# Patient Record
Sex: MALE | Race: WHITE | NOT HISPANIC OR LATINO | Employment: OTHER | URBAN - METROPOLITAN AREA
[De-identification: names, ages, dates, MRNs, and addresses within clinical notes are randomized per-mention and may not be internally consistent; named-entity substitution may affect disease eponyms.]

---

## 2017-01-06 RX ORDER — PEN NEEDLE, DIABETIC 29 G X1/2"
NEEDLE, DISPOSABLE MISCELLANEOUS
Qty: 100 EACH | Refills: 1 | Status: SHIPPED | OUTPATIENT
Start: 2017-01-06 | End: 2017-05-04 | Stop reason: SDUPTHER

## 2017-05-04 RX ORDER — FLURBIPROFEN SODIUM 0.3 MG/ML
SOLUTION/ DROPS OPHTHALMIC
Qty: 100 EACH | Refills: 5 | Status: SHIPPED | OUTPATIENT
Start: 2017-05-04 | End: 2020-06-11

## 2018-12-03 ENCOUNTER — TELEPHONE (OUTPATIENT)
Dept: INTERNAL MEDICINE | Facility: CLINIC | Age: 63
End: 2018-12-03

## 2018-12-03 NOTE — TELEPHONE ENCOUNTER
PT LEFT PRACTICE DUE TO INS REASONS. LAST SEEN DEC, 2015.  PT NOW HAS INS WE ACCEPT, PT WOULD LIKE TO TO RETURN.   PT IS CONSIDERED NEW PT, WOULD YOU LIKE TO TO TAKE ON AS NEW PT?    PT CALL BACK [phone]

## 2018-12-20 ENCOUNTER — OFFICE VISIT (OUTPATIENT)
Dept: INTERNAL MEDICINE | Facility: CLINIC | Age: 63
End: 2018-12-20

## 2018-12-20 ENCOUNTER — TELEPHONE (OUTPATIENT)
Dept: INTERNAL MEDICINE | Facility: CLINIC | Age: 63
End: 2018-12-20

## 2018-12-20 VITALS
HEIGHT: 74 IN | HEART RATE: 64 BPM | SYSTOLIC BLOOD PRESSURE: 142 MMHG | TEMPERATURE: 98.6 F | DIASTOLIC BLOOD PRESSURE: 76 MMHG | WEIGHT: 262 LBS | RESPIRATION RATE: 18 BRPM | BODY MASS INDEX: 33.62 KG/M2

## 2018-12-20 DIAGNOSIS — Z79.4 TYPE 2 DIABETES MELLITUS WITH DIABETIC PERIPHERAL ANGIOPATHY WITHOUT GANGRENE, WITH LONG-TERM CURRENT USE OF INSULIN (HCC): Primary | ICD-10-CM

## 2018-12-20 DIAGNOSIS — I10 ESSENTIAL HYPERTENSION: ICD-10-CM

## 2018-12-20 DIAGNOSIS — I73.9 PERIPHERAL ARTERIAL DISEASE (HCC): ICD-10-CM

## 2018-12-20 DIAGNOSIS — E78.5 HYPERLIPIDEMIA, UNSPECIFIED HYPERLIPIDEMIA TYPE: ICD-10-CM

## 2018-12-20 DIAGNOSIS — I25.10 CORONARY ARTERY DISEASE INVOLVING NATIVE HEART WITHOUT ANGINA PECTORIS, UNSPECIFIED VESSEL OR LESION TYPE: ICD-10-CM

## 2018-12-20 DIAGNOSIS — E11.51 TYPE 2 DIABETES MELLITUS WITH DIABETIC PERIPHERAL ANGIOPATHY WITHOUT GANGRENE, WITH LONG-TERM CURRENT USE OF INSULIN (HCC): Primary | ICD-10-CM

## 2018-12-20 LAB
A/C: NORMAL
ALBUMIN SERPL-MCNC: 4.58 G/DL (ref 3.2–4.8)
ALBUMIN/GLOB SERPL: 2 G/DL (ref 1.5–2.5)
ALP SERPL-CCNC: 70 U/L (ref 25–100)
ALT SERPL W P-5'-P-CCNC: 24 U/L (ref 7–40)
ANION GAP SERPL CALCULATED.3IONS-SCNC: 7 MMOL/L (ref 3–11)
ARTICHOKE IGE QN: 75 MG/DL (ref 0–130)
AST SERPL-CCNC: 21 U/L (ref 0–33)
BILIRUB BLD-MCNC: NEGATIVE MG/DL
BILIRUB SERPL-MCNC: 0.6 MG/DL (ref 0.3–1.2)
BUN BLD-MCNC: 21 MG/DL (ref 9–23)
BUN/CREAT SERPL: 21.4 (ref 7–25)
CALCIUM SPEC-SCNC: 9.2 MG/DL (ref 8.7–10.4)
CHLORIDE SERPL-SCNC: 105 MMOL/L (ref 99–109)
CHOLEST SERPL-MCNC: 124 MG/DL (ref 0–200)
CLARITY, POC: CLEAR
CO2 SERPL-SCNC: 30 MMOL/L (ref 20–31)
COLOR UR: YELLOW
CREAT BLD-MCNC: 0.98 MG/DL (ref 0.6–1.3)
EXPIRATION DATE: ABNORMAL
EXPIRATION DATE: NORMAL
GFR SERPL CREATININE-BSD FRML MDRD: 78 ML/MIN/1.73
GLOBULIN UR ELPH-MCNC: 2.3 GM/DL
GLUCOSE BLD-MCNC: 74 MG/DL (ref 70–100)
GLUCOSE UR STRIP-MCNC: ABNORMAL MG/DL
HBA1C MFR BLD: 8.4 % (ref 4.8–5.6)
HDLC SERPL-MCNC: 45 MG/DL (ref 40–60)
KETONES UR QL: NEGATIVE
LEUKOCYTE EST, POC: NEGATIVE
Lab: ABNORMAL
Lab: NORMAL
NITRITE UR-MCNC: NEGATIVE MG/ML
PH UR: 5 [PH] (ref 5–8)
POC CREATININE URINE: 200
POC MICROALBUMIN URINE: 80
POTASSIUM BLD-SCNC: 3.8 MMOL/L (ref 3.5–5.5)
PROT SERPL-MCNC: 6.9 G/DL (ref 5.7–8.2)
PROT UR STRIP-MCNC: NEGATIVE MG/DL
RBC # UR STRIP: NEGATIVE /UL
SODIUM BLD-SCNC: 142 MMOL/L (ref 132–146)
SP GR UR: 1.02 (ref 1–1.03)
TRIGL SERPL-MCNC: 47 MG/DL (ref 0–150)
UROBILINOGEN UR QL: ABNORMAL

## 2018-12-20 PROCEDURE — 82044 UR ALBUMIN SEMIQUANTITATIVE: CPT | Performed by: INTERNAL MEDICINE

## 2018-12-20 PROCEDURE — 99204 OFFICE O/P NEW MOD 45 MIN: CPT | Performed by: INTERNAL MEDICINE

## 2018-12-20 PROCEDURE — 80061 LIPID PANEL: CPT | Performed by: INTERNAL MEDICINE

## 2018-12-20 PROCEDURE — 80053 COMPREHEN METABOLIC PANEL: CPT | Performed by: INTERNAL MEDICINE

## 2018-12-20 PROCEDURE — 83036 HEMOGLOBIN GLYCOSYLATED A1C: CPT | Performed by: INTERNAL MEDICINE

## 2018-12-20 PROCEDURE — 81003 URINALYSIS AUTO W/O SCOPE: CPT | Performed by: INTERNAL MEDICINE

## 2018-12-20 PROCEDURE — 36415 COLL VENOUS BLD VENIPUNCTURE: CPT | Performed by: INTERNAL MEDICINE

## 2018-12-20 RX ORDER — ATORVASTATIN CALCIUM 40 MG/1
40 TABLET, FILM COATED ORAL DAILY
COMMUNITY
Start: 2014-09-30 | End: 2019-12-12 | Stop reason: SDUPTHER

## 2018-12-20 RX ORDER — CILOSTAZOL 50 MG/1
50 TABLET ORAL 2 TIMES DAILY
COMMUNITY
Start: 2014-09-25 | End: 2019-01-21 | Stop reason: SDUPTHER

## 2018-12-20 RX ORDER — INSULIN ASPART 100 [IU]/ML
50 INJECTION, SUSPENSION SUBCUTANEOUS 2 TIMES DAILY
Refills: 1 | COMMUNITY
Start: 2018-11-23 | End: 2018-12-20 | Stop reason: SDUPTHER

## 2018-12-20 RX ORDER — PIOGLITAZONEHYDROCHLORIDE 30 MG/1
1 TABLET ORAL DAILY
Refills: 1 | COMMUNITY
Start: 2018-12-03 | End: 2019-01-07

## 2018-12-20 RX ORDER — LISINOPRIL 2.5 MG/1
1 TABLET ORAL DAILY
Refills: 0 | COMMUNITY
Start: 2018-11-26 | End: 2020-10-21

## 2018-12-20 RX ORDER — ASPIRIN 81 MG/1
1 TABLET ORAL DAILY
COMMUNITY
Start: 2013-04-22

## 2018-12-20 RX ORDER — GABAPENTIN 400 MG/1
400 CAPSULE ORAL 2 TIMES DAILY
Refills: 0 | COMMUNITY
Start: 2018-12-17 | End: 2019-02-01 | Stop reason: SDUPTHER

## 2018-12-20 RX ORDER — APIXABAN 5 MG/1
1 TABLET, FILM COATED ORAL 2 TIMES DAILY
Refills: 1 | COMMUNITY
Start: 2018-11-27 | End: 2019-01-30 | Stop reason: SDUPTHER

## 2018-12-20 RX ORDER — METOPROLOL SUCCINATE 25 MG/1
1 TABLET, EXTENDED RELEASE ORAL DAILY
Refills: 0 | COMMUNITY
Start: 2018-11-19 | End: 2019-09-08 | Stop reason: SDUPTHER

## 2018-12-20 RX ORDER — INSULIN ASPART 100 [IU]/ML
50 INJECTION, SUSPENSION SUBCUTANEOUS 2 TIMES DAILY
Qty: 10 PEN | Refills: 5 | Status: SHIPPED | OUTPATIENT
Start: 2018-12-20 | End: 2019-06-17 | Stop reason: SDUPTHER

## 2018-12-20 NOTE — TELEPHONE ENCOUNTER
----- Message from Ebonie Vallejo sent at 12/20/2018  1:43 PM EST -----  [facility] CALLED AND NEED TO VERIFY THE DOSING FOR NOVOLOG MIX.  [phone]

## 2018-12-20 NOTE — TELEPHONE ENCOUNTER
S/W RICARDO LUCIO.  STATES JUST NEEDED TO VERIFY NOVALOG DOSE.  STATES RX IS 50 UNITS TWICE DAILY AND THAT IS WHAT PT REPORTED TODAY THAT HE WAS TAKING.  VERB UNDERSTANDING AND APPREC.

## 2018-12-20 NOTE — PROGRESS NOTES
Chief Complaint   Patient presents with   • RE-ESTABLISH CARE       History of Present Illness    The patient presents for a follow-up related to Type 2 Diabetes Mellitus and reports that he checks his blood sugars at home. His sugars are checked four times daily. The average sugars are in the 150-200 range. He denies hypoglycemic symptoms. The patient denies polyuria, polydypsia or polyphagia. He reports no symptoms of neuropathy. The patient takes his medication as prescribed. He is taking insulin. His injection sites are rotated appropriately. The patient does check his feet daily at home. He denies chest pain, shortness of breath, orthopnea, paroxysmal nocturnal dyspnea, dyspnea on exertion, edema, palpitations or syncope.    The patient presents for a follow-up related to hypertension. The patient reports that he has had no headaches or blurred vision. He states that he is taking his medication as prescribed. He is not having medication side effects.    The patient presents for a follow-up related to hyperlipidemia. He is following a low fat diet. He reports that he is exercising. He is taking atorvastatin. The patient is taking his medication as prescribed. He reports no medication side effects, including muscle cramps, abdominal pain, headaches or weakness.    The patient presents for a follow-up related to coronary artery disease. He denies diaphoresis. The patient reports that he is diabetic, hypertensive and hyperlipidemic but he is not a smoker. He takes an aspirin daily.    The patient presents for follow-up of peripheral arterial disease. He states that he has claudication, hair loss and leg pain. He denies back pain. He states that he is taking no medications. The claudication begins at rest and is relieved with rest. He denies ulcerations.    Review of Systems    CONSTITUTIONAL- Denies Unexplained Weight Loss, Fever, Chills, Fatigue, Weakness or Malaise.    HENT- Denies Nasal Discharge, Sore Throat,  Ear Pain, Ear Drainage, Sinus Pain, Nasal Congestion, Decreased Hearing or Tinnitus.    GASTROINTESTINAL- Denies Abdominal Pain, Nausea, Vomiting, Diarrhea, Blood per Rectum, Constipation or Heartburn.    PULMONARY- Denies Wheezing, Sputum Production, Cough, Hemoptysis or Pleuritic Chest Pain.    ENDOCRINE- Denies Cold Intolerance, Depression, Heat Intolerance, Memory Loss, Sleep Disturbance or Weight Gain.    Medications      Current Outpatient Medications:   •  aspirin 81 MG EC tablet, Take 1 tablet by mouth Daily., Disp: , Rfl:   •  atorvastatin (LIPITOR) 40 MG tablet, Take 40 mg by mouth Daily., Disp: , Rfl:   •  B-D UF III MINI PEN NEEDLES 31G X 5 MM misc, use as directed, Disp: 100 each, Rfl: 5  •  cilostazol (PLETAL) 50 MG tablet, Take 50 mg by mouth 2 (Two) Times a Day., Disp: , Rfl:   •  ELIQUIS 5 MG tablet tablet, Take 1 tablet by mouth 2 (Two) Times a Day., Disp: , Rfl: 1  •  gabapentin (NEURONTIN) 400 MG capsule, Take 400 mg by mouth 2 (Two) Times a Day., Disp: , Rfl: 0  •  lisinopril (PRINIVIL,ZESTRIL) 2.5 MG tablet, Take 1 tablet by mouth Daily., Disp: , Rfl: 0  •  metFORMIN (GLUCOPHAGE) 1000 MG tablet, Take 1 tablet by mouth 2 (Two) Times a Day., Disp: , Rfl: 0  •  metoprolol succinate XL (TOPROL-XL) 25 MG 24 hr tablet, Take 1 tablet by mouth Daily., Disp: , Rfl: 0  •  NOVOLOG MIX 70/30 FLEXPEN (70-30) 100 UNIT/ML suspension pen-injector injection, Inject 50 Units under the skin into the appropriate area as directed 2 (Two) Times a Day., Disp: , Rfl: 1  •  pioglitazone (ACTOS) 30 MG tablet, Take 1 tablet by mouth Daily., Disp: , Rfl: 1     Allergies    Allergies   Allergen Reactions   • Neosporin [Neomycin-Bacitracin Zn-Polymyx] Rash       Problem List    There is no problem list on file for this patient.    Past Medical History:   Diagnosis Date   • Coronary artery disease    • Diabetes mellitus (CMS/HCC)    • Hyperlipidemia    • Peripheral vascular disease (CMS/HCC)      Past Surgical History:  "  Procedure Laterality Date   • CORONARY ARTERY BYPASS GRAFT     • FEMORAL ARTERY - POPLITEAL ARTERY BYPASS GRAFT       Social History     Socioeconomic History   • Marital status:      Spouse name: Not on file   • Number of children: Not on file   • Years of education: Not on file   • Highest education level: Not on file   Tobacco Use   • Smoking status: Former Smoker     Last attempt to quit:      Years since quittin.9   • Smokeless tobacco: Never Used   Substance and Sexual Activity   • Alcohol use: No     Frequency: Never       Medications, Allergies, Problems List and Past History were reviewed and updated.    Physical Examination    /76 (BP Location: Left arm, Patient Position: Sitting, Cuff Size: Adult)   Pulse 64   Temp 98.6 °F (37 °C) (Temporal)   Resp 18   Ht 187.3 cm (73.75\")   Wt 119 kg (262 lb)   BMI 33.87 kg/m²       HEENT: Head- Normocephalic Atraumatic. Facies- Within normal limits. Pinnas- Normal texture and shape bilaterally. Canals- Normal bilaterally. TMs- Normal bilaterally. Nares- Patent bilaterally. Nasal Septum- is normal. There is no tenderness to palpation over the frontal or maxillary sinuses. Lids- Normal bilaterally. Conjunctiva- Clear bilaterally. Sclera- Anicteric bilaterally. Oropharynx- Moist with no lesions. Tonsils- No enlargement, erythema or exudate.    Neck: Thyroid- non enlarged, symmetric and has no nodules. No bruits are detected. ROM- Normal Range of Motion with no rigidity.    Lungs: Auscultation- Clear to auscultation bilaterally. There are no retractions, clubbing or cyanosis. The Expiratory to Inspiratory ratio is equal.    Cardiovascular: There are no carotid bruits. Heart- Normal Rate with Regular rhythm and no murmurs. There are no gallops. There are no rubs. In the lower extremities there is no edema. The upper extremities do not have edema.    Abdomen: Soft, benign, non-tender with no masses, hernias, organomegaly or scars.    Feet: The " feet are symmetric with normal hay landmarks. There is no tenderness to palpation bilaterally. The feet have normal posterior tibial and dorsalis pedis pulses and normal capillary refill bilaterally. The monofilament examination is normal bilaterally.       The arches are normal bilaterally. There are no skin or nail lesions present. There are no ingrown nails. There are no bunions noted.    Impression and Assessment    Type 2 Diabetes Mellitus with Other Circulatory Complications.    Type 2 Diabetes Mellitus.    Essential Hypertension.    Hyperlipidemia.    Coronary Artery Disease.    Peripheral Arterial Disease.    Plan    Essential Hypertension Plan: The current plan was continued.    Hyperlipidemia Plan: The patient was instructed to exercise daily, eat a low fat diet and continue his medications.    Coronary Artery Disease Plan: The current plan was continued.    Peripheral Arterial Disease Plan: The current plan was continued.    Type 2 Diabetes Mellitus with Other Circulatory Complications Plan: The current plan was continued.    Type 2 Diabetes Mellitus Plan: The current plan was continued.    Too was seen today for re-establish care.    Diagnoses and all orders for this visit:    Type 2 diabetes mellitus with diabetic peripheral angiopathy without gangrene, with long-term current use of insulin (CMS/[facility])  -     Comprehensive Metabolic Panel  -     Hemoglobin A1c  -     POC Microalbumin  -     SITagliptin (JANUVIA) 100 MG tablet; Take 1 tablet by mouth Daily.  -     NOVOLOG MIX 70/30 FLEXPEN (70-30) 100 UNIT/ML suspension pen-injector injection; Inject 0.5 mL under the skin into the appropriate area as directed 2 (Two) Times a Day.    Essential hypertension  -     Comprehensive Metabolic Panel  -     POC Urinalysis Dipstick, Automated    Hyperlipidemia, unspecified hyperlipidemia type  -     Comprehensive Metabolic Panel  -     Lipid Panel    Coronary artery disease involving native heart without  angina pectoris, unspecified vessel or lesion type    Peripheral arterial disease (CMS/HCC)        Return to Office    The patient was instructed to return for follow-up in 3 weeks.    The patient was instructed to return sooner if the condition changes, worsens, or does not resolve.

## 2019-01-07 ENCOUNTER — OFFICE VISIT (OUTPATIENT)
Dept: INTERNAL MEDICINE | Facility: CLINIC | Age: 64
End: 2019-01-07

## 2019-01-07 VITALS
DIASTOLIC BLOOD PRESSURE: 80 MMHG | RESPIRATION RATE: 18 BRPM | HEART RATE: 78 BPM | HEIGHT: 74 IN | SYSTOLIC BLOOD PRESSURE: 150 MMHG | WEIGHT: 266.2 LBS | BODY MASS INDEX: 34.16 KG/M2 | TEMPERATURE: 98.2 F

## 2019-01-07 DIAGNOSIS — R05.9 COUGH: Primary | ICD-10-CM

## 2019-01-07 LAB
EXPIRATION DATE: NORMAL
FLUAV AG NPH QL: NEGATIVE
FLUBV AG NPH QL: NEGATIVE
INTERNAL CONTROL: NORMAL
Lab: NORMAL

## 2019-01-07 PROCEDURE — 87804 INFLUENZA ASSAY W/OPTIC: CPT | Performed by: INTERNAL MEDICINE

## 2019-01-07 PROCEDURE — 99213 OFFICE O/P EST LOW 20 MIN: CPT | Performed by: INTERNAL MEDICINE

## 2019-01-07 RX ORDER — BENZONATATE 200 MG/1
200 CAPSULE ORAL 3 TIMES DAILY PRN
Qty: 20 CAPSULE | Refills: 0 | Status: SHIPPED | OUTPATIENT
Start: 2019-01-07 | End: 2019-04-15

## 2019-01-07 RX ORDER — AZITHROMYCIN 250 MG/1
TABLET, FILM COATED ORAL
Qty: 6 TABLET | Refills: 0 | Status: SHIPPED | OUTPATIENT
Start: 2019-01-07 | End: 2019-01-14

## 2019-01-07 RX ORDER — FLUTICASONE PROPIONATE 50 MCG
2 SPRAY, SUSPENSION (ML) NASAL DAILY
Qty: 16 G | Refills: 3 | Status: SHIPPED | OUTPATIENT
Start: 2019-01-07 | End: 2019-12-16 | Stop reason: SDUPTHER

## 2019-01-07 NOTE — PROGRESS NOTES
"OFFICE PROGRESS NOTE    Chief Complaint   Patient presents with   • Cough     x2 days    • Nasal Congestion     x2days    • Vomiting     x2 days        HPI: 63 y.o. male pt of Dr. Geller's with hx DM2, HTN, HLD, CAD, PAD here for:    Has had 2d ST, cough (yellow-green phlegm), nasal congestion (yellow), +frontal HA and maxillary sinus pressure. Denies F/C, SOB, abd pain. Some N/V (post tussive). Taking Mucinex, tylenol with no relief. Reports this is his \"4th round of sinus issues this fall.\" Prior PCP Rx'd Azithro, Amox (didn't work) and a few other \"stronger abx\" but can't recall names.     Review of Systems   Constitutional: Negative for activity change, appetite change and fever.   HENT: Positive for congestion, postnasal drip, rhinorrhea, sinus pressure and sore throat. Negative for sneezing.    Eyes: Negative for discharge and visual disturbance.   Respiratory: Positive for cough. Negative for shortness of breath.    Cardiovascular: Negative for chest pain and palpitations.   Gastrointestinal: Negative for abdominal distention, abdominal pain, blood in stool, constipation, nausea and vomiting.   Endocrine: Negative for cold intolerance and heat intolerance.   Genitourinary: Negative for dysuria.   Musculoskeletal: Negative for neck stiffness.   Skin: Negative for rash.   Allergic/Immunologic: Negative for environmental allergies and food allergies.   Neurological: Negative for headache.   Hematological: Negative for adenopathy.   Psychiatric/Behavioral: Negative for depressed mood.       The following portions of the patient's history were reviewed and updated as appropriate: allergies, current medications, past family history, past medical history, past social history, past surgical history and problem list.      Physical Exam:  Vitals:    01/07/19 1002   BP: 150/80   BP Location: Right arm   Patient Position: Sitting   Cuff Size: Adult   Pulse: 78   Resp: 18   Temp: 98.2 °F (36.8 °C)   Weight: 121 kg (266 " "lb 3.2 oz)   Height: 187.3 cm (73.75\")       Physical Exam   Constitutional: He is oriented to person, place, and time. He appears well-developed and well-nourished. No distress.   Speaks in full sentences, no active coughing during exam   HENT:   Head: Normocephalic and atraumatic.   Right Ear: A middle ear effusion (serous) is present.   Left Ear: A middle ear effusion (serous) is present.   Nose: Mucosal edema and rhinorrhea present. Right sinus exhibits maxillary sinus tenderness. Left sinus exhibits maxillary sinus tenderness.   +cobblestoning   Eyes: Right eye exhibits no discharge. Left eye exhibits no discharge.   Neck: Normal range of motion. Neck supple.   Cardiovascular: Normal rate, regular rhythm and normal heart sounds. Exam reveals no gallop and no friction rub.   No murmur heard.  Pulmonary/Chest: Effort normal and breath sounds normal. No stridor. No respiratory distress. He has no wheezes. He has no rales.   Musculoskeletal: Normal range of motion. He exhibits no edema.   Lymphadenopathy:     He has no cervical adenopathy.   Neurological: He is alert and oriented to person, place, and time. He exhibits normal muscle tone.   Skin: Skin is warm and dry. Capillary refill takes less than 2 seconds. He is not diaphoretic.   Psychiatric: He has a normal mood and affect.   Vitals reviewed.    Assesment and Plan: 63 y.o. male here for:  Cough  Likely acute bronchitis given absence of focal lung sounds, fevers. Rx Tessalon Perles PRN, Azithromycin x 5d and Flonase. Ok to c/w Mucinex PRN. Pt requesting \"stronger abx\" and I relayed that given current exam without tachypnea, focal lung sounds etc, no role for broader spectrum abx (I.e. Levaquin) bradley given risk for GI upset, Cdiff, resistance etc. Pt agreeable to this explanation. Has prescheduled f/u with PCP next week but can call sooner with new questions/concerns (I.e. F/C, blood in sputum, SOB etc).      Return for Next scheduled follow up, As needed if no " improvement or new symptoms.    Trinidad Aquino MD  1/7/2019

## 2019-01-07 NOTE — ASSESSMENT & PLAN NOTE
"Likely acute bronchitis given absence of focal lung sounds, fevers. Rx Tessalon Perles PRN, Azithromycin x 5d and Flonase. Ok to c/w Mucinex PRN. Pt requesting \"stronger abx\" and I relayed that given current exam without tachypnea, focal lung sounds etc, no role for broader spectrum abx (I.e. Levaquin) bradley given risk for GI upset, Cdiff, resistance etc. Pt agreeable to this explanation. Has prescheduled f/u with PCP next week but can call sooner with new questions/concerns (I.e. F/C, blood in sputum, SOB etc).  "

## 2019-01-14 ENCOUNTER — TELEPHONE (OUTPATIENT)
Dept: INTERNAL MEDICINE | Facility: CLINIC | Age: 64
End: 2019-01-14

## 2019-01-14 ENCOUNTER — OFFICE VISIT (OUTPATIENT)
Dept: INTERNAL MEDICINE | Facility: CLINIC | Age: 64
End: 2019-01-14

## 2019-01-14 VITALS
TEMPERATURE: 98.5 F | BODY MASS INDEX: 34 KG/M2 | SYSTOLIC BLOOD PRESSURE: 128 MMHG | DIASTOLIC BLOOD PRESSURE: 78 MMHG | RESPIRATION RATE: 18 BRPM | HEART RATE: 68 BPM | WEIGHT: 263 LBS

## 2019-01-14 DIAGNOSIS — Z79.4 TYPE 2 DIABETES MELLITUS WITH DIABETIC PERIPHERAL ANGIOPATHY WITHOUT GANGRENE, WITH LONG-TERM CURRENT USE OF INSULIN (HCC): Primary | ICD-10-CM

## 2019-01-14 DIAGNOSIS — J01.90 ACUTE SINUSITIS, RECURRENCE NOT SPECIFIED, UNSPECIFIED LOCATION: ICD-10-CM

## 2019-01-14 DIAGNOSIS — E11.51 TYPE 2 DIABETES MELLITUS WITH DIABETIC PERIPHERAL ANGIOPATHY WITHOUT GANGRENE, WITH LONG-TERM CURRENT USE OF INSULIN (HCC): Primary | ICD-10-CM

## 2019-01-14 LAB — HBA1C MFR BLD: 8.4 % (ref 4.8–5.6)

## 2019-01-14 PROCEDURE — 83036 HEMOGLOBIN GLYCOSYLATED A1C: CPT | Performed by: INTERNAL MEDICINE

## 2019-01-14 PROCEDURE — 99214 OFFICE O/P EST MOD 30 MIN: CPT | Performed by: INTERNAL MEDICINE

## 2019-01-14 PROCEDURE — 36415 COLL VENOUS BLD VENIPUNCTURE: CPT | Performed by: INTERNAL MEDICINE

## 2019-01-14 RX ORDER — CEFDINIR 300 MG/1
300 CAPSULE ORAL 2 TIMES DAILY
Qty: 28 CAPSULE | Refills: 0 | Status: SHIPPED | OUTPATIENT
Start: 2019-01-14 | End: 2019-02-25

## 2019-01-14 NOTE — TELEPHONE ENCOUNTER
----- Message from Tess Aviles sent at 1/14/2019  2:32 PM EST -----  Contact: WIFE  WILLIE RICHTER CALLING FOR HER  ALYSA RICHTER WHO HAD HIS A1C CHECKED TODAY, SHE WANTS TO KNOW IF THE RESULTS ARE IN. SHE CAN BE REACHED [phone]

## 2019-01-14 NOTE — PROGRESS NOTES
Chief Complaint   Patient presents with   • Follow-up     3 WEEK FOLLOW UP       History of Present Illness      The patient presents for a follow-up related to Type 2 Diabetes Mellitus and reports that he checks his blood sugars at home. His sugars are checked every other day. The average sugars are in the 100-150 range. He denies hypoglycemic symptoms. The patient denies polyuria, polydypsia or polyphagia. He reports no symptoms of neuropathy. The patient takes his medication as prescribed. He is taking insulin. His injection sites are rotated appropriately. The patient does check his feet daily at home. He denies chest pain, shortness of breath, orthopnea, paroxysmal nocturnal dyspnea, dyspnea on exertion, edema, palpitations or syncope.    The patient complains of sinus pain, nasal congestion, nasal discharge and facial pain.    The patient reports a 3 week history of upper respiratory symptoms. The patient has symptoms of a wet cough and a headache but the patient does not have a dry cough, wheezing, fever, eye drainage, ear pain, ear drainage, nausea, vomiting, diarrhea, rash, decreased appetite, abdominal pain or sore throat. The cough is non productive. The nasal discharge is yellow and thick. The patient has used OTC oral decongestants, antibiotics, cool mist humidifiers and mucolytics for treatment of this illness. The antibiotic used augmentin and z-izzy x 2   The patient completed the antibiotic. The symptoms have not improved with the antibiotics. The mucolytic agent did not give symptom relief. The oral decongestants did not give relief of the congestion.    Review of Systems    HENT- Reports: Decreased Hearing. Denies: Tinnitus.    GASTROINTESTINAL- Denies Blood per Rectum, Constipation or Heartburn.    PULMONARY- Reports: Cough. Denies: Sputum Production or Hemoptysis.    CARDIOVASCULAR- Denies Claudication or Irregular Heart Beat.    ENDOCRINE- Denies Cold Intolerance, Depression, Fatigue, Hair Loss,  Heat Intolerance, Memory Loss, Sleep Disturbance, Weight Gain or Weight Loss.    Medications      Current Outpatient Medications:   •  aspirin 81 MG EC tablet, Take 1 tablet by mouth Daily., Disp: , Rfl:   •  atorvastatin (LIPITOR) 40 MG tablet, Take 40 mg by mouth Daily., Disp: , Rfl:   •  B-D UF III MINI PEN NEEDLES 31G X 5 MM misc, use as directed, Disp: 100 each, Rfl: 5  •  benzonatate (TESSALON) 200 MG capsule, Take 1 capsule by mouth 3 (Three) Times a Day As Needed for Cough., Disp: 20 capsule, Rfl: 0  •  cilostazol (PLETAL) 50 MG tablet, Take 50 mg by mouth 2 (Two) Times a Day., Disp: , Rfl:   •  ELIQUIS 5 MG tablet tablet, Take 1 tablet by mouth 2 (Two) Times a Day., Disp: , Rfl: 1  •  fluticasone (FLONASE) 50 MCG/ACT nasal spray, 2 sprays into the nostril(s) as directed by provider Daily., Disp: 16 g, Rfl: 3  •  gabapentin (NEURONTIN) 400 MG capsule, Take 400 mg by mouth 2 (Two) Times a Day., Disp: , Rfl: 0  •  lisinopril (PRINIVIL,ZESTRIL) 2.5 MG tablet, Take 1 tablet by mouth Daily., Disp: , Rfl: 0  •  metFORMIN (GLUCOPHAGE) 1000 MG tablet, Take 1 tablet by mouth 2 (Two) Times a Day., Disp: , Rfl: 0  •  metoprolol succinate XL (TOPROL-XL) 25 MG 24 hr tablet, Take 1 tablet by mouth Daily., Disp: , Rfl: 0  •  NOVOLOG MIX 70/30 FLEXPEN (70-30) 100 UNIT/ML suspension pen-injector injection, Inject 0.5 mL under the skin into the appropriate area as directed 2 (Two) Times a Day. (Patient taking differently: Inject 48 Units under the skin into the appropriate area as directed 2 (Two) Times a Day.), Disp: 10 pen, Rfl: 5  •  SITagliptin (JANUVIA) 100 MG tablet, Take 1 tablet by mouth Daily., Disp: 30 tablet, Rfl: 5  •  cefdinir (OMNICEF) 300 MG capsule, Take 1 capsule by mouth 2 (Two) Times a Day., Disp: 28 capsule, Rfl: 0     Allergies    Allergies   Allergen Reactions   • Neosporin [Neomycin-Bacitracin Zn-Polymyx] Rash       Problem List    Patient Active Problem List   Diagnosis   • Cough       Medications,  Allergies, Problems List and Past History were reviewed and updated.    Physical Examination    /78 (BP Location: Left arm, Patient Position: Sitting, Cuff Size: Adult)   Pulse 68   Temp 98.5 °F (36.9 °C) (Temporal)   Resp 18   Wt 119 kg (263 lb)   BMI 34.00 kg/m²     HEENT: Head- Normocephalic Atraumatic. Facies- Within normal limits. Pinnas- Normal texture and shape bilaterally. Canals- Normal bilaterally. TMs- Normal bilaterally. Nares- Patent bilaterally. Nasal Septum- is normal. There is no tenderness to palpation over the frontal or maxillary sinuses. Lids- Normal bilaterally. Conjunctiva- Clear bilaterally. Sclera- Anicteric bilaterally. Oropharynx- Moist with no lesions. Tonsils- No enlargement, erythema or exudate.    Neck: Thyroid- non enlarged, symmetric and has no nodules. No bruits are detected. ROM- Normal Range of Motion with no rigidity.    Lungs: Auscultation- Clear to auscultation bilaterally. There are no retractions, clubbing or cyanosis. The Expiratory to Inspiratory ratio is equal.    Lymph Nodes: Cervical- no enlarged lymph nodes noted. Clavicular- Deferred. Axillary- Deferred. Inguinal- Deferred.    Cardiovascular: There are no carotid bruits. Heart- Normal Rate with Regular rhythm and no murmurs. There are no gallops. There are no rubs. In the lower extremities there is no edema. The upper extremities do not have edema.    Abdomen: Soft, benign, non-tender with no masses, hernias, organomegaly or scars.    Impression and Assessment    Type 2 Diabetes Mellitus.    Acute Sinusitis.    Plan    Type 2 Diabetes Mellitus Plan: Medication was added as noted below.    Acute Sinusitis Plan: A cool mist humidifier was encouraged. Over the counter mucolytic agents were encouraged. He was encouraged to drink plenty of fluids. Medication will be added as noted below.    Too was seen today for follow-up.    Diagnoses and all orders for this visit:    Type 2 diabetes mellitus with diabetic  peripheral angiopathy without gangrene, with long-term current use of insulin (CMS/[facility])  -     Hemoglobin A1c    Acute sinusitis, recurrence not specified, unspecified location  -     cefdinir (OMNICEF) 300 MG capsule; Take 1 capsule by mouth 2 (Two) Times a Day.        Return to Office    The patient was instructed to return for follow-up in 3 months.    The patient was instructed to return sooner if the condition changes, worsens, or does not resolve.

## 2019-01-21 RX ORDER — CILOSTAZOL 50 MG/1
TABLET ORAL
Qty: 60 TABLET | Refills: 3 | Status: SHIPPED | OUTPATIENT
Start: 2019-01-21 | End: 2019-05-11 | Stop reason: SDUPTHER

## 2019-01-31 RX ORDER — APIXABAN 5 MG/1
TABLET, FILM COATED ORAL
Qty: 60 TABLET | Refills: 5 | Status: SHIPPED | OUTPATIENT
Start: 2019-01-31 | End: 2019-08-19 | Stop reason: SDUPTHER

## 2019-02-04 RX ORDER — GABAPENTIN 400 MG/1
CAPSULE ORAL
Qty: 60 CAPSULE | Refills: 0 | Status: SHIPPED | OUTPATIENT
Start: 2019-02-04 | End: 2019-03-11 | Stop reason: SDUPTHER

## 2019-02-25 ENCOUNTER — TELEPHONE (OUTPATIENT)
Dept: INTERNAL MEDICINE | Facility: CLINIC | Age: 64
End: 2019-02-25

## 2019-02-25 RX ORDER — AMOXICILLIN AND CLAVULANATE POTASSIUM 875; 125 MG/1; MG/1
1 TABLET, FILM COATED ORAL 2 TIMES DAILY
Qty: 20 TABLET | Refills: 0 | Status: SHIPPED | OUTPATIENT
Start: 2019-02-25 | End: 2019-03-07

## 2019-02-25 NOTE — TELEPHONE ENCOUNTER
Please call in augmentin 875 mg po BID x 10 days.   If symptoms worsen, don't improve, develops a fever needs to be seen.  Des Geller MD  1:47 PM  02/25/19

## 2019-02-25 NOTE — TELEPHONE ENCOUNTER
----- Message from Destiny Murphy sent at 2/25/2019 10:19 AM EST -----  Wife, Caroline Tai, called and states patient still has sinus infection symptoms of watery eyes, nose running, phlegm. Wants to know what Dr. Geller wants to do for him? Uses [facility]. Wife can be reached at [phone].

## 2019-02-25 NOTE — TELEPHONE ENCOUNTER
RX SENT VIA [facility].     S/W PT WIFE, INFORMED OF RX WITH DIRECTIONS GIVEN.  VERB GOOD UNDERSTANDING AND APPREC.  EXPL IF WORSENS OR DOESN'T IMPROVE THEN WILL NEED TO CALL TO BE SEEN.  AGREED.

## 2019-03-12 RX ORDER — GABAPENTIN 400 MG/1
CAPSULE ORAL
Qty: 60 CAPSULE | Refills: 0 | Status: SHIPPED | OUTPATIENT
Start: 2019-03-12 | End: 2019-04-21 | Stop reason: SDUPTHER

## 2019-04-15 ENCOUNTER — OFFICE VISIT (OUTPATIENT)
Dept: INTERNAL MEDICINE | Facility: CLINIC | Age: 64
End: 2019-04-15

## 2019-04-15 VITALS
DIASTOLIC BLOOD PRESSURE: 62 MMHG | BODY MASS INDEX: 33.87 KG/M2 | SYSTOLIC BLOOD PRESSURE: 132 MMHG | TEMPERATURE: 97.8 F | RESPIRATION RATE: 18 BRPM | WEIGHT: 262 LBS | HEART RATE: 68 BPM

## 2019-04-15 DIAGNOSIS — L57.0 ACTINIC KERATOSIS: ICD-10-CM

## 2019-04-15 DIAGNOSIS — E11.51 TYPE 2 DIABETES MELLITUS WITH DIABETIC PERIPHERAL ANGIOPATHY WITHOUT GANGRENE, WITH LONG-TERM CURRENT USE OF INSULIN (HCC): Primary | ICD-10-CM

## 2019-04-15 DIAGNOSIS — Z79.4 TYPE 2 DIABETES MELLITUS WITH DIABETIC PERIPHERAL ANGIOPATHY WITHOUT GANGRENE, WITH LONG-TERM CURRENT USE OF INSULIN (HCC): Primary | ICD-10-CM

## 2019-04-15 DIAGNOSIS — E78.5 HYPERLIPIDEMIA, UNSPECIFIED HYPERLIPIDEMIA TYPE: ICD-10-CM

## 2019-04-15 DIAGNOSIS — I10 ESSENTIAL HYPERTENSION: ICD-10-CM

## 2019-04-15 LAB
A/C: NORMAL
ALBUMIN SERPL-MCNC: 4.4 G/DL (ref 3.5–5.2)
ALBUMIN/GLOB SERPL: 1.3 G/DL
ALP SERPL-CCNC: 83 U/L (ref 39–117)
ALT SERPL W P-5'-P-CCNC: 18 U/L (ref 1–41)
ANION GAP SERPL CALCULATED.3IONS-SCNC: 18 MMOL/L
AST SERPL-CCNC: 19 U/L (ref 1–40)
BILIRUB BLD-MCNC: NEGATIVE MG/DL
BILIRUB SERPL-MCNC: 0.7 MG/DL (ref 0.2–1.2)
BUN BLD-MCNC: 12 MG/DL (ref 8–23)
BUN/CREAT SERPL: 11.9 (ref 7–25)
CALCIUM SPEC-SCNC: 9.5 MG/DL (ref 8.6–10.5)
CHLORIDE SERPL-SCNC: 102 MMOL/L (ref 98–107)
CHOLEST SERPL-MCNC: 117 MG/DL (ref 0–200)
CLARITY, POC: CLEAR
CO2 SERPL-SCNC: 24 MMOL/L (ref 22–29)
COLOR UR: YELLOW
CREAT BLD-MCNC: 1.01 MG/DL (ref 0.76–1.27)
EXPIRATION DATE: NORMAL
EXPIRATION DATE: NORMAL
GFR SERPL CREATININE-BSD FRML MDRD: 75 ML/MIN/1.73
GLOBULIN UR ELPH-MCNC: 3.3 GM/DL
GLUCOSE BLD-MCNC: 134 MG/DL (ref 65–99)
GLUCOSE UR STRIP-MCNC: NEGATIVE MG/DL
HBA1C MFR BLD: 7.6 % (ref 4.8–5.6)
HDLC SERPL-MCNC: 35 MG/DL (ref 40–60)
KETONES UR QL: NEGATIVE
LDLC SERPL CALC-MCNC: 57 MG/DL (ref 0–100)
LDLC/HDLC SERPL: 1.63 {RATIO}
LEUKOCYTE EST, POC: NEGATIVE
Lab: NORMAL
Lab: NORMAL
NITRITE UR-MCNC: NEGATIVE MG/ML
PH UR: 5 [PH] (ref 5–8)
POC CREATININE URINE: 100
POC MICROALBUMIN URINE: 30
POTASSIUM BLD-SCNC: 4.5 MMOL/L (ref 3.5–5.2)
PROT SERPL-MCNC: 7.7 G/DL (ref 6–8.5)
PROT UR STRIP-MCNC: NEGATIVE MG/DL
RBC # UR STRIP: NEGATIVE /UL
SODIUM BLD-SCNC: 144 MMOL/L (ref 136–145)
SP GR UR: 1.01 (ref 1–1.03)
TRIGL SERPL-MCNC: 124 MG/DL (ref 0–150)
UROBILINOGEN UR QL: NORMAL
VLDLC SERPL-MCNC: 24.8 MG/DL (ref 5–40)

## 2019-04-15 PROCEDURE — 83036 HEMOGLOBIN GLYCOSYLATED A1C: CPT | Performed by: INTERNAL MEDICINE

## 2019-04-15 PROCEDURE — 81003 URINALYSIS AUTO W/O SCOPE: CPT | Performed by: INTERNAL MEDICINE

## 2019-04-15 PROCEDURE — 36415 COLL VENOUS BLD VENIPUNCTURE: CPT | Performed by: INTERNAL MEDICINE

## 2019-04-15 PROCEDURE — 80061 LIPID PANEL: CPT | Performed by: INTERNAL MEDICINE

## 2019-04-15 PROCEDURE — 80053 COMPREHEN METABOLIC PANEL: CPT | Performed by: INTERNAL MEDICINE

## 2019-04-15 PROCEDURE — 82044 UR ALBUMIN SEMIQUANTITATIVE: CPT | Performed by: INTERNAL MEDICINE

## 2019-04-15 PROCEDURE — 99214 OFFICE O/P EST MOD 30 MIN: CPT | Performed by: INTERNAL MEDICINE

## 2019-04-15 NOTE — PROGRESS NOTES
Chief Complaint   Patient presents with   • Follow-up     3 month follow up chronic medical problems       History of Present Illness    He presents for an initial evaluation with a scaly area on his face. This has been present for longer than one year. The condition is painful, enlarging, scaling and itchy. There are no exposures to people with similar lesions. There is no history of new cosmetic or detergent usage on the affected area. No prior treatment has been administered.    The patient presents for a follow-up related to hyperlipidemia. He is following a low fat diet. He reports that he is not exercising. He is taking atorvastatin. The patient is taking his medication as prescribed. He reports no medication side effects, including muscle cramps, abdominal pain, headaches or weakness. He denies chest pain, shortness of breath, orthopnea, paroxysmal nocturnal dyspnea, dyspnea on exertion, edema, palpitations or syncope.    The patient presents for a follow-up related to Type 2 Diabetes Mellitus and reports that he checks his blood sugars at home. His sugars are checked daily. The average sugars are in the 100-150 range. He denies hypoglycemic symptoms. The patient denies polyuria, polydypsia or polyphagia. The patient takes his medication as prescribed. He is taking insulin. His injection sites are rotated appropriately. The patient does check his feet daily at home.    The patient presents for a follow-up related to hypertension. The patient reports that he has had no headaches or blurred vision. He states that he is taking his medication as prescribed. He is not having medication side effects.    Review of Systems    CONSTITUTIONAL- Denies Unexplained Weight Loss, Fever, Chills, Sweats, Fatigue, Weakness or Malaise.    PULMONARY- Denies Wheezing, Sputum Production, Cough, Hemoptysis or Pleuritic Chest Pain.    CARDIOVASCULAR- Denies Claudication or Irregular Heart Beat.    Medications      Current Outpatient  Medications:   •  aspirin 81 MG EC tablet, Take 1 tablet by mouth Daily., Disp: , Rfl:   •  atorvastatin (LIPITOR) 40 MG tablet, Take 40 mg by mouth Daily., Disp: , Rfl:   •  B-D UF III MINI PEN NEEDLES 31G X 5 MM misc, use as directed, Disp: 100 each, Rfl: 5  •  cilostazol (PLETAL) 50 MG tablet, take 1 tablet twice a day (Patient taking differently: take 1 tablet daily), Disp: 60 tablet, Rfl: 3  •  ELIQUIS 5 MG tablet tablet, take 1 tablet by mouth twice a day, Disp: 60 tablet, Rfl: 5  •  fluticasone (FLONASE) 50 MCG/ACT nasal spray, 2 sprays into the nostril(s) as directed by provider Daily., Disp: 16 g, Rfl: 3  •  gabapentin (NEURONTIN) 400 MG capsule, take 1 capsule by mouth twice a day, Disp: 60 capsule, Rfl: 0  •  lisinopril (PRINIVIL,ZESTRIL) 2.5 MG tablet, Take 1 tablet by mouth Daily., Disp: , Rfl: 0  •  metFORMIN (GLUCOPHAGE) 1000 MG tablet, Take 1 tablet by mouth 2 (Two) Times a Day., Disp: , Rfl: 0  •  metoprolol succinate XL (TOPROL-XL) 25 MG 24 hr tablet, Take 1 tablet by mouth Daily., Disp: , Rfl: 0  •  NOVOLOG MIX 70/30 FLEXPEN (70-30) 100 UNIT/ML suspension pen-injector injection, Inject 0.5 mL under the skin into the appropriate area as directed 2 (Two) Times a Day., Disp: 10 pen, Rfl: 5  •  SITagliptin (JANUVIA) 100 MG tablet, Take 1 tablet by mouth Daily., Disp: 30 tablet, Rfl: 5     Allergies    Allergies   Allergen Reactions   • Adhesive Tape Rash   • Neosporin [Neomycin-Bacitracin Zn-Polymyx] Rash       Problem List    Patient Active Problem List   Diagnosis   • Cough       Medications, Allergies, Problems List and Past History were reviewed and updated.    Physical Examination    /62 (BP Location: Left arm, Patient Position: Sitting, Cuff Size: Adult)   Pulse 68   Temp 97.8 °F (36.6 °C) (Temporal)   Resp 18   Wt 119 kg (262 lb)   BMI 33.87 kg/m²     HEENT: Facies- Within normal limits. Lids- Normal bilaterally. Conjunctiva- Clear bilaterally. Sclera- Anicteric bilaterally.    Neck:  Thyroid- non enlarged, symmetric and has no nodules. No bruits are detected.    Lungs: Auscultation- Clear to auscultation bilaterally. There are no retractions, clubbing or cyanosis. The Expiratory to Inspiratory ratio is equal.    Cardiovascular: There are no carotid bruits. Heart- Normal Rate with Regular rhythm and no murmurs. There are no gallops. There are no rubs. In the lower extremities there is no edema. The upper extremities do not have edema.    Abdomen: Soft, benign, non-tender with no masses, hernias, organomegaly or scars.    Dermatologic: The patient has a scaly area on his face. The scaly area is pearly and pink. The affected skin is crusted, dry, excoriated and maculopapular and the condition is noted to be well demarcated.    Impression and Assessment    Actinic keratosis.    Hyperlipidemia.    Type 2 Diabetes Mellitus.    Essential Hypertension.    Plan    Hyperlipidemia Plan: The patient was instructed to exercise daily, eat a low fat diet and continue his medications.    Essential Hypertension Plan: The current plan was continued.    Type 2 Diabetes Mellitus Plan: The current plan was continued.    Actinic keratosis Plan: A dermatology referral was arranged.    Too was seen today for follow-up.    Diagnoses and all orders for this visit:    Type 2 diabetes mellitus with diabetic peripheral angiopathy without gangrene, with long-term current use of insulin (CMS/[facility])  -     Comprehensive Metabolic Panel  -     Hemoglobin A1c  -     POC Urinalysis Dipstick, Automated  -     POC Microalbumin    Essential hypertension  -     Comprehensive Metabolic Panel  -     POC Urinalysis Dipstick, Automated    Hyperlipidemia, unspecified hyperlipidemia type  -     Comprehensive Metabolic Panel  -     Lipid Panel    Actinic keratosis  -     Ambulatory Referral to Dermatology          Return to Office    The patient was instructed to return for follow-up in 4 months.    The patient was instructed to return  sooner if the condition changes, worsens, or does not resolve.

## 2019-04-22 RX ORDER — GABAPENTIN 400 MG/1
CAPSULE ORAL
Qty: 60 CAPSULE | Refills: 0 | Status: SHIPPED | OUTPATIENT
Start: 2019-04-22 | End: 2019-05-23 | Stop reason: SDUPTHER

## 2019-05-13 RX ORDER — CILOSTAZOL 50 MG/1
TABLET ORAL
Qty: 60 TABLET | Refills: 3 | Status: SHIPPED | OUTPATIENT
Start: 2019-05-13 | End: 2019-09-05 | Stop reason: SDUPTHER

## 2019-05-23 RX ORDER — GABAPENTIN 400 MG/1
CAPSULE ORAL
Qty: 60 CAPSULE | Refills: 0 | Status: SHIPPED | OUTPATIENT
Start: 2019-05-23 | End: 2019-07-10 | Stop reason: SDUPTHER

## 2019-06-03 ENCOUNTER — OFFICE VISIT (OUTPATIENT)
Dept: INTERNAL MEDICINE | Facility: CLINIC | Age: 64
End: 2019-06-03

## 2019-06-03 VITALS
SYSTOLIC BLOOD PRESSURE: 140 MMHG | HEART RATE: 70 BPM | HEIGHT: 74 IN | TEMPERATURE: 98.5 F | RESPIRATION RATE: 18 BRPM | WEIGHT: 265 LBS | BODY MASS INDEX: 34.01 KG/M2 | DIASTOLIC BLOOD PRESSURE: 70 MMHG

## 2019-06-03 DIAGNOSIS — R23.9 SKIN CHANGE: Primary | ICD-10-CM

## 2019-06-03 PROCEDURE — 99213 OFFICE O/P EST LOW 20 MIN: CPT | Performed by: NURSE PRACTITIONER

## 2019-06-03 RX ORDER — SULFAMETHOXAZOLE AND TRIMETHOPRIM 800; 160 MG/1; MG/1
1 TABLET ORAL 2 TIMES DAILY
Qty: 20 TABLET | Refills: 0 | Status: SHIPPED | OUTPATIENT
Start: 2019-06-03 | End: 2019-08-01

## 2019-06-03 NOTE — PROGRESS NOTES
Subjective:    Too Tai is a 63 y.o. male.     Chief Complaint   Patient presents with   • Skin Problem     left side of the face x4 weeks (has seen Dermatology)       History of Present Illness   Patient present with spouse.    Patient complains of skin changes on left temporal area x 4 weeks. Patient states areas begin as raised red bumps and he scratches areas open and cannot stop picking at them. Patient states multiple areas have developed and are developing among hair above left ear. He denies itching or tenderness. He states he had an appointment at [facility] for actinic keratoses and had cryotherapy on several areas. Patient and wife state they were told by dermatologist that he had no idea what other areas were. Patient expresses concern about skin cancer. Patient has T2DM.    Current Outpatient Medications:   •  aspirin 81 MG EC tablet, Take 1 tablet by mouth Daily., Disp: , Rfl:   •  atorvastatin (LIPITOR) 40 MG tablet, Take 40 mg by mouth Daily., Disp: , Rfl:   •  B-D UF III MINI PEN NEEDLES 31G X 5 MM misc, use as directed, Disp: 100 each, Rfl: 5  •  cilostazol (PLETAL) 50 MG tablet, take 1 tablet by mouth twice a day, Disp: 60 tablet, Rfl: 3  •  Crisaborole (EUCRISA) 2 % ointment, Apply 1 application topically Daily As Needed (rash)., Disp: 60 g, Rfl: 0  •  ELIQUIS 5 MG tablet tablet, take 1 tablet by mouth twice a day, Disp: 60 tablet, Rfl: 5  •  fluticasone (FLONASE) 50 MCG/ACT nasal spray, 2 sprays into the nostril(s) as directed by provider Daily., Disp: 16 g, Rfl: 3  •  gabapentin (NEURONTIN) 400 MG capsule, take 1 capsule by mouth twice a day, Disp: 60 capsule, Rfl: 0  •  lisinopril (PRINIVIL,ZESTRIL) 2.5 MG tablet, Take 1 tablet by mouth Daily., Disp: , Rfl: 0  •  metFORMIN (GLUCOPHAGE) 1000 MG tablet, Take 1 tablet by mouth 2 (Two) Times a Day., Disp: , Rfl: 0  •  metoprolol succinate XL (TOPROL-XL) 25 MG 24 hr tablet, Take 1 tablet by mouth Daily., Disp: , Rfl: 0  •  NOVOLOG MIX  "70/30 FLEXPEN (70-30) 100 UNIT/ML suspension pen-injector injection, Inject 0.5 mL under the skin into the appropriate area as directed 2 (Two) Times a Day., Disp: 10 pen, Rfl: 5  •  SITagliptin (JANUVIA) 100 MG tablet, Take 1 tablet by mouth Daily., Disp: 30 tablet, Rfl: 5  •  sulfamethoxazole-trimethoprim (BACTRIM DS) 800-160 MG per tablet, Take 1 tablet by mouth 2 (Two) Times a Day., Disp: 20 tablet, Rfl: 0     The following portions of the patient's history were reviewed and updated as appropriate: allergies, current medications, past family history, past medical history, past social history, past surgical history and problem list.    Review of Systems   Constitutional: Negative for chills, fatigue and fever.   HENT: Negative for congestion, ear pain, postnasal drip, rhinorrhea, sinus pressure, sneezing and sore throat.    Eyes: Negative for pain, discharge, redness and itching.   Respiratory: Negative for cough, chest tightness, shortness of breath and wheezing.    Cardiovascular: Negative for chest pain.   Gastrointestinal: Negative for abdominal pain, diarrhea, nausea and vomiting.   Musculoskeletal: Negative for arthralgias and myalgias.   Skin: Positive for rash.   Neurological: Negative for headaches.   Hematological: Negative for adenopathy.       Objective:    /70   Pulse 70   Temp 98.5 °F (36.9 °C)   Resp 18   Ht 187.3 cm (73.75\")   Wt 120 kg (265 lb)   BMI 34.26 kg/m²     Physical Exam   Constitutional: He is oriented to person, place, and time. He appears well-developed and well-nourished. He is active and cooperative.  Non-toxic appearance. He does not have a sickly appearance. He does not appear ill. No distress.   HENT:   Head: Normocephalic and atraumatic.   Right Ear: Tympanic membrane, external ear and ear canal normal.   Left Ear: Tympanic membrane, external ear and ear canal normal.   Nose: Nose normal.   Mouth/Throat: Uvula is midline, oropharynx is clear and moist and mucous " membranes are normal. No oral lesions.   Eyes: Conjunctivae and lids are normal.   Neck: Normal range of motion. Neck supple.   Cardiovascular: Normal rate and regular rhythm.   No murmur heard.  Pulmonary/Chest: Effort normal and breath sounds normal.   Lymphadenopathy:     He has no cervical adenopathy.   Neurological: He is alert and oriented to person, place, and time.   Skin: Skin is warm and dry. Rash noted.   Actinic keratoses scattered on scalp. Multiple pustules and scabbed areas on left and right temporal area.   Psychiatric: He has a normal mood and affect.   Nursing note and vitals reviewed.      Assessment/Plan:    Too was seen today for skin problem.    Diagnoses and all orders for this visit:    Skin change  -     Ambulatory Referral to Dermatology  -     sulfamethoxazole-trimethoprim (BACTRIM DS) 800-160 MG per tablet; Take 1 tablet by mouth 2 (Two) Times a Day.  -     Crisaborole (EUCRISA) 2 % ointment; Apply 1 application topically Daily As Needed (rash).    Discussed to stop scratching open skin.     Return if symptoms worsen or fail to improve.

## 2019-06-08 DIAGNOSIS — E11.51 TYPE 2 DIABETES MELLITUS WITH DIABETIC PERIPHERAL ANGIOPATHY WITHOUT GANGRENE, WITH LONG-TERM CURRENT USE OF INSULIN (HCC): ICD-10-CM

## 2019-06-08 DIAGNOSIS — Z79.4 TYPE 2 DIABETES MELLITUS WITH DIABETIC PERIPHERAL ANGIOPATHY WITHOUT GANGRENE, WITH LONG-TERM CURRENT USE OF INSULIN (HCC): ICD-10-CM

## 2019-06-10 RX ORDER — SITAGLIPTIN 100 MG/1
TABLET, FILM COATED ORAL
Qty: 30 TABLET | Refills: 5 | Status: SHIPPED | OUTPATIENT
Start: 2019-06-10 | End: 2020-09-05 | Stop reason: SDUPTHER

## 2019-06-17 DIAGNOSIS — E11.51 TYPE 2 DIABETES MELLITUS WITH DIABETIC PERIPHERAL ANGIOPATHY WITHOUT GANGRENE, WITH LONG-TERM CURRENT USE OF INSULIN (HCC): ICD-10-CM

## 2019-06-17 DIAGNOSIS — Z79.4 TYPE 2 DIABETES MELLITUS WITH DIABETIC PERIPHERAL ANGIOPATHY WITHOUT GANGRENE, WITH LONG-TERM CURRENT USE OF INSULIN (HCC): ICD-10-CM

## 2019-06-17 RX ORDER — INSULIN ASPART 100 [IU]/ML
INJECTION, SUSPENSION SUBCUTANEOUS
Qty: 30 ML | Refills: 5 | Status: SHIPPED | OUTPATIENT
Start: 2019-06-17 | End: 2019-10-12 | Stop reason: SDUPTHER

## 2019-07-10 RX ORDER — GABAPENTIN 400 MG/1
CAPSULE ORAL
Qty: 60 CAPSULE | Refills: 0 | Status: SHIPPED | OUTPATIENT
Start: 2019-07-10 | End: 2019-08-23 | Stop reason: SDUPTHER

## 2019-07-24 ENCOUNTER — TELEPHONE (OUTPATIENT)
Dept: INTERNAL MEDICINE | Facility: CLINIC | Age: 64
End: 2019-07-24

## 2019-07-24 NOTE — TELEPHONE ENCOUNTER
S/W pt wife, [place], expl we do not have a coupon but that Dr Garner recommended checking with pharmacy and if they don't trying [email or web address].  States she has already checked with pharmacy and they didn't. States she will figure something out to get thru til 8/1/19 when he has insurance again.  Verb apprec.

## 2019-07-24 NOTE — TELEPHONE ENCOUNTER
----- Message from Meredith Edwards sent at 7/24/2019  9:11 AM EDT -----  Pts wife, [place], called and said there was a mix-up with insurance and are not covered until August. They were hoping we could find a coupon to fill Sean:     NOVOLOG MIX 70/30 FLEXPEN (70-30) 100 UNIT/ML suspension pen-injector injection    [place] can be reached at [phone].    Thank you.

## 2019-07-24 NOTE — TELEPHONE ENCOUNTER
I don't have any coupons.  Maybe the pharmacy does.  He can also try [email or web address]  Des Geller MD  1:47 PM  07/24/19

## 2019-08-01 ENCOUNTER — OFFICE VISIT (OUTPATIENT)
Dept: INTERNAL MEDICINE | Facility: CLINIC | Age: 64
End: 2019-08-01

## 2019-08-01 VITALS
SYSTOLIC BLOOD PRESSURE: 128 MMHG | BODY MASS INDEX: 33.48 KG/M2 | RESPIRATION RATE: 18 BRPM | HEART RATE: 72 BPM | TEMPERATURE: 97.8 F | WEIGHT: 259 LBS | DIASTOLIC BLOOD PRESSURE: 74 MMHG

## 2019-08-01 DIAGNOSIS — I10 ESSENTIAL HYPERTENSION: Primary | ICD-10-CM

## 2019-08-01 DIAGNOSIS — R41.3 MEMORY LOSS: ICD-10-CM

## 2019-08-01 DIAGNOSIS — E78.5 HYPERLIPIDEMIA, UNSPECIFIED HYPERLIPIDEMIA TYPE: ICD-10-CM

## 2019-08-01 DIAGNOSIS — E11.9 TYPE 2 DIABETES MELLITUS WITHOUT COMPLICATION, WITHOUT LONG-TERM CURRENT USE OF INSULIN (HCC): ICD-10-CM

## 2019-08-01 LAB
ALBUMIN SERPL-MCNC: 4.1 G/DL (ref 3.5–5.2)
ALBUMIN/GLOB SERPL: 1.2 G/DL
ALP SERPL-CCNC: 83 U/L (ref 39–117)
ALT SERPL W P-5'-P-CCNC: 23 U/L (ref 1–41)
ANION GAP SERPL CALCULATED.3IONS-SCNC: 13.3 MMOL/L (ref 5–15)
AST SERPL-CCNC: 22 U/L (ref 1–40)
BASOPHILS # BLD AUTO: 0.06 10*3/MM3 (ref 0–0.2)
BASOPHILS NFR BLD AUTO: 0.8 % (ref 0–1.5)
BILIRUB SERPL-MCNC: 0.6 MG/DL (ref 0.2–1.2)
BUN BLD-MCNC: 11 MG/DL (ref 8–23)
BUN/CREAT SERPL: 12 (ref 7–25)
CALCIUM SPEC-SCNC: 9.8 MG/DL (ref 8.6–10.5)
CHLORIDE SERPL-SCNC: 99 MMOL/L (ref 98–107)
CHOLEST SERPL-MCNC: 120 MG/DL (ref 0–200)
CO2 SERPL-SCNC: 27.7 MMOL/L (ref 22–29)
CREAT BLD-MCNC: 0.92 MG/DL (ref 0.76–1.27)
DEPRECATED RDW RBC AUTO: 43.8 FL (ref 37–54)
EOSINOPHIL # BLD AUTO: 0.45 10*3/MM3 (ref 0–0.4)
EOSINOPHIL NFR BLD AUTO: 6.1 % (ref 0.3–6.2)
ERYTHROCYTE [DISTWIDTH] IN BLOOD BY AUTOMATED COUNT: 12.7 % (ref 12.3–15.4)
FOLATE SERPL-MCNC: 17.9 NG/ML (ref 4.78–24.2)
GFR SERPL CREATININE-BSD FRML MDRD: 83 ML/MIN/1.73
GLOBULIN UR ELPH-MCNC: 3.5 GM/DL
GLUCOSE BLD-MCNC: 167 MG/DL (ref 65–99)
HBA1C MFR BLD: 7.7 % (ref 4.8–5.6)
HCT VFR BLD AUTO: 47.6 % (ref 37.5–51)
HDLC SERPL-MCNC: 35 MG/DL (ref 40–60)
HGB BLD-MCNC: 15.3 G/DL (ref 13–17.7)
IMM GRANULOCYTES # BLD AUTO: 0.03 10*3/MM3 (ref 0–0.05)
IMM GRANULOCYTES NFR BLD AUTO: 0.4 % (ref 0–0.5)
LDLC SERPL CALC-MCNC: 66 MG/DL (ref 0–100)
LDLC/HDLC SERPL: 1.88 {RATIO}
LYMPHOCYTES # BLD AUTO: 2.06 10*3/MM3 (ref 0.7–3.1)
LYMPHOCYTES NFR BLD AUTO: 28.1 % (ref 19.6–45.3)
MCH RBC QN AUTO: 30.4 PG (ref 26.6–33)
MCHC RBC AUTO-ENTMCNC: 32.1 G/DL (ref 31.5–35.7)
MCV RBC AUTO: 94.6 FL (ref 79–97)
MONOCYTES # BLD AUTO: 0.73 10*3/MM3 (ref 0.1–0.9)
MONOCYTES NFR BLD AUTO: 9.9 % (ref 5–12)
NEUTROPHILS # BLD AUTO: 4.01 10*3/MM3 (ref 1.7–7)
NEUTROPHILS NFR BLD AUTO: 54.7 % (ref 42.7–76)
NRBC BLD AUTO-RTO: 0 /100 WBC (ref 0–0.2)
PLATELET # BLD AUTO: 294 10*3/MM3 (ref 140–450)
PMV BLD AUTO: 10.3 FL (ref 6–12)
POTASSIUM BLD-SCNC: 4.8 MMOL/L (ref 3.5–5.2)
PROT SERPL-MCNC: 7.6 G/DL (ref 6–8.5)
RBC # BLD AUTO: 5.03 10*6/MM3 (ref 4.14–5.8)
SODIUM BLD-SCNC: 140 MMOL/L (ref 136–145)
T4 FREE SERPL-MCNC: 1.21 NG/DL (ref 0.93–1.7)
TRIGL SERPL-MCNC: 96 MG/DL (ref 0–150)
TSH SERPL DL<=0.05 MIU/L-ACNC: 1.93 MIU/ML (ref 0.27–4.2)
VIT B12 BLD-MCNC: 252 PG/ML (ref 211–946)
VLDLC SERPL-MCNC: 19.2 MG/DL (ref 5–40)
WBC NRBC COR # BLD: 7.34 10*3/MM3 (ref 3.4–10.8)

## 2019-08-01 PROCEDURE — 80061 LIPID PANEL: CPT | Performed by: INTERNAL MEDICINE

## 2019-08-01 PROCEDURE — 36415 COLL VENOUS BLD VENIPUNCTURE: CPT | Performed by: INTERNAL MEDICINE

## 2019-08-01 PROCEDURE — 84443 ASSAY THYROID STIM HORMONE: CPT | Performed by: INTERNAL MEDICINE

## 2019-08-01 PROCEDURE — 82746 ASSAY OF FOLIC ACID SERUM: CPT | Performed by: INTERNAL MEDICINE

## 2019-08-01 PROCEDURE — 99214 OFFICE O/P EST MOD 30 MIN: CPT | Performed by: INTERNAL MEDICINE

## 2019-08-01 PROCEDURE — 85025 COMPLETE CBC W/AUTO DIFF WBC: CPT | Performed by: INTERNAL MEDICINE

## 2019-08-01 PROCEDURE — 83036 HEMOGLOBIN GLYCOSYLATED A1C: CPT | Performed by: INTERNAL MEDICINE

## 2019-08-01 PROCEDURE — 82607 VITAMIN B-12: CPT | Performed by: INTERNAL MEDICINE

## 2019-08-01 PROCEDURE — 80053 COMPREHEN METABOLIC PANEL: CPT | Performed by: INTERNAL MEDICINE

## 2019-08-01 PROCEDURE — 84439 ASSAY OF FREE THYROXINE: CPT | Performed by: INTERNAL MEDICINE

## 2019-08-01 NOTE — PATIENT INSTRUCTIONS
Heart-Healthy Eating Plan  Heart-healthy meal planning includes:  · Eating less unhealthy fats.  · Eating more healthy fats.  · Making other changes in your diet.  Talk with your doctor or a diet specialist (dietitian) to create an eating plan that is right for you.  What is my plan?  Your doctor may recommend an eating plan that includes:  · Total fat: ______% or less of total calories a day.  · Saturated fat: ______% or less of total calories a day.  · Cholesterol: less than _________mg a day.  What are tips for following this plan?  Cooking  Avoid frying your food. Try to bake, boil, grill, or broil it instead. You can also reduce fat by:  · Removing the skin from poultry.  · Removing all visible fats from meats.  · Steaming vegetables in water or broth.  Meal planning    · At meals, divide your plate into four equal parts:  ? Fill one-half of your plate with vegetables and green salads.  ? Fill one-fourth of your plate with whole grains.  ? Fill one-fourth of your plate with lean protein foods.  · Eat 4-5 servings of vegetables per day. A serving of vegetables is:  ? 1 cup of raw or cooked vegetables.  ? 2 cups of raw leafy greens.  · Eat 4-5 servings of fruit per day. A serving of fruit is:  ? 1 medium whole fruit.  ? ¼ cup of dried fruit.  ? ½ cup of fresh, frozen, or canned fruit.  ? ½ cup of 100% fruit juice.  · Eat more foods that have soluble fiber. These are apples, broccoli, carrots, beans, peas, and barley. Try to get 20-30 g of fiber per day.  · Eat 4-5 servings of nuts, legumes, and seeds per week:  ? 1 serving of dried beans or legumes equals ½ cup after being cooked.  ? 1 serving of nuts is ¼ cup.  ? 1 serving of seeds equals 1 tablespoon.  General information  · Eat more home-cooked food. Eat less restaurant, buffet, and fast food.  · Limit or avoid alcohol.  · Limit foods that are high in starch and sugar.  · Avoid fried foods.  · Lose weight if you are overweight.  · Keep track of how much salt  (sodium) you eat. This is important if you have high blood pressure. Ask your doctor to tell you more about this.  · Try to add vegetarian meals each week.  Fats  · Choose healthy fats. These include olive oil and canola oil, flaxseeds, walnuts, almonds, and seeds.  · Eat more omega-3 fats. These include salmon, mackerel, sardines, tuna, flaxseed oil, and ground flaxseeds. Try to eat fish at least 2 times each week.  · Check food labels. Avoid foods with trans fats or high amounts of saturated fat.  · Limit saturated fats.  ? These are often found in animal products, such as meats, butter, and cream.  ? These are also found in plant foods, such as palm oil, palm kernel oil, and coconut oil.  · Avoid foods with partially hydrogenated oils in them. These have trans fats. Examples are stick margarine, some tub margarines, cookies, crackers, and other baked goods.  What foods can I eat?  Fruits  All fresh, canned (in natural juice), or frozen fruits.  Vegetables  Fresh or frozen vegetables (raw, steamed, roasted, or grilled). Green salads.  Grains  Most grains. Choose whole wheat and whole grains most of the time. Rice and pasta, including brown rice and pastas made with whole wheat.  Meats and other proteins  Lean, well-trimmed beef, veal, pork, and lamb. Chicken and turkey without skin. All fish and shellfish. Wild duck, rabbit, pheasant, and venison. Egg whites or low-cholesterol egg substitutes. Dried beans, peas, lentils, and tofu. Seeds and most nuts.  Dairy  Low-fat or nonfat cheeses, including ricotta and mozzarella. Skim or 1% milk that is liquid, powdered, or evaporated. Buttermilk that is made with low-fat milk. Nonfat or low-fat yogurt.  Fats and oils  Non-hydrogenated (trans-free) margarines. Vegetable oils, including soybean, sesame, sunflower, olive, peanut, safflower, corn, canola, and cottonseed. Salad dressings or mayonnaise made with a vegetable oil.  Beverages  Mineral water. Coffee and tea. Diet  carbonated beverages.  Sweets and desserts  Sherbet, gelatin, and fruit ice. Small amounts of dark chocolate.  Limit all sweets and desserts.  Seasonings and condiments  All seasonings and condiments.  The items listed above may not be a complete list of foods and drinks you can eat. Contact a dietitian for more options.  What foods should I avoid?  Fruits  Canned fruit in heavy syrup. Fruit in cream or butter sauce. Fried fruit. Limit coconut.  Vegetables  Vegetables cooked in cheese, cream, or butter sauce. Fried vegetables.  Grains  Breads that are made with saturated or trans fats, oils, or whole milk. Croissants. Sweet rolls. Donuts. High-fat crackers, such as cheese crackers.  Meats and other proteins  Fatty meats, such as hot dogs, ribs, sausage, feliz, rib-eye roast or steak. High-fat deli meats, such as salami and bologna. Caviar. Domestic duck and goose. Organ meats, such as liver.  Dairy  Cream, sour cream, cream cheese, and creamed cottage cheese. Whole-milk cheeses. Whole or 2% milk that is liquid, evaporated, or condensed. Whole buttermilk. Cream sauce or high-fat cheese sauce. Yogurt that is made from whole milk.  Fats and oils  Meat fat, or shortening. Cocoa butter, hydrogenated oils, palm oil, coconut oil, palm kernel oil. Solid fats and shortenings, including feliz fat, salt pork, lard, and butter. Nondairy cream substitutes. Salad dressings with cheese or sour cream.  Beverages  Regular sodas and juice drinks with added sugar.  Sweets and desserts  Frosting. Pudding. Cookies. Cakes. Pies. Milk chocolate or white chocolate. Buttered syrups. Full-fat ice cream or ice cream drinks.  The items listed above may not be a complete list of foods and drinks to avoid. Contact a dietitian for more information.  Summary  · Heart-healthy meal planning includes eating less unhealthy fats, eating more healthy fats, and making other changes in your diet.  · Eat a balanced diet. This includes fruits and  vegetables, low-fat or nonfat dairy, lean protein, nuts and legumes, whole grains, and heart-healthy oils and fats.  This information is not intended to replace advice given to you by your health care provider. Make sure you discuss any questions you have with your health care provider.  Document Released: 06/18/2013 Document Revised: 01/25/2019 Document Reviewed: 01/25/2019  [employer] Interactive Patient Education © 2019 [employer]

## 2019-08-01 NOTE — PROGRESS NOTES
Chief Complaint   Patient presents with   • Follow-up     4 month follow up chronic medical problems       History of Present Illness    The patient presents for a follow-up related to Type 2 Diabetes Mellitus and reports that he checks his blood sugars at home. His sugars are checked daily. The average sugars are in the 150-200 range. He denies hypoglycemic symptoms. The patient denies polyuria, polydypsia or polyphagia. He reports no symptoms of neuropathy. The patient takes his medication as prescribed. He is not taking insulin. The patient does check his feet daily at home. He denies chest pain, shortness of breath, orthopnea, paroxysmal nocturnal dyspnea, dyspnea on exertion, edema, palpitations or syncope.    The patient presents for a follow-up related to hyperlipidemia. He is following a low fat diet. He reports that he is exercising. He is taking atorvastatin. The patient is taking his medication as prescribed. He reports no medication side effects, including muscle cramps, abdominal pain, headaches or weakness.    The patient presents for a follow-up related to hypertension. The patient reports that he has had no headaches or blurred vision. He states that he is taking his medication as prescribed. He is not having medication side effects.    The patient complains of memory loss over 6 months. The primary type of memory affected is learning and retention, complex tasks and name recall. The patient still drives. He states that he does get lost. He lives with his spouse. The patient is able to stay alone. There are no symptoms of depression.    Review of Systems    CONSTITUTIONAL- Denies Unexplained Weight Loss, Fever, Chills, Sweats, Fatigue, Weakness or Malaise.    HENT- Denies Nasal Discharge, Sore Throat, Ear Pain, Ear Drainage, Sinus Pain, Nasal Congestion, Decreased Hearing or Tinnitus.    GASTROINTESTINAL- Denies Abdominal Pain, Nausea, Vomiting, Diarrhea, Blood per Rectum, Constipation or  Heartburn.    PULMONARY- Denies Wheezing, Sputum Production, Cough, Hemoptysis or Pleuritic Chest Pain.    CARDIOVASCULAR- Denies Claudication or Irregular Heart Beat.    NEUROLOGIC- Reports: Memory Loss. Denies: Seizures, Confusion or Depression.    Medications      Current Outpatient Medications:   •  aspirin 81 MG EC tablet, Take 1 tablet by mouth Daily., Disp: , Rfl:   •  atorvastatin (LIPITOR) 40 MG tablet, Take 40 mg by mouth Daily., Disp: , Rfl:   •  B-D UF III MINI PEN NEEDLES 31G X 5 MM misc, use as directed, Disp: 100 each, Rfl: 5  •  cilostazol (PLETAL) 50 MG tablet, take 1 tablet by mouth twice a day, Disp: 60 tablet, Rfl: 3  •  Crisaborole (EUCRISA) 2 % ointment, Apply 1 application topically Daily As Needed (rash)., Disp: 60 g, Rfl: 0  •  ELIQUIS 5 MG tablet tablet, take 1 tablet by mouth twice a day, Disp: 60 tablet, Rfl: 5  •  fluticasone (FLONASE) 50 MCG/ACT nasal spray, 2 sprays into the nostril(s) as directed by provider Daily., Disp: 16 g, Rfl: 3  •  gabapentin (NEURONTIN) 400 MG capsule, take 1 capsule by mouth twice a day, Disp: 60 capsule, Rfl: 0  •  JANUVIA 100 MG tablet, take 1 tablet by mouth once daily, Disp: 30 tablet, Rfl: 5  •  lisinopril (PRINIVIL,ZESTRIL) 2.5 MG tablet, Take 1 tablet by mouth Daily., Disp: , Rfl: 0  •  metFORMIN (GLUCOPHAGE) 1000 MG tablet, Take 1 tablet by mouth 2 (Two) Times a Day., Disp: , Rfl: 0  •  metoprolol succinate XL (TOPROL-XL) 25 MG 24 hr tablet, Take 1 tablet by mouth Daily., Disp: , Rfl: 0  •  NOVOLOG MIX 70/30 FLEXPEN (70-30) 100 UNIT/ML suspension pen-injector injection, inject 50 units subcutaneously twice a day, Disp: 30 mL, Rfl: 5     Allergies    Allergies   Allergen Reactions   • Adhesive Tape Rash   • Neosporin [Neomycin-Bacitracin Zn-Polymyx] Rash       Problem List    Patient Active Problem List   Diagnosis   • Cough       Medications, Allergies, Problems List and Past History were reviewed and updated.    Physical Examination    /74 (BP  Location: Left arm, Patient Position: Sitting, Cuff Size: Adult)   Pulse 72   Temp 97.8 °F (36.6 °C) (Temporal)   Resp 18   Wt 117 kg (259 lb)   BMI 33.48 kg/m²     HEENT: Facies- Within normal limits. Lids- Normal bilaterally. Conjunctiva- Clear bilaterally. Sclera- Anicteric bilaterally.    Neck: Thyroid- non enlarged, symmetric and has no nodules. No bruits are detected.    Lungs: Auscultation- Clear to auscultation bilaterally. There are no retractions, clubbing or cyanosis. The Expiratory to Inspiratory ratio is equal.    Lymph Nodes: Cervical- no enlarged lymph nodes noted. Clavicular- Deferred. Axillary- Deferred. Inguinal- Deferred.    Cardiovascular: There are no carotid bruits. Heart- Normal Rate with Regular rhythm and no murmurs. There are no gallops. There are no rubs. In the lower extremities there is no edema. The upper extremities do not have edema.    Abdomen: Soft, benign, non-tender with no masses, hernias, organomegaly or scars.    Neurologic Exam: MMSE: 22/30.    Cranial Nerves II-XII: Intact    Upper Extremity Neuro Exam: Muscle Strength is 5/5 in all major upper extremity muscle groups. Deep Tendon Reflexes are 2+ and equal in the biceps, triceps and brachioradialis distributions bilaterally. Sensation is normal in all distributions.    Lower Extremity Neuro Exam: Muscle Strength is 5/5 in all major lower extremity muscle groups. Deep Tendon Reflexes are 2+ and equal in the patellar and Achilles distributions bilaterally. Sensation is normal in all distributions.    Corticospinal Tract Exam: No Ulnar or Pronator Drift.    Gait: Normal.    Impression and Assessment    Type 2 Diabetes Mellitus without complication without insulin usage.    Hyperlipidemia.    Essential Hypertension.    Memory Loss.    Plan    Hyperlipidemia Plan: The patient was instructed to exercise daily, eat a low fat diet and continue his medications.    Essential Hypertension Plan: The current plan was  continued.    Type 2 Diabetes Mellitus without complication without insulin usage Plan: The current plan was continued.    Memory Loss Plan: Further plans will be made after test results are received and reviewed.    Too was seen today for follow-up.    Diagnoses and all orders for this visit:    Essential hypertension  -     CBC & Differential  -     Comprehensive Metabolic Panel  -     CBC Auto Differential    Hyperlipidemia, unspecified hyperlipidemia type  -     CBC & Differential  -     Comprehensive Metabolic Panel  -     Lipid Panel  -     CBC Auto Differential    Type 2 diabetes mellitus without complication, without long-term current use of insulin (CMS/[facility])  -     CBC & Differential  -     Comprehensive Metabolic Panel  -     Hemoglobin A1c  -     CBC Auto Differential    Memory loss  -     CBC & Differential  -     Comprehensive Metabolic Panel  -     TSH  -     T4, Free  -     Folate  -     Vitamin B12  -     MRI Brain With & Without Contrast; Future  -     CBC Auto Differential        Return to Office    The patient was instructed to return for follow-up in 4 months.    The patient was instructed to return sooner if the condition changes, worsens, or does not resolve.

## 2019-08-04 DIAGNOSIS — E53.8 VITAMIN B12 DEFICIENCY: Primary | ICD-10-CM

## 2019-08-04 RX ORDER — CYANOCOBALAMIN 1000 UG/ML
1000 INJECTION, SOLUTION INTRAMUSCULAR; SUBCUTANEOUS
Status: COMPLETED | OUTPATIENT
Start: 2019-08-05 | End: 2019-08-29

## 2019-08-04 RX ORDER — CYANOCOBALAMIN 1000 UG/ML
1000 INJECTION, SOLUTION INTRAMUSCULAR; SUBCUTANEOUS
Status: SHIPPED | OUTPATIENT
Start: 2019-09-02

## 2019-08-08 ENCOUNTER — CLINICAL SUPPORT (OUTPATIENT)
Dept: INTERNAL MEDICINE | Facility: CLINIC | Age: 64
End: 2019-08-08

## 2019-08-08 PROCEDURE — 96372 THER/PROPH/DIAG INJ SC/IM: CPT | Performed by: INTERNAL MEDICINE

## 2019-08-08 RX ADMIN — CYANOCOBALAMIN 1000 MCG: 1000 INJECTION, SOLUTION INTRAMUSCULAR; SUBCUTANEOUS at 16:08

## 2019-08-14 ENCOUNTER — HOSPITAL ENCOUNTER (OUTPATIENT)
Dept: MRI IMAGING | Facility: HOSPITAL | Age: 64
Discharge: HOME | End: 2019-08-14
Admitting: INTERNAL MEDICINE

## 2019-08-14 ENCOUNTER — CLINICAL SUPPORT (OUTPATIENT)
Dept: INTERNAL MEDICINE | Facility: CLINIC | Age: 64
End: 2019-08-14

## 2019-08-14 DIAGNOSIS — E53.8 B12 DEFICIENCY: ICD-10-CM

## 2019-08-14 DIAGNOSIS — R41.3 MEMORY LOSS: ICD-10-CM

## 2019-08-14 PROCEDURE — 70553 MRI BRAIN STEM W/O & W/DYE: CPT

## 2019-08-14 PROCEDURE — 0 GADOBENATE DIMEGLUMINE 529 MG/ML SOLUTION: Performed by: INTERNAL MEDICINE

## 2019-08-14 PROCEDURE — 96372 THER/PROPH/DIAG INJ SC/IM: CPT | Performed by: INTERNAL MEDICINE

## 2019-08-14 PROCEDURE — A9577 INJ MULTIHANCE: HCPCS | Performed by: INTERNAL MEDICINE

## 2019-08-14 RX ADMIN — GADOBENATE DIMEGLUMINE 20 ML: 529 INJECTION, SOLUTION INTRAVENOUS at 14:11

## 2019-08-14 RX ADMIN — CYANOCOBALAMIN 1000 MCG: 1000 INJECTION, SOLUTION INTRAMUSCULAR; SUBCUTANEOUS at 14:30

## 2019-08-19 RX ORDER — APIXABAN 5 MG/1
TABLET, FILM COATED ORAL
Qty: 60 TABLET | Refills: 5 | Status: SHIPPED | OUTPATIENT
Start: 2019-08-19 | End: 2020-06-11

## 2019-08-21 ENCOUNTER — CLINICAL SUPPORT (OUTPATIENT)
Dept: INTERNAL MEDICINE | Facility: CLINIC | Age: 64
End: 2019-08-21

## 2019-08-21 DIAGNOSIS — E53.8 VITAMIN B 12 DEFICIENCY: ICD-10-CM

## 2019-08-21 PROCEDURE — 96372 THER/PROPH/DIAG INJ SC/IM: CPT | Performed by: INTERNAL MEDICINE

## 2019-08-21 RX ADMIN — CYANOCOBALAMIN 1000 MCG: 1000 INJECTION, SOLUTION INTRAMUSCULAR; SUBCUTANEOUS at 13:14

## 2019-08-26 RX ORDER — GABAPENTIN 400 MG/1
CAPSULE ORAL
Qty: 60 CAPSULE | Refills: 0 | Status: SHIPPED | OUTPATIENT
Start: 2019-08-26 | End: 2019-10-01 | Stop reason: SDUPTHER

## 2019-08-29 ENCOUNTER — OFFICE VISIT (OUTPATIENT)
Dept: INTERNAL MEDICINE | Facility: CLINIC | Age: 64
End: 2019-08-29

## 2019-08-29 VITALS
DIASTOLIC BLOOD PRESSURE: 64 MMHG | TEMPERATURE: 98.3 F | BODY MASS INDEX: 33.48 KG/M2 | RESPIRATION RATE: 16 BRPM | SYSTOLIC BLOOD PRESSURE: 134 MMHG | HEART RATE: 92 BPM | WEIGHT: 259 LBS

## 2019-08-29 DIAGNOSIS — E53.8 VITAMIN B12 DEFICIENCY: ICD-10-CM

## 2019-08-29 DIAGNOSIS — R41.3 MEMORY LOSS: Primary | ICD-10-CM

## 2019-08-29 PROCEDURE — 96372 THER/PROPH/DIAG INJ SC/IM: CPT | Performed by: INTERNAL MEDICINE

## 2019-08-29 PROCEDURE — 99213 OFFICE O/P EST LOW 20 MIN: CPT | Performed by: INTERNAL MEDICINE

## 2019-08-29 RX ADMIN — CYANOCOBALAMIN 1000 MCG: 1000 INJECTION, SOLUTION INTRAMUSCULAR; SUBCUTANEOUS at 12:10

## 2019-08-29 NOTE — PROGRESS NOTES
Chief Complaint   Patient presents with   • Follow-up     Follow up chronic medical problems       History of Present Illness    The patient presents for follow-up of Vitamin B12 deficiency. There are no reports of blood loss. The patient has memory loss but does not have a dry cough, a wet cough, wheezing, fever, a headache, nausea, vomiting, diarrhea, myalgias, abdominal pain, sweats, weight loss, decreased appetite, chills, paresthesias or numbness. The patient's energy level is normal. There are no reports of chest pain, shortness of breath, palpitations or syncope. The patient is taking a vitamin B12 supplement in the form of IM injections.    He presents for follow-up regarding his memory loss. He presents with his spouse. His symptoms are reported to be improved. He is taking a medication. The medication is taken as prescribed. There are no medication side effects.    Review of Systems    CONSTITUTIONAL- Denies Weakness or Malaise.    PULMONARY- Denies Sputum Production, Cough, Hemoptysis or Pleuritic Chest Pain.    CARDIOVASCULAR- Denies Claudication, Edema or Irregular Heart Beat.    NEUROLOGIC- Denies Seizures, Visual Changes, Confusion or Depression.    Medications      Current Outpatient Medications:   •  aspirin 81 MG EC tablet, Take 1 tablet by mouth Daily., Disp: , Rfl:   •  atorvastatin (LIPITOR) 40 MG tablet, Take 40 mg by mouth Daily., Disp: , Rfl:   •  B-D UF III MINI PEN NEEDLES 31G X 5 MM misc, use as directed, Disp: 100 each, Rfl: 5  •  cilostazol (PLETAL) 50 MG tablet, take 1 tablet by mouth twice a day, Disp: 60 tablet, Rfl: 3  •  Crisaborole (EUCRISA) 2 % ointment, Apply 1 application topically Daily As Needed (rash)., Disp: 60 g, Rfl: 0  •  ELIQUIS 5 MG tablet tablet, take 1 tablet by mouth twice a day, Disp: 60 tablet, Rfl: 5  •  fluticasone (FLONASE) 50 MCG/ACT nasal spray, 2 sprays into the nostril(s) as directed by provider Daily., Disp: 16 g, Rfl: 3  •  gabapentin (NEURONTIN) 400 MG  capsule, take 1 capsule by mouth twice a day, Disp: 60 capsule, Rfl: 0  •  JANUVIA 100 MG tablet, take 1 tablet by mouth once daily, Disp: 30 tablet, Rfl: 5  •  lisinopril (PRINIVIL,ZESTRIL) 2.5 MG tablet, Take 1 tablet by mouth Daily., Disp: , Rfl: 0  •  metFORMIN (GLUCOPHAGE) 1000 MG tablet, Take 1 tablet by mouth 2 (Two) Times a Day., Disp: , Rfl: 0  •  metoprolol succinate XL (TOPROL-XL) 25 MG 24 hr tablet, Take 1 tablet by mouth Daily., Disp: , Rfl: 0  •  NOVOLOG MIX 70/30 FLEXPEN (70-30) 100 UNIT/ML suspension pen-injector injection, inject 50 units subcutaneously twice a day, Disp: 30 mL, Rfl: 5    Current Facility-Administered Medications:   •  [START ON 9/2/2019] cyanocobalamin injection 1,000 mcg, 1,000 mcg, Intramuscular, Q28 Days, Des Geller MD     Allergies    Allergies   Allergen Reactions   • Adhesive Tape Rash   • Neosporin [Neomycin-Bacitracin Zn-Polymyx] Rash       Problem List    Patient Active Problem List   Diagnosis   • Cough       Medications, Allergies, Problems List and Past History were reviewed and updated.    Physical Examination    /64 (BP Location: Left arm, Patient Position: Sitting, Cuff Size: Adult)   Pulse 92   Temp 98.3 °F (36.8 °C) (Temporal)   Resp 16   Wt 117 kg (259 lb)   BMI 33.48 kg/m²     HEENT: Facies- Within normal limits. Lids- Normal bilaterally. Conjunctiva- Clear bilaterally. Sclera- Anicteric bilaterally.    Neck: Thyroid- non enlarged, symmetric and has no nodules. No bruits are detected. ROM- Normal Range of Motion with no rigidity.    Lungs: Auscultation- Clear to auscultation bilaterally. There are no retractions, clubbing or cyanosis. The Expiratory to Inspiratory ratio is equal.    Lymph Nodes: Cervical- no enlarged lymph nodes noted.    Cardiovascular: There are no carotid bruits. Heart- Normal Rate with Regular rhythm and no murmurs. There are no gallops. There are no rubs. In the lower extremities there is no edema. The upper extremities  do not have edema.    Abdomen: Soft, benign, non-tender with no masses, hernias, organomegaly or scars.    Impression and Assessment    Vitamin B12 Deficiency.    Memory Loss.    Plan    Memory Loss Plan: The current plan was continued.    Vitamin B12 Deficiency Plan: The current plan was continued.    Too was seen today for follow-up.    Diagnoses and all orders for this visit:    Memory loss    Vitamin B12 deficiency        Return to Office    The patient was instructed to return for follow-up in 2 months.    The patient was instructed to return sooner if the condition changes, worsens, or does not resolve.

## 2019-09-03 ENCOUNTER — OFFICE VISIT (OUTPATIENT)
Dept: INTERNAL MEDICINE | Facility: CLINIC | Age: 64
End: 2019-09-03

## 2019-09-03 VITALS
RESPIRATION RATE: 18 BRPM | HEART RATE: 84 BPM | BODY MASS INDEX: 33.09 KG/M2 | WEIGHT: 256 LBS | DIASTOLIC BLOOD PRESSURE: 74 MMHG | SYSTOLIC BLOOD PRESSURE: 128 MMHG | TEMPERATURE: 97.4 F

## 2019-09-03 DIAGNOSIS — J01.00 ACUTE NON-RECURRENT MAXILLARY SINUSITIS: Primary | ICD-10-CM

## 2019-09-03 PROCEDURE — 99214 OFFICE O/P EST MOD 30 MIN: CPT | Performed by: INTERNAL MEDICINE

## 2019-09-03 RX ORDER — AMOXICILLIN AND CLAVULANATE POTASSIUM 875; 125 MG/1; MG/1
1 TABLET, FILM COATED ORAL 2 TIMES DAILY
Qty: 20 TABLET | Refills: 0 | Status: SHIPPED | OUTPATIENT
Start: 2019-09-03 | End: 2019-10-15

## 2019-09-03 NOTE — PROGRESS NOTES
Chief Complaint   Patient presents with   • Sinusitis     Face and head pressure       History of Present Illness    The patient complains of sinus pain, nasal congestion, nasal discharge and facial pain.    The patient reports a 2 week history of upper respiratory symptoms. The patient has symptoms of a headache but the patient does not have a dry cough, a wet cough, wheezing, fever, eye drainage, ear pain, ear drainage, nausea, vomiting, diarrhea, rash, decreased appetite, abdominal pain or sore throat. The nasal discharge is yellow, green, thick and purulent. The patient has not tried anything for treatment of this illness.     Review of Systems    CONSTITUTIONAL- Denies Unexplained Weight Loss, Chills, Sweats, Fatigue, Weakness or Malaise.    HENT- Denies Decreased Hearing.    GASTROINTESTINAL- Denies Blood per Rectum, Constipation or Heartburn.    Medications      Current Outpatient Medications:   •  aspirin 81 MG EC tablet, Take 1 tablet by mouth Daily., Disp: , Rfl:   •  atorvastatin (LIPITOR) 40 MG tablet, Take 40 mg by mouth Daily., Disp: , Rfl:   •  B-D UF III MINI PEN NEEDLES 31G X 5 MM misc, use as directed, Disp: 100 each, Rfl: 5  •  cilostazol (PLETAL) 50 MG tablet, take 1 tablet by mouth twice a day, Disp: 60 tablet, Rfl: 3  •  Crisaborole (EUCRISA) 2 % ointment, Apply 1 application topically Daily As Needed (rash)., Disp: 60 g, Rfl: 0  •  ELIQUIS 5 MG tablet tablet, take 1 tablet by mouth twice a day, Disp: 60 tablet, Rfl: 5  •  fluticasone (FLONASE) 50 MCG/ACT nasal spray, 2 sprays into the nostril(s) as directed by provider Daily., Disp: 16 g, Rfl: 3  •  gabapentin (NEURONTIN) 400 MG capsule, take 1 capsule by mouth twice a day, Disp: 60 capsule, Rfl: 0  •  JANUVIA 100 MG tablet, take 1 tablet by mouth once daily, Disp: 30 tablet, Rfl: 5  •  lisinopril (PRINIVIL,ZESTRIL) 2.5 MG tablet, Take 1 tablet by mouth Daily., Disp: , Rfl: 0  •  metFORMIN (GLUCOPHAGE) 1000 MG tablet, Take 1 tablet by mouth 2  (Two) Times a Day., Disp: , Rfl: 0  •  metoprolol succinate XL (TOPROL-XL) 25 MG 24 hr tablet, Take 1 tablet by mouth Daily., Disp: , Rfl: 0  •  NOVOLOG MIX 70/30 FLEXPEN (70-30) 100 UNIT/ML suspension pen-injector injection, inject 50 units subcutaneously twice a day, Disp: 30 mL, Rfl: 5  •  amoxicillin-clavulanate (AUGMENTIN) 875-125 MG per tablet, Take 1 tablet by mouth 2 (Two) Times a Day., Disp: 20 tablet, Rfl: 0    Current Facility-Administered Medications:   •  cyanocobalamin injection 1,000 mcg, 1,000 mcg, Intramuscular, Q28 Days, Des Geller MD     Allergies    Allergies   Allergen Reactions   • Adhesive Tape Rash   • Neosporin [Neomycin-Bacitracin Zn-Polymyx] Rash       Problem List    Patient Active Problem List   Diagnosis   • Cough       Medications, Allergies, Problems List and Past History were reviewed and updated.    Physical Examination    /74 (BP Location: Right arm, Patient Position: Sitting, Cuff Size: Adult)   Pulse 84   Temp 97.4 °F (36.3 °C) (Temporal)   Resp 18   Wt 116 kg (256 lb)   BMI 33.09 kg/m²     HEENT: Head- Normocephalic Atraumatic. Facies- Within normal limits. Pinnas- Normal texture and shape bilaterally. Canals- Normal bilaterally. TMs- Normal bilaterally. Nares- Patent bilaterally. Nasal Septum- is normal. There is pain to palpation over the right maxillary sinus and left maxillary sinus but not over the right frontal sinus or left frontal sinus. Lids- Normal bilaterally. Conjunctiva- Clear bilaterally. Sclera- Anicteric bilaterally. Oropharynx- Moist with no lesions. Tonsils- No enlargement, erythema or exudate.    Neck: Thyroid- non enlarged, symmetric and has no nodules. No bruits are detected.    Lungs: Auscultation- Clear to auscultation bilaterally. There are no retractions, clubbing or cyanosis. The Expiratory to Inspiratory ratio is equal.    Lymph Nodes: Cervical- no enlarged lymph nodes noted.    Cardiovascular: There are no carotid bruits. Heart-  Normal Rate with Regular rhythm and no murmurs. There are no gallops. There are no rubs. In the lower extremities there is no edema. The upper extremities do not have edema.    Impression and Assessment    Acute Sinusitis.    Plan    Acute Sinusitis Plan: Use over the counter acetaminophen for fevers or comfort. The patient was encouraged to use over the counter antihistamines. A cool mist humidifier was encouraged. He was encouraged to drink plenty of fluids. Medication will be added as noted below.    Too was seen today for sinusitis.    Diagnoses and all orders for this visit:    Acute non-recurrent maxillary sinusitis  -     amoxicillin-clavulanate (AUGMENTIN) 875-125 MG per tablet; Take 1 tablet by mouth 2 (Two) Times a Day.        Return to Office    The patient was instructed to return for follow-up as needed.    The patient was instructed to return sooner if the condition changes, worsens, or does not resolve.

## 2019-09-05 RX ORDER — CILOSTAZOL 50 MG/1
TABLET ORAL
Qty: 180 TABLET | Refills: 1 | Status: SHIPPED | OUTPATIENT
Start: 2019-09-05 | End: 2020-06-11

## 2019-09-09 RX ORDER — METOPROLOL SUCCINATE 25 MG/1
25 TABLET, EXTENDED RELEASE ORAL DAILY
Qty: 30 TABLET | Refills: 5 | Status: SHIPPED | OUTPATIENT
Start: 2019-09-09 | End: 2020-06-11

## 2019-10-02 RX ORDER — GABAPENTIN 400 MG/1
CAPSULE ORAL
Qty: 60 CAPSULE | Refills: 0 | Status: SHIPPED | OUTPATIENT
Start: 2019-10-02 | End: 2019-11-05 | Stop reason: SDUPTHER

## 2019-10-07 ENCOUNTER — CLINICAL SUPPORT (OUTPATIENT)
Dept: INTERNAL MEDICINE | Facility: CLINIC | Age: 64
End: 2019-10-07

## 2019-10-07 DIAGNOSIS — E53.8 VITAMIN B12 DEFICIENCY: ICD-10-CM

## 2019-10-07 PROCEDURE — 96372 THER/PROPH/DIAG INJ SC/IM: CPT | Performed by: INTERNAL MEDICINE

## 2019-10-07 RX ADMIN — CYANOCOBALAMIN 1000 MCG: 1000 INJECTION, SOLUTION INTRAMUSCULAR; SUBCUTANEOUS at 15:25

## 2019-10-12 ENCOUNTER — DOCUMENTATION (OUTPATIENT)
Dept: INTERNAL MEDICINE | Facility: CLINIC | Age: 64
End: 2019-10-12

## 2019-10-12 DIAGNOSIS — E11.51 TYPE 2 DIABETES MELLITUS WITH DIABETIC PERIPHERAL ANGIOPATHY WITHOUT GANGRENE, WITH LONG-TERM CURRENT USE OF INSULIN (HCC): ICD-10-CM

## 2019-10-12 DIAGNOSIS — Z79.4 TYPE 2 DIABETES MELLITUS WITH DIABETIC PERIPHERAL ANGIOPATHY WITHOUT GANGRENE, WITH LONG-TERM CURRENT USE OF INSULIN (HCC): ICD-10-CM

## 2019-10-12 RX ORDER — INSULIN ASPART 100 [IU]/ML
50 INJECTION, SUSPENSION SUBCUTANEOUS 2 TIMES DAILY
Qty: 10 PEN | Refills: 5 | Status: SHIPPED | OUTPATIENT
Start: 2019-10-12 | End: 2020-03-16

## 2019-10-15 ENCOUNTER — OFFICE VISIT (OUTPATIENT)
Dept: INTERNAL MEDICINE | Facility: CLINIC | Age: 64
End: 2019-10-15

## 2019-10-15 VITALS
HEIGHT: 74 IN | BODY MASS INDEX: 32.44 KG/M2 | HEART RATE: 92 BPM | TEMPERATURE: 98.3 F | WEIGHT: 252.8 LBS | OXYGEN SATURATION: 95 % | DIASTOLIC BLOOD PRESSURE: 68 MMHG | SYSTOLIC BLOOD PRESSURE: 132 MMHG | RESPIRATION RATE: 20 BRPM

## 2019-10-15 DIAGNOSIS — J01.00 ACUTE NON-RECURRENT MAXILLARY SINUSITIS: Primary | ICD-10-CM

## 2019-10-15 PROCEDURE — 99214 OFFICE O/P EST MOD 30 MIN: CPT | Performed by: INTERNAL MEDICINE

## 2019-10-15 RX ORDER — BENZONATATE 200 MG/1
200 CAPSULE ORAL 3 TIMES DAILY PRN
Qty: 30 CAPSULE | Refills: 0 | Status: SHIPPED | OUTPATIENT
Start: 2019-10-15 | End: 2019-12-16

## 2019-10-15 RX ORDER — AMOXICILLIN AND CLAVULANATE POTASSIUM 875; 125 MG/1; MG/1
1 TABLET, FILM COATED ORAL 2 TIMES DAILY
Qty: 20 TABLET | Refills: 0 | Status: SHIPPED | OUTPATIENT
Start: 2019-10-15 | End: 2019-11-06

## 2019-10-15 RX ORDER — INFLUENZA VIRUS VACCINE 15; 15; 15; 15 UG/.5ML; UG/.5ML; UG/.5ML; UG/.5ML
SUSPENSION INTRAMUSCULAR
Refills: 0 | COMMUNITY
Start: 2019-09-06 | End: 2019-10-15

## 2019-10-15 NOTE — PROGRESS NOTES
Chief Complaint   Patient presents with   • Nasal Congestion     symptoms started 10/13/19   • Cough     productive        History of Present Illness      The patient complains of sinus pain, nasal congestion, nasal discharge, tooth pain and facial pain.    The patient reports a 1 week history of upper respiratory symptoms. The patient does not have a dry cough, a wet cough, wheezing, fever, a headache, eye drainage, ear pain, ear drainage, nausea, vomiting, diarrhea, rash, decreased appetite or abdominal pain. The nasal discharge is yellow, green, thick, purulent and cloudy. The patient has not tried anything for treatment of this illness.    Review of Systems    CONSTITUTIONAL- Denies Unexplained Weight Loss, Chills, Sweats, Fatigue, Weakness or Malaise.    GASTROINTESTINAL- Denies Constipation or Heartburn.    PULMONARY- Denies Dyspnea, Sputum Production, Cough, Hemoptysis or Pleuritic Chest Pain.    Medications      Current Outpatient Medications:   •  aspirin 81 MG EC tablet, Take 1 tablet by mouth Daily., Disp: , Rfl:   •  atorvastatin (LIPITOR) 40 MG tablet, Take 40 mg by mouth Daily., Disp: , Rfl:   •  B-D UF III MINI PEN NEEDLES 31G X 5 MM misc, use as directed, Disp: 100 each, Rfl: 5  •  Crisaborole (EUCRISA) 2 % ointment, Apply 1 application topically Daily As Needed (rash)., Disp: 60 g, Rfl: 0  •  ELIQUIS 5 MG tablet tablet, take 1 tablet by mouth twice a day, Disp: 60 tablet, Rfl: 5  •  fluticasone (FLONASE) 50 MCG/ACT nasal spray, 2 sprays into the nostril(s) as directed by provider Daily., Disp: 16 g, Rfl: 3  •  gabapentin (NEURONTIN) 400 MG capsule, take 1 capsule by mouth twice a day, Disp: 60 capsule, Rfl: 0  •  JANUVIA 100 MG tablet, take 1 tablet by mouth once daily, Disp: 30 tablet, Rfl: 5  •  lisinopril (PRINIVIL,ZESTRIL) 2.5 MG tablet, Take 1 tablet by mouth Daily., Disp: , Rfl: 0  •  metFORMIN (GLUCOPHAGE) 1000 MG tablet, Take 1 tablet by mouth 2 (Two) Times a Day., Disp: , Rfl: 0  •   "metoprolol succinate XL (TOPROL-XL) 25 MG 24 hr tablet, Take 1 tablet by mouth Daily., Disp: 30 tablet, Rfl: 5  •  NOVOLOG MIX 70/30 FLEXPEN (70-30) 100 UNIT/ML suspension pen-injector injection, Inject 0.5 mL under the skin into the appropriate area as directed 2 (Two) Times a Day., Disp: 10 pen, Rfl: 5  •  amoxicillin-clavulanate (AUGMENTIN) 875-125 MG per tablet, Take 1 tablet by mouth 2 (Two) Times a Day., Disp: 20 tablet, Rfl: 0  •  benzonatate (TESSALON) 200 MG capsule, Take 1 capsule by mouth 3 (Three) Times a Day As Needed for Cough., Disp: 30 capsule, Rfl: 0  •  cilostazol (PLETAL) 50 MG tablet, take 1 tablet by mouth twice a day, Disp: 180 tablet, Rfl: 1    Current Facility-Administered Medications:   •  cyanocobalamin injection 1,000 mcg, 1,000 mcg, Intramuscular, Q28 Days, Des Geller MD, 1,000 mcg at 10/07/19 1525     Allergies    Allergies   Allergen Reactions   • Adhesive Tape Rash   • Neosporin [Neomycin-Bacitracin Zn-Polymyx] Rash       Problem List    Patient Active Problem List   Diagnosis   • Cough       Medications, Allergies, Problems List and Past History were reviewed and updated.    Physical Examination    /68   Pulse 92   Temp 98.3 °F (36.8 °C) (Temporal)   Resp 20   Ht 187.3 cm (73.75\")   Wt 115 kg (252 lb 12.8 oz)   SpO2 95%   BMI 32.68 kg/m²     HEENT: Head- Normocephalic Atraumatic. Facies- Within normal limits. Pinnas- Normal texture and shape bilaterally. Canals- Normal bilaterally. TMs- Normal bilaterally. Nares- Patent bilaterally. Nasal Septum- is normal. There is pain to palpation over the right maxillary sinus and left maxillary sinus but not over the right frontal sinus or left frontal sinus. Lids- Normal bilaterally. Conjunctiva- Clear bilaterally. Sclera- Anicteric bilaterally. Oropharynx- Moist with no lesions. Tonsils- No enlargement, erythema or exudate.    Neck: Thyroid- non enlarged, symmetric and has no nodules. No bruits are detected. ROM- Normal " Range of Motion with no rigidity.    Lungs: Auscultation- Clear to auscultation bilaterally. There are no retractions, clubbing or cyanosis. The Expiratory to Inspiratory ratio is equal.    Lymph Nodes: Cervical- no enlarged lymph nodes noted.    Cardiovascular: Heart- Normal Rate with Regular rhythm and no murmurs.    Impression and Assessment    Acute Sinusitis.    Plan    Acute Sinusitis Plan: Use over the counter acetaminophen for fevers or comfort. A cool mist humidifier was encouraged. He was encouraged to drink plenty of fluids. Adequate rest was advised. Medication will be added as noted below.    Too was seen today for nasal congestion and cough.    Diagnoses and all orders for this visit:    Acute non-recurrent maxillary sinusitis  -     amoxicillin-clavulanate (AUGMENTIN) 875-125 MG per tablet; Take 1 tablet by mouth 2 (Two) Times a Day.  -     benzonatate (TESSALON) 200 MG capsule; Take 1 capsule by mouth 3 (Three) Times a Day As Needed for Cough.        Return to Office    The patient was instructed to return for follow-up at the next scheduled visit.    The patient was instructed to return sooner if the condition changes, worsens, or does not resolve.

## 2019-11-05 RX ORDER — GABAPENTIN 400 MG/1
CAPSULE ORAL
Qty: 60 CAPSULE | Refills: 0 | Status: SHIPPED | OUTPATIENT
Start: 2019-11-05 | End: 2019-11-22 | Stop reason: SDUPTHER

## 2019-11-06 ENCOUNTER — OFFICE VISIT (OUTPATIENT)
Dept: INTERNAL MEDICINE | Facility: CLINIC | Age: 64
End: 2019-11-06

## 2019-11-06 VITALS
RESPIRATION RATE: 18 BRPM | TEMPERATURE: 98.3 F | WEIGHT: 247 LBS | HEART RATE: 76 BPM | SYSTOLIC BLOOD PRESSURE: 132 MMHG | DIASTOLIC BLOOD PRESSURE: 74 MMHG | BODY MASS INDEX: 31.93 KG/M2

## 2019-11-06 DIAGNOSIS — E53.8 VITAMIN B 12 DEFICIENCY: Primary | ICD-10-CM

## 2019-11-06 LAB — VIT B12 BLD-MCNC: >2000 PG/ML (ref 211–946)

## 2019-11-06 PROCEDURE — 36415 COLL VENOUS BLD VENIPUNCTURE: CPT | Performed by: INTERNAL MEDICINE

## 2019-11-06 PROCEDURE — 96372 THER/PROPH/DIAG INJ SC/IM: CPT | Performed by: INTERNAL MEDICINE

## 2019-11-06 PROCEDURE — 99212 OFFICE O/P EST SF 10 MIN: CPT | Performed by: INTERNAL MEDICINE

## 2019-11-06 PROCEDURE — 82607 VITAMIN B-12: CPT | Performed by: INTERNAL MEDICINE

## 2019-11-06 RX ADMIN — CYANOCOBALAMIN 1000 MCG: 1000 INJECTION, SOLUTION INTRAMUSCULAR; SUBCUTANEOUS at 12:00

## 2019-11-06 NOTE — PROGRESS NOTES
Chief Complaint   Patient presents with   • Follow-up     2 month follow up chronic medical problems       History of Present Illness      The patient presents for follow-up of Vitamin B12 deficiency. There are no reports of blood loss. The patient has no symptoms of a dry cough, a wet cough, wheezing, fever, a headache, nausea, vomiting, diarrhea, myalgias, abdominal pain, sweats, weight loss, decreased appetite, chills, paresthesias, numbness or memory loss. The patient's energy level is normal. There are no reports of chest pain, shortness of breath, palpitations or syncope. The patient is taking a vitamin B12 supplement in the form of IM injections.    Review of Systems    NEUROLOGIC- Denies Seizures, Visual Changes, Confusion, Depression or Excessive Daytime Sleepiness.    Medications      Current Outpatient Medications:   •  aspirin 81 MG EC tablet, Take 1 tablet by mouth Daily., Disp: , Rfl:   •  atorvastatin (LIPITOR) 40 MG tablet, Take 40 mg by mouth Daily., Disp: , Rfl:   •  B-D UF III MINI PEN NEEDLES 31G X 5 MM misc, use as directed, Disp: 100 each, Rfl: 5  •  benzonatate (TESSALON) 200 MG capsule, Take 1 capsule by mouth 3 (Three) Times a Day As Needed for Cough., Disp: 30 capsule, Rfl: 0  •  cilostazol (PLETAL) 50 MG tablet, take 1 tablet by mouth twice a day, Disp: 180 tablet, Rfl: 1  •  Crisaborole (EUCRISA) 2 % ointment, Apply 1 application topically Daily As Needed (rash)., Disp: 60 g, Rfl: 0  •  ELIQUIS 5 MG tablet tablet, take 1 tablet by mouth twice a day, Disp: 60 tablet, Rfl: 5  •  fluticasone (FLONASE) 50 MCG/ACT nasal spray, 2 sprays into the nostril(s) as directed by provider Daily., Disp: 16 g, Rfl: 3  •  gabapentin (NEURONTIN) 400 MG capsule, take 1 capsule by mouth twice a day, Disp: 60 capsule, Rfl: 0  •  JANUVIA 100 MG tablet, take 1 tablet by mouth once daily, Disp: 30 tablet, Rfl: 5  •  lisinopril (PRINIVIL,ZESTRIL) 2.5 MG tablet, Take 1 tablet by mouth Daily., Disp: , Rfl: 0  •   metFORMIN (GLUCOPHAGE) 1000 MG tablet, Take 1 tablet by mouth 2 (Two) Times a Day., Disp: , Rfl: 0  •  metoprolol succinate XL (TOPROL-XL) 25 MG 24 hr tablet, Take 1 tablet by mouth Daily., Disp: 30 tablet, Rfl: 5  •  NOVOLOG MIX 70/30 FLEXPEN (70-30) 100 UNIT/ML suspension pen-injector injection, Inject 0.5 mL under the skin into the appropriate area as directed 2 (Two) Times a Day., Disp: 10 pen, Rfl: 5    Current Facility-Administered Medications:   •  cyanocobalamin injection 1,000 mcg, 1,000 mcg, Intramuscular, Q28 Days, Des Geller MD, 1,000 mcg at 10/07/19 1525     Allergies    Allergies   Allergen Reactions   • Adhesive Tape Rash   • Neosporin [Neomycin-Bacitracin Zn-Polymyx] Rash       Problem List    Patient Active Problem List   Diagnosis   • Cough       Medications, Allergies, Problems List and Past History were reviewed and updated.    Physical Examination    /74 (BP Location: Left arm, Patient Position: Sitting, Cuff Size: Adult)   Pulse 76   Temp 98.3 °F (36.8 °C) (Temporal)   Resp 18   Wt 112 kg (247 lb)   BMI 31.93 kg/m²     HEENT: Facies- Within normal limits. Lids- Normal bilaterally. Conjunctiva- Clear bilaterally. Sclera- Anicteric bilaterally.    Neck: Thyroid- non enlarged, symmetric and has no nodules. No bruits are detected.    Lungs: Auscultation- Clear to auscultation bilaterally. There are no retractions, clubbing or cyanosis. The Expiratory to Inspiratory ratio is equal.    Lymph Nodes: Cervical- no enlarged lymph nodes noted.    Cardiovascular: Heart- Normal Rate with Regular rhythm and no murmurs.    Impression and Assessment    Vitamin B12 Deficiency.    Plan    Vitamin B12 Deficiency Plan: The current plan was continued.    Too was seen today for follow-up.    Diagnoses and all orders for this visit:    Vitamin B 12 deficiency  -     Vitamin B12        Return to Office    The patient was instructed to return for follow-up in 2 months.    The patient was  instructed to return sooner if the condition changes, worsens, or does not resolve.

## 2019-11-21 ENCOUNTER — TELEPHONE (OUTPATIENT)
Dept: INTERNAL MEDICINE | Facility: CLINIC | Age: 64
End: 2019-11-21

## 2019-11-21 RX ORDER — SULFAMETHOXAZOLE AND TRIMETHOPRIM 800; 160 MG/1; MG/1
1 TABLET ORAL 2 TIMES DAILY
Qty: 20 TABLET | Refills: 0 | Status: SHIPPED | OUTPATIENT
Start: 2019-11-21 | End: 2019-12-16

## 2019-11-21 NOTE — TELEPHONE ENCOUNTER
Patients wife called in regards to the patient still having an issues with an sinus infection and would like to know if Dr Hernandez would be able to call him something to his pharmacy other than the medication that was prescribed to the patient on 11/06/2019? Please give Mrs. Berumen a call back at [phone] in regards to this matter.

## 2019-11-21 NOTE — TELEPHONE ENCOUNTER
Sent in Bactrim  If symptoms worsen, don't improve, develops a fever needs to be seen.   Des Geller MD  5:24 PM  11/21/19

## 2019-11-23 RX ORDER — GABAPENTIN 400 MG/1
CAPSULE ORAL
Qty: 60 CAPSULE | Refills: 0 | Status: SHIPPED | OUTPATIENT
Start: 2019-11-23 | End: 2019-12-12 | Stop reason: SDUPTHER

## 2019-12-12 RX ORDER — GABAPENTIN 400 MG/1
CAPSULE ORAL
Qty: 60 CAPSULE | Refills: 0 | Status: SHIPPED | OUTPATIENT
Start: 2019-12-12 | End: 2020-01-16

## 2019-12-12 RX ORDER — ATORVASTATIN CALCIUM 40 MG/1
40 TABLET, FILM COATED ORAL NIGHTLY
Qty: 90 TABLET | Refills: 1 | Status: SHIPPED | OUTPATIENT
Start: 2019-12-12 | End: 2020-06-11

## 2019-12-16 ENCOUNTER — OFFICE VISIT (OUTPATIENT)
Dept: INTERNAL MEDICINE | Facility: CLINIC | Age: 64
End: 2019-12-16

## 2019-12-16 VITALS
WEIGHT: 249.13 LBS | HEART RATE: 67 BPM | BODY MASS INDEX: 32.2 KG/M2 | DIASTOLIC BLOOD PRESSURE: 80 MMHG | OXYGEN SATURATION: 96 % | TEMPERATURE: 98.8 F | SYSTOLIC BLOOD PRESSURE: 120 MMHG

## 2019-12-16 DIAGNOSIS — R50.9 FEVER AND CHILLS: ICD-10-CM

## 2019-12-16 DIAGNOSIS — J10.1 INFLUENZA A: Primary | ICD-10-CM

## 2019-12-16 PROBLEM — M10.9 GOUT: Status: ACTIVE | Noted: 2019-12-16

## 2019-12-16 PROBLEM — E11.9 DIABETES MELLITUS (HCC): Status: ACTIVE | Noted: 2019-12-16

## 2019-12-16 PROBLEM — I25.10 CORONARY ARTERY DISEASE INVOLVING NATIVE CORONARY ARTERY OF NATIVE HEART: Status: ACTIVE | Noted: 2017-01-27

## 2019-12-16 PROBLEM — I73.9 PERIPHERAL VASCULAR DISEASE (HCC): Status: ACTIVE | Noted: 2018-09-10

## 2019-12-16 PROBLEM — I25.2 HISTORY OF HEART ATTACK: Status: ACTIVE | Noted: 2019-12-16

## 2019-12-16 PROBLEM — N40.0 BENIGN PROSTATIC HYPERPLASIA: Status: ACTIVE | Noted: 2019-12-16

## 2019-12-16 PROBLEM — E78.5 HYPERLIPIDEMIA: Status: ACTIVE | Noted: 2019-12-16

## 2019-12-16 PROBLEM — G62.9 PERIPHERAL NEUROPATHY: Status: ACTIVE | Noted: 2019-12-16

## 2019-12-16 PROBLEM — I25.10 CHRONIC CORONARY ARTERY DISEASE: Status: ACTIVE | Noted: 2019-12-16

## 2019-12-16 PROBLEM — I10 BENIGN ESSENTIAL HTN: Status: ACTIVE | Noted: 2019-12-16

## 2019-12-16 LAB
EXPIRATION DATE: ABNORMAL
FLUAV AG NPH QL: POSITIVE
FLUBV AG NPH QL: NEGATIVE
INTERNAL CONTROL: ABNORMAL
Lab: ABNORMAL

## 2019-12-16 PROCEDURE — 87804 INFLUENZA ASSAY W/OPTIC: CPT | Performed by: PHYSICIAN ASSISTANT

## 2019-12-16 PROCEDURE — 99213 OFFICE O/P EST LOW 20 MIN: CPT | Performed by: PHYSICIAN ASSISTANT

## 2019-12-16 RX ORDER — OSELTAMIVIR PHOSPHATE 75 MG/1
75 CAPSULE ORAL 2 TIMES DAILY
Qty: 10 CAPSULE | Refills: 0 | Status: SHIPPED | OUTPATIENT
Start: 2019-12-16 | End: 2019-12-21

## 2019-12-16 RX ORDER — FLUTICASONE PROPIONATE 50 MCG
2 SPRAY, SUSPENSION (ML) NASAL DAILY
Qty: 16 G | Refills: 3 | Status: SHIPPED | OUTPATIENT
Start: 2019-12-16 | End: 2022-10-26

## 2019-12-16 NOTE — PROGRESS NOTES
Chief Complaint   Patient presents with   • URI     2xdays left ear hurting, congestion, cough, chills   • Fever     2xdays       Subjective       History of Present Illness     Too Tai is a 64 y.o. male. He presents with 2 day history of flu-like symptoms. Pt reports fever, fatigue, chills, cough and congestion x2 days. Fever Tmax on Saturday of 102.3F. He also reports a productive cough and L ear pain. He denies sore throat, abdominal pain, N/V/D. He has been taking tylenol rotating with advil PRN, with no relief of symptoms. He did receive a flu vaccine this season.       The following portions of the patient's history were reviewed and updated as appropriate: allergies, current medications and problem list.    Allergies   Allergen Reactions   • Adhesive Tape Rash   • Neosporin [Neomycin-Bacitracin Zn-Polymyx] Rash     Social History     Tobacco Use   • Smoking status: Former Smoker     Last attempt to quit:      Years since quittin.9   • Smokeless tobacco: Never Used   Substance Use Topics   • Alcohol use: No     Frequency: Never         Current Outpatient Medications:   •  aspirin 81 MG EC tablet, Take 1 tablet by mouth Daily., Disp: , Rfl:   •  atorvastatin (LIPITOR) 40 MG tablet, Take 1 tablet by mouth Every Night., Disp: 90 tablet, Rfl: 1  •  B-D UF III MINI PEN NEEDLES 31G X 5 MM misc, use as directed, Disp: 100 each, Rfl: 5  •  cilostazol (PLETAL) 50 MG tablet, take 1 tablet by mouth twice a day, Disp: 180 tablet, Rfl: 1  •  ELIQUIS 5 MG tablet tablet, take 1 tablet by mouth twice a day, Disp: 60 tablet, Rfl: 5  •  fluticasone (FLONASE) 50 MCG/ACT nasal spray, 2 sprays into the nostril(s) as directed by provider Daily., Disp: 16 g, Rfl: 3  •  gabapentin (NEURONTIN) 400 MG capsule, TAKE 1 CAPSULE BY MOUTH TWICE A DAY, Disp: 60 capsule, Rfl: 0  •  JANUVIA 100 MG tablet, take 1 tablet by mouth once daily, Disp: 30 tablet, Rfl: 5  •  lisinopril (PRINIVIL,ZESTRIL) 2.5 MG tablet, Take 1 tablet by  mouth Daily., Disp: , Rfl: 0  •  metFORMIN (GLUCOPHAGE) 1000 MG tablet, Take 1 tablet by mouth 2 (Two) Times a Day., Disp: , Rfl: 0  •  metoprolol succinate XL (TOPROL-XL) 25 MG 24 hr tablet, Take 1 tablet by mouth Daily., Disp: 30 tablet, Rfl: 5  •  NOVOLOG MIX 70/30 FLEXPEN (70-30) 100 UNIT/ML suspension pen-injector injection, Inject 0.5 mL under the skin into the appropriate area as directed 2 (Two) Times a Day., Disp: 10 pen, Rfl: 5  •  benzonatate (TESSALON) 200 MG capsule, Take 1 capsule by mouth 3 (Three) Times a Day As Needed for Cough., Disp: 30 capsule, Rfl: 0  •  Crisaborole (EUCRISA) 2 % ointment, Apply 1 application topically Daily As Needed (rash)., Disp: 60 g, Rfl: 0  •  oseltamivir (TAMIFLU) 75 MG capsule, Take 1 capsule by mouth 2 (Two) Times a Day for 5 days., Disp: 10 capsule, Rfl: 0  •  sulfamethoxazole-trimethoprim (BACTRIM DS) 800-160 MG per tablet, Take 1 tablet by mouth 2 (Two) Times a Day., Disp: 20 tablet, Rfl: 0    Current Facility-Administered Medications:   •  cyanocobalamin injection 1,000 mcg, 1,000 mcg, Intramuscular, Q28 Days, Des Geller MD, 1,000 mcg at 11/06/19 1200    Review of Systems   Constitutional: Positive for chills, fatigue and fever.   HENT: Positive for congestion and ear pain. Negative for sore throat and trouble swallowing.    Eyes: Negative for pain.   Respiratory: Positive for cough. Negative for shortness of breath and wheezing.    Cardiovascular: Negative for chest pain.   Gastrointestinal: Negative for abdominal pain, diarrhea, nausea and vomiting.   Genitourinary: Negative for dysuria.   Neurological: Positive for headache. Negative for dizziness.       Objective   Vitals:    12/16/19 1158   BP: 120/80   Pulse: 67   Temp: 98.8 °F (37.1 °C)   SpO2: 96%     Physical Exam   Constitutional: He appears well-developed and well-nourished.   HENT:   Head: Normocephalic and atraumatic.   Right Ear: Tympanic membrane, external ear and ear canal normal.   Left  Ear: Tympanic membrane, external ear and ear canal normal.   Mouth/Throat: Oropharynx is clear and moist and mucous membranes are normal.   Eyes: Conjunctivae are normal.   Neck: Neck supple.   Cardiovascular: Normal rate and regular rhythm.   No murmur heard.  Pulmonary/Chest: Effort normal and breath sounds normal. He has no wheezes. He has no rales.   Lymphadenopathy:     He has no cervical adenopathy.   Psychiatric: He has a normal mood and affect. His behavior is normal.         Assessment/Plan   Too was seen today for uri and fever.    Diagnoses and all orders for this visit:    Influenza A  -     oseltamivir (TAMIFLU) 75 MG capsule; Take 1 capsule by mouth 2 (Two) Times a Day for 5 days.    Fever and chills  -     POC Influenza A / B    Other orders  -     fluticasone (FLONASE) 50 MCG/ACT nasal spray; 2 sprays into the nostril(s) as directed by provider Daily.        POSITIVE flu A.   Finish all Tamiflu as directed.  Tylenol or Ibuprofen rotating PRN.   Plenty of fluids and rest.              Return for Next scheduled follow up.

## 2019-12-26 ENCOUNTER — TELEPHONE (OUTPATIENT)
Dept: INTERNAL MEDICINE | Facility: CLINIC | Age: 64
End: 2019-12-26

## 2019-12-26 NOTE — TELEPHONE ENCOUNTER
Kamla cueva pharmacist from [facility] called to get last two office notes faxed to [phone] they are verifying most recent med list they believe the one they have is outdated and want to compare what they have to what out office has dates of service 12-16-19 and 11-6-19 please call with any questions or concerns [phone]

## 2020-01-16 RX ORDER — GABAPENTIN 400 MG/1
CAPSULE ORAL
Qty: 60 CAPSULE | Refills: 2 | Status: SHIPPED | OUTPATIENT
Start: 2020-01-16 | End: 2020-01-21

## 2020-01-21 ENCOUNTER — OFFICE VISIT (OUTPATIENT)
Dept: INTERNAL MEDICINE | Facility: CLINIC | Age: 65
End: 2020-01-21

## 2020-01-21 VITALS
DIASTOLIC BLOOD PRESSURE: 70 MMHG | HEART RATE: 64 BPM | WEIGHT: 242 LBS | SYSTOLIC BLOOD PRESSURE: 142 MMHG | RESPIRATION RATE: 18 BRPM | BODY MASS INDEX: 31.28 KG/M2 | TEMPERATURE: 97.9 F

## 2020-01-21 DIAGNOSIS — E11.51 TYPE 2 DIABETES MELLITUS WITH DIABETIC PERIPHERAL ANGIOPATHY WITHOUT GANGRENE, WITH LONG-TERM CURRENT USE OF INSULIN (HCC): Primary | ICD-10-CM

## 2020-01-21 DIAGNOSIS — I10 ESSENTIAL HYPERTENSION: ICD-10-CM

## 2020-01-21 DIAGNOSIS — E78.5 HYPERLIPIDEMIA, UNSPECIFIED HYPERLIPIDEMIA TYPE: ICD-10-CM

## 2020-01-21 DIAGNOSIS — Z79.4 TYPE 2 DIABETES MELLITUS WITH DIABETIC PERIPHERAL ANGIOPATHY WITHOUT GANGRENE, WITH LONG-TERM CURRENT USE OF INSULIN (HCC): Primary | ICD-10-CM

## 2020-01-21 PROCEDURE — 99214 OFFICE O/P EST MOD 30 MIN: CPT | Performed by: INTERNAL MEDICINE

## 2020-01-21 PROCEDURE — 36415 COLL VENOUS BLD VENIPUNCTURE: CPT | Performed by: INTERNAL MEDICINE

## 2020-01-21 RX ORDER — GABAPENTIN 400 MG/1
400 CAPSULE ORAL 3 TIMES DAILY
Qty: 90 CAPSULE | Refills: 2 | Status: SHIPPED | OUTPATIENT
Start: 2020-01-21 | End: 2020-06-11

## 2020-01-21 NOTE — PROGRESS NOTES
Chief Complaint   Patient presents with   • Follow-up     2 month follow up chronic medical problems        History of Present Illness    The patient presents for a follow-up related to Type 2 Diabetes Mellitus and reports that he doesn't check his blood sugars at home. He denies hypoglycemic symptoms. The patient denies polyuria, polydypsia or polyphagia. He reports numbness, tingling and pain in his feet. The neuropathy is to the mid-calfs. The patient takes his medication as prescribed. He is taking insulin. His injection sites are rotated appropriately. The patient does check his feet daily at home. He denies chest pain, shortness of breath, orthopnea, paroxysmal nocturnal dyspnea, dyspnea on exertion, edema, palpitations or syncope.    The patient presents for a follow-up related to hyperlipidemia. He is following a low fat diet. He reports that he is exercising. He is taking atorvastatin. The patient is taking his medication as prescribed. He reports no medication side effects, including muscle cramps, abdominal pain, headaches or weakness.    The patient presents for a follow-up related to hypertension. The patient reports that he has had no headaches or blurred vision. He states that he is taking his medication as prescribed. He is not having medication side effects.    Review of Systems    CONSTITUTIONAL- Denies Unexplained Weight Loss, Fever, Chills, Sweats, Fatigue, Weakness or Malaise.    PULMONARY- Denies Wheezing, Sputum Production, Cough, Hemoptysis or Pleuritic Chest Pain.    CARDIOVASCULAR- Denies Claudication or Irregular Heart Beat.    Medications      Current Outpatient Medications:   •  aspirin 81 MG EC tablet, Take 1 tablet by mouth Daily., Disp: , Rfl:   •  atorvastatin (LIPITOR) 40 MG tablet, Take 1 tablet by mouth Every Night., Disp: 90 tablet, Rfl: 1  •  B-D UF III MINI PEN NEEDLES 31G X 5 MM misc, use as directed, Disp: 100 each, Rfl: 5  •  cilostazol (PLETAL) 50 MG tablet, take 1 tablet  by mouth twice a day, Disp: 180 tablet, Rfl: 1  •  Crisaborole (EUCRISA) 2 % ointment, Apply 1 application topically Daily As Needed (rash)., Disp: 60 g, Rfl: 0  •  ELIQUIS 5 MG tablet tablet, take 1 tablet by mouth twice a day, Disp: 60 tablet, Rfl: 5  •  fluticasone (FLONASE) 50 MCG/ACT nasal spray, 2 sprays into the nostril(s) as directed by provider Daily., Disp: 16 g, Rfl: 3  •  gabapentin (NEURONTIN) 400 MG capsule, TAKE 1 CAPSULE BY MOUTH TWICE DAILY, Disp: 60 capsule, Rfl: 2  •  JANUVIA 100 MG tablet, take 1 tablet by mouth once daily, Disp: 30 tablet, Rfl: 5  •  lisinopril (PRINIVIL,ZESTRIL) 2.5 MG tablet, Take 1 tablet by mouth Daily., Disp: , Rfl: 0  •  metFORMIN (GLUCOPHAGE) 1000 MG tablet, Take 1 tablet by mouth 2 (Two) Times a Day., Disp: , Rfl: 0  •  metoprolol succinate XL (TOPROL-XL) 25 MG 24 hr tablet, Take 1 tablet by mouth Daily., Disp: 30 tablet, Rfl: 5  •  NOVOLOG MIX 70/30 FLEXPEN (70-30) 100 UNIT/ML suspension pen-injector injection, Inject 0.5 mL under the skin into the appropriate area as directed 2 (Two) Times a Day., Disp: 10 pen, Rfl: 5    Current Facility-Administered Medications:   •  cyanocobalamin injection 1,000 mcg, 1,000 mcg, Intramuscular, Q28 Days, Des Geller MD, 1,000 mcg at 11/06/19 1200     Allergies    Allergies   Allergen Reactions   • Adhesive Tape Rash   • Neosporin [Neomycin-Bacitracin Zn-Polymyx] Rash       Problem List    Patient Active Problem List   Diagnosis   • Cough   • Benign essential HTN   • Benign prostatic hyperplasia   • Coronary artery disease involving native coronary artery of native heart   • Chronic coronary artery disease   • Diabetes mellitus (CMS/HCC)   • Gout   • Hyperlipidemia   • History of heart attack   • Peripheral neuropathy   • Peripheral vascular disease (CMS/HCC)       Medications, Allergies, Problems List and Past History were reviewed and updated.    Physical Examination    /70 (BP Location: Left arm, Patient Position:  Sitting, Cuff Size: Adult)   Pulse 64   Temp 97.9 °F (36.6 °C) (Temporal)   Resp 18   Wt 110 kg (242 lb)   BMI 31.28 kg/m²     HEENT: Facies- Within normal limits. Lids- Normal bilaterally. Conjunctiva- Clear bilaterally. Sclera- Anicteric bilaterally.    Neck: Thyroid- non enlarged, symmetric and has no nodules. No bruits are detected.    Lungs: Auscultation- Clear to auscultation bilaterally. There are no retractions, clubbing or cyanosis. The Expiratory to Inspiratory ratio is equal.    Cardiovascular: There are no carotid bruits. Heart- Normal Rate with Regular rhythm and no murmurs. There are no gallops. There are no rubs. In the lower extremities there is no edema. The upper extremities do not have edema.    Abdomen: Soft, benign, non-tender with no masses, hernias, organomegaly or scars.    Impression and Assessment    Type 2 Diabetes Mellitus without complication with insulin usage.    Hyperlipidemia.    Essential Hypertension.    Diabetic Neuropathy.    Plan    Hyperlipidemia Plan: The patient was instructed to exercise daily, eat a low fat diet and continue his medications.    Essential Hypertension Plan: The current plan was continued.    Type 2 Diabetes Mellitus without complication with insulin usage Plan: The current plan was continued.    Diabetic Neuropathy Plan: The medications were changed as noted.    Too was seen today for follow-up.    Diagnoses and all orders for this visit:    Type 2 diabetes mellitus with diabetic peripheral angiopathy without gangrene, with long-term current use of insulin (CMS/[facility])  -     Comprehensive Metabolic Panel  -     Hemoglobin A1c    Essential hypertension  -     CBC & Differential  -     Comprehensive Metabolic Panel    Hyperlipidemia, unspecified hyperlipidemia type  -     Comprehensive Metabolic Panel  -     Lipid Panel    Other orders  -     gabapentin (NEURONTIN) 400 MG capsule; Take 1 capsule by mouth 3 (Three) Times a Day.        Return to  Office    The patient was instructed to return for follow-up in 4 months. Next Visit: Follow-up.    The patient was instructed to return sooner if the condition changes, worsens, or does not resolve.

## 2020-01-22 LAB
ALBUMIN SERPL-MCNC: 4.1 G/DL (ref 3.5–5.2)
ALBUMIN/GLOB SERPL: 1.5 G/DL
ALP SERPL-CCNC: 73 U/L (ref 39–117)
ALT SERPL-CCNC: 16 U/L (ref 1–41)
AST SERPL-CCNC: 17 U/L (ref 1–40)
BASOPHILS # BLD AUTO: 0.06 10*3/MM3 (ref 0–0.2)
BASOPHILS NFR BLD AUTO: 0.9 % (ref 0–1.5)
BILIRUB SERPL-MCNC: 0.6 MG/DL (ref 0.2–1.2)
BUN SERPL-MCNC: 10 MG/DL (ref 8–23)
BUN/CREAT SERPL: 10.1 (ref 7–25)
CALCIUM SERPL-MCNC: 9.1 MG/DL (ref 8.6–10.5)
CHLORIDE SERPL-SCNC: 101 MMOL/L (ref 98–107)
CHOLEST SERPL-MCNC: 140 MG/DL (ref 0–200)
CO2 SERPL-SCNC: 26.7 MMOL/L (ref 22–29)
CREAT SERPL-MCNC: 0.99 MG/DL (ref 0.76–1.27)
EOSINOPHIL # BLD AUTO: 0.61 10*3/MM3 (ref 0–0.4)
EOSINOPHIL NFR BLD AUTO: 9.1 % (ref 0.3–6.2)
ERYTHROCYTE [DISTWIDTH] IN BLOOD BY AUTOMATED COUNT: 13.1 % (ref 12.3–15.4)
GLOBULIN SER CALC-MCNC: 2.7 GM/DL
GLUCOSE SERPL-MCNC: 96 MG/DL (ref 65–99)
HBA1C MFR BLD: 7.5 % (ref 4.8–5.6)
HCT VFR BLD AUTO: 43.5 % (ref 37.5–51)
HDLC SERPL-MCNC: 38 MG/DL (ref 40–60)
HGB BLD-MCNC: 14.6 G/DL (ref 13–17.7)
IMM GRANULOCYTES # BLD AUTO: 0.02 10*3/MM3 (ref 0–0.05)
IMM GRANULOCYTES NFR BLD AUTO: 0.3 % (ref 0–0.5)
LDLC SERPL CALC-MCNC: 81 MG/DL (ref 0–100)
LYMPHOCYTES # BLD AUTO: 2.23 10*3/MM3 (ref 0.7–3.1)
LYMPHOCYTES NFR BLD AUTO: 33.4 % (ref 19.6–45.3)
MCH RBC QN AUTO: 29.8 PG (ref 26.6–33)
MCHC RBC AUTO-ENTMCNC: 33.6 G/DL (ref 31.5–35.7)
MCV RBC AUTO: 88.8 FL (ref 79–97)
MONOCYTES # BLD AUTO: 0.86 10*3/MM3 (ref 0.1–0.9)
MONOCYTES NFR BLD AUTO: 12.9 % (ref 5–12)
NEUTROPHILS # BLD AUTO: 2.9 10*3/MM3 (ref 1.7–7)
NEUTROPHILS NFR BLD AUTO: 43.4 % (ref 42.7–76)
NRBC BLD AUTO-RTO: 0 /100 WBC (ref 0–0.2)
PLATELET # BLD AUTO: 345 10*3/MM3 (ref 140–450)
POTASSIUM SERPL-SCNC: 4.6 MMOL/L (ref 3.5–5.2)
PROT SERPL-MCNC: 6.8 G/DL (ref 6–8.5)
RBC # BLD AUTO: 4.9 10*6/MM3 (ref 4.14–5.8)
SODIUM SERPL-SCNC: 141 MMOL/L (ref 136–145)
TRIGL SERPL-MCNC: 105 MG/DL (ref 0–150)
VLDLC SERPL CALC-MCNC: 21 MG/DL (ref 5–40)
WBC # BLD AUTO: 6.68 10*3/MM3 (ref 3.4–10.8)

## 2020-03-04 ENCOUNTER — OFFICE VISIT (OUTPATIENT)
Dept: INTERNAL MEDICINE | Facility: CLINIC | Age: 65
End: 2020-03-04

## 2020-03-04 VITALS
HEART RATE: 84 BPM | SYSTOLIC BLOOD PRESSURE: 122 MMHG | WEIGHT: 243 LBS | TEMPERATURE: 98.1 F | DIASTOLIC BLOOD PRESSURE: 76 MMHG | RESPIRATION RATE: 18 BRPM | BODY MASS INDEX: 31.41 KG/M2

## 2020-03-04 DIAGNOSIS — J01.00 ACUTE NON-RECURRENT MAXILLARY SINUSITIS: Primary | ICD-10-CM

## 2020-03-04 PROCEDURE — 99214 OFFICE O/P EST MOD 30 MIN: CPT | Performed by: INTERNAL MEDICINE

## 2020-03-04 RX ORDER — AMOXICILLIN AND CLAVULANATE POTASSIUM 875; 125 MG/1; MG/1
1 TABLET, FILM COATED ORAL 2 TIMES DAILY
Qty: 20 TABLET | Refills: 0 | Status: SHIPPED | OUTPATIENT
Start: 2020-03-04 | End: 2020-08-31

## 2020-03-04 NOTE — PROGRESS NOTES
Chief Complaint   Patient presents with   • Sinusitis   • Cough       History of Present Illness      The patient reports a 1 week history of upper respiratory symptoms. The patient has symptoms of a wet cough, facial pain, tooth pain, a headache, nasal congestion and rhinorrhea but the patient does not have a dry cough, wheezing, fever, eye drainage, ear pain, ear drainage, nausea, vomiting, diarrhea, rash, decreased appetite, abdominal pain or sore throat. The cough is productive of thick and yellow sputum. The nasal discharge is yellow and thick. The patient has not tried anything for treatment of this illness.    Review of Systems    CONSTITUTIONAL- Denies Unexplained Weight Loss, Chills, Sweats, Fatigue, Weakness or Malaise.    HENT- Denies Sinus Pain or Decreased Hearing.    PULMONARY- Reports: Cough. Denies: Dyspnea, Sputum Production or Hemoptysis.    CARDIOVASCULAR- Denies Chest Pain, Claudication, Edema, Syncope, Palpitations or Irregular Heart Beat.    Medications      Current Outpatient Medications:   •  aspirin 81 MG EC tablet, Take 1 tablet by mouth Daily., Disp: , Rfl:   •  atorvastatin (LIPITOR) 40 MG tablet, Take 1 tablet by mouth Every Night., Disp: 90 tablet, Rfl: 1  •  B-D UF III MINI PEN NEEDLES 31G X 5 MM misc, use as directed, Disp: 100 each, Rfl: 5  •  cilostazol (PLETAL) 50 MG tablet, take 1 tablet by mouth twice a day, Disp: 180 tablet, Rfl: 1  •  Crisaborole (EUCRISA) 2 % ointment, Apply 1 application topically Daily As Needed (rash)., Disp: 60 g, Rfl: 0  •  ELIQUIS 5 MG tablet tablet, take 1 tablet by mouth twice a day, Disp: 60 tablet, Rfl: 5  •  fluticasone (FLONASE) 50 MCG/ACT nasal spray, 2 sprays into the nostril(s) as directed by provider Daily., Disp: 16 g, Rfl: 3  •  gabapentin (NEURONTIN) 400 MG capsule, Take 1 capsule by mouth 3 (Three) Times a Day., Disp: 90 capsule, Rfl: 2  •  JANUVIA 100 MG tablet, take 1 tablet by mouth once daily, Disp: 30 tablet, Rfl: 5  •  lisinopril  (PRINIVIL,ZESTRIL) 2.5 MG tablet, Take 1 tablet by mouth Daily., Disp: , Rfl: 0  •  metFORMIN (GLUCOPHAGE) 1000 MG tablet, Take 1 tablet by mouth 2 (Two) Times a Day., Disp: , Rfl: 0  •  metoprolol succinate XL (TOPROL-XL) 25 MG 24 hr tablet, Take 1 tablet by mouth Daily., Disp: 30 tablet, Rfl: 5  •  NOVOLOG MIX 70/30 FLEXPEN (70-30) 100 UNIT/ML suspension pen-injector injection, Inject 0.5 mL under the skin into the appropriate area as directed 2 (Two) Times a Day., Disp: 10 pen, Rfl: 5    Current Facility-Administered Medications:   •  cyanocobalamin injection 1,000 mcg, 1,000 mcg, Intramuscular, Q28 Days, Des Geller MD, 1,000 mcg at 11/06/19 1200     Allergies    Allergies   Allergen Reactions   • Adhesive Tape Rash   • Neosporin [Neomycin-Bacitracin Zn-Polymyx] Rash       Problem List    Patient Active Problem List   Diagnosis   • Cough   • Benign essential HTN   • Benign prostatic hyperplasia   • Coronary artery disease involving native coronary artery of native heart   • Chronic coronary artery disease   • Diabetes mellitus (CMS/HCC)   • Gout   • Hyperlipidemia   • History of heart attack   • Peripheral neuropathy   • Peripheral vascular disease (CMS/HCC)       Medications, Allergies, Problems List and Past History were reviewed and updated.    Physical Examination    /76 (BP Location: Right arm, Patient Position: Sitting, Cuff Size: Adult)   Pulse 84   Temp 98.1 °F (36.7 °C) (Temporal)   Resp 18   Wt 110 kg (243 lb)   BMI 31.41 kg/m²     HEENT: Head- Normocephalic Atraumatic. Facies- Within normal limits. Pinnas- Normal texture and shape bilaterally. Canals- Normal bilaterally. TMs- Normal bilaterally. Nares- Patent bilaterally. Nasal Septum- is normal. There is pain to palpation over the right maxillary sinus and left maxillary sinus but not over the right frontal sinus or left frontal sinus. Lids- Normal bilaterally. Conjunctiva- Clear bilaterally. Sclera- Anicteric bilaterally.  Oropharynx- Moist with no lesions. Tonsils- No enlargement, erythema or exudate.    Neck: Thyroid- non enlarged, symmetric and has no nodules. No bruits are detected. ROM- Normal Range of Motion with no rigidity.    Lungs: Auscultation- Clear to auscultation bilaterally. There are no retractions, clubbing or cyanosis. The Expiratory to Inspiratory ratio is equal.    Lymph Nodes: Cervical- no enlarged lymph nodes noted.    Cardiovascular: There are no carotid bruits. Heart- Normal Rate with Regular rhythm and no murmurs. There are no gallops. There are no rubs. In the lower extremities there is no edema. The upper extremities do not have edema.    Impression and Assessment    Acute Sinusitis.    Plan    Acute Sinusitis Plan: Use over the counter acetaminophen for fevers or comfort. A cool mist humidifier was encouraged. Over the counter mucolytic agents were encouraged. Medication will be added as noted below.    Too was seen today for sinusitis and cough.    Diagnoses and all orders for this visit:    Acute non-recurrent maxillary sinusitis  -     amoxicillin-clavulanate (AUGMENTIN) 875-125 MG per tablet; Take 1 tablet by mouth 2 (Two) Times a Day.        Return to Office    The patient was instructed to return for follow-up at the next scheduled visit.    The patient was instructed to return sooner if the condition changes, worsens, or does not resolve.

## 2020-03-14 DIAGNOSIS — Z79.4 TYPE 2 DIABETES MELLITUS WITH DIABETIC PERIPHERAL ANGIOPATHY WITHOUT GANGRENE, WITH LONG-TERM CURRENT USE OF INSULIN (HCC): ICD-10-CM

## 2020-03-14 DIAGNOSIS — E11.51 TYPE 2 DIABETES MELLITUS WITH DIABETIC PERIPHERAL ANGIOPATHY WITHOUT GANGRENE, WITH LONG-TERM CURRENT USE OF INSULIN (HCC): ICD-10-CM

## 2020-03-16 RX ORDER — INSULIN ASPART 100 [IU]/ML
INJECTION, SUSPENSION SUBCUTANEOUS
Qty: 15 ML | Refills: 2 | Status: SHIPPED | OUTPATIENT
Start: 2020-03-16 | End: 2020-05-15

## 2020-05-15 DIAGNOSIS — Z79.4 TYPE 2 DIABETES MELLITUS WITH DIABETIC PERIPHERAL ANGIOPATHY WITHOUT GANGRENE, WITH LONG-TERM CURRENT USE OF INSULIN (HCC): ICD-10-CM

## 2020-05-15 DIAGNOSIS — E11.51 TYPE 2 DIABETES MELLITUS WITH DIABETIC PERIPHERAL ANGIOPATHY WITHOUT GANGRENE, WITH LONG-TERM CURRENT USE OF INSULIN (HCC): ICD-10-CM

## 2020-05-15 RX ORDER — INSULIN ASPART 100 [IU]/ML
INJECTION, SUSPENSION SUBCUTANEOUS
Qty: 15 ML | Refills: 2 | Status: SHIPPED | OUTPATIENT
Start: 2020-05-15 | End: 2020-05-18 | Stop reason: SDUPTHER

## 2020-05-18 DIAGNOSIS — Z79.4 TYPE 2 DIABETES MELLITUS WITH DIABETIC PERIPHERAL ANGIOPATHY WITHOUT GANGRENE, WITH LONG-TERM CURRENT USE OF INSULIN (HCC): ICD-10-CM

## 2020-05-18 DIAGNOSIS — E11.51 TYPE 2 DIABETES MELLITUS WITH DIABETIC PERIPHERAL ANGIOPATHY WITHOUT GANGRENE, WITH LONG-TERM CURRENT USE OF INSULIN (HCC): ICD-10-CM

## 2020-05-18 RX ORDER — INSULIN ASPART 100 [IU]/ML
50 INJECTION, SUSPENSION SUBCUTANEOUS
Qty: 30 PEN | Refills: 2 | Status: SHIPPED | OUTPATIENT
Start: 2020-05-18 | End: 2020-06-05

## 2020-05-21 DIAGNOSIS — E11.51 TYPE 2 DIABETES MELLITUS WITH DIABETIC PERIPHERAL ANGIOPATHY WITHOUT GANGRENE, WITH LONG-TERM CURRENT USE OF INSULIN (HCC): Primary | ICD-10-CM

## 2020-05-21 DIAGNOSIS — Z79.4 TYPE 2 DIABETES MELLITUS WITH DIABETIC PERIPHERAL ANGIOPATHY WITHOUT GANGRENE, WITH LONG-TERM CURRENT USE OF INSULIN (HCC): Primary | ICD-10-CM

## 2020-05-21 RX ORDER — BLOOD GLUCOSE CONTROL HIGH,LOW
1 EACH MISCELLANEOUS AS NEEDED
Qty: 1 EACH | Refills: 0 | Status: SHIPPED | OUTPATIENT
Start: 2020-05-21

## 2020-05-21 RX ORDER — BLOOD-GLUCOSE METER
1 EACH MISCELLANEOUS 3 TIMES DAILY
Qty: 1 KIT | Refills: 0 | Status: SHIPPED | OUTPATIENT
Start: 2020-05-21

## 2020-05-21 RX ORDER — PEN NEEDLE, DIABETIC 31 GX5/16"
1 NEEDLE, DISPOSABLE MISCELLANEOUS 3 TIMES DAILY
Qty: 200 EACH | Refills: 0 | Status: SHIPPED | OUTPATIENT
Start: 2020-05-21

## 2020-05-26 ENCOUNTER — OFFICE VISIT (OUTPATIENT)
Dept: INTERNAL MEDICINE | Facility: CLINIC | Age: 65
End: 2020-05-26

## 2020-05-26 VITALS
TEMPERATURE: 98 F | WEIGHT: 248.6 LBS | BODY MASS INDEX: 32.14 KG/M2 | RESPIRATION RATE: 18 BRPM | OXYGEN SATURATION: 95 % | HEART RATE: 69 BPM | SYSTOLIC BLOOD PRESSURE: 138 MMHG | DIASTOLIC BLOOD PRESSURE: 78 MMHG

## 2020-05-26 DIAGNOSIS — Z79.4 TYPE 2 DIABETES MELLITUS WITH DIABETIC PERIPHERAL ANGIOPATHY WITHOUT GANGRENE, WITH LONG-TERM CURRENT USE OF INSULIN (HCC): Primary | ICD-10-CM

## 2020-05-26 DIAGNOSIS — E78.5 HYPERLIPIDEMIA, UNSPECIFIED HYPERLIPIDEMIA TYPE: ICD-10-CM

## 2020-05-26 DIAGNOSIS — Z12.11 SCREENING FOR COLON CANCER: ICD-10-CM

## 2020-05-26 DIAGNOSIS — E11.51 TYPE 2 DIABETES MELLITUS WITH DIABETIC PERIPHERAL ANGIOPATHY WITHOUT GANGRENE, WITH LONG-TERM CURRENT USE OF INSULIN (HCC): Primary | ICD-10-CM

## 2020-05-26 DIAGNOSIS — I10 ESSENTIAL HYPERTENSION: ICD-10-CM

## 2020-05-26 DIAGNOSIS — R10.31 RIGHT LOWER QUADRANT ABDOMINAL PAIN: ICD-10-CM

## 2020-05-26 DIAGNOSIS — Z23 IMMUNIZATION DUE: ICD-10-CM

## 2020-05-26 LAB
ALBUMIN/CREATININE RATIO, URINE: NORMAL
BILIRUB BLD-MCNC: ABNORMAL MG/DL
CLARITY, POC: CLEAR
COLOR UR: YELLOW
EXPIRATION DATE: ABNORMAL
EXPIRATION DATE: NORMAL
GLUCOSE UR STRIP-MCNC: ABNORMAL MG/DL
KETONES UR QL: ABNORMAL
LEUKOCYTE EST, POC: NEGATIVE
Lab: ABNORMAL
Lab: NORMAL
NITRITE UR-MCNC: NEGATIVE MG/ML
PH UR: 5 [PH] (ref 5–8)
POC CREATININE URINE: 200
POC MICROALBUMIN URINE: 80
PROT UR STRIP-MCNC: NEGATIVE MG/DL
RBC # UR STRIP: NEGATIVE /UL
SP GR UR: 1.02 (ref 1–1.03)
UROBILINOGEN UR QL: ABNORMAL

## 2020-05-26 PROCEDURE — 90732 PPSV23 VACC 2 YRS+ SUBQ/IM: CPT | Performed by: INTERNAL MEDICINE

## 2020-05-26 PROCEDURE — 82044 UR ALBUMIN SEMIQUANTITATIVE: CPT | Performed by: INTERNAL MEDICINE

## 2020-05-26 PROCEDURE — G0009 ADMIN PNEUMOCOCCAL VACCINE: HCPCS | Performed by: INTERNAL MEDICINE

## 2020-05-26 PROCEDURE — 99214 OFFICE O/P EST MOD 30 MIN: CPT | Performed by: INTERNAL MEDICINE

## 2020-05-26 PROCEDURE — 36415 COLL VENOUS BLD VENIPUNCTURE: CPT | Performed by: INTERNAL MEDICINE

## 2020-05-26 PROCEDURE — 81003 URINALYSIS AUTO W/O SCOPE: CPT | Performed by: INTERNAL MEDICINE

## 2020-05-26 NOTE — PROGRESS NOTES
Chief Complaint   Patient presents with   • Follow-up       History of Present Illness      The patient presents for a follow-up related to hypertension. The patient reports that he has had no headaches, chest pain, dyspnea, edema, syncope, blurred vision or palpitations. He states that he is taking his medication as prescribed. He is not having medication side effects.    The patient presents for a follow-up related to hyperlipidemia. He is following a low fat diet. He reports that he is not exercising. He is taking atorvastatin. The patient is taking his medication as prescribed. He reports no medication side effects, including muscle cramps, headaches or weakness. He denies orthopnea, paroxysmal nocturnal dyspnea or dyspnea on exertion.    The patient presents for a follow-up related to Type 2 Diabetes Mellitus and reports that he checks his blood sugars at home. His sugars are checked every three days. The average sugars are in the 150-200 range. He denies hypoglycemic symptoms. The patient denies polyuria, polydypsia or polyphagia. He reports numbness, tingling and pain in his feet. The neuropathy is to the mid-foot. The neuropathy and leg pain to mid-thigh on the right leg. Patient has a history of bypass surgery on right leg. The patient takes his medication as prescribed. He is taking insulin. His injection sites are rotated appropriately. The patient does check his feet daily at home.    The patient presents for an initial visit for right lower quadrant abdominal pain. There is 2 month history of abdominal pain. The pain is in the RLQ. The pain does not radiate. The pain is characterized as aching and dull. The pain is intermittent. The patient has not had a fever. The patient reports that the pain is not aggravated by motion, food, medication or hunger.  The patient has noted no alleviating factors. The patient also denies hematuria, dysuria, a rash, urinary hesitancy or urinary frequency. The patient has  not had a past history of abdominal surgery. Patient's last colonoscopy was ten years ago.    Review of Systems    CONSTITUTIONAL- Denies Unexplained Weight Loss, Fever, Chills, Sweats, Fatigue, Weakness or Malaise.    GASTROINTESTINAL- Reports: Abdominal Pain. Denies: Nausea, Vomiting, Diarrhea, Blood per Rectum or Constipation.    PULMONARY- Denies Wheezing, Sputum Production, Cough, Hemoptysis or Pleuritic Chest Pain.    CARDIOVASCULAR- Denies Irregular Heart Beat.    ENDOCRINE- Denies Cold Intolerance, Depression, Hair Loss, Heat Intolerance, Memory Loss, Sleep Disturbance or Weight Gain.    Medications      Current Outpatient Medications:   •  Alcohol Swabs (ALCOHOL PREP) pads, 1 swab 3 (Three) Times a Day., Disp: 200 each, Rfl: 0  •  amoxicillin-clavulanate (AUGMENTIN) 875-125 MG per tablet, Take 1 tablet by mouth 2 (Two) Times a Day., Disp: 20 tablet, Rfl: 0  •  aspirin 81 MG EC tablet, Take 1 tablet by mouth Daily., Disp: , Rfl:   •  atorvastatin (LIPITOR) 40 MG tablet, Take 1 tablet by mouth Every Night., Disp: 90 tablet, Rfl: 1  •  B-D UF III MINI PEN NEEDLES 31G X 5 MM misc, use as directed, Disp: 100 each, Rfl: 5  •  Blood Glucose Calibration (ACCU-CHEK LOR) solution, 1 bottle by In Vitro route As Needed (Use as directed)., Disp: 1 each, Rfl: 0  •  Blood Glucose Monitoring Suppl (ACCU-CHEK LOR PLUS) w/Device kit, 1 kit 3 (Three) Times a Day., Disp: 1 kit, Rfl: 0  •  cilostazol (PLETAL) 50 MG tablet, take 1 tablet by mouth twice a day, Disp: 180 tablet, Rfl: 1  •  Crisaborole (EUCRISA) 2 % ointment, Apply 1 application topically Daily As Needed (rash)., Disp: 60 g, Rfl: 0  •  ELIQUIS 5 MG tablet tablet, take 1 tablet by mouth twice a day, Disp: 60 tablet, Rfl: 5  •  fluticasone (FLONASE) 50 MCG/ACT nasal spray, 2 sprays into the nostril(s) as directed by provider Daily., Disp: 16 g, Rfl: 3  •  gabapentin (NEURONTIN) 400 MG capsule, Take 1 capsule by mouth 3 (Three) Times a Day., Disp: 90 capsule, Rfl:  2  •  glucose blood (Accu-Chek Geri Plus) test strip, Use as instructed, Disp: 100 each, Rfl: 12  •  JANUVIA 100 MG tablet, take 1 tablet by mouth once daily, Disp: 30 tablet, Rfl: 5  •  Lancets (ACCU-CHEK SOFT TOUCH) lancets, Check sugars 3 times daily, Disp: 1 each, Rfl: 12  •  lisinopril (PRINIVIL,ZESTRIL) 2.5 MG tablet, Take 1 tablet by mouth Daily., Disp: , Rfl: 0  •  metFORMIN (GLUCOPHAGE) 1000 MG tablet, Take 1 tablet by mouth 2 (Two) Times a Day., Disp: , Rfl: 0  •  metoprolol succinate XL (TOPROL-XL) 25 MG 24 hr tablet, Take 1 tablet by mouth Daily., Disp: 30 tablet, Rfl: 5  •  NOVOLOG MIX 70/30 FLEXPEN (70-30) 100 UNIT/ML suspension pen-injector injection, Inject 0.5 mL under the skin into the appropriate area as directed 2 (Two) Times a Day Before Meals., Disp: 30 pen, Rfl: 2    Current Facility-Administered Medications:   •  cyanocobalamin injection 1,000 mcg, 1,000 mcg, Intramuscular, Q28 Days, Des Geller MD, 1,000 mcg at 11/06/19 1200     Allergies    Allergies   Allergen Reactions   • Adhesive Tape Rash   • Neosporin [Neomycin-Bacitracin Zn-Polymyx] Rash       Problem List    Patient Active Problem List   Diagnosis   • Cough   • Benign essential HTN   • Benign prostatic hyperplasia   • Coronary artery disease involving native coronary artery of native heart   • Chronic coronary artery disease   • Diabetes mellitus (CMS/HCC)   • Gout   • Hyperlipidemia   • History of heart attack   • Peripheral neuropathy   • Peripheral vascular disease (CMS/HCC)       Medications, Allergies, Problems List and Past History were reviewed and updated.    Physical Examination    /78 (BP Location: Right arm, Patient Position: Sitting, Cuff Size: Adult)   Pulse 69   Temp 98 °F (36.7 °C) (Temporal)   Resp 18   Wt 113 kg (248 lb 9.6 oz)   SpO2 95%   BMI 32.14 kg/m²     HEENT: Head- Normocephalic Atraumatic. Facies- Within normal limits. Pinnas- Normal texture and shape bilaterally. Canals- Normal  bilaterally. TMs- Normal bilaterally. Nares- Patent bilaterally. Nasal Septum- is normal. Lids- Normal bilaterally. Conjunctiva- Clear bilaterally. Sclera- Anicteric bilaterally. Oropharynx- Moist with no lesions. Tonsils- No enlargement, erythema or exudate.    Neck: Thyroid- non enlarged, symmetric and has no nodules. No bruits are detected.    Lungs: Auscultation- Clear to auscultation bilaterally. There are no retractions, clubbing or cyanosis. The Expiratory to Inspiratory ratio is equal.    Cardiovascular: There are no carotid bruits. Heart- Normal Rate with Regular rhythm and no murmurs. There are no gallops. There are no rubs. In the lower extremities there is no edema. The upper extremities do not have edema.    Abdomen: Soft, benign, non-tender with no masses, hernias, organomegaly or scars. There is no CVA tenderness noted.    Feet: The feet are symmetric with normal hay landmarks. There is no tenderness to palpation bilaterally. The feet have normal posterior tibial and dorsalis pedis pulses and normal capillary refill bilaterally. The arches are normal bilaterally. There are no skin or nail lesions present. There are no ingrown nails. There are no bunions noted.    Impression and Assessment    Essential Hypertension.    Hyperlipidemia.    Type 2 Diabetes Mellitus.    Right Lower Quadrant Abdominal Pain    Diabetic Neuropathy.    Plan    Right Lower Quadrant Abdominal Pain Plan: The patient is due for a colonoscopy. The following tests were ordered: Colonoscopy.    Essential Hypertension Plan: The patient was instructed to exercise daily, eat a low fat diet and continue his medications.    Hyperlipidemia Plan: The patient was instructed to exercise daily, eat a low fat diet and continue his medications.    Type 2 Diabetes Mellitus Plan: The current plan was continued.    Diabetic Neuropathy Plan: The current plan was continued.    Too was seen today for follow-up.    Diagnoses and all orders for  this visit:    Type 2 diabetes mellitus with diabetic peripheral angiopathy without gangrene, with long-term current use of insulin (CMS/[facility])  -     Comprehensive Metabolic Panel  -     Hemoglobin A1c  -     POC Microalbumin    Essential hypertension  -     Comprehensive Metabolic Panel  -     CBC & Differential  -     POC Urinalysis Dipstick, Automated    Hyperlipidemia, unspecified hyperlipidemia type  -     Comprehensive Metabolic Panel  -     Lipid Panel    Right lower quadrant abdominal pain  -     CT Abdomen Pelvis Without Contrast; Future    Immunization due  -     Pneumococcal Polysaccharide Vaccine 23-Valent Greater Than or Equal To 3yo Subcutaneous / IM    Screening for colon cancer  -     Ambulatory Referral For Screening Colonoscopy        Return to Office    The patient was instructed to return for follow-up in 4 months.    The patient was instructed to return sooner if the condition changes, worsens, or does not resolve.

## 2020-05-27 LAB
ALBUMIN SERPL-MCNC: 4.3 G/DL (ref 3.8–4.8)
ALBUMIN/GLOB SERPL: 1.7 {RATIO} (ref 1.2–2.2)
ALP SERPL-CCNC: 70 IU/L (ref 39–117)
ALT SERPL-CCNC: 19 IU/L (ref 0–44)
AST SERPL-CCNC: 18 IU/L (ref 0–40)
BASOPHILS # BLD AUTO: 0.1 X10E3/UL (ref 0–0.2)
BASOPHILS NFR BLD AUTO: 1 %
BILIRUB SERPL-MCNC: 0.5 MG/DL (ref 0–1.2)
BUN SERPL-MCNC: 18 MG/DL (ref 8–27)
BUN/CREAT SERPL: 15 (ref 10–24)
CALCIUM SERPL-MCNC: 9.6 MG/DL (ref 8.6–10.2)
CHLORIDE SERPL-SCNC: 108 MMOL/L (ref 96–106)
CHOLEST SERPL-MCNC: 128 MG/DL (ref 100–199)
CO2 SERPL-SCNC: 23 MMOL/L (ref 20–29)
CREAT SERPL-MCNC: 1.18 MG/DL (ref 0.76–1.27)
EOSINOPHIL # BLD AUTO: 0.6 X10E3/UL (ref 0–0.4)
EOSINOPHIL NFR BLD AUTO: 8 %
ERYTHROCYTE [DISTWIDTH] IN BLOOD BY AUTOMATED COUNT: 12.8 % (ref 11.6–15.4)
GLOBULIN SER CALC-MCNC: 2.6 G/DL (ref 1.5–4.5)
GLUCOSE SERPL-MCNC: 154 MG/DL (ref 65–99)
HBA1C MFR BLD: 7.6 % (ref 4.8–5.6)
HCT VFR BLD AUTO: 44.9 % (ref 37.5–51)
HDLC SERPL-MCNC: 39 MG/DL
HGB BLD-MCNC: 15 G/DL (ref 13–17.7)
IMM GRANULOCYTES # BLD AUTO: 0 X10E3/UL (ref 0–0.1)
IMM GRANULOCYTES NFR BLD AUTO: 1 %
LDLC SERPL CALC-MCNC: 76 MG/DL (ref 0–99)
LYMPHOCYTES # BLD AUTO: 2 X10E3/UL (ref 0.7–3.1)
LYMPHOCYTES NFR BLD AUTO: 26 %
MCH RBC QN AUTO: 30.2 PG (ref 26.6–33)
MCHC RBC AUTO-ENTMCNC: 33.4 G/DL (ref 31.5–35.7)
MCV RBC AUTO: 91 FL (ref 79–97)
MONOCYTES # BLD AUTO: 0.9 X10E3/UL (ref 0.1–0.9)
MONOCYTES NFR BLD AUTO: 12 %
NEUTROPHILS # BLD AUTO: 4.1 X10E3/UL (ref 1.4–7)
NEUTROPHILS NFR BLD AUTO: 52 %
PLATELET # BLD AUTO: 284 X10E3/UL (ref 150–450)
POTASSIUM SERPL-SCNC: 4.9 MMOL/L (ref 3.5–5.2)
PROT SERPL-MCNC: 6.9 G/DL (ref 6–8.5)
RBC # BLD AUTO: 4.96 X10E6/UL (ref 4.14–5.8)
SODIUM SERPL-SCNC: 143 MMOL/L (ref 134–144)
TRIGL SERPL-MCNC: 66 MG/DL (ref 0–149)
VLDLC SERPL CALC-MCNC: 13 MG/DL (ref 5–40)
WBC # BLD AUTO: 7.8 X10E3/UL (ref 3.4–10.8)

## 2020-06-05 DIAGNOSIS — E11.51 TYPE 2 DIABETES MELLITUS WITH DIABETIC PERIPHERAL ANGIOPATHY WITHOUT GANGRENE, WITH LONG-TERM CURRENT USE OF INSULIN (HCC): ICD-10-CM

## 2020-06-05 DIAGNOSIS — Z79.4 TYPE 2 DIABETES MELLITUS WITH DIABETIC PERIPHERAL ANGIOPATHY WITHOUT GANGRENE, WITH LONG-TERM CURRENT USE OF INSULIN (HCC): ICD-10-CM

## 2020-06-05 RX ORDER — INSULIN ASPART 100 [IU]/ML
INJECTION, SUSPENSION SUBCUTANEOUS
Qty: 15 ML | Refills: 0 | Status: SHIPPED | OUTPATIENT
Start: 2020-06-05 | End: 2020-06-22

## 2020-06-11 RX ORDER — METOPROLOL SUCCINATE 25 MG/1
TABLET, EXTENDED RELEASE ORAL
Qty: 90 TABLET | Refills: 1 | Status: SHIPPED | OUTPATIENT
Start: 2020-06-11 | End: 2020-10-19

## 2020-06-11 RX ORDER — APIXABAN 5 MG/1
TABLET, FILM COATED ORAL
Qty: 180 TABLET | Refills: 1 | Status: SHIPPED | OUTPATIENT
Start: 2020-06-11 | End: 2020-12-14

## 2020-06-11 RX ORDER — CILOSTAZOL 50 MG/1
TABLET ORAL
Qty: 180 TABLET | Refills: 1 | Status: SHIPPED | OUTPATIENT
Start: 2020-06-11 | End: 2020-10-27

## 2020-06-11 RX ORDER — GABAPENTIN 400 MG/1
CAPSULE ORAL
Qty: 90 CAPSULE | Refills: 2 | Status: SHIPPED | OUTPATIENT
Start: 2020-06-11 | End: 2020-11-30

## 2020-06-11 RX ORDER — FLURBIPROFEN SODIUM 0.3 MG/ML
SOLUTION/ DROPS OPHTHALMIC
Qty: 200 EACH | Refills: 1 | Status: SHIPPED | OUTPATIENT
Start: 2020-06-11 | End: 2021-06-16

## 2020-06-11 RX ORDER — ATORVASTATIN CALCIUM 40 MG/1
40 TABLET, FILM COATED ORAL NIGHTLY
Qty: 90 TABLET | Refills: 1 | Status: SHIPPED | OUTPATIENT
Start: 2020-06-11 | End: 2021-04-30

## 2020-06-19 ENCOUNTER — TELEPHONE (OUTPATIENT)
Dept: INTERNAL MEDICINE | Facility: CLINIC | Age: 65
End: 2020-06-19

## 2020-06-19 DIAGNOSIS — E11.51 TYPE 2 DIABETES MELLITUS WITH DIABETIC PERIPHERAL ANGIOPATHY WITHOUT GANGRENE, WITH LONG-TERM CURRENT USE OF INSULIN (HCC): ICD-10-CM

## 2020-06-19 DIAGNOSIS — Z79.4 TYPE 2 DIABETES MELLITUS WITH DIABETIC PERIPHERAL ANGIOPATHY WITHOUT GANGRENE, WITH LONG-TERM CURRENT USE OF INSULIN (HCC): ICD-10-CM

## 2020-06-19 NOTE — TELEPHONE ENCOUNTER
[facility] IS REQUESTING DOSING CLARIFICATION FOR PATIENTS     NOVOLOG MIX 70/30 FLEXPEN (70-30) 100 UNIT/ML suspension pen-injector injection    PLEASE CALL AND CONFIRM [phone]

## 2020-06-19 NOTE — TELEPHONE ENCOUNTER
Pharmacy states on his Novlog, the directions say 0.5 units. With the pen, you can't measure out 0.5ml.  Pharmacy needs clarification. Ok to wait until Monday.

## 2020-06-22 RX ORDER — INSULIN ASPART 100 [IU]/ML
INJECTION, SUSPENSION SUBCUTANEOUS
Qty: 30 ML | Refills: 5 | Status: SHIPPED | OUTPATIENT
Start: 2020-06-22 | End: 2021-02-15 | Stop reason: SDUPTHER

## 2020-06-22 NOTE — TELEPHONE ENCOUNTER
S/W pt, states he takes Novalog Mix 70/30 and that he takes 50 units twice daily.  Expl will get rx corrected and resent to pharmacy.  Verb great apprec.

## 2020-07-21 RX ORDER — SODIUM, POTASSIUM,MAG SULFATES 17.5-3.13G
SOLUTION, RECONSTITUTED, ORAL ORAL
Qty: 2 BOTTLE | Refills: 0 | Status: SHIPPED | OUTPATIENT
Start: 2020-07-21 | End: 2020-12-31

## 2020-07-24 ENCOUNTER — APPOINTMENT (OUTPATIENT)
Dept: PREADMISSION TESTING | Facility: HOSPITAL | Age: 65
End: 2020-07-24

## 2020-08-18 ENCOUNTER — OFFICE VISIT (OUTPATIENT)
Dept: INTERNAL MEDICINE | Facility: CLINIC | Age: 65
End: 2020-08-18

## 2020-08-18 VITALS
SYSTOLIC BLOOD PRESSURE: 132 MMHG | TEMPERATURE: 98.2 F | BODY MASS INDEX: 31.8 KG/M2 | HEART RATE: 64 BPM | RESPIRATION RATE: 16 BRPM | WEIGHT: 246 LBS | OXYGEN SATURATION: 97 % | DIASTOLIC BLOOD PRESSURE: 72 MMHG

## 2020-08-18 DIAGNOSIS — M25.472 PAIN AND SWELLING OF LEFT ANKLE: ICD-10-CM

## 2020-08-18 DIAGNOSIS — M25.572 PAIN AND SWELLING OF LEFT ANKLE: ICD-10-CM

## 2020-08-18 DIAGNOSIS — I73.9 PERIPHERAL VASCULAR DISEASE (HCC): Primary | ICD-10-CM

## 2020-08-18 DIAGNOSIS — Z95.828 S/P INSERTION OF ILIAC ARTERY STENT: ICD-10-CM

## 2020-08-18 LAB — D DIMER PPP FEU-MCNC: 0.29 MCGFEU/ML (ref 0–0.56)

## 2020-08-18 PROCEDURE — 99213 OFFICE O/P EST LOW 20 MIN: CPT | Performed by: PHYSICIAN ASSISTANT

## 2020-08-18 PROCEDURE — 36415 COLL VENOUS BLD VENIPUNCTURE: CPT | Performed by: PHYSICIAN ASSISTANT

## 2020-08-18 NOTE — PROGRESS NOTES
Chief Complaint   Patient presents with   • Foot Pain     spots around ankle       Subjective       History of Present Illness     Too Tai is a 65 y.o. male. He presents with 2 day history of swelling and tenderness to L ankle, with a white spot at his ankle. Pt and his wife provide the history. Pt is concerned as he has severe PVD and hx of peripheral arterial stent and femoral artery stent. Pt denies any recent falls, trauma or injury to lower extremities. His wife states he does have new shoes he has been wearing and wonders if this is affecting his ankle. He does have mild swelling at lower extremities at baseline, somewhat increased at lateral L ankle today. He denies any increased numbness or tingling of lower extremities from baseline. He denies chest pain, SOA, abdominal pain or any additional concerns today. Pt follows with Dr. Light at [facility] in [place] for his PVD.       The following portions of the patient's history were reviewed and updated as appropriate: allergies, current medications, past medical history, past surgical history and problem list.    Allergies   Allergen Reactions   • Adhesive Tape Rash   • Neosporin [Neomycin-Bacitracin Zn-Polymyx] Rash     Social History     Tobacco Use   • Smoking status: Former Smoker     Last attempt to quit:      Years since quittin.6   • Smokeless tobacco: Never Used   Substance Use Topics   • Alcohol use: No     Frequency: Never         Current Outpatient Medications:   •  Alcohol Swabs (ALCOHOL PREP) pads, 1 swab 3 (Three) Times a Day., Disp: 200 each, Rfl: 0  •  amoxicillin-clavulanate (AUGMENTIN) 875-125 MG per tablet, Take 1 tablet by mouth 2 (Two) Times a Day., Disp: 20 tablet, Rfl: 0  •  aspirin 81 MG EC tablet, Take 1 tablet by mouth Daily., Disp: , Rfl:   •  atorvastatin (LIPITOR) 40 MG tablet, TAKE 1 TABLET BY MOUTH EVERY NIGHT, Disp: 90 tablet, Rfl: 1  •  B-D UF III MINI PEN NEEDLES 31G X 5 MM misc, USE AS DIRECTED, Disp: 200  each, Rfl: 1  •  Blood Glucose Calibration (ACCU-CHEK LOR) solution, 1 bottle by In Vitro route As Needed (Use as directed)., Disp: 1 each, Rfl: 0  •  Blood Glucose Monitoring Suppl (ACCU-CHEK LOR PLUS) w/Device kit, 1 kit 3 (Three) Times a Day., Disp: 1 kit, Rfl: 0  •  cilostazol (PLETAL) 50 MG tablet, TAKE 1 TABLET BY MOUTH TWICE A DAY, Disp: 180 tablet, Rfl: 1  •  Crisaborole (EUCRISA) 2 % ointment, Apply 1 application topically Daily As Needed (rash)., Disp: 60 g, Rfl: 0  •  ELIQUIS 5 MG tablet tablet, TAKE 1 TABLET BY MOUTH TWICE A DAY, Disp: 180 tablet, Rfl: 1  •  fluticasone (FLONASE) 50 MCG/ACT nasal spray, 2 sprays into the nostril(s) as directed by provider Daily., Disp: 16 g, Rfl: 3  •  gabapentin (NEURONTIN) 400 MG capsule, TAKE 1 CAPSULE BY MOUTH THREE TIMES DAILY, Disp: 90 capsule, Rfl: 2  •  glucose blood (Accu-Chek Lor Plus) test strip, Use as instructed, Disp: 100 each, Rfl: 12  •  JANUVIA 100 MG tablet, take 1 tablet by mouth once daily, Disp: 30 tablet, Rfl: 5  •  Lancets (ACCU-CHEK SOFT TOUCH) lancets, Check sugars 3 times daily, Disp: 1 each, Rfl: 12  •  lisinopril (PRINIVIL,ZESTRIL) 2.5 MG tablet, Take 1 tablet by mouth Daily., Disp: , Rfl: 0  •  metFORMIN (GLUCOPHAGE) 1000 MG tablet, Take 1 tablet by mouth 2 (Two) Times a Day., Disp: 180 tablet, Rfl: 1  •  metoprolol succinate XL (TOPROL-XL) 25 MG 24 hr tablet, TAKE 1 TABLET BY MOUTH ONCE DAILY, Disp: 90 tablet, Rfl: 1  •  NOVOLOG MIX 70/30 FLEXPEN (70-30) 100 UNIT/ML suspension pen-injector injection, Take 50 units SQ twice daily with meals, Disp: 30 mL, Rfl: 5  •  sodium-potassium-magnesium sulfates (Suprep Bowel Prep Kit) 17.5-3.13-1.6 GM/177ML solution oral solution, Please follow the directions mailed to you by our office. If you have any questions call our office at [phone], Disp: 2 bottle, Rfl: 0    Current Facility-Administered Medications:   •  cyanocobalamin injection 1,000 mcg, 1,000 mcg, Intramuscular, Q28 Days,  Des Geller MD, 1,000 mcg at 11/06/19 1200    Review of Systems   Constitutional: Negative for chills, fatigue and fever.   HENT: Negative for sore throat.    Respiratory: Negative for cough, shortness of breath and wheezing.    Cardiovascular: Positive for leg swelling. Negative for chest pain and palpitations.   Gastrointestinal: Negative for abdominal pain, nausea and vomiting.   Musculoskeletal: Positive for arthralgias and myalgias.   Skin: Positive for color change. Negative for rash.   Neurological: Negative for dizziness and headache.       Objective   Vitals:    08/18/20 0851   BP: 132/72   Pulse: 64   Resp: 16   Temp: 98.2 °F (36.8 °C)   SpO2: 97%     Physical Exam   Constitutional: He appears well-developed and well-nourished.   HENT:   Mouth/Throat: Oropharynx is clear and moist.   Eyes: Conjunctivae are normal.   Cardiovascular: Normal rate, regular rhythm and normal heart sounds.   Pulses:       Posterior tibial pulses are 0 on the right side, and 1+ on the left side.   Decreased peripheral pulses secondary to PVD.    Pulmonary/Chest: Effort normal and breath sounds normal. He has no wheezes. He has no rales.   Musculoskeletal:        Left ankle: He exhibits swelling. He exhibits normal range of motion and no ecchymosis. Tenderness. Lateral malleolus tenderness found.   L ankle: +minimal swelling localized to lateral L ankle just posterior to malleolus. +prominent vessel at L ankle at area of suspicion. +TTP again just posterior to L lateral malleolus. Normal active ROM, although +pt reports discomfort with dorsiflexion and inversion.              Assessment/Plan   Too was seen today for foot pain.    Diagnoses and all orders for this visit:    Peripheral vascular disease (CMS/[facility])  -     D-dimer, Quantitative    S/P insertion of iliac artery stent  -     D-dimer, Quantitative    Pain and swelling of left ankle  -     D-dimer, Quantitative      Suspect muscle vs ligament strain. Advised  GLORIA at this time. We will obtain D-dimer to r/o further concerns for DVT as this is very worrisome for pt and his wife today.   Continue follow up with Dr. Light for PVD as scheduled next month.            Return for Next scheduled follow up.

## 2020-08-31 ENCOUNTER — PRIOR AUTHORIZATION (OUTPATIENT)
Dept: INTERNAL MEDICINE | Facility: CLINIC | Age: 65
End: 2020-08-31

## 2020-08-31 ENCOUNTER — OFFICE VISIT (OUTPATIENT)
Dept: INTERNAL MEDICINE | Facility: CLINIC | Age: 65
End: 2020-08-31

## 2020-08-31 VITALS
BODY MASS INDEX: 30.59 KG/M2 | DIASTOLIC BLOOD PRESSURE: 62 MMHG | RESPIRATION RATE: 16 BRPM | SYSTOLIC BLOOD PRESSURE: 110 MMHG | HEIGHT: 75 IN | OXYGEN SATURATION: 96 % | TEMPERATURE: 98.2 F | WEIGHT: 246 LBS | HEART RATE: 70 BPM

## 2020-08-31 DIAGNOSIS — S10.91XA ABRASION OF NECK, INITIAL ENCOUNTER: Primary | ICD-10-CM

## 2020-08-31 DIAGNOSIS — Z79.4 TYPE 2 DIABETES MELLITUS WITH DIABETIC PERIPHERAL ANGIOPATHY WITHOUT GANGRENE, WITH LONG-TERM CURRENT USE OF INSULIN (HCC): ICD-10-CM

## 2020-08-31 DIAGNOSIS — E11.51 TYPE 2 DIABETES MELLITUS WITH DIABETIC PERIPHERAL ANGIOPATHY WITHOUT GANGRENE, WITH LONG-TERM CURRENT USE OF INSULIN (HCC): ICD-10-CM

## 2020-08-31 PROCEDURE — 99213 OFFICE O/P EST LOW 20 MIN: CPT | Performed by: PHYSICIAN ASSISTANT

## 2020-08-31 NOTE — TELEPHONE ENCOUNTER
Too Tai (Becker: [id number])   Available without authorization.        Tier Exception has been submitted:     Too Tai (Becker: [id number])   Drug: Januvia 100MG tablets  Form: [employer] Tier Exceptions Form    Next Steps:  The plan will fax you a determination, typically within 1 to 5 business days

## 2020-09-28 ENCOUNTER — OFFICE VISIT (OUTPATIENT)
Dept: INTERNAL MEDICINE | Facility: CLINIC | Age: 65
End: 2020-09-28

## 2020-09-28 VITALS
RESPIRATION RATE: 18 BRPM | WEIGHT: 246 LBS | BODY MASS INDEX: 30.75 KG/M2 | DIASTOLIC BLOOD PRESSURE: 68 MMHG | TEMPERATURE: 98 F | SYSTOLIC BLOOD PRESSURE: 142 MMHG | HEART RATE: 72 BPM

## 2020-09-28 DIAGNOSIS — Z12.5 PROSTATE CANCER SCREENING: ICD-10-CM

## 2020-09-28 DIAGNOSIS — Z79.4 TYPE 2 DIABETES MELLITUS WITH DIABETIC PERIPHERAL ANGIOPATHY WITHOUT GANGRENE, WITH LONG-TERM CURRENT USE OF INSULIN (HCC): ICD-10-CM

## 2020-09-28 DIAGNOSIS — E53.8 VITAMIN B 12 DEFICIENCY: ICD-10-CM

## 2020-09-28 DIAGNOSIS — Z00.00 PHYSICAL EXAM: ICD-10-CM

## 2020-09-28 DIAGNOSIS — Z12.11 SCREENING FOR COLON CANCER: ICD-10-CM

## 2020-09-28 DIAGNOSIS — Z00.00 MEDICARE ANNUAL WELLNESS VISIT, SUBSEQUENT: Primary | ICD-10-CM

## 2020-09-28 DIAGNOSIS — E11.51 TYPE 2 DIABETES MELLITUS WITH DIABETIC PERIPHERAL ANGIOPATHY WITHOUT GANGRENE, WITH LONG-TERM CURRENT USE OF INSULIN (HCC): ICD-10-CM

## 2020-09-28 DIAGNOSIS — I10 ESSENTIAL HYPERTENSION: ICD-10-CM

## 2020-09-28 DIAGNOSIS — E78.5 HYPERLIPIDEMIA, UNSPECIFIED HYPERLIPIDEMIA TYPE: ICD-10-CM

## 2020-09-28 DIAGNOSIS — I25.10 CORONARY ARTERY DISEASE INVOLVING NATIVE HEART WITHOUT ANGINA PECTORIS, UNSPECIFIED VESSEL OR LESION TYPE: ICD-10-CM

## 2020-09-28 DIAGNOSIS — L01.00 IMPETIGO: ICD-10-CM

## 2020-09-28 LAB
A/C: NORMAL
BILIRUB BLD-MCNC: NEGATIVE MG/DL
CLARITY, POC: CLEAR
COLOR UR: YELLOW
EXPIRATION DATE: ABNORMAL
EXPIRATION DATE: NORMAL
GLUCOSE UR STRIP-MCNC: ABNORMAL MG/DL
KETONES UR QL: NEGATIVE
LEUKOCYTE EST, POC: NEGATIVE
Lab: 5066
Lab: ABNORMAL
NITRITE UR-MCNC: NEGATIVE MG/ML
PH UR: 6 [PH] (ref 5–8)
POC CREATININE URINE: 50
POC MICROALBUMIN URINE: 10
PROT UR STRIP-MCNC: NEGATIVE MG/DL
RBC # UR STRIP: NEGATIVE /UL
SP GR UR: 1.01 (ref 1–1.03)
UROBILINOGEN UR QL: NORMAL

## 2020-09-28 PROCEDURE — 36415 COLL VENOUS BLD VENIPUNCTURE: CPT | Performed by: INTERNAL MEDICINE

## 2020-09-28 PROCEDURE — G0439 PPPS, SUBSEQ VISIT: HCPCS | Performed by: INTERNAL MEDICINE

## 2020-09-28 PROCEDURE — 81003 URINALYSIS AUTO W/O SCOPE: CPT | Performed by: INTERNAL MEDICINE

## 2020-09-28 PROCEDURE — 99396 PREV VISIT EST AGE 40-64: CPT | Performed by: INTERNAL MEDICINE

## 2020-09-28 PROCEDURE — 96160 PT-FOCUSED HLTH RISK ASSMT: CPT | Performed by: INTERNAL MEDICINE

## 2020-09-28 PROCEDURE — 82044 UR ALBUMIN SEMIQUANTITATIVE: CPT | Performed by: INTERNAL MEDICINE

## 2020-09-28 RX ORDER — CEPHALEXIN 500 MG/1
500 CAPSULE ORAL 4 TIMES DAILY
Qty: 40 CAPSULE | Refills: 0 | Status: SHIPPED | OUTPATIENT
Start: 2020-09-28 | End: 2020-10-20

## 2020-09-28 NOTE — PROGRESS NOTES
The ABCs of the Annual Wellness Visit  Subsequent Medicare Wellness Visit    Chief Complaint   Patient presents with   • Medicare Wellness-subsequent       Subjective   History of Present Illness:  Too Tai is a 65 y.o. male who presents for a Subsequent Medicare Wellness Visit.    HEALTH RISK ASSESSMENT    Recent Hospitalizations:  Recently treated at the following:  Other: [facility]    Current Medical Providers:  Patient Care Team:  Des Geller MD as PCP - General (Internal Medicine)    Smoking Status:  Social History     Tobacco Use   Smoking Status Former Smoker   • Quit date:    • Years since quittin.7   Smokeless Tobacco Never Used       Alcohol Consumption:  Social History     Substance and Sexual Activity   Alcohol Use No   • Frequency: Never       Depression Screen:   PHQ-2/PHQ-9 Depression Screening 2020   Little interest or pleasure in doing things 0   Feeling down, depressed, or hopeless 0   Total Score 0       Fall Risk Screen:  AUGUSTINE Fall Risk Assessment has not been completed.    Health Habits and Functional and Cognitive Screening:  Functional & Cognitive Status 2020   Do you have difficulty preparing food and eating? No   Do you have difficulty bathing yourself, getting dressed or grooming yourself? No   Do you have difficulty using the toilet? No   Do you have difficulty moving around from place to place? No   Do you have trouble with steps or getting out of a bed or a chair? No   Current Diet Well Balanced Diet   Dental Exam Up to date   Eye Exam Up to date   Exercise (times per week) 0 times per week   Current Exercise Activities Include None   Do you need help using the phone?  No   Are you deaf or do you have serious difficulty hearing?  Yes   Do you need help with transportation? No   Do you need help shopping? No   Do you need help preparing meals?  No   Do you need help with housework?  No   Do you need help with laundry? No   Do you need help taking  your medications? Yes   Do you need help managing money? No   Do you ever drive or ride in a car without wearing a seat belt? No   Have you felt unusual stress, anger or loneliness in the last month? No   Who do you live with? Spouse   If you need help, do you have trouble finding someone available to you? No   Have you been bothered in the last four weeks by sexual problems? No   Do you have difficulty concentrating, remembering or making decisions? No         Does the patient have evidence of cognitive impairment? No    Asprin use counseling:Taking ASA appropriately as indicated    Age-appropriate Screening Schedule:  Refer to the list below for future screening recommendations based on patient's age, sex and/or medical conditions. Orders for these recommended tests are listed in the plan section. The patient has been provided with a written plan.    Health Maintenance   Topic Date Due   • COLONOSCOPY  1955   • TDAP/TD VACCINES (1 - Tdap) 07/13/1975   • ZOSTER VACCINE (2 of 3) 09/14/2017   • DIABETIC FOOT EXAM  12/03/2018   • DIABETIC EYE EXAM  12/03/2018   • INFLUENZA VACCINE  08/01/2020   • HEMOGLOBIN A1C  11/26/2020   • LIPID PANEL  05/26/2021   • URINE MICROALBUMIN  05/26/2021          The following portions of the patient's history were reviewed and updated as appropriate: allergies, current medications, past family history, past medical history, past social history, past surgical history and problem list.    Outpatient Medications Prior to Visit   Medication Sig Dispense Refill   • Alcohol Swabs (ALCOHOL PREP) pads 1 swab 3 (Three) Times a Day. 200 each 0   • aspirin 81 MG EC tablet Take 1 tablet by mouth Daily.     • atorvastatin (LIPITOR) 40 MG tablet TAKE 1 TABLET BY MOUTH EVERY NIGHT 90 tablet 1   • B-D UF III MINI PEN NEEDLES 31G X 5 MM misc USE AS DIRECTED 200 each 1   • Blood Glucose Calibration (ACCU-CHEK LOR) solution 1 bottle by In Vitro route As Needed (Use as directed). 1 each 0   • Blood  Glucose Monitoring Suppl (ACCU-CHEK LOR PLUS) w/Device kit 1 kit 3 (Three) Times a Day. 1 kit 0   • cilostazol (PLETAL) 50 MG tablet TAKE 1 TABLET BY MOUTH TWICE A  tablet 1   • Crisaborole (EUCRISA) 2 % ointment Apply 1 application topically Daily As Needed (rash). 60 g 0   • ELIQUIS 5 MG tablet tablet TAKE 1 TABLET BY MOUTH TWICE A  tablet 1   • fluticasone (FLONASE) 50 MCG/ACT nasal spray 2 sprays into the nostril(s) as directed by provider Daily. (Patient taking differently: 2 sprays into the nostril(s) as directed by provider Daily As Needed.) 16 g 3   • gabapentin (NEURONTIN) 400 MG capsule TAKE 1 CAPSULE BY MOUTH THREE TIMES DAILY 90 capsule 2   • glucose blood (Accu-Chek Lor Plus) test strip Use as instructed 100 each 12   • Lancets (ACCU-CHEK SOFT TOUCH) lancets Check sugars 3 times daily 1 each 12   • lisinopril (PRINIVIL,ZESTRIL) 2.5 MG tablet Take 1 tablet by mouth Daily.  0   • metFORMIN (GLUCOPHAGE) 1000 MG tablet Take 1 tablet by mouth 2 (Two) Times a Day. 180 tablet 1   • metoprolol succinate XL (TOPROL-XL) 25 MG 24 hr tablet TAKE 1 TABLET BY MOUTH ONCE DAILY 90 tablet 1   • mupirocin (Bactroban) 2 % ointment Apply  topically to the appropriate area as directed 3 (Three) Times a Day. 60 g 0   • NOVOLOG MIX 70/30 FLEXPEN (70-30) 100 UNIT/ML suspension pen-injector injection Take 50 units SQ twice daily with meals 30 mL 5   • SITagliptin (Januvia) 100 MG tablet Take 1 tablet by mouth Daily. 90 tablet 1   • sodium-potassium-magnesium sulfates (Suprep Bowel Prep Kit) 17.5-3.13-1.6 GM/177ML solution oral solution Please follow the directions mailed to you by our office. If you have any questions call our office at [phone] 2 bottle 0     Facility-Administered Medications Prior to Visit   Medication Dose Route Frequency Provider Last Rate Last Dose   • cyanocobalamin injection 1,000 mcg  1,000 mcg Intramuscular Q28 Days Des Geller MD   1,000 mcg at 11/06/19 1200        Patient Active Problem List   Diagnosis   • Cough   • Benign essential HTN   • Benign prostatic hyperplasia   • Coronary artery disease involving native coronary artery of native heart   • Chronic coronary artery disease   • Diabetes mellitus (CMS/[facility])   • Gout   • Hyperlipidemia   • History of heart attack   • Peripheral neuropathy   • Peripheral vascular disease (CMS/[facility])       Advanced Care Planning:  ACP discussion was held with the patient during this visit. Patient does not have an advance directive, information provided.    Review of Systems    Compared to one year ago, the patient feels his physical health is worse.  Compared to one year ago, the patient feels his mental health is the same.    Reviewed chart for potential of high risk medication in the elderly: yes  Reviewed chart for potential of harmful drug interactions in the elderly:yes    Objective         Vitals:    09/28/20 1353   BP: 142/68   BP Location: Right arm   Patient Position: Sitting   Cuff Size: Adult   Pulse: 72   Resp: 18   Temp: 98 °F (36.7 °C)   TempSrc: Infrared   Weight: 112 kg (246 lb)   Height: Comment: Unable to remove shoes from height   PainSc: 0-No pain       Body mass index is 30.75 kg/m².  Discussed the patient's BMI with him. The BMI is above average; BMI management plan is completed.    Physical Exam Finger Rub Hearing{Test (right ear):passed  Finger Rub Hearing{Test (left ear):passed              Assessment/Plan   Medicare Risks and Personalized Health Plan  CMS Preventative Services Quick Reference  Advance Directive Discussion  Fall Risk  Immunizations Discussed/Encouraged (specific immunizations; adacel Tdap and Shingrix )  Obesity/Overweight     The above risks/problems have been discussed with the patient.  Pertinent information has been shared with the patient in the After Visit Summary.  Follow up plans and orders are seen below in the Assessment/Plan Section.    Too was seen today for medicare  wellness-subsequent.    Diagnoses and all orders for this visit:    Medicare annual wellness visit, subsequent    Physical exam    Type 2 diabetes mellitus with diabetic peripheral angiopathy without gangrene, with long-term current use of insulin (CMS/[facility])    Essential hypertension    Hyperlipidemia, unspecified hyperlipidemia type    Vitamin B 12 deficiency    Coronary artery disease involving native heart without angina pectoris, unspecified vessel or lesion type    Impetigo        An After Visit Summary and PPPS were given to the patient.

## 2020-09-29 LAB
ALBUMIN SERPL-MCNC: 4.5 G/DL (ref 3.5–5.2)
ALBUMIN/GLOB SERPL: 1.9 G/DL
ALP SERPL-CCNC: 88 U/L (ref 39–117)
ALT SERPL-CCNC: 15 U/L (ref 1–41)
AST SERPL-CCNC: 13 U/L (ref 1–40)
BASOPHILS # BLD AUTO: 0.07 10*3/MM3 (ref 0–0.2)
BASOPHILS NFR BLD AUTO: 1 % (ref 0–1.5)
BILIRUB SERPL-MCNC: 0.7 MG/DL (ref 0–1.2)
BUN SERPL-MCNC: 16 MG/DL (ref 8–23)
BUN/CREAT SERPL: 15.8 (ref 7–25)
CALCIUM SERPL-MCNC: 9.2 MG/DL (ref 8.6–10.5)
CHLORIDE SERPL-SCNC: 100 MMOL/L (ref 98–107)
CHOLEST SERPL-MCNC: 132 MG/DL (ref 0–200)
CO2 SERPL-SCNC: 28.4 MMOL/L (ref 22–29)
CREAT SERPL-MCNC: 1.01 MG/DL (ref 0.76–1.27)
EOSINOPHIL # BLD AUTO: 0.49 10*3/MM3 (ref 0–0.4)
EOSINOPHIL NFR BLD AUTO: 7.3 % (ref 0.3–6.2)
ERYTHROCYTE [DISTWIDTH] IN BLOOD BY AUTOMATED COUNT: 11.8 % (ref 12.3–15.4)
GLOBULIN SER CALC-MCNC: 2.4 GM/DL
GLUCOSE SERPL-MCNC: 257 MG/DL (ref 65–99)
HBA1C MFR BLD: 7.5 % (ref 4.8–5.6)
HCT VFR BLD AUTO: 45 % (ref 37.5–51)
HCV AB S/CO SERPL IA: 0.2 S/CO RATIO (ref 0–0.9)
HDLC SERPL-MCNC: 37 MG/DL (ref 40–60)
HGB BLD-MCNC: 15.1 G/DL (ref 13–17.7)
IMM GRANULOCYTES # BLD AUTO: 0.02 10*3/MM3 (ref 0–0.05)
IMM GRANULOCYTES NFR BLD AUTO: 0.3 % (ref 0–0.5)
LDLC SERPL CALC-MCNC: 77 MG/DL (ref 0–100)
LYMPHOCYTES # BLD AUTO: 2.08 10*3/MM3 (ref 0.7–3.1)
LYMPHOCYTES NFR BLD AUTO: 31 % (ref 19.6–45.3)
MCH RBC QN AUTO: 30.4 PG (ref 26.6–33)
MCHC RBC AUTO-ENTMCNC: 33.6 G/DL (ref 31.5–35.7)
MCV RBC AUTO: 90.5 FL (ref 79–97)
MONOCYTES # BLD AUTO: 0.88 10*3/MM3 (ref 0.1–0.9)
MONOCYTES NFR BLD AUTO: 13.1 % (ref 5–12)
NEUTROPHILS # BLD AUTO: 3.16 10*3/MM3 (ref 1.7–7)
NEUTROPHILS NFR BLD AUTO: 47.3 % (ref 42.7–76)
NRBC BLD AUTO-RTO: 0 /100 WBC (ref 0–0.2)
PLATELET # BLD AUTO: 271 10*3/MM3 (ref 140–450)
POTASSIUM SERPL-SCNC: 3.9 MMOL/L (ref 3.5–5.2)
PROT SERPL-MCNC: 6.9 G/DL (ref 6–8.5)
PSA SERPL-MCNC: 0.55 NG/ML (ref 0–4)
RBC # BLD AUTO: 4.97 10*6/MM3 (ref 4.14–5.8)
SODIUM SERPL-SCNC: 138 MMOL/L (ref 136–145)
TRIGL SERPL-MCNC: 88 MG/DL (ref 0–150)
TSH SERPL DL<=0.005 MIU/L-ACNC: 2.48 UIU/ML (ref 0.27–4.2)
VIT B12 SERPL-MCNC: 242 PG/ML (ref 211–946)
VLDLC SERPL CALC-MCNC: 17.6 MG/DL
WBC # BLD AUTO: 6.7 10*3/MM3 (ref 3.4–10.8)

## 2020-09-29 NOTE — PROGRESS NOTES
Chief Complaint   Patient presents with   • Medicare Wellness-subsequent       History of Present Illness    The patient presents for an established patient physical examination and health maintenance visit.    The patient presents for a follow-up related to Vitamin B12 deficiency. There are no reports of blood loss. The patient has no symptoms of fever, nausea, vomiting, diarrhea, myalgias, abdominal pain, sweats, weight loss, decreased appetite, chills, paresthesias, numbness or memory loss. His energy level is normal. The patient is taking a vitamin B12 supplement in the form of IM injections.    He presents for an initial evaluation with a rash on his neck. This has been present for two weeks. The condition is painful, worsening, oozing and irritated. There are no exposures to people with similar lesions. There is no history of new cosmetic or detergent usage on the affected area. Treatment has been administered at home. The prior treatment consisted of topical steroid cream. The patient denies a dry cough, a wet cough, wheezing, facial pain, a headache, eye drainage, ear pain, ear drainage or nasal discharge.    The patient presents for a follow-up related to Type 2 Diabetes Mellitus. He checks his blood sugars at home. His sugars are checked twice daily. The average sugars are in the 100-150 range. He denies hypoglycemic symptoms. The patient denies polyuria, polydypsia or polyphagia. He reports no symptoms of neuropathy. The patient takes his medication as prescribed. He is not taking insulin. The patient does check his feet daily at home. He denies chest pain, shortness of breath, orthopnea, paroxysmal nocturnal dyspnea, dyspnea on exertion, edema, palpitations or syncope.    The patient presents for a follow-up related to hyperlipidemia. He is following a low fat diet. He reports that he is exercising. He is taking atorvastatin. The patient is taking his medication as prescribed. He reports no medication  side effects, including muscle cramps, abdominal pain, headaches or weakness.    The patient presents for a follow-up related to hypertension. The patient reports that he has had no blurred vision. He states that he is taking his medication as prescribed. He is not having medication side effects.    The patient presents for a follow-up related to coronary artery disease. He takes an aspirin daily.    Review of Systems    CONSTITUTIONAL- Denies Weakness or Malaise.    NECK- Denies Decreased Range of Motion, Stiffness, Thyroid Nodules, Enlarged Lymph Nodes or Goiter.    EYES- Denies Eye Pain, Eye Redness, Eye Discharge, Decreased Vision, Visual Disturbance, Diplopia, Visual Loss or Swollen Eyelid.    HENT- Denies Sore Throat, Sinus Pain, Nasal Congestion, Decreased Hearing or Tinnitus.    PULMONARY- Denies Sputum Production, Cough, Hemoptysis or Pleuritic Chest Pain.    GASTROINTESTINAL- Denies Blood per Rectum, Constipation or Heartburn.    GENITOURINARY- Denies Penile Discharge, Erectile Dysfunction, Testicular Mass, Testicular Pain, Hernia, Nocturia, Decreased Urine Stream, Incomplete Voiding, Urinary Frequency, Urinary Urgency, Dysuria or Hematuria.    CARDIOVASCULAR- Denies Claudication or Irregular Heart Beat.    ENDOCRINE- Denies Cold Intolerance, Depression, Hair Loss, Heat Intolerance, Sleep Disturbance or Weight Gain.    NEUROLOGIC- Denies Seizures, Confusion or Excessive Daytime Sleepiness.    MUSCULOSKELETAL- Denies Joint Pain, Joint Stiffness, Decreased Range of Motion, Joint Swelling or Erythema of Joints.    INTEGUMENTARY- Reports: Rash. Denies: Lumps, Sores, Itching, Dryness, Color Change, Changes in Hair, Brittle Nails, Discolored Nails, Thickened Nails, Growths or Alopecia.    Medications      Current Outpatient Medications:   •  Alcohol Swabs (ALCOHOL PREP) pads, 1 swab 3 (Three) Times a Day., Disp: 200 each, Rfl: 0  •  aspirin 81 MG EC tablet, Take 1 tablet by mouth Daily., Disp: , Rfl:   •   atorvastatin (LIPITOR) 40 MG tablet, TAKE 1 TABLET BY MOUTH EVERY NIGHT, Disp: 90 tablet, Rfl: 1  •  B-D UF III MINI PEN NEEDLES 31G X 5 MM misc, USE AS DIRECTED, Disp: 200 each, Rfl: 1  •  Blood Glucose Calibration (ACCU-CHEK LOR) solution, 1 bottle by In Vitro route As Needed (Use as directed)., Disp: 1 each, Rfl: 0  •  Blood Glucose Monitoring Suppl (ACCU-CHEK LOR PLUS) w/Device kit, 1 kit 3 (Three) Times a Day., Disp: 1 kit, Rfl: 0  •  cilostazol (PLETAL) 50 MG tablet, TAKE 1 TABLET BY MOUTH TWICE A DAY, Disp: 180 tablet, Rfl: 1  •  Crisaborole (EUCRISA) 2 % ointment, Apply 1 application topically Daily As Needed (rash)., Disp: 60 g, Rfl: 0  •  ELIQUIS 5 MG tablet tablet, TAKE 1 TABLET BY MOUTH TWICE A DAY, Disp: 180 tablet, Rfl: 1  •  fluticasone (FLONASE) 50 MCG/ACT nasal spray, 2 sprays into the nostril(s) as directed by provider Daily. (Patient taking differently: 2 sprays into the nostril(s) as directed by provider Daily As Needed.), Disp: 16 g, Rfl: 3  •  gabapentin (NEURONTIN) 400 MG capsule, TAKE 1 CAPSULE BY MOUTH THREE TIMES DAILY, Disp: 90 capsule, Rfl: 2  •  glucose blood (Accu-Chek Lor Plus) test strip, Use as instructed, Disp: 100 each, Rfl: 12  •  Lancets (ACCU-CHEK SOFT TOUCH) lancets, Check sugars 3 times daily, Disp: 1 each, Rfl: 12  •  lisinopril (PRINIVIL,ZESTRIL) 2.5 MG tablet, Take 1 tablet by mouth Daily., Disp: , Rfl: 0  •  metFORMIN (GLUCOPHAGE) 1000 MG tablet, Take 1 tablet by mouth 2 (Two) Times a Day., Disp: 180 tablet, Rfl: 1  •  metoprolol succinate XL (TOPROL-XL) 25 MG 24 hr tablet, TAKE 1 TABLET BY MOUTH ONCE DAILY, Disp: 90 tablet, Rfl: 1  •  mupirocin (Bactroban) 2 % ointment, Apply  topically to the appropriate area as directed 3 (Three) Times a Day., Disp: 60 g, Rfl: 0  •  NOVOLOG MIX 70/30 FLEXPEN (70-30) 100 UNIT/ML suspension pen-injector injection, Take 50 units SQ twice daily with meals, Disp: 30 mL, Rfl: 5  •  SITagliptin (Januvia) 100 MG tablet, Take 1 tablet by  mouth Daily., Disp: 90 tablet, Rfl: 1  •  cephalexin (Keflex) 500 MG capsule, Take 1 capsule by mouth 4 (Four) Times a Day., Disp: 40 capsule, Rfl: 0  •  sodium-potassium-magnesium sulfates (Suprep Bowel Prep Kit) 17.5-3.13-1.6 GM/177ML solution oral solution, Please follow the directions mailed to you by our office. If you have any questions call our office at [phone], Disp: 2 bottle, Rfl: 0    Current Facility-Administered Medications:   •  cyanocobalamin injection 1,000 mcg, 1,000 mcg, Intramuscular, Q28 Days, Des Geller MD, 1,000 mcg at 11/06/19 1200     Allergies    Allergies   Allergen Reactions   • Adhesive Tape Rash   • Neosporin [Neomycin-Bacitracin Zn-Polymyx] Rash       Problem List    Patient Active Problem List   Diagnosis   • Cough   • Benign essential HTN   • Benign prostatic hyperplasia   • Coronary artery disease involving native coronary artery of native heart   • Chronic coronary artery disease   • Diabetes mellitus (CMS/HCC)   • Gout   • Hyperlipidemia   • History of heart attack   • Peripheral neuropathy   • Peripheral vascular disease (CMS/HCC)       Medications, Allergies, Problems List and Past History were reviewed and updated.    Physical Examination    /68 (BP Location: Right arm, Patient Position: Sitting, Cuff Size: Adult)   Pulse 72   Temp 98 °F (36.7 °C) (Infrared)   Resp 18   Wt 112 kg (246 lb)   BMI 30.75 kg/m²     HEENT: Head- Normocephalic Atraumatic. Facies- Within normal limits. Pinnas- Normal texture and shape bilaterally. Canals- Normal bilaterally. TMs- Normal bilaterally. Nares- Patent bilaterally. Nasal Septum- is normal. There is no tenderness to palpation over the frontal or maxillary sinuses. Lids- Normal bilaterally. Conjunctiva- Clear bilaterally. Sclera- Anicteric bilaterally. Oropharynx- Moist with no lesions. Tonsils- No enlargement, erythema or exudate.    Neck: Thyroid- non enlarged, symmetric and has no nodules. No bruits are detected.  ROM- Normal Range of Motion with no rigidity.    Lungs: Auscultation- Clear to auscultation bilaterally. There are no retractions, clubbing or cyanosis. The Expiratory to Inspiratory ratio is equal.    Lymph Nodes: Cervical- no enlarged lymph nodes noted. Clavicular- no enlarged supraclavicular lymph nodes noted. Axillary- no enlarged axillary lymph nodes noted. Inguinal- no enlarged inguinal lymph nodes noted.    Cardiovascular: There are no carotid bruits. Heart- Normal Rate with Regular rhythm and no murmurs. There are no gallops. There are no rubs. In the lower extremities there is no edema. The upper extremities do not have edema.    Abdomen: Soft, benign, non-tender with no masses, hernias, organomegaly or scars.    Male Genitourinary: The penis is uncircumcised with testicles found in the scrotum bilaterally. Testicular Size is normal bilaterally. The penis has no anatomic abnormalities.    Rectal: reveals normal external sphincter tone with no external lesions.    Prostate: The prostate gland is symmetric and smooth with no nodularity.    Feet: The feet are symmetric with normal hay landmarks. There is no tenderness to palpation bilaterally. The feet have normal posterior tibial and dorsalis pedis pulses and normal capillary refill bilaterally. The monofilament examination is normal bilaterally.       The arches are normal bilaterally. There are no skin or nail lesions present. There are no ingrown nails. There are no bunions noted.    Dermatologic: The patient has no worrisome or suspicious skin lesions noted.    Dermatologic: The patient has a rash on his neck. The rash is brown and bright red. The affected skin is crusted and the condition is noted to be spread over a wide area.    Impression and Assessment    Normal Physical Examination.    Impetigo.    Vitamin B12 Deficiency.    Encounter for Screening for Malignant Neoplasm of the Colon.    Encounter for Screening for Malignant Neoplasm of the  Prostate.    Type 2 Diabetes Mellitus.    Hyperlipidemia.    Essential Hypertension.    Coronary Artery Disease.    Plan    Hyperlipidemia Plan: The patient was instructed to exercise daily, eat a low fat diet and continue his medications.    Essential Hypertension Plan: The current plan was continued.    Coronary Artery Disease Plan: The current plan was continued.    Type 2 Diabetes Mellitus Plan: The current plan was continued.    Impetigo Plan: Medication was added as noted below.    Vitamin B12 Deficiency Plan: The current plan was continued.    Counseling was provided regarding: Adequate Aerobic Exercise, Dental Visits, Flossing Teeth and Heart Healthy Diet.    The following was ordered for screening and health maintenance: CBC w Automated Diff, CMP, Colonoscopy, Hepatitis C Antibody, PSA, TSH and U/A.       Immunizations Ordered and Administered: None.    Too was seen today for medicare wellness-subsequent.    Diagnoses and all orders for this visit:    Medicare annual wellness visit, subsequent    Physical exam  -     CBC & Differential  -     Hepatitis C Antibody  -     TSH    Type 2 diabetes mellitus with diabetic peripheral angiopathy without gangrene, with long-term current use of insulin (CMS/[facility])  -     Comprehensive Metabolic Panel  -     Hemoglobin A1c  -     POC Microalbumin    Essential hypertension  -     CBC & Differential  -     Comprehensive Metabolic Panel  -     POC Urinalysis Dipstick, Automated    Hyperlipidemia, unspecified hyperlipidemia type  -     Comprehensive Metabolic Panel  -     Lipid Panel    Vitamin B 12 deficiency  -     CBC & Differential  -     Vitamin B12    Coronary artery disease involving native heart without angina pectoris, unspecified vessel or lesion type    Impetigo  -     cephalexin (Keflex) 500 MG capsule; Take 1 capsule by mouth 4 (Four) Times a Day.    Screening for colon cancer  -     Ambulatory Referral For Screening Colonoscopy    Prostate cancer  screening  -     PSA Screen        Return to Office    The patient was instructed to return for follow-up in 4 months.    The patient was instructed to return sooner if the condition changes, worsens, or does not resolve.

## 2020-10-05 ENCOUNTER — TELEPHONE (OUTPATIENT)
Dept: INTERNAL MEDICINE | Facility: CLINIC | Age: 65
End: 2020-10-05

## 2020-10-05 PROCEDURE — U0003 INFECTIOUS AGENT DETECTION BY NUCLEIC ACID (DNA OR RNA); SEVERE ACUTE RESPIRATORY SYNDROME CORONAVIRUS 2 (SARS-COV-2) (CORONAVIRUS DISEASE [COVID-19]), AMPLIFIED PROBE TECHNIQUE, MAKING USE OF HIGH THROUGHPUT TECHNOLOGIES AS DESCRIBED BY CMS-2020-01-R: HCPCS | Performed by: NURSE PRACTITIONER

## 2020-10-05 NOTE — TELEPHONE ENCOUNTER
We are not ordering Covid tests unless pt's are seen via [facility] per guidelines, and for asymptomatic patients with possible exposure we advise they be tested at [facility].    See message on other family member.

## 2020-10-05 NOTE — TELEPHONE ENCOUNTER
Patients wife called and stated that a child in her household tested positive for covid and she would like an order that way the patient can get tested as well     Please call and advise at [phone]

## 2020-10-05 NOTE — TELEPHONE ENCOUNTER
Summary: Transferred  to be scheduled.       Spoke to pt's , advised of clinical message.

## 2020-10-08 ENCOUNTER — TELEPHONE (OUTPATIENT)
Dept: URGENT CARE | Facility: CLINIC | Age: 65
End: 2020-10-08

## 2020-10-19 RX ORDER — METOPROLOL SUCCINATE 25 MG/1
TABLET, EXTENDED RELEASE ORAL
Qty: 90 TABLET | Refills: 1 | Status: SHIPPED | OUTPATIENT
Start: 2020-10-19 | End: 2021-04-15

## 2020-10-20 ENCOUNTER — OFFICE VISIT (OUTPATIENT)
Dept: INTERNAL MEDICINE | Facility: CLINIC | Age: 65
End: 2020-10-20

## 2020-10-20 VITALS
SYSTOLIC BLOOD PRESSURE: 132 MMHG | DIASTOLIC BLOOD PRESSURE: 72 MMHG | HEART RATE: 76 BPM | OXYGEN SATURATION: 98 % | BODY MASS INDEX: 30.75 KG/M2 | WEIGHT: 246 LBS | TEMPERATURE: 98.2 F

## 2020-10-20 DIAGNOSIS — I10 ESSENTIAL HYPERTENSION: ICD-10-CM

## 2020-10-20 DIAGNOSIS — E11.51 TYPE 2 DIABETES MELLITUS WITH DIABETIC PERIPHERAL ANGIOPATHY WITHOUT GANGRENE, WITH LONG-TERM CURRENT USE OF INSULIN (HCC): Primary | ICD-10-CM

## 2020-10-20 DIAGNOSIS — E53.8 VITAMIN B 12 DEFICIENCY: ICD-10-CM

## 2020-10-20 DIAGNOSIS — E78.5 HYPERLIPIDEMIA, UNSPECIFIED HYPERLIPIDEMIA TYPE: ICD-10-CM

## 2020-10-20 DIAGNOSIS — Z79.4 TYPE 2 DIABETES MELLITUS WITH DIABETIC PERIPHERAL ANGIOPATHY WITHOUT GANGRENE, WITH LONG-TERM CURRENT USE OF INSULIN (HCC): Primary | ICD-10-CM

## 2020-10-20 DIAGNOSIS — R21 RASH: ICD-10-CM

## 2020-10-20 DIAGNOSIS — Z23 IMMUNIZATION DUE: ICD-10-CM

## 2020-10-20 PROCEDURE — 99214 OFFICE O/P EST MOD 30 MIN: CPT | Performed by: INTERNAL MEDICINE

## 2020-10-20 PROCEDURE — G0008 ADMIN INFLUENZA VIRUS VAC: HCPCS | Performed by: INTERNAL MEDICINE

## 2020-10-20 PROCEDURE — 90686 IIV4 VACC NO PRSV 0.5 ML IM: CPT | Performed by: INTERNAL MEDICINE

## 2020-10-20 PROCEDURE — 96372 THER/PROPH/DIAG INJ SC/IM: CPT | Performed by: INTERNAL MEDICINE

## 2020-10-20 RX ADMIN — CYANOCOBALAMIN 1000 MCG: 1000 INJECTION, SOLUTION INTRAMUSCULAR; SUBCUTANEOUS at 16:03

## 2020-10-20 NOTE — PROGRESS NOTES
Chief Complaint   Patient presents with   • Rash   • Med Refill     lipitor 40 mg       History of Present Illness      The patient presents for a follow-up related to Vitamin B12 deficiency. There are no reports of blood loss. The patient has no symptoms of fever, nausea, vomiting, diarrhea, myalgias, abdominal pain, sweats, weight loss, decreased appetite, chills, paresthesias, numbness or memory loss. His energy level is normal. The patient is taking a vitamin B12 supplement in the form of IM injections.    He presents for an initial evaluation with a rash on his neck and the arms. This has been present for one month. The condition is painful, scaling and itchy. There are no exposures to people with similar lesions. There is no history of new cosmetic or detergent usage on the affected area. No prior treatment has been administered. The patient denies a dry cough, a wet cough, wheezing, facial pain, a headache, eye drainage, ear pain, ear drainage or nasal discharge.    The patient presents for a follow-up related to Type 2 Diabetes Mellitus. He checks his blood sugars at home. His sugars are checked daily. The average sugars are in the 100-150 range. He denies hypoglycemic symptoms. The patient denies polyuria, polydypsia or polyphagia. He reports no symptoms of neuropathy. The patient takes his medication as prescribed. He is not taking insulin. The patient does check his feet daily at home. He denies chest pain, shortness of breath, orthopnea, paroxysmal nocturnal dyspnea, dyspnea on exertion, edema, palpitations or syncope.    The patient presents for a follow-up related to hyperlipidemia. He is following a low fat diet. He reports that he is exercising. He is taking atorvastatin. The patient is taking his medication as prescribed. He reports no medication side effects, including muscle cramps, abdominal pain, headaches or weakness.    The patient presents for a follow-up related to hypertension. The patient  reports that he has had no blurred vision. He states that he is taking his medication as prescribed. He is not having medication side effects.    Review of Systems    CONSTITUTIONAL- Denies Weakness or Malaise.    HENT- Denies Sore Throat, Sinus Pain, Nasal Congestion, Decreased Hearing or Tinnitus.    GASTROINTESTINAL- Denies Blood per Rectum, Constipation or Heartburn.    PULMONARY- Denies Sputum Production, Cough, Hemoptysis or Pleuritic Chest Pain.    CARDIOVASCULAR- Denies Claudication or Irregular Heart Beat.    Medications      Current Outpatient Medications:   •  Alcohol Swabs (ALCOHOL PREP) pads, 1 swab 3 (Three) Times a Day., Disp: 200 each, Rfl: 0  •  aspirin 81 MG EC tablet, Take 1 tablet by mouth Daily., Disp: , Rfl:   •  atorvastatin (LIPITOR) 40 MG tablet, TAKE 1 TABLET BY MOUTH EVERY NIGHT, Disp: 90 tablet, Rfl: 1  •  B-D UF III MINI PEN NEEDLES 31G X 5 MM misc, USE AS DIRECTED, Disp: 200 each, Rfl: 1  •  Blood Glucose Calibration (ACCU-CHEK LOR) solution, 1 bottle by In Vitro route As Needed (Use as directed)., Disp: 1 each, Rfl: 0  •  Blood Glucose Monitoring Suppl (ACCU-CHEK LOR PLUS) w/Device kit, 1 kit 3 (Three) Times a Day., Disp: 1 kit, Rfl: 0  •  cilostazol (PLETAL) 50 MG tablet, TAKE 1 TABLET BY MOUTH TWICE A DAY, Disp: 180 tablet, Rfl: 1  •  Crisaborole (EUCRISA) 2 % ointment, Apply 1 application topically Daily As Needed (rash)., Disp: 60 g, Rfl: 0  •  ELIQUIS 5 MG tablet tablet, TAKE 1 TABLET BY MOUTH TWICE A DAY, Disp: 180 tablet, Rfl: 1  •  fluticasone (FLONASE) 50 MCG/ACT nasal spray, 2 sprays into the nostril(s) as directed by provider Daily. (Patient taking differently: 2 sprays into the nostril(s) as directed by provider Daily As Needed.), Disp: 16 g, Rfl: 3  •  gabapentin (NEURONTIN) 400 MG capsule, TAKE 1 CAPSULE BY MOUTH THREE TIMES DAILY, Disp: 90 capsule, Rfl: 2  •  glucose blood (Accu-Chek Lor Plus) test strip, Use as instructed, Disp: 100 each, Rfl: 12  •  Lancets  (ACCU-CHEK SOFT TOUCH) lancets, Check sugars 3 times daily, Disp: 1 each, Rfl: 12  •  lisinopril (PRINIVIL,ZESTRIL) 2.5 MG tablet, Take 1 tablet by mouth Daily., Disp: , Rfl: 0  •  metFORMIN (GLUCOPHAGE) 1000 MG tablet, Take 1 tablet by mouth 2 (Two) Times a Day., Disp: 180 tablet, Rfl: 1  •  metoprolol succinate XL (TOPROL-XL) 25 MG 24 hr tablet, TAKE 1 TABLET BY MOUTH EVERY DAY, Disp: 90 tablet, Rfl: 1  •  mupirocin (Bactroban) 2 % ointment, Apply  topically to the appropriate area as directed 3 (Three) Times a Day., Disp: 60 g, Rfl: 0  •  NOVOLOG MIX 70/30 FLEXPEN (70-30) 100 UNIT/ML suspension pen-injector injection, Take 50 units SQ twice daily with meals, Disp: 30 mL, Rfl: 5  •  SITagliptin (Januvia) 100 MG tablet, Take 1 tablet by mouth Daily., Disp: 90 tablet, Rfl: 1  •  sodium-potassium-magnesium sulfates (Suprep Bowel Prep Kit) 17.5-3.13-1.6 GM/177ML solution oral solution, Please follow the directions mailed to you by our office. If you have any questions call our office at [phone], Disp: 2 bottle, Rfl: 0    Current Facility-Administered Medications:   •  cyanocobalamin injection 1,000 mcg, 1,000 mcg, Intramuscular, Q28 Days, Des Geller MD, 1,000 mcg at 11/06/19 1200     Allergies    Allergies   Allergen Reactions   • Adhesive Tape Rash   • Neosporin [Neomycin-Bacitracin Zn-Polymyx] Rash       Problem List    Patient Active Problem List   Diagnosis   • Cough   • Benign essential HTN   • Benign prostatic hyperplasia   • Coronary artery disease involving native coronary artery of native heart   • Chronic coronary artery disease   • Diabetes mellitus (CMS/HCC)   • Gout   • Hyperlipidemia   • History of heart attack   • Peripheral neuropathy   • Peripheral vascular disease (CMS/HCC)       Medications, Allergies, Problems List and Past History were reviewed and updated.    Physical Examination    /72 (BP Location: Left arm, Patient Position: Sitting, Cuff Size: Adult)   Pulse 76   Temp  98.2 °F (36.8 °C) (Temporal)   Wt 112 kg (246 lb)   SpO2 98%   BMI 30.75 kg/m²     HEENT: Head- Normocephalic Atraumatic. Facies- Within normal limits. Pinnas- Normal texture and shape bilaterally. Canals- Normal bilaterally. TMs- Normal bilaterally. Nares- Patent bilaterally. Nasal Septum- is normal. There is no tenderness to palpation over the frontal or maxillary sinuses. Lids- Normal bilaterally. Conjunctiva- Clear bilaterally. Sclera- Anicteric bilaterally. Oropharynx- Moist with no lesions. Tonsils- No enlargement, erythema or exudate.    Neck: Thyroid- non enlarged, symmetric and has no nodules. No bruits are detected. ROM- Normal Range of Motion with no rigidity.    Lungs: Auscultation- Clear to auscultation bilaterally. There are no retractions, clubbing or cyanosis. The Expiratory to Inspiratory ratio is equal.    Lymph Nodes: Cervical- no enlarged lymph nodes noted. Clavicular- Deferred. Axillary- Deferred. Inguinal- Deferred.    Cardiovascular: There are no carotid bruits. Heart- Normal Rate with Regular rhythm and no murmurs. There are no gallops. There are no rubs. In the lower extremities there is no edema. The upper extremities do not have edema.    Abdomen: Soft, benign, non-tender with no masses, hernias, organomegaly or scars.    Dermatologic: The patient has a rash on his neck and the arms. The rash is deep red. The affected skin is excoriated and macular and the condition is noted to be well demarcated.    Impression and Assessment    Rash.    Vitamin B12 Deficiency.    Type 2 Diabetes Mellitus.    Hyperlipidemia.    Essential Hypertension.    Plan    Hyperlipidemia Plan: The patient was instructed to exercise daily, eat a low fat diet and continue his medications.    Essential Hypertension Plan: The current plan was continued.    Type 2 Diabetes Mellitus Plan: The current plan was continued.    Rash Plan: A dermatology referral was arranged.    Vitamin B12 Deficiency Plan: The current plan  was continued.    Diagnoses and all orders for this visit:    1. Type 2 diabetes mellitus with diabetic peripheral angiopathy without gangrene, with long-term current use of insulin (CMS/[facility]) (Primary)  -     Comprehensive Metabolic Panel    2. Essential hypertension  -     CBC & Differential  -     Comprehensive Metabolic Panel    3. Hyperlipidemia, unspecified hyperlipidemia type  -     Comprehensive Metabolic Panel  -     Lipid Panel    4. Vitamin B 12 deficiency  -     CBC & Differential  -     Vitamin B12    5. Rash  -     Ambulatory Referral to Dermatology    6. Immunization due  -     Fluarix/Fluzone/Afluria Quad>6 Months        Return to Office    The patient was instructed to return for follow-up in 4 months.    The patient was instructed to return sooner if the condition changes, worsens, or does not resolve.

## 2020-10-21 RX ORDER — LISINOPRIL 2.5 MG/1
TABLET ORAL
Qty: 30 TABLET | Refills: 5 | Status: SHIPPED | OUTPATIENT
Start: 2020-10-21 | End: 2021-04-15

## 2020-10-26 ENCOUNTER — TELEPHONE (OUTPATIENT)
Dept: INTERNAL MEDICINE | Facility: CLINIC | Age: 65
End: 2020-10-26

## 2020-10-26 NOTE — TELEPHONE ENCOUNTER
Spoke with Caroline   She states Deysi gave her dermatology office # and he has an appt for 11/2/2020   Verb understanding given

## 2020-10-26 NOTE — TELEPHONE ENCOUNTER
Patient's wife Caroline states patient has sores on his neck again that has opened up and bleeding. She states he finished an antibiotic 1 week ago for the problem. She states Dr. Geller placed a referral for a Dermatologist. She can be reached at [phone].

## 2020-10-27 RX ORDER — CILOSTAZOL 50 MG/1
TABLET ORAL
Qty: 180 TABLET | Refills: 1 | Status: SHIPPED | OUTPATIENT
Start: 2020-10-27 | End: 2023-04-05 | Stop reason: SDUPTHER

## 2020-10-27 NOTE — TELEPHONE ENCOUNTER
Last OV 9-28-20  Next OV due for follow up in January but isn't scheduled yet    S/w patient and scheduled him for a 4 month fup on 1-27-21.    .

## 2020-11-16 ENCOUNTER — TELEPHONE (OUTPATIENT)
Dept: INTERNAL MEDICINE | Facility: CLINIC | Age: 65
End: 2020-11-16

## 2020-11-16 RX ORDER — AMOXICILLIN AND CLAVULANATE POTASSIUM 875; 125 MG/1; MG/1
1 TABLET, FILM COATED ORAL 2 TIMES DAILY
Qty: 20 TABLET | Refills: 0 | Status: SHIPPED | OUTPATIENT
Start: 2020-11-16 | End: 2021-01-27

## 2020-11-16 NOTE — TELEPHONE ENCOUNTER
Augmentin sent in  Please notify patient  If symptoms worsen, don't improve, develops a fever needs to be seen.   Des Geller MD  17:29 EST  11/16/20

## 2020-11-16 NOTE — TELEPHONE ENCOUNTER
Pt wife called requesting an antibiotic be sent in for pt due to his sinus infection. Please advise

## 2020-11-16 NOTE — TELEPHONE ENCOUNTER
S/W pt wife, informed that Dr Garner has sent rx to pharmacy.  Verb great apprec. Inst to call if worsens or doesn't improve.  Agreed.

## 2020-11-30 RX ORDER — GABAPENTIN 400 MG/1
CAPSULE ORAL
Qty: 90 CAPSULE | Refills: 2 | Status: SHIPPED | OUTPATIENT
Start: 2020-11-30 | End: 2021-07-14 | Stop reason: SDUPTHER

## 2020-12-14 RX ORDER — APIXABAN 5 MG/1
TABLET, FILM COATED ORAL
Qty: 180 TABLET | Refills: 1 | Status: SHIPPED | OUTPATIENT
Start: 2020-12-14 | End: 2021-03-15

## 2020-12-31 RX ORDER — SODIUM, POTASSIUM,MAG SULFATES 17.5-3.13G
2 SOLUTION, RECONSTITUTED, ORAL ORAL TAKE AS DIRECTED
Qty: 354 ML | Refills: 0 | Status: SHIPPED | OUTPATIENT
Start: 2020-12-31 | End: 2021-01-27

## 2021-01-10 ENCOUNTER — APPOINTMENT (OUTPATIENT)
Dept: PREADMISSION TESTING | Facility: HOSPITAL | Age: 66
End: 2021-01-10

## 2021-01-10 DIAGNOSIS — Z79.4 TYPE 2 DIABETES MELLITUS WITH DIABETIC PERIPHERAL ANGIOPATHY WITHOUT GANGRENE, WITH LONG-TERM CURRENT USE OF INSULIN (HCC): ICD-10-CM

## 2021-01-10 DIAGNOSIS — E11.51 TYPE 2 DIABETES MELLITUS WITH DIABETIC PERIPHERAL ANGIOPATHY WITHOUT GANGRENE, WITH LONG-TERM CURRENT USE OF INSULIN (HCC): ICD-10-CM

## 2021-01-11 ENCOUNTER — PRIOR AUTHORIZATION (OUTPATIENT)
Dept: GASTROENTEROLOGY | Facility: CLINIC | Age: 66
End: 2021-01-11

## 2021-01-11 RX ORDER — SITAGLIPTIN 100 MG/1
TABLET, FILM COATED ORAL
Qty: 90 TABLET | Refills: 1 | Status: SHIPPED | OUTPATIENT
Start: 2021-01-11 | End: 2021-10-05

## 2021-01-12 ENCOUNTER — OUTSIDE FACILITY SERVICE (OUTPATIENT)
Dept: GASTROENTEROLOGY | Facility: CLINIC | Age: 66
End: 2021-01-12

## 2021-01-12 PROCEDURE — OUTSIDEPOS PR OUTSIDE POS PLACEHOLDER: Performed by: INTERNAL MEDICINE

## 2021-01-27 ENCOUNTER — HOSPITAL ENCOUNTER (OUTPATIENT)
Dept: GENERAL RADIOLOGY | Facility: HOSPITAL | Age: 66
Discharge: HOME | End: 2021-01-27
Admitting: INTERNAL MEDICINE

## 2021-01-27 ENCOUNTER — OFFICE VISIT (OUTPATIENT)
Dept: INTERNAL MEDICINE | Facility: CLINIC | Age: 66
End: 2021-01-27

## 2021-01-27 VITALS
TEMPERATURE: 97.3 F | SYSTOLIC BLOOD PRESSURE: 148 MMHG | RESPIRATION RATE: 18 BRPM | HEART RATE: 64 BPM | WEIGHT: 241 LBS | BODY MASS INDEX: 30.12 KG/M2 | DIASTOLIC BLOOD PRESSURE: 64 MMHG

## 2021-01-27 DIAGNOSIS — M79.642 LEFT HAND PAIN: ICD-10-CM

## 2021-01-27 DIAGNOSIS — M79.642 LEFT HAND PAIN: Primary | ICD-10-CM

## 2021-01-27 DIAGNOSIS — E53.8 VITAMIN B12 DEFICIENCY: ICD-10-CM

## 2021-01-27 DIAGNOSIS — L28.1 PRURIGO NODULARIS: ICD-10-CM

## 2021-01-27 PROCEDURE — 36415 COLL VENOUS BLD VENIPUNCTURE: CPT | Performed by: INTERNAL MEDICINE

## 2021-01-27 PROCEDURE — 73130 X-RAY EXAM OF HAND: CPT

## 2021-01-27 PROCEDURE — 99214 OFFICE O/P EST MOD 30 MIN: CPT | Performed by: INTERNAL MEDICINE

## 2021-01-27 PROCEDURE — 96372 THER/PROPH/DIAG INJ SC/IM: CPT | Performed by: INTERNAL MEDICINE

## 2021-01-27 RX ORDER — TRIAMCINOLONE ACETONIDE 5 MG/G
OINTMENT TOPICAL 2 TIMES DAILY
Qty: 60 G | Refills: 0 | Status: SHIPPED | OUTPATIENT
Start: 2021-01-27 | End: 2021-03-11

## 2021-01-27 RX ORDER — SULFAMETHOXAZOLE AND TRIMETHOPRIM 800; 160 MG/1; MG/1
1 TABLET ORAL 2 TIMES DAILY
Qty: 20 TABLET | Refills: 0 | Status: SHIPPED | OUTPATIENT
Start: 2021-01-27 | End: 2021-01-28

## 2021-01-27 RX ADMIN — CYANOCOBALAMIN 1000 MCG: 1000 INJECTION, SOLUTION INTRAMUSCULAR; SUBCUTANEOUS at 16:10

## 2021-01-28 ENCOUNTER — TELEPHONE (OUTPATIENT)
Dept: INTERNAL MEDICINE | Facility: CLINIC | Age: 66
End: 2021-01-28

## 2021-01-28 RX ORDER — AMOXICILLIN AND CLAVULANATE POTASSIUM 875; 125 MG/1; MG/1
1 TABLET, FILM COATED ORAL 2 TIMES DAILY
Qty: 20 TABLET | Refills: 0 | Status: SHIPPED | OUTPATIENT
Start: 2021-01-28 | End: 2021-03-11

## 2021-01-28 NOTE — TELEPHONE ENCOUNTER
PATIENT SPOUSE CALLED IN STATED PATIENT WAS PLACED ON SULFAMETH-TRIMETHOPRIM  FOR AN INJURY TO HIS HAND AND THE MEDICATION IS CAUSING THE PATIENT TO ITCH BADLY AND IT'S ALSO CAUSED PATIENT TO BREAK OUT ON HIS CHEST WITH A RED AREA PATIENT IS REQUESTING A DIFFERENT ANTIBIOTIC FOR TREATMENT OF HIS INJURY PLEASE ADVISE      CALL BACK NUMBER: [phone]  [facility] [address] [facility]

## 2021-02-15 RX ORDER — INSULIN ASPART 100 [IU]/ML
INJECTION, SUSPENSION SUBCUTANEOUS
Qty: 30 ML | Refills: 5 | Status: SHIPPED | OUTPATIENT
Start: 2021-02-15 | End: 2021-03-02 | Stop reason: SDUPTHER

## 2021-02-22 ENCOUNTER — OFFICE VISIT (OUTPATIENT)
Dept: INTERNAL MEDICINE | Facility: CLINIC | Age: 66
End: 2021-02-22

## 2021-02-22 ENCOUNTER — HOSPITAL ENCOUNTER (OUTPATIENT)
Dept: CT IMAGING | Facility: HOSPITAL | Age: 66
Discharge: HOME | End: 2021-02-22
Admitting: NURSE PRACTITIONER

## 2021-02-22 VITALS
DIASTOLIC BLOOD PRESSURE: 70 MMHG | SYSTOLIC BLOOD PRESSURE: 122 MMHG | OXYGEN SATURATION: 96 % | HEART RATE: 82 BPM | TEMPERATURE: 98.2 F | BODY MASS INDEX: 29.5 KG/M2 | RESPIRATION RATE: 16 BRPM | WEIGHT: 236 LBS

## 2021-02-22 DIAGNOSIS — R29.810 FACIAL DROOP: Primary | ICD-10-CM

## 2021-02-22 DIAGNOSIS — R29.810 FACIAL DROOP: ICD-10-CM

## 2021-02-22 PROCEDURE — 99214 OFFICE O/P EST MOD 30 MIN: CPT | Performed by: NURSE PRACTITIONER

## 2021-02-22 PROCEDURE — 70450 CT HEAD/BRAIN W/O DYE: CPT

## 2021-03-02 ENCOUNTER — TELEPHONE (OUTPATIENT)
Dept: INTERNAL MEDICINE | Facility: CLINIC | Age: 66
End: 2021-03-02

## 2021-03-02 RX ORDER — INSULIN ASPART 100 [IU]/ML
INJECTION, SUSPENSION SUBCUTANEOUS
Qty: 30 ML | Refills: 5 | Status: SHIPPED | OUTPATIENT
Start: 2021-03-02 | End: 2021-11-02 | Stop reason: SDUPTHER

## 2021-03-02 NOTE — TELEPHONE ENCOUNTER
Reduce dose patient does not need new prescription pen.  He had a recent reduced appetite I asked his wife to reduce the insulin dosage temporarily.  She is to call me is doing this week.

## 2021-03-03 ENCOUNTER — PATIENT OUTREACH (OUTPATIENT)
Dept: PHARMACY | Facility: HOSPITAL | Age: 66
End: 2021-03-03

## 2021-03-03 NOTE — OUTREACH NOTE
Medication Adherence Call    Patient was called today to discuss medication adherence with lisinopril, as the patient was identified as having care opportunities.     The patient's wife denies issues with adherence and denies any barriers to receiving medications. She believes some of his medcations are a 90 day supply. She is not sure at the time if this one is, but will ask his pharmacy for it to be filled that way.    Carmelita Bee, PharmD  03/03/21

## 2021-03-11 ENCOUNTER — OFFICE VISIT (OUTPATIENT)
Dept: INTERNAL MEDICINE | Facility: CLINIC | Age: 66
End: 2021-03-11

## 2021-03-11 VITALS
BODY MASS INDEX: 29.5 KG/M2 | HEART RATE: 68 BPM | TEMPERATURE: 97.3 F | DIASTOLIC BLOOD PRESSURE: 60 MMHG | RESPIRATION RATE: 20 BRPM | SYSTOLIC BLOOD PRESSURE: 126 MMHG | WEIGHT: 236 LBS

## 2021-03-11 DIAGNOSIS — E11.621 DIABETIC ULCER OF RIGHT HEEL ASSOCIATED WITH TYPE 2 DIABETES MELLITUS, UNSPECIFIED ULCER STAGE (HCC): ICD-10-CM

## 2021-03-11 DIAGNOSIS — R41.3 MEMORY LOSS: ICD-10-CM

## 2021-03-11 DIAGNOSIS — I73.9 PERIPHERAL VASCULAR DISEASE (HCC): ICD-10-CM

## 2021-03-11 DIAGNOSIS — Z79.4 TYPE 2 DIABETES MELLITUS WITH DIABETIC PERIPHERAL ANGIOPATHY WITHOUT GANGRENE, WITH LONG-TERM CURRENT USE OF INSULIN (HCC): Primary | ICD-10-CM

## 2021-03-11 DIAGNOSIS — L97.419 DIABETIC ULCER OF RIGHT HEEL ASSOCIATED WITH TYPE 2 DIABETES MELLITUS, UNSPECIFIED ULCER STAGE (HCC): ICD-10-CM

## 2021-03-11 DIAGNOSIS — I10 ESSENTIAL HYPERTENSION: ICD-10-CM

## 2021-03-11 DIAGNOSIS — E11.51 TYPE 2 DIABETES MELLITUS WITH DIABETIC PERIPHERAL ANGIOPATHY WITHOUT GANGRENE, WITH LONG-TERM CURRENT USE OF INSULIN (HCC): Primary | ICD-10-CM

## 2021-03-11 DIAGNOSIS — I25.10 CORONARY ARTERY DISEASE INVOLVING NATIVE HEART WITHOUT ANGINA PECTORIS, UNSPECIFIED VESSEL OR LESION TYPE: ICD-10-CM

## 2021-03-11 DIAGNOSIS — E78.5 HYPERLIPIDEMIA, UNSPECIFIED HYPERLIPIDEMIA TYPE: ICD-10-CM

## 2021-03-11 DIAGNOSIS — E53.8 VITAMIN B 12 DEFICIENCY: ICD-10-CM

## 2021-03-11 LAB
A/C: NORMAL
BILIRUB BLD-MCNC: NEGATIVE MG/DL
CLARITY, POC: CLEAR
COLOR UR: YELLOW
EXPIRATION DATE: ABNORMAL
EXPIRATION DATE: NORMAL
GLUCOSE UR STRIP-MCNC: ABNORMAL MG/DL
KETONES UR QL: NEGATIVE
LEUKOCYTE EST, POC: NEGATIVE
Lab: ABNORMAL
Lab: NORMAL
NITRITE UR-MCNC: NEGATIVE MG/ML
PH UR: 5 [PH] (ref 5–8)
POC CREATININE URINE: 100
POC MICROALBUMIN URINE: 10
PROT UR STRIP-MCNC: NEGATIVE MG/DL
RBC # UR STRIP: NEGATIVE /UL
SP GR UR: 1.02 (ref 1–1.03)
UROBILINOGEN UR QL: NORMAL

## 2021-03-11 PROCEDURE — 36415 COLL VENOUS BLD VENIPUNCTURE: CPT | Performed by: INTERNAL MEDICINE

## 2021-03-11 PROCEDURE — 81003 URINALYSIS AUTO W/O SCOPE: CPT | Performed by: INTERNAL MEDICINE

## 2021-03-11 PROCEDURE — 99214 OFFICE O/P EST MOD 30 MIN: CPT | Performed by: INTERNAL MEDICINE

## 2021-03-11 PROCEDURE — 82044 UR ALBUMIN SEMIQUANTITATIVE: CPT | Performed by: INTERNAL MEDICINE

## 2021-03-12 LAB
ALBUMIN SERPL-MCNC: 4.1 G/DL (ref 3.5–5.2)
ALBUMIN/GLOB SERPL: 1.6 G/DL
ALP SERPL-CCNC: 78 U/L (ref 39–117)
ALT SERPL-CCNC: 18 U/L (ref 1–41)
AST SERPL-CCNC: 21 U/L (ref 1–40)
BASOPHILS # BLD AUTO: 0.08 10*3/MM3 (ref 0–0.2)
BASOPHILS NFR BLD AUTO: 1.2 % (ref 0–1.5)
BILIRUB SERPL-MCNC: 0.4 MG/DL (ref 0–1.2)
BUN SERPL-MCNC: 16 MG/DL (ref 8–23)
BUN/CREAT SERPL: 18.4 (ref 7–25)
CALCIUM SERPL-MCNC: 9.2 MG/DL (ref 8.6–10.5)
CHLORIDE SERPL-SCNC: 104 MMOL/L (ref 98–107)
CHOLEST SERPL-MCNC: 127 MG/DL (ref 0–200)
CO2 SERPL-SCNC: 25.9 MMOL/L (ref 22–29)
CREAT SERPL-MCNC: 0.87 MG/DL (ref 0.76–1.27)
EOSINOPHIL # BLD AUTO: 0.66 10*3/MM3 (ref 0–0.4)
EOSINOPHIL NFR BLD AUTO: 9.6 % (ref 0.3–6.2)
ERYTHROCYTE [DISTWIDTH] IN BLOOD BY AUTOMATED COUNT: 12.6 % (ref 12.3–15.4)
GLOBULIN SER CALC-MCNC: 2.5 GM/DL
GLUCOSE SERPL-MCNC: 179 MG/DL (ref 65–99)
HBA1C MFR BLD: 7.9 % (ref 4.8–5.6)
HCT VFR BLD AUTO: 45.8 % (ref 37.5–51)
HDLC SERPL-MCNC: 38 MG/DL (ref 40–60)
HGB BLD-MCNC: 15.1 G/DL (ref 13–17.7)
IMM GRANULOCYTES # BLD AUTO: 0.03 10*3/MM3 (ref 0–0.05)
IMM GRANULOCYTES NFR BLD AUTO: 0.4 % (ref 0–0.5)
LDLC SERPL CALC-MCNC: 73 MG/DL (ref 0–100)
LYMPHOCYTES # BLD AUTO: 2.03 10*3/MM3 (ref 0.7–3.1)
LYMPHOCYTES NFR BLD AUTO: 29.6 % (ref 19.6–45.3)
MCH RBC QN AUTO: 30.7 PG (ref 26.6–33)
MCHC RBC AUTO-ENTMCNC: 33 G/DL (ref 31.5–35.7)
MCV RBC AUTO: 93.1 FL (ref 79–97)
MONOCYTES # BLD AUTO: 0.79 10*3/MM3 (ref 0.1–0.9)
MONOCYTES NFR BLD AUTO: 11.5 % (ref 5–12)
NEUTROPHILS # BLD AUTO: 3.26 10*3/MM3 (ref 1.7–7)
NEUTROPHILS NFR BLD AUTO: 47.7 % (ref 42.7–76)
NRBC BLD AUTO-RTO: 0 /100 WBC (ref 0–0.2)
PLATELET # BLD AUTO: 269 10*3/MM3 (ref 140–450)
POTASSIUM SERPL-SCNC: 4.6 MMOL/L (ref 3.5–5.2)
PROT SERPL-MCNC: 6.6 G/DL (ref 6–8.5)
RBC # BLD AUTO: 4.92 10*6/MM3 (ref 4.14–5.8)
SODIUM SERPL-SCNC: 139 MMOL/L (ref 136–145)
T4 FREE SERPL-MCNC: 1.23 NG/DL (ref 0.93–1.7)
TRIGL SERPL-MCNC: 83 MG/DL (ref 0–150)
TSH SERPL DL<=0.005 MIU/L-ACNC: 1.71 UIU/ML (ref 0.27–4.2)
VIT B12 SERPL-MCNC: 297 PG/ML (ref 211–946)
VLDLC SERPL CALC-MCNC: 16 MG/DL (ref 5–40)
WBC # BLD AUTO: 6.85 10*3/MM3 (ref 3.4–10.8)

## 2021-03-12 NOTE — PROGRESS NOTES
Chief Complaint   Patient presents with   • Follow-up     2 WEEK FOLLOW UP       History of Present Illness    The patient presents for a follow-up related to Vitamin B12 deficiency. There are no reports of blood loss. The patient has memory loss but does not have a dry cough, a wet cough, wheezing, fever, nausea, vomiting, diarrhea, myalgias, abdominal pain, sweats, weight loss, decreased appetite, chills, paresthesias or numbness. His energy level is normal. The patient is taking a vitamin B12 supplement in the form of IM injections.    The patient presents for a follow-up related to Type 2 Diabetes Mellitus. He checks his blood sugars at home. His sugars are checked daily. The average sugars are in the 100-150 range. He denies hypoglycemic symptoms. The patient denies polyuria, polydypsia or polyphagia. He reports no symptoms of neuropathy. The patient takes his medication as prescribed. He is taking insulin. His injection sites are rotated appropriately. The patient does check his feet daily at home. He denies chest pain, shortness of breath, orthopnea, paroxysmal nocturnal dyspnea, dyspnea on exertion, edema, palpitations or syncope.    The patient presents for a follow-up related to hypertension. The patient reports that he has had no headaches or blurred vision. He states that he is taking his medication as prescribed. He is not having medication side effects.    The patient presents for a follow-up related to hyperlipidemia. He is following a low fat diet. He reports that he is exercising. He is taking atorvastatin. The patient is taking his medication as prescribed. He reports no medication side effects, including muscle cramps, abdominal pain, headaches or weakness.    The patient presents for a follow-up related to coronary artery disease. He takes an aspirin daily.    The patient presents for follow-up of peripheral arterial disease. He states that he has claudication, hair loss and leg pain. He denies  back pain. He states that he is taking Eliquis. The claudication begins after he walks less than ten yards and is relieved with rest. He reports ulcerations.    The patient complains of memory loss over 6 months. The primary type of memory affected is complex tasks, reasoning, speech and language, behavioral difficulties and name recall. He states that he does not get lost. He lives with his spouse. The patient is able to stay alone. There are no symptoms of depression.    Review of Systems    CONSTITUTIONAL- Denies Weakness or Malaise.    HENT- Denies Nasal Discharge, Sore Throat, Ear Pain, Ear Drainage, Sinus Pain, Nasal Congestion, Decreased Hearing or Tinnitus.    GASTROINTESTINAL- Denies Blood per Rectum, Constipation or Heartburn.    PULMONARY- Denies Sputum Production, Cough, Hemoptysis or Pleuritic Chest Pain.    NEUROLOGIC- Denies Seizures, Confusion or Depression.    Medications      Current Outpatient Medications:   •  Alcohol Swabs (ALCOHOL PREP) pads, 1 swab 3 (Three) Times a Day., Disp: 200 each, Rfl: 0  •  aspirin 81 MG EC tablet, Take 1 tablet by mouth Daily., Disp: , Rfl:   •  atorvastatin (LIPITOR) 40 MG tablet, TAKE 1 TABLET BY MOUTH EVERY NIGHT, Disp: 90 tablet, Rfl: 1  •  B-D UF III MINI PEN NEEDLES 31G X 5 MM misc, USE AS DIRECTED, Disp: 200 each, Rfl: 1  •  Blood Glucose Calibration (ACCU-CHEK LOR) solution, 1 bottle by In Vitro route As Needed (Use as directed)., Disp: 1 each, Rfl: 0  •  Blood Glucose Monitoring Suppl (ACCU-CHEK LOR PLUS) w/Device kit, 1 kit 3 (Three) Times a Day., Disp: 1 kit, Rfl: 0  •  cilostazol (PLETAL) 50 MG tablet, TAKE 1 TABLET BY MOUTH TWICE DAILY, Disp: 180 tablet, Rfl: 1  •  Eliquis 5 MG tablet tablet, TAKE 1 TABLET BY MOUTH TWICE A DAY, Disp: 180 tablet, Rfl: 1  •  gabapentin (NEURONTIN) 400 MG capsule, TAKE 1 CAPSULE BY MOUTH THREE TIMES DAILY, Disp: 90 capsule, Rfl: 2  •  glucose blood (Accu-Chek Lor Plus) test strip, Use as instructed, Disp: 100 each,  Rfl: 12  •  Januvia 100 MG tablet, TAKE 1 TABLET BY MOUTH DAILY, Disp: 90 tablet, Rfl: 1  •  Lancets (ACCU-CHEK SOFT TOUCH) lancets, Check sugars 3 times daily, Disp: 1 each, Rfl: 12  •  lisinopril (PRINIVIL,ZESTRIL) 2.5 MG tablet, TAKE 1 TABLET BY MOUTH EVERY DAY, Disp: 30 tablet, Rfl: 5  •  metFORMIN (GLUCOPHAGE) 1000 MG tablet, TAKE 1 TABLET BY MOUTH TWICE DAILY, Disp: 180 tablet, Rfl: 1  •  metoprolol succinate XL (TOPROL-XL) 25 MG 24 hr tablet, TAKE 1 TABLET BY MOUTH EVERY DAY, Disp: 90 tablet, Rfl: 1  •  mupirocin (Bactroban) 2 % ointment, Apply  topically to the appropriate area as directed 3 (Three) Times a Day. (Patient taking differently: Apply  topically to the appropriate area as directed 3 (Three) Times a Day As Needed.), Disp: 60 g, Rfl: 0  •  NovoLOG Mix 70/30 FlexPen (70-30) 100 UNIT/ML suspension pen-injector injection, Take 30 units SQ twice daily with meals (Patient taking differently: Take 25-40  units SQ twice daily with meals), Disp: 30 mL, Rfl: 5  •  fluticasone (FLONASE) 50 MCG/ACT nasal spray, 2 sprays into the nostril(s) as directed by provider Daily. (Patient taking differently: 2 sprays into the nostril(s) as directed by provider Daily As Needed.), Disp: 16 g, Rfl: 3    Current Facility-Administered Medications:   •  cyanocobalamin injection 1,000 mcg, 1,000 mcg, Intramuscular, Q28 Days, Des Geller MD, 1,000 mcg at 01/27/21 1610     Allergies    Allergies   Allergen Reactions   • Bactrim [Sulfamethoxazole-Trimethoprim] Rash   • Adhesive Tape Rash   • Neosporin [Neomycin-Bacitracin Zn-Polymyx] Rash       Problem List    Patient Active Problem List   Diagnosis   • Cough   • Benign essential HTN   • Benign prostatic hyperplasia   • Coronary artery disease involving native coronary artery of native heart   • Chronic coronary artery disease   • Diabetes mellitus (CMS/HCC)   • Gout   • Hyperlipidemia   • History of heart attack   • Peripheral neuropathy   • Peripheral vascular disease  (CMS/[facility])       Medications, Allergies, Problems List and Past History were reviewed and updated.    Physical Examination    /60 (BP Location: Right arm, Patient Position: Sitting, Cuff Size: Adult)   Pulse 68   Temp 97.3 °F (36.3 °C) (Infrared)   Resp 20   Wt 107 kg (236 lb)   BMI 29.50 kg/m²     HEENT: Head- Normocephalic Atraumatic. Facies- Within normal limits. Pinnas- Normal texture and shape bilaterally. Canals- Normal bilaterally. TMs- Normal bilaterally. Nares- Patent bilaterally. Nasal Septum- is normal. There is no tenderness to palpation over the frontal or maxillary sinuses. Lids- Normal bilaterally. Conjunctiva- Clear bilaterally. Sclera- Anicteric bilaterally. Oropharynx- Moist with no lesions. Tonsils- No enlargement, erythema or exudate.    Neck: Thyroid- non enlarged, symmetric and has no nodules. No bruits are detected. ROM- Normal Range of Motion with no rigidity.    Lungs: Auscultation- Clear to auscultation bilaterally. There are no retractions, clubbing or cyanosis. The Expiratory to Inspiratory ratio is equal.    Lymph Nodes: Cervical- no enlarged lymph nodes noted.    Cardiovascular: There are no carotid bruits. Heart- Normal Rate with Regular rhythm and no murmurs. There are no gallops. There are no rubs. In the lower extremities there is no edema. The upper extremities do not have edema.    Abdomen: Soft, benign, non-tender with no masses, hernias, organomegaly or scars.    Neurologic Exam:    Cranial Nerves II-XII: Intact    Upper Extremity Neuro Exam: Muscle Strength is 5/5 in all major upper extremity muscle groups. Deep Tendon Reflexes are 2+ and equal in the biceps, triceps and brachioradialis distributions bilaterally. Sensation is normal in all distributions.    Lower Extremity Neuro Exam: Muscle Strength is 5/5 in all major lower extremity muscle groups. Deep Tendon Reflexes are 2+ and equal in the patellar and Achilles distributions bilaterally. Sensation is normal in  all distributions.    Corticospinal Tract Exam: No Ulnar or Pronator Drift.    Gait: Normal.    Cerebellar Function: No abnormalities noted with rapid alternating movements on the right, rapid alternating movements on the left, finger to nose on the right, finger to nose on the left, heel to shin on the right, heel to shin on the left or Romberg test.    Feet: The feet are symmetric with normal hay landmarks. There is no tenderness to palpation bilaterally. The feet have a decreased right posteior tibial pulse and a normal left posterior tibial pulse, a decreased right dorsalis pedis pulse and a normal left dorsalis pedis pulse and delayed right sided capillary refill. The arches are normal bilaterally. There are skin or nail lesions present. There are no ingrown nails. There are no bunions noted.    Dermatologic: The patient has an ulcerated area on his feet. The ulceration is pale. The affected skin is dry, eroded, fissured and ulcerated and the condition is noted to be well demarcated.    Impression and Assessment    Ulceration.    Vitamin B12 Deficiency.    Type 2 Diabetes Mellitus.    Essential Hypertension.    Hyperlipidemia.    Coronary Artery Disease.    Peripheral Arterial Disease.    Memory Loss.    Plan    Essential Hypertension Plan: The patient was instructed to continue the current medications.    Hyperlipidemia Plan: The patient was instructed to exercise daily, eat a low fat diet and continue his medications.    Coronary Artery Disease Plan: The patient will follow-up with his cardiologist. The patient was instructed to continue the current medications.    Peripheral Arterial Disease Plan: He was referred to vascular surgery.    Type 2 Diabetes Mellitus Plan: The current plan was continued.    Ulceration Plan: He was referred to vascular surgery. He will continue his follow-up with podiatry.    Memory Loss Plan: Further plans will be made after test results are received and reviewed.    Vitamin B12  Deficiency Plan: The patient was instructed to continue the current medications.    Diagnoses and all orders for this visit:    1. Type 2 diabetes mellitus with diabetic peripheral angiopathy without gangrene, with long-term current use of insulin (CMS/[facility]) (Primary)  -     Comprehensive Metabolic Panel  -     Hemoglobin A1c  -     POC Microalbumin    2. Essential hypertension  -     CBC & Differential  -     Comprehensive Metabolic Panel  -     POC Urinalysis Dipstick, Automated    3. Hyperlipidemia, unspecified hyperlipidemia type  -     Comprehensive Metabolic Panel  -     Lipid Panel    4. Vitamin B 12 deficiency  -     CBC & Differential  -     Vitamin B12    5. Diabetic ulcer of right heel associated with type 2 diabetes mellitus, unspecified ulcer stage (CMS/[facility])  -     Ambulatory Referral to Vascular Surgery    6. Coronary artery disease involving native heart without angina pectoris, unspecified vessel or lesion type    7. Peripheral vascular disease (CMS/[facility])    8. Memory loss  -     TSH  -     T4, Free  -     MRI Brain With & Without Contrast; Future           Return to Office    The patient was instructed to return for follow-up in 4 months. The patient was instructed to return sooner if the condition changes, worsens, or does not resolve.

## 2021-03-15 RX ORDER — APIXABAN 5 MG/1
TABLET, FILM COATED ORAL
Qty: 180 TABLET | Refills: 1 | Status: SHIPPED | OUTPATIENT
Start: 2021-03-15 | End: 2021-10-11

## 2021-03-31 ENCOUNTER — APPOINTMENT (OUTPATIENT)
Dept: MRI IMAGING | Facility: HOSPITAL | Age: 66
End: 2021-03-31

## 2021-04-15 RX ORDER — LISINOPRIL 2.5 MG/1
TABLET ORAL
Qty: 30 TABLET | Refills: 5 | Status: SHIPPED | OUTPATIENT
Start: 2021-04-15 | End: 2021-11-17 | Stop reason: SDUPTHER

## 2021-04-15 RX ORDER — METOPROLOL SUCCINATE 25 MG/1
TABLET, EXTENDED RELEASE ORAL
Qty: 90 TABLET | Refills: 1 | Status: SHIPPED | OUTPATIENT
Start: 2021-04-15 | End: 2021-11-15

## 2021-04-27 ENCOUNTER — OFFICE VISIT (OUTPATIENT)
Dept: INTERNAL MEDICINE | Facility: CLINIC | Age: 66
End: 2021-04-27

## 2021-04-27 VITALS
WEIGHT: 231 LBS | DIASTOLIC BLOOD PRESSURE: 80 MMHG | TEMPERATURE: 98.9 F | RESPIRATION RATE: 20 BRPM | HEART RATE: 80 BPM | SYSTOLIC BLOOD PRESSURE: 158 MMHG | BODY MASS INDEX: 28.87 KG/M2

## 2021-04-27 DIAGNOSIS — S10.91XA ABRASION OF NECK, INITIAL ENCOUNTER: ICD-10-CM

## 2021-04-27 DIAGNOSIS — J30.2 SEASONAL ALLERGIC RHINITIS, UNSPECIFIED TRIGGER: Primary | ICD-10-CM

## 2021-04-27 PROCEDURE — 99214 OFFICE O/P EST MOD 30 MIN: CPT | Performed by: INTERNAL MEDICINE

## 2021-04-27 RX ORDER — AZITHROMYCIN 250 MG/1
TABLET, FILM COATED ORAL
Qty: 6 TABLET | Refills: 0 | Status: SHIPPED | OUTPATIENT
Start: 2021-04-27 | End: 2021-07-14

## 2021-04-27 RX ORDER — METHYLPREDNISOLONE 4 MG/1
TABLET ORAL
Qty: 21 TABLET | Refills: 0 | Status: SHIPPED | OUTPATIENT
Start: 2021-04-27 | End: 2021-07-14

## 2021-04-30 RX ORDER — ATORVASTATIN CALCIUM 40 MG/1
40 TABLET, FILM COATED ORAL NIGHTLY
Qty: 90 TABLET | Refills: 1 | Status: SHIPPED | OUTPATIENT
Start: 2021-04-30 | End: 2021-11-01

## 2021-05-03 NOTE — PROGRESS NOTES
Chief Complaint   Patient presents with   • Sinus issues       History of Present Illness    He presents for an initial evaluation with an abrasion on his neck. This has been present for several days. The condition is painful. The patient has had a tetanus shot in the past five years. No prior treatment has been administered. There is no evidence of infection.    The patient presents for an acute visit for a ten day history of allergy symptoms. The symptoms are seasonal. His allergies are worse in the spring. The patient reports a dry cough, nasal congestion, a runny nose, itchy watery eyes and sneezing but the patient has not noted a wet cough, wheezing, fever, facial pain, a headache, eye drainage, ear pain, ear drainage, a rash, nausea, vomiting, diarrhea, chills, decreased appetite or eczema. He has not tried anything to relieve the symptoms.    Medications      Current Outpatient Medications:   •  Alcohol Swabs (ALCOHOL PREP) pads, 1 swab 3 (Three) Times a Day., Disp: 200 each, Rfl: 0  •  aspirin 81 MG EC tablet, Take 1 tablet by mouth Daily., Disp: , Rfl:   •  B-D UF III MINI PEN NEEDLES 31G X 5 MM misc, USE AS DIRECTED, Disp: 200 each, Rfl: 1  •  Blood Glucose Calibration (ACCU-CHEK LOR) solution, 1 bottle by In Vitro route As Needed (Use as directed)., Disp: 1 each, Rfl: 0  •  Blood Glucose Monitoring Suppl (ACCU-CHEK LOR PLUS) w/Device kit, 1 kit 3 (Three) Times a Day., Disp: 1 kit, Rfl: 0  •  cilostazol (PLETAL) 50 MG tablet, TAKE 1 TABLET BY MOUTH TWICE DAILY, Disp: 180 tablet, Rfl: 1  •  Eliquis 5 MG tablet tablet, TAKE 1 TABLET BY MOUTH TWICE DAILY, Disp: 180 tablet, Rfl: 1  •  fluticasone (FLONASE) 50 MCG/ACT nasal spray, 2 sprays into the nostril(s) as directed by provider Daily. (Patient taking differently: 2 sprays into the nostril(s) as directed by provider Daily As Needed.), Disp: 16 g, Rfl: 3  •  gabapentin (NEURONTIN) 400 MG capsule, TAKE 1 CAPSULE BY MOUTH THREE TIMES DAILY, Disp: 90  capsule, Rfl: 2  •  glucose blood (Accu-Chek Geri Plus) test strip, Use as instructed, Disp: 100 each, Rfl: 12  •  Januvia 100 MG tablet, TAKE 1 TABLET BY MOUTH DAILY, Disp: 90 tablet, Rfl: 1  •  Lancets (ACCU-CHEK SOFT TOUCH) lancets, Check sugars 3 times daily, Disp: 1 each, Rfl: 12  •  lisinopril (PRINIVIL,ZESTRIL) 2.5 MG tablet, TAKE 1 TABLET BY MOUTH EVERY DAY, Disp: 30 tablet, Rfl: 5  •  metFORMIN (GLUCOPHAGE) 1000 MG tablet, TAKE 1 TABLET BY MOUTH TWICE DAILY, Disp: 180 tablet, Rfl: 1  •  metoprolol succinate XL (TOPROL-XL) 25 MG 24 hr tablet, TAKE 1 TABLET BY MOUTH EVERY DAY, Disp: 90 tablet, Rfl: 1  •  mupirocin (Bactroban) 2 % ointment, Apply  topically to the appropriate area as directed 3 (Three) Times a Day., Disp: 60 g, Rfl: 0  •  NovoLOG Mix 70/30 FlexPen (70-30) 100 UNIT/ML suspension pen-injector injection, Take 30 units SQ twice daily with meals (Patient taking differently: Take 25-40  units SQ twice daily with meals), Disp: 30 mL, Rfl: 5  •  atorvastatin (LIPITOR) 40 MG tablet, TAKE 1 TABLET BY MOUTH EVERY NIGHT, Disp: 90 tablet, Rfl: 1  •  azithromycin (Zithromax Z-Jesse) 250 MG tablet, Take 2 tablets the first day, then 1 tablet daily for 4 days., Disp: 6 tablet, Rfl: 0  •  methylPREDNISolone (MEDROL) 4 MG dose pack, Take as directed on package instructions., Disp: 21 tablet, Rfl: 0    Current Facility-Administered Medications:   •  cyanocobalamin injection 1,000 mcg, 1,000 mcg, Intramuscular, Q28 Days, Des Geller MD, 1,000 mcg at 01/27/21 1610     Allergies    Allergies   Allergen Reactions   • Bactrim [Sulfamethoxazole-Trimethoprim] Rash   • Adhesive Tape Rash   • Neosporin [Neomycin-Bacitracin Zn-Polymyx] Rash       Problem List    Patient Active Problem List   Diagnosis   • Cough   • Benign essential HTN   • Benign prostatic hyperplasia   • Coronary artery disease involving native coronary artery of native heart   • Chronic coronary artery disease   • Diabetes mellitus (CMS/HCC)   •  Gout   • Hyperlipidemia   • History of heart attack   • Peripheral neuropathy   • Peripheral vascular disease (CMS/HCC)       Medications, Allergies, Problems List and Past History were reviewed and updated.    Physical Examination    /80 (BP Location: Left arm, Patient Position: Sitting, Cuff Size: Adult)   Pulse 80   Temp 98.9 °F (37.2 °C) (Infrared)   Resp 20   Wt 105 kg (231 lb)   BMI 28.87 kg/m²       HEENT: Head- Normocephalic Atraumatic. Facies- Within normal limits. Pinnas- Normal texture and shape bilaterally. Canals- Normal bilaterally. TMs- Normal bilaterally. Nares- Patent bilaterally. Nasal Septum- is normal. There is no tenderness to palpation over the frontal or maxillary sinuses. Lids- Normal bilaterally. Conjunctiva- Clear bilaterally. Sclera- Anicteric bilaterally. Oropharynx- Moist with no lesions. Tonsils- No enlargement, erythema or exudate.    Neck: Thyroid- non enlarged, symmetric and has no nodules. No bruits are detected. ROM- Normal Range of Motion with no rigidity.    Lungs: Auscultation- Clear to auscultation bilaterally. There are no retractions, clubbing or cyanosis. The Expiratory to Inspiratory ratio is equal.    Cardiovascular: There are no carotid bruits. Heart- Normal Rate with Regular rhythm and no murmurs. There are no gallops. There are no rubs. In the lower extremities there is no edema. The upper extremities do not have edema.    Dermatologic: The patient has an abrasion on his neck. The abrasion is bright red. The affected skin is abraded and the condition is noted to be well demarcated.    Impression and Assessment    Abrasion.    Allergic Rhinitis.    Plan    Allergic Rhinitis Plan: Medication was added as noted below.    Abrasion Plan: Skin care was discussed with the patient.    Diagnoses and all orders for this visit:    1. Seasonal allergic rhinitis, unspecified trigger (Primary)  -     azithromycin (Zithromax Z-Jesse) 250 MG tablet; Take 2 tablets the first  day, then 1 tablet daily for 4 days.  Dispense: 6 tablet; Refill: 0  -     methylPREDNISolone (MEDROL) 4 MG dose pack; Take as directed on package instructions.  Dispense: 21 tablet; Refill: 0    2. Abrasion of neck, initial encounter  -     mupirocin (Bactroban) 2 % ointment; Apply  topically to the appropriate area as directed 3 (Three) Times a Day.  Dispense: 60 g; Refill: 0        Return to Office    The patient was instructed to return for follow-up as needed. The patient was instructed to return sooner if the condition changes, worsens, or does not resolve.

## 2021-06-16 RX ORDER — FLURBIPROFEN SODIUM 0.3 MG/ML
SOLUTION/ DROPS OPHTHALMIC
Qty: 200 EACH | Refills: 1 | Status: SHIPPED | OUTPATIENT
Start: 2021-06-16

## 2021-07-14 ENCOUNTER — OFFICE VISIT (OUTPATIENT)
Dept: INTERNAL MEDICINE | Facility: CLINIC | Age: 66
End: 2021-07-14

## 2021-07-14 VITALS
DIASTOLIC BLOOD PRESSURE: 80 MMHG | RESPIRATION RATE: 18 BRPM | BODY MASS INDEX: 28.37 KG/M2 | SYSTOLIC BLOOD PRESSURE: 122 MMHG | TEMPERATURE: 98 F | WEIGHT: 227 LBS | HEART RATE: 72 BPM

## 2021-07-14 DIAGNOSIS — L97.509 TYPE 2 DIABETES MELLITUS WITH FOOT ULCER, WITH LONG-TERM CURRENT USE OF INSULIN (HCC): ICD-10-CM

## 2021-07-14 DIAGNOSIS — E78.5 HYPERLIPIDEMIA, UNSPECIFIED HYPERLIPIDEMIA TYPE: ICD-10-CM

## 2021-07-14 DIAGNOSIS — E53.8 VITAMIN B 12 DEFICIENCY: Primary | ICD-10-CM

## 2021-07-14 DIAGNOSIS — I10 ESSENTIAL HYPERTENSION: ICD-10-CM

## 2021-07-14 DIAGNOSIS — Z79.4 TYPE 2 DIABETES MELLITUS WITH FOOT ULCER, WITH LONG-TERM CURRENT USE OF INSULIN (HCC): ICD-10-CM

## 2021-07-14 DIAGNOSIS — E11.621 TYPE 2 DIABETES MELLITUS WITH FOOT ULCER, WITH LONG-TERM CURRENT USE OF INSULIN (HCC): ICD-10-CM

## 2021-07-14 PROCEDURE — 36415 COLL VENOUS BLD VENIPUNCTURE: CPT | Performed by: INTERNAL MEDICINE

## 2021-07-14 PROCEDURE — 99214 OFFICE O/P EST MOD 30 MIN: CPT | Performed by: INTERNAL MEDICINE

## 2021-07-14 PROCEDURE — 96372 THER/PROPH/DIAG INJ SC/IM: CPT | Performed by: INTERNAL MEDICINE

## 2021-07-14 RX ORDER — GABAPENTIN 400 MG/1
400 CAPSULE ORAL 3 TIMES DAILY
Qty: 90 CAPSULE | Refills: 1 | Status: SHIPPED | OUTPATIENT
Start: 2021-07-14 | End: 2022-02-22

## 2021-07-14 RX ORDER — CIPROFLOXACIN 500 MG/1
500 TABLET, FILM COATED ORAL DAILY
COMMUNITY
Start: 2021-06-25 | End: 2022-02-21

## 2021-07-14 RX ADMIN — CYANOCOBALAMIN 1000 MCG: 1000 INJECTION, SOLUTION INTRAMUSCULAR; SUBCUTANEOUS at 15:05

## 2021-07-15 LAB
CHOLEST SERPL-MCNC: 115 MG/DL (ref 0–200)
HBA1C MFR BLD: 7.9 % (ref 4.8–5.6)
HDLC SERPL-MCNC: 31 MG/DL (ref 40–60)
LDLC SERPL CALC-MCNC: 67 MG/DL (ref 0–100)
TRIGL SERPL-MCNC: 88 MG/DL (ref 0–150)
VIT B12 SERPL-MCNC: 233 PG/ML (ref 211–946)
VLDLC SERPL CALC-MCNC: 17 MG/DL (ref 5–40)

## 2021-08-08 NOTE — PROGRESS NOTES
Chief Complaint   Patient presents with   • Follow-up     4 MONTH FOLLOW UP CHRONIC MEDICAL PROBLEMS       History of Present Illness      The patient presents for a follow-up related to Vitamin B12 deficiency. There are no reports of blood loss. The patient has no symptoms of a dry cough, a wet cough, wheezing, fever, nausea, vomiting, diarrhea, myalgias, abdominal pain, sweats, weight loss, decreased appetite, chills, paresthesias, numbness or memory loss. His energy level is normal. The patient is taking a vitamin B12 supplement in the form of IM injections.    The patient presents for a follow-up related to Type 2 Diabetes Mellitus. He checks his blood sugars at home. His sugars are checked daily. The average sugars are in the 100-150 range. He denies hypoglycemic symptoms. The patient denies polyuria, polydypsia or polyphagia. He reports no symptoms of neuropathy. The patient takes his medication as prescribed. He is taking insulin. His injection sites are rotated appropriately. The patient does check his feet daily at home. He denies chest pain, shortness of breath, orthopnea, paroxysmal nocturnal dyspnea, dyspnea on exertion, edema, palpitations or syncope.    The patient presents for a follow-up related to hyperlipidemia. He is following a low fat diet. He reports that he is not exercising. He is taking atorvastatin. The patient is taking his medication as prescribed. He reports no medication side effects, including muscle cramps, abdominal pain, headaches or weakness.    The patient presents for a follow-up related to hypertension. The patient reports that he has had no headaches or blurred vision. He states that he is taking his medication as prescribed. He is not having medication side effects.    Review of Systems    CONSTITUTIONAL- Denies Weakness or Malaise.    HENT- Denies Nasal Discharge, Sore Throat, Ear Pain, Ear Drainage, Sinus Pain, Nasal Congestion, Decreased Hearing or  Tinnitus.    GASTROINTESTINAL- Denies Blood per Rectum, Constipation or Heartburn.    PULMONARY- Denies Sputum Production, Cough, Hemoptysis or Pleuritic Chest Pain.    CARDIOVASCULAR- Denies Claudication or Irregular Heart Beat.    Medications      Current Outpatient Medications:   •  Alcohol Swabs (ALCOHOL PREP) pads, 1 swab 3 (Three) Times a Day., Disp: 200 each, Rfl: 0  •  aspirin 81 MG EC tablet, Take 1 tablet by mouth Daily., Disp: , Rfl:   •  atorvastatin (LIPITOR) 40 MG tablet, TAKE 1 TABLET BY MOUTH EVERY NIGHT, Disp: 90 tablet, Rfl: 1  •  B-D UF III MINI PEN NEEDLES 31G X 5 MM misc, USE AS DIRECTED, Disp: 200 each, Rfl: 1  •  Blood Glucose Calibration (ACCU-CHEK LOR) solution, 1 bottle by In Vitro route As Needed (Use as directed)., Disp: 1 each, Rfl: 0  •  Blood Glucose Monitoring Suppl (ACCU-CHEK LOR PLUS) w/Device kit, 1 kit 3 (Three) Times a Day., Disp: 1 kit, Rfl: 0  •  cilostazol (PLETAL) 50 MG tablet, TAKE 1 TABLET BY MOUTH TWICE DAILY, Disp: 180 tablet, Rfl: 1  •  ciprofloxacin (CIPRO) 500 MG tablet, Take 500 mg by mouth Daily., Disp: , Rfl:   •  Eliquis 5 MG tablet tablet, TAKE 1 TABLET BY MOUTH TWICE DAILY, Disp: 180 tablet, Rfl: 1  •  glucose blood (Accu-Chek Lor Plus) test strip, Use as instructed, Disp: 100 each, Rfl: 12  •  Januvia 100 MG tablet, TAKE 1 TABLET BY MOUTH DAILY, Disp: 90 tablet, Rfl: 1  •  lisinopril (PRINIVIL,ZESTRIL) 2.5 MG tablet, TAKE 1 TABLET BY MOUTH EVERY DAY, Disp: 30 tablet, Rfl: 5  •  metoprolol succinate XL (TOPROL-XL) 25 MG 24 hr tablet, TAKE 1 TABLET BY MOUTH EVERY DAY, Disp: 90 tablet, Rfl: 1  •  mupirocin (Bactroban) 2 % ointment, Apply  topically to the appropriate area as directed 3 (Three) Times a Day., Disp: 60 g, Rfl: 0  •  NovoLOG Mix 70/30 FlexPen (70-30) 100 UNIT/ML suspension pen-injector injection, Take 30 units SQ twice daily with meals (Patient taking differently: Take 25-40  units SQ twice daily with meals), Disp: 30 mL, Rfl: 5  •  fluticasone  (FLONASE) 50 MCG/ACT nasal spray, 2 sprays into the nostril(s) as directed by provider Daily. (Patient taking differently: 2 sprays into the nostril(s) as directed by provider Daily As Needed.), Disp: 16 g, Rfl: 3  •  gabapentin (NEURONTIN) 400 MG capsule, Take 1 capsule by mouth 3 (Three) Times a Day., Disp: 90 capsule, Rfl: 1  •  Lancets (ACCU-CHEK SOFT TOUCH) lancets, Check sugars 3 times daily, Disp: 1 each, Rfl: 12  •  metFORMIN (GLUCOPHAGE) 1000 MG tablet, TAKE 1 TABLET BY MOUTH TWICE DAILY, Disp: 180 tablet, Rfl: 1    Current Facility-Administered Medications:   •  cyanocobalamin injection 1,000 mcg, 1,000 mcg, Intramuscular, Q28 Days, Des Geller MD, 1,000 mcg at 07/14/21 1505     Allergies    Allergies   Allergen Reactions   • Bactrim [Sulfamethoxazole-Trimethoprim] Rash   • Adhesive Tape Rash   • Neosporin [Neomycin-Bacitracin Zn-Polymyx] Rash       Problem List    Patient Active Problem List   Diagnosis   • Cough   • Benign essential HTN   • Benign prostatic hyperplasia   • Coronary artery disease involving native coronary artery of native heart   • Chronic coronary artery disease   • Diabetes mellitus (CMS/HCC)   • Gout   • Hyperlipidemia   • History of heart attack   • Peripheral neuropathy   • Peripheral vascular disease (CMS/HCC)       Medications, Allergies, Problems List and Past History were reviewed and updated.    Physical Examination    /80 (BP Location: Right arm, Patient Position: Sitting, Cuff Size: Adult)   Pulse 72   Temp 98 °F (36.7 °C) (Infrared)   Resp 18   Wt 103 kg (227 lb)   BMI 28.37 kg/m²     HEENT: Facies- Within normal limits. Lids- Normal bilaterally. Conjunctiva- Clear bilaterally. Sclera- Anicteric bilaterally.    Neck: Thyroid- non enlarged, symmetric and has no nodules. No bruits are detected.    Lungs: Auscultation- Clear to auscultation bilaterally. There are no retractions, clubbing or cyanosis. The Expiratory to Inspiratory ratio is equal.    Lymph  Nodes: Cervical- no enlarged lymph nodes noted.    Cardiovascular: There are no carotid bruits. Heart- Normal Rate with Regular rhythm and no murmurs. There are no gallops. There are no rubs. In the lower extremities there is no edema. The upper extremities do not have edema.    Abdomen: Soft, benign, non-tender with no masses, hernias, organomegaly or scars.    Impression and Assessment    Vitamin B12 Deficiency.    Type 2 Diabetes Mellitus.    Hyperlipidemia.    Essential Hypertension.    Plan    Hyperlipidemia Plan: The patient was instructed to exercise daily, eat a low fat diet and continue his medications.    Essential Hypertension Plan: The patient was instructed to continue the current medications.    Type 2 Diabetes Mellitus Plan: The patient was instructed to continue the current medications.    Vitamin B12 Deficiency Plan: The patient was instructed to continue the current medications.    Diagnoses and all orders for this visit:    1. Vitamin B 12 deficiency (Primary)  -     Vitamin B12    2. Type 2 diabetes mellitus with foot ulcer, with long-term current use of insulin (CMS/[facility])  -     gabapentin (NEURONTIN) 400 MG capsule; Take 1 capsule by mouth 3 (Three) Times a Day.  Dispense: 90 capsule; Refill: 1  -     Hemoglobin A1c    3. Essential hypertension    4. Hyperlipidemia, unspecified hyperlipidemia type  -     Lipid Panel          Return to Office    The patient was instructed to return for follow-up in 4 months. The patient was instructed to return sooner if the condition changes, worsens, or does not resolve.

## 2021-10-05 DIAGNOSIS — Z79.4 TYPE 2 DIABETES MELLITUS WITH DIABETIC PERIPHERAL ANGIOPATHY WITHOUT GANGRENE, WITH LONG-TERM CURRENT USE OF INSULIN (HCC): ICD-10-CM

## 2021-10-05 DIAGNOSIS — E11.51 TYPE 2 DIABETES MELLITUS WITH DIABETIC PERIPHERAL ANGIOPATHY WITHOUT GANGRENE, WITH LONG-TERM CURRENT USE OF INSULIN (HCC): ICD-10-CM

## 2021-10-05 RX ORDER — SITAGLIPTIN 100 MG/1
TABLET, FILM COATED ORAL
Qty: 90 TABLET | Refills: 1 | Status: SHIPPED | OUTPATIENT
Start: 2021-10-05 | End: 2022-01-25

## 2021-10-07 ENCOUNTER — TELEPHONE (OUTPATIENT)
Dept: INTERNAL MEDICINE | Facility: CLINIC | Age: 66
End: 2021-10-07

## 2021-10-07 DIAGNOSIS — R41.3 MEMORY LOSS: Primary | ICD-10-CM

## 2021-10-07 NOTE — TELEPHONE ENCOUNTER
Pt wife, [place], called and requesting referral to Dr Lenin Bradley for pt's dementia. States Dr Garner did a memory test on pt couple visits ago but that pt has gotten worse and like would like to see a specialist.  States he also gets easily aggitated and mean at times which is not like his normal.  States they are trying to keep him from driving but that it is hard.  States he has gotten lost and not known where he was when trying to go to places he has been numerous times.  States she is just very concerned.     Pt wife # [phone]

## 2021-10-11 RX ORDER — APIXABAN 5 MG/1
TABLET, FILM COATED ORAL
Qty: 180 TABLET | Refills: 1 | Status: SHIPPED | OUTPATIENT
Start: 2021-10-11 | End: 2022-04-11

## 2021-10-11 NOTE — TELEPHONE ENCOUNTER
[phone] is the wrong number for wife    Appt has been made and wife is aware at correct phone number-see referral for any further communications

## 2021-10-18 ENCOUNTER — LAB (OUTPATIENT)
Dept: LAB | Facility: HOSPITAL | Age: 66
End: 2021-10-18

## 2021-10-18 ENCOUNTER — OFFICE VISIT (OUTPATIENT)
Dept: INTERNAL MEDICINE | Facility: CLINIC | Age: 66
End: 2021-10-18

## 2021-10-18 VITALS
HEIGHT: 75 IN | SYSTOLIC BLOOD PRESSURE: 122 MMHG | RESPIRATION RATE: 12 BRPM | BODY MASS INDEX: 28.23 KG/M2 | OXYGEN SATURATION: 97 % | TEMPERATURE: 97.7 F | DIASTOLIC BLOOD PRESSURE: 58 MMHG | HEART RATE: 65 BPM | WEIGHT: 227 LBS

## 2021-10-18 DIAGNOSIS — I10 ESSENTIAL HYPERTENSION: Primary | ICD-10-CM

## 2021-10-18 DIAGNOSIS — Z79.4 TYPE 2 DIABETES MELLITUS WITHOUT COMPLICATION, WITH LONG-TERM CURRENT USE OF INSULIN (HCC): ICD-10-CM

## 2021-10-18 DIAGNOSIS — E78.5 HYPERLIPIDEMIA, UNSPECIFIED HYPERLIPIDEMIA TYPE: ICD-10-CM

## 2021-10-18 DIAGNOSIS — F03.90 DEMENTIA WITHOUT BEHAVIORAL DISTURBANCE, UNSPECIFIED DEMENTIA TYPE: ICD-10-CM

## 2021-10-18 DIAGNOSIS — E11.9 TYPE 2 DIABETES MELLITUS WITHOUT COMPLICATION, WITH LONG-TERM CURRENT USE OF INSULIN (HCC): ICD-10-CM

## 2021-10-18 PROCEDURE — 96372 THER/PROPH/DIAG INJ SC/IM: CPT | Performed by: INTERNAL MEDICINE

## 2021-10-18 PROCEDURE — 99214 OFFICE O/P EST MOD 30 MIN: CPT | Performed by: INTERNAL MEDICINE

## 2021-10-18 RX ORDER — DONEPEZIL HYDROCHLORIDE 10 MG/1
10 TABLET, FILM COATED ORAL NIGHTLY
Qty: 30 TABLET | Refills: 5 | Status: SHIPPED | OUTPATIENT
Start: 2021-10-18 | End: 2022-01-25

## 2021-10-18 RX ADMIN — CYANOCOBALAMIN 1000 MCG: 1000 INJECTION, SOLUTION INTRAMUSCULAR; SUBCUTANEOUS at 12:26

## 2021-10-18 NOTE — PROGRESS NOTES
Chief Complaint   Patient presents with   • Follow-up       History of Present Illness    The patient presents for a follow-up related to Type 2 Diabetes Mellitus. He checks his blood sugars at home. His sugars are checked infrequently. The average sugars are in the 150-200 range. He denies hypoglycemic symptoms. The patient denies polyuria, polydypsia or polyphagia. He reports no symptoms of neuropathy. The patient takes his medication as prescribed. He is not taking insulin. The patient does check his feet daily at home. He denies chest pain, shortness of breath, orthopnea, paroxysmal nocturnal dyspnea, dyspnea on exertion, edema, palpitations or syncope.    The patient presents for a follow-up related to hyperlipidemia. He is following a low fat diet. He reports that he is not exercising. He is taking atorvastatin. The patient is taking his medication as prescribed. He reports no medication side effects, including muscle cramps, abdominal pain, headaches or weakness.    The patient presents for a follow-up related to hypertension. The patient reports that he has had no headaches or blurred vision. He states that he is taking his medication as prescribed. He is not having medication side effects.    The patient presents for a follow-up related to dementia. His symptoms are reported to be stable. He is not taking a medication. He lives with his spouse. The patient is able to stay alone.    Review of Systems    CONSTITUTIONAL- Denies Unexplained Weight Loss, Fever, Chills, Sweats, Fatigue, Weakness or Malaise.    PULMONARY- Denies Wheezing, Sputum Production, Cough, Hemoptysis or Pleuritic Chest Pain.    GASTROINTESTINAL- Denies Abdominal Pain, Nausea, Vomiting, Diarrhea, Blood per Rectum, Constipation or Heartburn.    CARDIOVASCULAR- Denies Claudication or Irregular Heart Beat.    NEUROLOGIC- Denies Seizures, Confusion, Memory Loss, Depression or Excessive Daytime Sleepiness.    Medications      Current Outpatient  Medications:   •  Alcohol Swabs (ALCOHOL PREP) pads, 1 swab 3 (Three) Times a Day., Disp: 200 each, Rfl: 0  •  aspirin 81 MG EC tablet, Take 1 tablet by mouth Daily., Disp: , Rfl:   •  atorvastatin (LIPITOR) 40 MG tablet, TAKE 1 TABLET BY MOUTH EVERY NIGHT, Disp: 90 tablet, Rfl: 1  •  B-D UF III MINI PEN NEEDLES 31G X 5 MM misc, USE AS DIRECTED, Disp: 200 each, Rfl: 1  •  Blood Glucose Calibration (ACCU-CHEK LOR) solution, 1 bottle by In Vitro route As Needed (Use as directed)., Disp: 1 each, Rfl: 0  •  Blood Glucose Monitoring Suppl (ACCU-CHEK LOR PLUS) w/Device kit, 1 kit 3 (Three) Times a Day., Disp: 1 kit, Rfl: 0  •  cilostazol (PLETAL) 50 MG tablet, TAKE 1 TABLET BY MOUTH TWICE DAILY, Disp: 180 tablet, Rfl: 1  •  ciprofloxacin (CIPRO) 500 MG tablet, Take 500 mg by mouth Daily., Disp: , Rfl:   •  Eliquis 5 MG tablet tablet, TAKE 1 TABLET BY MOUTH TWICE DAILY, Disp: 180 tablet, Rfl: 1  •  fluticasone (FLONASE) 50 MCG/ACT nasal spray, 2 sprays into the nostril(s) as directed by provider Daily. (Patient taking differently: 2 sprays into the nostril(s) as directed by provider Daily As Needed.), Disp: 16 g, Rfl: 3  •  gabapentin (NEURONTIN) 400 MG capsule, Take 1 capsule by mouth 3 (Three) Times a Day., Disp: 90 capsule, Rfl: 1  •  glucose blood (Accu-Chek Lor Plus) test strip, Use as instructed, Disp: 100 each, Rfl: 12  •  Januvia 100 MG tablet, TAKE 1 TABLET BY MOUTH DAILY, Disp: 90 tablet, Rfl: 1  •  Lancets (ACCU-CHEK SOFT TOUCH) lancets, Check sugars 3 times daily, Disp: 1 each, Rfl: 12  •  lisinopril (PRINIVIL,ZESTRIL) 2.5 MG tablet, TAKE 1 TABLET BY MOUTH EVERY DAY, Disp: 30 tablet, Rfl: 5  •  metFORMIN (GLUCOPHAGE) 1000 MG tablet, TAKE 1 TABLET BY MOUTH TWICE DAILY, Disp: 180 tablet, Rfl: 1  •  metoprolol succinate XL (TOPROL-XL) 25 MG 24 hr tablet, TAKE 1 TABLET BY MOUTH EVERY DAY, Disp: 90 tablet, Rfl: 1  •  mupirocin (Bactroban) 2 % ointment, Apply  topically to the appropriate area as directed 3  "(Three) Times a Day., Disp: 60 g, Rfl: 0  •  NovoLOG Mix 70/30 FlexPen (70-30) 100 UNIT/ML suspension pen-injector injection, Take 30 units SQ twice daily with meals (Patient taking differently: Take 25-40  units SQ twice daily with meals), Disp: 30 mL, Rfl: 5  •  donepezil (Aricept) 10 MG tablet, Take 1 tablet by mouth Every Night., Disp: 30 tablet, Rfl: 5    Current Facility-Administered Medications:   •  cyanocobalamin injection 1,000 mcg, 1,000 mcg, Intramuscular, Q28 Days, Des Geller MD, 1,000 mcg at 10/18/21 1226     Allergies    Allergies   Allergen Reactions   • Bactrim [Sulfamethoxazole-Trimethoprim] Rash   • Adhesive Tape Rash   • Neosporin [Neomycin-Bacitracin Zn-Polymyx] Rash       Problem List    Patient Active Problem List   Diagnosis   • Cough   • Benign essential HTN   • Benign prostatic hyperplasia   • Coronary artery disease involving native coronary artery of native heart   • Chronic coronary artery disease   • Diabetes mellitus (HCC)   • Gout   • Hyperlipidemia   • History of heart attack   • Peripheral neuropathy   • Peripheral vascular disease (HCC)       Medications, Allergies, Problems List and Past History were reviewed and updated.    Physical Examination    /58   Pulse 65   Temp 97.7 °F (36.5 °C)   Resp 12   Ht 190.5 cm (75\")   Wt 103 kg (227 lb)   SpO2 97%   BMI 28.37 kg/m²     HEENT: Facies- Within normal limits. Lids- Normal bilaterally. Conjunctiva- Clear bilaterally. Sclera- Anicteric bilaterally.    Neck: Thyroid- non enlarged, symmetric and has no nodules. No bruits are detected. ROM- Normal Range of Motion with no rigidity.    Lungs: Auscultation- Clear to auscultation bilaterally. There are no retractions, clubbing or cyanosis. The Expiratory to Inspiratory ratio is equal.    Lymph Nodes: Cervical- no enlarged lymph nodes noted.    Cardiovascular: There are no carotid bruits. Heart- Normal Rate with Regular rhythm and no murmurs. There are no gallops. There " are no rubs. In the lower extremities there is no edema. The upper extremities do not have edema.    Abdomen: Soft, benign, non-tender with no masses, hernias, organomegaly or scars.    Impression and Assessment    Type 2 Diabetes Mellitus without complication without insulin usage.    Hyperlipidemia.    Essential Hypertension.    Dementia.    Plan    Hyperlipidemia Plan: The patient was instructed to exercise daily, eat a low fat diet and continue his medications.    Essential Hypertension Plan: The patient was instructed to continue the current medications.    Type 2 Diabetes Mellitus without complication without insulin usage Plan: The patient was instructed to continue the current medications.    Dementia Plan: Medication will be added as noted.    Diagnoses and all orders for this visit:    1. Essential hypertension (Primary)  -     Comprehensive Metabolic Panel; Future  -     Comprehensive Metabolic Panel    2. Hyperlipidemia, unspecified hyperlipidemia type  -     Comprehensive Metabolic Panel; Future  -     Lipid Panel; Future  -     Lipid Panel  -     Comprehensive Metabolic Panel    3. Type 2 diabetes mellitus without complication, with long-term current use of insulin (HCC)  -     Comprehensive Metabolic Panel; Future  -     Hemoglobin A1c; Future  -     Hemoglobin A1c  -     Comprehensive Metabolic Panel    4. Dementia without behavioral disturbance, unspecified dementia type (HCC)  -     donepezil (Aricept) 10 MG tablet; Take 1 tablet by mouth Every Night.  Dispense: 30 tablet; Refill: 5        Return to Office    The patient was instructed to return for follow-up in 6 weeks. The patient was instructed to return sooner if the condition changes, worsens, or does not resolve.

## 2021-10-19 LAB
ALBUMIN SERPL-MCNC: 4.2 G/DL (ref 3.5–5.2)
ALBUMIN/GLOB SERPL: 1.9 G/DL
ALP SERPL-CCNC: 82 U/L (ref 39–117)
ALT SERPL-CCNC: 19 U/L (ref 1–41)
AST SERPL-CCNC: 19 U/L (ref 1–40)
BILIRUB SERPL-MCNC: 0.9 MG/DL (ref 0–1.2)
BUN SERPL-MCNC: 11 MG/DL (ref 8–23)
BUN/CREAT SERPL: 13.1 (ref 7–25)
CALCIUM SERPL-MCNC: 9 MG/DL (ref 8.6–10.5)
CHLORIDE SERPL-SCNC: 106 MMOL/L (ref 98–107)
CHOLEST SERPL-MCNC: 122 MG/DL (ref 0–200)
CO2 SERPL-SCNC: 27.1 MMOL/L (ref 22–29)
CREAT SERPL-MCNC: 0.84 MG/DL (ref 0.76–1.27)
GLOBULIN SER CALC-MCNC: 2.2 GM/DL
GLUCOSE SERPL-MCNC: 163 MG/DL (ref 65–99)
HBA1C MFR BLD: 8.6 % (ref 4.8–5.6)
HDLC SERPL-MCNC: 36 MG/DL (ref 40–60)
LDLC SERPL CALC-MCNC: 69 MG/DL (ref 0–100)
POTASSIUM SERPL-SCNC: 4.4 MMOL/L (ref 3.5–5.2)
PROT SERPL-MCNC: 6.4 G/DL (ref 6–8.5)
SODIUM SERPL-SCNC: 141 MMOL/L (ref 136–145)
TRIGL SERPL-MCNC: 86 MG/DL (ref 0–150)
VLDLC SERPL CALC-MCNC: 17 MG/DL (ref 5–40)

## 2021-11-01 RX ORDER — ATORVASTATIN CALCIUM 40 MG/1
40 TABLET, FILM COATED ORAL NIGHTLY
Qty: 90 TABLET | Refills: 1 | Status: SHIPPED | OUTPATIENT
Start: 2021-11-01 | End: 2022-07-18

## 2021-11-02 RX ORDER — INSULIN ASPART 100 [IU]/ML
INJECTION, SUSPENSION SUBCUTANEOUS
Qty: 30 ML | Refills: 5 | Status: CANCELLED | OUTPATIENT
Start: 2021-11-02

## 2021-11-02 RX ORDER — INSULIN ASPART 100 [IU]/ML
INJECTION, SUSPENSION SUBCUTANEOUS
Qty: 30 ML | Refills: 5
Start: 2021-11-02 | End: 2021-11-22

## 2021-11-15 RX ORDER — METOPROLOL SUCCINATE 25 MG/1
TABLET, EXTENDED RELEASE ORAL
Qty: 90 TABLET | Refills: 1 | Status: SHIPPED | OUTPATIENT
Start: 2021-11-15 | End: 2022-05-19

## 2021-11-17 RX ORDER — LISINOPRIL 2.5 MG/1
2.5 TABLET ORAL DAILY
Qty: 90 TABLET | Refills: 1 | Status: SHIPPED | OUTPATIENT
Start: 2021-11-17 | End: 2022-05-16

## 2021-11-22 RX ORDER — INSULIN ASPART 100 [IU]/ML
INJECTION, SUSPENSION SUBCUTANEOUS
Qty: 30 ML | Refills: 5 | OUTPATIENT
Start: 2021-11-22

## 2021-11-22 RX ORDER — INSULIN ASPART 100 [IU]/ML
INJECTION, SUSPENSION SUBCUTANEOUS
Qty: 30 ML | Refills: 5 | Status: SHIPPED | OUTPATIENT
Start: 2021-11-22 | End: 2022-07-11

## 2022-01-14 ENCOUNTER — DOCUMENTATION (OUTPATIENT)
Dept: INTERNAL MEDICINE | Facility: CLINIC | Age: 67
End: 2022-01-14

## 2022-01-14 RX ORDER — AMOXICILLIN AND CLAVULANATE POTASSIUM 875; 125 MG/1; MG/1
1 TABLET, FILM COATED ORAL 2 TIMES DAILY
Qty: 20 TABLET | Refills: 0 | Status: SHIPPED | OUTPATIENT
Start: 2022-01-14 | End: 2022-02-21

## 2022-01-25 DIAGNOSIS — E11.51 TYPE 2 DIABETES MELLITUS WITH DIABETIC PERIPHERAL ANGIOPATHY WITHOUT GANGRENE, WITH LONG-TERM CURRENT USE OF INSULIN: ICD-10-CM

## 2022-01-25 DIAGNOSIS — F03.90 DEMENTIA WITHOUT BEHAVIORAL DISTURBANCE, UNSPECIFIED DEMENTIA TYPE: ICD-10-CM

## 2022-01-25 DIAGNOSIS — Z79.4 TYPE 2 DIABETES MELLITUS WITH DIABETIC PERIPHERAL ANGIOPATHY WITHOUT GANGRENE, WITH LONG-TERM CURRENT USE OF INSULIN: ICD-10-CM

## 2022-01-25 RX ORDER — SITAGLIPTIN 100 MG/1
TABLET, FILM COATED ORAL
Qty: 90 TABLET | Refills: 1 | Status: SHIPPED | OUTPATIENT
Start: 2022-01-25 | End: 2022-10-21

## 2022-01-25 RX ORDER — DONEPEZIL HYDROCHLORIDE 10 MG/1
10 TABLET, FILM COATED ORAL NIGHTLY
Qty: 90 TABLET | Refills: 1 | Status: SHIPPED | OUTPATIENT
Start: 2022-01-25 | End: 2022-09-19

## 2022-02-21 ENCOUNTER — HOSPITAL ENCOUNTER (OUTPATIENT)
Dept: GENERAL RADIOLOGY | Facility: HOSPITAL | Age: 67
Discharge: HOME | End: 2022-02-21
Admitting: INTERNAL MEDICINE

## 2022-02-21 ENCOUNTER — LAB (OUTPATIENT)
Dept: LAB | Facility: HOSPITAL | Age: 67
End: 2022-02-21

## 2022-02-21 ENCOUNTER — OFFICE VISIT (OUTPATIENT)
Dept: INTERNAL MEDICINE | Facility: CLINIC | Age: 67
End: 2022-02-21

## 2022-02-21 ENCOUNTER — HOSPITAL ENCOUNTER (OUTPATIENT)
Dept: GENERAL RADIOLOGY | Facility: HOSPITAL | Age: 67
Discharge: HOME | End: 2022-02-21

## 2022-02-21 VITALS
DIASTOLIC BLOOD PRESSURE: 64 MMHG | TEMPERATURE: 98.3 F | RESPIRATION RATE: 22 BRPM | SYSTOLIC BLOOD PRESSURE: 118 MMHG | WEIGHT: 228 LBS | BODY MASS INDEX: 28.5 KG/M2 | HEART RATE: 74 BPM

## 2022-02-21 DIAGNOSIS — I25.10 CORONARY ARTERY DISEASE INVOLVING NATIVE HEART WITHOUT ANGINA PECTORIS, UNSPECIFIED VESSEL OR LESION TYPE: ICD-10-CM

## 2022-02-21 DIAGNOSIS — E78.5 HYPERLIPIDEMIA, UNSPECIFIED HYPERLIPIDEMIA TYPE: ICD-10-CM

## 2022-02-21 DIAGNOSIS — F03.90 DEMENTIA WITHOUT BEHAVIORAL DISTURBANCE, UNSPECIFIED DEMENTIA TYPE: ICD-10-CM

## 2022-02-21 DIAGNOSIS — I10 ESSENTIAL HYPERTENSION: ICD-10-CM

## 2022-02-21 DIAGNOSIS — E11.9 TYPE 2 DIABETES MELLITUS WITHOUT COMPLICATION, WITH LONG-TERM CURRENT USE OF INSULIN: ICD-10-CM

## 2022-02-21 DIAGNOSIS — R07.9 CHEST PAIN, UNSPECIFIED TYPE: ICD-10-CM

## 2022-02-21 DIAGNOSIS — E53.8 VITAMIN B 12 DEFICIENCY: ICD-10-CM

## 2022-02-21 DIAGNOSIS — L97.509 TYPE 2 DIABETES MELLITUS WITH FOOT ULCER, WITH LONG-TERM CURRENT USE OF INSULIN: ICD-10-CM

## 2022-02-21 DIAGNOSIS — Z00.00 MEDICARE ANNUAL WELLNESS VISIT, SUBSEQUENT: Primary | ICD-10-CM

## 2022-02-21 DIAGNOSIS — Z79.4 TYPE 2 DIABETES MELLITUS WITH FOOT ULCER, WITH LONG-TERM CURRENT USE OF INSULIN: ICD-10-CM

## 2022-02-21 DIAGNOSIS — E11.621 TYPE 2 DIABETES MELLITUS WITH FOOT ULCER, WITH LONG-TERM CURRENT USE OF INSULIN: ICD-10-CM

## 2022-02-21 DIAGNOSIS — Z79.4 TYPE 2 DIABETES MELLITUS WITHOUT COMPLICATION, WITH LONG-TERM CURRENT USE OF INSULIN: ICD-10-CM

## 2022-02-21 LAB
A/C: ABNORMAL
BILIRUB BLD-MCNC: NEGATIVE MG/DL
CLARITY, POC: CLEAR
COLOR UR: YELLOW
EXPIRATION DATE: ABNORMAL
EXPIRATION DATE: ABNORMAL
GLUCOSE UR STRIP-MCNC: ABNORMAL MG/DL
KETONES UR QL: NEGATIVE
LEUKOCYTE EST, POC: NEGATIVE
Lab: ABNORMAL
Lab: ABNORMAL
NITRITE UR-MCNC: NEGATIVE MG/ML
PH UR: 6 [PH] (ref 5–8)
POC CREATININE URINE: ABNORMAL
POC MICROALBUMIN URINE: ABNORMAL
PROT UR STRIP-MCNC: NEGATIVE MG/DL
RBC # UR STRIP: NEGATIVE /UL
SP GR UR: 1.02 (ref 1–1.03)
UROBILINOGEN UR QL: NORMAL

## 2022-02-21 PROCEDURE — 1170F FXNL STATUS ASSESSED: CPT | Performed by: INTERNAL MEDICINE

## 2022-02-21 PROCEDURE — 96372 THER/PROPH/DIAG INJ SC/IM: CPT | Performed by: INTERNAL MEDICINE

## 2022-02-21 PROCEDURE — 82044 UR ALBUMIN SEMIQUANTITATIVE: CPT | Performed by: INTERNAL MEDICINE

## 2022-02-21 PROCEDURE — 81003 URINALYSIS AUTO W/O SCOPE: CPT | Performed by: INTERNAL MEDICINE

## 2022-02-21 PROCEDURE — 36415 COLL VENOUS BLD VENIPUNCTURE: CPT | Performed by: INTERNAL MEDICINE

## 2022-02-21 PROCEDURE — 1126F AMNT PAIN NOTED NONE PRSNT: CPT | Performed by: INTERNAL MEDICINE

## 2022-02-21 PROCEDURE — G0439 PPPS, SUBSEQ VISIT: HCPCS | Performed by: INTERNAL MEDICINE

## 2022-02-21 PROCEDURE — 71046 X-RAY EXAM CHEST 2 VIEWS: CPT

## 2022-02-21 PROCEDURE — 96160 PT-FOCUSED HLTH RISK ASSMT: CPT | Performed by: INTERNAL MEDICINE

## 2022-02-21 PROCEDURE — 1160F RVW MEDS BY RX/DR IN RCRD: CPT | Performed by: INTERNAL MEDICINE

## 2022-02-21 RX ADMIN — CYANOCOBALAMIN 1000 MCG: 1000 INJECTION, SOLUTION INTRAMUSCULAR; SUBCUTANEOUS at 15:07

## 2022-02-21 NOTE — PROGRESS NOTES
The ABCs of the Annual Wellness Visit  Subsequent Medicare Wellness Visit    Chief Complaint   Patient presents with   • Annual Exam      Subjective    History of Present Illness:  Too Tai is a 66 y.o. male who presents for a Subsequent Medicare Wellness Visit.    The following portions of the patient's history were reviewed and   updated as appropriate: allergies, current medications, past family history, past medical history, past social history, past surgical history and problem list.    Compared to one year ago, the patient feels his physical   health is the same.    Compared to one year ago, the patient feels his mental   health is the same.    Recent Hospitalizations:  He was not admitted to the hospital during the last year.       Current Medical Providers:  Patient Care Team:  Des Geller MD as PCP - General (Internal Medicine)  Sara Bee MD as Consulting Physician (Dermatopathology)    Outpatient Medications Prior to Visit   Medication Sig Dispense Refill   • Alcohol Swabs (ALCOHOL PREP) pads 1 swab 3 (Three) Times a Day. 200 each 0   • aspirin 81 MG EC tablet Take 1 tablet by mouth Daily.     • atorvastatin (LIPITOR) 40 MG tablet TAKE 1 TABLET BY MOUTH EVERY NIGHT 90 tablet 1   • B-D UF III MINI PEN NEEDLES 31G X 5 MM misc USE AS DIRECTED 200 each 1   • Blood Glucose Calibration (ACCU-CHEK LOR) solution 1 bottle by In Vitro route As Needed (Use as directed). 1 each 0   • Blood Glucose Monitoring Suppl (ACCU-CHEK LOR PLUS) w/Device kit 1 kit 3 (Three) Times a Day. 1 kit 0   • cilostazol (PLETAL) 50 MG tablet TAKE 1 TABLET BY MOUTH TWICE DAILY 180 tablet 1   • donepezil (ARICEPT) 10 MG tablet TAKE 1 TABLET BY MOUTH EVERY NIGHT 90 tablet 1   • Eliquis 5 MG tablet tablet TAKE 1 TABLET BY MOUTH TWICE DAILY 180 tablet 1   • fluticasone (FLONASE) 50 MCG/ACT nasal spray 2 sprays into the nostril(s) as directed by provider Daily. (Patient taking differently: 2 sprays into the nostril(s)  as directed by provider Daily As Needed.) 16 g 3   • gabapentin (NEURONTIN) 400 MG capsule Take 1 capsule by mouth 3 (Three) Times a Day. 90 capsule 1   • glucose blood (Accu-Chek Geri Plus) test strip Use as instructed 100 each 12   • Januvia 100 MG tablet TAKE 1 TABLET BY MOUTH DAILY 90 tablet 1   • Lancets (ACCU-CHEK SOFT TOUCH) lancets Check sugars 3 times daily 1 each 12   • lisinopril (PRINIVIL,ZESTRIL) 2.5 MG tablet Take 1 tablet by mouth Daily. 90 tablet 1   • metFORMIN (GLUCOPHAGE) 1000 MG tablet TAKE 1 TABLET BY MOUTH TWICE DAILY 180 tablet 1   • metoprolol succinate XL (TOPROL-XL) 25 MG 24 hr tablet TAKE 1 TABLET BY MOUTH EVERY DAY 90 tablet 1   • mupirocin (Bactroban) 2 % ointment Apply  topically to the appropriate area as directed 3 (Three) Times a Day. 60 g 0   • NovoLOG Mix 70/30 FlexPen (70-30) 100 UNIT/ML suspension pen-injector injection ADMINISTER 50 UNITS UNDER THE SKIN TWICE DAILY WITH MEALS 30 mL 5   • amoxicillin-clavulanate (Augmentin) 875-125 MG per tablet Take 1 tablet by mouth 2 (Two) Times a Day. 20 tablet 0   • ciprofloxacin (CIPRO) 500 MG tablet Take 500 mg by mouth Daily.       Facility-Administered Medications Prior to Visit   Medication Dose Route Frequency Provider Last Rate Last Admin   • cyanocobalamin injection 1,000 mcg  1,000 mcg Intramuscular Q28 Days Des Geller MD   1,000 mcg at 10/18/21 1226       No opioid medication identified on active medication list. I have reviewed chart for other potential  high risk medication/s and harmful drug interactions in the elderly.          Aspirin is on active medication list. Aspirin use is indicated based on review of current medical condition/s. Pros and cons of this therapy have been discussed today. Benefits of this medication outweigh potential harm.  Patient has been encouraged to continue taking this medication.  .      Patient Active Problem List   Diagnosis   • Cough   • Benign essential HTN   • Benign prostatic  hyperplasia   • Coronary artery disease involving native coronary artery of native heart   • Chronic coronary artery disease   • Diabetes mellitus (HCC)   • Gout   • Hyperlipidemia   • History of heart attack   • Peripheral neuropathy   • Peripheral vascular disease (HCC)     Advance Care Planning  Advance Directive is not on file.  ACP discussion was held with the patient during this visit. Patient does not have an advance directive, information provided.          Objective    Vitals:    22 1109   BP: 118/64   Pulse: 74   Resp: 22   Temp: 98.3 °F (36.8 °C)   Weight: 103 kg (228 lb)   PainSc: 0-No pain     BMI Readings from Last 1 Encounters:   22 28.50 kg/m²   BMI is above normal parameters. Recommendations include: exercise counseling and nutrition counseling    Does the patient have evidence of cognitive impairment? Yes    Physical Exam        Finger Rub Hearing{Test (right ear):passed  Finger Rub Hearing{Test (left ear):passed        HEALTH RISK ASSESSMENT    Smoking Status:  Social History     Tobacco Use   Smoking Status Former Smoker   • Quit date:    • Years since quittin.1   Smokeless Tobacco Never Used     Alcohol Consumption:  Social History     Substance and Sexual Activity   Alcohol Use No     Fall Risk Screen:    STEADI Fall Risk Assessment was completed, and patient is at HIGH risk for falls. Assessment completed on:2022    Depression Screening:  PHQ-2/PHQ-9 Depression Screening 2022   Little interest or pleasure in doing things 0   Feeling down, depressed, or hopeless 0   Total Score 0       Health Habits and Functional and Cognitive Screening:  Functional & Cognitive Status 2022   Do you have difficulty preparing food and eating? No   Do you have difficulty bathing yourself, getting dressed or grooming yourself? Yes   Do you have difficulty using the toilet? No   Do you have difficulty moving around from place to place? No   Do you have trouble with steps or  getting out of a bed or a chair? No   Current Diet Unhealthy Diet   Dental Exam Not up to date   Eye Exam Not up to date   Exercise (times per week) 0 times per week   Current Exercises Include No Regular Exercise   Current Exercise Activities Include -   Do you need help using the phone?  Yes   Are you deaf or do you have serious difficulty hearing?  Yes   Do you need help with transportation? No   Do you need help shopping? Yes   Do you need help preparing meals?  Yes   Do you need help with housework?  Yes   Do you need help with laundry? No   Do you need help taking your medications? Yes   Do you need help managing money? No   Do you ever drive or ride in a car without wearing a seat belt? No   Have you felt unusual stress, anger or loneliness in the last month? Yes   Who do you live with? Sibling   If you need help, do you have trouble finding someone available to you? No   Have you been bothered in the last four weeks by sexual problems? No   Do you have difficulty concentrating, remembering or making decisions? Yes       Age-appropriate Screening Schedule:  Refer to the list below for future screening recommendations based on patient's age, sex and/or medical conditions. Orders for these recommended tests are listed in the plan section. The patient has been provided with a written plan.    Health Maintenance   Topic Date Due   • TDAP/TD VACCINES (1 - Tdap) Never done   • ZOSTER VACCINE (2 of 3) 09/14/2017   • DIABETIC FOOT EXAM  Never done   • DIABETIC EYE EXAM  Never done   • INFLUENZA VACCINE  08/01/2021   • URINE MICROALBUMIN  03/11/2022   • HEMOGLOBIN A1C  04/18/2022   • LIPID PANEL  10/18/2022              Assessment/Plan   CMS Preventative Services Quick Reference  Risk Factors Identified During Encounter  Dementia/Memory   Immunizations Discussed/Encouraged (specific Immunizations; Tdap and Shingrix  Obesity/Overweight   The above risks/problems have been discussed with the patient.  Follow up  actions/plans if indicated are seen below in the Assessment/Plan Section.  Pertinent information has been shared with the patient in the After Visit Summary.    Diagnoses and all orders for this visit:    1. Medicare annual wellness visit, subsequent (Primary)    2. Essential hypertension  -     CBC & Differential; Future  -     Comprehensive Metabolic Panel; Future  -     TSH; Future  -     POC Urinalysis Dipstick, Automated; Future  -     POC Urinalysis Dipstick, Automated  -     TSH  -     Comprehensive Metabolic Panel  -     CBC & Differential    3. Hyperlipidemia, unspecified hyperlipidemia type  -     Comprehensive Metabolic Panel; Future  -     Lipid Panel; Future  -     Lipid Panel  -     Comprehensive Metabolic Panel    4. Type 2 diabetes mellitus without complication, with long-term current use of insulin (HCC)  -     Comprehensive Metabolic Panel; Future  -     POC Microalbumin; Future  -     POC Microalbumin  -     Comprehensive Metabolic Panel    5. Dementia without behavioral disturbance, unspecified dementia type (HCC)    6. Vitamin B 12 deficiency  -     CBC & Differential; Future  -     Vitamin B12; Future  -     Vitamin B12  -     CBC & Differential    7. Chest pain, unspecified type  -     XR Chest PA & Lateral; Future  -     ECG 12 Lead    8. Coronary artery disease involving native heart without angina pectoris, unspecified vessel or lesion type        Patient's Body mass index is 28.5 kg/m². indicating that he is overweight (BMI 25-29.9). Patient's (Body mass index is 28.5 kg/m².) indicates that they are overweight with health conditions that include hypertension and dyslipidemias . Weight is unchanged. BMI is is above average; BMI management plan is completed. We discussed portion control and increasing exercise. .

## 2022-02-22 LAB
ALBUMIN SERPL-MCNC: 4.3 G/DL (ref 3.8–4.8)
ALBUMIN/GLOB SERPL: 1.7 {RATIO} (ref 1.2–2.2)
ALP SERPL-CCNC: 79 IU/L (ref 44–121)
ALT SERPL-CCNC: 18 IU/L (ref 0–44)
AST SERPL-CCNC: 14 IU/L (ref 0–40)
BASOPHILS # BLD AUTO: 0.1 X10E3/UL (ref 0–0.2)
BASOPHILS NFR BLD AUTO: 1 %
BILIRUB SERPL-MCNC: 0.6 MG/DL (ref 0–1.2)
BUN SERPL-MCNC: 16 MG/DL (ref 8–27)
BUN/CREAT SERPL: 18 (ref 10–24)
CALCIUM SERPL-MCNC: 8.9 MG/DL (ref 8.6–10.2)
CHLORIDE SERPL-SCNC: 103 MMOL/L (ref 96–106)
CHOLEST SERPL-MCNC: 125 MG/DL (ref 100–199)
CO2 SERPL-SCNC: 20 MMOL/L (ref 20–29)
CREAT SERPL-MCNC: 0.91 MG/DL (ref 0.76–1.27)
EOSINOPHIL # BLD AUTO: 0.6 X10E3/UL (ref 0–0.4)
EOSINOPHIL NFR BLD AUTO: 8 %
ERYTHROCYTE [DISTWIDTH] IN BLOOD BY AUTOMATED COUNT: 12.5 % (ref 11.6–15.4)
GLOBULIN SER CALC-MCNC: 2.6 G/DL (ref 1.5–4.5)
GLUCOSE SERPL-MCNC: 208 MG/DL (ref 65–99)
HCT VFR BLD AUTO: 41.7 % (ref 37.5–51)
HDLC SERPL-MCNC: 38 MG/DL
HGB BLD-MCNC: 14.3 G/DL (ref 13–17.7)
IMM GRANULOCYTES # BLD AUTO: 0 X10E3/UL (ref 0–0.1)
IMM GRANULOCYTES NFR BLD AUTO: 0 %
LDLC SERPL CALC-MCNC: 64 MG/DL (ref 0–99)
LYMPHOCYTES # BLD AUTO: 2.2 X10E3/UL (ref 0.7–3.1)
LYMPHOCYTES NFR BLD AUTO: 34 %
MCH RBC QN AUTO: 31 PG (ref 26.6–33)
MCHC RBC AUTO-ENTMCNC: 34.3 G/DL (ref 31.5–35.7)
MCV RBC AUTO: 91 FL (ref 79–97)
MONOCYTES # BLD AUTO: 1 X10E3/UL (ref 0.1–0.9)
MONOCYTES NFR BLD AUTO: 15 %
NEUTROPHILS # BLD AUTO: 2.8 X10E3/UL (ref 1.4–7)
NEUTROPHILS NFR BLD AUTO: 42 %
PLATELET # BLD AUTO: 237 X10E3/UL (ref 150–450)
POTASSIUM SERPL-SCNC: 4 MMOL/L (ref 3.5–5.2)
PROT SERPL-MCNC: 6.9 G/DL (ref 6–8.5)
RBC # BLD AUTO: 4.61 X10E6/UL (ref 4.14–5.8)
SODIUM SERPL-SCNC: 141 MMOL/L (ref 134–144)
TRIGL SERPL-MCNC: 127 MG/DL (ref 0–149)
TSH SERPL DL<=0.005 MIU/L-ACNC: 1.84 UIU/ML (ref 0.45–4.5)
VIT B12 SERPL-MCNC: >2000 PG/ML (ref 232–1245)
VLDLC SERPL CALC-MCNC: 23 MG/DL (ref 5–40)
WBC # BLD AUTO: 6.6 X10E3/UL (ref 3.4–10.8)

## 2022-02-22 PROCEDURE — 93000 ELECTROCARDIOGRAM COMPLETE: CPT | Performed by: INTERNAL MEDICINE

## 2022-02-22 RX ORDER — GABAPENTIN 400 MG/1
CAPSULE ORAL
Qty: 90 CAPSULE | Refills: 2 | Status: SHIPPED | OUTPATIENT
Start: 2022-02-22 | End: 2022-11-03

## 2022-02-22 NOTE — PATIENT INSTRUCTIONS
Medicare Wellness  Personal Prevention Plan of Service     Date of Office Visit:  2022  Encounter Provider:  Des Geller MD  Place of Service:  [facility]  Patient Name: Too Tai  :  1955    As part of the Medicare Wellness portion of your visit today, we are providing you with this personalized preventive plan of services (PPPS). This plan is based upon recommendations of the [place] Preventive Services Task Force (USPSTF) and the Advisory Committee on Immunization Practices (ACIP).    This lists the preventive care services that should be considered, and provides dates of when you are due. Items listed as completed are up-to-date and do not require any further intervention.    Health Maintenance   Topic Date Due   • COLORECTAL CANCER SCREENING  Never done   • TDAP/TD VACCINES (1 - Tdap) Never done   • ZOSTER VACCINE (2 of 3) 2017   • DIABETIC FOOT EXAM  Never done   • DIABETIC EYE EXAM  Never done   • INFLUENZA VACCINE  2021   • COVID-19 Vaccine (3 - Booster for Pfizer series) 2022   • HEMOGLOBIN A1C  2022   • LIPID PANEL  10/18/2022   • ANNUAL WELLNESS VISIT  2023   • URINE MICROALBUMIN  2023   • Pneumococcal Vaccine 65+ (2 of 2 - PPSV23) 2025   • HEPATITIS C SCREENING  Completed       Orders Placed This Encounter   Procedures   • XR Chest PA & Lateral     Standing Status:   Future     Number of Occurrences:   1     Standing Expiration Date:   2023     Order Specific Question:   Reason for Exam:     Answer:   chest pain   • Comprehensive Metabolic Panel     Standing Status:   Future     Number of Occurrences:   1     Standing Expiration Date:   2023     Order Specific Question:   Release to patient     Answer:   Immediate   • Lipid Panel     Standing Status:   Future     Number of Occurrences:   1     Standing Expiration Date:   2023   • TSH     Standing Status:   Future      Number of Occurrences:   1     Standing Expiration Date:   2/22/2023     Order Specific Question:   Release to patient     Answer:   Immediate   • Vitamin B12     Standing Status:   Future     Number of Occurrences:   1     Standing Expiration Date:   2/22/2023     Order Specific Question:   Release to patient     Answer:   Immediate   • POC Urinalysis Dipstick, Automated     Standing Status:   Future     Number of Occurrences:   1     Order Specific Question:   Release to patient     Answer:   Immediate   • POC Microalbumin     Standing Status:   Future     Number of Occurrences:   1     Order Specific Question:   Release to patient     Answer:   Immediate   • ECG 12 Lead     Order Specific Question:   Reason for Exam:     Answer:   chest pain   • CBC & Differential     Standing Status:   Future     Number of Occurrences:   1     Standing Expiration Date:   2/22/2023     Order Specific Question:   Manual Differential     Answer:   No       Return in about 3 months (around 5/21/2022) for Annual physical.          Heart-Healthy Eating Plan  Heart-healthy meal planning includes:  · Eating less unhealthy fats.  · Eating more healthy fats.  · Making other changes in your diet.  Talk with your doctor or a diet specialist (dietitian) to create an eating plan that is right for you.  What is my plan?  Your doctor may recommend an eating plan that includes:  · Total fat: ______% or less of total calories a day.  · Saturated fat: ______% or less of total calories a day.  · Cholesterol: less than _________mg a day.  What are tips for following this plan?  Cooking  Avoid frying your food. Try to bake, boil, grill, or broil it instead. You can also reduce fat by:  · Removing the skin from poultry.  · Removing all visible fats from meats.  · Steaming vegetables in water or broth.  Meal planning    · At meals, divide your plate into four equal parts:  ? Fill one-half of your plate with vegetables and green salads.  ? Fill  one-fourth of your plate with whole grains.  ? Fill one-fourth of your plate with lean protein foods.  · Eat 4-5 servings of vegetables per day. A serving of vegetables is:  ? 1 cup of raw or cooked vegetables.  ? 2 cups of raw leafy greens.  · Eat 4-5 servings of fruit per day. A serving of fruit is:  ? 1 medium whole fruit.  ? ¼ cup of dried fruit.  ? ½ cup of fresh, frozen, or canned fruit.  ? ½ cup of 100% fruit juice.  · Eat more foods that have soluble fiber. These are apples, broccoli, carrots, beans, peas, and barley. Try to get 20-30 g of fiber per day.  · Eat 4-5 servings of nuts, legumes, and seeds per week:  ? 1 serving of dried beans or legumes equals ½ cup after being cooked.  ? 1 serving of nuts is ¼ cup.  ? 1 serving of seeds equals 1 tablespoon.    General information  · Eat more home-cooked food. Eat less restaurant, buffet, and fast food.  · Limit or avoid alcohol.  · Limit foods that are high in starch and sugar.  · Avoid fried foods.  · Lose weight if you are overweight.  · Keep track of how much salt (sodium) you eat. This is important if you have high blood pressure. Ask your doctor to tell you more about this.  · Try to add vegetarian meals each week.  Fats  · Choose healthy fats. These include olive oil and canola oil, flaxseeds, walnuts, almonds, and seeds.  · Eat more omega-3 fats. These include salmon, mackerel, sardines, tuna, flaxseed oil, and ground flaxseeds. Try to eat fish at least 2 times each week.  · Check food labels. Avoid foods with trans fats or high amounts of saturated fat.  · Limit saturated fats.  ? These are often found in animal products, such as meats, butter, and cream.  ? These are also found in plant foods, such as palm oil, palm kernel oil, and coconut oil.  · Avoid foods with partially hydrogenated oils in them. These have trans fats. Examples are stick margarine, some tub margarines, cookies, crackers, and other baked goods.  What foods can I eat?  Fruits  All  fresh, canned (in natural juice), or frozen fruits.  Vegetables  Fresh or frozen vegetables (raw, steamed, roasted, or grilled). Green salads.  Grains  Most grains. Choose whole wheat and whole grains most of the time. Rice and pasta, including brown rice and pastas made with whole wheat.  Meats and other proteins  Lean, well-trimmed beef, veal, pork, and lamb. Chicken and turkey without skin. All fish and shellfish. Wild duck, rabbit, pheasant, and venison. Egg whites or low-cholesterol egg substitutes. Dried beans, peas, lentils, and tofu. Seeds and most nuts.  Dairy  Low-fat or nonfat cheeses, including ricotta and mozzarella. Skim or 1% milk that is liquid, powdered, or evaporated. Buttermilk that is made with low-fat milk. Nonfat or low-fat yogurt.  Fats and oils  Non-hydrogenated (trans-free) margarines. Vegetable oils, including soybean, sesame, sunflower, olive, peanut, safflower, corn, canola, and cottonseed. Salad dressings or mayonnaise made with a vegetable oil.  Beverages  Mineral water. Coffee and tea. Diet carbonated beverages.  Sweets and desserts  Sherbet, gelatin, and fruit ice. Small amounts of dark chocolate.  Limit all sweets and desserts.  Seasonings and condiments  All seasonings and condiments.  The items listed above may not be a complete list of foods and drinks you can eat. Contact a dietitian for more options.  What foods should I avoid?  Fruits  Canned fruit in heavy syrup. Fruit in cream or butter sauce. Fried fruit. Limit coconut.  Vegetables  Vegetables cooked in cheese, cream, or butter sauce. Fried vegetables.  Grains  Breads that are made with saturated or trans fats, oils, or whole milk. Croissants. Sweet rolls. Donuts. High-fat crackers, such as cheese crackers.  Meats and other proteins  Fatty meats, such as hot dogs, ribs, sausage, feliz, rib-eye roast or steak. High-fat deli meats, such as salami and bologna. Caviar. Domestic duck and goose. Organ meats, such as  liver.  Dairy  Cream, sour cream, cream cheese, and creamed cottage cheese. Whole-milk cheeses. Whole or 2% milk that is liquid, evaporated, or condensed. Whole buttermilk. Cream sauce or high-fat cheese sauce. Yogurt that is made from whole milk.  Fats and oils  Meat fat, or shortening. Cocoa butter, hydrogenated oils, palm oil, coconut oil, palm kernel oil. Solid fats and shortenings, including feliz fat, salt pork, lard, and butter. Nondairy cream substitutes. Salad dressings with cheese or sour cream.  Beverages  Regular sodas and juice drinks with added sugar.  Sweets and desserts  Frosting. Pudding. Cookies. Cakes. Pies. Milk chocolate or white chocolate. Buttered syrups. Full-fat ice cream or ice cream drinks.  The items listed above may not be a complete list of foods and drinks to avoid. Contact a dietitian for more information.  Summary  · Heart-healthy meal planning includes eating less unhealthy fats, eating more healthy fats, and making other changes in your diet.  · Eat a balanced diet. This includes fruits and vegetables, low-fat or nonfat dairy, lean protein, nuts and legumes, whole grains, and heart-healthy oils and fats.  This information is not intended to replace advice given to you by your health care provider. Make sure you discuss any questions you have with your health care provider.  Document Revised: 02/21/2019 Document Reviewed: 01/25/2019  [employer] Patient Education © 2021 [employer]      Advance Care Planning and Advance Directives     You make decisions on a daily basis - decisions about where you want to live, your career, your home, your life. Perhaps one of the most important decisions you face is your choice for future medical care. Take time to talk with your family and your healthcare team and start planning today.  Advance Care Planning is a process that can help you:  · Understand possible future healthcare decisions in light of your own experiences  · Reflect on those  decision in light of your goals and values  · Discuss your decisions with those closest to you and the healthcare professionals that care for you  · Make a plan by creating a document that reflects your wishes    Surrogate Decision Maker  In the event of a medical emergency, which has left you unable to communicate or to make your own decisions, you would need someone to make decisions for you.  It is important to discuss your preferences for medical treatment with this person while you are in good health.     Qualities of a surrogate decision maker:  • Willing to take on this role and responsibility  • Knows what you want for future medical care  • Willing to follow your wishes even if they don't agree with them  • Able to make difficult medical decisions under stressful circumstances    Advance Directives  These are legal documents you can create that will guide your healthcare team and decision maker(s) when needed. These documents can be stored in the electronic medical record.    · Living Will - a legal document to guide your care if you have a terminal condition or a serious illness and are unable to communicate. States vary by statute in document names/types, but most forms may include one or more of the following:        -  Directions regarding life-prolonging treatments        -  Directions regarding artificially provided nutrition/hydration        -  Choosing a healthcare decision maker        -  Direction regarding organ/tissue donation    · Durable Power of  for Healthcare - this document names an -in-fact to make medical decisions for you, but it may also allow this person to make personal and financial decisions for you. Please seek the advice of an  if you need this type of document.    **Advance Directives are not required and no one may discriminate against you if you do not sign one.    Medical Orders  Many states allow specific forms/orders signed by your physician to  record your wishes for medical treatment in your current state of health. This form, signed in personal communication with your physician, addresses resuscitation and other medical interventions that you may or may not want.      For more information or to schedule a time with a [facility] Advance Care Planning Facilitator contact: [email or web address] or call [phone] and someone will contact you directly.

## 2022-02-22 NOTE — PROGRESS NOTES
Chief Complaint   Patient presents with   • Annual Exam       History of Present Illness    The patient presents for a follow-up related to Vitamin B12 deficiency. There are no reports of blood loss. The patient has no symptoms of a dry cough, a wet cough, fever, nausea, vomiting, diarrhea, myalgias, abdominal pain, sweats, weight loss, decreased appetite, chills, paresthesias or numbness. His energy level is normal. The patient is taking a vitamin B12 supplement in the form of IM injections.    The patient presents for a follow-up related to hypertension. The patient reports that he has had chest pain but he denies having headaches, dyspnea, edema, syncope, blurred vision or palpitations. He states that he is taking his medication as prescribed. He is not having medication side effects.    The patient presents for a follow-up related to hyperlipidemia. He is not following a low fat diet. He reports that he is not exercising. He is taking atorvastatin. The patient is taking his medication as prescribed. He reports no medication side effects, including muscle cramps, abdominal pain, headaches or weakness. He denies orthopnea, paroxysmal nocturnal dyspnea or dyspnea on exertion.    The patient presents for a follow-up related to Type 2 Diabetes Mellitus. He checks his blood sugars at home. His sugars are checked twice daily. The average sugars are in the 120-170 range. He denies hypoglycemic symptoms. The patient denies polyuria, polydypsia or polyphagia. He reports no symptoms of neuropathy. The patient takes his medication as prescribed. He is not taking insulin. The patient does check his feet daily at home.    The patient presents for a follow-up related to dementia. His symptoms are reported to be stable. He is taking a medication. The medication is taken as prescribed. There are no medication side effects. He lives with his spouse. The patient is able to stay alone.    The patient presents for an acute visit for  several episodes of chest pain. There is a one week history of chest pain. The episodes tend to last five minutes. The patient has noted no alleviating factors. The pain onset was sudden. The pain is not made worse with activity and it is not relieved with rest. The pain is not made worse with deep breaths. It is characterized as squeezing. The pain does not radiate. The patient states the pain is substernal. The pain is not associated with belching, clamminess, dizziness, indigestion or lightheadedness. The patient is diabetic, hypertensive and hyperlipidemic but he is not a smoker. A past history of CAD is reported.    The patient presents for a follow-up related to coronary artery disease. He takes an aspirin daily.    Review of Systems    PULMONARY- Denies Wheezing, Sputum Production, Cough, Hemoptysis or Pleuritic Chest Pain.    CARDIOVASCULAR- Denies Claudication.    NEUROLOGIC- Denies Seizures, Confusion, Memory Loss, Depression or Excessive Daytime Sleepiness.    Medications      Current Outpatient Medications:   •  Alcohol Swabs (ALCOHOL PREP) pads, 1 swab 3 (Three) Times a Day., Disp: 200 each, Rfl: 0  •  aspirin 81 MG EC tablet, Take 1 tablet by mouth Daily., Disp: , Rfl:   •  atorvastatin (LIPITOR) 40 MG tablet, TAKE 1 TABLET BY MOUTH EVERY NIGHT, Disp: 90 tablet, Rfl: 1  •  B-D UF III MINI PEN NEEDLES 31G X 5 MM misc, USE AS DIRECTED, Disp: 200 each, Rfl: 1  •  Blood Glucose Calibration (ACCU-CHEK LOR) solution, 1 bottle by In Vitro route As Needed (Use as directed)., Disp: 1 each, Rfl: 0  •  Blood Glucose Monitoring Suppl (ACCU-CHEK LOR PLUS) w/Device kit, 1 kit 3 (Three) Times a Day., Disp: 1 kit, Rfl: 0  •  cilostazol (PLETAL) 50 MG tablet, TAKE 1 TABLET BY MOUTH TWICE DAILY, Disp: 180 tablet, Rfl: 1  •  donepezil (ARICEPT) 10 MG tablet, TAKE 1 TABLET BY MOUTH EVERY NIGHT, Disp: 90 tablet, Rfl: 1  •  Eliquis 5 MG tablet tablet, TAKE 1 TABLET BY MOUTH TWICE DAILY, Disp: 180 tablet, Rfl: 1  •   fluticasone (FLONASE) 50 MCG/ACT nasal spray, 2 sprays into the nostril(s) as directed by provider Daily. (Patient taking differently: 2 sprays into the nostril(s) as directed by provider Daily As Needed.), Disp: 16 g, Rfl: 3  •  gabapentin (NEURONTIN) 400 MG capsule, Take 1 capsule by mouth 3 (Three) Times a Day., Disp: 90 capsule, Rfl: 1  •  glucose blood (Accu-Chek Geri Plus) test strip, Use as instructed, Disp: 100 each, Rfl: 12  •  Januvia 100 MG tablet, TAKE 1 TABLET BY MOUTH DAILY, Disp: 90 tablet, Rfl: 1  •  Lancets (ACCU-CHEK SOFT TOUCH) lancets, Check sugars 3 times daily, Disp: 1 each, Rfl: 12  •  lisinopril (PRINIVIL,ZESTRIL) 2.5 MG tablet, Take 1 tablet by mouth Daily., Disp: 90 tablet, Rfl: 1  •  metFORMIN (GLUCOPHAGE) 1000 MG tablet, TAKE 1 TABLET BY MOUTH TWICE DAILY, Disp: 180 tablet, Rfl: 1  •  metoprolol succinate XL (TOPROL-XL) 25 MG 24 hr tablet, TAKE 1 TABLET BY MOUTH EVERY DAY, Disp: 90 tablet, Rfl: 1  •  mupirocin (Bactroban) 2 % ointment, Apply  topically to the appropriate area as directed 3 (Three) Times a Day., Disp: 60 g, Rfl: 0  •  NovoLOG Mix 70/30 FlexPen (70-30) 100 UNIT/ML suspension pen-injector injection, ADMINISTER 50 UNITS UNDER THE SKIN TWICE DAILY WITH MEALS, Disp: 30 mL, Rfl: 5    Current Facility-Administered Medications:   •  cyanocobalamin injection 1,000 mcg, 1,000 mcg, Intramuscular, Q28 Days, Des Geller MD, 1,000 mcg at 02/21/22 1507     Allergies    Allergies   Allergen Reactions   • Bactrim [Sulfamethoxazole-Trimethoprim] Rash   • Adhesive Tape Rash   • Neosporin [Neomycin-Bacitracin Zn-Polymyx] Rash       Problem List    Patient Active Problem List   Diagnosis   • Cough   • Benign essential HTN   • Benign prostatic hyperplasia   • Coronary artery disease involving native coronary artery of native heart   • Chronic coronary artery disease   • Diabetes mellitus (HCC)   • Gout   • Hyperlipidemia   • History of heart attack   • Peripheral neuropathy   •  Peripheral vascular disease (HCC)       Medications, Allergies, Problems List and Past History were reviewed and updated.    Physical Examination    /64   Pulse 74   Temp 98.3 °F (36.8 °C)   Resp 22   Wt 103 kg (228 lb)   BMI 28.50 kg/m²     HEENT: Head- Normocephalic Atraumatic. Facies- Within normal limits. Pinnas- Normal texture and shape bilaterally. Canals- Normal bilaterally. TMs- Normal bilaterally. Nares- Patent bilaterally. Nasal Septum- is normal. There is no tenderness to palpation over the frontal or maxillary sinuses. Lids- Normal bilaterally. Conjunctiva- Clear bilaterally. Sclera- Anicteric bilaterally. Oropharynx- Moist with no lesions. Tonsils- No enlargement, erythema or exudate.    Neck: Thyroid- non enlarged, symmetric and has no nodules. No bruits are detected. ROM- Normal Range of Motion with no rigidity.    Lungs: Auscultation- Clear to auscultation bilaterally. There are no retractions, clubbing or cyanosis. The Expiratory to Inspiratory ratio is equal.    Lymph Nodes: Cervical- no enlarged lymph nodes noted.    Cardiovascular: There are no carotid bruits. Heart- Normal Rate with Regular rhythm and no murmurs. There are no gallops. There are no rubs. In the lower extremities there is no edema. The upper extremities do not have edema.    Abdomen: Soft, benign, non-tender with no masses, hernias, organomegaly or scars.    Neurologic Exam:    Cranial Nerves II-XII: Intact    Gait: Normal.    Feet: The feet are symmetric with normal hay landmarks. There is no tenderness to palpation bilaterally. The feet have normal posterior tibial and dorsalis pedis pulses and normal capillary refill bilaterally. The monofilament examination is normal bilaterally.       The arches are normal bilaterally. There are no skin or nail lesions present. There are no ingrown nails. There are no bunions noted.    Radiology    My personal interpretation of the x-rays is as stated below:    Chest X-ray (PA  and Lateral) 02/22/2022 : The CXR reveals cardiomegaly, a normal hilum, a normal mediastinum, clear lung fields and normal diaphragms and no other abnormalities.      ECG 12 Lead    Date/Time: 2/22/2022 7:26 AM  Performed by: Des Geller MD  Authorized by: Des Geller MD   Comparison: compared with previous ECG from 12/27/2021  Rhythm: sinus bradycardia  Rate: bradycardic  Conduction: 1st degree AV block  Q waves: V1 and V2    T inversion: III and aVF  QRS axis: normal    Clinical impression: abnormal EKG  Comments: T-wave changes when compared to prior ECG.  No other significant changes.  Q-waves in septal leads were present in December 2021.            Impression and Assessment    Vitamin B12 Deficiency.    Essential Hypertension.    Hyperlipidemia.    Type 2 Diabetes Mellitus without complication with insulin usage.    Dementia.    Chest Pain.    Coronary Artery Disease.    Plan    Essential Hypertension Plan: The patient was instructed to continue the current medications.    Hyperlipidemia Plan: The patient was instructed to exercise daily, eat a low fat diet and continue his medications.    Chest Pain Plan: The patient will follow-up with his cardiologist. The patient was instructed to continue the current medications. I spoke with his cardiologist and he is going to be seen for a stress test tomorrow.    Coronary Artery Disease Plan: The patient will follow-up with his cardiologist. The patient was instructed to continue the current medications.    Type 2 Diabetes Mellitus without complication with insulin usage Plan: The patient was instructed to continue the current medications. Further plans will be made after testing.    Dementia Plan: The patient was instructed to continue the current medications.    Vitamin B12 Deficiency Plan: The patient was instructed to continue the current medications.    Diagnoses and all orders for this visit:    1. Medicare annual wellness visit, subsequent  (Primary)    2. Essential hypertension  -     CBC & Differential; Future  -     Comprehensive Metabolic Panel; Future  -     TSH; Future  -     POC Urinalysis Dipstick, Automated; Future  -     POC Urinalysis Dipstick, Automated  -     TSH  -     Comprehensive Metabolic Panel  -     CBC & Differential    3. Hyperlipidemia, unspecified hyperlipidemia type  -     Comprehensive Metabolic Panel; Future  -     Lipid Panel; Future  -     Lipid Panel  -     Comprehensive Metabolic Panel    4. Type 2 diabetes mellitus without complication, with long-term current use of insulin (HCC)  -     Comprehensive Metabolic Panel; Future  -     POC Microalbumin; Future  -     POC Microalbumin  -     Comprehensive Metabolic Panel    5. Dementia without behavioral disturbance, unspecified dementia type (HCC)    6. Vitamin B 12 deficiency  -     CBC & Differential; Future  -     Vitamin B12; Future  -     Vitamin B12  -     CBC & Differential    7. Chest pain, unspecified type  -     XR Chest PA & Lateral; Future  -     ECG 12 Lead    8. Coronary artery disease involving native heart without angina pectoris, unspecified vessel or lesion type        Return to Office    The patient was instructed to return for follow-up in 3 months. The patient was instructed to return sooner if the condition changes, worsens, or does not resolve.

## 2022-03-03 ENCOUNTER — TELEPHONE (OUTPATIENT)
Dept: INTERNAL MEDICINE | Facility: CLINIC | Age: 67
End: 2022-03-03

## 2022-03-03 NOTE — TELEPHONE ENCOUNTER
Caller: WILLIE RICHTER    Relationship: Emergency Contact    Best call back number: [phone]    What was the call regarding:   PATIENT'S (WIFE) WILLIE STATED THAT PATIENT IS HAVING SURGERY 03/08/2022 AND PATIENT NEEDS A COVID TEST BEFORE HE CAN HAVE THE PROCEDURE DONE    PATIENT WOULD LIKE A CALL BACK REGARDING WHEN HE CAN COME BY THE OFFICE TO GET HE COVID TEST DONE     Do you require a callback: YES

## 2022-03-03 NOTE — TELEPHONE ENCOUNTER
S/W pt wife, expl who ever is doing his surgery has to arrange his Covid testing.  Verb understanding and states will call them back.

## 2022-04-11 RX ORDER — APIXABAN 5 MG/1
TABLET, FILM COATED ORAL
Qty: 180 TABLET | Refills: 1 | Status: SHIPPED | OUTPATIENT
Start: 2022-04-11

## 2022-04-12 ENCOUNTER — LAB (OUTPATIENT)
Dept: LAB | Facility: HOSPITAL | Age: 67
End: 2022-04-12

## 2022-04-12 ENCOUNTER — OFFICE VISIT (OUTPATIENT)
Dept: INTERNAL MEDICINE | Facility: CLINIC | Age: 67
End: 2022-04-12

## 2022-04-12 VITALS
HEART RATE: 79 BPM | RESPIRATION RATE: 20 BRPM | TEMPERATURE: 98.2 F | SYSTOLIC BLOOD PRESSURE: 130 MMHG | WEIGHT: 227 LBS | BODY MASS INDEX: 28.37 KG/M2 | OXYGEN SATURATION: 96 % | DIASTOLIC BLOOD PRESSURE: 60 MMHG

## 2022-04-12 DIAGNOSIS — J20.9 ACUTE BRONCHITIS, UNSPECIFIED ORGANISM: Primary | ICD-10-CM

## 2022-04-12 DIAGNOSIS — R05.9 COUGH: ICD-10-CM

## 2022-04-12 PROCEDURE — 99214 OFFICE O/P EST MOD 30 MIN: CPT | Performed by: INTERNAL MEDICINE

## 2022-04-12 PROCEDURE — 87804 INFLUENZA ASSAY W/OPTIC: CPT | Performed by: INTERNAL MEDICINE

## 2022-04-12 PROCEDURE — U0004 COV-19 TEST NON-CDC HGH THRU: HCPCS | Performed by: INTERNAL MEDICINE

## 2022-04-12 RX ORDER — AMOXICILLIN AND CLAVULANATE POTASSIUM 875; 125 MG/1; MG/1
1 TABLET, FILM COATED ORAL 2 TIMES DAILY
Qty: 20 TABLET | Refills: 0 | Status: SHIPPED | OUTPATIENT
Start: 2022-04-12 | End: 2022-04-22

## 2022-04-12 RX ORDER — BENZONATATE 100 MG/1
100 CAPSULE ORAL 3 TIMES DAILY PRN
Qty: 30 CAPSULE | Refills: 1 | Status: SHIPPED | OUTPATIENT
Start: 2022-04-12 | End: 2022-05-23

## 2022-04-12 NOTE — PROGRESS NOTES
Subjective       Too Tai is a 66 y.o. male.     Chief Complaint   Patient presents with   • Cough   • Nasal Congestion       History obtained from the patient and his wife (who served as an independent historian due to the patient's hearing difficulty).      URI   This is a new problem. Episode onset: 2-3 days ago. Progression since onset: Off and on x 2 months. There has been no fever. Associated symptoms include chest pain (has CAD, had a cath recently and stent ok), congestion, coughing (productive of clear sputum, no hemoptysis), nausea, rhinorrhea (clear), sinus pain and sneezing. Pertinent negatives include no abdominal pain, diarrhea, ear pain, headaches, joint pain, joint swelling, neck pain, plugged ear sensation, rash, sore throat, swollen glands, vomiting or wheezing. Associated symptoms comments: No loss of taste or smell. Treatments tried: Claritin and Benadryl. The treatment provided no relief.      The patient has a history of CAD and NIDDM, but no Asthma or COPD.  He has a remote history of smoking.  He denies exposure to COVID-19, Influenza, or Strep.  He has had the COVID-19 vaccine, but not the booster.  He has not had his Influenza vaccine this year.    The following portions of the patient's history were reviewed and updated as appropriate: allergies, current medications, past family history, past medical history, past social history, past surgical history and problem list.      Review of Systems   Constitutional: Positive for appetite change (decreased x 2 months, not worse) and fatigue. Negative for chills and fever.   HENT: Positive for congestion, postnasal drip, rhinorrhea (clear), sinus pressure, sinus pain and sneezing. Negative for ear discharge, ear pain, sore throat and voice change.    Eyes: Positive for pain, discharge (clear) and redness. Negative for itching.   Respiratory: Positive for cough (productive of clear sputum, no hemoptysis). Negative for shortness of breath and  wheezing.    Cardiovascular: Positive for chest pain (has CAD, had a cath recently and stent ok).   Gastrointestinal: Positive for nausea. Negative for abdominal pain, diarrhea and vomiting.   Musculoskeletal: Negative for arthralgias, joint pain, joint swelling, myalgias, neck pain and neck stiffness.   Skin: Negative for rash.   Neurological: Negative for headaches.   Hematological: Negative for adenopathy.           Objective     Blood pressure 130/60, pulse 79, temperature 98.2 °F (36.8 °C), temperature source Temporal, resp. rate 20, weight 103 kg (227 lb), SpO2 96 %.    Physical Exam  Vitals and nursing note reviewed.   Constitutional:       Appearance: He is well-developed.      Comments: Overweight.   HENT:      Head: Normocephalic and atraumatic.      Comments: No maxillary or frontal sinus tenderness to palpation.     Right Ear: External ear normal. There is impacted cerumen (complete occlusion).      Left Ear: Tympanic membrane, ear canal and external ear normal.      Mouth/Throat:      Mouth: Mucous membranes are moist. No oral lesions.      Pharynx: Oropharynx is clear.      Comments: Tonsils normal.  Eyes:      Conjunctiva/sclera: Conjunctivae normal.   Cardiovascular:      Rate and Rhythm: Normal rate and regular rhythm.      Heart sounds: No murmur heard.  Pulmonary:      Effort: Pulmonary effort is normal.      Breath sounds: Normal breath sounds.   Musculoskeletal:      Cervical back: Normal range of motion and neck supple.   Lymphadenopathy:      Cervical: No cervical adenopathy.   Skin:     Findings: No rash.   Neurological:      Mental Status: He is alert.   Psychiatric:         Mood and Affect: Mood normal.         Results for orders placed or performed in visit on 04/12/22   POC Influenza A / B    Specimen: Swab   Result Value Ref Range    Rapid Influenza A Ag Negative Negative    Rapid Influenza B Ag Negative Negative    Internal Control Passed Passed    Lot Number [id number]     Expiration Date  10/16/2023        Assessment/Plan   Diagnoses and all orders for this visit:    1. Acute bronchitis, unspecified organism (Primary)  -     amoxicillin-clavulanate (Augmentin) 875-125 MG per tablet; Take 1 tablet by mouth 2 (Two) Times a Day for 10 days.  Dispense: 20 tablet; Refill: 0    2. Cough  -     benzonatate (Tessalon Perles) 100 MG capsule; Take 1 capsule by mouth 3 (Three) Times a Day As Needed for Cough.  Dispense: 30 capsule; Refill: 1  -     POC Influenza A / B  -     COVID-19 PCR, [facility], NP SWAB IN [facility]/ORAL SWISH 24-30 HR TAT - Swab, Nasopharynx   Continue Claritin, add Mucinex, and plenty of fluids.    Return if symptoms worsen or fail to improve.

## 2022-04-13 ENCOUNTER — TELEPHONE (OUTPATIENT)
Dept: INTERNAL MEDICINE | Facility: CLINIC | Age: 67
End: 2022-04-13

## 2022-04-13 LAB — SARS-COV-2 RNA NOSE QL NAA+PROBE: NOT DETECTED

## 2022-05-16 RX ORDER — LISINOPRIL 2.5 MG/1
2.5 TABLET ORAL DAILY
Qty: 90 TABLET | Refills: 1 | Status: SHIPPED | OUTPATIENT
Start: 2022-05-16 | End: 2022-12-19

## 2022-05-19 RX ORDER — METOPROLOL SUCCINATE 25 MG/1
TABLET, EXTENDED RELEASE ORAL
Qty: 90 TABLET | Refills: 1 | Status: SHIPPED | OUTPATIENT
Start: 2022-05-19 | End: 2022-12-28 | Stop reason: SDUPTHER

## 2022-05-23 ENCOUNTER — OFFICE VISIT (OUTPATIENT)
Dept: INTERNAL MEDICINE | Facility: CLINIC | Age: 67
End: 2022-05-23

## 2022-05-23 VITALS
DIASTOLIC BLOOD PRESSURE: 60 MMHG | TEMPERATURE: 97.3 F | OXYGEN SATURATION: 95 % | HEIGHT: 75 IN | SYSTOLIC BLOOD PRESSURE: 128 MMHG | RESPIRATION RATE: 16 BRPM | BODY MASS INDEX: 28.62 KG/M2 | WEIGHT: 230.2 LBS | HEART RATE: 60 BPM

## 2022-05-23 DIAGNOSIS — J30.2 SEASONAL ALLERGIC RHINITIS, UNSPECIFIED TRIGGER: Primary | ICD-10-CM

## 2022-05-23 PROCEDURE — 99213 OFFICE O/P EST LOW 20 MIN: CPT | Performed by: PHYSICIAN ASSISTANT

## 2022-05-23 RX ORDER — LEVOCETIRIZINE DIHYDROCHLORIDE 5 MG/1
5 TABLET, FILM COATED ORAL EVERY EVENING
Qty: 30 TABLET | Refills: 2 | Status: SHIPPED | OUTPATIENT
Start: 2022-05-23 | End: 2022-08-18

## 2022-07-11 RX ORDER — INSULIN ASPART 100 [IU]/ML
INJECTION, SUSPENSION SUBCUTANEOUS
Qty: 30 ML | Refills: 5 | Status: SHIPPED | OUTPATIENT
Start: 2022-07-11

## 2022-07-18 RX ORDER — ATORVASTATIN CALCIUM 40 MG/1
40 TABLET, FILM COATED ORAL NIGHTLY
Qty: 90 TABLET | Refills: 1 | Status: SHIPPED | OUTPATIENT
Start: 2022-07-18

## 2022-08-18 DIAGNOSIS — J30.2 SEASONAL ALLERGIC RHINITIS, UNSPECIFIED TRIGGER: ICD-10-CM

## 2022-08-18 RX ORDER — LEVOCETIRIZINE DIHYDROCHLORIDE 5 MG/1
5 TABLET, FILM COATED ORAL EVERY EVENING
Qty: 30 TABLET | Refills: 2 | Status: SHIPPED | OUTPATIENT
Start: 2022-08-18

## 2022-09-16 ENCOUNTER — TELEPHONE (OUTPATIENT)
Dept: INTERNAL MEDICINE | Facility: CLINIC | Age: 67
End: 2022-09-16

## 2022-09-16 DIAGNOSIS — F03.90 DEMENTIA WITHOUT BEHAVIORAL DISTURBANCE, UNSPECIFIED DEMENTIA TYPE: ICD-10-CM

## 2022-09-16 RX ORDER — DONEPEZIL HYDROCHLORIDE 10 MG/1
10 TABLET, FILM COATED ORAL NIGHTLY
Qty: 90 TABLET | Refills: 1 | Status: CANCELLED | OUTPATIENT
Start: 2022-09-16

## 2022-09-16 NOTE — TELEPHONE ENCOUNTER
Caller: WILLIE RICHTER    Relationship: Emergency Contact    Best call back number: [phone]     What is the best time to reach you:ANY TIME    Who are you requesting to speak with (clinical staff, provider,  specific staff member): CLINICAL    Do you know the name of the person who called:     What was the call regarding: [facility] IS HELPING BUT ONLY LASTING ABOUT 8 HOURS, IS THERE A ANYTHING ELSE THEY CAN DO OR POSSIBLY INCREASE THE DOSAGE, THE DEMENTIA IS WORSENING AT TIMES  Do you require a callback:YES

## 2022-09-19 RX ORDER — DONEPEZIL HYDROCHLORIDE 23 MG/1
23 TABLET, FILM COATED ORAL NIGHTLY
Qty: 30 TABLET | Refills: 5 | Status: SHIPPED | OUTPATIENT
Start: 2022-09-19

## 2022-09-19 NOTE — TELEPHONE ENCOUNTER
I have sent in a prescription for a higher dosage.  Please notify Caroline Geller MD  06:44 EDT  09/19/22

## 2022-10-16 DIAGNOSIS — F03.90 DEMENTIA WITHOUT BEHAVIORAL DISTURBANCE: ICD-10-CM

## 2022-10-17 RX ORDER — DONEPEZIL HYDROCHLORIDE 10 MG/1
10 TABLET, FILM COATED ORAL NIGHTLY
Qty: 90 TABLET | Refills: 1 | Status: SHIPPED | OUTPATIENT
Start: 2022-10-17 | End: 2023-04-03 | Stop reason: DRUGHIGH

## 2022-10-20 DIAGNOSIS — Z79.4 TYPE 2 DIABETES MELLITUS WITH DIABETIC PERIPHERAL ANGIOPATHY WITHOUT GANGRENE, WITH LONG-TERM CURRENT USE OF INSULIN: ICD-10-CM

## 2022-10-20 DIAGNOSIS — E11.51 TYPE 2 DIABETES MELLITUS WITH DIABETIC PERIPHERAL ANGIOPATHY WITHOUT GANGRENE, WITH LONG-TERM CURRENT USE OF INSULIN: ICD-10-CM

## 2022-10-21 RX ORDER — SITAGLIPTIN 100 MG/1
TABLET, FILM COATED ORAL
Qty: 90 TABLET | Refills: 1 | Status: SHIPPED | OUTPATIENT
Start: 2022-10-21

## 2022-10-26 ENCOUNTER — LAB (OUTPATIENT)
Dept: LAB | Facility: HOSPITAL | Age: 67
End: 2022-10-26

## 2022-10-26 ENCOUNTER — OFFICE VISIT (OUTPATIENT)
Dept: INTERNAL MEDICINE | Facility: CLINIC | Age: 67
End: 2022-10-26

## 2022-10-26 VITALS
HEIGHT: 75 IN | DIASTOLIC BLOOD PRESSURE: 66 MMHG | RESPIRATION RATE: 22 BRPM | WEIGHT: 232.4 LBS | TEMPERATURE: 98 F | OXYGEN SATURATION: 95 % | SYSTOLIC BLOOD PRESSURE: 138 MMHG | BODY MASS INDEX: 28.89 KG/M2 | HEART RATE: 57 BPM

## 2022-10-26 DIAGNOSIS — J30.9 ALLERGIC RHINITIS, UNSPECIFIED SEASONALITY, UNSPECIFIED TRIGGER: ICD-10-CM

## 2022-10-26 DIAGNOSIS — J98.8 RESPIRATORY INFECTION: Primary | ICD-10-CM

## 2022-10-26 LAB
EXPIRATION DATE: NORMAL
FLUAV AG UPPER RESP QL IA.RAPID: NOT DETECTED
FLUBV AG UPPER RESP QL IA.RAPID: NOT DETECTED
INTERNAL CONTROL: NORMAL
Lab: NORMAL
SARS-COV-2 RNA NOSE QL NAA+PROBE: NOT DETECTED
SARS-COV-2 RNA RESP QL NAA+PROBE: NOT DETECTED

## 2022-10-26 PROCEDURE — 87428 SARSCOV & INF VIR A&B AG IA: CPT | Performed by: NURSE PRACTITIONER

## 2022-10-26 PROCEDURE — U0004 COV-19 TEST NON-CDC HGH THRU: HCPCS | Performed by: NURSE PRACTITIONER

## 2022-10-26 PROCEDURE — C9803 HOPD COVID-19 SPEC COLLECT: HCPCS | Performed by: NURSE PRACTITIONER

## 2022-10-26 PROCEDURE — 99214 OFFICE O/P EST MOD 30 MIN: CPT | Performed by: NURSE PRACTITIONER

## 2022-10-26 RX ORDER — DOXYCYCLINE HYCLATE 100 MG/1
100 CAPSULE ORAL 2 TIMES DAILY
Qty: 20 CAPSULE | Refills: 0 | Status: SHIPPED | OUTPATIENT
Start: 2022-10-26 | End: 2023-04-03

## 2022-10-26 RX ORDER — FLUTICASONE PROPIONATE 50 MCG
2 SPRAY, SUSPENSION (ML) NASAL DAILY
Qty: 16 G | Refills: 0 | Status: SHIPPED | OUTPATIENT
Start: 2022-10-26 | End: 2022-11-22

## 2022-10-26 NOTE — PROGRESS NOTES
Patient Name: Too Tai  : 1955   MRN: [id number]     Chief Complaint:    Chief Complaint   Patient presents with   • Nasal Congestion   • Cough       History of Present Illness: Too Tai is a 67 y.o. male presents to clinic for cough:    Patient complains of nasal congestion and productive cough with sputum described as yellow.  Symptoms began 3 weeks ago.  The cough is non-productive, without wheezing, fever, dyspnea or hemoptysis and is aggravated by dust Associated symptoms include:postnasal drip , intermittent chest pains, and sneezing.  Patient had recent travel. Patient does have a past history of smoking. He has been taking tylenol and allergy medications.     Patient had increased confusion on vacation. Dr. Geller increased donepezil to 23 mg daily; if he took it every day he would have severe night roberson. The wife is giving him the donepezil 23 mg every 3rd day and this has helped.       Subjective     Review of System: Review of Systems   Constitutional: Negative for fatigue and fever.   HENT: Positive for congestion, postnasal drip and sneezing. Negative for rhinorrhea.    Eyes: Positive for discharge (watery).   Respiratory: Positive for cough. Negative for shortness of breath and wheezing.    Cardiovascular: Negative for chest pain and palpitations.   Gastrointestinal: Negative for abdominal pain, diarrhea, nausea and vomiting.   Genitourinary:        Urinary leakage   Skin: Negative for rash.   Neurological: Negative for headaches.   Hematological: Negative for adenopathy.   Psychiatric/Behavioral: Negative for dysphoric mood.        Medications:     Current Outpatient Medications:   •  Alcohol Swabs (ALCOHOL PREP) pads, 1 swab 3 (Three) Times a Day., Disp: 200 each, Rfl: 0  •  aspirin 81 MG EC tablet, Take 1 tablet by mouth Daily., Disp: , Rfl:   •  atorvastatin (LIPITOR) 40 MG tablet, TAKE 1 TABLET BY MOUTH EVERY NIGHT, Disp: 90 tablet, Rfl: 1  •  B-D UF III MINI PEN NEEDLES 31G X  5 MM misc, USE AS DIRECTED, Disp: 200 each, Rfl: 1  •  Blood Glucose Calibration (ACCU-CHEK LOR) solution, 1 bottle by In Vitro route As Needed (Use as directed)., Disp: 1 each, Rfl: 0  •  Blood Glucose Monitoring Suppl (ACCU-CHEK LOR PLUS) w/Device kit, 1 kit 3 (Three) Times a Day., Disp: 1 kit, Rfl: 0  •  cilostazol (PLETAL) 50 MG tablet, TAKE 1 TABLET BY MOUTH TWICE DAILY, Disp: 180 tablet, Rfl: 1  •  donepezil (ARICEPT) 10 MG tablet, TAKE 1 TABLET BY MOUTH EVERY NIGHT, Disp: 90 tablet, Rfl: 1  •  donepezil (Aricept) 23 MG tablet, Take 1 tablet by mouth Every Night., Disp: 30 tablet, Rfl: 5  •  Eliquis 5 MG tablet tablet, TAKE 1 TABLET BY MOUTH TWICE DAILY, Disp: 180 tablet, Rfl: 1  •  gabapentin (NEURONTIN) 400 MG capsule, TAKE 1 CAPSULE BY MOUTH THREE TIMES DAILY, Disp: 90 capsule, Rfl: 2  •  glucose blood (Accu-Chek Lor Plus) test strip, Use as instructed, Disp: 100 each, Rfl: 12  •  Januvia 100 MG tablet, TAKE 1 TABLET BY MOUTH DAILY, Disp: 90 tablet, Rfl: 1  •  Lancets (ACCU-CHEK SOFT TOUCH) lancets, Check sugars 3 times daily, Disp: 1 each, Rfl: 12  •  levocetirizine (XYZAL) 5 MG tablet, TAKE 1 TABLET BY MOUTH EVERY EVENING, Disp: 30 tablet, Rfl: 2  •  lisinopril (PRINIVIL,ZESTRIL) 2.5 MG tablet, TAKE 1 TABLET BY MOUTH DAILY, Disp: 90 tablet, Rfl: 1  •  metFORMIN (GLUCOPHAGE) 1000 MG tablet, TAKE 1 TABLET BY MOUTH TWICE DAILY, Disp: 180 tablet, Rfl: 1  •  metoprolol succinate XL (TOPROL-XL) 25 MG 24 hr tablet, TAKE 1 TABLET BY MOUTH EVERY DAY, Disp: 90 tablet, Rfl: 1  •  mupirocin (Bactroban) 2 % ointment, Apply  topically to the appropriate area as directed 3 (Three) Times a Day., Disp: 60 g, Rfl: 0  •  NovoLOG Mix 70/30 FlexPen (70-30) 100 UNIT/ML suspension pen-injector injection, ADMINISTER 50 UNITS UNDER THE SKIN TWICE DAILY WITH MEALS, Disp: 30 mL, Rfl: 5  •  doxycycline (VIBRAMYCIN) 100 MG capsule, Take 1 capsule by mouth 2 (Two) Times a Day., Disp: 20 capsule, Rfl: 0  •  fluticasone (Flonase)  "50 MCG/ACT nasal spray, 2 sprays into the nostril(s) as directed by provider Daily., Disp: 16 g, Rfl: 0    Current Facility-Administered Medications:   •  cyanocobalamin injection 1,000 mcg, 1,000 mcg, Intramuscular, Q28 Days, Des Geller MD, 1,000 mcg at 02/21/22 1507    Allergies:   Allergies   Allergen Reactions   • Bactrim [Sulfamethoxazole-Trimethoprim] Rash   • Adhesive Tape Rash   • Neosporin [Neomycin-Bacitracin Zn-Polymyx] Rash       Objective     Physical Exam:   Vital Signs:   Vitals:    10/26/22 1029   BP: 138/66   BP Location: Right arm   Patient Position: Sitting   Cuff Size: Adult   Pulse: 57   Resp: 22   Temp: 98 °F (36.7 °C)   TempSrc: Infrared   SpO2: 95%   Weight: 105 kg (232 lb 6.4 oz)   Height: 190.5 cm (75\")   PainSc:   2           Physical Exam  Constitutional:       Appearance: He is ill-appearing.   HENT:      Right Ear: There is impacted cerumen.      Left Ear: There is impacted cerumen.      Nose: Congestion and rhinorrhea present.   Eyes:      General:         Right eye: Discharge (watery) present.         Left eye: Discharge (watery) present.  Cardiovascular:      Rate and Rhythm: Normal rate and regular rhythm.      Heart sounds: Normal heart sounds. No murmur heard.  Pulmonary:      Breath sounds: Examination of the left-upper field reveals decreased breath sounds. Decreased breath sounds present.   Abdominal:      General: Bowel sounds are normal.      Tenderness: There is no abdominal tenderness.   Lymphadenopathy:      Cervical: No cervical adenopathy.   Skin:     General: Skin is warm and dry.         Assessment / Plan      Assessment/Plan:   Diagnoses and all orders for this visit:    1. Respiratory infection (Primary)  -     doxycycline (VIBRAMYCIN) 100 MG capsule; Take 1 capsule by mouth 2 (Two) Times a Day.  Dispense: 20 capsule; Refill: 0  -     Covid-19 + Flu A&B AG, [facility]  -     COVID-19 PCR, [facility], NP SWAB IN [facility] VIRAL TRANSPORT MEDIA/ORAL SWISH 24-30 HR " TAT - Swab, Nasopharynx; Future  -     COVID-19 PCR, [facility], NP SWAB IN [facility] VIRAL TRANSPORT MEDIA/ORAL SWISH 24-30 HR TAT - Swab, Nasopharynx    2. Allergic rhinitis, unspecified seasonality, unspecified trigger  -     fluticasone (Flonase) 50 MCG/ACT nasal spray; 2 sprays into the nostril(s) as directed by provider Daily.  Dispense: 16 g; Refill: 0    Influenza and rapid covid were negative; covid pcr is  pending. Take medications as prescribed.  Continue supportive care and hydration.     Explained and discussed patient's condition and plan of care.  Discussed when to follow-up.  Discussed possible red flags and how to follow-up with those.  Viewed patient's medications and discussed common side effects. Patient to continue current medications as advised.  Be compliant with medications. Patient to let me know if they have any worsening, no improvement, does not tolerate medication, or any future concerns about treatment. ER for emergencies.  Patient verbalized an understanding and agreement with plan of care.        Follow Up:   IVONNE Jessica  [facility]

## 2022-10-27 ENCOUNTER — TELEPHONE (OUTPATIENT)
Dept: INTERNAL MEDICINE | Facility: CLINIC | Age: 67
End: 2022-10-27

## 2022-10-27 NOTE — TELEPHONE ENCOUNTER
----- Message from IVONNE Grimm sent at 10/26/2022  8:12 PM EDT -----  Please let them know the covid test is negative.

## 2022-11-03 ENCOUNTER — OFFICE VISIT (OUTPATIENT)
Dept: INTERNAL MEDICINE | Facility: CLINIC | Age: 67
End: 2022-11-03

## 2022-11-03 VITALS
RESPIRATION RATE: 20 BRPM | SYSTOLIC BLOOD PRESSURE: 144 MMHG | BODY MASS INDEX: 28.75 KG/M2 | DIASTOLIC BLOOD PRESSURE: 72 MMHG | HEART RATE: 76 BPM | WEIGHT: 230 LBS | TEMPERATURE: 99.1 F

## 2022-11-03 DIAGNOSIS — Z23 IMMUNIZATION DUE: ICD-10-CM

## 2022-11-03 DIAGNOSIS — Z79.4 TYPE 2 DIABETES MELLITUS WITHOUT COMPLICATION, WITH LONG-TERM CURRENT USE OF INSULIN: ICD-10-CM

## 2022-11-03 DIAGNOSIS — F03.90 DEMENTIA WITHOUT BEHAVIORAL DISTURBANCE: ICD-10-CM

## 2022-11-03 DIAGNOSIS — E78.5 HYPERLIPIDEMIA, UNSPECIFIED HYPERLIPIDEMIA TYPE: ICD-10-CM

## 2022-11-03 DIAGNOSIS — R10.11 RUQ ABDOMINAL PAIN: Primary | ICD-10-CM

## 2022-11-03 DIAGNOSIS — I10 ESSENTIAL HYPERTENSION: ICD-10-CM

## 2022-11-03 DIAGNOSIS — E53.8 VITAMIN B 12 DEFICIENCY: ICD-10-CM

## 2022-11-03 DIAGNOSIS — J30.1 ALLERGIC RHINITIS DUE TO POLLEN, UNSPECIFIED SEASONALITY: ICD-10-CM

## 2022-11-03 DIAGNOSIS — E11.9 TYPE 2 DIABETES MELLITUS WITHOUT COMPLICATION, WITH LONG-TERM CURRENT USE OF INSULIN: ICD-10-CM

## 2022-11-03 PROCEDURE — 99214 OFFICE O/P EST MOD 30 MIN: CPT | Performed by: INTERNAL MEDICINE

## 2022-11-03 PROCEDURE — 90662 IIV NO PRSV INCREASED AG IM: CPT | Performed by: INTERNAL MEDICINE

## 2022-11-03 PROCEDURE — G0008 ADMIN INFLUENZA VIRUS VAC: HCPCS | Performed by: INTERNAL MEDICINE

## 2022-11-03 NOTE — PROGRESS NOTES
Chief Complaint   Patient presents with   • Follow-up     Follow up chronic medical problems       History of Present Illness    The patient presents for a follow-up related to Vitamin B12 deficiency. There are no reports of blood loss. The patient has abdominal pain but does not have myalgias, sweats, weight loss, paresthesias or numbness. His energy level is normal. There are no reports of chest pain, shortness of breath, palpitations or syncope. The patient is taking a vitamin B12 supplement in the form of IM injections.    The patient presents for an acute visit for right upper quadrant abdominal pain. There is a one month history of abdominal pain. The pain is in the RUQ. The pain does not radiate. The pain is characterized as an ache. The pain is constant. The patient has had a fever. The fever is described as tactile. The patient reports that the pain is not aggravated by motion, food, medication, hunger or alcohol.  The patient has noted no alleviating factors. The patient also denies hematuria, dysuria, nausea, vomiting, diarrhea, constipation, a rash, rectal bleeding, urinary hesitancy or urinary frequency. There has been no testicular pain. The patient has not had a past history of abdominal surgery.    The patient presents for an acute visit for a chronic history of allergy symptoms. The symptoms are seasonal. His allergies are worse in the spring. The patient denies a dry cough, a wet cough, wheezing, fever, facial pain, a headache, eye drainage, ear pain, ear drainage, nasal congestion, chills, decreased appetite, a runny nose, itchy watery eyes, sneezing or eczema. He has not tried anything to relieve the symptoms.    The patient presents for a follow-up related to Type 2 Diabetes Mellitus. He checks his blood sugars at home. His sugars are checked daily. The average sugars are in the 100-150 range. He denies hypoglycemic symptoms. The patient denies polyuria, polydypsia or polyphagia. He reports no  symptoms of neuropathy. The patient takes his medication as prescribed. He is taking insulin. His injection sites are rotated appropriately. The patient does check his feet daily at home. He denies orthopnea, paroxysmal nocturnal dyspnea, dyspnea on exertion or edema.    The patient presents for a follow-up related to hyperlipidemia. He is following a low fat diet. He reports that he is exercising. He is taking atorvastatin. The patient is taking his medication as prescribed. He reports no medication side effects, including muscle cramps, abdominal pain, headaches or weakness.    The patient has had constant pain.    The patient presents for a follow-up related to hypertension. The patient reports that he has had no blurred vision. He states that he is taking his medication as prescribed. He is not having medication side effects.    The patient presents for a follow-up related to dementia. His symptoms are reported to be worsened. He is taking a medication. The medication is taken as prescribed. There are no medication side effects. The patient is able to stay alone. He is scheduled with neurology.    Review of Systems    PULMONARY- Denies Sputum Production, Cough, Hemoptysis or Pleuritic Chest Pain.    CARDIOVASCULAR- Denies Claudication or Irregular Heart Beat.    NEUROLOGIC- Denies Seizures, Confusion, Depression or Excessive Daytime Sleepiness.    Medications      Current Outpatient Medications:   •  Alcohol Swabs (ALCOHOL PREP) pads, 1 swab 3 (Three) Times a Day., Disp: 200 each, Rfl: 0  •  aspirin 81 MG EC tablet, Take 1 tablet by mouth Daily., Disp: , Rfl:   •  atorvastatin (LIPITOR) 40 MG tablet, TAKE 1 TABLET BY MOUTH EVERY NIGHT, Disp: 90 tablet, Rfl: 1  •  B-D UF III MINI PEN NEEDLES 31G X 5 MM misc, USE AS DIRECTED, Disp: 200 each, Rfl: 1  •  Blood Glucose Calibration (ACCU-CHEK LOR) solution, 1 bottle by In Vitro route As Needed (Use as directed)., Disp: 1 each, Rfl: 0  •  Blood Glucose Monitoring  Suppl (ACCU-CHEK LOR PLUS) w/Device kit, 1 kit 3 (Three) Times a Day., Disp: 1 kit, Rfl: 0  •  cilostazol (PLETAL) 50 MG tablet, TAKE 1 TABLET BY MOUTH TWICE DAILY, Disp: 180 tablet, Rfl: 1  •  donepezil (ARICEPT) 10 MG tablet, TAKE 1 TABLET BY MOUTH EVERY NIGHT, Disp: 90 tablet, Rfl: 1  •  doxycycline (VIBRAMYCIN) 100 MG capsule, Take 1 capsule by mouth 2 (Two) Times a Day., Disp: 20 capsule, Rfl: 0  •  Eliquis 5 MG tablet tablet, TAKE 1 TABLET BY MOUTH TWICE DAILY, Disp: 180 tablet, Rfl: 1  •  fluticasone (Flonase) 50 MCG/ACT nasal spray, 2 sprays into the nostril(s) as directed by provider Daily., Disp: 16 g, Rfl: 0  •  glucose blood (Accu-Chek Lor Plus) test strip, Use as instructed, Disp: 100 each, Rfl: 12  •  Januvia 100 MG tablet, TAKE 1 TABLET BY MOUTH DAILY, Disp: 90 tablet, Rfl: 1  •  Lancets (ACCU-CHEK SOFT TOUCH) lancets, Check sugars 3 times daily, Disp: 1 each, Rfl: 12  •  levocetirizine (XYZAL) 5 MG tablet, TAKE 1 TABLET BY MOUTH EVERY EVENING, Disp: 30 tablet, Rfl: 2  •  lisinopril (PRINIVIL,ZESTRIL) 2.5 MG tablet, TAKE 1 TABLET BY MOUTH DAILY, Disp: 90 tablet, Rfl: 1  •  metFORMIN (GLUCOPHAGE) 1000 MG tablet, TAKE 1 TABLET BY MOUTH TWICE DAILY, Disp: 180 tablet, Rfl: 1  •  metoprolol succinate XL (TOPROL-XL) 25 MG 24 hr tablet, TAKE 1 TABLET BY MOUTH EVERY DAY, Disp: 90 tablet, Rfl: 1  •  NovoLOG Mix 70/30 FlexPen (70-30) 100 UNIT/ML suspension pen-injector injection, ADMINISTER 50 UNITS UNDER THE SKIN TWICE DAILY WITH MEALS (Patient taking differently: 40 units sq every morning and 25-35 units in evening based on meal intake), Disp: 30 mL, Rfl: 5  •  donepezil (Aricept) 23 MG tablet, Take 1 tablet by mouth Every Night., Disp: 30 tablet, Rfl: 5    Current Facility-Administered Medications:   •  cyanocobalamin injection 1,000 mcg, 1,000 mcg, Intramuscular, Q28 Days, Des Geller MD, 1,000 mcg at 02/21/22 1507     Allergies    Allergies   Allergen Reactions   • Bactrim  [Sulfamethoxazole-Trimethoprim] Rash   • Adhesive Tape Rash   • Neosporin [Neomycin-Bacitracin Zn-Polymyx] Rash       Problem List    Patient Active Problem List   Diagnosis   • Cough   • Benign essential HTN   • Benign prostatic hyperplasia   • Coronary artery disease involving native coronary artery of native heart   • Chronic coronary artery disease   • Diabetes mellitus (HCC)   • Gout   • Hyperlipidemia   • History of heart attack   • Peripheral neuropathy   • Peripheral vascular disease (HCC)       Medications, Allergies, Problems List and Past History were reviewed and updated.    Physical Examination    /72 (BP Location: Left arm, Patient Position: Sitting, Cuff Size: Adult)   Pulse 76   Temp 99.1 °F (37.3 °C) (Infrared)   Resp 20   Wt 104 kg (230 lb)   BMI 28.75 kg/m²     HEENT: Head- Normocephalic Atraumatic. Facies- Within normal limits. Pinnas- Normal texture and shape bilaterally. Canals- Normal bilaterally. TMs- Normal bilaterally. Nares- Patent bilaterally. Nasal Septum- is normal. There is no tenderness to palpation over the frontal or maxillary sinuses. Lids- Normal bilaterally. Conjunctiva- Clear bilaterally. Sclera- Anicteric bilaterally. Oropharynx- Moist with no lesions. Tonsils- No enlargement, erythema or exudate.    Neck: Thyroid- non enlarged, symmetric and has no nodules. No bruits are detected. ROM- Normal Range of Motion with no rigidity.    Lungs: Auscultation- Clear to auscultation bilaterally. There are no retractions, clubbing or cyanosis. The Expiratory to Inspiratory ratio is equal.    Lymph Nodes: Cervical- no enlarged lymph nodes noted.    Cardiovascular: There are no carotid bruits. Heart- Normal Rate with Regular rhythm and no murmurs. There are no gallops. There are no rubs. In the lower extremities there is no edema. The upper extremities do not have edema.    Abdomen: Soft, benign, non-tender with no masses, hernias, organomegaly or scars.    Impression and  Assessment    Vitamin B12 Deficiency.    Encounter for Immunization Administration.    Allergic Rhinitis.    Right Upper Quadrant Abdominal Pain    Type 2 Diabetes Mellitus without complication without insulin usage.    Hyperlipidemia.    Essential Hypertension.    Dementia.    Plan    Right Upper Quadrant Abdominal Pain Plan: Further plans will be made after the tests are reviewed.    Hyperlipidemia Plan: The patient was instructed to exercise daily, eat a low fat diet and continue his medications.    Essential Hypertension Plan: The patient was instructed to continue the current medications.    Type 2 Diabetes Mellitus without complication without insulin usage Plan: The patient was instructed to continue the current medications.    Allergic Rhinitis Plan: Continue the current medication regimen.    Dementia Plan: The patient was instructed to continue the current medications.    Vitamin B12 Deficiency Plan: The patient was instructed to continue the current medications.    Counseled regarding immunizations and applicable VIS given.    Immunizations Ordered and Administered: Fluzone High Dose 65+ years.    Diagnoses and all orders for this visit:    1. RUQ abdominal pain (Primary)  -     US Abdomen Limited; Future    2. Allergic rhinitis due to pollen, unspecified seasonality    3. Type 2 diabetes mellitus without complication, with long-term current use of insulin (HCC)  -     Comprehensive Metabolic Panel; Future  -     Hemoglobin A1c; Future  -     Hemoglobin A1c  -     Comprehensive Metabolic Panel    4. Essential hypertension  -     CBC & Differential; Future  -     Comprehensive Metabolic Panel; Future  -     TSH; Future  -     TSH  -     Comprehensive Metabolic Panel  -     CBC & Differential    5. Hyperlipidemia, unspecified hyperlipidemia type  -     Comprehensive Metabolic Panel; Future  -     Lipid Panel; Future  -     Lipid Panel  -     Comprehensive Metabolic Panel    6. Vitamin B 12 deficiency  -      CBC & Differential; Future  -     Vitamin B12; Future  -     Vitamin B12  -     CBC & Differential    7. Dementia without behavioral disturbance (HCC)  -     TSH; Future  -     TSH    8. Immunization due  -     Fluzone High-Dose 65+yrs        Return to Office    The patient was instructed to return for follow-up in 4 months. The patient was instructed to return sooner if the condition changes, worsens, or does not resolve.

## 2022-11-04 LAB
ALBUMIN SERPL-MCNC: 4.3 G/DL (ref 3.5–5.2)
ALBUMIN/GLOB SERPL: 1.9 G/DL
ALP SERPL-CCNC: 84 U/L (ref 39–117)
ALT SERPL-CCNC: 13 U/L (ref 1–41)
AST SERPL-CCNC: 17 U/L (ref 1–40)
BASOPHILS # BLD AUTO: 0.08 10*3/MM3 (ref 0–0.2)
BASOPHILS NFR BLD AUTO: 0.9 % (ref 0–1.5)
BILIRUB SERPL-MCNC: 0.5 MG/DL (ref 0–1.2)
BUN SERPL-MCNC: 13 MG/DL (ref 8–23)
BUN/CREAT SERPL: 13.1 (ref 7–25)
CALCIUM SERPL-MCNC: 9.7 MG/DL (ref 8.6–10.5)
CHLORIDE SERPL-SCNC: 101 MMOL/L (ref 98–107)
CHOLEST SERPL-MCNC: 124 MG/DL (ref 0–200)
CO2 SERPL-SCNC: 26 MMOL/L (ref 22–29)
CREAT SERPL-MCNC: 0.99 MG/DL (ref 0.76–1.27)
EGFRCR SERPLBLD CKD-EPI 2021: 84 ML/MIN/1.73
EOSINOPHIL # BLD AUTO: 0.6 10*3/MM3 (ref 0–0.4)
EOSINOPHIL NFR BLD AUTO: 7 % (ref 0.3–6.2)
ERYTHROCYTE [DISTWIDTH] IN BLOOD BY AUTOMATED COUNT: 12.1 % (ref 12.3–15.4)
GLOBULIN SER CALC-MCNC: 2.3 GM/DL
GLUCOSE SERPL-MCNC: 143 MG/DL (ref 65–99)
HBA1C MFR BLD: 8.2 % (ref 4.8–5.6)
HCT VFR BLD AUTO: 44.3 % (ref 37.5–51)
HDLC SERPL-MCNC: 38 MG/DL (ref 40–60)
HGB BLD-MCNC: 15 G/DL (ref 13–17.7)
IMM GRANULOCYTES # BLD AUTO: 0.05 10*3/MM3 (ref 0–0.05)
IMM GRANULOCYTES NFR BLD AUTO: 0.6 % (ref 0–0.5)
LDLC SERPL CALC-MCNC: 66 MG/DL (ref 0–100)
LYMPHOCYTES # BLD AUTO: 3.25 10*3/MM3 (ref 0.7–3.1)
LYMPHOCYTES NFR BLD AUTO: 38 % (ref 19.6–45.3)
MCH RBC QN AUTO: 31 PG (ref 26.6–33)
MCHC RBC AUTO-ENTMCNC: 33.9 G/DL (ref 31.5–35.7)
MCV RBC AUTO: 91.5 FL (ref 79–97)
MONOCYTES # BLD AUTO: 1.01 10*3/MM3 (ref 0.1–0.9)
MONOCYTES NFR BLD AUTO: 11.8 % (ref 5–12)
NEUTROPHILS # BLD AUTO: 3.57 10*3/MM3 (ref 1.7–7)
NEUTROPHILS NFR BLD AUTO: 41.7 % (ref 42.7–76)
NRBC BLD AUTO-RTO: 0.1 /100 WBC (ref 0–0.2)
PLATELET # BLD AUTO: 259 10*3/MM3 (ref 140–450)
POTASSIUM SERPL-SCNC: 4.9 MMOL/L (ref 3.5–5.2)
PROT SERPL-MCNC: 6.6 G/DL (ref 6–8.5)
RBC # BLD AUTO: 4.84 10*6/MM3 (ref 4.14–5.8)
SODIUM SERPL-SCNC: 140 MMOL/L (ref 136–145)
TRIGL SERPL-MCNC: 109 MG/DL (ref 0–150)
TSH SERPL DL<=0.005 MIU/L-ACNC: 1.95 UIU/ML (ref 0.27–4.2)
VIT B12 SERPL-MCNC: 324 PG/ML (ref 211–946)
VLDLC SERPL CALC-MCNC: 20 MG/DL (ref 5–40)
WBC # BLD AUTO: 8.56 10*3/MM3 (ref 3.4–10.8)

## 2022-11-17 ENCOUNTER — APPOINTMENT (OUTPATIENT)
Dept: ULTRASOUND IMAGING | Facility: HOSPITAL | Age: 67
End: 2022-11-17

## 2022-11-22 DIAGNOSIS — J30.9 ALLERGIC RHINITIS, UNSPECIFIED SEASONALITY, UNSPECIFIED TRIGGER: ICD-10-CM

## 2022-11-22 RX ORDER — FLUTICASONE PROPIONATE 50 MCG
SPRAY, SUSPENSION (ML) NASAL
Qty: 16 G | Refills: 0 | Status: SHIPPED | OUTPATIENT
Start: 2022-11-22

## 2022-11-22 NOTE — TELEPHONE ENCOUNTER
LOV for chronic condition 11/3/2022  NOV  3/23/2022         Lab Hours:  Monday-Thursday 7-6  Friday 7-5 Saturday 7-12    Do not eat any food or drink any beverages for 12 hours before having the testing done. You may have water only; NO candy, gum, mints, coffee, soda or juice.  Please take all medications as usual. Do not drink any alcoholic beverages for 24 hours prior to testing.    If your physician has ordered laboratory or radiological testing as a part of your ongoing plan of care, please allow 5-7 business days for the results to be sent and reviewed by your provider.  You may receive test results in [facility] before your physician has had a chance to review them.   If your results are critical and require more immediate intervention, you will be contacted sooner. Your results will be conveyed via phone call or letter.      Clinic phone hours  Monday 8:00 - 4:30   Tuesday 8:00 - 4:30   Wednesday 8:00 - 4:30  Thursday 8:00 - 4:30 Friday 8:00 - 4:30    If you need a refill on your prescription, please call your pharmacy and let them know.  Please be proactive and call before your medication runs out.  The pharmacy will then contact us for the refill(s).  Please allow 24 - 48 hours for the refill to be processed.    You may receive a patient satisfaction survey in the mail. Please take time to complete, as your feedback is very important to us. We strive to make your experience exceptional and your comments help us with goal. We look forward to hearing from you.    Thank you,    [facility]  [address] [address] [place], [place] [id number]  Phone: [phone]    Medicare Wellness Visit  Plan for Preventive Care    A good way for you to stay healthy is to use preventive care.  Medicare covers many services that can help you stay healthy.* The goal of these services is to find any health problems as quickly as possible. Finding problems early can help make them easier to treat.  Your personal plan below lists the services you may need and when they are due.      Health  Maintenance Summary       Hepatitis B Vaccine (1 of 3 - 3-dose series)  Overdue - never done    Influenza Vaccine (1)  Due since 9/1/2022    Traditional Medicare- Medicare Wellness Visit (Yearly)  Due since 9/7/2022    Depression Screening (Yearly)  Next due on 9/7/2023    DTaP/Tdap/Td Vaccine (2 - Td or Tdap)  Next due on 9/1/2026    Pneumococcal Vaccine 65+   Completed    Shingles Vaccine   Completed    Osteoporosis Screening   Completed    COVID-19 Vaccine   Completed    Meningococcal Vaccine   Aged Out    HPV Vaccine   Aged Out             Preventive Care for Women and Men    Heart Screenings (Cardiovascular):  Blood tests are used to check your cholesterol, lipid and triglyceride levels. High levels can increase your risk for heart disease and stroke. High levels can be treated with medications, diet and exercise. Lowering your levels can help keep your heart and blood vessels healthy.  Your provider will order these tests if they are needed.    An ultrasound is done to see if you have an abdominal aortic aneurysm (AAA).  This is an enlargement of one of the main blood vessels that delivers blood to the body.   In the [place], 9,000 deaths are caused by AAA.  You may not even know you have this problem and as many as 1 in 3 people will have a serious problem if it is not treated.  Early diagnosis allows for more effective treatment and cure.  If you have a family history of AAA or are a male age 65-75 who has smoked, you are at higher risk of an AAA.  Your provider can order this test, if needed.    Colorectal Screening:  There are many tests that are used to check for cancer of your colon and rectum. You and your provider should discuss what test is best for you and when to have it done.  Options include:  Screening Colonoscopy: exam of the entire colon, seen through a flexible lighted tube.  Flexible Sigmoidoscopy: exam of the last third (sigmoid portion) of the colon and rectum, seen through a flexible  lighted tube.  Cologuard DNA stool test: a sample of your stool is used to screen for cancer and unseen blood in your stool.  Fecal Occult Blood Test: a sample of your stool is studied to find any unseen blood    Flu Shot:  An immunization that helps to prevent influenza (the flu). You should get this every year. The best time to get the shot is in the fall.    Pneumococcal Shot:  Vaccines are available that can help prevent pneumococcal disease, which is any type of infection caused by Streptococcus pneumoniae bacteria.   Their use can prevent some cases of pneumonia, meningitis, and sepsis. There are two types of pneumococcal vaccines:   Conjugate vaccines (PCV-13 or Prevnar 13®) - helps protect against the 13 types of pneumococcal bacteria that are the most common causes of serious infections in children and adults.    Polysaccharide vaccine (PPSV23 or Yjhqxaxvm83®) - helps protect against 23 types of pneumococcal bacteria for patients who are recommended to get it.  These vaccines should be given at least 12 months apart.  A booster is usually not needed.     Hepatitis B Shot:  An immunization that helps to protect people from getting Hepatitis B. Hepatitis B is a virus that spreads through contact with infected blood or body fluids. Many people with the virus do not have symptoms.  The virus can lead to serious problems, such as liver disease. Some people are at higher risk than others. Your doctor will tell you if you need this shot.     Diabetes Screening:  A test to measure sugar (glucose) in your blood is called a fasting blood sugar. Fasting means you cannot have food or drink for at least 8 hours before the test. This test can detect diabetes long before you may notice symptoms.    Glaucoma Screening:  Glaucoma screening is performed by your eye doctor. The test measures the fluid pressure inside your eyes to determine if you have glaucoma.     Hepatitis C Screening:  A blood test to see if you have the  hepatitis C virus.  Hepatitis C attacks the liver and is a major cause of chronic liver disease.  Medicare will cover a single screening for all adults born between 1945 & 1965, or high risk patients (people who have injected illegal drugs or people who have had blood transfusions).  High risk patients who continue to inject illegal drugs can be screened for Hepatitis C every year.    Smoking and Tobacco-Use Cessation Counseling:  Tobacco is the single greatest cause of disease and early death in our country today. Medication and counseling together can increase a person’s chance of quitting for good.   Medicare covers two quitting attempts per year, with four counseling sessions per attempt (eight sessions in a 12 month period)    Preventive Screening tests for Women    Screening Mammograms and Breast Exams:  An x-ray of your breasts to check for breast cancer before you or your doctor may be able to feel it.  If breast cancer is found early it can usually be treated with success.    Pelvic Exams and Pap Tests:  An exam to check for cervical and vaginal cancer. A Pap test is a lab test in which cells are taken from your cervix and sent to the lab to look for signs of cervical cancer. If cancer of the cervix is found early, chances for a cure are good. Testing can generally end at age 65, or if a woman has a hysterectomy for a benign condition. Your provider may recommend more frequent testing if certain abnormal results are found.    Bone Mass Measurements:  A painless x-ray of your bone density to see if you are at risk for a broken bone. Bone density refers to the thickness of bones or how tightly the bone tissue is packed.    Preventive Screening tests for Men    Prostate Screening:  Should you have a prostate cancer test (PSA)?  It is up to you to decide if you want a prostate cancer test. Talk to your clinician to find out if the test is right for you.  Things for you to consider and talk about should  include:  Benefits and harms of the test  Your family history  How your race/ethnicity may influence the test  If the test may impact other medical conditions you have  Your values on screenings and treatments    *Medicare pays for many preventive services to keep you healthy. For some of these services, you might have to pay a deductible, coinsurance, and / or copayment.  The amounts vary depending on the type of services you need and the kind of Medicare health plan you have.    For further details on screenings offered by Medicare please visit: https://www.medicare.gov/coverage/preventive-screening-services       If you would like to try a sleeping aid that is perhaps less sedating than prescription strength, considers trying over-the-counter Unisom also noticed doxylamine nightly as needed approximately 30 minutes before bedtime. I would prefer that you replace melatonin with doxylamine, rather than taking them together.

## 2022-11-29 ENCOUNTER — APPOINTMENT (OUTPATIENT)
Dept: ULTRASOUND IMAGING | Facility: HOSPITAL | Age: 67
End: 2022-11-29

## 2022-11-29 RX ORDER — NITROGLYCERIN 0.4 MG/1
TABLET SUBLINGUAL
Qty: 25 TABLET | Refills: 0 | Status: SHIPPED | OUTPATIENT
Start: 2022-11-29

## 2022-12-19 RX ORDER — LISINOPRIL 2.5 MG/1
2.5 TABLET ORAL DAILY
Qty: 90 TABLET | Refills: 1 | Status: SHIPPED | OUTPATIENT
Start: 2022-12-19

## 2022-12-28 RX ORDER — METOPROLOL SUCCINATE 25 MG/1
25 TABLET, EXTENDED RELEASE ORAL DAILY
Qty: 90 TABLET | Refills: 1 | Status: SHIPPED | OUTPATIENT
Start: 2022-12-28 | End: 2023-02-03

## 2023-02-03 RX ORDER — METOPROLOL SUCCINATE 25 MG/1
TABLET, EXTENDED RELEASE ORAL
Qty: 90 TABLET | Refills: 1 | Status: SHIPPED | OUTPATIENT
Start: 2023-02-03

## 2023-02-07 ENCOUNTER — TELEPHONE (OUTPATIENT)
Dept: INTERNAL MEDICINE | Facility: CLINIC | Age: 68
End: 2023-02-07
Payer: MEDICARE

## 2023-04-03 ENCOUNTER — OFFICE VISIT (OUTPATIENT)
Dept: INTERNAL MEDICINE | Facility: CLINIC | Age: 68
End: 2023-04-03
Payer: MEDICARE

## 2023-04-03 VITALS
TEMPERATURE: 98 F | RESPIRATION RATE: 20 BRPM | HEART RATE: 60 BPM | BODY MASS INDEX: 30.37 KG/M2 | SYSTOLIC BLOOD PRESSURE: 132 MMHG | DIASTOLIC BLOOD PRESSURE: 64 MMHG | WEIGHT: 243 LBS

## 2023-04-03 DIAGNOSIS — F03.90 DEMENTIA WITHOUT BEHAVIORAL DISTURBANCE: ICD-10-CM

## 2023-04-03 DIAGNOSIS — E11.9 TYPE 2 DIABETES MELLITUS WITHOUT COMPLICATION, WITH LONG-TERM CURRENT USE OF INSULIN: Primary | ICD-10-CM

## 2023-04-03 DIAGNOSIS — E78.5 HYPERLIPIDEMIA, UNSPECIFIED HYPERLIPIDEMIA TYPE: ICD-10-CM

## 2023-04-03 DIAGNOSIS — E53.8 VITAMIN B 12 DEFICIENCY: ICD-10-CM

## 2023-04-03 DIAGNOSIS — I10 ESSENTIAL HYPERTENSION: ICD-10-CM

## 2023-04-03 DIAGNOSIS — Z79.4 TYPE 2 DIABETES MELLITUS WITHOUT COMPLICATION, WITH LONG-TERM CURRENT USE OF INSULIN: Primary | ICD-10-CM

## 2023-04-03 LAB
ALBUMIN/CREATININE RATIO, URINE: NORMAL
BILIRUB BLD-MCNC: ABNORMAL MG/DL
CLARITY, POC: ABNORMAL
COLOR UR: ABNORMAL
EXPIRATION DATE: ABNORMAL
EXPIRATION DATE: NORMAL
GLUCOSE UR STRIP-MCNC: NEGATIVE MG/DL
KETONES UR QL: ABNORMAL
LEUKOCYTE EST, POC: NEGATIVE
Lab: ABNORMAL
Lab: NORMAL
NITRITE UR-MCNC: NEGATIVE MG/ML
PH UR: 5 [PH] (ref 5–8)
POC CREATININE URINE: NORMAL
POC MICROALBUMIN URINE: NORMAL
PROT UR STRIP-MCNC: ABNORMAL MG/DL
RBC # UR STRIP: NEGATIVE /UL
SP GR UR: 1.02 (ref 1–1.03)
UROBILINOGEN UR QL: ABNORMAL

## 2023-04-03 RX ORDER — QUETIAPINE FUMARATE 25 MG/1
1 TABLET, FILM COATED ORAL 2 TIMES DAILY
COMMUNITY
Start: 2023-03-22

## 2023-04-03 RX ADMIN — CYANOCOBALAMIN 1000 MCG: 1000 INJECTION, SOLUTION INTRAMUSCULAR; SUBCUTANEOUS at 13:28

## 2023-04-03 NOTE — PROGRESS NOTES
Chief Complaint   Patient presents with   • Follow-up     4 month follow up chronic medical problems       History of Present Illness      The patient presents for a follow-up related to Vitamin B12 deficiency. There are no reports of blood loss. The patient has no symptoms of a dry cough, a wet cough, wheezing, fever, nausea, vomiting, diarrhea, myalgias, abdominal pain, sweats, weight loss, decreased appetite, chills, paresthesias, numbness or memory loss. His energy level is normal. The patient is taking a vitamin B12 supplement in the form of IM injections.    The patient presents for a follow-up related to Type 2 Diabetes Mellitus. He checks his blood sugars at home. His sugars are checked daily. The average sugars are in the 100-150 range. He denies hypoglycemic symptoms. The patient denies polyuria, polydypsia or polyphagia. He reports no symptoms of neuropathy. The patient takes his medication as prescribed. He is taking insulin. His injection sites are rotated appropriately. The patient does check his feet daily at home. He denies chest pain, shortness of breath, orthopnea, paroxysmal nocturnal dyspnea, dyspnea on exertion, edema, palpitations or syncope.    The patient presents for a follow-up related to hyperlipidemia. He is following a low fat diet. He reports that he is exercising. He is taking atorvastatin. The patient is taking his medication as prescribed. He reports no medication side effects, including muscle cramps, abdominal pain, headaches or weakness.    The patient presents for a follow-up related to hypertension. The patient reports that he has had no headaches or blurred vision. He states that he is taking his medication as prescribed. He is not having medication side effects.    The patient presents for a follow-up related to dementia. His symptoms are reported to be worsened. He is taking a medication. The medication is taken as prescribed. There are no medication side effects. He lives  with his spouse. The patient is able to stay alone.    Review of Systems    PULMONARY- Denies Sputum Production, Cough, Hemoptysis or Pleuritic Chest Pain.    CARDIOVASCULAR- Denies Claudication or Irregular Heart Beat.    NEUROLOGIC- Denies Seizures, Confusion, Depression or Excessive Daytime Sleepiness.    Medications      Current Outpatient Medications:   •  Alcohol Swabs (ALCOHOL PREP) pads, 1 swab 3 (Three) Times a Day., Disp: 200 each, Rfl: 0  •  aspirin 81 MG EC tablet, Take 1 tablet by mouth Daily., Disp: , Rfl:   •  atorvastatin (LIPITOR) 40 MG tablet, TAKE 1 TABLET BY MOUTH EVERY NIGHT, Disp: 90 tablet, Rfl: 1  •  B-D UF III MINI PEN NEEDLES 31G X 5 MM misc, USE AS DIRECTED, Disp: 200 each, Rfl: 1  •  Blood Glucose Calibration (ACCU-CHEK LOR) solution, 1 bottle by In Vitro route As Needed (Use as directed)., Disp: 1 each, Rfl: 0  •  Blood Glucose Monitoring Suppl (ACCU-CHEK LOR PLUS) w/Device kit, 1 kit 3 (Three) Times a Day., Disp: 1 kit, Rfl: 0  •  cilostazol (PLETAL) 50 MG tablet, TAKE 1 TABLET BY MOUTH TWICE DAILY, Disp: 180 tablet, Rfl: 1  •  donepezil (Aricept) 23 MG tablet, Take 1 tablet by mouth Every Night., Disp: 30 tablet, Rfl: 5  •  Eliquis 5 MG tablet tablet, TAKE 1 TABLET BY MOUTH TWICE DAILY, Disp: 180 tablet, Rfl: 1  •  fluticasone (FLONASE) 50 MCG/ACT nasal spray, INSTILL 2 SPRAYS INTO THE NOSTRIL(S) AS DIRECTED BY PROVIDER DAILY, Disp: 16 g, Rfl: 0  •  glucose blood (Accu-Chek Lor Plus) test strip, Use as instructed, Disp: 100 each, Rfl: 12  •  Januvia 100 MG tablet, TAKE 1 TABLET BY MOUTH DAILY, Disp: 90 tablet, Rfl: 1  •  Lancets (ACCU-CHEK SOFT TOUCH) lancets, Check sugars 3 times daily, Disp: 1 each, Rfl: 12  •  levocetirizine (XYZAL) 5 MG tablet, TAKE 1 TABLET BY MOUTH EVERY EVENING, Disp: 30 tablet, Rfl: 2  •  lisinopril (PRINIVIL,ZESTRIL) 2.5 MG tablet, TAKE 1 TABLET BY MOUTH DAILY, Disp: 90 tablet, Rfl: 1  •  metFORMIN (GLUCOPHAGE) 1000 MG tablet, TAKE 1 TABLET BY MOUTH  TWICE DAILY, Disp: 180 tablet, Rfl: 1  •  metoprolol succinate XL (TOPROL-XL) 25 MG 24 hr tablet, TAKE 1 TABLET BY MOUTH EVERY DAY, Disp: 90 tablet, Rfl: 1  •  nitroglycerin (NITROSTAT) 0.4 MG SL tablet, DISSOLVE 1 TABLET UNDER THE TONGUE EVERY 5 MINUTES AS NEEDED FOR CHEST PAIN. DO NOT EXCEED A TOTAL OF 3 DOSES IN 15 MINUTES. (Patient not taking: Reported on 4/3/2023), Disp: 25 tablet, Rfl: 0  •  NovoLOG Mix 70/30 FlexPen (70-30) 100 UNIT/ML suspension pen-injector injection, ADMINISTER 50 UNITS UNDER THE SKIN TWICE DAILY WITH MEALS (Patient taking differently: 40 units sq every morning and 25-35 units in evening based on meal intake), Disp: 30 mL, Rfl: 5  •  QUEtiapine (SEROquel) 25 MG tablet, Take 1 tablet by mouth 2 (Two) Times a Day., Disp: , Rfl:     Current Facility-Administered Medications:   •  cyanocobalamin injection 1,000 mcg, 1,000 mcg, Intramuscular, Q28 Days, Des Geller MD, 1,000 mcg at 02/21/22 1507     Allergies    Allergies   Allergen Reactions   • Bactrim [Sulfamethoxazole-Trimethoprim] Rash   • Adhesive Tape Rash   • Neosporin [Neomycin-Bacitracin Zn-Polymyx] Rash       Problem List    Patient Active Problem List   Diagnosis   • Cough   • Benign essential HTN   • Benign prostatic hyperplasia   • Coronary artery disease involving native coronary artery of native heart   • Chronic coronary artery disease   • Diabetes mellitus   • Gout   • Hyperlipidemia   • History of heart attack   • Peripheral neuropathy   • Peripheral vascular disease       Medications, Allergies, Problems List and Past History were reviewed and updated.    Physical Examination    /64 (BP Location: Left arm, Patient Position: Sitting, Cuff Size: Adult)   Pulse 60   Temp 98 °F (36.7 °C) (Infrared)   Resp 20   Wt 110 kg (243 lb)   BMI 30.37 kg/m²     HEENT: Head- Normocephalic Atraumatic. Facies- Within normal limits. Pinnas- Normal texture and shape bilaterally. Canals- Normal bilaterally. TMs- Normal  bilaterally. Nares- Patent bilaterally. Nasal Septum- is normal. There is no tenderness to palpation over the frontal or maxillary sinuses. Lids- Normal bilaterally. Conjunctiva- Clear bilaterally. Sclera- Anicteric bilaterally. Oropharynx- Moist with no lesions. Tonsils- No enlargement, erythema or exudate.    Neck: Thyroid- non enlarged, symmetric and has no nodules. No bruits are detected. ROM- Normal Range of Motion with no rigidity.    Lungs: Auscultation- Clear to auscultation bilaterally. There are no retractions, clubbing or cyanosis. The Expiratory to Inspiratory ratio is equal.    Lymph Nodes: Cervical- no enlarged lymph nodes noted.    Cardiovascular: There are no carotid bruits. Heart- Normal Rate with Regular rhythm and no murmurs. There are no gallops. There are no rubs. In the lower extremities there is no edema. The upper extremities do not have edema.    Abdomen: Soft, benign, non-tender with no masses, hernias, organomegaly or scars.    Neurologic Exam:    Cranial Nerves II-XII: Intact    Upper Extremity Neuro Exam: Muscle Strength is 5/5 in all major upper extremity muscle groups. Deep Tendon Reflexes are 2+ and equal in the biceps, triceps and brachioradialis distributions bilaterally. Sensation is normal in all distributions.    Lower Extremity Neuro Exam: Muscle Strength is 5/5 in all major lower extremity muscle groups. Deep Tendon Reflexes are 2+ and equal in the patellar and Achilles distributions bilaterally. Sensation is normal in all distributions.    Gait: Normal.    Impression and Assessment    Vitamin B12 Deficiency.    Type 2 Diabetes Mellitus without complication without insulin usage.    Hyperlipidemia.    Essential Hypertension.    Dementia.    Plan    Hyperlipidemia Plan: The patient was instructed to exercise daily, eat a low fat diet and continue his medications.    Essential Hypertension Plan: The patient was instructed to continue the current medications.    Type 2 Diabetes  Mellitus without complication without insulin usage Plan: The patient was instructed to continue the current medications.    Dementia Plan: The patient was instructed to continue the current medications.    Vitamin B12 Deficiency Plan: The patient was instructed to continue the current medications.    Diagnoses and all orders for this visit:    1. Type 2 diabetes mellitus without complication, with long-term current use of insulin (Primary)  -     Comprehensive Metabolic Panel; Future  -     POC Microalbumin; Future  -     Comprehensive Metabolic Panel  -     POC Microalbumin    2. Hyperlipidemia, unspecified hyperlipidemia type  -     Comprehensive Metabolic Panel; Future  -     Lipid Panel; Future  -     Lipid Panel  -     Comprehensive Metabolic Panel    3. Essential hypertension  -     CBC & Differential; Future  -     Comprehensive Metabolic Panel; Future  -     POC Urinalysis Dipstick, Automated; Future  -     Comprehensive Metabolic Panel  -     CBC & Differential  -     POC Urinalysis Dipstick, Automated    4. Vitamin B 12 deficiency  -     CBC & Differential; Future  -     Vitamin B12; Future  -     Vitamin B12  -     CBC & Differential    5. Dementia without behavioral disturbance        Return to Office    The patient was instructed to return for follow-up in 4 months. The patient was instructed to return sooner if the condition changes, worsens, or does not resolve.      Patient tolerated well good hemostasis, Left arm

## 2023-04-04 LAB
ALBUMIN SERPL-MCNC: 4.1 G/DL (ref 3.5–5.2)
ALBUMIN/GLOB SERPL: 1.4 G/DL
ALP SERPL-CCNC: 105 U/L (ref 39–117)
ALT SERPL-CCNC: 13 U/L (ref 1–41)
AST SERPL-CCNC: 14 U/L (ref 1–40)
BASOPHILS # BLD AUTO: 0.07 10*3/MM3 (ref 0–0.2)
BASOPHILS NFR BLD AUTO: 0.9 % (ref 0–1.5)
BILIRUB SERPL-MCNC: 0.6 MG/DL (ref 0–1.2)
BUN SERPL-MCNC: 17 MG/DL (ref 8–23)
BUN/CREAT SERPL: 16 (ref 7–25)
CALCIUM SERPL-MCNC: 9.2 MG/DL (ref 8.6–10.5)
CHLORIDE SERPL-SCNC: 104 MMOL/L (ref 98–107)
CHOLEST SERPL-MCNC: 201 MG/DL (ref 0–200)
CO2 SERPL-SCNC: 28 MMOL/L (ref 22–29)
CREAT SERPL-MCNC: 1.06 MG/DL (ref 0.76–1.27)
EGFRCR SERPLBLD CKD-EPI 2021: 77.4 ML/MIN/1.73
EOSINOPHIL # BLD AUTO: 0.67 10*3/MM3 (ref 0–0.4)
EOSINOPHIL NFR BLD AUTO: 8.2 % (ref 0.3–6.2)
ERYTHROCYTE [DISTWIDTH] IN BLOOD BY AUTOMATED COUNT: 11.8 % (ref 12.3–15.4)
GLOBULIN SER CALC-MCNC: 3 GM/DL
GLUCOSE SERPL-MCNC: 175 MG/DL (ref 65–99)
HCT VFR BLD AUTO: 44.5 % (ref 37.5–51)
HDLC SERPL-MCNC: 41 MG/DL (ref 40–60)
HGB BLD-MCNC: 15.5 G/DL (ref 13–17.7)
IMM GRANULOCYTES # BLD AUTO: 0.04 10*3/MM3 (ref 0–0.05)
IMM GRANULOCYTES NFR BLD AUTO: 0.5 % (ref 0–0.5)
LDLC SERPL CALC-MCNC: 135 MG/DL (ref 0–100)
LYMPHOCYTES # BLD AUTO: 2.77 10*3/MM3 (ref 0.7–3.1)
LYMPHOCYTES NFR BLD AUTO: 34 % (ref 19.6–45.3)
MCH RBC QN AUTO: 30.6 PG (ref 26.6–33)
MCHC RBC AUTO-ENTMCNC: 34.8 G/DL (ref 31.5–35.7)
MCV RBC AUTO: 87.8 FL (ref 79–97)
MONOCYTES # BLD AUTO: 0.84 10*3/MM3 (ref 0.1–0.9)
MONOCYTES NFR BLD AUTO: 10.3 % (ref 5–12)
NEUTROPHILS # BLD AUTO: 3.76 10*3/MM3 (ref 1.7–7)
NEUTROPHILS NFR BLD AUTO: 46.1 % (ref 42.7–76)
NRBC BLD AUTO-RTO: 0 /100 WBC (ref 0–0.2)
PLATELET # BLD AUTO: 300 10*3/MM3 (ref 140–450)
POTASSIUM SERPL-SCNC: 4.6 MMOL/L (ref 3.5–5.2)
PROT SERPL-MCNC: 7.1 G/DL (ref 6–8.5)
RBC # BLD AUTO: 5.07 10*6/MM3 (ref 4.14–5.8)
SODIUM SERPL-SCNC: 141 MMOL/L (ref 136–145)
TRIGL SERPL-MCNC: 138 MG/DL (ref 0–150)
VIT B12 SERPL-MCNC: >2000 PG/ML (ref 211–946)
VLDLC SERPL CALC-MCNC: 25 MG/DL (ref 5–40)
WBC # BLD AUTO: 8.15 10*3/MM3 (ref 3.4–10.8)

## 2023-04-05 RX ORDER — CILOSTAZOL 50 MG/1
50 TABLET ORAL 2 TIMES DAILY
Qty: 180 TABLET | Refills: 1 | Status: SHIPPED | OUTPATIENT
Start: 2023-04-05

## 2023-04-05 NOTE — TELEPHONE ENCOUNTER
Caller: WILLIE RICHTER    Relationship: Emergency Contact    Best call back number: [phone]    Requested Prescriptions:   Requested Prescriptions     Pending Prescriptions Disp Refills   • cilostazol (PLETAL) 50 MG tablet 180 tablet 1     Sig: Take 1 tablet by mouth 2 (Two) Times a Day.      Pharmacy where request should be sent: [facility] - [phone] [facility] [phone]      Last office visit with prescribing clinician: 4/3/2023   Last telemedicine visit with prescribing clinician: 8/3/2023   Next office visit with prescribing clinician: 8/3/2023     Additional details provided by patient: NO MEDICATION ON HAND    Does the patient have less than a 3 day supply:  [x] Yes  [] No    Would you like a call back once the refill request has been completed: [] Yes [x] No    If the office needs to give you a call back, can they leave a voicemail: [] Yes [x] No    Tawana Acosta Rep   04/05/23 13:55 EDT

## 2023-04-24 RX ORDER — DONEPEZIL HYDROCHLORIDE 23 MG/1
TABLET, FILM COATED ORAL
Qty: 30 TABLET | Refills: 2 | Status: SHIPPED | OUTPATIENT
Start: 2023-04-24

## 2023-06-03 DIAGNOSIS — E11.51 TYPE 2 DIABETES MELLITUS WITH DIABETIC PERIPHERAL ANGIOPATHY WITHOUT GANGRENE, WITH LONG-TERM CURRENT USE OF INSULIN: ICD-10-CM

## 2023-06-03 DIAGNOSIS — Z79.4 TYPE 2 DIABETES MELLITUS WITH DIABETIC PERIPHERAL ANGIOPATHY WITHOUT GANGRENE, WITH LONG-TERM CURRENT USE OF INSULIN: ICD-10-CM

## 2023-06-05 RX ORDER — SITAGLIPTIN 100 MG/1
TABLET, FILM COATED ORAL
Qty: 90 TABLET | Refills: 1 | Status: SHIPPED | OUTPATIENT
Start: 2023-06-05

## 2023-07-25 RX ORDER — DONEPEZIL HYDROCHLORIDE 23 MG/1
TABLET, FILM COATED ORAL
Qty: 30 TABLET | Refills: 2 | Status: SHIPPED | OUTPATIENT
Start: 2023-07-25

## 2023-08-03 ENCOUNTER — OFFICE VISIT (OUTPATIENT)
Dept: INTERNAL MEDICINE | Facility: CLINIC | Age: 68
End: 2023-08-03
Payer: MEDICARE

## 2023-08-03 VITALS
HEART RATE: 84 BPM | TEMPERATURE: 98 F | DIASTOLIC BLOOD PRESSURE: 80 MMHG | SYSTOLIC BLOOD PRESSURE: 132 MMHG | HEIGHT: 74 IN | WEIGHT: 243 LBS | BODY MASS INDEX: 31.18 KG/M2 | RESPIRATION RATE: 20 BRPM

## 2023-08-03 DIAGNOSIS — E78.5 HYPERLIPIDEMIA, UNSPECIFIED HYPERLIPIDEMIA TYPE: ICD-10-CM

## 2023-08-03 DIAGNOSIS — E11.9 TYPE 2 DIABETES MELLITUS WITHOUT COMPLICATION, WITH LONG-TERM CURRENT USE OF INSULIN: ICD-10-CM

## 2023-08-03 DIAGNOSIS — Z79.4 TYPE 2 DIABETES MELLITUS WITHOUT COMPLICATION, WITH LONG-TERM CURRENT USE OF INSULIN: ICD-10-CM

## 2023-08-03 DIAGNOSIS — E53.8 VITAMIN B 12 DEFICIENCY: ICD-10-CM

## 2023-08-03 DIAGNOSIS — Z00.00 PHYSICAL EXAM: ICD-10-CM

## 2023-08-03 DIAGNOSIS — R10.13 EPIGASTRIC PAIN: ICD-10-CM

## 2023-08-03 DIAGNOSIS — Z12.5 PROSTATE CANCER SCREENING: ICD-10-CM

## 2023-08-03 DIAGNOSIS — F03.90 DEMENTIA WITHOUT BEHAVIORAL DISTURBANCE: ICD-10-CM

## 2023-08-03 DIAGNOSIS — Z00.00 MEDICARE ANNUAL WELLNESS VISIT, SUBSEQUENT: Primary | ICD-10-CM

## 2023-08-03 DIAGNOSIS — I10 ESSENTIAL HYPERTENSION: ICD-10-CM

## 2023-08-03 LAB
ALBUMIN/CREATININE RATIO, URINE: >300
BILIRUB BLD-MCNC: NEGATIVE MG/DL
CLARITY, POC: CLEAR
COLOR UR: YELLOW
EXPIRATION DATE: ABNORMAL
EXPIRATION DATE: NORMAL
GLUCOSE UR STRIP-MCNC: ABNORMAL MG/DL
KETONES UR QL: NEGATIVE
LEUKOCYTE EST, POC: NEGATIVE
Lab: ABNORMAL
Lab: NORMAL
NITRITE UR-MCNC: NEGATIVE MG/ML
PH UR: 5 [PH] (ref 5–8)
POC CREATININE URINE: 50
POC MICROALBUMIN URINE: 150
PROT UR STRIP-MCNC: ABNORMAL MG/DL
RBC # UR STRIP: NEGATIVE /UL
SP GR UR: 1.01 (ref 1–1.03)
UROBILINOGEN UR QL: NORMAL

## 2023-08-04 ENCOUNTER — TELEPHONE (OUTPATIENT)
Dept: INTERNAL MEDICINE | Facility: CLINIC | Age: 68
End: 2023-08-04
Payer: MEDICARE

## 2023-08-04 LAB
ALBUMIN SERPL-MCNC: 4.4 G/DL (ref 3.5–5.2)
ALBUMIN/GLOB SERPL: 1.7 G/DL
ALP SERPL-CCNC: 82 U/L (ref 39–117)
ALT SERPL-CCNC: 13 U/L (ref 1–41)
AST SERPL-CCNC: 14 U/L (ref 1–40)
BASOPHILS # BLD AUTO: 0.09 10*3/MM3 (ref 0–0.2)
BASOPHILS NFR BLD AUTO: 1.3 % (ref 0–1.5)
BILIRUB SERPL-MCNC: 0.5 MG/DL (ref 0–1.2)
BUN SERPL-MCNC: 12 MG/DL (ref 8–23)
BUN/CREAT SERPL: 10.9 (ref 7–25)
CALCIUM SERPL-MCNC: 9.7 MG/DL (ref 8.6–10.5)
CHLORIDE SERPL-SCNC: 106 MMOL/L (ref 98–107)
CHOLEST SERPL-MCNC: 201 MG/DL (ref 0–200)
CO2 SERPL-SCNC: 27.1 MMOL/L (ref 22–29)
CREAT SERPL-MCNC: 1.1 MG/DL (ref 0.76–1.27)
EGFRCR SERPLBLD CKD-EPI 2021: 73.6 ML/MIN/1.73
EOSINOPHIL # BLD AUTO: 0.82 10*3/MM3 (ref 0–0.4)
EOSINOPHIL NFR BLD AUTO: 11.6 % (ref 0.3–6.2)
ERYTHROCYTE [DISTWIDTH] IN BLOOD BY AUTOMATED COUNT: 12 % (ref 12.3–15.4)
GLOBULIN SER CALC-MCNC: 2.6 GM/DL
GLUCOSE SERPL-MCNC: 269 MG/DL (ref 65–99)
HBA1C MFR BLD: 8.4 % (ref 4.8–5.6)
HCT VFR BLD AUTO: 46.2 % (ref 37.5–51)
HDLC SERPL-MCNC: 37 MG/DL (ref 40–60)
HGB BLD-MCNC: 15.8 G/DL (ref 13–17.7)
IMM GRANULOCYTES # BLD AUTO: 0.03 10*3/MM3 (ref 0–0.05)
IMM GRANULOCYTES NFR BLD AUTO: 0.4 % (ref 0–0.5)
LDLC SERPL CALC-MCNC: 127 MG/DL (ref 0–100)
LYMPHOCYTES # BLD AUTO: 2.35 10*3/MM3 (ref 0.7–3.1)
LYMPHOCYTES NFR BLD AUTO: 33.3 % (ref 19.6–45.3)
MCH RBC QN AUTO: 30.5 PG (ref 26.6–33)
MCHC RBC AUTO-ENTMCNC: 34.2 G/DL (ref 31.5–35.7)
MCV RBC AUTO: 89.2 FL (ref 79–97)
MONOCYTES # BLD AUTO: 0.72 10*3/MM3 (ref 0.1–0.9)
MONOCYTES NFR BLD AUTO: 10.2 % (ref 5–12)
NEUTROPHILS # BLD AUTO: 3.05 10*3/MM3 (ref 1.7–7)
NEUTROPHILS NFR BLD AUTO: 43.2 % (ref 42.7–76)
NRBC BLD AUTO-RTO: 0 /100 WBC (ref 0–0.2)
PLATELET # BLD AUTO: 250 10*3/MM3 (ref 140–450)
POTASSIUM SERPL-SCNC: 5.4 MMOL/L (ref 3.5–5.2)
PROT SERPL-MCNC: 7 G/DL (ref 6–8.5)
PSA SERPL-MCNC: 0.79 NG/ML (ref 0–4)
RBC # BLD AUTO: 5.18 10*6/MM3 (ref 4.14–5.8)
SODIUM SERPL-SCNC: 141 MMOL/L (ref 136–145)
TRIGL SERPL-MCNC: 208 MG/DL (ref 0–150)
TSH SERPL DL<=0.005 MIU/L-ACNC: 2.28 UIU/ML (ref 0.27–4.2)
VIT B12 SERPL-MCNC: 304 PG/ML (ref 211–946)
VLDLC SERPL CALC-MCNC: 37 MG/DL (ref 5–40)
WBC # BLD AUTO: 7.06 10*3/MM3 (ref 3.4–10.8)

## 2023-08-04 RX ORDER — APIXABAN 5 MG/1
TABLET, FILM COATED ORAL
Qty: 180 TABLET | Refills: 0 | Status: SHIPPED | OUTPATIENT
Start: 2023-08-04

## 2023-08-08 NOTE — TELEPHONE ENCOUNTER
Reached Marco - pharmacist and advised of clinical message from Dr. Geller. Marco verbalized good understanding.

## 2023-08-15 RX ORDER — METOPROLOL SUCCINATE 25 MG/1
TABLET, EXTENDED RELEASE ORAL
Qty: 90 TABLET | Refills: 1 | Status: SHIPPED | OUTPATIENT
Start: 2023-08-15

## 2023-11-03 ENCOUNTER — TELEPHONE (OUTPATIENT)
Dept: INTERNAL MEDICINE | Facility: CLINIC | Age: 68
End: 2023-11-03
Payer: MEDICARE

## 2023-11-03 NOTE — TELEPHONE ENCOUNTER
WIFE OF PATIENT HAS CALLED REQUESTING A CALL ASAP WITH STATUS OF ORDER REQUEST FOR URINALYSIS.    CALL BACK NUMBER [phone]

## 2023-11-03 NOTE — TELEPHONE ENCOUNTER
Caller: WILLIE RICHTER    Relationship: Emergency Contact    Best call back number: [phone]    What orders are you requesting (i.e. lab or imaging): URINALYSIS FOR POSSIBLE UTI    In what timeframe would the patient need to come in: ASAP TODAY    Where will you receive your lab/imaging services: IN OFFICE    Additional notes: WIFE STATES THAT PATIENT HAS LOWER BACK PAIN AND IT MAY BE FROM AN UTI. WIFE IS REQUESTING A CALL WHEN ORDER HAS BEEN PLACED

## 2023-11-06 RX ORDER — TEMAZEPAM 15 MG/1
15 CAPSULE ORAL NIGHTLY PRN
Qty: 10 CAPSULE | Refills: 0 | Status: SHIPPED | OUTPATIENT
Start: 2023-11-06

## 2023-11-20 ENCOUNTER — TELEPHONE (OUTPATIENT)
Dept: INTERNAL MEDICINE | Facility: CLINIC | Age: 68
End: 2023-11-20
Payer: MEDICARE

## 2023-11-20 NOTE — TELEPHONE ENCOUNTER
Called patient and spoke to patients wife, [place]. She stated that they do plan to have this completed but will have to call back to schedule this exam. Patient has dementia and getting into testing has been difficult to schedule. I am pending order out to give them time to do so. If patient calls back in the mean time, central scheduling number is [phone].

## 2023-11-25 ENCOUNTER — DOCUMENTATION (OUTPATIENT)
Dept: INTERNAL MEDICINE | Facility: CLINIC | Age: 68
End: 2023-11-25
Payer: MEDICARE

## 2023-11-25 RX ORDER — QUETIAPINE FUMARATE 25 MG/1
25 TABLET, FILM COATED ORAL 3 TIMES DAILY
Qty: 90 TABLET | Refills: 5 | Status: SHIPPED | OUTPATIENT
Start: 2023-11-25

## 2023-12-05 ENCOUNTER — TELEMEDICINE (OUTPATIENT)
Dept: INTERNAL MEDICINE | Facility: CLINIC | Age: 68
End: 2023-12-05
Payer: MEDICARE

## 2023-12-05 DIAGNOSIS — F03.C11 SEVERE DEMENTIA WITH AGITATION, UNSPECIFIED DEMENTIA TYPE: ICD-10-CM

## 2023-12-05 DIAGNOSIS — E53.8 VITAMIN B 12 DEFICIENCY: ICD-10-CM

## 2023-12-05 DIAGNOSIS — R26.81 GAIT INSTABILITY: ICD-10-CM

## 2023-12-05 DIAGNOSIS — E78.5 HYPERLIPIDEMIA, UNSPECIFIED HYPERLIPIDEMIA TYPE: ICD-10-CM

## 2023-12-05 DIAGNOSIS — E11.9 TYPE 2 DIABETES MELLITUS WITHOUT COMPLICATION, WITH LONG-TERM CURRENT USE OF INSULIN: Primary | ICD-10-CM

## 2023-12-05 DIAGNOSIS — Z79.4 TYPE 2 DIABETES MELLITUS WITHOUT COMPLICATION, WITH LONG-TERM CURRENT USE OF INSULIN: Primary | ICD-10-CM

## 2023-12-05 DIAGNOSIS — I10 ESSENTIAL HYPERTENSION: ICD-10-CM

## 2023-12-05 NOTE — PROGRESS NOTES
Chief Complaint   Patient presents with    Follow-up     Chronic Medical Problems     Telemedicine was provided via two-way interactive audiovisual telecommunication (Melodigram) due to the COVID-19 outbreak in order to minimize risk of exposure.    Others in attendance:  None    Patient Location: Home- [place]  Provider Location: Office- [place]    Risks, benefits and alternative including in-person office visit have been explained to the patient, and the patient has consented to this mode of care.  The visit was conducted on a secure line.  All parties in the room, if any other, were identified and approved by the patient prior to the telemedicine visit.  No technical issues were experienced.  A level of care equivalent to in-person care was achieved.    History of Present Illness    The patient presents for a follow-up related to Vitamin B12 deficiency. There are no reports of blood loss. The patient has no symptoms of a dry cough, a wet cough, wheezing, fever, nausea, vomiting, diarrhea, myalgias, abdominal pain, sweats, weight loss, decreased appetite, chills, paresthesias, numbness or memory loss. His energy level is normal. The patient is taking a vitamin B12 supplement in the form of IM injections.    The patient presents for a follow-up related to Type 2 Diabetes Mellitus without complication without insulin usage. He checks his blood sugars at home. His sugars are checked daily. The average sugars are in the 100-150 range. He denies hypoglycemic symptoms. The patient denies polyuria, polydypsia or polyphagia. He reports no symptoms of neuropathy. The patient takes his medication as prescribed. He is taking insulin. His injection sites are rotated appropriately. The patient does check his feet daily at home. He denies chest pain, shortness of breath, orthopnea, paroxysmal nocturnal dyspnea, dyspnea on exertion, edema, palpitations or syncope.    The patient presents for a follow-up related to hyperlipidemia. He is  following a low fat diet. He reports that he is exercising. He is taking atorvastatin. The patient is taking his medication as prescribed. He reports no medication side effects, including muscle cramps, abdominal pain, headaches or weakness.    The patient presents for a follow-up related to hypertension. The patient reports that he has had no headaches or blurred vision. He states that he is taking his medication as prescribed. He is not having medication side effects.    The patient presents for a follow-up related to dementia. His symptoms are reported to be worsened. He is taking a medication. The medication is taken as prescribed. There are no medication side effects. He lives with his spouse. The patient is able to stay alone.    The patient presents for a follow-up of gait instability which has been present for three days. The symptoms are stable. There are no complaints of headaches. He denies urinary incontinence. There are no complaints of paresthesias in the upper or lower extremities. He denies neck pain. There are no complaints of back pain. He reports that he does not have joint stiffness. There are no complaints of visual changes. He denies excessive alcohol abuse. The patient denies heat intolerance, cold intolerance, night sweats, dysuria or hematuria.    Review of Systems    PULMONARY- Denies Sputum Production, Cough, Hemoptysis or Pleuritic Chest Pain.    CARDIOVASCULAR- Denies Claudication or Irregular Heart Beat.    NEUROLOGIC- Denies Seizures, Confusion, Depression or Excessive Daytime Sleepiness.    Medications      Current Outpatient Medications:     Alcohol Swabs (ALCOHOL PREP) pads, 1 swab 3 (Three) Times a Day., Disp: 200 each, Rfl: 0    aspirin 81 MG EC tablet, Take 1 tablet by mouth Daily., Disp: , Rfl:     atorvastatin (LIPITOR) 40 MG tablet, TAKE 1 TABLET BY MOUTH EVERY NIGHT, Disp: 90 tablet, Rfl: 1    B-D UF III MINI PEN NEEDLES 31G X 5 MM misc, USE AS DIRECTED, Disp: 200 each, Rfl:  1    Blood Glucose Calibration (ACCU-CHEK LOR) solution, 1 bottle by In Vitro route As Needed (Use as directed)., Disp: 1 each, Rfl: 0    Blood Glucose Monitoring Suppl (ACCU-CHEK LOR PLUS) w/Device kit, 1 kit 3 (Three) Times a Day., Disp: 1 kit, Rfl: 0    cilostazol (PLETAL) 50 MG tablet, Take 1 tablet by mouth 2 (Two) Times a Day., Disp: 180 tablet, Rfl: 1    donepezil (ARICEPT) 23 MG tablet, TAKE ONE TABLET BY MOUTH every night, Disp: 30 tablet, Rfl: 2    Eliquis 5 MG tablet tablet, TAKE ONE TABLET BY MOUTH TWICE DAILY, Disp: 180 tablet, Rfl: 0    fluticasone (FLONASE) 50 MCG/ACT nasal spray, INSTILL 2 SPRAYS INTO THE NOSTRIL(S) AS DIRECTED BY PROVIDER DAILY, Disp: 16 g, Rfl: 0    glucose blood (Accu-Chek Lor Plus) test strip, Use as instructed, Disp: 100 each, Rfl: 12    insulin aspart prot & aspart (NovoLOG Mix 70/30 FlexPen) (70-30) 100 UNIT/ML suspension pen-injector injection, inejct 50 units UNDER THE SKIN TWICE DAILY WITH MEALS (Patient taking differently: inejct 40 units UNDER THE SKIN in the morning and then 20-25 units in evening based on diet), Disp: 30 mL, Rfl: 5    Lancets (ACCU-CHEK SOFT TOUCH) lancets, Check sugars 3 times daily, Disp: 1 each, Rfl: 12    levocetirizine (XYZAL) 5 MG tablet, TAKE 1 TABLET BY MOUTH EVERY EVENING, Disp: 30 tablet, Rfl: 2    lisinopril (PRINIVIL,ZESTRIL) 2.5 MG tablet, TAKE 1 TABLET BY MOUTH DAILY, Disp: 90 tablet, Rfl: 1    metFORMIN (GLUCOPHAGE) 1000 MG tablet, TAKE ONE TABLET BY MOUTH TWICE DAILY, Disp: 180 tablet, Rfl: 1    metoprolol succinate XL (TOPROL-XL) 25 MG 24 hr tablet, Take 1 tablet by mouth Daily., Disp: 90 tablet, Rfl: 1    QUEtiapine (SEROquel) 25 MG tablet, Take 1 tablet by mouth 2 (Two) Times a Day., Disp: , Rfl:     QUEtiapine (SEROquel) 25 MG tablet, Take 1 tablet by mouth 3 (Three) Times a Day., Disp: 90 tablet, Rfl: 5    SITagliptin (Januvia) 100 MG tablet, TAKE ONE TABLET BY MOUTH EVERY DAY, Disp: 90 tablet, Rfl: 1    temazepam (Restoril)  15 MG capsule, Take 1 capsule by mouth At Night As Needed for Sleep., Disp: 10 capsule, Rfl: 0    Current Facility-Administered Medications:     cyanocobalamin injection 1,000 mcg, 1,000 mcg, Intramuscular, Q28 Days, Des Geller MD, 1,000 mcg at 04/03/23 1328     Allergies    Allergies   Allergen Reactions    Bactrim [Sulfamethoxazole-Trimethoprim] Rash    Adhesive Tape Rash    Neosporin [Neomycin-Bacitracin Zn-Polymyx] Rash       Problem List    Patient Active Problem List   Diagnosis    Cough    Benign essential HTN    Benign prostatic hyperplasia    Coronary artery disease involving native coronary artery of native heart    Chronic coronary artery disease    Diabetes mellitus    Gout    Hyperlipidemia    History of heart attack    Peripheral neuropathy    Peripheral vascular disease       Medications, Allergies, Problems List and Past History were reviewed and updated.    Physical Examination    There were no vitals taken for this visit.      HEENT: Facies- Within normal limits. Lids- Normal bilaterally. Conjunctiva- Clear bilaterally. Sclera- Anicteric bilaterally.    Neurologic Exam:    Cranial Nerves II-XII: Intact    Impression and Assessment    Vitamin B12 Deficiency.    Type 2 Diabetes Mellitus without complication without insulin usage.    Hyperlipidemia.    Essential Hypertension.    Dementia.    Gait Instability.    Plan    Hyperlipidemia Plan: The patient was instructed to exercise daily, eat a low fat diet and continue his medications.    Essential Hypertension Plan: The patient was instructed to continue the current medications.    Type 2 Diabetes Mellitus without complication without insulin usage Plan: The patient was instructed to continue the current medications.    Dementia Plan: He was referred to physical therapy. The patient was instructed to continue the current medications. Medication will be added as noted. Discussed memory care facilities. Further plans will be made after testing.  He was referred to home health for speech therapy, physical therapy, medication compliance and administration.    Gait Instability Plan: Home health for PT.    Vitamin B12 Deficiency Plan: The patient was instructed to continue the current medications.      Return to Office    The patient was instructed to return for follow-up in 1 month. The patient was instructed to return sooner if the condition changes, worsens, or does not resolve.

## 2023-12-07 ENCOUNTER — HOME HEALTH ADMISSION (OUTPATIENT)
Dept: HOME HEALTH SERVICES | Facility: HOME HEALTHCARE | Age: 68
End: 2023-12-07
Payer: MEDICARE

## 2023-12-07 RX ORDER — BREXPIPRAZOLE 2 MG/1
2 TABLET ORAL DAILY
Start: 2023-12-07

## 2023-12-09 ENCOUNTER — HOME CARE VISIT (OUTPATIENT)
Dept: HOME HEALTH SERVICES | Facility: HOME HEALTHCARE | Age: 68
End: 2023-12-09
Payer: MEDICARE

## 2023-12-09 VITALS
DIASTOLIC BLOOD PRESSURE: 68 MMHG | HEART RATE: 54 BPM | SYSTOLIC BLOOD PRESSURE: 132 MMHG | OXYGEN SATURATION: 95 % | RESPIRATION RATE: 16 BRPM

## 2023-12-09 PROCEDURE — G0299 HHS/HOSPICE OF RN EA 15 MIN: HCPCS

## 2023-12-10 NOTE — HOME HEALTH
"SOC Note:    Home Health ordered for: disciplines SN/PT/ST/MSW    Reason for Hosp/Primary Dx/Co-morbidities: 68-year-old male w/ PMH of HTN, BPH, CAD, DM, gout, HLD, MI, peripheral neuropathy, PVD, and dementia.  Presented to PCP's office for routine visit.  HH ordered to assist wife. Wife reports that patient gets very easily agitated and has refused a shower for two months.     Focus of Care: care r/t dementia, medication education, fall prevention, skin breakdown prevention.     Patient's goal(s):  \"For him to be as comfortable as he can without him being agitated.  I can handle this, except when he gets so mean.\"    Current Functional status/mobility/DME:  Minimal assist w/ ambulation.  Uses cane in the home.     HB status/Living Arrangements: Homebound.  Lives w/ spouse, mother-in-law, children, and grandchildren.     Skin Integrity/wound status: CDI.  Some scabs noted to LLE.     Code Status: CPR    Fall Risk/Safety concerns: High fall risk    Medication issues/Concerns:  Patient is missing several medications that are on his med rec; will send MD a message.     Additional Problems/Concerns: N/A    SDOH Barriers (i.e. caregiver concerns, social isolation, transportation, food insecurity, environment, income etc.)/Need for MSW: N/A    Plan for next visit: care r/t dementia, medication education, fall prevention, skin breakdown prevention.  Did medications get sorted?"

## 2023-12-11 ENCOUNTER — HOME CARE VISIT (OUTPATIENT)
Dept: HOME HEALTH SERVICES | Facility: HOME HEALTHCARE | Age: 68
End: 2023-12-11
Payer: MEDICARE

## 2023-12-11 VITALS
TEMPERATURE: 98.4 F | RESPIRATION RATE: 16 BRPM | DIASTOLIC BLOOD PRESSURE: 88 MMHG | SYSTOLIC BLOOD PRESSURE: 144 MMHG | OXYGEN SATURATION: 96 % | HEART RATE: 87 BPM

## 2023-12-11 DIAGNOSIS — J30.2 SEASONAL ALLERGIC RHINITIS, UNSPECIFIED TRIGGER: ICD-10-CM

## 2023-12-11 DIAGNOSIS — Z79.4 TYPE 2 DIABETES MELLITUS WITH DIABETIC PERIPHERAL ANGIOPATHY WITHOUT GANGRENE, WITH LONG-TERM CURRENT USE OF INSULIN: ICD-10-CM

## 2023-12-11 DIAGNOSIS — E11.51 TYPE 2 DIABETES MELLITUS WITH DIABETIC PERIPHERAL ANGIOPATHY WITHOUT GANGRENE, WITH LONG-TERM CURRENT USE OF INSULIN: ICD-10-CM

## 2023-12-11 PROCEDURE — G0153 HHCP-SVS OF S/L PATH,EA 15MN: HCPCS

## 2023-12-11 RX ORDER — LEVOCETIRIZINE DIHYDROCHLORIDE 5 MG/1
5 TABLET, FILM COATED ORAL EVERY EVENING
Qty: 90 TABLET | Refills: 1 | Status: SHIPPED | OUTPATIENT
Start: 2023-12-11

## 2023-12-11 RX ORDER — LISINOPRIL 2.5 MG/1
2.5 TABLET ORAL DAILY
Qty: 90 TABLET | Refills: 1 | Status: SHIPPED | OUTPATIENT
Start: 2023-12-11

## 2023-12-11 RX ORDER — CILOSTAZOL 50 MG/1
50 TABLET ORAL 2 TIMES DAILY
Qty: 180 TABLET | Refills: 1 | Status: SHIPPED | OUTPATIENT
Start: 2023-12-11

## 2023-12-11 RX ORDER — TEMAZEPAM 15 MG/1
15 CAPSULE ORAL NIGHTLY PRN
Qty: 30 CAPSULE | Refills: 2 | Status: SHIPPED | OUTPATIENT
Start: 2023-12-11

## 2023-12-11 NOTE — TELEPHONE ENCOUNTER
LOV 08/03/2023  NOV     [employer] message sent    Relay the following information:    Patient needs to schedule a follow up appointment.  Please advise patient he needs to schedule and keep his appointment to continue to receive refills in the future.  If he is unable to keep his appointment we will not be able to provider further refills.  Once appointment has been scheduled please update message with date and time so we can process the request.  We will forward the message to the provider to review the refill request.

## 2023-12-11 NOTE — Clinical Note
ST evaluation only.  Attempted to assess pt's cognitive communication skills using the MOCA- pt. with limited participation due to cognitive deficits.  Overall score of 2/30 indicating severe/profound cognitive communication deficits.  Pt. is unable to participate in therapy services secondary to inability to follow direction and inability to answer questions appropriately.  SLP recommended family look into assistance in the home to care for pt. or placement in memory care unit.  MSW to contact wife with resource information. No further ST services warranted at this time.

## 2023-12-12 ENCOUNTER — HOME CARE VISIT (OUTPATIENT)
Dept: HOME HEALTH SERVICES | Facility: HOME HEALTHCARE | Age: 68
End: 2023-12-12
Payer: MEDICARE

## 2023-12-12 VITALS
RESPIRATION RATE: 16 BRPM | TEMPERATURE: 98.1 F | OXYGEN SATURATION: 96 % | SYSTOLIC BLOOD PRESSURE: 136 MMHG | DIASTOLIC BLOOD PRESSURE: 84 MMHG | HEART RATE: 74 BPM

## 2023-12-12 PROCEDURE — G0155 HHCP-SVS OF CSW,EA 15 MIN: HCPCS

## 2023-12-12 PROCEDURE — G0156 HHCP-SVS OF AIDE,EA 15 MIN: HCPCS

## 2023-12-12 RX ORDER — APIXABAN 5 MG/1
TABLET, FILM COATED ORAL
Qty: 180 TABLET | Refills: 1 | Status: SHIPPED | OUTPATIENT
Start: 2023-12-12

## 2023-12-12 RX ORDER — SITAGLIPTIN 100 MG/1
TABLET, FILM COATED ORAL
Qty: 90 TABLET | Refills: 1 | Status: SHIPPED | OUTPATIENT
Start: 2023-12-12

## 2023-12-12 NOTE — HOME HEALTH
ST Eval Note:     Home Health ordered for: ST services secondary to dementia with agitation.     Reason for Hosp/Primary Dx/Co-morbidities: Pt. recently seen by PCP for worsening dementia with agitation.    .   Focus of Care: Assessment to determine plan for services.     Patient's goal(s):  NA; family goals to keep pt and other family members safe in the home.     Current Functional status/mobility/DME: Pt. able to ambulate without AD in the home, he requires max assist for all BADLs, however, he becomes agitated when assisted.     HB status/Living Arrangements: Pt. lives in a single story home with his wife, his son and daughter in law and their 6 children.     Fall Risk/Safety concerns: Pt. at risk for falls, decreased safety secondary to cognitive decline.     Plan for next visit: NA

## 2023-12-12 NOTE — HOME HEALTH
Met with patient and wife.  She states that patient got diagnosed with dementia 2 years ago.  She is the primary caregiver.  She is a  and is still trying to wrok some.  She said that her  is pleasant most of the time but at times does get angry and has outbursts and at those times she isn't sure what to do.  She has called the EMS and police a couple of times and they have been able to calm him down.  She doesn't want to necessarily put him in a facility but wanted to discuss options.  I provided her with assisted/memory care options.  Encouraged her to contact [facility] which is an agency that can help with options for placement.  She said she will follow up with them.  We also discussed adult day programs and she will follow up on this as well.  Her  was present for out visit but he did not follow our conversation and could not answer questions correctly.  She said at times he doesn't take his medications.  Dr. Geller called while I was there and re filled some of the medicine she was concerned about.  She also asked for a referral to Dr. Snyder, neurologist, because he is at [facility] and she states she doesn't get much support or insight from the current MD and wanted to check with someone else.  She said LILIAN Carty, saw patient today and was able to give him a bath, shave him and get on clean clothes.  Mr. Tai did say she was a very nice lady.  Provided other resources to the wife including a careviver resource book and pathways book.  She has my number to contact as needed.  She is agreeable to a referral to the Family Caregiver Support program.  This was completed through [employer].  Their son, his wife, their two small daughters and patient's mother in law live in the home.

## 2023-12-14 ENCOUNTER — HOME CARE VISIT (OUTPATIENT)
Dept: HOME HEALTH SERVICES | Facility: HOME HEALTHCARE | Age: 68
End: 2023-12-14
Payer: MEDICARE

## 2023-12-14 VITALS
SYSTOLIC BLOOD PRESSURE: 142 MMHG | TEMPERATURE: 97.8 F | RESPIRATION RATE: 18 BRPM | DIASTOLIC BLOOD PRESSURE: 84 MMHG | OXYGEN SATURATION: 95 % | HEART RATE: 57 BPM

## 2023-12-14 PROCEDURE — G0495 RN CARE TRAIN/EDU IN HH: HCPCS

## 2023-12-15 NOTE — HOME HEALTH
Routine Visit Note:    Skill/education provided: CP and NV assessment, for falls, skin issues, behavioral issues, compliance with taking meds. No falls, pt did have a bad evening last night, wife states he went into a rage for no reason. Pt is clam and cooperative today. Wife stated he sometimes doesnt want to take his meds but his sister is able to convince him. Wife has reached out to some resources provided by [facility] and awaits call back. New med called in for pt but wife has to pick it up later today so unable to add to med list this visit.    Patient/caregiver response: pt adan assessment well.    Plan for next visit: assessment and education    Other pertinent info:

## 2023-12-18 ENCOUNTER — HOME CARE VISIT (OUTPATIENT)
Dept: HOME HEALTH SERVICES | Facility: HOME HEALTHCARE | Age: 68
End: 2023-12-18
Payer: MEDICARE

## 2023-12-18 VITALS
HEART RATE: 66 BPM | DIASTOLIC BLOOD PRESSURE: 82 MMHG | TEMPERATURE: 98.3 F | OXYGEN SATURATION: 97 % | SYSTOLIC BLOOD PRESSURE: 132 MMHG | RESPIRATION RATE: 16 BRPM

## 2023-12-18 PROCEDURE — G0156 HHCP-SVS OF AIDE,EA 15 MIN: HCPCS

## 2023-12-20 ENCOUNTER — HOME CARE VISIT (OUTPATIENT)
Dept: HOME HEALTH SERVICES | Facility: HOME HEALTHCARE | Age: 68
End: 2023-12-20
Payer: MEDICARE

## 2023-12-20 VITALS
OXYGEN SATURATION: 95 % | DIASTOLIC BLOOD PRESSURE: 60 MMHG | TEMPERATURE: 97.5 F | HEART RATE: 57 BPM | RESPIRATION RATE: 18 BRPM | SYSTOLIC BLOOD PRESSURE: 102 MMHG

## 2023-12-20 PROCEDURE — G0495 RN CARE TRAIN/EDU IN HH: HCPCS

## 2023-12-20 RX ORDER — DIAZEPAM 5 MG/1
TABLET ORAL
Qty: 2 TABLET | Refills: 0 | Status: SHIPPED | OUTPATIENT
Start: 2023-12-20

## 2023-12-20 NOTE — HOME HEALTH
Routine Visit Note:    Skill/education provided: CP assessment, assessed ambulation/uses cane regularly, mentation/some verbalization but nonsensical due to severe dementia, compliance with taking meds/doing ok, ate well yesterday but not always, no skin issues other than lt lower leg is itchy/advised CG apply lotion daily so pt wont scratch.    Patient/caregiver response: pt was cooperative with assessment, wife tired, looking into placement options    Plan for next visit: assessment and education, DC soon if no new issues    Other pertinent info:  before insulin

## 2023-12-26 ENCOUNTER — HOME CARE VISIT (OUTPATIENT)
Dept: HOME HEALTH SERVICES | Facility: HOME HEALTHCARE | Age: 68
End: 2023-12-26
Payer: MEDICARE

## 2023-12-26 VITALS
DIASTOLIC BLOOD PRESSURE: 60 MMHG | SYSTOLIC BLOOD PRESSURE: 102 MMHG | OXYGEN SATURATION: 94 % | RESPIRATION RATE: 18 BRPM | HEART RATE: 87 BPM | TEMPERATURE: 97.5 F

## 2023-12-26 PROCEDURE — G0495 RN CARE TRAIN/EDU IN HH: HCPCS

## 2023-12-27 ENCOUNTER — HOME CARE VISIT (OUTPATIENT)
Dept: HOME HEALTH SERVICES | Facility: HOME HEALTHCARE | Age: 68
End: 2023-12-27
Payer: MEDICARE

## 2023-12-27 NOTE — CASE COMMUNICATION
Patient missed a HHAIDE visit from [facility] on 12-27-23.     Reason: Pt Discharged.       For your records only.   Per CMS Guidance, MD must be notified of missed/cancelled visits; therefore the prescribed frequency was not met.

## 2024-01-08 ENCOUNTER — OFFICE VISIT (OUTPATIENT)
Dept: INTERNAL MEDICINE | Facility: CLINIC | Age: 69
End: 2024-01-08
Payer: MEDICARE

## 2024-01-08 VITALS
SYSTOLIC BLOOD PRESSURE: 114 MMHG | BODY MASS INDEX: 28.37 KG/M2 | RESPIRATION RATE: 20 BRPM | DIASTOLIC BLOOD PRESSURE: 58 MMHG | HEART RATE: 64 BPM | WEIGHT: 227 LBS | TEMPERATURE: 97.5 F

## 2024-01-08 DIAGNOSIS — Z79.4 TYPE 2 DIABETES MELLITUS WITH DIABETIC PERIPHERAL ANGIOPATHY WITHOUT GANGRENE, WITH LONG-TERM CURRENT USE OF INSULIN: Primary | ICD-10-CM

## 2024-01-08 DIAGNOSIS — I10 ESSENTIAL HYPERTENSION: ICD-10-CM

## 2024-01-08 DIAGNOSIS — L97.529 DIABETIC ULCER OF BOTH FEET: ICD-10-CM

## 2024-01-08 DIAGNOSIS — E11.9 TYPE 2 DIABETES MELLITUS WITHOUT COMPLICATION, WITH LONG-TERM CURRENT USE OF INSULIN: ICD-10-CM

## 2024-01-08 DIAGNOSIS — F03.C11 SEVERE DEMENTIA WITH AGITATION, UNSPECIFIED DEMENTIA TYPE: ICD-10-CM

## 2024-01-08 DIAGNOSIS — E53.8 VITAMIN B 12 DEFICIENCY: ICD-10-CM

## 2024-01-08 DIAGNOSIS — L97.519 DIABETIC ULCER OF BOTH FEET: ICD-10-CM

## 2024-01-08 DIAGNOSIS — E78.5 HYPERLIPIDEMIA, UNSPECIFIED HYPERLIPIDEMIA TYPE: ICD-10-CM

## 2024-01-08 DIAGNOSIS — E11.51 TYPE 2 DIABETES MELLITUS WITH DIABETIC PERIPHERAL ANGIOPATHY WITHOUT GANGRENE, WITH LONG-TERM CURRENT USE OF INSULIN: Primary | ICD-10-CM

## 2024-01-08 DIAGNOSIS — Z79.4 TYPE 2 DIABETES MELLITUS WITHOUT COMPLICATION, WITH LONG-TERM CURRENT USE OF INSULIN: ICD-10-CM

## 2024-01-08 DIAGNOSIS — E11.621 DIABETIC ULCER OF BOTH FEET: ICD-10-CM

## 2024-01-08 LAB
ALBUMIN/CREATININE RATIO, URINE: NORMAL
BILIRUB BLD-MCNC: ABNORMAL MG/DL
CLARITY, POC: CLEAR
COLOR UR: YELLOW
EXPIRATION DATE: ABNORMAL
EXPIRATION DATE: NORMAL
GLUCOSE UR STRIP-MCNC: ABNORMAL MG/DL
KETONES UR QL: ABNORMAL
LEUKOCYTE EST, POC: NEGATIVE
Lab: ABNORMAL
Lab: NORMAL
NITRITE UR-MCNC: NEGATIVE MG/ML
PH UR: 5 [PH] (ref 5–8)
POC CREATININE URINE: 300
POC MICROALBUMIN URINE: 150
PROT UR STRIP-MCNC: ABNORMAL MG/DL
RBC # UR STRIP: NEGATIVE /UL
SP GR UR: 1.03 (ref 1–1.03)
UROBILINOGEN UR QL: NORMAL

## 2024-01-08 PROCEDURE — 82044 UR ALBUMIN SEMIQUANTITATIVE: CPT | Performed by: NURSE PRACTITIONER

## 2024-01-08 PROCEDURE — 82746 ASSAY OF FOLIC ACID SERUM: CPT | Performed by: NURSE PRACTITIONER

## 2024-01-08 PROCEDURE — 3074F SYST BP LT 130 MM HG: CPT | Performed by: NURSE PRACTITIONER

## 2024-01-08 PROCEDURE — 84443 ASSAY THYROID STIM HORMONE: CPT | Performed by: NURSE PRACTITIONER

## 2024-01-08 PROCEDURE — 36415 COLL VENOUS BLD VENIPUNCTURE: CPT | Performed by: NURSE PRACTITIONER

## 2024-01-08 PROCEDURE — 99214 OFFICE O/P EST MOD 30 MIN: CPT | Performed by: NURSE PRACTITIONER

## 2024-01-08 PROCEDURE — 81003 URINALYSIS AUTO W/O SCOPE: CPT | Performed by: NURSE PRACTITIONER

## 2024-01-08 PROCEDURE — 83036 HEMOGLOBIN GLYCOSYLATED A1C: CPT | Performed by: NURSE PRACTITIONER

## 2024-01-08 PROCEDURE — 85025 COMPLETE CBC W/AUTO DIFF WBC: CPT | Performed by: NURSE PRACTITIONER

## 2024-01-08 PROCEDURE — 3078F DIAST BP <80 MM HG: CPT | Performed by: NURSE PRACTITIONER

## 2024-01-08 PROCEDURE — 82607 VITAMIN B-12: CPT | Performed by: NURSE PRACTITIONER

## 2024-01-08 PROCEDURE — 80061 LIPID PANEL: CPT | Performed by: NURSE PRACTITIONER

## 2024-01-08 PROCEDURE — 80053 COMPREHEN METABOLIC PANEL: CPT | Performed by: NURSE PRACTITIONER

## 2024-01-08 RX ORDER — QUETIAPINE FUMARATE 50 MG/1
50 TABLET, FILM COATED ORAL 2 TIMES DAILY
COMMUNITY

## 2024-01-08 NOTE — PROGRESS NOTES
Patient Name: Too Tai  : 1955   MRN: [id number]     Chief Complaint:    Chief Complaint   Patient presents with    Sores on bottom of both feet        History of Present Illness: Too Tai is a 68 y.o. male presents to clinic  for evaluation of sores on his feet. He is accompanied by his wife and son.     Superficial ulcer   The patient's wife states the patient is experiencing bilateral sores on the patient's lower extremities. She notes the patient did not want to take his shoes off and after 1 week when he took his shoes off, she noticed the mild skin breakdown on his right  and left foot. She describes after she took his shoes off his left foot looked like a burn. She denies bleeding on his left foot after she took the shoe off. She states the patient has not been eating and drinking well for a while. She reports she soaked his feet in foam soap, and she used a prescription cream on his feet (mupirocin). She currently denies a fever, or rash.      Diabetes Mellitus    She states the patient will not allow her to monitor his blood glucose levels much. She reports that the patient's blood glucose level the last they checked was 194 mg/dL. He has just started utilizing Rexulti 2 mg last week. She notes he does not want to take a bath or allow her to monitor his blood glucose levels. She states he had a home health aide that was excellent. Unfortunately, she notes the home health aid was unable to come due to insurance.      A1C is a lab tests that tells us what your average blood sugar is over the last three months.   A normal     Supplemental Information   He sees Dr. Bradley twice a year for his neurologist.     He is currently Januvia 100 mg daily 70/30 40 units in the morning based on the diet. He is utilizing 1000 mg metformin twice a day.     Review of System: Review of Systems   A review of systems was performed, and the pertinent positives are noted in the HPI.    Medications:     Current  Outpatient Medications:     Alcohol Swabs (ALCOHOL PREP) pads, 1 swab 3 (Three) Times a Day., Disp: 200 each, Rfl: 0    apixaban (Eliquis) 5 MG tablet tablet, TAKE ONE TABLET BY MOUTH TWICE DAILY, Disp: 180 tablet, Rfl: 1    aspirin 81 MG EC tablet, Take 1 tablet by mouth Daily., Disp: , Rfl:     atorvastatin (LIPITOR) 40 MG tablet, TAKE 1 TABLET BY MOUTH EVERY NIGHT, Disp: 90 tablet, Rfl: 1    B-D UF III MINI PEN NEEDLES 31G X 5 MM misc, USE AS DIRECTED, Disp: 200 each, Rfl: 1    Blood Glucose Calibration (ACCU-CHEK LOR) solution, 1 bottle by In Vitro route As Needed (Use as directed)., Disp: 1 each, Rfl: 0    Blood Glucose Monitoring Suppl (ACCU-CHEK LOR PLUS) w/Device kit, 1 kit 3 (Three) Times a Day., Disp: 1 kit, Rfl: 0    Brexpiprazole (Rexulti) 2 MG tablet, Take 1 tablet by mouth Daily., Disp: , Rfl:     cilostazol (PLETAL) 50 MG tablet, Take 1 tablet by mouth 2 (Two) Times a Day., Disp: 180 tablet, Rfl: 1    donepezil (ARICEPT) 23 MG tablet, TAKE ONE TABLET BY MOUTH every night, Disp: 30 tablet, Rfl: 2    fluticasone (FLONASE) 50 MCG/ACT nasal spray, INSTILL 2 SPRAYS INTO THE NOSTRIL(S) AS DIRECTED BY PROVIDER DAILY, Disp: 16 g, Rfl: 0    glucose blood (Accu-Chek Lor Plus) test strip, Use as instructed, Disp: 100 each, Rfl: 12    insulin aspart prot & aspart (NovoLOG Mix 70/30 FlexPen) (70-30) 100 UNIT/ML suspension pen-injector injection, inejct 50 units UNDER THE SKIN TWICE DAILY WITH MEALS (Patient taking differently: inejct 40 units UNDER THE SKIN in the morning and then 20-25 units in evening based on diet), Disp: 30 mL, Rfl: 5    Januvia 100 MG tablet, TAKE ONE TABLET BY MOUTH EVERY DAY, Disp: 90 tablet, Rfl: 1    Lancets (ACCU-CHEK SOFT TOUCH) lancets, Check sugars 3 times daily, Disp: 1 each, Rfl: 12    lisinopril (PRINIVIL,ZESTRIL) 2.5 MG tablet, Take 1 tablet by mouth Daily., Disp: 90 tablet, Rfl: 1    metFORMIN (GLUCOPHAGE) 1000 MG tablet, TAKE ONE TABLET BY MOUTH TWICE DAILY, Disp: 180  tablet, Rfl: 1    metoprolol succinate XL (TOPROL-XL) 25 MG 24 hr tablet, Take 1 tablet by mouth Daily., Disp: 90 tablet, Rfl: 1    QUEtiapine (SEROquel) 50 MG tablet, Take 1 tablet by mouth 2 (Two) Times a Day., Disp: , Rfl:     temazepam (Restoril) 15 MG capsule, Take 1 capsule by mouth At Night As Needed for Sleep. Indications: Trouble Sleeping, Disp: 30 capsule, Rfl: 2    diazePAM (Valium) 5 MG tablet, Take 1 tab 1 hour before procedure.  Take an additional tab 15 minutes before if needed. (Patient not taking: Reported on 1/8/2024), Disp: 2 tablet, Rfl: 0    levocetirizine (XYZAL) 5 MG tablet, Take 1 tablet by mouth Every Evening. (Patient not taking: Reported on 1/8/2024), Disp: 90 tablet, Rfl: 1    Current Facility-Administered Medications:     cyanocobalamin injection 1,000 mcg, 1,000 mcg, Intramuscular, Q28 Days, Des Geller MD, 1,000 mcg at 04/03/23 1328    Allergies:   Allergies   Allergen Reactions    Bactrim [Sulfamethoxazole-Trimethoprim] Rash    Adhesive Tape Rash    Neosporin [Neomycin-Bacitracin Zn-Polymyx] Rash       Objective     Physical Exam:   Vital Signs:   Vitals:    01/08/24 1119   BP: 114/58   BP Location: Right arm   Patient Position: Sitting   Cuff Size: Adult   Pulse: 64   Resp: 20   Temp: 97.5 °F (36.4 °C)   TempSrc: Infrared   Weight: 103 kg (227 lb)           Physical Exam  Cardiovascular:      Rate and Rhythm: Normal rate and regular rhythm.      Heart sounds: Normal heart sounds.   Pulmonary:      Breath sounds: Normal breath sounds.   Skin:     Comments: Bilateral foot breakdown.              Assessment / Plan      Assessment/Plan:   Diagnoses and all orders for this visit:    1. Type 2 diabetes mellitus with diabetic peripheral angiopathy without gangrene, with long-term current use of insulin (Primary)  -     Ambulatory Referral to Home Health  -     Ambulatory Referral to Case Management Caregiving/Support, Disease Education, Care Coordination    2. Essential  hypertension  -     CBC & Differential  -     Comprehensive Metabolic Panel  -     TSH  -     POC Urinalysis Dipstick, Automated    3. Vitamin B 12 deficiency  -     CBC & Differential  -     Vitamin B12    4. Type 2 diabetes mellitus without complication, with long-term current use of insulin  -     Comprehensive Metabolic Panel  -     Hemoglobin A1c  -     POC Microalbumin    5. Hyperlipidemia, unspecified hyperlipidemia type  -     Comprehensive Metabolic Panel  -     Lipid Panel    6. Severe dementia with agitation, unspecified dementia type  -     Vitamin B12  -     Folate  -     Ambulatory Referral to Home Health  -     Ambulatory Referral to Case Management Caregiving/Support, Disease Education, Care Coordination    7. Diabetic ulcer of both feet  -     Ambulatory Referral to Home Health  -     Ambulatory Referral to Case Management Caregiving/Support, Disease Education, Care Coordination       1. Superficial ulcer   No signs of infection; it appears to look like a superficial ulcer. I will try mupirocin ointment until Home health can apply hydrogel. Placed a referral to case management for  home health aide  too.   2.Diabetes Mellitus    The patient is a diabetic his las A1c was 8.4 mg/dL. He will continue his current medications and complete a full panel of laboratory studies today as previously ordered by Dr. Hernandez .           Follow Up:   I will discuss follow-up pending labs.     IVONNE Grimm  [facility]      Transcribed from ambient dictation for IVONNE Grimm by Beba Ríos.  01/08/24   14:30 EST    Patient or patient representative verbalized consent to the visit recording.  I have personally performed the services described in this document as transcribed by the above individual, and it is both accurate and complete.

## 2024-01-09 ENCOUNTER — TELEPHONE (OUTPATIENT)
Dept: INTERNAL MEDICINE | Facility: CLINIC | Age: 69
End: 2024-01-09
Payer: MEDICARE

## 2024-01-09 ENCOUNTER — HOME HEALTH ADMISSION (OUTPATIENT)
Dept: HOME HEALTH SERVICES | Facility: HOME HEALTHCARE | Age: 69
End: 2024-01-09
Payer: MEDICARE

## 2024-01-09 ENCOUNTER — REFERRAL TRIAGE (OUTPATIENT)
Dept: CASE MANAGEMENT | Facility: OTHER | Age: 69
End: 2024-01-09
Payer: MEDICARE

## 2024-01-09 ENCOUNTER — PATIENT OUTREACH (OUTPATIENT)
Dept: CASE MANAGEMENT | Facility: OTHER | Age: 69
End: 2024-01-09
Payer: MEDICARE

## 2024-01-09 DIAGNOSIS — E11.621 DIABETIC ULCER OF BOTH FEET: ICD-10-CM

## 2024-01-09 DIAGNOSIS — R26.81 GAIT INSTABILITY: ICD-10-CM

## 2024-01-09 DIAGNOSIS — F03.90 DEMENTIA WITHOUT BEHAVIORAL DISTURBANCE: ICD-10-CM

## 2024-01-09 DIAGNOSIS — E53.8 VITAMIN B 12 DEFICIENCY: Primary | ICD-10-CM

## 2024-01-09 DIAGNOSIS — L97.529 DIABETIC ULCER OF BOTH FEET: ICD-10-CM

## 2024-01-09 DIAGNOSIS — E78.5 HYPERLIPIDEMIA, UNSPECIFIED HYPERLIPIDEMIA TYPE: ICD-10-CM

## 2024-01-09 DIAGNOSIS — F03.C11 SEVERE DEMENTIA WITH AGITATION, UNSPECIFIED DEMENTIA TYPE: ICD-10-CM

## 2024-01-09 DIAGNOSIS — L97.519 DIABETIC ULCER OF BOTH FEET: ICD-10-CM

## 2024-01-09 DIAGNOSIS — E11.51 TYPE 2 DIABETES MELLITUS WITH DIABETIC PERIPHERAL ANGIOPATHY WITHOUT GANGRENE, WITH LONG-TERM CURRENT USE OF INSULIN: Primary | ICD-10-CM

## 2024-01-09 DIAGNOSIS — Z79.4 TYPE 2 DIABETES MELLITUS WITH DIABETIC PERIPHERAL ANGIOPATHY WITHOUT GANGRENE, WITH LONG-TERM CURRENT USE OF INSULIN: Primary | ICD-10-CM

## 2024-01-09 LAB
ALBUMIN SERPL-MCNC: 4.8 G/DL (ref 3.5–5.2)
ALBUMIN/GLOB SERPL: 1.8 G/DL
ALP SERPL-CCNC: 88 U/L (ref 39–117)
ALT SERPL W P-5'-P-CCNC: 31 U/L (ref 1–41)
ANION GAP SERPL CALCULATED.3IONS-SCNC: 12.9 MMOL/L (ref 5–15)
AST SERPL-CCNC: 27 U/L (ref 1–40)
BASOPHILS # BLD AUTO: 0.13 10*3/MM3 (ref 0–0.2)
BASOPHILS NFR BLD AUTO: 1.6 % (ref 0–1.5)
BILIRUB SERPL-MCNC: 1 MG/DL (ref 0–1.2)
BUN SERPL-MCNC: 15 MG/DL (ref 8–23)
BUN/CREAT SERPL: 13.6 (ref 7–25)
CALCIUM SPEC-SCNC: 9.7 MG/DL (ref 8.6–10.5)
CHLORIDE SERPL-SCNC: 104 MMOL/L (ref 98–107)
CHOLEST SERPL-MCNC: 220 MG/DL (ref 0–200)
CO2 SERPL-SCNC: 24.1 MMOL/L (ref 22–29)
CREAT SERPL-MCNC: 1.1 MG/DL (ref 0.76–1.27)
DEPRECATED RDW RBC AUTO: 40 FL (ref 37–54)
EGFRCR SERPLBLD CKD-EPI 2021: 73.6 ML/MIN/1.73
EOSINOPHIL # BLD AUTO: 0.99 10*3/MM3 (ref 0–0.4)
EOSINOPHIL NFR BLD AUTO: 12.1 % (ref 0.3–6.2)
ERYTHROCYTE [DISTWIDTH] IN BLOOD BY AUTOMATED COUNT: 12.5 % (ref 12.3–15.4)
FOLATE SERPL-MCNC: 11.2 NG/ML (ref 4.78–24.2)
GLOBULIN UR ELPH-MCNC: 2.6 GM/DL
GLUCOSE SERPL-MCNC: 157 MG/DL (ref 65–99)
HBA1C MFR BLD: 9.5 % (ref 4.8–5.6)
HCT VFR BLD AUTO: 46.3 % (ref 37.5–51)
HDLC SERPL-MCNC: 41 MG/DL (ref 40–60)
HGB BLD-MCNC: 15.8 G/DL (ref 13–17.7)
IMM GRANULOCYTES # BLD AUTO: 0.03 10*3/MM3 (ref 0–0.05)
IMM GRANULOCYTES NFR BLD AUTO: 0.4 % (ref 0–0.5)
LDLC SERPL CALC-MCNC: 159 MG/DL (ref 0–100)
LDLC/HDLC SERPL: 3.84 {RATIO}
LYMPHOCYTES # BLD AUTO: 2.4 10*3/MM3 (ref 0.7–3.1)
LYMPHOCYTES NFR BLD AUTO: 29.4 % (ref 19.6–45.3)
MCH RBC QN AUTO: 30.4 PG (ref 26.6–33)
MCHC RBC AUTO-ENTMCNC: 34.1 G/DL (ref 31.5–35.7)
MCV RBC AUTO: 89.2 FL (ref 79–97)
MONOCYTES # BLD AUTO: 0.72 10*3/MM3 (ref 0.1–0.9)
MONOCYTES NFR BLD AUTO: 8.8 % (ref 5–12)
NEUTROPHILS NFR BLD AUTO: 3.88 10*3/MM3 (ref 1.7–7)
NEUTROPHILS NFR BLD AUTO: 47.7 % (ref 42.7–76)
NRBC BLD AUTO-RTO: 0 /100 WBC (ref 0–0.2)
PLATELET # BLD AUTO: 280 10*3/MM3 (ref 140–450)
PMV BLD AUTO: 10.3 FL (ref 6–12)
POTASSIUM SERPL-SCNC: 4.4 MMOL/L (ref 3.5–5.2)
PROT SERPL-MCNC: 7.4 G/DL (ref 6–8.5)
RBC # BLD AUTO: 5.19 10*6/MM3 (ref 4.14–5.8)
SODIUM SERPL-SCNC: 141 MMOL/L (ref 136–145)
TRIGL SERPL-MCNC: 108 MG/DL (ref 0–150)
TSH SERPL DL<=0.05 MIU/L-ACNC: 1.78 UIU/ML (ref 0.27–4.2)
VIT B12 BLD-MCNC: 274 PG/ML (ref 211–946)
VLDLC SERPL-MCNC: 20 MG/DL (ref 5–40)
WBC NRBC COR # BLD AUTO: 8.15 10*3/MM3 (ref 3.4–10.8)

## 2024-01-09 NOTE — TELEPHONE ENCOUNTER
----- Message from IVONNE Grimm sent at 1/9/2024  3:00 PM EST -----  Diabetes is not controlled.  I have made a referral to home health for diabetic education and monitoring.  Monitor glucose  (fasting and before  evening meal) and bring recordings to the next office visit.  Reschedule a visit in 2 weeks with readings. We will recheck labs for b12 deficiency at the next visit .

## 2024-01-09 NOTE — TELEPHONE ENCOUNTER
Per [facility] we are unable to accept at this time due to staffing volume.   Please find another agency and send message back to me so I can fax

## 2024-01-09 NOTE — OUTREACH NOTE
AMBULATORY CASE MANAGEMENT NOTE    Name and Relationship of Patient/Support Person: WILLIE RICHTER - Emergency Contact  Emergency Contact    Patient Outreach    Received Ambulatory Case Management referral from IVONNE Grimm, as family is requesting a home-Aid to help patient with shower/ personal care.  Called patient's wife, Willie, and explained role of RN-JYOTI.  Wife stated that Home Health services were coming but stopped prior to Christmas, and they had an Aid that helped 1 day/week.   Informed wife that PCP has made a new referral to Home Health (skilled) Agency and if that  Agency that accepts the referral has an Aid, then Medicare will cover the cost of the help with bathing, as long as patient is getting one of the skilled services.  Talked with wife about other options of getting an Aid for bathing help:  personal-hire of someone they know;  hiring a community agency for caregiver to come and help with bathing/ personal care; Medicaid Waiver service, if patient meets the State's requirements, not an immediate help, could take 2-3 months after application is completed.   Wife stated she is having some pain that is unusual for her at this time, and can no longer talk on the phone.  She asked that RN-ACM call her back tomorrow, to talk further about her options of help for her .      Callie KAYE  Ambulatory Case Management    1/9/2024, 16:36 EST

## 2024-01-09 NOTE — TELEPHONE ENCOUNTER
I left a message on the patients voicemail to call our office back, office number provided.     HUB relay:  Diabetes is not controlled.  I have made a referral to home health for diabetic education and monitoring.  Monitor glucose  (fasting and before  evening meal) and bring recordings to the next office visit.  Reschedule a visit in 2 weeks with readings. We will recheck labs for b12 deficiency at the next visit .

## 2024-01-09 NOTE — TELEPHONE ENCOUNTER
Caller: NEELAMWILLIE    Relationship: Emergency Contact    Best call back number: [phone]     Caller requesting test results: LAB RESULTS     What test was performed: LABS     When was the test performed: 1.8.24    Where was the test performed: OFFICE     Additional notes: PLEASE CALL WITH RESULTS

## 2024-01-10 ENCOUNTER — PATIENT OUTREACH (OUTPATIENT)
Dept: CASE MANAGEMENT | Facility: OTHER | Age: 69
End: 2024-01-10
Payer: MEDICARE

## 2024-01-10 NOTE — OUTREACH NOTE
AMBULATORY CASE MANAGEMENT NOTE    Name and Relationship of Patient/Support Person: WILLIE RICHTER - Emergency Contact  Emergency Contact    Patient Outreach    Called wife back today to finish conversation begun yesterday regarding patient's dementia and wife's need for assistance in the home to help with his personal care/ shower, and to be with him when she has to be out of the house.  Attentive listening provided as wife voiced stress and concern for her .   Discussed options of assistance for her to pursue to get help in the home for patient. 1) Reminded wife that Skilled Home Health services have been ordered by the PCP, and when they call her to set up first visit, she can ask them if they offer a Home Health Aid to assist with bathing.  Reminded wife this service is limited in time, only available during skilled HH services episode.  2) Discussed Community Agencies that she can hire in-home caregivers from - an out of pocket expense.  Provided wife with 4 different agencies' names and phone numbers to call and ask questions about cost, availability, etc.  3) Private-hire help from someone she knows and trusts.  Wife said she has already talked with someone she knows of and is going to meet with her, find out her cost per hour, and check her availability.  4) Discussed/ explained Medicaid Waiver services.  Provided wife with the number to call Medicaid and complete application over the phone.   Wife voiced appreciation for the information and said she would follow up.     This note will be routed to the PCP.    Callie KAYE  Ambulatory Case Management    1/10/2024, 17:09 EST

## 2024-01-16 NOTE — TELEPHONE ENCOUNTER
Spoke with Sam at [facility].  States they do accept [employer] insurance.  States to send referral, office notes and demographics and their intake supervisor will review and see if they can meet patient's needs.    Fax # [phone]

## 2024-01-17 NOTE — TELEPHONE ENCOUNTER
This has already been sent to [email or web address] and is in process with them    Sending back to clinical staff to call regarding message from Ebonie

## 2024-01-17 NOTE — TELEPHONE ENCOUNTER
Regarding home health referral  Sridhar Ferro Ann I Hello, we started is care on yesterday. Thanks again for the referral.

## 2024-01-23 ENCOUNTER — TELEPHONE (OUTPATIENT)
Dept: INTERNAL MEDICINE | Facility: CLINIC | Age: 69
End: 2024-01-23
Payer: MEDICARE

## 2024-01-23 ENCOUNTER — PATIENT OUTREACH (OUTPATIENT)
Dept: CASE MANAGEMENT | Facility: OTHER | Age: 69
End: 2024-01-23
Payer: MEDICARE

## 2024-01-23 NOTE — TELEPHONE ENCOUNTER
Patient's wife called. Patient has not eaten much in the last 3 days and has only drank 6 ounces. Today he did not drink any liquids. He did eat 2 pieces of toast today. She wants to know if she should take him to ER. Please call: [phone]

## 2024-01-23 NOTE — OUTREACH NOTE
AMBULATORY CASE MANAGEMENT NOTE    Name and Relationship of Patient/Support Person: WILLIE RICHTER - Emergency Contact  Emergency Contact    Patient Outreach    RN-ACRANDALL called patient's wife to follow up regarding assistance with patient's bathing/ ADL's.  Wife stated she did not get the private-hire help she thought she might.  Said she has not called the community agencies yet, but has their numbers still, that she can call.  Wife asked about [facility], in [place], [place].  Provided patient information needed regarding them having a memory care unit, and gave the phone number.  Encouraged wife to call and gather information on their Memory Care unit, and possible go visit it, if interested.  Discussed applying for Medicaid Waiver; confirmed she has the phone number to call given previously.  Gave encouragement to wife that making calls and gathering information does not commit her, but gives her more clarity on what she needs and can do, if needed.  She voiced appreciation for the information.    Callie KAYE  Ambulatory Case Management    1/23/2024, 14:35 EST

## 2024-01-24 ENCOUNTER — TELEPHONE (OUTPATIENT)
Dept: INTERNAL MEDICINE | Facility: CLINIC | Age: 69
End: 2024-01-24

## 2024-01-24 NOTE — TELEPHONE ENCOUNTER
Caller: CRISSY    Relationship: [facility]    Best call back number: [phone]    What was the call regarding: CRISSY CALLED STATING THAT SHE IS WITH THE FAMILY.  THEY STATED THAT THE PATIENT IS NOT EATING AND DRINKING. NOT TAKING  HIS MEDICATION. PATIENT HASN'T URINATED. PATIENT DID  EAT TWO SLICES AND ABOUT 43 TO 4 OUNCES OF TOMATO JUICE. THEY ARE PLANNING TO TRY GATORADE AND OTHER DRINKS. CRISSY ASKED IF DR MURRAY WOULD CONSIDER PUTTING THE PATIENT ON MEGACE.     BLOOD PRESSURE 130/64  BLOOD SUGAR 269

## 2024-01-24 NOTE — TELEPHONE ENCOUNTER
Spoke with wife.  He ate a little yesterday.  She is going to hold rexulti as this was started before he started refusing to eat.  She will call me with an update in 24-48 hours.  Told to go to ER if symptoms worsen.  Discussed signs of dehydration.

## 2024-01-25 RX ORDER — MEGESTROL ACETATE 125 MG/ML
625 SUSPENSION ORAL DAILY
Qty: 150 ML | Refills: 1 | Status: ON HOLD | OUTPATIENT
Start: 2024-01-25 | End: 2024-02-25

## 2024-01-25 NOTE — TELEPHONE ENCOUNTER
Spoke with Beltran, with [facility], informed that we did send in rx for Gregory.  Verbalized appreciation.  States she will be going back to see patient this afternoon as she was concerned about him.  States she will call us if any changes or worsening.  States she would also like order for Social service consult to see if any other services that might be able to help.  Explained that would be great.

## 2024-01-25 NOTE — TELEPHONE ENCOUNTER
I have sent in megace.    Please see how he's doing today.    Des Geller MD  06:59 EST  01/25/24

## 2024-01-29 ENCOUNTER — OUTSIDE FACILITY SERVICE (OUTPATIENT)
Dept: INTERNAL MEDICINE | Facility: CLINIC | Age: 69
End: 2024-01-29
Payer: MEDICARE

## 2024-01-31 ENCOUNTER — TELEPHONE (OUTPATIENT)
Dept: INTERNAL MEDICINE | Facility: CLINIC | Age: 69
End: 2024-01-31
Payer: MEDICARE

## 2024-01-31 NOTE — TELEPHONE ENCOUNTER
Caller: CHRISTINE    Relationship: Home Health    Best call back number: [phone]    What orders are you requesting (i.e. lab or imaging): VERBAL ORDER FOR 2 MORE VISITS    In what timeframe would the patient need to come in: ASAP    Where will you receive your lab/imaging services: IN HOME    Additional notes: CHRISTINE WOULD LIKE ADDITIONAL 2 MORE VISITS  FOR EDUCATION AND RESOURCE PLANNING WITH PATIENT AND SPOUSE. PLEASE ADVISE

## 2024-01-31 NOTE — TELEPHONE ENCOUNTER
Okay per Dr Geller.    Spoke with [employer], gave okay for 2 additional visits.  Verbalized appreciation.

## 2024-02-06 ENCOUNTER — PATIENT OUTREACH (OUTPATIENT)
Dept: CASE MANAGEMENT | Facility: OTHER | Age: 69
End: 2024-02-06
Payer: MEDICARE

## 2024-02-06 NOTE — OUTREACH NOTE
AMBULATORY CASE MANAGEMENT NOTE    Name and Relationship of Patient/Support Person: WILLIE RICHTER - Emergency Contact  Emergency Contact    Patient Outreach    RN-JYOTI called patient's wife to follow up on any assistance needed with patient.  Wife stated she has her son and DIL in the home with her to help; she has [facility] coming in and she has talked with their . Wife stated patient has been refusing to take his medication, refusing to eat and drink at times. Discussed options of more assistance in the home; SNF; Assisted Living/Memory Care Facility. Wife said she has called some Memory Care Units and they are very expensive.  She said she plans to call or go down to the Medicaid office and apply for patient to get Medicaid and Medicaid Waiver, if possible.  Wife stated she will call RN-MIKELM if needed.      Callie KAYE  Ambulatory Case Management    2/6/2024, 10:51 EST

## 2024-02-20 ENCOUNTER — TELEPHONE (OUTPATIENT)
Dept: INTERNAL MEDICINE | Facility: CLINIC | Age: 69
End: 2024-02-20
Payer: MEDICARE

## 2024-02-20 NOTE — TELEPHONE ENCOUNTER
Jolene with home health called to say that patients sugar is 459. They did give him his insulin but he is having a hard time swallowing the metformin pill. Jolene suggested that they crush the pill up and put it in some applesauce for patient. They just wanted you to know. Patient does not have any other symptoms and seems to be fine.

## 2024-02-24 ENCOUNTER — HOSPITAL ENCOUNTER (INPATIENT)
Facility: HOSPITAL | Age: 69
LOS: 8 days | Discharge: NURSING, REHAB OR LONG-TERM CARE | DRG: 057 | End: 2024-03-04
Attending: EMERGENCY MEDICINE | Admitting: INTERNAL MEDICINE
Payer: MEDICARE

## 2024-02-24 ENCOUNTER — APPOINTMENT (OUTPATIENT)
Dept: GENERAL RADIOLOGY | Facility: HOSPITAL | Age: 69
DRG: 057 | End: 2024-02-24
Payer: MEDICARE

## 2024-02-24 ENCOUNTER — APPOINTMENT (OUTPATIENT)
Dept: CT IMAGING | Facility: HOSPITAL | Age: 69
DRG: 057 | End: 2024-02-24
Payer: MEDICARE

## 2024-02-24 DIAGNOSIS — R29.6 MULTIPLE FALLS: ICD-10-CM

## 2024-02-24 DIAGNOSIS — R13.10 DYSPHAGIA, UNSPECIFIED TYPE: ICD-10-CM

## 2024-02-24 DIAGNOSIS — J90 BILATERAL PLEURAL EFFUSION: ICD-10-CM

## 2024-02-24 DIAGNOSIS — F03.918 DEMENTIA WITH BEHAVIORAL DISTURBANCE: Primary | ICD-10-CM

## 2024-02-24 DIAGNOSIS — R53.1 GENERALIZED WEAKNESS: ICD-10-CM

## 2024-02-24 DIAGNOSIS — S09.90XA INJURY OF HEAD, INITIAL ENCOUNTER: ICD-10-CM

## 2024-02-24 LAB
ALBUMIN SERPL-MCNC: 4.3 G/DL (ref 3.5–5.2)
ALBUMIN/GLOB SERPL: 1.4 G/DL
ALP SERPL-CCNC: 73 U/L (ref 39–117)
ALT SERPL W P-5'-P-CCNC: 20 U/L (ref 1–41)
ANION GAP SERPL CALCULATED.3IONS-SCNC: 12 MMOL/L (ref 5–15)
AST SERPL-CCNC: 19 U/L (ref 1–40)
BASOPHILS # BLD AUTO: 0.09 10*3/MM3 (ref 0–0.2)
BASOPHILS NFR BLD AUTO: 1.1 % (ref 0–1.5)
BILIRUB SERPL-MCNC: 1 MG/DL (ref 0–1.2)
BUN SERPL-MCNC: 12 MG/DL (ref 8–23)
BUN/CREAT SERPL: 13.8 (ref 7–25)
CALCIUM SPEC-SCNC: 9.1 MG/DL (ref 8.6–10.5)
CHLORIDE SERPL-SCNC: 101 MMOL/L (ref 98–107)
CO2 SERPL-SCNC: 28 MMOL/L (ref 22–29)
CREAT SERPL-MCNC: 0.87 MG/DL (ref 0.76–1.27)
DEPRECATED RDW RBC AUTO: 44.2 FL (ref 37–54)
EGFRCR SERPLBLD CKD-EPI 2021: 94.6 ML/MIN/1.73
EOSINOPHIL # BLD AUTO: 0.92 10*3/MM3 (ref 0–0.4)
EOSINOPHIL NFR BLD AUTO: 11.7 % (ref 0.3–6.2)
ERYTHROCYTE [DISTWIDTH] IN BLOOD BY AUTOMATED COUNT: 12.9 % (ref 12.3–15.4)
GLOBULIN UR ELPH-MCNC: 3.1 GM/DL
GLUCOSE SERPL-MCNC: 248 MG/DL (ref 65–99)
HCT VFR BLD AUTO: 45.6 % (ref 37.5–51)
HGB BLD-MCNC: 15.4 G/DL (ref 13–17.7)
HOLD SPECIMEN: NORMAL
HOLD SPECIMEN: NORMAL
IMM GRANULOCYTES # BLD AUTO: 0.02 10*3/MM3 (ref 0–0.05)
IMM GRANULOCYTES NFR BLD AUTO: 0.3 % (ref 0–0.5)
LYMPHOCYTES # BLD AUTO: 3.16 10*3/MM3 (ref 0.7–3.1)
LYMPHOCYTES NFR BLD AUTO: 40.1 % (ref 19.6–45.3)
MAGNESIUM SERPL-MCNC: 1.6 MG/DL (ref 1.6–2.4)
MCH RBC QN AUTO: 31.6 PG (ref 26.6–33)
MCHC RBC AUTO-ENTMCNC: 33.8 G/DL (ref 31.5–35.7)
MCV RBC AUTO: 93.4 FL (ref 79–97)
MONOCYTES # BLD AUTO: 0.82 10*3/MM3 (ref 0.1–0.9)
MONOCYTES NFR BLD AUTO: 10.4 % (ref 5–12)
NEUTROPHILS NFR BLD AUTO: 2.88 10*3/MM3 (ref 1.7–7)
NEUTROPHILS NFR BLD AUTO: 36.4 % (ref 42.7–76)
NRBC BLD AUTO-RTO: 0 /100 WBC (ref 0–0.2)
PLATELET # BLD AUTO: 250 10*3/MM3 (ref 140–450)
PMV BLD AUTO: 10.2 FL (ref 6–12)
POTASSIUM SERPL-SCNC: 3.7 MMOL/L (ref 3.5–5.2)
PROT SERPL-MCNC: 7.4 G/DL (ref 6–8.5)
QT INTERVAL: 450 MS
QTC INTERVAL: 434 MS
RBC # BLD AUTO: 4.88 10*6/MM3 (ref 4.14–5.8)
SODIUM SERPL-SCNC: 141 MMOL/L (ref 136–145)
TROPONIN T SERPL HS-MCNC: 55 NG/L
WBC NRBC COR # BLD AUTO: 7.89 10*3/MM3 (ref 3.4–10.8)
WHOLE BLOOD HOLD COAG: NORMAL
WHOLE BLOOD HOLD SPECIMEN: NORMAL

## 2024-02-24 PROCEDURE — 82607 VITAMIN B-12: CPT | Performed by: INTERNAL MEDICINE

## 2024-02-24 PROCEDURE — 70450 CT HEAD/BRAIN W/O DYE: CPT

## 2024-02-24 PROCEDURE — 80053 COMPREHEN METABOLIC PANEL: CPT | Performed by: EMERGENCY MEDICINE

## 2024-02-24 PROCEDURE — 85025 COMPLETE CBC W/AUTO DIFF WBC: CPT | Performed by: EMERGENCY MEDICINE

## 2024-02-24 PROCEDURE — 93005 ELECTROCARDIOGRAM TRACING: CPT | Performed by: EMERGENCY MEDICINE

## 2024-02-24 PROCEDURE — 83735 ASSAY OF MAGNESIUM: CPT | Performed by: EMERGENCY MEDICINE

## 2024-02-24 PROCEDURE — 80307 DRUG TEST PRSMV CHEM ANLYZR: CPT | Performed by: NURSE PRACTITIONER

## 2024-02-24 PROCEDURE — 99285 EMERGENCY DEPT VISIT HI MDM: CPT

## 2024-02-24 PROCEDURE — 81001 URINALYSIS AUTO W/SCOPE: CPT | Performed by: EMERGENCY MEDICINE

## 2024-02-24 PROCEDURE — 84484 ASSAY OF TROPONIN QUANT: CPT | Performed by: EMERGENCY MEDICINE

## 2024-02-24 PROCEDURE — 83605 ASSAY OF LACTIC ACID: CPT | Performed by: NURSE PRACTITIONER

## 2024-02-24 PROCEDURE — 71045 X-RAY EXAM CHEST 1 VIEW: CPT

## 2024-02-24 PROCEDURE — 82746 ASSAY OF FOLIC ACID SERUM: CPT | Performed by: INTERNAL MEDICINE

## 2024-02-24 NOTE — LETTER
EMS Transport Request  For use at [facility], [place], [place], [place], and [place] only   Patient Name: Too Tai : 1955   Weight:99.8 kg (220 lb 0.3 oz) Pick-up Location: [facility] BLS/ALS: BLS/ALS: BLS   Insurance: [employer] MEDICARE REPLACEMENT Auth End Date:    Pre-Cert #: D/C Summary complete:    Destination: Other [facility] and Rehab    Contact Precautions: None   Equipment (O2, Fluids, etc.): None   Arrive By Date/Time: 3/4, afternoon  Stretcher/WC: Stretcher   CM Requesting: Kassy Alejandra RN Ext: [phone]   Notes/Medical Necessity:      ______________________________________________________________________    *Only 2 patient bags OR 1 carry-on size bag are permitted.  Wheelchairs and walkers CANNOT transported with the patient. Acknowledge: Yes

## 2024-02-24 NOTE — Clinical Note
Level of Care: Telemetry [5]   Diagnosis: Spasm of muscle [728.85.ICD-9-CM]   Admitting Physician: HAWA CLARK III [[id number]   Attending Physician: HAWA CLARK III [[id number]]   Bed Request Comments: tele obs (not CDU)

## 2024-02-25 PROBLEM — I25.10 ARTERIOSCLEROSIS OF CORONARY ARTERY: Status: ACTIVE | Noted: 2024-02-25

## 2024-02-25 PROBLEM — E83.42 HYPOMAGNESEMIA: Status: ACTIVE | Noted: 2019-12-25

## 2024-02-25 PROBLEM — Z79.4 TYPE 2 DIABETES MELLITUS, WITH LONG-TERM CURRENT USE OF INSULIN: Status: RESOLVED | Noted: 2024-01-16 | Resolved: 2024-02-25

## 2024-02-25 PROBLEM — Z79.01 CHRONIC ANTICOAGULATION: Status: ACTIVE | Noted: 2024-02-25

## 2024-02-25 PROBLEM — Z79.4 TYPE 2 DIABETES MELLITUS WITH HYPERGLYCEMIA, WITH LONG-TERM CURRENT USE OF INSULIN: Status: ACTIVE | Noted: 2019-12-16

## 2024-02-25 PROBLEM — E11.9 TYPE 2 DIABETES MELLITUS, WITH LONG-TERM CURRENT USE OF INSULIN: Status: RESOLVED | Noted: 2024-01-16 | Resolved: 2024-02-25

## 2024-02-25 PROBLEM — R29.6 FREQUENT FALLS: Status: ACTIVE | Noted: 2024-02-25

## 2024-02-25 PROBLEM — E11.65 TYPE 2 DIABETES MELLITUS WITH HYPERGLYCEMIA, WITH LONG-TERM CURRENT USE OF INSULIN: Status: ACTIVE | Noted: 2019-12-16

## 2024-02-25 PROBLEM — G25.3 MYOCLONIC JERKING: Status: ACTIVE | Noted: 2024-02-25

## 2024-02-25 PROBLEM — E11.9 TYPE 2 DIABETES MELLITUS, WITH LONG-TERM CURRENT USE OF INSULIN: Status: ACTIVE | Noted: 2024-01-16

## 2024-02-25 PROBLEM — F03.918 DEMENTIA WITH BEHAVIORAL DISTURBANCE: Status: ACTIVE | Noted: 2024-02-25

## 2024-02-25 PROBLEM — Z79.4 TYPE 2 DIABETES MELLITUS, WITH LONG-TERM CURRENT USE OF INSULIN: Status: ACTIVE | Noted: 2024-01-16

## 2024-02-25 PROBLEM — Z86.718 HISTORY OF DVT (DEEP VEIN THROMBOSIS): Status: ACTIVE | Noted: 2024-02-25

## 2024-02-25 PROBLEM — M62.838 SPASM OF MUSCLE: Status: ACTIVE | Noted: 2024-02-25

## 2024-02-25 LAB
AMMONIA BLD-SCNC: 23 UMOL/L (ref 16–60)
AMPHET+METHAMPHET UR QL: NEGATIVE
AMPHETAMINES UR QL: NEGATIVE
ANION GAP SERPL CALCULATED.3IONS-SCNC: 11 MMOL/L (ref 5–15)
BACTERIA UR QL AUTO: NORMAL /HPF
BARBITURATES UR QL SCN: NEGATIVE
BASOPHILS # BLD AUTO: 0.07 10*3/MM3 (ref 0–0.2)
BASOPHILS NFR BLD AUTO: 1.1 % (ref 0–1.5)
BENZODIAZ UR QL SCN: POSITIVE
BILIRUB UR QL STRIP: NEGATIVE
BUN SERPL-MCNC: 12 MG/DL (ref 8–23)
BUN/CREAT SERPL: 15.6 (ref 7–25)
BUPRENORPHINE SERPL-MCNC: NEGATIVE NG/ML
CALCIUM SPEC-SCNC: 8.6 MG/DL (ref 8.6–10.5)
CANNABINOIDS SERPL QL: NEGATIVE
CHLORIDE SERPL-SCNC: 103 MMOL/L (ref 98–107)
CK SERPL-CCNC: 101 U/L (ref 20–200)
CLARITY UR: CLEAR
CO2 SERPL-SCNC: 26 MMOL/L (ref 22–29)
COCAINE UR QL: NEGATIVE
COLOR UR: YELLOW
CREAT SERPL-MCNC: 0.77 MG/DL (ref 0.76–1.27)
D-LACTATE SERPL-SCNC: 2 MMOL/L (ref 0.5–2)
DEPRECATED RDW RBC AUTO: 43.8 FL (ref 37–54)
EGFRCR SERPLBLD CKD-EPI 2021: 98.1 ML/MIN/1.73
EOSINOPHIL # BLD AUTO: 0.85 10*3/MM3 (ref 0–0.4)
EOSINOPHIL NFR BLD AUTO: 12.8 % (ref 0.3–6.2)
ERYTHROCYTE [DISTWIDTH] IN BLOOD BY AUTOMATED COUNT: 12.7 % (ref 12.3–15.4)
FENTANYL UR-MCNC: NEGATIVE NG/ML
FOLATE SERPL-MCNC: 10.2 NG/ML (ref 4.78–24.2)
GEN 5 2HR TROPONIN T REFLEX: 45 NG/L
GLUCOSE BLDC GLUCOMTR-MCNC: 215 MG/DL (ref 70–130)
GLUCOSE BLDC GLUCOMTR-MCNC: 218 MG/DL (ref 70–130)
GLUCOSE BLDC GLUCOMTR-MCNC: 233 MG/DL (ref 70–130)
GLUCOSE SERPL-MCNC: 288 MG/DL (ref 65–99)
GLUCOSE UR STRIP-MCNC: ABNORMAL MG/DL
HBA1C MFR BLD: 9.2 % (ref 4.8–5.6)
HCT VFR BLD AUTO: 41 % (ref 37.5–51)
HGB BLD-MCNC: 14.3 G/DL (ref 13–17.7)
HGB UR QL STRIP.AUTO: NEGATIVE
HOLD SPECIMEN: NORMAL
HYALINE CASTS UR QL AUTO: NORMAL /LPF
IMM GRANULOCYTES # BLD AUTO: 0.02 10*3/MM3 (ref 0–0.05)
IMM GRANULOCYTES NFR BLD AUTO: 0.3 % (ref 0–0.5)
KETONES UR QL STRIP: ABNORMAL
LEUKOCYTE ESTERASE UR QL STRIP.AUTO: NEGATIVE
LYMPHOCYTES # BLD AUTO: 2.3 10*3/MM3 (ref 0.7–3.1)
LYMPHOCYTES NFR BLD AUTO: 34.5 % (ref 19.6–45.3)
MAGNESIUM SERPL-MCNC: 1.3 MG/DL (ref 1.6–2.4)
MCH RBC QN AUTO: 32.4 PG (ref 26.6–33)
MCHC RBC AUTO-ENTMCNC: 34.9 G/DL (ref 31.5–35.7)
MCV RBC AUTO: 93 FL (ref 79–97)
METHADONE UR QL SCN: NEGATIVE
MONOCYTES # BLD AUTO: 0.6 10*3/MM3 (ref 0.1–0.9)
MONOCYTES NFR BLD AUTO: 9 % (ref 5–12)
NEUTROPHILS NFR BLD AUTO: 2.82 10*3/MM3 (ref 1.7–7)
NEUTROPHILS NFR BLD AUTO: 42.3 % (ref 42.7–76)
NITRITE UR QL STRIP: NEGATIVE
NRBC BLD AUTO-RTO: 0 /100 WBC (ref 0–0.2)
OPIATES UR QL: NEGATIVE
OXYCODONE UR QL SCN: NEGATIVE
PCP UR QL SCN: NEGATIVE
PH UR STRIP.AUTO: 6.5 [PH] (ref 5–8)
PHOSPHATE SERPL-MCNC: 2.3 MG/DL (ref 2.5–4.5)
PLATELET # BLD AUTO: 231 10*3/MM3 (ref 140–450)
PMV BLD AUTO: 10.2 FL (ref 6–12)
POTASSIUM SERPL-SCNC: 3.2 MMOL/L (ref 3.5–5.2)
PROT UR QL STRIP: ABNORMAL
RBC # BLD AUTO: 4.41 10*6/MM3 (ref 4.14–5.8)
RBC # UR STRIP: NORMAL /HPF
REF LAB TEST METHOD: NORMAL
SODIUM SERPL-SCNC: 140 MMOL/L (ref 136–145)
SP GR UR STRIP: 1.03 (ref 1–1.03)
SQUAMOUS #/AREA URNS HPF: NORMAL /HPF
TRICYCLICS UR QL SCN: POSITIVE
TROPONIN T DELTA: -10 NG/L
TSH SERPL DL<=0.05 MIU/L-ACNC: 4.05 UIU/ML (ref 0.27–4.2)
UROBILINOGEN UR QL STRIP: ABNORMAL
VIT B12 BLD-MCNC: 223 PG/ML (ref 211–946)
WBC # UR STRIP: NORMAL /HPF
WBC NRBC COR # BLD AUTO: 6.66 10*3/MM3 (ref 3.4–10.8)

## 2024-02-25 PROCEDURE — 25010000002 SODIUM CHLORIDE 0.9 % WITH KCL 20 MEQ 20-0.9 MEQ/L-% SOLUTION: Performed by: INTERNAL MEDICINE

## 2024-02-25 PROCEDURE — 36415 COLL VENOUS BLD VENIPUNCTURE: CPT

## 2024-02-25 PROCEDURE — 85025 COMPLETE CBC W/AUTO DIFF WBC: CPT | Performed by: INTERNAL MEDICINE

## 2024-02-25 PROCEDURE — 25010000002 HALOPERIDOL LACTATE PER 5 MG: Performed by: NURSE PRACTITIONER

## 2024-02-25 PROCEDURE — 25010000002 DIPHENHYDRAMINE PER 50 MG: Performed by: INTERNAL MEDICINE

## 2024-02-25 PROCEDURE — 25010000002 ENOXAPARIN PER 10 MG: Performed by: INTERNAL MEDICINE

## 2024-02-25 PROCEDURE — 25010000002 DIAZEPAM PER 5 MG: Performed by: PSYCHIATRY & NEUROLOGY

## 2024-02-25 PROCEDURE — 25010000002 POTASSIUM CHLORIDE 10 MEQ/100ML SOLUTION: Performed by: INTERNAL MEDICINE

## 2024-02-25 PROCEDURE — 63710000001 INSULIN DETEMIR PER 5 UNITS: Performed by: INTERNAL MEDICINE

## 2024-02-25 PROCEDURE — 63710000001 INSULIN LISPRO (HUMAN) PER 5 UNITS: Performed by: INTERNAL MEDICINE

## 2024-02-25 PROCEDURE — 82550 ASSAY OF CK (CPK): CPT | Performed by: NURSE PRACTITIONER

## 2024-02-25 PROCEDURE — 25010000002 MAGNESIUM SULFATE 2 GM/50ML SOLUTION: Performed by: NURSE PRACTITIONER

## 2024-02-25 PROCEDURE — 83735 ASSAY OF MAGNESIUM: CPT | Performed by: INTERNAL MEDICINE

## 2024-02-25 PROCEDURE — 82948 REAGENT STRIP/BLOOD GLUCOSE: CPT

## 2024-02-25 PROCEDURE — 84484 ASSAY OF TROPONIN QUANT: CPT | Performed by: EMERGENCY MEDICINE

## 2024-02-25 PROCEDURE — 83036 HEMOGLOBIN GLYCOSYLATED A1C: CPT | Performed by: INTERNAL MEDICINE

## 2024-02-25 PROCEDURE — 84100 ASSAY OF PHOSPHORUS: CPT | Performed by: INTERNAL MEDICINE

## 2024-02-25 PROCEDURE — 82140 ASSAY OF AMMONIA: CPT | Performed by: INTERNAL MEDICINE

## 2024-02-25 PROCEDURE — 99223 1ST HOSP IP/OBS HIGH 75: CPT | Performed by: INTERNAL MEDICINE

## 2024-02-25 PROCEDURE — 82652 VIT D 1 25-DIHYDROXY: CPT | Performed by: INTERNAL MEDICINE

## 2024-02-25 PROCEDURE — 80048 BASIC METABOLIC PNL TOTAL CA: CPT | Performed by: INTERNAL MEDICINE

## 2024-02-25 PROCEDURE — 25810000003 LACTATED RINGERS SOLUTION: Performed by: INTERNAL MEDICINE

## 2024-02-25 PROCEDURE — 84443 ASSAY THYROID STIM HORMONE: CPT | Performed by: INTERNAL MEDICINE

## 2024-02-25 PROCEDURE — 99222 1ST HOSP IP/OBS MODERATE 55: CPT | Performed by: PSYCHIATRY & NEUROLOGY

## 2024-02-25 PROCEDURE — 25010000002 MAGNESIUM SULFATE 2 GM/50ML SOLUTION: Performed by: INTERNAL MEDICINE

## 2024-02-25 RX ORDER — SODIUM CHLORIDE 0.9 % (FLUSH) 0.9 %
10 SYRINGE (ML) INJECTION AS NEEDED
Status: DISCONTINUED | OUTPATIENT
Start: 2024-02-25 | End: 2024-02-26

## 2024-02-25 RX ORDER — QUETIAPINE FUMARATE 25 MG/1
50 TABLET, FILM COATED ORAL 2 TIMES DAILY
Status: DISCONTINUED | OUTPATIENT
Start: 2024-02-25 | End: 2024-02-26

## 2024-02-25 RX ORDER — ONDANSETRON 2 MG/ML
4 INJECTION INTRAMUSCULAR; INTRAVENOUS EVERY 6 HOURS PRN
Status: DISCONTINUED | OUTPATIENT
Start: 2024-02-25 | End: 2024-03-04 | Stop reason: HOSPADM

## 2024-02-25 RX ORDER — POTASSIUM CHLORIDE 20 MEQ/1
40 TABLET, EXTENDED RELEASE ORAL EVERY 4 HOURS
Status: DISCONTINUED | OUTPATIENT
Start: 2024-02-25 | End: 2024-02-25

## 2024-02-25 RX ORDER — ONDANSETRON 4 MG/1
4 TABLET, ORALLY DISINTEGRATING ORAL EVERY 6 HOURS PRN
Status: DISCONTINUED | OUTPATIENT
Start: 2024-02-25 | End: 2024-03-04 | Stop reason: HOSPADM

## 2024-02-25 RX ORDER — DIPHENHYDRAMINE HYDROCHLORIDE 50 MG/ML
25 INJECTION INTRAMUSCULAR; INTRAVENOUS ONCE
Status: COMPLETED | OUTPATIENT
Start: 2024-02-25 | End: 2024-02-25

## 2024-02-25 RX ORDER — ASPIRIN 81 MG/1
81 TABLET ORAL DAILY
Status: DISCONTINUED | OUTPATIENT
Start: 2024-02-25 | End: 2024-02-25

## 2024-02-25 RX ORDER — LISINOPRIL 2.5 MG/1
2.5 TABLET ORAL DAILY
Status: DISCONTINUED | OUTPATIENT
Start: 2024-02-25 | End: 2024-02-25

## 2024-02-25 RX ORDER — DIAZEPAM 5 MG/ML
5 INJECTION, SOLUTION INTRAMUSCULAR; INTRAVENOUS ONCE
Status: COMPLETED | OUTPATIENT
Start: 2024-02-25 | End: 2024-02-25

## 2024-02-25 RX ORDER — HALOPERIDOL 5 MG/ML
1 INJECTION INTRAMUSCULAR EVERY 6 HOURS PRN
Status: DISCONTINUED | OUTPATIENT
Start: 2024-02-25 | End: 2024-02-26

## 2024-02-25 RX ORDER — POLYETHYLENE GLYCOL 3350 17 G/17G
17 POWDER, FOR SOLUTION ORAL DAILY PRN
Status: DISCONTINUED | OUTPATIENT
Start: 2024-02-25 | End: 2024-03-04 | Stop reason: HOSPADM

## 2024-02-25 RX ORDER — SODIUM CHLORIDE 0.9 % (FLUSH) 0.9 %
10 SYRINGE (ML) INJECTION EVERY 12 HOURS SCHEDULED
Status: DISCONTINUED | OUTPATIENT
Start: 2024-02-25 | End: 2024-02-26

## 2024-02-25 RX ORDER — MAGNESIUM SULFATE HEPTAHYDRATE 40 MG/ML
2 INJECTION, SOLUTION INTRAVENOUS ONCE
Status: COMPLETED | OUTPATIENT
Start: 2024-02-25 | End: 2024-02-25

## 2024-02-25 RX ORDER — POTASSIUM CHLORIDE 7.45 MG/ML
10 INJECTION INTRAVENOUS
Status: COMPLETED | OUTPATIENT
Start: 2024-02-25 | End: 2024-02-25

## 2024-02-25 RX ORDER — BISACODYL 10 MG
10 SUPPOSITORY, RECTAL RECTAL DAILY PRN
Status: DISCONTINUED | OUTPATIENT
Start: 2024-02-25 | End: 2024-03-04 | Stop reason: HOSPADM

## 2024-02-25 RX ORDER — DEXTROSE MONOHYDRATE 25 G/50ML
25 INJECTION, SOLUTION INTRAVENOUS
Status: DISCONTINUED | OUTPATIENT
Start: 2024-02-25 | End: 2024-03-04 | Stop reason: HOSPADM

## 2024-02-25 RX ORDER — ATORVASTATIN CALCIUM 40 MG/1
40 TABLET, FILM COATED ORAL NIGHTLY
Status: DISCONTINUED | OUTPATIENT
Start: 2024-02-25 | End: 2024-02-25

## 2024-02-25 RX ORDER — IBUPROFEN 600 MG/1
1 TABLET ORAL
Status: DISCONTINUED | OUTPATIENT
Start: 2024-02-25 | End: 2024-03-04 | Stop reason: HOSPADM

## 2024-02-25 RX ORDER — POTASSIUM CHLORIDE 750 MG/1
40 CAPSULE, EXTENDED RELEASE ORAL ONCE
Status: DISCONTINUED | OUTPATIENT
Start: 2024-02-25 | End: 2024-03-04

## 2024-02-25 RX ORDER — NICOTINE POLACRILEX 4 MG
15 LOZENGE BUCCAL
Status: DISCONTINUED | OUTPATIENT
Start: 2024-02-25 | End: 2024-03-04 | Stop reason: HOSPADM

## 2024-02-25 RX ORDER — DONEPEZIL HYDROCHLORIDE 10 MG/1
20 TABLET, FILM COATED ORAL DAILY
Status: DISCONTINUED | OUTPATIENT
Start: 2024-02-25 | End: 2024-03-04 | Stop reason: HOSPADM

## 2024-02-25 RX ORDER — ACETAMINOPHEN 160 MG/5ML
650 SOLUTION ORAL EVERY 4 HOURS PRN
Status: DISCONTINUED | OUTPATIENT
Start: 2024-02-25 | End: 2024-03-04 | Stop reason: HOSPADM

## 2024-02-25 RX ORDER — DONEPEZIL HYDROCHLORIDE 10 MG/1
20 TABLET, FILM COATED ORAL NIGHTLY
Status: DISCONTINUED | OUTPATIENT
Start: 2024-02-25 | End: 2024-02-25

## 2024-02-25 RX ORDER — AMOXICILLIN 250 MG
2 CAPSULE ORAL 2 TIMES DAILY
Status: DISCONTINUED | OUTPATIENT
Start: 2024-02-25 | End: 2024-03-04 | Stop reason: HOSPADM

## 2024-02-25 RX ORDER — SODIUM CHLORIDE AND POTASSIUM CHLORIDE 150; 900 MG/100ML; MG/100ML
100 INJECTION, SOLUTION INTRAVENOUS CONTINUOUS
Status: DISCONTINUED | OUTPATIENT
Start: 2024-02-25 | End: 2024-02-25

## 2024-02-25 RX ORDER — CILOSTAZOL 50 MG/1
50 TABLET ORAL 2 TIMES DAILY
Status: DISCONTINUED | OUTPATIENT
Start: 2024-02-25 | End: 2024-03-01

## 2024-02-25 RX ORDER — METOPROLOL SUCCINATE 25 MG/1
25 TABLET, EXTENDED RELEASE ORAL DAILY
Status: DISCONTINUED | OUTPATIENT
Start: 2024-02-25 | End: 2024-02-28

## 2024-02-25 RX ORDER — MAGNESIUM SULFATE HEPTAHYDRATE 40 MG/ML
4 INJECTION, SOLUTION INTRAVENOUS ONCE
Status: DISCONTINUED | OUTPATIENT
Start: 2024-02-25 | End: 2024-02-25

## 2024-02-25 RX ORDER — NITROGLYCERIN 0.4 MG/1
0.4 TABLET SUBLINGUAL
Status: DISCONTINUED | OUTPATIENT
Start: 2024-02-25 | End: 2024-03-04 | Stop reason: HOSPADM

## 2024-02-25 RX ORDER — HALOPERIDOL 5 MG/ML
1 INJECTION INTRAMUSCULAR ONCE
Status: COMPLETED | OUTPATIENT
Start: 2024-02-25 | End: 2024-02-25

## 2024-02-25 RX ORDER — INSULIN LISPRO 100 [IU]/ML
2-7 INJECTION, SOLUTION INTRAVENOUS; SUBCUTANEOUS
Status: DISCONTINUED | OUTPATIENT
Start: 2024-02-25 | End: 2024-03-04 | Stop reason: HOSPADM

## 2024-02-25 RX ORDER — ACETAMINOPHEN 325 MG/1
650 TABLET ORAL EVERY 4 HOURS PRN
Status: DISCONTINUED | OUTPATIENT
Start: 2024-02-25 | End: 2024-03-04 | Stop reason: HOSPADM

## 2024-02-25 RX ORDER — TEMAZEPAM 15 MG/1
15 CAPSULE ORAL NIGHTLY PRN
Status: DISCONTINUED | OUTPATIENT
Start: 2024-02-25 | End: 2024-02-27

## 2024-02-25 RX ORDER — POTASSIUM CHLORIDE 7.45 MG/ML
10 INJECTION INTRAVENOUS
Status: DISCONTINUED | OUTPATIENT
Start: 2024-02-25 | End: 2024-02-25

## 2024-02-25 RX ORDER — QUETIAPINE FUMARATE 25 MG/1
50 TABLET, FILM COATED ORAL ONCE
Status: COMPLETED | OUTPATIENT
Start: 2024-02-25 | End: 2024-02-25

## 2024-02-25 RX ORDER — SODIUM CHLORIDE 9 MG/ML
40 INJECTION, SOLUTION INTRAVENOUS AS NEEDED
Status: DISCONTINUED | OUTPATIENT
Start: 2024-02-25 | End: 2024-02-26

## 2024-02-25 RX ORDER — POTASSIUM CHLORIDE 7.45 MG/ML
10 INJECTION INTRAVENOUS
Status: ACTIVE | OUTPATIENT
Start: 2024-02-25 | End: 2024-02-25

## 2024-02-25 RX ORDER — ACETAMINOPHEN 650 MG/1
650 SUPPOSITORY RECTAL EVERY 4 HOURS PRN
Status: DISCONTINUED | OUTPATIENT
Start: 2024-02-25 | End: 2024-03-04 | Stop reason: HOSPADM

## 2024-02-25 RX ORDER — ENOXAPARIN SODIUM 100 MG/ML
1 INJECTION SUBCUTANEOUS 2 TIMES DAILY
Status: DISCONTINUED | OUTPATIENT
Start: 2024-02-25 | End: 2024-03-04 | Stop reason: HOSPADM

## 2024-02-25 RX ORDER — DONEPEZIL HYDROCHLORIDE 10 MG/1
20 TABLET, FILM COATED ORAL DAILY
Status: DISCONTINUED | OUTPATIENT
Start: 2024-02-25 | End: 2024-02-25

## 2024-02-25 RX ORDER — TEMAZEPAM 15 MG/1
15 CAPSULE ORAL ONCE
Status: COMPLETED | OUTPATIENT
Start: 2024-02-25 | End: 2024-02-25

## 2024-02-25 RX ORDER — BISACODYL 5 MG/1
5 TABLET, DELAYED RELEASE ORAL DAILY PRN
Status: DISCONTINUED | OUTPATIENT
Start: 2024-02-25 | End: 2024-03-04 | Stop reason: HOSPADM

## 2024-02-25 RX ADMIN — TEMAZEPAM 15 MG: 15 CAPSULE ORAL at 01:50

## 2024-02-25 RX ADMIN — POTASSIUM CHLORIDE 10 MEQ: 7.46 INJECTION, SOLUTION INTRAVENOUS at 16:42

## 2024-02-25 RX ADMIN — DIAZEPAM 5 MG: 5 INJECTION, SOLUTION INTRAMUSCULAR; INTRAVENOUS at 14:40

## 2024-02-25 RX ADMIN — MAGNESIUM SULFATE HEPTAHYDRATE 2 G: 2 INJECTION, SOLUTION INTRAVENOUS at 13:32

## 2024-02-25 RX ADMIN — QUETIAPINE FUMARATE 50 MG: 25 TABLET ORAL at 10:49

## 2024-02-25 RX ADMIN — SODIUM CHLORIDE, POTASSIUM CHLORIDE, SODIUM LACTATE AND CALCIUM CHLORIDE 1500 ML: 600; 310; 30; 20 INJECTION, SOLUTION INTRAVENOUS at 13:33

## 2024-02-25 RX ADMIN — MAGNESIUM OXIDE TAB 400 MG (241.3 MG ELEMENTAL MG) 800 MG: 400 (241.3 MG) TAB at 11:06

## 2024-02-25 RX ADMIN — DONEPEZIL HYDROCHLORIDE 20 MG: 10 TABLET, FILM COATED ORAL at 10:49

## 2024-02-25 RX ADMIN — POTASSIUM CHLORIDE AND SODIUM CHLORIDE 100 ML/HR: 900; 150 INJECTION, SOLUTION INTRAVENOUS at 03:32

## 2024-02-25 RX ADMIN — ENOXAPARIN SODIUM 100 MG: 100 INJECTION SUBCUTANEOUS at 20:27

## 2024-02-25 RX ADMIN — INSULIN DETEMIR 5 UNITS: 100 INJECTION, SOLUTION SUBCUTANEOUS at 10:49

## 2024-02-25 RX ADMIN — INSULIN LISPRO 3 UNITS: 100 INJECTION, SOLUTION INTRAVENOUS; SUBCUTANEOUS at 20:27

## 2024-02-25 RX ADMIN — MAGNESIUM SULFATE HEPTAHYDRATE 2 G: 2 INJECTION, SOLUTION INTRAVENOUS at 03:34

## 2024-02-25 RX ADMIN — QUETIAPINE FUMARATE 50 MG: 25 TABLET ORAL at 20:27

## 2024-02-25 RX ADMIN — INSULIN DETEMIR 5 UNITS: 100 INJECTION, SOLUTION SUBCUTANEOUS at 20:27

## 2024-02-25 RX ADMIN — HALOPERIDOL LACTATE 1 MG: 5 INJECTION, SOLUTION INTRAMUSCULAR at 05:14

## 2024-02-25 RX ADMIN — DIPHENHYDRAMINE HYDROCHLORIDE 25 MG: 50 INJECTION INTRAMUSCULAR; INTRAVENOUS at 01:37

## 2024-02-25 RX ADMIN — Medication 10 ML: at 20:28

## 2024-02-25 RX ADMIN — QUETIAPINE 50 MG: 25 TABLET, FILM COATED ORAL at 01:49

## 2024-02-25 RX ADMIN — INSULIN LISPRO 3 UNITS: 100 INJECTION, SOLUTION INTRAVENOUS; SUBCUTANEOUS at 17:58

## 2024-02-25 RX ADMIN — ENOXAPARIN SODIUM 100 MG: 100 INJECTION SUBCUTANEOUS at 10:49

## 2024-02-25 RX ADMIN — INSULIN LISPRO 3 UNITS: 100 INJECTION, SOLUTION INTRAVENOUS; SUBCUTANEOUS at 11:19

## 2024-02-25 RX ADMIN — POTASSIUM CHLORIDE 10 MEQ: 7.46 INJECTION, SOLUTION INTRAVENOUS at 15:28

## 2024-02-25 RX ADMIN — HALOPERIDOL LACTATE 1 MG: 5 INJECTION, SOLUTION INTRAMUSCULAR at 20:26

## 2024-02-25 NOTE — NURSING NOTE
Attempt to place 4th iv unsuccessful pt yelling at rn and moving legs like to kick rn. Placed monitor pulse ox back on pt.pt remains very agitated.   monit

## 2024-02-25 NOTE — CONSULTS
Neurology Note    Patient:  Too Tai    YOB: 1955    REFERRING PHYSICIAN:  Dr. Davies    CHIEF COMPLAINT:    AMS, falls    HISTORY OF PRESENT ILLNESS:   The patient is a 68 y.o. male with advanced AD, taken care of at home by his wife, p/w increasing frequency of falls and muscle jerks for one week. He had 4 falls yday. He does use a cane at times but does not like to use it. His dementia has progressed recently. His sister and niece report that he does appears to recognize them but does not know their name. He sees Segundo Bradley and Duyen. He was recently restarted on Rexulti 2 weeks ago. He did take a Benadryl yday that his wife thinks helped with his muscle jerks.    Past Medical History:  Past Medical History:   Diagnosis Date    Coronary artery disease     Dementia     Diabetes mellitus     Hyperlipidemia     Peripheral vascular disease        Past Surgical History:  Past Surgical History:   Procedure Laterality Date    CORONARY ARTERY BYPASS GRAFT      FEMORAL ARTERY - POPLITEAL ARTERY BYPASS GRAFT         Social History:   Social History     Socioeconomic History    Marital status:    Tobacco Use    Smoking status: Former     Types: Cigarettes     Quit date:      Years since quittin.1     Passive exposure: Never    Smokeless tobacco: Former   Vaping Use    Vaping Use: Never used   Substance and Sexual Activity    Alcohol use: No    Drug use: No    Sexual activity: Never        Family History:   Family History   Problem Relation Age of Onset    Diabetes Mother     Heart disease Father     Hypertension Father     Hyperlipidemia Father     Diabetes Sister     Diabetes Maternal Grandfather     Diabetes Sister     Diabetes Sister        Medications Prior to Admission:    Prior to Admission medications    Medication Sig Start Date End Date Taking? Authorizing Provider   Alcohol Swabs (ALCOHOL PREP) pads 1 swab 3 (Three) Times a Day. 20  Yes Des Geller MD    apixaban (Eliquis) 5 MG tablet tablet TAKE ONE TABLET BY MOUTH TWICE DAILY  Patient taking differently: Take 1 tablet by mouth Daily. Indications: history of DVT/PE 12/12/23  Yes Des Geller MD   Brexpiprazole (Rexulti) 2 MG tablet Take 1 tablet by mouth Daily.  Patient taking differently: Take 1.5 mg by mouth Daily. Indications: Behavioral Disorders associated with Dementia 12/7/23  Yes Des Geller MD   cilostazol (PLETAL) 50 MG tablet Take 1 tablet by mouth 2 (Two) Times a Day. 12/11/23  Yes Des Geller MD   glucose blood (Accu-Chek Geri Plus) test strip Use as instructed 5/21/20  Yes Des Geller MD   insulin aspart prot & aspart (NovoLOG Mix 70/30 FlexPen) (70-30) 100 UNIT/ML suspension pen-injector injection inejct 50 units UNDER THE SKIN TWICE DAILY WITH MEALS  Patient taking differently: inejct 40 units UNDER THE SKIN in the morning and then 20-25 units in evening based on diet 7/13/23  Yes Des Geller MD   Januvia 100 MG tablet TAKE ONE TABLET BY MOUTH EVERY DAY 12/12/23  Yes Des Geller MD   Lancets (ACCU-CHEK SOFT TOUCH) lancets Check sugars 3 times daily 5/21/20  Yes Des Geller MD   lisinopril (PRINIVIL,ZESTRIL) 2.5 MG tablet Take 1 tablet by mouth Daily. 12/11/23  Yes Des Geller MD   metFORMIN (GLUCOPHAGE) 1000 MG tablet TAKE ONE TABLET BY MOUTH TWICE DAILY 12/28/23  Yes Des Geller MD   metoprolol succinate XL (TOPROL-XL) 25 MG 24 hr tablet Take 1 tablet by mouth Daily. 8/15/23  Yes Des Geller MD   QUEtiapine (SEROquel) 50 MG tablet Take 1 tablet by mouth 2 (Two) Times a Day.   Yes Tita Ramirez MD   temazepam (Restoril) 15 MG capsule Take 1 capsule by mouth At Night As Needed for Sleep. Indications: Trouble Sleeping 12/11/23  Yes Des Geller MD   aspirin 81 MG EC tablet Take 1 tablet by mouth Daily. 4/22/13   Provider, MD Tita   atorvastatin (LIPITOR) 40 MG tablet TAKE 1 TABLET BY  MOUTH EVERY NIGHT 7/18/22   Des Geller MD   B-D UF III MINI PEN NEEDLES 31G X 5 MM misc USE AS DIRECTED 6/16/21   Des Geller MD   Blood Glucose Calibration (ACCU-CHEK LOR) solution 1 bottle by In Vitro route As Needed (Use as directed). 5/21/20   Des Geller MD   Blood Glucose Monitoring Suppl (ACCU-CHEK LOR PLUS) w/Device kit 1 kit 3 (Three) Times a Day. 5/21/20   Des Geller MD   donepezil (ARICEPT) 23 MG tablet TAKE ONE TABLET BY MOUTH every night  Patient taking differently: Take 1 tablet by mouth Daily. Indications: Alzheimer's Disease 7/25/23   eDs Geller MD   diazePAM (Valium) 5 MG tablet Take 1 tab 1 hour before procedure.  Take an additional tab 15 minutes before if needed.  Patient not taking: Reported on 1/8/2024 12/20/23 2/25/24  Des Geller MD   fluticasone (FLONASE) 50 MCG/ACT nasal spray INSTILL 2 SPRAYS INTO THE NOSTRIL(S) AS DIRECTED BY PROVIDER DAILY 11/22/22 2/25/24  Ebonie Pope APRN   levocetirizine (XYZAL) 5 MG tablet Take 1 tablet by mouth Every Evening.  Patient not taking: Reported on 1/8/2024 12/11/23 2/25/24  Des Geller MD   megestrol (Megace ES) 625 MG/5ML suspension Take 5 mL by mouth Daily. 1/25/24 2/25/24  Des Geller MD       Allergies:  Bactrim [sulfamethoxazole-trimethoprim], Adhesive tape, and Neosporin [neomycin-bacitracin zn-polymyx]      Review of system  Review of Systems   Unable to perform ROS: Dementia       Vitals:    02/25/24 1214   BP:    Pulse: 75   Resp:    Temp:    SpO2:        Physical exam  Physical Exam  Cardiovascular:      Rate and Rhythm: Normal rate and regular rhythm.   Pulmonary:      Effort: Pulmonary effort is normal.   Neurological:      Mental Status: He is alert.      Comments: Speech fairly clear, at times incoherent, does not appear to comprehend or follow verbal commands (me or others in the room), somewhat irritated by examination, No facial droop, tremor or rigidity  noted, moves limbs well.           Lab Results   Component Value Date    WBC 6.66 02/25/2024    HGB 14.3 02/25/2024    HCT 41.0 02/25/2024    MCV 93.0 02/25/2024     02/25/2024     Lab Results   Component Value Date    GLUCOSE 288 (H) 02/25/2024    BUN 12 02/25/2024    CREATININE 0.77 02/25/2024    EGFRIFNONA 88 02/21/2022    EGFRIFAFRI 102 02/21/2022    BCR 15.6 02/25/2024    CO2 26.0 02/25/2024    CALCIUM 8.6 02/25/2024    PROTENTOTREF 7.0 08/03/2023    ALBUMIN 4.3 02/24/2024    LABIL2 1.7 08/03/2023    AST 19 02/24/2024    ALT 20 02/24/2024   ntains abnormal data Urine Drug Screen - Urine, Clean Catch  Order: [id number]  Status: Final result       Visible to patient: Yes (not seen)       Next appt: None    Specimen Information: Urine, Clean Catch   0 Result Notes      Component  Ref Range & Units 1 d ago   THC, Screen, Urine  Negative Negative   Phencyclidine (PCP), Urine  Negative Negative   Cocaine Screen, Urine  Negative Negative   Methamphetamine, Ur  Negative Negative   Opiate Screen  Negative Negative   Amphetamine Screen, Urine  Negative Negative   Benzodiazepine Screen, Urine  Negative Positive Abnormal    Tricyclic Antidepressants Screen  Negative Positive Abnormal    Methadone Screen, Urine  Negative Negative   Barbiturates Screen, Urine  Negative Negative   Oxycodone Screen, Urine  Negative Negative   Buprenorphine, Screen, Urine  Negative Negative        Urinalysis, Microscopic Only - Urine, Clean Catch  Order: [id number] - Reflex for Order [id number]  Status: Final result       Visible to patient: Yes (not seen)       Next appt: None    Specimen Information: Urine, Clean Catch   0 Result Notes      Component  Ref Range & Units 1 d ago   RBC, UA  None Seen, 0-2 /HPF 0-2   WBC, UA  None Seen, 0-2 /HPF 0-2   Bacteria, UA  None Seen, Trace /HPF None Seen   Squamous Epithelial Cells, UA  None Seen, 0-2 /HPF 0-2   Hyaline Casts, UA  0 - 6 /LPF 0-6   Methodology Automated Microscopy   Resulting Agency  [facility]              Specimen Collected: 02/24/24 23:54 EST Last Resulted: 02/25/24 00:11 EST           TSH  0.270 - 4.200 uIU/mL 1.780     Ammonia  16 - 60 umol/L 23     Radiological Studies:   CT Head Without Contrast    Result Date: 2/24/2024  CT HEAD WO CONTRAST Date of Exam: 2/24/2024 10:33 PM EST Indication: Head trauma, minor (Age >= 65y). Comparison: CT scan the head dated February 22, 2021 Technique: Axial CT images were obtained of the head without contrast administration.  Automated exposure control and iterative construction methods were used. Findings: There is mild diffuse generalized atrophy. There are low-attenuation areas in the periventricular white matter consistent with chronic microvascular ischemic change. There is no mass, mass effect or midline shift. There are no abnormal extra-axial fluid collections or areas of acute hemorrhage. The paranasal sinuses are clear. The mastoid air cells are clear.     Impression: Atrophy and chronic microvascular ischemic change. No acute intracranial process. Electronically Signed: Sam Smith MD  2/24/2024 11:04 PM EST  Workstation ID: [id number]    XR Chest 1 View    Result Date: 2/24/2024  XR CHEST 1 VW Date of Exam: 2/24/2024 10:23 PM EST Indication: Weak/Dizzy/AMS triage protocol Comparison: Chest radiograph dated February 21, 2022 Findings: There is postoperative change from midline sternotomy and coronary artery bypass grafting. The pulmonary vascular markings are normal. There is a small left effusion. There is left basilar atelectasis.     Impression: Small left effusion with left basilar atelectasis. Electronically Signed: Sam Smith MD  2/24/2024 10:36 PM EST  Workstation ID: [id number]       During this visit the following were done:  Labs Reviewed [x]    Labs Ordered []    Radiology Reports Reviewed [x]    Radiology Ordered []    EKG, echo, and/or stress test reviewed [x]    EEG results reviewed  []    EEG reviewed and interpreted per myself    []    Discussed case with neurointerventionalist or neuroradiologist []    Referring Provider Records Reviewed []    ER Records Reviewed []    Hospital Records Reviewed []    History Obtained From Family []    Radiological images view and Interpreted per myself [x]    Case Discussed with referring provider []     Decision to obtain and request outside records  []        Assessment and Plan     Advanced AD with behavioral disturbance, increased myoclonus and falls likely from Rexulti. No evidence of an infection, no signs of a stroke.   - Fall precautions.   - Restraints prn to prevent harm to self and others, at risk for aggressive behavior.   - Stop Rexulti.   - Continue Aricept and Seroquel.   - Haldol and Restoril prn.   - MRI brain, diazepam on call.   - EEG.   - Consider increasing Seroquel dose or trying Zyprexa if agitation worsens.    Thanks.                 Electronically signed by Wili Nicole MD on 2/25/2024 at 13:27 EST

## 2024-02-25 NOTE — ED NOTES
Too Tai    Nursing Report ED to Floor:  Mental status: dementia  Ambulatory status: non ambulatory in ed  Oxygen Therapy:  room air  Cardiac Rhythm: sinus derek  Admitted from: ed  Safety Concerns:  fall risk  Social Issues: none  ED Room #:  17    ED Nurse Phone Extension - [phone] or may call [phone].      HPI:   Chief Complaint   Patient presents with    Weakness - Generalized       Past Medical History:  Past Medical History:   Diagnosis Date    Coronary artery disease     Dementia     Diabetes mellitus     Hyperlipidemia     Peripheral vascular disease         Past Surgical History:  Past Surgical History:   Procedure Laterality Date    CORONARY ARTERY BYPASS GRAFT      FEMORAL ARTERY - POPLITEAL ARTERY BYPASS GRAFT          Admitting Doctor:   Jamel Randhawa III, DO    Consulting Provider(s):  Consults       No orders found for last 30 day(s).             Admitting Diagnosis:   The primary encounter diagnosis was Dementia with behavioral disturbance. Diagnoses of Multiple falls, Generalized weakness, Injury of head, initial encounter, and Bilateral pleural effusion were also pertinent to this visit.    Most Recent Vitals:   Vitals:    02/24/24 2220 02/24/24 2303 02/25/24 0008 02/25/24 0031   BP:  (!) 191/77 154/85 162/86   BP Location:       Patient Position:       Pulse:  55  56   Resp:       Temp: 98.5 °F (36.9 °C)      TempSrc: Oral      SpO2:  97%  98%   Weight:       Height:           Active LDAs/IV Access:   Lines, Drains & Airways       Active LDAs       Name Placement date Placement time Site Days    Peripheral IV 02/24/24 Left Antecubital 02/24/24  --  Antecubital  1                    Labs (abnormal labs have a star):   Labs Reviewed   COMPREHENSIVE METABOLIC PANEL - Abnormal; Notable for the following components:       Result Value    Glucose 248 (*)     All other components within normal limits    Narrative:     GFR Normal >60  Chronic Kidney Disease <60  Kidney Failure <15     SINGLE  HSTROPONIN T - Abnormal; Notable for the following components:    HS Troponin T 55 (*)     All other components within normal limits    Narrative:     High Sensitive Troponin T Reference Range:  <14.0 ng/L- Negative Female for AMI  <22.0 ng/L- Negative Male for AMI  >=14 - Abnormal Female indicating possible myocardial injury.  >=22 - Abnormal Male indicating possible myocardial injury.   Clinicians would have to utilize clinical acumen, EKG, Troponin, and serial changes to determine if it is an Acute Myocardial Infarction or myocardial injury due to an underlying chronic condition.        CBC WITH AUTO DIFFERENTIAL - Abnormal; Notable for the following components:    Neutrophil % 36.4 (*)     Eosinophil % 11.7 (*)     Lymphocytes, Absolute 3.16 (*)     Eosinophils, Absolute 0.92 (*)     All other components within normal limits   HIGH SENSITIVITIY TROPONIN T 2HR - Abnormal; Notable for the following components:    HS Troponin T 45 (*)     Troponin T Delta -10 (*)     All other components within normal limits    Narrative:     High Sensitive Troponin T Reference Range:  <14.0 ng/L- Negative Female for AMI  <22.0 ng/L- Negative Male for AMI  >=14 - Abnormal Female indicating possible myocardial injury.  >=22 - Abnormal Male indicating possible myocardial injury.   Clinicians would have to utilize clinical acumen, EKG, Troponin, and serial changes to determine if it is an Acute Myocardial Infarction or myocardial injury due to an underlying chronic condition.        URINALYSIS W/ MICROSCOPIC IF INDICATED (NO CULTURE) - Abnormal; Notable for the following components:    Specific Gravity, UA 1.032 (*)     Glucose, UA >=1000 mg/dL (3+) (*)     Ketones, UA Trace (*)     Protein,  mg/dL (2+) (*)     All other components within normal limits   URINE DRUG SCREEN - Abnormal; Notable for the following components:    Benzodiazepine Screen, Urine Positive (*)     Tricyclic Antidepressants Screen Positive (*)     All other  components within normal limits    Narrative:     Cutoff For Drugs Screened:    Amphetamines               500 ng/ml  Barbiturates               200 ng/ml  Benzodiazepines            150 ng/ml  Cocaine                    150 ng/ml  Methadone                  200 ng/ml  Opiates                    100 ng/ml  Phencyclidine               25 ng/ml  THC                         50 ng/ml  Methamphetamine            500 ng/ml  Tricyclic Antidepressants  300 ng/ml  Oxycodone                  100 ng/ml  Buprenorphine               10 ng/ml    The normal value for all drugs tested is negative. This report includes unconfirmed screening results, with the cutoff values listed, to be used for medical treatment purposes only.  Unconfirmed results must not be used for non-medical purposes such as employment or legal testing.  Clinical consideration should be applied to any drug of abuse test, particularly when unconfirmed results are used.     MAGNESIUM - Normal   CK - Normal   FENTANYL, URINE - Normal    Narrative:     Negative Threshold:      Fentanyl 5 ng/mL     The normal value for the drug tested is negative. This report includes final unconfirmed screening results to be used for medical treatment purposes only. Unconfirmed results must not be used for non-medical purposes such as employment or legal testing. Clinical consideration should be applied to any drug of abuse test, particularly when unconfirmed results are used.          URINALYSIS, MICROSCOPIC ONLY   RAINBOW DRAW    Narrative:     The following orders were created for panel order [place] Draw.  Procedure                               Abnormality         Status                     ---------                               -----------         ------                     Green Top (Gel)[[id number]]                                  Final result               Lavender Top[[id number]]                                     Final result               Gold Top - SST[[id number]]                                    Final result               Gray Top[[id number]]                                         In process                 Light Blue Top[[id number]]                                   Final result                 Please view results for these tests on the individual orders.   CBC AND DIFFERENTIAL    Narrative:     The following orders were created for panel order CBC & Differential.  Procedure                               Abnormality         Status                     ---------                               -----------         ------                     CBC Auto Differential[[id number]]        Abnormal            Final result                 Please view results for these tests on the individual orders.   GREEN TOP   LAVENDER TOP   GOLD TOP - SST   LIGHT BLUE TOP   GRAY TOP       Meds Given in ED:   Medications   QUEtiapine (SEROquel) tablet 50 mg (50 mg Oral Given 2/25/24 0149)   temazepam (RESTORIL) capsule 15 mg (15 mg Oral Given 2/25/24 0150)   diphenhydrAMINE (BENADRYL) injection 25 mg (25 mg Intravenous Given 2/25/24 0137)

## 2024-02-25 NOTE — H&P
"    [facility] Medicine Services  HISTORY AND PHYSICAL    Patient Name: Too Tai  : 1955  MRN: [id number]  Primary Care Physician: Des Geller MD  Date of admission: 2024    Subjective   Subjective     Chief Complaint:  \"Jerking\"    HPI:  Too Tai is a 68 y.o. male with hx of advance dementia, T2DM - insulin dependent, HTN, HLD, PVD, CAD, BPH, DVT on chronic anticoagulation who presents to the ED for evaluation of jerking episodes and also had 4 falls yesterday. Pt is unable to provide HPI d/t advanced dementia; wife is at bedside and is assisting w/ HPI. Starting yesterday, patient has been having intermittent episodes where he \"jerks\", resembles a startle reflex and is strong enough it shakes the stretcher when he does this. Wife reports it started out being occasional and is now happening more frequently. He had 4 falls yesterday, hitting his head at least one time and has a skin tear on his right arm. Wife uses wheelchair and has limited mobility herself. She tells me patient was started on Rexulti ~ 2 months ago and she started at 0.5mg and slowly went up to 2mg dose which helped with his agitation and behavior but he stopped eating so she d/w PCP and stopped the medication ~ 1 month ago, appetite improved but agitation returned so she started on 0.5mg for 2-3 days and then up to 1.5mg which helped his agitation and he was still eating, this was ~ 2-3 weeks he has been on 1.5 mg daily. She reports having difficulty with getting him to take his meds at time; typically he will sleep ok if she can get him to take his seroquel and temazepam at night. He only urinates 1-2 times a day, will usually only drink diet mt dew for her, not a lot of water. She is concerned that the jerking episodes are getting more frequent and stronger, last night he was doing this in his sleep as well. She denies any seizure like activity, no loss of consciousness, no loss of bowel or " bladder control, no rhythmic jerking.     Review of Systems   Unable to perform ROS: Dementia                Personal History     Past Medical History:   Diagnosis Date    Coronary artery disease     Dementia     Diabetes mellitus     Hyperlipidemia     Peripheral vascular disease        Past Surgical History:   Procedure Laterality Date    CORONARY ARTERY BYPASS GRAFT      FEMORAL ARTERY - POPLITEAL ARTERY BYPASS GRAFT         Family History:  family history includes Diabetes in his maternal grandfather, mother, sister, sister, and sister; Heart disease in his father; Hyperlipidemia in his father; Hypertension in his father.     Social History:  reports that he quit smoking about 27 years ago. His smoking use included cigarettes. He has never been exposed to tobacco smoke. He has never used smokeless tobacco. He reports that he does not drink alcohol and does not use drugs.  Social History     Social History Narrative    Not on file       Medications:  Accu-Chek Geri, Accu-Chek Geri Plus, Alcohol Prep, Brexpiprazole, Insulin Pen Needle, QUEtiapine, SITagliptin, accu-chek soft touch, apixaban, aspirin, atorvastatin, cilostazol, diazePAM, donepezil, fluticasone, glucose blood, insulin aspart prot & aspart, levocetirizine, lisinopril, megestrol, metFORMIN, metoprolol succinate XL, and temazepam    Allergies   Allergen Reactions    Bactrim [Sulfamethoxazole-Trimethoprim] Rash    Adhesive Tape Rash    Neosporin [Neomycin-Bacitracin Zn-Polymyx] Rash       Objective   Objective     Vital Signs:   Temp:  [98.5 °F (36.9 °C)] 98.5 °F (36.9 °C)  Heart Rate:  [55-58] 56  Resp:  [18] 18  BP: (154-194)/(77-94) 173/94    Physical Exam  Constitutional:       General: He is not in acute distress.     Appearance: He is well-developed. He is not toxic-appearing.   HENT:      Head: Normocephalic and atraumatic.      Nose: Nose normal.      Mouth/Throat:      Mouth: Mucous membranes are dry.      Pharynx: Oropharynx is clear.   Eyes:       Extraocular Movements: Extraocular movements intact.      Conjunctiva/sclera: Conjunctivae normal.      Pupils: Pupils are equal, round, and reactive to light.   Cardiovascular:      Rate and Rhythm: Regular rhythm. Bradycardia present.      Pulses: Normal pulses.      Heart sounds: Normal heart sounds.   Pulmonary:      Effort: Pulmonary effort is normal. No respiratory distress.      Breath sounds: Normal breath sounds.   Abdominal:      General: Bowel sounds are normal. There is no distension.      Palpations: Abdomen is soft.      Tenderness: There is no abdominal tenderness. There is no guarding.   Musculoskeletal:         General: No swelling. Normal range of motion.      Cervical back: Normal range of motion and neck supple.   Skin:     General: Skin is warm and dry.      Capillary Refill: Capillary refill takes less than 2 seconds.   Neurological:      Mental Status: He is alert.      Cranial Nerves: No cranial nerve deficit.   Psychiatric:         Mood and Affect: Mood normal.         Behavior: Behavior normal.        Result Review:  I have personally reviewed the results from the time of this admission to 2/25/2024 02:42 EST and agree with these findings:  [x]  Laboratory list / accordion  [x]  Microbiology  [x]  Radiology  [x]  EKG/Telemetry   []  Cardiology/Vascular   []  Pathology  []  Old records  []  Other:  Most notable findings include:     LAB RESULTS:      Lab 02/25/24  0035 02/24/24  2221   WBC  --  7.89   HEMOGLOBIN  --  15.4   HEMATOCRIT  --  45.6   PLATELETS  --  250   NEUTROS ABS  --  2.88   IMMATURE GRANS (ABS)  --  0.02   LYMPHS ABS  --  3.16*   MONOS ABS  --  0.82   EOS ABS  --  0.92*   MCV  --  93.4   CK TOTAL 101  --          Lab 02/24/24  2221   SODIUM 141   POTASSIUM 3.7   CHLORIDE 101   CO2 28.0   ANION GAP 12.0   BUN 12   CREATININE 0.87   EGFR 94.6   GLUCOSE 248*   CALCIUM 9.1   MAGNESIUM 1.6         Lab 02/24/24  2221   TOTAL PROTEIN 7.4   ALBUMIN 4.3   GLOBULIN 3.1   ALT  (SGPT) 20   AST (SGOT) 19   BILIRUBIN 1.0   ALK PHOS 73         Lab 02/25/24  0035 02/24/24  2221   HSTROP T 45* 55*                 Brief Urine Lab Results  (Last result in the past 365 days)        Color   Clarity   Blood   Leuk Est   Nitrite   Protein   CREAT   Urine HCG        02/24/24 2354 Yellow   Clear   Negative   Negative   Negative   100 mg/dL (2+)                 Microbiology Results (last 10 days)       ** No results found for the last 240 hours. **            CT Head Without Contrast    Result Date: 2/24/2024  CT HEAD WO CONTRAST Date of Exam: 2/24/2024 10:33 PM EST Indication: Head trauma, minor (Age >= 65y). Comparison: CT scan the head dated February 22, 2021 Technique: Axial CT images were obtained of the head without contrast administration.  Automated exposure control and iterative construction methods were used. Findings: There is mild diffuse generalized atrophy. There are low-attenuation areas in the periventricular white matter consistent with chronic microvascular ischemic change. There is no mass, mass effect or midline shift. There are no abnormal extra-axial fluid collections or areas of acute hemorrhage. The paranasal sinuses are clear. The mastoid air cells are clear.     Impression: Impression: Atrophy and chronic microvascular ischemic change. No acute intracranial process. Electronically Signed: Sam Smith MD  2/24/2024 11:04 PM EST  Workstation ID: [id number]    XR Chest 1 View    Result Date: 2/24/2024  XR CHEST 1 VW Date of Exam: 2/24/2024 10:23 PM EST Indication: Weak/Dizzy/AMS triage protocol Comparison: Chest radiograph dated February 21, 2022 Findings: There is postoperative change from midline sternotomy and coronary artery bypass grafting. The pulmonary vascular markings are normal. There is a small left effusion. There is left basilar atelectasis.     Impression: Impression: Small left effusion with left basilar atelectasis. Electronically Signed: Sam Smith MD  2/24/2024  10:36 PM EST  Workstation ID: [id number]     Results for orders placed in visit on 07/30/20    SCANNED - ECHOCARDIOGRAM      Assessment & Plan   Assessment & Plan       Spasm of muscle    Benign essential HTN    Benign prostatic hyperplasia    Coronary artery disease involving native coronary artery of native heart    Type 2 diabetes mellitus with hyperglycemia, with long-term current use of insulin    Myoclonic jerking    Hypomagnesemia    Dementia with behavioral disturbance    Frequent falls    History of DVT (deep vein thrombosis)    Chronic anticoagulation    Myoclonic jerking  Frequent falls  - unclear etiology, highly concerned may be side effect of Rexulti  - CTH negative  - check b12, folate, vit d, ammonia, lactic acid, CK, tsh  - urinalysis clear  - UDS + benzos/TCA's which he is prescribed  - fall precautions  - benadryl 25 mg IV x 1 given in ED  - neurology consult in am  - consider MRI brain if patient can tolerate, will defer to neurology    Hypokalemia / Hypomagnesemia  Decreased oral intake  - both mild, replace mag/K  - add NS w/ 20 KCL @ 100 ml/hr x 24 hours  - nutrition consult    Dementia w/ behavioral disturbance  - advance dementia, would likely benefit from placement in dementia care facility, wife does not walk well and had difficulty caring for patient  - f/w Dr. Bradley, neurology every 6 months  - holding rexulti, non-formulary but also possibly contributor to jerking, defer to neurology if this should be weaned vs discontinued (wife could bring Rx in if we need to slowly wean dose)  - continue seroquel 50 mg BID, home dose  - continue temazepam 15 mg nightly PRN  - continue aricept 20 mg nightly  - took his night meds in the ED, may require sitter or restraints for patient safety as he is trying to get up frequently and pulling at his IV lines/monitor    HTN / CAD  Bradycardia  Elevated troponin  - no c/o chest pain, but may be hard to determine w/ pt advance dementia  - HS trop 55 w/ repeat  "45, EKG sinus w/ first degree AVB  - on aspirin, eliquis  - continue lisinopril  - continue metoprolol, hold for HR < 60 or SBP < 110, monitor bradycardia may need alternative tx for HTN    T2DM - inuslin dependent  - on 70/30 novolog mix 40 units in am and 20 units pm, hold for now  - add levemir 10 units nightly  - fsbg achs w/ low dose ssi  - check A1c    Hx DVT  Chronic anticoagulation  - continue Eliquis for now, may need to consider risk vs benefit w/ high risk for falling      DVT prophylaxis:  Eliquis    CODE STATUS:    Medical Intervention Limits: NO intubation (DNI)  Code Status (Patient has no pulse and is not breathing): No CPR (Do Not Attempt to Resuscitate)  Medical Interventions (Patient has pulse or is breathing): Limited Support      Expected Discharge     Expected Discharge Date: 2/26/2024; Expected Discharge Time:       This note has been completed as part of a split-shared workflow.     Signature: Electronically signed by IVONNE Rashid, 02/25/24, 2:42 AM EST    Total APC time: 70 minutes    Attending   Admission Attestation       I have performed an independent face-to-face diagnostic evaluation including performing an independent physical examination.  I approve of the documented plan of care above that was reviewed and developed with the advanced practice clinician (APC) and take responsibility for that plan along with its associated risks.  I have updated the HPI as appropriate.    Brief HPI    68 M brought by family to the ED due to muscle spasms of the right upper extremity.  Family accompanies him in the room and provides all history, as the patient has advanced dementia and can provide very little information, does not answer questions appropriately.  The patient's wife states he was walking earlier today (2/24 Saturday) and, \"his legs just gave out on him,\" and the patient did suffer a fall.  She states he did not impact his head or either hip, and he does not seem to have any " "complaint after the fall.  She also has that he suffered 4 falls yesterday (Friday 2/23).  She states that also yesterday she noticed the patient will have a sudden \"upper body jerk,\" especially involving the RUE which appears as if he is startled by something.  She states it only happened a couple times on Friday, but became more frequent today.  In terms of new medications, she states that approximately 6 weeks ago the patient was started on Rexulti; this seemed to work well at the early doses but he exhibited increased anger with higher doses, so she went back to a lower dose.  She states the anger resolved with the lower dose.  She denies any actual seizure activity, although she does mention the patient was having the sudden upper body jerking episodes in his sleep last night.    Attending Physical Exam:  Temp:  [97.5 °F (36.4 °C)-98.5 °F (36.9 °C)] 97.5 °F (36.4 °C)  Heart Rate:  [55-68] 68  Resp:  [16-18] 16  BP: (131-194)/(65-94) 131/65    Constitutional: Awake, alert, NAD.  Appears chronically ill.  Eyes: PERRLA, sclerae anicteric, no conjunctival injection  HENT: NCAT, mucous membranes dry.  Neck: Supple, no thyromegaly, no lymphadenopathy, trachea midline  Respiratory: Clear to auscultation bilaterally, nonlabored respirations   Cardiovascular: Bradycardic with rate 55 on my exam, no murmurs, rubs, or gallops, palpable pedal pulses bilaterally  Gastrointestinal: Positive bowel sounds, soft, nontender, nondistended  Musculoskeletal: No bilateral ankle edema, no clubbing or cyanosis to extremities  Psychiatric: Does not answer questions appropriately, cannot fully assess, occasionally appears anxious but will follow some commands.  Neurologic: Does not answer orientation questions appropriately, follows some commands and ignores others, strength symmetric in all extremities, Cranial Nerves grossly intact to confrontation, speech clear.  During my exam he had 1 episode of the upper body startled appearing " jerking motion/spasm.  Skin: No rashes, poor turgor.    Result Review:  I have personally reviewed the results from the time of this admission to 2/25/2024 03:17 EST and agree with these findings:  [x]  Laboratory list / accordion  []  Microbiology  [x]  Radiology  [x]  EKG/Telemetry   []  Cardiology/Vascular   []  Pathology  []  Old records  []  Other:  Most notable findings include: I reviewed chest x-ray which is a single AP view and by my read shows no acute infiltrate/edema.  I reviewed EKG which by my read shows sinus bradycardia with rate just under 60 bpm, normal axis, nonspecific ST/T wave changes, borderline first-degree AV block.    Assessment and Plan:    See assessment and plan documented by APC above and updated/edited by me as appropriate.      Total time spent: 35 minutes  Time spent includes time reviewing chart, face-to-face time, counseling patient/family/caregiver, ordering medications/tests/procedures, communicating with other health care professionals, documenting clinical information in the electronic health record, and coordination of care.       Jamel Randhawa III, DO  02/25/24

## 2024-02-25 NOTE — SIGNIFICANT NOTE
02/25/24 1016   SLP Deferred Reason   SLP Deferred Reason Routine  (Spoke w/ RN, pt not approriate for SLP evaluation today. Will check back tomorrow.)

## 2024-02-25 NOTE — PROGRESS NOTES
[facility] Medicine Services  ADMISSION FOLLOW-UP NOTE          Patient admitted after midnight, H&P by my partner performed earlier on today's date reviewed.  Interim findings, labs, and charting also reviewed.        The [facility] Hospital Problem List has been managed and updated to include any new diagnoses:  Active Hospital Problems    Diagnosis  POA    **Spasm of muscle [M62.838]  Yes    Myoclonic jerking [G25.3]  Yes    Dementia with behavioral disturbance [F03.918]  Unknown    Frequent falls [R29.6]  Not Applicable    History of DVT (deep vein thrombosis) [Z86.718]  Not Applicable    Chronic anticoagulation [Z79.01]  Not Applicable    Hypomagnesemia [E83.42]  Yes    Benign essential HTN [I10]  Yes    Type 2 diabetes mellitus with hyperglycemia, with long-term current use of insulin [E11.65, Z79.4]  Not Applicable    Benign prostatic hyperplasia [N40.0]  Yes    Coronary artery disease involving native coronary artery of native heart [I25.10]  Yes      Resolved Hospital Problems    Diagnosis Date Resolved POA    Type 2 diabetes mellitus, with long-term current use of insulin [E11.9, Z79.4] 02/25/2024 Not Applicable     In summary, this is a 67 yo male w severe dementia living at home who presents with repeated falls. Family concern for jerking spells which appear to be hyperactive startle, myoclonic type jerk, possible rexulti effect?    Frequent falls w jerking mvmt  -neurology consulted: improved w benadryl, acutely worse - possilble med effect? Appreciate their recs on rexulti tapering v continuation specifically  -d/w wife: given her impaired mobility looking for long-term care options if affordable    Severe dementia c/b behavioral disturbance  -significantly worse after hospitalization which is to be expected  -restraints for necessary IV meds today, then hopeful taper off as appropriate. OK without IV likely very soon. Can be off tele/off pulse ox, other things to hopefully  facilitate very limited use of restraints  -home meds can be crushed: limited total med # (dc statin, etc) given condition    Hypokalemia and severe hypomagnesemia - IV + PO replacement, suspect somewhat dilutional given quick drop 2/24--> 2/25    DMII - insulin as able  HTN  CAD  H/o DVT on chronic anticoagulation - lovenox given challenges w po meds    Expected Discharge complex SNF v memory care [facility], will likely take some time  Expected Discharge Date: 3/1/2024; Expected Discharge Time:      Courtney Davies MD  02/25/24

## 2024-02-25 NOTE — NURSING NOTE
Pt has become increasingly more combative with staff and placed in bilateral soft wrist restraits/orders.pt has pulled out 3 ivs total .which has delayed mediation .wife at bedside and aware of need for restraints.1 mg of I'm haldol given/rn the patient remains uncooperative,

## 2024-02-26 ENCOUNTER — APPOINTMENT (OUTPATIENT)
Dept: NEUROLOGY | Facility: HOSPITAL | Age: 69
DRG: 057 | End: 2024-02-26
Payer: MEDICARE

## 2024-02-26 LAB
ANION GAP SERPL CALCULATED.3IONS-SCNC: 8 MMOL/L (ref 5–15)
BUN SERPL-MCNC: 8 MG/DL (ref 8–23)
BUN/CREAT SERPL: 9.6 (ref 7–25)
CALCIUM SPEC-SCNC: 9 MG/DL (ref 8.6–10.5)
CHLORIDE SERPL-SCNC: 103 MMOL/L (ref 98–107)
CO2 SERPL-SCNC: 29 MMOL/L (ref 22–29)
CREAT SERPL-MCNC: 0.83 MG/DL (ref 0.76–1.27)
EGFRCR SERPLBLD CKD-EPI 2021: 95.9 ML/MIN/1.73
GLUCOSE BLDC GLUCOMTR-MCNC: 167 MG/DL (ref 70–130)
GLUCOSE BLDC GLUCOMTR-MCNC: 206 MG/DL (ref 70–130)
GLUCOSE BLDC GLUCOMTR-MCNC: 209 MG/DL (ref 70–130)
GLUCOSE BLDC GLUCOMTR-MCNC: 252 MG/DL (ref 70–130)
GLUCOSE SERPL-MCNC: 161 MG/DL (ref 65–99)
MAGNESIUM SERPL-MCNC: 1.7 MG/DL (ref 1.6–2.4)
POTASSIUM SERPL-SCNC: 3.6 MMOL/L (ref 3.5–5.2)
QT INTERVAL: 448 MS
QTC INTERVAL: 428 MS
SODIUM SERPL-SCNC: 140 MMOL/L (ref 136–145)

## 2024-02-26 PROCEDURE — 99232 SBSQ HOSP IP/OBS MODERATE 35: CPT | Performed by: INTERNAL MEDICINE

## 2024-02-26 PROCEDURE — 93010 ELECTROCARDIOGRAM REPORT: CPT | Performed by: STUDENT IN AN ORGANIZED HEALTH CARE EDUCATION/TRAINING PROGRAM

## 2024-02-26 PROCEDURE — 25010000002 HALOPERIDOL LACTATE PER 5 MG: Performed by: NURSE PRACTITIONER

## 2024-02-26 PROCEDURE — 95819 EEG AWAKE AND ASLEEP: CPT

## 2024-02-26 PROCEDURE — 99232 SBSQ HOSP IP/OBS MODERATE 35: CPT | Performed by: PSYCHIATRY & NEUROLOGY

## 2024-02-26 PROCEDURE — 63710000001 INSULIN LISPRO (HUMAN) PER 5 UNITS: Performed by: INTERNAL MEDICINE

## 2024-02-26 PROCEDURE — 63710000001 INSULIN DETEMIR PER 5 UNITS: Performed by: INTERNAL MEDICINE

## 2024-02-26 PROCEDURE — 95819 EEG AWAKE AND ASLEEP: CPT | Performed by: PSYCHIATRY & NEUROLOGY

## 2024-02-26 PROCEDURE — 83735 ASSAY OF MAGNESIUM: CPT | Performed by: INTERNAL MEDICINE

## 2024-02-26 PROCEDURE — 80048 BASIC METABOLIC PNL TOTAL CA: CPT | Performed by: INTERNAL MEDICINE

## 2024-02-26 PROCEDURE — 82948 REAGENT STRIP/BLOOD GLUCOSE: CPT

## 2024-02-26 PROCEDURE — 93005 ELECTROCARDIOGRAM TRACING: CPT | Performed by: INTERNAL MEDICINE

## 2024-02-26 PROCEDURE — 25010000002 ENOXAPARIN PER 10 MG: Performed by: INTERNAL MEDICINE

## 2024-02-26 RX ORDER — HALOPERIDOL 5 MG/ML
2 INJECTION INTRAMUSCULAR EVERY 6 HOURS PRN
Status: DISCONTINUED | OUTPATIENT
Start: 2024-02-26 | End: 2024-02-27

## 2024-02-26 RX ORDER — QUETIAPINE FUMARATE 100 MG/1
100 TABLET, FILM COATED ORAL NIGHTLY
Status: DISCONTINUED | OUTPATIENT
Start: 2024-02-26 | End: 2024-02-27

## 2024-02-26 RX ORDER — QUETIAPINE FUMARATE 100 MG/1
100 TABLET, FILM COATED ORAL DAILY
Status: DISCONTINUED | OUTPATIENT
Start: 2024-02-27 | End: 2024-02-27

## 2024-02-26 RX ORDER — ENALAPRILAT 1.25 MG/ML
0.62 INJECTION INTRAVENOUS ONCE
Status: COMPLETED | OUTPATIENT
Start: 2024-02-26 | End: 2024-02-26

## 2024-02-26 RX ORDER — QUETIAPINE FUMARATE 25 MG/1
50 TABLET, FILM COATED ORAL DAILY
Status: DISCONTINUED | OUTPATIENT
Start: 2024-02-26 | End: 2024-02-26

## 2024-02-26 RX ORDER — HALOPERIDOL 5 MG/ML
1 INJECTION INTRAMUSCULAR EVERY 6 HOURS PRN
Status: DISCONTINUED | OUTPATIENT
Start: 2024-02-26 | End: 2024-02-26

## 2024-02-26 RX ADMIN — INSULIN DETEMIR 5 UNITS: 100 INJECTION, SOLUTION SUBCUTANEOUS at 08:28

## 2024-02-26 RX ADMIN — INSULIN LISPRO 2 UNITS: 100 INJECTION, SOLUTION INTRAVENOUS; SUBCUTANEOUS at 17:20

## 2024-02-26 RX ADMIN — TEMAZEPAM 15 MG: 15 CAPSULE ORAL at 20:43

## 2024-02-26 RX ADMIN — ENALAPRILAT 0.62 MG: 2.5 INJECTION INTRAVENOUS at 00:31

## 2024-02-26 RX ADMIN — INSULIN DETEMIR 5 UNITS: 100 INJECTION, SOLUTION SUBCUTANEOUS at 20:44

## 2024-02-26 RX ADMIN — INSULIN LISPRO 3 UNITS: 100 INJECTION, SOLUTION INTRAVENOUS; SUBCUTANEOUS at 11:56

## 2024-02-26 RX ADMIN — ENOXAPARIN SODIUM 100 MG: 100 INJECTION SUBCUTANEOUS at 08:28

## 2024-02-26 RX ADMIN — ENOXAPARIN SODIUM 100 MG: 100 INJECTION SUBCUTANEOUS at 20:43

## 2024-02-26 RX ADMIN — QUETIAPINE FUMARATE 100 MG: 100 TABLET ORAL at 20:43

## 2024-02-26 RX ADMIN — INSULIN LISPRO 4 UNITS: 100 INJECTION, SOLUTION INTRAVENOUS; SUBCUTANEOUS at 20:43

## 2024-02-26 RX ADMIN — Medication 10 ML: at 08:29

## 2024-02-26 RX ADMIN — HALOPERIDOL LACTATE 1 MG: 5 INJECTION, SOLUTION INTRAMUSCULAR at 08:00

## 2024-02-26 RX ADMIN — INSULIN LISPRO 3 UNITS: 100 INJECTION, SOLUTION INTRAVENOUS; SUBCUTANEOUS at 08:28

## 2024-02-26 NOTE — CASE MANAGEMENT/SOCIAL WORK
Discharge Planning Assessment  [facility]     Patient Name: Too Tai  MRN: [id number]  Today's Date: 2/26/2024    Admit Date: 2/24/2024    Plan: Home   Discharge Needs Assessment       Row Name 02/26/24 1155       Living Environment    People in Home spouse    Current Living Arrangements home    Primary Care Provided by self    Provides Primary Care For no one, unable/limited ability to care for self    Family Caregiver if Needed spouse    Family Caregiver Names [place] Tai, wife    Quality of Family Relationships helpful;involved;supportive    Able to Return to Prior Arrangements yes       Transition Planning    Patient/Family Anticipates Transition to home with family    Patient/Family Anticipated Services at Transition        Discharge Needs Assessment    Equipment Currently Used at Home cane, straight    Concerns to be Addressed denies needs/concerns at this time    Equipment Needed After Discharge none    Discharge Coordination/Progress Lives in a house in [place] [facility] [place] with wife and needs assistance with ADLs.  Has a cane that he uses.  Current with [facility], but may be discharging soon. No advanced directives.                   Discharge Plan       Row Name 02/26/24 3591       Plan    Plan Home    Patient/Family in Agreement with Plan yes    Plan Comments I spoke with Mr Tai's wife [place] over the phone.  Mr Tai resides in a house in [place] [facility] [place] with [place] and needs assistance with ADLs.  His only DME is a cane.  [facility], however they may be discharging him soon.  At this time there are no advanced directives.  His insurance is [employer], and there have not been any issues or lapses in coverage.  His insurance covers prescriptions.  His PCP is Des Geller, and he gets his prescriptions filled [facility] [place]. At this time, there are no discharge needs.  Jamaica states that she is interested in assisted  living facilities for Mr Tai, and already has the number for [facility].  Case management will continue to follow.    Final Discharge Disposition Code 01 - home or self-care                  Continued Care and Services - Admitted Since 2/24/2024    Coordination has not been started for this encounter.       Selected Continued Care - Episodes Includes continued care and service providers with selected services from the active episodes listed below      High Risk Care Management Episode start date: 1/9/2024   There are no active outsourced providers for this episode.                 Expected Discharge Date and Time       Expected Discharge Date Expected Discharge Time    Mar 1, 2024            Demographic Summary    No documentation.                  Functional Status       Row Name 02/26/24 1155       Functional Status    Usual Activity Tolerance poor    Current Activity Tolerance poor       Functional Status, IADL    Medications assistive equipment and person    Meal Preparation assistive equipment and person    Housekeeping assistive equipment and person    Laundry assistive equipment and person    Shopping assistive equipment and person                   Psychosocial    No documentation.                  Abuse/Neglect    No documentation.                  Legal    No documentation.                  Substance Abuse    No documentation.                  Patient Forms    No documentation.                     Bernadine Darden RN

## 2024-02-26 NOTE — PROGRESS NOTES
[facility] Medicine Services  PROGRESS NOTE    Patient Name: Too Tai  : 1955  MRN: [id number]    Date of Admission: 2024  Primary Care Physician: Des Geller MD    Subjective   Subjective     CC:  Falls, advanced dementia f/u    HPI:  In restraints - given benzo x1 and seroquel additional dose overnight  Confused, talks little  Tries to get out of bed, unable to take PO meds for nurse this morning - discussed prioritizing seroquel as we move forward      Objective   Objective     Vital Signs:   Temp:  [97 °F (36.1 °C)-98.5 °F (36.9 °C)] 98.4 °F (36.9 °C)  Heart Rate:  [50-81] 81  Resp:  [16-18] 18  BP: (107-186)/(44-90) 107/75     Physical Exam:  Constitutional: Very ill appearing male lying in bed in bilateral arm restraints  HENT: NCAT, mucous membranes moist  Respiratory: Clear to auscultation bilaterally, respiratory effort normal on room air  Cardiovascular: RRR, no murmurs, rubs, or gallops  Gastrointestinal: Soft, nontender, nondistended  Psychiatric: Calm, easily redirected  Neurologic: Awake, not oriented at all even in simple conversation, non--focal exam, no muscle jersk   Skin: No rashes    Results Reviewed:  LAB RESULTS:      Lab 24  0259 24  2221   WBC 6.66 7.89   HEMOGLOBIN 14.3 15.4   HEMATOCRIT 41.0 45.6   PLATELETS 231 250   NEUTROS ABS 2.82 2.88   IMMATURE GRANS (ABS) 0.02 0.02   LYMPHS ABS 2.30 3.16*   MONOS ABS 0.60 0.82   EOS ABS 0.85* 0.92*   MCV 93.0 93.4   LACTATE  --  2.0         Lab 24  0409 24  0259 24  0035 24  2221   SODIUM 140 140  --  141   POTASSIUM 3.6 3.2*  --  3.7   CHLORIDE 103 103  --  101   CO2 29.0 26.0  --  28.0   ANION GAP 8.0 11.0  --  12.0   BUN 8 12  --  12   CREATININE 0.83 0.77  --  0.87   EGFR 95.9 98.1  --  94.6   GLUCOSE 161* 288*  --  248*   CALCIUM 9.0 8.6  --  9.1   MAGNESIUM 1.7 1.3*  --  1.6   PHOSPHORUS  --  2.3*  --   --    HEMOGLOBIN A1C  --  9.20*  --   --    TSH  --    --  4.050  --          Lab 02/24/24  2221   TOTAL PROTEIN 7.4   ALBUMIN 4.3   GLOBULIN 3.1   ALT (SGPT) 20   AST (SGOT) 19   BILIRUBIN 1.0   ALK PHOS 73         Lab 02/25/24  0035 02/24/24  2221   HSTROP T 45* 55*             Lab 02/24/24  2100   FOLATE 10.20   VITAMIN B 12 223         Brief Urine Lab Results  (Last result in the past 365 days)        Color   Clarity   Blood   Leuk Est   Nitrite   Protein   CREAT   Urine HCG        02/24/24 2354 Yellow   Clear   Negative   Negative   Negative   100 mg/dL (2+)                   Microbiology Results Abnormal       None            EEG    Result Date: 2/26/2024  Reason for referral: 68 y.o.male with altered mental status, jerking spells Technical Summary:  A 19 channel digital EEG was performed using the international 10-20 placement system, including eye leads and EKG leads. Duration: 23 minutes Findings: The patient is awake.  Diffuse medium amplitude 5 to 6 Hz theta is present symmetrically over both hemispheres.  A clear posterior rhythm is not present.  Occasional intermixed slower 4 Hz generalized delta is present.  Sleep is not seen.  Photic stimulation and hyperventilation are not performed.  No focal features or epileptiform activity are present. Video: Available Technical quality: Superior EKG: Regular, 60 bpm SUMMARY: Mild generalized slow No focal features or epileptiform activity are seen     Impression: Diffuse cerebral dysfunction of mild degree, nonspecific No evidence for epilepsy is seen on the study This report is transcribed using the Dragon dictation system.      CT Head Without Contrast    Result Date: 2/24/2024  CT HEAD WO CONTRAST Date of Exam: 2/24/2024 10:33 PM EST Indication: Head trauma, minor (Age >= 65y). Comparison: CT scan the head dated February 22, 2021 Technique: Axial CT images were obtained of the head without contrast administration.  Automated exposure control and iterative construction methods were used. Findings: There is mild  diffuse generalized atrophy. There are low-attenuation areas in the periventricular white matter consistent with chronic microvascular ischemic change. There is no mass, mass effect or midline shift. There are no abnormal extra-axial fluid collections or areas of acute hemorrhage. The paranasal sinuses are clear. The mastoid air cells are clear.     Impression: Impression: Atrophy and chronic microvascular ischemic change. No acute intracranial process. Electronically Signed: Sam Smith MD  2/24/2024 11:04 PM EST  Workstation ID: [id number]    XR Chest 1 View    Result Date: 2/24/2024  XR CHEST 1 VW Date of Exam: 2/24/2024 10:23 PM EST Indication: Weak/Dizzy/AMS triage protocol Comparison: Chest radiograph dated February 21, 2022 Findings: There is postoperative change from midline sternotomy and coronary artery bypass grafting. The pulmonary vascular markings are normal. There is a small left effusion. There is left basilar atelectasis.     Impression: Impression: Small left effusion with left basilar atelectasis. Electronically Signed: Sam Smith MD  2/24/2024 10:36 PM EST  Workstation ID: [id number]     Results for orders placed in visit on 07/30/20    SCANNED - ECHOCARDIOGRAM      Current medications:  Scheduled Meds:[Held by provider] cilostazol, 50 mg, Oral, BID  donepezil, 20 mg, Oral, Daily  enoxaparin, 1 mg/kg, Subcutaneous, BID  insulin detemir, 5 Units, Subcutaneous, Q12H  insulin lispro, 2-7 Units, Subcutaneous, 4x Daily AC & at Bedtime  [Held by provider] metoprolol succinate XL, 25 mg, Oral, Daily  potassium chloride, 40 mEq, Oral, Once  QUEtiapine, 100 mg, Oral, Nightly  QUEtiapine, 50 mg, Oral, Daily  senna-docusate sodium, 2 tablet, Oral, BID      Continuous Infusions:   PRN Meds:.  acetaminophen **OR** acetaminophen **OR** acetaminophen    senna-docusate sodium **AND** polyethylene glycol **AND** bisacodyl **AND** bisacodyl    Calcium Replacement - Follow Nurse / BPA Driven Protocol    dextrose     dextrose    glucagon (human recombinant)    haloperidol lactate    Magnesium Standard Dose Replacement - Follow Nurse / BPA Driven Protocol    nitroglycerin    ondansetron ODT **OR** ondansetron    Phosphorus Replacement - Follow Nurse / BPA Driven Protocol    Potassium Replacement - Follow Nurse / BPA Driven Protocol    temazepam    Assessment & Plan   Assessment & Plan     Active Hospital Problems    Diagnosis  POA    **Spasm of muscle [M62.838]  Yes    Myoclonic jerking [G25.3]  Yes    Dementia with behavioral disturbance [F03.918]  Unknown    Frequent falls [R29.6]  Not Applicable    History of DVT (deep vein thrombosis) [Z86.718]  Not Applicable    Chronic anticoagulation [Z79.01]  Not Applicable    Hypomagnesemia [E83.42]  Yes    Benign essential HTN [I10]  Yes    Type 2 diabetes mellitus with hyperglycemia, with long-term current use of insulin [E11.65, Z79.4]  Not Applicable    Benign prostatic hyperplasia [N40.0]  Yes    Coronary artery disease involving native coronary artery of native heart [I25.10]  Yes      Resolved Hospital Problems    Diagnosis Date Resolved POA    Type 2 diabetes mellitus, with long-term current use of insulin [E11.9, Z79.4] 02/25/2024 Not Applicable        Brief Hospital Course to date:  Too Tai is a 68 y.o. male w severe dementia living at home who presents with repeated falls. Family concern for jerking spells which appear to be hyperactive startle, myoclonic type jerk, possible rexulti effect?     Frequent falls w increased myoclonus  -neurology consulted and considers it rexulti s/e, rexulti therefore stopped  -d/w wife: given her impaired mobility looking for long-term care options if affordable     Severe dementia c/b behavioral disturbance  -significantly worse after hospitalization which is to be expected  -restraints for significant agitation and staff safety today required  -d/c tele when able to wean/ dcpulse ox/does not have IV so hopefully will facilitate weaning  of restraints  -IM haldol prn  -home meds can be crushed: limited total med # (dc statin, etc) given condition     Hypokalemia and severe hypomagnesemia -improved after replacement, PO Mg supp given severity  DMII - insulin as able  HTN  CAD  H/o DVT on chronic anticoagulation - lovenox given challenges w po meds    Expected Discharge Location and Transportation: LTC  Expected Discharge 3/1 (Discharge date is tentative pending patient's medical condition and is subject to change)  Expected Discharge Date: 3/1/2024; Expected Discharge Time:      DVT prophylaxis:  Medical DVT prophylaxis orders are present.         AM-PAC 6 Clicks Score (PT): 20 (02/26/24 0300)    CODE STATUS:   Code Status and Medical Interventions:   Ordered at: 02/25/24 0219     Medical Intervention Limits:    NO intubation (DNI)     Code Status (Patient has no pulse and is not breathing):    No CPR (Do Not Attempt to Resuscitate)     Medical Interventions (Patient has pulse or is breathing):    Limited Support       Courtney Davies MD  02/26/24

## 2024-02-26 NOTE — SIGNIFICANT NOTE
02/26/24 0914   SLP Deferred Reason   SLP Deferred Reason Patient unavailable for evaluation     Per d/w RN, pt not appropriate for swallow evaluation 2' ongoing agitation.

## 2024-02-26 NOTE — PROGRESS NOTES
Neurology Note    Patient:  Too Tai    YOB: 1955    REFERRING PHYSICIAN:  Dr. Davies    CHIEF COMPLAINT:    AMS, muscle jerks, falls    HISTORY OF PRESENT ILLNESS:   The patient remains irritable and confused, prone to combative behavior per RN, noted to have occasional whole body jerks.    Past Medical History:  Past Medical History:   Diagnosis Date    Coronary artery disease     Dementia     Diabetes mellitus     Hyperlipidemia     Peripheral vascular disease        Past Surgical History:  Past Surgical History:   Procedure Laterality Date    CORONARY ARTERY BYPASS GRAFT      FEMORAL ARTERY - POPLITEAL ARTERY BYPASS GRAFT         Social History:   Social History     Socioeconomic History    Marital status:    Tobacco Use    Smoking status: Former     Types: Cigarettes     Quit date:      Years since quittin.1     Passive exposure: Never    Smokeless tobacco: Former   Vaping Use    Vaping Use: Never used   Substance and Sexual Activity    Alcohol use: No    Drug use: No    Sexual activity: Never        Family History:   Family History   Problem Relation Age of Onset    Diabetes Mother     Heart disease Father     Hypertension Father     Hyperlipidemia Father     Diabetes Sister     Diabetes Maternal Grandfather     Diabetes Sister     Diabetes Sister        Medications Prior to Admission:    Prior to Admission medications    Medication Sig Start Date End Date Taking? Authorizing Provider   Alcohol Swabs (ALCOHOL PREP) pads 1 swab 3 (Three) Times a Day. 20  Yes Des Geller MD   apixaban (Eliquis) 5 MG tablet tablet TAKE ONE TABLET BY MOUTH TWICE DAILY  Patient taking differently: Take 1 tablet by mouth Daily. Indications: history of DVT/PE 23  Yes Des Geller MD   Brexpiprazole (Rexulti) 2 MG tablet Take 1 tablet by mouth Daily.  Patient taking differently: Take 1.5 mg by mouth Daily. Indications: Behavioral Disorders associated with Dementia  12/7/23  Yes Des Geller MD   cilostazol (PLETAL) 50 MG tablet Take 1 tablet by mouth 2 (Two) Times a Day. 12/11/23  Yes Des Geller MD   glucose blood (Accu-Chek Geri Plus) test strip Use as instructed 5/21/20  Yes Des Geller MD   insulin aspart prot & aspart (NovoLOG Mix 70/30 FlexPen) (70-30) 100 UNIT/ML suspension pen-injector injection inejct 50 units UNDER THE SKIN TWICE DAILY WITH MEALS  Patient taking differently: inejct 40 units UNDER THE SKIN in the morning and then 20-25 units in evening based on diet 7/13/23  Yes Des Geller MD   Januvia 100 MG tablet TAKE ONE TABLET BY MOUTH EVERY DAY 12/12/23  Yes eDs Geller MD   Lancets (ACCU-CHEK SOFT TOUCH) lancets Check sugars 3 times daily 5/21/20  Yes Des Geller MD   lisinopril (PRINIVIL,ZESTRIL) 2.5 MG tablet Take 1 tablet by mouth Daily. 12/11/23  Yes Des Geller MD   metFORMIN (GLUCOPHAGE) 1000 MG tablet TAKE ONE TABLET BY MOUTH TWICE DAILY 12/28/23  Yes Des Geller MD   metoprolol succinate XL (TOPROL-XL) 25 MG 24 hr tablet Take 1 tablet by mouth Daily. 8/15/23  Yes Des Geller MD   QUEtiapine (SEROquel) 50 MG tablet Take 1 tablet by mouth 2 (Two) Times a Day.   Yes Tita Ramirez MD   temazepam (Restoril) 15 MG capsule Take 1 capsule by mouth At Night As Needed for Sleep. Indications: Trouble Sleeping 12/11/23  Yes Des Geller MD   aspirin 81 MG EC tablet Take 1 tablet by mouth Daily. 4/22/13   Tita Ramirez MD   atorvastatin (LIPITOR) 40 MG tablet TAKE 1 TABLET BY MOUTH EVERY NIGHT 7/18/22   Des Geller MD   B-D UF III MINI PEN NEEDLES 31G X 5 MM misc USE AS DIRECTED 6/16/21   Des Geller MD   Blood Glucose Calibration (ACCU-CHEK GERI) solution 1 bottle by In Vitro route As Needed (Use as directed). 5/21/20   Des Geller MD   Blood Glucose Monitoring Suppl (ACCU-CHEK GERI PLUS) w/Device kit 1 kit 3 (Three) Times a Day.  5/21/20   Des Geller MD   donepezil (ARICEPT) 23 MG tablet TAKE ONE TABLET BY MOUTH every night  Patient taking differently: Take 1 tablet by mouth Daily. Indications: Alzheimer's Disease 7/25/23   Des Geller MD       Allergies:  Bactrim [sulfamethoxazole-trimethoprim], Adhesive tape, and Neosporin [neomycin-bacitracin zn-polymyx]      Review of system  Review of Systems   Unable to perform ROS: Dementia       Vitals:    02/26/24 0759   BP: (!) 183/83   Pulse: 55   Resp:    Temp:    SpO2: 96%       Physical exam  Physical Exam  Cardiovascular:      Rate and Rhythm: Normal rate and regular rhythm.   Pulmonary:      Effort: Pulmonary effort is normal.   Neurological:      Comments: Resting in bed, muttering, trying in to raise up, in wrist restraints, getting EEG.           Lab Results   Component Value Date    WBC 6.66 02/25/2024    HGB 14.3 02/25/2024    HCT 41.0 02/25/2024    MCV 93.0 02/25/2024     02/25/2024     Lab Results   Component Value Date    GLUCOSE 161 (H) 02/26/2024    BUN 8 02/26/2024    CREATININE 0.83 02/26/2024    EGFRIFNONA 88 02/21/2022    EGFRIFAFRI 102 02/21/2022    BCR 9.6 02/26/2024    CO2 29.0 02/26/2024    CALCIUM 9.0 02/26/2024    PROTENTOTREF 7.0 08/03/2023    ALBUMIN 4.3 02/24/2024    LABIL2 1.7 08/03/2023    AST 19 02/24/2024    ALT 20 02/24/2024     EEG (Order [id number])  Order-Level Documents:    Scan on 2/26/2024 1306 by [employer], [place]: EEG         Author: -- Service: -- Author Type: --   Filed: Date of Service: Creation Time:   Status: (Other)   Reason for referral:     68 y.o.male with altered mental status, jerking spells     Technical Summary:      A 19 channel digital EEG was performed using the international 10-20 placement system, including eye leads and EKG leads.     Duration: 23 minutes     Findings:     The patient is awake.  Diffuse medium amplitude 5 to 6 Hz theta is present symmetrically over both hemispheres.  A clear posterior rhythm is  not present.  Occasional intermixed slower 4 Hz generalized delta is present.  Sleep is not seen.  Photic stimulation and hyperventilation are not performed.  No focal features or epileptiform activity are present.     Video: Available     Technical quality: Superior     EKG: Regular, 60 bpm     SUMMARY:     Mild generalized slow     No focal features or epileptiform activity are seen     IMPRESSION:     Diffuse cerebral dysfunction of mild degree, nonspecific     No evidence for epilepsy is seen on the study     This report is transcribed using the Dragon dictation system.               Radiological Studies:   CT Head Without Contrast    Result Date: 2/24/2024  CT HEAD WO CONTRAST Date of Exam: 2/24/2024 10:33 PM EST Indication: Head trauma, minor (Age >= 65y). Comparison: CT scan the head dated February 22, 2021 Technique: Axial CT images were obtained of the head without contrast administration.  Automated exposure control and iterative construction methods were used. Findings: There is mild diffuse generalized atrophy. There are low-attenuation areas in the periventricular white matter consistent with chronic microvascular ischemic change. There is no mass, mass effect or midline shift. There are no abnormal extra-axial fluid collections or areas of acute hemorrhage. The paranasal sinuses are clear. The mastoid air cells are clear.     Impression: Atrophy and chronic microvascular ischemic change. No acute intracranial process. Electronically Signed: Sam Smith MD  2/24/2024 11:04 PM EST  Workstation ID: [id number]    XR Chest 1 View    Result Date: 2/24/2024  XR CHEST 1 VW Date of Exam: 2/24/2024 10:23 PM EST Indication: Weak/Dizzy/AMS triage protocol Comparison: Chest radiograph dated February 21, 2022 Findings: There is postoperative change from midline sternotomy and coronary artery bypass grafting. The pulmonary vascular markings are normal. There is a small left effusion. There is left basilar atelectasis.      Impression: Small left effusion with left basilar atelectasis. Electronically Signed: Sam Smith MD  2/24/2024 10:36 PM EST  Workstation ID: [id number]       During this visit the following were done:  Labs Reviewed [x]    Labs Ordered []    Radiology Reports Reviewed [x]    Radiology Ordered []    EKG, echo, and/or stress test reviewed []    EEG results reviewed  []    EEG reviewed and interpreted per myself   []    Discussed case with neurointerventionalist or neuroradiologist []    Referring Provider Records Reviewed []    ER Records Reviewed []    Hospital Records Reviewed []    History Obtained From Family []    Radiological images view and Interpreted per myself [x]    Case Discussed with referring provider []     Decision to obtain and request outside records  []        Assessment and Plan     Advanced AD with behavioral disturbance, increased myoclonus and falls likely from Rexulti. No evidence of an infection, no signs of a stroke. EEG w/o epileptic changes.   - Fall precautions.   - Restraints prn to prevent harm to self and others, at risk for aggressive behavior.   - Stopped Rexulti.   - Continue Aricept.   - Try increasing Seroquel dose to 100 mg bid, then 200 mg bid if needed.   - Haldol and Restoril prn.   - MRI brain ordered, diazepam on call.   - Consider trying Zyprexa if agitation worsens.   - Case management.                 Electronically signed by Wili Nicole MD on 2/26/2024 at 11:23 EST

## 2024-02-26 NOTE — CONSULTS
Order noted for diabetes education. A1c 9.2%. GCS 13, confused, combative per chart review. Diabetes ed will follow for an appropriate time for education.

## 2024-02-27 LAB
1,25(OH)2D SERPL-MCNC: 29.2 PG/ML (ref 24.8–81.5)
ALBUMIN SERPL-MCNC: 4.1 G/DL (ref 3.5–5.2)
ALP SERPL-CCNC: 82 U/L (ref 39–117)
ALT SERPL W P-5'-P-CCNC: 26 U/L (ref 1–41)
ANION GAP SERPL CALCULATED.3IONS-SCNC: 15 MMOL/L (ref 5–15)
AST SERPL-CCNC: 28 U/L (ref 1–40)
BILIRUB CONJ SERPL-MCNC: 0.4 MG/DL (ref 0–0.3)
BILIRUB INDIRECT SERPL-MCNC: 0.9 MG/DL
BILIRUB SERPL-MCNC: 1.3 MG/DL (ref 0–1.2)
BUN SERPL-MCNC: 9 MG/DL (ref 8–23)
BUN/CREAT SERPL: 11.4 (ref 7–25)
CALCIUM SPEC-SCNC: 9.1 MG/DL (ref 8.6–10.5)
CHLORIDE SERPL-SCNC: 103 MMOL/L (ref 98–107)
CO2 SERPL-SCNC: 22 MMOL/L (ref 22–29)
CREAT SERPL-MCNC: 0.79 MG/DL (ref 0.76–1.27)
EGFRCR SERPLBLD CKD-EPI 2021: 97.4 ML/MIN/1.73
GLUCOSE BLDC GLUCOMTR-MCNC: 147 MG/DL (ref 70–130)
GLUCOSE BLDC GLUCOMTR-MCNC: 179 MG/DL (ref 70–130)
GLUCOSE BLDC GLUCOMTR-MCNC: 190 MG/DL (ref 70–130)
GLUCOSE BLDC GLUCOMTR-MCNC: 208 MG/DL (ref 70–130)
GLUCOSE BLDC GLUCOMTR-MCNC: 214 MG/DL (ref 70–130)
GLUCOSE SERPL-MCNC: 172 MG/DL (ref 65–99)
MAGNESIUM SERPL-MCNC: 1.8 MG/DL (ref 1.6–2.4)
POTASSIUM SERPL-SCNC: 4.1 MMOL/L (ref 3.5–5.2)
PROT SERPL-MCNC: 6.9 G/DL (ref 6–8.5)
QT INTERVAL: 412 MS
QTC INTERVAL: 486 MS
SODIUM SERPL-SCNC: 140 MMOL/L (ref 136–145)

## 2024-02-27 PROCEDURE — 99232 SBSQ HOSP IP/OBS MODERATE 35: CPT | Performed by: INTERNAL MEDICINE

## 2024-02-27 PROCEDURE — 80076 HEPATIC FUNCTION PANEL: CPT

## 2024-02-27 PROCEDURE — 97162 PT EVAL MOD COMPLEX 30 MIN: CPT

## 2024-02-27 PROCEDURE — 83735 ASSAY OF MAGNESIUM: CPT | Performed by: INTERNAL MEDICINE

## 2024-02-27 PROCEDURE — 99233 SBSQ HOSP IP/OBS HIGH 50: CPT

## 2024-02-27 PROCEDURE — 25010000002 ENOXAPARIN PER 10 MG: Performed by: INTERNAL MEDICINE

## 2024-02-27 PROCEDURE — 80048 BASIC METABOLIC PNL TOTAL CA: CPT | Performed by: INTERNAL MEDICINE

## 2024-02-27 PROCEDURE — 82948 REAGENT STRIP/BLOOD GLUCOSE: CPT

## 2024-02-27 PROCEDURE — 63710000001 INSULIN LISPRO (HUMAN) PER 5 UNITS: Performed by: INTERNAL MEDICINE

## 2024-02-27 PROCEDURE — 25010000002 CYANOCOBALAMIN PER 1000 MCG

## 2024-02-27 PROCEDURE — 97530 THERAPEUTIC ACTIVITIES: CPT

## 2024-02-27 PROCEDURE — 93005 ELECTROCARDIOGRAM TRACING: CPT | Performed by: NURSE PRACTITIONER

## 2024-02-27 PROCEDURE — 97166 OT EVAL MOD COMPLEX 45 MIN: CPT

## 2024-02-27 PROCEDURE — 92610 EVALUATE SWALLOWING FUNCTION: CPT

## 2024-02-27 PROCEDURE — 93010 ELECTROCARDIOGRAM REPORT: CPT | Performed by: INTERNAL MEDICINE

## 2024-02-27 PROCEDURE — 97535 SELF CARE MNGMENT TRAINING: CPT

## 2024-02-27 PROCEDURE — 63710000001 INSULIN DETEMIR PER 5 UNITS: Performed by: INTERNAL MEDICINE

## 2024-02-27 RX ORDER — QUETIAPINE FUMARATE 100 MG/1
100 TABLET, FILM COATED ORAL EVERY 12 HOURS SCHEDULED
Status: DISCONTINUED | OUTPATIENT
Start: 2024-02-27 | End: 2024-03-03

## 2024-02-27 RX ORDER — VALPROIC ACID 250 MG/5ML
250 SOLUTION ORAL EVERY 8 HOURS SCHEDULED
Status: DISCONTINUED | OUTPATIENT
Start: 2024-02-27 | End: 2024-03-04 | Stop reason: HOSPADM

## 2024-02-27 RX ORDER — TEMAZEPAM 15 MG/1
15 CAPSULE ORAL NIGHTLY
Status: DISCONTINUED | OUTPATIENT
Start: 2024-02-27 | End: 2024-03-04 | Stop reason: HOSPADM

## 2024-02-27 RX ORDER — ZIPRASIDONE MESYLATE 20 MG/ML
10 INJECTION, POWDER, LYOPHILIZED, FOR SOLUTION INTRAMUSCULAR EVERY 4 HOURS PRN
Status: DISCONTINUED | OUTPATIENT
Start: 2024-02-27 | End: 2024-03-04 | Stop reason: HOSPADM

## 2024-02-27 RX ORDER — HYDRALAZINE HYDROCHLORIDE 25 MG/1
25 TABLET, FILM COATED ORAL ONCE
Status: COMPLETED | OUTPATIENT
Start: 2024-02-27 | End: 2024-02-27

## 2024-02-27 RX ORDER — CYANOCOBALAMIN 1000 UG/ML
1000 INJECTION, SOLUTION INTRAMUSCULAR; SUBCUTANEOUS
Status: DISCONTINUED | OUTPATIENT
Start: 2024-02-27 | End: 2024-03-04 | Stop reason: HOSPADM

## 2024-02-27 RX ORDER — HYDRALAZINE HYDROCHLORIDE 20 MG/ML
10 INJECTION INTRAMUSCULAR; INTRAVENOUS ONCE
Status: DISCONTINUED | OUTPATIENT
Start: 2024-02-27 | End: 2024-02-27

## 2024-02-27 RX ADMIN — INSULIN LISPRO 2 UNITS: 100 INJECTION, SOLUTION INTRAVENOUS; SUBCUTANEOUS at 08:28

## 2024-02-27 RX ADMIN — INSULIN LISPRO 2 UNITS: 100 INJECTION, SOLUTION INTRAVENOUS; SUBCUTANEOUS at 11:32

## 2024-02-27 RX ADMIN — VALPROIC ACID 250 MG: 250 SOLUTION ORAL at 15:50

## 2024-02-27 RX ADMIN — SENNOSIDES AND DOCUSATE SODIUM 2 TABLET: 8.6; 5 TABLET ORAL at 08:28

## 2024-02-27 RX ADMIN — INSULIN DETEMIR 5 UNITS: 100 INJECTION, SOLUTION SUBCUTANEOUS at 21:43

## 2024-02-27 RX ADMIN — HYDRALAZINE HYDROCHLORIDE 25 MG: 25 TABLET ORAL at 01:02

## 2024-02-27 RX ADMIN — INSULIN LISPRO 2 UNITS: 100 INJECTION, SOLUTION INTRAVENOUS; SUBCUTANEOUS at 21:43

## 2024-02-27 RX ADMIN — INSULIN DETEMIR 5 UNITS: 100 INJECTION, SOLUTION SUBCUTANEOUS at 08:29

## 2024-02-27 RX ADMIN — ENOXAPARIN SODIUM 100 MG: 100 INJECTION SUBCUTANEOUS at 21:26

## 2024-02-27 RX ADMIN — QUETIAPINE FUMARATE 100 MG: 100 TABLET ORAL at 21:26

## 2024-02-27 RX ADMIN — CYANOCOBALAMIN 1000 MCG: 1000 INJECTION, SOLUTION INTRAMUSCULAR; SUBCUTANEOUS at 15:50

## 2024-02-27 RX ADMIN — QUETIAPINE FUMARATE 100 MG: 100 TABLET ORAL at 08:28

## 2024-02-27 RX ADMIN — VALPROIC ACID 250 MG: 250 SOLUTION ORAL at 21:26

## 2024-02-27 RX ADMIN — DONEPEZIL HYDROCHLORIDE 20 MG: 10 TABLET, FILM COATED ORAL at 08:28

## 2024-02-27 RX ADMIN — ENOXAPARIN SODIUM 100 MG: 100 INJECTION SUBCUTANEOUS at 08:27

## 2024-02-27 RX ADMIN — TEMAZEPAM 15 MG: 15 CAPSULE ORAL at 21:26

## 2024-02-27 NOTE — THERAPY EVALUATION
Patient Name: Too Tai  : 1955    MRN: [id number]                              Today's Date: 2024       Admit Date: 2024    Visit Dx:     ICD-10-CM ICD-9-CM   1. Dementia with behavioral disturbance  F03.918 294.21   2. Multiple falls  R29.6 V15.88   3. Generalized weakness  R53.1 780.79   4. Injury of head, initial encounter  S09.90XA 959.01   5. Bilateral pleural effusion  J90 511.9     Patient Active Problem List   Diagnosis    Cough    Benign essential HTN    Benign prostatic hyperplasia    Coronary artery disease involving native coronary artery of native heart    Chronic coronary artery disease    Type 2 diabetes mellitus with hyperglycemia, with long-term current use of insulin    Gout    Hyperlipidemia    History of heart attack    Peripheral neuropathy    Peripheral vascular disease    Spasm of muscle    Myoclonic jerking    Hypomagnesemia    Arteriosclerosis of coronary artery    Dementia with behavioral disturbance    Frequent falls    History of DVT (deep vein thrombosis)    Chronic anticoagulation     Past Medical History:   Diagnosis Date    Coronary artery disease     Dementia     Diabetes mellitus     Hyperlipidemia     Peripheral vascular disease      Past Surgical History:   Procedure Laterality Date    CORONARY ARTERY BYPASS GRAFT      FEMORAL ARTERY - POPLITEAL ARTERY BYPASS GRAFT        General Information       Row Name 24 1040          Physical Therapy Time and Intention    Document Type evaluation  -ML     Mode of Treatment physical therapy  -ML       Row Name 24 1040          General Information    Patient Profile Reviewed yes  -ML     Prior Level of Function max assist:;ADL's;min assist:;transfer;all household mobility  Spouse assists pt with all ADL's; takes regular trips to bathroom to use commode; pt has SPC but does not like to use, recents hx of falls  -ML     Existing Precautions/Restrictions fall;other (see comments)  hx advanced Alzheimer's; muscle  spasms, easily agitated, hx frequent falls  -     Barriers to Rehab medically complex;previous functional deficit;cognitive status  -       Row Name 02/27/24 1040          Living Environment    People in Home spouse  -       Row Name 02/27/24 1040          Home Main Entrance    Number of Stairs, Main Entrance none;other (see comments)  Ramp to enter home through garage  -       Row Name 02/27/24 1040          Stairs Within Home, Primary    Number of Stairs, Within Home, Primary none  -       Row Name 02/27/24 1040          Cognition    Orientation Status (Cognition) oriented to;person;disoriented to;place;situation;time  -       Row Name 02/27/24 1040          Safety Issues, Functional Mobility    Safety Issues Affecting Function (Mobility) ability to follow commands;at risk behavior observed;awareness of need for assistance;insight into deficits/self-awareness;judgment;problem-solving;safety precaution awareness;safety precautions follow-through/compliance;sequencing abilities  -     Impairments Affecting Function (Mobility) balance;cognition;coordination;endurance/activity tolerance;motor planning;strength  -     Cognitive Impairments, Mobility Safety/Performance attention;awareness, need for assistance;insight into deficits/self-awareness;judgment;problem-solving/reasoning;safety precaution follow-through;safety precaution awareness;sequencing abilities  -               User Key  (r) = Recorded By, (t) = Taken By, (c) = Cosigned By      Initials Name Provider Type     Carole Lyles Physical Therapist                   Mobility       Row Name 02/27/24 1041          Bed Mobility    Bed Mobility supine-sit;sit-supine  -     Supine-Sit [place] (Bed Mobility) minimum assist (75% patient effort);1 person assist;verbal cues;nonverbal cues (demo/gesture)  [facility]     Sit-Supine [place] (Bed Mobility) minimum assist (75% patient effort);1 person assist;verbal cues;nonverbal cues (demo/gesture)   -ML     Assistive Device (Bed Mobility) bed rails;head of bed elevated  -ML       Row Name 02/27/24 1041          Sit-Stand Transfer    Sit-Stand [place] (Transfers) minimum assist (75% patient effort);2 person assist;verbal cues;nonverbal cues (demo/gesture)  -ML     Assistive Device (Sit-Stand Transfers) other (see comments)  BUE support  -ML     Comment, (Sit-Stand Transfer) patient completed 1 sit to stand transfer from EOB  -ML       Row Name 02/27/24 1041          Gait/Stairs (Locomotion)    [place] Level (Gait) verbal cues;minimum assist (75% patient effort);2 person assist  -ML     Assistive Device (Gait) other (see comments)  BUE support  -ML     Distance in Feet (Gait) 2  -ML     Comment, (Gait/Stairs) patient able to take 2 lateral side steps to HOB, further ambulation deferred due to patient with agitation/frustration towards gait belt  -ML               User Key  (r) = Recorded By, (t) = Taken By, (c) = Cosigned By      Initials Name Provider Type    ML Carole Lyles Physical Therapist                   Obj/Interventions       Row Name 02/27/24 1043          Range of Motion Comprehensive    General Range of Motion bilateral lower extremity ROM WFL  -ML       Row Name 02/27/24 1043          Strength Comprehensive (MMT)    Comment, General Manual Muscle Testing (MMT) Assessment unable to formally assess due to cognitvie status  -ML       Row Name 02/27/24 1043          Balance    Balance Assessment sitting static balance;sitting dynamic balance;sit to stand dynamic balance;standing static balance;standing dynamic balance  -ML     Static Sitting Balance supervision  -ML     Dynamic Sitting Balance contact guard  -ML     Position, Sitting Balance unsupported;sitting edge of bed  -ML     Sit to Stand Dynamic Balance verbal cues;minimal assist;2-person assist  -ML     Static Standing Balance minimal assist  -ML     Dynamic Standing Balance minimal assist;2-person assist;verbal cues  -ML      Position/Device Used, Standing Balance supported  -ML     Balance Interventions sitting;standing;sit to stand;supported;weight shifting activity  -ML       Row Name 02/27/24 1043          Sensory Assessment (Somatosensory)    Sensory Assessment (Somatosensory) unable/difficult to assess  -ML               User Key  (r) = Recorded By, (t) = Taken By, (c) = Cosigned By      Initials Name Provider Type    Carole Yee Physical Therapist                   Goals/Plan       Row Name 02/27/24 1046          Bed Mobility Goal 1 (PT)    Activity/Assistive Device (Bed Mobility Goal 1, PT) sit to supine;supine to sit  -ML     [place] Level/Cues Needed (Bed Mobility Goal 1, PT) standby assist  -ML     Time Frame (Bed Mobility Goal 1, PT) 10 days  -ML       Row Name 02/27/24 1046          Transfer Goal 1 (PT)    Activity/Assistive Device (Transfer Goal 1, PT) sit-to-stand/stand-to-sit;bed-to-chair/chair-to-bed;walker, rolling  -ML     [place] Level/Cues Needed (Transfer Goal 1, PT) standby assist  -ML     Time Frame (Transfer Goal 1, PT) 10 days  -ML       Row Name 02/27/24 1046          Gait Training Goal 1 (PT)    Activity/Assistive Device (Gait Training Goal 1, PT) gait (walking locomotion);decrease fall risk;walker, rolling  -ML     [place] Level (Gait Training Goal 1, PT) contact guard required  -ML     Distance (Gait Training Goal 1, PT) 50  -ML     Time Frame (Gait Training Goal 1, PT) 10 days  -ML       Row Name 02/27/24 1046          Therapy Assessment/Plan (PT)    Planned Therapy Interventions (PT) balance training;bed mobility training;gait training;home exercise program;neuromuscular re-education;patient/family education;postural re-education;ROM (range of motion);strengthening;stretching;transfer training  -ML               User Key  (r) = Recorded By, (t) = Taken By, (c) = Cosigned By      Initials Name Provider Type    Carole Yee Physical Therapist                   Clinical Impression        Row Name 02/27/24 1044          Pain    Additional Documentation Pain Scale: FACES Pre/Post-Treatment (Group)  -ML       Row Name 02/27/24 1044          Pain Scale: FACES Pre/Post-Treatment    Pain: FACES Scale, Pretreatment 0-->no hurt  -ML     Posttreatment Pain Rating 0-->no hurt  -ML       Row Name 02/27/24 1044          Plan of Care Review    Plan of Care Reviewed With patient;spouse  -     Outcome Evaluation Physical therapy evaluation complete. The patient orientated to self, able to follow simple commands with cues to participate with PT. The patient presents below baseline for mobility and would continue to benefit from skilled PT to address strength, balance and activity tolerance deficits.  -ML       Row Name 02/27/24 1044          Therapy Assessment/Plan (PT)    Rehab Potential (PT) fair, will monitor progress closely  -     Criteria for Skilled Interventions Met (PT) yes;meets criteria;skilled treatment is necessary  -     Therapy Frequency (PT) daily  -ML       Row Name 02/27/24 1044          Vital Signs    Pre Patient Position Supine  -     Intra Patient Position Standing  -ML     Post Patient Position Supine  -ML       Row Name 02/27/24 1044          Positioning and Restraints    Pre-Treatment Position in bed  -ML     Post Treatment Position bed  -ML     In Bed notified nsg;fowlers;call light within reach;encouraged to call for assist;exit alarm on;with family/caregiver;side rails up x3  seizure pads in place  -     Restraints released:;reapplied:;notified nsg:;soft limb  -ML               User Key  (r) = Recorded By, (t) = Taken By, (c) = Cosigned By      Initials Name Provider Type     Carole Lyles Physical Therapist                   Outcome Measures       Row Name 02/27/24 1047          How much help from another person do you currently need...    Turning from your back to your side while in flat bed without using bedrails? 4  -ML     Moving from lying on back to sitting on the side  of a flat bed without bedrails? 3  -ML     Moving to and from a bed to a chair (including a wheelchair)? 3  -ML     Standing up from a chair using your arms (e.g., wheelchair, bedside chair)? 3  -ML     Climbing 3-5 steps with a railing? 3  -ML     To walk in hospital room? 3  -ML     AM-PAC 6 Clicks Score (PT) 19  -ML     Highest Level of Mobility Goal 6 --> Walk 10 steps or more  -ML       Row Name 02/27/24 1047 02/27/24 1046       Functional Assessment    Outcome Measure Options AM-PAC 6 Clicks Basic Mobility (PT)  -ML AM-PAC 6 Clicks Daily Activity (OT)  -              User Key  (r) = Recorded By, (t) = Taken By, (c) = Cosigned By      Initials Name Provider Type    Cheryl Calixto, OT Occupational Therapist    Carole Yee Physical Therapist                                 Physical Therapy Education       Title: PT OT SLP Therapies (In Progress)       Topic: Physical Therapy (In Progress)       Point: Mobility training (Done)       Learning Progress Summary             Patient Acceptance, E, VU,NR by  at 2/27/2024 1048   Family Acceptance, E, VU,NR by  at 2/27/2024 1048                         Point: Home exercise program (Not Started)       Learner Progress:  Not documented in this visit.              Point: Body mechanics (Not Started)       Learner Progress:  Not documented in this visit.              Point: Precautions (Done)       Learning Progress Summary             Patient Acceptance, E, VU,NR by  at 2/27/2024 1048   Family Acceptance, E, VU,NR by  at 2/27/2024 1048                                         User Key       Initials Effective Dates Name Provider Type Discipline     04/22/21 -  Carole Lyles Physical Therapist PT                  PT Recommendation and Plan  Planned Therapy Interventions (PT): balance training, bed mobility training, gait training, home exercise program, neuromuscular re-education, patient/family education, postural re-education, ROM (range of motion),  strengthening, stretching, transfer training  Plan of Care Reviewed With: patient, spouse  Outcome Evaluation: Physical therapy evaluation complete. The patient orientated to self, able to follow simple commands with cues to participate with PT. The patient presents below baseline for mobility and would continue to benefit from skilled PT to address strength, balance and activity tolerance deficits.     Time Calculation:   PT Evaluation Complexity  History, PT Evaluation Complexity: 1-2 personal factors and/or comorbidities  Examination of Body Systems (PT Eval Complexity): total of 3 or more elements  Clinical Presentation (PT Evaluation Complexity): evolving  Clinical Decision Making (PT Evaluation Complexity): moderate complexity  Overall Complexity (PT Evaluation Complexity): moderate complexity     PT Charges       Row Name 02/27/24 1048             Time Calculation    Start Time 0905  -ML      PT Received On 02/27/24  -ML      PT Goal Re-Cert Due Date 03/08/24  -ML         Timed Charges    44291 - PT Therapeutic Activity Minutes 10  -ML         Untimed Charges    PT Eval/Re-eval Minutes 46  -ML         Total Minutes    Timed Charges Total Minutes 10  -ML      Untimed Charges Total Minutes 46  -ML       Total Minutes 56  -ML                User Key  (r) = Recorded By, (t) = Taken By, (c) = Cosigned By      Initials Name Provider Type    Carole Yee Physical Therapist                  Therapy Charges for Today       Code Description Service Date Service Provider Modifiers Qty    72330540964 HC PT THERAPEUTIC ACT EA 15 MIN 2/27/2024 Carole Lyles GP 1    40077612131 HC PT EVAL MOD COMPLEXITY 4 2/27/2024 Carole Lyles GP 1            PT G-Codes  Outcome Measure Options: AM-PAC 6 Clicks Basic Mobility (PT)  AM-PAC 6 Clicks Score (PT): 19  AM-PAC 6 Clicks Score (OT): 6  PT Discharge Summary  Anticipated Discharge Disposition (PT): skilled nursing facility    Carole Lyles  2/27/2024

## 2024-02-27 NOTE — THERAPY EVALUATION
Patient Name: Too Tai  : 1955    MRN: [id number]                              Today's Date: 2024       Admit Date: 2024    Visit Dx:     ICD-10-CM ICD-9-CM   1. Dementia with behavioral disturbance  F03.918 294.21   2. Multiple falls  R29.6 V15.88   3. Generalized weakness  R53.1 780.79   4. Injury of head, initial encounter  S09.90XA 959.01   5. Bilateral pleural effusion  J90 511.9     Patient Active Problem List   Diagnosis    Cough    Benign essential HTN    Benign prostatic hyperplasia    Coronary artery disease involving native coronary artery of native heart    Chronic coronary artery disease    Type 2 diabetes mellitus with hyperglycemia, with long-term current use of insulin    Gout    Hyperlipidemia    History of heart attack    Peripheral neuropathy    Peripheral vascular disease    Spasm of muscle    Myoclonic jerking    Hypomagnesemia    Arteriosclerosis of coronary artery    Dementia with behavioral disturbance    Frequent falls    History of DVT (deep vein thrombosis)    Chronic anticoagulation     Past Medical History:   Diagnosis Date    Coronary artery disease     Dementia     Diabetes mellitus     Hyperlipidemia     Peripheral vascular disease      Past Surgical History:   Procedure Laterality Date    CORONARY ARTERY BYPASS GRAFT      FEMORAL ARTERY - POPLITEAL ARTERY BYPASS GRAFT        General Information       Row Name 24 1017          OT Time and Intention    Document Type evaluation  -KASSIE     Mode of Treatment occupational therapy  -KASSIE       Row Name 24 1017          General Information    Patient Profile Reviewed yes  -KASSIE     Prior Level of Function max assist:;ADL's;min assist:;transfer;all household mobility  Spouse assists pt with all ADL's; takes regular trips to bathroom to use commode; pt has SPC but does not like to use  -KASSIE     Existing Precautions/Restrictions fall;other (see comments)  hx advanced Alzheimer's; muscle spasms, easily agitated, hx  frequent falls  -KASSIE     Barriers to Rehab medically complex;previous functional deficit;cognitive status  -       Row Name 02/27/24 1017          Occupational Profile    Environmental Supports and Barriers (Occupational Profile) Lives with spouse in single level home in [place]; has SPC, tub shower with shower chair; spouse primary caregiver, hired caregiver 2x/week  -       Row Name 02/27/24 1017          Living Environment    People in Home spouse  -       Row Name 02/27/24 1017          Home Main Entrance    Number of Stairs, Main Entrance none  Ramp to enter home through garage  -       Row Name 02/27/24 1017          Stairs Within Home, Primary    Number of Stairs, Within Home, Primary none  -       Row Name 02/27/24 1017          Cognition    Orientation Status (Cognition) oriented to;person;disoriented to;place;situation;time  -       Row Name 02/27/24 1017          Safety Issues, Functional Mobility    Safety Issues Affecting Function (Mobility) ability to follow commands;at risk behavior observed;awareness of need for assistance;insight into deficits/self-awareness;judgment;problem-solving;safety precaution awareness;safety precautions follow-through/compliance;sequencing abilities  -KASSIE     Impairments Affecting Function (Mobility) balance;cognition;coordination;endurance/activity tolerance;motor planning;strength  -KASSIE     Cognitive Impairments, Mobility Safety/Performance attention;awareness, need for assistance;insight into deficits/self-awareness;judgment;problem-solving/reasoning;safety precaution awareness;safety precaution follow-through;sequencing abilities  -KASSIE     Comment, Safety Issues/Impairments (Mobility) cues for all transitions; adverse reaction to gait belt  -               User Key  (r) = Recorded By, (t) = Taken By, (c) = Cosigned By      Initials Name Provider Type    Cheryl Calixto OT Occupational Therapist                     Mobility/ADL's       Row Name 02/27/24  1031          Bed Mobility    Bed Mobility supine-sit;sit-supine  -KASSIE     Supine-Sit Caroline (Bed Mobility) minimum assist (75% patient effort);1 person assist;verbal cues;nonverbal cues (demo/gesture)  -KASSIE     Sit-Supine [place] (Bed Mobility) minimum assist (75% patient effort);1 person assist;verbal cues;nonverbal cues (demo/gesture)  -KASSIE     Bed Mobility, Safety Issues cognitive deficits limit understanding  -     Assistive Device (Bed Mobility) bed rails;head of bed elevated  -KASSIE     Comment, (Bed Mobility) v/t cues to initiate transitions  -       Row Name 02/27/24 1031          Transfers    Transfers sit-stand transfer  -KASSIE     Comment, (Transfers) MinAx2/cues with BUE support  -       Row Name 02/27/24 1031          Sit-Stand Transfer    Sit-Stand Caroline (Transfers) minimum assist (75% patient effort);2 person assist;verbal cues;nonverbal cues (demo/gesture)  -       Row Name 02/27/24 1031          Functional Mobility    Functional Mobility- Ind. Level not tested  -     Functional Mobility- Comment Unsafe to attempt d/t increased agitation regarding gait belt  -       Row Name 02/27/24 1031          Activities of Daily Living    BADL Assessment/Intervention lower body dressing;grooming  -       Row Name 02/27/24 1031          Lower Body Dressing Assessment/Training    [place] Level (Lower Body Dressing) don;socks;dependent (less than 25% patient effort)  -KASSIE     Position (Lower Body Dressing) sitting up in bed  -       Row Name 02/27/24 1031          Grooming Assessment/Training    [place] Level (Grooming) wash face, hands;dependent (less than 25% patient effort)  -KASSIE     Position (Grooming) edge of bed sitting  -KASSIE     Comment, (Grooming) declined oral care  -               User Key  (r) = Recorded By, (t) = Taken By, (c) = Cosigned By      Initials Name Provider Type    Cheryl Calixto OT Occupational Therapist                   Obj/Interventions       Row Name  02/27/24 1035          Sensory Assessment (Somatosensory)    Sensory Assessment (Somatosensory) unable/difficult to assess  -KASSIE       Row Name 02/27/24 1035          Vision Assessment/Intervention    Visual Impairment/Limitations unable/difficult to assess  -KASSIE       Row Name 02/27/24 1035          Range of Motion Comprehensive    General Range of Motion bilateral upper extremity ROM WFL  -KASSIE     Comment, General Range of Motion as demonstrated functionally  -KASSIE       Row Name 02/27/24 1035          Strength Comprehensive (MMT)    Comment, General Manual Muscle Testing (MMT) Assessment unable to formally assess d/t cognitive status  -KASSIE       Row Name 02/27/24 1035          Balance    Balance Assessment sitting static balance;sitting dynamic balance;standing static balance;standing dynamic balance  -KASSIE     Static Sitting Balance supervision  -KASSIE     Dynamic Sitting Balance contact guard  -KASSIE     Position, Sitting Balance unsupported;sitting edge of bed  -KASSIE     Static Standing Balance minimal assist  -KASSIE     Dynamic Standing Balance minimal assist;2-person assist  -KASSIE     Position/Device Used, Standing Balance supported  -KASSIE     Balance Interventions standing;occupation based/functional task;weight shifting activity  -KASSIE     Comment, Balance MinAx2 for sidestepping R toward HOB with BUE support  -KASSIE               User Key  (r) = Recorded By, (t) = Taken By, (c) = Cosigned By      Initials Name Provider Type    KASSIE Cheryl Walls OT Occupational Therapist                   Goals/Plan       Row Name 02/27/24 1045          Transfer Goal 1 (OT)    Activity/Assistive Device (Transfer Goal 1, OT) sit-to-stand/stand-to-sit;toilet  -KASSIE     [place] Level/Cues Needed (Transfer Goal 1, OT) contact guard required  -KASSIE     Time Frame (Transfer Goal 1, OT) long term goal (LTG);10 days  -KASSIE     Progress/Outcome (Transfer Goal 1, OT) new goal  -KASSIE       Row Name 02/27/24 1045          Dressing Goal 1 (OT)    Activity/Device  (Dressing Goal 1, OT) lower body dressing  -KASSIE     [place]/Cues Needed (Dressing Goal 1, OT) set-up required  -KASSIE     Time Frame (Dressing Goal 1, OT) long term goal (LTG);10 days  -KASSIE     Progress/Outcome (Dressing Goal 1, OT) new goal  -KASSIE       Row Name 02/27/24 1045          Grooming Goal 1 (OT)    Activity/Device (Grooming Goal 1, OT) hair care;oral care;wash face, hands  -KASSIE     [place] (Grooming Goal 1, OT) contact guard required  -KASSIE     Time Frame (Grooming Goal 1, OT) long term goal (LTG);10 days  -KASSIE     Progress/Outcome (Grooming Goal 1, OT) new goal  -KASSIE       Row Name 02/27/24 1045          Therapy Assessment/Plan (OT)    Planned Therapy Interventions (OT) activity tolerance training;BADL retraining;functional balance retraining;occupation/activity based interventions;patient/caregiver education/training;ROM/therapeutic exercise;strengthening exercise;transfer/mobility retraining  -KASSIE               User Key  (r) = Recorded By, (t) = Taken By, (c) = Cosigned By      Initials Name Provider Type    Cheryl Calixto, OT Occupational Therapist                   Clinical Impression       Row Name 02/27/24 1040          Pain Assessment    Additional Documentation Pain Scale: FACES Pre/Post-Treatment (Group)  -[facility] Name 02/27/24 1040          Pain Scale: FACES Pre/Post-Treatment    Pain: FACES Scale, Pretreatment 0-->no hurt  -KASSIE     Posttreatment Pain Rating 0-->no hurt  -[facility] Name 02/27/24 1040          Plan of Care Review    Plan of Care Reviewed With patient;spouse  -KASSIE     Outcome Evaluation OT eval completed. Pt presents with generalized weakness, impaired balance, and increased confusion limiting participation in ADL's. Pt required v/t cues for all transitions, extra time and prompting to follow simple commands. Pt completed functional transfers with MinAx2/BUE support, Dep for grooming and eating. IP OT services warranted to address self-care deficits. Recommend discharge to  SNF.  -KASSIE       Row Name 02/27/24 1040          Therapy Assessment/Plan (OT)    Rehab Potential (OT) fair, will monitor progress closely  -KASSIE     Criteria for Skilled Therapeutic Interventions Met (OT) yes;meets criteria;skilled treatment is necessary  -KASSIE     Therapy Frequency (OT) daily  -KASSIE       Row Name 02/27/24 1040          Therapy Plan Review/Discharge Plan (OT)    Anticipated Discharge Disposition (OT) skilled nursing facility  -KASSIE       Row Name 02/27/24 1040          Vital Signs    Pre Systolic BP Rehab 187  -KASSIE     Pre Treatment Diastolic BP 92  -KASSIE     Pretreatment Heart Rate (beats/min) 73  -KASSIE     Intratreatment Heart Rate (beats/min) 122  -KASSIE     O2 Delivery Pre Treatment room air  -KASSIE     O2 Delivery Intra Treatment room air  -KASSIE     O2 Delivery Post Treatment room air  -KASSIE     Pre Patient Position Supine  -KASSIE     Intra Patient Position Standing  -KASSIE     Post Patient Position Supine  -KASSIE       Row Name 02/27/24 1040          Positioning and Restraints    Pre-Treatment Position in bed  -KASSIE     Post Treatment Position bed  -KASSIE     In Bed notified nsg;fowlers;call light within reach;encouraged to call for assist;exit alarm on;with family/caregiver;side rails up x3  seizure pads in place  -KASSIE     Restraints released:;reapplied:;soft limb  -KASSIE               User Key  (r) = Recorded By, (t) = Taken By, (c) = Cosigned By      Initials Name Provider Type    Cheryl Calixto, ISAURO Occupational Therapist                   Outcome Measures       Row Name 02/27/24 1046          How much help from another is currently needed...    Putting on and taking off regular lower body clothing? 1  -KASSIE     Bathing (including washing, rinsing, and drying) 1  -KASSIE     Toileting (which includes using toilet bed pan or urinal) 1  -KASSIE     Putting on and taking off regular upper body clothing 1  -KASSIE     Taking care of personal grooming (such as brushing teeth) 1  -KASSIE     Eating meals 1  -KSASIE     AM-PAC 6 Clicks Score (OT) 6  -KASSIE        Row Name 02/27/24 1047          How much help from another person do you currently need...    Turning from your back to your side while in flat bed without using bedrails? 4  -ML     Moving from lying on back to sitting on the side of a flat bed without bedrails? 3  -ML     Moving to and from a bed to a chair (including a wheelchair)? 3  -ML     Standing up from a chair using your arms (e.g., wheelchair, bedside chair)? 3  -ML     Climbing 3-5 steps with a railing? 3  -ML     To walk in hospital room? 3  -ML     AM-PAC 6 Clicks Score (PT) 19  -ML     Highest Level of Mobility Goal 6 --> Walk 10 steps or more  -ML       Row Name 02/27/24 1047 02/27/24 1046       Functional Assessment    Outcome Measure Options AM-PAC 6 Clicks Basic Mobility (PT)  -ML AM-PAC 6 Clicks Daily Activity (OT)  -KASSIE              User Key  (r) = Recorded By, (t) = Taken By, (c) = Cosigned By      Initials Name Provider Type    Cheryl Calixto, OT Occupational Therapist    Carole Yee Physical Therapist                    Occupational Therapy Education       Title: PT OT SLP Therapies (In Progress)       Topic: Occupational Therapy (In Progress)       Point: ADL training (Done)       Description:   Instruct learner(s) on proper safety adaptation and remediation techniques during self care or transfers.   Instruct in proper use of assistive devices.                  Learning Progress Summary             Patient Acceptance, E, VU by KASSIE at 2/27/2024 1046    Comment: OT POC; fall prevention   Family Acceptance, E, VU by KASSIE at 2/27/2024 1046    Comment: OT POC; fall prevention                         Point: Home exercise program (Not Started)       Description:   Instruct learner(s) on appropriate technique for monitoring, assisting and/or progressing therapeutic exercises/activities.                  Learner Progress:  Not documented in this visit.              Point: Precautions (Done)       Description:   Instruct learner(s) on  prescribed precautions during self-care and functional transfers.                  Learning Progress Summary             Patient Acceptance, E, VU by KASSIE at 2/27/2024 1046    Comment: OT POC; fall prevention   Family Acceptance, E, VU by KASSIE at 2/27/2024 1046    Comment: OT POC; fall prevention                         Point: Body mechanics (Not Started)       Description:   Instruct learner(s) on proper positioning and spine alignment during self-care, functional mobility activities and/or exercises.                  Learner Progress:  Not documented in this visit.                              User Key       Initials Effective Dates Name Provider Type Discipline    KASSIE 06/16/21 -  Cheryl Walls, OT Occupational Therapist OT                  OT Recommendation and Plan  Planned Therapy Interventions (OT): activity tolerance training, BADL retraining, functional balance retraining, occupation/activity based interventions, patient/caregiver education/training, ROM/therapeutic exercise, strengthening exercise, transfer/mobility retraining  Therapy Frequency (OT): daily  Plan of Care Review  Plan of Care Reviewed With: patient, spouse  Outcome Evaluation: OT eval completed. Pt presents with generalized weakness, impaired balance, and increased confusion limiting participation in ADL's. Pt required v/t cues for all transitions, extra time and prompting to follow simple commands. Pt completed functional transfers with MinAx2/BUE support, Dep for grooming and eating. IP OT services warranted to address self-care deficits. Recommend discharge to SNF.     Time Calculation:   Evaluation Complexity (OT)  Review Occupational Profile/Medical/Therapy History Complexity: expanded/moderate complexity  Assessment, Occupational Performance/Identification of Deficit Complexity: 3-5 performance deficits  Clinical Decision Making Complexity (OT): detailed assessment/moderate complexity  Overall Complexity of Evaluation (OT): moderate  complexity     Time Calculation- OT       Row Name 02/27/24 0850             Time Calculation- OT    OT Start Time 0850  -KASSIE      OT Received On 02/27/24  -KASSIE      OT Goal Re-Cert Due Date 03/08/24  -KASSIE         Timed Charges    02516 - OT Self Care/Mgmt Minutes 12  -KASSIE         Untimed Charges    OT Eval/Re-eval Minutes 48  -KASSIE         Total Minutes    Timed Charges Total Minutes 12  -KASSIE      Untimed Charges Total Minutes 48  -KASSIE       Total Minutes 60  -KASSIE                User Key  (r) = Recorded By, (t) = Taken By, (c) = Cosigned By      Initials Name Provider Type    Cheryl Calixto OT Occupational Therapist                  Therapy Charges for Today       Code Description Service Date Service Provider Modifiers Qty    43506586133 HC OT SELF CARE/MGMT/TRAIN EA 15 MIN 2/27/2024 Cheryl Walls OT GO 1    32617132010 HC OT EVAL MOD COMPLEXITY 4 2/27/2024 Cheryl Walls OT GO 1                 Cheryl Walls OT  2/27/2024

## 2024-02-27 NOTE — PROGRESS NOTES
[facility] Neurology    Progress Note    Patient Name: Too Tai  : 1955  MRN: [id number]  Primary Care Physician:  Des Geller MD  Date of admission: 2024    Subjective     Chief Complaint: Dementia s/p fall    History of Present Illness   Patient was seen resting in bed.  He was able to state his name and identified his wife in the room.  He was able to follow very basic commands.  He was requiring 2-point restraints for extreme agitation.  Per bedside RN patient rested well overnight with addition of Restoril.    Review of Systems   Unable to assess due to baseline mental status    Objective     Physical Exam  Vitals and nursing note reviewed.   Constitutional:       General: He is not in acute distress.     Appearance: He is not ill-appearing.   Eyes:      Extraocular Movements: Extraocular movements intact.      Pupils: Pupils are equal, round, and reactive to light.      Comments: No nystagmus or deviated gaze noted   Neurological:      Mental Status: He is alert. He is disoriented.      Cranial Nerves: Cranial nerves 2-12 are intact.      Sensory: Sensation is intact.      Motor: Motor function is intact.      Comments:     Cranial Nerves   CN II: Pupils are equal, round, and reactive to light. Normal visual acuity and visual fields.    CN III IV VI: Extraocular movements are full without nystagmus.  CN VII: Facial movements are symmetric. No weakness.  CN VIII:  Auditory acuity is normal.  CN IX & X:  Symmetric palatal movement.  CN XI: Sternocleidomastoid and trapezius are normal.  No weakness.       Psychiatric:         Behavior: Behavior is agitated.          Vitals:   Temp:  [96.8 °F (36 °C)-98.5 °F (36.9 °C)] 96.8 °F (36 °C)  Heart Rate:  [77-90] 90  Resp:  [16-18] 18  BP: (107-192)/(75-95) 187/92    Current Medications    Current Facility-Administered Medications:     acetaminophen (TYLENOL) tablet 650 mg, 650 mg, Oral, Q4H PRN **OR** acetaminophen (TYLENOL) 160 MG/5ML  oral solution 650 mg, 650 mg, Oral, Q4H PRN **OR** acetaminophen (TYLENOL) suppository 650 mg, 650 mg, Rectal, Q4H PRN, Jamel Randhawa III, DO    sennosides-docusate (PERICOLACE) 8.6-50 MG per tablet 2 tablet, 2 tablet, Oral, BID, 2 tablet at 02/27/24 0828 **AND** polyethylene glycol (MIRALAX) packet 17 g, 17 g, Oral, Daily PRN **AND** bisacodyl (DULCOLAX) EC tablet 5 mg, 5 mg, Oral, Daily PRN **AND** bisacodyl (DULCOLAX) suppository 10 mg, 10 mg, Rectal, Daily PRN, Jamel Randhawa III, DO    Calcium Replacement - Follow Nurse / BPA Driven Protocol, , Does not apply, PRN, Jamel Randhawa III, DO    [Held by provider] cilostazol (PLETAL) tablet 50 mg, 50 mg, Oral, BID, Jamel Randhawa III, DO    dextrose (D50W) (25 g/50 mL) IV injection 25 g, 25 g, Intravenous, Q15 Min PRN, Jamel Randhawa III, DO    dextrose (GLUTOSE) oral gel 15 g, 15 g, Oral, Q15 Min PRN, Jamel Randhawa III, DO    donepezil (ARICEPT) tablet 20 mg, 20 mg, Oral, Daily, Milena Sharpe, APRN, 20 mg at 02/27/24 0828    Enoxaparin Sodium (LOVENOX) syringe 100 mg, 1 mg/kg, Subcutaneous, BID, Courtney Davies MD, 100 mg at 02/27/24 0827    glucagon (GLUCAGEN) injection 1 mg, 1 mg, Intramuscular, Q15 Min PRN, Jamel Randhawa III, DO    haloperidol lactate (HALDOL) injection 2 mg, 2 mg, Intramuscular, Q6H PRN, Courtney Davies MD    insulin detemir (LEVEMIR) injection 5 Units, 5 Units, Subcutaneous, Q12H, Courtney Davies MD, 5 Units at 02/27/24 0829    Insulin Lispro (humaLOG) injection 2-7 Units, 2-7 Units, Subcutaneous, 4x Daily AC & at Bedtime, Jamel Randhawa III, DO, 2 Units at 02/27/24 0828    Magnesium Standard Dose Replacement - Follow Nurse / BPA Driven Protocol, , Does not apply, PRN, Jamel Randhawa III, DO    [Held by provider] metoprolol succinate XL (TOPROL-XL) 24 hr tablet 25 mg, 25 mg, Oral, Daily, Milena Sharpe APRN    nitroglycerin (NITROSTAT) SL tablet 0.4 mg, 0.4 mg,  Sublingual, Q5 Min PRN, Jamel Randhawa III, DO    ondansetron ODT (ZOFRAN-ODT) disintegrating tablet 4 mg, 4 mg, Oral, Q6H PRN **OR** ondansetron (ZOFRAN) injection 4 mg, 4 mg, Intravenous, Q6H PRN, Jamel Randhawa III, DO    Phosphorus Replacement - Follow Nurse / BPA Driven Protocol, , Does not apply, PRN, Jamel Randhawa III, DO    potassium chloride (MICRO-K/KLOR-CON) CR capsule, 40 mEq, Oral, Once, Courtney Davies MD    Potassium Replacement - Follow Nurse / BPA Driven Protocol, , Does not apply, PRN, Jamel Randhawa III, DO    QUEtiapine (SEROquel) tablet 100 mg, 100 mg, Oral, Nightly, Courtney Davies MD, 100 mg at 02/26/24 2043    QUEtiapine (SEROquel) tablet 100 mg, 100 mg, Oral, Daily, Wili Nicole MD, 100 mg at 02/27/24 0828    temazepam (RESTORIL) capsule 15 mg, 15 mg, Oral, Nightly PRN, Jamel Randhawa III, DO, 15 mg at 02/26/24 2043    Laboratory Results:   Lab Results   Component Value Date    GLUCOSE 172 (H) 02/27/2024    CALCIUM 9.1 02/27/2024     02/27/2024    K 4.1 02/27/2024    CO2 22.0 02/27/2024     02/27/2024    BUN 9 02/27/2024    CREATININE 0.79 02/27/2024    EGFRIFAFRI 102 02/21/2022    EGFRIFNONA 88 02/21/2022    BCR 11.4 02/27/2024    ANIONGAP 15.0 02/27/2024     Lab Results   Component Value Date    WBC 6.66 02/25/2024    HGB 14.3 02/25/2024    HCT 41.0 02/25/2024    MCV 93.0 02/25/2024     02/25/2024     Lab Results   Component Value Date    CHOL 220 (H) 01/08/2024    CHOL 120 08/01/2019    CHOL 117 04/15/2019     Lab Results   Component Value Date    HDL 41 01/08/2024    HDL 37 (L) 08/03/2023    HDL 41 04/03/2023     Lab Results   Component Value Date     (H) 01/08/2024     (H) 08/03/2023     (H) 04/03/2023     Lab Results   Component Value Date    TRIG 108 01/08/2024    TRIG 208 (H) 08/03/2023    TRIG 138 04/03/2023     Lab Results   Component Value Date    HGBA1C 9.20 (H) 02/25/2024     Lab  Results   Component Value Date    INR 1.1 11/25/2019    INR 1.1 09/10/2018    PROTIME 12.5 11/25/2019    PROTIME 13.3 09/10/2018     Lab Results   Component Value Date    FOLATE 10.20 02/24/2024     Lab Results   Component Value Date    [id number] 223 02/24/2024             Assessment / Plan     Brief Patient Summary:  Too Tai is a 68 y.o. male who has past medical history of severe dementia with behavioral disturbances who presented to [facility] ED after experiencing a fall with increased confusion.  Patient's family endorses episodes of full body jerking which were thought to be response of hyperactivity to startle.      Plan:   Advanced Alzheimer's disease with behavioral disturbances  Increase in myoclonus  S/p fall  Concern Rexulti was increasing patient's myoclonus.  Continue to hold.  Continue Aricept 20 mg daily  Continue Seroquel 100 mg twice daily  QTc 486  Per bedside RN Haldol had no effect on patient will attempt Geodon as needed for extreme agitation  Schedule Restoril 15 mg nightly  Vitamin B12 223; IM cyanocobalamin 1000 mcg monthly  Trial of Depakote 250 mg 3 times daily  LFTs pending  General neurology will continue to follow    I have discussed the above with the patient, bedside RN Dr. Davies  Time spent with patient: 50 minutes in face-to-face evaluation and management of the patient.  Copied text in this note has been reviewed and is accurate as of 02/27/24.       IVONNE Santos

## 2024-02-27 NOTE — THERAPY RE-EVALUATION
Acute Care - Speech Language Pathology   Swallow Initial Evaluation [facility]    Clinical Swallow Evaluation       Patient Name: Too Tai  : 1955  MRN: [id number]  Today's Date: 2024               Admit Date: 2024    Visit Dx:     ICD-10-CM ICD-9-CM   1. Dementia with behavioral disturbance  F03.918 294.21   2. Multiple falls  R29.6 V15.88   3. Generalized weakness  R53.1 780.79   4. Injury of head, initial encounter  S09.90XA 959.01   5. Bilateral pleural effusion  J90 511.9     Patient Active Problem List   Diagnosis    Cough    Benign essential HTN    Benign prostatic hyperplasia    Coronary artery disease involving native coronary artery of native heart    Chronic coronary artery disease    Type 2 diabetes mellitus with hyperglycemia, with long-term current use of insulin    Gout    Hyperlipidemia    History of heart attack    Peripheral neuropathy    Peripheral vascular disease    Spasm of muscle    Myoclonic jerking    Hypomagnesemia    Arteriosclerosis of coronary artery    Dementia with behavioral disturbance    Frequent falls    History of DVT (deep vein thrombosis)    Chronic anticoagulation     Past Medical History:   Diagnosis Date    Coronary artery disease     Dementia     Diabetes mellitus     Hyperlipidemia     Peripheral vascular disease      Past Surgical History:   Procedure Laterality Date    CORONARY ARTERY BYPASS GRAFT      FEMORAL ARTERY - POPLITEAL ARTERY BYPASS GRAFT         SLP Recommendation and Plan  SLP Swallowing Diagnosis: unable/unwilling to cooperate, unable to assess (24 1100)  SLP Diet Recommendation: other (see comments) (unable to make a diet recommendation as patient would not accept attempts to provide trials, even when presented with his favorite diet mountain dew.) (24 1100)  Recommended Precautions and Strategies: other (see comments) (recommend attempt to present meds in pureed.) (24 1100)  SLP Rec. for Method of Medication  Administration: meds whole, with puree, as tolerated (02/27/24 1100)     Monitor for Signs of Aspiration: notify SLP if any concerns (02/27/24 1100)  Recommended Diagnostics: reassess via clinical swallow evaluation (02/27/24 1100)  Swallow Criteria for Skilled Therapeutic Interventions Met: demonstrates skilled criteria (02/27/24 1100)  Anticipated Discharge Disposition (SLP): skilled nursing facility, anticipate therapy at next level of care (02/27/24 1100)  Rehab Potential/Prognosis, Swallowing: re-evaluate goals as necessary (02/27/24 1100)  Therapy Frequency (Swallow): evaluation only (02/27/24 1100)     Oral Care Recommendations: Oral Care BID/PRN, Toothbrush (02/27/24 1100)                                               SWALLOW EVALUATION (last 72 hours)       SLP Adult Swallow Evaluation       Row Name 02/27/24 1100                   Rehab Evaluation    Document Type evaluation  -CH        Subjective Information no complaints  -CH        Patient Observations alert;poorly cooperative  easily agitated  -CH        Patient/Family/Caregiver Comments/Observations spouse present  -CH        Patient Effort poor  -CH        Symptoms Noted During/After Treatment none  -CH           General Information    Patient Profile Reviewed yes  -CH        Pertinent History Of Current Problem advanced dementia, difficulty swallowing meds (larger pills).  -CH        Current Method of Nutrition regular textures;thin liquids  -CH        Precautions/Limitations, Vision WFL;for purposes of eval  -CH        Precautions/Limitations, Hearing WFL;for purposes of eval  -CH        Prior Level of Function-Communication unknown;cognitive-linguistic impairment;other (see comments)  dementia  -CH        Prior Level of Function-Swallowing no diet consistency restrictions  -        Plans/Goals Discussed with patient;spouse/S.O.;agreed upon  -        Barriers to Rehab cognitive status  easily agitated  -        Patient's Goals for Discharge  patient could not state  -        Family Goals for Discharge family did not state  -           Pain    Additional Documentation Pain Scale: FACES Pre/Post-Treatment (Group)  -           Pain Scale: FACES Pre/Post-Treatment    Pain: FACES Scale, Pretreatment 0-->no hurt  -CH        Posttreatment Pain Rating 0-->no hurt  -CH           Oral Motor Structure and Function    Dentition Assessment natural, present and adequate  -CH        Secretion Management WNL/WFL  -CH        Mucosal Quality dry  -CH        Volitional Swallow WFL  -CH           Oral Musculature and Cranial Nerve Assessment    Oral Motor General Assessment unable to assess;other (see comments)  pt did not follow commands for oral motor assessment  -           General Eating/Swallowing Observations    Respiratory Support Currently in Use room air  -        Eating/Swallowing Skills fed by SLP;fed by staff/caregiver  -        Positioning During Eating upright 90 degree;upright in bed  -        Utensils Used spoon;straw  -        Consistencies Trialed thin liquids  attempted thin by spoon/straw and pureed.  -CH        Pre SpO2 (%) 96  -CH        Post SpO2 (%) 97  -CH           Respiratory    Respiratory Status WFL;room air  -           Clinical Swallow Eval    Clinical Swallow Evaluation Summary Unable to fully assess as patient declined trials of ice, thin and pureed. Patient clamped lips shut and would not accept trials. Spouse attempted to present trials of thin liquids. patient continued to decline. Spouse feels that swallowing is likely functional, however, patient is not accepting of meds or food/liquids presented by staff at times. Patient accepted crackers with peanut butter for spouse earlier and she reported no issues at that time. Patient became agitated at SLP and spouse's attempts to present trials and yelled out. SLP to reattempt evaluation tomorrow as patient will participate.  -           SLP Evaluation Clinical Impression     SLP Swallowing Diagnosis unable/unwilling to cooperate;unable to assess  -        Rehab Potential/Prognosis, Swallowing re-evaluate goals as necessary  -        Swallow Criteria for Skilled Therapeutic Interventions Met demonstrates skilled criteria  -           Recommendations    Therapy Frequency (Swallow) evaluation only  -        SLP Diet Recommendation other (see comments)  unable to make a diet recommendation as patient would not accept attempts to provide trials, even when presented with his favorite diet mountain dew.  -        Recommended Diagnostics reassess via clinical swallow evaluation  -        Recommended Precautions and Strategies other (see comments)  recommend attempt to present meds in pureed.  -        Oral Care Recommendations Oral Care BID/PRN;Toothbrush  -        SLP Rec. for Method of Medication Administration meds whole;with puree;as tolerated  -        Monitor for Signs of Aspiration notify SLP if any concerns  -        Anticipated Discharge Disposition (SLP) skilled nursing facility;anticipate therapy at next level of care  -                  User Key  (r) = Recorded By, (t) = Taken By, (c) = Cosigned By      Initials Name Effective Dates    Irasema Acosta, MS CCC-SLP 06/16/21 -                     EDUCATION  The patient has been educated in the following areas:   Dysphagia (Swallowing Impairment) Oral Care/Hydration.              Time Calculation:    Time Calculation- SLP       Row Name 02/27/24 1624             Time Calculation- [facility]    SLP Start Time 1100  -      SLP Received On 02/27/24  -         Untimed Charges    SLP Eval/Re-eval  ST Eval Oral Pharyng Swallow - 05914  -CH      [id number] Eval Oral Pharyng Swallow Minutes 38  -CH         Total Minutes    Untimed Charges Total Minutes 38  -CH       Total Minutes 38  -CH                User Key  (r) = Recorded By, (t) = Taken By, (c) = Cosigned By      Initials Name Provider Type    LEN Brower  MS Irasema CCC-SLP Speech and Language Pathologist                    Therapy Charges for Today       Code Description Service Date Service Provider Modifiers Qty    [id number]  ST EVAL ORAL PHARYNG SWALLOW 3 2/27/2024 Irasema Brower MS CCC-SLP GN 1                 Irasema Brower MS CCC-SLP  2/27/2024

## 2024-02-27 NOTE — PROGRESS NOTES
[facility] Medicine Services  PROGRESS NOTE    Patient Name: Too Tai  : 1955  MRN: [id number]    Date of Admission: 2024  Primary Care Physician: Des Geller MD    Subjective   Subjective     CC:  Falls, advanced dementia f/u    HPI:  Trial of out of restraints today, but tried to get out of bed and aggression toward staff within ~30 minutes unfortunately requiring repeat restraint placement      Objective   Objective     Vital Signs:   Temp:  [96.8 °F (36 °C)-98.2 °F (36.8 °C)] 98.2 °F (36.8 °C)  Heart Rate:  [78-90] 90  Resp:  [16-18] 18  BP: (150-192)/(82-95) 188/94     Physical Exam:  Constitutional: Frail appearing male lying in bed in bilateral arm restraints  HENT: NCAT, mucous membranes moist  Respiratory: Clear to auscultation bilaterally, respiratory effort normal on room air  Cardiovascular: RRR, no murmurs, rubs, or gallops  Gastrointestinal: Soft, nontender, nondistended  Psychiatric: Calm on initial exam (see above for change)  Neurologic: Awake, not oriented at all even in simple conversation, non--focal exam, no muscle jerks on my exam (but ongoing at times per nursing)  Skin: No rashes    Results Reviewed:  LAB RESULTS:      Lab 24  0259 24  2221   WBC 6.66 7.89   HEMOGLOBIN 14.3 15.4   HEMATOCRIT 41.0 45.6   PLATELETS 231 250   NEUTROS ABS 2.82 2.88   IMMATURE GRANS (ABS) 0.02 0.02   LYMPHS ABS 2.30 3.16*   MONOS ABS 0.60 0.82   EOS ABS 0.85* 0.92*   MCV 93.0 93.4   LACTATE  --  2.0         Lab 24  0646 24  0409 24  0259 24  0035 24  2221   SODIUM 140 140 140  --  141   POTASSIUM 4.1 3.6 3.2*  --  3.7   CHLORIDE 103 103 103  --  101   CO2 22.0 29.0 26.0  --  28.0   ANION GAP 15.0 8.0 11.0  --  12.0   BUN 9 8 12  --  12   CREATININE 0.79 0.83 0.77  --  0.87   EGFR 97.4 95.9 98.1  --  94.6   GLUCOSE 172* 161* 288*  --  248*   CALCIUM 9.1 9.0 8.6  --  9.1   MAGNESIUM 1.8 1.7 1.3*  --  1.6   PHOSPHORUS  --    --  2.3*  --   --    HEMOGLOBIN A1C  --   --  9.20*  --   --    TSH  --   --   --  4.050  --          Lab 02/27/24  0646 02/24/24  2221   TOTAL PROTEIN 6.9 7.4   ALBUMIN 4.1 4.3   GLOBULIN  --  3.1   ALT (SGPT) 26 20   AST (SGOT) 28 19   BILIRUBIN 1.3* 1.0   INDIRECT BILIRUBIN 0.9  --    BILIRUBIN DIRECT 0.4*  --    ALK PHOS 82 73         Lab 02/25/24  0035 02/24/24  2221   HSTROP T 45* 55*             Lab 02/24/24  2100   FOLATE 10.20   VITAMIN B 12 223         Brief Urine Lab Results  (Last result in the past 365 days)        Color   Clarity   Blood   Leuk Est   Nitrite   Protein   CREAT   Urine HCG        02/24/24 2354 Yellow   Clear   Negative   Negative   Negative   100 mg/dL (2+)                   Microbiology Results Abnormal       None            EEG    Result Date: 2/26/2024  Reason for referral: 68 y.o.male with altered mental status, jerking spells Technical Summary:  A 19 channel digital EEG was performed using the international 10-20 placement system, including eye leads and EKG leads. Duration: 23 minutes Findings: The patient is awake.  Diffuse medium amplitude 5 to 6 Hz theta is present symmetrically over both hemispheres.  A clear posterior rhythm is not present.  Occasional intermixed slower 4 Hz generalized delta is present.  Sleep is not seen.  Photic stimulation and hyperventilation are not performed.  No focal features or epileptiform activity are present. Video: Available Technical quality: Superior EKG: Regular, 60 bpm SUMMARY: Mild generalized slow No focal features or epileptiform activity are seen     Impression: Diffuse cerebral dysfunction of mild degree, nonspecific No evidence for epilepsy is seen on the study This report is transcribed using the Dragon dictation system.       Results for orders placed in visit on 07/30/20    SCANNED - ECHOCARDIOGRAM      Current medications:  Scheduled Meds:[Held by provider] cilostazol, 50 mg, Oral, BID  cyanocobalamin, 1,000 mcg, Intramuscular, Q28  Days  donepezil, 20 mg, Oral, Daily  enoxaparin, 1 mg/kg, Subcutaneous, BID  insulin detemir, 5 Units, Subcutaneous, Q12H  insulin lispro, 2-7 Units, Subcutaneous, 4x Daily AC & at Bedtime  [Held by provider] metoprolol succinate XL, 25 mg, Oral, Daily  potassium chloride, 40 mEq, Oral, Once  QUEtiapine, 100 mg, Oral, Q12H  senna-docusate sodium, 2 tablet, Oral, BID  temazepam, 15 mg, Oral, Nightly  Valproic Acid, 250 mg, Oral, Q8H      Continuous Infusions:   PRN Meds:.  acetaminophen **OR** acetaminophen **OR** acetaminophen    senna-docusate sodium **AND** polyethylene glycol **AND** bisacodyl **AND** bisacodyl    Calcium Replacement - Follow Nurse / BPA Driven Protocol    dextrose    dextrose    glucagon (human recombinant)    Magnesium Standard Dose Replacement - Follow Nurse / BPA Driven Protocol    nitroglycerin    ondansetron ODT **OR** ondansetron    Phosphorus Replacement - Follow Nurse / BPA Driven Protocol    Potassium Replacement - Follow Nurse / BPA Driven Protocol    ziprasidone    Assessment & Plan   Assessment & Plan     Active Hospital Problems    Diagnosis  POA    **Spasm of muscle [M62.838]  Yes    Myoclonic jerking [G25.3]  Yes    Dementia with behavioral disturbance [F03.918]  Unknown    Frequent falls [R29.6]  Not Applicable    History of DVT (deep vein thrombosis) [Z86.718]  Not Applicable    Chronic anticoagulation [Z79.01]  Not Applicable    Hypomagnesemia [E83.42]  Yes    Benign essential HTN [I10]  Yes    Type 2 diabetes mellitus with hyperglycemia, with long-term current use of insulin [E11.65, Z79.4]  Not Applicable    Benign prostatic hyperplasia [N40.0]  Yes    Coronary artery disease involving native coronary artery of native heart [I25.10]  Yes      Resolved Hospital Problems    Diagnosis Date Resolved POA    Type 2 diabetes mellitus, with long-term current use of insulin [E11.9, Z79.4] 02/25/2024 Not Applicable        Brief Hospital Course to date:  Too Tai is a 68 y.o. male  w severe dementia living at home who presents with repeated falls. Family concern for jerking spells which appear to be myoclonic jerks, possibly worse with rexulti  Stay c/b significant agitation with transition to hospital, currently requiring restraints     Severe dementia c/b behavioral disturbance  -significantly worse after hospitalization which is to be expected  -restraints for significant agitation and staff safety today again required despite trial off. D/w neurology who will evaluate again today - may be good depakote candidate given myoclonic jerk + agitation, appreciate their collaboration  -IM haldol prn  -home meds can be crushed: limited total med # (dc statin, etc) given condition    Frequent falls w increased myoclonus  -neurology consulted and considers it rexulti s/e, rexulti therefore stopped  -d/w wife: given her impaired mobility looking for SNF to long-term care options if affordable    Elevated BP  -only on home metoprolol 25 (held for bradycardia) and lisinopril 2.5 mg  -given this very low anti-HTN need, suspect current elevated pressures more effect of agitation  -risk>benefit of over-treating this HTN. Would not treat if elev unless correlated patient sx or calm with SBP >200    Hypokalemia and severe hypomagnesemia -improved after replacement, PO Mg supp given severity  DMII - insulin as able  HTN  CAD  H/o DVT on chronic anticoagulation - lovenox given challenges w po meds    Expected Discharge Location and Transportation: SNF to LTC  Expected Discharge 3/7 (Discharge date is tentative pending patient's medical condition and is subject to change)  Expected Discharge Date: 3/7/2024; Expected Discharge Time:      DVT prophylaxis:  Medical DVT prophylaxis orders are present.         AM-PAC 6 Clicks Score (PT): 19 (02/27/24 1775)    CODE STATUS:   Code Status and Medical Interventions:   Ordered at: 02/25/24 8586     Medical Intervention Limits:    NO intubation (DNI)     Code Status  (Patient has no pulse and is not breathing):    No CPR (Do Not Attempt to Resuscitate)     Medical Interventions (Patient has pulse or is breathing):    Limited Support       Courtney Davies MD  02/27/24

## 2024-02-27 NOTE — CASE MANAGEMENT/SOCIAL WORK
Continued Stay Note  [facility]     Patient Name: Too Tai  MRN: [id number]  Today's Date: 2/27/2024    Admit Date: 2/24/2024    Plan: SNF to LTC VS Memory Care private pay   Discharge Plan       Row Name 02/27/24 1505       Plan    Plan SNF to LTC VS Memory Care private pay    Patient/Family in Agreement with Plan yes    Plan Comments CM went by room to speech with wife. Wife was not at bedside. CM spoke with family member that was at bedside. CM given Roadmap to Recovery, with CM number on it for wife to call. Patient discharge plan is likey SNF to LTC VS memory care private pay. CM will follow.    Final Discharge Disposition Code 30 - still a patient                   Discharge Codes    No documentation.                 Expected Discharge Date and Time       Expected Discharge Date Expected Discharge Time    Mar 1, 2024               Kassy Alejandra RN

## 2024-02-28 LAB
GLUCOSE BLDC GLUCOMTR-MCNC: 212 MG/DL (ref 70–130)
GLUCOSE BLDC GLUCOMTR-MCNC: 241 MG/DL (ref 70–130)
GLUCOSE BLDC GLUCOMTR-MCNC: 258 MG/DL (ref 70–130)
GLUCOSE BLDC GLUCOMTR-MCNC: 261 MG/DL (ref 70–130)

## 2024-02-28 PROCEDURE — 99233 SBSQ HOSP IP/OBS HIGH 50: CPT

## 2024-02-28 PROCEDURE — 82948 REAGENT STRIP/BLOOD GLUCOSE: CPT

## 2024-02-28 PROCEDURE — 25010000002 ENOXAPARIN PER 10 MG: Performed by: INTERNAL MEDICINE

## 2024-02-28 PROCEDURE — 99232 SBSQ HOSP IP/OBS MODERATE 35: CPT | Performed by: INTERNAL MEDICINE

## 2024-02-28 PROCEDURE — 63710000001 INSULIN LISPRO (HUMAN) PER 5 UNITS: Performed by: INTERNAL MEDICINE

## 2024-02-28 PROCEDURE — 63710000001 INSULIN DETEMIR PER 5 UNITS: Performed by: INTERNAL MEDICINE

## 2024-02-28 RX ORDER — AMLODIPINE BESYLATE 5 MG/1
5 TABLET ORAL
Status: DISCONTINUED | OUTPATIENT
Start: 2024-02-28 | End: 2024-03-02

## 2024-02-28 RX ADMIN — INSULIN DETEMIR 7 UNITS: 100 INJECTION, SOLUTION SUBCUTANEOUS at 21:05

## 2024-02-28 RX ADMIN — AMLODIPINE BESYLATE 5 MG: 5 TABLET ORAL at 19:53

## 2024-02-28 RX ADMIN — INSULIN LISPRO 4 UNITS: 100 INJECTION, SOLUTION INTRAVENOUS; SUBCUTANEOUS at 13:43

## 2024-02-28 RX ADMIN — INSULIN LISPRO 3 UNITS: 100 INJECTION, SOLUTION INTRAVENOUS; SUBCUTANEOUS at 21:05

## 2024-02-28 RX ADMIN — QUETIAPINE FUMARATE 100 MG: 100 TABLET ORAL at 19:55

## 2024-02-28 RX ADMIN — TEMAZEPAM 15 MG: 15 CAPSULE ORAL at 19:52

## 2024-02-28 RX ADMIN — INSULIN LISPRO 3 UNITS: 100 INJECTION, SOLUTION INTRAVENOUS; SUBCUTANEOUS at 17:32

## 2024-02-28 RX ADMIN — ENOXAPARIN SODIUM 100 MG: 100 INJECTION SUBCUTANEOUS at 21:05

## 2024-02-28 RX ADMIN — VALPROIC ACID 250 MG: 250 SOLUTION ORAL at 14:47

## 2024-02-28 RX ADMIN — SENNOSIDES AND DOCUSATE SODIUM 2 TABLET: 8.6; 5 TABLET ORAL at 19:55

## 2024-02-28 RX ADMIN — INSULIN DETEMIR 5 UNITS: 100 INJECTION, SOLUTION SUBCUTANEOUS at 08:12

## 2024-02-28 RX ADMIN — VALPROIC ACID 250 MG: 250 SOLUTION ORAL at 06:07

## 2024-02-28 RX ADMIN — ENOXAPARIN SODIUM 100 MG: 100 INJECTION SUBCUTANEOUS at 08:12

## 2024-02-28 RX ADMIN — INSULIN LISPRO 4 UNITS: 100 INJECTION, SOLUTION INTRAVENOUS; SUBCUTANEOUS at 08:11

## 2024-02-28 RX ADMIN — VALPROIC ACID 250 MG: 250 SOLUTION ORAL at 21:05

## 2024-02-28 NOTE — PROGRESS NOTES
[facility]    Progress Note    Patient Name: Too Tai  : 1955  MRN: [id number]  Primary Care Physician:  Des Geller MD  Date of admission: 2024    Subjective     Chief Complaint: Dementia    History of Present Illness   Patient was seen resting in bed with spouse bedside.  Has been out of restraints for approximately 8 hours.  Patient rested well overnight according to nursing staff.  No side effects noted from Depakote.  Myoclonus seems to be overall improved.    Review of Systems   Difficult to assess due to patient's baseline mental status    Objective     Physical Exam  Vitals and nursing note reviewed.   Constitutional:       General: He is not in acute distress.     Appearance: He is not ill-appearing.   Eyes:      Extraocular Movements: Extraocular movements intact.      Pupils: Pupils are equal, round, and reactive to light.      Comments: No nystagmus or deviated gaze noted   Neurological:      Mental Status: He is alert. Mental status is at baseline.      Cranial Nerves: Cranial nerves 2-12 are intact.      Sensory: Sensation is intact.      Motor: Tremor present. No weakness or seizure activity.      Comments:     Cranial Nerves   CN II: Pupils are equal, round, and reactive to light. Normal visual acuity and visual fields.    CN III IV VI: Extraocular movements are full without nystagmus.  CN V: Normal  strength of muscles of mastication.  CN VII: Facial movements are symmetric. No weakness.  CN VIII:  Auditory acuity is normal.  CN IX & X:  Symmetric palatal movement.  CN XII: The tongue is midline.  No atrophy or fasciculations.            Vitals:   Temp:  [97.3 °F (36.3 °C)-98.2 °F (36.8 °C)] 98.1 °F (36.7 °C)  Heart Rate:  [71-99] 90  Resp:  [18] 18  BP: (152-188)/(76-98) 180/88    Current Medications    Current Facility-Administered Medications:     acetaminophen (TYLENOL) tablet 650 mg, 650 mg, Oral, Q4H PRN **OR** acetaminophen (TYLENOL) 160 MG/5ML oral  solution 650 mg, 650 mg, Oral, Q4H PRN **OR** acetaminophen (TYLENOL) suppository 650 mg, 650 mg, Rectal, Q4H PRN, Jamel Randhawa III, DO    sennosides-docusate (PERICOLACE) 8.6-50 MG per tablet 2 tablet, 2 tablet, Oral, BID, 2 tablet at 02/27/24 0828 **AND** polyethylene glycol (MIRALAX) packet 17 g, 17 g, Oral, Daily PRN **AND** bisacodyl (DULCOLAX) EC tablet 5 mg, 5 mg, Oral, Daily PRN **AND** bisacodyl (DULCOLAX) suppository 10 mg, 10 mg, Rectal, Daily PRN, Jamel Randhawa III, DO    Calcium Replacement - Follow Nurse / BPA Driven Protocol, , Does not apply, PRN, Jamel Randhawa III, DO    [Held by provider] cilostazol (PLETAL) tablet 50 mg, 50 mg, Oral, BID, Jamel Randhawa III, DO    cyanocobalamin injection 1,000 mcg, 1,000 mcg, Intramuscular, Q28 Days, Meliza Kahn, APRN, 1,000 mcg at 02/27/24 1550    dextrose (D50W) (25 g/50 mL) IV injection 25 g, 25 g, Intravenous, Q15 Min PRN, Jamel Randhawa III, DO    dextrose (GLUTOSE) oral gel 15 g, 15 g, Oral, Q15 Min PRN, Jamel Randhawa III, DO    donepezil (ARICEPT) tablet 20 mg, 20 mg, Oral, Daily, Milena Sharpe, APRN, 20 mg at 02/27/24 0828    Enoxaparin Sodium (LOVENOX) syringe 100 mg, 1 mg/kg, Subcutaneous, BID, Courtney Davies MD, 100 mg at 02/28/24 0812    glucagon (GLUCAGEN) injection 1 mg, 1 mg, Intramuscular, Q15 Min PRN, Jamel Randhawa III, DO    insulin detemir (LEVEMIR) injection 5 Units, 5 Units, Subcutaneous, Q12H, Courtney Davies MD, 5 Units at 02/28/24 0812    Insulin Lispro (humaLOG) injection 2-7 Units, 2-7 Units, Subcutaneous, 4x Daily AC & at Bedtime, Jamel Randhawa III, DO, 4 Units at 02/28/24 0811    Magnesium Standard Dose Replacement - Follow Nurse / BPA Driven Protocol, , Does not apply, PRN, Jamel Randhawa III,     [Held by provider] metoprolol succinate XL (TOPROL-XL) 24 hr tablet 25 mg, 25 mg, Oral, Daily, Milena Sharpe APRN    nitroglycerin (NITROSTAT) SL  tablet 0.4 mg, 0.4 mg, Sublingual, Q5 Min PRN, Jamel Randhawa III, DO    ondansetron ODT (ZOFRAN-ODT) disintegrating tablet 4 mg, 4 mg, Oral, Q6H PRN **OR** ondansetron (ZOFRAN) injection 4 mg, 4 mg, Intravenous, Q6H PRN, Jamel Randhawa III, DO    Phosphorus Replacement - Follow Nurse / BPA Driven Protocol, , Does not apply, PRN, Jamel Randhawa III, DO    potassium chloride (MICRO-K/KLOR-CON) CR capsule, 40 mEq, Oral, Once, Courtney Davies MD    Potassium Replacement - Follow Nurse / BPA Driven Protocol, , Does not apply, PRN, Jamel Randhawa III, DO    QUEtiapine (SEROquel) tablet 100 mg, 100 mg, Oral, Q12H, Courtney Davies MD, 100 mg at 02/27/24 2126    temazepam (RESTORIL) capsule 15 mg, 15 mg, Oral, Nightly, Darrell Jhaveri DO, 15 mg at 02/27/24 2126    Valproic Acid (DEPAKENE) syrup 250 mg, 250 mg, Oral, Q8H, Meliza Kahn APRN, 250 mg at 02/28/24 0607    ziprasidone (GEODON) injection 10 mg, 10 mg, Intramuscular, Q4H PRN, Meliza Kahn, APRN    Laboratory Results:   Lab Results   Component Value Date    GLUCOSE 172 (H) 02/27/2024    CALCIUM 9.1 02/27/2024     02/27/2024    K 4.1 02/27/2024    CO2 22.0 02/27/2024     02/27/2024    BUN 9 02/27/2024    CREATININE 0.79 02/27/2024    EGFRIFAFRI 102 02/21/2022    EGFRIFNONA 88 02/21/2022    BCR 11.4 02/27/2024    ANIONGAP 15.0 02/27/2024     Lab Results   Component Value Date    WBC 6.66 02/25/2024    HGB 14.3 02/25/2024    HCT 41.0 02/25/2024    MCV 93.0 02/25/2024     02/25/2024     Lab Results   Component Value Date    CHOL 220 (H) 01/08/2024    CHOL 120 08/01/2019    CHOL 117 04/15/2019     Lab Results   Component Value Date    HDL 41 01/08/2024    HDL 37 (L) 08/03/2023    HDL 41 04/03/2023     Lab Results   Component Value Date     (H) 01/08/2024     (H) 08/03/2023     (H) 04/03/2023     Lab Results   Component Value Date    TRIG 108 01/08/2024    TRIG 208 (H) 08/03/2023    TRIG 138  04/03/2023     Lab Results   Component Value Date    HGBA1C 9.20 (H) 02/25/2024     Lab Results   Component Value Date    INR 1.1 11/25/2019    INR 1.1 09/10/2018    PROTIME 12.5 11/25/2019    PROTIME 13.3 09/10/2018     Lab Results   Component Value Date    FOLATE 10.20 02/24/2024     Lab Results   Component Value Date    RDCTCDMJ34 223 02/24/2024             Assessment / Plan   Brief Patient Summary:  Too Tai is a 68 y.o. male who has past medical history of severe dementia with behavioral disturbances who presented to [facility] ED after experiencing a fall with increased confusion.  Patient's family endorses episodes of full body jerking which were thought to be response of hyperactivity to startle.       Plan:   Advanced Alzheimer's disease with behavioral disturbances  Increase in myoclonus-improving  S/p fall  Concern Rexulti was increasing patient's myoclonus.  Continue to hold.  Continue Aricept 20 mg daily  Continue Seroquel 100 mg twice daily  QTc 486  Continue Geodon as needed for agitation; no required doses  Vitamin B12 223; IM cyanocobalamin 1000 mcg monthly  Continue Depakote 250 mg 3 times daily  General neurology will continue to follow    I have discussed the above with the patient, bedside RN Dr. Davies  Time spent with patient: 50 minutes in face-to-face evaluation and management of the patient.      IVONNE Santos

## 2024-02-28 NOTE — PROGRESS NOTES
[facility] Services  PROGRESS NOTE    Patient Name: Too Tai  : 1955  MRN: [id number]    Date of Admission: 2024  Primary Care Physician: Des Geller MD    Subjective   Subjective     CC:  Falls, advanced dementia f/u    HPI:  Slept well overnight with temazepam, groggy in morning. Wife discouraged by his grogginess this morning and his overall course  Out of restraints throughout day without issue      Objective   Objective     Vital Signs:   Temp:  [97.3 °F (36.3 °C)-99.3 °F (37.4 °C)] 99.3 °F (37.4 °C)  Heart Rate:  [71-99] 90  Resp:  [18] 18  BP: (156-180)/(76-98) 180/86     Physical Exam:  Constitutional: Frail appearing male lying in bed under blanket  HENT: NCAT, mucous membranes moist  Respiratory: Clear to auscultation bilaterally, respiratory effort normal on room air  Cardiovascular: RRR, no murmurs, rubs, or gallops  Gastrointestinal: Soft, nontender, nondistended  Psychiatric: Calm on initial exam  Neurologic: Awake, not oriented at all even in simple conversation, able to say some some words, non-focal exam, no muscle jerks on my exam  Skin: No rashes    Results Reviewed:  LAB RESULTS:      Lab 24  0259 24  2221   WBC 6.66 7.89   HEMOGLOBIN 14.3 15.4   HEMATOCRIT 41.0 45.6   PLATELETS 231 250   NEUTROS ABS 2.82 2.88   IMMATURE GRANS (ABS) 0.02 0.02   LYMPHS ABS 2.30 3.16*   MONOS ABS 0.60 0.82   EOS ABS 0.85* 0.92*   MCV 93.0 93.4   LACTATE  --  2.0         Lab 24  0646 24  0409 24  0259 24  0035 24  2221   SODIUM 140 140 140  --  141   POTASSIUM 4.1 3.6 3.2*  --  3.7   CHLORIDE 103 103 103  --  101   CO2 22.0 29.0 26.0  --  28.0   ANION GAP 15.0 8.0 11.0  --  12.0   BUN 9 8 12  --  12   CREATININE 0.79 0.83 0.77  --  0.87   EGFR 97.4 95.9 98.1  --  94.6   GLUCOSE 172* 161* 288*  --  248*   CALCIUM 9.1 9.0 8.6  --  9.1   MAGNESIUM 1.8 1.7 1.3*  --  1.6   PHOSPHORUS  --   --  2.3*  --   --     HEMOGLOBIN A1C  --   --  9.20*  --   --    TSH  --   --   --  4.050  --          Lab 02/27/24  0646 02/24/24  2221   TOTAL PROTEIN 6.9 7.4   ALBUMIN 4.1 4.3   GLOBULIN  --  3.1   ALT (SGPT) 26 20   AST (SGOT) 28 19   BILIRUBIN 1.3* 1.0   INDIRECT BILIRUBIN 0.9  --    BILIRUBIN DIRECT 0.4*  --    ALK PHOS 82 73         Lab 02/25/24  0035 02/24/24  2221   HSTROP T 45* 55*             Lab 02/24/24  2100   FOLATE 10.20   VITAMIN B 12 223         Brief Urine Lab Results  (Last result in the past 365 days)        Color   Clarity   Blood   Leuk Est   Nitrite   Protein   CREAT   Urine HCG        02/24/24 2354 Yellow   Clear   Negative   Negative   Negative   100 mg/dL (2+)                   Microbiology Results Abnormal       None            No radiology results from the last 24 hrs    Results for orders placed in visit on 07/30/20    SCANNED - ECHOCARDIOGRAM      Current medications:  Scheduled Meds:amLODIPine, 5 mg, Oral, Q24H  [Held by provider] cilostazol, 50 mg, Oral, BID  cyanocobalamin, 1,000 mcg, Intramuscular, Q28 Days  donepezil, 20 mg, Oral, Daily  enoxaparin, 1 mg/kg, Subcutaneous, BID  insulin detemir, 7 Units, Subcutaneous, Q12H  insulin lispro, 2-7 Units, Subcutaneous, 4x Daily AC & at Bedtime  potassium chloride, 40 mEq, Oral, Once  QUEtiapine, 100 mg, Oral, Q12H  senna-docusate sodium, 2 tablet, Oral, BID  temazepam, 15 mg, Oral, Nightly  Valproic Acid, 250 mg, Oral, Q8H      Continuous Infusions:   PRN Meds:.  acetaminophen **OR** acetaminophen **OR** acetaminophen    senna-docusate sodium **AND** polyethylene glycol **AND** bisacodyl **AND** bisacodyl    Calcium Replacement - Follow Nurse / BPA Driven Protocol    dextrose    dextrose    glucagon (human recombinant)    Magnesium Standard Dose Replacement - Follow Nurse / BPA Driven Protocol    nitroglycerin    ondansetron ODT **OR** ondansetron    Phosphorus Replacement - Follow Nurse / BPA Driven Protocol    Potassium Replacement - Follow Nurse / BPA  Driven Protocol    ziprasidone    Assessment & Plan   Assessment & Plan     Active Hospital Problems    Diagnosis  POA    **Spasm of muscle [M62.838]  Yes    Myoclonic jerking [G25.3]  Yes    Dementia with behavioral disturbance [F03.918]  Unknown    Frequent falls [R29.6]  Not Applicable    History of DVT (deep vein thrombosis) [Z86.718]  Not Applicable    Chronic anticoagulation [Z79.01]  Not Applicable    Hypomagnesemia [E83.42]  Yes    Benign essential HTN [I10]  Yes    Type 2 diabetes mellitus with hyperglycemia, with long-term current use of insulin [E11.65, Z79.4]  Not Applicable    Benign prostatic hyperplasia [N40.0]  Yes    Coronary artery disease involving native coronary artery of native heart [I25.10]  Yes      Resolved Hospital Problems    Diagnosis Date Resolved POA    Type 2 diabetes mellitus, with long-term current use of insulin [E11.9, Z79.4] 02/25/2024 Not Applicable        Brief Hospital Course to date:  Too Tai is a 68 y.o. male w severe dementia living at home who presents with repeated falls. Family concern for jerking spells which appear to be myoclonic jerks, possibly worse with rexulti  Stay c/b significant agitation with transition to hospital, out of restraints 2/28 AM with help of depakote + seroquel     Severe dementia c/b behavioral disturbance  -significantly worse after hospitalization which is to be expected  -seroquel + valproic acid per neurology, daily EKG for Qtc check  -IM haldol prn (no IV in place)  -home meds can be crushed: limited total med # (dc statin, etc) given condition    Frequent falls w increased myoclonus  -neurology consulted and considers it rexulti s/e, rexulti therefore stopped  -d/w wife: given her impaired mobility looking for SNF to long-term care options if affordable    Elevated BP  -only on home metoprolol 25 (held for bradycardia) and lisinopril 2.5 mg w appropriate 's at home  -initially attributed to agitation but persistent w more calm  exam. Unclear significance of acute change  -start amlodipine 5 mg if can take PO. Would not treat w IV anti-hypertensives given risk of hypotension. Would not treat if elev unless correlated patient sx or calm with SBP >200    Hypokalemia and severe hypomagnesemia -improved after replacement, PO Mg supp given severity  DMII - insulin as able  HTN  CAD  PAD bilateral lower extremity  H/o DVT on chronic anticoagulation - lovenox given challenges w po meds. Previously on cilostazol, apixaban, aspirin --> narrowed to anticoagulant only which is probably plan w least harm for discharge    Expected Discharge Location and Transportation: SNF to LTC  Expected Discharge 3/7 (Discharge date is tentative pending patient's medical condition and is subject to change)  Expected Discharge Date: 3/7/2024; Expected Discharge Time:      DVT prophylaxis:  Medical DVT prophylaxis orders are present.         AM-PAC 6 Clicks Score (PT): 19 (02/27/24 1047)    CODE STATUS:   Code Status and Medical Interventions:   Ordered at: 02/25/24 0219     Medical Intervention Limits:    NO intubation (DNI)     Code Status (Patient has no pulse and is not breathing):    No CPR (Do Not Attempt to Resuscitate)     Medical Interventions (Patient has pulse or is breathing):    Limited Support       Courtney Davies MD  02/28/24

## 2024-02-28 NOTE — SIGNIFICANT NOTE
02/28/24 1611   SLP Deferred Reason   SLP Deferred Reason Patient unavailable for evaluation  (Pt not appropriate for evaluation this date. Will defer and f/u as pt mental status improves to participate.)

## 2024-02-28 NOTE — CASE MANAGEMENT/SOCIAL WORK
Continued Stay Note  [facility]     Patient Name: Too Tai  MRN: [id number]  Today's Date: 2/28/2024    Admit Date: 2/24/2024    Plan: SNF to LTC   Discharge Plan       Row Name 02/28/24 1547       Plan    Plan SNF to LTC    Patient/Family in Agreement with Plan yes    Plan Comments Spoke with wife today at bedside. Wife is interested in SNF to LTC. CM given map of facilities in [place]. Wife would like referral to [facility]. CM will start to make referrals when medically appropriate. CM will follow for any discharge needs.    Final Discharge Disposition Code 30 - still a patient                   Discharge Codes    No documentation.                 Expected Discharge Date and Time       Expected Discharge Date Expected Discharge Time    Mar 7, 2024               Kassy Alejandra RN

## 2024-02-29 LAB
ANION GAP SERPL CALCULATED.3IONS-SCNC: 13 MMOL/L (ref 5–15)
BUN SERPL-MCNC: 13 MG/DL (ref 8–23)
BUN/CREAT SERPL: 17.6 (ref 7–25)
CALCIUM SPEC-SCNC: 9 MG/DL (ref 8.6–10.5)
CHLORIDE SERPL-SCNC: 100 MMOL/L (ref 98–107)
CO2 SERPL-SCNC: 24 MMOL/L (ref 22–29)
CREAT SERPL-MCNC: 0.74 MG/DL (ref 0.76–1.27)
DEPRECATED RDW RBC AUTO: 41.3 FL (ref 37–54)
EGFRCR SERPLBLD CKD-EPI 2021: 99.3 ML/MIN/1.73
ERYTHROCYTE [DISTWIDTH] IN BLOOD BY AUTOMATED COUNT: 12.6 % (ref 12.3–15.4)
GLUCOSE BLDC GLUCOMTR-MCNC: 180 MG/DL (ref 70–130)
GLUCOSE BLDC GLUCOMTR-MCNC: 228 MG/DL (ref 70–130)
GLUCOSE BLDC GLUCOMTR-MCNC: 311 MG/DL (ref 70–130)
GLUCOSE BLDC GLUCOMTR-MCNC: 386 MG/DL (ref 70–130)
GLUCOSE SERPL-MCNC: 261 MG/DL (ref 65–99)
HCT VFR BLD AUTO: 47.3 % (ref 37.5–51)
HGB BLD-MCNC: 16.2 G/DL (ref 13–17.7)
MAGNESIUM SERPL-MCNC: 1.7 MG/DL (ref 1.6–2.4)
MCH RBC QN AUTO: 30.6 PG (ref 26.6–33)
MCHC RBC AUTO-ENTMCNC: 34.2 G/DL (ref 31.5–35.7)
MCV RBC AUTO: 89.4 FL (ref 79–97)
PLATELET # BLD AUTO: 287 10*3/MM3 (ref 140–450)
PMV BLD AUTO: 10.2 FL (ref 6–12)
POTASSIUM SERPL-SCNC: 4.1 MMOL/L (ref 3.5–5.2)
RBC # BLD AUTO: 5.29 10*6/MM3 (ref 4.14–5.8)
SODIUM SERPL-SCNC: 137 MMOL/L (ref 136–145)
WBC NRBC COR # BLD AUTO: 9.98 10*3/MM3 (ref 3.4–10.8)

## 2024-02-29 PROCEDURE — 97530 THERAPEUTIC ACTIVITIES: CPT

## 2024-02-29 PROCEDURE — 80048 BASIC METABOLIC PNL TOTAL CA: CPT | Performed by: INTERNAL MEDICINE

## 2024-02-29 PROCEDURE — 63710000001 INSULIN LISPRO (HUMAN) PER 5 UNITS: Performed by: INTERNAL MEDICINE

## 2024-02-29 PROCEDURE — 83735 ASSAY OF MAGNESIUM: CPT | Performed by: INTERNAL MEDICINE

## 2024-02-29 PROCEDURE — 82948 REAGENT STRIP/BLOOD GLUCOSE: CPT

## 2024-02-29 PROCEDURE — 25010000002 ENOXAPARIN PER 10 MG: Performed by: INTERNAL MEDICINE

## 2024-02-29 PROCEDURE — 99232 SBSQ HOSP IP/OBS MODERATE 35: CPT | Performed by: PEDIATRICS

## 2024-02-29 PROCEDURE — 63710000001 INSULIN DETEMIR PER 5 UNITS: Performed by: INTERNAL MEDICINE

## 2024-02-29 PROCEDURE — 92610 EVALUATE SWALLOWING FUNCTION: CPT

## 2024-02-29 PROCEDURE — 99232 SBSQ HOSP IP/OBS MODERATE 35: CPT

## 2024-02-29 PROCEDURE — 85027 COMPLETE CBC AUTOMATED: CPT

## 2024-02-29 RX ADMIN — QUETIAPINE FUMARATE 100 MG: 100 TABLET ORAL at 08:17

## 2024-02-29 RX ADMIN — INSULIN DETEMIR 7 UNITS: 100 INJECTION, SOLUTION SUBCUTANEOUS at 20:46

## 2024-02-29 RX ADMIN — VALPROIC ACID 250 MG: 250 SOLUTION ORAL at 21:02

## 2024-02-29 RX ADMIN — INSULIN DETEMIR 7 UNITS: 100 INJECTION, SOLUTION SUBCUTANEOUS at 08:17

## 2024-02-29 RX ADMIN — AMLODIPINE BESYLATE 5 MG: 5 TABLET ORAL at 08:16

## 2024-02-29 RX ADMIN — TEMAZEPAM 15 MG: 15 CAPSULE ORAL at 20:08

## 2024-02-29 RX ADMIN — INSULIN LISPRO 6 UNITS: 100 INJECTION, SOLUTION INTRAVENOUS; SUBCUTANEOUS at 10:53

## 2024-02-29 RX ADMIN — DONEPEZIL HYDROCHLORIDE 20 MG: 10 TABLET, FILM COATED ORAL at 08:16

## 2024-02-29 RX ADMIN — QUETIAPINE FUMARATE 100 MG: 100 TABLET ORAL at 20:08

## 2024-02-29 RX ADMIN — SENNOSIDES AND DOCUSATE SODIUM 2 TABLET: 8.6; 5 TABLET ORAL at 20:08

## 2024-02-29 RX ADMIN — VALPROIC ACID 250 MG: 250 SOLUTION ORAL at 13:00

## 2024-02-29 RX ADMIN — ENOXAPARIN SODIUM 100 MG: 100 INJECTION SUBCUTANEOUS at 21:03

## 2024-02-29 RX ADMIN — ENOXAPARIN SODIUM 100 MG: 100 INJECTION SUBCUTANEOUS at 08:16

## 2024-02-29 RX ADMIN — INSULIN LISPRO 3 UNITS: 100 INJECTION, SOLUTION INTRAVENOUS; SUBCUTANEOUS at 08:17

## 2024-02-29 RX ADMIN — INSULIN LISPRO 5 UNITS: 100 INJECTION, SOLUTION INTRAVENOUS; SUBCUTANEOUS at 16:40

## 2024-02-29 RX ADMIN — VALPROIC ACID 250 MG: 250 SOLUTION ORAL at 06:30

## 2024-02-29 RX ADMIN — INSULIN LISPRO 2 UNITS: 100 INJECTION, SOLUTION INTRAVENOUS; SUBCUTANEOUS at 20:46

## 2024-02-29 RX ADMIN — SENNOSIDES AND DOCUSATE SODIUM 2 TABLET: 8.6; 5 TABLET ORAL at 08:16

## 2024-02-29 NOTE — THERAPY TREATMENT NOTE
Patient Name: Too Tai  : 1955    MRN: [id number]                              Today's Date: 2024       Admit Date: 2024    Visit Dx:     ICD-10-CM ICD-9-CM   1. Dementia with behavioral disturbance  F03.918 294.21   2. Multiple falls  R29.6 V15.88   3. Generalized weakness  R53.1 780.79   4. Injury of head, initial encounter  S09.90XA 959.01   5. Bilateral pleural effusion  J90 511.9     Patient Active Problem List   Diagnosis    Cough    Benign essential HTN    Benign prostatic hyperplasia    Coronary artery disease involving native coronary artery of native heart    Chronic coronary artery disease    Type 2 diabetes mellitus with hyperglycemia, with long-term current use of insulin    Gout    Hyperlipidemia    History of heart attack    Peripheral neuropathy    Peripheral vascular disease    Spasm of muscle    Myoclonic jerking    Hypomagnesemia    Arteriosclerosis of coronary artery    Dementia with behavioral disturbance    Frequent falls    History of DVT (deep vein thrombosis)    Chronic anticoagulation     Past Medical History:   Diagnosis Date    Coronary artery disease     Dementia     Diabetes mellitus     Hyperlipidemia     Peripheral vascular disease      Past Surgical History:   Procedure Laterality Date    CORONARY ARTERY BYPASS GRAFT      FEMORAL ARTERY - POPLITEAL ARTERY BYPASS GRAFT        General Information       Row Name 24 1551          Physical Therapy Time and Intention    Document Type therapy note (daily note)  -ML     Mode of Treatment physical therapy  -ML       Row Name 24 1558          General Information    Patient Profile Reviewed yes  -ML     Existing Precautions/Restrictions fall;other (see comments)  hx advanced Alzheimer's; muscle spasms, easily agitated, hx frequent falls  -ML     Barriers to Rehab medically complex;previous functional deficit;cognitive status  -ML       Row Name 24 1249          Cognition    Orientation Status (Cognition)  oriented to;person;disoriented to;place;situation;time;other (see comments)  responds to name  -ML       Row Name 02/29/24 1551          Safety Issues, Functional Mobility    Safety Issues Affecting Function (Mobility) ability to follow commands;awareness of need for assistance;insight into deficits/self-awareness;judgment;problem-solving;safety precaution awareness;safety precautions follow-through/compliance;sequencing abilities  -ML     Impairments Affecting Function (Mobility) balance;cognition;coordination;endurance/activity tolerance;motor planning;strength  -ML     Cognitive Impairments, Mobility Safety/Performance awareness, need for assistance;insight into deficits/self-awareness;judgment;problem-solving/reasoning;safety precaution awareness;safety precaution follow-through;sequencing abilities  -ML               User Key  (r) = Recorded By, (t) = Taken By, (c) = Cosigned By      Initials Name Provider Type    ML Carole Lyles Physical Therapist                   Mobility       Row Name 02/29/24 1552          Bed Mobility    Bed Mobility supine-sit  -ML     Supine-Sit [place] (Bed Mobility) minimum assist (75% patient effort);1 person assist;verbal cues;nonverbal cues (demo/gesture)  -ML     Assistive Device (Bed Mobility) bed rails;head of bed elevated  -ML     Comment, (Bed Mobility) cuers to initiate supineto sit transfer  -ML       Row Name 02/29/24 2162          Bed-Chair Transfer    Bed-Chair [place] (Transfers) verbal cues;nonverbal cues (demo/gesture);moderate assist (50% patient effort);2 person assist  -ML     Assistive Device (Bed-Chair Transfers) other (see comments)  hand held assist  -ML     Comment, (Bed-Chair Transfer) patient able to take 2 short shuffled steps to transfer from EOB to chair, verbal cues for sequencing  -ML       Row Name 02/29/24 4692          Sit-Stand Transfer    Sit-Stand [place] (Transfers) minimum assist (75% patient effort);2 person assist;verbal  cues;nonverbal cues (demo/gesture)  -ML     Assistive Device (Sit-Stand Transfers) other (see comments)  hand held assist  -ML       Row Name 02/29/24 1552          Gait/Stairs (Locomotion)    [place] Level (Gait) unable to assess  -ML               User Key  (r) = Recorded By, (t) = Taken By, (c) = Cosigned By      Initials Name Provider Type    Carole Yee Physical Therapist                   Obj/Interventions       Row Name 02/29/24 8567          Balance    Balance Assessment sitting static balance;sit to stand dynamic balance;standing static balance;standing dynamic balance  -ML     Static Sitting Balance supervision  -ML     Position, Sitting Balance unsupported;sitting edge of bed  -ML     Sit to Stand Dynamic Balance verbal cues;non-verbal cues (demo/gesture);minimal assist;2-person assist  -ML     Static Standing Balance minimal assist;verbal cues;non-verbal cues (demo/gesture);2-person assist  -ML     Dynamic Standing Balance moderate assist;2-person assist;verbal cues;non-verbal cues (demo/gesture)  -ML     Position/Device Used, Standing Balance supported  -ML     Balance Interventions sitting;standing;sit to stand;supported;occupation based/functional task  -ML               User Key  (r) = Recorded By, (t) = Taken By, (c) = Cosigned By      Initials Name Provider Type    Carole Yee Physical Therapist                   Goals/Plan    No documentation.                  Clinical Impression       Row Name 02/29/24 1552          Pain    Pretreatment Pain Rating 0/10 - no pain  -ML     Posttreatment Pain Rating 0/10 - no pain  -ML       Row Name 02/29/24 4274          Plan of Care Review    Plan of Care Reviewed With patient;spouse  -ML     Progress no change  -ML     Outcome Evaluation Physical therapy treatment complete. The patient able to follow simple commands with cues to participate in PT. Patient required 2 person assist to complete bed to chair transfer. Patient continues to present below  baseline for mobility and would continue to benefit from skilled PT to address strength, balance and activity tolerance deficits. Continue current PT POC.  -ML       Row Name 02/29/24 1554          Vital Signs    Pre Patient Position Supine  -ML     Intra Patient Position Standing  -ML     Post Patient Position Sitting  -ML       Row Name 02/29/24 1554          Positioning and Restraints    Pre-Treatment Position in bed  -ML     Post Treatment Position chair  -ML     In Chair notified nsg;reclined;call light within reach;encouraged to call for assist;exit alarm on;waffle cushion;with family/caregiver;legs elevated;patient within staff view  -ML               User Key  (r) = Recorded By, (t) = Taken By, (c) = Cosigned By      Initials Name Provider Type    Carole Yee Physical Therapist                   Outcome Measures       Row Name 02/29/24 1556          How much help from another person do you currently need...    Turning from your back to your side while in flat bed without using bedrails? 3  -ML     Moving from lying on back to sitting on the side of a flat bed without bedrails? 3  -ML     Moving to and from a bed to a chair (including a wheelchair)? 3  -ML     Standing up from a chair using your arms (e.g., wheelchair, bedside chair)? 3  -ML     Climbing 3-5 steps with a railing? 2  -ML     To walk in hospital room? 2  -ML     AM-PAC 6 Clicks Score (PT) 16  -ML     Highest Level of Mobility Goal 5 --> Static standing  -ML       Row Name 02/29/24 1556          Functional Assessment    Outcome Measure Options AM-PAC 6 Clicks Basic Mobility (PT)  -ML               User Key  (r) = Recorded By, (t) = Taken By, (c) = Cosigned By      Initials Name Provider Type    Carole Yee Physical Therapist                                 Physical Therapy Education       Title: PT OT SLP Therapies (In Progress)       Topic: Physical Therapy (In Progress)       Point: Mobility training (Done)       Learning Progress  Summary             Patient Acceptance, E, VU,NR by ML at 2/29/2024 1556    Acceptance, E, VU,NR by ML at 2/27/2024 1048   Family Acceptance, E, VU,NR by ML at 2/29/2024 1556    Acceptance, E, VU,NR by ML at 2/27/2024 1048                         Point: Home exercise program (Not Started)       Learner Progress:  Not documented in this visit.              Point: Body mechanics (Done)       Learning Progress Summary             Patient Acceptance, E, VU,NR by ML at 2/29/2024 1556   Family Acceptance, E, VU,NR by ML at 2/29/2024 1556                         Point: Precautions (Done)       Learning Progress Summary             Patient Acceptance, E, VU,NR by ML at 2/29/2024 1556    Acceptance, E, VU,NR by ML at 2/27/2024 1048   Family Acceptance, E, VU,NR by ML at 2/29/2024 1556    Acceptance, E, VU,NR by ML at 2/27/2024 1048                                         User Key       Initials Effective Dates Name Provider Type Discipline     04/22/21 -  Carole Lyles Physical Therapist PT                  PT Recommendation and Plan  Planned Therapy Interventions (PT): balance training, bed mobility training, gait training, home exercise program, neuromuscular re-education, patient/family education, postural re-education, ROM (range of motion), strengthening, stretching, transfer training  Plan of Care Reviewed With: patient, spouse  Progress: no change  Outcome Evaluation: Physical therapy treatment complete. The patient able to follow simple commands with cues to participate in PT. Patient required 2 person assist to complete bed to chair transfer. Patient continues to present below baseline for mobility and would continue to benefit from skilled PT to address strength, balance and activity tolerance deficits. Continue current PT POC.     Time Calculation:   PT Evaluation Complexity  History, PT Evaluation Complexity: 1-2 personal factors and/or comorbidities  Examination of Body Systems (PT Eval Complexity): total of 3  or more elements  Clinical Presentation (PT Evaluation Complexity): evolving  Clinical Decision Making (PT Evaluation Complexity): moderate complexity  Overall Complexity (PT Evaluation Complexity): moderate complexity     PT Charges       Row Name 02/29/24 1557             Time Calculation    Start Time 1530  -ML      PT Received On 02/29/24  -ML         Timed Charges    62904 - PT Therapeutic Activity Minutes 18  -ML         Total Minutes    Timed Charges Total Minutes 18  -ML       Total Minutes 18  -ML                User Key  (r) = Recorded By, (t) = Taken By, (c) = Cosigned By      Initials Name Provider Type    Carole Yee Physical Therapist                  Therapy Charges for Today       Code Description Service Date Service Provider Modifiers Qty    64345619676 HC PT THERAPEUTIC ACT EA 15 MIN 2/29/2024 Carole Lyles GP 1            PT G-Codes  Outcome Measure Options: AM-PAC 6 Clicks Basic Mobility (PT)  AM-PAC 6 Clicks Score (PT): 16  AM-PAC 6 Clicks Score (OT): 6  PT Discharge Summary  Anticipated Discharge Disposition (PT): skilled nursing facility    Carole Lyles  2/29/2024

## 2024-02-29 NOTE — PROGRESS NOTES
[facility] Medicine Services  PROGRESS NOTE    Patient Name: Too Tai  : 1955  MRN: [id number]    Date of Admission: 2024  Primary Care Physician: Des Geller MD    Subjective   Subjective     CC:  Falls, advanced dementia f/u    HPI:  Per wife, had a more restless night.  She would like to see him more awake but not agitated or aggressive.  Able to be awake enough to eat breakfast and take his meds.  He was       Objective   Objective     Vital Signs:   Temp:  [97.9 °F (36.6 °C)-99.4 °F (37.4 °C)] 99.4 °F (37.4 °C)  Heart Rate:  [] 88  Resp:  [18] 18  BP: (108-181)/(68-95) 108/68     Physical Exam:  Constitutional: Frail appearing male lying in bed under blanket, awakens briefly during the exam  HENT: NCAT, mucous membranes moist  Respiratory: transmitted upper airway noise bilaterally, respiratory effort normal on room air  Cardiovascular: RRR, no murmurs, rubs, or gallops  Gastrointestinal: Soft, nontender, nondistended  Psychiatric: Calm on initial exam  Neurologic: sleeping. no muscle jerks on my exam  Skin: No rashes    Results Reviewed:  LAB RESULTS:      Lab 24  0852 24  0259 24  2221   WBC 9.98 6.66 7.89   HEMOGLOBIN 16.2 14.3 15.4   HEMATOCRIT 47.3 41.0 45.6   PLATELETS 287 231 250   NEUTROS ABS  --  2.82 2.88   IMMATURE GRANS (ABS)  --  0.02 0.02   LYMPHS ABS  --  2.30 3.16*   MONOS ABS  --  0.60 0.82   EOS ABS  --  0.85* 0.92*   MCV 89.4 93.0 93.4   LACTATE  --   --  2.0         Lab 24  0852 24  0646 24  0409 24  0259 24  0035 24  2221   SODIUM 137 140 140 140  --  141   POTASSIUM 4.1 4.1 3.6 3.2*  --  3.7   CHLORIDE 100 103 103 103  --  101   CO2 24.0 22.0 29.0 26.0  --  28.0   ANION GAP 13.0 15.0 8.0 11.0  --  12.0   BUN 13 9 8 12  --  12   CREATININE 0.74* 0.79 0.83 0.77  --  0.87   EGFR 99.3 97.4 95.9 98.1  --  94.6   GLUCOSE 261* 172* 161* 288*  --  248*   CALCIUM 9.0 9.1 9.0 8.6  --   9.1   MAGNESIUM 1.7 1.8 1.7 1.3*  --  1.6   PHOSPHORUS  --   --   --  2.3*  --   --    HEMOGLOBIN A1C  --   --   --  9.20*  --   --    TSH  --   --   --   --  4.050  --          Lab 02/27/24  0646 02/24/24  2221   TOTAL PROTEIN 6.9 7.4   ALBUMIN 4.1 4.3   GLOBULIN  --  3.1   ALT (SGPT) 26 20   AST (SGOT) 28 19   BILIRUBIN 1.3* 1.0   INDIRECT BILIRUBIN 0.9  --    BILIRUBIN DIRECT 0.4*  --    ALK PHOS 82 73         Lab 02/25/24  0035 02/24/24  2221   HSTROP T 45* 55*             Lab 02/24/24  2100   FOLATE 10.20   VITAMIN B 12 223         Brief Urine Lab Results  (Last result in the past 365 days)        Color   Clarity   Blood   Leuk Est   Nitrite   Protein   CREAT   Urine HCG        02/24/24 2354 Yellow   Clear   Negative   Negative   Negative   100 mg/dL (2+)                   Microbiology Results Abnormal       None            No radiology results from the last 24 hrs    Results for orders placed in visit on 07/30/20    SCANNED - ECHOCARDIOGRAM      Current medications:  Scheduled Meds:amLODIPine, 5 mg, Oral, Q24H  [Held by provider] cilostazol, 50 mg, Oral, BID  cyanocobalamin, 1,000 mcg, Intramuscular, Q28 Days  donepezil, 20 mg, Oral, Daily  enoxaparin, 1 mg/kg, Subcutaneous, BID  insulin detemir, 7 Units, Subcutaneous, Q12H  insulin lispro, 2-7 Units, Subcutaneous, 4x Daily AC & at Bedtime  potassium chloride, 40 mEq, Oral, Once  QUEtiapine, 100 mg, Oral, Q12H  senna-docusate sodium, 2 tablet, Oral, BID  temazepam, 15 mg, Oral, Nightly  Valproic Acid, 250 mg, Oral, Q8H      Continuous Infusions:   PRN Meds:.  acetaminophen **OR** acetaminophen **OR** acetaminophen    senna-docusate sodium **AND** polyethylene glycol **AND** bisacodyl **AND** bisacodyl    Calcium Replacement - Follow Nurse / BPA Driven Protocol    dextrose    dextrose    glucagon (human recombinant)    Magnesium Standard Dose Replacement - Follow Nurse / BPA Driven Protocol    nitroglycerin    ondansetron ODT **OR** ondansetron    Phosphorus  Replacement - Follow Nurse / BPA Driven Protocol    Potassium Replacement - Follow Nurse / BPA Driven Protocol    ziprasidone    Assessment & Plan   Assessment & Plan     Active Hospital Problems    Diagnosis  POA    **Spasm of muscle [M62.838]  Yes    Myoclonic jerking [G25.3]  Yes    Dementia with behavioral disturbance [F03.918]  Unknown    Frequent falls [R29.6]  Not Applicable    History of DVT (deep vein thrombosis) [Z86.718]  Not Applicable    Chronic anticoagulation [Z79.01]  Not Applicable    Hypomagnesemia [E83.42]  Yes    Benign essential HTN [I10]  Yes    Type 2 diabetes mellitus with hyperglycemia, with long-term current use of insulin [E11.65, Z79.4]  Not Applicable    Benign prostatic hyperplasia [N40.0]  Yes    Coronary artery disease involving native coronary artery of native heart [I25.10]  Yes      Resolved Hospital Problems    Diagnosis Date Resolved POA    Type 2 diabetes mellitus, with long-term current use of insulin [E11.9, Z79.4] 02/25/2024 Not Applicable        Brief Hospital Course to date:  Too Tai is a 68 y.o. male w severe dementia living at home who presents with repeated falls. Family concern for jerking spells which appear to be myoclonic jerks, possibly worse with rexulti  Stay c/b significant agitation with transition to hospital, out of restraints 2/28 AM with help of depakote + seroquel     Severe dementia c/b behavioral disturbance  -significantly worse after hospitalization which is to be expected  -seroquel + valproic acid per neurology, daily EKG for Qtc check  -IM haldol prn (no IV in place)  -home meds can be crushed: limited total med # (dc statin, etc) given condition  -will work with neurology to find the right amount of medications to keep his agitation to a minimum but not so sedated that he won't wake up.  She is concerned that therapy will not get him out of bed due to the sleepiness.    Frequent falls w increased myoclonus  -neurology consulted and considers  it rexulti s/e, rexulti therefore stopped  -d/w wife: given her impaired mobility looking for SNF to long-term care options if affordable    Elevated BP  -only on home metoprolol 25 (held for bradycardia) and lisinopril 2.5 mg w appropriate 's at home  -initially attributed to agitation but persistent w more calm exam. Unclear significance of acute change  -start amlodipine 5 mg if can take PO. Would not treat w IV anti-hypertensives given risk of hypotension. Would not treat if elev unless correlated patient sx or calm with SBP >200    Hypokalemia and severe hypomagnesemia -improved after replacement, PO Mg supp given severity  DMII - insulin as able  HTN  CAD  PAD bilateral lower extremity  H/o DVT on chronic anticoagulation - lovenox given challenges w po meds. Previously on cilostazol, apixaban, aspirin --> narrowed to anticoagulant only which is probably plan w least harm for discharge    Expected Discharge Location and Transportation: SNF to LTC  Expected Discharge 3/7 (Discharge date is tentative pending patient's medical condition and is subject to change)  Expected Discharge Date: 3/7/2024; Expected Discharge Time:      DVT prophylaxis:  Medical DVT prophylaxis orders are present.         AM-PAC 6 Clicks Score (PT): 19 (02/27/24 1047)    CODE STATUS:   Code Status and Medical Interventions:   Ordered at: 02/25/24 0219     Medical Intervention Limits:    NO intubation (DNI)     Code Status (Patient has no pulse and is not breathing):    No CPR (Do Not Attempt to Resuscitate)     Medical Interventions (Patient has pulse or is breathing):    Limited Support       Fannie Parker MD  02/29/24

## 2024-02-29 NOTE — PROGRESS NOTES
[facility] Neurology    Progress Note    Patient Name: Too Tai  : 1955  MRN: [id number]  Primary Care Physician:  Des Geller MD  Date of admission: 2024    Subjective     Chief Complaint: Advanced dementia    History of Present Illness   Patient was seen resting comfortably in bed.  He did awake easily.  Move all 4 extremities.  Was reluctant to take medications from nurses.  Per bedside RN patient rested well overnight.    Review of Systems  Unable to assess due to baseline mental status    Objective     Physical Exam  Vitals and nursing note reviewed.   Constitutional:       General: He is not in acute distress.     Appearance: He is not ill-appearing.   Eyes:      Extraocular Movements: Extraocular movements intact.      Pupils: Pupils are equal, round, and reactive to light.      Comments: No nystagmus or deviated gaze noted   Neurological:      Mental Status: He is alert. Mental status is at baseline. He is disoriented.      Cranial Nerves: No cranial nerve deficit or facial asymmetry.      Sensory: No sensory deficit.      Motor: No weakness, tremor or seizure activity.      Comments:     Cranial Nerves   CN II: Pupils are equal, round, and reactive to light. Normal visual acuity and visual fields.    CN III IV VI: Extraocular movements are full without nystagmus.  CN V: Normal  strength of muscles of mastication.  CN VII: Facial movements are symmetric. No weakness.  CN IX & X:  Symmetric palatal movement.  CN XII: The tongue is midline.  No atrophy or fasciculations.            Vitals:   Temp:  [97.9 °F (36.6 °C)-99.4 °F (37.4 °C)] 99.4 °F (37.4 °C)  Heart Rate:  [] 88  Resp:  [18] 18  BP: (108-181)/(68-95) 108/68    Current Medications    Current Facility-Administered Medications:     acetaminophen (TYLENOL) tablet 650 mg, 650 mg, Oral, Q4H PRN **OR** acetaminophen (TYLENOL) 160 MG/5ML oral solution 650 mg, 650 mg, Oral, Q4H PRN **OR** acetaminophen (TYLENOL)  suppository 650 mg, 650 mg, Rectal, Q4H PRN, Jamel Randhawa III, DO    amLODIPine (NORVASC) tablet 5 mg, 5 mg, Oral, Q24H, Courtney Davies MD, 5 mg at 02/29/24 0816    sennosides-docusate (PERICOLACE) 8.6-50 MG per tablet 2 tablet, 2 tablet, Oral, BID, 2 tablet at 02/29/24 0816 **AND** polyethylene glycol (MIRALAX) packet 17 g, 17 g, Oral, Daily PRN **AND** bisacodyl (DULCOLAX) EC tablet 5 mg, 5 mg, Oral, Daily PRN **AND** bisacodyl (DULCOLAX) suppository 10 mg, 10 mg, Rectal, Daily PRN, Jamel Randhawa III, DO    Calcium Replacement - Follow Nurse / BPA Driven Protocol, , Does not apply, PRN, Jamel Randhawa III, DO    [Held by provider] cilostazol (PLETAL) tablet 50 mg, 50 mg, Oral, BID, Jamel Randhawa III, DO    cyanocobalamin injection 1,000 mcg, 1,000 mcg, Intramuscular, Q28 Days, Meliza Kahn, APRN, 1,000 mcg at 02/27/24 1550    dextrose (D50W) (25 g/50 mL) IV injection 25 g, 25 g, Intravenous, Q15 Min PRN, Jamel Randhawa III, DO    dextrose (GLUTOSE) oral gel 15 g, 15 g, Oral, Q15 Min PRN, Jamel Randhawa III, DO    donepezil (ARICEPT) tablet 20 mg, 20 mg, Oral, Daily, Milena Sharpe, APRN, 20 mg at 02/29/24 0816    Enoxaparin Sodium (LOVENOX) syringe 100 mg, 1 mg/kg, Subcutaneous, BID, Courtney Davies MD, 100 mg at 02/29/24 0816    glucagon (GLUCAGEN) injection 1 mg, 1 mg, Intramuscular, Q15 Min PRN, Jamel Randhawa III, DO    insulin detemir (LEVEMIR) injection 7 Units, 7 Units, Subcutaneous, Q12H, Courtney Davies MD, 7 Units at 02/29/24 0817    Insulin Lispro (humaLOG) injection 2-7 Units, 2-7 Units, Subcutaneous, 4x Daily AC & at Bedtime, Jamel Randhawa III, DO, 6 Units at 02/29/24 1053    Magnesium Standard Dose Replacement - Follow Nurse / BPA Driven Protocol, , Does not apply, PRN, Jamel Randhawa III,     nitroglycerin (NITROSTAT) SL tablet 0.4 mg, 0.4 mg, Sublingual, Q5 Min PRN, Jamel Randhawa III, DO     ondansetron ODT (ZOFRAN-ODT) disintegrating tablet 4 mg, 4 mg, Oral, Q6H PRN **OR** ondansetron (ZOFRAN) injection 4 mg, 4 mg, Intravenous, Q6H PRN, Jamel Randhawa III, DO    Phosphorus Replacement - Follow Nurse / BPA Driven Protocol, , Does not apply, PRN, Jamel Randhawa III, DO    potassium chloride (MICRO-K/KLOR-CON) CR capsule, 40 mEq, Oral, Once, Courtney Davies MD    Potassium Replacement - Follow Nurse / BPA Driven Protocol, , Does not apply, PRN, Jamel Randhawa III, DO    QUEtiapine (SEROquel) tablet 100 mg, 100 mg, Oral, Q12H, Courtney Davies MD, 100 mg at 02/29/24 0817    temazepam (RESTORIL) capsule 15 mg, 15 mg, Oral, Nightly, Darrell Jhaveri DO, 15 mg at 02/28/24 1952    Valproic Acid (DEPAKENE) syrup 250 mg, 250 mg, Oral, Q8H, Meliza Kahn, APRN, 250 mg at 02/29/24 1300    ziprasidone (GEODON) injection 10 mg, 10 mg, Intramuscular, Q4H PRN, Meliza Kahn, APRN    Laboratory Results:   Lab Results   Component Value Date    GLUCOSE 261 (H) 02/29/2024    CALCIUM 9.0 02/29/2024     02/29/2024    K 4.1 02/29/2024    CO2 24.0 02/29/2024     02/29/2024    BUN 13 02/29/2024    CREATININE 0.74 (L) 02/29/2024    EGFRIFAFRI 102 02/21/2022    EGFRIFNONA 88 02/21/2022    BCR 17.6 02/29/2024    ANIONGAP 13.0 02/29/2024     Lab Results   Component Value Date    WBC 9.98 02/29/2024    HGB 16.2 02/29/2024    HCT 47.3 02/29/2024    MCV 89.4 02/29/2024     02/29/2024     Lab Results   Component Value Date    CHOL 220 (H) 01/08/2024    CHOL 120 08/01/2019    CHOL 117 04/15/2019     Lab Results   Component Value Date    HDL 41 01/08/2024    HDL 37 (L) 08/03/2023    HDL 41 04/03/2023     Lab Results   Component Value Date     (H) 01/08/2024     (H) 08/03/2023     (H) 04/03/2023     Lab Results   Component Value Date    TRIG 108 01/08/2024    TRIG 208 (H) 08/03/2023    TRIG 138 04/03/2023     Lab Results   Component Value Date    HGBA1C 9.20 (H)  02/25/2024     Lab Results   Component Value Date    INR 1.1 11/25/2019    INR 1.1 09/10/2018    PROTIME 12.5 11/25/2019    PROTIME 13.3 09/10/2018     Lab Results   Component Value Date    FOLATE 10.20 02/24/2024     Lab Results   Component Value Date    MTXCKIJS25 223 02/24/2024             Assessment / Plan   Brief Patient Summary:  Too Tai is a 68 y.o. male who has past medical history of severe dementia with behavioral disturbances who presented to [facility] ED after experiencing a fall with increased confusion.  Patient's family endorses episodes of full body jerking which were thought to be response of hyperactivity to startle.       Plan:   Advanced Alzheimer's disease with behavioral disturbances  Increase in myoclonus-resolved  S/p fall  Concern Rexulti was increasing patient's myoclonus.  Continue to hold.  Continue Aricept 20 mg daily  Continue Seroquel 100 mg twice daily  QTc 486  Continue Geodon as needed for agitation; no required doses  Vitamin B12 223; IM cyanocobalamin 1000 mcg monthly  Continue Depakote 250 mg 3 times daily  General neurology will continue to follow    I have discussed the above with the patient, bedside RN and Dr. Parker  Time spent with patient: 35 minutes in face-to-face evaluation and management of the patient.    Copied text in this note has been reviewed and is accurate as of 02/29/24.     IVONNE Santos

## 2024-02-29 NOTE — THERAPY RE-EVALUATION
Acute Care - Speech Language Pathology   Swallow Re-Evaluation [facility]  Clinical Swallow Evaluation       Patient Name: Too Tai  : 1955  MRN: [id number]  Today's Date: 2024               Admit Date: 2024    Visit Dx:     ICD-10-CM ICD-9-CM   1. Dementia with behavioral disturbance  F03.918 294.21   2. Multiple falls  R29.6 V15.88   3. Generalized weakness  R53.1 780.79   4. Injury of head, initial encounter  S09.90XA 959.01   5. Bilateral pleural effusion  J90 511.9     Patient Active Problem List   Diagnosis    Cough    Benign essential HTN    Benign prostatic hyperplasia    Coronary artery disease involving native coronary artery of native heart    Chronic coronary artery disease    Type 2 diabetes mellitus with hyperglycemia, with long-term current use of insulin    Gout    Hyperlipidemia    History of heart attack    Peripheral neuropathy    Peripheral vascular disease    Spasm of muscle    Myoclonic jerking    Hypomagnesemia    Arteriosclerosis of coronary artery    Dementia with behavioral disturbance    Frequent falls    History of DVT (deep vein thrombosis)    Chronic anticoagulation     Past Medical History:   Diagnosis Date    Coronary artery disease     Dementia     Diabetes mellitus     Hyperlipidemia     Peripheral vascular disease      Past Surgical History:   Procedure Laterality Date    CORONARY ARTERY BYPASS GRAFT      FEMORAL ARTERY - POPLITEAL ARTERY BYPASS GRAFT         SLP Recommendation and Plan  SLP Swallowing Diagnosis: unable/unwilling to cooperate, unable to assess (24)  SLP Diet Recommendation: other (see comments) (U/a to make diet recommendation given poor acceptance of PO trials) (24)     SLP Rec. for Method of Medication Administration: meds whole, meds crushed, with puree, as tolerated (24)     Monitor for Signs of Aspiration: yes, notify SLP if any concerns (24)  Recommended Diagnostics: reassess via clinical  swallow evaluation (02/29/24 0905)  Swallow Criteria for Skilled Therapeutic Interventions Met: demonstrates skilled criteria (02/29/24 0905)  Anticipated Discharge Disposition (SLP): skilled nursing facility, anticipate therapy at next level of care (02/29/24 0905)  Rehab Potential/Prognosis, Swallowing: re-evaluate goals as necessary (02/29/24 0905)     Predicted Duration Therapy Intervention (Days): until discharge (02/29/24 0905)  Oral Care Recommendations: Oral Care BID/PRN, Toothbrush (02/29/24 0905)                                               SWALLOW EVALUATION (last 72 hours)       SLP Adult Swallow Evaluation       Row Name 02/29/24 0905 02/27/24 1100                Rehab Evaluation    Document Type re-evaluation  - evaluation  -       Subjective Information no complaints  - no complaints  -       Patient Observations poorly cooperative  - alert;poorly cooperative  easily agitated  -       Patient/Family/Caregiver Comments/Observations Spouse present  - spouse present  -       Patient Effort poor  - poor  -       Symptoms Noted During/After Treatment none  - none  -          General Information    Patient Profile Reviewed yes  - yes  -       Pertinent History Of Current Problem See previous eval  - advanced dementia, difficulty swallowing meds (larger pills).  -       Current Method of Nutrition regular textures;thin liquids  - regular textures;thin liquids  -       Precautions/Limitations, Vision WFL;for purposes of eval  - WFL;for purposes of eval  -       Precautions/Limitations, Hearing WFL;for purposes of [facility] WFL;for purposes of eval  -       Prior Level of Function-Communication other (see comments)  Dementia per chart  - unknown;cognitive-linguistic impairment;other (see comments)  dementia  -       Prior Level of Function-Swallowing no diet consistency restrictions;other (see comments)  per spouse report  - no diet consistency restrictions  -CH        Plans/Goals Discussed with patient;spouse/S.O.  - patient;spouse/S.O.;agreed upon  [facility]       Barriers to Rehab cognitive status  - cognitive status  easily agitated  -       Patient's Goals for Discharge patient did not state  - patient could not state  -       Family Goals for Discharge family did not state  - family did not state  -          Pain    Additional Documentation Pain Scale: FACES Pre/Post-Treatment (Group)  - Pain Scale: FACES Pre/Post-Treatment (Group)  -          Pain Scale: FACES Pre/Post-Treatment    Pain: FACES Scale, Pretreatment 0-->no hurt  - 0-->no hurt  -       Posttreatment Pain Rating 0-->no hurt  - 0-->no hurt  -          Oral Motor Structure and Function    Dentition Assessment -- natural, present and adequate  -       Secretion Management WNL/WFL  - WNL/WFL  -       Mucosal Quality dry  - dry  -       Volitional Swallow -- WFL  -          Oral Musculature and Cranial Nerve Assessment    Oral Motor General Assessment other (see comments)  Pt did not follow OM commands  - unable to assess;other (see comments)  pt did not follow commands for oral motor assessment  -          General Eating/Swallowing Observations    Respiratory Support Currently in Use room air  - room air  -       Eating/Swallowing Skills fed by SLP;fed by staff/caregiver  - fed by SLP;fed by staff/caregiver  -       Positioning During Eating upright 90 degree;upright in bed  - upright 90 degree;upright in bed  -       Utensils Used spoon;straw  - spoon;straw  -       Consistencies Trialed thin liquids  - thin liquids  attempted thin by spoon/straw and pureed.  -       Pre SpO2 (%) -- 96  -CH       Post SpO2 (%) -- 97  -CH          Respiratory    Respiratory Status -- WFL;room air  -          Clinical Swallow Eval    Pharyngeal Phase no overt signs/symptoms of pharyngeal impairment  - --       Clinical Swallow Evaluation Summary Evaluation limited d/t  poor acceptance of PO trials/agitation. Pt accepted single sip of thin via straw & ~2 bites of ice cream, no s/s of aspiration. Pt began yelling out & become increasingly agitated when presented w/ further PO trials. U/a to make diet recommendation given limited assessment. SLP will f/u for re-eval  - Unable to fully assess as patient declined trials of ice, thin and pureed. Patient clamped lips shut and would not accept trials. Spouse attempted to present trials of thin liquids. patient continued to decline. Spouse feels that swallowing is likely functional, however, patient is not accepting of meds or food/liquids presented by staff at times. Patient accepted crackers with peanut butter for spouse earlier and she reported no issues at that time. Patient became agitated at SLP and spouse's attempts to present trials and yelled out. SLP to reattempt evaluation tomorrow as patient will participate.  -          SLP Evaluation Clinical Impression    SLP Swallowing Diagnosis unable/unwilling to cooperate;unable to assess  - unable/unwilling to cooperate;unable to assess  -       Functional Impact risk of aspiration/pneumonia;risk of malnutrition;risk of dehydration  - --       Rehab Potential/Prognosis, Swallowing re-evaluate goals as necessary  - re-evaluate goals as necessary  -       Swallow Criteria for Skilled Therapeutic Interventions Met demonstrates skilled criteria  - demonstrates skilled criteria  -          Recommendations    Therapy Frequency (Swallow) -- evaluation only  -       Predicted Duration Therapy Intervention (Days) until discharge  - --       SLP Diet Recommendation other (see comments)  U/a to make diet recommendation given poor acceptance of PO trials  - other (see comments)  unable to make a diet recommendation as patient would not accept attempts to provide trials, even when presented with his favorite diet mountain dew.  -       Recommended Diagnostics reassess via  clinical swallow evaluation  - reassess via clinical swallow evaluation  -       Recommended Precautions and Strategies -- other (see comments)  recommend attempt to present meds in pureed.  -       Oral Care Recommendations Oral Care BID/PRN;Toothbrush  - Oral Care BID/PRN;Toothbrush  -       SLP Rec. for Method of Medication Administration meds whole;meds crushed;with puree;as tolerated  - meds whole;with puree;as tolerated  -       Monitor for Signs of Aspiration yes;notify SLP if any concerns  - notify SLP if any concerns  -       Anticipated Discharge Disposition (SLP) skilled nursing facility;anticipate therapy at next level of care  - skilled nursing facility;anticipate therapy at next level of care  -                 User Key  (r) = Recorded By, (t) = Taken By, (c) = Cosigned By      Initials Name Effective Dates     Irasema Brower MS CCC-SLP 06/16/21 -      Nati Bautista MS CCC-SLP 05/12/23 -                     EDUCATION  The patient has been educated in the following areas:   Dysphagia (Swallowing Impairment).              Time Calculation:    Time Calculation- SLP       Row Name 02/29/24 1321             Time Calculation- [facility]    SLP Start Time 0905  -      SLP Received On 02/29/24  -         Untimed Charges    [id number] Eval Oral Pharyng Swallow Minutes 41  -         Total Minutes    Untimed Charges Total Minutes 41  -       Total Minutes 41  -                User Key  (r) = Recorded By, (t) = Taken By, (c) = Cosigned By      Initials Name Provider Type     Nati Bautista MS CCC-SLP Speech and Language Pathologist                    Therapy Charges for Today       Code Description Service Date Service Provider Modifiers Qty    [id number] HC ST EVAL ORAL PHARYNG SWALLOW 3 2/29/2024 Nati Bautista MS CCC-SLP GN 1                 Nati Bautista MS CCC-KACEY  2/29/2024

## 2024-03-01 LAB
GLUCOSE BLDC GLUCOMTR-MCNC: 190 MG/DL (ref 70–130)
GLUCOSE BLDC GLUCOMTR-MCNC: 191 MG/DL (ref 70–130)
GLUCOSE BLDC GLUCOMTR-MCNC: 196 MG/DL (ref 70–130)
GLUCOSE BLDC GLUCOMTR-MCNC: 325 MG/DL (ref 70–130)

## 2024-03-01 PROCEDURE — 99232 SBSQ HOSP IP/OBS MODERATE 35: CPT | Performed by: PEDIATRICS

## 2024-03-01 PROCEDURE — 99232 SBSQ HOSP IP/OBS MODERATE 35: CPT

## 2024-03-01 PROCEDURE — 63710000001 INSULIN LISPRO (HUMAN) PER 5 UNITS: Performed by: INTERNAL MEDICINE

## 2024-03-01 PROCEDURE — 25010000002 ENOXAPARIN PER 10 MG: Performed by: INTERNAL MEDICINE

## 2024-03-01 PROCEDURE — 63710000001 INSULIN DETEMIR PER 5 UNITS: Performed by: INTERNAL MEDICINE

## 2024-03-01 PROCEDURE — 92526 ORAL FUNCTION THERAPY: CPT

## 2024-03-01 PROCEDURE — 82948 REAGENT STRIP/BLOOD GLUCOSE: CPT

## 2024-03-01 RX ADMIN — VALPROIC ACID 250 MG: 250 SOLUTION ORAL at 21:46

## 2024-03-01 RX ADMIN — QUETIAPINE FUMARATE 100 MG: 100 TABLET ORAL at 08:35

## 2024-03-01 RX ADMIN — INSULIN LISPRO 5 UNITS: 100 INJECTION, SOLUTION INTRAVENOUS; SUBCUTANEOUS at 21:40

## 2024-03-01 RX ADMIN — VALPROIC ACID 250 MG: 250 SOLUTION ORAL at 05:03

## 2024-03-01 RX ADMIN — INSULIN LISPRO 2 UNITS: 100 INJECTION, SOLUTION INTRAVENOUS; SUBCUTANEOUS at 12:23

## 2024-03-01 RX ADMIN — DONEPEZIL HYDROCHLORIDE 20 MG: 10 TABLET, FILM COATED ORAL at 08:35

## 2024-03-01 RX ADMIN — ENOXAPARIN SODIUM 100 MG: 100 INJECTION SUBCUTANEOUS at 21:39

## 2024-03-01 RX ADMIN — INSULIN DETEMIR 7 UNITS: 100 INJECTION, SOLUTION SUBCUTANEOUS at 08:35

## 2024-03-01 RX ADMIN — INSULIN LISPRO 2 UNITS: 100 INJECTION, SOLUTION INTRAVENOUS; SUBCUTANEOUS at 08:36

## 2024-03-01 RX ADMIN — VALPROIC ACID 250 MG: 250 SOLUTION ORAL at 14:05

## 2024-03-01 RX ADMIN — INSULIN DETEMIR 7 UNITS: 100 INJECTION, SOLUTION SUBCUTANEOUS at 21:40

## 2024-03-01 RX ADMIN — AMLODIPINE BESYLATE 5 MG: 5 TABLET ORAL at 08:35

## 2024-03-01 RX ADMIN — TEMAZEPAM 15 MG: 15 CAPSULE ORAL at 21:43

## 2024-03-01 RX ADMIN — ENOXAPARIN SODIUM 100 MG: 100 INJECTION SUBCUTANEOUS at 08:34

## 2024-03-01 RX ADMIN — QUETIAPINE FUMARATE 100 MG: 100 TABLET ORAL at 21:43

## 2024-03-01 RX ADMIN — SENNOSIDES AND DOCUSATE SODIUM 2 TABLET: 8.6; 5 TABLET ORAL at 08:35

## 2024-03-01 RX ADMIN — INSULIN LISPRO 2 UNITS: 100 INJECTION, SOLUTION INTRAVENOUS; SUBCUTANEOUS at 16:31

## 2024-03-01 NOTE — CASE MANAGEMENT/SOCIAL WORK
Continued Stay Note  [facility]     Patient Name: Too Tai  MRN: [id number]  Today's Date: 3/1/2024    Admit Date: 2/24/2024    Plan: SNF   Discharge Plan       Row Name 03/01/24 1305       Plan    Plan SNF    Patient/Family in Agreement with Plan yes    Plan Comments Sp[facility] Edith[facility]. Spoke with wife at bedside. Wife is agreeable to bed offer. Insurance pre-crit started today. Will likely be here over the weekend. CM will follow for any discharge needs.    Final Discharge Disposition Code 03 - skilled nursing facility (SNF)                   Discharge Codes    No documentation.                 Expected Discharge Date and Time       Expected Discharge Date Expected Discharge Time    Mar 7, 2024               Kassy Alejandra RN

## 2024-03-01 NOTE — PROGRESS NOTES
[facility] Medicine Services  PROGRESS NOTE    Patient Name: Too Tai  : 1955  MRN: [id number]    Date of Admission: 2024  Primary Care Physician: Des Geller MD    Subjective   Subjective     CC:  Falls, advanced dementia f/u    HPI:  Overall no change overnight.  Wife has been attentive and been able to get some of his meds in with sherbet.  Remains out of restraints and overall is calm.  Was able to sit in a chair yesterday and tolerated it well.      Objective   Objective     Vital Signs:   Temp:  [97.2 °F (36.2 °C)-98.5 °F (36.9 °C)] 98 °F (36.7 °C)  Heart Rate:  [] 94  Resp:  [16-20] 16  BP: (144-163)/() 158/83     Physical Exam:  Constitutional: Frail appearing male lying in bed under blanket, eyes are open and he is talking somewhat.  HENT: NCAT, mucous membranes moist  Respiratory: transmitted upper airway noise bilaterally, respiratory effort normal on room air  Cardiovascular: RRR, no murmurs, rubs, or gallops  Gastrointestinal: Soft, nontender, nondistended  Psychiatric: Calm on initial exam  Neurologic: Awake, no muscle jerks on my exam  Skin: No rashes    Results Reviewed:  LAB RESULTS:      Lab 24  0852 24  0259 24  2221   WBC 9.98 6.66 7.89   HEMOGLOBIN 16.2 14.3 15.4   HEMATOCRIT 47.3 41.0 45.6   PLATELETS 287 231 250   NEUTROS ABS  --  2.82 2.88   IMMATURE GRANS (ABS)  --  0.02 0.02   LYMPHS ABS  --  2.30 3.16*   MONOS ABS  --  0.60 0.82   EOS ABS  --  0.85* 0.92*   MCV 89.4 93.0 93.4   LACTATE  --   --  2.0         Lab 24  0852 24  0646 24  0409 24  0259 24  0035 24  2221   SODIUM 137 140 140 140  --  141   POTASSIUM 4.1 4.1 3.6 3.2*  --  3.7   CHLORIDE 100 103 103 103  --  101   CO2 24.0 22.0 29.0 26.0  --  28.0   ANION GAP 13.0 15.0 8.0 11.0  --  12.0   BUN 13 9 8 12  --  12   CREATININE 0.74* 0.79 0.83 0.77  --  0.87   EGFR 99.3 97.4 95.9 98.1  --  94.6   GLUCOSE 261* 172*  161* 288*  --  248*   CALCIUM 9.0 9.1 9.0 8.6  --  9.1   MAGNESIUM 1.7 1.8 1.7 1.3*  --  1.6   PHOSPHORUS  --   --   --  2.3*  --   --    HEMOGLOBIN A1C  --   --   --  9.20*  --   --    TSH  --   --   --   --  4.050  --          Lab 02/27/24  0646 02/24/24  2221   TOTAL PROTEIN 6.9 7.4   ALBUMIN 4.1 4.3   GLOBULIN  --  3.1   ALT (SGPT) 26 20   AST (SGOT) 28 19   BILIRUBIN 1.3* 1.0   INDIRECT BILIRUBIN 0.9  --    BILIRUBIN DIRECT 0.4*  --    ALK PHOS 82 73         Lab 02/25/24  0035 02/24/24  2221   HSTROP T 45* 55*             Lab 02/24/24  2100   FOLATE 10.20   VITAMIN B 12 223         Brief Urine Lab Results  (Last result in the past 365 days)        Color   Clarity   Blood   Leuk Est   Nitrite   Protein   CREAT   Urine HCG        02/24/24 2354 Yellow   Clear   Negative   Negative   Negative   100 mg/dL (2+)                   Microbiology Results Abnormal       None            No radiology results from the last 24 hrs    Results for orders placed in visit on 07/30/20    SCANNED - ECHOCARDIOGRAM      Current medications:  Scheduled Meds:amLODIPine, 5 mg, Oral, Q24H  [Held by provider] cilostazol, 50 mg, Oral, BID  cyanocobalamin, 1,000 mcg, Intramuscular, Q28 Days  donepezil, 20 mg, Oral, Daily  enoxaparin, 1 mg/kg, Subcutaneous, BID  insulin detemir, 7 Units, Subcutaneous, Q12H  insulin lispro, 2-7 Units, Subcutaneous, 4x Daily AC & at Bedtime  potassium chloride, 40 mEq, Oral, Once  QUEtiapine, 100 mg, Oral, Q12H  senna-docusate sodium, 2 tablet, Oral, BID  temazepam, 15 mg, Oral, Nightly  Valproic Acid, 250 mg, Oral, Q8H      Continuous Infusions:   PRN Meds:.  acetaminophen **OR** acetaminophen **OR** acetaminophen    senna-docusate sodium **AND** polyethylene glycol **AND** bisacodyl **AND** bisacodyl    Calcium Replacement - Follow Nurse / BPA Driven Protocol    dextrose    dextrose    glucagon (human recombinant)    Magnesium Standard Dose Replacement - Follow Nurse / BPA Driven Protocol    nitroglycerin     ondansetron ODT **OR** ondansetron    Phosphorus Replacement - Follow Nurse / BPA Driven Protocol    Potassium Replacement - Follow Nurse / BPA Driven Protocol    ziprasidone    Assessment & Plan   Assessment & Plan     Active Hospital Problems    Diagnosis  POA    **Spasm of muscle [M62.838]  Yes    Myoclonic jerking [G25.3]  Yes    Dementia with behavioral disturbance [F03.918]  Unknown    Frequent falls [R29.6]  Not Applicable    History of DVT (deep vein thrombosis) [Z86.718]  Not Applicable    Chronic anticoagulation [Z79.01]  Not Applicable    Hypomagnesemia [E83.42]  Yes    Benign essential HTN [I10]  Yes    Type 2 diabetes mellitus with hyperglycemia, with long-term current use of insulin [E11.65, Z79.4]  Not Applicable    Benign prostatic hyperplasia [N40.0]  Yes    Coronary artery disease involving native coronary artery of native heart [I25.10]  Yes      Resolved Hospital Problems    Diagnosis Date Resolved POA    Type 2 diabetes mellitus, with long-term current use of insulin [E11.9, Z79.4] 02/25/2024 Not Applicable        Brief Hospital Course to date:  Too Tai is a 68 y.o. male w severe dementia living at home who presents with repeated falls. Family concern for jerking spells which appear to be myoclonic jerks, possibly worse with rexulti  Stay c/b significant agitation with transition to hospital, out of restraints 2/28 AM with help of depakote + seroquel     Severe dementia c/b behavioral disturbance  -significantly worse after hospitalization which is to be expected  -seroquel + valproic acid per neurology, daily EKG for Qtc check  -IM haldol prn (no IV in place)  -home meds can be crushed: limited total med # (dc statin, etc) given condition  -Current regiment seems to be helping with his behavioral disturbances    Frequent falls w increased myoclonus  -neurology consulted and considers it rexulti s/e, rexulti therefore stopped  -d/w wife: given her impaired mobility looking for SNF to  long-term care options if affordable    Elevated BP  -only on home metoprolol 25 (held for bradycardia) and lisinopril 2.5 mg w appropriate 's at home  -Continue amlodipine 5 mg if can take PO. Would not treat w IV anti-hypertensives given risk of hypotension. Would not treat if elev unless correlated patient sx or calm with SBP >200    Hypokalemia and severe hypomagnesemia -improved after replacement, PO Mg supp given severity  DMII - insulin as able  HTN  CAD  PAD bilateral lower extremity  H/o DVT on chronic anticoagulation - lovenox given challenges w po meds. Previously on cilostazol, apixaban, aspirin --> narrowed to anticoagulant only which is probably plan w least harm for discharge    Expected Discharge Location and Transportation: SNF to LTC  Expected Discharge 3/7 (Discharge date is tentative pending patient's medical condition and is subject to change)  Expected Discharge Date: 3/7/2024; Expected Discharge Time:      DVT prophylaxis:  Medical DVT prophylaxis orders are present.     Copied text in this note has been reviewed and is accurate as of 03/01/24.       AM-PAC 6 Clicks Score (PT): 16 (02/29/24 5424)    CODE STATUS:   Code Status and Medical Interventions:   Ordered at: 02/25/24 0219     Medical Intervention Limits:    NO intubation (DNI)     Code Status (Patient has no pulse and is not breathing):    No CPR (Do Not Attempt to Resuscitate)     Medical Interventions (Patient has pulse or is breathing):    Limited Support       Fannie Parker MD  03/01/24

## 2024-03-01 NOTE — THERAPY RE-EVALUATION
Acute Care - Speech Language Pathology   Swallow Re-Assessment [facility]  Clinical Swallow Re-Evaluation       Patient Name: Too Tai  : 1955  MRN: [id number]  Today's Date: 3/1/2024               Admit Date: 2024    Visit Dx:     ICD-10-CM ICD-9-CM   1. Dementia with behavioral disturbance  F03.918 294.21   2. Multiple falls  R29.6 V15.88   3. Generalized weakness  R53.1 780.79   4. Injury of head, initial encounter  S09.90XA 959.01   5. Bilateral pleural effusion  J90 511.9   6. Dysphagia, unspecified type  R13.10 787.20     Patient Active Problem List   Diagnosis    Cough    Benign essential HTN    Benign prostatic hyperplasia    Coronary artery disease involving native coronary artery of native heart    Chronic coronary artery disease    Type 2 diabetes mellitus with hyperglycemia, with long-term current use of insulin    Gout    Hyperlipidemia    History of heart attack    Peripheral neuropathy    Peripheral vascular disease    Spasm of muscle    Myoclonic jerking    Hypomagnesemia    Arteriosclerosis of coronary artery    Dementia with behavioral disturbance    Frequent falls    History of DVT (deep vein thrombosis)    Chronic anticoagulation     Past Medical History:   Diagnosis Date    Coronary artery disease     Dementia     Diabetes mellitus     Hyperlipidemia     Peripheral vascular disease      Past Surgical History:   Procedure Laterality Date    CORONARY ARTERY BYPASS GRAFT      FEMORAL ARTERY - POPLITEAL ARTERY BYPASS GRAFT         SLP Recommendation and Plan  SLP Swallowing Diagnosis: unable to assess, unable/unwilling to cooperate, swallow WFL/no suspected pharyngeal impairment (24)  SLP Diet Recommendation: other (see comments) (continue current as able) (24)  Recommended Precautions and Strategies: upright posture during/after eating, general aspiration precautions, 1:1 supervision (24)  SLP Rec. for Method of Medication Administration: as  tolerated (03/01/24 0900)     Monitor for Signs of Aspiration: yes, notify SLP if any concerns (03/01/24 0900)  Recommended Diagnostics: No further SLP services recommended (03/01/24 0900)     Anticipated Discharge Disposition (SLP): No further SLP services warranted (03/01/24 0900)     Therapy Frequency (Swallow): evaluation only (03/01/24 0900)     Oral Care Recommendations: Oral Care BID/PRN, Toothbrush (03/01/24 0900)                                        Plan of Care Reviewed With: spouse  Progress: no change      SWALLOW EVALUATION (last 72 hours)       SLP Adult Swallow Evaluation       Row Name 03/01/24 0900 02/29/24 0905 02/27/24 1100             Rehab Evaluation    Document Type re-evaluation  - re-evaluation  - evaluation  -      Subjective Information no complaints  -RS no complaints  - no complaints  -      Patient Observations poorly cooperative  -RS poorly cooperative  - alert;poorly cooperative  easily agitated  -      Patient/Family/Caregiver Comments/Observations spouse present and attempting to feed pt breakfast  -RS Spouse present  - spouse present  -      Patient Effort poor  -RS poor  - poor  -CH      Symptoms Noted During/After Treatment none  -RS none  - none  -      Oral Care patient refused intervention  -RS -- --         General Information    Patient Profile Reviewed -- yes  - yes  -      Pertinent History Of Current Problem -- See previous eval  - advanced dementia, difficulty swallowing meds (larger pills).  -      Current Method of Nutrition -- regular textures;thin liquids  - regular textures;thin liquids  -      Precautions/Limitations, Vision -- WFL;for purposes of eval  - WFL;for purposes of eval  -      Precautions/Limitations, Hearing -- WFL;for purposes of al  - WFL;for purposes of eval  -      Prior Level of Function-Communication -- other (see comments)  Dementia per chart  - unknown;cognitive-linguistic impairment;other (see  comments)  dementia  -      Prior Level of Function-Swallowing -- no diet consistency restrictions;other (see comments)  per spouse report  - no diet consistency restrictions  -      Plans/Goals Discussed with -- patient;spouse/S.O.  - patient;spouse/S.O.;agreed upon  -      Barriers to Rehab -- cognitive status  - cognitive status  easily agitated  -      Patient's Goals for Discharge -- patient did not state  - patient could not state  -      Family Goals for Discharge -- family did not state  - family did not state  -         Pain    Additional Documentation Pain Scale: FACES Pre/Post-Treatment (Group)  - Pain Scale: FACES Pre/Post-Treatment (Group)  - Pain Scale: FACES Pre/Post-Treatment (Group)  -         Pain Scale: FACES Pre/Post-Treatment    Pain: FACES Scale, Pretreatment 0-->no hurt  -RS 0-->no hurt  -MH 0-->no hurt  -CH      Posttreatment Pain Rating 0-->no hurt  -RS 0-->no hurt  -MH 0-->no hurt  -CH         Oral Motor Structure and Function    Dentition Assessment -- -- natural, present and adequate  -      Secretion Management -- WNL/WFL  - WNL/WFL  -      Mucosal Quality -- dry  - dry  -      Volitional Swallow -- -- WFL  -         Oral Musculature and Cranial Nerve Assessment    Oral Motor General Assessment -- other (see comments)  Pt did not follow OM commands  - unable to assess;other (see comments)  pt did not follow commands for oral motor assessment  -         General Eating/Swallowing Observations    Respiratory Support Currently in Use -- room air  - room air  -      Eating/Swallowing Skills -- fed by SLP;fed by staff/caregiver  - fed by SLP;fed by staff/caregiver  -      Positioning During Eating -- upright 90 degree;upright in bed  - upright 90 degree;upright in bed  -      Utensils Used -- spoon;straw  - spoon;straw  -      Consistencies Trialed -- thin liquids  - thin liquids  attempted thin by spoon/straw and pureed.  -       Pre SpO2 (%) -- -- 96  -CH      Post SpO2 (%) -- -- 97  -CH         Respiratory    Respiratory Status -- -- WFL;room air  -         Clinical Swallow Eval    Pharyngeal Phase -- no overt signs/symptoms of pharyngeal impairment  - --      Clinical Swallow Evaluation Summary Pt's agitation continues to fluctuate. RN reports limited PO intake, but no overt coughing/choking with what he does consume. Wife is present at bedside, pt will only accept PO trials from her this morning. Agitation increases rapidly and pt swings at wife and SLP. No overt s/sx aspiration or distress though limited trials of thin, puree, and mixed. No pna or respiratory distress documented in MD notes, pt is currently on room air. At this time will allow pt to continue current diet with PO meds as able. Even if further pharyngeal assessment were indicated, feel very strongly that pt would not accept barium for MBS or tolerate scope placement for FEES. D/w wife who verbalized understanding and appreciation.  -RS Evaluation limited d/t poor acceptance of PO trials/agitation. Pt accepted single sip of thin via straw & ~2 bites of ice cream, no s/s of aspiration. Pt began yelling out & become increasingly agitated when presented w/ further PO trials. U/a to make diet recommendation given limited assessment. SLP will f/u for re-eval  - Unable to fully assess as patient declined trials of ice, thin and pureed. Patient clamped lips shut and would not accept trials. Spouse attempted to present trials of thin liquids. patient continued to decline. Spouse feels that swallowing is likely functional, however, patient is not accepting of meds or food/liquids presented by staff at times. Patient accepted crackers with peanut butter for spouse earlier and she reported no issues at that time. Patient became agitated at SLP and spouse's attempts to present trials and yelled out. SLP to reattempt evaluation tomorrow as patient will participate.  -          SLP Evaluation Clinical Impression    SLP Swallowing Diagnosis unable to assess;unable/unwilling to cooperate;swallow WFL/no suspected pharyngeal impairment  -RS unable/unwilling to cooperate;unable to assess  - unable/unwilling to cooperate;unable to assess  -      Functional Impact risk of aspiration/pneumonia;risk of malnutrition;risk of dehydration  -RS risk of aspiration/pneumonia;risk of malnutrition;risk of dehydration  - --      Rehab Potential/Prognosis, Swallowing -- re-evaluate goals as necessary  - re-evaluate goals as necessary  -      Swallow Criteria for Skilled Therapeutic Interventions Met -- demonstrates skilled criteria  - demonstrates skilled criteria  -         Recommendations    Therapy Frequency (Swallow) evaluation only  - -- evaluation only  -      Predicted Duration Therapy Intervention (Days) -- until discharge  - --      SLP Diet Recommendation other (see comments)  continue current as able  -RS other (see comments)  U/a to make diet recommendation given poor acceptance of PO trials  - other (see comments)  unable to make a diet recommendation as patient would not accept attempts to provide trials, even when presented with his favorite diet mountain dew.  -      Recommended Diagnostics No further SLP services recommended  -RS reassess via clinical swallow evaluation  - reassess via clinical swallow evaluation  -      Recommended Precautions and Strategies upright posture during/after eating;general aspiration precautions;1:1 supervision  - -- other (see comments)  recommend attempt to present meds in pureed.  -      Oral Care Recommendations Oral Care BID/PRN;Toothbrush  -RS Oral Care BID/PRN;Toothbrush  - Oral Care BID/PRN;Toothbrush  -      SLP Rec. for Method of Medication Administration as tolerated  -RS meds whole;meds crushed;with puree;as tolerated  - meds whole;with puree;as tolerated  -      Monitor for Signs of Aspiration yes;notify SLP if  any concerns  -RS yes;notify SLP if any concerns  - notify SLP if any concerns  -CH      Anticipated Discharge Disposition (SLP) No further SLP services warranted  -RS skilled nursing facility;anticipate therapy at next level of care  - skilled nursing facility;anticipate therapy at next level of care  -                User Key  (r) = Recorded By, (t) = Taken By, (c) = Cosigned By      Initials Name Effective Dates     Irasema Brower MS CCC-SLP 06/16/21 -      Nati Bautista MS CCC-SLP 05/12/23 -     Aleksandar Duval MS CCC-SLP 09/14/23 -                     EDUCATION  The patient has been educated in the following areas:   Dysphagia (Swallowing Impairment).              Time Calculation:    Time Calculation- SLP       Row Name 03/01/24 1002             Time Calculation- SLP    SLP Start Time 0900  -RS      SLP Received On 03/01/24  -RS         Untimed Charges    13542-QL Treatment Swallow Minutes 62  -RS         Total Minutes    Untimed Charges Total Minutes 62  -RS       Total Minutes 62  -RS                User Key  (r) = Recorded By, (t) = Taken By, (c) = Cosigned By      Initials Name Provider Type    RS Aleksandar Davies MS CCC-SLP Speech and Language Pathologist                    Therapy Charges for Today       Code Description Service Date Service Provider Modifiers Qty    [id number] HC ST TREATMENT SWALLOW 4 3/1/2024 Aleksandar Davies MS CCC-KACEY GN 1                 Aleksandar Davies MS CCC-KACEY  3/1/2024

## 2024-03-01 NOTE — PROGRESS NOTES
[facility] Neurology    Progress Note    Patient Name: oTo Tai  : 1955  MRN: [id number]  Primary Care Physician:  Des Geller MD  Date of admission: 2024    Subjective     Chief Complaint: Advanced Dementia     History of Present Illness   Patient seen resting comfortably in bed.  Patient responded to noxious stimuli, moves all extremities.  Per nurse he rested well overnight.  He also woke up to take meds and was calm.  Required no restraints.     Review of Systems   Unable to assess due to baseline mental status    Objective     Physical Exam  Vitals and nursing note reviewed.   Constitutional:       General: He is not in acute distress.     Appearance: He is not ill-appearing.   Eyes:      Extraocular Movements: Extraocular movements intact.      Pupils: Pupils are equal, round, and reactive to light.      Comments: No nystagmus or deviated gaze noted   Neurological:      Mental Status: He is lethargic.      Cranial Nerves: No facial asymmetry.      Sensory: No sensory deficit.      Motor: No weakness.      Deep Tendon Reflexes:      Reflex Scores:       Bicep reflexes are 1+ on the right side.       Patellar reflexes are 1+ on the right side and 1+ on the left side.     Comments:     Cranial Nerves   CN II: Pupils are equal, round, and reactive to light. Normal visual acuity and visual fields.    CN III IV VI: Extraocular movements are full without nystagmus.  CN VII: Facial movements are symmetric. No weakness.  CN VIII:  Auditory acuity is normal.            Vitals:   Temp:  [97.2 °F (36.2 °C)-98.5 °F (36.9 °C)] 98.5 °F (36.9 °C)  Heart Rate:  [] 95  Resp:  [18-20] 18  BP: (108-163)/() 163/123    Current Medications    Current Facility-Administered Medications:     acetaminophen (TYLENOL) tablet 650 mg, 650 mg, Oral, Q4H PRN **OR** acetaminophen (TYLENOL) 160 MG/5ML oral solution 650 mg, 650 mg, Oral, Q4H PRN **OR** acetaminophen (TYLENOL) suppository 650 mg, 650  mg, Rectal, Q4H PRN, Jamel Randhawa III, DO    amLODIPine (NORVASC) tablet 5 mg, 5 mg, Oral, Q24H, Courtney Davies MD, 5 mg at 03/01/24 0835    sennosides-docusate (PERICOLACE) 8.6-50 MG per tablet 2 tablet, 2 tablet, Oral, BID, 2 tablet at 03/01/24 0835 **AND** polyethylene glycol (MIRALAX) packet 17 g, 17 g, Oral, Daily PRN **AND** bisacodyl (DULCOLAX) EC tablet 5 mg, 5 mg, Oral, Daily PRN **AND** bisacodyl (DULCOLAX) suppository 10 mg, 10 mg, Rectal, Daily PRN, Jamel Randhawa III, DO    Calcium Replacement - Follow Nurse / BPA Driven Protocol, , Does not apply, PRN, Jamel Randhawa III, DO    [Held by provider] cilostazol (PLETAL) tablet 50 mg, 50 mg, Oral, BID, Jamel Randhawa III, DO    cyanocobalamin injection 1,000 mcg, 1,000 mcg, Intramuscular, Q28 Days, Meliza Kahn, APRN, 1,000 mcg at 02/27/24 1550    dextrose (D50W) (25 g/50 mL) IV injection 25 g, 25 g, Intravenous, Q15 Min PRN, Jamel Randhawa III, DO    dextrose (GLUTOSE) oral gel 15 g, 15 g, Oral, Q15 Min PRN, Jamel Randhawa III, DO    donepezil (ARICEPT) tablet 20 mg, 20 mg, Oral, Daily, Milena Sharpe, APRN, 20 mg at 03/01/24 0835    Enoxaparin Sodium (LOVENOX) syringe 100 mg, 1 mg/kg, Subcutaneous, BID, Courtney Davies MD, 100 mg at 03/01/24 0834    glucagon (GLUCAGEN) injection 1 mg, 1 mg, Intramuscular, Q15 Min PRN, Jamel Randhawa III, DO    insulin detemir (LEVEMIR) injection 7 Units, 7 Units, Subcutaneous, Q12H, Courtney Davies MD, 7 Units at 03/01/24 0835    Insulin Lispro (humaLOG) injection 2-7 Units, 2-7 Units, Subcutaneous, 4x Daily AC & at Bedtime, Jamel Randhawa III, DO, 2 Units at 03/01/24 0836    Magnesium Standard Dose Replacement - Follow Nurse / BPA Driven Protocol, , Does not apply, PRN, Jamel Randhawa III, DO    nitroglycerin (NITROSTAT) SL tablet 0.4 mg, 0.4 mg, Sublingual, Q5 Min PRN, Jamel Randhawa III, DO    ondansetron ODT (ZOFRAN-ODT)  disintegrating tablet 4 mg, 4 mg, Oral, Q6H PRN **OR** ondansetron (ZOFRAN) injection 4 mg, 4 mg, Intravenous, Q6H PRN, Jamel Randhawa III, DO    Phosphorus Replacement - Follow Nurse / BPA Driven Protocol, , Does not apply, PRN, Jamel Randhawa III, DO    potassium chloride (MICRO-K/KLOR-CON) CR capsule, 40 mEq, Oral, Once, Courtney Davies MD    Potassium Replacement - Follow Nurse / BPA Driven Protocol, , Does not apply, PRN, Jamel Randhawa III, DO    QUEtiapine (SEROquel) tablet 100 mg, 100 mg, Oral, Q12H, Courtney Davies MD, 100 mg at 03/01/24 0835    temazepam (RESTORIL) capsule 15 mg, 15 mg, Oral, Nightly, Darrell Jhaveri DO, 15 mg at 02/29/24 2008    Valproic Acid (DEPAKENE) syrup 250 mg, 250 mg, Oral, Q8H, Meliza Kahn, APRN, 250 mg at 03/01/24 0503    ziprasidone (GEODON) injection 10 mg, 10 mg, Intramuscular, Q4H PRN, Meliza Kahn, APRN    Laboratory Results:   Lab Results   Component Value Date    GLUCOSE 261 (H) 02/29/2024    CALCIUM 9.0 02/29/2024     02/29/2024    K 4.1 02/29/2024    CO2 24.0 02/29/2024     02/29/2024    BUN 13 02/29/2024    CREATININE 0.74 (L) 02/29/2024    EGFRIFAFRI 102 02/21/2022    EGFRIFNONA 88 02/21/2022    BCR 17.6 02/29/2024    ANIONGAP 13.0 02/29/2024     Lab Results   Component Value Date    WBC 9.98 02/29/2024    HGB 16.2 02/29/2024    HCT 47.3 02/29/2024    MCV 89.4 02/29/2024     02/29/2024     Lab Results   Component Value Date    CHOL 220 (H) 01/08/2024    CHOL 120 08/01/2019    CHOL 117 04/15/2019     Lab Results   Component Value Date    HDL 41 01/08/2024    HDL 37 (L) 08/03/2023    HDL 41 04/03/2023     Lab Results   Component Value Date     (H) 01/08/2024     (H) 08/03/2023     (H) 04/03/2023     Lab Results   Component Value Date    TRIG 108 01/08/2024    TRIG 208 (H) 08/03/2023    TRIG 138 04/03/2023     Lab Results   Component Value Date    HGBA1C 9.20 (H) 02/25/2024     Lab Results   Component  Value Date    INR 1.1 11/25/2019    INR 1.1 09/10/2018    PROTIME 12.5 11/25/2019    PROTIME 13.3 09/10/2018     Lab Results   Component Value Date    FOLATE 10.20 02/24/2024     Lab Results   Component Value Date    [id number] 223 02/24/2024             Assessment / Plan     Brief Patient Summary:  Too Tai is a 68 y.o. male who has past medical history of severe dementia with behavioral disturbances who presented to [facility] ED after experiencing a fall with increased confusion.  Patient's family endorses episodes of full body jerking which were thought to be response of hyperactivity to startle.       Plan:   Advanced Alzheimer's disease with behavioral disturbances  Increase in myoclonus-resolved  S/p fall  Concern Rexulti was increasing patient's myoclonus.  Continue to hold.  Continue Aricept 20 mg daily  Continue Seroquel 100 mg twice daily  QTc 486  Continue Geodon as needed for agitation; no required doses  Vitamin B12 223; IM cyanocobalamin 1000 mcg monthly  Continue Depakote 250 mg 3 times daily  General neurology will follow up on Sunday    I have discussed the above with the patient, bedside RN, Dr. Parker    Time spent with patient: 35 minutes in face-to-face evaluation and management of the patient.    Copied text in this note has been reviewed and is accurate as of 03/01/24.     IVONNE Santos

## 2024-03-01 NOTE — CONSULTS
"Diabetes Education    Patient Name:  Too Tai  YOB: 1955  MRN: [id number]  Admit Date:  2/24/2024        Total time spent reviewing chart, preparing education/materials, providing education at bedside, and coordinating care approx 15 minutes.    Consult for diabetes education received for A1c>7. Chart reviewed. Pt was seen at bedside today. Permission given for visit by pts son and wife. Noted per chart review pt taking insulin, januvia, and metformin prior to admission.     Pt is sleeping at time of encounter, wife on telephone w/ nursing facility. Pts son does not identify any specific educational needs at this time. Noted per Case Mgmt note, plan for SNF with transition to LTC at discharge. Provided pts son w/ [facility] \"Blood Glucose Goals\" and \"What is A1c\" handouts, which also include outpt ph#. Acknowledged that pts family currently working on coordinating future care, but encouraged pts son to contact our outpatient office if educational needs arise in the future, as we are support for them.      Thank you for this consult.       Electronically signed by:  Caron Nathan RN  03/01/24 13:35 EST  "

## 2024-03-02 LAB
ALBUMIN SERPL-MCNC: 4 G/DL (ref 3.5–5.2)
ALBUMIN/GLOB SERPL: 1.3 G/DL
ALP SERPL-CCNC: 93 U/L (ref 39–117)
ALT SERPL W P-5'-P-CCNC: 33 U/L (ref 1–41)
ANION GAP SERPL CALCULATED.3IONS-SCNC: 9 MMOL/L (ref 5–15)
AST SERPL-CCNC: 23 U/L (ref 1–40)
BASOPHILS # BLD AUTO: 0.09 10*3/MM3 (ref 0–0.2)
BASOPHILS NFR BLD AUTO: 1 % (ref 0–1.5)
BILIRUB SERPL-MCNC: 1.1 MG/DL (ref 0–1.2)
BUN SERPL-MCNC: 20 MG/DL (ref 8–23)
BUN/CREAT SERPL: 23.8 (ref 7–25)
CALCIUM SPEC-SCNC: 9.5 MG/DL (ref 8.6–10.5)
CHLORIDE SERPL-SCNC: 100 MMOL/L (ref 98–107)
CO2 SERPL-SCNC: 30 MMOL/L (ref 22–29)
CREAT SERPL-MCNC: 0.84 MG/DL (ref 0.76–1.27)
DEPRECATED RDW RBC AUTO: 42.4 FL (ref 37–54)
EGFRCR SERPLBLD CKD-EPI 2021: 95.6 ML/MIN/1.73
EOSINOPHIL # BLD AUTO: 0.97 10*3/MM3 (ref 0–0.4)
EOSINOPHIL NFR BLD AUTO: 11.1 % (ref 0.3–6.2)
ERYTHROCYTE [DISTWIDTH] IN BLOOD BY AUTOMATED COUNT: 12.6 % (ref 12.3–15.4)
GLOBULIN UR ELPH-MCNC: 3.2 GM/DL
GLUCOSE BLDC GLUCOMTR-MCNC: 169 MG/DL (ref 70–130)
GLUCOSE BLDC GLUCOMTR-MCNC: 185 MG/DL (ref 70–130)
GLUCOSE BLDC GLUCOMTR-MCNC: 199 MG/DL (ref 70–130)
GLUCOSE BLDC GLUCOMTR-MCNC: 208 MG/DL (ref 70–130)
GLUCOSE SERPL-MCNC: 198 MG/DL (ref 65–99)
HCT VFR BLD AUTO: 47.5 % (ref 37.5–51)
HGB BLD-MCNC: 15.9 G/DL (ref 13–17.7)
IMM GRANULOCYTES # BLD AUTO: 0.02 10*3/MM3 (ref 0–0.05)
IMM GRANULOCYTES NFR BLD AUTO: 0.2 % (ref 0–0.5)
LYMPHOCYTES # BLD AUTO: 2.7 10*3/MM3 (ref 0.7–3.1)
LYMPHOCYTES NFR BLD AUTO: 31 % (ref 19.6–45.3)
MAGNESIUM SERPL-MCNC: 2 MG/DL (ref 1.6–2.4)
MCH RBC QN AUTO: 30.5 PG (ref 26.6–33)
MCHC RBC AUTO-ENTMCNC: 33.5 G/DL (ref 31.5–35.7)
MCV RBC AUTO: 91.2 FL (ref 79–97)
MONOCYTES # BLD AUTO: 0.99 10*3/MM3 (ref 0.1–0.9)
MONOCYTES NFR BLD AUTO: 11.4 % (ref 5–12)
NEUTROPHILS NFR BLD AUTO: 3.95 10*3/MM3 (ref 1.7–7)
NEUTROPHILS NFR BLD AUTO: 45.3 % (ref 42.7–76)
NRBC BLD AUTO-RTO: 0 /100 WBC (ref 0–0.2)
PHOSPHATE SERPL-MCNC: 2.6 MG/DL (ref 2.5–4.5)
PLATELET # BLD AUTO: 311 10*3/MM3 (ref 140–450)
PMV BLD AUTO: 10.1 FL (ref 6–12)
POTASSIUM SERPL-SCNC: 3.6 MMOL/L (ref 3.5–5.2)
PROT SERPL-MCNC: 7.2 G/DL (ref 6–8.5)
RBC # BLD AUTO: 5.21 10*6/MM3 (ref 4.14–5.8)
SODIUM SERPL-SCNC: 139 MMOL/L (ref 136–145)
VALPROATE SERPL-MCNC: 51.8 MCG/ML (ref 50–125)
WBC NRBC COR # BLD AUTO: 8.72 10*3/MM3 (ref 3.4–10.8)

## 2024-03-02 PROCEDURE — 82948 REAGENT STRIP/BLOOD GLUCOSE: CPT

## 2024-03-02 PROCEDURE — 80164 ASSAY DIPROPYLACETIC ACD TOT: CPT | Performed by: PEDIATRICS

## 2024-03-02 PROCEDURE — 63710000001 INSULIN DETEMIR PER 5 UNITS: Performed by: INTERNAL MEDICINE

## 2024-03-02 PROCEDURE — 84100 ASSAY OF PHOSPHORUS: CPT | Performed by: PEDIATRICS

## 2024-03-02 PROCEDURE — 85025 COMPLETE CBC W/AUTO DIFF WBC: CPT | Performed by: PEDIATRICS

## 2024-03-02 PROCEDURE — 83735 ASSAY OF MAGNESIUM: CPT | Performed by: PEDIATRICS

## 2024-03-02 PROCEDURE — 99232 SBSQ HOSP IP/OBS MODERATE 35: CPT | Performed by: PEDIATRICS

## 2024-03-02 PROCEDURE — 80053 COMPREHEN METABOLIC PANEL: CPT | Performed by: PEDIATRICS

## 2024-03-02 PROCEDURE — 25010000002 ENOXAPARIN PER 10 MG: Performed by: INTERNAL MEDICINE

## 2024-03-02 PROCEDURE — 63710000001 INSULIN LISPRO (HUMAN) PER 5 UNITS: Performed by: INTERNAL MEDICINE

## 2024-03-02 RX ORDER — AMLODIPINE BESYLATE 10 MG/1
10 TABLET ORAL
Status: DISCONTINUED | OUTPATIENT
Start: 2024-03-03 | End: 2024-03-04

## 2024-03-02 RX ADMIN — INSULIN DETEMIR 7 UNITS: 100 INJECTION, SOLUTION SUBCUTANEOUS at 08:24

## 2024-03-02 RX ADMIN — QUETIAPINE FUMARATE 100 MG: 100 TABLET ORAL at 08:24

## 2024-03-02 RX ADMIN — INSULIN DETEMIR 7 UNITS: 100 INJECTION, SOLUTION SUBCUTANEOUS at 21:28

## 2024-03-02 RX ADMIN — VALPROIC ACID 250 MG: 250 SOLUTION ORAL at 06:12

## 2024-03-02 RX ADMIN — INSULIN LISPRO 3 UNITS: 100 INJECTION, SOLUTION INTRAVENOUS; SUBCUTANEOUS at 08:25

## 2024-03-02 RX ADMIN — INSULIN LISPRO 2 UNITS: 100 INJECTION, SOLUTION INTRAVENOUS; SUBCUTANEOUS at 12:38

## 2024-03-02 RX ADMIN — DONEPEZIL HYDROCHLORIDE 20 MG: 10 TABLET, FILM COATED ORAL at 08:25

## 2024-03-02 RX ADMIN — AMLODIPINE BESYLATE 5 MG: 5 TABLET ORAL at 08:25

## 2024-03-02 RX ADMIN — ENOXAPARIN SODIUM 100 MG: 100 INJECTION SUBCUTANEOUS at 21:27

## 2024-03-02 RX ADMIN — ENOXAPARIN SODIUM 100 MG: 100 INJECTION SUBCUTANEOUS at 08:24

## 2024-03-02 RX ADMIN — INSULIN LISPRO 2 UNITS: 100 INJECTION, SOLUTION INTRAVENOUS; SUBCUTANEOUS at 21:34

## 2024-03-02 NOTE — PROGRESS NOTES
[facility] Medicine Services  PROGRESS NOTE    Patient Name: Too Tai  : 1955  MRN: [id number]    Date of Admission: 2024  Primary Care Physician: Des Geller MD    Subjective   Subjective     CC:  Falls, advanced dementia f/u    HPI:  Overall no change overnight.  Able to get him a little yogurt with his meds.    Objective   Objective     Vital Signs:   Temp:  [98 °F (36.7 °C)-98.9 °F (37.2 °C)] 98.6 °F (37 °C)  Heart Rate:  [82-94] 92  Resp:  [16-18] 16  BP: (127-158)/(64-87) 149/77     Physical Exam:  Constitutional: Frail appearing male sitting in bed under blanket, eyes are open and he is talking somewhat.  HENT: NCAT, mucous membranes moist  Respiratory: transmitted upper airway noise bilaterally, respiratory effort normal on room air  Cardiovascular: RRR, no murmurs, rubs, or gallops  Gastrointestinal: Soft, nontender, nondistended  Psychiatric: Calm on exam, cooperative  Neurologic: Awake, no muscle jerks on my exam  Skin: No rashes    Results Reviewed:  LAB RESULTS:      Lab 24  0852 24  0259 24  2221   WBC 9.98 6.66 7.89   HEMOGLOBIN 16.2 14.3 15.4   HEMATOCRIT 47.3 41.0 45.6   PLATELETS 287 231 250   NEUTROS ABS  --  2.82 2.88   IMMATURE GRANS (ABS)  --  0.02 0.02   LYMPHS ABS  --  2.30 3.16*   MONOS ABS  --  0.60 0.82   EOS ABS  --  0.85* 0.92*   MCV 89.4 93.0 93.4   LACTATE  --   --  2.0         Lab 24  0852 24  0646 24  0409 24  0259 24  0035 24  2221   SODIUM 137 140 140 140  --  141   POTASSIUM 4.1 4.1 3.6 3.2*  --  3.7   CHLORIDE 100 103 103 103  --  101   CO2 24.0 22.0 29.0 26.0  --  28.0   ANION GAP 13.0 15.0 8.0 11.0  --  12.0   BUN 13 9 8 12  --  12   CREATININE 0.74* 0.79 0.83 0.77  --  0.87   EGFR 99.3 97.4 95.9 98.1  --  94.6   GLUCOSE 261* 172* 161* 288*  --  248*   CALCIUM 9.0 9.1 9.0 8.6  --  9.1   MAGNESIUM 1.7 1.8 1.7 1.3*  --  1.6   PHOSPHORUS  --   --   --  2.3*  --   --     HEMOGLOBIN A1C  --   --   --  9.20*  --   --    TSH  --   --   --   --  4.050  --          Lab 02/27/24  0646 02/24/24  2221   TOTAL PROTEIN 6.9 7.4   ALBUMIN 4.1 4.3   GLOBULIN  --  3.1   ALT (SGPT) 26 20   AST (SGOT) 28 19   BILIRUBIN 1.3* 1.0   INDIRECT BILIRUBIN 0.9  --    BILIRUBIN DIRECT 0.4*  --    ALK PHOS 82 73         Lab 02/25/24  0035 02/24/24  2221   HSTROP T 45* 55*             Lab 02/24/24  2100   FOLATE 10.20   VITAMIN B 12 223         Brief Urine Lab Results  (Last result in the past 365 days)        Color   Clarity   Blood   Leuk Est   Nitrite   Protein   CREAT   Urine HCG        02/24/24 2354 Yellow   Clear   Negative   Negative   Negative   100 mg/dL (2+)                   Microbiology Results Abnormal       None            No radiology results from the last 24 hrs    Results for orders placed in visit on 07/30/20    SCANNED - ECHOCARDIOGRAM      Current medications:  Scheduled Meds:amLODIPine, 5 mg, Oral, Q24H  cyanocobalamin, 1,000 mcg, Intramuscular, Q28 Days  donepezil, 20 mg, Oral, Daily  enoxaparin, 1 mg/kg, Subcutaneous, BID  insulin detemir, 7 Units, Subcutaneous, Q12H  insulin lispro, 2-7 Units, Subcutaneous, 4x Daily AC & at Bedtime  potassium chloride, 40 mEq, Oral, Once  QUEtiapine, 100 mg, Oral, Q12H  senna-docusate sodium, 2 tablet, Oral, BID  temazepam, 15 mg, Oral, Nightly  Valproic Acid, 250 mg, Oral, Q8H      Continuous Infusions:   PRN Meds:.  acetaminophen **OR** acetaminophen **OR** acetaminophen    senna-docusate sodium **AND** polyethylene glycol **AND** bisacodyl **AND** bisacodyl    Calcium Replacement - Follow Nurse / BPA Driven Protocol    dextrose    dextrose    glucagon (human recombinant)    Magnesium Standard Dose Replacement - Follow Nurse / BPA Driven Protocol    nitroglycerin    ondansetron ODT **OR** ondansetron    Phosphorus Replacement - Follow Nurse / BPA Driven Protocol    Potassium Replacement - Follow Nurse / BPA Driven Protocol     ziprasidone    Assessment & Plan   Assessment & Plan     Active Hospital Problems    Diagnosis  POA    **Spasm of muscle [M62.838]  Yes    Myoclonic jerking [G25.3]  Yes    Dementia with behavioral disturbance [F03.918]  Unknown    Frequent falls [R29.6]  Not Applicable    History of DVT (deep vein thrombosis) [Z86.718]  Not Applicable    Chronic anticoagulation [Z79.01]  Not Applicable    Hypomagnesemia [E83.42]  Yes    Benign essential HTN [I10]  Yes    Type 2 diabetes mellitus with hyperglycemia, with long-term current use of insulin [E11.65, Z79.4]  Not Applicable    Benign prostatic hyperplasia [N40.0]  Yes    Coronary artery disease involving native coronary artery of native heart [I25.10]  Yes      Resolved Hospital Problems    Diagnosis Date Resolved POA    Type 2 diabetes mellitus, with long-term current use of insulin [E11.9, Z79.4] 02/25/2024 Not Applicable        Brief Hospital Course to date:  Too Tai is a 68 y.o. male w severe dementia living at home who presents with repeated falls. Family concern for jerking spells which appear to be myoclonic jerks, possibly worse with rexulti  Stay c/b significant agitation with transition to hospital, out of restraints 2/28 AM with help of depakote + seroquel     Severe dementia c/b behavioral disturbance  -seroquel + valproic acid per neurology,  -IM haldol prn (no IV in place)  -home meds can be crushed: limited total med # (dc statin, etc) given condition  -Current regiment seems to be helping with his behavioral disturbances  -Concern for increased sedation 3/2 by nursing and family.  Will obtain CMP, CBC and valproic acid level.  I am hesitant to decrease his meds due to the severe agitation which she had earlier as well as aggression towards staff with care.    Frequent falls w increased myoclonus  -neurology consulted and considers the myoclonus rexulti s/e, rexulti therefore stopped  -d/w wife: given her impaired mobility looking for SNF to long-term  care options if affordable    HTN  -BP elevated, inc amlodipine to 10 mg.  - Would not treat w IV anti-hypertensives given risk of hypotension. Would not treat if elev unless correlated patient sx or calm with SBP >200    Hypokalemia and severe hypomagnesemia   -improved after replacement, PO Mg supp given severity  DMII - insulin as able--BS relatively stable  CAD  PAD bilateral lower extremity  H/o DVT on chronic anticoagulation - lovenox given challenges w po meds. Previously on cilostazol, apixaban, aspirin --> narrowed to anticoagulant only which is probably plan w least harm for discharge    Expected Discharge Location and Transportation: SNF to LTC--pending insurance approval at [facility]  Expected Discharge 3/7 (Discharge date is tentative pending patient's medical condition and is subject to change)  Expected Discharge Date: 3/7/2024; Expected Discharge Time:      DVT prophylaxis:  Medical DVT prophylaxis orders are present.     Copied text in this note has been reviewed and is accurate as of 03/02/24.       AM-PAC 6 Clicks Score (PT): 20 (03/01/24 2200)    CODE STATUS:   Code Status and Medical Interventions:   Ordered at: 02/25/24 0213     Medical Intervention Limits:    NO intubation (DNI)     Code Status (Patient has no pulse and is not breathing):    No CPR (Do Not Attempt to Resuscitate)     Medical Interventions (Patient has pulse or is breathing):    Limited Support       Fannie Parker MD  03/02/24

## 2024-03-03 LAB
GLUCOSE BLDC GLUCOMTR-MCNC: 172 MG/DL (ref 70–130)
GLUCOSE BLDC GLUCOMTR-MCNC: 174 MG/DL (ref 70–130)
GLUCOSE BLDC GLUCOMTR-MCNC: 231 MG/DL (ref 70–130)
GLUCOSE BLDC GLUCOMTR-MCNC: 264 MG/DL (ref 70–130)

## 2024-03-03 PROCEDURE — 99232 SBSQ HOSP IP/OBS MODERATE 35: CPT | Performed by: PEDIATRICS

## 2024-03-03 PROCEDURE — 82948 REAGENT STRIP/BLOOD GLUCOSE: CPT

## 2024-03-03 PROCEDURE — 63710000001 INSULIN DETEMIR PER 5 UNITS: Performed by: INTERNAL MEDICINE

## 2024-03-03 PROCEDURE — 25010000002 ENOXAPARIN PER 10 MG: Performed by: INTERNAL MEDICINE

## 2024-03-03 PROCEDURE — 99233 SBSQ HOSP IP/OBS HIGH 50: CPT

## 2024-03-03 PROCEDURE — 63710000001 INSULIN LISPRO (HUMAN) PER 5 UNITS: Performed by: INTERNAL MEDICINE

## 2024-03-03 RX ORDER — MIRTAZAPINE 15 MG/1
15 TABLET, FILM COATED ORAL NIGHTLY
Status: DISCONTINUED | OUTPATIENT
Start: 2024-03-03 | End: 2024-03-04 | Stop reason: HOSPADM

## 2024-03-03 RX ORDER — LISINOPRIL 2.5 MG/1
2.5 TABLET ORAL DAILY
Status: DISCONTINUED | OUTPATIENT
Start: 2024-03-03 | End: 2024-03-04 | Stop reason: HOSPADM

## 2024-03-03 RX ORDER — QUETIAPINE FUMARATE 25 MG/1
50 TABLET, FILM COATED ORAL EVERY 12 HOURS SCHEDULED
Status: DISCONTINUED | OUTPATIENT
Start: 2024-03-03 | End: 2024-03-04 | Stop reason: HOSPADM

## 2024-03-03 RX ADMIN — SENNOSIDES AND DOCUSATE SODIUM 2 TABLET: 8.6; 5 TABLET ORAL at 08:47

## 2024-03-03 RX ADMIN — INSULIN DETEMIR 7 UNITS: 100 INJECTION, SOLUTION SUBCUTANEOUS at 21:23

## 2024-03-03 RX ADMIN — INSULIN LISPRO 2 UNITS: 100 INJECTION, SOLUTION INTRAVENOUS; SUBCUTANEOUS at 21:23

## 2024-03-03 RX ADMIN — ENOXAPARIN SODIUM 100 MG: 100 INJECTION SUBCUTANEOUS at 21:23

## 2024-03-03 RX ADMIN — VALPROIC ACID 250 MG: 250 SOLUTION ORAL at 12:30

## 2024-03-03 RX ADMIN — ENOXAPARIN SODIUM 100 MG: 100 INJECTION SUBCUTANEOUS at 08:46

## 2024-03-03 RX ADMIN — AMLODIPINE BESYLATE 10 MG: 10 TABLET ORAL at 08:48

## 2024-03-03 RX ADMIN — DONEPEZIL HYDROCHLORIDE 20 MG: 10 TABLET, FILM COATED ORAL at 08:48

## 2024-03-03 RX ADMIN — TEMAZEPAM 15 MG: 15 CAPSULE ORAL at 19:25

## 2024-03-03 RX ADMIN — MIRTAZAPINE 15 MG: 15 TABLET, FILM COATED ORAL at 19:24

## 2024-03-03 RX ADMIN — INSULIN LISPRO 2 UNITS: 100 INJECTION, SOLUTION INTRAVENOUS; SUBCUTANEOUS at 08:45

## 2024-03-03 RX ADMIN — INSULIN DETEMIR 7 UNITS: 100 INJECTION, SOLUTION SUBCUTANEOUS at 08:46

## 2024-03-03 RX ADMIN — INSULIN LISPRO 3 UNITS: 100 INJECTION, SOLUTION INTRAVENOUS; SUBCUTANEOUS at 12:27

## 2024-03-03 RX ADMIN — VALPROIC ACID 250 MG: 250 SOLUTION ORAL at 06:01

## 2024-03-03 RX ADMIN — LISINOPRIL 2.5 MG: 2.5 TABLET ORAL at 12:27

## 2024-03-03 RX ADMIN — QUETIAPINE FUMARATE 50 MG: 25 TABLET ORAL at 19:24

## 2024-03-03 RX ADMIN — INSULIN LISPRO 4 UNITS: 100 INJECTION, SOLUTION INTRAVENOUS; SUBCUTANEOUS at 17:38

## 2024-03-03 NOTE — PROGRESS NOTES
[facility] Neurology    Progress Note    Patient Name: Too Tai  : 1955  MRN: [id number]  Primary Care Physician:  Des Geller MD  Date of admission: 2024    Subjective     Chief Complaint: Dementia    History of Present Illness   Patient did not receive p.m. medications as he was too lethargic.  Upon assessment he is awake reluctant to participate with exam.  Wife is concerned about patient's lack of appetite.    Review of Systems   Difficult to assess due to baseline mental status  Objective     Physical Exam  Vitals and nursing note reviewed.   Constitutional:       General: He is not in acute distress.     Appearance: He is not ill-appearing.   Eyes:      Extraocular Movements: Extraocular movements intact.      Pupils: Pupils are equal, round, and reactive to light.      Comments: No nystagmus or deviated gaze noted   Neurological:      Mental Status: He is alert. Mental status is at baseline.      Cranial Nerves: No cranial nerve deficit or facial asymmetry.      Sensory: No sensory deficit.      Motor: No weakness, tremor or seizure activity.      Comments:     Cranial Nerves   CN II: Pupils are equal, round, and reactive to light. Normal visual acuity and visual fields.    CN III IV VI: Extraocular movements are full without nystagmus.  CN V: Normal facial sensation and strength of muscles of mastication.  CN VII: Facial movements are symmetric. No weakness.  CN VIII:  Auditory acuity is normal.  CN XII: The tongue is midline.  No atrophy or fasciculations.            Vitals:   Temp:  [97.7 °F (36.5 °C)-99.3 °F (37.4 °C)] 98.2 °F (36.8 °C)  Heart Rate:  [82-92] 82  Resp:  [16-18] 16  BP: (131-186)/(62-99) 186/99    Current Medications    Current Facility-Administered Medications:     acetaminophen (TYLENOL) tablet 650 mg, 650 mg, Oral, Q4H PRN **OR** acetaminophen (TYLENOL) 160 MG/5ML oral solution 650 mg, 650 mg, Oral, Q4H PRN **OR** acetaminophen (TYLENOL) suppository 650  mg, 650 mg, Rectal, Q4H PRN, Jamel Randhawa III, DO    amLODIPine (NORVASC) tablet 10 mg, 10 mg, Oral, Q24H, Fannie Parker MD    sennosides-docusate (PERICOLACE) 8.6-50 MG per tablet 2 tablet, 2 tablet, Oral, BID, 2 tablet at 03/01/24 0835 **AND** polyethylene glycol (MIRALAX) packet 17 g, 17 g, Oral, Daily PRN **AND** bisacodyl (DULCOLAX) EC tablet 5 mg, 5 mg, Oral, Daily PRN **AND** bisacodyl (DULCOLAX) suppository 10 mg, 10 mg, Rectal, Daily PRN, Jamel Randhawa III, DO    Calcium Replacement - Follow Nurse / BPA Driven Protocol, , Does not apply, PRN, Jamel Randhawa III, DO    cyanocobalamin injection 1,000 mcg, 1,000 mcg, Intramuscular, Q28 Days, Meliza Kahn, APRN, 1,000 mcg at 02/27/24 1550    dextrose (D50W) (25 g/50 mL) IV injection 25 g, 25 g, Intravenous, Q15 Min PRN, Jamel Randhawa III, DO    dextrose (GLUTOSE) oral gel 15 g, 15 g, Oral, Q15 Min PRN, Jamel Randhawa III, DO    donepezil (ARICEPT) tablet 20 mg, 20 mg, Oral, Daily, Milena Sharpe, APRN, 20 mg at 03/02/24 0825    Enoxaparin Sodium (LOVENOX) syringe 100 mg, 1 mg/kg, Subcutaneous, BID, Courtney Davies MD, 100 mg at 03/02/24 2127    glucagon (GLUCAGEN) injection 1 mg, 1 mg, Intramuscular, Q15 Min PRN, Jamel Randhawa III, DO    insulin detemir (LEVEMIR) injection 7 Units, 7 Units, Subcutaneous, Q12H, Courtney Davies MD, 7 Units at 03/02/24 2128    Insulin Lispro (humaLOG) injection 2-7 Units, 2-7 Units, Subcutaneous, 4x Daily AC & at Bedtime, Jamel Randhawa III, DO, 2 Units at 03/02/24 5308    Magnesium Standard Dose Replacement - Follow Nurse / BPA Driven Protocol, , Does not apply, PRN, Jamel Randhawa III, DO    nitroglycerin (NITROSTAT) SL tablet 0.4 mg, 0.4 mg, Sublingual, Q5 Min PRN, Jamel Randhawa III, DO    ondansetron ODT (ZOFRAN-ODT) disintegrating tablet 4 mg, 4 mg, Oral, Q6H PRN **OR** ondansetron (ZOFRAN) injection 4 mg, 4 mg, Intravenous, Q6H  PRN, Jamel Randhawa III, DO    Phosphorus Replacement - Follow Nurse / BPA Driven Protocol, , Does not apply, PRN, Jamel Randhawa III, DO    potassium chloride (MICRO-K/KLOR-CON) CR capsule, 40 mEq, Oral, Once, Courtney Davies MD    Potassium Replacement - Follow Nurse / BPA Driven Protocol, , Does not apply, PRN, Jamel Randhawa III, DO    QUEtiapine (SEROquel) tablet 100 mg, 100 mg, Oral, Q12H, Courtney Davies MD, 100 mg at 03/02/24 0824    temazepam (RESTORIL) capsule 15 mg, 15 mg, Oral, Nightly, Darrell Jhaveri DO, 15 mg at 03/01/24 2143    Valproic Acid (DEPAKENE) syrup 250 mg, 250 mg, Oral, Q8H, Meliza Kahn APRN, 250 mg at 03/03/24 0601    ziprasidone (GEODON) injection 10 mg, 10 mg, Intramuscular, Q4H PRN, Meliza Kahn, APRN    Laboratory Results:   Lab Results   Component Value Date    GLUCOSE 198 (H) 03/02/2024    CALCIUM 9.5 03/02/2024     03/02/2024    K 3.6 03/02/2024    CO2 30.0 (H) 03/02/2024     03/02/2024    BUN 20 03/02/2024    CREATININE 0.84 03/02/2024    EGFRIFAFRI 102 02/21/2022    EGFRIFNONA 88 02/21/2022    BCR 23.8 03/02/2024    ANIONGAP 9.0 03/02/2024     Lab Results   Component Value Date    WBC 8.72 03/02/2024    HGB 15.9 03/02/2024    HCT 47.5 03/02/2024    MCV 91.2 03/02/2024     03/02/2024     Lab Results   Component Value Date    CHOL 220 (H) 01/08/2024    CHOL 120 08/01/2019    CHOL 117 04/15/2019     Lab Results   Component Value Date    HDL 41 01/08/2024    HDL 37 (L) 08/03/2023    HDL 41 04/03/2023     Lab Results   Component Value Date     (H) 01/08/2024     (H) 08/03/2023     (H) 04/03/2023     Lab Results   Component Value Date    TRIG 108 01/08/2024    TRIG 208 (H) 08/03/2023    TRIG 138 04/03/2023     Lab Results   Component Value Date    HGBA1C 9.20 (H) 02/25/2024     Lab Results   Component Value Date    INR 1.1 11/25/2019    INR 1.1 09/10/2018    PROTIME 12.5 11/25/2019    PROTIME 13.3 09/10/2018      Lab Results   Component Value Date    FOLATE 10.20 02/24/2024     Lab Results   Component Value Date    [id number] 223 02/24/2024             Assessment / Plan   Brief Patient Summary:  Too Tai is a 68 y.o. male who has past medical history of severe dementia with behavioral disturbances who presented to [facility] ED after experiencing a fall with increased confusion.  Patient's family endorses episodes of full body jerking which were thought to be response of hyperactivity to startle.       Plan:   Advanced Alzheimer's disease with behavioral disturbances  Increase in myoclonus-resolved  S/p fall  Decrease in appetite  Concern Rexulti was increasing patient's myoclonus.  Continue to hold.  Continue Aricept 20 mg daily  Continue Seroquel 50 mg twice daily  QTc 486  Continue Geodon as needed for agitation; no required doses  Continue Restoril 15 mg nightly  Trial of Remeron 15 mg nightly  Vitamin B12 223; IM cyanocobalamin 1000 mcg monthly  Continue Depakote 250 mg 3 times daily  General neurology will follow up on Sunday       I have discussed the above with the patient, bedside RN Dr. Panda  Time spent with patient: 50 minutes in face-to-face evaluation and management of the patient.    Copied text in this note has been reviewed and is accurate as of 03/03/24.     IVONNE Santos

## 2024-03-03 NOTE — PROGRESS NOTES
[facility] Medicine Services  PROGRESS NOTE    Patient Name: Too Tai  : 1955  MRN: [id number]    Date of Admission: 2024  Primary Care Physician: Des Geller MD    Subjective   Subjective     CC:  Falls, advanced dementia f/u    HPI:  Wife asked overnight to not take his evening medications because she felt that he was fairly sedated yesterday and the day before.  Did well overnight without any episodes of aggression.  Did not take the Seroquel this morning either.  Continues to eat very little.  Wife feels that he is much more awake and more interactive today.    Objective   Objective     Vital Signs:   Temp:  [97.7 °F (36.5 °C)-99.3 °F (37.4 °C)] 97.9 °F (36.6 °C)  Heart Rate:  [82-92] 82  Resp:  [16-18] 18  BP: (131-186)/(62-99) 179/89     Physical Exam:  Constitutional: Frail appearing male sitting in bed under blanket, eyes are open and he is talking somewhat, does appear more alert.  HENT: NCAT, mucous membranes moist  Respiratory: transmitted upper airway noise bilaterally, respiratory effort normal on room air  Cardiovascular: RRR, no murmurs, rubs, or gallops  Gastrointestinal: Soft, nontender, nondistended  Psychiatric: Calm on exam, cooperative  Neurologic: Awake, no muscle jerks on my exam  Skin: No rashes    Results Reviewed:  LAB RESULTS:      Lab 24  1407 24  0852   WBC 8.72 9.98   HEMOGLOBIN 15.9 16.2   HEMATOCRIT 47.5 47.3   PLATELETS 311 287   NEUTROS ABS 3.95  --    IMMATURE GRANS (ABS) 0.02  --    LYMPHS ABS 2.70  --    MONOS ABS 0.99*  --    EOS ABS 0.97*  --    MCV 91.2 89.4         Lab 24  1407 24  0852 24  0646 24  0409   SODIUM 139 137 140 140   POTASSIUM 3.6 4.1 4.1 3.6   CHLORIDE 100 100 103 103   CO2 30.0* 24.0 22.0 29.0   ANION GAP 9.0 13.0 15.0 8.0   BUN 20 13 9 8   CREATININE 0.84 0.74* 0.79 0.83   EGFR 95.6 99.3 97.4 95.9   GLUCOSE 198* 261* 172* 161*   CALCIUM 9.5 9.0 9.1 9.0   MAGNESIUM 2.0  1.7 1.8 1.7   PHOSPHORUS 2.6  --   --   --          Lab 03/02/24  1407 02/27/24  0646   TOTAL PROTEIN 7.2 6.9   ALBUMIN 4.0 4.1   GLOBULIN 3.2  --    ALT (SGPT) 33 26   AST (SGOT) 23 28   BILIRUBIN 1.1 1.3*   INDIRECT BILIRUBIN  --  0.9   BILIRUBIN DIRECT  --  0.4*   ALK PHOS 93 82                         Brief Urine Lab Results  (Last result in the past 365 days)        Color   Clarity   Blood   Leuk Est   Nitrite   Protein   CREAT   Urine HCG        02/24/24 2354 Yellow   Clear   Negative   Negative   Negative   100 mg/dL (2+)                   Microbiology Results Abnormal       None            No radiology results from the last 24 hrs    Results for orders placed in visit on 07/30/20    SCANNED - ECHOCARDIOGRAM      Current medications:  Scheduled Meds:amLODIPine, 10 mg, Oral, Q24H  cyanocobalamin, 1,000 mcg, Intramuscular, Q28 Days  donepezil, 20 mg, Oral, Daily  enoxaparin, 1 mg/kg, Subcutaneous, BID  insulin detemir, 7 Units, Subcutaneous, Q12H  insulin lispro, 2-7 Units, Subcutaneous, 4x Daily AC & at Bedtime  potassium chloride, 40 mEq, Oral, Once  QUEtiapine, 50 mg, Oral, Q12H  senna-docusate sodium, 2 tablet, Oral, BID  temazepam, 15 mg, Oral, Nightly  Valproic Acid, 250 mg, Oral, Q8H      Continuous Infusions:   PRN Meds:.  acetaminophen **OR** acetaminophen **OR** acetaminophen    senna-docusate sodium **AND** polyethylene glycol **AND** bisacodyl **AND** bisacodyl    Calcium Replacement - Follow Nurse / BPA Driven Protocol    dextrose    dextrose    glucagon (human recombinant)    Magnesium Standard Dose Replacement - Follow Nurse / BPA Driven Protocol    nitroglycerin    ondansetron ODT **OR** ondansetron    Phosphorus Replacement - Follow Nurse / BPA Driven Protocol    Potassium Replacement - Follow Nurse / BPA Driven Protocol    ziprasidone    Assessment & Plan   Assessment & Plan     Active Hospital Problems    Diagnosis  POA    **Spasm of muscle [M62.838]  Yes    Myoclonic jerking [G25.3]  Yes     Dementia with behavioral disturbance [F03.918]  Unknown    Frequent falls [R29.6]  Not Applicable    History of DVT (deep vein thrombosis) [Z86.718]  Not Applicable    Chronic anticoagulation [Z79.01]  Not Applicable    Hypomagnesemia [E83.42]  Yes    Benign essential HTN [I10]  Yes    Type 2 diabetes mellitus with hyperglycemia, with long-term current use of insulin [E11.65, Z79.4]  Not Applicable    Benign prostatic hyperplasia [N40.0]  Yes    Coronary artery disease involving native coronary artery of native heart [I25.10]  Yes      Resolved Hospital Problems    Diagnosis Date Resolved POA    Type 2 diabetes mellitus, with long-term current use of insulin [E11.9, Z79.4] 02/25/2024 Not Applicable        Brief Hospital Course to date:  Too Tai is a 68 y.o. male w severe dementia living at home who presents with repeated falls. Family concern for jerking spells which appear to be myoclonic jerks, possibly worse with rexulti  Stay c/b significant agitation with transition to hospital, out of restraints 2/28 AM with help of depakote + seroquel     Severe dementia c/b behavioral disturbance  -seroquel + valproic acid per neurology,  -IM haldol prn (no IV in place)  -home meds can be crushed: limited total med # (dc statin, etc) given condition  -Current regiment seems to be helping with his behavioral disturbances but due to concern for sedation, will decrease the seroquel back to 50mg BID.  -Monitor closely for inc agitation    Frequent falls w increased myoclonus  -neurology consulted and considers the myoclonus rexulti s/e, rexulti therefore stopped  -d/w wife: given her impaired mobility looking for SNF to long-term care options if affordable    HTN  -BP elevated, cont amlodipine to 10 mg. Add back home lisinopril 2.5mg  - Would not treat w IV anti-hypertensives given risk of hypotension. Would not treat if elev unless correlated patient sx or calm with SBP >200    Hypokalemia and severe hypomagnesemia    -improved after replacement, PO Mg supp given severity  DMII - insulin as able--BS relatively stable  CAD  PAD bilateral lower extremity  H/o DVT on chronic anticoagulation - lovenox given challenges w po meds. Previously on cilostazol, apixaban, aspirin --> narrowed to anticoagulant only which is probably plan w least harm for discharge    Expected Discharge Location and Transportation: SNF to LTC--pending insurance approval at [facility]  Expected Discharge 3/7 (Discharge date is tentative pending patient's medical condition and is subject to change)  Expected Discharge Date: 3/7/2024; Expected Discharge Time:      DVT prophylaxis:  Medical DVT prophylaxis orders are present.     Copied text in this note has been reviewed and is accurate as of 03/03/24.       AM-PAC 6 Clicks Score (PT): 14 (03/02/24 2010)    CODE STATUS:   Code Status and Medical Interventions:   Ordered at: 02/25/24 0219     Medical Intervention Limits:    NO intubation (DNI)     Code Status (Patient has no pulse and is not breathing):    No CPR (Do Not Attempt to Resuscitate)     Medical Interventions (Patient has pulse or is breathing):    Limited Support       Fannie Parker MD  03/03/24

## 2024-03-04 VITALS
SYSTOLIC BLOOD PRESSURE: 171 MMHG | DIASTOLIC BLOOD PRESSURE: 80 MMHG | BODY MASS INDEX: 28.24 KG/M2 | WEIGHT: 220.02 LBS | OXYGEN SATURATION: 97 % | HEIGHT: 74 IN | RESPIRATION RATE: 18 BRPM | HEART RATE: 95 BPM | TEMPERATURE: 98.4 F

## 2024-03-04 LAB
GLUCOSE BLDC GLUCOMTR-MCNC: 203 MG/DL (ref 70–130)
GLUCOSE BLDC GLUCOMTR-MCNC: 230 MG/DL (ref 70–130)

## 2024-03-04 PROCEDURE — 63710000001 INSULIN LISPRO (HUMAN) PER 5 UNITS: Performed by: INTERNAL MEDICINE

## 2024-03-04 PROCEDURE — 63710000001 INSULIN DETEMIR PER 5 UNITS: Performed by: INTERNAL MEDICINE

## 2024-03-04 PROCEDURE — 25010000002 ENOXAPARIN PER 10 MG: Performed by: INTERNAL MEDICINE

## 2024-03-04 PROCEDURE — 82948 REAGENT STRIP/BLOOD GLUCOSE: CPT

## 2024-03-04 PROCEDURE — 99232 SBSQ HOSP IP/OBS MODERATE 35: CPT

## 2024-03-04 RX ORDER — DONEPEZIL HYDROCHLORIDE 10 MG/1
20 TABLET, FILM COATED ORAL DAILY
Qty: 60 TABLET | Refills: 0 | Status: SHIPPED | OUTPATIENT
Start: 2024-03-05 | End: 2024-04-04

## 2024-03-04 RX ORDER — TEMAZEPAM 15 MG/1
15 CAPSULE ORAL NIGHTLY
Qty: 3 CAPSULE | Refills: 0 | Status: SHIPPED | OUTPATIENT
Start: 2024-03-04 | End: 2024-03-07

## 2024-03-04 RX ORDER — CYANOCOBALAMIN 1000 UG/ML
1000 INJECTION, SOLUTION INTRAMUSCULAR; SUBCUTANEOUS
Qty: 1 ML | Refills: 0 | Status: SHIPPED | OUTPATIENT
Start: 2024-03-26 | End: 2024-04-25

## 2024-03-04 RX ORDER — ENOXAPARIN SODIUM 100 MG/ML
1 INJECTION SUBCUTANEOUS 2 TIMES DAILY
Qty: 60 ML | Refills: 0 | Status: SHIPPED | OUTPATIENT
Start: 2024-03-04 | End: 2024-04-03

## 2024-03-04 RX ORDER — VALPROIC ACID 250 MG/5ML
250 SOLUTION ORAL EVERY 8 HOURS SCHEDULED
Qty: 300 ML | Refills: 0 | Status: SHIPPED | OUTPATIENT
Start: 2024-03-04 | End: 2024-04-03

## 2024-03-04 RX ORDER — MIRTAZAPINE 15 MG/1
15 TABLET, FILM COATED ORAL NIGHTLY
Qty: 30 TABLET | Refills: 0 | Status: SHIPPED | OUTPATIENT
Start: 2024-03-04 | End: 2024-04-03

## 2024-03-04 RX ADMIN — INSULIN LISPRO 3 UNITS: 100 INJECTION, SOLUTION INTRAVENOUS; SUBCUTANEOUS at 12:28

## 2024-03-04 RX ADMIN — ENOXAPARIN SODIUM 100 MG: 100 INJECTION SUBCUTANEOUS at 08:45

## 2024-03-04 RX ADMIN — INSULIN LISPRO 3 UNITS: 100 INJECTION, SOLUTION INTRAVENOUS; SUBCUTANEOUS at 08:45

## 2024-03-04 RX ADMIN — BISACODYL 10 MG: 10 SUPPOSITORY RECTAL at 11:24

## 2024-03-04 RX ADMIN — INSULIN DETEMIR 7 UNITS: 100 INJECTION, SOLUTION SUBCUTANEOUS at 08:45

## 2024-03-04 NOTE — PAYOR COMM NOTE
"Too Richter (68 y.o. Male)     [id number]     Holly Rose, RN  Utilization Review  [phone]  [phone]        Date of Birth   1955    Social Security Number       Address   [address] [place] [address]    Home Phone   [phone]    MRN   [id number]       Gnosticist   None    Marital Status                               Admission Date   24    Admission Type   Emergency    Admitting Provider   Joyce Elizondo DO    Attending Provider       Department, Room/Bed   [facility] 6B, N632/1       Discharge Date   3/4/2024    Discharge Disposition   Long Term Care (DC - External)    Discharge Destination   Other                              Attending Provider: (none)   Allergies: Bactrim [Sulfamethoxazole-trimethoprim], Adhesive Tape, Neosporin [Neomycin-bacitracin Zn-polymyx]    Isolation: None   Infection: None   Code Status: Prior    Ht: 189 cm (74.41\")   Wt: 99.8 kg (220 lb 0.3 oz)    Admission Cmt: None   Principal Problem: Spasm of muscle [M62.838]                   Active Insurance as of 2024       Primary Coverage       Payor Plan Insurance Group Employer/Plan Group    AETNA MEDICARE REPLACEMENT AETNA MED ADV PPO [id number]       Payor Plan Address Payor Plan Phone Number Payor Plan Fax Number Effective Dates    [address] [phone]  2024 - None Entered    [facility] [id number]         Subscriber Name Subscriber Birth Date Member ID       TOO RICHTER 1955 [id number]                     Emergency Contacts        (Rel.) Home Phone Work Phone Mobile Phone    WILLIE RICHTER (Spouse) [phone] -- [phone]                 Discharge Summary        Joyce Elizondo DO at [facility]              [facility] Medicine Services  DISCHARGE SUMMARY    Patient Name: Too Richter  : 1955  MRN: [id number]    Date of Admission: 2024 10:14 PM  Date of Discharge:  " 3/4/2024  Primary Care Physician: Des Geller MD    Consults       Date and Time Order Name Status Description    2/25/2024  2:44 AM Inpatient Neurology Consult General Completed             Hospital Course     Presenting Problem: dementia    Active Hospital Problems    Diagnosis  POA    **Spasm of muscle [M62.838]  Yes    Myoclonic jerking [G25.3]  Yes    Dementia with behavioral disturbance [F03.918]  Unknown    Frequent falls [R29.6]  Not Applicable    History of DVT (deep vein thrombosis) [Z86.718]  Not Applicable    Chronic anticoagulation [Z79.01]  Not Applicable    Hypomagnesemia [E83.42]  Yes    Benign essential HTN [I10]  Yes    Type 2 diabetes mellitus with hyperglycemia, with long-term current use of insulin [E11.65, Z79.4]  Not Applicable    Benign prostatic hyperplasia [N40.0]  Yes    Coronary artery disease involving native coronary artery of native heart [I25.10]  Yes      Resolved Hospital Problems    Diagnosis Date Resolved POA    Type 2 diabetes mellitus, with long-term current use of insulin [E11.9, Z79.4] 02/25/2024 Not Applicable          Hospital Course:  Too Tai is a 68 y.o. male with severe dementia living at home who presents with repeated falls. Family was concerned for jerking spells which appear to be myoclonic jerks, possibly worse with rexulti  Stay c/b significant agitation with transition to hospital, out of restraints 2/28 AM with help of depakote + seroquel     This patient's problems and plans were partially entered by my partner and updated as appropriate by me 03/04/24.    Severe dementia c/b behavioral disturbance  -seroquel + valproic acid per neurology,  -Current regimen seems to be helping with his behavioral disturbances but due to concern for sedation, Seroquel decreased back to 50mg BID - continue.  -Monitor closely for increased agitation     Frequent falls w increased myoclonus  -neurology consulted and considers the myoclonus rexulti s/e, rexulti  therefore stopped       HTN  -BP improving, hold amlodipine as he has been hypotensive overnight  - Would not treat w IV anti-hypertensives given risk of hypotension. Would not treat if elev unless correlated patient sx or calm with SBP >200     Hypokalemia and severe hypomagnesemia   -improved after replacement, PO Mg supp given severity  DMII - insulin as able--BS relatively stable  CAD  PAD bilateral lower extremity  H/o DVT on chronic anticoagulation - lovenox given challenges w po meds. Previously on cilostazol, apixaban, aspirin --> narrowed to anticoagulant only which is probably plan w least harm for discharge      Discharge Follow Up Recommendations for outpatient labs/diagnostics:   PCP in 1 week    Day of Discharge     HPI:   Wife at bedside, trying to get him to eat    Review of Systems  Limited ROS due to mental status  Wife at bedside reports no overnight events or issues currently    Vital Signs:   Temp:  [97.5 °F (36.4 °C)-98 °F (36.7 °C)] 97.9 °F (36.6 °C)  Heart Rate:  [71-87] 82  Resp:  [16-18] 18  BP: (101-179)/(49-89) 112/82      Physical Exam:  Constitutional: No acute distress, awake, alert  HENT: NCAT, mucous membranes moist  Respiratory: Respiratory effort normal   Cardiovascular: RRR  Gastrointestinal: Soft, nontender, nondistended  Musculoskeletal: No bilateral ankle edema  Psychiatric: Appropriate affect, cooperative  Neurologic: Disoriented at baseline, follows simple commands  Skin: No rashes      Pertinent  and/or Most Recent Results     LAB RESULTS:      Lab 03/02/24  1407 02/29/24  0852   WBC 8.72 9.98   HEMOGLOBIN 15.9 16.2   HEMATOCRIT 47.5 47.3   PLATELETS 311 287   NEUTROS ABS 3.95  --    IMMATURE GRANS (ABS) 0.02  --    LYMPHS ABS 2.70  --    MONOS ABS 0.99*  --    EOS ABS 0.97*  --    MCV 91.2 89.4         Lab 03/02/24  1407 02/29/24  0852 02/27/24  0646   SODIUM 139 137 140   POTASSIUM 3.6 4.1 4.1   CHLORIDE 100 100 103   CO2 30.0* 24.0 22.0   ANION GAP 9.0 13.0 15.0   BUN 20  13 9   CREATININE 0.84 0.74* 0.79   EGFR 95.6 99.3 97.4   GLUCOSE 198* 261* 172*   CALCIUM 9.5 9.0 9.1   MAGNESIUM 2.0 1.7 1.8   PHOSPHORUS 2.6  --   --          Lab 03/02/24  1407 02/27/24  0646   TOTAL PROTEIN 7.2 6.9   ALBUMIN 4.0 4.1   GLOBULIN 3.2  --    ALT (SGPT) 33 26   AST (SGOT) 23 28   BILIRUBIN 1.1 1.3*   INDIRECT BILIRUBIN  --  0.9   BILIRUBIN DIRECT  --  0.4*   ALK PHOS 93 82                     Brief Urine Lab Results  (Last result in the past 365 days)        Color   Clarity   Blood   Leuk Est   Nitrite   Protein   CREAT   Urine HCG        02/24/24 2354 Yellow   Clear   Negative   Negative   Negative   100 mg/dL (2+)                 Microbiology Results (last 10 days)       ** No results found for the last 240 hours. **            EEG    Result Date: 2/26/2024  Reason for referral: 68 y.o.male with altered mental status, jerking spells Technical Summary:  A 19 channel digital EEG was performed using the international 10-20 placement system, including eye leads and EKG leads. Duration: 23 minutes Findings: The patient is awake.  Diffuse medium amplitude 5 to 6 Hz theta is present symmetrically over both hemispheres.  A clear posterior rhythm is not present.  Occasional intermixed slower 4 Hz generalized delta is present.  Sleep is not seen.  Photic stimulation and hyperventilation are not performed.  No focal features or epileptiform activity are present. Video: Available Technical quality: Superior EKG: Regular, 60 bpm SUMMARY: Mild generalized slow No focal features or epileptiform activity are seen     Diffuse cerebral dysfunction of mild degree, nonspecific No evidence for epilepsy is seen on the study This report is transcribed using the Dragon dictation system.      CT Head Without Contrast    Result Date: 2/24/2024  CT HEAD WO CONTRAST Date of Exam: 2/24/2024 10:33 PM EST Indication: Head trauma, minor (Age >= 65y). Comparison: CT scan the head dated February 22, 2021 Technique: Axial CT images  were obtained of the head without contrast administration.  Automated exposure control and iterative construction methods were used. Findings: There is mild diffuse generalized atrophy. There are low-attenuation areas in the periventricular white matter consistent with chronic microvascular ischemic change. There is no mass, mass effect or midline shift. There are no abnormal extra-axial fluid collections or areas of acute hemorrhage. The paranasal sinuses are clear. The mastoid air cells are clear.     Impression: Atrophy and chronic microvascular ischemic change. No acute intracranial process. Electronically Signed: Sam Smith MD  2/24/2024 11:04 PM EST  Workstation ID: [id number]    XR Chest 1 View    Result Date: 2/24/2024  XR CHEST 1 VW Date of Exam: 2/24/2024 10:23 PM EST Indication: Weak/Dizzy/AMS triage protocol Comparison: Chest radiograph dated February 21, 2022 Findings: There is postoperative change from midline sternotomy and coronary artery bypass grafting. The pulmonary vascular markings are normal. There is a small left effusion. There is left basilar atelectasis.     Impression: Small left effusion with left basilar atelectasis. Electronically Signed: Sam Smith MD  2/24/2024 10:36 PM EST  Workstation ID: [id number]             Results for orders placed in visit on 07/30/20    SCANNED - ECHOCARDIOGRAM      Plan for Follow-up of Pending Labs/Results: None pending    Discharge Details        Discharge Medications        New Medications        Instructions Start Date   cyanocobalamin 1000 MCG/ML injection   1,000 mcg, Intramuscular, Every 28 Days   Start Date: March 26, 2024     Enoxaparin Sodium 100 MG/ML solution prefilled syringe syringe  Commonly known as: LOVENOX   1 mg/kg (100 mg), Subcutaneous, 2 Times Daily      insulin detemir 100 UNIT/ML injection  Commonly known as: LEVEMIR   7 Units, Subcutaneous, Every 12 Hours Scheduled      mirtazapine 15 MG tablet  Commonly known as: REMERON   15 mg,  Oral, Nightly      Valproic Acid 250 MG/5ML solution syrup  Commonly known as: DEPAKENE   250 mg, Oral, Every 8 Hours Scheduled             Changes to Medications        Instructions Start Date   donepezil 10 MG tablet  Commonly known as: ARICEPT  What changed:   medication strength  how much to take  when to take this   20 mg, Oral, Daily   Start Date: March 5, 2024     temazepam 15 MG capsule  Commonly known as: Restoril  What changed:   when to take this  reasons to take this   15 mg, Oral, Nightly             Continue These Medications        Instructions Start Date   Accu-Chek Geri Plus w/Device kit   1 kit, Does not apply, 3 Times Daily      Accu-Chek Geri solution   1 bottle, In Vitro, As Needed      accu-chek soft touch lancets   Check sugars 3 times daily      Alcohol Prep pads   1 swab , Does not apply, 3 Times Daily      B-D UF III MINI PEN NEEDLES 31G X 5 MM misc  Generic drug: Insulin Pen Needle   USE AS DIRECTED      lisinopril 2.5 MG tablet  Commonly known as: PRINIVIL,ZESTRIL   2.5 mg, Oral, Daily      QUEtiapine 50 MG tablet  Commonly known as: SEROquel   50 mg, Oral, 2 Times Daily             Stop These Medications      aspirin 81 MG EC tablet     atorvastatin 40 MG tablet  Commonly known as: LIPITOR     cilostazol 50 MG tablet  Commonly known as: PLETAL     Eliquis 5 MG tablet tablet  Generic drug: apixaban     glucose blood test strip  Commonly known as: Accu-Chek Geri Plus     Januvia 100 MG tablet  Generic drug: SITagliptin     metFORMIN 1000 MG tablet  Commonly known as: GLUCOPHAGE     metoprolol succinate XL 25 MG 24 hr tablet  Commonly known as: TOPROL-XL     NovoLOG Mix 70/30 FlexPen (70-30) 100 UNIT/ML suspension pen-injector injection  Generic drug: insulin aspart prot & aspart     Rexulti 2 MG tablet  Generic drug: Brexpiprazole              Allergies   Allergen Reactions    Bactrim [Sulfamethoxazole-Trimethoprim] Rash    Adhesive Tape Rash    Neosporin [Neomycin-Bacitracin  Zn-Polymyx] Rash         Discharge Disposition:  Long Term Care (DC - External)    Diet:  Hospital:  Diet Order   Procedures    Diet: Regular/House Diet; Texture: Regular Texture (IDDSI 7); Fluid Consistency: Thin (IDDSI 0)            Activity:      Restrictions or Other Recommendations:         CODE STATUS:    Code Status and Medical Interventions:   Ordered at: 02/25/24 0219     Medical Intervention Limits:    NO intubation (DNI)     Code Status (Patient has no pulse and is not breathing):    No CPR (Do Not Attempt to Resuscitate)     Medical Interventions (Patient has pulse or is breathing):    Limited Support       Future Appointments   Date Time Provider Department Center   3/4/2024  2:30 PM MED 11  [facility]       Additional Instructions for the Follow-ups that You Need to Schedule       Discharge Follow-up with PCP   As directed       Currently Documented PCP:    Des Geller MD    PCP Phone Number:    [phone]     Follow Up Details: 1 week                      Joyce Elizondo DO  03/04/24      Time Spent on Discharge:  I spent  33  minutes on this discharge activity which included: face-to-face encounter with the patient, reviewing the data in the system, coordination of the care with the nursing staff as well as consultants, documentation, and entering orders.            Electronically signed by Joyce Elizondo DO at 03/04/24 4051

## 2024-03-04 NOTE — PLAN OF CARE
Goal Outcome Evaluation:                     Anticipated Discharge Disposition (SLP): skilled nursing facility, anticipate therapy at next level of care          SLP Swallowing Diagnosis: unable/unwilling to cooperate, unable to assess (02/27/24 1100)             
Goal Outcome Evaluation:                     Anticipated Discharge Disposition (SLP): skilled nursing facility, anticipate therapy at next level of care          SLP Swallowing Diagnosis: unable/unwilling to cooperate, unable to assess (02/29/24 2780)             
Goal Outcome Evaluation:            Patient is on RA, Sinus Alpesh to NSR monitor, Alert not oriented, still very confused, In wrist restraints, replaced Mag and Potassium today via IV, has a Bolus of LR going, he did eat lunch and some dinner today, had 1 large Urinary Void in the bed post offering to use a urinal, no BM this shift, 5mg Valium given for agitation/yelling this afternoon, VSS - will continue POC.                                  
Goal Outcome Evaluation:      Patient remained in restraints throughout the night.  Wife stayed at bedside.  Several linen changes due to incontinence on bed and floor.  Combative at times.  Pulled off equipment.  Haldol given last night.  Room air.  SR/SB.                                          
Goal Outcome Evaluation:      Pt A+Ox1, follows some commands (when willing), fluids and oral intake encouraged with some participation. Pt in wrist restraints, wife at bedside and helpful. VSS, RA, non tele, continue POC.                                         
Goal Outcome Evaluation:  Plan of Care Reviewed With: patient        Progress: improving          VSS,                         
Goal Outcome Evaluation:  Plan of Care Reviewed With: patient        Progress: no change  Outcome Evaluation: Patient agitated at beginning of shift, wanting to get out of bed but not agressive/combative. Seroquel and restoril given, patient went to sleep. Wife refused night vlproic acid. Patient has slept throughout the night.                               
Goal Outcome Evaluation:  Plan of Care Reviewed With: patient        Progress: no change  Outcome Evaluation: Patient was calm and cooperatiove throughout night. Rested this shift without complaints. VSS on room air. No acute events overnight.                               
Goal Outcome Evaluation:  Plan of Care Reviewed With: patient        Progress: no change  Outcome Evaluation: Patient's wife remains at bedside, she requested not giving nightly seroquel, valproic acid, and restoril. Patient slept throughout the night, but woke up when stimulated with no aggressive behavior. VSS on room air. No acute events overnight.                               
Goal Outcome Evaluation:  Plan of Care Reviewed With: patient        Progress: no change  Outcome Evaluation: VSS. Pt remains on RA with no signs of SOA. Pt remains non-tele on the monitor. No pain noted. Restraints removed this morning at 0800. Pt has tolerated being out of restraints. Pt does become agitated and slightly combative when we bother him.                               
Goal Outcome Evaluation:  Plan of Care Reviewed With: patient, spouse           Outcome Evaluation: OT eval completed. Pt presents with generalized weakness, impaired balance, and increased confusion limiting participation in ADL's. Pt required v/t cues for all transitions, extra time and prompting to follow simple commands. Pt completed functional transfers with MinAx2/BUE support, Dep for grooming and eating. IP OT services warranted to address self-care deficits. Recommend discharge to SNF.      Anticipated Discharge Disposition (OT): skilled nursing facility                        
Goal Outcome Evaluation:  Plan of Care Reviewed With: patient, spouse           Outcome Evaluation: Physical therapy evaluation complete. The patient orientated to self, able to follow simple commands with cues to participate with PT. The patient presents below baseline for mobility and would continue to benefit from skilled PT to address strength, balance and activity tolerance deficits.      Anticipated Discharge Disposition (PT): skilled nursing facility                        
Goal Outcome Evaluation:  Plan of Care Reviewed With: patient, spouse           Outcome Evaluation: VSS. Pt on room air. Called report to Tana at [facility].                               
Goal Outcome Evaluation:  Plan of Care Reviewed With: patient, spouse        Progress: no change  Outcome Evaluation: Physical therapy treatment complete. The patient able to follow simple commands with cues to participate in PT. Patient required 2 person assist to complete bed to chair transfer. Patient continues to present below baseline for mobility and would continue to benefit from skilled PT to address strength, balance and activity tolerance deficits. Continue current PT POC.      Anticipated Discharge Disposition (PT): skilled nursing facility                        
Goal Outcome Evaluation:  Plan of Care Reviewed With: spouse        Progress: no change         Anticipated Discharge Disposition (SLP): No further SLP services warranted          SLP Swallowing Diagnosis: unable to assess, unable/unwilling to cooperate, swallow WFL/no suspected pharyngeal impairment (03/01/24 0900)             
Goal Outcome Evaluation:  Pt continues to be confused with poor appetite.    Pt uncooperative with bathing and turning.    PO meds taken today .    Pt has been accepted to facility in [place].   Spouse at bedside.                                              
Goal Outcome Evaluation:  Pt still confused to person, place, time.   He was able to get up to chair w 2 asst.     No IV or Tele in place.   Hyperglycemia noted and inc B/B.                                              
Name band;

## 2024-03-04 NOTE — PROGRESS NOTES
[facility] Neurology    Progress Note    Patient Name: Too Tai  : 1955  MRN: [id number]  Primary Care Physician:  Des Geller MD  Date of admission: 2024    Subjective     Chief Complaint: Dementia    History of Present Illness   Patient seen resting comfortably in bed.  No acute events overnight.  At bedside.      Review of Systems   Difficult to assess due to baseline mental status    Objective     Physical Exam  Vitals and nursing note reviewed.   Constitutional:       General: He is not in acute distress.     Appearance: He is not ill-appearing.   Eyes:      Extraocular Movements: Extraocular movements intact.      Pupils: Pupils are equal, round, and reactive to light.      Comments: No nystagmus or deviated gaze noted   Cardiovascular:      Rate and Rhythm: Normal rate.   Pulmonary:      Effort: Pulmonary effort is normal.   Neurological:      Mental Status: He is alert. Mental status is at baseline.      Cranial Nerves: No cranial nerve deficit or facial asymmetry.      Motor: No weakness, tremor or seizure activity.      Comments:     Cranial Nerves   CN II: Pupils are equal, round, and reactive to light. Normal visual acuity and visual fields.    CN III IV VI: Extraocular movements are full without nystagmus.  CN V: Normal facial sensation and strength of muscles of mastication.  CN VII: Facial movements are symmetric. No weakness.  CN VIII:  Auditory acuity is normal.  CN IX & X:  Symmetric palatal movement.  CN XI: Sternocleidomastoid and trapezius are normal.  No weakness.  CN XII: The tongue is midline.  No atrophy or fasciculations.            Vitals:   Temp:  [97.5 °F (36.4 °C)-98 °F (36.7 °C)] 97.9 °F (36.6 °C)  Heart Rate:  [71-87] 82  Resp:  [16-18] 18  BP: (101-179)/(49-89) 112/82    Current Medications    Current Facility-Administered Medications:     acetaminophen (TYLENOL) tablet 650 mg, 650 mg, Oral, Q4H PRN **OR** acetaminophen (TYLENOL) 160 MG/5ML oral  solution 650 mg, 650 mg, Oral, Q4H PRN **OR** acetaminophen (TYLENOL) suppository 650 mg, 650 mg, Rectal, Q4H PRN, Jamel Randhawa III, DO    sennosides-docusate (PERICOLACE) 8.6-50 MG per tablet 2 tablet, 2 tablet, Oral, BID, 2 tablet at 03/04/24 0846 **AND** polyethylene glycol (MIRALAX) packet 17 g, 17 g, Oral, Daily PRN **AND** bisacodyl (DULCOLAX) EC tablet 5 mg, 5 mg, Oral, Daily PRN **AND** bisacodyl (DULCOLAX) suppository 10 mg, 10 mg, Rectal, Daily PRN, Jamel Randhawa III, DO    Calcium Replacement - Follow Nurse / BPA Driven Protocol, , Does not apply, PRN, Jamel Randhawa III, DO    cyanocobalamin injection 1,000 mcg, 1,000 mcg, Intramuscular, Q28 Days, Meliza Kahn, APRN, 1,000 mcg at 02/27/24 1550    dextrose (D50W) (25 g/50 mL) IV injection 25 g, 25 g, Intravenous, Q15 Min PRN, Jamel Randhawa III,     dextrose (GLUTOSE) oral gel 15 g, 15 g, Oral, Q15 Min PRN, Jamel Randhawa III, DO    donepezil (ARICEPT) tablet 20 mg, 20 mg, Oral, Daily, Milena Sharpe, APRN, 20 mg at 03/04/24 0846    Enoxaparin Sodium (LOVENOX) syringe 100 mg, 1 mg/kg, Subcutaneous, BID, Courtney Davies MD, 100 mg at 03/04/24 0845    glucagon (GLUCAGEN) injection 1 mg, 1 mg, Intramuscular, Q15 Min PRN, Jamel Randhawa III, DO    insulin detemir (LEVEMIR) injection 7 Units, 7 Units, Subcutaneous, Q12H, Courtney Davies MD, 7 Units at 03/04/24 0845    Insulin Lispro (humaLOG) injection 2-7 Units, 2-7 Units, Subcutaneous, 4x Daily AC & at Bedtime, Jamel Randhawa III, DO, 3 Units at 03/04/24 0845    lisinopril (PRINIVIL,ZESTRIL) tablet 2.5 mg, 2.5 mg, Oral, Daily, Fannie Parker MD, 2.5 mg at 03/04/24 0846    Magnesium Standard Dose Replacement - Follow Nurse / BPA Driven Protocol, , Does not apply, PRN, Jamel Randhawa III, DO    mirtazapine (REMERON) tablet 15 mg, 15 mg, Oral, Nightly, Meliza Kahn K, APRN, 15 mg at 03/03/24 1924    nitroglycerin (NITROSTAT)  SL tablet 0.4 mg, 0.4 mg, Sublingual, Q5 Min PRN, Jamel Randhawa III, DO    ondansetron ODT (ZOFRAN-ODT) disintegrating tablet 4 mg, 4 mg, Oral, Q6H PRN **OR** ondansetron (ZOFRAN) injection 4 mg, 4 mg, Intravenous, Q6H PRN, Jamel Randhawa III, DO    Phosphorus Replacement - Follow Nurse / BPA Driven Protocol, , Does not apply, PRN, Jamel Randhawa III, DO    Potassium Replacement - Follow Nurse / BPA Driven Protocol, , Does not apply, PRN, Jamel Randhawa III, DO    QUEtiapine (SEROquel) tablet 50 mg, 50 mg, Oral, Q12H, Fannie Parker MD, 50 mg at 03/04/24 0846    temazepam (RESTORIL) capsule 15 mg, 15 mg, Oral, Nightly, Darrell Jhaveri DO, 15 mg at 03/03/24 1925    Valproic Acid (DEPAKENE) syrup 250 mg, 250 mg, Oral, Q8H, Meliza Kahn K, APRN, 250 mg at 03/03/24 1230    ziprasidone (GEODON) injection 10 mg, 10 mg, Intramuscular, Q4H PRN, Meliza Kahn K, APRN    Laboratory Results:   Lab Results   Component Value Date    GLUCOSE 198 (H) 03/02/2024    CALCIUM 9.5 03/02/2024     03/02/2024    K 3.6 03/02/2024    CO2 30.0 (H) 03/02/2024     03/02/2024    BUN 20 03/02/2024    CREATININE 0.84 03/02/2024    EGFRIFAFRI 102 02/21/2022    EGFRIFNONA 88 02/21/2022    BCR 23.8 03/02/2024    ANIONGAP 9.0 03/02/2024     Lab Results   Component Value Date    WBC 8.72 03/02/2024    HGB 15.9 03/02/2024    HCT 47.5 03/02/2024    MCV 91.2 03/02/2024     03/02/2024     Lab Results   Component Value Date    CHOL 220 (H) 01/08/2024    CHOL 120 08/01/2019    CHOL 117 04/15/2019     Lab Results   Component Value Date    HDL 41 01/08/2024    HDL 37 (L) 08/03/2023    HDL 41 04/03/2023     Lab Results   Component Value Date     (H) 01/08/2024     (H) 08/03/2023     (H) 04/03/2023     Lab Results   Component Value Date    TRIG 108 01/08/2024    TRIG 208 (H) 08/03/2023    TRIG 138 04/03/2023     Lab Results   Component Value Date    HGBA1C 9.20 (H) 02/25/2024      Lab Results   Component Value Date    INR 1.1 11/25/2019    INR 1.1 09/10/2018    PROTIME 12.5 11/25/2019    PROTIME 13.3 09/10/2018     Lab Results   Component Value Date    FOLATE 10.20 02/24/2024     Lab Results   Component Value Date    [id number] 223 02/24/2024             Assessment / Plan   Brief Patient Summary:  Too Tai is a 68 y.o. male who has past medical history of severe dementia with behavioral disturbances who presented to [facility] ED after experiencing a fall with increased confusion.  Patient's family endorses episodes of full body jerking which were thought to be response of hyperactivity to startle.       Plan:   Advanced Alzheimer's disease with behavioral disturbances  Increase in myoclonus-resolved  S/p fall  Decrease in appetite  Concern Rexulti was increasing patient's myoclonus. Continue to hold.  Continue Aricept 20 mg daily  Continue Seroquel 50 mg twice daily  QTc 486  Continue Geodon as needed for agitation; no required doses  Continue Restoril 15 mg nightly  Continue Remeron 15 mg nightly  Vitamin B12 223; IM cyanocobalamin 1000 mcg monthly  Continue Depakote 250 mg 3 times daily  Patient to follow-up with IVONNE Eckert  Patient is okay to discharge to facility from neurologic standpoint.  Please feel free to reach out with any questions.     I have discussed the above with bedside RN, Dr. Elizondo and patient.  Time spent with patient: 35 minutes in face-to-face evaluation and management of the patient.    Copied text in this note has been reviewed and is accurate as of 03/04/24.     IVONNE Santos

## 2024-03-04 NOTE — CASE MANAGEMENT/SOCIAL WORK
Case Management Discharge Note      Final Note: Spoke with wife at bedside. Patient has a bed at [facility]. Insurance has approved him to go. Spoke with Edith, with [facility], has a bed and can come today. Nurse to call report to [phone]. [facility]. Pharmacy is [facility]. Transport with [facility] at 1430. PCS is upload to [employer]. All parties are in agreement with discharge plan.         Selected Continued Care - Admitted Since 2/24/2024       Destination Coordination complete.      Service Provider Selected Services Address Phone Fax Patient Preferred    [facility] [address] [place], [place] [id number] [phone] --              Durable Medical Equipment    No services have been selected for the patient.                Dialysis/Infusion    No services have been selected for the patient.                Home Medical Care    No services have been selected for the patient.                Therapy    No services have been selected for the patient.                Community Resources    No services have been selected for the patient.                Community & DME    No services have been selected for the patient.                    Selected Continued Care - Episodes Includes continued care and service providers with selected services from the active episodes listed below      High Risk Care Management Episode start date: 1/9/2024   There are no active outsourced providers for this episode.                      Final Discharge Disposition Code: 03 - skilled nursing facility (SNF)

## 2024-03-04 NOTE — DISCHARGE SUMMARY
[facility] Medicine Services  DISCHARGE SUMMARY    Patient Name: Too Tai  : 1955  MRN: [id number]    Date of Admission: 2024 10:14 PM  Date of Discharge:  3/4/2024  Primary Care Physician: Des Geller MD    Consults       Date and Time Order Name Status Description    2024  2:44 AM Inpatient Neurology Consult General Completed             Hospital Course     Presenting Problem: dementia    Active Hospital Problems    Diagnosis  POA    **Spasm of muscle [M62.838]  Yes    Myoclonic jerking [G25.3]  Yes    Dementia with behavioral disturbance [F03.918]  Unknown    Frequent falls [R29.6]  Not Applicable    History of DVT (deep vein thrombosis) [Z86.718]  Not Applicable    Chronic anticoagulation [Z79.01]  Not Applicable    Hypomagnesemia [E83.42]  Yes    Benign essential HTN [I10]  Yes    Type 2 diabetes mellitus with hyperglycemia, with long-term current use of insulin [E11.65, Z79.4]  Not Applicable    Benign prostatic hyperplasia [N40.0]  Yes    Coronary artery disease involving native coronary artery of native heart [I25.10]  Yes      Resolved Hospital Problems    Diagnosis Date Resolved POA    Type 2 diabetes mellitus, with long-term current use of insulin [E11.9, Z79.4] 2024 Not Applicable          Hospital Course:  Too Tai is a 68 y.o. male with severe dementia living at home who presents with repeated falls. Family was concerned for jerking spells which appear to be myoclonic jerks, possibly worse with rexulti  Stay c/b significant agitation with transition to hospital, out of restraints  AM with help of depakote + seroquel     This patient's problems and plans were partially entered by my partner and updated as appropriate by me 24.    Severe dementia c/b behavioral disturbance  -seroquel + valproic acid per neurology,  -Current regimen seems to be helping with his behavioral disturbances but due to concern for sedation, Seroquel  decreased back to 50mg BID - continue.  -Monitor closely for increased agitation     Frequent falls w increased myoclonus  -neurology consulted and considers the myoclonus rexulti s/e, rexulti therefore stopped       HTN  -BP improving, hold amlodipine as he has been hypotensive overnight  - Would not treat w IV anti-hypertensives given risk of hypotension. Would not treat if elev unless correlated patient sx or calm with SBP >200     Hypokalemia and severe hypomagnesemia   -improved after replacement, PO Mg supp given severity  DMII - insulin as able--BS relatively stable  CAD  PAD bilateral lower extremity  H/o DVT on chronic anticoagulation - lovenox given challenges w po meds. Previously on cilostazol, apixaban, aspirin --> narrowed to anticoagulant only which is probably plan w least harm for discharge      Discharge Follow Up Recommendations for outpatient labs/diagnostics:   PCP in 1 week    Day of Discharge     HPI:   Wife at bedside, trying to get him to eat    Review of Systems  Limited ROS due to mental status  Wife at bedside reports no overnight events or issues currently    Vital Signs:   Temp:  [97.5 °F (36.4 °C)-98 °F (36.7 °C)] 97.9 °F (36.6 °C)  Heart Rate:  [71-87] 82  Resp:  [16-18] 18  BP: (101-179)/(49-89) 112/82      Physical Exam:  Constitutional: No acute distress, awake, alert  HENT: NCAT, mucous membranes moist  Respiratory: Respiratory effort normal   Cardiovascular: RRR  Gastrointestinal: Soft, nontender, nondistended  Musculoskeletal: No bilateral ankle edema  Psychiatric: Appropriate affect, cooperative  Neurologic: Disoriented at baseline, follows simple commands  Skin: No rashes      Pertinent  and/or Most Recent Results     LAB RESULTS:      Lab 03/02/24  1407 02/29/24  0852   WBC 8.72 9.98   HEMOGLOBIN 15.9 16.2   HEMATOCRIT 47.5 47.3   PLATELETS 311 287   NEUTROS ABS 3.95  --    IMMATURE GRANS (ABS) 0.02  --    LYMPHS ABS 2.70  --    MONOS ABS 0.99*  --    EOS ABS 0.97*  --    MCV  91.2 89.4         Lab 03/02/24  1407 02/29/24  0852 02/27/24  0646   SODIUM 139 137 140   POTASSIUM 3.6 4.1 4.1   CHLORIDE 100 100 103   CO2 30.0* 24.0 22.0   ANION GAP 9.0 13.0 15.0   BUN 20 13 9   CREATININE 0.84 0.74* 0.79   EGFR 95.6 99.3 97.4   GLUCOSE 198* 261* 172*   CALCIUM 9.5 9.0 9.1   MAGNESIUM 2.0 1.7 1.8   PHOSPHORUS 2.6  --   --          Lab 03/02/24  1407 02/27/24  0646   TOTAL PROTEIN 7.2 6.9   ALBUMIN 4.0 4.1   GLOBULIN 3.2  --    ALT (SGPT) 33 26   AST (SGOT) 23 28   BILIRUBIN 1.1 1.3*   INDIRECT BILIRUBIN  --  0.9   BILIRUBIN DIRECT  --  0.4*   ALK PHOS 93 82                     Brief Urine Lab Results  (Last result in the past 365 days)        Color   Clarity   Blood   Leuk Est   Nitrite   Protein   CREAT   Urine HCG        02/24/24 2354 Yellow   Clear   Negative   Negative   Negative   100 mg/dL (2+)                 Microbiology Results (last 10 days)       ** No results found for the last 240 hours. **            EEG    Result Date: 2/26/2024  Reason for referral: 68 y.o.male with altered mental status, jerking spells Technical Summary:  A 19 channel digital EEG was performed using the international 10-20 placement system, including eye leads and EKG leads. Duration: 23 minutes Findings: The patient is awake.  Diffuse medium amplitude 5 to 6 Hz theta is present symmetrically over both hemispheres.  A clear posterior rhythm is not present.  Occasional intermixed slower 4 Hz generalized delta is present.  Sleep is not seen.  Photic stimulation and hyperventilation are not performed.  No focal features or epileptiform activity are present. Video: Available Technical quality: Superior EKG: Regular, 60 bpm SUMMARY: Mild generalized slow No focal features or epileptiform activity are seen     Diffuse cerebral dysfunction of mild degree, nonspecific No evidence for epilepsy is seen on the study This report is transcribed using the Dragon dictation system.      CT Head Without Contrast    Result Date:  2/24/2024  CT HEAD WO CONTRAST Date of Exam: 2/24/2024 10:33 PM EST Indication: Head trauma, minor (Age >= 65y). Comparison: CT scan the head dated February 22, 2021 Technique: Axial CT images were obtained of the head without contrast administration.  Automated exposure control and iterative construction methods were used. Findings: There is mild diffuse generalized atrophy. There are low-attenuation areas in the periventricular white matter consistent with chronic microvascular ischemic change. There is no mass, mass effect or midline shift. There are no abnormal extra-axial fluid collections or areas of acute hemorrhage. The paranasal sinuses are clear. The mastoid air cells are clear.     Impression: Atrophy and chronic microvascular ischemic change. No acute intracranial process. Electronically Signed: Sam Smith MD  2/24/2024 11:04 PM EST  Workstation ID: [id number]    XR Chest 1 View    Result Date: 2/24/2024  XR CHEST 1 VW Date of Exam: 2/24/2024 10:23 PM EST Indication: Weak/Dizzy/AMS triage protocol Comparison: Chest radiograph dated February 21, 2022 Findings: There is postoperative change from midline sternotomy and coronary artery bypass grafting. The pulmonary vascular markings are normal. There is a small left effusion. There is left basilar atelectasis.     Impression: Small left effusion with left basilar atelectasis. Electronically Signed: Sam Smith MD  2/24/2024 10:36 PM EST  Workstation ID: [id number]             Results for orders placed in visit on 07/30/20    SCANNED - ECHOCARDIOGRAM      Plan for Follow-up of Pending Labs/Results: None pending    Discharge Details        Discharge Medications        New Medications        Instructions Start Date   cyanocobalamin 1000 MCG/ML injection   1,000 mcg, Intramuscular, Every 28 Days   Start Date: March 26, 2024     Enoxaparin Sodium 100 MG/ML solution prefilled syringe syringe  Commonly known as: LOVENOX   1 mg/kg (100 mg), Subcutaneous, 2 Times  Daily      insulin detemir 100 UNIT/ML injection  Commonly known as: LEVEMIR   7 Units, Subcutaneous, Every 12 Hours Scheduled      mirtazapine 15 MG tablet  Commonly known as: REMERON   15 mg, Oral, Nightly      Valproic Acid 250 MG/5ML solution syrup  Commonly known as: DEPAKENE   250 mg, Oral, Every 8 Hours Scheduled             Changes to Medications        Instructions Start Date   donepezil 10 MG tablet  Commonly known as: ARICEPT  What changed:   medication strength  how much to take  when to take this   20 mg, Oral, Daily   Start Date: March 5, 2024     temazepam 15 MG capsule  Commonly known as: Restoril  What changed:   when to take this  reasons to take this   15 mg, Oral, Nightly             Continue These Medications        Instructions Start Date   Accu-Chek Geri Plus w/Device kit   1 kit, Does not apply, 3 Times Daily      Accu-Chek Geri solution   1 bottle, In Vitro, As Needed      accu-chek soft touch lancets   Check sugars 3 times daily      Alcohol Prep pads   1 swab , Does not apply, 3 Times Daily      B-D UF III MINI PEN NEEDLES 31G X 5 MM misc  Generic drug: Insulin Pen Needle   USE AS DIRECTED      lisinopril 2.5 MG tablet  Commonly known as: PRINIVIL,ZESTRIL   2.5 mg, Oral, Daily      QUEtiapine 50 MG tablet  Commonly known as: SEROquel   50 mg, Oral, 2 Times Daily             Stop These Medications      aspirin 81 MG EC tablet     atorvastatin 40 MG tablet  Commonly known as: LIPITOR     cilostazol 50 MG tablet  Commonly known as: PLETAL     Eliquis 5 MG tablet tablet  Generic drug: apixaban     glucose blood test strip  Commonly known as: Accu-Chek Geri Plus     Januvia 100 MG tablet  Generic drug: SITagliptin     metFORMIN 1000 MG tablet  Commonly known as: GLUCOPHAGE     metoprolol succinate XL 25 MG 24 hr tablet  Commonly known as: TOPROL-XL     NovoLOG Mix 70/30 FlexPen (70-30) 100 UNIT/ML suspension pen-injector injection  Generic drug: insulin aspart prot & aspart     Rexulti 2 MG  tablet  Generic drug: Brexpiprazole              Allergies   Allergen Reactions    Bactrim [Sulfamethoxazole-Trimethoprim] Rash    Adhesive Tape Rash    Neosporin [Neomycin-Bacitracin Zn-Polymyx] Rash         Discharge Disposition:  Long Term Care (DC - External)    Diet:  Hospital:  Diet Order   Procedures    Diet: Regular/House Diet; Texture: Regular Texture (IDDSI 7); Fluid Consistency: Thin (IDDSI 0)            Activity:      Restrictions or Other Recommendations:         CODE STATUS:    Code Status and Medical Interventions:   Ordered at: 02/25/24 0219     Medical Intervention Limits:    NO intubation (DNI)     Code Status (Patient has no pulse and is not breathing):    No CPR (Do Not Attempt to Resuscitate)     Medical Interventions (Patient has pulse or is breathing):    Limited Support       Future Appointments   Date Time Provider Department Center   3/4/2024  2:30 PM MED 11  [facility]       Additional Instructions for the Follow-ups that You Need to Schedule       Discharge Follow-up with PCP   As directed       Currently Documented PCP:    Des Geller MD    PCP Phone Number:    [phone]     Follow Up Details: 1 week                      Joyce Elizondo DO  03/04/24      Time Spent on Discharge:  I spent  33  minutes on this discharge activity which included: face-to-face encounter with the patient, reviewing the data in the system, coordination of the care with the nursing staff as well as consultants, documentation, and entering orders.

## 2024-03-05 ENCOUNTER — TELEPHONE (OUTPATIENT)
Dept: INTERNAL MEDICINE | Facility: CLINIC | Age: 69
End: 2024-03-05
Payer: MEDICARE

## 2024-03-05 NOTE — TELEPHONE ENCOUNTER
Saturday 1 week ago. Patient went to hospital because he was jerking and fell 4 times. ER gave him benadryl and it helped and then put him on seizure medication.     Admitted to the hospital for 9 days.     They were discharged to nursing home/assisted living ([facility]) .     Wife tried to tell the hospital that that he needed more care. The hospital transferred him and didn't give her any of his medication and it is completely changed from what he has been on .    They transferred him and they didn't give her any medication before he was sent to the nursing home.     He was put in a room by himself. They don't have his medication there. His blood pressure in hospital was running high.     He hasn't had any medication since yesterday morning for diabetes or anything.    Per wife he also hasn't eaten in 5 days.     She would like to talk with someone about what she can do.

## 2024-03-05 NOTE — TELEPHONE ENCOUNTER
Spoke with Caroline, she is in the process of getting medications sent to facility that patient is in. She will call back this afternoon or tomorrow if patient doesn't receive any meds

## 2024-03-16 ENCOUNTER — READMISSION MANAGEMENT (OUTPATIENT)
Dept: CALL CENTER | Facility: HOSPITAL | Age: 69
End: 2024-03-16
Payer: MEDICARE

## 2024-03-16 NOTE — OUTREACH NOTE
Prep Survey      Flowsheet Row Responses   [facility] [place] facility patient discharged from? Non-BH   Is LACE score < 7 ? Non-BH Discharge   Eligibility Not Eligible   What are the reasons patient is not eligible? Other  [No admit]   Does the patient have one of the following disease processes/diagnoses(primary or secondary)? Other   Prep survey completed? Yes            Rosemary SHAFFER - Registered Nurse

## 2024-03-17 ENCOUNTER — HOSPITAL ENCOUNTER (EMERGENCY)
Facility: HOSPITAL | Age: 69
Discharge: NURSING, REHAB OR LONG-TERM CARE | End: 2024-03-17
Attending: EMERGENCY MEDICINE | Admitting: EMERGENCY MEDICINE
Payer: MEDICARE

## 2024-03-17 VITALS
TEMPERATURE: 98.4 F | BODY MASS INDEX: 26.51 KG/M2 | HEART RATE: 82 BPM | RESPIRATION RATE: 18 BRPM | HEIGHT: 73 IN | OXYGEN SATURATION: 97 % | WEIGHT: 200 LBS | SYSTOLIC BLOOD PRESSURE: 179 MMHG | DIASTOLIC BLOOD PRESSURE: 87 MMHG

## 2024-03-17 DIAGNOSIS — T14.8XXA HEMATOMA: ICD-10-CM

## 2024-03-17 DIAGNOSIS — Z79.01 ANTICOAGULATED: Primary | ICD-10-CM

## 2024-03-17 PROCEDURE — 99283 EMERGENCY DEPT VISIT LOW MDM: CPT

## 2024-03-17 NOTE — ED PROVIDER NOTES
Subjective   History of Present Illness  Mr Tai is sent from his nursing home after bruising was noted on his arm.  His wife tells me staff told her they did not have access to a Doppler ultrasound and wanted to rule out DVT.  He has history of dementia.  He has been prescribed Eliquis however family and staff have been having difficulty getting him to take his medicines regularly so currently he gets Lovenox shots.  His wife continues that he continues to receive those.  He is not able to provide any history.  He was admitted here on  through the fourth of this month with myoclonus and behavioral problems.  His wife tells me he has been discharged to a nursing home.      Review of Systems    Past Medical History:   Diagnosis Date    Coronary artery disease     Dementia     Diabetes mellitus     Hyperlipidemia     Peripheral vascular disease        Allergies   Allergen Reactions    Bactrim [Sulfamethoxazole-Trimethoprim] Rash    Adhesive Tape Rash    Neosporin [Neomycin-Bacitracin Zn-Polymyx] Rash       Past Surgical History:   Procedure Laterality Date    CORONARY ARTERY BYPASS GRAFT      FEMORAL ARTERY - POPLITEAL ARTERY BYPASS GRAFT         Family History   Problem Relation Age of Onset    Diabetes Mother     Heart disease Father     Hypertension Father     Hyperlipidemia Father     Diabetes Sister     Diabetes Maternal Grandfather     Diabetes Sister     Diabetes Sister        Social History     Socioeconomic History    Marital status:    Tobacco Use    Smoking status: Former     Current packs/day: 0.00     Types: Cigarettes     Quit date:      Years since quittin.2     Passive exposure: Never    Smokeless tobacco: Former   Vaping Use    Vaping status: Never Used   Substance and Sexual Activity    Alcohol use: No    Drug use: No    Sexual activity: Never           Objective   Physical Exam  Vitals and nursing note reviewed.   Constitutional:       Comments: He is sitting up smiling in  no distress.   HENT:      Head: Normocephalic and atraumatic.   Cardiovascular:      Rate and Rhythm: Normal rate and regular rhythm.      Heart sounds: No murmur heard.  Pulmonary:      Effort: Pulmonary effort is normal.      Breath sounds: Normal breath sounds. No wheezing or rales.   Musculoskeletal:      Cervical back: Normal range of motion and neck supple.      Comments: There is a purpleish bruise and palpable hematoma over the distal biceps muscle on the left.  It is not tense.  He has normal range of motion of his arm and no significant tenderness.  His wife advises that he seemed to be having pain on the other arm as well.  When I examined that arm he has noted tenderness or bruising.  He has normal range of motion of that as well.   Skin:     Findings: Bruising present.   Neurological:      Mental Status: He is alert.         Procedures           ED Course  ED Course as of 03/17/24 1517   Sun Mar 17, 2024   1057 I have reviewed his recent records.  He currently is in a nursing home.  I spoke with his wife.  This is a blood clot but is under the skin and is at low risk of embolizing.  I have encouraged her to continue the Lovenox.  I have advised her that this will likely take a couple of weeks to fully resolve.  She tells me he was sent here for Doppler ultrasound but I have advised her this is not necessary as it appears to be a hematoma in the muscle.  Has good perfusion and pulses distally. [DT]      ED Course User Index  [DT] Pop Alan MD                                             Medical Decision Making  Problems Addressed:  Anticoagulated: acute illness or injury  Hematoma: acute illness or injury        Final diagnoses:   Anticoagulated   Hematoma       ED Disposition  ED Disposition       ED Disposition   Discharge    Condition   Stable    Comment   --               Des Geller MD  [address] [place] [address] [address]  [place] [id number]  [phone]               Medication List       No changes were made to your prescriptions during this visit.            Pop Alan MD  03/17/24 0099

## 2024-03-17 NOTE — DISCHARGE INSTRUCTIONS
Tylenol as needed for discomfort.  Return if any concerns.  Call his primary care provider to arrange follow-up.

## 2024-03-21 ENCOUNTER — TELEPHONE (OUTPATIENT)
Dept: INTERNAL MEDICINE | Facility: CLINIC | Age: 69
End: 2024-03-21
Payer: MEDICARE

## 2024-03-21 NOTE — TELEPHONE ENCOUNTER
Caller: [facility]    Relationship:     Best call back number:     [phone]     What orders are you requesting (i.e. lab or imaging): VERBAL ORDERS FOR HOME HEALTH    In what timeframe would the patient need to come in:     Where will you receive your lab/imaging services: HOME    Additional notes:

## 2024-03-25 ENCOUNTER — PATIENT OUTREACH (OUTPATIENT)
Dept: CASE MANAGEMENT | Facility: OTHER | Age: 69
End: 2024-03-25
Payer: MEDICARE

## 2024-03-25 NOTE — OUTREACH NOTE
AMBULATORY CASE MANAGEMENT NOTE    Name and Relationship of Patient/Support Person: J[place] RICHTER - Emergency Contact  Emergency Contact    Care Coordination    RN-ACM called [facility], and confirmed patient was d/c'd home on 3/21/24.    Patient Outreach    RN-ACM called patient's wife, [place], to follow up regarding patient's HRCM needs.  Wife stated patient is eating, drinking, and sleeping okay; he is independent with mobility, without using an assistive device.  Wife stated she has to give him verbal cues as to where to go, direction to walk. She said Home Health has not called her yet, but she was informed they will call and come.  Discussed additional help that is available to the wife and patient if needed.  Wife stated she has some private sitters available to her, if she needs them.  No RN-ACM needs voiced at this time.  Confirmed wife has RN-ACM phone number if needed.  PCP appt is 4/1/24.     Callie KAYE  Ambulatory Case Management    3/25/2024, 10:58 EDT

## 2024-03-27 ENCOUNTER — APPOINTMENT (OUTPATIENT)
Dept: CT IMAGING | Facility: HOSPITAL | Age: 69
End: 2024-03-27
Payer: MEDICARE

## 2024-03-27 ENCOUNTER — HOSPITAL ENCOUNTER (INPATIENT)
Facility: HOSPITAL | Age: 69
LOS: 7 days | Discharge: HOME | End: 2024-04-04
Attending: EMERGENCY MEDICINE | Admitting: INTERNAL MEDICINE
Payer: MEDICARE

## 2024-03-27 ENCOUNTER — APPOINTMENT (OUTPATIENT)
Dept: GENERAL RADIOLOGY | Facility: HOSPITAL | Age: 69
End: 2024-03-27
Payer: MEDICARE

## 2024-03-27 ENCOUNTER — TELEPHONE (OUTPATIENT)
Dept: INTERNAL MEDICINE | Facility: CLINIC | Age: 69
End: 2024-03-27
Payer: MEDICARE
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
## 2024-03-27 DIAGNOSIS — Z79.01 CHRONIC ANTICOAGULATION: ICD-10-CM

## 2024-03-27 DIAGNOSIS — R82.71 BACTERIURIA: ICD-10-CM

## 2024-03-27 DIAGNOSIS — N30.00 ACUTE CYSTITIS WITHOUT HEMATURIA: Primary | ICD-10-CM

## 2024-03-27 DIAGNOSIS — Z86.718 HISTORY OF DVT (DEEP VEIN THROMBOSIS): ICD-10-CM

## 2024-03-27 DIAGNOSIS — R29.6 FREQUENT FALLS: ICD-10-CM

## 2024-03-27 DIAGNOSIS — S09.90XA INJURY OF HEAD, INITIAL ENCOUNTER: ICD-10-CM

## 2024-03-27 DIAGNOSIS — Z86.59 HISTORY OF DEMENTIA: ICD-10-CM

## 2024-03-27 DIAGNOSIS — R29.6 MULTIPLE FALLS: ICD-10-CM

## 2024-03-27 DIAGNOSIS — R40.1 STUPOR: ICD-10-CM

## 2024-03-27 DIAGNOSIS — F03.918 DEMENTIA WITH BEHAVIORAL DISTURBANCE: ICD-10-CM
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
## 2024-03-27 PROBLEM — R41.82 AMS (ALTERED MENTAL STATUS): Status: ACTIVE | Noted: 2024-03-27

## 2024-03-27 PROBLEM — N39.0 ACUTE UTI (URINARY TRACT INFECTION): Status: ACTIVE | Noted: 2024-03-27

## 2024-03-27 LAB
ALBUMIN SERPL-MCNC: 4.2 G/DL (ref 3.5–5.2)
ALBUMIN/GLOB SERPL: 1.2 G/DL
ALP SERPL-CCNC: 89 U/L (ref 39–117)
ALT SERPL W P-5'-P-CCNC: 22 U/L (ref 1–41)
ANION GAP SERPL CALCULATED.3IONS-SCNC: 14 MMOL/L (ref 5–15)
AST SERPL-CCNC: 22 U/L (ref 1–40)
BACTERIA UR QL AUTO: ABNORMAL /HPF
BASOPHILS # BLD AUTO: 0.02 10*3/MM3 (ref 0–0.2)
BASOPHILS NFR BLD AUTO: 0.2 % (ref 0–1.5)
BILIRUB SERPL-MCNC: 1 MG/DL (ref 0–1.2)
BUN SERPL-MCNC: 14 MG/DL (ref 8–23)
BUN/CREAT SERPL: 18.2 (ref 7–25)
CALCIUM SPEC-SCNC: 9.4 MG/DL (ref 8.6–10.5)
CHLORIDE SERPL-SCNC: 96 MMOL/L (ref 98–107)
CK SERPL-CCNC: 72 U/L (ref 20–200)
CO2 SERPL-SCNC: 26 MMOL/L (ref 22–29)
CREAT SERPL-MCNC: 0.77 MG/DL (ref 0.76–1.27)
D-LACTATE SERPL-SCNC: 4 MMOL/L (ref 0.5–2)
D-LACTATE SERPL-SCNC: 4.3 MMOL/L (ref 0.5–2)
DEPRECATED RDW RBC AUTO: 42.8 FL (ref 37–54)
EGFRCR SERPLBLD CKD-EPI 2021: 98.1 ML/MIN/1.73
EOSINOPHIL # BLD AUTO: 0 10*3/MM3 (ref 0–0.4)
EOSINOPHIL NFR BLD AUTO: 0 % (ref 0.3–6.2)
ERYTHROCYTE [DISTWIDTH] IN BLOOD BY AUTOMATED COUNT: 12.4 % (ref 12.3–15.4)
ERYTHROCYTE [SEDIMENTATION RATE] IN BLOOD: 31 MM/HR (ref 0–20)
GLOBULIN UR ELPH-MCNC: 3.4 GM/DL
GLUCOSE BLDC GLUCOMTR-MCNC: 320 MG/DL (ref 70–130)
GLUCOSE SERPL-MCNC: 305 MG/DL (ref 65–99)
HBA1C MFR BLD: 9.2 % (ref 4.8–5.6)
HCT VFR BLD AUTO: 44.7 % (ref 37.5–51)
HGB BLD-MCNC: 15.3 G/DL (ref 13–17.7)
HOLD SPECIMEN: NORMAL
HYALINE CASTS UR QL AUTO: ABNORMAL /LPF
IMM GRANULOCYTES # BLD AUTO: 0.09 10*3/MM3 (ref 0–0.05)
IMM GRANULOCYTES NFR BLD AUTO: 1 % (ref 0–0.5)
LYMPHOCYTES # BLD AUTO: 1.09 10*3/MM3 (ref 0.7–3.1)
LYMPHOCYTES NFR BLD AUTO: 11.9 % (ref 19.6–45.3)
MCH RBC QN AUTO: 31.9 PG (ref 26.6–33)
MCHC RBC AUTO-ENTMCNC: 34.2 G/DL (ref 31.5–35.7)
MCV RBC AUTO: 93.1 FL (ref 79–97)
MONOCYTES # BLD AUTO: 0.47 10*3/MM3 (ref 0.1–0.9)
MONOCYTES NFR BLD AUTO: 5.1 % (ref 5–12)
NEUTROPHILS NFR BLD AUTO: 7.51 10*3/MM3 (ref 1.7–7)
NEUTROPHILS NFR BLD AUTO: 81.8 % (ref 42.7–76)
NRBC BLD AUTO-RTO: 0 /100 WBC (ref 0–0.2)
PLATELET # BLD AUTO: 300 10*3/MM3 (ref 140–450)
PMV BLD AUTO: 9.7 FL (ref 6–12)
POTASSIUM SERPL-SCNC: 5.1 MMOL/L (ref 3.5–5.2)
PROCALCITONIN SERPL-MCNC: 0.02 NG/ML (ref 0–0.25)
PROT SERPL-MCNC: 7.6 G/DL (ref 6–8.5)
QT INTERVAL: 406 MS
QTC INTERVAL: 459 MS
RBC # BLD AUTO: 4.8 10*6/MM3 (ref 4.14–5.8)
RBC # UR STRIP: ABNORMAL /HPF
REF LAB TEST METHOD: ABNORMAL
SODIUM SERPL-SCNC: 136 MMOL/L (ref 136–145)
SQUAMOUS #/AREA URNS HPF: ABNORMAL /HPF
TROPONIN T SERPL HS-MCNC: 15 NG/L
VALPROATE SERPL-MCNC: <2.8 MCG/ML (ref 50–125)
WBC # UR STRIP: ABNORMAL /HPF
WBC NRBC COR # BLD AUTO: 9.18 10*3/MM3 (ref 3.4–10.8)
WHOLE BLOOD HOLD COAG: NORMAL
WHOLE BLOOD HOLD SPECIMEN: NORMAL

## 2024-03-27 PROCEDURE — 72125 CT NECK SPINE W/O DYE: CPT

## 2024-03-27 PROCEDURE — 87040 BLOOD CULTURE FOR BACTERIA: CPT | Performed by: PHYSICIAN ASSISTANT

## 2024-03-27 PROCEDURE — G0378 HOSPITAL OBSERVATION PER HR: HCPCS

## 2024-03-27 PROCEDURE — 84145 PROCALCITONIN (PCT): CPT | Performed by: PHYSICIAN ASSISTANT

## 2024-03-27 PROCEDURE — 83036 HEMOGLOBIN GLYCOSYLATED A1C: CPT | Performed by: NURSE PRACTITIONER

## 2024-03-27 PROCEDURE — 80053 COMPREHEN METABOLIC PANEL: CPT | Performed by: PHYSICIAN ASSISTANT

## 2024-03-27 PROCEDURE — 87077 CULTURE AEROBIC IDENTIFY: CPT | Performed by: PHYSICIAN ASSISTANT

## 2024-03-27 PROCEDURE — 25810000003 SODIUM CHLORIDE 0.9 % SOLUTION: Performed by: INTERNAL MEDICINE

## 2024-03-27 PROCEDURE — 80164 ASSAY DIPROPYLACETIC ACD TOT: CPT | Performed by: NURSE PRACTITIONER

## 2024-03-27 PROCEDURE — 82607 VITAMIN B-12: CPT | Performed by: INTERNAL MEDICINE

## 2024-03-27 PROCEDURE — 87186 SC STD MICRODIL/AGAR DIL: CPT | Performed by: PHYSICIAN ASSISTANT

## 2024-03-27 PROCEDURE — 99285 EMERGENCY DEPT VISIT HI MDM: CPT

## 2024-03-27 PROCEDURE — 81001 URINALYSIS AUTO W/SCOPE: CPT | Performed by: PHYSICIAN ASSISTANT

## 2024-03-27 PROCEDURE — 93005 ELECTROCARDIOGRAM TRACING: CPT | Performed by: EMERGENCY MEDICINE

## 2024-03-27 PROCEDURE — 25010000002 HALOPERIDOL LACTATE PER 5 MG

## 2024-03-27 PROCEDURE — 87086 URINE CULTURE/COLONY COUNT: CPT | Performed by: PHYSICIAN ASSISTANT

## 2024-03-27 PROCEDURE — 63710000001 INSULIN DETEMIR PER 5 UNITS: Performed by: NURSE PRACTITIONER

## 2024-03-27 PROCEDURE — 71045 X-RAY EXAM CHEST 1 VIEW: CPT

## 2024-03-27 PROCEDURE — 36415 COLL VENOUS BLD VENIPUNCTURE: CPT

## 2024-03-27 PROCEDURE — 83605 ASSAY OF LACTIC ACID: CPT | Performed by: PHYSICIAN ASSISTANT

## 2024-03-27 PROCEDURE — 84484 ASSAY OF TROPONIN QUANT: CPT | Performed by: PHYSICIAN ASSISTANT

## 2024-03-27 PROCEDURE — 82550 ASSAY OF CK (CPK): CPT | Performed by: INTERNAL MEDICINE

## 2024-03-27 PROCEDURE — 25010000002 LORAZEPAM PER 2 MG

## 2024-03-27 PROCEDURE — 70450 CT HEAD/BRAIN W/O DYE: CPT

## 2024-03-27 PROCEDURE — 63710000001 INSULIN LISPRO (HUMAN) PER 5 UNITS: Performed by: NURSE PRACTITIONER

## 2024-03-27 PROCEDURE — 85652 RBC SED RATE AUTOMATED: CPT | Performed by: INTERNAL MEDICINE

## 2024-03-27 PROCEDURE — 82948 REAGENT STRIP/BLOOD GLUCOSE: CPT

## 2024-03-27 PROCEDURE — 85025 COMPLETE CBC W/AUTO DIFF WBC: CPT | Performed by: PHYSICIAN ASSISTANT

## 2024-03-27 PROCEDURE — 99222 1ST HOSP IP/OBS MODERATE 55: CPT | Performed by: INTERNAL MEDICINE

## 2024-03-27 RX ORDER — AMOXICILLIN 250 MG
2 CAPSULE ORAL 2 TIMES DAILY PRN
Status: CANCELLED | OUTPATIENT
Start: 2024-03-27

## 2024-03-27 RX ORDER — MIRTAZAPINE 15 MG/1
15 TABLET, FILM COATED ORAL NIGHTLY
Status: CANCELLED | OUTPATIENT
Start: 2024-03-27

## 2024-03-27 RX ORDER — DEXTROSE MONOHYDRATE 25 G/50ML
25 INJECTION, SOLUTION INTRAVENOUS
Status: DISCONTINUED | OUTPATIENT
Start: 2024-03-27 | End: 2024-04-04 | Stop reason: HOSPADM

## 2024-03-27 RX ORDER — ENOXAPARIN SODIUM 100 MG/ML
1 INJECTION SUBCUTANEOUS 2 TIMES DAILY
Status: CANCELLED | OUTPATIENT
Start: 2024-03-27 | End: 2024-04-03

## 2024-03-27 RX ORDER — CALCIUM CARBONATE 500 MG/1
2 TABLET, CHEWABLE ORAL 2 TIMES DAILY PRN
Status: CANCELLED | OUTPATIENT
Start: 2024-03-27

## 2024-03-27 RX ORDER — DEXTROSE MONOHYDRATE 25 G/50ML
25 INJECTION, SOLUTION INTRAVENOUS
Status: CANCELLED | OUTPATIENT
Start: 2024-03-27

## 2024-03-27 RX ORDER — ACETAMINOPHEN 650 MG/1
650 SUPPOSITORY RECTAL EVERY 4 HOURS PRN
Status: CANCELLED | OUTPATIENT
Start: 2024-03-27

## 2024-03-27 RX ORDER — SODIUM CHLORIDE 9 MG/ML
40 INJECTION, SOLUTION INTRAVENOUS AS NEEDED
Status: CANCELLED | OUTPATIENT
Start: 2024-03-27

## 2024-03-27 RX ORDER — POLYETHYLENE GLYCOL 3350 17 G/17G
17 POWDER, FOR SOLUTION ORAL DAILY PRN
Status: CANCELLED | OUTPATIENT
Start: 2024-03-27

## 2024-03-27 RX ORDER — DONEPEZIL HYDROCHLORIDE 10 MG/1
20 TABLET, FILM COATED ORAL DAILY
Status: DISCONTINUED | OUTPATIENT
Start: 2024-03-28 | End: 2024-04-04 | Stop reason: HOSPADM

## 2024-03-27 RX ORDER — MIRTAZAPINE 15 MG/1
15 TABLET, FILM COATED ORAL NIGHTLY
Status: DISCONTINUED | OUTPATIENT
Start: 2024-03-27 | End: 2024-04-04 | Stop reason: HOSPADM

## 2024-03-27 RX ORDER — ACETAMINOPHEN 650 MG/1
650 SUPPOSITORY RECTAL EVERY 4 HOURS PRN
Status: DISCONTINUED | OUTPATIENT
Start: 2024-03-27 | End: 2024-04-04 | Stop reason: HOSPADM

## 2024-03-27 RX ORDER — SODIUM CHLORIDE 0.9 % (FLUSH) 0.9 %
10 SYRINGE (ML) INJECTION EVERY 12 HOURS SCHEDULED
Status: DISCONTINUED | OUTPATIENT
Start: 2024-03-27 | End: 2024-04-03

## 2024-03-27 RX ORDER — ENOXAPARIN SODIUM 100 MG/ML
1 INJECTION SUBCUTANEOUS 2 TIMES DAILY
Status: DISCONTINUED | OUTPATIENT
Start: 2024-03-27 | End: 2024-03-27

## 2024-03-27 RX ORDER — BISACODYL 10 MG
10 SUPPOSITORY, RECTAL RECTAL DAILY PRN
Status: CANCELLED | OUTPATIENT
Start: 2024-03-27

## 2024-03-27 RX ORDER — IBUPROFEN 600 MG/1
1 TABLET ORAL
Status: DISCONTINUED | OUTPATIENT
Start: 2024-03-27 | End: 2024-04-04 | Stop reason: HOSPADM

## 2024-03-27 RX ORDER — ACETAMINOPHEN 160 MG/5ML
650 SOLUTION ORAL EVERY 4 HOURS PRN
Status: DISCONTINUED | OUTPATIENT
Start: 2024-03-27 | End: 2024-04-04 | Stop reason: HOSPADM

## 2024-03-27 RX ORDER — ONDANSETRON 2 MG/ML
4 INJECTION INTRAMUSCULAR; INTRAVENOUS EVERY 6 HOURS PRN
Status: DISCONTINUED | OUTPATIENT
Start: 2024-03-27 | End: 2024-04-04 | Stop reason: HOSPADM

## 2024-03-27 RX ORDER — SODIUM CHLORIDE 0.9 % (FLUSH) 0.9 %
10 SYRINGE (ML) INJECTION AS NEEDED
Status: CANCELLED | OUTPATIENT
Start: 2024-03-27

## 2024-03-27 RX ORDER — QUETIAPINE FUMARATE 25 MG/1
50 TABLET, FILM COATED ORAL 2 TIMES DAILY
Status: DISCONTINUED | OUTPATIENT
Start: 2024-03-27 | End: 2024-04-04 | Stop reason: HOSPADM

## 2024-03-27 RX ORDER — NITROGLYCERIN 0.4 MG/1
0.4 TABLET SUBLINGUAL
Status: DISCONTINUED | OUTPATIENT
Start: 2024-03-27 | End: 2024-04-04 | Stop reason: HOSPADM

## 2024-03-27 RX ORDER — SODIUM CHLORIDE 0.9 % (FLUSH) 0.9 %
10 SYRINGE (ML) INJECTION AS NEEDED
Status: DISCONTINUED | OUTPATIENT
Start: 2024-03-27 | End: 2024-04-04 | Stop reason: HOSPADM

## 2024-03-27 RX ORDER — INSULIN LISPRO 100 [IU]/ML
2-7 INJECTION, SOLUTION INTRAVENOUS; SUBCUTANEOUS
Status: DISCONTINUED | OUTPATIENT
Start: 2024-03-27 | End: 2024-04-04 | Stop reason: HOSPADM

## 2024-03-27 RX ORDER — DONEPEZIL HYDROCHLORIDE 10 MG/1
20 TABLET, FILM COATED ORAL DAILY
Status: CANCELLED | OUTPATIENT
Start: 2024-03-27

## 2024-03-27 RX ORDER — SODIUM CHLORIDE 9 MG/ML
75 INJECTION, SOLUTION INTRAVENOUS CONTINUOUS
Status: ACTIVE | OUTPATIENT
Start: 2024-03-27 | End: 2024-03-28

## 2024-03-27 RX ORDER — NITROGLYCERIN 0.4 MG/1
0.4 TABLET SUBLINGUAL
Status: CANCELLED | OUTPATIENT
Start: 2024-03-27

## 2024-03-27 RX ORDER — IBUPROFEN 600 MG/1
1 TABLET ORAL
Status: CANCELLED | OUTPATIENT
Start: 2024-03-27

## 2024-03-27 RX ORDER — HALOPERIDOL 5 MG/ML
5 INJECTION INTRAMUSCULAR ONCE
Status: COMPLETED | OUTPATIENT
Start: 2024-03-27 | End: 2024-03-27

## 2024-03-27 RX ORDER — HALOPERIDOL 5 MG/ML
INJECTION INTRAMUSCULAR
Status: COMPLETED
Start: 2024-03-27 | End: 2024-03-27

## 2024-03-27 RX ORDER — ONDANSETRON 4 MG/1
4 TABLET, ORALLY DISINTEGRATING ORAL EVERY 6 HOURS PRN
Status: DISCONTINUED | OUTPATIENT
Start: 2024-03-27 | End: 2024-04-04 | Stop reason: HOSPADM

## 2024-03-27 RX ORDER — CEFUROXIME AXETIL 250 MG/1
250 TABLET ORAL ONCE
Status: DISCONTINUED | OUTPATIENT
Start: 2024-03-27 | End: 2024-03-27

## 2024-03-27 RX ORDER — BISACODYL 5 MG/1
5 TABLET, DELAYED RELEASE ORAL DAILY PRN
Status: DISCONTINUED | OUTPATIENT
Start: 2024-03-27 | End: 2024-04-04 | Stop reason: HOSPADM

## 2024-03-27 RX ORDER — ACETAMINOPHEN 325 MG/1
650 TABLET ORAL EVERY 4 HOURS PRN
Status: CANCELLED | OUTPATIENT
Start: 2024-03-27

## 2024-03-27 RX ORDER — LISINOPRIL 5 MG/1
2.5 TABLET ORAL DAILY
Status: DISCONTINUED | OUTPATIENT
Start: 2024-03-28 | End: 2024-03-30

## 2024-03-27 RX ORDER — ACETAMINOPHEN 160 MG/5ML
650 SOLUTION ORAL EVERY 4 HOURS PRN
Status: CANCELLED | OUTPATIENT
Start: 2024-03-27

## 2024-03-27 RX ORDER — VALPROIC ACID 250 MG/5ML
250 SOLUTION ORAL EVERY 8 HOURS SCHEDULED
Status: DISCONTINUED | OUTPATIENT
Start: 2024-03-27 | End: 2024-04-03

## 2024-03-27 RX ORDER — ACETAMINOPHEN 325 MG/1
650 TABLET ORAL EVERY 4 HOURS PRN
Status: DISCONTINUED | OUTPATIENT
Start: 2024-03-27 | End: 2024-04-04 | Stop reason: HOSPADM

## 2024-03-27 RX ORDER — LORAZEPAM 2 MG/ML
INJECTION INTRAMUSCULAR
Status: COMPLETED
Start: 2024-03-27 | End: 2024-03-27

## 2024-03-27 RX ORDER — INSULIN LISPRO 100 [IU]/ML
2-7 INJECTION, SOLUTION INTRAVENOUS; SUBCUTANEOUS
Status: CANCELLED | OUTPATIENT
Start: 2024-03-27

## 2024-03-27 RX ORDER — CALCIUM CARBONATE 500 MG/1
2 TABLET, CHEWABLE ORAL 3 TIMES DAILY PRN
Status: DISCONTINUED | OUTPATIENT
Start: 2024-03-27 | End: 2024-04-04 | Stop reason: HOSPADM

## 2024-03-27 RX ORDER — SODIUM CHLORIDE 0.9 % (FLUSH) 0.9 %
10 SYRINGE (ML) INJECTION AS NEEDED
Status: DISCONTINUED | OUTPATIENT
Start: 2024-03-27 | End: 2024-03-27 | Stop reason: SDUPTHER

## 2024-03-27 RX ORDER — QUETIAPINE FUMARATE 25 MG/1
50 TABLET, FILM COATED ORAL 2 TIMES DAILY
Status: CANCELLED | OUTPATIENT
Start: 2024-03-27

## 2024-03-27 RX ORDER — AMOXICILLIN 250 MG
2 CAPSULE ORAL 2 TIMES DAILY PRN
Status: DISCONTINUED | OUTPATIENT
Start: 2024-03-27 | End: 2024-04-04 | Stop reason: HOSPADM

## 2024-03-27 RX ORDER — SODIUM CHLORIDE 0.9 % (FLUSH) 0.9 %
10 SYRINGE (ML) INJECTION EVERY 12 HOURS SCHEDULED
Status: CANCELLED | OUTPATIENT
Start: 2024-03-27

## 2024-03-27 RX ORDER — NICOTINE POLACRILEX 4 MG
15 LOZENGE BUCCAL
Status: CANCELLED | OUTPATIENT
Start: 2024-03-27

## 2024-03-27 RX ORDER — BISACODYL 5 MG/1
5 TABLET, DELAYED RELEASE ORAL DAILY PRN
Status: CANCELLED | OUTPATIENT
Start: 2024-03-27

## 2024-03-27 RX ORDER — LORAZEPAM 2 MG/ML
2 INJECTION INTRAMUSCULAR ONCE
Status: COMPLETED | OUTPATIENT
Start: 2024-03-27 | End: 2024-03-27

## 2024-03-27 RX ORDER — LISINOPRIL 5 MG/1
2.5 TABLET ORAL DAILY
Status: CANCELLED | OUTPATIENT
Start: 2024-03-27

## 2024-03-27 RX ORDER — SODIUM CHLORIDE 9 MG/ML
40 INJECTION, SOLUTION INTRAVENOUS AS NEEDED
Status: DISCONTINUED | OUTPATIENT
Start: 2024-03-27 | End: 2024-04-03

## 2024-03-27 RX ORDER — ONDANSETRON 2 MG/ML
4 INJECTION INTRAMUSCULAR; INTRAVENOUS EVERY 6 HOURS PRN
Status: CANCELLED | OUTPATIENT
Start: 2024-03-27

## 2024-03-27 RX ORDER — VALPROIC ACID 250 MG/5ML
250 SOLUTION ORAL EVERY 8 HOURS SCHEDULED
Status: CANCELLED | OUTPATIENT
Start: 2024-03-27

## 2024-03-27 RX ORDER — NICOTINE POLACRILEX 4 MG
15 LOZENGE BUCCAL
Status: DISCONTINUED | OUTPATIENT
Start: 2024-03-27 | End: 2024-04-04 | Stop reason: HOSPADM

## 2024-03-27 RX ORDER — POLYETHYLENE GLYCOL 3350 17 G/17G
17 POWDER, FOR SOLUTION ORAL DAILY PRN
Status: DISCONTINUED | OUTPATIENT
Start: 2024-03-27 | End: 2024-04-04 | Stop reason: HOSPADM

## 2024-03-27 RX ORDER — BISACODYL 10 MG
10 SUPPOSITORY, RECTAL RECTAL DAILY PRN
Status: DISCONTINUED | OUTPATIENT
Start: 2024-03-27 | End: 2024-04-04 | Stop reason: HOSPADM

## 2024-03-27 RX ORDER — ONDANSETRON 4 MG/1
4 TABLET, ORALLY DISINTEGRATING ORAL EVERY 6 HOURS PRN
Status: CANCELLED | OUTPATIENT
Start: 2024-03-27

## 2024-03-27 RX ORDER — SODIUM CHLORIDE 0.9 % (FLUSH) 0.9 %
10 SYRINGE (ML) INJECTION AS NEEDED
Status: DISCONTINUED | OUTPATIENT
Start: 2024-03-27 | End: 2024-04-03

## 2024-03-27 RX ADMIN — LORAZEPAM 2 MG: 2 INJECTION INTRAMUSCULAR at 14:48

## 2024-03-27 RX ADMIN — MIRTAZAPINE 15 MG: 15 TABLET, FILM COATED ORAL at 22:26

## 2024-03-27 RX ADMIN — QUETIAPINE FUMARATE 50 MG: 25 TABLET ORAL at 22:26

## 2024-03-27 RX ADMIN — SODIUM CHLORIDE 500 ML: 9 INJECTION, SOLUTION INTRAVENOUS at 22:27

## 2024-03-27 RX ADMIN — HALOPERIDOL LACTATE 5 MG: 5 INJECTION, SOLUTION INTRAMUSCULAR at 14:49

## 2024-03-27 RX ADMIN — INSULIN LISPRO 5 UNITS: 100 INJECTION, SOLUTION INTRAVENOUS; SUBCUTANEOUS at 22:25

## 2024-03-27 RX ADMIN — INSULIN DETEMIR 7 UNITS: 100 INJECTION, SOLUTION SUBCUTANEOUS at 22:25

## 2024-03-27 RX ADMIN — HALOPERIDOL 5 MG: 5 INJECTION INTRAMUSCULAR at 14:49

## 2024-03-27 RX ADMIN — LORAZEPAM 2 MG: 2 INJECTION INTRAMUSCULAR; INTRAVENOUS at 14:48

## 2024-03-27 NOTE — TELEPHONE ENCOUNTER
Patient is home from nursing home. Patient has appointment on Monday. Per wife he has been doing ok but last night he started vomiting and was up most of the night vomiting about every 45 minutes. Per wife it has mostly been phlegm. She is unsure what is causing it but it has stopped.     Wife stated that he is now started to fall forward and has fallen a few times. He continually tries to get up and then falls forward.     She is wanting to know what she can do. She does not want to go back to hospital. She is wanting to know if there is something that they can give him to let him rest.     Please advise.

## 2024-03-27 NOTE — H&P
"    [facility] Medicine Services  HISTORY AND PHYSICAL    Patient Name: Too Tai  : 1955  MRN: [id number]  Primary Care Physician: Des Geller MD  Date of admission: 3/27/2024      Subjective   Subjective     Chief Complaint:  Altered mental status/falls    HPI:  Too Tai is a 68 y.o. male with PMH significant for CAD, HTN, HLD, T2DM, chronic anticoagulation, dementia with behavioral disturbance, BPH presented to [facility] ED on 3/27/2024 d/t altered mental status and frequent falls.  All history was obtained from the wife who states she is having difficulty taking care of him.  Since 8 PM last evening (3/26) patient was making a grunting noise and and would fall forward hitting his face or head on the ground or what ever was in the way towards the ground.  Wife denies any loss of consciousness but states she has never seen this before.  Patient is on Lovenox through 4/3/2024 d/t previous admission (2024-3/4/2024) for \"jerking motions\", agitation and dementia with behavioral disturbances.  Patient will be admitted to hospital medicine service for evaluation and treatment.      Personal History     Past Medical History:   Diagnosis Date    Coronary artery disease     Dementia     Diabetes mellitus     Hyperlipidemia     Peripheral vascular disease            Past Surgical History:   Procedure Laterality Date    CORONARY ARTERY BYPASS GRAFT      FEMORAL ARTERY - POPLITEAL ARTERY BYPASS GRAFT         Family History: family history includes Diabetes in his maternal grandfather, mother, sister, sister, and sister; Heart disease in his father; Hyperlipidemia in his father; Hypertension in his father.     Social History:  reports that he quit smoking about 27 years ago. His smoking use included cigarettes. He has never been exposed to tobacco smoke. He has quit using smokeless tobacco. He reports that he does not drink alcohol and does not use drugs.  Social History " "    Social History Narrative    Not on file       Medications:  Available home medication information reviewed.  Accu-Chek Geri, Accu-Chek Geri Plus, Alcohol Prep, Enoxaparin Sodium, Insulin Pen Needle, QUEtiapine, Valproic Acid, accu-chek soft touch, cyanocobalamin, donepezil, insulin detemir, lisinopril, and mirtazapine    Allergies   Allergen Reactions    Bactrim [Sulfamethoxazole-Trimethoprim] Rash    Adhesive Tape Rash    Neosporin [Neomycin-Bacitracin Zn-Polymyx] Rash       Objective   Objective     Vital Signs:   Temp:  [98.4 °F (36.9 °C)] 98.4 °F (36.9 °C)  Heart Rate:  [81-84] 81  Resp:  [16-17] 16  BP: (151)/(75) 151/75       Physical Exam   Constitutional: Alert, WD, chronically ill-appearing male sitting up in the bed in NAD  Head: NC, abrasion to top of head/forehead, back of head, left eyebrow, right-sided nose  ENT: Pink, moist mucous membranes   Respiratory: Nonlabored, symmetrical chest expansion, CTAB, RA  Cardiovascular: RRR, no M/R/G  Gastrointestinal: Soft, NT, ND +BS  Musculoskeletal: RICO; no LE edema bilaterally  Neurologic: Confused, strength symmetric in all extremities, follows no commands, speech clear  Skin: Small scabbed over areas to LAD with left>right; no erythema or drainage  Psychiatric: Angry and saying \"no\"    Result Review:  I have personally reviewed the results from the time of this admission to 3/27/2024 17:52 EDT and agree with these findings:  [x]  Laboratory list / accordion  [x]  Microbiology  [x]  Radiology  [x]  EKG/Telemetry   [x]  Cardiology/Vascular   []  Pathology  [x]  Old records  []  Other:  Most notable findings include:     LAB RESULTS:      Lab 03/27/24  1422   WBC 9.18   HEMOGLOBIN 15.3   HEMATOCRIT 44.7   PLATELETS 300   NEUTROS ABS 7.51*   IMMATURE GRANS (ABS) 0.09*   LYMPHS ABS 1.09   MONOS ABS 0.47   EOS ABS 0.00   MCV 93.1   SED RATE 31*   PROCALCITONIN 0.02   LACTATE 4.0*         Lab 03/27/24  1422   SODIUM 136   POTASSIUM 5.1   CHLORIDE 96*   CO2 26.0 "   ANION GAP 14.0   BUN 14   CREATININE 0.77   EGFR 98.1   GLUCOSE 305*   CALCIUM 9.4         Lab 03/27/24  1422   TOTAL PROTEIN 7.6   ALBUMIN 4.2   GLOBULIN 3.4   ALT (SGPT) 22   AST (SGOT) 22   BILIRUBIN 1.0   ALK PHOS 89         Lab 03/27/24  1422   HSTROP T 15                 UA          1/8/2024    13:22 2/24/2024    23:54 3/27/2024    16:33   Urinalysis   Squamous Epithelial Cells, UA  0-2  0-2    Specific Gravity, UA  1.032  1.038    Ketones, UA 15 mg/dL  Trace  40 mg/dL (2+)    Blood, UA  Negative  Negative    Leukocytes, UA Negative  Negative  Negative    Nitrite, UA  Negative  Positive    RBC, UA  0-2  0-2    WBC, UA  0-2  0-2    Bacteria, UA  None Seen  4+        Microbiology Results (last 10 days)       ** No results found for the last 240 hours. **            CT Cervical Spine Without Contrast    Result Date: 3/27/2024  CT CERVICAL SPINE WO CONTRAST Date of Exam: 3/27/2024 3:06 PM EDT Indication: pain, falls. Comparison: CT cervical spine 4/29/2011 Technique: Axial CT images were obtained of the cervical spine without contrast administration.  Reconstructed coronal and sagittal images were also obtained. Automated exposure control and iterative construction methods were used. Findings: Normal spinal alignment. No acute fracture. No traumatic malalignment. Vertebral body heights are normal. Unremarkable appearance of interbody graft/fusion at C6-C7. There is mild degenerative disc disease at C5-C6 and C7-T1. Small posterior disc osteophyte complexes noted at C5-C6. The paravertebral soft tissues are unremarkable; no prevertebral soft tissue swelling. No CT evidence of high-grade/severe spinal canal stenosis. Lung apices are clear. Homogeneous attenuation of the thyroid gland. No  pharyngeal or laryngeal mass lesion. There are atherosclerotic calcifications of the cervical carotid arteries. No lymphadenopathy.     Impression: Impression: No acute fracture or traumatic malalignment. Electronically Signed:  Bebeto Ko MD  3/27/2024 3:17 PM EDT  Workstation ID: [id number]    CT Head Without Contrast    Result Date: 3/27/2024  CT HEAD WO CONTRAST Date of Exam: 3/27/2024 3:06 PM EDT Indication: multiple falls, on lovenox, positive LOC. Comparison: 2/24/2024. Technique: Axial CT images were obtained of the head without contrast administration.  Automated exposure control and iterative construction methods were used. Findings: There is moderately severe atrophy. There is periventricular hypodensity compatible with chronic small vessel ischemic insult. There is no acute mass effect or edema. There is no acute CVA or hemorrhage. There is a small old right periventricular CVA. There is a small old left basal ganglia CVA or prominent perivascular space. .    Impression: Impression: Chronic findings. No acute process. Electronically Signed: Waleska Giron MD  3/27/2024 3:12 PM EDT  Workstation ID: [id number]    XR Chest 1 View    Result Date: 3/27/2024  XR CHEST 1 VW Date of Exam: 3/27/2024 1:54 PM EDT Indication: multiple falls, confusion Comparison: 2/24/2024. Findings: There are low lung volumes with poor inspiration. There are postsurgical changes of midline sternotomy. The heart size is normal. There is continued mild atelectasis of the left lung base. There is new patchy infiltrate of the right upper lobe adjacent to the right hilum. There are no effusions. There is no pneumothorax. There is no obvious acute process of visualized bony structures.     Impression: Impression: Continued mild left basilar atelectasis. New infiltrate on the right is nonspecific and could represent area of atelectasis, pneumonia, or pulmonary contusion in the setting of trauma. Electronically Signed: Waleska Giron MD  3/27/2024 2:11 PM EDT  Workstation ID: [id number]     Results for orders placed in visit on 07/30/20    SCANNED - ECHOCARDIOGRAM      Assessment & Plan   Assessment & Plan       Benign essential HTN    Benign prostatic  hyperplasia    Chronic coronary artery disease    Type 2 diabetes mellitus with hyperglycemia, with long-term current use of insulin    Hyperlipidemia    Dementia with behavioral disturbance    Frequent falls    Chronic anticoagulation    AMS (altered mental status)    Falls    Bacteriuria    AMS  Dementia with behavioral disturbance  Frequent falls  --Continue Aricept, Remeron, Seroquel  -- Continue Depakote  -- Valproic acid level pending  -- Neurology consult  -- Admission labs unremarkable  --Check echo    T2DM w/A1c pending, hyperglycemia  --QACHS BG monitoring  --Basal/SSI; adjust as needed  --Hold metformin    Bacteriuria  --PO Ceftin started NAD  --No leukocytes or WBCs; +nitrite  -- Urine Cx pending    HTN/CAD/HLD-lisinopril  Chronic anticoagulation-Lovenox-- has been on full dose-- will hold and repeat CT head in the morning.  BPH    Elevated lactic acid  -probably traumatic  Will check again in the am    DVT prophylaxis:HOLDING LOVENOX until he proves stable     Palliative consult    CODE STATUS:    Code Status and Medical Interventions:   Ordered at: 03/27/24 1036     Level Of Support Discussed With:    Health Care Surrogate     Code Status (Patient has no pulse and is not breathing):    No CPR (Do Not Attempt to Resuscitate)     Medical Interventions (Patient has pulse or is breathing):    Comfort Measures       Expected Discharge   Expected Discharge Date: 3/30/2024; Expected Discharge Time:      IVONNE Leigh  03/27/24    Attending   Admission Attestation     I have performed an independent face-to-face diagnostic evaluation including performing an independent physical examination as documented here.  The documented plan of care above was reviewed and developed with the advanced practice clinician (APC).    Brief Summary Statement:   Too Tai is a 68 y.o. male who  has a past medical history of Coronary artery disease, Dementia, Diabetes mellitus, Hyperlipidemia, and Peripheral vascular  disease.  that presented with multiple falls, LOC with falls, no obvious seizure, recent EEG without seizure activity. Has been on full dose lovenox. Difficult to get any more history due to patients dementia.      Attending Physical Exam:  Temp:  [98.4 °F (36.9 °C)] 98.4 °F (36.9 °C)  Heart Rate:  [81-84] 81  Resp:  [16-17] 16  BP: (151-172)/(68-75) 172/68    Constitutional: Awake, mumbles, eyes closed  Eyes: clear sclerae, no conjunctival injection  HENT: bruises on his head, face, nose mucous membranes dry  Neck: no masses or lymphadenopathy, trachea midline  Respiratory: good breath sounds bilaterally, respirations unlabored  Cardiovascular: RRR, no murmurs appreciated, palpable peripheral pulses  Abdomen:  soft, no HSM or masses palpable, not tender or distended  Musculoskeletal: No peripheral edema, clubbing or cyanosis  Neurologic: Oriented x 3,                       Strength symmetric in all extremities                     Cranial Nerves grossly intact, speech clear  Skin: No rashes or jaundice  Psychiatric: occasionally agitate    Result Review:  I have personally reviewed the results from the time of this admission to 3/27/2024 22:24 EDT and agree with these findings:  [x]  Laboratory list / accordion  [x]  Microbiology  [x]  Radiology  [x]  EKG/Telemetry   []  Cardiology/Vascular   []  Pathology  [x]  Old records  []  Other:  Most notable findings include: remarkably normal labs, except increased LA, brain atrophy, elevated glucose      Rebecca Wilson MD  03/27/24

## 2024-03-27 NOTE — TELEPHONE ENCOUNTER
He should still be on seroquel and that should help.  Anything else is going to make falls worse.  Des Geller MD  11:00 EDT  03/27/24

## 2024-03-27 NOTE — ED NOTES
" Too Tai    Nursing Report ED to Floor:  Mental status: A+Ox1  Ambulatory status: Bedfast  Oxygen Therapy:  RA  Cardiac Rhythm: NSR  Admitted from: Home  Safety Concerns:  Falls  Social Issues: N/A  ED Room #:  20    ED Nurse Phone Extension - [phone] or may call [phone].      HPI:   Chief Complaint   Patient presents with    Fall       Past Medical History:  Past Medical History:   Diagnosis Date    Coronary artery disease     Dementia     Diabetes mellitus     Hyperlipidemia     Peripheral vascular disease         Past Surgical History:  Past Surgical History:   Procedure Laterality Date    CORONARY ARTERY BYPASS GRAFT      FEMORAL ARTERY - POPLITEAL ARTERY BYPASS GRAFT          Admitting Doctor:   Rebecca Wilson MD    Consulting Provider(s):  Consults       Date and Time Order Name Status Description    2/25/2024  2:44 AM Inpatient Neurology Consult General Completed              Admitting Diagnosis:   The primary encounter diagnosis was Acute cystitis without hematuria. Diagnoses of Multiple falls, Injury of head, initial encounter, and History of dementia were also pertinent to this visit.    Most Recent Vitals:   Vitals:    03/27/24 1348 03/27/24 1555   BP: 151/75    BP Location: Right arm    Patient Position: Sitting    Pulse: 84 81   Resp: 17 16   Temp: 98.4 °F (36.9 °C)    TempSrc: Axillary    SpO2: 97% 95%   Weight: 90.7 kg (199 lb 15.3 oz)    Height: 185.4 cm (73\")        Active LDAs/IV Access:   Lines, Drains & Airways       Active LDAs       None                    Labs (abnormal labs have a star):   Labs Reviewed   COMPREHENSIVE METABOLIC PANEL - Abnormal; Notable for the following components:       Result Value    Glucose 305 (*)     Chloride 96 (*)     All other components within normal limits    Narrative:     GFR Normal >60  Chronic Kidney Disease <60  Kidney Failure <15     URINALYSIS W/ CULTURE IF INDICATED - Abnormal; Notable for the following components:    Specific Gravity, UA 1.038 (*)     " Glucose, UA >=1000 mg/dL (3+) (*)     Ketones, UA 40 mg/dL (2+) (*)     Protein,  mg/dL (2+) (*)     Nitrite, UA Positive (*)     All other components within normal limits    Narrative:     In absence of clinical symptoms, the presence of pyuria, bacteria, and/or nitrites on the urinalysis result does not correlate with infection.   CBC WITH AUTO DIFFERENTIAL - Abnormal; Notable for the following components:    Neutrophil % 81.8 (*)     Lymphocyte % 11.9 (*)     Eosinophil % 0.0 (*)     Immature Grans % 1.0 (*)     Neutrophils, Absolute 7.51 (*)     Immature Grans, Absolute 0.09 (*)     All other components within normal limits   URINALYSIS, MICROSCOPIC ONLY - Abnormal; Notable for the following components:    Bacteria, UA 4+ (*)     All other components within normal limits   LACTIC ACID, PLASMA - Abnormal; Notable for the following components:    Lactate 4.0 (*)     All other components within normal limits   HEMOGLOBIN A1C - Abnormal; Notable for the following components:    Hemoglobin A1C 9.20 (*)     All other components within normal limits    Narrative:     Hemoglobin A1C Ranges:    Increased Risk for Diabetes  5.7% to 6.4%  Diabetes                     >= 6.5%  Diabetic Goal                < 7.0%   SEDIMENTATION RATE - Abnormal; Notable for the following components:    Sed Rate 31 (*)     All other components within normal limits   SINGLE HS TROPONIN T - Normal    Narrative:     High Sensitive Troponin T Reference Range:  <14.0 ng/L- Negative Female for AMI  <22.0 ng/L- Negative Male for AMI  >=14 - Abnormal Female indicating possible myocardial injury.  >=22 - Abnormal Male indicating possible myocardial injury.   Clinicians would have to utilize clinical acumen, EKG, Troponin, and serial changes to determine if it is an Acute Myocardial Infarction or myocardial injury due to an underlying chronic condition.        PROCALCITONIN - Normal    Narrative:     As a Marker for Sepsis (Non-Neonates):    1.  "<0.5 ng/mL represents a low risk of severe sepsis and/or septic shock.  2. >2 ng/mL represents a high risk of severe sepsis and/or septic shock.    As a Marker for Lower Respiratory Tract Infections that require antibiotic therapy:    PCT on Admission    Antibiotic Therapy       6-12 Hrs later    >0.5                Strongly Recommended  >0.25 - <0.5        Recommended   0.1 - 0.25          Discouraged              Remeasure/reassess PCT  <0.1                Strongly Discouraged     Remeasure/reassess PCT    As 28 day mortality risk marker: \"Change in Procalcitonin Result\" (>80% or <=80%) if Day 0 (or Day 1) and Day 4 values are available. Refer to [email or web address]    Change in PCT <=80%  A decrease of PCT levels below or equal to 80% defines a positive change in PCT test result representing a higher risk for 28-day all-cause mortality of patients diagnosed with severe sepsis for septic shock.    Change in PCT >80%  A decrease of PCT levels of more than 80% defines a negative change in PCT result representing a lower risk for 28-day all-cause mortality of patients diagnosed with severe sepsis or septic shock.      BLOOD CULTURE   BLOOD CULTURE   RAINBOW DRAW    Narrative:     The following orders were created for panel order [place] Draw.  Procedure                               Abnormality         Status                     ---------                               -----------         ------                     Green Top (Gel)[[id number]]                                  Final result               Lavender Top[[id number]]                                     Final result               Gold Top - SST[[id number]]                                   Final result               Gray Top[[id number]]                                         In process                 Light Blue Top[[id number]]                                   Final result                 Please view results for these tests on the individual " orders.   LACTIC ACID, REFLEX   VALPROIC ACID LEVEL, TOTAL   CK   VITAMIN B12   GREEN TOP   LAVENDER TOP   GOLD TOP - SST   LIGHT BLUE TOP   CBC AND DIFFERENTIAL    Narrative:     The following orders were created for panel order CBC & Differential.  Procedure                               Abnormality         Status                     ---------                               -----------         ------                     CBC Auto Differential[[id number]]        Abnormal            Final result                 Please view results for these tests on the individual orders.   GRAY TOP       Meds Given in ED:   Medications   sodium chloride 0.9 % flush 10 mL (has no administration in time range)   cefuroxime (CEFTIN) tablet 250 mg (has no administration in time range)   LORazepam (ATIVAN) injection 2 mg (2 mg Intramuscular Given 3/27/24 1448)   haloperidol lactate (HALDOL) injection 5 mg (5 mg Intramuscular Given 3/27/24 1449)           Last NIH score:                                                          Dysphagia screening results:        [place] Coma Scale:  No data recorded     CIWA:        Restraint Type:            Isolation Status:  No active isolations

## 2024-03-27 NOTE — ED PROVIDER NOTES
"Subjective   History of Present Illness  Pt is a 69 yo male presenting to ED by EMS with reports of falls. PMHx significant for CAD, CABG, HTN, HLD, DVT, PVD, DM (insulin) and Dementia. Majority of hx provided by wife due to patient's dementia and resistant to cooperate. She explains he came home from nursing home last Thursday and had been doing reasonably well until yesterday he began having falls again. He fell x4 times yesterday and x5 today. He had LOC with one of the falls today. She has a walker for him to use but he doesn't. She describes him walking and then just \"head dives\" forward. He is on Lovenox and no other blood thinners. He did have several episodes of vomiting last night. No reports of diarrhea, abdominal pain, chest pain, SOB, cough or fevers. He was recently admitted 2-24 to 3-4 with multiple falls and behavior disturbances.       History provided by:  Patient, EMS personnel, medical records and spouse  History limited by:  Dementia      Review of Systems   Unable to perform ROS: Dementia   Constitutional:  Negative for fever.   Respiratory:  Negative for cough and shortness of breath.    Cardiovascular:  Negative for chest pain.   Gastrointestinal:  Negative for abdominal pain, diarrhea and vomiting.   Musculoskeletal:  Positive for neck pain. Negative for arthralgias and back pain.   Psychiatric/Behavioral:  Positive for confusion (chronic).        Past Medical History:   Diagnosis Date    Coronary artery disease     Dementia     Diabetes mellitus     Hyperlipidemia     Peripheral vascular disease        Allergies   Allergen Reactions    Bactrim [Sulfamethoxazole-Trimethoprim] Rash    Adhesive Tape Rash    Neosporin [Neomycin-Bacitracin Zn-Polymyx] Rash       Past Surgical History:   Procedure Laterality Date    CORONARY ARTERY BYPASS GRAFT      FEMORAL ARTERY - POPLITEAL ARTERY BYPASS GRAFT         Family History   Problem Relation Age of Onset    Diabetes Mother     Heart disease Father     " Hypertension Father     Hyperlipidemia Father     Diabetes Sister     Diabetes Maternal Grandfather     Diabetes Sister     Diabetes Sister        Social History     Socioeconomic History    Marital status:    Tobacco Use    Smoking status: Former     Current packs/day: 0.00     Types: Cigarettes     Quit date:      Years since quittin.2     Passive exposure: Never    Smokeless tobacco: Former   Vaping Use    Vaping status: Never Used   Substance and Sexual Activity    Alcohol use: No    Drug use: No    Sexual activity: Never           Objective   Physical Exam  Vitals and nursing note reviewed.   Constitutional:       General: He is not in acute distress.  HENT:      Head: Contusion present. No laceration.      Nose: Nose normal.   Eyes:      Extraocular Movements: Extraocular movements intact.      Conjunctiva/sclera: Conjunctivae normal.      Pupils: Pupils are equal, round, and reactive to light.   Cardiovascular:      Rate and Rhythm: Normal rate.      Heart sounds: Normal heart sounds.   Pulmonary:      Effort: Pulmonary effort is normal. No respiratory distress.   Abdominal:      Palpations: Abdomen is soft.      Tenderness: There is no abdominal tenderness.   Musculoskeletal:      Right shoulder: No tenderness. Normal range of motion.      Left shoulder: No tenderness. Normal range of motion.      Right elbow: Normal range of motion. No tenderness.      Left elbow: Normal range of motion. No tenderness.      Cervical back: Normal range of motion and neck supple. Tenderness present. No deformity.      Thoracic back: No tenderness.      Lumbar back: No tenderness.      Right hip: No tenderness. Normal range of motion.      Left hip: No tenderness. Normal range of motion.      Right knee: Normal range of motion. No tenderness.      Left knee: Normal range of motion. No tenderness.   Skin:     General: Skin is warm.   Neurological:      Mental Status: He is alert. Mental status is at baseline. He  is disoriented and confused.      Comments: Does not follow commands           Procedures           ED Course      Recent Results (from the past 24 hour(s))   Green Top (Gel)    Collection Time: 03/27/24  2:22 PM   Result Value Ref Range    Extra Tube Hold for add-ons.    Lavender Top    Collection Time: 03/27/24  2:22 PM   Result Value Ref Range    Extra Tube hold for add-on    Gold Top - SST    Collection Time: 03/27/24  2:22 PM   Result Value Ref Range    Extra Tube Hold for add-ons.    Gray Top    Collection Time: 03/27/24  2:22 PM   Result Value Ref Range    Extra Tube Hold for add-ons.    Light Blue Top    Collection Time: 03/27/24  2:22 PM   Result Value Ref Range    Extra Tube Hold for add-ons.    Comprehensive Metabolic Panel    Collection Time: 03/27/24  2:22 PM    Specimen: Blood   Result Value Ref Range    Glucose 305 (H) 65 - 99 mg/dL    BUN 14 8 - 23 mg/dL    Creatinine 0.77 0.76 - 1.27 mg/dL    Sodium 136 136 - 145 mmol/L    Potassium 5.1 3.5 - 5.2 mmol/L    Chloride 96 (L) 98 - 107 mmol/L    CO2 26.0 22.0 - 29.0 mmol/L    Calcium 9.4 8.6 - 10.5 mg/dL    Total Protein 7.6 6.0 - 8.5 g/dL    Albumin 4.2 3.5 - 5.2 g/dL    ALT (SGPT) 22 1 - 41 U/L    AST (SGOT) 22 1 - 40 U/L    Alkaline Phosphatase 89 39 - 117 U/L    Total Bilirubin 1.0 0.0 - 1.2 mg/dL    Globulin 3.4 gm/dL    A/G Ratio 1.2 g/dL    BUN/Creatinine Ratio 18.2 7.0 - 25.0    Anion Gap 14.0 5.0 - 15.0 mmol/L    eGFR 98.1 >60.0 mL/min/1.73   Single High Sensitivity Troponin T    Collection Time: 03/27/24  2:22 PM    Specimen: Blood   Result Value Ref Range    HS Troponin T 15 <22 ng/L   CBC Auto Differential    Collection Time: 03/27/24  2:22 PM    Specimen: Blood   Result Value Ref Range    WBC 9.18 3.40 - 10.80 10*3/mm3    RBC 4.80 4.14 - 5.80 10*6/mm3    Hemoglobin 15.3 13.0 - 17.7 g/dL    Hematocrit 44.7 37.5 - 51.0 %    MCV 93.1 79.0 - 97.0 fL    MCH 31.9 26.6 - 33.0 pg    MCHC 34.2 31.5 - 35.7 g/dL    RDW 12.4 12.3 - 15.4 %    RDW-SD 42.8  37.0 - 54.0 fl    MPV 9.7 6.0 - 12.0 fL    Platelets 300 140 - 450 10*3/mm3    Neutrophil % 81.8 (H) 42.7 - 76.0 %    Lymphocyte % 11.9 (L) 19.6 - 45.3 %    Monocyte % 5.1 5.0 - 12.0 %    Eosinophil % 0.0 (L) 0.3 - 6.2 %    Basophil % 0.2 0.0 - 1.5 %    Immature Grans % 1.0 (H) 0.0 - 0.5 %    Neutrophils, Absolute 7.51 (H) 1.70 - 7.00 10*3/mm3    Lymphocytes, Absolute 1.09 0.70 - 3.10 10*3/mm3    Monocytes, Absolute 0.47 0.10 - 0.90 10*3/mm3    Eosinophils, Absolute 0.00 0.00 - 0.40 10*3/mm3    Basophils, Absolute 0.02 0.00 - 0.20 10*3/mm3    Immature Grans, Absolute 0.09 (H) 0.00 - 0.05 10*3/mm3    nRBC 0.0 0.0 - 0.2 /100 WBC   Lactic Acid, Plasma    Collection Time: 03/27/24  2:22 PM    Specimen: Blood   Result Value Ref Range    Lactate 4.0 (C) 0.5 - 2.0 mmol/L   Procalcitonin    Collection Time: 03/27/24  2:22 PM    Specimen: Blood   Result Value Ref Range    Procalcitonin 0.02 0.00 - 0.25 ng/mL   Hemoglobin A1c    Collection Time: 03/27/24  2:22 PM    Specimen: Blood   Result Value Ref Range    Hemoglobin A1C 9.20 (H) 4.80 - 5.60 %   CK    Collection Time: 03/27/24  2:22 PM    Specimen: Blood   Result Value Ref Range    Creatine Kinase 72 20 - 200 U/L   Sedimentation Rate    Collection Time: 03/27/24  2:22 PM    Specimen: Blood   Result Value Ref Range    Sed Rate 31 (H) 0 - 20 mm/hr   ECG 12 Lead Syncope    Collection Time: 03/27/24  4:04 PM   Result Value Ref Range    QT Interval 406 ms    QTC Interval 459 ms   Urinalysis With Culture If Indicated - Urine, Catheter    Collection Time: 03/27/24  4:33 PM    Specimen: Urine, Catheter   Result Value Ref Range    Color, UA Yellow Yellow, Straw    Appearance, UA Clear Clear    pH, UA 6.5 5.0 - 8.0    Specific Gravity, UA 1.038 (H) 1.001 - 1.030    Glucose, UA >=1000 mg/dL (3+) (A) Negative    Ketones, UA 40 mg/dL (2+) (A) Negative    Bilirubin, UA Negative Negative    Blood, UA Negative Negative    Protein,  mg/dL (2+) (A) Negative    Leuk Esterase, UA  Negative Negative    Nitrite, UA Positive (A) Negative    Urobilinogen, UA 1.0 E.U./dL 0.2 - 1.0 E.U./dL   Urinalysis, Microscopic Only - Urine, Catheter    Collection Time: 03/27/24  4:33 PM    Specimen: Urine, Catheter   Result Value Ref Range    RBC, UA 0-2 None Seen, 0-2 /HPF    WBC, UA 0-2 None Seen, 0-2 /HPF    Bacteria, UA 4+ (A) None Seen, Trace /HPF    Squamous Epithelial Cells, UA 0-2 None Seen, 0-2 /HPF    Hyaline Casts, UA 0-6 0 - 6 /LPF    Methodology Automated Microscopy    STAT Lactic Acid, Reflex    Collection Time: 03/27/24  7:43 PM    Specimen: Blood   Result Value Ref Range    Lactate 4.3 (C) 0.5 - 2.0 mmol/L   Valproic Acid Level, Total    Collection Time: 03/27/24  7:43 PM    Specimen: Blood   Result Value Ref Range    Valproic Acid <2.8 (L) 50.0 - 125.0 mcg/mL   POC Glucose Once    Collection Time: 03/27/24  9:53 PM    Specimen: Blood   Result Value Ref Range    Glucose 320 (H) 70 - 130 mg/dL     Note: In addition to lab results from this visit, the labs listed above may include labs taken at another facility or during a different encounter within the last 24 hours. Please correlate lab times with ED admission and discharge times for further clarification of the services performed during this visit.    CT Head Without Contrast   Final Result   Impression:   Chronic findings. No acute process.            Electronically Signed: Waleska Giron MD     3/27/2024 3:12 PM EDT     Workstation ID: [id number]      CT Cervical Spine Without Contrast   Final Result   Impression:   No acute fracture or traumatic malalignment.            Electronically Signed: Bebeto Ko MD     3/27/2024 3:17 PM EDT     Workstation ID: [id number]      XR Chest 1 View   Final Result   Impression:   Continued mild left basilar atelectasis. New infiltrate on the right is nonspecific and could represent area of atelectasis, pneumonia, or pulmonary contusion in the setting of trauma.         Electronically Signed: Waleska  "MD Mack     3/27/2024 2:11 PM EDT     Workstation ID: [id number]        Vitals:    03/27/24 1348 03/27/24 1555 03/27/24 1641 03/27/24 1700   BP: 151/75   172/68   BP Location: Right arm      Patient Position: Sitting      Pulse: 84 81 81    Resp: 17 16     Temp: 98.4 °F (36.9 °C)      TempSrc: Axillary      SpO2: 97% 95% 97%    Weight: 90.7 kg (199 lb 15.3 oz)      Height: 185.4 cm (73\")        Medications   sodium chloride 0.9 % flush 10 mL (has no administration in time range)   sodium chloride 0.9 % infusion (has no administration in time range)   LORazepam (ATIVAN) injection 2 mg (2 mg Intramuscular Given 3/27/24 1448)   haloperidol lactate (HALDOL) injection 5 mg (5 mg Intramuscular Given 3/27/24 1449)     ECG/EMG Results (last 24 hours)       Procedure Component Value Units Date/Time    ECG 12 Lead Syncope [[id number]] Collected: 03/27/24 1604     Updated: 03/27/24 1722     QT Interval 406 ms      QTC Interval 459 ms     Narrative:      Test Reason : Syncope  Blood Pressure :   */*   mmHG  Vent. Rate :  77 BPM     Atrial Rate :  77 BPM     P-R Int : 274 ms          QRS Dur : 108 ms      QT Int : 406 ms       P-R-T Axes :  50 -28  47 degrees     QTc Int : 459 ms    Sinus rhythm with 1st degree AV block  Possible Left atrial enlargement  Left ventricular hypertrophy  Cannot rule out Septal infarct (cited on or before 24-FEB-2024)  Abnormal ECG  When compared with ECG of 27-FEB-2024 05:58,  Questionable change in initial forces of Anterior leads  Confirmed by ROHITH ELAINE MD ([id number]) on 3/27/2024 5:22:26 PM    Referred By: EDMD           Confirmed By: ROHITH ELAINE MD          ECG 12 Lead Syncope   Final Result   Test Reason : Syncope   Blood Pressure :   */*   mmHG   Vent. Rate :  77 BPM     Atrial Rate :  77 BPM      P-R Int : 274 ms          QRS Dur : 108 ms       QT Int : 406 ms       P-R-T Axes :  50 -28  47 degrees      QTc Int : 459 ms      Sinus rhythm with 1st degree AV block   Possible Left atrial " enlargement   Left ventricular hypertrophy   Cannot rule out Septal infarct (cited on or before 24-FEB-2024)   Abnormal ECG   When compared with ECG of 27-FEB-2024 05:58,   Questionable change in initial forces of Anterior leads   Confirmed by ROHITH WHITLEY MD ([id number]) on 3/27/2024 5:22:26 PM      Referred By: EDMD           Confirmed By: ROHITH WHITLEY MD                                                 Medical Decision Making  Pt is a 69 yo female presenting to ED with reports of multiple falls. Labs in ED notable for WBC 9.18, Cr 0.77, Glucose 305, K 5.1, Na 136 and HS Trop 15. UA with +4 bacteria and nitrate. CT head and C spine without acute findings. CXR no acute findings. EKG sinus rhythm. Discussed results and tx plan for admission with wife and patient.     Discussed patient with Dr. Whitley who is agreeable with ED course and tx plan.  Discussed admission with hospitalist Dr. Wilson    DDx  Subarachnoid hemorrhage, Subdural, CVA, ACS, UTI, Fracture, Electrolyte abnormalities     Problems Addressed:  Acute cystitis without hematuria: complicated acute illness or injury  History of dementia: chronic illness or injury  Injury of head, initial encounter: complicated acute illness or injury  Multiple falls: complicated acute illness or injury    Amount and/or Complexity of Data Reviewed  Independent Historian: spouse  External Data Reviewed: notes.     Details: Reviewed previous non ED visits including prior labs, imaging, available notes, medications, allergies and surgical hx.     Labs: ordered. Decision-making details documented in ED Course.  Radiology: ordered. Decision-making details documented in ED Course.  ECG/medicine tests: ordered. Decision-making details documented in ED Course.    Risk  Prescription drug management.  Decision regarding hospitalization.        Final diagnoses:   Acute cystitis without hematuria   Multiple falls   Injury of head, initial encounter   History of dementia       ED  Disposition  ED Disposition       ED Disposition   Decision to Admit    Condition   --    Comment   Level of Care: Telemetry [5]   Diagnosis: Falls frequently [327933]   Admitting Physician: MECHE GARCÍA [[id number]]   Attending Physician: MECHE GARCÍA [[id number]]                 No follow-up provider specified.       Medication List      No changes were made to your prescriptions during this visit.            Nirmala Dang PA  03/27/24 0132

## 2024-03-27 NOTE — TELEPHONE ENCOUNTER
"Spoke with patient wife, [place], informed that Dr Geller said \"He should still be on seroquel and that should help.  Anything else is going to make falls worse.\"  Verbalized understanding and states she did give that to him.    "

## 2024-03-27 NOTE — LETTER
EMS Transport Request  For use at [facility], [place], [place], [place], and [place] only   Patient Name: Too Tai : 1955   Weight:90.7 kg (199 lb 15.3 oz) [facility] BLS/ALS: BLS/ALS: BLS   Insurance: [employer] MED[employer]ARE REPLACEMENT Auth End Date: 4/3/2024   Pre-Cert #: D/C Summary complete:    Destination: Home FOR LINA: 24-hour notice needed and given, How many stairs 1, Will the patient be on the main level yes, Is there a ramp available no, Can the patient stand and pivot no, Address [address] [place] [address] [place] [address], and Name/contact number for who will be present [place] Tai [phone]   Contact Precautions: None   Equipment (O2, Fluids, etc.): O2, settings room air   Arrive By Date/Time: 2024 Stretcher/WC: Stretcher   CM Requesting: Scar Washington RN Ext: [phone]   Notes/Medical Necessity: Patient is confused. Impaired mobility, endurance, gait and balance     ______________________________________________________________________    *Only 2 patient bags OR 1 carry-on size bag are permitted.  Wheelchairs and walkers CANNOT transported with the patient. Acknowledge: Yes

## 2024-03-28 ENCOUNTER — APPOINTMENT (OUTPATIENT)
Dept: CT IMAGING | Facility: HOSPITAL | Age: 69
End: 2024-03-28
Payer: COMMERCIAL

## 2024-03-28 ENCOUNTER — APPOINTMENT (OUTPATIENT)
Dept: NEUROLOGY | Facility: HOSPITAL | Age: 69
End: 2024-03-28
Payer: MEDICARE

## 2024-03-28 LAB
BILIRUB UR QL STRIP: NEGATIVE
CLARITY UR: CLEAR
COLOR UR: YELLOW
D-LACTATE SERPL-SCNC: 2 MMOL/L (ref 0.5–2)
GLUCOSE BLDC GLUCOMTR-MCNC: 123 MG/DL (ref 70–130)
GLUCOSE BLDC GLUCOMTR-MCNC: 124 MG/DL (ref 70–130)
GLUCOSE BLDC GLUCOMTR-MCNC: 190 MG/DL (ref 70–130)
GLUCOSE BLDC GLUCOMTR-MCNC: 195 MG/DL (ref 70–130)
GLUCOSE BLDC GLUCOMTR-MCNC: 234 MG/DL (ref 70–130)
GLUCOSE UR STRIP-MCNC: ABNORMAL MG/DL
HGB UR QL STRIP.AUTO: NEGATIVE
KETONES UR QL STRIP: ABNORMAL
LEUKOCYTE ESTERASE UR QL STRIP.AUTO: NEGATIVE
NITRITE UR QL STRIP: POSITIVE
PH UR STRIP.AUTO: 6.5 [PH] (ref 5–8)
PROT UR QL STRIP: ABNORMAL
SP GR UR STRIP: 1.04 (ref 1–1.03)
UROBILINOGEN UR QL STRIP: ABNORMAL
VIT B12 BLD-MCNC: 380 PG/ML (ref 211–946)

## 2024-03-28 PROCEDURE — 95819 EEG AWAKE AND ASLEEP: CPT | Performed by: PSYCHIATRY & NEUROLOGY

## 2024-03-28 PROCEDURE — 82948 REAGENT STRIP/BLOOD GLUCOSE: CPT

## 2024-03-28 PROCEDURE — 83605 ASSAY OF LACTIC ACID: CPT | Performed by: PHYSICIAN ASSISTANT

## 2024-03-28 PROCEDURE — 63710000001 INSULIN DETEMIR PER 5 UNITS: Performed by: NURSE PRACTITIONER

## 2024-03-28 PROCEDURE — 99223 1ST HOSP IP/OBS HIGH 75: CPT

## 2024-03-28 PROCEDURE — 99232 SBSQ HOSP IP/OBS MODERATE 35: CPT | Performed by: INTERNAL MEDICINE

## 2024-03-28 PROCEDURE — 25810000003 SODIUM CHLORIDE 0.9 % SOLUTION: Performed by: NURSE PRACTITIONER

## 2024-03-28 PROCEDURE — 63710000001 INSULIN LISPRO (HUMAN) PER 5 UNITS: Performed by: NURSE PRACTITIONER

## 2024-03-28 PROCEDURE — 25010000002 CEFTRIAXONE PER 250 MG: Performed by: INTERNAL MEDICINE

## 2024-03-28 PROCEDURE — 95819 EEG AWAKE AND ASLEEP: CPT

## 2024-03-28 PROCEDURE — 70450 CT HEAD/BRAIN W/O DYE: CPT

## 2024-03-28 RX ORDER — TEMAZEPAM 15 MG/1
15 CAPSULE ORAL NIGHTLY PRN
Status: DISCONTINUED | OUTPATIENT
Start: 2024-03-28 | End: 2024-03-29

## 2024-03-28 RX ORDER — METOPROLOL SUCCINATE 25 MG/1
25 TABLET, EXTENDED RELEASE ORAL DAILY
COMMUNITY
End: 2024-04-04 | Stop reason: HOSPADM

## 2024-03-28 RX ORDER — ZIPRASIDONE MESYLATE 20 MG/ML
10 INJECTION, POWDER, LYOPHILIZED, FOR SOLUTION INTRAMUSCULAR EVERY 4 HOURS PRN
Status: DISCONTINUED | OUTPATIENT
Start: 2024-03-28 | End: 2024-04-04 | Stop reason: HOSPADM

## 2024-03-28 RX ADMIN — MIRTAZAPINE 15 MG: 15 TABLET, FILM COATED ORAL at 20:52

## 2024-03-28 RX ADMIN — INSULIN DETEMIR 7 UNITS: 100 INJECTION, SOLUTION SUBCUTANEOUS at 08:55

## 2024-03-28 RX ADMIN — QUETIAPINE FUMARATE 50 MG: 25 TABLET ORAL at 20:52

## 2024-03-28 RX ADMIN — DONEPEZIL HYDROCHLORIDE 20 MG: 10 TABLET, FILM COATED ORAL at 08:54

## 2024-03-28 RX ADMIN — INSULIN LISPRO 2 UNITS: 100 INJECTION, SOLUTION INTRAVENOUS; SUBCUTANEOUS at 17:18

## 2024-03-28 RX ADMIN — INSULIN LISPRO 3 UNITS: 100 INJECTION, SOLUTION INTRAVENOUS; SUBCUTANEOUS at 20:52

## 2024-03-28 RX ADMIN — QUETIAPINE FUMARATE 50 MG: 25 TABLET ORAL at 08:54

## 2024-03-28 RX ADMIN — SODIUM CHLORIDE 75 ML/HR: 9 INJECTION, SOLUTION INTRAVENOUS at 00:33

## 2024-03-28 RX ADMIN — INSULIN LISPRO 2 UNITS: 100 INJECTION, SOLUTION INTRAVENOUS; SUBCUTANEOUS at 12:00

## 2024-03-28 RX ADMIN — INSULIN DETEMIR 7 UNITS: 100 INJECTION, SOLUTION SUBCUTANEOUS at 20:52

## 2024-03-28 RX ADMIN — LISINOPRIL 2.5 MG: 5 TABLET ORAL at 08:55

## 2024-03-28 RX ADMIN — Medication 10 ML: at 20:53

## 2024-03-28 RX ADMIN — SODIUM CHLORIDE 1000 MG: 900 INJECTION INTRAVENOUS at 12:00

## 2024-03-28 NOTE — PLAN OF CARE
Goal Outcome Evaluation:  Plan of Care Reviewed With: spouse   Pt oriented to self only on RA  VSS NSR. No s/s of pain or SOA. Pt calm most of day at times combative and yelling out, worsening in evening prn administered.      Progress: no change

## 2024-03-28 NOTE — PLAN OF CARE
"Goal Outcome Evaluation:  Plan of Care Reviewed With: patient        Progress: no change  Outcome Evaluation: Palliative RN saw pt on 3/28 at 0938. New palliative consult for assistance with GOC per Dr. Wilson.  Pt. was asleep in bed on RA in BUE and did not demonstrate any visible signs of distress during palliative nursing visit.  Althought he appeared comfortable at that time, wife at  stated he had been having pain in his head and nose post fall.  She stated he walked \"good before\" and that he \"followed me everywhere\".  This weekend damian their 47th wedding anniversary.  She asked to speak with outpt. hospice \"in case\" pt does not recover.  She is very hopeful that he will regain his strength and recover to his previous state and return home with her.  If he does not recover, she would like to take him home with hospice services.  Dr. GLENN Cesar visited for provider consult today.  Hospice consult entered for information. Palliative care to follow for support, POC and ongoing GOC,.      Problem: Palliative Care  Goal: Enhanced Quality of Life  Intervention: Promote Advance Care Planning  Flowsheets (Taken 3/28/2024 1410)  Life Transition/Adjustment:   palliative care initiated   palliative care discussed      1330 Palliative IDT meeting: ZAHRA Ching RN, CHPN; GLENN Cesar MD.; GLENN Cervantes LCSW, [facility]; AQUILINO Bai, APRN; PEPITO Wilson RN, PN; JUANITO Lisa MDiv    After hours, weekends and holidays, contact Palliative Provider by calling [phone].                        "

## 2024-03-28 NOTE — CONSULTS
Hospice referral received, chart reviewed, and visit made. Spoke with wife outside of pt's room. Reviewed hospice plan of care and goals of care. Overview of hospice services provided. Wife would like to see if pt returns to baseline with the use of IV abx. She would like to think about the information that was given to her today and would like for hospice liaison to follow up on Monday. Gave her hospice brochure with phone number. Hospice will follow up on Monday.     For further assistance, please call [phone].       Tamiko Verdugo RN, BSN, Hospice Liaison

## 2024-03-28 NOTE — PROGRESS NOTES
[facility]  PROGRESS NOTE    Patient Name: Too Tai  : 1955  MRN: [id number]    Date of Admission: 3/27/2024  Primary Care Physician: Des Geller MD    Subjective   Subjective     CC: Follow-up AMS/ falls    HPI: Patient remains lethargic, difficult to arouse, spouse is at bedside, reports recent episodes of passing out with falls when he attempts to sit up or stand      Objective   Objective     Vital Signs:   Temp:  [98.3 °F (36.8 °C)-98.4 °F (36.9 °C)] 98.3 °F (36.8 °C)  Heart Rate:  [49-84] 65  Resp:  [16-20] 20  BP: (109-172)/(51-75) 109/51     Physical Exam:  Constitutional: No acute distress, rlethargic  HENT: NCAT, mucous membranes moist  Respiratory: Clear to auscultation bilaterally, respiratory effort normal   Cardiovascular: Bradycardic, no murmurs, rubs, or gallops  Gastrointestinal: Positive bowel sounds, soft, nontender, nondistended  Musculoskeletal: No bilateral ankle edema, in restraints  Psychiatric: MARIIA  Neurologic: MARIIA    Results Reviewed:  LAB RESULTS:      Lab 24  0040 24  1943 24  1422   WBC  --   --  9.18   HEMOGLOBIN  --   --  15.3   HEMATOCRIT  --   --  44.7   PLATELETS  --   --  300   NEUTROS ABS  --   --  7.51*   IMMATURE GRANS (ABS)  --   --  0.09*   LYMPHS ABS  --   --  1.09   MONOS ABS  --   --  0.47   EOS ABS  --   --  0.00   MCV  --   --  93.1   SED RATE  --   --  31*   PROCALCITONIN  --   --  0.02   LACTATE 2.0 4.3* 4.0*         Lab 24  1422   SODIUM 136   POTASSIUM 5.1   CHLORIDE 96*   CO2 26.0   ANION GAP 14.0   BUN 14   CREATININE 0.77   EGFR 98.1   GLUCOSE 305*   CALCIUM 9.4   HEMOGLOBIN A1C 9.20*         Lab 24  1422   TOTAL PROTEIN 7.6   ALBUMIN 4.2   GLOBULIN 3.4   ALT (SGPT) 22   AST (SGOT) 22   BILIRUBIN 1.0   ALK PHOS 89         Lab 24  1422   HSTROP T 15             Lab 24  1422   VITAMIN B 12 380         Brief Urine Lab Results  (Last result in the past 365 days)         Color   Clarity   Blood   Leuk Est   Nitrite   Protein   CREAT   Urine HCG        03/27/24 1633 Yellow   Clear   Negative   Negative   Positive   100 mg/dL (2+)                   Microbiology Results Abnormal       None            CT Cervical Spine Without Contrast    Result Date: 3/27/2024  CT CERVICAL SPINE WO CONTRAST Date of Exam: 3/27/2024 3:06 PM EDT Indication: pain, falls. Comparison: CT cervical spine 4/29/2011 Technique: Axial CT images were obtained of the cervical spine without contrast administration.  Reconstructed coronal and sagittal images were also obtained. Automated exposure control and iterative construction methods were used. Findings: Normal spinal alignment. No acute fracture. No traumatic malalignment. Vertebral body heights are normal. Unremarkable appearance of interbody graft/fusion at C6-C7. There is mild degenerative disc disease at C5-C6 and C7-T1. Small posterior disc osteophyte complexes noted at C5-C6. The paravertebral soft tissues are unremarkable; no prevertebral soft tissue swelling. No CT evidence of high-grade/severe spinal canal stenosis. Lung apices are clear. Homogeneous attenuation of the thyroid gland. No  pharyngeal or laryngeal mass lesion. There are atherosclerotic calcifications of the cervical carotid arteries. No lymphadenopathy.     Impression: Impression: No acute fracture or traumatic malalignment. Electronically Signed: Bebeto Ko MD  3/27/2024 3:17 PM EDT  Workstation ID: [id number]    CT Head Without Contrast    Result Date: 3/27/2024  CT HEAD WO CONTRAST Date of Exam: 3/27/2024 3:06 PM EDT Indication: multiple falls, on lovenox, positive LOC. Comparison: 2/24/2024. Technique: Axial CT images were obtained of the head without contrast administration.  Automated exposure control and iterative construction methods were used. Findings: There is moderately severe atrophy. There is periventricular hypodensity compatible with chronic small vessel ischemic  insult. There is no acute mass effect or edema. There is no acute CVA or hemorrhage. There is a small old right periventricular CVA. There is a small old left basal ganglia CVA or prominent perivascular space. .    Impression: Impression: Chronic findings. No acute process. Electronically Signed: Waleska Giron MD  3/27/2024 3:12 PM EDT  Workstation ID: [id number]    XR Chest 1 View    Result Date: 3/27/2024  XR CHEST 1 VW Date of Exam: 3/27/2024 1:54 PM EDT Indication: multiple falls, confusion Comparison: 2/24/2024. Findings: There are low lung volumes with poor inspiration. There are postsurgical changes of midline sternotomy. The heart size is normal. There is continued mild atelectasis of the left lung base. There is new patchy infiltrate of the right upper lobe adjacent to the right hilum. There are no effusions. There is no pneumothorax. There is no obvious acute process of visualized bony structures.     Impression: Impression: Continued mild left basilar atelectasis. New infiltrate on the right is nonspecific and could represent area of atelectasis, pneumonia, or pulmonary contusion in the setting of trauma. Electronically Signed: Waleska Giron MD  3/27/2024 2:11 PM EDT  Workstation ID: [id number]     Results for orders placed in visit on 07/30/20    SCANNED - ECHOCARDIOGRAM      Current medications:  Scheduled Meds:donepezil, 20 mg, Oral, Daily  insulin detemir, 7 Units, Subcutaneous, Q12H  insulin lispro, 2-7 Units, Subcutaneous, 4x Daily AC & at Bedtime  lisinopril, 2.5 mg, Oral, Daily  mirtazapine, 15 mg, Oral, Nightly  QUEtiapine, 50 mg, Oral, BID  sodium chloride, 10 mL, Intravenous, Q12H  Valproic Acid, 250 mg, Oral, Q8H      Continuous Infusions:sodium chloride, 75 mL/hr, Last Rate: 75 mL/hr (03/28/24 0033)      PRN Meds:.  acetaminophen **OR** acetaminophen **OR** acetaminophen    senna-docusate sodium **AND** polyethylene glycol **AND** bisacodyl **AND** bisacodyl    calcium carbonate    Calcium  Replacement - Follow Nurse / BPA Driven Protocol    dextrose    dextrose    glucagon (human recombinant)    Magnesium Standard Dose Replacement - Follow Nurse / BPA Driven Protocol    nitroglycerin    ondansetron ODT **OR** ondansetron    Phosphorus Replacement - Follow Nurse / BPA Driven Protocol    Potassium Replacement - Follow Nurse / BPA Driven Protocol    sodium chloride    sodium chloride    sodium chloride    Assessment & Plan   Assessment & Plan     Active Hospital Problems    Diagnosis  POA    **Falls [W19.XXXA]  Yes    AMS (altered mental status) [R41.82]  Yes    Bacteriuria [R82.71]  Yes    Frequent falls [R29.6]  Not Applicable    Dementia with behavioral disturbance [F03.918]  Yes    Chronic anticoagulation [Z79.01]  Not Applicable    Type 2 diabetes mellitus with hyperglycemia, with long-term current use of insulin [E11.65, Z79.4]  Not Applicable    Benign essential HTN [I10]  Yes    Benign prostatic hyperplasia [N40.0]  Yes    Chronic coronary artery disease [I25.10]  Yes    Hyperlipidemia [E78.5]  Yes      Resolved Hospital Problems   No resolved problems to display.        Brief Hospital Course to date:  Too Tai is a 68 y.o. male with PMH significant for CAD, HTN, HLD, T2DM, chronic anticoagulation, dementia with behavioral disturbance, BPH presented to [facility] ED on 3/27/2024 d/t altered mental status and frequent falls.     Acute metabolic encephalopathy superimposed on underlying dementia  Frequent falls  Suspected orthostatic hypotension  Bradycardic on telemetry  -CT head without any acute findings  -Continue Aricept, Seroquel, Remeron, valproic acid  -Neurology consulted to assist  -Follow-up orthostatic vital signs  -Patient may require Holter monitor as he is bradycardic on telemetry  -PT/OT to evaluate    Suspected UTI  -Patient with 4+ bacteria  -Follow-up blood and urine cultures  -Will give a short course of Rocephin    Hypertension  -BP currently stable but low,   -continue  lisinopril with holding parameters  -Follow-up orthostatic vital signs    Poorly controlled type 2 diabetes with A1c 9.2%  -Continue basal and SSI, adjust as warranted    CAD  Hyperlipidemia  -Currently not on any meds other than metoprolol    History of DVT  -On chronic anticoagulation with full dose Lovenox  -Currently on hold current 2 falls    Expected Discharge Location and Transportation: TBD  Expected Discharge   Expected Discharge Date: 3/30/2024; Expected Discharge Time:      DVT prophylaxis:  Medical and mechanical DVT prophylaxis orders are present.         AM-PAC 6 Clicks Score (PT): 6 (03/27/24 1251)    CODE STATUS:   Code Status and Medical Interventions:   Ordered at: 03/27/24 9792     Level Of Support Discussed With:    Health Care Surrogate     Code Status (Patient has no pulse and is not breathing):    No CPR (Do Not Attempt to Resuscitate)     Medical Interventions (Patient has pulse or is breathing):    Comfort Measures       Kavitha Sumner MD  03/28/24

## 2024-03-28 NOTE — CASE MANAGEMENT/SOCIAL WORK
Discharge Planning Assessment  [facility]     Patient Name: Too Tai  MRN: [id number]  Today's Date: 3/28/2024    Admit Date: 3/27/2024    Plan: Home pending PT/OT recs   Discharge Needs Assessment       Row Name 03/28/24 0814       Living Environment    People in Home spouse    Name(s) of People in Home [place] (spouse) [phone]    Current Living Arrangements home    Potentially Unsafe Housing Conditions none    Primary Care Provided by self    Provides Primary Care For no one    Family Caregiver if Needed spouse    Quality of Family Relationships helpful;involved;supportive    Able to Return to Prior Arrangements yes       Resource/Environmental Concerns    Resource/Environmental Concerns none    Transportation Concerns none       Transition Planning    Patient/Family Anticipates Transition to home with family    Patient/Family Anticipated Services at Transition none    Transportation Anticipated family or friend will provide       Discharge Needs Assessment    Readmission Within the Last 30 Days no previous admission in last 30 days    Equipment Currently Used at Home cane, straight    Concerns to be Addressed denies needs/concerns at this time    Anticipated Changes Related to Illness none    Equipment Needed After Discharge none                   Discharge Plan       Row Name 03/28/24 0815       Plan    Plan Home pending PT/OT recs    Patient/Family in Agreement with Plan yes    Plan Comments Spoke with spouse at bedside. Lives with [place] (spouse) [phone] [facility]. Is dependent with ADL's. No problems with [employer] Medicare or medications. Uses a straight cane. Has advanced directives. PCP is Des Geller MD. Plan is home pending PT/OT recs. [facility] at [facility] recently. CM will continue to follow.    Final Discharge Disposition Code 01 - home or self-care                  Continued Care and Services - Admitted Since 3/27/2024    No active coordination exists for this encounter.        Selected Continued Care - Episodes Includes continued care and service providers with selected services from the active episodes listed below      High Risk Care Management Episode start date: 1/9/2024   There are no active outsourced providers for this episode.                 Selected Continued Care - Prior Encounters Includes continued care and service providers with selected services from prior encounters from 12/28/2023 to 3/28/2024      Discharged on 3/4/2024 Admission date: 2/24/2024 - Discharge disposition: Skilled Nursing Facility (DC - External)      Destination       Service Provider Selected Services Address Phone Fax Patient Preferred    [facility] [address] [place], [place] [id number] [phone] --                          Expected Discharge Date and Time       Expected Discharge Date Expected Discharge Time    Mar 30, 2024            Demographic Summary       Row Name 03/28/24 0814       General Information    Admission Type observation    Arrived From emergency department    Referral Source admission list    Reason for Consult discharge planning    Preferred Language English       Contact Information    Permission Granted to Share Info With     Contact Information Obtained for                    Functional Status       Row Name 03/28/24 0814       Functional Status    Usual Activity Tolerance moderate    Current Activity Tolerance moderate       Functional Status, IADL    Medications assistive person    Meal Preparation assistive person    Housekeeping completely dependent    Laundry completely dependent    Shopping completely dependent       Mental Status    General Appearance WDL WDL       Mental Status Summary    Recent Changes in Mental Status/Cognitive Functioning no changes       Employment/    Employment Status retired                   Psychosocial    No documentation.                  Abuse/Neglect     No documentation.                  Legal    No documentation.                  Substance Abuse    No documentation.                  Patient Forms    No documentation.                     Scar Washington RN

## 2024-03-28 NOTE — PLAN OF CARE
Goal Outcome Evaluation:                                              Problem: Restraint, Nonviolent  Goal: Absence of Harm or Injury  Outcome: Unable to Meet, Plan Revised

## 2024-03-28 NOTE — CONSULTS
Palliative Care Initial Consult   Attending Physician: Kavitha Sumner MD  Referring Provider: Rebecca Wilson      Reason for Referral:  assistance with clarification of goals of care    Code Status:   Code Status and Medical Interventions:   Ordered at: 03/27/24 5841     Level Of Support Discussed With:    Health Care Surrogate     Code Status (Patient has no pulse and is not breathing):    No CPR (Do Not Attempt to Resuscitate)     Medical Interventions (Patient has pulse or is breathing):    Comfort Measures      Advanced Directives: Advance Directive Status: Patient has advance directive, copy in chart   Family/Support: Spouse     Goals of Care:    Goals of Care/Treatment Preferences:    Treat what we can       HPI:   67 yo male with hx of dementia for about the last 5 years.  He had recent hospitalization for  repeated falls fowwed by rehab and had been doing well at home when had 4 falls in one day with spouse describes as a nose dive for the floor.  Pt is unable to provide hx but denies current symptoms.  On last admit rexulti discontinued due to myoclonic type movements.  Agitation was reported in the ED this admit and maisha has been consulted.  He had been on valproic acid and seroquel. On testing VPA level <2.8 with therapeutic window .  Spouse is bedside. He was made comfort measures last noc.      ROS:   Denied N/V/D/C    Palliative Pain Assessment:   Are you having pain at the present time or have you recently taken pain medication to treat pain? No       Palliative Dyspnea Assessment:   Are you shortness of breath at the present time or have you recently taken medication to treat shortness of breath?  No        Past Medical History:   Diagnosis Date    Coronary artery disease     Dementia     Diabetes mellitus     Hyperlipidemia     Peripheral vascular disease      Past Surgical History:   Procedure Laterality Date    CORONARY ARTERY BYPASS GRAFT      FEMORAL ARTERY - POPLITEAL ARTERY BYPASS GRAFT        Social History     Socioeconomic History    Marital status:    Tobacco Use    Smoking status: Former     Current packs/day: 0.00     Types: Cigarettes     Quit date:      Years since quittin.2     Passive exposure: Never    Smokeless tobacco: Former   Vaping Use    Vaping status: Never Used   Substance and Sexual Activity    Alcohol use: No    Drug use: No    Sexual activity: Never     Family History   Problem Relation Age of Onset    Diabetes Mother     Heart disease Father     Hypertension Father     Hyperlipidemia Father     Diabetes Sister     Diabetes Maternal Grandfather     Diabetes Sister     Diabetes Sister        Allergies   Allergen Reactions    Bactrim [Sulfamethoxazole-Trimethoprim] Rash    Adhesive Tape Rash    Neosporin [Neomycin-Bacitracin Zn-Polymyx] Rash         Current Facility-Administered Medications:     acetaminophen (TYLENOL) tablet 650 mg, 650 mg, Oral, Q4H PRN **OR** acetaminophen (TYLENOL) 160 MG/5ML oral solution 650 mg, 650 mg, Oral, Q4H PRN **OR** acetaminophen (TYLENOL) suppository 650 mg, 650 mg, Rectal, Q4H PRN, Carmelita Smith APRN    sennosides-docusate (PERICOLACE) 8.6-50 MG per tablet 2 tablet, 2 tablet, Oral, BID PRN **AND** polyethylene glycol (MIRALAX) packet 17 g, 17 g, Oral, Daily PRN **AND** bisacodyl (DULCOLAX) EC tablet 5 mg, 5 mg, Oral, Daily PRN **AND** bisacodyl (DULCOLAX) suppository 10 mg, 10 mg, Rectal, Daily PRN, Carmelita Smith APRN    calcium carbonate (TUMS) chewable tablet 500 mg (200 mg elemental), 2 tablet, Oral, TID PRN, Carmelita Smith APRN    Calcium Replacement - Follow Nurse / BPA Driven Protocol, , Does not apply, PRN, Carmelita Smith APRN    dextrose (D50W) (25 g/50 mL) IV injection 25 g, 25 g, Intravenous, Q15 Min PRN, Carmelita Smith APRN    dextrose (GLUTOSE) oral gel 15 g, 15 g, Oral, Q15 Min PRN, Carmelita Smith APRN    donepezil (ARICEPT) tablet 20 mg, 20 mg, Oral, Daily, Carmelita Smith APRN    glucagon (GLUCAGEN) injection 1 mg,  "1 mg, Intramuscular, Q15 Min PRN, Carmelita Smith APRN    insulin detemir (LEVEMIR) injection 7 Units, 7 Units, Subcutaneous, Q12H, Carmelita Smith APRN, 7 Units at 03/27/24 2225    Insulin Lispro (humaLOG) injection 2-7 Units, 2-7 Units, Subcutaneous, 4x Daily AC & at Bedtime, Carmelita Smith APRN, 5 Units at 03/27/24 2225    lisinopril (PRINIVIL,ZESTRIL) tablet 2.5 mg, 2.5 mg, Oral, Daily, Carmelita Smith APRN    Magnesium Standard Dose Replacement - Follow Nurse / BPA Driven Protocol, , Does not apply, PRN, Carmelita Smith APRN    mirtazapine (REMERON) tablet 15 mg, 15 mg, Oral, Nightly, Carmelita Smith APRN, 15 mg at 03/27/24 2226    nitroglycerin (NITROSTAT) SL tablet 0.4 mg, 0.4 mg, Sublingual, Q5 Min PRN, Carmelita Smith APRN    ondansetron ODT (ZOFRAN-ODT) disintegrating tablet 4 mg, 4 mg, Oral, Q6H PRN **OR** ondansetron (ZOFRAN) injection 4 mg, 4 mg, Intravenous, Q6H PRN, Carmelita Smith APRN    Phosphorus Replacement - Follow Nurse / BPA Driven Protocol, , Does not apply, PRN, Carmelita Smith APRN    Potassium Replacement - Follow Nurse / BPA Driven Protocol, , Does not apply, PRN, Carmelita Smith APRN    QUEtiapine (SEROquel) tablet 50 mg, 50 mg, Oral, BID, Carmelita Smith APRN, 50 mg at 03/27/24 2226    sodium chloride 0.9 % flush 10 mL, 10 mL, Intravenous, PRN, Carmelita Smith APRN    sodium chloride 0.9 % flush 10 mL, 10 mL, Intravenous, Q12H, Carmelita Smith APRN    sodium chloride 0.9 % flush 10 mL, 10 mL, Intravenous, PRN, Carmelita Smith APRN    sodium chloride 0.9 % infusion 40 mL, 40 mL, Intravenous, PRN, Serey, Carmelita, APRN    Valproic Acid (DEPAKENE) syrup 250 mg, 250 mg, Oral, Q8H, Carmelita Smith APRN     Palliative Performance Scale Score:      /63 (BP Location: Right arm, Patient Position: Lying)   Pulse (!) 48   Temp 98 °F (36.7 °C) (Axillary)   Resp 20   Ht 185.4 cm (73\")   Wt 90.7 kg (199 lb 15.3 oz)   SpO2 94%   BMI 26.38 kg/m²     Intake/Output Summary (Last 24 hours) at 3/28/2024 " 0850  Last data filed at 3/28/2024 0700  Gross per 24 hour   Intake 1173 ml   Output 0 ml   Net 1173 ml       Physical Exam:    General Appearance:    Arouseable, cooperative, NAD   HEENT:    NC/AT, EOMI, anicteric, MMM, face relaxed   Neck:   supple, trachea midline, no JVD   Lungs:     CTA bilat, diminished in bases; respirations regular, even     and unlabored    Heart:    RRR, normal S1 and S2, no M/R/G   Abdomen:     Normal bowel sounds, soft, nontender, nondistended   G/U:   Deferred   MSK/Extremities:   No clubbing , cyanosis or edema, No wasting   Pulses:   Pulses palpable and equal bilaterally   Skin:   Warm, dry, no mottling   Neurologic:   A/Ox1, cooperative, moves extremities x 4, no tremor, nl     tone   Psych:   Calm         Labs:   Results from last 7 days   Lab Units 03/27/24  1422   WBC 10*3/mm3 9.18   HEMOGLOBIN g/dL 15.3   HEMATOCRIT % 44.7   PLATELETS 10*3/mm3 300     Results from last 7 days   Lab Units 03/27/24  1422   SODIUM mmol/L 136   POTASSIUM mmol/L 5.1   CHLORIDE mmol/L 96*   CO2 mmol/L 26.0   BUN mg/dL 14   CREATININE mg/dL 0.77   CALCIUM mg/dL 9.4   BILIRUBIN mg/dL 1.0   ALK PHOS U/L 89   ALT (SGPT) U/L 22   AST (SGOT) U/L 22   GLUCOSE mg/dL 305*     Imaging Results (Last 72 Hours)       Procedure Component Value Units Date/Time    CT Head Without Contrast [[id number]] Collected: 03/28/24 0818     Updated: 03/28/24 0830    Narrative:      CT HEAD WO CONTRAST    Date of Exam: 3/28/2024 7:44 AM EDT    Indication: Syncope/presyncope, cerebrovascular cause suspected.    Comparison:  3/27/2024    Technique: Axial CT images were obtained of the head without contrast administration.  Automated exposure control and iterative construction methods were used.      Findings:  Parenchyma:No acute intraparenchymal hemorrhage. No loss of gray-white differentiation to suggest large territory infarct. Moderate parenchymal volume loss. Scattered periventricular and subcortical white matter hypodensities,  nonspecific, but most often   consistent with small vessel ischemic changes. No midline shift or herniation.    Ventricles and extra axial spaces:Prominent ventricles and sulci secondary to volume loss. No extra axial fluid collection seen.    Other:Orbits are grossly intact. Paranasal sinuses are clear. Mastoid air cells are clear. Calvarium is intact. Intracranial atherosclerotic calcification is present.      Impression:      Impression:  No evidence of acute intracranial hemorrhage or large territory infarct.    Chronic changes as above.        Electronically Signed: Stephon Chin MD    3/28/2024 8:27 AM EDT    Workstation ID: [id number]    CT Cervical Spine Without Contrast [[id number]] Collected: 03/27/24 1511     Updated: 03/27/24 1521    Narrative:      CT CERVICAL SPINE WO CONTRAST    Date of Exam: 3/27/2024 3:06 PM EDT    Indication: pain, falls.    Comparison: CT cervical spine 4/29/2011    Technique: Axial CT images were obtained of the cervical spine without contrast administration.  Reconstructed coronal and sagittal images were also obtained. Automated exposure control and iterative construction methods were used.      Findings:  Normal spinal alignment. No acute fracture. No traumatic malalignment. Vertebral body heights are normal. Unremarkable appearance of interbody graft/fusion at C6-C7. There is mild degenerative disc disease at C5-C6 and C7-T1. Small posterior disc   osteophyte complexes noted at C5-C6. The paravertebral soft tissues are unremarkable; no prevertebral soft tissue swelling. No CT evidence of high-grade/severe spinal canal stenosis. Lung apices are clear. Homogeneous attenuation of the thyroid gland. No   pharyngeal or laryngeal mass lesion. There are atherosclerotic calcifications of the cervical carotid arteries. No lymphadenopathy.      Impression:      Impression:  No acute fracture or traumatic malalignment.        Electronically Signed: Bebeto Ko MD    3/27/2024 3:17  PM EDT    Workstation ID: [id number]    CT Head Without Contrast [[id number]] Collected: 03/27/24 1510     Updated: 03/27/24 1515    Narrative:      CT HEAD WO CONTRAST    Date of Exam: 3/27/2024 3:06 PM EDT    Indication: multiple falls, on lovenox, positive LOC.    Comparison: 2/24/2024.    Technique: Axial CT images were obtained of the head without contrast administration.  Automated exposure control and iterative construction methods were used.      Findings:  There is moderately severe atrophy. There is periventricular hypodensity compatible with chronic small vessel ischemic insult. There is no acute mass effect or edema. There is no acute CVA or hemorrhage. There is a small old right periventricular CVA.   There is a small old left basal ganglia CVA or prominent perivascular space.    .    Impression:      Impression:  Chronic findings. No acute process.        Electronically Signed: Waleska Giron MD    3/27/2024 3:12 PM EDT    Workstation ID: [id number]    XR Chest 1 View [[id number]] Collected: 03/27/24 1409     Updated: 03/27/24 1414    Narrative:      XR CHEST 1 VW    Date of Exam: 3/27/2024 1:54 PM EDT    Indication: multiple falls, confusion    Comparison: 2/24/2024.    Findings:  There are low lung volumes with poor inspiration. There are postsurgical changes of midline sternotomy. The heart size is normal. There is continued mild atelectasis of the left lung base. There is new patchy infiltrate of the right upper lobe adjacent   to the right hilum. There are no effusions. There is no pneumothorax. There is no obvious acute process of visualized bony structures.      Impression:      Impression:  Continued mild left basilar atelectasis. New infiltrate on the right is nonspecific and could represent area of atelectasis, pneumonia, or pulmonary contusion in the setting of trauma.      Electronically Signed: Waleska Giron MD    3/27/2024 2:11 PM EDT    Workstation ID: [id number]              Lab  03/27/24  1422   HEMOGLOBIN A1C 9.20*       Diagnostics:   No valid procedures specified.    A:     Falls    Benign essential HTN    Benign prostatic hyperplasia    Chronic coronary artery disease    Type 2 diabetes mellitus with hyperglycemia, with long-term current use of insulin    Hyperlipidemia    Dementia with behavioral disturbance    Frequent falls    Chronic anticoagulation    AMS (altered mental status)    Bacteriuria         Impression:   Bacteruria - C&S pending  Dementia with behaviors - not end stage  Falls - pattern similar to that for which he was discharged in early March. - Rexulti discontinued at that time  DM2        Symptoms:  Debility       P:    Agree with neuro eval.  Not received haldol x 1 in ED. Concern raised as noted with myoclonus with rexulti which should in theory cause less often than haloperidol.  VPA is subtherapeutic and would seem reasonable to adjust.  Even in presence of comfort measures treatment of potential UTI is reasonable.  Thank you for this consult and allowing us to participate in patient's plan of care. Palliative Care Team will continue to follow patient.       Carlee Cesar MD, 3/28/2024, 08:50 EDT

## 2024-03-28 NOTE — NURSING NOTE
Woc consulted for possible stage II on the coccyx.    Per charting it is MASD/friction shear.    Attempted to see patient he is in restraints when doing the stance and attempting to turn at first patient gave us permission and then he closes eyes to go back to sleep and we started to turn him and he got combative.    Multiple attempts of redirection and turning were tried his wife even told him to turn and he only attempted to sit up which he was too weak to do.  Every time we try to turn him on his side he would get combative.    We we will have to attempt to assess at a later date.    As we are leaving hospice came into the room.

## 2024-03-28 NOTE — NURSING NOTE
MEPILEX APPLIED TO SACRUM, HEELS,ELBOWS. REMOVED BY PATIENT DUE TO AMS. OPEN SHEARED SKIN NOTED TO COCCYX AREA

## 2024-03-28 NOTE — CONSULTS
Diabetes Education    Patient Name:  Too Tai  YOB: 1955  MRN: [id number]  Admit Date:  3/27/2024      Chart reviewed for diabetes education; noted pt with altered mental status. Not appropriate for education at this time. We will cont to follow, please call [phone] with any interval needs. Thank you.     Electronically signed by:  Caron Nathan RN  03/28/24 10:28 EDT

## 2024-03-28 NOTE — NURSING NOTE
PATIENT WITH SKIN TEAR LEFT FOREARM, CLEANSED WITH NS AND ALLEVYN APPLIED. MULTIPLE ABRASIONS NOTED TO BILATERAL UPPER AND LOWER EXTREMITIES, WITH BRUISING. PATIENT CONTINUES TO REMOVE PROTECTIVE ALLEVYNS.

## 2024-03-28 NOTE — CONSULTS
"[facility] Neurology  Consult Note    Patient Name: Too Tai  : 1955  MRN: [id number]  Primary Care Physician:  Des Geller MD  Referring Physician: No ref. provider found  Date of admission: 3/27/2024    Subjective     Reason for Consult/ Chief Complaint: Dementia with behavioral disturbances    Too Tai is a 68 y.o. male with a past medical history of CAD, HTN, HLD, T2DM, chronic anticoagulation, dementia with behavioral disturbances and BPH presented to [facility] ED on 3/27/2024 for altered mental status and frequent falls.  Per wife which is bedside at approximately 8 PM on the evening of 3/26 patient began to make grunting noises and would fall forward hitting his head and face on the ground.  Patient's wife describes it as \"a magnet on his forehead trying to get to the ground\".  She states that Tuesday evening 4 episodes without loss of consciousness and finally another 1 prior to him coming in and that resulted in loss of consciousness for approximately 1 minute.  Wife denies any loss of bowel or bladder control, frothing at mouth, tonic-clonic movements, tongue bite or sleepiness postevent.  Patient's wife denies any change in his gait prior to falls.     Patient was recently admitted 2024 through 3/4/2024 for frequent falls and myoclonic jerking.  Neurology was consulted for altered mental status.  Etiology of  jerking was unclear but Rexulti was discontinued with concern it was making it worse.  Patient continued with dementia with behavioral disturbances and was started on Depakote 250 TID  daily with resolution.  Patient was discharged on 3/4 to [facility].  At that time he was on Aricept 20 mg nightly, Remeron 15 mg nightly, temazepam 15 mg nightly, Seroquel 50 mg twice daily and Depakote 250 mg 3 times daily.  [facility].  Per patient's wife and she returned home she resumed his regimen prior to his previous " hospitalization.  Patient has not received any Depakote or Remeron per wife's report.    Upon assessment patient was asleep and I did not wake him.  He was requiring 2-point restraints.  He was able to move all limbs equally.  No facial droop was noted.  No nystagmus or deviated gaze noted he was PERRLA.  CT head and 24-hour repeat CT head was negative for any acute process.  CK 72, lactate 4.3 repeat 2.0, VPA level less than 2.0, UA positive for +4 bacteria and nitrites, blood cultures pending    Review Of Systems   Unable to assess due to mental status    Personal History     Past Medical History:   Diagnosis Date    Coronary artery disease     Dementia     Diabetes mellitus     Hyperlipidemia     Peripheral vascular disease        Past Surgical History:   Procedure Laterality Date    CORONARY ARTERY BYPASS GRAFT      FEMORAL ARTERY - POPLITEAL ARTERY BYPASS GRAFT         Family History: family history includes Diabetes in his maternal grandfather, mother, sister, sister, and sister; Heart disease in his father; Hyperlipidemia in his father; Hypertension in his father. Otherwise pertinent FHx was reviewed and not pertinent to current issue.    Social History:  reports that he quit smoking about 27 years ago. His smoking use included cigarettes. He has never been exposed to tobacco smoke. He has quit using smokeless tobacco. He reports that he does not drink alcohol and does not use drugs.    Home Medications:   Accu-Chek Geri, Accu-Chek Geri Plus, Alcohol Prep, Enoxaparin Sodium, Insulin Pen Needle, QUEtiapine, SITagliptin, Valproic Acid, accu-chek soft touch, apixaban, cyanocobalamin, donepezil, insulin detemir, lisinopril, metFORMIN, metoprolol succinate XL, and mirtazapine    Current Medications:     Current Facility-Administered Medications:     acetaminophen (TYLENOL) tablet 650 mg, 650 mg, Oral, Q4H PRN **OR** acetaminophen (TYLENOL) 160 MG/5ML oral solution 650 mg, 650 mg, Oral, Q4H PRN **OR**  acetaminophen (TYLENOL) suppository 650 mg, 650 mg, Rectal, Q4H PRN, Carmelita Smith APRN    sennosides-docusate (PERICOLACE) 8.6-50 MG per tablet 2 tablet, 2 tablet, Oral, BID PRN **AND** polyethylene glycol (MIRALAX) packet 17 g, 17 g, Oral, Daily PRN **AND** bisacodyl (DULCOLAX) EC tablet 5 mg, 5 mg, Oral, Daily PRN **AND** bisacodyl (DULCOLAX) suppository 10 mg, 10 mg, Rectal, Daily PRN, Carmelita Smith APRN    calcium carbonate (TUMS) chewable tablet 500 mg (200 mg elemental), 2 tablet, Oral, TID PRN, Carmelita Smith APRN    Calcium Replacement - Follow Nurse / BPA Driven Protocol, , Does not apply, PRN, Carmelita Smith APRN    dextrose (D50W) (25 g/50 mL) IV injection 25 g, 25 g, Intravenous, Q15 Min PRN, Carmelita Smith APRN    dextrose (GLUTOSE) oral gel 15 g, 15 g, Oral, Q15 Min PRN, Carmelita Smith APRN    donepezil (ARICEPT) tablet 20 mg, 20 mg, Oral, Daily, Carmelita Smith APRN, 20 mg at 03/28/24 0854    glucagon (GLUCAGEN) injection 1 mg, 1 mg, Intramuscular, Q15 Min PRN, Carmelita Smith APRN    insulin detemir (LEVEMIR) injection 7 Units, 7 Units, Subcutaneous, Q12H, Carmelita Smith APRN, 7 Units at 03/28/24 0855    Insulin Lispro (humaLOG) injection 2-7 Units, 2-7 Units, Subcutaneous, 4x Daily AC & at Bedtime, Carmelita Smith APRN, 5 Units at 03/27/24 2225    lisinopril (PRINIVIL,ZESTRIL) tablet 2.5 mg, 2.5 mg, Oral, Daily, Carmelita Smith APRN, 2.5 mg at 03/28/24 0855    Magnesium Standard Dose Replacement - Follow Nurse / BPA Driven Protocol, , Does not apply, PRN, Carmelita Smith APRN    mirtazapine (REMERON) tablet 15 mg, 15 mg, Oral, Nightly, Carmelita Smith APRN, 15 mg at 03/27/24 2226    nitroglycerin (NITROSTAT) SL tablet 0.4 mg, 0.4 mg, Sublingual, Q5 Min PRN, Carmelita Smith APRN    ondansetron ODT (ZOFRAN-ODT) disintegrating tablet 4 mg, 4 mg, Oral, Q6H PRN **OR** ondansetron (ZOFRAN) injection 4 mg, 4 mg, Intravenous, Q6H PRN, Carmelita Smith APRN    Phosphorus Replacement - Follow Nurse / BPA Driven  Protocol, , Does not apply, PRN, Carmelita Smith, APRN    Potassium Replacement - Follow Nurse / BPA Driven Protocol, , Does not apply, PRN, Carmelita Smith, APRN    QUEtiapine (SEROquel) tablet 50 mg, 50 mg, Oral, BID, SerCarmelita gallagher, APRN, 50 mg at 03/28/24 0854    sodium chloride 0.9 % flush 10 mL, 10 mL, Intravenous, PRN, Seraileen, Carmelita, APRN    sodium chloride 0.9 % flush 10 mL, 10 mL, Intravenous, Q12H, Serey, Carmelita, APRN    sodium chloride 0.9 % flush 10 mL, 10 mL, Intravenous, PRN, Seraileen, Carmelita, APRN    sodium chloride 0.9 % infusion 40 mL, 40 mL, Intravenous, PRN, SerCarmelita gallagher, APRN    Valproic Acid (DEPAKENE) syrup 250 mg, 250 mg, Oral, Q8H, SerCarmelita gallagher, APRN     Allergies:  Allergies   Allergen Reactions    Bactrim [Sulfamethoxazole-Trimethoprim] Rash    Adhesive Tape Rash    Neosporin [Neomycin-Bacitracin Zn-Polymyx] Rash       Objective     Physical Exam  Vitals and nursing note reviewed.   Constitutional:       General: He is not in acute distress.     Appearance: He is not ill-appearing.   Eyes:      Extraocular Movements: Extraocular movements intact.      Pupils: Pupils are equal, round, and reactive to light.      Comments: No nystagmus or deviated gaze mired   Cardiovascular:      Rate and Rhythm: Bradycardia present.   Pulmonary:      Effort: Pulmonary effort is normal.   Neurological:      Mental Status: He is lethargic.      Cranial Nerves: No facial asymmetry.      Sensory: No sensory deficit.      Motor: No weakness.      Deep Tendon Reflexes:      Reflex Scores:       Bicep reflexes are 1+ on the right side and 1+ on the left side.       Patellar reflexes are 0 on the right side and 0 on the left side.     Comments:     Physical exam extremely limited due to patient lethargy    Cranial Nerves   CN II: Pupils are equal, round, and reactive to light. Normal visual acuity and visual fields.    CN III IV VI: Extraocular movements are full without nystagmus  CN VII: Facial movements are symmetric.  No weakness.  CN VIII:  Auditory acuity is normal.  CN IX & X:  Symmetric palatal movement.  CN XII: The tongue is midline.  No atrophy or fasciculations.             Vitals:  Temp:  [98 °F (36.7 °C)-98.4 °F (36.9 °C)] 98 °F (36.7 °C)  Heart Rate:  [48-84] 48  Resp:  [16-20] 20  BP: (109-172)/(51-75) 153/63    Laboratory Results:   Lab Results   Component Value Date    GLUCOSE 305 (H) 03/27/2024    CALCIUM 9.4 03/27/2024     03/27/2024    K 5.1 03/27/2024    CO2 26.0 03/27/2024    CL 96 (L) 03/27/2024    BUN 14 03/27/2024    CREATININE 0.77 03/27/2024    EGFRIFAFRI 102 02/21/2022    EGFRIFNONA 88 02/21/2022    BCR 18.2 03/27/2024    ANIONGAP 14.0 03/27/2024     Lab Results   Component Value Date    WBC 9.18 03/27/2024    HGB 15.3 03/27/2024    HCT 44.7 03/27/2024    MCV 93.1 03/27/2024     03/27/2024     Lab Results   Component Value Date    CHOL 220 (H) 01/08/2024    CHOL 120 08/01/2019    CHOL 117 04/15/2019     Lab Results   Component Value Date    HDL 41 01/08/2024    HDL 37 (L) 08/03/2023    HDL 41 04/03/2023     Lab Results   Component Value Date     (H) 01/08/2024     (H) 08/03/2023     (H) 04/03/2023     Lab Results   Component Value Date    TRIG 108 01/08/2024    TRIG 208 (H) 08/03/2023    TRIG 138 04/03/2023     Lab Results   Component Value Date    HGBA1C 9.20 (H) 03/27/2024     Lab Results   Component Value Date    INR 1.1 11/25/2019    INR 1.1 09/10/2018    PROTIME 12.5 11/25/2019    PROTIME 13.3 09/10/2018     Lab Results   Component Value Date    CXFXNJTH18 380 03/27/2024     Lab Results   Component Value Date    FOLATE 10.20 02/24/2024             Assessment / Plan   Active Hospital Problems:    Falls    Benign essential HTN    Benign prostatic hyperplasia    Chronic coronary artery disease    Type 2 diabetes mellitus with hyperglycemia, with long-term current use of insulin    Hyperlipidemia    Dementia with behavioral disturbance    Frequent falls    Chronic  anticoagulation    AMS (altered mental status)    Bacteriuria       Brief Patient Summary:  Too Tai is a 68 y.o. male with a past medical history of CAD, HTN, HLD, T2DM, chronic anticoagulation, dementia with behavioral disturbances and BPH presented to [facility] ED on 3/27/2024 for altered mental status and frequent falls.  Per wife which is bedside at approximately 8 PM on the evening of 3/26 patient began to make grunting noises and would fall forward hitting his head and face on the ground.      Plan:   Dementia with behavioral disturbances   frequent falls  Mental status  Spoke with pharmacist about med rec lists alleviation from [facility]. In the meantime would restart previous alterative regiment.  Initial CT head and repeat CT head negative for acute process  EEG  Depakote 250 mg 3 times daily  VPA level <2.8, based off EEG results and medication reconciliation will consider IV load.  Aricept 20 mg daily  Remeron 15 mg nightly  Restoril 15 mg as needed nightly, please consider giving before 10 PM  Seroquel 50 mg twice daily  Geodon 10 mg as needed for extreme agitation  QTc 459  Palliative care following, appreciate recommendations  General neurology continue to follow      I have discussed the above with the patient, bedside RN and Dr. Sumner  Time spent with patient: 80 minutes in face-to-face evaluation and management of the patient.       IVONNE Santos

## 2024-03-29 ENCOUNTER — APPOINTMENT (OUTPATIENT)
Dept: CARDIOLOGY | Facility: HOSPITAL | Age: 69
End: 2024-03-29
Payer: COMMERCIAL

## 2024-03-29 LAB
ANION GAP SERPL CALCULATED.3IONS-SCNC: 9 MMOL/L (ref 5–15)
ASCENDING AORTA: 4.2 CM
BASOPHILS # BLD AUTO: 0.1 10*3/MM3 (ref 0–0.2)
BASOPHILS NFR BLD AUTO: 0.9 % (ref 0–1.5)
BH CV ECHO MEAS - AI P1/2T: 433.7 MSEC
BH CV ECHO MEAS - AO MAX PG: 7.5 MMHG
BH CV ECHO MEAS - AO MEAN PG: 4 MMHG
BH CV ECHO MEAS - AO ROOT DIAM: 4.1 CM
BH CV ECHO MEAS - AO V2 MAX: 137 CM/SEC
BH CV ECHO MEAS - AO V2 VTI: 30.8 CM
BH CV ECHO MEAS - AVA(I,D): 1.91 CM2
BH CV ECHO MEAS - EDV(CUBED): 148.9 ML
BH CV ECHO MEAS - EDV(MOD-SP2): 110 ML
BH CV ECHO MEAS - EDV(MOD-SP4): 122 ML
BH CV ECHO MEAS - EF(MOD-BP): 55.1 %
BH CV ECHO MEAS - EF(MOD-SP2): 57.5 %
BH CV ECHO MEAS - EF(MOD-SP4): 52.5 %
BH CV ECHO MEAS - ESV(CUBED): 68.9 ML
BH CV ECHO MEAS - ESV(MOD-SP2): 46.7 ML
BH CV ECHO MEAS - ESV(MOD-SP4): 58 ML
BH CV ECHO MEAS - FS: 22.6 %
BH CV ECHO MEAS - IVS/LVPW: 1 CM
BH CV ECHO MEAS - IVSD: 1.2 CM
BH CV ECHO MEAS - LA DIMENSION: 3.8 CM
BH CV ECHO MEAS - LAT PEAK E' VEL: 7.1 CM/SEC
BH CV ECHO MEAS - LV DIASTOLIC VOL/BSA (35-75): 56.8 CM2
BH CV ECHO MEAS - LV MASS(C)D: 256.6 GRAMS
BH CV ECHO MEAS - LV MAX PG: 2.6 MMHG
BH CV ECHO MEAS - LV MEAN PG: 1 MMHG
BH CV ECHO MEAS - LV SYSTOLIC VOL/BSA (12-30): 27 CM2
BH CV ECHO MEAS - LV V1 MAX: 80.8 CM/SEC
BH CV ECHO MEAS - LV V1 VTI: 17 CM
BH CV ECHO MEAS - LVIDD: 5.3 CM
BH CV ECHO MEAS - LVIDS: 4.1 CM
BH CV ECHO MEAS - LVOT AREA: 3.5 CM2
BH CV ECHO MEAS - LVOT DIAM: 2.1 CM
BH CV ECHO MEAS - LVPWD: 1.2 CM
BH CV ECHO MEAS - MED PEAK E' VEL: 4.8 CM/SEC
BH CV ECHO MEAS - MV A MAX VEL: 105 CM/SEC
BH CV ECHO MEAS - MV DEC SLOPE: 329 CM/SEC2
BH CV ECHO MEAS - MV DEC TIME: 0.23 SEC
BH CV ECHO MEAS - MV E MAX VEL: 48.7 CM/SEC
BH CV ECHO MEAS - MV E/A: 0.46
BH CV ECHO MEAS - MV MAX PG: 3.5 MMHG
BH CV ECHO MEAS - MV MEAN PG: 2 MMHG
BH CV ECHO MEAS - MV P1/2T: 62.1 MSEC
BH CV ECHO MEAS - MV V2 VTI: 23 CM
BH CV ECHO MEAS - MVA(P1/2T): 3.5 CM2
BH CV ECHO MEAS - MVA(VTI): 2.6 CM2
BH CV ECHO MEAS - PA ACC TIME: 0.11 SEC
BH CV ECHO MEAS - SI(MOD-SP2): 29.5 ML/M2
BH CV ECHO MEAS - SI(MOD-SP4): 29.8 ML/M2
BH CV ECHO MEAS - SV(LVOT): 58.7 ML
BH CV ECHO MEAS - SV(MOD-SP2): 63.3 ML
BH CV ECHO MEAS - SV(MOD-SP4): 64 ML
BH CV ECHO MEAS - TAPSE (>1.6): 2.03 CM
BH CV ECHO MEASUREMENTS AVERAGE E/E' RATIO: 8.18
BH CV VAS BP RIGHT ARM: NORMAL MMHG
BH CV XLRA - RV BASE: 4.3 CM
BH CV XLRA - RV LENGTH: 7.6 CM
BH CV XLRA - RV MID: 3.1 CM
BH CV XLRA - TDI S': 8.6 CM/SEC
BUN SERPL-MCNC: 10 MG/DL (ref 8–23)
BUN/CREAT SERPL: 13 (ref 7–25)
CALCIUM SPEC-SCNC: 9.5 MG/DL (ref 8.6–10.5)
CHLORIDE SERPL-SCNC: 101 MMOL/L (ref 98–107)
CO2 SERPL-SCNC: 27 MMOL/L (ref 22–29)
CREAT SERPL-MCNC: 0.77 MG/DL (ref 0.76–1.27)
DEPRECATED RDW RBC AUTO: 40.1 FL (ref 37–54)
EGFRCR SERPLBLD CKD-EPI 2021: 98.1 ML/MIN/1.73
EOSINOPHIL # BLD AUTO: 0.61 10*3/MM3 (ref 0–0.4)
EOSINOPHIL NFR BLD AUTO: 5.5 % (ref 0.3–6.2)
ERYTHROCYTE [DISTWIDTH] IN BLOOD BY AUTOMATED COUNT: 12.2 % (ref 12.3–15.4)
GLUCOSE BLDC GLUCOMTR-MCNC: 156 MG/DL (ref 70–130)
GLUCOSE BLDC GLUCOMTR-MCNC: 197 MG/DL (ref 70–130)
GLUCOSE BLDC GLUCOMTR-MCNC: 254 MG/DL (ref 70–130)
GLUCOSE BLDC GLUCOMTR-MCNC: 287 MG/DL (ref 70–130)
GLUCOSE SERPL-MCNC: 166 MG/DL (ref 65–99)
HCT VFR BLD AUTO: 48.6 % (ref 37.5–51)
HGB BLD-MCNC: 16.8 G/DL (ref 13–17.7)
IMM GRANULOCYTES # BLD AUTO: 0.09 10*3/MM3 (ref 0–0.05)
IMM GRANULOCYTES NFR BLD AUTO: 0.8 % (ref 0–0.5)
LEFT ATRIUM VOLUME INDEX: 30.7 ML/M2
LYMPHOCYTES # BLD AUTO: 2.65 10*3/MM3 (ref 0.7–3.1)
LYMPHOCYTES NFR BLD AUTO: 24.1 % (ref 19.6–45.3)
MCH RBC QN AUTO: 31.1 PG (ref 26.6–33)
MCHC RBC AUTO-ENTMCNC: 34.6 G/DL (ref 31.5–35.7)
MCV RBC AUTO: 89.8 FL (ref 79–97)
MONOCYTES # BLD AUTO: 1.24 10*3/MM3 (ref 0.1–0.9)
MONOCYTES NFR BLD AUTO: 11.3 % (ref 5–12)
NEUTROPHILS NFR BLD AUTO: 57.4 % (ref 42.7–76)
NEUTROPHILS NFR BLD AUTO: 6.31 10*3/MM3 (ref 1.7–7)
NRBC BLD AUTO-RTO: 0 /100 WBC (ref 0–0.2)
PLATELET # BLD AUTO: 251 10*3/MM3 (ref 140–450)
PMV BLD AUTO: 9.8 FL (ref 6–12)
POTASSIUM SERPL-SCNC: 4.2 MMOL/L (ref 3.5–5.2)
RBC # BLD AUTO: 5.41 10*6/MM3 (ref 4.14–5.8)
SODIUM SERPL-SCNC: 137 MMOL/L (ref 136–145)
WBC NRBC COR # BLD AUTO: 11 10*3/MM3 (ref 3.4–10.8)

## 2024-03-29 PROCEDURE — 99233 SBSQ HOSP IP/OBS HIGH 50: CPT

## 2024-03-29 PROCEDURE — 25010000002 CEFTRIAXONE PER 250 MG: Performed by: INTERNAL MEDICINE

## 2024-03-29 PROCEDURE — 63710000001 INSULIN DETEMIR PER 5 UNITS: Performed by: NURSE PRACTITIONER

## 2024-03-29 PROCEDURE — 80048 BASIC METABOLIC PNL TOTAL CA: CPT | Performed by: INTERNAL MEDICINE

## 2024-03-29 PROCEDURE — 93306 TTE W/DOPPLER COMPLETE: CPT | Performed by: INTERNAL MEDICINE

## 2024-03-29 PROCEDURE — 25010000002 ZIPRASIDONE MESYLATE PER 10 MG

## 2024-03-29 PROCEDURE — 63710000001 INSULIN LISPRO (HUMAN) PER 5 UNITS: Performed by: NURSE PRACTITIONER

## 2024-03-29 PROCEDURE — 82948 REAGENT STRIP/BLOOD GLUCOSE: CPT

## 2024-03-29 PROCEDURE — 93306 TTE W/DOPPLER COMPLETE: CPT

## 2024-03-29 PROCEDURE — 99232 SBSQ HOSP IP/OBS MODERATE 35: CPT | Performed by: STUDENT IN AN ORGANIZED HEALTH CARE EDUCATION/TRAINING PROGRAM

## 2024-03-29 PROCEDURE — 85025 COMPLETE CBC W/AUTO DIFF WBC: CPT | Performed by: INTERNAL MEDICINE

## 2024-03-29 RX ORDER — TEMAZEPAM 15 MG/1
15 CAPSULE ORAL NIGHTLY
Status: DISCONTINUED | OUTPATIENT
Start: 2024-03-29 | End: 2024-04-04 | Stop reason: HOSPADM

## 2024-03-29 RX ORDER — DOXYCYCLINE 100 MG/1
100 CAPSULE ORAL EVERY 12 HOURS SCHEDULED
Status: COMPLETED | OUTPATIENT
Start: 2024-03-29 | End: 2024-04-01

## 2024-03-29 RX ADMIN — INSULIN LISPRO 4 UNITS: 100 INJECTION, SOLUTION INTRAVENOUS; SUBCUTANEOUS at 21:34

## 2024-03-29 RX ADMIN — INSULIN LISPRO 2 UNITS: 100 INJECTION, SOLUTION INTRAVENOUS; SUBCUTANEOUS at 09:58

## 2024-03-29 RX ADMIN — SODIUM CHLORIDE 1000 MG: 9 INJECTION, SOLUTION INTRAVENOUS at 11:18

## 2024-03-29 RX ADMIN — QUETIAPINE FUMARATE 50 MG: 25 TABLET ORAL at 21:35

## 2024-03-29 RX ADMIN — Medication 10 ML: at 21:40

## 2024-03-29 RX ADMIN — INSULIN DETEMIR 7 UNITS: 100 INJECTION, SOLUTION SUBCUTANEOUS at 21:34

## 2024-03-29 RX ADMIN — DONEPEZIL HYDROCHLORIDE 20 MG: 10 TABLET, FILM COATED ORAL at 09:54

## 2024-03-29 RX ADMIN — INSULIN LISPRO 4 UNITS: 100 INJECTION, SOLUTION INTRAVENOUS; SUBCUTANEOUS at 17:51

## 2024-03-29 RX ADMIN — DOXYCYCLINE 100 MG: 100 CAPSULE ORAL at 21:35

## 2024-03-29 RX ADMIN — VALPROIC ACID 250 MG: 250 SOLUTION ORAL at 13:39

## 2024-03-29 RX ADMIN — MIRTAZAPINE 15 MG: 15 TABLET, FILM COATED ORAL at 21:35

## 2024-03-29 RX ADMIN — ZIPRASIDONE MESYLATE 10 MG: 20 INJECTION, POWDER, LYOPHILIZED, FOR SOLUTION INTRAMUSCULAR at 05:03

## 2024-03-29 RX ADMIN — INSULIN LISPRO 2 UNITS: 100 INJECTION, SOLUTION INTRAVENOUS; SUBCUTANEOUS at 12:02

## 2024-03-29 RX ADMIN — DOXYCYCLINE 100 MG: 100 CAPSULE ORAL at 09:54

## 2024-03-29 RX ADMIN — SODIUM CHLORIDE 1000 MG: 900 INJECTION INTRAVENOUS at 12:02

## 2024-03-29 RX ADMIN — VALPROIC ACID 250 MG: 250 SOLUTION ORAL at 21:35

## 2024-03-29 RX ADMIN — TEMAZEPAM 15 MG: 15 CAPSULE ORAL at 21:35

## 2024-03-29 RX ADMIN — ZIPRASIDONE MESYLATE 10 MG: 20 INJECTION, POWDER, LYOPHILIZED, FOR SOLUTION INTRAMUSCULAR at 00:42

## 2024-03-29 RX ADMIN — ACETAMINOPHEN 650 MG: 650 SOLUTION ORAL at 14:56

## 2024-03-29 RX ADMIN — Medication 10 ML: at 09:58

## 2024-03-29 RX ADMIN — QUETIAPINE FUMARATE 50 MG: 25 TABLET ORAL at 09:54

## 2024-03-29 RX ADMIN — INSULIN DETEMIR 7 UNITS: 100 INJECTION, SOLUTION SUBCUTANEOUS at 09:58

## 2024-03-29 RX ADMIN — LISINOPRIL 2.5 MG: 5 TABLET ORAL at 09:54

## 2024-03-29 NOTE — PROGRESS NOTES
[facility] Medicine Services  PROGRESS NOTE    Patient Name: Too Tai  : 1955  MRN: [id number]    Date of Admission: 3/27/2024  Primary Care Physician: Des Geller MD    Subjective   Subjective     CC:  AMS, falls     HPI:  Patient was reportedly aggressive with staff overnight but now resting comfortably after receiving Geodon.      Objective   Objective     Vital Signs:   Temp:  [97.6 °F (36.4 °C)] 97.6 °F (36.4 °C)  Heart Rate:  [48-84] 84  Resp:  [18] 18  BP: (158-191)/(60-84) 158/60     Physical Exam:  General appearance: Resting comfortably, no acute distress  Cardiovascular: Bradycardic, no murmurs or rubs, radial pulse full 2/4 BL   Respiratory: CTAB, no crackles or wheezes, oxygenating well on room air  Abdomen: soft, non-tender, no organomegaly, bowel sounds normoactive      Results Reviewed:  LAB RESULTS:      Lab 24  0704 24  0040 24  1943 24  1422   WBC 11.00*  --   --  9.18   HEMOGLOBIN 16.8  --   --  15.3   HEMATOCRIT 48.6  --   --  44.7   PLATELETS 251  --   --  300   NEUTROS ABS 6.31  --   --  7.51*   IMMATURE GRANS (ABS) 0.09*  --   --  0.09*   LYMPHS ABS 2.65  --   --  1.09   MONOS ABS 1.24*  --   --  0.47   EOS ABS 0.61*  --   --  0.00   MCV 89.8  --   --  93.1   SED RATE  --   --   --  31*   PROCALCITONIN  --   --   --  0.02   LACTATE  --  2.0 4.3* 4.0*         Lab 24  0920 24  1422   SODIUM 137 136   POTASSIUM 4.2 5.1   CHLORIDE 101 96*   CO2 27.0 26.0   ANION GAP 9.0 14.0   BUN 10 14   CREATININE 0.77 0.77   EGFR 98.1 98.1   GLUCOSE 166* 305*   CALCIUM 9.5 9.4   HEMOGLOBIN A1C  --  9.20*         Lab 24  1422   TOTAL PROTEIN 7.6   ALBUMIN 4.2   GLOBULIN 3.4   ALT (SGPT) 22   AST (SGOT) 22   BILIRUBIN 1.0   ALK PHOS 89         Lab 24  1422   HSTROP T 15             Lab 24  1422   VITAMIN B 12 380         Brief Urine Lab Results  (Last result in the past 365 days)        Color   Clarity   Blood    Leuk Est   Nitrite   Protein   CREAT   Urine HCG        03/27/24 1633 Yellow   Clear   Negative   Negative   Positive   100 mg/dL (2+)                   Microbiology Results Abnormal       Procedure Component Value - Date/Time    Blood Culture - Blood, Arm, Left [[id number]]  (Normal) Collected: 03/27/24 1943    Lab Status: Preliminary result Specimen: Blood from Arm, Left Updated: 03/28/24 2000     Blood Culture No growth at 24 hours    Blood Culture - Blood, Arm, Right [[id number]]  (Normal) Collected: 03/27/24 1750    Lab Status: Preliminary result Specimen: Blood from Arm, Right Updated: 03/28/24 1815     Blood Culture No growth at 24 hours    Narrative:      Less than seven (7) mL's of blood was collected.  Insufficient quantity may yield false negative results.            EEG    Result Date: 3/28/2024  Reason for referral: 68 y.o.male with syncope Technical Summary:  A 19 channel digital EEG was performed using the international 10-20 placement system, including eye leads and EKG leads. Duration: 22 minutes Findings: The patient is resting quietly in bed with his eyes closed and appears drowsy.  Diffuse low amplitude 5-6 Hz theta is present symmetrically over both hemispheres.  A clear posterior rhythm is not seen.  Photic stimulation does not change the background.  Hyperventilation is not performed.  No focal features or epileptiform activity are seen. Video: Available Technical quality: Superior EKG: Bradycardic, 40-50 bpm SUMMARY: Mild generalized slow No focal features or epileptiform activity are seen     Impression: Diffuse cerebral dysfunction mild degree, nonspecific This report is transcribed using the Dragon dictation system.      CT Head Without Contrast    Result Date: 3/28/2024  CT HEAD WO CONTRAST Date of Exam: 3/28/2024 7:44 AM EDT Indication: Syncope/presyncope, cerebrovascular cause suspected. Comparison:  3/27/2024 Technique: Axial CT images were obtained of the head without contrast  administration.  Automated exposure control and iterative construction methods were used. Findings: Parenchyma:No acute intraparenchymal hemorrhage. No loss of gray-white differentiation to suggest large territory infarct. Moderate parenchymal volume loss. Scattered periventricular and subcortical white matter hypodensities, nonspecific, but most often  consistent with small vessel ischemic changes. No midline shift or herniation. Ventricles and extra axial spaces:Prominent ventricles and sulci secondary to volume loss. No extra axial fluid collection seen. Other:Orbits are grossly intact. Paranasal sinuses are clear. Mastoid air cells are clear. Calvarium is intact. Intracranial atherosclerotic calcification is present.     Impression: Impression: No evidence of acute intracranial hemorrhage or large territory infarct. Chronic changes as above. Electronically Signed: Stephon Chin MD  3/28/2024 8:27 AM EDT  Workstation ID: [id number]    CT Cervical Spine Without Contrast    Result Date: 3/27/2024  CT CERVICAL SPINE WO CONTRAST Date of Exam: 3/27/2024 3:06 PM EDT Indication: pain, falls. Comparison: CT cervical spine 4/29/2011 Technique: Axial CT images were obtained of the cervical spine without contrast administration.  Reconstructed coronal and sagittal images were also obtained. Automated exposure control and iterative construction methods were used. Findings: Normal spinal alignment. No acute fracture. No traumatic malalignment. Vertebral body heights are normal. Unremarkable appearance of interbody graft/fusion at C6-C7. There is mild degenerative disc disease at C5-C6 and C7-T1. Small posterior disc osteophyte complexes noted at C5-C6. The paravertebral soft tissues are unremarkable; no prevertebral soft tissue swelling. No CT evidence of high-grade/severe spinal canal stenosis. Lung apices are clear. Homogeneous attenuation of the thyroid gland. No  pharyngeal or laryngeal mass lesion. There are  atherosclerotic calcifications of the cervical carotid arteries. No lymphadenopathy.     Impression: Impression: No acute fracture or traumatic malalignment. Electronically Signed: Bebeto Ko MD  3/27/2024 3:17 PM EDT  Workstation ID: [id number]    CT Head Without Contrast    Result Date: 3/27/2024  CT HEAD WO CONTRAST Date of Exam: 3/27/2024 3:06 PM EDT Indication: multiple falls, on lovenox, positive LOC. Comparison: 2/24/2024. Technique: Axial CT images were obtained of the head without contrast administration.  Automated exposure control and iterative construction methods were used. Findings: There is moderately severe atrophy. There is periventricular hypodensity compatible with chronic small vessel ischemic insult. There is no acute mass effect or edema. There is no acute CVA or hemorrhage. There is a small old right periventricular CVA. There is a small old left basal ganglia CVA or prominent perivascular space. .    Impression: Impression: Chronic findings. No acute process. Electronically Signed: Waleska Giron MD  3/27/2024 3:12 PM EDT  Workstation ID: [id number]    XR Chest 1 View    Result Date: 3/27/2024  XR CHEST 1 VW Date of Exam: 3/27/2024 1:54 PM EDT Indication: multiple falls, confusion Comparison: 2/24/2024. Findings: There are low lung volumes with poor inspiration. There are postsurgical changes of midline sternotomy. The heart size is normal. There is continued mild atelectasis of the left lung base. There is new patchy infiltrate of the right upper lobe adjacent to the right hilum. There are no effusions. There is no pneumothorax. There is no obvious acute process of visualized bony structures.     Impression: Impression: Continued mild left basilar atelectasis. New infiltrate on the right is nonspecific and could represent area of atelectasis, pneumonia, or pulmonary contusion in the setting of trauma. Electronically Signed: Waleska Giron MD  3/27/2024 2:11 PM EDT  Workstation ID:  [id number]     Results for orders placed in visit on 07/30/20    SCANNED - ECHOCARDIOGRAM      Current medications:  Scheduled Meds:cefTRIAXone, 1,000 mg, Intravenous, Q24H  donepezil, 20 mg, Oral, Daily  doxycycline, 100 mg, Oral, Q12H  insulin detemir, 7 Units, Subcutaneous, Q12H  insulin lispro, 2-7 Units, Subcutaneous, 4x Daily AC & at Bedtime  lisinopril, 2.5 mg, Oral, Daily  mirtazapine, 15 mg, Oral, Nightly  QUEtiapine, 50 mg, Oral, BID  sodium chloride, 10 mL, Intravenous, Q12H  temazepam, 15 mg, Oral, Nightly  valproate sodium, 1,000 mg, Intravenous, Once  Valproic Acid, 250 mg, Oral, Q8H      Continuous Infusions:   PRN Meds:.  acetaminophen **OR** acetaminophen **OR** acetaminophen    senna-docusate sodium **AND** polyethylene glycol **AND** bisacodyl **AND** bisacodyl    calcium carbonate    Calcium Replacement - Follow Nurse / BPA Driven Protocol    dextrose    dextrose    glucagon (human recombinant)    Magnesium Standard Dose Replacement - Follow Nurse / BPA Driven Protocol    nitroglycerin    ondansetron ODT **OR** ondansetron    Phosphorus Replacement - Follow Nurse / BPA Driven Protocol    Potassium Replacement - Follow Nurse / BPA Driven Protocol    sodium chloride    sodium chloride    sodium chloride    ziprasidone    Assessment & Plan   Assessment & Plan     Active Hospital Problems    Diagnosis  POA    **Falls [W19.XXXA]  Yes    AMS (altered mental status) [R41.82]  Yes    Bacteriuria [R82.71]  Yes    Frequent falls [R29.6]  Not Applicable    Dementia with behavioral disturbance [F03.918]  Yes    Chronic anticoagulation [Z79.01]  Not Applicable    Type 2 diabetes mellitus with hyperglycemia, with long-term current use of insulin [E11.65, Z79.4]  Not Applicable    Benign essential HTN [I10]  Yes    Benign prostatic hyperplasia [N40.0]  Yes    Chronic coronary artery disease [I25.10]  Yes    Hyperlipidemia [E78.5]  Yes      Resolved Hospital Problems   No resolved problems to display.     This  patient's assessments and plans were partially entered by my partner and updated as appropriate by me on 3/29/2024    Brief Hospital Course to date:  Too Tai is a 68 y.o. male with PMH significant for CAD, HTN, HLD, T2DM, chronic anticoagulation, dementia with behavioral disturbance, BPH presented to [facility] ED on 3/27/2024 d/t altered mental status and frequent falls. Patient found to be orthostatic with uncontrolled behavior likely secondary to outpatient medication changes.     Acute metabolic encephalopathy superimposed on underlying dementia  Frequent falls  Suspected orthostatic hypotension  Bradycardic on telemetry  -CT head without any acute findings  -Neurology following   -Continue Aricept, Seroquel, Remeron  -Restart temazepam  -Geodon as needed for extreme agitation  -Loaded w depakote today as this appeared to not be administered outpatient, then continue 250mg TID; check level in AM, monitor for toxicity   -Once behavioral changes are controlled, will pursue further workup of orthostatic hypotension with echocardiogram and vascular studies  -Patient may require Holter monitor as he is bradycardic on telemetry  -PT/OT to evaluate  -Obtain TTE and subsequent vascular studies  -Discussed with Neurology and wife at bedside      Suspected UTI  -Patient with 4+ bacteria  -Follow-up blood and urine cultures  -Continue Rocephin     Hypertension  -BP currently stable but low,   -continue lisinopril with holding parameters  -Follow-up orthostatic vital signs     Poorly controlled type 2 diabetes with A1c 9.2%  -Continue basal and SSI, adjust as warranted     CAD  Hyperlipidemia  -Currently not on any meds other than metoprolol     History of DVT  -On chronic anticoagulation with full dose Lovenox  -Currently on hold current 2 falls    Expected Discharge Location and Transportation: Home vs LTC   Expected Discharge   Expected Discharge Date: 4/1/2024; Expected Discharge Time:      DVT prophylaxis:  Medical and  mechanical DVT prophylaxis orders are present.         AM-PAC 6 Clicks Score (PT): 17 (03/29/24 0800)    CODE STATUS:   Code Status and Medical Interventions:   Ordered at: 03/27/24 6179     Level Of Support Discussed With:    Health Care Surrogate     Code Status (Patient has no pulse and is not breathing):    No CPR (Do Not Attempt to Resuscitate)     Medical Interventions (Patient has pulse or is breathing):    Comfort Measures       Scar Schneider, DO  03/29/24

## 2024-03-29 NOTE — PLAN OF CARE
Goal Outcome Evaluation:  Plan of Care Reviewed With: caregiver         Pt was verbally and physically aggressive overnight. Pt pinched and kicked RN and caregiver. 4 pt restraints initiated per provider. Geodon given 2x due to aggressiveness, calmed him for about 1.5 hrs each time. Unable to assess much of the night due to continued aggressiveness as soon as restraints were released. Pt continually pulled pulse ox and tele box off, tele d/c'd per provider conversation. Pt has been completely incontinent overnight, 3 full bed changes.

## 2024-03-29 NOTE — NURSING NOTE
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    Patient now in 4 point restraints. Required Geodon overnight due verbal and physical aggression.     Not appropriate to see.  Treatment for suspected stage 2 or MASD can be treated the same.      When appropriate, cleanse with PH foam cleanser and barrier wipes. Apply z-guard BID. Apply alleyvn foam dressing to sacrum and heels for additional pressure injury prevention.    Sign off.    Juan Aden RN, BSN, CCRN, CWOCN  Wound, Ostomy and Continence (WOC) Department  [facility]

## 2024-03-29 NOTE — PROGRESS NOTES
[facility]    Progress Note    Patient Name: Too Tai  : 1955  MRN: [id number]  Primary Care Physician:  Des Geller MD  Date of admission: 3/27/2024    Subjective     Chief Complaint: Dementia with behavioral disturbances    History of Present Illness   Patient required 2 doses of Geodon overnight for extreme agitation.  Upon examination he was asleep, did not wake for exam.  Patient was able to move all 4 extremities was in no distress.  Wife and son at bedside.  Some confusion with medication with wife, answered all questions and she verbalized understanding.    Review of Systems   Unable to assess due to mental status    Objective     Physical Exam  Vitals and nursing note reviewed.   Cardiovascular:      Rate and Rhythm: Normal rate and regular rhythm.   Pulmonary:      Effort: Pulmonary effort is normal.   Skin:     General: Skin is cool and dry.   Neurological:      Mental Status: He is lethargic.      Cranial Nerves: No facial asymmetry.      Sensory: No sensory deficit.      Motor: No seizure activity.      Comments:   Physical exam is extremely limited due to patient lethargy.    Motor: Moves all extremities          Vitals:   Temp:  [97.6 °F (36.4 °C)] 97.6 °F (36.4 °C)  Heart Rate:  [48-84] 84  Resp:  [18] 18  BP: (157-191)/(56-84) 191/84    Current Medications    Current Facility-Administered Medications:     acetaminophen (TYLENOL) tablet 650 mg, 650 mg, Oral, Q4H PRN **OR** acetaminophen (TYLENOL) 160 MG/5ML oral solution 650 mg, 650 mg, Oral, Q4H PRN **OR** acetaminophen (TYLENOL) suppository 650 mg, 650 mg, Rectal, Q4H PRN, Carmelita Smith APRN    sennosides-docusate (PERICOLACE) 8.6-50 MG per tablet 2 tablet, 2 tablet, Oral, BID PRN **AND** polyethylene glycol (MIRALAX) packet 17 g, 17 g, Oral, Daily PRN **AND** bisacodyl (DULCOLAX) EC tablet 5 mg, 5 mg, Oral, Daily PRN **AND** bisacodyl (DULCOLAX) suppository 10 mg, 10 mg, Rectal, Daily PRN, Carmelita Smith,  IVONNE    calcium carbonate (TUMS) chewable tablet 500 mg (200 mg elemental), 2 tablet, Oral, TID PRN, Carmelita Smith APRN    Calcium Replacement - Follow Nurse / BPA Driven Protocol, , Does not apply, PRN, Carmelita Smith APRN    cefTRIAXone (ROCEPHIN) 1,000 mg in sodium chloride 0.9 % 100 mL MBP, 1,000 mg, Intravenous, Q24H, Kavitha Sumner MD, Last Rate: 200 mL/hr at 03/28/24 1200, 1,000 mg at 03/28/24 1200    dextrose (D50W) (25 g/50 mL) IV injection 25 g, 25 g, Intravenous, Q15 Min PRN, Carmelita Smith APRN    dextrose (GLUTOSE) oral gel 15 g, 15 g, Oral, Q15 Min PRN, Carmelita Smith APRN    donepezil (ARICEPT) tablet 20 mg, 20 mg, Oral, Daily, Carmelita Smith APRN, 20 mg at 03/28/24 0854    doxycycline (MONODOX) capsule 100 mg, 100 mg, Oral, Q12H, Scar Schneider,     glucagon (GLUCAGEN) injection 1 mg, 1 mg, Intramuscular, Q15 Min PRN, Carmelita Smith APRN    insulin detemir (LEVEMIR) injection 7 Units, 7 Units, Subcutaneous, Q12H, Carmelita Smith APRN, 7 Units at 03/28/24 2052    Insulin Lispro (humaLOG) injection 2-7 Units, 2-7 Units, Subcutaneous, 4x Daily AC & at Bedtime, Carmelita Smith APRN, 3 Units at 03/28/24 2052    lisinopril (PRINIVIL,ZESTRIL) tablet 2.5 mg, 2.5 mg, Oral, Daily, Carmelita Smith APRN, 2.5 mg at 03/28/24 0855    Magnesium Standard Dose Replacement - Follow Nurse / BPA Driven Protocol, , Does not apply, PRN, Carmelita Smith APRN    mirtazapine (REMERON) tablet 15 mg, 15 mg, Oral, Nightly, Carmelita Smith APRN, 15 mg at 03/28/24 2052    nitroglycerin (NITROSTAT) SL tablet 0.4 mg, 0.4 mg, Sublingual, Q5 Min PRN, Carmelita Smith APRN    ondansetron ODT (ZOFRAN-ODT) disintegrating tablet 4 mg, 4 mg, Oral, Q6H PRN **OR** ondansetron (ZOFRAN) injection 4 mg, 4 mg, Intravenous, Q6H PRN, Carmelita Smith APRN    Phosphorus Replacement - Follow Nurse / BPA Driven Protocol, , Does not apply, PRN, Carmelita Smith APRN    Potassium Replacement - Follow Nurse / BPA Driven Protocol, , Does not apply, PRN,  Carmelita Smith APRN    QUEtiapine (SEROquel) tablet 50 mg, 50 mg, Oral, BID, Carmelita Smith APRN, 50 mg at 03/28/24 2052    sodium chloride 0.9 % flush 10 mL, 10 mL, Intravenous, PRN, Carmelita Smith, APRN    sodium chloride 0.9 % flush 10 mL, 10 mL, Intravenous, Q12H, Carmelita Smith APRN, 10 mL at 03/28/24 2053    sodium chloride 0.9 % flush 10 mL, 10 mL, Intravenous, PRN, Carmelita Smith, IVONNE    sodium chloride 0.9 % infusion 40 mL, 40 mL, Intravenous, PRN, Carmelita Smith APRN    temazepam (RESTORIL) capsule 15 mg, 15 mg, Oral, Nightly PRN, Darrell Jhaveri,     Valproic Acid (DEPAKENE) syrup 250 mg, 250 mg, Oral, Q8H, Carmelita Smith APRN    ziprasidone (GEODON) injection 10 mg, 10 mg, Intramuscular, Q4H PRN, Meliza Kahn, APRN, 10 mg at 03/29/24 0503    Laboratory Results:   Lab Results   Component Value Date    GLUCOSE 305 (H) 03/27/2024    CALCIUM 9.4 03/27/2024     03/27/2024    K 5.1 03/27/2024    CO2 26.0 03/27/2024    CL 96 (L) 03/27/2024    BUN 14 03/27/2024    CREATININE 0.77 03/27/2024    EGFRIFAFRI 102 02/21/2022    EGFRIFNONA 88 02/21/2022    BCR 18.2 03/27/2024    ANIONGAP 14.0 03/27/2024     Lab Results   Component Value Date    WBC 11.00 (H) 03/29/2024    HGB 16.8 03/29/2024    HCT 48.6 03/29/2024    MCV 89.8 03/29/2024     03/29/2024     Lab Results   Component Value Date    CHOL 220 (H) 01/08/2024    CHOL 120 08/01/2019    CHOL 117 04/15/2019     Lab Results   Component Value Date    HDL 41 01/08/2024    HDL 37 (L) 08/03/2023    HDL 41 04/03/2023     Lab Results   Component Value Date     (H) 01/08/2024     (H) 08/03/2023     (H) 04/03/2023     Lab Results   Component Value Date    TRIG 108 01/08/2024    TRIG 208 (H) 08/03/2023    TRIG 138 04/03/2023     Lab Results   Component Value Date    HGBA1C 9.20 (H) 03/27/2024     Lab Results   Component Value Date    INR 1.1 11/25/2019    INR 1.1 09/10/2018    PROTIME 12.5 11/25/2019    PROTIME 13.3 09/10/2018     Lab  Results   Component Value Date    FOLATE 10.20 02/24/2024     Lab Results   Component Value Date    TDUPODAM10 380 03/27/2024             Assessment / Plan   Brief Patient Summary:  Too Tai is a 68 y.o. male with a past medical history of CAD, HTN, HLD, T2DM, chronic anticoagulation, dementia with behavioral disturbances and BPH presented to [facility] ED on 3/27/2024 for altered mental status and frequent falls.  Per wife which is bedside at approximately 8 PM on the evening of 3/26 patient began to make grunting noises and would fall forward hitting his head and face on the ground.       Plan:   Dementia with behavioral disturbances   frequent falls  Mental status  Syncope  UTI  Medication regimen revealed patient had not been receiving Depakote at nursing facility.  Per wife, patient returned home she placed him on the same medication regimen for his agitation that he had been on prior to his previous hospitalization.  Initial CT head and repeat CT head negative for acute process  EEG no focal features or epileptic activity.  Nonspecific mild diffuse cerebral dysfunction  One-time IV load of Depakote 1000 mg followed by scheduled Depakote 250 mg 3 times daily.  VPA level <2.8, repeat VPA in a.m. to ensure no toxicity  Scheduled  Aricept 20 mg daily  Scheduled Remeron 15 mg nightly  Scheduled Restoril 15 mg nightly  Seroquel 50 mg twice daily  Geodon 10 mg as needed for extreme agitation   QTc 459  Echo pending  Will consider vessel imaging when patient is more cooperative with exam.  Continue antibiotics per hospitalist team, blood cultures pending.   Palliative care following, appreciate recommendations  General neurology will follow-up with patient on Monday.  Please feel free to reach out if needed over the weekend.    I have discussed the above with the patient, bedside RN, Dr. Schneider and patient's wife.  Time spent with patient: 50 minutes in face-to-face evaluation and management of the patient.    Copied  text in this note has been reviewed and is accurate as of 03/29/24.     Meliza Kahn, APRN

## 2024-03-29 NOTE — PROGRESS NOTES
"Palliative Care Daily Progress Note     C/C: none    S: Medical record reviewed. Events noted.  Significant issues with agitation over noc.  Required geodon x 2.  Calm this AM and spouse bedside    ROS: pt unable    O: Code Status:   Code Status and Medical Interventions:   Ordered at: 03/27/24 1724     Level Of Support Discussed With:    Health Care Surrogate     Code Status (Patient has no pulse and is not breathing):    No CPR (Do Not Attempt to Resuscitate)     Medical Interventions (Patient has pulse or is breathing):    Comfort Measures      Advanced Directives: Advance Directive Status: Patient has advance directive, copy in chart   Goals of Care: Ongoing.   Palliative Performance Scale Score: 60%     BP (!) 191/84 (BP Location: Left arm, Patient Position: Lying)   Pulse 84   Temp 97.6 °F (36.4 °C) (Axillary)   Resp 18   Ht 185.4 cm (73\")   Wt 90.7 kg (199 lb 15.3 oz)   SpO2 94%   BMI 26.38 kg/m²     Intake/Output Summary (Last 24 hours) at 3/29/2024 0944  Last data filed at 3/28/2024 1300  Gross per 24 hour   Intake 100 ml   Output 0 ml   Net 100 ml       Physical Exam:    General Appearance:    Restful appearing, observed to allow pt to remain calm   HEENT:    anicteric, MMM, face relaxed   Neck:   supple, trachea midline, no JVD   Lungs:     respirations regular, even   and unlabored    Heart:    RRR on monitor   Abdomen:     grossly nondistended   G/U:   Deferred   MSK/Extremities:   No clubbing , cyanosis or edema, No wasting   Skin:   dry, no mottling   Neurologic:   Slept thru visit and did not disturb   Psych:   Calm, appropriate       Current Medications:      Current Facility-Administered Medications:     acetaminophen (TYLENOL) tablet 650 mg, 650 mg, Oral, Q4H PRN **OR** acetaminophen (TYLENOL) 160 MG/5ML oral solution 650 mg, 650 mg, Oral, Q4H PRN **OR** acetaminophen (TYLENOL) suppository 650 mg, 650 mg, Rectal, Q4H PRN, Carmelita Smith APRN    sennosides-docusate (PERICOLACE) 8.6-50 MG per " tablet 2 tablet, 2 tablet, Oral, BID PRN **AND** polyethylene glycol (MIRALAX) packet 17 g, 17 g, Oral, Daily PRN **AND** bisacodyl (DULCOLAX) EC tablet 5 mg, 5 mg, Oral, Daily PRN **AND** bisacodyl (DULCOLAX) suppository 10 mg, 10 mg, Rectal, Daily PRN, Carmelita Smith APRN    calcium carbonate (TUMS) chewable tablet 500 mg (200 mg elemental), 2 tablet, Oral, TID PRN, Carmelita Smith APRN    Calcium Replacement - Follow Nurse / BPA Driven Protocol, , Does not apply, PRN, Carmelita Smith APRN    cefTRIAXone (ROCEPHIN) 1,000 mg in sodium chloride 0.9 % 100 mL MBP, 1,000 mg, Intravenous, Q24H, Kavitha Sumner MD, Last Rate: 200 mL/hr at 03/28/24 1200, 1,000 mg at 03/28/24 1200    dextrose (D50W) (25 g/50 mL) IV injection 25 g, 25 g, Intravenous, Q15 Min PRN, Carmelita Smith APRN    dextrose (GLUTOSE) oral gel 15 g, 15 g, Oral, Q15 Min PRN, Carmelita Smith APRN    donepezil (ARICEPT) tablet 20 mg, 20 mg, Oral, Daily, Carmelita Smith APRN, 20 mg at 03/28/24 0854    doxycycline (MONODOX) capsule 100 mg, 100 mg, Oral, Q12H, Scar Schneider DO    glucagon (GLUCAGEN) injection 1 mg, 1 mg, Intramuscular, Q15 Min PRN, Carmelita Smith APRN    insulin detemir (LEVEMIR) injection 7 Units, 7 Units, Subcutaneous, Q12H, Carmelita Smith APRN, 7 Units at 03/28/24 2052    Insulin Lispro (humaLOG) injection 2-7 Units, 2-7 Units, Subcutaneous, 4x Daily AC & at Bedtime, Carmelita Smith APRN, 3 Units at 03/28/24 2052    lisinopril (PRINIVIL,ZESTRIL) tablet 2.5 mg, 2.5 mg, Oral, Daily, Carmelita Smith APRN, 2.5 mg at 03/28/24 0855    Magnesium Standard Dose Replacement - Follow Nurse / BPA Driven Protocol, , Does not apply, PRN, Carmelita Smith APRN    mirtazapine (REMERON) tablet 15 mg, 15 mg, Oral, Nightly, Carmelita Smith APRN, 15 mg at 03/28/24 2052    nitroglycerin (NITROSTAT) SL tablet 0.4 mg, 0.4 mg, Sublingual, Q5 Min PRN, Carmelita Smith APRN    ondansetron ODT (ZOFRAN-ODT) disintegrating tablet 4 mg, 4 mg, Oral, Q6H PRN **OR** ondansetron  (ZOFRAN) injection 4 mg, 4 mg, Intravenous, Q6H PRN, Carmelita Smith APRN    Phosphorus Replacement - Follow Nurse / BPA Driven Protocol, , Does not apply, PRN, Carmelita Smith APRN    Potassium Replacement - Follow Nurse / BPA Driven Protocol, , Does not apply, PRN, Carmelita Smith APRN    QUEtiapine (SEROquel) tablet 50 mg, 50 mg, Oral, BID, Carmelita Smith APRN, 50 mg at 03/28/24 2052    sodium chloride 0.9 % flush 10 mL, 10 mL, Intravenous, PRN, Carmelita Smith APRN    sodium chloride 0.9 % flush 10 mL, 10 mL, Intravenous, Q12H, Carmelita Smith APRN, 10 mL at 03/28/24 2053    sodium chloride 0.9 % flush 10 mL, 10 mL, Intravenous, PRN, Carmelita Smith APRN    sodium chloride 0.9 % infusion 40 mL, 40 mL, Intravenous, PRN, Carmelita Smith APRN    temazepam (RESTORIL) capsule 15 mg, 15 mg, Oral, Nightly PRN, Darrell Jhaveri,     valproate (DEPACON) 1,000 mg in sodium chloride 0.9 % 100 mL IVPB, 1,000 mg, Intravenous, Once, Meliza Kahn, IVONNE    Valproic Acid (DEPAKENE) syrup 250 mg, 250 mg, Oral, Q8H, Carmelita Smith APRN    ziprasidone (GEODON) injection 10 mg, 10 mg, Intramuscular, Q4H PRN, Meliza Kahn APRN, 10 mg at 03/29/24 0503     Labs:   Results from last 7 days   Lab Units 03/29/24  0704   WBC 10*3/mm3 11.00*   HEMOGLOBIN g/dL 16.8   HEMATOCRIT % 48.6   PLATELETS 10*3/mm3 251     Results from last 7 days   Lab Units 03/27/24  1422   SODIUM mmol/L 136   POTASSIUM mmol/L 5.1   CHLORIDE mmol/L 96*   CO2 mmol/L 26.0   BUN mg/dL 14   CREATININE mg/dL 0.77   CALCIUM mg/dL 9.4   BILIRUBIN mg/dL 1.0   ALK PHOS U/L 89   ALT (SGPT) U/L 22   AST (SGOT) U/L 22   GLUCOSE mg/dL 305*     Imaging Results (Last 72 Hours)       Procedure Component Value Units Date/Time    CT Head Without Contrast [[id number]] Collected: 03/28/24 0818     Updated: 03/28/24 0830    Narrative:      CT HEAD WO CONTRAST    Date of Exam: 3/28/2024 7:44 AM EDT    Indication: Syncope/presyncope, cerebrovascular cause suspected.    Comparison:   3/27/2024    Technique: Axial CT images were obtained of the head without contrast administration.  Automated exposure control and iterative construction methods were used.      Findings:  Parenchyma:No acute intraparenchymal hemorrhage. No loss of gray-white differentiation to suggest large territory infarct. Moderate parenchymal volume loss. Scattered periventricular and subcortical white matter hypodensities, nonspecific, but most often   consistent with small vessel ischemic changes. No midline shift or herniation.    Ventricles and extra axial spaces:Prominent ventricles and sulci secondary to volume loss. No extra axial fluid collection seen.    Other:Orbits are grossly intact. Paranasal sinuses are clear. Mastoid air cells are clear. Calvarium is intact. Intracranial atherosclerotic calcification is present.      Impression:      Impression:  No evidence of acute intracranial hemorrhage or large territory infarct.    Chronic changes as above.        Electronically Signed: Stephon Chin MD    3/28/2024 8:27 AM EDT    Workstation ID: [id number]    CT Cervical Spine Without Contrast [[id number]] Collected: 03/27/24 1511     Updated: 03/27/24 1521    Narrative:      CT CERVICAL SPINE WO CONTRAST    Date of Exam: 3/27/2024 3:06 PM EDT    Indication: pain, falls.    Comparison: CT cervical spine 4/29/2011    Technique: Axial CT images were obtained of the cervical spine without contrast administration.  Reconstructed coronal and sagittal images were also obtained. Automated exposure control and iterative construction methods were used.      Findings:  Normal spinal alignment. No acute fracture. No traumatic malalignment. Vertebral body heights are normal. Unremarkable appearance of interbody graft/fusion at C6-C7. There is mild degenerative disc disease at C5-C6 and C7-T1. Small posterior disc   osteophyte complexes noted at C5-C6. The paravertebral soft tissues are unremarkable; no prevertebral soft tissue  swelling. No CT evidence of high-grade/severe spinal canal stenosis. Lung apices are clear. Homogeneous attenuation of the thyroid gland. No   pharyngeal or laryngeal mass lesion. There are atherosclerotic calcifications of the cervical carotid arteries. No lymphadenopathy.      Impression:      Impression:  No acute fracture or traumatic malalignment.        Electronically Signed: Bebeto Ko MD    3/27/2024 3:17 PM EDT    Workstation ID: [id number]    CT Head Without Contrast [[id number]] Collected: 03/27/24 1510     Updated: 03/27/24 1515    Narrative:      CT HEAD WO CONTRAST    Date of Exam: 3/27/2024 3:06 PM EDT    Indication: multiple falls, on lovenox, positive LOC.    Comparison: 2/24/2024.    Technique: Axial CT images were obtained of the head without contrast administration.  Automated exposure control and iterative construction methods were used.      Findings:  There is moderately severe atrophy. There is periventricular hypodensity compatible with chronic small vessel ischemic insult. There is no acute mass effect or edema. There is no acute CVA or hemorrhage. There is a small old right periventricular CVA.   There is a small old left basal ganglia CVA or prominent perivascular space.    .    Impression:      Impression:  Chronic findings. No acute process.        Electronically Signed: Waleska Giron MD    3/27/2024 3:12 PM EDT    Workstation ID: [id number]    XR Chest 1 View [[id number]] Collected: 03/27/24 1409     Updated: 03/27/24 1414    Narrative:      XR CHEST 1 VW    Date of Exam: 3/27/2024 1:54 PM EDT    Indication: multiple falls, confusion    Comparison: 2/24/2024.    Findings:  There are low lung volumes with poor inspiration. There are postsurgical changes of midline sternotomy. The heart size is normal. There is continued mild atelectasis of the left lung base. There is new patchy infiltrate of the right upper lobe adjacent   to the right hilum. There are no effusions. There is no  pneumothorax. There is no obvious acute process of visualized bony structures.      Impression:      Impression:  Continued mild left basilar atelectasis. New infiltrate on the right is nonspecific and could represent area of atelectasis, pneumonia, or pulmonary contusion in the setting of trauma.      Electronically Signed: Waleska Giron MD    3/27/2024 2:11 PM EDT    Workstation ID: [id number]              Lab 03/27/24  1422   HEMOGLOBIN A1C 9.20*         Diagnostics: Reviewed    A:     Falls    Benign essential HTN    Benign prostatic hyperplasia    Chronic coronary artery disease    Type 2 diabetes mellitus with hyperglycemia, with long-term current use of insulin    Hyperlipidemia    Dementia with behavioral disturbance    Frequent falls    Chronic anticoagulation    AMS (altered mental status)    Bacteriuria       Impression:  Bacteruria - C&S pending  Dementia with behaviors - not end stage  Falls - pattern similar to that for which he was discharged in early March. - Rexulti discontinued at that time  DM2    Symptoms:   Debility  Agitation    P:   Discussed with neuro and hospitalist.  Agree with IV load of valproic acid.  Pt does not have a hospice dx at this time.  Will continue to monitor for symptom needs but will allow neuro to continue to manage agitation. Palliative Care Team will continue to follow patient.     Carlee Cesar MD, 3/29/2024, 09:44 EDT

## 2024-03-29 NOTE — CASE MANAGEMENT/SOCIAL WORK
Discharge Planning Assessment  [facility]     Patient Name: Too Tai  MRN: [id number]  Today's Date: 3/29/2024    Admit Date: 3/27/2024    Plan: ONGOING   Discharge Needs Assessment    No documentation.                  Discharge Plan       Row Name 03/29/24 1105       Plan    Plan ONGOING    Plan Comments Neuro following.  Load with Depakote today.  Cont efforts to control behavioral disturbances.  CM will cont to follow for DCP.  Home vs SNF (per MDR, wife prefers home with possible add'l caregivers).    Final Discharge Disposition Code 30 - still a patient                  Continued Care and Services - Admitted Since 3/27/2024    No active coordination exists for this encounter.       Selected Continued Care - Episodes Includes continued care and service providers with selected services from the active episodes listed below      High Risk Care Management Episode start date: 1/9/2024   There are no active outsourced providers for this episode.                 Selected Continued Care - Prior Encounters Includes continued care and service providers with selected services from prior encounters from 12/28/2023 to 3/29/2024      Discharged on 3/4/2024 Admission date: 2/24/2024 - Discharge disposition: Skilled Nursing Facility (DC - External)      Destination       Service Provider Selected Services Address Phone Fax Patient Preferred    [facility] [address] [place], [place] [id number] [phone] --                          Expected Discharge Date and Time       Expected Discharge Date Expected Discharge Time    Mar 30, 2024            Demographic Summary    No documentation.                  Functional Status    No documentation.                  Psychosocial    No documentation.                  Abuse/Neglect    No documentation.                  Legal    No documentation.                  Substance Abuse    No documentation.                  Patient Forms     No documentation.                     Reba Murphy RN

## 2024-03-29 NOTE — CONSULTS
Chart reviewed per consult, patient GCS charted 14, as per nursing note patient is on restraints and is agitated. May not be appropriate for education today. Please call [phone] for immediate needs.

## 2024-03-30 LAB
BACTERIA SPEC AEROBE CULT: ABNORMAL
GLUCOSE BLDC GLUCOMTR-MCNC: 235 MG/DL (ref 70–130)
GLUCOSE BLDC GLUCOMTR-MCNC: 240 MG/DL (ref 70–130)
GLUCOSE BLDC GLUCOMTR-MCNC: 242 MG/DL (ref 70–130)
GLUCOSE BLDC GLUCOMTR-MCNC: 277 MG/DL (ref 70–130)
GLUCOSE BLDC GLUCOMTR-MCNC: 281 MG/DL (ref 70–130)
VALPROATE SERPL-MCNC: 48.8 MCG/ML (ref 50–125)

## 2024-03-30 PROCEDURE — 82948 REAGENT STRIP/BLOOD GLUCOSE: CPT

## 2024-03-30 PROCEDURE — 63710000001 INSULIN LISPRO (HUMAN) PER 5 UNITS: Performed by: NURSE PRACTITIONER

## 2024-03-30 PROCEDURE — 63710000001 INSULIN DETEMIR PER 5 UNITS: Performed by: STUDENT IN AN ORGANIZED HEALTH CARE EDUCATION/TRAINING PROGRAM

## 2024-03-30 PROCEDURE — 25010000002 CEFTRIAXONE PER 250 MG: Performed by: INTERNAL MEDICINE

## 2024-03-30 PROCEDURE — 80164 ASSAY DIPROPYLACETIC ACD TOT: CPT

## 2024-03-30 PROCEDURE — 99232 SBSQ HOSP IP/OBS MODERATE 35: CPT | Performed by: STUDENT IN AN ORGANIZED HEALTH CARE EDUCATION/TRAINING PROGRAM

## 2024-03-30 PROCEDURE — 63710000001 INSULIN DETEMIR PER 5 UNITS: Performed by: NURSE PRACTITIONER

## 2024-03-30 RX ORDER — LISINOPRIL 5 MG/1
2.5 TABLET ORAL ONCE
Status: COMPLETED | OUTPATIENT
Start: 2024-03-30 | End: 2024-03-30

## 2024-03-30 RX ORDER — LISINOPRIL 5 MG/1
5 TABLET ORAL DAILY
Status: DISCONTINUED | OUTPATIENT
Start: 2024-03-31 | End: 2024-03-31

## 2024-03-30 RX ADMIN — DOXYCYCLINE 100 MG: 100 CAPSULE ORAL at 08:42

## 2024-03-30 RX ADMIN — SODIUM CHLORIDE 1000 MG: 900 INJECTION INTRAVENOUS at 11:24

## 2024-03-30 RX ADMIN — VALPROIC ACID 250 MG: 250 SOLUTION ORAL at 06:05

## 2024-03-30 RX ADMIN — Medication 10 ML: at 08:43

## 2024-03-30 RX ADMIN — INSULIN LISPRO 3 UNITS: 100 INJECTION, SOLUTION INTRAVENOUS; SUBCUTANEOUS at 08:43

## 2024-03-30 RX ADMIN — INSULIN LISPRO 3 UNITS: 100 INJECTION, SOLUTION INTRAVENOUS; SUBCUTANEOUS at 18:09

## 2024-03-30 RX ADMIN — Medication 10 ML: at 23:51

## 2024-03-30 RX ADMIN — TEMAZEPAM 15 MG: 15 CAPSULE ORAL at 21:31

## 2024-03-30 RX ADMIN — INSULIN DETEMIR 7 UNITS: 100 INJECTION, SOLUTION SUBCUTANEOUS at 08:42

## 2024-03-30 RX ADMIN — VALPROIC ACID 250 MG: 250 SOLUTION ORAL at 14:34

## 2024-03-30 RX ADMIN — MIRTAZAPINE 15 MG: 15 TABLET, FILM COATED ORAL at 21:31

## 2024-03-30 RX ADMIN — VALPROIC ACID 250 MG: 250 SOLUTION ORAL at 21:30

## 2024-03-30 RX ADMIN — LISINOPRIL 2.5 MG: 5 TABLET ORAL at 12:11

## 2024-03-30 RX ADMIN — DOXYCYCLINE 100 MG: 100 CAPSULE ORAL at 21:31

## 2024-03-30 RX ADMIN — QUETIAPINE FUMARATE 50 MG: 25 TABLET ORAL at 21:31

## 2024-03-30 RX ADMIN — DONEPEZIL HYDROCHLORIDE 20 MG: 10 TABLET, FILM COATED ORAL at 08:42

## 2024-03-30 RX ADMIN — INSULIN LISPRO 3 UNITS: 100 INJECTION, SOLUTION INTRAVENOUS; SUBCUTANEOUS at 23:50

## 2024-03-30 RX ADMIN — LISINOPRIL 2.5 MG: 5 TABLET ORAL at 08:42

## 2024-03-30 RX ADMIN — INSULIN DETEMIR 10 UNITS: 100 INJECTION, SOLUTION SUBCUTANEOUS at 21:30

## 2024-03-30 RX ADMIN — INSULIN LISPRO 4 UNITS: 100 INJECTION, SOLUTION INTRAVENOUS; SUBCUTANEOUS at 11:25

## 2024-03-30 RX ADMIN — QUETIAPINE FUMARATE 50 MG: 25 TABLET ORAL at 08:42

## 2024-03-30 NOTE — PROGRESS NOTES
[facility] Medicine Services  PROGRESS NOTE    Patient Name: Too Tai  : 1955  MRN: [id number]    Date of Admission: 3/27/2024  Primary Care Physician: Des Geller MD    Subjective   Subjective     CC:  AMS, falls       HPI:  Patient reportedly had a better night, behavior improved with medication changes yesterday as well as wife remaining at bedside.  He is out of lower extremity restraints this morning and tolerating well.  He is pleasant and cooperative this morning and has no complaints.      Objective   Objective     Vital Signs:   Temp:  [97 °F (36.1 °C)-98 °F (36.7 °C)] 98 °F (36.7 °C)  Resp:  [16-18] 16  BP: (144-176)/(60-87) 176/87     Physical Exam:  General appearance: Awake, alert, resting comfortably, no acute distress  Cardiovascular: RRR, no murmurs or rubs, radial pulse full 2/4 BL   Respiratory: CTAB, no crackles or wheezes, oxygenating well on room air  Abdomen: soft, non-tender, no organomegaly, bowel sounds normoactive     Results Reviewed:  LAB RESULTS:      Lab 24  0704 24  0040 24  1943 24  1422   WBC 11.00*  --   --  9.18   HEMOGLOBIN 16.8  --   --  15.3   HEMATOCRIT 48.6  --   --  44.7   PLATELETS 251  --   --  300   NEUTROS ABS 6.31  --   --  7.51*   IMMATURE GRANS (ABS) 0.09*  --   --  0.09*   LYMPHS ABS 2.65  --   --  1.09   MONOS ABS 1.24*  --   --  0.47   EOS ABS 0.61*  --   --  0.00   MCV 89.8  --   --  93.1   SED RATE  --   --   --  31*   PROCALCITONIN  --   --   --  0.02   LACTATE  --  2.0 4.3* 4.0*         Lab 24  0920 24  1422   SODIUM 137 136   POTASSIUM 4.2 5.1   CHLORIDE 101 96*   CO2 27.0 26.0   ANION GAP 9.0 14.0   BUN 10 14   CREATININE 0.77 0.77   EGFR 98.1 98.1   GLUCOSE 166* 305*   CALCIUM 9.5 9.4   HEMOGLOBIN A1C  --  9.20*         Lab 24  1422   TOTAL PROTEIN 7.6   ALBUMIN 4.2   GLOBULIN 3.4   ALT (SGPT) 22   AST (SGOT) 22   BILIRUBIN 1.0   ALK PHOS 89         Lab 24  1422    HSTROP T 15             Lab 03/27/24  1422   VITAMIN B 12 380         Brief Urine Lab Results  (Last result in the past 365 days)        Color   Clarity   Blood   Leuk Est   Nitrite   Protein   CREAT   Urine HCG        03/27/24 1633 Yellow   Clear   Negative   Negative   Positive   100 mg/dL (2+)                   Microbiology Results Abnormal       Procedure Component Value - Date/Time    Blood Culture - Blood, Arm, Left [[id number]]  (Normal) Collected: 03/27/24 1943    Lab Status: Preliminary result Specimen: Blood from Arm, Left Updated: 03/29/24 2000     Blood Culture No growth at 2 days    Blood Culture - Blood, Arm, Right [[id number]]  (Normal) Collected: 03/27/24 1750    Lab Status: Preliminary result Specimen: Blood from Arm, Right Updated: 03/29/24 1815     Blood Culture No growth at 2 days    Narrative:      Less than seven (7) mL's of blood was collected.  Insufficient quantity may yield false negative results.            Adult Transthoracic Echo Complete W/ Cont if Necessary Per Protocol    Result Date: 3/29/2024    Left ventricular systolic function is normal. Calculated left ventricular EF = 55.1% Normal left ventricular cavity size noted. Left ventricular wall thickness is consistent with mild concentric hypertrophy. Left ventricular diastolic function is consistent with (grade I) impaired relaxation.   The right ventricular cavity is mildly dilated. Normal right ventricular systolic function noted.   There is calcification of the aortic valve. The aortic valve appears trileaflet. Mild aortic valve regurgitation is present. No aortic valve stenosis is present.   Mitral annular calcification is present. Trace mitral valve regurgitation is present. No significant mitral valve stenosis is present.   The tricuspid valve is structurally normal with no stenosis present. Trace tricuspid valve regurgitation is present. Insufficient TR velocity profile to estimate the right ventricular systolic pressure.    Mild dilation of the sinuses of Valsalva is present. Mild dilation of the ascending aorta is present. Ascending aorta = 4.2 cm     EEG    Result Date: 3/28/2024  Reason for referral: 68 y.o.male with syncope Technical Summary:  A 19 channel digital EEG was performed using the international 10-20 placement system, including eye leads and EKG leads. Duration: 22 minutes Findings: The patient is resting quietly in bed with his eyes closed and appears drowsy.  Diffuse low amplitude 5-6 Hz theta is present symmetrically over both hemispheres.  A clear posterior rhythm is not seen.  Photic stimulation does not change the background.  Hyperventilation is not performed.  No focal features or epileptiform activity are seen. Video: Available Technical quality: Superior EKG: Bradycardic, 40-50 bpm SUMMARY: Mild generalized slow No focal features or epileptiform activity are seen     Impression: Diffuse cerebral dysfunction mild degree, nonspecific This report is transcribed using the Dragon dictation system.       Results for orders placed during the hospital encounter of 03/27/24    Adult Transthoracic Echo Complete W/ Cont if Necessary Per Protocol    Interpretation Summary    Left ventricular systolic function is normal. Calculated left ventricular EF = 55.1% Normal left ventricular cavity size noted. Left ventricular wall thickness is consistent with mild concentric hypertrophy. Left ventricular diastolic function is consistent with (grade I) impaired relaxation.    The right ventricular cavity is mildly dilated. Normal right ventricular systolic function noted.    There is calcification of the aortic valve. The aortic valve appears trileaflet. Mild aortic valve regurgitation is present. No aortic valve stenosis is present.    Mitral annular calcification is present. Trace mitral valve regurgitation is present. No significant mitral valve stenosis is present.    The tricuspid valve is structurally normal with no stenosis  present. Trace tricuspid valve regurgitation is present. Insufficient TR velocity profile to estimate the right ventricular systolic pressure.    Mild dilation of the sinuses of Valsalva is present. Mild dilation of the ascending aorta is present. Ascending aorta = 4.2 cm      Current medications:  Scheduled Meds:cefTRIAXone, 1,000 mg, Intravenous, Q24H  donepezil, 20 mg, Oral, Daily  doxycycline, 100 mg, Oral, Q12H  insulin detemir, 7 Units, Subcutaneous, Q12H  insulin lispro, 2-7 Units, Subcutaneous, 4x Daily AC & at Bedtime  lisinopril, 2.5 mg, Oral, Daily  mirtazapine, 15 mg, Oral, Nightly  QUEtiapine, 50 mg, Oral, BID  sodium chloride, 10 mL, Intravenous, Q12H  temazepam, 15 mg, Oral, Nightly  Valproic Acid, 250 mg, Oral, Q8H      Continuous Infusions:   PRN Meds:.  acetaminophen **OR** acetaminophen **OR** acetaminophen    senna-docusate sodium **AND** polyethylene glycol **AND** bisacodyl **AND** bisacodyl    calcium carbonate    Calcium Replacement - Follow Nurse / BPA Driven Protocol    dextrose    dextrose    glucagon (human recombinant)    Magnesium Standard Dose Replacement - Follow Nurse / BPA Driven Protocol    nitroglycerin    ondansetron ODT **OR** ondansetron    Phosphorus Replacement - Follow Nurse / BPA Driven Protocol    Potassium Replacement - Follow Nurse / BPA Driven Protocol    sodium chloride    sodium chloride    sodium chloride    ziprasidone    Assessment & Plan   Assessment & Plan     Active Hospital Problems    Diagnosis  POA    **Falls [W19.XXXA]  Yes    AMS (altered mental status) [R41.82]  Yes    Bacteriuria [R82.71]  Yes    Frequent falls [R29.6]  Not Applicable    Dementia with behavioral disturbance [F03.918]  Yes    Chronic anticoagulation [Z79.01]  Not Applicable    Type 2 diabetes mellitus with hyperglycemia, with long-term current use of insulin [E11.65, Z79.4]  Not Applicable    Benign essential HTN [I10]  Yes    Benign prostatic hyperplasia [N40.0]  Yes    Chronic coronary  artery disease [I25.10]  Yes    Hyperlipidemia [E78.5]  Yes      Resolved Hospital Problems   No resolved problems to display.        This patient's assessments and plans were partially entered by my partner and updated as appropriate by me on 3/30/2024     Brief Hospital Course to date:  Too Tai is a 68 y.o. male with PMH significant for CAD, HTN, HLD, T2DM, chronic anticoagulation, dementia with behavioral disturbance, BPH presented to [facility] ED on 3/27/2024 d/t altered mental status and frequent falls. Patient found to be orthostatic with uncontrolled behavior likely secondary to outpatient medication changes.     Acute metabolic encephalopathy superimposed on underlying dementia  Frequent falls  Suspected orthostatic hypotension  Bradycardic on telemetry  -CT head without any acute findings  -Neurology following   -Continue Aricept, Seroquel, Remeron  -Restart temazepam; improved overnight behaviors, continue  -Geodon as needed for extreme agitation  -Loaded w depakote 3/29 as this appeared to not be administered outpatient, then continue 250mg TID; check level in AM, monitor for toxicity   -Once behavioral changes are controlled, will pursue further workup of orthostatic hypotension with echocardiogram and vascular studies  -TTE w no significant abnormalities   -PT/OT to evaluate     Suspected UTI, staph epidermidis   Suspected PNA   -Patient with 4+ bacteria  -Culture grew Staph epidermidis, will continue empiric treatment with Rocephin and doxycycline for 5-day course (day 3/5) due to further possibility of contributing PNA      Hypertension  -Now uncontrolled   -Increase lisinopril to 5mg daily   -Follow-up orthostatic vital signs     Poorly controlled type 2 diabetes with A1c 9.2%  -Continue basal and SSI, adjust as indicated     CAD  Hyperlipidemia  -Currently not on any meds other than metoprolol     History of DVT  -On chronic anticoagulation with full dose Lovenox  -Currently on hold current 2  falls       Expected Discharge Location and Transportation: LTC vs Home w HH  Expected Discharge   Expected Discharge Date: 4/1/2024; Expected Discharge Time:      DVT prophylaxis:  Medical and mechanical DVT prophylaxis orders are present.         AM-PAC 6 Clicks Score (PT): 17 (03/30/24 5467)    CODE STATUS:   Code Status and Medical Interventions:   Ordered at: 03/27/24 5386     Level Of Support Discussed With:    Health Care Surrogate     Code Status (Patient has no pulse and is not breathing):    No CPR (Do Not Attempt to Resuscitate)     Medical Interventions (Patient has pulse or is breathing):    Comfort Measures       Scar Schneider, DO  03/30/24

## 2024-03-30 NOTE — PLAN OF CARE
Goal Outcome Evaluation:  Plan of Care Reviewed With: caregiver        Progress: improving            Pt was verbally aggressive at times overnight, but was able to be calmed down by wife at bedside before he escalated further. Slept tonight until around 0300 after getting meds, tolerated being out of ankle restraints this morning. Still pulls at IV, dressing and pinches when out of wrist restraints to clean him up.  No geodon given tonight, having wife at bedside was a big help, as he recognizes her readily and listens.

## 2024-03-31 LAB
GLUCOSE BLDC GLUCOMTR-MCNC: 169 MG/DL (ref 70–130)
GLUCOSE BLDC GLUCOMTR-MCNC: 185 MG/DL (ref 70–130)
GLUCOSE BLDC GLUCOMTR-MCNC: 195 MG/DL (ref 70–130)
GLUCOSE BLDC GLUCOMTR-MCNC: 326 MG/DL (ref 70–130)

## 2024-03-31 PROCEDURE — 99232 SBSQ HOSP IP/OBS MODERATE 35: CPT | Performed by: STUDENT IN AN ORGANIZED HEALTH CARE EDUCATION/TRAINING PROGRAM

## 2024-03-31 PROCEDURE — 63710000001 INSULIN LISPRO (HUMAN) PER 5 UNITS: Performed by: NURSE PRACTITIONER

## 2024-03-31 PROCEDURE — 82948 REAGENT STRIP/BLOOD GLUCOSE: CPT

## 2024-03-31 PROCEDURE — 63710000001 INSULIN DETEMIR PER 5 UNITS: Performed by: STUDENT IN AN ORGANIZED HEALTH CARE EDUCATION/TRAINING PROGRAM

## 2024-03-31 PROCEDURE — 25010000002 CEFTRIAXONE PER 250 MG: Performed by: STUDENT IN AN ORGANIZED HEALTH CARE EDUCATION/TRAINING PROGRAM

## 2024-03-31 RX ORDER — AMLODIPINE BESYLATE 5 MG/1
5 TABLET ORAL
Status: DISCONTINUED | OUTPATIENT
Start: 2024-03-31 | End: 2024-04-01

## 2024-03-31 RX ORDER — LISINOPRIL 10 MG/1
10 TABLET ORAL DAILY
Status: DISCONTINUED | OUTPATIENT
Start: 2024-03-31 | End: 2024-04-01

## 2024-03-31 RX ADMIN — SODIUM CHLORIDE 1000 MG: 900 INJECTION INTRAVENOUS at 12:00

## 2024-03-31 RX ADMIN — INSULIN DETEMIR 15 UNITS: 100 INJECTION, SOLUTION SUBCUTANEOUS at 20:39

## 2024-03-31 RX ADMIN — MIRTAZAPINE 15 MG: 15 TABLET, FILM COATED ORAL at 20:39

## 2024-03-31 RX ADMIN — INSULIN DETEMIR 15 UNITS: 100 INJECTION, SOLUTION SUBCUTANEOUS at 10:07

## 2024-03-31 RX ADMIN — VALPROIC ACID 250 MG: 250 SOLUTION ORAL at 20:39

## 2024-03-31 RX ADMIN — INSULIN LISPRO 5 UNITS: 100 INJECTION, SOLUTION INTRAVENOUS; SUBCUTANEOUS at 17:22

## 2024-03-31 RX ADMIN — LISINOPRIL 10 MG: 10 TABLET ORAL at 10:06

## 2024-03-31 RX ADMIN — INSULIN LISPRO 2 UNITS: 100 INJECTION, SOLUTION INTRAVENOUS; SUBCUTANEOUS at 20:38

## 2024-03-31 RX ADMIN — AMLODIPINE BESYLATE 5 MG: 5 TABLET ORAL at 13:21

## 2024-03-31 RX ADMIN — INSULIN LISPRO 2 UNITS: 100 INJECTION, SOLUTION INTRAVENOUS; SUBCUTANEOUS at 10:07

## 2024-03-31 RX ADMIN — DONEPEZIL HYDROCHLORIDE 20 MG: 10 TABLET, FILM COATED ORAL at 10:06

## 2024-03-31 RX ADMIN — VALPROIC ACID 250 MG: 250 SOLUTION ORAL at 13:22

## 2024-03-31 RX ADMIN — INSULIN LISPRO 2 UNITS: 100 INJECTION, SOLUTION INTRAVENOUS; SUBCUTANEOUS at 12:00

## 2024-03-31 RX ADMIN — VALPROIC ACID 250 MG: 250 SOLUTION ORAL at 05:39

## 2024-03-31 RX ADMIN — QUETIAPINE FUMARATE 50 MG: 25 TABLET ORAL at 10:06

## 2024-03-31 RX ADMIN — TEMAZEPAM 15 MG: 15 CAPSULE ORAL at 20:39

## 2024-03-31 RX ADMIN — QUETIAPINE FUMARATE 50 MG: 25 TABLET ORAL at 20:39

## 2024-03-31 RX ADMIN — Medication 10 ML: at 22:12

## 2024-03-31 RX ADMIN — Medication 10 ML: at 10:08

## 2024-03-31 RX ADMIN — DOXYCYCLINE 100 MG: 100 CAPSULE ORAL at 10:05

## 2024-03-31 RX ADMIN — DOXYCYCLINE 100 MG: 100 CAPSULE ORAL at 20:39

## 2024-03-31 NOTE — PROGRESS NOTES
[facility] Medicine Services  PROGRESS NOTE    Patient Name: Too Tai  : 1955  MRN: [id number]    Date of Admission: 3/27/2024  Primary Care Physician: Des Geller MD    Subjective   Subjective     CC:  AMS, falls       HPI:  Patient with continued improvement in mood.  Overnight, she was overall redirectable with only short period of verbal aggressiveness.  This morning he is awake, alert and has no complaints.      Objective   Objective     Vital Signs:   Temp:  [96.6 °F (35.9 °C)-98.5 °F (36.9 °C)] 97.2 °F (36.2 °C)  Resp:  [16] 16  BP: (181-188)/(79-91) 188/89     Physical Exam:  General appearance: Awake, alert, no acute distress  Cardiovascular: RRR, no murmurs or rubs, radial pulse full 2/4 BL   Respiratory: CTAB, no crackles or wheezes, oxygenating well on room air  Abdomen: soft, non-tender, no organomegaly, bowel sounds normoactive   Psych: abnormal judgement and insight, cooperative     Results Reviewed:  LAB RESULTS:      Lab 24  0704 24  0040 24  1943 24  1422   WBC 11.00*  --   --  9.18   HEMOGLOBIN 16.8  --   --  15.3   HEMATOCRIT 48.6  --   --  44.7   PLATELETS 251  --   --  300   NEUTROS ABS 6.31  --   --  7.51*   IMMATURE GRANS (ABS) 0.09*  --   --  0.09*   LYMPHS ABS 2.65  --   --  1.09   MONOS ABS 1.24*  --   --  0.47   EOS ABS 0.61*  --   --  0.00   MCV 89.8  --   --  93.1   SED RATE  --   --   --  31*   PROCALCITONIN  --   --   --  0.02   LACTATE  --  2.0 4.3* 4.0*         Lab 24  0920 24  1422   SODIUM 137 136   POTASSIUM 4.2 5.1   CHLORIDE 101 96*   CO2 27.0 26.0   ANION GAP 9.0 14.0   BUN 10 14   CREATININE 0.77 0.77   EGFR 98.1 98.1   GLUCOSE 166* 305*   CALCIUM 9.5 9.4   HEMOGLOBIN A1C  --  9.20*         Lab 24  1422   TOTAL PROTEIN 7.6   ALBUMIN 4.2   GLOBULIN 3.4   ALT (SGPT) 22   AST (SGOT) 22   BILIRUBIN 1.0   ALK PHOS 89         Lab 24  1422   HSTROP T 15             Lab 24  1422    VITAMIN B 12 380         Brief Urine Lab Results  (Last result in the past 365 days)        Color   Clarity   Blood   Leuk Est   Nitrite   Protein   CREAT   Urine HCG        03/27/24 1633 Yellow   Clear   Negative   Negative   Positive   100 mg/dL (2+)                   Microbiology Results Abnormal       Procedure Component Value - Date/Time    Blood Culture - Blood, Arm, Left [[id number]]  (Normal) Collected: 03/27/24 1943    Lab Status: Preliminary result Specimen: Blood from Arm, Left Updated: 03/30/24 2000     Blood Culture No growth at 3 days    Blood Culture - Blood, Arm, Right [[id number]]  (Normal) Collected: 03/27/24 1750    Lab Status: Preliminary result Specimen: Blood from Arm, Right Updated: 03/30/24 1815     Blood Culture No growth at 3 days    Narrative:      Less than seven (7) mL's of blood was collected.  Insufficient quantity may yield false negative results.            Adult Transthoracic Echo Complete W/ Cont if Necessary Per Protocol    Result Date: 3/29/2024    Left ventricular systolic function is normal. Calculated left ventricular EF = 55.1% Normal left ventricular cavity size noted. Left ventricular wall thickness is consistent with mild concentric hypertrophy. Left ventricular diastolic function is consistent with (grade I) impaired relaxation.   The right ventricular cavity is mildly dilated. Normal right ventricular systolic function noted.   There is calcification of the aortic valve. The aortic valve appears trileaflet. Mild aortic valve regurgitation is present. No aortic valve stenosis is present.   Mitral annular calcification is present. Trace mitral valve regurgitation is present. No significant mitral valve stenosis is present.   The tricuspid valve is structurally normal with no stenosis present. Trace tricuspid valve regurgitation is present. Insufficient TR velocity profile to estimate the right ventricular systolic pressure.   Mild dilation of the sinuses of Valsalva is  present. Mild dilation of the ascending aorta is present. Ascending aorta = 4.2 cm      Results for orders placed during the hospital encounter of 03/27/24    Adult Transthoracic Echo Complete W/ Cont if Necessary Per Protocol    Interpretation Summary    Left ventricular systolic function is normal. Calculated left ventricular EF = 55.1% Normal left ventricular cavity size noted. Left ventricular wall thickness is consistent with mild concentric hypertrophy. Left ventricular diastolic function is consistent with (grade I) impaired relaxation.    The right ventricular cavity is mildly dilated. Normal right ventricular systolic function noted.    There is calcification of the aortic valve. The aortic valve appears trileaflet. Mild aortic valve regurgitation is present. No aortic valve stenosis is present.    Mitral annular calcification is present. Trace mitral valve regurgitation is present. No significant mitral valve stenosis is present.    The tricuspid valve is structurally normal with no stenosis present. Trace tricuspid valve regurgitation is present. Insufficient TR velocity profile to estimate the right ventricular systolic pressure.    Mild dilation of the sinuses of Valsalva is present. Mild dilation of the ascending aorta is present. Ascending aorta = 4.2 cm      Current medications:  Scheduled Meds:cefTRIAXone, 1,000 mg, Intravenous, Q24H  donepezil, 20 mg, Oral, Daily  doxycycline, 100 mg, Oral, Q12H  insulin detemir, 15 Units, Subcutaneous, Q12H  insulin lispro, 2-7 Units, Subcutaneous, 4x Daily AC & at Bedtime  lisinopril, 10 mg, Oral, Daily  mirtazapine, 15 mg, Oral, Nightly  QUEtiapine, 50 mg, Oral, BID  sodium chloride, 10 mL, Intravenous, Q12H  temazepam, 15 mg, Oral, Nightly  Valproic Acid, 250 mg, Oral, Q8H      Continuous Infusions:   PRN Meds:.  acetaminophen **OR** acetaminophen **OR** acetaminophen    senna-docusate sodium **AND** polyethylene glycol **AND** bisacodyl **AND** bisacodyl     calcium carbonate    Calcium Replacement - Follow Nurse / BPA Driven Protocol    dextrose    dextrose    glucagon (human recombinant)    Magnesium Standard Dose Replacement - Follow Nurse / BPA Driven Protocol    nitroglycerin    ondansetron ODT **OR** ondansetron    Phosphorus Replacement - Follow Nurse / BPA Driven Protocol    Potassium Replacement - Follow Nurse / BPA Driven Protocol    sodium chloride    sodium chloride    sodium chloride    ziprasidone    Assessment & Plan   Assessment & Plan     Active Hospital Problems    Diagnosis  POA    **Falls [W19.XXXA]  Yes    AMS (altered mental status) [R41.82]  Yes    Bacteriuria [R82.71]  Yes    Frequent falls [R29.6]  Not Applicable    Dementia with behavioral disturbance [F03.918]  Yes    Chronic anticoagulation [Z79.01]  Not Applicable    Type 2 diabetes mellitus with hyperglycemia, with long-term current use of insulin [E11.65, Z79.4]  Not Applicable    Benign essential HTN [I10]  Yes    Benign prostatic hyperplasia [N40.0]  Yes    Chronic coronary artery disease [I25.10]  Yes    Hyperlipidemia [E78.5]  Yes      Resolved Hospital Problems   No resolved problems to display.        This patient's assessments and plans were partially entered by my partner and updated as appropriate by me on 3/31/2024     Brief Hospital Course to date:  Too Tai is a 68 y.o. male with PMH significant for CAD, HTN, HLD, T2DM, chronic anticoagulation, dementia with behavioral disturbance, BPH presented to [facility] ED on 3/27/2024 d/t altered mental status and frequent falls. Patient found to be orthostatic with uncontrolled behavior likely secondary to outpatient medication changes.     Acute metabolic encephalopathy superimposed on underlying dementia  Frequent falls  Suspected orthostatic hypotension  Bradycardic on telemetry  -CT head without any acute findings  -Neurology following   -Continue Aricept, Seroquel, Remeron  -Continue temazepam; improved overnight behaviors  -Geodon  as needed for extreme agitation  -Loaded w depakote 3/29 as this appeared to not be administered outpatient, then continue 250mg TID; monitor for toxicity   -Once behavioral changes are controlled, will pursue further workup of orthostatic hypotension with echocardiogram and vascular studies  -TTE w no significant abnormalities   -PT/OT to evaluate  -Check orthostatics      Suspected UTI, staph epidermidis   Suspected PNA   -Patient with 4+ bacteria  -Culture grew Staph epidermidis, will continue empiric treatment with Rocephin and doxycycline for 5-day course (day 3/5) due to further possibility of contributing PNA      Hypertension  -Now uncontrolled   -Increase lisinopril to 10mg daily   -Follow-up orthostatic vital signs     Poorly controlled type 2 diabetes with A1c 9.2%  -Continue basal and SSI, adjust as indicated  -Increase levemir to 15 units      CAD  Hyperlipidemia  -Currently not on any meds other than metoprolol     History of DVT  -On chronic anticoagulation with full dose Lovenox  -Currently on hold current 2 falls    Expected Discharge Location and Transportation: Home w    Expected Discharge   Expected Discharge Date: 4/2/2024; Expected Discharge Time:      DVT prophylaxis:  Medical and mechanical DVT prophylaxis orders are present.         AM-PAC 6 Clicks Score (PT): 17 (03/31/24 0800)    CODE STATUS:   Code Status and Medical Interventions:   Ordered at: 03/27/24 8087     Level Of Support Discussed With:    Health Care Surrogate     Code Status (Patient has no pulse and is not breathing):    No CPR (Do Not Attempt to Resuscitate)     Medical Interventions (Patient has pulse or is breathing):    Comfort Measures       Scar Schneider, DO  03/31/24

## 2024-03-31 NOTE — PLAN OF CARE
Goal Outcome Evaluation:  Plan of Care Reviewed With: caregiver        Progress: improving          No major issues overnight, got verbally aggressive again, but discovered if his hands are occupied he is far more acquiescent to care.  Was able to calm him down by giving him positive encouragement and keep his hands occupied.

## 2024-04-01 LAB
ANION GAP SERPL CALCULATED.3IONS-SCNC: 10 MMOL/L (ref 5–15)
BACTERIA SPEC AEROBE CULT: NORMAL
BACTERIA SPEC AEROBE CULT: NORMAL
BUN SERPL-MCNC: 13 MG/DL (ref 8–23)
BUN/CREAT SERPL: 18.1 (ref 7–25)
CALCIUM SPEC-SCNC: 9.6 MG/DL (ref 8.6–10.5)
CHLORIDE SERPL-SCNC: 100 MMOL/L (ref 98–107)
CO2 SERPL-SCNC: 29 MMOL/L (ref 22–29)
CREAT SERPL-MCNC: 0.72 MG/DL (ref 0.76–1.27)
DEPRECATED RDW RBC AUTO: 39.9 FL (ref 37–54)
EGFRCR SERPLBLD CKD-EPI 2021: 100.1 ML/MIN/1.73
ERYTHROCYTE [DISTWIDTH] IN BLOOD BY AUTOMATED COUNT: 11.9 % (ref 12.3–15.4)
GLUCOSE BLDC GLUCOMTR-MCNC: 128 MG/DL (ref 70–130)
GLUCOSE BLDC GLUCOMTR-MCNC: 223 MG/DL (ref 70–130)
GLUCOSE BLDC GLUCOMTR-MCNC: 225 MG/DL (ref 70–130)
GLUCOSE BLDC GLUCOMTR-MCNC: 262 MG/DL (ref 70–130)
GLUCOSE SERPL-MCNC: 125 MG/DL (ref 65–99)
HCT VFR BLD AUTO: 48.4 % (ref 37.5–51)
HGB BLD-MCNC: 16.3 G/DL (ref 13–17.7)
MCH RBC QN AUTO: 30.8 PG (ref 26.6–33)
MCHC RBC AUTO-ENTMCNC: 33.7 G/DL (ref 31.5–35.7)
MCV RBC AUTO: 91.5 FL (ref 79–97)
PLATELET # BLD AUTO: 297 10*3/MM3 (ref 140–450)
PMV BLD AUTO: 9.8 FL (ref 6–12)
POTASSIUM SERPL-SCNC: 3.8 MMOL/L (ref 3.5–5.2)
RBC # BLD AUTO: 5.29 10*6/MM3 (ref 4.14–5.8)
SODIUM SERPL-SCNC: 139 MMOL/L (ref 136–145)
WBC NRBC COR # BLD AUTO: 9.53 10*3/MM3 (ref 3.4–10.8)

## 2024-04-01 PROCEDURE — 85027 COMPLETE CBC AUTOMATED: CPT | Performed by: STUDENT IN AN ORGANIZED HEALTH CARE EDUCATION/TRAINING PROGRAM

## 2024-04-01 PROCEDURE — 80048 BASIC METABOLIC PNL TOTAL CA: CPT | Performed by: STUDENT IN AN ORGANIZED HEALTH CARE EDUCATION/TRAINING PROGRAM

## 2024-04-01 PROCEDURE — 63710000001 INSULIN LISPRO (HUMAN) PER 5 UNITS: Performed by: NURSE PRACTITIONER

## 2024-04-01 PROCEDURE — 99233 SBSQ HOSP IP/OBS HIGH 50: CPT

## 2024-04-01 PROCEDURE — 25010000002 CEFTRIAXONE PER 250 MG: Performed by: STUDENT IN AN ORGANIZED HEALTH CARE EDUCATION/TRAINING PROGRAM

## 2024-04-01 PROCEDURE — 99232 SBSQ HOSP IP/OBS MODERATE 35: CPT | Performed by: STUDENT IN AN ORGANIZED HEALTH CARE EDUCATION/TRAINING PROGRAM

## 2024-04-01 PROCEDURE — 63710000001 INSULIN DETEMIR PER 5 UNITS: Performed by: STUDENT IN AN ORGANIZED HEALTH CARE EDUCATION/TRAINING PROGRAM

## 2024-04-01 PROCEDURE — 82948 REAGENT STRIP/BLOOD GLUCOSE: CPT

## 2024-04-01 RX ORDER — AMLODIPINE BESYLATE 5 MG/1
5 TABLET ORAL ONCE
Status: COMPLETED | OUTPATIENT
Start: 2024-04-01 | End: 2024-04-01

## 2024-04-01 RX ORDER — LISINOPRIL 20 MG/1
20 TABLET ORAL DAILY
Status: DISCONTINUED | OUTPATIENT
Start: 2024-04-02 | End: 2024-04-04 | Stop reason: HOSPADM

## 2024-04-01 RX ORDER — AMLODIPINE BESYLATE 10 MG/1
10 TABLET ORAL
Status: DISCONTINUED | OUTPATIENT
Start: 2024-04-02 | End: 2024-04-04 | Stop reason: HOSPADM

## 2024-04-01 RX ADMIN — MIRTAZAPINE 15 MG: 15 TABLET, FILM COATED ORAL at 22:33

## 2024-04-01 RX ADMIN — SODIUM CHLORIDE 1000 MG: 900 INJECTION INTRAVENOUS at 12:00

## 2024-04-01 RX ADMIN — QUETIAPINE FUMARATE 50 MG: 25 TABLET ORAL at 22:33

## 2024-04-01 RX ADMIN — VALPROIC ACID 250 MG: 250 SOLUTION ORAL at 06:28

## 2024-04-01 RX ADMIN — DONEPEZIL HYDROCHLORIDE 20 MG: 10 TABLET, FILM COATED ORAL at 08:35

## 2024-04-01 RX ADMIN — QUETIAPINE FUMARATE 50 MG: 25 TABLET ORAL at 08:35

## 2024-04-01 RX ADMIN — Medication 10 ML: at 08:36

## 2024-04-01 RX ADMIN — INSULIN LISPRO 3 UNITS: 100 INJECTION, SOLUTION INTRAVENOUS; SUBCUTANEOUS at 17:33

## 2024-04-01 RX ADMIN — INSULIN LISPRO 3 UNITS: 100 INJECTION, SOLUTION INTRAVENOUS; SUBCUTANEOUS at 11:59

## 2024-04-01 RX ADMIN — INSULIN LISPRO 4 UNITS: 100 INJECTION, SOLUTION INTRAVENOUS; SUBCUTANEOUS at 22:34

## 2024-04-01 RX ADMIN — LISINOPRIL 10 MG: 10 TABLET ORAL at 08:35

## 2024-04-01 RX ADMIN — AMLODIPINE BESYLATE 5 MG: 5 TABLET ORAL at 08:35

## 2024-04-01 RX ADMIN — TEMAZEPAM 15 MG: 15 CAPSULE ORAL at 22:32

## 2024-04-01 RX ADMIN — AMLODIPINE BESYLATE 5 MG: 5 TABLET ORAL at 15:25

## 2024-04-01 RX ADMIN — INSULIN DETEMIR 15 UNITS: 100 INJECTION, SOLUTION SUBCUTANEOUS at 08:35

## 2024-04-01 RX ADMIN — DOXYCYCLINE 100 MG: 100 CAPSULE ORAL at 08:35

## 2024-04-01 RX ADMIN — VALPROIC ACID 250 MG: 250 SOLUTION ORAL at 15:25

## 2024-04-01 RX ADMIN — VALPROIC ACID 250 MG: 250 SOLUTION ORAL at 22:33

## 2024-04-01 RX ADMIN — DOXYCYCLINE 100 MG: 100 CAPSULE ORAL at 22:33

## 2024-04-01 RX ADMIN — INSULIN DETEMIR 20 UNITS: 100 INJECTION, SOLUTION SUBCUTANEOUS at 22:33

## 2024-04-01 RX ADMIN — Medication 10 ML: at 22:34

## 2024-04-01 NOTE — PLAN OF CARE
Goal Outcome Evaluation:  Plan of Care Reviewed With: spouse, patient        Progress: no change  Outcome Evaluation: Pt seen at 1210, wife present. Pt is BLE restraints, calm, did not verbally respond to ROS inquiry. Pt with documented improvement at times, but still episodes of agitation/aggression at times. Spouse a bit teary, stated she did not wish to discuss any concerns at that time;  Josh has seen previously, will check on her this afternoon.    1300 Palliative IDT meeting:  MD, APRN, RN,   After hours, weekends and holidays, contact Palliative Provider by calling [phone]     Problem: Palliative Care  Goal: Enhanced Quality of Life  Outcome: Ongoing, Progressing  Intervention: Promote Advance Care Planning  Flowsheets (Taken 4/1/2024 1417)  Life Transition/Adjustment: (Requested  visit for support to spouse) other (see comments)  Intervention: Optimize Function  Flowsheets (Taken 4/1/2024 1417)  Sleep/Rest Enhancement:   consistent schedule promoted   family presence promoted  Intervention: Optimize Psychosocial Wellbeing  Flowsheets (Taken 4/1/2024 1417)  Family/Support System Care: ( supportive visit)   caregiver stress acknowledged   self-care encouraged   support provided   other (see comments)

## 2024-04-01 NOTE — CASE MANAGEMENT/SOCIAL WORK
Continued Stay Note  [facility]     Patient Name: Too Tai  MRN: [id number]  Today's Date: 4/1/2024    Admit Date: 3/27/2024    Plan: SNF   Discharge Plan       Row Name 04/01/24 1040       Plan    Plan SNF    Patient/Family in Agreement with Plan yes    Plan Comments Spoke with spouse at bedside. Spouse wants patient to go to a SNF before returning home. Spouse has no preference on a facility. Patient is currently in restraints and will need to be out of restraints before referrals can be made. CM will continue to follow.    Final Discharge Disposition Code 03 - skilled nursing facility (SNF)      Row Name 04/01/24 0926       Plan    Plan Home with spouse    Patient/Family in Agreement with Plan yes    Plan Comments Spoke to patient at bedside. Plan is home with spouse. CM will continue to follow.    Final Discharge Disposition Code 01 - home or self-care                   Discharge Codes    No documentation.                 Expected Discharge Date and Time       Expected Discharge Date Expected Discharge Time    Apr 2, 2024               Scar Washington RN

## 2024-04-01 NOTE — PROGRESS NOTES
" [facility] Neurology    Progress Note    Patient Name: Too Tai  : 1955  MRN: [id number]  Primary Care Physician:  Des Geller MD  Date of admission: 3/27/2024    Subjective     Chief Complaint: Advanced dementia with behavioral disturbances    History of Present Illness   Per chart review patient did well with Depakote over the weekend.  Patient requiring 2-point restraints at this time, is an improvement from 4 points that he was requiring last week. No as needed medications used as patient was redirectable.  Patient was trialed with loosen restraints however per RN note he became verbally aggressive and attempted to pinch bedside RN.  Please see nursing note.    Upon assessment patient was more alert than previous assessment.  He was uncooperative with exam however was able to move all extremities.  Wife at bedside said the patient rested well throughout night until early this a.m.  Patient's wife is concerned with disposition with them leaving this hospital.  States that she would like to \"see him take a few steps\" before they leave.  Explained to her that that would be the ultimate goal however patient and staff safety need to take into consideration.    Review of Systems   Unable to assess due to baseline mental status    Objective     Physical Exam  Vitals and nursing note reviewed.   Constitutional:       General: He is not in acute distress.     Appearance: He is not ill-appearing.   Eyes:      Extraocular Movements: Extraocular movements intact.      Pupils: Pupils are equal, round, and reactive to light.      Comments: No nystagmus or deviated gaze noted   Cardiovascular:      Rate and Rhythm: Normal rate.   Pulmonary:      Effort: Pulmonary effort is normal.   Neurological:      Mental Status: He is alert.      Cranial Nerves: No cranial nerve deficit or facial asymmetry.      Sensory: No sensory deficit.      Motor: No weakness or seizure activity.      Deep Tendon Reflexes: " Babinski sign absent on the right side. Babinski sign absent on the left side.      Reflex Scores:       Bicep reflexes are 1+ on the right side and 1+ on the left side.       Patellar reflexes are 1+ on the right side and 1+ on the left side.     Comments:   Exam limited due to patient's baseline cognitive function.    Cranial Nerves   CN II: Pupils are equal, round, and reactive to light. Normal visual acuity and visual fields.    CN III IV VI: Extraocular movements are full without nystagmus.  CN V: Normal facial sensation and strength of muscles of mastication.  CN VII: Facial movements are symmetric. No weakness.  CN VIII:  Auditory acuity is normal.  CN IX & X:  Symmetric palatal movement.  CN XII: The tongue is midline.  No atrophy or fasciculations.            Vitals:   Temp:  [97 °F (36.1 °C)-98.4 °F (36.9 °C)] 98 °F (36.7 °C)  Heart Rate:  [86] 86  Resp:  [16-18] 18  BP: (161-193)/(79-91) 190/88    Current Medications    Current Facility-Administered Medications:     acetaminophen (TYLENOL) tablet 650 mg, 650 mg, Oral, Q4H PRN **OR** acetaminophen (TYLENOL) 160 MG/5ML oral solution 650 mg, 650 mg, Oral, Q4H PRN, 650 mg at 03/29/24 1456 **OR** acetaminophen (TYLENOL) suppository 650 mg, 650 mg, Rectal, Q4H PRN, Carmelita Smith APRN    amLODIPine (NORVASC) tablet 5 mg, 5 mg, Oral, Q24H, Scar Schneider DO, 5 mg at 04/01/24 0835    sennosides-docusate (PERICOLACE) 8.6-50 MG per tablet 2 tablet, 2 tablet, Oral, BID PRN **AND** polyethylene glycol (MIRALAX) packet 17 g, 17 g, Oral, Daily PRN **AND** bisacodyl (DULCOLAX) EC tablet 5 mg, 5 mg, Oral, Daily PRN **AND** bisacodyl (DULCOLAX) suppository 10 mg, 10 mg, Rectal, Daily PRN, Carmelita Smith APRN    calcium carbonate (TUMS) chewable tablet 500 mg (200 mg elemental), 2 tablet, Oral, TID PRN, Carmelita Smith APRN    Calcium Replacement - Follow Nurse / BPA Driven Protocol, , Does not apply, PRN, Carmelita Smith APRN    cefTRIAXone (ROCEPHIN) 1,000 mg in sodium  chloride 0.9 % 100 mL MBP, 1,000 mg, Intravenous, Q24H, Scar Schneider, DO, Last Rate: 200 mL/hr at 03/31/24 1200, 1,000 mg at 03/31/24 1200    dextrose (D50W) (25 g/50 mL) IV injection 25 g, 25 g, Intravenous, Q15 Min PRN, Carmelita Smith APRN    dextrose (GLUTOSE) oral gel 15 g, 15 g, Oral, Q15 Min PRN, Carmelita Smith APRN    donepezil (ARICEPT) tablet 20 mg, 20 mg, Oral, Daily, Carmelita Smith APRN, 20 mg at 04/01/24 0835    doxycycline (MONODOX) capsule 100 mg, 100 mg, Oral, Q12H, Scar Schneider DO, 100 mg at 04/01/24 0835    glucagon (GLUCAGEN) injection 1 mg, 1 mg, Intramuscular, Q15 Min PRN, Carmelita Smith APRN    insulin detemir (LEVEMIR) injection 15 Units, 15 Units, Subcutaneous, Q12H, Scar Schneider, , 15 Units at 04/01/24 0835    Insulin Lispro (humaLOG) injection 2-7 Units, 2-7 Units, Subcutaneous, 4x Daily AC & at Bedtime, Carmelita Smith APRN, 2 Units at 03/31/24 2038    lisinopril (PRINIVIL,ZESTRIL) tablet 10 mg, 10 mg, Oral, Daily, Scar Schneider, , 10 mg at 04/01/24 0835    Magnesium Standard Dose Replacement - Follow Nurse / BPA Driven Protocol, , Does not apply, PRN, Carmelita Smith APRN    mirtazapine (REMERON) tablet 15 mg, 15 mg, Oral, Nightly, Carmelita Smith APRN, 15 mg at 03/31/24 2039    nitroglycerin (NITROSTAT) SL tablet 0.4 mg, 0.4 mg, Sublingual, Q5 Min PRN, Carmelita Smith APRN    ondansetron ODT (ZOFRAN-ODT) disintegrating tablet 4 mg, 4 mg, Oral, Q6H PRN **OR** ondansetron (ZOFRAN) injection 4 mg, 4 mg, Intravenous, Q6H PRN, Carmelita Smith APRN    Phosphorus Replacement - Follow Nurse / BPA Driven Protocol, , Does not apply, PRNSarah Lauren, APRN    Potassium Replacement - Follow Nurse / BPA Driven Protocol, , Does not apply, PRN, Carmelita mSith APRN    QUEtiapine (SEROquel) tablet 50 mg, 50 mg, Oral, BID, Carmelita Smith APRN, 50 mg at 04/01/24 0835    sodium chloride 0.9 % flush 10 mL, 10 mL, Intravenous, PRN, Carmelita Smith APRN    sodium chloride 0.9 % flush 10 mL,  10 mL, Intravenous, Q12H, Carmelita Smith APRN, 10 mL at 04/01/24 0836    sodium chloride 0.9 % flush 10 mL, 10 mL, Intravenous, PRN, Carmelita Smith, APRN    sodium chloride 0.9 % infusion 40 mL, 40 mL, Intravenous, PRN, Carmelita Smith, APRN    temazepam (RESTORIL) capsule 15 mg, 15 mg, Oral, Nightly, Darrell Jhaveri, DO, 15 mg at 03/31/24 2039    Valproic Acid (DEPAKENE) syrup 250 mg, 250 mg, Oral, Q8H, Carmelita Smith APRN, 250 mg at 04/01/24 0628    ziprasidone (GEODON) injection 10 mg, 10 mg, Intramuscular, Q4H PRN, Meliza Kahn, APRN, 10 mg at 03/29/24 0503    Laboratory Results:   Lab Results   Component Value Date    GLUCOSE 125 (H) 04/01/2024    CALCIUM 9.6 04/01/2024     04/01/2024    K 3.8 04/01/2024    CO2 29.0 04/01/2024     04/01/2024    BUN 13 04/01/2024    CREATININE 0.72 (L) 04/01/2024    EGFRIFAFRI 102 02/21/2022    EGFRIFNONA 88 02/21/2022    BCR 18.1 04/01/2024    ANIONGAP 10.0 04/01/2024     Lab Results   Component Value Date    WBC 9.53 04/01/2024    HGB 16.3 04/01/2024    HCT 48.4 04/01/2024    MCV 91.5 04/01/2024     04/01/2024     Lab Results   Component Value Date    CHOL 220 (H) 01/08/2024    CHOL 120 08/01/2019    CHOL 117 04/15/2019     Lab Results   Component Value Date    HDL 41 01/08/2024    HDL 37 (L) 08/03/2023    HDL 41 04/03/2023     Lab Results   Component Value Date     (H) 01/08/2024     (H) 08/03/2023     (H) 04/03/2023     Lab Results   Component Value Date    TRIG 108 01/08/2024    TRIG 208 (H) 08/03/2023    TRIG 138 04/03/2023     Lab Results   Component Value Date    HGBA1C 9.20 (H) 03/27/2024     Lab Results   Component Value Date    INR 1.1 11/25/2019    INR 1.1 09/10/2018    PROTIME 12.5 11/25/2019    PROTIME 13.3 09/10/2018     Lab Results   Component Value Date    FOLATE 10.20 02/24/2024     Lab Results   Component Value Date    [id number] 380 03/27/2024             Assessment / Plan   Brief Patient Summary:  Too Tai is a 68  y.o. male with a past medical history of CAD, HTN, HLD, T2DM, chronic anticoagulation, dementia with behavioral disturbances and BPH presented to [facility] ED on 3/27/2024 for altered mental status and frequent falls.  Per wife which is bedside at approximately 8 PM on the evening of 3/26 patient began to make grunting noises and would fall forward hitting his head and face on the ground.       Plan:   Dementia with behavioral disturbances   Mental status  Presyncope/recurrent falls  UTI  Initial CT head and repeat CT head negative for acute process  EEG no focal features or epileptic activity.  Nonspecific mild diffuse cerebral dysfunction  Continue Depakote 250 mg 3 times daily  VPA level 48.8, non toxicity  Scheduled  Aricept 20 mg daily  Scheduled Remeron 15 mg nightly  Scheduled Restoril 15 mg nightly  Scheduled Seroquel 50 mg twice daily  Geodon 10 mg as needed for extreme agitation; last dose 3/29  QTc 459  Echo unremarkable.  EF 55%  Will hold off on vessel imaging at this time as patient is unable to tolerate exam.  Orthostatic blood pressure when patient is more appropriate  Continue antibiotics per hospitalist team, blood cultures pending.   Palliative care following, appreciate recommendations  Case management following, appreciate help and complicated patient disposition.  General neurology will follow-up with patient on Monday.  Please feel free to reach out if needed over the weekend.    I have discussed the above with the patient, bedside RN and Dr. Schneider  Time spent with patient: 50 minutes in face-to-face evaluation and management of the patient.    Copied text in this note has been reviewed and is accurate as of 04/01/24.     IVONNE Santos

## 2024-04-01 NOTE — CASE MANAGEMENT/SOCIAL WORK
Continued Stay Note  [facility]     Patient Name: Too Tai  MRN: [id number]  Today's Date: 4/1/2024    Admit Date: 3/27/2024    Plan: Home with spouse   Discharge Plan       Row Name 04/01/24 0926       Plan    Plan Home with spouse    Patient/Family in Agreement with Plan yes    Plan Comments Spoke to patient at bedside. Plan is home with spouse. CM will continue to follow.    Final Discharge Disposition Code 01 - home or self-care                   Discharge Codes    No documentation.                 Expected Discharge Date and Time       Expected Discharge Date Expected Discharge Time    Apr 2, 2024               Scar Washington RN

## 2024-04-01 NOTE — CASE MANAGEMENT/SOCIAL WORK
Continued Stay Note  [facility]     Patient Name: Too Tai  MRN: [id number]  Today's Date: 4/1/2024    Admit Date: 3/27/2024    Plan: Home with [facility]   Discharge Plan       Row Name 04/01/24 1446       Plan    Plan Home with [facility]    Patient/Family in Agreement with Plan yes    Plan Comments Spoke with spouse, Caroline at bedside. [place] wants to take him home with Home Health and she is going to hire caregivers. We just have to try to get the medications right to get him out of restraints. Patient previously had [facility] and she would like them to return. [place] stated that she cannot afford the co-pays for skilled nursing and cannot afford long term care. CM will continue to follow.    Final Discharge Disposition Code 06 - home with home health care                   Discharge Codes    No documentation.                 Expected Discharge Date and Time       Expected Discharge Date Expected Discharge Time    Apr 2, 2024               Scar Washington RN

## 2024-04-01 NOTE — PROGRESS NOTES
Continued Stay Note  [facility]     Patient Name: Too Tai  MRN: [id number]  Today's Date: 4/1/2024    Admit Date: 3/27/2024    Plan: SNF   Discharge Plan       Row Name 04/01/24 1141       Plan    Plan SNF    Plan Comments Noted in 's note that pt's wife has opted for rehab following hospital discharge. Visit made to pt, staff nurse present working with pt, wife present. Wife reiterated that the plan is for rehab, discussed a hospice follow up once pt is no longer able to participate in therapy. Wife stated does not want a hospice follow up, has the hospice number and will call hospice if needed. Will close the hospice referral. Please call [phone] if can be of further assistance.      Row Name 04/01/24 1040       Plan    Plan     Patient/Family in Agreement with Plan     Plan Comments     Final Discharge Disposition Code       Row Name 04/01/24 0926       Plan    Plan     Patient/Family in Agreement with Plan     Plan Comments     Final Discharge Disposition Code                    Discharge Codes    No documentation.                 Expected Discharge Date and Time       Expected Discharge Date Expected Discharge Time    Apr 2, 2024               Tawana Garcia RN

## 2024-04-01 NOTE — PROGRESS NOTES
[facility] Medicine Services  PROGRESS NOTE    Patient Name: Too Tai  : 1955  MRN: [id number]    Date of Admission: 3/27/2024  Primary Care Physician: Des Geller MD    Subjective   Subjective     CC:  AMS, falls       HPI:  Patient reportedly slept well for the majority of the night until around 4 AM this morning when he needed to get up and use the restroom.  At that point, some contention developed related to patient's behavior.  Family at bedside as well as the need for restraints.  Upon my evaluation, patient is directable but would not verbally answer questions.      Objective   Objective     Vital Signs:   Temp:  [97 °F (36.1 °C)-98.4 °F (36.9 °C)] 98 °F (36.7 °C)  Heart Rate:  [86] 86  Resp:  [16-18] 18  BP: (131-193)/(69-91) 131/76     Physical Exam:  General appearance: Awake, alert, no acute distress  Cardiovascular: RRR, no murmurs or rubs, radial pulse full 2/4 BL   Respiratory: CTAB, no crackles or wheezes, oxygenating well on room air  Abdomen: soft, non-tender, no organomegaly, bowel sounds normoactive   Psych: abnormal judgement and insight, cooperative with gently approached instructions     Results Reviewed:  LAB RESULTS:      Lab 24  0417 24  0704 24  0040 24  1943 24  1422   WBC 9.53 11.00*  --   --  9.18   HEMOGLOBIN 16.3 16.8  --   --  15.3   HEMATOCRIT 48.4 48.6  --   --  44.7   PLATELETS 297 251  --   --  300   NEUTROS ABS  --  6.31  --   --  7.51*   IMMATURE GRANS (ABS)  --  0.09*  --   --  0.09*   LYMPHS ABS  --  2.65  --   --  1.09   MONOS ABS  --  1.24*  --   --  0.47   EOS ABS  --  0.61*  --   --  0.00   MCV 91.5 89.8  --   --  93.1   SED RATE  --   --   --   --  31*   PROCALCITONIN  --   --   --   --  0.02   LACTATE  --   --  2.0 4.3* 4.0*         Lab 24  0417 24  0920 24  1422   SODIUM 139 137 136   POTASSIUM 3.8 4.2 5.1   CHLORIDE 100 101 96*   CO2 29.0 27.0 26.0   ANION GAP 10.0 9.0  14.0   BUN 13 10 14   CREATININE 0.72* 0.77 0.77   EGFR 100.1 98.1 98.1   GLUCOSE 125* 166* 305*   CALCIUM 9.6 9.5 9.4   HEMOGLOBIN A1C  --   --  9.20*         Lab 03/27/24  1422   TOTAL PROTEIN 7.6   ALBUMIN 4.2   GLOBULIN 3.4   ALT (SGPT) 22   AST (SGOT) 22   BILIRUBIN 1.0   ALK PHOS 89         Lab 03/27/24  1422   HSTROP T 15             Lab 03/27/24  1422   VITAMIN B 12 380         Brief Urine Lab Results  (Last result in the past 365 days)        Color   Clarity   Blood   Leuk Est   Nitrite   Protein   CREAT   Urine HCG        03/27/24 1633 Yellow   Clear   Negative   Negative   Positive   100 mg/dL (2+)                   Microbiology Results Abnormal       Procedure Component Value - Date/Time    Blood Culture - Blood, Arm, Left [[id number]]  (Normal) Collected: 03/27/24 1943    Lab Status: Preliminary result Specimen: Blood from Arm, Left Updated: 03/31/24 2000     Blood Culture No growth at 4 days    Blood Culture - Blood, Arm, Right [[id number]]  (Normal) Collected: 03/27/24 1750    Lab Status: Preliminary result Specimen: Blood from Arm, Right Updated: 03/31/24 1815     Blood Culture No growth at 4 days    Narrative:      Less than seven (7) mL's of blood was collected.  Insufficient quantity may yield false negative results.            No radiology results from the last 24 hrs    Results for orders placed during the hospital encounter of 03/27/24    Adult Transthoracic Echo Complete W/ Cont if Necessary Per Protocol    Interpretation Summary    Left ventricular systolic function is normal. Calculated left ventricular EF = 55.1% Normal left ventricular cavity size noted. Left ventricular wall thickness is consistent with mild concentric hypertrophy. Left ventricular diastolic function is consistent with (grade I) impaired relaxation.    The right ventricular cavity is mildly dilated. Normal right ventricular systolic function noted.    There is calcification of the aortic valve. The aortic valve appears  trileaflet. Mild aortic valve regurgitation is present. No aortic valve stenosis is present.    Mitral annular calcification is present. Trace mitral valve regurgitation is present. No significant mitral valve stenosis is present.    The tricuspid valve is structurally normal with no stenosis present. Trace tricuspid valve regurgitation is present. Insufficient TR velocity profile to estimate the right ventricular systolic pressure.    Mild dilation of the sinuses of Valsalva is present. Mild dilation of the ascending aorta is present. Ascending aorta = 4.2 cm      Current medications:  Scheduled Meds:amLODIPine, 5 mg, Oral, Q24H  cefTRIAXone, 1,000 mg, Intravenous, Q24H  donepezil, 20 mg, Oral, Daily  doxycycline, 100 mg, Oral, Q12H  insulin detemir, 15 Units, Subcutaneous, Q12H  insulin lispro, 2-7 Units, Subcutaneous, 4x Daily AC & at Bedtime  lisinopril, 10 mg, Oral, Daily  mirtazapine, 15 mg, Oral, Nightly  QUEtiapine, 50 mg, Oral, BID  sodium chloride, 10 mL, Intravenous, Q12H  temazepam, 15 mg, Oral, Nightly  Valproic Acid, 250 mg, Oral, Q8H      Continuous Infusions:   PRN Meds:.  acetaminophen **OR** acetaminophen **OR** acetaminophen    senna-docusate sodium **AND** polyethylene glycol **AND** bisacodyl **AND** bisacodyl    calcium carbonate    Calcium Replacement - Follow Nurse / BPA Driven Protocol    dextrose    dextrose    glucagon (human recombinant)    Magnesium Standard Dose Replacement - Follow Nurse / BPA Driven Protocol    nitroglycerin    ondansetron ODT **OR** ondansetron    Phosphorus Replacement - Follow Nurse / BPA Driven Protocol    Potassium Replacement - Follow Nurse / BPA Driven Protocol    sodium chloride    sodium chloride    sodium chloride    ziprasidone    Assessment & Plan   Assessment & Plan     Active Hospital Problems    Diagnosis  POA    **Falls [W19.XXXA]  Yes    AMS (altered mental status) [R41.82]  Yes    Bacteriuria [R82.71]  Yes    Frequent falls [R29.6]  Not Applicable     Dementia with behavioral disturbance [F03.918]  Yes    Chronic anticoagulation [Z79.01]  Not Applicable    Type 2 diabetes mellitus with hyperglycemia, with long-term current use of insulin [E11.65, Z79.4]  Not Applicable    Benign essential HTN [I10]  Yes    Benign prostatic hyperplasia [N40.0]  Yes    Chronic coronary artery disease [I25.10]  Yes    Hyperlipidemia [E78.5]  Yes      Resolved Hospital Problems   No resolved problems to display.        This patient's assessments and plans were partially entered by my partner and updated as appropriate by me on 4/01/2024     Brief Hospital Course to date:  Too Tai is a 68 y.o. male with PMH significant for CAD, HTN, HLD, T2DM, chronic anticoagulation, dementia with behavioral disturbance, BPH presented to [facility] ED on 3/27/2024 d/t altered mental status and frequent falls. Patient found to be orthostatic with uncontrolled behavior likely secondary to outpatient medication changes.     Acute metabolic encephalopathy superimposed on underlying dementia  Frequent falls  Orthostatic hypotension  Bradycardic on telemetry, resolved   -CT head without any acute findings  -Neurology following   -Continue Aricept, Seroquel, Remeron  -Continue temazepam; improved overnight behaviors  -Geodon as needed for extreme agitation  -Loaded w depakote 3/29 as this appeared to not be administered outpatient, then continue 250mg TID; monitor for toxicity   -Once behavioral changes are controlled, will pursue further workup of orthostatic hypotension with echocardiogram and vascular studies  -TTE w no significant abnormalities   -PT/OT to evaluate  -+ orthostatics on 4/1; DC metoprolol and titrate lisinopril and norvasc   -Case management to assist with placement options, as home will likely result in readmission due to needs      Suspected UTI, staph epidermidis   Suspected PNA   -Patient with 4+ bacteria  -Culture grew Staph epidermidis, will continue empiric treatment with Rocephin  and doxycycline for 5-day course (day 5/5) due to further possibility of contributing PNA      Hypertension  -Now uncontrolled   -Increase lisinopril to 20mg daily   -Add Norvasc, increase to 10mg   -Follow-up orthostatic vital signs     Poorly controlled type 2 diabetes with A1c 9.2%  -Continue basal and SSI, adjust as indicated  -Increase levemir to 20 units      CAD  Hyperlipidemia  -Currently not on any meds other than metoprolol     History of DVT  -On chronic anticoagulation with full dose Lovenox  -Currently on hold current 2 falls    Expected Discharge Location and Transportation: LTC vs home   Expected Discharge   Expected Discharge Date: 4/2/2024; Expected Discharge Time:      DVT prophylaxis:  Medical and mechanical DVT prophylaxis orders are present.         AM-PAC 6 Clicks Score (PT): 17 (04/01/24 9421)    CODE STATUS:   Code Status and Medical Interventions:   Ordered at: 03/27/24 1360     Level Of Support Discussed With:    Health Care Surrogate     Code Status (Patient has no pulse and is not breathing):    No CPR (Do Not Attempt to Resuscitate)     Medical Interventions (Patient has pulse or is breathing):    Comfort Measures       Scar Schneider, DO  04/01/24
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
## 2024-04-02 LAB
GLUCOSE BLDC GLUCOMTR-MCNC: 139 MG/DL (ref 70–130)
GLUCOSE BLDC GLUCOMTR-MCNC: 218 MG/DL (ref 70–130)
GLUCOSE BLDC GLUCOMTR-MCNC: 283 MG/DL (ref 70–130)
GLUCOSE BLDC GLUCOMTR-MCNC: 360 MG/DL (ref 70–130)
GLUCOSE BLDC GLUCOMTR-MCNC: 405 MG/DL (ref 70–130)

## 2024-04-02 PROCEDURE — 25010000002 CEFTRIAXONE PER 250 MG: Performed by: STUDENT IN AN ORGANIZED HEALTH CARE EDUCATION/TRAINING PROGRAM

## 2024-04-02 PROCEDURE — 82948 REAGENT STRIP/BLOOD GLUCOSE: CPT

## 2024-04-02 PROCEDURE — 99233 SBSQ HOSP IP/OBS HIGH 50: CPT

## 2024-04-02 PROCEDURE — 99232 SBSQ HOSP IP/OBS MODERATE 35: CPT | Performed by: STUDENT IN AN ORGANIZED HEALTH CARE EDUCATION/TRAINING PROGRAM

## 2024-04-02 PROCEDURE — 63710000001 INSULIN DETEMIR PER 5 UNITS: Performed by: STUDENT IN AN ORGANIZED HEALTH CARE EDUCATION/TRAINING PROGRAM

## 2024-04-02 PROCEDURE — 63710000001 INSULIN LISPRO (HUMAN) PER 5 UNITS: Performed by: NURSE PRACTITIONER

## 2024-04-02 RX ADMIN — AMLODIPINE BESYLATE 10 MG: 10 TABLET ORAL at 09:17

## 2024-04-02 RX ADMIN — SODIUM CHLORIDE 1000 MG: 900 INJECTION INTRAVENOUS at 11:12

## 2024-04-02 RX ADMIN — TEMAZEPAM 15 MG: 15 CAPSULE ORAL at 20:28

## 2024-04-02 RX ADMIN — VALPROIC ACID 250 MG: 250 SOLUTION ORAL at 05:47

## 2024-04-02 RX ADMIN — INSULIN LISPRO 4 UNITS: 100 INJECTION, SOLUTION INTRAVENOUS; SUBCUTANEOUS at 17:41

## 2024-04-02 RX ADMIN — DONEPEZIL HYDROCHLORIDE 20 MG: 10 TABLET, FILM COATED ORAL at 09:17

## 2024-04-02 RX ADMIN — INSULIN DETEMIR 20 UNITS: 100 INJECTION, SOLUTION SUBCUTANEOUS at 09:17

## 2024-04-02 RX ADMIN — LISINOPRIL 20 MG: 20 TABLET ORAL at 09:17

## 2024-04-02 RX ADMIN — INSULIN LISPRO 3 UNITS: 100 INJECTION, SOLUTION INTRAVENOUS; SUBCUTANEOUS at 12:45

## 2024-04-02 RX ADMIN — QUETIAPINE FUMARATE 50 MG: 25 TABLET ORAL at 20:28

## 2024-04-02 RX ADMIN — MIRTAZAPINE 15 MG: 15 TABLET, FILM COATED ORAL at 20:28

## 2024-04-02 RX ADMIN — Medication 10 ML: at 09:17

## 2024-04-02 RX ADMIN — QUETIAPINE FUMARATE 50 MG: 25 TABLET ORAL at 09:17

## 2024-04-02 RX ADMIN — INSULIN LISPRO 6 UNITS: 100 INJECTION, SOLUTION INTRAVENOUS; SUBCUTANEOUS at 23:06

## 2024-04-02 RX ADMIN — VALPROIC ACID 250 MG: 250 SOLUTION ORAL at 23:07

## 2024-04-02 RX ADMIN — INSULIN DETEMIR 20 UNITS: 100 INJECTION, SOLUTION SUBCUTANEOUS at 23:06
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
## 2024-04-02 NOTE — PROGRESS NOTES
[facility] Medicine Services  PROGRESS NOTE    Patient Name: Too Tai  : 1955  MRN: [id number]    Date of Admission: 3/27/2024  Primary Care Physician: Des Geller MD    Subjective   Subjective     CC:  AMS, falls       HPI:  Patient still with intermittent aggressive behaviors but ultimately able to be redirected.  He has had his restraints off since early this morning and has so far behaved well.      Objective   Objective     Vital Signs:   Temp:  [96.8 °F (36 °C)-97.7 °F (36.5 °C)] 97 °F (36.1 °C)  Resp:  [18-20] 20  BP: (139-176)/(72-85) 176/79     Physical Exam:  General appearance: Awake, alert, no acute distress  Cardiovascular: RRR, no murmurs or rubs, radial pulse full 2/4 BL   Respiratory: CTAB, no crackles or wheezes, oxygenating well on room air  Abdomen: soft, non-tender, no organomegaly, bowel sounds normoactive   Psych: abnormal judgement and insight, overall cooperative     Results Reviewed:  LAB RESULTS:      Lab 24  0704 24  0040 24  1943 24  1422   WBC 9.53 11.00*  --   --  9.18   HEMOGLOBIN 16.3 16.8  --   --  15.3   HEMATOCRIT 48.4 48.6  --   --  44.7   PLATELETS 297 251  --   --  300   NEUTROS ABS  --  6.31  --   --  7.51*   IMMATURE GRANS (ABS)  --  0.09*  --   --  0.09*   LYMPHS ABS  --  2.65  --   --  1.09   MONOS ABS  --  1.24*  --   --  0.47   EOS ABS  --  0.61*  --   --  0.00   MCV 91.5 89.8  --   --  93.1   SED RATE  --   --   --   --  31*   PROCALCITONIN  --   --   --   --  0.02   LACTATE  --   --  2.0 4.3* 4.0*         Lab 24  0920 24  1422   SODIUM 139 137 136   POTASSIUM 3.8 4.2 5.1   CHLORIDE 100 101 96*   CO2 29.0 27.0 26.0   ANION GAP 10.0 9.0 14.0   BUN 13 10 14   CREATININE 0.72* 0.77 0.77   EGFR 100.1 98.1 98.1   GLUCOSE 125* 166* 305*   CALCIUM 9.6 9.5 9.4   HEMOGLOBIN A1C  --   --  9.20*         Lab 24  1422   TOTAL PROTEIN 7.6   ALBUMIN 4.2   GLOBULIN 3.4    ALT (SGPT) 22   AST (SGOT) 22   BILIRUBIN 1.0   ALK PHOS 89         Lab 03/27/24  1422   HSTROP T 15             Lab 03/27/24  1422   VITAMIN B 12 380         Brief Urine Lab Results  (Last result in the past 365 days)        Color   Clarity   Blood   Leuk Est   Nitrite   Protein   CREAT   Urine HCG        03/27/24 1633 Yellow   Clear   Negative   Negative   Positive   100 mg/dL (2+)                   Microbiology Results Abnormal       Procedure Component Value - Date/Time    Blood Culture - Blood, Arm, Left [[id number]]  (Normal) Collected: 03/27/24 1943    Lab Status: Final result Specimen: Blood from Arm, Left Updated: 04/01/24 2000     Blood Culture No growth at 5 days    Blood Culture - Blood, Arm, Right [[id number]]  (Normal) Collected: 03/27/24 1750    Lab Status: Final result Specimen: Blood from Arm, Right Updated: 04/01/24 1815     Blood Culture No growth at 5 days    Narrative:      Less than seven (7) mL's of blood was collected.  Insufficient quantity may yield false negative results.            No radiology results from the last 24 hrs    Results for orders placed during the hospital encounter of 03/27/24    Adult Transthoracic Echo Complete W/ Cont if Necessary Per Protocol    Interpretation Summary    Left ventricular systolic function is normal. Calculated left ventricular EF = 55.1% Normal left ventricular cavity size noted. Left ventricular wall thickness is consistent with mild concentric hypertrophy. Left ventricular diastolic function is consistent with (grade I) impaired relaxation.    The right ventricular cavity is mildly dilated. Normal right ventricular systolic function noted.    There is calcification of the aortic valve. The aortic valve appears trileaflet. Mild aortic valve regurgitation is present. No aortic valve stenosis is present.    Mitral annular calcification is present. Trace mitral valve regurgitation is present. No significant mitral valve stenosis is present.    The  tricuspid valve is structurally normal with no stenosis present. Trace tricuspid valve regurgitation is present. Insufficient TR velocity profile to estimate the right ventricular systolic pressure.    Mild dilation of the sinuses of Valsalva is present. Mild dilation of the ascending aorta is present. Ascending aorta = 4.2 cm      Current medications:  Scheduled Meds:amLODIPine, 10 mg, Oral, Q24H  cefTRIAXone, 1,000 mg, Intravenous, Q24H  donepezil, 20 mg, Oral, Daily  insulin detemir, 20 Units, Subcutaneous, Q12H  insulin lispro, 2-7 Units, Subcutaneous, 4x Daily AC & at Bedtime  lisinopril, 20 mg, Oral, Daily  mirtazapine, 15 mg, Oral, Nightly  QUEtiapine, 50 mg, Oral, BID  sodium chloride, 10 mL, Intravenous, Q12H  temazepam, 15 mg, Oral, Nightly  Valproic Acid, 250 mg, Oral, Q8H      Continuous Infusions:   PRN Meds:.  acetaminophen **OR** acetaminophen **OR** acetaminophen    senna-docusate sodium **AND** polyethylene glycol **AND** bisacodyl **AND** bisacodyl    calcium carbonate    Calcium Replacement - Follow Nurse / BPA Driven Protocol    dextrose    dextrose    glucagon (human recombinant)    Magnesium Standard Dose Replacement - Follow Nurse / BPA Driven Protocol    nitroglycerin    ondansetron ODT **OR** ondansetron    Phosphorus Replacement - Follow Nurse / BPA Driven Protocol    Potassium Replacement - Follow Nurse / BPA Driven Protocol    sodium chloride    sodium chloride    sodium chloride    ziprasidone    Assessment & Plan   Assessment & Plan     Active Hospital Problems    Diagnosis  POA    **Falls [W19.XXXA]  Yes    AMS (altered mental status) [R41.82]  Yes    Bacteriuria [R82.71]  Yes    Frequent falls [R29.6]  Not Applicable    Dementia with behavioral disturbance [F03.918]  Yes    Chronic anticoagulation [Z79.01]  Not Applicable    Type 2 diabetes mellitus with hyperglycemia, with long-term current use of insulin [E11.65, Z79.4]  Not Applicable    Benign essential HTN [I10]  Yes    Benign  prostatic hyperplasia [N40.0]  Yes    Chronic coronary artery disease [I25.10]  Yes    Hyperlipidemia [E78.5]  Yes      Resolved Hospital Problems   No resolved problems to display.        This patient's assessments and plans were partially entered by my partner and updated as appropriate by me on 4/02/2024     Brief Hospital Course to date:  Too Tai is a 68 y.o. male with PMH significant for CAD, HTN, HLD, T2DM, chronic anticoagulation, dementia with behavioral disturbance, BPH presented to [facility] ED on 3/27/2024 d/t altered mental status and frequent falls. Patient found to be orthostatic with uncontrolled behavior likely secondary to outpatient medication changes.     Acute metabolic encephalopathy superimposed on underlying dementia  Frequent falls  Orthostatic hypotension  Bradycardic on telemetry, resolved   -CT head without any acute findings  -Neurology following   -Continue Aricept, Seroquel, Remeron  -Continue temazepam; improved overnight behaviors  -Geodon as needed for extreme agitation  -Loaded w depakote 3/29 as this appeared to not be administered outpatient, then continue 250mg TID; monitor for toxicity   -TTE w no significant abnormalities   -PT/OT to evaluate  -+ orthostatics on 4/1; DC metoprolol and titrate lisinopril and norvasc to optimize hypertension   -It appears financial limitations/insurance limitations are preventing the patient from being placed despite home not being an ideal environment for the patient due to his unsteady gait and requirement of full-time observation.  Patient's wife expresses understanding of the situation indicates she will do her best to care for the patient with what resources she does have, until he is a candidate for hospice care as his disease progresses.  Will anticipate for discharge home 4/3.     Suspected UTI, staph epidermidis   Suspected PNA   -Patient with 4+ bacteria  -Culture grew Staph epidermidis  -Completed empiric treatment with Rocephin and  doxycycline for 5-day course (day 5/5) due to further possibility of contributing PNA      Hypertension  -Now uncontrolled   -Increased lisinopril to 20mg daily   -Add Norvasc, increased to 10mg   -Follow-up orthostatic vital signs     Poorly controlled type 2 diabetes with A1c 9.2%  -Continue basal and SSI, adjust as indicated  -Increased levemir to 20 units      CAD  Hyperlipidemia  -DC metoprolol due to orthostatics      History of DVT  -On chronic anticoagulation with full dose Lovenox  -Currently on hold current 2 falls  -Will not restart anticoagulation due to fall risk     Expected Discharge Location and Transportation: Home   Expected Discharge   Expected Discharge Date: 4/3/2024; Expected Discharge Time:      DVT prophylaxis:  Medical and mechanical DVT prophylaxis orders are present.         AM-PAC 6 Clicks Score (PT): 13 (04/02/24 2619)    CODE STATUS:   Code Status and Medical Interventions:   Ordered at: 03/27/24 9594     Level Of Support Discussed With:    Health Care Surrogate     Code Status (Patient has no pulse and is not breathing):    No CPR (Do Not Attempt to Resuscitate)     Medical Interventions (Patient has pulse or is breathing):    Comfort Measures       Scar Schneider, DO  04/02/24

## 2024-04-02 NOTE — PROGRESS NOTES
[facility] Neurology    Progress Note    Patient Name: Too Tai  : 1955  MRN: [id number]  Primary Care Physician:  Des Geller MD  Date of admission: 3/27/2024    Subjective     Chief Complaint: Dementia with behavioral disturbances    History of Present Illness   Per chart review patient has been tolerating being out of restraints for the past few hours.  Per night shift RN note patient is improved from past 4 nights.  See nursing note.  Patient seen resting comfortably in bed.  Did not wake for exam.  Patient is able to move all extremities.  No distress noted.    Review of Systems   Unable to assess due to baseline mental status    Objective     Physical Exam  Vitals and nursing note reviewed.   Constitutional:       General: He is not in acute distress.     Appearance: He is not ill-appearing.   Eyes:      Extraocular Movements: Extraocular movements intact.      Pupils: Pupils are equal, round, and reactive to light.   Cardiovascular:      Rate and Rhythm: Normal rate.   Pulmonary:      Effort: Pulmonary effort is normal.   Neurological:      Mental Status: He is lethargic.      Cranial Nerves: No cranial nerve deficit or facial asymmetry.      Sensory: No sensory deficit.      Motor: No weakness.      Comments:   Physical exam limited due to lethargy.  Patient able to move all extremities.          Vitals:   Temp:  [96.8 °F (36 °C)-97.7 °F (36.5 °C)] 97 °F (36.1 °C)  Resp:  [18-20] 20  BP: (131-176)/(69-85) 176/79    Current Medications    Current Facility-Administered Medications:     acetaminophen (TYLENOL) tablet 650 mg, 650 mg, Oral, Q4H PRN **OR** acetaminophen (TYLENOL) 160 MG/5ML oral solution 650 mg, 650 mg, Oral, Q4H PRN, 650 mg at 24 1456 **OR** acetaminophen (TYLENOL) suppository 650 mg, 650 mg, Rectal, Q4H PRN, Carmelita Smith APRN    amLODIPine (NORVASC) tablet 10 mg, 10 mg, Oral, Q24H, Scar Schneider DO    sennosides-docusate (PERICOLACE) 8.6-50 MG per tablet 2  tablet, 2 tablet, Oral, BID PRN **AND** polyethylene glycol (MIRALAX) packet 17 g, 17 g, Oral, Daily PRN **AND** bisacodyl (DULCOLAX) EC tablet 5 mg, 5 mg, Oral, Daily PRN **AND** bisacodyl (DULCOLAX) suppository 10 mg, 10 mg, Rectal, Daily PRN, Carmelita Smith APRN    calcium carbonate (TUMS) chewable tablet 500 mg (200 mg elemental), 2 tablet, Oral, TID PRN, Carmelita Smith APRN    Calcium Replacement - Follow Nurse / BPA Driven Protocol, , Does not apply, PRN, Carmelita Smith APRN    cefTRIAXone (ROCEPHIN) 1,000 mg in sodium chloride 0.9 % 100 mL MBP, 1,000 mg, Intravenous, Q24H, Scar Schneider, DO, Last Rate: 200 mL/hr at 04/01/24 1200, 1,000 mg at 04/01/24 1200    dextrose (D50W) (25 g/50 mL) IV injection 25 g, 25 g, Intravenous, Q15 Min PRN, Carmelita Smith APRN    dextrose (GLUTOSE) oral gel 15 g, 15 g, Oral, Q15 Min PRN, Carmelita Smith APRN    donepezil (ARICEPT) tablet 20 mg, 20 mg, Oral, Daily, Carmelita Smith APRN, 20 mg at 04/01/24 0835    glucagon (GLUCAGEN) injection 1 mg, 1 mg, Intramuscular, Q15 Min PRN, Carmelita Smith APRN    insulin detemir (LEVEMIR) injection 20 Units, 20 Units, Subcutaneous, Q12H, Scar Schneider DO, 20 Units at 04/01/24 2233    Insulin Lispro (humaLOG) injection 2-7 Units, 2-7 Units, Subcutaneous, 4x Daily AC & at Bedtime, Carmelita Smith APRN, 4 Units at 04/01/24 2234    lisinopril (PRINIVIL,ZESTRIL) tablet 20 mg, 20 mg, Oral, Daily, Scar Schneider, DO    Magnesium Standard Dose Replacement - Follow Nurse / BPA Driven Protocol, , Does not apply, PRN, Carmelita Smith APRN    mirtazapine (REMERON) tablet 15 mg, 15 mg, Oral, Nightly, Carmelita Smith APRN, 15 mg at 04/01/24 2233    nitroglycerin (NITROSTAT) SL tablet 0.4 mg, 0.4 mg, Sublingual, Q5 Min PRN, Carmelita Smith APRN    ondansetron ODT (ZOFRAN-ODT) disintegrating tablet 4 mg, 4 mg, Oral, Q6H PRN **OR** ondansetron (ZOFRAN) injection 4 mg, 4 mg, Intravenous, Q6H PRN, Carmelita Smith APRN    Phosphorus Replacement - Follow  Nurse / BPA Driven Protocol, , Does not apply, PRN, Carmelita Smith APRN    Potassium Replacement - Follow Nurse / BPA Driven Protocol, , Does not apply, PRN, Carmelita Smith APRN    QUEtiapine (SEROquel) tablet 50 mg, 50 mg, Oral, BID, Carmelita Smith, APRN, 50 mg at 04/01/24 2233    sodium chloride 0.9 % flush 10 mL, 10 mL, Intravenous, PRN, Carmelita Smiht, IVONNE    sodium chloride 0.9 % flush 10 mL, 10 mL, Intravenous, Q12H, Carmelita Smith APRN, 10 mL at 04/01/24 2234    sodium chloride 0.9 % flush 10 mL, 10 mL, Intravenous, PRN, Carmelita Smith APRN    sodium chloride 0.9 % infusion 40 mL, 40 mL, Intravenous, PRN, Carmelita Smith APRN    temazepam (RESTORIL) capsule 15 mg, 15 mg, Oral, Nightly, Darrell Jhaveri, DO, 15 mg at 04/01/24 2232    Valproic Acid (DEPAKENE) syrup 250 mg, 250 mg, Oral, Q8H, Carmelita Smith APRN, 250 mg at 04/02/24 0547    ziprasidone (GEODON) injection 10 mg, 10 mg, Intramuscular, Q4H PRN, Meliza Kahn, APRN, 10 mg at 03/29/24 0503    Laboratory Results:   Lab Results   Component Value Date    GLUCOSE 125 (H) 04/01/2024    CALCIUM 9.6 04/01/2024     04/01/2024    K 3.8 04/01/2024    CO2 29.0 04/01/2024     04/01/2024    BUN 13 04/01/2024    CREATININE 0.72 (L) 04/01/2024    EGFRIFAFRI 102 02/21/2022    EGFRIFNONA 88 02/21/2022    BCR 18.1 04/01/2024    ANIONGAP 10.0 04/01/2024     Lab Results   Component Value Date    WBC 9.53 04/01/2024    HGB 16.3 04/01/2024    HCT 48.4 04/01/2024    MCV 91.5 04/01/2024     04/01/2024     Lab Results   Component Value Date    CHOL 220 (H) 01/08/2024    CHOL 120 08/01/2019    CHOL 117 04/15/2019     Lab Results   Component Value Date    HDL 41 01/08/2024    HDL 37 (L) 08/03/2023    HDL 41 04/03/2023     Lab Results   Component Value Date     (H) 01/08/2024     (H) 08/03/2023     (H) 04/03/2023     Lab Results   Component Value Date    TRIG 108 01/08/2024    TRIG 208 (H) 08/03/2023    TRIG 138 04/03/2023     Lab Results    Component Value Date    HGBA1C 9.20 (H) 03/27/2024     Lab Results   Component Value Date    INR 1.1 11/25/2019    INR 1.1 09/10/2018    PROTIME 12.5 11/25/2019    PROTIME 13.3 09/10/2018     Lab Results   Component Value Date    FOLATE 10.20 02/24/2024     Lab Results   Component Value Date    [id number] 380 03/27/2024             Assessment / Plan   Brief Patient Summary:  Too Tai is a 68 y.o. male with a past medical history of CAD, HTN, HLD, T2DM, chronic anticoagulation, dementia with behavioral disturbances and BPH presented to [facility] ED on 3/27/2024 for altered mental status and frequent falls.  Per wife which is bedside at approximately 8 PM on the evening of 3/26 patient began to make grunting noises and would fall forward hitting his head and face on the ground.       Plan:   Dementia with behavioral disturbances   Mental status  Presyncope/recurrent falls  UTI  Orthostatic Hypotension   Initial CT head and repeat CT head negative for acute process  EEG no focal features or epileptic activity.  Nonspecific mild diffuse cerebral dysfunction  Continue Depakote 250 mg 3 times daily  VPA level 48.8, non toxicity  Scheduled  Aricept 20 mg daily  Scheduled Remeron 15 mg nightly  Scheduled Restoril 15 mg nightly  Scheduled Seroquel 50 mg twice daily  Geodon 10 mg as needed for extreme agitation; last dose 3/29  QTc 459  Echo unremarkable.  EF 55%  Will hold off on vessel imaging at this time as patient is unable to tolerate exam.  Orthostatic blood pressures with a 60 point drop in systolic blood pressure from 190-131  Patient's wife educated on nonmedical management of orthostatic hypotension including small meals throughout the day, the need for hydration, compression stockings or hose, extended position changes, slow transitions when changing from extreme temperatures.  Patient's wife was also educated in fall precautions once patient returns home.  Patient's wife verbalized understanding.  Continue  antibiotics per hospitalist team, blood cultures pending.   Palliative care following, appreciate recommendations  Case management following, appreciate help and complicated patient disposition.  General neurology will continue to follow    I have discussed the above with the patient, bedside RN and Dr. Schneider  Time spent with patient: 50 minutes in face-to-face evaluation and management of the patient.    Copied text in this note has been reviewed and is accurate as of 04/02/24.     IVONNE Santos

## 2024-04-02 NOTE — PLAN OF CARE
"  Problem: Palliative Care  Goal: Enhanced Quality of Life  Intervention: Optimize Psychosocial Wellbeing  Recent Flowsheet Documentation  Taken 4/2/2024 1042 by Amber Cervantes \"Soraya\" JADIEL PEREIRA  Supportive Measures: (d/w wife at bedside)   active listening utilized   counseling provided   positive reinforcement provided   self-care encouraged   verbalization of feelings encouraged  Family/Support System Care:   support provided   self-care encouraged   caregiver stress acknowledged  Patient seen at 1024, wife at bedside, staff present initially to clean patient - patient resistant and mildly agitated but cooperative with coaching from staff and wife.  Patient rested quietly and calmly for remainder of visit.  Patient's wife states no complaint of pain or nausea, patient ate some this morning, respirations unlabored at time of visit.  Answered questions about palliative care and briefly touched on hospice support; discussed patient's progressive decline - ultimately terminal - recommended hospice support if goal is truly comfort.  Wife would like to take patient home - CM following for discharge needs.  Palliative Care continues to follow for support and assist with needs as possible.     "

## 2024-04-03 LAB
GLUCOSE BLDC GLUCOMTR-MCNC: 110 MG/DL (ref 70–130)
GLUCOSE BLDC GLUCOMTR-MCNC: 149 MG/DL (ref 70–130)
GLUCOSE BLDC GLUCOMTR-MCNC: 173 MG/DL (ref 70–130)
GLUCOSE BLDC GLUCOMTR-MCNC: 234 MG/DL (ref 70–130)

## 2024-04-03 PROCEDURE — 97166 OT EVAL MOD COMPLEX 45 MIN: CPT

## 2024-04-03 PROCEDURE — 63710000001 INSULIN DETEMIR PER 5 UNITS: Performed by: STUDENT IN AN ORGANIZED HEALTH CARE EDUCATION/TRAINING PROGRAM

## 2024-04-03 PROCEDURE — 99233 SBSQ HOSP IP/OBS HIGH 50: CPT

## 2024-04-03 PROCEDURE — 99232 SBSQ HOSP IP/OBS MODERATE 35: CPT | Performed by: NURSE PRACTITIONER

## 2024-04-03 PROCEDURE — 63710000001 INSULIN LISPRO (HUMAN) PER 5 UNITS: Performed by: NURSE PRACTITIONER

## 2024-04-03 PROCEDURE — 25010000002 ZIPRASIDONE MESYLATE PER 10 MG

## 2024-04-03 PROCEDURE — 82948 REAGENT STRIP/BLOOD GLUCOSE: CPT

## 2024-04-03 PROCEDURE — 97162 PT EVAL MOD COMPLEX 30 MIN: CPT

## 2024-04-03 RX ORDER — DIVALPROEX SODIUM 125 MG/1
250 CAPSULE, COATED PELLETS ORAL EVERY 8 HOURS SCHEDULED
Status: DISCONTINUED | OUTPATIENT
Start: 2024-04-03 | End: 2024-04-04 | Stop reason: HOSPADM

## 2024-04-03 RX ORDER — MIRTAZAPINE 15 MG/1
15 TABLET, FILM COATED ORAL NIGHTLY
Qty: 30 TABLET | Refills: 0 | Status: CANCELLED | OUTPATIENT
Start: 2024-04-03 | End: 2024-05-03

## 2024-04-03 RX ORDER — VALPROIC ACID 250 MG/5ML
250 SOLUTION ORAL EVERY 8 HOURS SCHEDULED
Qty: 300 ML | Refills: 0 | Status: CANCELLED | OUTPATIENT
Start: 2024-04-03 | End: 2024-05-03

## 2024-04-03 RX ADMIN — POLYETHYLENE GLYCOL 3350 17 G: 17 POWDER, FOR SOLUTION ORAL at 05:48

## 2024-04-03 RX ADMIN — QUETIAPINE FUMARATE 50 MG: 25 TABLET ORAL at 08:12

## 2024-04-03 RX ADMIN — INSULIN LISPRO 3 UNITS: 100 INJECTION, SOLUTION INTRAVENOUS; SUBCUTANEOUS at 12:27

## 2024-04-03 RX ADMIN — DIVALPROEX SODIUM 250 MG: 125 CAPSULE ORAL at 22:04

## 2024-04-03 RX ADMIN — AMLODIPINE BESYLATE 10 MG: 10 TABLET ORAL at 08:12

## 2024-04-03 RX ADMIN — DONEPEZIL HYDROCHLORIDE 20 MG: 10 TABLET, FILM COATED ORAL at 08:12

## 2024-04-03 RX ADMIN — INSULIN DETEMIR 20 UNITS: 100 INJECTION, SOLUTION SUBCUTANEOUS at 22:47

## 2024-04-03 RX ADMIN — QUETIAPINE FUMARATE 50 MG: 25 TABLET ORAL at 22:04

## 2024-04-03 RX ADMIN — INSULIN DETEMIR 20 UNITS: 100 INJECTION, SOLUTION SUBCUTANEOUS at 08:12

## 2024-04-03 RX ADMIN — TEMAZEPAM 15 MG: 15 CAPSULE ORAL at 22:04

## 2024-04-03 RX ADMIN — VALPROIC ACID 250 MG: 250 SOLUTION ORAL at 05:48

## 2024-04-03 RX ADMIN — MIRTAZAPINE 15 MG: 15 TABLET, FILM COATED ORAL at 22:03

## 2024-04-03 RX ADMIN — INSULIN LISPRO 3 UNITS: 100 INJECTION, SOLUTION INTRAVENOUS; SUBCUTANEOUS at 18:34

## 2024-04-03 RX ADMIN — ZIPRASIDONE MESYLATE 10 MG: 20 INJECTION, POWDER, LYOPHILIZED, FOR SOLUTION INTRAMUSCULAR at 20:12

## 2024-04-03 RX ADMIN — LISINOPRIL 20 MG: 20 TABLET ORAL at 08:12

## 2024-04-03 NOTE — PLAN OF CARE
Goal Outcome Evaluation:  Plan of Care Reviewed With: patient, spouse, son        Progress: no change  Outcome Evaluation: Pt unable to actively participate in PT eval at this date with pt dependent x2 for all bed mobility with limited command following and intermittent agitation. Pt not appropriate for skilled PT services d/t inability to actively participate. d/c rec ECF.      Anticipated Discharge Disposition (PT): extended care facility

## 2024-04-03 NOTE — THERAPY DISCHARGE NOTE
Patient Name: Too Tai  : 1955    MRN: [id number]                              Today's Date: 4/3/2024       Admit Date: 3/27/2024    Visit Dx:     ICD-10-CM ICD-9-CM   1. Acute cystitis without hematuria  N30.00 595.0   2. Multiple falls  R29.6 V15.88   3. Injury of head, initial encounter  S09.90XA 959.01   4. History of dementia  Z86.59 V11.8     Patient Active Problem List   Diagnosis    Cough    Benign essential HTN    Benign prostatic hyperplasia    Coronary artery disease involving native coronary artery of native heart    Chronic coronary artery disease    Type 2 diabetes mellitus with hyperglycemia, with long-term current use of insulin    Gout    Hyperlipidemia    History of heart attack    Peripheral neuropathy    Peripheral vascular disease    Spasm of muscle    Myoclonic jerking    Hypomagnesemia    Arteriosclerosis of coronary artery    Dementia with behavioral disturbance    Frequent falls    History of DVT (deep vein thrombosis)    Chronic anticoagulation    AMS (altered mental status)    Falls    Bacteriuria     Past Medical History:   Diagnosis Date    Coronary artery disease     Dementia     Diabetes mellitus     Hyperlipidemia     Peripheral vascular disease      Past Surgical History:   Procedure Laterality Date    CORONARY ARTERY BYPASS GRAFT      FEMORAL ARTERY - POPLITEAL ARTERY BYPASS GRAFT        General Information       Row Name 24 1001          Physical Therapy Time and Intention    Document Type discharge evaluation/summary  -     Mode of Treatment physical therapy  -       Row Name 24 1001          General Information    Patient Profile Reviewed yes  -     Prior Level of Function independent:;all household mobility;transfer;gait;bed mobility  prior to recent falls, pt was ambulatory with no AD, multiple recent falls  -     Existing Precautions/Restrictions fall;other (see comments)  dementia with behavior disturbances  -     Barriers to Rehab medically  complex;previous functional deficit;cognitive status;uncooperative  -       Row Name 04/03/24 1001          Living Environment    People in Home spouse;child(saad), adult  -       Row Name 04/03/24 1001          Home Main Entrance    Number of Stairs, Main Entrance other (see comments)  ramp  -       Row Name 04/03/24 1001          Stairs Within Home, Primary    Number of Stairs, Within Home, Primary none  -       Row Name 04/03/24 1001          Cognition    Orientation Status (Cognition) unable/difficult to assess  pt with lack of cooperative making orientations questions difficult to asses, pt responded to name  -       Row Name 04/03/24 1001          Safety Issues, Functional Mobility    Safety Issues Affecting Function (Mobility) ability to follow commands;at risk behavior observed;awareness of need for assistance;friction/shear risk;insight into deficits/self-awareness;judgment;problem-solving;safety precaution awareness;safety precautions follow-through/compliance;sequencing abilities  -     Impairments Affecting Function (Mobility) cognition;strength  -     Cognitive Impairments, Mobility Safety/Performance attention;awareness, need for assistance;impulsivity;insight into deficits/self-awareness;judgment;problem-solving/reasoning;safety precaution awareness;safety precaution follow-through;sequencing abilities  -     Comment, Safety Issues/Impairments (Mobility) limited by cognitive status with difficulty with command following  -               User Key  (r) = Recorded By, (t) = Taken By, (c) = Cosigned By      Initials Name Provider Type     Humera Bustillo, PT Physical Therapist                   Mobility       Row Name 04/03/24 1052          Bed Mobility    Bed Mobility rolling left;rolling right;scooting/bridging  -     Rolling Left [place] (Bed Mobility) dependent (less than 25% patient effort);2 person assist;verbal cues  -     Rolling Right [place] (Bed Mobility)  dependent (less than 25% patient effort);2 person assist;verbal cues  -     Scooting/Bridging [place] (Bed Mobility) dependent (less than 25% patient effort);2 person assist  -     Assistive Device (Bed Mobility) bed rails;draw sheet  -     Comment, (Bed Mobility) multiple cues to sequence rolling and education on purpose of movement, pt with poor participation  -[facility] Name 04/03/24 1052          Transfers    Comment, (Transfers) pt declined/ unsafe to attempt d/t poor command following  -HM       Row Name 04/03/24 1052          Gait/Stairs (Locomotion)    Comment, (Gait/Stairs) pt safe to assess  -               User Key  (r) = Recorded By, (t) = Taken By, (c) = Cosigned By      Initials Name Provider Type    Humera Lacy PT Physical Therapist                   Obj/Interventions       Row Name 04/03/24 1057          Range of Motion Comprehensive    Comment, General Range of Motion difficult to assess d/t poor command following  -HM       Row Name 04/03/24 1057          Strength Comprehensive (MMT)    Comment, General Manual Muscle Testing (MMT) Assessment attempted to engage pt in MMT/ ROM however pt with poor command following and agitated, not appropriate to assess at this time  -[facility] Name 04/03/24 1057          Sensory Assessment (Somatosensory)    Sensory Assessment (Somatosensory) unable/difficult to assess  when asked if pt was able to detect light touch, pt said no. unable to determine d/t poor command following  -               User Key  (r) = Recorded By, (t) = Taken By, (c) = Cosigned By      Initials Name Provider Type     Humera Bustillo PT Physical Therapist                   Goals/Plan    No documentation.                  Clinical Impression       Row Name 04/03/24 1058          Pain    Additional Documentation Pain Scale: FACES Pre/Post-Treatment (Group)  -[facility] Name 04/03/24 1058          Pain Scale: FACES Pre/Post-Treatment    Pain: FACES Scale,  Pretreatment 0-->no hurt  -HM     Posttreatment Pain Rating 0-->no hurt  -HM       Row Name 04/03/24 1058          Plan of Care Review    Plan of Care Reviewed With patient;spouse;son  -     Progress no change  -     Outcome Evaluation Pt unable to actively participate in PT eval at this date with pt dependent x2 for all bed mobility with limited command following and intermittent agitation. Pt not appropriate for skilled PT services d/t inability to actively participate. d/c rec ECF.  -       Row Name 04/03/24 1058          Therapy Assessment/Plan (PT)    Criteria for Skilled Interventions Met (PT) no  -     Therapy Frequency (PT) evaluation only  -       Row Name 04/03/24 1058          Vital Signs    O2 Delivery Pre Treatment room air  -HM     O2 Delivery Intra Treatment room air  -     O2 Delivery Post Treatment room air  -HM     Pre Patient Position Supine  -HM     Intra Patient Position Side Lying  -HM     Post Patient Position Supine  -       Row Name 04/03/24 1058          Positioning and Restraints    Pre-Treatment Position in bed  -     Post Treatment Position bed  -HM     In Bed notified nsg;fowlers;encouraged to call for assist;call light within reach;exit alarm on  -               User Key  (r) = Recorded By, (t) = Taken By, (c) = Cosigned By      Initials Name Provider Type    Humera Lacy, PT Physical Therapist                   Outcome Measures       Row Name 04/03/24 1144 04/03/24 0800       How much help from another person do you currently need...    Turning from your back to your side while in flat bed without using bedrails? 1  - 2  -MM    Moving from lying on back to sitting on the side of a flat bed without bedrails? 1  - 3  -MM    Moving to and from a bed to a chair (including a wheelchair)? 1  - 2  -MM    Standing up from a chair using your arms (e.g., wheelchair, bedside chair)? 1  - 2  -MM    Climbing 3-5 steps with a railing? 1  - 2  -MM    To walk in  hospital room? 1  - 2  -MM    AM-PAC 6 Clicks Score (PT) 6  - 13  -MM    Highest Level of Mobility Goal 2 --> Bed activities/dependent transfer  - 4 --> Transfer to chair/commode  -      Row Name 04/03/24 1144 04/03/24 1010       Functional Assessment    Outcome Measure Options AM-PAC 6 Clicks Basic Mobility (PT)  - AM-PAC 6 Clicks Daily Activity (OT)  -              User Key  (r) = Recorded By, (t) = Taken By, (c) = Cosigned By      Initials Name Provider Type    [facility] Carmelita Brown, OT Occupational Therapist     Humera Bustillo, PT Physical Therapist    MM Estrella Bustillo RN Registered Nurse                  Physical Therapy Education       Title: PT OT SLP Therapies (In Progress)       Topic: Physical Therapy (In Progress)       Point: Mobility training (In Progress)       Learning Progress Summary             Patient Acceptance, E,TB, NL by  at 4/3/2024 1144                         Point: Home exercise program (Not Started)       Learner Progress:  Not documented in this visit.              Point: Body mechanics (In Progress)       Learning Progress Summary             Patient Acceptance, E,TB, NL by  at 4/3/2024 1144                         Point: Precautions (In Progress)       Learning Progress Summary             Patient Acceptance, E,TB, NL by  at 4/3/2024 1144                                         User Key       Initials Effective Dates Name Provider Type [facility] 09/22/22 -  Humera Bustillo, PT Physical Therapist PT                  PT Recommendation and Plan     Plan of Care Reviewed With: patient, spouse, son  Progress: no change  Outcome Evaluation: Pt unable to actively participate in PT eval at this date with pt dependent x2 for all bed mobility with limited command following and intermittent agitation. Pt not appropriate for skilled PT services d/t inability to actively participate. d/c rec ECF.     Time Calculation:   PT Evaluation Complexity  History, PT Evaluation  Complexity: 3 or more personal factors and/or comorbidities  Examination of Body Systems (PT Eval Complexity): total of 3 or more elements  Clinical Presentation (PT Evaluation Complexity): evolving  Clinical Decision Making (PT Evaluation Complexity): moderate complexity  Overall Complexity (PT Evaluation Complexity): moderate complexity     PT Charges       Row Name 04/03/24 1145             Time Calculation    Start Time 0855  -HM      PT Received On 04/03/24  -HM         Untimed Charges    PT Eval/Re-eval Minutes 46  -HM         Total Minutes    Untimed Charges Total Minutes 46  -HM       Total Minutes 46  -HM                User Key  (r) = Recorded By, (t) = Taken By, (c) = Cosigned By      Initials Name Provider Type     Humera Bustillo, PT Physical Therapist                  Therapy Charges for Today       Code Description Service Date Service Provider Modifiers Qty    [id number]  PT EVAL MOD COMPLEXITY 4 4/3/2024 Humera Bustillo, PT GP 1            PT G-Codes  Outcome Measure Options: AM-PAC 6 Clicks Basic Mobility (PT)  AM-PAC 6 Clicks Score (PT): 6  AM-PAC 6 Clicks Score (OT): 9    PT Discharge Summary  Anticipated Discharge Disposition (PT): extended care facility    Humera Bustillo PT  4/3/2024

## 2024-04-03 NOTE — PLAN OF CARE
Goal Outcome Evaluation:  Plan of Care Reviewed With: patient, spouse, son        Progress: no change  Outcome Evaluation: Pt. u/a to actively participate in session secondary to cognitive status and limited command following. DEP x 2 for rolling L and R. DEP for LBD. No further skilled services warranted at this time, please re-consult if participation and command following improves. Recommend LTC at discharge.      Anticipated Discharge Disposition (OT): extended care facility

## 2024-04-03 NOTE — CASE MANAGEMENT/SOCIAL WORK
Case Management Discharge Note      Final Note: Plan is home with [facility] SN/PT/OT/Aide. [facility] is scheduled for Thu. 4/4 at 0830. PCS is in [employer]. Wife, [place] is aware of the plan and is in agreement.         Selected Continued Care - Admitted Since 3/27/2024       Destination    No services have been selected for the patient.                Durable Medical Equipment    No services have been selected for the patient.                Dialysis/Infusion    No services have been selected for the patient.                Home Medical Care Coordination complete.      Service Provider Selected Services Address Phone Fax Patient Preferred    [facility] ,  Home Rehabilitation ,  Home Living [facility] Services [address] [place] [address] [place], [place] [id number] [phone] --              Therapy    No services have been selected for the patient.                Community Resources    No services have been selected for the patient.                Community & DME    No services have been selected for the patient.                    Selected Continued Care - Episodes Includes continued care and service providers with selected services from the active episodes listed below      High Risk Care Management Episode start date: 1/9/2024   There are no active outsourced providers for this episode.                 Selected Continued Care - Prior Encounters Includes continued care and service providers with selected services from prior encounters from 12/28/2023 to 4/3/2024      Discharged on 3/4/2024 Admission date: 2/24/2024 - Discharge disposition: Skilled Nursing Facility (DC - External)      Destination       Service Provider Selected Services Address Phone Fax Patient Preferred    [facility] [address] [place] [id number] [phone] --                          Transportation Services  Ambulance: [facility]    Final Discharge Disposition Code: 06 - home with home health care

## 2024-04-03 NOTE — CONSULTS
Discussed and taught patients wife about type 2 diabetes self-management, risk factors, and importance of blood glucose control to reduce complications. Patient remains confused. Target blood glucose readings and A1c goals per ADA were reviewed. Wife reports she has all needed supplies to monitor glucose and administer medication . Should patient refuse medication advised her to speak with providers to discuss alternate plans to aid in glucose control Signs, symptoms, and treatment of hyperglycemia and hypoglycemia were discussed. Lifestyle changes such as physical activity with MD approval and healthy eating were encouraged. Stressed the importance of strict blood sugar control after surgery to prevent complications such as infection and to promote healing of incision. Encouraged to monitor blood sugar at home 1+  times per day and to call PCP if blood sugar is trending high. Encouraged to keep record of blood glucose readings to take to follow up appointment with PCP.

## 2024-04-03 NOTE — PROGRESS NOTES
[facility] Medicine Services  PROGRESS NOTE    Patient Name: Too Tai  : 1955  MRN: [id number]    Date of Admission: 3/27/2024  Primary Care Physician: Des Geller MD    Subjective   Subjective     CC:  AMS, falls       HPI:  Wife at bedside. Discussed discharge. Wife is exhausted and has not slept and will be his only caretaker at home. States she feels defeated, tearful. Patient was up in chair yesterday and felt better, then got agitated and didn't sleep well overnight when moved back to bed last night. No restraints. Did not participate with PT this morning, and has been refusing medications again. Pt is currently sleeping in bed, opens eyes and answers simple questions. Discussed with wife I will order a sitter to stay with patient and she needs to go home to sleep and get rested to take patient home, likely tomorrow.       Objective   Objective     Vital Signs:   Temp:  [97 °F (36.1 °C)] 97 °F (36.1 °C)  Heart Rate:  [59-87] 87  Resp:  [16] 16  BP: ()/(64-80) 95/80     Physical Exam:  Constitutional: No acute distress, sleeping/ eyes open to voice  HENT: NCAT, mucous membranes moist, bilateral eyes with scleral injection  Respiratory: Clear to auscultation bilaterally, respiratory effort normal   Cardiovascular: RRR, no murmurs, rubs, or gallops  Gastrointestinal: Positive bowel sounds, soft, nontender, nondistended  Musculoskeletal: No bilateral ankle edema  Psychiatric: Flat affect, uncooperative, impulsive, poor judgement   Neurologic: Disoriented, RICO, speech clear  Skin: No rashes on visible skin     Results Reviewed:  LAB RESULTS:      Lab 24  0417 24  0704 24  0040 24  1943 24  1422   WBC 9.53 11.00*  --   --  9.18   HEMOGLOBIN 16.3 16.8  --   --  15.3   HEMATOCRIT 48.4 48.6  --   --  44.7   PLATELETS 297 251  --   --  300   NEUTROS ABS  --  6.31  --   --  7.51*   IMMATURE GRANS (ABS)  --  0.09*  --   --  0.09*   LYMPHS  ABS  --  2.65  --   --  1.09   MONOS ABS  --  1.24*  --   --  0.47   EOS ABS  --  0.61*  --   --  0.00   MCV 91.5 89.8  --   --  93.1   SED RATE  --   --   --   --  31*   PROCALCITONIN  --   --   --   --  0.02   LACTATE  --   --  2.0 4.3* 4.0*         Lab 04/01/24  0417 03/29/24  0920 03/27/24  1422   SODIUM 139 137 136   POTASSIUM 3.8 4.2 5.1   CHLORIDE 100 101 96*   CO2 29.0 27.0 26.0   ANION GAP 10.0 9.0 14.0   BUN 13 10 14   CREATININE 0.72* 0.77 0.77   EGFR 100.1 98.1 98.1   GLUCOSE 125* 166* 305*   CALCIUM 9.6 9.5 9.4   HEMOGLOBIN A1C  --   --  9.20*         Lab 03/27/24  1422   TOTAL PROTEIN 7.6   ALBUMIN 4.2   GLOBULIN 3.4   ALT (SGPT) 22   AST (SGOT) 22   BILIRUBIN 1.0   ALK PHOS 89         Lab 03/27/24  1422   HSTROP T 15             Lab 03/27/24  1422   VITAMIN B 12 380         Brief Urine Lab Results  (Last result in the past 365 days)        Color   Clarity   Blood   Leuk Est   Nitrite   Protein   CREAT   Urine HCG        03/27/24 1633 Yellow   Clear   Negative   Negative   Positive   100 mg/dL (2+)                   Microbiology Results Abnormal       Procedure Component Value - Date/Time    Blood Culture - Blood, Arm, Left [[id number]]  (Normal) Collected: 03/27/24 1943    Lab Status: Final result Specimen: Blood from Arm, Left Updated: 04/01/24 2000     Blood Culture No growth at 5 days    Blood Culture - Blood, Arm, Right [[id number]]  (Normal) Collected: 03/27/24 1750    Lab Status: Final result Specimen: Blood from Arm, Right Updated: 04/01/24 1815     Blood Culture No growth at 5 days    Narrative:      Less than seven (7) mL's of blood was collected.  Insufficient quantity may yield false negative results.            No radiology results from the last 24 hrs    Results for orders placed during the hospital encounter of 03/27/24    Adult Transthoracic Echo Complete W/ Cont if Necessary Per Protocol    Interpretation Summary    Left ventricular systolic function is normal. Calculated left  ventricular EF = 55.1% Normal left ventricular cavity size noted. Left ventricular wall thickness is consistent with mild concentric hypertrophy. Left ventricular diastolic function is consistent with (grade I) impaired relaxation.    The right ventricular cavity is mildly dilated. Normal right ventricular systolic function noted.    There is calcification of the aortic valve. The aortic valve appears trileaflet. Mild aortic valve regurgitation is present. No aortic valve stenosis is present.    Mitral annular calcification is present. Trace mitral valve regurgitation is present. No significant mitral valve stenosis is present.    The tricuspid valve is structurally normal with no stenosis present. Trace tricuspid valve regurgitation is present. Insufficient TR velocity profile to estimate the right ventricular systolic pressure.    Mild dilation of the sinuses of Valsalva is present. Mild dilation of the ascending aorta is present. Ascending aorta = 4.2 cm      Current medications:  Scheduled Meds:amLODIPine, 10 mg, Oral, Q24H  donepezil, 20 mg, Oral, Daily  insulin detemir, 20 Units, Subcutaneous, Q12H  insulin lispro, 2-7 Units, Subcutaneous, 4x Daily AC & at Bedtime  lisinopril, 20 mg, Oral, Daily  mirtazapine, 15 mg, Oral, Nightly  QUEtiapine, 50 mg, Oral, BID  sodium chloride, 10 mL, Intravenous, Q12H  temazepam, 15 mg, Oral, Nightly  Valproic Acid, 250 mg, Oral, Q8H      Continuous Infusions:   PRN Meds:.  acetaminophen **OR** acetaminophen **OR** acetaminophen    senna-docusate sodium **AND** polyethylene glycol **AND** bisacodyl **AND** bisacodyl    calcium carbonate    Calcium Replacement - Follow Nurse / BPA Driven Protocol    dextrose    dextrose    glucagon (human recombinant)    Magnesium Standard Dose Replacement - Follow Nurse / BPA Driven Protocol    nitroglycerin    ondansetron ODT **OR** ondansetron    Phosphorus Replacement - Follow Nurse / BPA Driven Protocol    Potassium Replacement - Follow  Nurse / BPA Driven Protocol    sodium chloride    sodium chloride    sodium chloride    ziprasidone    Assessment & Plan   Assessment & Plan     Active Hospital Problems    Diagnosis  POA    **Falls [W19.XXXA]  Yes    AMS (altered mental status) [R41.82]  Yes    Bacteriuria [R82.71]  Yes    Frequent falls [R29.6]  Not Applicable    Dementia with behavioral disturbance [F03.918]  Yes    Chronic anticoagulation [Z79.01]  Not Applicable    Type 2 diabetes mellitus with hyperglycemia, with long-term current use of insulin [E11.65, Z79.4]  Not Applicable    Benign essential HTN [I10]  Yes    Benign prostatic hyperplasia [N40.0]  Yes    Chronic coronary artery disease [I25.10]  Yes    Hyperlipidemia [E78.5]  Yes      Resolved Hospital Problems   No resolved problems to display.        This patient's assessments and plans were partially entered by my partner and updated as appropriate by me on 4/02/2024     Brief Hospital Course to date:  Too Tai is a 68 y.o. male with PMH significant for CAD, HTN, HLD, T2DM, chronic anticoagulation, dementia with behavioral disturbance, BPH presented to [facility] ED on 3/27/2024 d/t altered mental status and frequent falls. Patient found to be orthostatic with uncontrolled behavior likely secondary to outpatient medication changes.     Acute metabolic encephalopathy superimposed on underlying dementia  Frequent falls  Orthostatic hypotension  Bradycardic on telemetry, resolved   -CT head without any acute findings  -Neurology following   -Continue Aricept, Seroquel, Remeron  -Continue temazepam; improved overnight behaviors  -Geodon as needed for extreme agitation  -Loaded w depakote 3/29 as this appeared to not be administered outpatient, then continue 250mg TID; monitor for toxicity   -TTE w no significant abnormalities   -PT/OT to evaluate  -+ orthostatics on 4/1; DC metoprolol and titrate lisinopril and norvasc to optimize hypertension   -It appears financial limitations/insurance  limitations are preventing the patient from being placed despite home not being an ideal environment for the patient due to his unsteady gait and requirement of full-time observation.  Patient's wife expresses understanding of the situation indicates she will do her best to care for the patient with what resources she does have, until he is a candidate for hospice care as his disease progresses.  Will anticipate for discharge home 4/4.   --delaying discharge home today due to wife (and only caretaker) complete exhaustion, will allow her to go home and sleep, order sitter in room with patient,  in preparation of DC home likely tomorrow.       Suspected UTI, staph epidermidis   Suspected PNA   -Patient with 4+ bacteria  -Culture grew Staph epidermidis  -Completed empiric treatment with Rocephin and doxycycline for 5-day course (day 5/5) due to further possibility of contributing PNA      Hypertension  -Now uncontrolled   -Increased lisinopril to 20mg daily   -Added Norvasc, increased to 10mg   -Follow-up orthostatic vital signs     Poorly controlled type 2 diabetes with A1c 9.2%  -Continue basal and SSI, adjust as indicated  -Increased levemir to 20 units      CAD  Hyperlipidemia  -DC metoprolol due to orthostatics      History of DVT  -On chronic anticoagulation with full dose Lovenox  -Currently on hold current 2 falls  -Will not restart anticoagulation due to fall risk     Expected Discharge Location and Transportation: Home tomorrow   Expected Discharge   Expected Discharge Date: 4/4/2024; Expected Discharge Time:      DVT prophylaxis:  Medical and mechanical DVT prophylaxis orders are present.         AM-PAC 6 Clicks Score (PT): 13 (04/03/24 0800)    CODE STATUS:   Code Status and Medical Interventions:   Ordered at: 03/27/24 6190     Level Of Support Discussed With:    Health Care Surrogate     Code Status (Patient has no pulse and is not breathing):    No CPR (Do Not Attempt to Resuscitate)     Medical  Interventions (Patient has pulse or is breathing):    Comfort Measures       Abbi Blackmon, IVONNE  04/03/24

## 2024-04-03 NOTE — PROGRESS NOTES
[facility] Neurology    Progress Note    Patient Name: Too Tai  : 1955  MRN: [id number]  Primary Care Physician:  Des Geller MD  Date of admission: 3/27/2024    Subjective     Chief Complaint: Dementia with behavioral disturbances    History of Present Illness   Patient alert during assessment.  He was requesting his lunch.  No acute events overnight.  Son and wife were at bedside.    Review of Systems   Difficult to assess due to baseline mental status  Objective     Physical Exam  Vitals and nursing note reviewed.   Eyes:      Extraocular Movements: Extraocular movements intact.      Pupils: Pupils are equal, round, and reactive to light.      Comments: No nystagmus or deviated gaze noted   Neurological:      Mental Status: He is alert. Mental status is at baseline.      Cranial Nerves: Cranial nerves 2-12 are intact.      Sensory: Sensation is intact.      Motor: Motor function is intact.      Comments:     Cranial Nerves   CN II: Pupils are equal, round, and reactive to light. Normal visual acuity and visual fields.    CN III IV VI: Extraocular movements are full without nystagmus.  CN V: Normal facial sensation and strength of muscles of mastication.  CN VII: Facial movements are symmetric. No weakness.  CN VIII:  Auditory acuity is normal.  CN IX & X:  Symmetric palatal movement.  CN XI: Sternocleidomastoid and trapezius are normal.  No weakness.  CN XII: The tongue is midline.  No atrophy or fasciculations.           Vitals:   Heart Rate:  [59-74] 59  Resp:  [16] 16  BP: (177)/(64) 177/64    Current Medications    Current Facility-Administered Medications:     acetaminophen (TYLENOL) tablet 650 mg, 650 mg, Oral, Q4H PRN **OR** acetaminophen (TYLENOL) 160 MG/5ML oral solution 650 mg, 650 mg, Oral, Q4H PRN, 650 mg at 24 1456 **OR** acetaminophen (TYLENOL) suppository 650 mg, 650 mg, Rectal, Q4H PRN, Carmelita Smith APRN    amLODIPine (NORVASC) tablet 10 mg, 10 mg, Oral, Q24H,  Scar Schneider, DO, 10 mg at 04/03/24 0812    sennosides-docusate (PERICOLACE) 8.6-50 MG per tablet 2 tablet, 2 tablet, Oral, BID PRN **AND** polyethylene glycol (MIRALAX) packet 17 g, 17 g, Oral, Daily PRN, 17 g at 04/03/24 0548 **AND** bisacodyl (DULCOLAX) EC tablet 5 mg, 5 mg, Oral, Daily PRN **AND** bisacodyl (DULCOLAX) suppository 10 mg, 10 mg, Rectal, Daily PRN, Carmelita Smith APRN    calcium carbonate (TUMS) chewable tablet 500 mg (200 mg elemental), 2 tablet, Oral, TID PRN, Carmelita Smith APRN    Calcium Replacement - Follow Nurse / BPA Driven Protocol, , Does not apply, PRN, Carmelita Smith APRN    dextrose (D50W) (25 g/50 mL) IV injection 25 g, 25 g, Intravenous, Q15 Min PRN, Carmelita Smith APRN    dextrose (GLUTOSE) oral gel 15 g, 15 g, Oral, Q15 Min PRN, Carmelita Smith APRN    donepezil (ARICEPT) tablet 20 mg, 20 mg, Oral, Daily, Carmelita Smith APRN, 20 mg at 04/03/24 0812    glucagon (GLUCAGEN) injection 1 mg, 1 mg, Intramuscular, Q15 Min PRN, Carmelita Smith APRN    insulin detemir (LEVEMIR) injection 20 Units, 20 Units, Subcutaneous, Q12H, Scar Schneider, , 20 Units at 04/03/24 0812    Insulin Lispro (humaLOG) injection 2-7 Units, 2-7 Units, Subcutaneous, 4x Daily AC & at Bedtime, Carmelita Smith APRN, 6 Units at 04/02/24 2306    lisinopril (PRINIVIL,ZESTRIL) tablet 20 mg, 20 mg, Oral, Daily, Scar Schneider, DO, 20 mg at 04/03/24 0812    Magnesium Standard Dose Replacement - Follow Nurse / BPA Driven Protocol, , Does not apply, PRN, Carmelita Smith APRN    mirtazapine (REMERON) tablet 15 mg, 15 mg, Oral, Nightly, Carmelita Smith APRN, 15 mg at 04/02/24 2028    nitroglycerin (NITROSTAT) SL tablet 0.4 mg, 0.4 mg, Sublingual, Q5 Min PRN, Carmelita Smith APRN    ondansetron ODT (ZOFRAN-ODT) disintegrating tablet 4 mg, 4 mg, Oral, Q6H PRN **OR** ondansetron (ZOFRAN) injection 4 mg, 4 mg, Intravenous, Q6H PRN, Carmelita Smith APRN    Phosphorus Replacement - Follow Nurse / BPA Driven Protocol, , Does  not apply, PRNSarah Lauren, APRN    Potassium Replacement - Follow Nurse / BPA Driven Protocol, , Does not apply, PRN, Carmelita Smith APRN    QUEtiapine (SEROquel) tablet 50 mg, 50 mg, Oral, BID, Carmelita Smith, APRN, 50 mg at 04/03/24 0812    sodium chloride 0.9 % flush 10 mL, 10 mL, Intravenous, PRN, Carmelita Smith, IVONNE    sodium chloride 0.9 % flush 10 mL, 10 mL, Intravenous, Q12H, Carmelita Smith APRN, 10 mL at 04/02/24 0917    sodium chloride 0.9 % flush 10 mL, 10 mL, Intravenous, PRN, Carmelita Smith, IVONNE    sodium chloride 0.9 % infusion 40 mL, 40 mL, Intravenous, PRN, Carmelita Smith APRN    temazepam (RESTORIL) capsule 15 mg, 15 mg, Oral, Nightly, Darrell Jhaveri, DO, 15 mg at 04/02/24 2028    Valproic Acid (DEPAKENE) syrup 250 mg, 250 mg, Oral, Q8H, Carmelita Smith APRN, 250 mg at 04/03/24 0548    ziprasidone (GEODON) injection 10 mg, 10 mg, Intramuscular, Q4H PRN, Meliza Kahn, APRN, 10 mg at 03/29/24 0503    Laboratory Results:   Lab Results   Component Value Date    GLUCOSE 125 (H) 04/01/2024    CALCIUM 9.6 04/01/2024     04/01/2024    K 3.8 04/01/2024    CO2 29.0 04/01/2024     04/01/2024    BUN 13 04/01/2024    CREATININE 0.72 (L) 04/01/2024    EGFRIFAFRI 102 02/21/2022    EGFRIFNONA 88 02/21/2022    BCR 18.1 04/01/2024    ANIONGAP 10.0 04/01/2024     Lab Results   Component Value Date    WBC 9.53 04/01/2024    HGB 16.3 04/01/2024    HCT 48.4 04/01/2024    MCV 91.5 04/01/2024     04/01/2024     Lab Results   Component Value Date    CHOL 220 (H) 01/08/2024    CHOL 120 08/01/2019    CHOL 117 04/15/2019     Lab Results   Component Value Date    HDL 41 01/08/2024    HDL 37 (L) 08/03/2023    HDL 41 04/03/2023     Lab Results   Component Value Date     (H) 01/08/2024     (H) 08/03/2023     (H) 04/03/2023     Lab Results   Component Value Date    TRIG 108 01/08/2024    TRIG 208 (H) 08/03/2023    TRIG 138 04/03/2023     Lab Results   Component Value Date    HGBA1C 9.20  (H) 03/27/2024     Lab Results   Component Value Date    INR 1.1 11/25/2019    INR 1.1 09/10/2018    PROTIME 12.5 11/25/2019    PROTIME 13.3 09/10/2018     Lab Results   Component Value Date    FOLATE 10.20 02/24/2024     Lab Results   Component Value Date    DRFLUTJJ10 380 03/27/2024             Assessment / Plan   Brief Patient Summary:  Too Tai is a 68 y.o. male with a past medical history of CAD, HTN, HLD, T2DM, chronic anticoagulation, dementia with behavioral disturbances and BPH presented to [facility] ED on 3/27/2024 for altered mental status and frequent falls.  Per wife which is bedside at approximately 8 PM on the evening of 3/26 patient began to make grunting noises and would fall forward hitting his head and face on the ground.       Plan:   Dementia with behavioral disturbances   Presyncope/recurrent falls  UTI  Orthostatic Hypotension   Initial CT head and repeat CT head negative for acute process  EEG no focal features or epileptic activity.  Nonspecific mild diffuse cerebral dysfunction  Continue Depakote 250 mg 3 times daily  VPA level 48.8, non toxicity  Scheduled  Aricept 20 mg daily  Scheduled Remeron 15 mg nightly  Scheduled Restoril 15 mg nightly  Scheduled Seroquel 50 mg twice daily  Geodon 10 mg as needed for extreme agitation; last dose 3/29  QTc 459  Echo unremarkable.  EF 55%  Will hold off on vessel imaging at this time as patient is unable to tolerate exam.  Orthostatic blood pressures with a 60 point drop in systolic blood pressure from 190 to 131  Patient's wife educated on nonmedical management of orthostatic hypotension including small meals throughout the day, the need for hydration, compression stockings or hose, extended position changes, slow transitions when changing from extreme temperatures.  Patient's wife was also educated in fall precautions once patient returns home.  Patient's wife verbalized understanding.  Continue antibiotics per hospitalist team, blood cultures  pending.   Palliative care following, appreciate recommendations  Case management following, appreciate help and complicated patient disposition.  Patient follow-up with Kamla Castillo.  General neurology will continue to follow    I have discussed the above with the patient, bedside RN Dr. Elizondo  Time spent with patient: 50 minutes in face-to-face evaluation and management of the patient.    Copied text in this note has been reviewed and is accurate as of 04/03/24.     IVONNE Santos

## 2024-04-03 NOTE — PLAN OF CARE
"  Problem: Palliative Care  Goal: Enhanced Quality of Life  Outcome: Adequate for Care Transition  Intervention: Optimize Psychosocial Wellbeing  Recent Flowsheet Documentation  Taken 4/3/2024 1300 by Amber Cervantes \"Soraya\" JADIEL PEREIRA  Supportive Measures: (Palliative IDT: GLENN Cesar MD; AQUILINO CORONADO; LIANNE Cervantes LCSW; ZAHRA Ching RN; ROHIT He RN; ANAI Mcnulty MDiv; JUANITO Burger MDiv) --  Taken 4/3/2024 1221 by Amber Cervantes \"Soraya\" JADIEL PEREIRA  Supportive Measures: (d/w wife at bedside)   active listening utilized   counseling provided   decision-making supported   positive reinforcement provided   verbalization of feelings encouraged  Family/Support System Care:   support provided   self-care encouraged   caregiver stress acknowledged  Plan is home with HH; discussed patient progressive decline and hospice support - pts wife is not ready to have hospice and would like to try HH first.  Palliative Care will sign off at this time, please reconsult as soon as possible.     "

## 2024-04-03 NOTE — THERAPY DISCHARGE NOTE
Acute Care - Occupational Therapy Discharge  [facility]    Patient Name: Too Tai  : 1955    MRN: [id number]                              Today's Date: 4/3/2024       Admit Date: 3/27/2024    Visit Dx:     ICD-10-CM ICD-9-CM   1. Acute cystitis without hematuria  N30.00 595.0   2. Multiple falls  R29.6 V15.88   3. Injury of head, initial encounter  S09.90XA 959.01   4. History of dementia  Z86.59 V11.8     Patient Active Problem List   Diagnosis    Cough    Benign essential HTN    Benign prostatic hyperplasia    Coronary artery disease involving native coronary artery of native heart    Chronic coronary artery disease    Type 2 diabetes mellitus with hyperglycemia, with long-term current use of insulin    Gout    Hyperlipidemia    History of heart attack    Peripheral neuropathy    Peripheral vascular disease    Spasm of muscle    Myoclonic jerking    Hypomagnesemia    Arteriosclerosis of coronary artery    Dementia with behavioral disturbance    Frequent falls    History of DVT (deep vein thrombosis)    Chronic anticoagulation    AMS (altered mental status)    Falls    Bacteriuria     Past Medical History:   Diagnosis Date    Coronary artery disease     Dementia     Diabetes mellitus     Hyperlipidemia     Peripheral vascular disease      Past Surgical History:   Procedure Laterality Date    CORONARY ARTERY BYPASS GRAFT      FEMORAL ARTERY - POPLITEAL ARTERY BYPASS GRAFT        General Information       Row Name 24 0959          OT Time and Intention    Document Type discharge evaluation/summary  -LC     Mode of Treatment occupational therapy  -LC       Row Name 24 0959          General Information    Patient Profile Reviewed yes  -LC     Prior Level of Function transfer;all household mobility;max assist:;ADL's;min assist:  -LC     Existing Precautions/Restrictions fall;other (see comments)  Cognition  -LC     Barriers to Rehab medically complex;previous functional deficit;cognitive  status  -       Row Name 04/03/24 0959          Living Environment    People in Home spouse  -       Row Name 04/03/24 0959          Home Main Entrance    Stair Railings, Main Entrance none  -       Row Name 04/03/24 0959          Stairs Within Home, Primary    Stair Railings, Within Home, Primary none  -       Row Name 04/03/24 0959          Cognition    Orientation Status (Cognition) oriented to;person;disoriented to;place;situation;time  -       Row Name 04/03/24 0959          Safety Issues, Functional Mobility    Safety Issues Affecting Function (Mobility) ability to follow commands;at risk behavior observed;awareness of need for assistance;impulsivity;insight into deficits/self-awareness;judgment;safety precautions follow-through/compliance;problem-solving;safety precaution awareness;sequencing abilities  -     Impairments Affecting Function (Mobility) balance;cognition;coordination;endurance/activity tolerance;postural/trunk control;range of motion (ROM);strength  -     Cognitive Impairments, Mobility Safety/Performance attention;awareness, need for assistance;impulsivity;insight into deficits/self-awareness;judgment;problem-solving/reasoning;safety precaution awareness;safety precaution follow-through;sequencing abilities  -     Comment, Safety Issues/Impairments (Mobility) Pt. u/a to follow commands, limited by cognitive status  -               User Key  (r) = Recorded By, (t) = Taken By, (c) = Cosigned By      Initials Name Provider Type    Carmelita Rodriguez OT Occupational Therapist                   Mobility/ADL's       Row Name 04/03/24 1002          Bed Mobility    Bed Mobility rolling left;rolling right  -     Rolling Left [place] (Bed Mobility) dependent (less than 25% patient effort);2 person assist;verbal cues  -     Rolling Right [place] (Bed Mobility) dependent (less than 25% patient effort);2 person assist;verbal cues  -     Assistive Device (Bed Mobility) bed  rails;draw sheet;head of bed elevated  -     Comment, (Bed Mobility) Increased time and encouragement needed to perform rolling. VC's to sequence  -       Row Name 04/03/24 1002          Transfers    Comment, (Transfers) Pt. declined.  -       Row Name 04/03/24 1002          Activities of Daily Living    BADL Assessment/Intervention grooming;lower body dressing  -       Row Name 04/03/24 1002          Grooming Assessment/Training    [place] Level (Grooming) wash face, hands;set up;verbal cues  -     Position (Grooming) sitting up in bed  -     Comment, (Grooming) VC's to initiate and sequence  -       Row Name 04/03/24 1002          Lower Body Dressing Assessment/Training    [place] Level (Lower Body Dressing) lower body dressing skills;dependent (less than 25% patient effort)  -               User Key  (r) = Recorded By, (t) = Taken By, (c) = Cosigned By      Initials Name Provider Type    Carmelita Rodriguez OT Occupational Therapist                   Obj/Interventions       [facility] Name 04/03/24 1004          Sensory Assessment (Somatosensory)    Sensory Assessment (Somatosensory) unable/difficult to assess  -[facility] Name 04/03/24 1004          Vision Assessment/Intervention    Visual Impairment/Limitations unable/difficult to assess  -[facility] Name 04/03/24 1004          Range of Motion Comprehensive    Comment, General Range of Motion Difficult to formally assess due to limited command following. Anticipate B UE WFL  -[facility] Name 04/03/24 1004          Strength Comprehensive (MMT)    Comment, General Manual Muscle Testing (MMT) Assessment Difficult to formally assess due to limited command following. Anticioate B UE WFL  -[facility] Name 04/03/24 1004          Balance    Comment, Balance U/A to formally assess  -               User Key  (r) = Recorded By, (t) = Taken By, (c) = Cosigned By      Initials Name Provider Type    Carmelita Rodriguez OT Occupational Therapist                    Goals/Plan    No documentation.                  Clinical Impression       Row Name 04/03/24 1006          Pain Assessment    Pretreatment Pain Rating 0/10 - no pain  -     Posttreatment Pain Rating 0/10 - no pain  -     Additional Documentation Pain Scale: Word Pre/Post-Treatment (Group)  -       Row Name 04/03/24 1006          Plan of Care Review    Plan of Care Reviewed With patient;spouse;son  -     Progress no change  -     Outcome Evaluation Pt. u/a to actively participate in session secondary to cognitive status and limited command following. DEP x 2 for rolling L and R. DEP for LBD. No further skilled services warranted at this time, please re-consult if participation and command following improves. Recommend LTC at discharge.  -       Row Name 04/03/24 1006          Therapy Assessment/Plan (OT)    Therapy Frequency (OT) evaluation only  -       Row Name 04/03/24 1006          Therapy Plan Review/Discharge Plan (OT)    Anticipated Discharge Disposition (OT) [facility] facility  -       Row Name 04/03/24 1006          Positioning and Restraints    Pre-Treatment Position in bed  -     Post Treatment Position bed  -LC     In Bed notified nsg;fowlers;patient within staff view;call light within reach;encouraged to call for assist;exit alarm on  -               User Key  (r) = Recorded By, (t) = Taken By, (c) = Cosigned By      Initials Name Provider Type     Carmelita Brown, OT Occupational Therapist                   Outcome Measures       Row Name 04/03/24 1010          How much help from another is currently needed...    Putting on and taking off regular lower body clothing? 1  -LC     Bathing (including washing, rinsing, and drying) 1  -LC     Toileting (which includes using toilet bed pan or urinal) 1  -LC     Putting on and taking off regular upper body clothing 2  -LC     Taking care of personal grooming (such as brushing teeth) 2  -LC     Eating meals 2  -LC      AM-PAC 6 Clicks Score (OT) 9  -       Row Name 04/03/24 0800          How much help from another person do you currently need...    Turning from your back to your side while in flat bed without using bedrails? 2  -MM     Moving from lying on back to sitting on the side of a flat bed without bedrails? 3  -MM     Moving to and from a bed to a chair (including a wheelchair)? 2  -MM     Standing up from a chair using your arms (e.g., wheelchair, bedside chair)? 2  -MM     Climbing 3-5 steps with a railing? 2  -MM     To walk in hospital room? 2  -MM     AM-PAC 6 Clicks Score (PT) 13  -MM     Highest Level of Mobility Goal 4 --> Transfer to chair/commode  -MM       Row Name 04/03/24 1010          Functional Assessment    Outcome Measure Options AM-PAC 6 Clicks Daily Activity (OT)  -               User Key  (r) = Recorded By, (t) = Taken By, (c) = Cosigned By      Initials Name Provider Type     Carmelita Brown OT Occupational Therapist    Estrella Verduzco, RN Registered Nurse                  Occupational Therapy Education       Title: PT OT SLP Therapies (In Progress)       Topic: Occupational Therapy (In Progress)       Point: ADL training (In Progress)       Description:   Instruct learner(s) on proper safety adaptation and remediation techniques during self care or transfers.   Instruct in proper use of assistive devices.                  Learning Progress Summary             Patient Acceptance, E, NR by  at 4/3/2024 0845                         Point: Home exercise program (Not Started)       Description:   Instruct learner(s) on appropriate technique for monitoring, assisting and/or progressing therapeutic exercises/activities.                  Learner Progress:  Not documented in this visit.              Point: Precautions (In Progress)       Description:   Instruct learner(s) on prescribed precautions during self-care and functional transfers.                  Learning Progress Summary              Patient Acceptance, E, NR by  at 4/3/2024 0845                         Point: Body mechanics (In Progress)       Description:   Instruct learner(s) on proper positioning and spine alignment during self-care, functional mobility activities and/or exercises.                  Learning Progress Summary             Patient Acceptance, E, NR by  at 4/3/2024 0845                                         User Key       Initials Effective Dates Name Provider Type Discipline     06/16/21 -  Carmelita Brown, ISAURO Occupational Therapist OT                  OT Recommendation and Plan  Therapy Frequency (OT): evaluation only  Plan of Care Review  Plan of Care Reviewed With: patient, spouse, son  Progress: no change  Outcome Evaluation: Pt. u/a to actively participate in session secondary to cognitive status and limited command following. DEP x 2 for rolling L and R. DEP for LBD. No further skilled services warranted at this time, please re-consult if participation and command following improves. Recommend LTC at discharge.  Plan of Care Reviewed With: patient, spouse, son  Outcome Evaluation: Pt. u/a to actively participate in session secondary to cognitive status and limited command following. DEP x 2 for rolling L and R. DEP for LBD. No further skilled services warranted at this time, please re-consult if participation and command following improves. Recommend LTC at discharge.     Time Calculation:   Evaluation Complexity (OT)  Review Occupational Profile/Medical/Therapy History Complexity: expanded/moderate complexity  Assessment, Occupational Performance/Identification of Deficit Complexity: 3-5 performance deficits  Clinical Decision Making Complexity (OT): detailed assessment/moderate complexity  Overall Complexity of Evaluation (OT): moderate complexity     Time Calculation- OT       Row Name 04/03/24 1011             Time Calculation- OT    OT Start Time 0845  -      OT Received On 04/03/24  -      OT Goal  Re-Cert Due Date --  -LC         Untimed Charges    OT Eval/Re-eval Minutes 48  -LC         Total Minutes    Untimed Charges Total Minutes 48  -LC       Total Minutes 48  -LC                User Key  (r) = Recorded By, (t) = Taken By, (c) = Cosigned By      Initials Name Provider Type    Carmelita Rodriguez OT Occupational Therapist                  Therapy Charges for Today       Code Description Service Date Service Provider Modifiers Qty    21716124928 HC OT EVAL MOD COMPLEXITY 4 4/3/2024 Carmelita Brown OT GO 1               OT Discharge Summary  Anticipated Discharge Disposition (OT): extended care facility  Reason for Discharge: Unable to participate  Discharge Destination: Extended care facility - LTC    Carmelita Brown OT  4/3/2024

## 2024-04-03 NOTE — PROGRESS NOTES
N[facility] Services    Patient Name:  Too Tai  YOB: 1955  MRN: [id number]  Admit Date:  3/27/2024    Patient screened for LOS. Chart reviewed. Patient has been transitioned for comfort measures as patient is terminal per provider. RDN will sign off at this time. Available via consult PRN.    Electronically signed by:  Lena Ziegler RD  04/03/24 09:39 EDT

## 2024-04-03 NOTE — CASE MANAGEMENT/SOCIAL WORK
Continued Stay Note  [facility]     Patient Name: Too Tai  MRN: [id number]  Today's Date: 4/3/2024    Admit Date: 3/27/2024    Plan: Home with [facility]    Discharge Plan       Row Name 04/03/24 0916       Plan    Plan Home with [facility]     Patient/Family in Agreement with Plan yes    Plan Comments Spoke with spouse at bedside. Plan is home with [facility]. Spouse cannot afford skilled copays or long term care and will not qualify for Medicaid. CM will continue to follow.    Final Discharge Disposition Code 06 - home with home health care                   Discharge Codes    No documentation.                 Expected Discharge Date and Time       Expected Discharge Date Expected Discharge Time    Apr 3, 2024               Scar Washington RN

## 2024-04-04 ENCOUNTER — READMISSION MANAGEMENT (OUTPATIENT)
Dept: CALL CENTER | Facility: HOSPITAL | Age: 69
End: 2024-04-04
Payer: MEDICARE

## 2024-04-04 ENCOUNTER — HOME HEALTH ADMISSION (OUTPATIENT)
Dept: HOME HEALTH SERVICES | Facility: HOME HEALTHCARE | Age: 69
End: 2024-04-04
Payer: COMMERCIAL

## 2024-04-04 ENCOUNTER — DOCUMENTATION (OUTPATIENT)
Dept: HOME HEALTH SERVICES | Facility: HOME HEALTHCARE | Age: 69
End: 2024-04-04
Payer: COMMERCIAL

## 2024-04-04 ENCOUNTER — TELEPHONE (OUTPATIENT)
Dept: INTERNAL MEDICINE | Facility: CLINIC | Age: 69
End: 2024-04-04
Payer: MEDICARE

## 2024-04-04 VITALS
RESPIRATION RATE: 18 BRPM | OXYGEN SATURATION: 95 % | BODY MASS INDEX: 26.5 KG/M2 | WEIGHT: 199.96 LBS | SYSTOLIC BLOOD PRESSURE: 164 MMHG | TEMPERATURE: 97.6 F | DIASTOLIC BLOOD PRESSURE: 89 MMHG | HEART RATE: 80 BPM | HEIGHT: 73 IN

## 2024-04-04 DIAGNOSIS — F03.90 DEMENTIA WITHOUT BEHAVIORAL DISTURBANCE: Primary | ICD-10-CM

## 2024-04-04 DIAGNOSIS — F03.C11 SEVERE DEMENTIA WITH AGITATION, UNSPECIFIED DEMENTIA TYPE: ICD-10-CM

## 2024-04-04 LAB — GLUCOSE BLDC GLUCOMTR-MCNC: 115 MG/DL (ref 70–130)

## 2024-04-04 PROCEDURE — 99232 SBSQ HOSP IP/OBS MODERATE 35: CPT

## 2024-04-04 PROCEDURE — 25010000002 ZIPRASIDONE MESYLATE PER 10 MG

## 2024-04-04 PROCEDURE — 99239 HOSP IP/OBS DSCHRG MGMT >30: CPT | Performed by: INTERNAL MEDICINE

## 2024-04-04 PROCEDURE — 82948 REAGENT STRIP/BLOOD GLUCOSE: CPT

## 2024-04-04 PROCEDURE — 63710000001 INSULIN DETEMIR PER 5 UNITS: Performed by: STUDENT IN AN ORGANIZED HEALTH CARE EDUCATION/TRAINING PROGRAM

## 2024-04-04 RX ORDER — HALOPERIDOL 5 MG/1
5 TABLET ORAL AS NEEDED
Qty: 30 TABLET | Refills: 0 | Status: SHIPPED | OUTPATIENT
Start: 2024-04-04 | End: 2024-05-04

## 2024-04-04 RX ORDER — MIRTAZAPINE 15 MG/1
15 TABLET, FILM COATED ORAL NIGHTLY
Qty: 30 TABLET | Refills: 0 | Status: SHIPPED | OUTPATIENT
Start: 2024-04-04 | End: 2024-05-04

## 2024-04-04 RX ORDER — QUETIAPINE FUMARATE 50 MG/1
50 TABLET, FILM COATED ORAL 2 TIMES DAILY
Qty: 60 TABLET | Refills: 0 | Status: SHIPPED | OUTPATIENT
Start: 2024-04-04 | End: 2024-05-04

## 2024-04-04 RX ORDER — TEMAZEPAM 15 MG/1
15 CAPSULE ORAL NIGHTLY PRN
Qty: 4 CAPSULE | Refills: 0 | Status: SHIPPED | OUTPATIENT
Start: 2024-04-04 | End: 2024-04-08

## 2024-04-04 RX ORDER — AMLODIPINE BESYLATE 10 MG/1
10 TABLET ORAL
Qty: 30 TABLET | Refills: 0 | Status: SHIPPED | OUTPATIENT
Start: 2024-04-04 | End: 2024-05-04

## 2024-04-04 RX ORDER — LISINOPRIL 20 MG/1
20 TABLET ORAL DAILY
Qty: 30 TABLET | Refills: 0 | Status: SHIPPED | OUTPATIENT
Start: 2024-04-04 | End: 2024-05-04

## 2024-04-04 RX ORDER — DIVALPROEX SODIUM 125 MG/1
250 CAPSULE, COATED PELLETS ORAL EVERY 8 HOURS SCHEDULED
Qty: 180 CAPSULE | Refills: 0 | Status: SHIPPED | OUTPATIENT
Start: 2024-04-04 | End: 2024-05-04

## 2024-04-04 RX ADMIN — LISINOPRIL 20 MG: 20 TABLET ORAL at 08:15

## 2024-04-04 RX ADMIN — INSULIN DETEMIR 20 UNITS: 100 INJECTION, SOLUTION SUBCUTANEOUS at 08:15

## 2024-04-04 RX ADMIN — DIVALPROEX SODIUM 250 MG: 125 CAPSULE ORAL at 06:41

## 2024-04-04 RX ADMIN — QUETIAPINE FUMARATE 50 MG: 25 TABLET ORAL at 08:15

## 2024-04-04 RX ADMIN — AMLODIPINE BESYLATE 10 MG: 10 TABLET ORAL at 08:15

## 2024-04-04 RX ADMIN — ZIPRASIDONE MESYLATE 10 MG: 20 INJECTION, POWDER, LYOPHILIZED, FOR SOLUTION INTRAMUSCULAR at 08:37

## 2024-04-04 RX ADMIN — DONEPEZIL HYDROCHLORIDE 20 MG: 10 TABLET, FILM COATED ORAL at 08:15

## 2024-04-04 NOTE — PLAN OF CARE
Goal Outcome Evaluation:         Pt's family at bedside. Pt physically aggressive to staff and family and trying to get out of bed. Pt's wife tearful as she is unsure what she will do when she goes home.

## 2024-04-04 NOTE — PROGRESS NOTES
Met with patient he is agreeable to [facility] services. Verified PCP. Ruby DUNBAR, [facility]-Liaison

## 2024-04-04 NOTE — PROGRESS NOTES
[facility] Neurology    Progress Note    Patient Name: Too Tai  : 1955  MRN: [id number]  Primary Care Physician:  Des Geller MD  Date of admission: 3/27/2024    Subjective     Chief Complaint: Dementia with behavioral disturbances    History of Present Illness   Patient seen resting comfortably in bed.  Wife at bedside.  No acute events overnight.  Plan to DC today.  Patient's wife is agreeable with plan.    Review of Systems   Unable to assess due to baseline mental status    Objective     Physical Exam  Vitals and nursing note reviewed.   Constitutional:       General: He is not in acute distress.     Appearance: He is not ill-appearing.   Eyes:      Extraocular Movements: Extraocular movements intact.      Pupils: Pupils are equal, round, and reactive to light.      Comments: No nystagmus or deviated gaze noted   Cardiovascular:      Rate and Rhythm: Normal rate.   Pulmonary:      Effort: Pulmonary effort is normal.   Neurological:      Mental Status: He is alert. Mental status is at baseline.      Cranial Nerves: No cranial nerve deficit or facial asymmetry.      Sensory: No sensory deficit.      Motor: No weakness or tremor.      Comments:     Cranial Nerves   CN II: Pupils are equal, round, and reactive to light. Normal visual acuity and visual fields.    CN III IV VI: Extraocular movements are full without nystagmus.  CN VII: Facial movements are symmetric. No weakness.  CN VIII:  Auditory acuity is normal.  CN IX & X:  Symmetric palatal movement.  CN XII: The tongue is midline.  No atrophy or fasciculations.            Vitals:   Temp:  [97 °F (36.1 °C)-98.3 °F (36.8 °C)] 97.6 °F (36.4 °C)  Heart Rate:  [54-87] 80  Resp:  [16-20] 18  BP: (147-176)/(64-86) 147/64    Current Medications    Current Facility-Administered Medications:     acetaminophen (TYLENOL) tablet 650 mg, 650 mg, Oral, Q4H PRN **OR** acetaminophen (TYLENOL) 160 MG/5ML oral solution 650 mg, 650 mg, Oral, Q4H PRN,  650 mg at 03/29/24 1456 **OR** acetaminophen (TYLENOL) suppository 650 mg, 650 mg, Rectal, Q4H PRN, Carmelita Smith APRN    amLODIPine (NORVASC) tablet 10 mg, 10 mg, Oral, Q24H, Scar Schneider, DO, 10 mg at 04/04/24 0815    sennosides-docusate (PERICOLACE) 8.6-50 MG per tablet 2 tablet, 2 tablet, Oral, BID PRN **AND** polyethylene glycol (MIRALAX) packet 17 g, 17 g, Oral, Daily PRN, 17 g at 04/03/24 0548 **AND** bisacodyl (DULCOLAX) EC tablet 5 mg, 5 mg, Oral, Daily PRN **AND** bisacodyl (DULCOLAX) suppository 10 mg, 10 mg, Rectal, Daily PRN, Carmelita Smith APRN    calcium carbonate (TUMS) chewable tablet 500 mg (200 mg elemental), 2 tablet, Oral, TID PRN, Carmelita Smith APRN    Calcium Replacement - Follow Nurse / BPA Driven Protocol, , Does not apply, PRN, Carmelita Smith APRN    dextrose (D50W) (25 g/50 mL) IV injection 25 g, 25 g, Intravenous, Q15 Min PRN, Carmelita Smith APRN    dextrose (GLUTOSE) oral gel 15 g, 15 g, Oral, Q15 Min PRN, Carmelita Smith APRN    Divalproex Sodium (DEPAKOTE SPRINKLE) capsule 250 mg, 250 mg, Oral, Q8H, Joyce Elizondo, DO, 250 mg at 04/04/24 0641    donepezil (ARICEPT) tablet 20 mg, 20 mg, Oral, Daily, Carmelita Smith APRN, 20 mg at 04/04/24 0815    glucagon (GLUCAGEN) injection 1 mg, 1 mg, Intramuscular, Q15 Min PRN, Carmelita Smith APRN    insulin detemir (LEVEMIR) injection 20 Units, 20 Units, Subcutaneous, Q12H, Scar Schneider, DO, 20 Units at 04/04/24 0815    Insulin Lispro (humaLOG) injection 2-7 Units, 2-7 Units, Subcutaneous, 4x Daily AC & at Bedtime, Carmelita Smith APRN, 3 Units at 04/03/24 1834    lisinopril (PRINIVIL,ZESTRIL) tablet 20 mg, 20 mg, Oral, Daily, Scar Schneider DO, 20 mg at 04/04/24 0815    Magnesium Standard Dose Replacement - Follow Nurse / BPA Driven Protocol, , Does not apply, PRN, Carmelita Smith APRN    mirtazapine (REMERON) tablet 15 mg, 15 mg, Oral, Nightly, Carmelita Smith APRN, 15 mg at 04/03/24 2203    nitroglycerin (NITROSTAT) SL tablet  0.4 mg, 0.4 mg, Sublingual, Q5 Min PRN, Carmelita Smith APRN    ondansetron ODT (ZOFRAN-ODT) disintegrating tablet 4 mg, 4 mg, Oral, Q6H PRN **OR** ondansetron (ZOFRAN) injection 4 mg, 4 mg, Intravenous, Q6H PRN, Carmelita Smith, IVONNE    Phosphorus Replacement - Follow Nurse / BPA Driven Protocol, , Does not apply, PRN, Carmelita Smith APRN    Potassium Replacement - Follow Nurse / BPA Driven Protocol, , Does not apply, PRN, Carmelita Smith APRN    QUEtiapine (SEROquel) tablet 50 mg, 50 mg, Oral, BID, Carmelita Smith APRN, 50 mg at 04/04/24 0815    sodium chloride 0.9 % flush 10 mL, 10 mL, Intravenous, PRN, Carmelita Smith APRN    temazepam (RESTORIL) capsule 15 mg, 15 mg, Oral, Nightly, Darrell Jhaveri, DO, 15 mg at 04/03/24 2204    ziprasidone (GEODON) injection 10 mg, 10 mg, Intramuscular, Q4H PRN, Meliza Kahn K, APRN, 10 mg at 04/04/24 0837    Laboratory Results:   Lab Results   Component Value Date    GLUCOSE 125 (H) 04/01/2024    CALCIUM 9.6 04/01/2024     04/01/2024    K 3.8 04/01/2024    CO2 29.0 04/01/2024     04/01/2024    BUN 13 04/01/2024    CREATININE 0.72 (L) 04/01/2024    EGFRIFAFRI 102 02/21/2022    EGFRIFNONA 88 02/21/2022    BCR 18.1 04/01/2024    ANIONGAP 10.0 04/01/2024     Lab Results   Component Value Date    WBC 9.53 04/01/2024    HGB 16.3 04/01/2024    HCT 48.4 04/01/2024    MCV 91.5 04/01/2024     04/01/2024     Lab Results   Component Value Date    CHOL 220 (H) 01/08/2024    CHOL 120 08/01/2019    CHOL 117 04/15/2019     Lab Results   Component Value Date    HDL 41 01/08/2024    HDL 37 (L) 08/03/2023    HDL 41 04/03/2023     Lab Results   Component Value Date     (H) 01/08/2024     (H) 08/03/2023     (H) 04/03/2023     Lab Results   Component Value Date    TRIG 108 01/08/2024    TRIG 208 (H) 08/03/2023    TRIG 138 04/03/2023     Lab Results   Component Value Date    HGBA1C 9.20 (H) 03/27/2024     Lab Results   Component Value Date    INR 1.1 11/25/2019     INR 1.1 09/10/2018    PROTIME 12.5 11/25/2019    PROTIME 13.3 09/10/2018     Lab Results   Component Value Date    FOLATE 10.20 02/24/2024     Lab Results   Component Value Date    RBIQPGMZ32 380 03/27/2024             Assessment / Plan   Brief Patient Summary:  Too Tai is a 68 y.o. male with a past medical history of CAD, HTN, HLD, T2DM, chronic anticoagulation, dementia with behavioral disturbances and BPH presented to [facility] ED on 3/27/2024 for altered mental status and frequent falls.  Per wife which is bedside at approximately 8 PM on the evening of 3/26 patient began to make grunting noises and would fall forward hitting his head and face on the ground.       Plan:   Dementia with behavioral disturbances   Presyncope/recurrent falls  UTI  Orthostatic Hypotension   Initial CT head and repeat CT head negative for acute process  EEG no focal features or epileptic activity.  Nonspecific mild diffuse cerebral dysfunction  Continue Depakote 250 mg 3 times daily  VPA level 48.8, non toxicity  Scheduled  Aricept 20 mg daily  Scheduled Remeron 15 mg nightly  Scheduled Restoril 15 mg nightly  Scheduled Seroquel 50 mg twice daily  Geodon 10 mg as needed for extreme agitation; last dose 3/29  QTc 459  Echo unremarkable.  EF 55%  Will hold off on vessel imaging at this time as patient is unable to tolerate exam.  Orthostatic blood pressures with a 60 point drop in systolic blood pressure from 190 to 131  Patient's wife educated on nonmedical management of orthostatic hypotension including small meals throughout the day, the need for hydration, compression stockings or hose, extended position changes, slow transitions when changing from extreme temperatures.  Patient's wife was also educated in fall precautions once patient returns home.  Patient's wife verbalized understanding.  Continue antibiotics per hospitalist team, blood cultures pending.   Palliative care following, appreciate recommendations  Case management  following, appreciate help and complicated patient disposition.  Patient follow-up with Kamla Castillo.  Patient is okay to discharge from neurologic standpoint.  Please reach out any questions.    I have discussed the above with the patient, bedside RN and Dr. Elizondo  Time spent with patient: 35 minutes in face-to-face evaluation and management of the patient.    Copied text in this note has been reviewed and is accurate as of 04/04/24.     IVONNE Santos

## 2024-04-04 NOTE — OUTREACH NOTE
Prep Survey      Flowsheet Row Responses   [facility] patient discharged from? [place]   Is LACE score < 7 ? No   Eligibility [facility]   Date of Admission 03/27/24   Date of Discharge 04/04/24   Discharge Disposition Home or Self Care   Discharge diagnosis Falls-Dementia with behavioral disturbance   Does the patient have one of the following disease processes/diagnoses(primary or secondary)? Other   Does the patient have Home health ordered? Yes   What is the [facility] health agency?  [facility]    Is there a DME ordered? No   Prep survey completed? Yes            BLANE HANCOCK - Registered Nurse

## 2024-04-04 NOTE — TELEPHONE ENCOUNTER
Patient has pending order for MRI Brain With & Without Contrast. He was seen 03/27/24 at the ED and completed CT scan of the head and other testing. Do you still recommend patient complete this exam?    Please advise.

## 2024-04-04 NOTE — CASE MANAGEMENT/SOCIAL WORK
Case Management Discharge Note      Final Note: Plan is home with [facility] SN/PT/OT/Aide. [facility] EMS is scheduled for Thu. 4/4 at 0830. PCS is in [employer]. [facility] declined due to staffing.         Selected Continued Care - Admitted Since 3/27/2024       Destination    No services have been selected for the patient.                Durable Medical Equipment    No services have been selected for the patient.                Dialysis/Infusion    No services have been selected for the patient.                Home Medical Care Coordination complete.      Service Provider Selected Services Address Phone Fax Patient Preferred     [facility] ,  Home Rehabilitation [address] [place] [id number] [phone] --              Therapy    No services have been selected for the patient.                Community Resources    No services have been selected for the patient.                Community & DME    No services have been selected for the patient.                    Selected Continued Care - Episodes Includes continued care and service providers with selected services from the active episodes listed below      High Risk Care Management Episode start date: 1/9/2024   There are no active outsourced providers for this episode.                 Selected Continued Care - Prior Encounters Includes continued care and service providers with selected services from prior encounters from 12/28/2023 to 4/4/2024      Discharged on 3/4/2024 Admission date: 2/24/2024 - Discharge disposition: Skilled Nursing Facility (DC - External)      Destination       Service Provider Selected Services Address Phone Fax Patient Preferred    [facility] [address] [place] [id number] [phone] --                          Transportation Services  Ambulance: [facility]    Final Discharge Disposition Code: 06 - home with home  health care

## 2024-04-04 NOTE — DISCHARGE SUMMARY
[facility] Medicine Services  DISCHARGE SUMMARY    Patient Name: Too Tai  : 1955  MRN: [id number]    Date of Admission: 3/27/2024  1:39 PM  Date of Discharge:  2024  Primary Care Physician: Des Geller MD    Consults       Date and Time Order Name Status Description    3/27/2024 10:27 PM Inpatient Palliative Care MD Consult Completed     3/27/2024 10:13 PM Inpatient Neurology Consult General Completed     2024  2:44 AM Inpatient Neurology Consult General Completed             Hospital Course     Presenting Problem: dementia with agitation    Active Hospital Problems    Diagnosis  POA    **Falls [W19.XXXA]  Yes    AMS (altered mental status) [R41.82]  Yes    Bacteriuria [R82.71]  Yes    Frequent falls [R29.6]  Not Applicable    Dementia with behavioral disturbance [F03.918]  Yes    Chronic anticoagulation [Z79.01]  Not Applicable    Type 2 diabetes mellitus with hyperglycemia, with long-term current use of insulin [E11.65, Z79.4]  Not Applicable    Benign essential HTN [I10]  Yes    Benign prostatic hyperplasia [N40.0]  Yes    Chronic coronary artery disease [I25.10]  Yes    Hyperlipidemia [E78.5]  Yes      Resolved Hospital Problems   No resolved problems to display.          Hospital Course:  Too Tai is a 68 y.o. male  with PMH significant for CAD, HTN, HLD, T2DM, chronic anticoagulation, dementia with behavioral disturbance, BPH presented to [facility] ED on 3/27/2024 d/t altered mental status and frequent falls. Patient found to be orthostatic with uncontrolled behavior likely secondary to outpatient medication changes.     Acute metabolic encephalopathy superimposed on underlying dementia  Frequent falls  Orthostatic hypotension, improved  -CT head without any acute findings  -Neurology evaluated  -Continue Aricept, Seroquel, Remeron  -Continue temazepam; improved overnight behaviors  -Haldol given at dc for increased agitation  -continue Depakote  sprinkles  -TTE w no significant abnormalities   -Wife has been able to find about 8 hours of sitter/help at home currently     Suspected UTI, staph epidermidis   Suspected PNA   -Patient with 4+ bacteria  -Culture grew Staph epidermidis  -Completed empiric treatment with Rocephin and doxycycline for 5-day course     Hypertension  -continue current     Poorly controlled type 2 diabetes with A1c 9.2%  -continue insulin at SC     CAD  Hyperlipidemia  -DCed metoprolol due to orthostatics      History of DVT  -On chronic anticoagulation with full dose Lovenox  -Currently on hold current 2 falls  -Did not restart anticoagulation due to fall risk       Discharge Follow Up Recommendations for outpatient labs/diagnostics:  PCP in 1 week    Day of Discharge     HPI:   Agitated but wife able to re-direct him    Review of Systems  Gen- No fevers, chills  CV- No chest pain, palpitations  Resp- No cough, dyspnea  GI- No N/V/D, abd pain      Vital Signs:   Temp:  [97.6 °F (36.4 °C)-98.3 °F (36.8 °C)] 97.6 °F (36.4 °C)  Heart Rate:  [54-82] 80  Resp:  [18-20] 18  BP: (147-176)/(64-89) 164/89      Physical Exam:  Constitutional: No acute distress currently but easily agitated  HENT: NCAT, mucous membranes moist  Respiratory: Respiratory effort normal   Cardiovascular: RRR, no murmurs, rubs, or gallops  Musculoskeletal: No bilateral ankle edema  Psychiatric: easily agitated, difficult to get him to follow commands  Skin: No rashes      Pertinent  and/or Most Recent Results     LAB RESULTS:      Lab 04/01/24 0417 03/29/24  0704   WBC 9.53 11.00*   HEMOGLOBIN 16.3 16.8   HEMATOCRIT 48.4 48.6   PLATELETS 297 251   NEUTROS ABS  --  6.31   IMMATURE GRANS (ABS)  --  0.09*   LYMPHS ABS  --  2.65   MONOS ABS  --  1.24*   EOS ABS  --  0.61*   MCV 91.5 89.8         Lab 04/01/24 0417 03/29/24  0920   SODIUM 139 137   POTASSIUM 3.8 4.2   CHLORIDE 100 101   CO2 29.0 27.0   ANION GAP 10.0 9.0   BUN 13 10   CREATININE 0.72* 0.77   EGFR 100.1  98.1   GLUCOSE 125* 166*   CALCIUM 9.6 9.5                         Brief Urine Lab Results  (Last result in the past 365 days)        Color   Clarity   Blood   Leuk Est   Nitrite   Protein   CREAT   Urine HCG        03/27/24 1633 Yellow   Clear   Negative   Negative   Positive   100 mg/dL (2+)                 Microbiology Results (last 10 days)       Procedure Component Value - Date/Time    Blood Culture - Blood, Arm, Left [[id number]]  (Normal) Collected: 03/27/24 1943    Lab Status: Final result Specimen: Blood from Arm, Left Updated: 04/01/24 2000     Blood Culture No growth at 5 days    Blood Culture - Blood, Arm, Right [[id number]]  (Normal) Collected: 03/27/24 1750    Lab Status: Final result Specimen: Blood from Arm, Right Updated: 04/01/24 1815     Blood Culture No growth at 5 days    Narrative:      Less than seven (7) mL's of blood was collected.  Insufficient quantity may yield false negative results.    Urine Culture - Urine, Urine, Catheter [[id number]]  (Abnormal)  (Susceptibility) Collected: 03/27/24 1633    Lab Status: Final result Specimen: Urine, Catheter Updated: 03/30/24 0912     Urine Culture >100,000 CFU/mL Staphylococcus epidermidis    Narrative:      Colonization of the urinary tract without infection is common. Treatment is discouraged unless the patient is symptomatic, pregnant, or undergoing an invasive urologic procedure.    Susceptibility        Staphylococcus epidermidis      HERMELINDA      Clindamycin Susceptible      Inducible Clindamycin Resistance Negative      Oxacillin Resistant      Tetracycline Susceptible      Trimethoprim + Sulfamethoxazole Resistant      Vancomycin Susceptible                                   Adult Transthoracic Echo Complete W/ Cont if Necessary Per Protocol    Result Date: 3/29/2024    Left ventricular systolic function is normal. Calculated left ventricular EF = 55.1% Normal left ventricular cavity size noted. Left ventricular wall thickness is consistent with  mild concentric hypertrophy. Left ventricular diastolic function is consistent with (grade I) impaired relaxation.   The right ventricular cavity is mildly dilated. Normal right ventricular systolic function noted.   There is calcification of the aortic valve. The aortic valve appears trileaflet. Mild aortic valve regurgitation is present. No aortic valve stenosis is present.   Mitral annular calcification is present. Trace mitral valve regurgitation is present. No significant mitral valve stenosis is present.   The tricuspid valve is structurally normal with no stenosis present. Trace tricuspid valve regurgitation is present. Insufficient TR velocity profile to estimate the right ventricular systolic pressure.   Mild dilation of the sinuses of Valsalva is present. Mild dilation of the ascending aorta is present. Ascending aorta = 4.2 cm     EEG    Result Date: 3/28/2024  Reason for referral: 68 y.o.male with syncope Technical Summary:  A 19 channel digital EEG was performed using the international 10-20 placement system, including eye leads and EKG leads. Duration: 22 minutes Findings: The patient is resting quietly in bed with his eyes closed and appears drowsy.  Diffuse low amplitude 5-6 Hz theta is present symmetrically over both hemispheres.  A clear posterior rhythm is not seen.  Photic stimulation does not change the background.  Hyperventilation is not performed.  No focal features or epileptiform activity are seen. Video: Available Technical quality: Superior EKG: Bradycardic, 40-50 bpm SUMMARY: Mild generalized slow No focal features or epileptiform activity are seen     Diffuse cerebral dysfunction mild degree, nonspecific This report is transcribed using the Dragon dictation system.      CT Head Without Contrast    Result Date: 3/28/2024  CT HEAD WO CONTRAST Date of Exam: 3/28/2024 7:44 AM EDT Indication: Syncope/presyncope, cerebrovascular cause suspected. Comparison:  3/27/2024 Technique: Axial CT  images were obtained of the head without contrast administration.  Automated exposure control and iterative construction methods were used. Findings: Parenchyma:No acute intraparenchymal hemorrhage. No loss of gray-white differentiation to suggest large territory infarct. Moderate parenchymal volume loss. Scattered periventricular and subcortical white matter hypodensities, nonspecific, but most often  consistent with small vessel ischemic changes. No midline shift or herniation. Ventricles and extra axial spaces:Prominent ventricles and sulci secondary to volume loss. No extra axial fluid collection seen. Other:Orbits are grossly intact. Paranasal sinuses are clear. Mastoid air cells are clear. Calvarium is intact. Intracranial atherosclerotic calcification is present.     Impression: No evidence of acute intracranial hemorrhage or large territory infarct. Chronic changes as above. Electronically Signed: Stephon Chin MD  3/28/2024 8:27 AM EDT  Workstation ID: [id number]    CT Cervical Spine Without Contrast    Result Date: 3/27/2024  CT CERVICAL SPINE WO CONTRAST Date of Exam: 3/27/2024 3:06 PM EDT Indication: pain, falls. Comparison: CT cervical spine 4/29/2011 Technique: Axial CT images were obtained of the cervical spine without contrast administration.  Reconstructed coronal and sagittal images were also obtained. Automated exposure control and iterative construction methods were used. Findings: Normal spinal alignment. No acute fracture. No traumatic malalignment. Vertebral body heights are normal. Unremarkable appearance of interbody graft/fusion at C6-C7. There is mild degenerative disc disease at C5-C6 and C7-T1. Small posterior disc osteophyte complexes noted at C5-C6. The paravertebral soft tissues are unremarkable; no prevertebral soft tissue swelling. No CT evidence of high-grade/severe spinal canal stenosis. Lung apices are clear. Homogeneous attenuation of the thyroid gland. No  pharyngeal or  laryngeal mass lesion. There are atherosclerotic calcifications of the cervical carotid arteries. No lymphadenopathy.     Impression: No acute fracture or traumatic malalignment. Electronically Signed: Bebeto Ko MD  3/27/2024 3:17 PM EDT  Workstation ID: [id number]    CT Head Without Contrast    Result Date: 3/27/2024  CT HEAD WO CONTRAST Date of Exam: 3/27/2024 3:06 PM EDT Indication: multiple falls, on lovenox, positive LOC. Comparison: 2/24/2024. Technique: Axial CT images were obtained of the head without contrast administration.  Automated exposure control and iterative construction methods were used. Findings: There is moderately severe atrophy. There is periventricular hypodensity compatible with chronic small vessel ischemic insult. There is no acute mass effect or edema. There is no acute CVA or hemorrhage. There is a small old right periventricular CVA. There is a small old left basal ganglia CVA or prominent perivascular space. .    Impression: Chronic findings. No acute process. Electronically Signed: Waleska Giron MD  3/27/2024 3:12 PM EDT  Workstation ID: [id number]    XR Chest 1 View    Result Date: 3/27/2024  XR CHEST 1 VW Date of Exam: 3/27/2024 1:54 PM EDT Indication: multiple falls, confusion Comparison: 2/24/2024. Findings: There are low lung volumes with poor inspiration. There are postsurgical changes of midline sternotomy. The heart size is normal. There is continued mild atelectasis of the left lung base. There is new patchy infiltrate of the right upper lobe adjacent to the right hilum. There are no effusions. There is no pneumothorax. There is no obvious acute process of visualized bony structures.     Impression: Continued mild left basilar atelectasis. New infiltrate on the right is nonspecific and could represent area of atelectasis, pneumonia, or pulmonary contusion in the setting of trauma. Electronically Signed: Waleska Giron MD  3/27/2024 2:11 PM EDT  Workstation ID: [id number]              Results for orders placed during the hospital encounter of 03/27/24    Adult Transthoracic Echo Complete W/ Cont if Necessary Per Protocol    Interpretation Summary    Left ventricular systolic function is normal. Calculated left ventricular EF = 55.1% Normal left ventricular cavity size noted. Left ventricular wall thickness is consistent with mild concentric hypertrophy. Left ventricular diastolic function is consistent with (grade I) impaired relaxation.    The right ventricular cavity is mildly dilated. Normal right ventricular systolic function noted.    There is calcification of the aortic valve. The aortic valve appears trileaflet. Mild aortic valve regurgitation is present. No aortic valve stenosis is present.    Mitral annular calcification is present. Trace mitral valve regurgitation is present. No significant mitral valve stenosis is present.    The tricuspid valve is structurally normal with no stenosis present. Trace tricuspid valve regurgitation is present. Insufficient TR velocity profile to estimate the right ventricular systolic pressure.    Mild dilation of the sinuses of Valsalva is present. Mild dilation of the ascending aorta is present. Ascending aorta = 4.2 cm      Plan for Follow-up of Pending Labs/Results:     Discharge Details        Discharge Medications        New Medications        Instructions Start Date   amLODIPine 10 MG tablet  Commonly known as: NORVASC   10 mg, Oral, Every 24 Hours Scheduled      Divalproex Sodium 125 MG capsule  Commonly known as: DEPAKOTE SPRINKLE   250 mg, Oral, Every 8 Hours Scheduled      haloperidol 5 MG tablet  Commonly known as: HALDOL   5 mg, Oral, As Needed      temazepam 15 MG capsule  Commonly known as: RESTORIL   15 mg, Oral, Nightly PRN             Changes to Medications        Instructions Start Date   insulin detemir 100 UNIT/ML injection  Commonly known as: LEVEMIR  What changed: how much to take   20 Units, Subcutaneous, Every 12 Hours  Scheduled      lisinopril 20 MG tablet  Commonly known as: PRINIVIL,ZESTRIL  What changed:   medication strength  how much to take   20 mg, Oral, Daily             Continue These Medications        Instructions Start Date   Accu-Chek Geri Plus w/Device kit   1 kit, Does not apply, 3 Times Daily      Accu-Chek Geri solution   1 bottle, In Vitro, As Needed      accu-chek soft touch lancets   Check sugars 3 times daily      Alcohol Prep pads   1 swab , Does not apply, 3 Times Daily      B-D UF III MINI PEN NEEDLES 31G X 5 MM misc  Generic drug: Insulin Pen Needle   USE AS DIRECTED      donepezil 10 MG tablet  Commonly known as: ARICEPT   20 mg, Oral, Daily      mirtazapine 15 MG tablet  Commonly known as: REMERON   15 mg, Oral, Nightly      QUEtiapine 50 MG tablet  Commonly known as: SEROquel   50 mg, Oral, 2 Times Daily             Stop These Medications      apixaban 5 MG tablet tablet  Commonly known as: ELIQUIS     cyanocobalamin 1000 MCG/ML injection     Enoxaparin Sodium 100 MG/ML solution prefilled syringe syringe  Commonly known as: LOVENOX     metFORMIN 1000 MG tablet  Commonly known as: GLUCOPHAGE     metoprolol succinate XL 25 MG 24 hr tablet  Commonly known as: TOPROL-XL     SITagliptin 100 MG tablet  Commonly known as: JANUVIA     Valproic Acid 250 MG/5ML solution syrup  Commonly known as: DEPAKENE              Allergies   Allergen Reactions    Bactrim [Sulfamethoxazole-Trimethoprim] Rash    Adhesive Tape Rash    Neosporin [Neomycin-Bacitracin Zn-Polymyx] Rash         Discharge Disposition:  Home or Self Care    Diet:  Hospital:  No active diet order           Activity:      Restrictions or Other Recommendations:         CODE STATUS:    Code Status and Medical Interventions:   Ordered at: 03/27/24 9128     Level Of Support Discussed With:    Health Care Surrogate     Code Status (Patient has no pulse and is not breathing):    No CPR (Do Not Attempt to Resuscitate)     Medical Interventions (Patient has  pulse or is breathing):    Comfort Measures       Future Appointments   Date Time Provider Department Center   4/11/2024 10:30 AM Humera Lau PA-C [facility]       Additional Instructions for the Follow-ups that You Need to Schedule       Ambulatory Referral to Home Health   As directed      Face to Face Visit Date: 4/3/2024   Follow-up provider for Plan of Care?: I will be treating the patient on an ongoing basis.  Please send me the Plan of Care for signature.   Follow-up provider: ABELARDO MURRAY [[id number]]   Reason/Clinical Findings: Altered Mental status   Describe mobility limitations that make leaving home difficult: Impaired mobility   Nursing/Therapeutic Services Requested: Skilled Nursing (Aide) Physical Therapy Occupational Therapy Other   Skilled nursing orders: Medication education Pain management Cardiopulmonary assessments Neurovascular assessments   PT orders: Therapeutic exercise Gait Training Strengthening Home safety assessment   Weight Bearing Status: As Tolerated   Occupational orders: Activities of daily living Energy conservation Strengthening Cognition   Frequency: 1 Week 1                      Joyce Elizondo DO  04/04/24      Time Spent on Discharge:  I spent  35  minutes on this discharge activity which included: face-to-face encounter with the patient, reviewing the data in the system, coordination of the care with the nursing staff as well as consultants, documentation, and entering orders.

## 2024-04-05 ENCOUNTER — TRANSITIONAL CARE MANAGEMENT TELEPHONE ENCOUNTER (OUTPATIENT)
Dept: CALL CENTER | Facility: HOSPITAL | Age: 69
End: 2024-04-05
Payer: MEDICARE

## 2024-04-05 NOTE — OUTREACH NOTE
Call Center TCM Note      Flowsheet Row Responses   [facility] patient discharged from? [place]   Does the patient have one of the following disease processes/diagnoses(primary or secondary)? Other   TCM attempt successful? No  [verbal release from 2018 lists [place] and Ritchie Tai]   Unsuccessful attempts Attempt 1   Call Status Voice mail issues  [voicemail full]            Katiuska Sigala RN    4/5/2024, 13:26 EDT

## 2024-04-05 NOTE — OUTREACH NOTE
Call Center TCM Note      Flowsheet Row Responses   [facility] patient discharged from? [place]   Does the patient have one of the following disease processes/diagnoses(primary or secondary)? Other   TCM attempt successful? No   Unsuccessful attempts Attempt 2            Katiuska Sigala RN    4/5/2024, 15:21 EDT

## 2024-04-06 ENCOUNTER — TRANSITIONAL CARE MANAGEMENT TELEPHONE ENCOUNTER (OUTPATIENT)
Dept: CALL CENTER | Facility: HOSPITAL | Age: 69
End: 2024-04-06
Payer: MEDICARE

## 2024-04-06 ENCOUNTER — HOME CARE VISIT (OUTPATIENT)
Dept: HOME HEALTH SERVICES | Facility: HOME HEALTHCARE | Age: 69
End: 2024-04-06
Payer: COMMERCIAL

## 2024-04-06 VITALS — RESPIRATION RATE: 16 BRPM | OXYGEN SATURATION: 97 % | HEART RATE: 80 BPM | TEMPERATURE: 98 F

## 2024-04-06 PROCEDURE — G0299 HHS/HOSPICE OF RN EA 15 MIN: HCPCS

## 2024-04-06 NOTE — OUTREACH NOTE
Call Center TCM Note      Flowsheet Row Responses   [facility] patient discharged from? [place]   Does the patient have one of the following disease processes/diagnoses(primary or secondary)? Other   TCM attempt successful? No   Unsuccessful attempts Attempt 3            Alanna Beyer RN    4/6/2024, 10:02 EDT

## 2024-04-06 NOTE — HOME HEALTH
"SOC Note:    Home Health ordered for: disciplines sn, pt, ot- request MSW. OT to eval for HHA    Reason for Hosp/Primary Dx/Co-morbidities: Patient is a 68 yr olod . male with PMH significant for CAD, HTN, HLD, T2DM, chronic anticoagulation, dementia with behavioral disturbance, BPH. Admitted to [facility] on 3/27/2024 d/t altered mental status and frequent falls. Pt with acute cystitis. Pt is agitated at times.- unable to take b/p at soc due to agitation.    Focus of Care: disease process Dementia, medication management and falls prevention    Patient's goal(s):\"to stop falling\"    Current Functional status/mobility/DME: cane    HB status/Living Arrangements: lives with family    Skin Integrity/wound status: per family- cdi but pt \"picks\" at skin.     Code Status: DNR    Fall Risk/Safety concerns: high risk for falls    Educated on Emergency Plan, steps to take prior to going to the ER and when to Call Home Health First:  yes    Medication issues/Concerns: several medications stopped: eliquis, toprol, valporic, januvia, metformin. Started depakote, levemir, lisinopril, haldol, restoril, amlodipine    Additional Problems/Concerns: na    SDOH Barriers (i.e. caregiver concerns, social isolation, transportation, food insecurity, environment, income etc.)/Need for MSW: yes    Plan for next visit: disease process T/I, Medication management"

## 2024-04-08 ENCOUNTER — TELEPHONE (OUTPATIENT)
Dept: INTERNAL MEDICINE | Facility: CLINIC | Age: 69
End: 2024-04-08
Payer: MEDICARE

## 2024-04-08 NOTE — TELEPHONE ENCOUNTER
Kelly with  Home Care called. They need verbal orders for Social Work for this patient. It is okay to leave a voice mail. Call: [phone]

## 2024-04-09 ENCOUNTER — HOME CARE VISIT (OUTPATIENT)
Dept: HOME HEALTH SERVICES | Facility: HOME HEALTHCARE | Age: 69
End: 2024-04-09
Payer: COMMERCIAL

## 2024-04-09 PROCEDURE — G0151 HHCP-SERV OF PT,EA 15 MIN: HCPCS | Performed by: PHYSICAL THERAPIST

## 2024-04-09 NOTE — HOME HEALTH
67 y/o male referred to  PT after recent 8 day hospitalization for UTI/encaphalopathy/fall; returns home to single-story home with spouse and family; PMHx significant for dementia with behaviors, DMII, BPH, CAD; per spouse report, patient has been on gradual decline for 3 years but decline has increased over previous 3 months with increased mobility deficits, falls, and assistance required for all ADLs and mobility.  Patient is retired from [employer] in [place] where he worked in the [employer].  He is also a former member of the  [employer].  Spouse currently has caregiver in home to assist with mobility and ADLs

## 2024-04-09 NOTE — Clinical Note
Juan  PT eval completed 4/9/24 with PT to address deficits in functional mobility and balance through HEP and family education in order to reduce risk for falls, hospitalization, and SNF admission.  PT frequency 1w4.  Of note, patient has episodes of verbal agression during evaluation requiring frequent redirection.

## 2024-04-09 NOTE — TELEPHONE ENCOUNTER
Spoke to Caroline, she stated that last night was a terrible night with Bill. They gave him 2 Temazepam he was very agitated was threatening and punches and kicking. Caroline stated that they had to place restraints on him to get him to not get up because he fell twice. Caroline stated that they gave him a Haldol at 1 AM and it did not even work. Caroline stated that she has 3 sitters working 12 hour days. Caroline stated that she is unsure what can be done if they have another night like that. Caroline also stated that she wants to have the MRI done but he will have to be restrained and possibly sedated cause he is very aggressive lately.

## 2024-04-10 ENCOUNTER — HOME CARE VISIT (OUTPATIENT)
Dept: HOME HEALTH SERVICES | Facility: HOME HEALTHCARE | Age: 69
End: 2024-04-10
Payer: COMMERCIAL

## 2024-04-10 VITALS
HEART RATE: 96 BPM | DIASTOLIC BLOOD PRESSURE: 73 MMHG | TEMPERATURE: 98.7 F | OXYGEN SATURATION: 96 % | SYSTOLIC BLOOD PRESSURE: 156 MMHG

## 2024-04-10 PROCEDURE — G0300 HHS/HOSPICE OF LPN EA 15 MIN: HCPCS

## 2024-04-10 PROCEDURE — G0152 HHCP-SERV OF OT,EA 15 MIN: HCPCS

## 2024-04-10 RX ORDER — QUETIAPINE FUMARATE 50 MG/1
TABLET, FILM COATED ORAL
Qty: 90 TABLET | Refills: 2 | Status: SHIPPED | OUTPATIENT
Start: 2024-04-10 | End: 2024-04-11

## 2024-04-10 NOTE — TELEPHONE ENCOUNTER
Called patient and spoke to patient wife. Notified her about the prescription for seroquel.     She stated that they are coming in tomorrow for an appointment with Dr. Du and wanted to discuss the MRI further. She wanted to know what exactly we are looking for or ruling out with performing the MRI. IF something was found on the MRI what could be done about treatment after.

## 2024-04-10 NOTE — TELEPHONE ENCOUNTER
I could send in valium to take prior to the MRI.      I have sent in seroquel to take 2 po qhs (and 1 po qam).    Des Geller MD  07:04 EDT  04/10/24

## 2024-04-10 NOTE — HOME HEALTH
Lucio Note  67 y/o male referred to  PT after recent 8 day hospitalization for UTI/encephalopathy/fall; returns home to single-story home with spouse and family; PMHx significant for dementia with behaviors, DMII, BPH, CAD; per spouse report, patient has been on gradual decline for 3 years but decline has increased over previous 3 months with increased mobility deficits, falls, and assistance required for all ADLs and mobility. Patient is retired from [employer] in [place] where he worked in the [employer] yard. He is also a former member of the  [employer]. Spouse currently has caregiver in home to assist with mobility and ADLs.  Of note pt with fall last night due to wanting to go to bathroom.    Patient's goal(s):  Pt caregiver states desire to improve safety with dressing, toileting, bathing as well as improve strength/balance.  OT to assist family/caregiver with safety education.    Services required to achieve goals: Skilled OT services are needed to improve energy conservation, safety and participation with functional mobility and transfers, ADL retraining, therapeutic exercises, and therapeutic activities to improve dynamic standing balance.    Potential Issues for goal attainment: Poor cognition and generalized weakness.    Problems identified: Decreased functional mobility, decreased transfer independence, decreased ADL participation, decreased energy.    Describe the Functional status and safety: Pt waxes and wanes with kindness and agression.  Pt able to complete some activity without direction, does not respond well to commands.    Describe any environmental issues:  many family present, possible over stimulation    Any equipment needs: OT to assess when spouse is present    POC confirmed with Pt and caregiver and Dr. Hernandez on date 4/10

## 2024-04-10 NOTE — HOME HEALTH
Routine Visit Note: LPN    Skill/education provided:cognitive/safety assessment/ medication review and education. pt up and eating breakfast upon arrival. Spouse at side. pt would not respond to most questions wife was able to input, no falls reported. pt refused assessing of coccyx rask. skin tear on right arm continues healing.v/s wdl pt refused blood pressure 3 attempts made.    Plan for next visit: cognitive assessment/ safety  assessment    Other pertinent info:

## 2024-04-10 NOTE — Clinical Note
OT to tx 1w4 for family/caregiver education and increased balance.  Of note, family/caregiver reports fall on 4/9 without injury during night.    Eval Note  67 y/o male referred to  PT after recent 8 day hospitalization for UTI/encephalopathy/fall; returns home to single-story home with spouse and family; PMHx significant for dementia with behaviors, DMII, BPH, CAD; per spouse report, patient has been on gradual decline for 3 years but decline has increased over previous 3 months with increased mobility deficits, falls, and assistance required for all ADLs and mobility. Patient is retired from [employer] in [place] where he worked in the [employer]. He is also a former member of the  [employer]. Spouse currently has caregiver in home to assist with mobility and ADLs.  Of note pt with fall last night due to wanting to go to bathroom.    Patient's goal(s):  Pt caregiver states desire to improve safety with dressing, toileting, bathing as well as improve strength/balance.  OT to assist family/caregiver with safety education.    Services required to achieve goals: Skilled OT services are needed to improve energy conservation, safety and participation with functional mobility and transfers, ADL retraining, therapeutic exercises, and therapeutic activities to improve dynamic standing balance.    Potential Issues for goal attainment: Poor cognition and generalized weakness.    Problems identified: Decreased functional mobility, decreased transfer independence, decreased ADL participation, decreased energy.    Describe the Functional status and safety: Pt waxes and wanes with kindness and agression.  Pt able to complete some activity without direction, does not respond well to commands.    Describe any environmental issues:  many family present, possible over stimulation    Any equipment needs: OT to assess when spouse is present    POC confirmed with Pt and caregiver and Dr. Hernandez on date 4/10

## 2024-04-11 ENCOUNTER — REFERRAL TRIAGE (OUTPATIENT)
Dept: CASE MANAGEMENT | Facility: OTHER | Age: 69
End: 2024-04-11
Payer: MEDICARE

## 2024-04-11 ENCOUNTER — OFFICE VISIT (OUTPATIENT)
Dept: INTERNAL MEDICINE | Facility: CLINIC | Age: 69
End: 2024-04-11
Payer: MEDICARE

## 2024-04-11 VITALS — TEMPERATURE: 97.5 F

## 2024-04-11 DIAGNOSIS — E11.65 TYPE 2 DIABETES MELLITUS WITH HYPERGLYCEMIA, WITH LONG-TERM CURRENT USE OF INSULIN: ICD-10-CM

## 2024-04-11 DIAGNOSIS — N30.90 CYSTITIS: ICD-10-CM

## 2024-04-11 DIAGNOSIS — F03.918 DEMENTIA WITH BEHAVIORAL DISTURBANCE: Primary | ICD-10-CM

## 2024-04-11 DIAGNOSIS — Z79.4 TYPE 2 DIABETES MELLITUS WITH HYPERGLYCEMIA, WITH LONG-TERM CURRENT USE OF INSULIN: ICD-10-CM

## 2024-04-11 RX ORDER — TEMAZEPAM 15 MG/1
15 CAPSULE ORAL NIGHTLY PRN
Qty: 14 CAPSULE | Refills: 0 | Status: SHIPPED | OUTPATIENT
Start: 2024-04-11

## 2024-04-11 RX ORDER — QUETIAPINE FUMARATE 50 MG/1
100 TABLET, EXTENDED RELEASE ORAL NIGHTLY
Qty: 60 TABLET | Refills: 1 | Status: SHIPPED | OUTPATIENT
Start: 2024-04-11

## 2024-04-11 RX ORDER — INSULIN GLARGINE 100 [IU]/ML
20 INJECTION, SOLUTION SUBCUTANEOUS NIGHTLY
COMMUNITY
Start: 2024-04-04 | End: 2024-04-11 | Stop reason: SDUPTHER

## 2024-04-11 RX ORDER — INSULIN GLARGINE 100 [IU]/ML
20 INJECTION, SOLUTION SUBCUTANEOUS 2 TIMES DAILY
Qty: 15 ML | Refills: 3 | Status: SHIPPED | OUTPATIENT
Start: 2024-04-11

## 2024-04-12 ENCOUNTER — PATIENT OUTREACH (OUTPATIENT)
Dept: CASE MANAGEMENT | Facility: OTHER | Age: 69
End: 2024-04-12
Payer: MEDICARE

## 2024-04-12 ENCOUNTER — REFERRAL TRIAGE (OUTPATIENT)
Age: 69
End: 2024-04-12
Payer: MEDICARE

## 2024-04-12 NOTE — OUTREACH NOTE
AMBULATORY CASE MANAGEMENT NOTE    Name and Relationship of Patient/Support Person: WILLIE RICHTER - Emergency Contact  Emergency Contact    Patient Outreach    RN-ACM received Ambulatory Case Management referral from PCP, Dr. ZAHRA Du who saw patient in office 4/11/24, to discuss with wife some LTC facility options.  JOCELINE called wife, Willie.  Willie said she is trying to plan ahead, is not ready at this time for LTC placement, but wants more information.  She said she has talked extensively with a couple of group home/Memory Care Units already, [facility], and she does not think they would work for her .  Discussed other options of facilities she could call.  She requested RN-ACM text her with names and phone numbers as she cannot write info down at this time.  Willie said she would also like the Patient support line # for MyChart, as she cannot get in her husbands MyChart.  As wife requested, RN-ACM sent text message to her with several other names and phone numbers to call to gather information about LTC Memory Care Units:  [facility], [facility];  [facility];  and [facility], in [facility].  Also sent her the phone number to call for MyChart Patient Support Line.    Discussed option of applying for Medicaid/ Medicaid Waiver and the help that would be to her at home to pay for in--home caregivers.  She said she would try to apply for this soon.  Wife stated she already is paying for sitters, daily from 11am - 8pm, to help provide assistance with patient's care needs.   RN-ACM will collaborate with Ludivina MELO.  This note will be routed to the PCP.    Callie KAYE  Ambulatory Case Management    4/12/2024, 12:12 EDT

## 2024-04-15 ENCOUNTER — TELEPHONE (OUTPATIENT)
Dept: INTERNAL MEDICINE | Facility: CLINIC | Age: 69
End: 2024-04-15
Payer: MEDICARE

## 2024-04-15 ENCOUNTER — HOME CARE VISIT (OUTPATIENT)
Dept: HOME HEALTH SERVICES | Facility: HOME HEALTHCARE | Age: 69
End: 2024-04-15
Payer: COMMERCIAL

## 2024-04-15 PROCEDURE — G0151 HHCP-SERV OF PT,EA 15 MIN: HCPCS | Performed by: PHYSICAL THERAPIST
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
## 2024-04-16 ENCOUNTER — HOME CARE VISIT (OUTPATIENT)
Dept: HOME HEALTH SERVICES | Facility: HOME HEALTHCARE | Age: 69
End: 2024-04-16
Payer: COMMERCIAL

## 2024-04-16 NOTE — TELEPHONE ENCOUNTER
Spoke with patient.  Temazepam 30 mg QHS OK and she will try it again tonight and tomorrow and let me know how it works.
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
## 2024-04-17 ENCOUNTER — HOME CARE VISIT (OUTPATIENT)
Dept: HOME HEALTH SERVICES | Facility: HOME HEALTHCARE | Age: 69
End: 2024-04-17
Payer: COMMERCIAL

## 2024-04-17 VITALS — RESPIRATION RATE: 18 BRPM

## 2024-04-17 PROCEDURE — G0300 HHS/HOSPICE OF LPN EA 15 MIN: HCPCS

## 2024-04-17 PROCEDURE — G0155 HHCP-SVS OF CSW,EA 15 MIN: HCPCS

## 2024-04-17 NOTE — HOME HEALTH
Met with patient's wife, [place].  Mr. Tai was walking around the entire visit.  Jamaica said they have good and bad days.  She said she is paying about $168 per day, $5000 per month for caregivers in the home.  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  I have seen them several times in the past.  They have several family members living in the home including Jamaica's 83 year old mother who uses a walker and was part of our visit today.  Discusses options again.  Provided a list of assisted living options with memory care; adult day (she states she will call [facility] at [facility] today to see if that could work for Mr. Tai for at least some respite for her during the day).  She said her PCP is also working on some options.  I also called [facility] and [facility] and provided her with information on their geriatric units, which provide short term stays for medication/behavioral health stabilzation.  She has my number to call if further assistance is needed.  She did have 3 caregivers and one quit yesterday so now has 2.  I provided her a list of agencies just in case she needs additional options.

## 2024-04-17 NOTE — HOME HEALTH
Routine Visit Note: LPN    Skill/education provided: safety assessment nuro assessment d/t alz/dementia, unable to obtain v/s temp only taken pt refused and became combative. . pt has eyes on him at all times with paid cg and spouse in home. no falls reported, pt will needs meds refilled by 5/5/24 contacted pcp by notes.no skin breakdown noted. spouse manages medication without difficulty.

## 2024-04-18 ENCOUNTER — TELEPHONE (OUTPATIENT)
Dept: INTERNAL MEDICINE | Facility: CLINIC | Age: 69
End: 2024-04-18
Payer: MEDICARE

## 2024-04-18 ENCOUNTER — HOSPITAL ENCOUNTER (INPATIENT)
Facility: HOSPITAL | Age: 69
LOS: 19 days | Discharge: NURSING, REHAB OR LONG-TERM CARE | DRG: 884 | End: 2024-05-09
Attending: EMERGENCY MEDICINE | Admitting: FAMILY MEDICINE
Payer: MEDICARE

## 2024-04-18 ENCOUNTER — PATIENT OUTREACH (OUTPATIENT)
Age: 69
End: 2024-04-18
Payer: MEDICARE

## 2024-04-18 DIAGNOSIS — F03.911 AGITATION DUE TO DEMENTIA: ICD-10-CM

## 2024-04-18 DIAGNOSIS — E11.65 HYPERGLYCEMIA DUE TO DIABETES MELLITUS: ICD-10-CM

## 2024-04-18 DIAGNOSIS — M62.838 SPASM OF MUSCLE: ICD-10-CM

## 2024-04-18 DIAGNOSIS — R29.6 FREQUENT FALLS: ICD-10-CM

## 2024-04-18 DIAGNOSIS — I25.10 CHRONIC CORONARY ARTERY DISEASE: ICD-10-CM

## 2024-04-18 DIAGNOSIS — G25.3 MYOCLONIC JERKING: ICD-10-CM

## 2024-04-18 DIAGNOSIS — I73.9 PERIPHERAL VASCULAR DISEASE: ICD-10-CM

## 2024-04-18 DIAGNOSIS — Z79.4 TYPE 2 DIABETES MELLITUS WITH HYPERGLYCEMIA, WITH LONG-TERM CURRENT USE OF INSULIN: ICD-10-CM

## 2024-04-18 DIAGNOSIS — E11.65 TYPE 2 DIABETES MELLITUS WITH HYPERGLYCEMIA, WITH LONG-TERM CURRENT USE OF INSULIN: ICD-10-CM

## 2024-04-18 DIAGNOSIS — F03.918 DEMENTIA WITH BEHAVIORAL DISTURBANCE: Primary | ICD-10-CM

## 2024-04-18 DIAGNOSIS — R46.89 AGGRESSIVE BEHAVIOR: ICD-10-CM

## 2024-04-18 PROCEDURE — 99285 EMERGENCY DEPT VISIT HI MDM: CPT

## 2024-04-18 PROCEDURE — 84443 ASSAY THYROID STIM HORMONE: CPT | Performed by: INTERNAL MEDICINE

## 2024-04-18 PROCEDURE — 82607 VITAMIN B-12: CPT | Performed by: INTERNAL MEDICINE

## 2024-04-18 PROCEDURE — 83036 HEMOGLOBIN GLYCOSYLATED A1C: CPT | Performed by: INTERNAL MEDICINE

## 2024-04-18 PROCEDURE — 83735 ASSAY OF MAGNESIUM: CPT | Performed by: EMERGENCY MEDICINE

## 2024-04-18 PROCEDURE — 84484 ASSAY OF TROPONIN QUANT: CPT | Performed by: EMERGENCY MEDICINE

## 2024-04-18 PROCEDURE — 93005 ELECTROCARDIOGRAM TRACING: CPT | Performed by: EMERGENCY MEDICINE

## 2024-04-18 PROCEDURE — 80164 ASSAY DIPROPYLACETIC ACD TOT: CPT | Performed by: EMERGENCY MEDICINE

## 2024-04-18 PROCEDURE — 80053 COMPREHEN METABOLIC PANEL: CPT | Performed by: EMERGENCY MEDICINE

## 2024-04-18 PROCEDURE — 85025 COMPLETE CBC W/AUTO DIFF WBC: CPT | Performed by: EMERGENCY MEDICINE

## 2024-04-18 RX ORDER — SODIUM CHLORIDE 0.9 % (FLUSH) 0.9 %
10 SYRINGE (ML) INJECTION AS NEEDED
Status: DISCONTINUED | OUTPATIENT
Start: 2024-04-18 | End: 2024-04-26

## 2024-04-18 NOTE — OUTREACH NOTE
Social Work Assessment  Questions/Answers      Flowsheet Row Most Recent Value   Referral Source physician   Reason for Consult community resources   Preferred Language English   Advance Care Planning Reviewed no concerns identified   People in Home spouse   Current Living Arrangements home   In the past 12 months has the electric, gas, oil, or water company threatened to shut off services in your home? No   Primary Care Provided by self   Provides Primary Care For no one   Family Caregiver if Needed none   Employment Status retired   Source of Income social security   Medications independent   Meal Preparation independent   Housekeeping independent   Laundry independent   Shopping independent        SDOH updated and reviewed with the patient during this program:  --     Disabilities: At Risk (3/27/2024)    Disabilities     Concentrating, Remembering, or Making Decisions Difficulty: yes     Doing Errands Independently Difficulty: yes      Financial Resource Strain: Low Risk  (4/18/2024)    Overall Financial Resource Strain (CARDIA)     Difficulty of Paying Living Expenses: Not hard at all      --     Food Insecurity: No Food Insecurity (4/18/2024)    Hunger Vital Sign     Worried About Running Out of Food in the Last Year: Never true     Ran Out of Food in the Last Year: Never true      --     Housing Stability: Not At Risk (4/18/2024)    Housing Stability     Current Living Arrangements: home     Potentially Unsafe Housing Conditions: none      --     Transportation Needs: No Transportation Needs (4/6/2024)    OASIS : Transportation     Lack of Transportation (Medical): No     Lack of Transportation (Non-Medical): No     Patient Unable or Declines to Respond: No      --     Utilities: Not At Risk (4/18/2024)    [facility] Utilities     Threatened with loss of utilities: No   Patient Outreach    SW contacted pt spouse after receiving a referral. Pt spouse reports that she met with home health  today, who  provided her a list of caregiving because she is having trouble finding anyone to work with them as pt has aggressive behaviors. An admissions rep from a memory care facility also told her that no facility would accept him due to behaviorals. Someone suggested she try a behavioral health facility in [place], but she called them and they did not accept his insurance. SW provided information on other options for physc assessment, including [facility] and [facility]. She reports that she will think about it and may take him there to be assessed.    Ludivina BREEN -   Ambulatory Case Management    4/18/2024, 16:29 EDT

## 2024-04-18 NOTE — LETTER
EMS Transport Request  For use at [facility], [place], [place], [place], and [place] only   Patient Name: Too Tai : 1955   Weight:97.5 kg (215 lb) Pick-up Location: [facility] BLS/ALS: BLS/ALS: BLS   Insurance: [employer] MEDICARE REPLACEMENT Auth End Date:    Pre-Cert #: D/C Summary complete:    Destination: Other [facility]   Contact Precautions: None   Equipment (O2, Fluids, etc.): None   Arrive By Date/Time: 24 mid-afternoon Stretcher/WC: Stretcher   CM Requesting: Ilene Brown RN Ext: [phone]   Notes/Medical Necessity: impaired functional mobility, balance, gait and endurance. Poor trunk control. Fall risk. Advance dementia w/ recent (>72hrs) behaviors/agitation. Lethargic.       ______________________________________________________________________    *Only 2 patient bags OR 1 carry-on size bag are permitted.  Wheelchairs and walkers CANNOT transported with the patient. Acknowledge: Yes

## 2024-04-18 NOTE — TELEPHONE ENCOUNTER
Caller: WILLIE RICHTER    Relationship: Emergency Contact    Best call back number: [phone]     What was the call regarding: PATIENT IS NOT SLEEPING DESPITE TWO SEROQUEL AND TWO SLEEPING PILLS temazepam (RESTORIL) 15 MG capsule . PATIENT HAD TO PEES A FEW TIMES AT NIGHT. PATIENTS  WIFE IS REQUESTING A MEDICATION TO HELP HIM SLEEP MORE SOUNDLY AS SHE IS NOT GETTING ANY SLEEP, THIS HAS BEEN GOING ON FOR A WEEK. MORE COMBATIVE WITH REQUESTS TO GET BACK INTO BED.     Is it okay if the provider responds through MyChart: NO

## 2024-04-18 NOTE — Clinical Note
Level of Care: Telemetry [5]   Diagnosis: Agitation due to dementia [[id number]]   Admitting Physician: DONYA MCGRAW [[id number]]   Attending Physician: DONYA MCGRAW [[id number]]   Bed Request Comments: tele

## 2024-04-19 ENCOUNTER — HOME CARE VISIT (OUTPATIENT)
Dept: HOME HEALTH SERVICES | Facility: HOME HEALTHCARE | Age: 69
End: 2024-04-19
Payer: COMMERCIAL

## 2024-04-19 ENCOUNTER — APPOINTMENT (OUTPATIENT)
Dept: CT IMAGING | Facility: HOSPITAL | Age: 69
DRG: 884 | End: 2024-04-19
Payer: MEDICARE

## 2024-04-19 ENCOUNTER — DOCUMENTATION (OUTPATIENT)
Dept: HOME HEALTH SERVICES | Facility: HOME HEALTHCARE | Age: 69
End: 2024-04-19
Payer: MEDICARE

## 2024-04-19 ENCOUNTER — APPOINTMENT (OUTPATIENT)
Dept: GENERAL RADIOLOGY | Facility: HOSPITAL | Age: 69
DRG: 884 | End: 2024-04-19
Payer: MEDICARE

## 2024-04-19 PROBLEM — F03.911 AGITATION DUE TO DEMENTIA: Status: ACTIVE | Noted: 2024-04-19

## 2024-04-19 LAB
ALBUMIN SERPL-MCNC: 3.9 G/DL (ref 3.5–5.2)
ALBUMIN/GLOB SERPL: 1.3 G/DL
ALP SERPL-CCNC: 120 U/L (ref 39–117)
ALT SERPL W P-5'-P-CCNC: 13 U/L (ref 1–41)
AMMONIA BLD-SCNC: 34 UMOL/L (ref 16–60)
ANION GAP SERPL CALCULATED.3IONS-SCNC: 10 MMOL/L (ref 5–15)
ANION GAP SERPL CALCULATED.3IONS-SCNC: 8 MMOL/L (ref 5–15)
AST SERPL-CCNC: 17 U/L (ref 1–40)
BASOPHILS # BLD AUTO: 0.1 10*3/MM3 (ref 0–0.2)
BASOPHILS # BLD AUTO: 0.15 10*3/MM3 (ref 0–0.2)
BASOPHILS NFR BLD AUTO: 1 % (ref 0–1.5)
BASOPHILS NFR BLD AUTO: 1.4 % (ref 0–1.5)
BILIRUB SERPL-MCNC: 0.3 MG/DL (ref 0–1.2)
BILIRUB UR QL STRIP: NEGATIVE
BUN SERPL-MCNC: 16 MG/DL (ref 8–23)
BUN SERPL-MCNC: 22 MG/DL (ref 8–23)
BUN/CREAT SERPL: 19.5 (ref 7–25)
BUN/CREAT SERPL: 19.6 (ref 7–25)
CALCIUM SPEC-SCNC: 9.4 MG/DL (ref 8.6–10.5)
CALCIUM SPEC-SCNC: 9.9 MG/DL (ref 8.6–10.5)
CHLORIDE SERPL-SCNC: 104 MMOL/L (ref 98–107)
CHLORIDE SERPL-SCNC: 106 MMOL/L (ref 98–107)
CLARITY UR: CLEAR
CO2 SERPL-SCNC: 28 MMOL/L (ref 22–29)
CO2 SERPL-SCNC: 30 MMOL/L (ref 22–29)
COLOR UR: YELLOW
CREAT SERPL-MCNC: 0.82 MG/DL (ref 0.76–1.27)
CREAT SERPL-MCNC: 1.12 MG/DL (ref 0.76–1.27)
DEPRECATED RDW RBC AUTO: 40.2 FL (ref 37–54)
DEPRECATED RDW RBC AUTO: 41.3 FL (ref 37–54)
EGFRCR SERPLBLD CKD-EPI 2021: 72 ML/MIN/1.73
EGFRCR SERPLBLD CKD-EPI 2021: 96.3 ML/MIN/1.73
EOSINOPHIL # BLD AUTO: 0.56 10*3/MM3 (ref 0–0.4)
EOSINOPHIL # BLD AUTO: 0.69 10*3/MM3 (ref 0–0.4)
EOSINOPHIL NFR BLD AUTO: 5.7 % (ref 0.3–6.2)
EOSINOPHIL NFR BLD AUTO: 6.6 % (ref 0.3–6.2)
ERYTHROCYTE [DISTWIDTH] IN BLOOD BY AUTOMATED COUNT: 11.8 % (ref 12.3–15.4)
ERYTHROCYTE [DISTWIDTH] IN BLOOD BY AUTOMATED COUNT: 11.9 % (ref 12.3–15.4)
GLOBULIN UR ELPH-MCNC: 3 GM/DL
GLUCOSE BLDC GLUCOMTR-MCNC: 104 MG/DL (ref 70–130)
GLUCOSE BLDC GLUCOMTR-MCNC: 148 MG/DL (ref 70–130)
GLUCOSE BLDC GLUCOMTR-MCNC: 182 MG/DL (ref 70–130)
GLUCOSE BLDC GLUCOMTR-MCNC: 264 MG/DL (ref 70–130)
GLUCOSE SERPL-MCNC: 166 MG/DL (ref 65–99)
GLUCOSE SERPL-MCNC: 226 MG/DL (ref 65–99)
GLUCOSE UR STRIP-MCNC: ABNORMAL MG/DL
HBA1C MFR BLD: 9.5 % (ref 4.8–5.6)
HCT VFR BLD AUTO: 42.2 % (ref 37.5–51)
HCT VFR BLD AUTO: 46.7 % (ref 37.5–51)
HGB BLD-MCNC: 13.8 G/DL (ref 13–17.7)
HGB BLD-MCNC: 15.5 G/DL (ref 13–17.7)
HGB UR QL STRIP.AUTO: NEGATIVE
HOLD SPECIMEN: NORMAL
IMM GRANULOCYTES # BLD AUTO: 0.04 10*3/MM3 (ref 0–0.05)
IMM GRANULOCYTES # BLD AUTO: 0.05 10*3/MM3 (ref 0–0.05)
IMM GRANULOCYTES NFR BLD AUTO: 0.4 % (ref 0–0.5)
IMM GRANULOCYTES NFR BLD AUTO: 0.5 % (ref 0–0.5)
KETONES UR QL STRIP: NEGATIVE
LEUKOCYTE ESTERASE UR QL STRIP.AUTO: NEGATIVE
LYMPHOCYTES # BLD AUTO: 3.34 10*3/MM3 (ref 0.7–3.1)
LYMPHOCYTES # BLD AUTO: 3.52 10*3/MM3 (ref 0.7–3.1)
LYMPHOCYTES NFR BLD AUTO: 33.6 % (ref 19.6–45.3)
LYMPHOCYTES NFR BLD AUTO: 33.8 % (ref 19.6–45.3)
MAGNESIUM SERPL-MCNC: 1.8 MG/DL (ref 1.6–2.4)
MAGNESIUM SERPL-MCNC: 1.9 MG/DL (ref 1.6–2.4)
MCH RBC QN AUTO: 30.8 PG (ref 26.6–33)
MCH RBC QN AUTO: 31.2 PG (ref 26.6–33)
MCHC RBC AUTO-ENTMCNC: 32.7 G/DL (ref 31.5–35.7)
MCHC RBC AUTO-ENTMCNC: 33.2 G/DL (ref 31.5–35.7)
MCV RBC AUTO: 92.7 FL (ref 79–97)
MCV RBC AUTO: 95.3 FL (ref 79–97)
MONOCYTES # BLD AUTO: 1.14 10*3/MM3 (ref 0.1–0.9)
MONOCYTES # BLD AUTO: 1.33 10*3/MM3 (ref 0.1–0.9)
MONOCYTES NFR BLD AUTO: 11.6 % (ref 5–12)
MONOCYTES NFR BLD AUTO: 12.7 % (ref 5–12)
NEUTROPHILS NFR BLD AUTO: 4.68 10*3/MM3 (ref 1.7–7)
NEUTROPHILS NFR BLD AUTO: 4.74 10*3/MM3 (ref 1.7–7)
NEUTROPHILS NFR BLD AUTO: 45.3 % (ref 42.7–76)
NEUTROPHILS NFR BLD AUTO: 47.4 % (ref 42.7–76)
NITRITE UR QL STRIP: NEGATIVE
NRBC BLD AUTO-RTO: 0 /100 WBC (ref 0–0.2)
NRBC BLD AUTO-RTO: 0 /100 WBC (ref 0–0.2)
PH UR STRIP.AUTO: 7 [PH] (ref 5–8)
PHOSPHATE SERPL-MCNC: 3.4 MG/DL (ref 2.5–4.5)
PLATELET # BLD AUTO: 292 10*3/MM3 (ref 140–450)
PLATELET # BLD AUTO: 307 10*3/MM3 (ref 140–450)
PMV BLD AUTO: 9.6 FL (ref 6–12)
PMV BLD AUTO: 9.9 FL (ref 6–12)
POTASSIUM SERPL-SCNC: 4.4 MMOL/L (ref 3.5–5.2)
POTASSIUM SERPL-SCNC: 4.9 MMOL/L (ref 3.5–5.2)
PROT SERPL-MCNC: 6.9 G/DL (ref 6–8.5)
PROT UR QL STRIP: NEGATIVE
QT INTERVAL: 380 MS
QTC INTERVAL: 452 MS
RBC # BLD AUTO: 4.43 10*6/MM3 (ref 4.14–5.8)
RBC # BLD AUTO: 5.04 10*6/MM3 (ref 4.14–5.8)
SODIUM SERPL-SCNC: 142 MMOL/L (ref 136–145)
SODIUM SERPL-SCNC: 144 MMOL/L (ref 136–145)
SP GR UR STRIP: <=1.005 (ref 1–1.03)
TROPONIN T SERPL HS-MCNC: 22 NG/L
TSH SERPL DL<=0.05 MIU/L-ACNC: 3.22 UIU/ML (ref 0.27–4.2)
UROBILINOGEN UR QL STRIP: ABNORMAL
VALPROATE SERPL-MCNC: 36.4 MCG/ML (ref 50–125)
VIT B12 BLD-MCNC: 384 PG/ML (ref 211–946)
WBC NRBC COR # BLD AUTO: 10.47 10*3/MM3 (ref 3.4–10.8)
WBC NRBC COR # BLD AUTO: 9.87 10*3/MM3 (ref 3.4–10.8)
WHOLE BLOOD HOLD COAG: NORMAL
WHOLE BLOOD HOLD SPECIMEN: NORMAL

## 2024-04-19 PROCEDURE — 63710000001 INSULIN GLARGINE PER 5 UNITS: Performed by: INTERNAL MEDICINE

## 2024-04-19 PROCEDURE — 82140 ASSAY OF AMMONIA: CPT | Performed by: INTERNAL MEDICINE

## 2024-04-19 PROCEDURE — 70450 CT HEAD/BRAIN W/O DYE: CPT

## 2024-04-19 PROCEDURE — 82948 REAGENT STRIP/BLOOD GLUCOSE: CPT

## 2024-04-19 PROCEDURE — 81003 URINALYSIS AUTO W/O SCOPE: CPT | Performed by: EMERGENCY MEDICINE

## 2024-04-19 PROCEDURE — 25010000002 ZIPRASIDONE MESYLATE PER 10 MG: Performed by: EMERGENCY MEDICINE

## 2024-04-19 PROCEDURE — 25010000002 CYANOCOBALAMIN PER 1000 MCG: Performed by: STUDENT IN AN ORGANIZED HEALTH CARE EDUCATION/TRAINING PROGRAM

## 2024-04-19 PROCEDURE — G0378 HOSPITAL OBSERVATION PER HR: HCPCS

## 2024-04-19 PROCEDURE — 99222 1ST HOSP IP/OBS MODERATE 55: CPT

## 2024-04-19 PROCEDURE — 63710000001 INSULIN LISPRO (HUMAN) PER 5 UNITS: Performed by: INTERNAL MEDICINE

## 2024-04-19 PROCEDURE — 80048 BASIC METABOLIC PNL TOTAL CA: CPT | Performed by: INTERNAL MEDICINE

## 2024-04-19 PROCEDURE — 99223 1ST HOSP IP/OBS HIGH 75: CPT | Performed by: INTERNAL MEDICINE

## 2024-04-19 PROCEDURE — 83735 ASSAY OF MAGNESIUM: CPT | Performed by: INTERNAL MEDICINE

## 2024-04-19 PROCEDURE — 84100 ASSAY OF PHOSPHORUS: CPT | Performed by: INTERNAL MEDICINE

## 2024-04-19 PROCEDURE — 85025 COMPLETE CBC W/AUTO DIFF WBC: CPT | Performed by: INTERNAL MEDICINE

## 2024-04-19 PROCEDURE — 71045 X-RAY EXAM CHEST 1 VIEW: CPT

## 2024-04-19 PROCEDURE — 25010000002 HALOPERIDOL LACTATE PER 5 MG: Performed by: PHYSICIAN ASSISTANT

## 2024-04-19 PROCEDURE — P9612 CATHETERIZE FOR URINE SPEC: HCPCS

## 2024-04-19 PROCEDURE — 25010000002 MIDAZOLAM PER 1 MG: Performed by: EMERGENCY MEDICINE

## 2024-04-19 PROCEDURE — 25010000002 ZIPRASIDONE MESYLATE PER 10 MG

## 2024-04-19 PROCEDURE — 25010000002 HEPARIN (PORCINE) PER 1000 UNITS: Performed by: INTERNAL MEDICINE

## 2024-04-19 RX ORDER — MIRTAZAPINE 15 MG/1
15 TABLET, FILM COATED ORAL NIGHTLY
Status: DISCONTINUED | OUTPATIENT
Start: 2024-04-19 | End: 2024-04-24

## 2024-04-19 RX ORDER — BISACODYL 5 MG/1
5 TABLET, DELAYED RELEASE ORAL DAILY PRN
Status: DISCONTINUED | OUTPATIENT
Start: 2024-04-19 | End: 2024-04-26

## 2024-04-19 RX ORDER — FOLIC ACID 1 MG/1
1 TABLET ORAL DAILY
Status: DISCONTINUED | OUTPATIENT
Start: 2024-04-19 | End: 2024-05-09 | Stop reason: HOSPADM

## 2024-04-19 RX ORDER — POLYETHYLENE GLYCOL 3350 17 G/17G
17 POWDER, FOR SOLUTION ORAL DAILY PRN
Status: DISCONTINUED | OUTPATIENT
Start: 2024-04-19 | End: 2024-04-26

## 2024-04-19 RX ORDER — AMOXICILLIN 250 MG
2 CAPSULE ORAL 2 TIMES DAILY PRN
Status: DISCONTINUED | OUTPATIENT
Start: 2024-04-19 | End: 2024-04-26

## 2024-04-19 RX ORDER — MIRTAZAPINE 15 MG/1
15 TABLET, FILM COATED ORAL NIGHTLY
Status: DISCONTINUED | OUTPATIENT
Start: 2024-04-19 | End: 2024-04-19

## 2024-04-19 RX ORDER — QUETIAPINE FUMARATE 100 MG/1
100 TABLET, FILM COATED ORAL EVERY 12 HOURS SCHEDULED
Status: DISCONTINUED | OUTPATIENT
Start: 2024-04-19 | End: 2024-04-22

## 2024-04-19 RX ORDER — SODIUM CHLORIDE 9 MG/ML
40 INJECTION, SOLUTION INTRAVENOUS AS NEEDED
Status: DISCONTINUED | OUTPATIENT
Start: 2024-04-19 | End: 2024-05-09 | Stop reason: HOSPADM

## 2024-04-19 RX ORDER — AMLODIPINE BESYLATE 10 MG/1
10 TABLET ORAL
Status: DISCONTINUED | OUTPATIENT
Start: 2024-04-19 | End: 2024-04-24

## 2024-04-19 RX ORDER — ZIPRASIDONE MESYLATE 20 MG/ML
20 INJECTION, POWDER, LYOPHILIZED, FOR SOLUTION INTRAMUSCULAR ONCE
Status: COMPLETED | OUTPATIENT
Start: 2024-04-19 | End: 2024-04-19

## 2024-04-19 RX ORDER — MIRTAZAPINE 15 MG/1
30 TABLET, FILM COATED ORAL NIGHTLY
Status: DISCONTINUED | OUTPATIENT
Start: 2024-04-19 | End: 2024-04-19

## 2024-04-19 RX ORDER — TEMAZEPAM 15 MG/1
30 CAPSULE ORAL NIGHTLY PRN
Status: DISCONTINUED | OUTPATIENT
Start: 2024-04-19 | End: 2024-04-21

## 2024-04-19 RX ORDER — NICOTINE POLACRILEX 4 MG
15 LOZENGE BUCCAL
Status: DISCONTINUED | OUTPATIENT
Start: 2024-04-19 | End: 2024-05-09 | Stop reason: HOSPADM

## 2024-04-19 RX ORDER — ACETAMINOPHEN 650 MG/1
650 SUPPOSITORY RECTAL EVERY 4 HOURS PRN
Status: DISCONTINUED | OUTPATIENT
Start: 2024-04-19 | End: 2024-05-09 | Stop reason: HOSPADM

## 2024-04-19 RX ORDER — MIDAZOLAM HYDROCHLORIDE 1 MG/ML
1 INJECTION INTRAMUSCULAR; INTRAVENOUS ONCE
Status: COMPLETED | OUTPATIENT
Start: 2024-04-19 | End: 2024-04-19

## 2024-04-19 RX ORDER — CYANOCOBALAMIN 1000 UG/ML
1000 INJECTION, SOLUTION INTRAMUSCULAR; SUBCUTANEOUS
Status: DISCONTINUED | OUTPATIENT
Start: 2024-04-19 | End: 2024-05-09 | Stop reason: HOSPADM

## 2024-04-19 RX ORDER — BISACODYL 10 MG
10 SUPPOSITORY, RECTAL RECTAL DAILY PRN
Status: DISCONTINUED | OUTPATIENT
Start: 2024-04-19 | End: 2024-04-26

## 2024-04-19 RX ORDER — HALOPERIDOL 5 MG/1
5 TABLET ORAL 4 TIMES DAILY PRN
Status: DISCONTINUED | OUTPATIENT
Start: 2024-04-19 | End: 2024-04-24

## 2024-04-19 RX ORDER — DIVALPROEX SODIUM 125 MG/1
250 CAPSULE, COATED PELLETS ORAL EVERY 8 HOURS SCHEDULED
Status: DISCONTINUED | OUTPATIENT
Start: 2024-04-19 | End: 2024-04-22

## 2024-04-19 RX ORDER — DONEPEZIL HYDROCHLORIDE 10 MG/1
10 TABLET, FILM COATED ORAL DAILY
Status: DISCONTINUED | OUTPATIENT
Start: 2024-04-19 | End: 2024-04-19

## 2024-04-19 RX ORDER — ACETAMINOPHEN 160 MG/5ML
650 SOLUTION ORAL EVERY 4 HOURS PRN
Status: DISCONTINUED | OUTPATIENT
Start: 2024-04-19 | End: 2024-05-09 | Stop reason: HOSPADM

## 2024-04-19 RX ORDER — HALOPERIDOL 5 MG/ML
1 INJECTION INTRAMUSCULAR ONCE
Status: COMPLETED | OUTPATIENT
Start: 2024-04-19 | End: 2024-04-19

## 2024-04-19 RX ORDER — LISINOPRIL 20 MG/1
20 TABLET ORAL DAILY
Status: DISCONTINUED | OUTPATIENT
Start: 2024-04-19 | End: 2024-04-24

## 2024-04-19 RX ORDER — CHOLECALCIFEROL (VITAMIN D3) 125 MCG
5 CAPSULE ORAL NIGHTLY PRN
Status: DISCONTINUED | OUTPATIENT
Start: 2024-04-19 | End: 2024-05-02

## 2024-04-19 RX ORDER — TEMAZEPAM 15 MG/1
15 CAPSULE ORAL NIGHTLY PRN
Status: DISCONTINUED | OUTPATIENT
Start: 2024-04-19 | End: 2024-04-19

## 2024-04-19 RX ORDER — QUETIAPINE FUMARATE 25 MG/1
50 TABLET, FILM COATED ORAL EVERY MORNING
Status: DISCONTINUED | OUTPATIENT
Start: 2024-04-19 | End: 2024-04-19

## 2024-04-19 RX ORDER — IBUPROFEN 600 MG/1
1 TABLET ORAL
Status: DISCONTINUED | OUTPATIENT
Start: 2024-04-19 | End: 2024-05-09 | Stop reason: HOSPADM

## 2024-04-19 RX ORDER — WATER 10 ML/10ML
INJECTION INTRAMUSCULAR; INTRAVENOUS; SUBCUTANEOUS
Status: COMPLETED
Start: 2024-04-19 | End: 2024-04-19

## 2024-04-19 RX ORDER — ZIPRASIDONE MESYLATE 20 MG/ML
10 INJECTION, POWDER, LYOPHILIZED, FOR SOLUTION INTRAMUSCULAR EVERY 4 HOURS PRN
Status: DISCONTINUED | OUTPATIENT
Start: 2024-04-19 | End: 2024-05-09 | Stop reason: HOSPADM

## 2024-04-19 RX ORDER — DEXTROSE MONOHYDRATE 25 G/50ML
25 INJECTION, SOLUTION INTRAVENOUS
Status: DISCONTINUED | OUTPATIENT
Start: 2024-04-19 | End: 2024-05-09 | Stop reason: HOSPADM

## 2024-04-19 RX ORDER — SODIUM CHLORIDE 0.9 % (FLUSH) 0.9 %
10 SYRINGE (ML) INJECTION AS NEEDED
Status: DISCONTINUED | OUTPATIENT
Start: 2024-04-19 | End: 2024-05-09 | Stop reason: HOSPADM

## 2024-04-19 RX ORDER — INSULIN LISPRO 100 [IU]/ML
2-7 INJECTION, SOLUTION INTRAVENOUS; SUBCUTANEOUS
Status: DISCONTINUED | OUTPATIENT
Start: 2024-04-19 | End: 2024-05-09 | Stop reason: HOSPADM

## 2024-04-19 RX ORDER — QUETIAPINE FUMARATE 50 MG/1
100 TABLET, EXTENDED RELEASE ORAL NIGHTLY
Status: DISCONTINUED | OUTPATIENT
Start: 2024-04-19 | End: 2024-04-19

## 2024-04-19 RX ORDER — SODIUM CHLORIDE 0.9 % (FLUSH) 0.9 %
10 SYRINGE (ML) INJECTION EVERY 12 HOURS SCHEDULED
Status: DISCONTINUED | OUTPATIENT
Start: 2024-04-19 | End: 2024-05-09 | Stop reason: HOSPADM

## 2024-04-19 RX ORDER — HEPARIN SODIUM 5000 [USP'U]/ML
5000 INJECTION, SOLUTION INTRAVENOUS; SUBCUTANEOUS EVERY 8 HOURS SCHEDULED
Status: DISCONTINUED | OUTPATIENT
Start: 2024-04-19 | End: 2024-05-09 | Stop reason: HOSPADM

## 2024-04-19 RX ORDER — ACETAMINOPHEN 325 MG/1
650 TABLET ORAL EVERY 4 HOURS PRN
Status: DISCONTINUED | OUTPATIENT
Start: 2024-04-19 | End: 2024-05-09 | Stop reason: HOSPADM

## 2024-04-19 RX ADMIN — ZIPRASIDONE MESYLATE 20 MG: 20 INJECTION, POWDER, LYOPHILIZED, FOR SOLUTION INTRAMUSCULAR at 00:29

## 2024-04-19 RX ADMIN — CYANOCOBALAMIN 1000 MCG: 1000 INJECTION, SOLUTION INTRAMUSCULAR; SUBCUTANEOUS at 15:32

## 2024-04-19 RX ADMIN — Medication 10 ML: at 11:08

## 2024-04-19 RX ADMIN — INSULIN GLARGINE 10 UNITS: 100 INJECTION, SOLUTION SUBCUTANEOUS at 10:32

## 2024-04-19 RX ADMIN — LISINOPRIL 20 MG: 20 TABLET ORAL at 11:08

## 2024-04-19 RX ADMIN — INSULIN GLARGINE 10 UNITS: 100 INJECTION, SOLUTION SUBCUTANEOUS at 22:35

## 2024-04-19 RX ADMIN — ZIPRASIDONE MESYLATE 10 MG: 20 INJECTION, POWDER, LYOPHILIZED, FOR SOLUTION INTRAMUSCULAR at 11:01

## 2024-04-19 RX ADMIN — WATER 1.2 ML: 1 INJECTION INTRAMUSCULAR; INTRAVENOUS; SUBCUTANEOUS at 00:30

## 2024-04-19 RX ADMIN — HEPARIN SODIUM 5000 UNITS: 5000 INJECTION INTRAVENOUS; SUBCUTANEOUS at 10:32

## 2024-04-19 RX ADMIN — QUETIAPINE FUMARATE 100 MG: 100 TABLET ORAL at 12:28

## 2024-04-19 RX ADMIN — MIDAZOLAM HYDROCHLORIDE 1 MG: 1 INJECTION, SOLUTION INTRAMUSCULAR; INTRAVENOUS at 03:06

## 2024-04-19 RX ADMIN — HEPARIN SODIUM 5000 UNITS: 5000 INJECTION INTRAVENOUS; SUBCUTANEOUS at 15:32

## 2024-04-19 RX ADMIN — MIDAZOLAM HYDROCHLORIDE 1 MG: 1 INJECTION, SOLUTION INTRAMUSCULAR; INTRAVENOUS at 02:30

## 2024-04-19 RX ADMIN — AMLODIPINE BESYLATE 10 MG: 10 TABLET ORAL at 11:07

## 2024-04-19 RX ADMIN — ZIPRASIDONE MESYLATE 10 MG: 20 INJECTION, POWDER, LYOPHILIZED, FOR SOLUTION INTRAMUSCULAR at 17:23

## 2024-04-19 RX ADMIN — INSULIN LISPRO 4 UNITS: 100 INJECTION, SOLUTION INTRAVENOUS; SUBCUTANEOUS at 12:28

## 2024-04-19 RX ADMIN — HALOPERIDOL LACTATE 1 MG: 5 INJECTION, SOLUTION INTRAMUSCULAR at 06:07

## 2024-04-19 RX ADMIN — INSULIN LISPRO 2 UNITS: 100 INJECTION, SOLUTION INTRAVENOUS; SUBCUTANEOUS at 17:24

## 2024-04-19 RX ADMIN — DIVALPROEX SODIUM 250 MG: 125 CAPSULE ORAL at 16:30

## 2024-04-19 RX ADMIN — ZIPRASIDONE MESYLATE 10 MG: 20 INJECTION, POWDER, LYOPHILIZED, FOR SOLUTION INTRAMUSCULAR at 22:36

## 2024-04-19 RX ADMIN — HEPARIN SODIUM 5000 UNITS: 5000 INJECTION INTRAVENOUS; SUBCUTANEOUS at 22:35

## 2024-04-19 RX ADMIN — FOLIC ACID 1 MG: 1 TABLET ORAL at 11:07

## 2024-04-19 NOTE — ED PROVIDER NOTES
Subjective   History of Present Illness  Patient presents for evaluation of agitation in the setting of known dementia.  Symptoms have been worsening over the past 2 days.  He has been restless, agitated, combative.  He has had 2 recent hospitalizations for similar episodes.  They have also been working without outpatient neurology for medication adjustments.  Tonight the wife gave double the normal dose of temazepam and held his evening mirtazepine at the instruction of his neurologist but did not see improvement.  [facility] was called to bring him to the ER.  Patient himself cannot provide any history.      History provided by:  Patient      Review of Systems    Past Medical History:   Diagnosis Date    Coronary artery disease     Dementia     Diabetes mellitus     Hyperlipidemia     Peripheral vascular disease        Allergies   Allergen Reactions    Bactrim [Sulfamethoxazole-Trimethoprim] Rash    Adhesive Tape Rash    Neosporin [Neomycin-Bacitracin Zn-Polymyx] Rash       Past Surgical History:   Procedure Laterality Date    CORONARY ARTERY BYPASS GRAFT      FEMORAL ARTERY - POPLITEAL ARTERY BYPASS GRAFT         Family History   Problem Relation Age of Onset    Diabetes Mother     Heart disease Father     Hypertension Father     Hyperlipidemia Father     Diabetes Sister     Diabetes Maternal Grandfather     Diabetes Sister     Diabetes Sister        Social History     Socioeconomic History    Marital status:    Tobacco Use    Smoking status: Former     Current packs/day: 0.00     Types: Cigarettes     Quit date:      Years since quittin.3     Passive exposure: Never    Smokeless tobacco: Former   Vaping Use    Vaping status: Never Used   Substance and Sexual Activity    Alcohol use: No    Drug use: No    Sexual activity: Never           Objective   Physical Exam  Constitutional:       Comments: Alert, does not directly engage with examiner.  Agitated, swinging at staff members   HENT:      Head:  Normocephalic and atraumatic.   Eyes:      Conjunctiva/sclera: Conjunctivae normal.      Pupils: Pupils are equal, round, and reactive to light.   Cardiovascular:      Rate and Rhythm: Normal rate and regular rhythm.      Pulses: Normal pulses.      Heart sounds: No murmur heard.     No gallop.   Pulmonary:      Effort: Pulmonary effort is normal. No respiratory distress.   Abdominal:      General: Abdomen is flat. There is no distension.      Tenderness: There is no abdominal tenderness.   Musculoskeletal:         General: No swelling or deformity. Normal range of motion.   Skin:     General: Skin is warm and dry.      Capillary Refill: Capillary refill takes less than 2 seconds.   Neurological:      Comments: EMV = 4-5-3.  Moves all extremities equally.  Noncooperative with neurologic exam         Procedures           ED Course  ED Course as of 04/19/24 0309   Fri Apr 19, 2024   0049 Twelve-lead ECG independently interpreted by myself demonstrates normal sinus rhythm with a first-degree AV block, WV interval of 240, no ST segment elevation or depression. [KB]   0246 CT scan of the brain independently interpreted by myself demonstrates no acute intracranial hemorrhage [KB]   0246 Laboratory workup independently interpreted by myself is notable for hyperglycemia without evidence of DKA.  elevated high-sensitivity troponin is also seen [KB]      ED Course User Index  [KB] Delmar Rocha MD                                             Medical Decision Making  Differential diagnosis includes acute infectious or metabolic derangement to cause exacerbation of patient's underlying dementia or this could simply be progression of patient's dementia with agitation features.  Will obtain lab studies and reevaluate the patient.    While in the emergency department patient continues to be agitated, striking at nursing staff.  He was given multiple doses of IV benzodiazepines as well as 20 mg intramuscular Geodon with some  improvement.  Ultimately I believe he will require hospitalization for further management.    Problems Addressed:  Aggressive behavior: complicated acute illness or injury  Agitation due to dementia: complicated acute illness or injury  Dementia with behavioral disturbance: complicated acute illness or injury  Hyperglycemia due to diabetes mellitus: complicated acute illness or injury    Amount and/or Complexity of Data Reviewed  Independent Historian: EMS     Details: Prehospital course given by EMS  External Data Reviewed: notes.     Details: 4/4/2024 reviewed most recent discharge summary where patient was hospitalized for similar situation  Labs: ordered. Decision-making details documented in ED Course.  Radiology: ordered and independent interpretation performed. Decision-making details documented in ED Course.  ECG/medicine tests: ordered and independent interpretation performed. Decision-making details documented in ED Course.  Discussion of management or test interpretation with external provider(s): Hospital medicine consulted for admission    Risk  Prescription drug management.  Decision regarding hospitalization.        Final diagnoses:   Dementia with behavioral disturbance   Aggressive behavior   Agitation due to dementia   Hyperglycemia due to diabetes mellitus       ED Disposition  ED Disposition       ED Disposition   Decision to Admit    Condition   --    Comment   Level of Care: Telemetry [5]   Diagnosis: Agitation due to dementia [[id number]]   Admitting Physician: DONYA MCGRAW [[id number]]   Attending Physician: DONYA MCGRAW [[id number]]   Bed Request Comments: tele               Recent Results (from the past 24 hour(s))   Comprehensive Metabolic Panel    Collection Time: 04/18/24 11:56 PM    Specimen: Blood   Result Value Ref Range    Glucose 226 (H) 65 - 99 mg/dL    BUN 22 8 - 23 mg/dL    Creatinine 1.12 0.76 - 1.27 mg/dL    Sodium 142 136 - 145 mmol/L    Potassium 4.9 3.5 - 5.2 mmol/L    Chloride  104 98 - 107 mmol/L    CO2 28.0 22.0 - 29.0 mmol/L    Calcium 9.4 8.6 - 10.5 mg/dL    Total Protein 6.9 6.0 - 8.5 g/dL    Albumin 3.9 3.5 - 5.2 g/dL    ALT (SGPT) 13 1 - 41 U/L    AST (SGOT) 17 1 - 40 U/L    Alkaline Phosphatase 120 (H) 39 - 117 U/L    Total Bilirubin 0.3 0.0 - 1.2 mg/dL    Globulin 3.0 gm/dL    A/G Ratio 1.3 g/dL    BUN/Creatinine Ratio 19.6 7.0 - 25.0    Anion Gap 10.0 5.0 - 15.0 mmol/L    eGFR 72.0 >60.0 mL/min/1.73   Single High Sensitivity Troponin T    Collection Time: 04/18/24 11:56 PM    Specimen: Blood   Result Value Ref Range    HS Troponin T 22 (H) <22 ng/L   Magnesium    Collection Time: 04/18/24 11:56 PM    Specimen: Blood   Result Value Ref Range    Magnesium 1.8 1.6 - 2.4 mg/dL   Green Top (Gel)    Collection Time: 04/18/24 11:56 PM   Result Value Ref Range    Extra Tube Hold for add-ons.    Lavender Top    Collection Time: 04/18/24 11:56 PM   Result Value Ref Range    Extra Tube hold for add-on    Gold Top - SST    Collection Time: 04/18/24 11:56 PM   Result Value Ref Range    Extra Tube Hold for add-ons.    Light Blue Top    Collection Time: 04/18/24 11:56 PM   Result Value Ref Range    Extra Tube Hold for add-ons.    CBC Auto Differential    Collection Time: 04/18/24 11:56 PM    Specimen: Blood   Result Value Ref Range    WBC 9.87 3.40 - 10.80 10*3/mm3    RBC 4.43 4.14 - 5.80 10*6/mm3    Hemoglobin 13.8 13.0 - 17.7 g/dL    Hematocrit 42.2 37.5 - 51.0 %    MCV 95.3 79.0 - 97.0 fL    MCH 31.2 26.6 - 33.0 pg    MCHC 32.7 31.5 - 35.7 g/dL    RDW 11.9 (L) 12.3 - 15.4 %    RDW-SD 41.3 37.0 - 54.0 fl    MPV 9.9 6.0 - 12.0 fL    Platelets 292 140 - 450 10*3/mm3    Neutrophil % 47.4 42.7 - 76.0 %    Lymphocyte % 33.8 19.6 - 45.3 %    Monocyte % 11.6 5.0 - 12.0 %    Eosinophil % 5.7 0.3 - 6.2 %    Basophil % 1.0 0.0 - 1.5 %    Immature Grans % 0.5 0.0 - 0.5 %    Neutrophils, Absolute 4.68 1.70 - 7.00 10*3/mm3    Lymphocytes, Absolute 3.34 (H) 0.70 - 3.10 10*3/mm3    Monocytes, Absolute 1.14  (H) 0.10 - 0.90 10*3/mm3    Eosinophils, Absolute 0.56 (H) 0.00 - 0.40 10*3/mm3    Basophils, Absolute 0.10 0.00 - 0.20 10*3/mm3    Immature Grans, Absolute 0.05 0.00 - 0.05 10*3/mm3    nRBC 0.0 0.0 - 0.2 /100 WBC   Valproic Acid Level, Total    Collection Time: 04/18/24 11:56 PM    Specimen: Blood   Result Value Ref Range    Valproic Acid 36.4 (L) 50.0 - 125.0 mcg/mL   ECG 12 Lead ED Triage Standing Order; Weak / Dizzy / AMS    Collection Time: 04/19/24 12:46 AM   Result Value Ref Range    QT Interval 380 ms    QTC Interval 452 ms   Urinalysis With Microscopic If Indicated (No Culture) - Urine, Catheter    Collection Time: 04/19/24  2:43 AM    Specimen: Urine, Catheter   Result Value Ref Range    Color, UA Yellow Yellow, Straw    Appearance, UA Clear Clear    pH, UA 7.0 5.0 - 8.0    Specific Gravity, UA <=1.005 1.001 - 1.030    Glucose,  mg/dL (Trace) (A) Negative    Ketones, UA Negative Negative    Bilirubin, UA Negative Negative    Blood, UA Negative Negative    Protein, UA Negative Negative    Leuk Esterase, UA Negative Negative    Nitrite, UA Negative Negative    Urobilinogen, UA 0.2 E.U./dL 0.2 - 1.0 E.U./dL     Note: In addition to lab results from this visit, the labs listed above may include labs taken at another facility or during a different encounter within the last 24 hours. Please correlate lab times with ED admission and discharge times for further clarification of the services performed during this visit.    CT Head Without Contrast   Final Result   Impression:      1. No acute intracranial abnormality.   2. Moderate chronic small vessel ischemic change.            Electronically Signed: Reg Pelaez MD     4/19/2024 2:29 AM EDT     Workstation ID: [id number]      XR Chest 1 View   Final Result   Impression:   Limited study demonstrates no acute abnormality in the lungs.         Electronically Signed: Reg Pelaez MD     4/19/2024 1:29 AM EDT     Workstation ID: [id number]        Vitals:     04/18/24 2351 04/18/24 2357 04/18/24 2359 04/19/24 0231   BP: 148/75   158/84   BP Location: Left arm      Patient Position: Lying      Pulse: 93  91    Resp: 20      Temp:  97.2 °F (36.2 °C)     TempSrc:  Axillary     SpO2: 97%  91% 96%   Weight:       Height:         Medications   sodium chloride 0.9 % flush 10 mL (has no administration in time range)   ziprasidone (GEODON) injection 20 mg (20 mg Intramuscular Given 4/19/24 0029)   sterile water (preservative free) injection  - ADS Override Pull (1.2 mL  Given 4/19/24 0030)   midazolam (VERSED) injection 1 mg (1 mg Intravenous Given 4/19/24 0230)   midazolam (VERSED) injection 1 mg (1 mg Intravenous Given 4/19/24 0306)     ECG/EMG Results (last 24 hours)       Procedure Component Value Units Date/Time    ECG 12 Lead ED Triage Standing Order; Weak / Dizzy / AMS [[id number]] Collected: 04/19/24 0046     Updated: 04/19/24 0138     QT Interval 380 ms      QTC Interval 452 ms     Narrative:      Test Reason : ED Triage Standing Order~  Blood Pressure :   */*   mmHG  Vent. Rate :  85 BPM     Atrial Rate :  85 BPM     P-R Int : 240 ms          QRS Dur : 110 ms      QT Int : 380 ms       P-R-T Axes :  59 -24  58 degrees     QTc Int : 452 ms    Sinus rhythm with 1st degree AV block  Minimal voltage criteria for LVH, may be normal variant  Septal infarct (cited on or before 24-FEB-2024)  Abnormal ECG  When compared with ECG of 27-MAR-2024 16:04,  No significant change was found  Confirmed by MD ROGEL KYLE ([id number]) on 4/19/2024 1:38:21 AM    Referred By: EDMD           Confirmed By: MONIQUE ROGEL MD          ECG 12 Lead ED Triage Standing Order; Weak / Dizzy / AMS   Final Result   Test Reason : ED Triage Standing Order~   Blood Pressure :   */*   mmHG   Vent. Rate :  85 BPM     Atrial Rate :  85 BPM      P-R Int : 240 ms          QRS Dur : 110 ms       QT Int : 380 ms       P-R-T Axes :  59 -24  58 degrees      QTc Int : 452 ms      Sinus rhythm with 1st degree AV block    Minimal voltage criteria for LVH, may be normal variant   Septal infarct (cited on or before 24-FEB-2024)   Abnormal ECG   When compared with ECG of 27-MAR-2024 16:04,   No significant change was found   Confirmed by MD ROGEL KYLE ([id number]) on 4/19/2024 1:38:21 AM      Referred By: EDMD           Confirmed By: MONIQUE ROGEL MD              No follow-up provider specified.       Medication List      No changes were made to your prescriptions during this visit.            Monique Rogel MD  04/19/24 1345

## 2024-04-19 NOTE — CONSULTS
[facility] Neurology  Consult Note    Patient Name: Too Tai  : 1955  MRN: [id number]  Primary Care Physician:  Des Geller MD  Referring Physician: No ref. provider found  Date of admission: 2024    Subjective     Reason for Consult/ Chief Complaint: Agitation and dementia    Too Tai is a 68 y.o. male with a past medical history of advanced dementia with behavioral disturbances and nonverbal, CAD, T2DM, HLD, PVD who presented to [facility] ED due to increase in agitation.  Patient has had multiple recent hospitalizations, see below for summary.  Patient has recently been seen by his PCP Dr. Wili Du.  At that appointment patient's Seroquel was adjusted to Seroquel  mg nightly.  Patient was also given a refill of Restoril.    Per wife's report after Seroquel was adjusted patient began to not sleep.  She then increased his home Remeron to 30 mg (2 tablets) nightly.  Patient continued to not sleep, resulting in admission.    In the ED patient received multiple doses of Versed and, Geodon and Haldol for agitation.  CT head was negative for acute abnormality.  Creatinine 0.82.  Electrolytes unremarkable.  Glucose 166.  Infection markers unremarkable.  VPA 36.4.  UA negative for signs of infection.  /75, temp 97.2.    Upon assessment patient was extremely agitated.  He just received Geodon.  He was requiring 2-point restraints.  He was able to move all extremities.  Speech was clear and not garbled.  He was unable to answer questions.    Patient was hospitalized from 2024 to 3/4/2024 for frequent falls with myoclonic jerking.  At that time there was concern that patient's myoclonic jerking was her related to Rexulti and it was discontinued.  Patient continued to have behavioral disturbances related to his dementia and was started on Depakote 250 mg 3 times daily.  He was discharged to [facility] re[facility] on Aricept 20 mg nightly, Remeron 15 mg nightly, Restoril 15 mg  nightly, Seroquel 50 mg nightly and Depakote 250 mg 3 times daily.  Patient was discharged from rehab on 3/21.  At that time patient's wife then resumed his medication regimen that he had been on prior to hospitalization.  Patient's wife did not continue Depakote or Remeron at that time.    Patient then returned to the hospital on 3/27/2024 to 4/4/2024 for altered mental status and frequent falls.  At that time he was found to be orthostatic and there was concerns for UTI and pneumonia.  Patient was restarted on previous regimen and for his agitation with good results.  Patient completed course of antibiotics.  Patient was ultimately discharged home to his wife with caregivers.    Review Of Systems   Limited to assess due to baseline mental status    Personal History     Past Medical History:   Diagnosis Date    Coronary artery disease     Dementia     Diabetes mellitus     Hyperlipidemia     Peripheral vascular disease        Past Surgical History:   Procedure Laterality Date    CORONARY ARTERY BYPASS GRAFT      FEMORAL ARTERY - POPLITEAL ARTERY BYPASS GRAFT         Family History: family history includes Diabetes in his maternal grandfather, mother, sister, sister, and sister; Heart disease in his father; Hyperlipidemia in his father; Hypertension in his father. Otherwise pertinent FHx was reviewed and not pertinent to current issue.    Social History:  reports that he quit smoking about 27 years ago. His smoking use included cigarettes. He has never been exposed to tobacco smoke. He has quit using smokeless tobacco. He reports that he does not drink alcohol and does not use drugs.    Home Medications:   Accu-Chek Geri, Accu-Chek Geri Plus, Alcohol Prep, Divalproex Sodium, Glucagon, Insulin Glargine, Insulin Pen Needle, QUEtiapine, QUEtiapine fumarate ER, accu-chek soft touch, amLODIPine, donepezil, haloperidol, lisinopril, mirtazapine, and temazepam    Current Medications:     Current Facility-Administered  Medications:     acetaminophen (TYLENOL) tablet 650 mg, 650 mg, Oral, Q4H PRN **OR** acetaminophen (TYLENOL) 160 MG/5ML oral solution 650 mg, 650 mg, Oral, Q4H PRN **OR** acetaminophen (TYLENOL) suppository 650 mg, 650 mg, Rectal, Q4H PRN, Traci, Nerissa G, DO    amLODIPine (NORVASC) tablet 10 mg, 10 mg, Oral, Q24H, Traci, Nerissa G, DO    sennosides-docusate (PERICOLACE) 8.6-50 MG per tablet 2 tablet, 2 tablet, Oral, BID PRN **AND** polyethylene glycol (MIRALAX) packet 17 g, 17 g, Oral, Daily PRN **AND** bisacodyl (DULCOLAX) EC tablet 5 mg, 5 mg, Oral, Daily PRN **AND** bisacodyl (DULCOLAX) suppository 10 mg, 10 mg, Rectal, Daily PRN, Traci, Nerissa G, DO    Calcium Replacement - Follow Nurse / BPA Driven Protocol, , Does not apply, PRN, Traci, Nerissa G, DO    dextrose (D50W) (25 g/50 mL) IV injection 25 g, 25 g, Intravenous, Q15 Min PRN, Traci, Nerissa G, DO    dextrose (GLUTOSE) oral gel 15 g, 15 g, Oral, Q15 Min PRN, Traci, Nerissa G, DO    Divalproex Sodium (DEPAKOTE SPRINKLE) capsule 250 mg, 250 mg, Oral, Q8H, Traci, Nerissa G, DO    donepezil (ARICEPT) tablet 10 mg, 10 mg, Oral, Daily, Traci, Nerissa G, DO    folic acid (FOLVITE) tablet 1 mg, 1 mg, Oral, Daily, Traci, Nerissa G, DO    glucagon (GLUCAGEN) injection 1 mg, 1 mg, Intramuscular, Q15 Min PRN, Traci, Nerissa G, DO    haloperidol (HALDOL) tablet 5 mg, 5 mg, Oral, 4x Daily PRN, Traci, Nerissa G, DO    heparin (porcine) 5000 UNIT/ML injection 5,000 Units, 5,000 Units, Subcutaneous, Q8H, Traci, Nerissa G,     insulin glargine (LANTUS, SEMGLEE) injection 10 Units, 10 Units, Subcutaneous, Q12H, Nerissa vAiles,     Insulin Lispro (humaLOG) injection 2-7 Units, 2-7 Units, Subcutaneous, 4x Daily AC & at Bedtime, Nerissa Aviles,     lisinopril (PRINIVIL,ZESTRIL) tablet 20 mg, 20 mg, Oral, Daily, Nerissa Aviles, DO    Magnesium Standard Dose Replacement - Follow Nurse / BPA Driven Protocol, , Does not apply, PRN, Nerissa Aviles, DO    melatonin tablet 5 mg, 5  mg, Oral, Nightly PRN, Traci, Nerissa G, DO    mirtazapine (REMERON) tablet 15 mg, 15 mg, Oral, Nightly, Traci, Nerissa G, DO    Phosphorus Replacement - Follow Nurse / BPA Driven Protocol, , Does not apply, PRN, Traci, Nerissa G, DO    Potassium Replacement - Follow Nurse / BPA Driven Protocol, , Does not apply, PRN, Traci, Nerissa G, DO    QUEtiapine (SEROquel) tablet 50 mg, 50 mg, Oral, QAM, Traci, Nerissa G, DO    QUEtiapine fumarate ER (SEROquel XR) tablet 100 mg, 100 mg, Oral, Nightly, Traci, Nerissa G, DO    sodium chloride 0.9 % flush 10 mL, 10 mL, Intravenous, PRN, Traci, Nerissa G, DO    sodium chloride 0.9 % flush 10 mL, 10 mL, Intravenous, Q12H, Traci, Nerissa G, DO    sodium chloride 0.9 % flush 10 mL, 10 mL, Intravenous, PRN, Traci, Nerissa G, DO    sodium chloride 0.9 % infusion 40 mL, 40 mL, Intravenous, PRN, Traci, Nerissa G, DO    temazepam (RESTORIL) capsule 15 mg, 15 mg, Oral, Nightly PRN, Traci, Nerissa G, DO     Allergies:  Allergies   Allergen Reactions    Bactrim [Sulfamethoxazole-Trimethoprim] Rash    Adhesive Tape Rash    Neosporin [Neomycin-Bacitracin Zn-Polymyx] Rash       Objective     Physical Exam  Vitals and nursing note reviewed.   Constitutional:       General: He is not in acute distress.     Appearance: He is not ill-appearing.   Eyes:      Extraocular Movements: Extraocular movements intact.      Pupils: Pupils are equal, round, and reactive to light.      Comments: No nystagmus or deviated gaze noted   Cardiovascular:      Rate and Rhythm: Normal rate.   Pulmonary:      Effort: Pulmonary effort is normal.   Neurological:      Mental Status: He is alert. He is disoriented.      Cranial Nerves: No cranial nerve deficit, dysarthria or facial asymmetry.      Sensory: No sensory deficit.      Motor: No weakness or seizure activity.      Comments:       Physical exam extremely limited due to patient agitation.  Patient requiring 2-point restraints    Cranial Nerves   CN II: Pupils are equal,  round, and reactive to light. Normal visual acuity and visual fields.    CN III IV VI: Extraocular movements are full without nystagmus.  CN V: Normal facial sensation and strength of muscles of mastication.  CN VII: Facial movements are symmetric. No weakness.  CN VIII:  Auditory acuity is normal.  CN IX & X:  Symmetric palatal movement.  CN XII: The tongue is midline.  No atrophy or fasciculations.    Motor: Able to move all extremities    Unable to elicit DTRs or Babinski's due to agitation   Psychiatric:         Behavior: Behavior is agitated, aggressive and combative.         Vitals:  Temp:  [96.5 °F (35.8 °C)-97.2 °F (36.2 °C)] 96.5 °F (35.8 °C)  Heart Rate:  [91-97] 97  Resp:  [20] 20  BP: (148-169)/(75-93) 155/93    Laboratory Results:   Lab Results   Component Value Date    GLUCOSE 166 (H) 04/19/2024    CALCIUM 9.9 04/19/2024     04/19/2024    K 4.4 04/19/2024    CO2 30.0 (H) 04/19/2024     04/19/2024    BUN 16 04/19/2024    CREATININE 0.82 04/19/2024    EGFRIFAFRI 102 02/21/2022    EGFRIFNONA 88 02/21/2022    BCR 19.5 04/19/2024    ANIONGAP 8.0 04/19/2024     Lab Results   Component Value Date    WBC 10.47 04/19/2024    HGB 15.5 04/19/2024    HCT 46.7 04/19/2024    MCV 92.7 04/19/2024     04/19/2024     Lab Results   Component Value Date    CHOL 220 (H) 01/08/2024    CHOL 120 08/01/2019    CHOL 117 04/15/2019     Lab Results   Component Value Date    HDL 41 01/08/2024    HDL 37 (L) 08/03/2023    HDL 41 04/03/2023     Lab Results   Component Value Date     (H) 01/08/2024     (H) 08/03/2023     (H) 04/03/2023     Lab Results   Component Value Date    TRIG 108 01/08/2024    TRIG 208 (H) 08/03/2023    TRIG 138 04/03/2023     Lab Results   Component Value Date    HGBA1C 9.50 (H) 04/18/2024     Lab Results   Component Value Date    INR 1.1 11/25/2019    INR 1.1 09/10/2018    PROTIME 12.5 11/25/2019    PROTIME 13.3 09/10/2018     Lab Results   Component Value Date     JTMEDDUN97 380 03/27/2024     Lab Results   Component Value Date    FOLATE 10.20 02/24/2024             Assessment / Plan   Brief Patient Summary:  Too Tai is a 68 y.o. male with a past medical history of advanced dementia with behavioral disturbances and nonverbal, CAD, T2DM, HLD, PVD who presented to [facility] ED due to increase in agitation.  Patient has had multiple recent hospitalizations.     Plan:   Advanced dementia with behavioral disturbances  Sleep Deprivation   Continue home Aricept 23 mg daily  Continue home Depakote 250 3 times daily  Continue home Remeron 15 mg nightly  Discontinue Seroquel XR.  Initiate Seroquel 100 mg twice daily  Continue Restoril 30 mg nightly, patient has been on this dose for approximately 10 days post last hospitalization.  Geodon as needed for extreme agitation  QTc 452  CT head negative for acute abnormality  Vitamin B12 384, IM cyanocobalamin 1000 mcg  Patient currently requiring 2-point restraints  At this time would recommend consideration of a [facility]/behavioral facility postdischarge.  Concern patient is not only a harm to self or others as well.  Case management following for difficult posthospitalization disposition.  General neurology will see as needed over the weekend. Will follow up on monday    I have discussed the above with the patient, bedside RN, patient's wife/son, case management and Dr. Bain.    Time spent with patient: 80 minutes in face-to-face evaluation and management of the patient.       IVONNE Santos

## 2024-04-19 NOTE — PLAN OF CARE
Problem: Restraint, Nonviolent  Goal: Absence of Harm or Injury  Outcome: Ongoing, Progressing  Intervention: Implement Least Restrictive Safety Strategies  Recent Flowsheet Documentation  Taken 4/19/2024 1600 by Nu Veliz RN  Medical Device Protection: IV pole/bag removed from visual field  Less Restrictive Alternative:   appropriate expression promoted   bed alarm in use   calming techniques promoted   coping techniques promoted   emotional support provided   medication offered   family presence promoted   positive reinforcement provided   safety enhancements provided   sensory stimulation limited  De-Escalation Techniques:   diversional activity encouraged   appropriate behavior reinforced   family involvement requested  Diversional Activities: television  Taken 4/19/2024 1400 by Nu Veliz RN  Medical Device Protection: IV pole/bag removed from visual field  Less Restrictive Alternative:   appropriate expression promoted   bed alarm in use   calming techniques promoted   coping techniques promoted   emotional support provided   medication offered   family presence promoted   positive reinforcement provided   safety enhancements provided   sensory stimulation limited  De-Escalation Techniques: reoriented  Diversional Activities: television  Taken 4/19/2024 1200 by Nu Veliz RN  Medical Device Protection: IV pole/bag removed from visual field  Less Restrictive Alternative:   appropriate expression promoted   bed alarm in use   calming techniques promoted   coping techniques promoted   emotional support provided   medication offered   family presence promoted   positive reinforcement provided   safety enhancements provided   sensory stimulation limited  De-Escalation Techniques:   appropriate behavior reinforced   family involvement requested   verbally redirected  Diversional Activities: television  Taken 4/19/2024 1000 by Nu Veliz RN  Medical Device Protection: IV pole/bag removed  from visual field  Less Restrictive Alternative:   appropriate expression promoted   bed alarm in use   calming techniques promoted   coping techniques promoted   emotional support provided   medication offered   family presence promoted   positive reinforcement provided   safety enhancements provided   sensory stimulation limited  De-Escalation Techniques:   appropriate behavior reinforced   family involvement requested   increased round frequency   medication administered   reoriented  Diversional Activities: television  Taken 4/19/2024 0800 by Nu Veliz RN  Medical Device Protection: IV pole/bag removed from visual field  Less Restrictive Alternative:   appropriate expression promoted   bed alarm in use   calming techniques promoted   coping techniques promoted   emotional support provided   medication offered   family presence promoted   positive reinforcement provided   safety enhancements provided   sensory stimulation limited  De-Escalation Techniques:   quiet time facilitated   reoriented   stimulation decreased   verbally redirected   increased round frequency   family involvement requested  Diversional Activities: television  Taken 4/19/2024 0742 by Nu Veliz RN  Medical Device Protection:   IV pole/bag removed from visual field   tubing secured  Intervention: Protect Dignity, Rights, and Personal Wellbeing  Recent Flowsheet Documentation  Taken 4/19/2024 0742 by Nu Veliz RN  Trust Relationship/Rapport:   care explained   choices provided  Intervention: Protect Skin and Joint Integrity  Recent Flowsheet Documentation  Taken 4/19/2024 1600 by Nu Veliz RN  Body Position: supine, legs elevated  Taken 4/19/2024 1200 by Nu Veliz RN  Body Position: position changed independently  Taken 4/19/2024 0742 by Nu Veliz RN  Body Position:   turned   neutral body alignment   neutral head position   sitting up in bed  Range of Motion: active ROM (range of motion) encouraged    Goal Outcome Evaluation:

## 2024-04-19 NOTE — PROGRESS NOTES
[facility] Medicine Services  ADMISSION FOLLOW-UP NOTE          Patient admitted after midnight, H&P by my partner performed earlier on today's date reviewed.  Interim findings, labs, and charting also reviewed.        The [facility] Hospital Problem List has been managed and updated to include any new diagnoses:  Active Hospital Problems    Diagnosis  POA    **Agitation due to dementia [F03.911]  Yes    Type 2 diabetes mellitus with hyperglycemia, with long-term current use of insulin [E11.65, Z79.4]  Not Applicable    Peripheral neuropathy [G62.9]  Yes    Hyperlipidemia [E78.5]  Yes    Benign prostatic hyperplasia [N40.0]  Yes    Benign essential HTN [I10]  Yes    Peripheral vascular disease [I73.9]  Yes    Coronary artery disease involving native coronary artery of native heart [I25.10]  Yes      Resolved Hospital Problems   No resolved problems to display.         ADDITIONAL PLAN:  - detailed assessment and plan from admission reviewed    Too Tai is a 68 y.o. male with a PMH significant for advanced dementia, CAD, diabetes mellitus type 2, HLD, PVD who comes to the ED due to agitation.          Severe dementia complicated by behavioral disturbance  - UA negative; CT head without acute findings; CXR without acute findings  - Neuro consulted   -Continue home Aricept; continue home Depakote; continue Remeron 50 mg nightly  -Changed to Seroquel 100 mg twice daily  -Continue Restoril 30 mg nightly  -As needed Geodon  -Currently in restraints for patient and staff safety  -May need Pat psych on discharge     Diabetes mellitus type 2  - No active home meds  - SSI with Accu-Cheks  - Hemoglobin A1c 9.5     HTN  PVD  CAD  - Continue home meds    Expected Discharge   Expected Discharge Date: 4/21/2024; Expected Discharge Time:      Cecily Bain DO  04/19/24

## 2024-04-19 NOTE — ED NOTES
Too Tai    Nursing Report ED to Floor:  Mental status: aox4  Ambulatory status: ambulatory at home  Oxygen Therapy:  ra  Cardiac Rhythm: nsr  Admitted from: home  Safety Concerns:  Fall risk.,   Social Issues: none  ED Room #:  17    ED Nurse Phone Extension - [phone] or may call [phone].      HPI:   Chief Complaint   Patient presents with    Altered Mental Status       Past Medical History:  Past Medical History:   Diagnosis Date    Coronary artery disease     Dementia     Diabetes mellitus     Hyperlipidemia     Peripheral vascular disease         Past Surgical History:  Past Surgical History:   Procedure Laterality Date    CORONARY ARTERY BYPASS GRAFT      FEMORAL ARTERY - POPLITEAL ARTERY BYPASS GRAFT          Admitting Doctor:   Nerissa Aviles DO    Consulting Provider(s):  Consults       Date and Time Order Name Status Description    3/27/2024 10:27 PM Inpatient Palliative Care MD Consult Completed     3/27/2024 10:13 PM Inpatient Neurology Consult General Completed              Admitting Diagnosis:   The primary encounter diagnosis was Dementia with behavioral disturbance. Diagnoses of Aggressive behavior, Agitation due to dementia, and Hyperglycemia due to diabetes mellitus were also pertinent to this visit.    Most Recent Vitals:   Vitals:    04/18/24 2351 04/18/24 2357 04/18/24 2359 04/19/24 0231   BP: 148/75   158/84   BP Location: Left arm      Patient Position: Lying      Pulse: 93  91    Resp: 20      Temp:  97.2 °F (36.2 °C)     TempSrc:  Axillary     SpO2: 97%  91% 96%   Weight:       Height:           Active LDAs/IV Access:   Lines, Drains & Airways       Active LDAs       Name Placement date Placement time Site Days    Peripheral IV 04/19/24 0300 Anterior;Left Foot 04/19/24  0300  Foot  less than 1                    Labs (abnormal labs have a star):   Labs Reviewed   COMPREHENSIVE METABOLIC PANEL - Abnormal; Notable for the following components:       Result Value    Glucose 226 (*)     Alkaline  Phosphatase 120 (*)     All other components within normal limits    Narrative:     GFR Normal >60  Chronic Kidney Disease <60  Kidney Failure <15     SINGLE HS TROPONIN T - Abnormal; Notable for the following components:    HS Troponin T 22 (*)     All other components within normal limits    Narrative:     High Sensitive Troponin T Reference Range:  <14.0 ng/L- Negative Female for AMI  <22.0 ng/L- Negative Male for AMI  >=14 - Abnormal Female indicating possible myocardial injury.  >=22 - Abnormal Male indicating possible myocardial injury.   Clinicians would have to utilize clinical acumen, EKG, Troponin, and serial changes to determine if it is an Acute Myocardial Infarction or myocardial injury due to an underlying chronic condition.        CBC WITH AUTO DIFFERENTIAL - Abnormal; Notable for the following components:    RDW 11.9 (*)     Lymphocytes, Absolute 3.34 (*)     Monocytes, Absolute 1.14 (*)     Eosinophils, Absolute 0.56 (*)     All other components within normal limits   VALPROIC ACID LEVEL, TOTAL - Abnormal; Notable for the following components:    Valproic Acid 36.4 (*)     All other components within normal limits    Narrative:     Therapeutic Ranges for Valproic Acid    Epilepsy:       mcg/ml  Bipolar/Kacey  up to 125 mcg/ml     URINALYSIS W/ MICROSCOPIC IF INDICATED (NO CULTURE) - Abnormal; Notable for the following components:    Glucose,  mg/dL (Trace) (*)     All other components within normal limits    Narrative:     Urine microscopic not indicated.   MAGNESIUM - Normal   AMMONIA - Normal   RAINBOW DRAW    Narrative:     The following orders were created for panel order [place] Draw.  Procedure                               Abnormality         Status                     ---------                               -----------         ------                     Green Top (Gel)[[id number]]                                  Final result               Lavender Top[[id number]]                                      Final result               Gold Top - SST[[id number]]                                   Final result               [place] Top[[id number]]                                         Final result               Light Blue Top[[id number]]                                   Final result                 Please view results for these tests on the individual orders.   CBC AND DIFFERENTIAL    Narrative:     The following orders were created for panel order CBC & Differential.  Procedure                               Abnormality         Status                     ---------                               -----------         ------                     CBC Auto Differential[[id number]]        Abnormal            Final result                 Please view results for these tests on the individual orders.   GREEN TOP   LAVENDER TOP   GOLD TOP - SST   GRAY TOP   LIGHT BLUE TOP       Meds Given in ED:   Medications   sodium chloride 0.9 % flush 10 mL (has no administration in time range)   ziprasidone (GEODON) injection 20 mg (20 mg Intramuscular Given 4/19/24 0029)   sterile water (preservative free) injection  - ADS Override Pull (1.2 mL  Given 4/19/24 0030)   midazolam (VERSED) injection 1 mg (1 mg Intravenous Given 4/19/24 0230)   midazolam (VERSED) injection 1 mg (1 mg Intravenous Given 4/19/24 0306)           Last NIH score:                                                          Dysphagia screening results:  Patient Factors Component (Dysphagia:Stroke or Rule-out)  Best Eye Response: 4-->(E4) spontaneous (04/19/24 0054)  Best Motor Response: 6-->(M6) obeys commands (04/19/24 0054)  Best Verbal Response: 4-->(V4) confused (04/19/24 0054)  [place] Coma Scale Score: 14 (04/19/24 0054)     [place] Coma Scale:  No data recorded     CIWA:        Restraint Type:            Isolation Status:  No active isolations

## 2024-04-19 NOTE — CASE MANAGEMENT/SOCIAL WORK
Discharge Planning Assessment  [facility]     Patient Name: Too Tai  MRN: [id number]  Today's Date: 4/19/2024    Admit Date: 4/18/2024    Plan: TBD   Discharge Needs Assessment       Row Name 04/19/24 1354       Living Environment    People in Home spouse    Current Living Arrangements home    Potentially Unsafe Housing Conditions none    Primary Care Provided by spouse/significant other    Provides Primary Care For no one, unable/limited ability to care for self    Family Caregiver if Needed spouse    Family Caregiver Names Wife, [place]    Quality of Family Relationships helpful;involved;supportive    Able to Return to Prior Arrangements other (see comments)  TBD       Resource/Environmental Concerns    Resource/Environmental Concerns none    Transportation Concerns none       Transition Planning    Patient/Family Anticipates Transition to long-term care facility;home with help/services    Patient/Family Anticipated Services at Transition     Transportation Anticipated health plan transportation       Discharge Needs Assessment    Readmission Within the Last 30 Days no previous admission in last 30 days    Equipment Currently Used at Home none    Concerns to be Addressed discharge planning;other (see comments)  Advanced dementia with behavioral disturbances, agitation & combativeness. Nonverbal.    Discharge Facility/Level of Care Needs psychiatric facility;nursing facility, intermediate                   Discharge Plan       Row Name 04/19/24 2084       Plan    Plan TBD    Patient/Family in Agreement with Plan yes    Plan Comments Spoke to wife and son at bedside to initiate discharge planning. Patient lives at home with wife in [facility]. They currently have (2) caregivers 8 hrs a day/7 days a week. Caregivers are privately hired & not through an agency. Prior to admission, he was dependent with all ADL's. Per wife, no DME for mobility. No home oxygen. He is current [facility] [place] for , PT/OT per Saud. His PCP is Des Geller. He has drug coverage and [employer] Medicare. His plan is TBD. Neurology &  following. CM will continue to follow and assist.    Final Discharge Disposition Code 30 - still a patient                  Continued Care and Services - Admitted Since 4/18/2024    No active coordination exists for this encounter.       Selected Continued Care - Episodes Includes continued care and service providers with selected services from the active episodes listed below      Ambulatory Social Work Case Management Episode start date: 4/12/2024   There are no active outsourced providers for this episode.             High Risk Care Management Episode start date: 1/9/2024   There are no active outsourced providers for this episode.                 Selected Continued Care - Prior Encounters Includes continued care and service providers with selected services from prior encounters from 1/19/2024 to 4/19/2024      Discharged on 4/4/2024 Admission date: 3/27/2024 - Discharge disposition: Home-Health Care Svc      Home Medical Care       Service Provider Selected Services Address Phone Fax Patient Preferred    [facility]  [facility] [address] [id number] [phone] --                      Discharged on 3/4/2024 Admission date: 2/24/2024 - Discharge disposition: Skilled Nursing Facility (DC - External)      Destination       Service Provider Selected Services Address Phone Fax Patient Preferred    [facility] [address] [address] [phone] --                          Expected Discharge Date and Time       Expected Discharge Date Expected Discharge Time    Apr 21, 2024            Demographic Summary       Row Name 04/19/24 1353       General Information    Admission Type observation    Arrived From emergency department    Referral Source  admission list    Reason for Consult discharge planning    Preferred Language English                   Functional Status       Row Name 04/19/24 2468       Functional Status    Usual Activity Tolerance moderate    Current Activity Tolerance moderate       Functional Status, IADL    Medications completely dependent    Meal Preparation completely dependent    Housekeeping completely dependent    Laundry completely dependent    Shopping completely dependent                   Psychosocial    No documentation.                  Abuse/Neglect    No documentation.                  Legal    No documentation.                  Substance Abuse    No documentation.                  Patient Forms    No documentation.                     Ilene Brown RN

## 2024-04-19 NOTE — H&P
[facility] Medicine Services  HISTORY AND PHYSICAL    Patient Name: Too Tai  : 1955  MRN: [id number]  Primary Care Physician: Des Geller MD  Date of admission: 2024      Subjective   Subjective     Chief Complaint:  Agitation    HPI:  Too Tai is a 68 y.o. male with a PMH significant for advanced dementia, CAD, diabetes mellitus type 2, HLD, PVD who comes to the ED due to agitation.  Patient with advanced dementia, nonverbal.  No family present.  HPI obtained from EMR.  Patient was hospitalized with d/c on 3/4/2024 after presenting with frequent falls and increased myoclonus.  Symptoms were felt to be secondary to Rexulti which was discontinued.  Patient was hospitalized again with d/c on 2024 where he was evaluated after having falls at home with increased confusion.  He was found to be orthostatic with concerns for UTI and pneumonia.  He was treated with antibiotics discharged home.  Patient has become more agitated, restless and combative over the past 2 days.  He has been seen by neurology outpatient with medication adjustments.  He has had no improvement of agitation.  He was brought to the ED by spouse due to these concerns.      Personal History     Past Medical History:   Diagnosis Date    Coronary artery disease     Dementia     Diabetes mellitus     Hyperlipidemia     Peripheral vascular disease            Past Surgical History:   Procedure Laterality Date    CORONARY ARTERY BYPASS GRAFT      FEMORAL ARTERY - POPLITEAL ARTERY BYPASS GRAFT         Family History: family history includes Diabetes in his maternal grandfather, mother, sister, sister, and sister; Heart disease in his father; Hyperlipidemia in his father; Hypertension in his father.     Social History:  reports that he quit smoking about 27 years ago. His smoking use included cigarettes. He has never been exposed to tobacco smoke. He has quit using smokeless tobacco. He reports  that he does not drink alcohol and does not use drugs.  Social History     Social History Narrative    Not on file       Medications:  Available home medication information reviewed.  Accu-Chek Geri, Accu-Chek Geri Plus, Alcohol Prep, Divalproex Sodium, Glucagon, Insulin Glargine, Insulin Pen Needle, QUEtiapine, QUEtiapine fumarate ER, accu-chek soft touch, amLODIPine, donepezil, haloperidol, lisinopril, mirtazapine, and temazepam    Allergies   Allergen Reactions    Bactrim [Sulfamethoxazole-Trimethoprim] Rash    Adhesive Tape Rash    Neosporin [Neomycin-Bacitracin Zn-Polymyx] Rash       Objective   Objective     Vital Signs:   Temp:  [97.2 °F (36.2 °C)] 97.2 °F (36.2 °C)  Heart Rate:  [91-93] 91  Resp:  [20] 20  BP: (148-158)/(75-84) 158/84       Physical Exam   Constitutional: Awake, alert, fidgety  Eyes: PERRLA, sclerae anicteric, no conjunctival injection  HENT: NCAT, mucous membranes moist  Neck: Supple, no thyromegaly, no lymphadenopathy, trachea midline  Respiratory: Clear to auscultation bilaterally, nonlabored respirations   Cardiovascular: RRR, no murmurs, rubs, or gallops, palpable pedal pulses bilaterally  Gastrointestinal: Positive bowel sounds, soft, nontender, nondistended  Musculoskeletal: No bilateral ankle edema, no clubbing or cyanosis to extremities  Psychiatric: Fidgety  Neurologic: Unable to assess, not following commands  Skin: No rashes      Result Review:  I have personally reviewed the results from the time of this admission to 4/19/2024 03:04 EDT and agree with these findings:  [x]  Laboratory list / accordion  []  Microbiology  [x]  Radiology  [x]  EKG/Telemetry   []  Cardiology/Vascular   []  Pathology  [x]  Old records      LAB RESULTS:      Lab 04/18/24  2356   WBC 9.87   HEMOGLOBIN 13.8   HEMATOCRIT 42.2   PLATELETS 292   NEUTROS ABS 4.68   IMMATURE GRANS (ABS) 0.05   LYMPHS ABS 3.34*   MONOS ABS 1.14*   EOS ABS 0.56*   MCV 95.3         Lab 04/18/24  2356   SODIUM 142   POTASSIUM  4.9   CHLORIDE 104   CO2 28.0   ANION GAP 10.0   BUN 22   CREATININE 1.12   EGFR 72.0   GLUCOSE 226*   CALCIUM 9.4   MAGNESIUM 1.8         Lab 04/18/24  2356   TOTAL PROTEIN 6.9   ALBUMIN 3.9   GLOBULIN 3.0   ALT (SGPT) 13   AST (SGOT) 17   BILIRUBIN 0.3   ALK PHOS 120*         Lab 04/18/24  2356   HSTROP T 22*                 UA          2/24/2024    23:54 3/27/2024    16:33 4/19/2024    02:43   Urinalysis   Squamous Epithelial Cells, UA 0-2  0-2     Specific Gravity, UA 1.032  1.038  <=1.005    Ketones, UA Trace  40 mg/dL (2+)  Negative    Blood, UA Negative  Negative  Negative    Leukocytes, UA Negative  Negative  Negative    Nitrite, UA Negative  Positive  Negative    RBC, UA 0-2  0-2     WBC, UA 0-2  0-2     Bacteria, UA None Seen  4+         Microbiology Results (last 10 days)       ** No results found for the last 240 hours. **            CT Head Without Contrast    Result Date: 4/19/2024  CT HEAD WO CONTRAST Date of Exam: 4/19/2024 2:11 AM EDT Indication: Mental status change, unknown cause. Comparison: 3/28/2024. Technique: Axial CT images were obtained of the head without contrast administration.  Automated exposure control and iterative construction methods were used. Findings: There is no acute intracranial hemorrhage. No mass effect, midline shift or abnormal extra-axial collection. There is stable moderate patchy periventricular white matter hypoattenuation. Mild age-appropriate generalized parenchymal volume loss. No new hypoattenuating lesions in the brain. Cortical gray-white differentiation appears intact. The orbits are unremarkable. Mastoids and paranasal sinuses appear well aerated.     Impression: Impression: 1. No acute intracranial abnormality. 2. Moderate chronic small vessel ischemic change. Electronically Signed: Reg Pelaez MD  4/19/2024 2:29 AM EDT  Workstation ID: [id number]    XR Chest 1 View    Result Date: 4/19/2024  XR CHEST 1 VW Date of Exam: 4/19/2024 12:24 AM EDT Indication:  Weak/Dizzy/AMS triage protocol Comparison: 3/27/2024. Findings: There is evidence of prior median sternotomy and CABG. Cardiac silhouette appears unchanged. Pulmonary vasculature is within normal limits. Evaluation of the left lung base is limited due to lordotic view and positioning. No definite lung consolidation. No pneumothorax or pleural effusion.     Impression: Impression: Limited study demonstrates no acute abnormality in the lungs. Electronically Signed: Reg Pelaez MD  4/19/2024 1:29 AM EDT  Workstation ID: [id number]     Results for orders placed during the hospital encounter of 03/27/24    Adult Transthoracic Echo Complete W/ Cont if Necessary Per Protocol    Interpretation Summary    Left ventricular systolic function is normal. Calculated left ventricular EF = 55.1% Normal left ventricular cavity size noted. Left ventricular wall thickness is consistent with mild concentric hypertrophy. Left ventricular diastolic function is consistent with (grade I) impaired relaxation.    The right ventricular cavity is mildly dilated. Normal right ventricular systolic function noted.    There is calcification of the aortic valve. The aortic valve appears trileaflet. Mild aortic valve regurgitation is present. No aortic valve stenosis is present.    Mitral annular calcification is present. Trace mitral valve regurgitation is present. No significant mitral valve stenosis is present.    The tricuspid valve is structurally normal with no stenosis present. Trace tricuspid valve regurgitation is present. Insufficient TR velocity profile to estimate the right ventricular systolic pressure.    Mild dilation of the sinuses of Valsalva is present. Mild dilation of the ascending aorta is present. Ascending aorta = 4.2 cm      Assessment & Plan   Assessment & Plan       Agitation due to dementia    Benign essential HTN    Benign prostatic hyperplasia    Coronary artery disease involving native coronary artery of native heart     Type 2 diabetes mellitus with hyperglycemia, with long-term current use of insulin    Hyperlipidemia    Peripheral neuropathy    Peripheral vascular disease    Too Tai is a 68 y.o. male with a PMH significant for advanced dementia, CAD, diabetes mellitus type 2, HLD, PVD who comes to the ED due to agitation.        Severe dementia complicated by behavioral disturbance  -Check UA  - CT head wnl  - Consult neurology  - Fall precautions  - Case management consult  - Continue home Depakote, Aricept, Haldol, Seroquel, Restoril, Remeron  - QTc 452    Diabetes mellitus type 2  - No active home meds  - SSI with Accu-Cheks  - Hemoglobin A1c for a.m.    HTN  PVD  CAD  - Continue home meds    Home med list reviewed    DVT prophylaxis:  Mechanical and medical DVT prophylaxis orders are signed and held.       CODE STATUS: No family present.  CODE STATUS will need to be clarified with family, was DNR during prior stay.  There are no questions and answers to display.     Expected Discharge   Expected Discharge Date: 4/21/2024; Expected Discharge Time:      Nerissa Aviles DO  04/19/24

## 2024-04-19 NOTE — PROGRESS NOTES
Patient is current with [facility] for SN/PT/OT. If remains observation no new home health orders needed and if changes to inpatient status please forward any resumption of care orders if d/c to home. Thank you Saud KAYE([facility])Hospital Liaison

## 2024-04-20 PROBLEM — R45.1 AGITATION: Status: ACTIVE | Noted: 2024-04-20

## 2024-04-20 LAB
GLUCOSE BLDC GLUCOMTR-MCNC: 174 MG/DL (ref 70–130)
GLUCOSE BLDC GLUCOMTR-MCNC: 193 MG/DL (ref 70–130)
GLUCOSE BLDC GLUCOMTR-MCNC: 225 MG/DL (ref 70–130)
GLUCOSE BLDC GLUCOMTR-MCNC: 338 MG/DL (ref 70–130)
GLUCOSE BLDC GLUCOMTR-MCNC: 345 MG/DL (ref 70–130)

## 2024-04-20 PROCEDURE — 63710000001 INSULIN GLARGINE PER 5 UNITS: Performed by: INTERNAL MEDICINE

## 2024-04-20 PROCEDURE — 25010000002 HEPARIN (PORCINE) PER 1000 UNITS: Performed by: INTERNAL MEDICINE

## 2024-04-20 PROCEDURE — 97166 OT EVAL MOD COMPLEX 45 MIN: CPT

## 2024-04-20 PROCEDURE — 97162 PT EVAL MOD COMPLEX 30 MIN: CPT | Performed by: PHYSICAL THERAPIST

## 2024-04-20 PROCEDURE — 25010000002 ZIPRASIDONE MESYLATE PER 10 MG

## 2024-04-20 PROCEDURE — 99232 SBSQ HOSP IP/OBS MODERATE 35: CPT | Performed by: INTERNAL MEDICINE

## 2024-04-20 PROCEDURE — 82948 REAGENT STRIP/BLOOD GLUCOSE: CPT

## 2024-04-20 PROCEDURE — 63710000001 INSULIN LISPRO (HUMAN) PER 5 UNITS: Performed by: INTERNAL MEDICINE

## 2024-04-20 RX ADMIN — INSULIN LISPRO 3 UNITS: 100 INJECTION, SOLUTION INTRAVENOUS; SUBCUTANEOUS at 21:30

## 2024-04-20 RX ADMIN — FOLIC ACID 1 MG: 1 TABLET ORAL at 07:59

## 2024-04-20 RX ADMIN — DIVALPROEX SODIUM 250 MG: 125 CAPSULE ORAL at 14:24

## 2024-04-20 RX ADMIN — LISINOPRIL 20 MG: 20 TABLET ORAL at 07:56

## 2024-04-20 RX ADMIN — INSULIN LISPRO 2 UNITS: 100 INJECTION, SOLUTION INTRAVENOUS; SUBCUTANEOUS at 07:55

## 2024-04-20 RX ADMIN — DONEPEZIL HYDROCHLORIDE 22.5 MG: 10 TABLET, FILM COATED ORAL at 07:58

## 2024-04-20 RX ADMIN — ZIPRASIDONE MESYLATE 10 MG: 20 INJECTION, POWDER, LYOPHILIZED, FOR SOLUTION INTRAMUSCULAR at 16:09

## 2024-04-20 RX ADMIN — INSULIN GLARGINE 10 UNITS: 100 INJECTION, SOLUTION SUBCUTANEOUS at 07:55

## 2024-04-20 RX ADMIN — ZIPRASIDONE MESYLATE 10 MG: 20 INJECTION, POWDER, LYOPHILIZED, FOR SOLUTION INTRAMUSCULAR at 22:51

## 2024-04-20 RX ADMIN — ZIPRASIDONE MESYLATE 10 MG: 20 INJECTION, POWDER, LYOPHILIZED, FOR SOLUTION INTRAMUSCULAR at 07:53

## 2024-04-20 RX ADMIN — DIVALPROEX SODIUM 250 MG: 125 CAPSULE ORAL at 21:28

## 2024-04-20 RX ADMIN — QUETIAPINE FUMARATE 100 MG: 100 TABLET ORAL at 21:29

## 2024-04-20 RX ADMIN — HEPARIN SODIUM 5000 UNITS: 5000 INJECTION INTRAVENOUS; SUBCUTANEOUS at 05:42

## 2024-04-20 RX ADMIN — INSULIN LISPRO 5 UNITS: 100 INJECTION, SOLUTION INTRAVENOUS; SUBCUTANEOUS at 17:36

## 2024-04-20 RX ADMIN — MIRTAZAPINE 15 MG: 15 TABLET, FILM COATED ORAL at 21:29

## 2024-04-20 RX ADMIN — INSULIN LISPRO 2 UNITS: 100 INJECTION, SOLUTION INTRAVENOUS; SUBCUTANEOUS at 11:59

## 2024-04-20 RX ADMIN — AMLODIPINE BESYLATE 10 MG: 10 TABLET ORAL at 07:56

## 2024-04-20 RX ADMIN — HEPARIN SODIUM 5000 UNITS: 5000 INJECTION INTRAVENOUS; SUBCUTANEOUS at 21:29

## 2024-04-20 RX ADMIN — QUETIAPINE FUMARATE 100 MG: 100 TABLET ORAL at 07:56

## 2024-04-20 RX ADMIN — INSULIN GLARGINE 10 UNITS: 100 INJECTION, SOLUTION SUBCUTANEOUS at 21:30

## 2024-04-20 RX ADMIN — HEPARIN SODIUM 5000 UNITS: 5000 INJECTION INTRAVENOUS; SUBCUTANEOUS at 14:23

## 2024-04-20 NOTE — PLAN OF CARE
Goal Outcome Evaluation:  Plan of Care Reviewed With: patient, spouse           Outcome Evaluation: PT evaluation completed.  Evaluation limited d/t severe dementia.  Pt stood x 2 reps with MinAx2 and took 3 sidesteps towards HOB with MinAx2 with PT guiding him towards HOB.  Unfortunately d/t pt's cognition, pt does not initiate movement and will not allow anything if he doesn't want to do it.  Pt becomes very easily agitated and combative if encouraged.  At this time, pt is not appropriate for skilled PT services.  If pt becomes more cooperative, we will be happy to come back to re-assess.  Recommend extended care facility.      Anticipated Discharge Disposition (PT): extended care facility

## 2024-04-20 NOTE — THERAPY DISCHARGE NOTE
Acute Care - Occupational Therapy Discharge  [facility]    Patient Name: Too Tai  : 1955    MRN: [id number]                              Today's Date: 2024       Admit Date: 2024    Visit Dx:     ICD-10-CM ICD-9-CM   1. Dementia with behavioral disturbance  F03.918 294.21   2. Aggressive behavior  R46.89 V40.39   3. Agitation due to dementia  F03.911 294.21   4. Hyperglycemia due to diabetes mellitus  E11.65 250.02     Patient Active Problem List   Diagnosis    Cough    Benign essential HTN    Benign prostatic hyperplasia    Coronary artery disease involving native coronary artery of native heart    Chronic coronary artery disease    Type 2 diabetes mellitus with hyperglycemia, with long-term current use of insulin    Gout    Hyperlipidemia    History of heart attack    Peripheral neuropathy    Peripheral vascular disease    Spasm of muscle    Myoclonic jerking    Hypomagnesemia    Arteriosclerosis of coronary artery    Dementia with behavioral disturbance    Frequent falls    History of DVT (deep vein thrombosis)    Chronic anticoagulation    AMS (altered mental status)    Falls    Bacteriuria    Cystitis    Agitation due to dementia    Agitation     Past Medical History:   Diagnosis Date    Coronary artery disease     Dementia     Diabetes mellitus     Hyperlipidemia     Peripheral vascular disease      Past Surgical History:   Procedure Laterality Date    CORONARY ARTERY BYPASS GRAFT      FEMORAL ARTERY - POPLITEAL ARTERY BYPASS GRAFT        General Information       Row Name 24 1331          OT Time and Intention    Document Type discharge evaluation/summary  -AC     Mode of Treatment occupational therapy  -AC       Row Name 24 3961          General Information    Patient Profile Reviewed yes  -AC     Prior Level of Function min assist:;feeding;max assist:;grooming;dressing;bathing  wife reports someone is always with pt when walking in home  -AC     Existing  Precautions/Restrictions fall  B wrist restraints, advanced dementia, agitated  -     Barriers to Rehab medically complex;previous functional deficit;cognitive status  -       Row Name 04/20/24 1331          Living Environment    People in Home spouse;other (see comments)  CG 8 hrs a day  -       Row Name 04/20/24 1331          Home Main Entrance    Number of Stairs, Main Entrance other (see comments)  entry ramp  -       Row Name 04/20/24 1331          Stairs Within Home, Primary    Number of Stairs, Within Home, Primary none  -       Row Name 04/20/24 1331          Cognition    Orientation Status (Cognition) disoriented to;person;place;situation;time  -       Row Name 04/20/24 1331          Safety Issues, Functional Mobility    Safety Issues Affecting Function (Mobility) ability to follow commands;at risk behavior observed;awareness of need for assistance;impulsivity;insight into deficits/self-awareness;judgment;positioning of assistive device;problem-solving;safety precaution awareness;safety precautions follow-through/compliance;sequencing abilities  -     Impairments Affecting Function (Mobility) balance;cognition;endurance/activity tolerance  -     Cognitive Impairments, Mobility Safety/Performance attention;awareness, need for assistance;impulsivity;insight into deficits/self-awareness;judgment;problem-solving/reasoning;safety precaution awareness;safety precaution follow-through;sequencing abilities  -               User Key  (r) = Recorded By, (t) = Taken By, (c) = Cosigned By      Initials Name Provider Type     Milena Keane OT Occupational Therapist                   Mobility/ADL's       Row Name 04/20/24 1331          Bed Mobility    Bed Mobility supine-sit;sit-supine  -     Supine-Sit [place] (Bed Mobility) standby assist  -     Sit-Supine [place] (Bed Mobility) standby assist  pt laid down on his own, but returned to sitting. Pt agitated with encouragement to return  to supine and required 3 person assist to return to supine  -     Bed Mobility, Safety Issues cognitive deficits limit understanding  -       Row Name 04/20/24 1331          Functional Mobility    Functional Mobility- Ind. Level minimum assist (75% patient effort);2 person assist required  -     Functional Mobility- Device other (see comments)  UE support  -     Functional Mobility-Distance (Feet) 3  -AC     Functional Mobility- Safety Issues step length decreased  -     Functional Mobility- Comment 3 side steps toward HOB once given physical/verbal cues to initiate  -       Row Name 04/20/24 1331          Activities of Daily Living    BADL Assessment/Intervention lower body dressing;grooming  -       Row Name 04/20/24 1331          Lower Body Dressing Assessment/Training    [place] Level (Lower Body Dressing) don;dependent (less than 25% patient effort)  -AC     Position (Lower Body Dressing) edge of bed sitting  -     Comment, (Lower Body Dressing) pt would hold sock and take toward his foot but unable to intiate donning sock  -       Row Name 04/20/24 1331          Grooming Assessment/Training    [place] Level (Grooming) wash face, hands;dependent (less than 25% patient effort)  -AC     Position (Grooming) edge of bed sitting  -AC     Comment, (Grooming) pt held wash cloth in his hand and given [place] A, but pt did not initiate  -               User Key  (r) = Recorded By, (t) = Taken By, (c) = Cosigned By      Initials Name Provider Type    Milena Shankar, OT Occupational Therapist                   Obj/Interventions       Row Name 04/20/24 1419          Sensory Assessment (Somatosensory)    Sensory Assessment (Somatosensory) unable/difficult to assess  -[facility] Name 04/20/24 1419          Vision Assessment/Intervention    Visual Impairment/Limitations unable/difficult to assess  -       Row Name 04/20/24 1419          Range of Motion Comprehensive    General Range of Motion  bilateral upper extremity ROM WNL  -     Comment, General Range of Motion pt actively moving arms throughout eval  -[facility] Name 04/20/24 1419          Strength Comprehensive (MMT)    Comment, General Manual Muscle Testing (MMT) Assessment pt unable to follow direction to formally assess d/t cognition  -               User Key  (r) = Recorded By, (t) = Taken By, (c) = Cosigned By      Initials Name Provider Type    Milena Shankar, OT Occupational Therapist                   Goals/Plan    No documentation.                  Clinical Impression       Row Name 04/20/24 1331          Pain Assessment    Additional Documentation Pain Scale: FACES Pre/Post-Treatment (Group)  -AC       Row Name 04/20/24 1331          Pain Scale: FACES Pre/Post-Treatment    Pain: FACES Scale, Pretreatment 0-->no hurt  -     Posttreatment Pain Rating 0-->no hurt  -[facility] 04/20/24 1331          Plan of Care Review    Plan of Care Reviewed With patient;spouse  -     Outcome Evaluation Pt dep to wash face, dep to don socks, CGA STS, min A x 2 to take 3 side steps towad HOB given UE support. Pt is limited by cognitive stratus.   Pt is not appropriate for therapy at this time as he is uncooperative, agitated, and combative when given encouragement to participate. Recommend ECF upon d/c.  -[facility] Name 04/20/24 1331          Therapy Assessment/Plan (OT)    Criteria for Skilled Therapeutic Interventions Met (OT) no;does not meet criteria for skilled intervention  -     Therapy Frequency (OT) evaluation only  -AC       Row Name 04/20/24 1331          Therapy Plan Review/Discharge Plan (OT)    Anticipated Discharge Disposition (OT) extended care facility  -[facility] 04/20/24 1331          Vital Signs    Pre Patient Position Sitting  sitting up in bed with arms in restraints  -     Post Patient Position Supine  -AC       Row Name 04/20/24 1331          Positioning and Restraints    Pre-Treatment Position in  bed  -AC     Post Treatment Position bed  -AC     In Bed notified nsg;supine;call light within reach;encouraged to call for assist;exit alarm on  -AC     Restraints released:;reapplied:;soft limb;notified nsg:  -AC               User Key  (r) = Recorded By, (t) = Taken By, (c) = Cosigned By      Initials Name Provider Type    Milena Shankar, OT Occupational Therapist                   Outcome Measures       Row Name 04/20/24 1429          How much help from another is currently needed...    Putting on and taking off regular lower body clothing? 1  -AC     Bathing (including washing, rinsing, and drying) 1  -AC     Toileting (which includes using toilet bed pan or urinal) 1  -AC     Putting on and taking off regular upper body clothing 1  -AC     Taking care of personal grooming (such as brushing teeth) 1  -AC     Eating meals 2  -AC     AM-PAC 6 Clicks Score (OT) 7  -AC       Row Name 04/20/24 1417          How much help from another person do you currently need...    Turning from your back to your side while in flat bed without using bedrails? 2  -LM     Moving from lying on back to sitting on the side of a flat bed without bedrails? 3  -LM     Moving to and from a bed to a chair (including a wheelchair)? 2  -LM     Standing up from a chair using your arms (e.g., wheelchair, bedside chair)? 3  -LM     Climbing 3-5 steps with a railing? 1  -LM     To walk in hospital room? 2  -LM     AM-PAC 6 Clicks Score (PT) 13  -LM     Highest Level of Mobility Goal 4 --> Transfer to chair/commode  -LM       Row Name 04/20/24 1429 04/20/24 1417       Functional Assessment    Outcome Measure Options AM-PAC 6 Clicks Daily Activity (OT)  -AC AM-PAC 6 Clicks Basic Mobility (PT)  -LM              User Key  (r) = Recorded By, (t) = Taken By, (c) = Cosigned By      Initials Name Provider Type    Milena Shankar, OT Occupational Therapist    LM Nina Calvert, TRACE Physical Therapist                  Occupational Therapy Education        Title: PT OT SLP Therapies (In Progress)       Topic: Occupational Therapy (In Progress)       Point: ADL training (Done)       Description:   Instruct learner(s) on proper safety adaptation and remediation techniques during self care or transfers.   Instruct in proper use of assistive devices.                  Learning Progress Summary             Family Acceptance, E, VU by  at 4/20/2024 7625                         Point: Home exercise program (Not Started)       Description:   Instruct learner(s) on appropriate technique for monitoring, assisting and/or progressing therapeutic exercises/activities.                  Learner Progress:  Not documented in this visit.              Point: Precautions (Not Started)       Description:   Instruct learner(s) on prescribed precautions during self-care and functional transfers.                  Learner Progress:  Not documented in this visit.              Point: Body mechanics (Not Started)       Description:   Instruct learner(s) on proper positioning and spine alignment during self-care, functional mobility activities and/or exercises.                  Learner Progress:  Not documented in this visit.                              User Key       Initials Effective Dates Name Provider Type Discipline     02/03/23 -  Milena Keane OT Occupational Therapist OT                  OT Recommendation and Plan  Therapy Frequency (OT): evaluation only  Plan of Care Review  Plan of Care Reviewed With: patient, spouse  Outcome Evaluation: Pt dep to wash face, dep to don socks, CGA STS, min A x 2 to take 3 side steps towad HOB given UE support. Pt is limited by cognitive stratus.   Pt is not appropriate for therapy at this time as he is uncooperative, agitated, and combative when given encouragement to participate. Recommend ECF upon d/c.  Plan of Care Reviewed With: patient, spouse  Outcome Evaluation: Pt dep to wash face, dep to don socks, CGA STS, min A x 2 to take 3 side  steps towad HOB given UE support. Pt is limited by cognitive stratus.   Pt is not appropriate for therapy at this time as he is uncooperative, agitated, and combative when given encouragement to participate. Recommend ECF upon d/c.     Time Calculation:   Evaluation Complexity (OT)  Review Occupational Profile/Medical/Therapy History Complexity: expanded/moderate complexity  Assessment, Occupational Performance/Identification of Deficit Complexity: 3-5 performance deficits  Clinical Decision Making Complexity (OT): detailed assessment/moderate complexity  Overall Complexity of Evaluation (OT): moderate complexity     Time Calculation- OT       Row Name 04/20/24 1331             Time Calculation- OT    OT Start Time 1331  -AC      OT Received On 04/20/24  -AC         Untimed Charges    OT Eval/Re-eval Minutes 50  -AC         Total Minutes    Untimed Charges Total Minutes 50  -AC       Total Minutes 50  -AC                User Key  (r) = Recorded By, (t) = Taken By, (c) = Cosigned By      Initials Name Provider Type    AC Milena Keane, OT Occupational Therapist                  Therapy Charges for Today       Code Description Service Date Service Provider Modifiers Qty    [id number]  OT EVAL MOD COMPLEXITY 4 4/20/2024 Milena Keane OT GO 1               OT Discharge Summary  Anticipated Discharge Disposition (OT): extended care facility    Milena Keane OT  4/20/2024

## 2024-04-20 NOTE — PROGRESS NOTES
[facility] Medicine Services  PROGRESS NOTE    Patient Name: Too Tai  : 1955  MRN: [id number]    Date of Admission: 2024  Primary Care Physician: Des Geller MD    Subjective   Subjective     CC:  AMS; agitation    HPI:  Likely no change.  Sleeping this morning.  Remains in restraints.  Wife at bedside and updated.       Objective   Objective     Vital Signs:   Temp:  [96.8 °F (36 °C)-97.5 °F (36.4 °C)] 97.5 °F (36.4 °C)  Heart Rate:  [101] 101  Resp:  [18-19] 18  BP: (138-175)/(70-91) 154/83     Physical Exam:  Constitutional: No acute distress, sleeping, in restraints   HENT: NCAT, mucous membranes moist  Respiratory: Clear to auscultation bilaterally, respiratory effort normal   Cardiovascular: sinus rhythm  Musculoskeletal: No bilateral ankle edema  Psychiatric: sleeping  Neurologic: moving all ext  Skin: No rashes    Results Reviewed:  LAB RESULTS:      Lab 24  0829 24   WBC 10.47 9.87   HEMOGLOBIN 15.5 13.8   HEMATOCRIT 46.7 42.2   PLATELETS 307 292   NEUTROS ABS 4.74 4.68   IMMATURE GRANS (ABS) 0.04 0.05   LYMPHS ABS 3.52* 3.34*   MONOS ABS 1.33* 1.14*   EOS ABS 0.69* 0.56*   MCV 92.7 95.3         Lab 24  0829 24  235   SODIUM 144 142   POTASSIUM 4.4 4.9   CHLORIDE 106 104   CO2 30.0* 28.0   ANION GAP 8.0 10.0   BUN 16 22   CREATININE 0.82 1.12   EGFR 96.3 72.0   GLUCOSE 166* 226*   CALCIUM 9.9 9.4   MAGNESIUM 1.9 1.8   PHOSPHORUS 3.4  --    HEMOGLOBIN A1C  --  9.50*   TSH  --  3.220         Lab 24  235   TOTAL PROTEIN 6.9   ALBUMIN 3.9   GLOBULIN 3.0   ALT (SGPT) 13   AST (SGOT) 17   BILIRUBIN 0.3   ALK PHOS 120*         Lab 24  235   HSTROP T 22*             Lab 24  235   VITAMIN B 12 384         Brief Urine Lab Results  (Last result in the past 365 days)        Color   Clarity   Blood   Leuk Est   Nitrite   Protein   CREAT   Urine HCG        24 0243 Yellow   Clear   Negative   Negative    Negative   Negative                   Microbiology Results Abnormal       None            CT Head Without Contrast    Result Date: 4/19/2024  CT HEAD WO CONTRAST Date of Exam: 4/19/2024 2:11 AM EDT Indication: Mental status change, unknown cause. Comparison: 3/28/2024. Technique: Axial CT images were obtained of the head without contrast administration.  Automated exposure control and iterative construction methods were used. Findings: There is no acute intracranial hemorrhage. No mass effect, midline shift or abnormal extra-axial collection. There is stable moderate patchy periventricular white matter hypoattenuation. Mild age-appropriate generalized parenchymal volume loss. No new hypoattenuating lesions in the brain. Cortical gray-white differentiation appears intact. The orbits are unremarkable. Mastoids and paranasal sinuses appear well aerated.     Impression: Impression: 1. No acute intracranial abnormality. 2. Moderate chronic small vessel ischemic change. Electronically Signed: Reg Pelaez MD  4/19/2024 2:29 AM EDT  Workstation ID: [id number]    XR Chest 1 View    Result Date: 4/19/2024  XR CHEST 1 VW Date of Exam: 4/19/2024 12:24 AM EDT Indication: Weak/Dizzy/AMS triage protocol Comparison: 3/27/2024. Findings: There is evidence of prior median sternotomy and CABG. Cardiac silhouette appears unchanged. Pulmonary vasculature is within normal limits. Evaluation of the left lung base is limited due to lordotic view and positioning. No definite lung consolidation. No pneumothorax or pleural effusion.     Impression: Impression: Limited study demonstrates no acute abnormality in the lungs. Electronically Signed: Reg Pelaez MD  4/19/2024 1:29 AM EDT  Workstation ID: [id number]     Results for orders placed during the hospital encounter of 03/27/24    Adult Transthoracic Echo Complete W/ Cont if Necessary Per Protocol    Interpretation Summary    Left ventricular systolic function is normal. Calculated left  ventricular EF = 55.1% Normal left ventricular cavity size noted. Left ventricular wall thickness is consistent with mild concentric hypertrophy. Left ventricular diastolic function is consistent with (grade I) impaired relaxation.    The right ventricular cavity is mildly dilated. Normal right ventricular systolic function noted.    There is calcification of the aortic valve. The aortic valve appears trileaflet. Mild aortic valve regurgitation is present. No aortic valve stenosis is present.    Mitral annular calcification is present. Trace mitral valve regurgitation is present. No significant mitral valve stenosis is present.    The tricuspid valve is structurally normal with no stenosis present. Trace tricuspid valve regurgitation is present. Insufficient TR velocity profile to estimate the right ventricular systolic pressure.    Mild dilation of the sinuses of Valsalva is present. Mild dilation of the ascending aorta is present. Ascending aorta = 4.2 cm      Current medications:  Scheduled Meds:amLODIPine, 10 mg, Oral, Q24H  cyanocobalamin, 1,000 mcg, Intramuscular, Q28 Days  Divalproex Sodium, 250 mg, Oral, Q8H  donepezil, 22.5 mg, Oral, Daily  folic acid, 1 mg, Oral, Daily  heparin (porcine), 5,000 Units, Subcutaneous, Q8H  insulin glargine, 10 Units, Subcutaneous, Q12H  insulin lispro, 2-7 Units, Subcutaneous, 4x Daily AC & at Bedtime  lisinopril, 20 mg, Oral, Daily  mirtazapine, 15 mg, Oral, Nightly  QUEtiapine, 100 mg, Oral, Q12H  sodium chloride, 10 mL, Intravenous, Q12H      Continuous Infusions:   PRN Meds:.  acetaminophen **OR** acetaminophen **OR** acetaminophen    senna-docusate sodium **AND** polyethylene glycol **AND** bisacodyl **AND** bisacodyl    Calcium Replacement - Follow Nurse / BPA Driven Protocol    dextrose    dextrose    glucagon (human recombinant)    haloperidol    Magnesium Standard Dose Replacement - Follow Nurse / BPA Driven Protocol    melatonin    Phosphorus Replacement - Follow  Nurse / BPA Driven Protocol    Potassium Replacement - Follow Nurse / BPA Driven Protocol    sodium chloride    sodium chloride    sodium chloride    temazepam    ziprasidone    Assessment & Plan   Assessment & Plan     Active Hospital Problems    Diagnosis  POA    **Agitation due to dementia [F03.911]  Yes    Agitation [R45.1]  Yes    Type 2 diabetes mellitus with hyperglycemia, with long-term current use of insulin [E11.65, Z79.4]  Not Applicable    Peripheral neuropathy [G62.9]  Yes    Hyperlipidemia [E78.5]  Yes    Benign prostatic hyperplasia [N40.0]  Yes    Benign essential HTN [I10]  Yes    Peripheral vascular disease [I73.9]  Yes    Coronary artery disease involving native coronary artery of native heart [I25.10]  Yes      Resolved Hospital Problems   No resolved problems to display.        Brief Hospital Course to date:  Too Tai is a 68 y.o. male with a PMH significant for advanced dementia, CAD, diabetes mellitus type 2, HLD, PVD who comes to the ED due to agitation.       Severe dementia complicated by behavioral disturbance  - UA negative; CT head without acute findings; CXR without acute findings  - Neuro consulted   -Continue home Aricept; continue home Depakote; continue Remeron 50 mg nightly  -Changed to Seroquel 100 mg twice daily  -Continue Restoril 30 mg nightly  -As needed Geodon  -Currently in restraints for patient and staff safety  -May need Pat psych on discharge. Will discuss with CM      Diabetes mellitus type 2  - No active home meds  - SSI with Accu-Cheks  - Hemoglobin A1c 9.5     HTN  PVD  CAD  - Continue home meds    Expected Discharge Location and Transportation: TBD  Expected Discharge   Expected Discharge Date: 4/21/2024; Expected Discharge Time:      DVT prophylaxis:  Medical and mechanical DVT prophylaxis orders are present.         AM-PAC 6 Clicks Score (PT): 13 (04/20/24 7574)    CODE STATUS:   Code Status and Medical Interventions:   Ordered at: 04/19/24 1037     Medical  Intervention Limits:    NO intubation (DNI)     Level Of Support Discussed With:    Next of Kin (If No Surrogate)     Code Status (Patient has no pulse and is not breathing):    No CPR (Do Not Attempt to Resuscitate)     Medical Interventions (Patient has pulse or is breathing):    Limited Support       Cecily Bain DO  04/20/24

## 2024-04-20 NOTE — PLAN OF CARE
Goal Outcome Evaluation:  Plan of Care Reviewed With: patient, spouse           Outcome Evaluation: Pt dep to wash face, dep to don socks, CGA STS, min A x 2 to take 3 side steps towad HOB given UE support. Pt is limited by cognitive stratus.   Pt is not appropriate for therapy at this time as he is uncooperative, agitated, and combative when given encouragement to participate. Recommend ECF upon d/c.      Anticipated Discharge Disposition (OT): extended care facility

## 2024-04-20 NOTE — THERAPY DISCHARGE NOTE
Patient Name: Too Tai  : 1955    MRN: [id number]                              Today's Date: 2024       Admit Date: 2024    Visit Dx:     ICD-10-CM ICD-9-CM   1. Dementia with behavioral disturbance  F03.918 294.21   2. Aggressive behavior  R46.89 V40.39   3. Agitation due to dementia  F03.911 294.21   4. Hyperglycemia due to diabetes mellitus  E11.65 250.02     Patient Active Problem List   Diagnosis    Cough    Benign essential HTN    Benign prostatic hyperplasia    Coronary artery disease involving native coronary artery of native heart    Chronic coronary artery disease    Type 2 diabetes mellitus with hyperglycemia, with long-term current use of insulin    Gout    Hyperlipidemia    History of heart attack    Peripheral neuropathy    Peripheral vascular disease    Spasm of muscle    Myoclonic jerking    Hypomagnesemia    Arteriosclerosis of coronary artery    Dementia with behavioral disturbance    Frequent falls    History of DVT (deep vein thrombosis)    Chronic anticoagulation    AMS (altered mental status)    Falls    Bacteriuria    Cystitis    Agitation due to dementia    Agitation     Past Medical History:   Diagnosis Date    Coronary artery disease     Dementia     Diabetes mellitus     Hyperlipidemia     Peripheral vascular disease      Past Surgical History:   Procedure Laterality Date    CORONARY ARTERY BYPASS GRAFT      FEMORAL ARTERY - POPLITEAL ARTERY BYPASS GRAFT        General Information       Row Name 24 1403          Physical Therapy Time and Intention    Document Type discharge evaluation/summary  -LM     Mode of Treatment physical therapy  -LM       Row Name 24 1403          General Information    Patient Profile Reviewed yes  -LM     Prior Level of Function min assist:;feeding;max assist:;grooming;dressing;bathing  Pt's wife states he ambulates around the home but someone is always with him to make sure he doesn't fall  -LM     Existing  Precautions/Restrictions fall;other (see comments)  Severe Dementia;  2 Point Wrist Restraints;  Easily Agitated  -LM     Barriers to Rehab previous functional deficit;cognitive status;uncooperative;combative  -LM       Row Name 04/20/24 1403          Living Environment    People in Home spouse  Pt also has 2 caregivers.  One is there for 8 hours each day.  -LM       Row Name 04/20/24 1403          Home Main Entrance    Number of Stairs, Main Entrance --  Entry Ramp  -LM       Row Name 04/20/24 1403          Stairs Within Home, Primary    Number of Stairs, Within Home, Primary none  -LM       Row Name 04/20/24 1403          Cognition    Orientation Status (Cognition) disoriented to;person;place;situation;time  -LM       Row Name 04/20/24 1403          Safety Issues, Functional Mobility    Safety Issues Affecting Function (Mobility) ability to follow commands;at risk behavior observed;awareness of need for assistance;impulsivity;insight into deficits/self-awareness;judgment;problem-solving;safety precaution awareness;safety precautions follow-through/compliance;sequencing abilities  -LM     Impairments Affecting Function (Mobility) balance;cognition;endurance/activity tolerance  -LM               User Key  (r) = Recorded By, (t) = Taken By, (c) = Cosigned By      Initials Name Provider Type    LM Nina Calvert PT Physical Therapist                   Mobility       Row Name 04/20/24 1408          Bed Mobility    Bed Mobility supine-sit;sit-supine  -LM     Supine-Sit Patrick (Bed Mobility) standby assist  -LM     Sit-Supine Patrick (Bed Mobility) standby assist  -LM     Comment, (Bed Mobility) Pt sitting at end of bed with legs between 2nd handrail and footboard with B arms pulled behind him d/t restraints.  Pt laid down voluntarily once and then returned to sitting.  2nd time, pt did not want to lay back down so 3 people required to return pt to supine.  -LM       Row Name 04/20/24 1403          Sit-Stand  Transfer    Sit-Stand [place] (Transfers) minimum assist (75% patient effort);2 person assist;verbal cues;nonverbal cues (demo/gesture)  -LM     Assistive Device (Sit-Stand Transfers) other (see comments)  B HHA  -LM     Comment, (Sit-Stand Transfer) Stood x 2 from EOB.  -LM       Row Name 04/20/24 1409          Gait/Stairs (Locomotion)    [place] Level (Gait) minimum assist (75% patient effort);2 person assist;verbal cues  -LM     Assistive Device (Gait) other (see comments)  B HHA  -LM     Distance in Feet (Gait) 3  -LM     Comment, (Gait/Stairs) Pt able to take sidesteps towards HOB with PT guiding pt towards the HOB.  Pt would not initiate movement on his own.  -LM               User Key  (r) = Recorded By, (t) = Taken By, (c) = Cosigned By      Initials Name Provider Type    Nina Mckinnon, PT Physical Therapist                   Obj/Interventions       Row Name 04/20/24 1413          Range of Motion Comprehensive    General Range of Motion bilateral lower extremity ROM WFL  -LM     Comment, General Range of Motion Noted through observation  -LM       Row Name 04/20/24 1413          Strength Comprehensive (MMT)    General Manual Muscle Testing (MMT) Assessment no strength deficits identified  BLEs  -LM     Comment, General Manual Muscle Testing (MMT) Assessment Noted through functional ability  -LM       Row Name 04/20/24 1413          Balance    Balance Assessment sitting static balance;standing static balance;standing dynamic balance  -LM     Static Sitting Balance standby assist  -LM     Position, Sitting Balance unsupported;sitting edge of bed  -LM     Static Standing Balance minimal assist;2-person assist  -LM     Dynamic Standing Balance minimal assist;2-person assist  -LM     Position/Device Used, Standing Balance supported  -LM       Row Name 04/20/24 1413          Sensory Assessment (Somatosensory)    Sensory Assessment (Somatosensory) unable/difficult to assess  -LM               User Key   (r) = Recorded By, (t) = Taken By, (c) = Cosigned By      Initials Name Provider Type    LM Nina Calvert, PT Physical Therapist                   Goals/Plan    No documentation.                  Clinical Impression       Row Name 04/20/24 1414          Pain    Additional Documentation Pain Scale: FACES Pre/Post-Treatment (Group)  -LM       Row Name 04/20/24 1414          Pain Scale: FACES Pre/Post-Treatment    Pain: FACES Scale, Pretreatment 0-->no hurt  -LM     Posttreatment Pain Rating 0-->no hurt  -LM       Row Name 04/20/24 1414          Plan of Care Review    Plan of Care Reviewed With patient;spouse  -LM     Outcome Evaluation PT evaluation completed.  Evaluation limited d/t severe dementia.  Pt stood x 2 reps with MinAx2 and took 3 sidesteps towards HOB with MinAx2 with PT guiding him towards HOB.  Unfortunately d/t pt's cognition, pt does not initiate movement and will not allow anything if he doesn't want to do it.  Pt becomes very easily agitated and combative if encouraged.  At this time, pt is not appropriate for skilled PT services.  If pt becomes more cooperative, we will be happy to come back to re-assess.  Recommend extended care facility.  -       Row Name 04/20/24 1414          Therapy Assessment/Plan (PT)    Criteria for Skilled Interventions Met (PT) no;does not meet criteria for skilled intervention  -LM     Therapy Frequency (PT) evaluation only  -       Row Name 04/20/24 1414          Positioning and Restraints    Pre-Treatment Position in bed  -LM     Post Treatment Position bed  -LM     In Bed supine;call light within reach;encouraged to call for assist;exit alarm on;with family/caregiver;notified nsg  -LM     Restraints released:;reapplied:;soft limb;notified nsg:  -LM               User Key  (r) = Recorded By, (t) = Taken By, (c) = Cosigned By      Initials Name Provider Type    LM Nina Calvert, PT Physical Therapist                   Outcome Measures       Row Name 04/20/24 1419           How much help from another person do you currently need...    Turning from your back to your side while in flat bed without using bedrails? 2  -LM     Moving from lying on back to sitting on the side of a flat bed without bedrails? 3  -LM     Moving to and from a bed to a chair (including a wheelchair)? 2  -LM     Standing up from a chair using your arms (e.g., wheelchair, bedside chair)? 3  -LM     Climbing 3-5 steps with a railing? 1  -LM     To walk in hospital room? 2  -LM     AM-PAC 6 Clicks Score (PT) 13  -LM     Highest Level of Mobility Goal 4 --> Transfer to chair/commode  -LM       Row Name 04/20/24 1417          Functional Assessment    Outcome Measure Options AM-PAC 6 Clicks Basic Mobility (PT)  -LM               User Key  (r) = Recorded By, (t) = Taken By, (c) = Cosigned By      Initials Name Provider Type    LM Nina Calvert PT Physical Therapist                  Physical Therapy Education       Title: PT OT SLP Therapies (In Progress)       Topic: Physical Therapy (In Progress)       Point: Mobility training (In Progress)       Learning Progress Summary             Patient Nonacceptance, E, NL by  at 4/20/2024 1418                         Point: Home exercise program (Not Started)       Learner Progress:  Not documented in this visit.              Point: Body mechanics (Not Started)       Learner Progress:  Not documented in this visit.              Point: Precautions (In Progress)       Learning Progress Summary             Patient Nonacceptance, E, NL by  at 4/20/2024 1418                                         User Key       Initials Effective Dates Name Provider Type Discipline     07/11/23 -  Nina Calvert PT Physical Therapist PT                  PT Recommendation and Plan     Plan of Care Reviewed With: patient, spouse  Outcome Evaluation: PT evaluation completed.  Evaluation limited d/t severe dementia.  Pt stood x 2 reps with MinAx2 and took 3 sidesteps towards HOB with MinAx2  with PT guiding him towards HOB.  Unfortunately d/t pt's cognition, pt does not initiate movement and will not allow anything if he doesn't want to do it.  Pt becomes very easily agitated and combative if encouraged.  At this time, pt is not appropriate for skilled PT services.  If pt becomes more cooperative, we will be happy to come back to re-assess.  Recommend extended care facility.     Time Calculation:   PT Evaluation Complexity  History, PT Evaluation Complexity: 3 or more personal factors and/or comorbidities  Examination of Body Systems (PT Eval Complexity): total of 4 or more elements  Clinical Presentation (PT Evaluation Complexity): evolving  Clinical Decision Making (PT Evaluation Complexity): moderate complexity  Overall Complexity (PT Evaluation Complexity): moderate complexity     PT Charges       Row Name 04/20/24 1418             Time Calculation    Start Time 1328  -LM      PT Received On 04/20/24  -LM         Untimed Charges    PT Eval/Re-eval Minutes 46  -LM         Total Minutes    Untimed Charges Total Minutes 46  -LM       Total Minutes 46  -LM                User Key  (r) = Recorded By, (t) = Taken By, (c) = Cosigned By      Initials Name Provider Type    LM Nina Calvert, PT Physical Therapist                  Therapy Charges for Today       Code Description Service Date Service Provider Modifiers Qty    29205130410 HC PT EVAL MOD COMPLEXITY 4 4/20/2024 Nina Calvert, PT GP 1            PT G-Codes  Outcome Measure Options: AM-PAC 6 Clicks Basic Mobility (PT)  AM-PAC 6 Clicks Score (PT): 13    PT Discharge Summary  Anticipated Discharge Disposition (PT): extended care facility    Nina Calvert PT  4/20/2024

## 2024-04-21 ENCOUNTER — HOME CARE VISIT (OUTPATIENT)
Dept: HOME HEALTH SERVICES | Facility: HOME HEALTHCARE | Age: 69
End: 2024-04-21
Payer: COMMERCIAL

## 2024-04-21 LAB
GLUCOSE BLDC GLUCOMTR-MCNC: 170 MG/DL (ref 70–130)
GLUCOSE BLDC GLUCOMTR-MCNC: 185 MG/DL (ref 70–130)
GLUCOSE BLDC GLUCOMTR-MCNC: 274 MG/DL (ref 70–130)
GLUCOSE BLDC GLUCOMTR-MCNC: 420 MG/DL (ref 70–130)

## 2024-04-21 PROCEDURE — 63710000001 INSULIN GLARGINE PER 5 UNITS: Performed by: INTERNAL MEDICINE

## 2024-04-21 PROCEDURE — 99232 SBSQ HOSP IP/OBS MODERATE 35: CPT | Performed by: INTERNAL MEDICINE

## 2024-04-21 PROCEDURE — 82948 REAGENT STRIP/BLOOD GLUCOSE: CPT

## 2024-04-21 PROCEDURE — 63710000001 INSULIN LISPRO (HUMAN) PER 5 UNITS: Performed by: INTERNAL MEDICINE

## 2024-04-21 PROCEDURE — 25010000002 HEPARIN (PORCINE) PER 1000 UNITS: Performed by: INTERNAL MEDICINE

## 2024-04-21 PROCEDURE — 25010000002 ZIPRASIDONE MESYLATE PER 10 MG

## 2024-04-21 RX ORDER — TEMAZEPAM 15 MG/1
30 CAPSULE ORAL NIGHTLY
Status: DISCONTINUED | OUTPATIENT
Start: 2024-04-21 | End: 2024-04-24

## 2024-04-21 RX ADMIN — QUETIAPINE FUMARATE 100 MG: 100 TABLET ORAL at 20:22

## 2024-04-21 RX ADMIN — DIVALPROEX SODIUM 250 MG: 125 CAPSULE ORAL at 14:37

## 2024-04-21 RX ADMIN — TEMAZEPAM 30 MG: 15 CAPSULE ORAL at 20:23

## 2024-04-21 RX ADMIN — HEPARIN SODIUM 5000 UNITS: 5000 INJECTION INTRAVENOUS; SUBCUTANEOUS at 06:07

## 2024-04-21 RX ADMIN — MIRTAZAPINE 15 MG: 15 TABLET, FILM COATED ORAL at 20:22

## 2024-04-21 RX ADMIN — QUETIAPINE FUMARATE 100 MG: 100 TABLET ORAL at 09:50

## 2024-04-21 RX ADMIN — DIVALPROEX SODIUM 250 MG: 125 CAPSULE ORAL at 20:22

## 2024-04-21 RX ADMIN — DONEPEZIL HYDROCHLORIDE 22.5 MG: 10 TABLET, FILM COATED ORAL at 09:53

## 2024-04-21 RX ADMIN — INSULIN GLARGINE 15 UNITS: 100 INJECTION, SOLUTION SUBCUTANEOUS at 20:44

## 2024-04-21 RX ADMIN — INSULIN LISPRO 2 UNITS: 100 INJECTION, SOLUTION INTRAVENOUS; SUBCUTANEOUS at 12:25

## 2024-04-21 RX ADMIN — INSULIN LISPRO 7 UNITS: 100 INJECTION, SOLUTION INTRAVENOUS; SUBCUTANEOUS at 17:39

## 2024-04-21 RX ADMIN — INSULIN LISPRO 4 UNITS: 100 INJECTION, SOLUTION INTRAVENOUS; SUBCUTANEOUS at 20:44

## 2024-04-21 RX ADMIN — LISINOPRIL 20 MG: 20 TABLET ORAL at 09:53

## 2024-04-21 RX ADMIN — INSULIN GLARGINE 10 UNITS: 100 INJECTION, SOLUTION SUBCUTANEOUS at 09:44

## 2024-04-21 RX ADMIN — AMLODIPINE BESYLATE 10 MG: 10 TABLET ORAL at 09:53

## 2024-04-21 RX ADMIN — HEPARIN SODIUM 5000 UNITS: 5000 INJECTION INTRAVENOUS; SUBCUTANEOUS at 20:44

## 2024-04-21 RX ADMIN — ZIPRASIDONE MESYLATE 10 MG: 20 INJECTION, POWDER, LYOPHILIZED, FOR SOLUTION INTRAMUSCULAR at 19:40

## 2024-04-21 RX ADMIN — HEPARIN SODIUM 5000 UNITS: 5000 INJECTION INTRAVENOUS; SUBCUTANEOUS at 14:37

## 2024-04-21 RX ADMIN — INSULIN LISPRO 2 UNITS: 100 INJECTION, SOLUTION INTRAVENOUS; SUBCUTANEOUS at 09:44

## 2024-04-21 RX ADMIN — FOLIC ACID 1 MG: 1 TABLET ORAL at 09:53

## 2024-04-21 NOTE — PROGRESS NOTES
[facility] Medicine Services  PROGRESS NOTE    Patient Name: Too Tai  : 1955  MRN: [id number]    Date of Admission: 2024  Primary Care Physician: Des Geller MD    Subjective   Subjective     CC:  agitation    HPI:  No acute events. Sleeping. Received geodon last night at 11.       Objective   Objective     Vital Signs:   Temp:  [97.5 °F (36.4 °C)] 97.5 °F (36.4 °C)  Resp:  [18] 18  BP: (154-155)/(76-83) 155/76     Physical Exam:  Constitutional: sleeping  HENT: NCAT, mucous membranes moist  Respiratory: Clear to auscultation bilaterally, respiratory effort normal   Cardiovascular: RRR, no murmurs, rubs, or gallops  Gastrointestinal: Positive bowel sounds, soft, nontender, nondistended  Musculoskeletal: No bilateral ankle edema  Psychiatric: currently calm  Neurologic: strength symmetric in all extremities, Cranial Nerves grossly intact to confrontation  Skin: No rashes      Results Reviewed:  LAB RESULTS:      Lab 24  0829 24   WBC 10.47 9.87   HEMOGLOBIN 15.5 13.8   HEMATOCRIT 46.7 42.2   PLATELETS 307 292   NEUTROS ABS 4.74 4.68   IMMATURE GRANS (ABS) 0.04 0.05   LYMPHS ABS 3.52* 3.34*   MONOS ABS 1.33* 1.14*   EOS ABS 0.69* 0.56*   MCV 92.7 95.3         Lab 24  0829 24  235   SODIUM 144 142   POTASSIUM 4.4 4.9   CHLORIDE 106 104   CO2 30.0* 28.0   ANION GAP 8.0 10.0   BUN 16 22   CREATININE 0.82 1.12   EGFR 96.3 72.0   GLUCOSE 166* 226*   CALCIUM 9.9 9.4   MAGNESIUM 1.9 1.8   PHOSPHORUS 3.4  --    HEMOGLOBIN A1C  --  9.50*   TSH  --  3.220         Lab 24   TOTAL PROTEIN 6.9   ALBUMIN 3.9   GLOBULIN 3.0   ALT (SGPT) 13   AST (SGOT) 17   BILIRUBIN 0.3   ALK PHOS 120*         Lab 24  235   HSTROP T 22*             Lab 24  235   VITAMIN B 12 384         Brief Urine Lab Results  (Last result in the past 365 days)        Color   Clarity   Blood   Leuk Est   Nitrite   Protein   CREAT   Urine HCG         04/19/24 0243 Yellow   Clear   Negative   Negative   Negative   Negative                   Microbiology Results Abnormal       None            No radiology results from the last 24 hrs    Results for orders placed during the hospital encounter of 03/27/24    Adult Transthoracic Echo Complete W/ Cont if Necessary Per Protocol    Interpretation Summary    Left ventricular systolic function is normal. Calculated left ventricular EF = 55.1% Normal left ventricular cavity size noted. Left ventricular wall thickness is consistent with mild concentric hypertrophy. Left ventricular diastolic function is consistent with (grade I) impaired relaxation.    The right ventricular cavity is mildly dilated. Normal right ventricular systolic function noted.    There is calcification of the aortic valve. The aortic valve appears trileaflet. Mild aortic valve regurgitation is present. No aortic valve stenosis is present.    Mitral annular calcification is present. Trace mitral valve regurgitation is present. No significant mitral valve stenosis is present.    The tricuspid valve is structurally normal with no stenosis present. Trace tricuspid valve regurgitation is present. Insufficient TR velocity profile to estimate the right ventricular systolic pressure.    Mild dilation of the sinuses of Valsalva is present. Mild dilation of the ascending aorta is present. Ascending aorta = 4.2 cm      Current medications:  Scheduled Meds:amLODIPine, 10 mg, Oral, Q24H  cyanocobalamin, 1,000 mcg, Intramuscular, Q28 Days  Divalproex Sodium, 250 mg, Oral, Q8H  donepezil, 22.5 mg, Oral, Daily  folic acid, 1 mg, Oral, Daily  heparin (porcine), 5,000 Units, Subcutaneous, Q8H  insulin glargine, 10 Units, Subcutaneous, Q12H  insulin lispro, 2-7 Units, Subcutaneous, 4x Daily AC & at Bedtime  lisinopril, 20 mg, Oral, Daily  mirtazapine, 15 mg, Oral, Nightly  QUEtiapine, 100 mg, Oral, Q12H  sodium chloride, 10 mL, Intravenous, Q12H      Continuous  Infusions:   PRN Meds:.  acetaminophen **OR** acetaminophen **OR** acetaminophen    senna-docusate sodium **AND** polyethylene glycol **AND** bisacodyl **AND** bisacodyl    Calcium Replacement - Follow Nurse / BPA Driven Protocol    dextrose    dextrose    glucagon (human recombinant)    haloperidol    Magnesium Standard Dose Replacement - Follow Nurse / BPA Driven Protocol    melatonin    Phosphorus Replacement - Follow Nurse / BPA Driven Protocol    Potassium Replacement - Follow Nurse / BPA Driven Protocol    sodium chloride    sodium chloride    sodium chloride    temazepam    ziprasidone    Assessment & Plan   Assessment & Plan     Active Hospital Problems    Diagnosis  POA    **Agitation due to dementia [F03.911]  Yes    Agitation [R45.1]  Yes    Type 2 diabetes mellitus with hyperglycemia, with long-term current use of insulin [E11.65, Z79.4]  Not Applicable    Peripheral neuropathy [G62.9]  Yes    Hyperlipidemia [E78.5]  Yes    Benign prostatic hyperplasia [N40.0]  Yes    Benign essential HTN [I10]  Yes    Peripheral vascular disease [I73.9]  Yes    Coronary artery disease involving native coronary artery of native heart [I25.10]  Yes      Resolved Hospital Problems   No resolved problems to display.        Brief Hospital Course to date:  Too Tai is a 68 y.o. male with a PMH significant for advanced dementia, CAD, diabetes mellitus type 2, HLD, PVD who comes to the ED due to agitation.       Severe dementia complicated by behavioral disturbance  - UA negative; CT head without acute findings; CXR without acute findings  - Neuro consulted   -Continue home Aricept; continue home Depakote; continue Remeron 50 mg nightly  -Changed to Seroquel 100 mg twice daily  - schedule temazepam 30 mg qHS  -As needed Geodon  -Currently in restraints for patient and staff safety. Wean off as able  -May need Pat psych on discharge. Will discuss with CM      Diabetes mellitus type 2  - No active home meds  - SSI with  [employer]  - Hemoglobin A1c 9.5     HTN  PVD  CAD  - Continue home meds    Expected Discharge Location and Transportation: TBD  Expected Discharge   Expected Discharge Date: 4/21/2024; Expected Discharge Time:      DVT prophylaxis:  Medical and mechanical DVT prophylaxis orders are present.         AM-PAC 6 Clicks Score (PT): 13 (04/20/24 2000)    CODE STATUS:   Code Status and Medical Interventions:   Ordered at: 04/19/24 1037     Medical Intervention Limits:    NO intubation (DNI)     Level Of Support Discussed With:    Next of Kin (If No Surrogate)     Code Status (Patient has no pulse and is not breathing):    No CPR (Do Not Attempt to Resuscitate)     Medical Interventions (Patient has pulse or is breathing):    Limited Support       Cecily Bain DO  04/21/24

## 2024-04-22 LAB
GLUCOSE BLDC GLUCOMTR-MCNC: 136 MG/DL (ref 70–130)
GLUCOSE BLDC GLUCOMTR-MCNC: 171 MG/DL (ref 70–130)
GLUCOSE BLDC GLUCOMTR-MCNC: 276 MG/DL (ref 70–130)
GLUCOSE BLDC GLUCOMTR-MCNC: 289 MG/DL (ref 70–130)
QT INTERVAL: 362 MS
QTC INTERVAL: 478 MS

## 2024-04-22 PROCEDURE — 25010000002 HEPARIN (PORCINE) PER 1000 UNITS: Performed by: INTERNAL MEDICINE

## 2024-04-22 PROCEDURE — 63710000001 INSULIN LISPRO (HUMAN) PER 5 UNITS: Performed by: PHYSICIAN ASSISTANT

## 2024-04-22 PROCEDURE — 99233 SBSQ HOSP IP/OBS HIGH 50: CPT

## 2024-04-22 PROCEDURE — 82948 REAGENT STRIP/BLOOD GLUCOSE: CPT

## 2024-04-22 PROCEDURE — 25010000002 ZIPRASIDONE MESYLATE PER 10 MG

## 2024-04-22 PROCEDURE — 63710000001 INSULIN GLARGINE PER 5 UNITS: Performed by: INTERNAL MEDICINE

## 2024-04-22 PROCEDURE — 93010 ELECTROCARDIOGRAM REPORT: CPT | Performed by: STUDENT IN AN ORGANIZED HEALTH CARE EDUCATION/TRAINING PROGRAM

## 2024-04-22 PROCEDURE — 25810000003 SODIUM CHLORIDE 0.9 % SOLUTION: Performed by: INTERNAL MEDICINE

## 2024-04-22 PROCEDURE — 63710000001 INSULIN LISPRO (HUMAN) PER 5 UNITS: Performed by: INTERNAL MEDICINE

## 2024-04-22 PROCEDURE — 93005 ELECTROCARDIOGRAM TRACING: CPT

## 2024-04-22 PROCEDURE — 99232 SBSQ HOSP IP/OBS MODERATE 35: CPT | Performed by: PHYSICIAN ASSISTANT

## 2024-04-22 RX ORDER — QUETIAPINE FUMARATE 100 MG/1
100 TABLET, FILM COATED ORAL EVERY 8 HOURS SCHEDULED
Status: DISCONTINUED | OUTPATIENT
Start: 2024-04-22 | End: 2024-04-23

## 2024-04-22 RX ORDER — INSULIN LISPRO 100 [IU]/ML
5 INJECTION, SOLUTION INTRAVENOUS; SUBCUTANEOUS
Status: DISCONTINUED | OUTPATIENT
Start: 2024-04-22 | End: 2024-04-30

## 2024-04-22 RX ORDER — DIVALPROEX SODIUM 125 MG/1
500 CAPSULE, COATED PELLETS ORAL EVERY 8 HOURS SCHEDULED
Status: DISCONTINUED | OUTPATIENT
Start: 2024-04-22 | End: 2024-04-23

## 2024-04-22 RX ORDER — SODIUM CHLORIDE 9 MG/ML
100 INJECTION, SOLUTION INTRAVENOUS CONTINUOUS
Status: DISCONTINUED | OUTPATIENT
Start: 2024-04-22 | End: 2024-04-23

## 2024-04-22 RX ADMIN — INSULIN LISPRO 4 UNITS: 100 INJECTION, SOLUTION INTRAVENOUS; SUBCUTANEOUS at 16:48

## 2024-04-22 RX ADMIN — INSULIN LISPRO 5 UNITS: 100 INJECTION, SOLUTION INTRAVENOUS; SUBCUTANEOUS at 16:48

## 2024-04-22 RX ADMIN — LISINOPRIL 20 MG: 20 TABLET ORAL at 08:08

## 2024-04-22 RX ADMIN — QUETIAPINE FUMARATE 100 MG: 100 TABLET ORAL at 08:08

## 2024-04-22 RX ADMIN — QUETIAPINE FUMARATE 100 MG: 100 TABLET ORAL at 20:36

## 2024-04-22 RX ADMIN — DIVALPROEX SODIUM 250 MG: 125 CAPSULE ORAL at 08:10

## 2024-04-22 RX ADMIN — MIRTAZAPINE 15 MG: 15 TABLET, FILM COATED ORAL at 20:36

## 2024-04-22 RX ADMIN — ZIPRASIDONE MESYLATE 10 MG: 20 INJECTION, POWDER, LYOPHILIZED, FOR SOLUTION INTRAMUSCULAR at 20:35

## 2024-04-22 RX ADMIN — DIVALPROEX SODIUM 500 MG: 125 CAPSULE ORAL at 16:10

## 2024-04-22 RX ADMIN — HEPARIN SODIUM 5000 UNITS: 5000 INJECTION INTRAVENOUS; SUBCUTANEOUS at 20:35

## 2024-04-22 RX ADMIN — INSULIN GLARGINE 15 UNITS: 100 INJECTION, SOLUTION SUBCUTANEOUS at 08:09

## 2024-04-22 RX ADMIN — QUETIAPINE FUMARATE 100 MG: 100 TABLET ORAL at 16:11

## 2024-04-22 RX ADMIN — SODIUM CHLORIDE 100 ML/HR: 9 INJECTION, SOLUTION INTRAVENOUS at 18:26

## 2024-04-22 RX ADMIN — ZIPRASIDONE MESYLATE 10 MG: 20 INJECTION, POWDER, LYOPHILIZED, FOR SOLUTION INTRAMUSCULAR at 06:43

## 2024-04-22 RX ADMIN — INSULIN LISPRO 4 UNITS: 100 INJECTION, SOLUTION INTRAVENOUS; SUBCUTANEOUS at 12:33

## 2024-04-22 RX ADMIN — AMLODIPINE BESYLATE 10 MG: 10 TABLET ORAL at 08:08

## 2024-04-22 RX ADMIN — DIVALPROEX SODIUM 500 MG: 125 CAPSULE ORAL at 20:35

## 2024-04-22 RX ADMIN — ZIPRASIDONE MESYLATE 10 MG: 20 INJECTION, POWDER, LYOPHILIZED, FOR SOLUTION INTRAMUSCULAR at 12:35

## 2024-04-22 RX ADMIN — TEMAZEPAM 30 MG: 15 CAPSULE ORAL at 20:43

## 2024-04-22 RX ADMIN — ZIPRASIDONE MESYLATE 10 MG: 20 INJECTION, POWDER, LYOPHILIZED, FOR SOLUTION INTRAMUSCULAR at 16:10

## 2024-04-22 RX ADMIN — DONEPEZIL HYDROCHLORIDE 22.5 MG: 10 TABLET, FILM COATED ORAL at 08:07

## 2024-04-22 RX ADMIN — INSULIN GLARGINE 15 UNITS: 100 INJECTION, SOLUTION SUBCUTANEOUS at 20:38

## 2024-04-22 RX ADMIN — MICONAZOLE NITRATE 1 APPLICATION: 20 POWDER TOPICAL at 16:11

## 2024-04-22 RX ADMIN — INSULIN LISPRO 2 UNITS: 100 INJECTION, SOLUTION INTRAVENOUS; SUBCUTANEOUS at 08:09

## 2024-04-22 RX ADMIN — Medication 10 ML: at 20:38

## 2024-04-22 RX ADMIN — FOLIC ACID 1 MG: 1 TABLET ORAL at 08:08

## 2024-04-22 NOTE — PROGRESS NOTES
[facility] Neurology    Progress Note    Patient Name: Too Tai  : 1955  MRN: [id number]  Primary Care Physician:  Des Geller MD  Date of admission: 2024    Subjective     Chief Complaint: Dementia with behavioral disturbances    History of Present Illness   Patient seen resting comfortably in bed.  Still requiring 2-point restraints and as needed medications for agitation.  Per bedside RN patient responds well to Seroquel.  Patient rested intermittently throughout night.    Review of Systems   Unable to assess due to mental status    Objective     Physical Exam  Vitals reviewed.   Constitutional:       General: He is not in acute distress.     Appearance: He is not ill-appearing.   Eyes:      General: No visual field deficit.     Extraocular Movements: Extraocular movements intact.      Pupils: Pupils are equal, round, and reactive to light.      Comments: No nystagmus or deviated gaze noted   Cardiovascular:      Rate and Rhythm: Normal rate.   Pulmonary:      Effort: Pulmonary effort is normal.   Neurological:      Mental Status: He is alert. Mental status is at baseline. He is disoriented.      Cranial Nerves: No cranial nerve deficit or facial asymmetry.      Sensory: No sensory deficit.      Motor: No weakness, tremor or seizure activity.          Vitals:   Temp:  [97.1 °F (36.2 °C)] 97.1 °F (36.2 °C)  Heart Rate:  [99] 99  Resp:  [18] 18  BP: (149-163)/(73-78) 163/78    Current Medications    Current Facility-Administered Medications:     acetaminophen (TYLENOL) tablet 650 mg, 650 mg, Oral, Q4H PRN **OR** acetaminophen (TYLENOL) 160 MG/5ML oral solution 650 mg, 650 mg, Oral, Q4H PRN **OR** acetaminophen (TYLENOL) suppository 650 mg, 650 mg, Rectal, Q4H PRN, Traci, Nerissa G, DO    amLODIPine (NORVASC) tablet 10 mg, 10 mg, Oral, Q24H, Traci, Nerissa G, DO, 10 mg at 24 0808    sennosides-docusate (PERICOLACE) 8.6-50 MG per tablet 2 tablet, 2 tablet, Oral, BID PRN **AND**  polyethylene glycol (MIRALAX) packet 17 g, 17 g, Oral, Daily PRN **AND** bisacodyl (DULCOLAX) EC tablet 5 mg, 5 mg, Oral, Daily PRN **AND** bisacodyl (DULCOLAX) suppository 10 mg, 10 mg, Rectal, Daily PRN, Traci, Nerissa G, DO    Calcium Replacement - Follow Nurse / BPA Driven Protocol, , Does not apply, PRN, Traci, Nerissa G, DO    cyanocobalamin injection 1,000 mcg, 1,000 mcg, Intramuscular, Q28 Days, Darrell Jhaveri, DO, 1,000 mcg at 04/19/24 1532    dextrose (D50W) (25 g/50 mL) IV injection 25 g, 25 g, Intravenous, Q15 Min PRN, Traci, Nerissa G, DO    dextrose (GLUTOSE) oral gel 15 g, 15 g, Oral, Q15 Min PRN, Traci, Nerissa G, DO    Divalproex Sodium (DEPAKOTE SPRINKLE) capsule 250 mg, 250 mg, Oral, Q8H, Traci, Nerissa G, DO, 250 mg at 04/22/24 0810    donepezil (ARICEPT) tablet 22.5 mg, 22.5 mg, Oral, Daily, Meliza Kahn K, APRN, 22.5 mg at 04/22/24 0807    folic acid (FOLVITE) tablet 1 mg, 1 mg, Oral, Daily, Traci, Nerissa G, DO, 1 mg at 04/22/24 0808    glucagon (GLUCAGEN) injection 1 mg, 1 mg, Intramuscular, Q15 Min PRN, Traci, Nerissa G, DO    haloperidol (HALDOL) tablet 5 mg, 5 mg, Oral, 4x Daily PRN, Traci, Nerissa G, DO    heparin (porcine) 5000 UNIT/ML injection 5,000 Units, 5,000 Units, Subcutaneous, Q8H, Traci, Nerissa G, DO, 5,000 Units at 04/21/24 2044    insulin glargine (LANTUS, SEMGLEE) injection 15 Units, 15 Units, Subcutaneous, Q12H, Cecily Bain, DO, 15 Units at 04/22/24 0809    Insulin Lispro (humaLOG) injection 2-7 Units, 2-7 Units, Subcutaneous, 4x Daily AC & at Bedtime, Nerissa Aviles DO, 2 Units at 04/22/24 0809    lisinopril (PRINIVIL,ZESTRIL) tablet 20 mg, 20 mg, Oral, Daily, Nerissa Aviles DO, 20 mg at 04/22/24 0808    Magnesium Standard Dose Replacement - Follow Nurse / BPA Driven Protocol, , Does not apply, PRN, Nerissa Aviles DO    melatonin tablet 5 mg, 5 mg, Oral, Nightly PRN, Nerissa Aviles, DO    mirtazapine (REMERON) tablet 15 mg, 15 mg, Oral, Nightly, Darrell Jhaveri, DO, 15  mg at 04/21/24 2022    Phosphorus Replacement - Follow Nurse / BPA Driven Protocol, , Does not apply, PRN, Traci, Nerissa G, DO    Potassium Replacement - Follow Nurse / BPA Driven Protocol, , Does not apply, PRN, Traci, Nerissa G, DO    QUEtiapine (SEROquel) tablet 100 mg, 100 mg, Oral, Q12H, Meliza Kahn, APRN, 100 mg at 04/22/24 0808    sodium chloride 0.9 % flush 10 mL, 10 mL, Intravenous, PRN, Traci, Nerissa G, DO    sodium chloride 0.9 % flush 10 mL, 10 mL, Intravenous, Q12H, Traci, Nerissa G, DO, 10 mL at 04/19/24 1108    sodium chloride 0.9 % flush 10 mL, 10 mL, Intravenous, PRN, Traci, Nerissa G, DO    sodium chloride 0.9 % infusion 40 mL, 40 mL, Intravenous, PRN, Traci, Nerissa G, DO    temazepam (RESTORIL) capsule 30 mg, 30 mg, Oral, Nightly, OdellCecily, DO, 30 mg at 04/21/24 2023    ziprasidone (GEODON) injection 10 mg, 10 mg, Intramuscular, Q4H PRN, Meliza Kahn, APRN, 10 mg at 04/22/24 0643    Laboratory Results:   Lab Results   Component Value Date    GLUCOSE 166 (H) 04/19/2024    CALCIUM 9.9 04/19/2024     04/19/2024    K 4.4 04/19/2024    CO2 30.0 (H) 04/19/2024     04/19/2024    BUN 16 04/19/2024    CREATININE 0.82 04/19/2024    EGFRIFAFRI 102 02/21/2022    EGFRIFNONA 88 02/21/2022    BCR 19.5 04/19/2024    ANIONGAP 8.0 04/19/2024     Lab Results   Component Value Date    WBC 10.47 04/19/2024    HGB 15.5 04/19/2024    HCT 46.7 04/19/2024    MCV 92.7 04/19/2024     04/19/2024     Lab Results   Component Value Date    CHOL 220 (H) 01/08/2024    CHOL 120 08/01/2019    CHOL 117 04/15/2019     Lab Results   Component Value Date    HDL 41 01/08/2024    HDL 37 (L) 08/03/2023    HDL 41 04/03/2023     Lab Results   Component Value Date     (H) 01/08/2024     (H) 08/03/2023     (H) 04/03/2023     Lab Results   Component Value Date    TRIG 108 01/08/2024    TRIG 208 (H) 08/03/2023    TRIG 138 04/03/2023     Lab Results   Component Value Date    HGBA1C 9.50 (H)  04/18/2024     Lab Results   Component Value Date    INR 1.1 11/25/2019    INR 1.1 09/10/2018    PROTIME 12.5 11/25/2019    PROTIME 13.3 09/10/2018     Lab Results   Component Value Date    FOLATE 10.20 02/24/2024     Lab Results   Component Value Date    ALCZRECW16 384 04/18/2024             Assessment / Plan   Brief Patient Summary:  Too Tai is a 68 y.o. male with a past medical history of advanced dementia with behavioral disturbances and nonverbal, CAD, T2DM, HLD, PVD who presented to [facility] ED due to increase in agitation.  Patient has had multiple recent hospitalizations.      Plan:   Advanced dementia with behavioral disturbances  Sleep Deprivation   Continue home Aricept 23 mg daily  Increase Depakote 500 mg 3 times daily  VPA in a.m.  Continue home Remeron 15 mg nightly  Increase Seroquel 100 mg 3 times daily  QTc 478, repeat QTc in a.m.  Continue Restoril 30 mg nightly, patient has been on this dose for approximately 10 days post last hospitalization.  Geodon as needed for extreme agitation; last dose 4/22 at 6432  CT head negative for acute abnormality  Vitamin B12 384, IM cyanocobalamin 1000 mcg  Patient currently requiring 2-point restraints  Case management following for difficult posthospitalization disposition.  Patient is not safe to return home due to concern of being in danger for his wife and himself.   General neurology will continue to follow    I have discussed the above with the patient, bedside RN and Dr. Bain  Time spent with patient: 50 minutes in face-to-face evaluation and management of the patient.      IVONNE Santos

## 2024-04-22 NOTE — PROGRESS NOTES
[facility] Medicine Services  PROGRESS NOTE    Patient Name: Too Tai  : 1955  MRN: [id number]    Date of Admission: 2024  Primary Care Physician: Des Geller MD    Subjective     CC: f/u dementia with agitation    HPI:  In bed. Wife reports he woke this morning and she helped feed him breakfast. Placed in 4 point restraints later this morning due to worsening agitation     Objective     Vital Signs:   Temp:  [97.1 °F (36.2 °C)] 97.1 °F (36.2 °C)  Heart Rate:  [99] 99  Resp:  [18] 18  BP: (149-163)/(73-78) 163/78     Physical Exam:  Constitutional: No acute distress. Asleep - did not wake for exam   HENT: NCAT, mucous membranes moist  Respiratory: Clear to auscultation bilaterally, respiratory effort normal on room air   Cardiovascular: RRR, no murmurs, rubs, or gallops  Gastrointestinal: Positive bowel sounds, soft, nontender, nondistended  Musculoskeletal: No bilateral ankle edema  Psychiatric: Currently calm   Neurologic: No focal deficits. Speech clear and coherent     Results Reviewed:  LAB RESULTS:      Lab 24  0829 24   WBC 10.47 9.87   HEMOGLOBIN 15.5 13.8   HEMATOCRIT 46.7 42.2   PLATELETS 307 292   NEUTROS ABS 4.74 4.68   IMMATURE GRANS (ABS) 0.04 0.05   LYMPHS ABS 3.52* 3.34*   MONOS ABS 1.33* 1.14*   EOS ABS 0.69* 0.56*   MCV 92.7 95.3         Lab 24  0829 24  2356   SODIUM 144 142   POTASSIUM 4.4 4.9   CHLORIDE 106 104   CO2 30.0* 28.0   ANION GAP 8.0 10.0   BUN 16 22   CREATININE 0.82 1.12   EGFR 96.3 72.0   GLUCOSE 166* 226*   CALCIUM 9.9 9.4   MAGNESIUM 1.9 1.8   PHOSPHORUS 3.4  --    HEMOGLOBIN A1C  --  9.50*   TSH  --  3.220         Lab 24  235   TOTAL PROTEIN 6.9   ALBUMIN 3.9   GLOBULIN 3.0   ALT (SGPT) 13   AST (SGOT) 17   BILIRUBIN 0.3   ALK PHOS 120*         Lab 24  2356   HSTROP T 22*         Lab 24  2356   VITAMIN B 12 384     Brief Urine Lab Results  (Last result in the past 365 days)         Color   Clarity   Blood   Leuk Est   Nitrite   Protein   CREAT   Urine HCG        04/19/24 0243 Yellow   Clear   Negative   Negative   Negative   Negative                 Microbiology Results Abnormal       None          No radiology results from the last 24 hrs    Results for orders placed during the hospital encounter of 03/27/24    Adult Transthoracic Echo Complete W/ Cont if Necessary Per Protocol    Interpretation Summary    Left ventricular systolic function is normal. Calculated left ventricular EF = 55.1% Normal left ventricular cavity size noted. Left ventricular wall thickness is consistent with mild concentric hypertrophy. Left ventricular diastolic function is consistent with (grade I) impaired relaxation.    The right ventricular cavity is mildly dilated. Normal right ventricular systolic function noted.    There is calcification of the aortic valve. The aortic valve appears trileaflet. Mild aortic valve regurgitation is present. No aortic valve stenosis is present.    Mitral annular calcification is present. Trace mitral valve regurgitation is present. No significant mitral valve stenosis is present.    The tricuspid valve is structurally normal with no stenosis present. Trace tricuspid valve regurgitation is present. Insufficient TR velocity profile to estimate the right ventricular systolic pressure.    Mild dilation of the sinuses of Valsalva is present. Mild dilation of the ascending aorta is present. Ascending aorta = 4.2 cm    Current medications:  Scheduled Meds:amLODIPine, 10 mg, Oral, Q24H  cyanocobalamin, 1,000 mcg, Intramuscular, Q28 Days  Divalproex Sodium, 500 mg, Oral, Q8H  donepezil, 22.5 mg, Oral, Daily  folic acid, 1 mg, Oral, Daily  heparin (porcine), 5,000 Units, Subcutaneous, Q8H  insulin glargine, 15 Units, Subcutaneous, Q12H  insulin lispro, 2-7 Units, Subcutaneous, 4x Daily AC & at Bedtime  lisinopril, 20 mg, Oral, Daily  mirtazapine, 15 mg, Oral, Nightly  QUEtiapine, 100 mg,  Oral, Q8H  sodium chloride, 10 mL, Intravenous, Q12H  temazepam, 30 mg, Oral, Nightly      Continuous Infusions:   PRN Meds:.  acetaminophen **OR** acetaminophen **OR** acetaminophen    senna-docusate sodium **AND** polyethylene glycol **AND** bisacodyl **AND** bisacodyl    Calcium Replacement - Follow Nurse / BPA Driven Protocol    dextrose    dextrose    glucagon (human recombinant)    haloperidol    Magnesium Standard Dose Replacement - Follow Nurse / BPA Driven Protocol    melatonin    Phosphorus Replacement - Follow Nurse / BPA Driven Protocol    Potassium Replacement - Follow Nurse / BPA Driven Protocol    sodium chloride    sodium chloride    sodium chloride    ziprasidone    Assessment & Plan     Active Hospital Problems    Diagnosis  POA    **Agitation due to dementia [F03.911]  Yes    Agitation [R45.1]  Yes    Type 2 diabetes mellitus with hyperglycemia, with long-term current use of insulin [E11.65, Z79.4]  Not Applicable    Peripheral neuropathy [G62.9]  Yes    Hyperlipidemia [E78.5]  Yes    Benign prostatic hyperplasia [N40.0]  Yes    Benign essential HTN [I10]  Yes    Peripheral vascular disease [I73.9]  Yes    Coronary artery disease involving native coronary artery of native heart [I25.10]  Yes      Resolved Hospital Problems   No resolved problems to display.     Brief Hospital Course to date:  Too Tai is a 68yoM with PMH significant for advanced dementia, CAD, diabetes mellitus type 2, HLD and PVD. Admitted to [facility] ED on 4/18/24 for agitation / behavioral disturbances.      Severe dementia complicated by behavioral disturbance  - Infectious work up unremarkable. UA reassuring. CXR without acute findings. CT head without acute findings  - Neuro following  - Still in restraints   - Continue home Aricept and Mirtazapine 15mg QHS   - Depakote increased to 500mg TID and Seroquel increased to 100mg TID today  - Continue Temazepam 30mg QHS  - May need geriatric psych admission -  CM looking into this     DMII with hyperglycemia  - A1c 9.5%  - Continue Lantus 15 units BID, add Lispro 5 units TID AC, continue SSI     HTN, continue Amlodipine 10mg daily     Expected Discharge Location and Transportation: TBD  Expected Discharge Expected Discharge Date: 4/26/2024; Expected Discharge Time:      DVT prophylaxis:Medical and mechanical DVT prophylaxis orders are present.    AM-PAC 6 Clicks Score (PT): 12 (04/22/24 0807)    CODE STATUS:   Code Status and Medical Interventions:   Ordered at: 04/19/24 1037     Medical Intervention Limits:    NO intubation (DNI)     Level Of Support Discussed With:    Next of Kin (If No Surrogate)     Code Status (Patient has no pulse and is not breathing):    No CPR (Do Not Attempt to Resuscitate)     Medical Interventions (Patient has pulse or is breathing):    Limited Support     Parisa Madrigal PA-C  04/22/24

## 2024-04-22 NOTE — PLAN OF CARE
Problem: Restraint, Nonviolent  Goal: Absence of Harm or Injury  Outcome: Ongoing, Progressing  Intervention: Implement Least Restrictive Safety Strategies  Recent Flowsheet Documentation  Taken 4/22/2024 0807 by Rachele Schultz RN  Medical Device Protection: torso covered  Less Restrictive Alternative:   bed alarm in use   calming techniques promoted   environment adjusted   family presence promoted   medication offered   positive reinforcement provided   safety enhancements provided   surveillance provided   therapeutic music  De-Escalation Techniques:   appropriate behavior reinforced   stimulation decreased  Diversional Activities: television  Intervention: Protect Dignity, Rights, and Personal Wellbeing  Recent Flowsheet Documentation  Taken 4/22/2024 1400 by Rachele Schultz, RN  Trust Relationship/Rapport:   care explained   choices provided   emotional support provided   empathic listening provided   questions answered   questions encouraged   reassurance provided   thoughts/feelings acknowledged  Taken 4/22/2024 1200 by Rachele Schultz RN  Trust Relationship/Rapport:   care explained   choices provided   emotional support provided   empathic listening provided   questions answered   questions encouraged   reassurance provided   thoughts/feelings acknowledged  Taken 4/22/2024 1000 by Rachele Schultz, RN  Trust Relationship/Rapport:   care explained   choices provided   emotional support provided   empathic listening provided   questions answered   questions encouraged   reassurance provided   thoughts/feelings acknowledged  Taken 4/22/2024 0807 by Rachele Schultz, RN  Trust Relationship/Rapport:   care explained   choices provided   emotional support provided   empathic listening provided   questions answered   questions encouraged   reassurance provided   thoughts/feelings acknowledged  Intervention: Protect Skin and Joint Integrity  Recent Flowsheet Documentation  Taken 4/22/2024 1400 by  Rachele Schultz RN  Body Position:   tilted   turned  Taken 4/22/2024 1200 by Rachele Schultz RN  Body Position:   tilted   turned  Taken 4/22/2024 1000 by Rachele Schultz RN  Body Position:   tilted   turned  Taken 4/22/2024 0807 by Rachele Schultz RN  Body Position:   tilted   turned     Problem: Skin Injury Risk Increased  Goal: Skin Health and Integrity  Outcome: Ongoing, Progressing  Intervention: Optimize Skin Protection  Recent Flowsheet Documentation  Taken 4/22/2024 1400 by Rachele Schultz RN  Pressure Reduction Techniques:   frequent weight shift encouraged   heels elevated off bed   positioned off wounds  Head of Bed (HOB) Positioning: HOB at 20-30 degrees  Pressure Reduction Devices:   pressure-redistributing mattress utilized   positioning supports utilized   heel offloading device utilized  Skin Protection:   adhesive use limited   tubing/devices free from skin contact  Taken 4/22/2024 1200 by Rachele Schultz RN  Pressure Reduction Techniques:   frequent weight shift encouraged   heels elevated off bed   positioned off wounds  Head of Bed (HOB) Positioning: HOB at 20-30 degrees  Pressure Reduction Devices:   pressure-redistributing mattress utilized   positioning supports utilized   heel offloading device utilized  Skin Protection:   adhesive use limited   tubing/devices free from skin contact  Taken 4/22/2024 1000 by Rachele Schultz RN  Pressure Reduction Techniques:   frequent weight shift encouraged   heels elevated off bed   positioned off wounds  Head of Bed (HOB) Positioning: HOB at 20-30 degrees  Pressure Reduction Devices:   pressure-redistributing mattress utilized   positioning supports utilized   heel offloading device utilized  Skin Protection:   adhesive use limited   tubing/devices free from skin contact  Taken 4/22/2024 0807 by Rachele Schultz RN  Pressure Reduction Techniques:   frequent weight shift encouraged   heels elevated off bed   positioned off  wounds  Head of Bed (HOB) Positioning: HOB at 20-30 degrees  Pressure Reduction Devices:   pressure-redistributing mattress utilized   positioning supports utilized   heel offloading device utilized  Skin Protection:   adhesive use limited   tubing/devices free from skin contact     Problem: Fall Injury Risk  Goal: Absence of Fall and Fall-Related Injury  Outcome: Ongoing, Progressing  Intervention: Identify and Manage Contributors  Recent Flowsheet Documentation  Taken 4/22/2024 1400 by Rachele Schultz RN  Medication Review/Management: medications reviewed  Taken 4/22/2024 1200 by Rachele Schultz RN  Medication Review/Management: medications reviewed  Taken 4/22/2024 1000 by Rachele Schultz RN  Medication Review/Management: medications reviewed  Taken 4/22/2024 0807 by Rachele Schultz RN  Medication Review/Management: medications reviewed  Intervention: Promote Injury-Free Environment  Recent Flowsheet Documentation  Taken 4/22/2024 1400 by Rachele Schultz RN  Safety Promotion/Fall Prevention:   activity supervised   assistive device/personal items within reach   clutter free environment maintained   toileting scheduled   safety round/check completed   room organization consistent  Taken 4/22/2024 1200 by Rachele Schultz RN  Safety Promotion/Fall Prevention:   activity supervised   assistive device/personal items within reach   clutter free environment maintained   toileting scheduled   safety round/check completed   room organization consistent  Taken 4/22/2024 1000 by Rachele Schultz RN  Safety Promotion/Fall Prevention:   activity supervised   assistive device/personal items within reach   clutter free environment maintained   toileting scheduled   safety round/check completed   room organization consistent  Taken 4/22/2024 0807 by Rachele Schultz RN  Safety Promotion/Fall Prevention:   activity supervised   assistive device/personal items within reach   clutter free environment  maintained   toileting scheduled   safety round/check completed   room organization consistent     Problem: Adult Inpatient Plan of Care  Goal: Plan of Care Review  Outcome: Ongoing, Progressing  Goal: Patient-Specific Goal (Individualized)  Outcome: Ongoing, Progressing  Goal: Absence of Hospital-Acquired Illness or Injury  Outcome: Ongoing, Progressing  Intervention: Identify and Manage Fall Risk  Recent Flowsheet Documentation  Taken 4/22/2024 1400 by Rachele Schultz RN  Safety Promotion/Fall Prevention:   activity supervised   assistive device/personal items within reach   clutter free environment maintained   toileting scheduled   safety round/check completed   room organization consistent  Taken 4/22/2024 1200 by Rachele Schultz RN  Safety Promotion/Fall Prevention:   activity supervised   assistive device/personal items within reach   clutter free environment maintained   toileting scheduled   safety round/check completed   room organization consistent  Taken 4/22/2024 1000 by Rachele Schultz RN  Safety Promotion/Fall Prevention:   activity supervised   assistive device/personal items within reach   clutter free environment maintained   toileting scheduled   safety round/check completed   room organization consistent  Taken 4/22/2024 0807 by Rachele Schultz RN  Safety Promotion/Fall Prevention:   activity supervised   assistive device/personal items within reach   clutter free environment maintained   toileting scheduled   safety round/check completed   room organization consistent  Intervention: Prevent Skin Injury  Recent Flowsheet Documentation  Taken 4/22/2024 1400 by Rachele Schultz RN  Body Position:   tilted   turned  Skin Protection:   adhesive use limited   tubing/devices free from skin contact  Taken 4/22/2024 1200 by Rachele Schultz RN  Body Position:   tilted   turned  Skin Protection:   adhesive use limited   tubing/devices free from skin contact  Taken 4/22/2024 1000 by  Rachele Schultz RN  Body Position:   tilted   turned  Skin Protection:   adhesive use limited   tubing/devices free from skin contact  Taken 4/22/2024 0807 by Rachele Schultz RN  Body Position:   tilted   turned  Skin Protection:   adhesive use limited   tubing/devices free from skin contact  Intervention: Prevent and Manage VTE (Venous Thromboembolism) Risk  Recent Flowsheet Documentation  Taken 4/22/2024 1400 by Rachele Schultz RN  Activity Management: activity encouraged  Taken 4/22/2024 1200 by Rachele Schultz RN  Activity Management: activity encouraged  Taken 4/22/2024 1000 by Rachele Schultz RN  Activity Management: activity encouraged  Taken 4/22/2024 0807 by Rachele Schultz RN  Activity Management: activity encouraged  VTE Prevention/Management: (see mar) other (see comments)  Intervention: Prevent Infection  Recent Flowsheet Documentation  Taken 4/22/2024 1400 by Rachele Schultz RN  Infection Prevention: single patient room provided  Taken 4/22/2024 1200 by Rachele Schultz RN  Infection Prevention: single patient room provided  Taken 4/22/2024 1000 by Rachele Schultz RN  Infection Prevention: single patient room provided  Taken 4/22/2024 0807 by Rachele Schultz RN  Infection Prevention: single patient room provided  Goal: Optimal Comfort and Wellbeing  Outcome: Ongoing, Progressing  Intervention: Monitor Pain and Promote Comfort  Recent Flowsheet Documentation  Taken 4/22/2024 1000 by Rachele Schultz RN  Pain Management Interventions: see MAR  Taken 4/22/2024 0807 by Rachele Schultz RN  Pain Management Interventions: see MAR  Intervention: Provide Person-Centered Care  Recent Flowsheet Documentation  Taken 4/22/2024 1400 by Rachele Schultz RN  Trust Relationship/Rapport:   care explained   choices provided   emotional support provided   empathic listening provided   questions answered   questions encouraged   reassurance provided   thoughts/feelings  acknowledged  Taken 4/22/2024 1200 by Rachele Schultz RN  Trust Relationship/Rapport:   care explained   choices provided   emotional support provided   empathic listening provided   questions answered   questions encouraged   reassurance provided   thoughts/feelings acknowledged  Taken 4/22/2024 1000 by Rachele Schultz, RN  Trust Relationship/Rapport:   care explained   choices provided   emotional support provided   empathic listening provided   questions answered   questions encouraged   reassurance provided   thoughts/feelings acknowledged  Taken 4/22/2024 0807 by Rachele Schultz, RN  Trust Relationship/Rapport:   care explained   choices provided   emotional support provided   empathic listening provided   questions answered   questions encouraged   reassurance provided   thoughts/feelings acknowledged  Goal: Readiness for Transition of Care  Outcome: Ongoing, Progressing   Goal Outcome Evaluation:

## 2024-04-22 NOTE — TELEPHONE ENCOUNTER
I had a long discussion with his wife over the weekend.  We are going to try to stop his seroquel and temazepam and see if symptoms improve.

## 2024-04-22 NOTE — CASE MANAGEMENT/SOCIAL WORK
Continued Stay Note  [facility]     Patient Name: Too Tai  MRN: [id number]  Today's Date: 4/22/2024    Admit Date: 4/18/2024    Plan: TBD   Discharge Plan       Row Name 04/22/24 1148       Plan    Plan TBD    Plan Comments Discussed patient in MDR. Discussed oni-psych options with MSW's Liberty Miller on Friday, 4/19. Patient would need to be able to participate in groups & this usually requires a legal guardian (not a family member or POA). CM called Loren with [facility] [employer] at [phone] to discuss Mr. Tai's current situation and options moving forward with wife's permission. Loren to call wife to discuss private pay budget & possible memory care facilities (pending mobility, medication adjustments, etc.). Some facilities offer psych services weekly to once or twice a month. Long-term care options, pending private pay cost for wife, will be limited. Wife was caring for patient at home with (2) private pay caregivers prior to hospitalization. CM will continue to follow and assist.    Final Discharge Disposition Code 30 - still a patient                   Discharge Codes    No documentation.                 Expected Discharge Date and Time       Expected Discharge Date Expected Discharge Time    Apr 26, 2024               Ilene Brown RN

## 2024-04-22 NOTE — NURSING NOTE
WOC CONSULTED FOR BED EXTENDER pad    [facility] BED EXTENDER pad #3 ordered and will be delivered by transport    Please implement pressure injury prevention protocol. Specialty beds do not replace turning/offloading.    At discharge, please wipe down pump with appropriate cleaning wipes and notify Charge RN and Transport to have [facility] bed extender pad returned to storage. Thank you.    Juan Aden RN, BSN, CCRN, CWOCN  Wound, Ostomy and Continence (WOC) Department  [facility]

## 2024-04-22 NOTE — PLAN OF CARE
Problem: Restraint, Nonviolent  Goal: Absence of Harm or Injury  Outcome: Ongoing, Progressing  Intervention: Implement Least Restrictive Safety Strategies  Recent Flowsheet Documentation  Taken 4/22/2024 0600 by Steve Smith RN  Medical Device Protection: torso covered  Less Restrictive Alternative:   bed alarm in use   calming techniques promoted   environment adjusted   family presence promoted   medication offered   positive reinforcement provided   safety enhancements provided   surveillance provided   therapeutic music  De-Escalation Techniques:   appropriate behavior reinforced   diversional activity encouraged   increased round frequency   medication administered   quiet time facilitated   reoriented   stimulation decreased   verbally redirected  Diversional Activities: television  Taken 4/22/2024 0400 by Steve Smith, RN  Medical Device Protection: torso covered  Less Restrictive Alternative:   bed alarm in use   calming techniques promoted   environment adjusted   family presence promoted   medication offered   positive reinforcement provided   safety enhancements provided   surveillance provided   therapeutic music  De-Escalation Techniques:   appropriate behavior reinforced   diversional activity encouraged   increased round frequency   quiet time facilitated   reoriented   stimulation decreased   verbally redirected  Diversional Activities: television  Taken 4/22/2024 0200 by Steve Smith, RN  Medical Device Protection: torso covered  Less Restrictive Alternative:   bed alarm in use   calming techniques promoted   environment adjusted   family presence promoted   medication offered   positive reinforcement provided   safety enhancements provided   surveillance provided   therapeutic music  De-Escalation Techniques:   appropriate behavior reinforced   stimulation decreased   quiet time facilitated   diversional activity encouraged   verbally redirected  Diversional Activities:  television  Taken 4/22/2024 0000 by Steve Smith RN  Medical Device Protection: torso covered  Less Restrictive Alternative:   bed alarm in use   calming techniques promoted   environment adjusted   family presence promoted   medication offered   positive reinforcement provided   safety enhancements provided   surveillance provided   therapeutic music  De-Escalation Techniques:   appropriate behavior reinforced   diversional activity encouraged   increased round frequency   quiet time facilitated   reoriented   stimulation decreased   verbally redirected  Diversional Activities: television  Taken 4/21/2024 2200 by Steve Smith RN  Medical Device Protection: torso covered  Less Restrictive Alternative:   bed alarm in use   calming techniques promoted   environment adjusted   family presence promoted   medication offered   positive reinforcement provided   safety enhancements provided   surveillance provided   therapeutic music  De-Escalation Techniques:   appropriate behavior reinforced   diversional activity encouraged   increased round frequency   quiet time facilitated   reoriented   stimulation decreased   verbally redirected  Diversional Activities: television  Taken 4/21/2024 2031 by Steve Smith, RN  Medical Device Protection: torso covered  Diversional Activities: television  Taken 4/21/2024 2000 by Steve Smith RN  Medical Device Protection: torso covered  Less Restrictive Alternative:   bed alarm in use   calming techniques promoted   environment adjusted   family presence promoted   medication offered   positive reinforcement provided   safety enhancements provided   surveillance provided   therapeutic music  De-Escalation Techniques:   appropriate behavior reinforced   diversional activity encouraged   increased round frequency   medication administered   quiet time facilitated   reoriented   stimulation decreased   verbally redirected  Diversional Activities:  television  Intervention: Protect Dignity, Rights, and Personal Wellbeing  Recent Flowsheet Documentation  Taken 4/21/2024 2031 by Steve Smith RN  Trust Relationship/Rapport:   care explained   choices provided   emotional support provided   empathic listening provided   thoughts/feelings acknowledged   reassurance provided  Intervention: Protect Skin and Joint Integrity  Recent Flowsheet Documentation  Taken 4/22/2024 0400 by Steve Smith RN  Body Position: weight shifting  Taken 4/22/2024 0000 by Steve Smith RN  Body Position:   neutral body alignment   weight shifting  Taken 4/21/2024 2031 by Steve Smith RN  Body Position: weight shifting  Range of Motion: active ROM (range of motion) encouraged     Problem: Skin Injury Risk Increased  Goal: Skin Health and Integrity  Outcome: Ongoing, Progressing  Intervention: Optimize Skin Protection  Recent Flowsheet Documentation  Taken 4/22/2024 0400 by Steve Smith RN  Pressure Reduction Techniques:   heels elevated off bed   frequent weight shift encouraged   pressure points protected   rest period provided between sit times   weight shift assistance provided  Head of Bed (HOB) Positioning: HOB lowered  Pressure Reduction Devices:   pressure-redistributing mattress utilized   positioning supports utilized  Skin Protection:   incontinence pads utilized   adhesive use limited   skin-to-skin areas padded   transparent dressing maintained   tubing/devices free from skin contact  Taken 4/22/2024 0000 by Steve Smith RN  Pressure Reduction Techniques:   frequent weight shift encouraged   heels elevated off bed   pressure points protected   rest period provided between sit times   weight shift assistance provided  Head of Bed (HOB) Positioning: HOB lowered  Pressure Reduction Devices:   pressure-redistributing mattress utilized   positioning supports utilized  Skin Protection:   adhesive use limited   transparent dressing maintained    tubing/devices free from skin contact   incontinence pads utilized  Taken 4/21/2024 2031 by Steve Smith, RN  Pressure Reduction Techniques:   frequent weight shift encouraged   pressure points protected   heels elevated off bed   rest period provided between sit times   weight shift assistance provided  Head of Bed (HOB) Positioning: HOB elevated  Pressure Reduction Devices:   positioning supports utilized   pressure-redistributing mattress utilized  Skin Protection:   adhesive use limited   incontinence pads utilized   transparent dressing maintained   skin-to-skin areas padded     Problem: Fall Injury Risk  Goal: Absence of Fall and Fall-Related Injury  Outcome: Ongoing, Progressing  Intervention: Identify and Manage Contributors  Recent Flowsheet Documentation  Taken 4/22/2024 0400 by Steve Smith RN  Medication Review/Management: medications reviewed  Self-Care Promotion:   independence encouraged   BADL personal objects within reach   BADL personal routines maintained  Taken 4/22/2024 0000 by Steve Smith RN  Medication Review/Management: medications reviewed  Self-Care Promotion:   independence encouraged   BADL personal objects within reach   BADL personal routines maintained  Taken 4/21/2024 2031 by Steve Smith RN  Medication Review/Management: medications reviewed  Self-Care Promotion:   independence encouraged   BADL personal objects within reach   BADL personal routines maintained   meal set-up provided  Intervention: Promote Injury-Free Environment  Recent Flowsheet Documentation  Taken 4/22/2024 0400 by Steve Smith RN  Safety Promotion/Fall Prevention:   safety round/check completed   room organization consistent   nonskid shoes/slippers when out of bed   clutter free environment maintained   assistive device/personal items within reach   activity supervised   fall prevention program maintained  Taken 4/22/2024 0000 by Steve Smith RN  Safety Promotion/Fall  Prevention:   safety round/check completed   room organization consistent   nonskid shoes/slippers when out of bed   clutter free environment maintained   assistive device/personal items within reach   activity supervised  Taken 4/21/2024 2031 by Steve Smith, RN  Safety Promotion/Fall Prevention:   activity supervised   assistive device/personal items within reach   clutter free environment maintained   fall prevention program maintained   lighting adjusted   nonskid shoes/slippers when out of bed   room organization consistent   safety round/check completed     Problem: Adult Inpatient Plan of Care  Goal: Plan of Care Review  Outcome: Ongoing, Progressing  Goal: Patient-Specific Goal (Individualized)  Outcome: Ongoing, Progressing  Goal: Absence of Hospital-Acquired Illness or Injury  Outcome: Ongoing, Progressing  Intervention: Identify and Manage Fall Risk  Recent Flowsheet Documentation  Taken 4/22/2024 0400 by Steve Smith, RN  Safety Promotion/Fall Prevention:   safety round/check completed   room organization consistent   nonskid shoes/slippers when out of bed   clutter free environment maintained   assistive device/personal items within reach   activity supervised   fall prevention program maintained  Taken 4/22/2024 0000 by Steve Smith, RN  Safety Promotion/Fall Prevention:   safety round/check completed   room organization consistent   nonskid shoes/slippers when out of bed   clutter free environment maintained   assistive device/personal items within reach   activity supervised  Taken 4/21/2024 2031 by Steve Smith, RN  Safety Promotion/Fall Prevention:   activity supervised   assistive device/personal items within reach   clutter free environment maintained   fall prevention program maintained   lighting adjusted   nonskid shoes/slippers when out of bed   room organization consistent   safety round/check completed  Intervention: Prevent Skin Injury  Recent Flowsheet  Documentation  Taken 4/22/2024 0400 by Steve Smith RN  Body Position: weight shifting  Skin Protection:   incontinence pads utilized   adhesive use limited   skin-to-skin areas padded   transparent dressing maintained   tubing/devices free from skin contact  Taken 4/22/2024 0000 by Steve Smith RN  Body Position:   neutral body alignment   weight shifting  Skin Protection:   adhesive use limited   transparent dressing maintained   tubing/devices free from skin contact   incontinence pads utilized  Taken 4/21/2024 2031 by Steve Smith RN  Body Position: weight shifting  Skin Protection:   adhesive use limited   incontinence pads utilized   transparent dressing maintained   skin-to-skin areas padded  Intervention: Prevent and Manage VTE (Venous Thromboembolism) Risk  Recent Flowsheet Documentation  Taken 4/22/2024 0400 by Steve Smith RN  Activity Management: activity minimized  Taken 4/22/2024 0000 by Steve Smith RN  Activity Management: activity encouraged  Taken 4/21/2024 2031 by Steve Smith RN  Activity Management: activity minimized  VTE Prevention/Management: (SQ heparin) --  Range of Motion: active ROM (range of motion) encouraged  Intervention: Prevent Infection  Recent Flowsheet Documentation  Taken 4/22/2024 0400 by Steve Smith RN  Infection Prevention:   environmental surveillance performed   cohorting utilized   equipment surfaces disinfected   hand hygiene promoted   personal protective equipment utilized   rest/sleep promoted  Taken 4/22/2024 0000 by Steve Smith RN  Infection Prevention:   environmental surveillance performed   cohorting utilized   equipment surfaces disinfected   hand hygiene promoted   personal protective equipment utilized   rest/sleep promoted  Taken 4/21/2024 2031 by Steve Smith RN  Infection Prevention:   environmental surveillance performed   rest/sleep promoted  Goal: Optimal Comfort and Wellbeing  Outcome:  Ongoing, Progressing  Intervention: Monitor Pain and Promote Comfort  Recent Flowsheet Documentation  Taken 4/22/2024 0000 by Steve Smith, RN  Pain Management Interventions:   care clustered   position adjusted   relaxation techniques promoted   quiet environment facilitated  Intervention: Provide Person-Centered Care  Recent Flowsheet Documentation  Taken 4/21/2024 2031 by Steve Smith, RN  Trust Relationship/Rapport:   care explained   choices provided   emotional support provided   empathic listening provided   thoughts/feelings acknowledged   reassurance provided  Goal: Readiness for Transition of Care  Outcome: Ongoing, Progressing   Goal Outcome Evaluation:

## 2024-04-23 LAB
GLUCOSE BLDC GLUCOMTR-MCNC: 169 MG/DL (ref 70–130)
GLUCOSE BLDC GLUCOMTR-MCNC: 177 MG/DL (ref 70–130)
GLUCOSE BLDC GLUCOMTR-MCNC: 195 MG/DL (ref 70–130)
GLUCOSE BLDC GLUCOMTR-MCNC: 208 MG/DL (ref 70–130)
QT INTERVAL: 456 MS
QTC INTERVAL: 456 MS
VALPROATE SERPL-MCNC: 60.8 MCG/ML (ref 50–125)

## 2024-04-23 PROCEDURE — 63710000001 INSULIN LISPRO (HUMAN) PER 5 UNITS: Performed by: INTERNAL MEDICINE

## 2024-04-23 PROCEDURE — 25010000002 ZIPRASIDONE MESYLATE PER 10 MG

## 2024-04-23 PROCEDURE — 80164 ASSAY DIPROPYLACETIC ACD TOT: CPT

## 2024-04-23 PROCEDURE — 99231 SBSQ HOSP IP/OBS SF/LOW 25: CPT | Performed by: PHYSICIAN ASSISTANT

## 2024-04-23 PROCEDURE — 99233 SBSQ HOSP IP/OBS HIGH 50: CPT

## 2024-04-23 PROCEDURE — 93005 ELECTROCARDIOGRAM TRACING: CPT

## 2024-04-23 PROCEDURE — 82948 REAGENT STRIP/BLOOD GLUCOSE: CPT

## 2024-04-23 PROCEDURE — 25010000002 HEPARIN (PORCINE) PER 1000 UNITS: Performed by: INTERNAL MEDICINE

## 2024-04-23 PROCEDURE — 25810000003 SODIUM CHLORIDE 0.9 % SOLUTION: Performed by: PHYSICIAN ASSISTANT

## 2024-04-23 PROCEDURE — 93010 ELECTROCARDIOGRAM REPORT: CPT | Performed by: INTERNAL MEDICINE

## 2024-04-23 PROCEDURE — 63710000001 INSULIN LISPRO (HUMAN) PER 5 UNITS: Performed by: PHYSICIAN ASSISTANT

## 2024-04-23 PROCEDURE — 25810000003 SODIUM CHLORIDE 0.9 % SOLUTION 1,000 ML FLEX CONT: Performed by: INTERNAL MEDICINE

## 2024-04-23 PROCEDURE — 63710000001 INSULIN GLARGINE PER 5 UNITS: Performed by: INTERNAL MEDICINE

## 2024-04-23 RX ORDER — QUETIAPINE FUMARATE 100 MG/1
200 TABLET, FILM COATED ORAL NIGHTLY
Status: DISCONTINUED | OUTPATIENT
Start: 2024-04-23 | End: 2024-04-24

## 2024-04-23 RX ORDER — SODIUM CHLORIDE 9 MG/ML
100 INJECTION, SOLUTION INTRAVENOUS CONTINUOUS
Status: DISCONTINUED | OUTPATIENT
Start: 2024-04-23 | End: 2024-04-24

## 2024-04-23 RX ORDER — DONEPEZIL HYDROCHLORIDE 10 MG/1
10 TABLET, FILM COATED ORAL NIGHTLY
Status: DISCONTINUED | OUTPATIENT
Start: 2024-04-24 | End: 2024-04-24

## 2024-04-23 RX ORDER — QUETIAPINE FUMARATE 100 MG/1
100 TABLET, FILM COATED ORAL EVERY 8 HOURS SCHEDULED
Status: DISCONTINUED | OUTPATIENT
Start: 2024-04-23 | End: 2024-04-23

## 2024-04-23 RX ORDER — QUETIAPINE FUMARATE 100 MG/1
100 TABLET, FILM COATED ORAL 2 TIMES DAILY
Status: DISCONTINUED | OUTPATIENT
Start: 2024-04-23 | End: 2024-05-08

## 2024-04-23 RX ORDER — DIVALPROEX SODIUM 125 MG/1
500 CAPSULE, COATED PELLETS ORAL EVERY 8 HOURS SCHEDULED
Status: DISCONTINUED | OUTPATIENT
Start: 2024-04-23 | End: 2024-04-24

## 2024-04-23 RX ORDER — DONEPEZIL HYDROCHLORIDE 23 MG/1
23 TABLET, FILM COATED ORAL NIGHTLY
COMMUNITY
End: 2024-05-09 | Stop reason: HOSPADM

## 2024-04-23 RX ADMIN — INSULIN LISPRO 2 UNITS: 100 INJECTION, SOLUTION INTRAVENOUS; SUBCUTANEOUS at 16:54

## 2024-04-23 RX ADMIN — DIVALPROEX SODIUM 500 MG: 125 CAPSULE ORAL at 09:00

## 2024-04-23 RX ADMIN — INSULIN LISPRO 5 UNITS: 100 INJECTION, SOLUTION INTRAVENOUS; SUBCUTANEOUS at 08:59

## 2024-04-23 RX ADMIN — MIRTAZAPINE 15 MG: 15 TABLET, FILM COATED ORAL at 19:42

## 2024-04-23 RX ADMIN — INSULIN LISPRO 3 UNITS: 100 INJECTION, SOLUTION INTRAVENOUS; SUBCUTANEOUS at 08:59

## 2024-04-23 RX ADMIN — DIVALPROEX SODIUM 500 MG: 125 CAPSULE ORAL at 19:41

## 2024-04-23 RX ADMIN — QUETIAPINE FUMARATE 100 MG: 100 TABLET ORAL at 14:14

## 2024-04-23 RX ADMIN — DONEPEZIL HYDROCHLORIDE 22.5 MG: 10 TABLET, FILM COATED ORAL at 08:58

## 2024-04-23 RX ADMIN — BISACODYL 5 MG: 5 TABLET, COATED ORAL at 08:59

## 2024-04-23 RX ADMIN — ZIPRASIDONE MESYLATE 10 MG: 20 INJECTION, POWDER, LYOPHILIZED, FOR SOLUTION INTRAMUSCULAR at 08:58

## 2024-04-23 RX ADMIN — INSULIN LISPRO 5 UNITS: 100 INJECTION, SOLUTION INTRAVENOUS; SUBCUTANEOUS at 16:55

## 2024-04-23 RX ADMIN — FOLIC ACID 1 MG: 1 TABLET ORAL at 08:59

## 2024-04-23 RX ADMIN — HEPARIN SODIUM 5000 UNITS: 5000 INJECTION INTRAVENOUS; SUBCUTANEOUS at 14:13

## 2024-04-23 RX ADMIN — Medication 10 ML: at 09:02

## 2024-04-23 RX ADMIN — Medication 10 ML: at 20:47

## 2024-04-23 RX ADMIN — AMLODIPINE BESYLATE 10 MG: 10 TABLET ORAL at 08:59

## 2024-04-23 RX ADMIN — MICONAZOLE NITRATE 1 APPLICATION: 20 POWDER TOPICAL at 20:46

## 2024-04-23 RX ADMIN — TEMAZEPAM 30 MG: 15 CAPSULE ORAL at 19:42

## 2024-04-23 RX ADMIN — ZIPRASIDONE MESYLATE 10 MG: 20 INJECTION, POWDER, LYOPHILIZED, FOR SOLUTION INTRAMUSCULAR at 19:27

## 2024-04-23 RX ADMIN — INSULIN GLARGINE 15 UNITS: 100 INJECTION, SOLUTION SUBCUTANEOUS at 20:46

## 2024-04-23 RX ADMIN — HEPARIN SODIUM 5000 UNITS: 5000 INJECTION INTRAVENOUS; SUBCUTANEOUS at 19:40

## 2024-04-23 RX ADMIN — DIVALPROEX SODIUM 500 MG: 125 CAPSULE ORAL at 14:14

## 2024-04-23 RX ADMIN — QUETIAPINE FUMARATE 100 MG: 100 TABLET ORAL at 08:58

## 2024-04-23 RX ADMIN — INSULIN LISPRO 2 UNITS: 100 INJECTION, SOLUTION INTRAVENOUS; SUBCUTANEOUS at 12:02

## 2024-04-23 RX ADMIN — LISINOPRIL 20 MG: 20 TABLET ORAL at 08:59

## 2024-04-23 RX ADMIN — SODIUM CHLORIDE 100 ML/HR: 9 INJECTION, SOLUTION INTRAVENOUS at 15:34

## 2024-04-23 RX ADMIN — INSULIN LISPRO 5 UNITS: 100 INJECTION, SOLUTION INTRAVENOUS; SUBCUTANEOUS at 12:01

## 2024-04-23 RX ADMIN — SODIUM CHLORIDE 40 ML: 9 INJECTION, SOLUTION INTRAVENOUS at 05:46

## 2024-04-23 RX ADMIN — Medication 10 ML: at 19:42

## 2024-04-23 RX ADMIN — INSULIN LISPRO 2 UNITS: 100 INJECTION, SOLUTION INTRAVENOUS; SUBCUTANEOUS at 20:46

## 2024-04-23 RX ADMIN — QUETIAPINE FUMARATE 200 MG: 100 TABLET ORAL at 19:41

## 2024-04-23 RX ADMIN — MICONAZOLE NITRATE 1 APPLICATION: 20 POWDER TOPICAL at 09:02

## 2024-04-23 RX ADMIN — INSULIN GLARGINE 15 UNITS: 100 INJECTION, SOLUTION SUBCUTANEOUS at 08:58

## 2024-04-23 NOTE — PLAN OF CARE
Problem: Restraint, Nonviolent  Goal: Absence of Harm or Injury  Outcome: Ongoing, Progressing  Intervention: Implement Least Restrictive Safety Strategies  Recent Flowsheet Documentation  Taken 4/23/2024 0800 by Rachele Schultz, RN  Medical Device Protection: IV pole/bag removed from visual field  Less Restrictive Alternative:   bed alarm in use   calming techniques promoted   environment adjusted   family presence promoted   medication offered   positive reinforcement provided   safety enhancements provided   surveillance provided   therapeutic music  De-Escalation Techniques: appropriate behavior reinforced  Diversional Activities: television  Intervention: Protect Dignity, Rights, and Personal Wellbeing  Recent Flowsheet Documentation  Taken 4/23/2024 1600 by Rachele Schultz, RN  Trust Relationship/Rapport:   care explained   choices provided   emotional support provided   empathic listening provided   questions answered   questions encouraged   reassurance provided   thoughts/feelings acknowledged  Taken 4/23/2024 1400 by Rachele Schultz RN  Trust Relationship/Rapport:   care explained   choices provided   emotional support provided   empathic listening provided   questions answered   questions encouraged   reassurance provided   thoughts/feelings acknowledged  Taken 4/23/2024 1200 by Rachele Schultz RN  Trust Relationship/Rapport:   care explained   choices provided   emotional support provided   empathic listening provided   questions answered   questions encouraged   reassurance provided   thoughts/feelings acknowledged  Taken 4/23/2024 1000 by Rachele Scuhltz RN  Trust Relationship/Rapport:   care explained   choices provided   emotional support provided   empathic listening provided   questions answered   questions encouraged   reassurance provided   thoughts/feelings acknowledged  Taken 4/23/2024 0858 by Rachele Schultz, RN  Trust Relationship/Rapport:   care explained   choices  provided   emotional support provided   empathic listening provided   questions answered   questions encouraged   reassurance provided   thoughts/feelings acknowledged  Intervention: Protect Skin and Joint Integrity  Recent Flowsheet Documentation  Taken 4/23/2024 1600 by Rachele Schultz RN  Body Position:   tilted   turned  Taken 4/23/2024 1400 by Rachele Schultz RN  Body Position:   tilted   turned  Taken 4/23/2024 1200 by Rachele Schultz RN  Body Position:   tilted   turned  Taken 4/23/2024 1000 by Rachele Schultz RN  Body Position:   tilted   turned  Taken 4/23/2024 0858 by Rachele Schultz RN  Body Position:   tilted   turned     Problem: Skin Injury Risk Increased  Goal: Skin Health and Integrity  Outcome: Ongoing, Progressing  Intervention: Optimize Skin Protection  Recent Flowsheet Documentation  Taken 4/23/2024 1600 by Rachele Schultz RN  Pressure Reduction Techniques:   frequent weight shift encouraged   heels elevated off bed   positioned off wounds  Head of Bed (HOB) Positioning: HOB at 20-30 degrees  Pressure Reduction Devices:   pressure-redistributing mattress utilized   positioning supports utilized   heel offloading device utilized  Skin Protection:   adhesive use limited   tubing/devices free from skin contact  Taken 4/23/2024 1400 by Rachele Schultz RN  Pressure Reduction Techniques:   frequent weight shift encouraged   heels elevated off bed   positioned off wounds  Head of Bed (HOB) Positioning: HOB at 20-30 degrees  Pressure Reduction Devices:   pressure-redistributing mattress utilized   positioning supports utilized   heel offloading device utilized  Skin Protection:   adhesive use limited   tubing/devices free from skin contact  Taken 4/23/2024 1200 by Rachele Schultz RN  Pressure Reduction Techniques:   frequent weight shift encouraged   heels elevated off bed   positioned off wounds  Head of Bed (HOB) Positioning: HOB at 20-30 degrees  Pressure Reduction  Devices:   pressure-redistributing mattress utilized   positioning supports utilized   heel offloading device utilized  Skin Protection:   adhesive use limited   tubing/devices free from skin contact  Taken 4/23/2024 1000 by Rachele Schultz RN  Pressure Reduction Techniques:   frequent weight shift encouraged   heels elevated off bed   positioned off wounds  Head of Bed (HOB) Positioning: HOB at 20-30 degrees  Pressure Reduction Devices:   pressure-redistributing mattress utilized   positioning supports utilized   heel offloading device utilized  Skin Protection:   adhesive use limited   tubing/devices free from skin contact  Taken 4/23/2024 0858 by Rachele Schultz RN  Pressure Reduction Techniques:   frequent weight shift encouraged   heels elevated off bed   positioned off wounds  Head of Bed (HOB) Positioning: HOB at 20-30 degrees  Pressure Reduction Devices:   pressure-redistributing mattress utilized   positioning supports utilized   heel offloading device utilized  Skin Protection:   adhesive use limited   tubing/devices free from skin contact     Problem: Fall Injury Risk  Goal: Absence of Fall and Fall-Related Injury  Outcome: Ongoing, Progressing  Intervention: Identify and Manage Contributors  Recent Flowsheet Documentation  Taken 4/23/2024 1600 by Rachele Schultz RN  Medication Review/Management: medications reviewed  Taken 4/23/2024 1400 by Rachele Schultz RN  Medication Review/Management: medications reviewed  Taken 4/23/2024 1200 by Rachele Schultz RN  Medication Review/Management: medications reviewed  Taken 4/23/2024 1000 by Rachele Schultz RN  Medication Review/Management: medications reviewed  Taken 4/23/2024 0858 by Rachele Schultz RN  Medication Review/Management: medications reviewed  Intervention: Promote Injury-Free Environment  Recent Flowsheet Documentation  Taken 4/23/2024 1600 by Rachele Schultz RN  Safety Promotion/Fall Prevention:   activity supervised    assistive device/personal items within reach   clutter free environment maintained   toileting scheduled   safety round/check completed   room organization consistent  Taken 4/23/2024 1400 by Rachele Schultz RN  Safety Promotion/Fall Prevention:   activity supervised   assistive device/personal items within reach   clutter free environment maintained   toileting scheduled   safety round/check completed   room organization consistent  Taken 4/23/2024 1200 by Rachele Schultz RN  Safety Promotion/Fall Prevention:   activity supervised   assistive device/personal items within reach   clutter free environment maintained   toileting scheduled   safety round/check completed   room organization consistent  Taken 4/23/2024 1000 by Rachele Schultz RN  Safety Promotion/Fall Prevention:   activity supervised   assistive device/personal items within reach   clutter free environment maintained   toileting scheduled   safety round/check completed   room organization consistent  Taken 4/23/2024 0858 by Rachele Schultz RN  Safety Promotion/Fall Prevention:   activity supervised   assistive device/personal items within reach   clutter free environment maintained   toileting scheduled   safety round/check completed   room organization consistent     Problem: Adult Inpatient Plan of Care  Goal: Plan of Care Review  Outcome: Ongoing, Progressing  Goal: Patient-Specific Goal (Individualized)  Outcome: Ongoing, Progressing  Goal: Absence of Hospital-Acquired Illness or Injury  Outcome: Ongoing, Progressing  Intervention: Identify and Manage Fall Risk  Recent Flowsheet Documentation  Taken 4/23/2024 1600 by Rachele Schultz RN  Safety Promotion/Fall Prevention:   activity supervised   assistive device/personal items within reach   clutter free environment maintained   toileting scheduled   safety round/check completed   room organization consistent  Taken 4/23/2024 1400 by Rachele Schultz RN  Safety Promotion/Fall  Prevention:   activity supervised   assistive device/personal items within reach   clutter free environment maintained   toileting scheduled   safety round/check completed   room organization consistent  Taken 4/23/2024 1200 by Rachele Schultz RN  Safety Promotion/Fall Prevention:   activity supervised   assistive device/personal items within reach   clutter free environment maintained   toileting scheduled   safety round/check completed   room organization consistent  Taken 4/23/2024 1000 by Rachele Schultz RN  Safety Promotion/Fall Prevention:   activity supervised   assistive device/personal items within reach   clutter free environment maintained   toileting scheduled   safety round/check completed   room organization consistent  Taken 4/23/2024 0858 by Rachele Schultz RN  Safety Promotion/Fall Prevention:   activity supervised   assistive device/personal items within reach   clutter free environment maintained   toileting scheduled   safety round/check completed   room organization consistent  Intervention: Prevent Skin Injury  Recent Flowsheet Documentation  Taken 4/23/2024 1600 by Rachele Schultz RN  Body Position:   tilted   turned  Skin Protection:   adhesive use limited   tubing/devices free from skin contact  Taken 4/23/2024 1400 by Rachele Schultz RN  Body Position:   tilted   turned  Skin Protection:   adhesive use limited   tubing/devices free from skin contact  Taken 4/23/2024 1200 by Rachele Schultz RN  Body Position:   tilted   turned  Skin Protection:   adhesive use limited   tubing/devices free from skin contact  Taken 4/23/2024 1000 by Rachele Schultz RN  Body Position:   tilted   turned  Skin Protection:   adhesive use limited   tubing/devices free from skin contact  Taken 4/23/2024 0858 by Rachele Schultz RN  Body Position:   tilted   turned  Skin Protection:   adhesive use limited   tubing/devices free from skin contact  Intervention: Prevent and Manage VTE  (Venous Thromboembolism) Risk  Recent Flowsheet Documentation  Taken 4/23/2024 1600 by Rachele Schultz RN  Activity Management: bedrest  Taken 4/23/2024 1400 by Rachele Schultz RN  Activity Management: bedrest  Taken 4/23/2024 1200 by Rachele Schultz RN  Activity Management: bedrest  Taken 4/23/2024 1000 by Rachele Schultz RN  Activity Management: bedrest  Taken 4/23/2024 0858 by Rachele Schultz RN  Activity Management: activity encouraged  VTE Prevention/Management: (see MAR) other (see comments)  Intervention: Prevent Infection  Recent Flowsheet Documentation  Taken 4/23/2024 1600 by Rachele Schultz RN  Infection Prevention: single patient room provided  Taken 4/23/2024 1400 by Rachele Schultz RN  Infection Prevention: single patient room provided  Taken 4/23/2024 1200 by Rachele Schultz RN  Infection Prevention: single patient room provided  Taken 4/23/2024 1000 by Rachele Schultz RN  Infection Prevention: single patient room provided  Taken 4/23/2024 0858 by Rachele Schultz RN  Infection Prevention: single patient room provided  Goal: Optimal Comfort and Wellbeing  Outcome: Ongoing, Progressing  Intervention: Provide Person-Centered Care  Recent Flowsheet Documentation  Taken 4/23/2024 1600 by Rachele Schultz RN  Trust Relationship/Rapport:   care explained   choices provided   emotional support provided   empathic listening provided   questions answered   questions encouraged   reassurance provided   thoughts/feelings acknowledged  Taken 4/23/2024 1400 by Rachele Schultz RN  Trust Relationship/Rapport:   care explained   choices provided   emotional support provided   empathic listening provided   questions answered   questions encouraged   reassurance provided   thoughts/feelings acknowledged  Taken 4/23/2024 1200 by Rachele Schultz RN  Trust Relationship/Rapport:   care explained   choices provided   emotional support provided   empathic listening provided    questions answered   questions encouraged   reassurance provided   thoughts/feelings acknowledged  Taken 4/23/2024 1000 by Rachele Schultz, RN  Trust Relationship/Rapport:   care explained   choices provided   emotional support provided   empathic listening provided   questions answered   questions encouraged   reassurance provided   thoughts/feelings acknowledged  Taken 4/23/2024 0858 by Rachele Schultz, RN  Trust Relationship/Rapport:   care explained   choices provided   emotional support provided   empathic listening provided   questions answered   questions encouraged   reassurance provided   thoughts/feelings acknowledged  Goal: Readiness for Transition of Care  Outcome: Ongoing, Progressing   Goal Outcome Evaluation:

## 2024-04-23 NOTE — PROGRESS NOTES
[facility]    Progress Note    Patient Name: Too Tai  : 1955  MRN: [id number]  Primary Care Physician:  Des Geller MD  Date of admission: 2024    Subjective     Chief Complaint: Dementia with behavioral disturbances    History of Present Illness   Patient seen resting comfortably in bed.,  Did not wake for exam.  He was in no distress and able to move all 4 extremities.  Still requiring 4-point restraints.  Patient did not receive Depakote or Seroquel this a.m. resulting in administration of as needed Geodon due to uncontrollable agitation.      Per bedside RN patient rested well throughout the night after receiving scheduled medication and as needed dose of Geodon.  He appears to be tolerating increased dose in her his Depakote.    Review of Systems   Unable to assess due to baseline mental status    Objective     Physical Exam  Vitals and nursing note reviewed.   Constitutional:       General: He is not in acute distress.     Appearance: He is not ill-appearing.   Cardiovascular:      Rate and Rhythm: Normal rate.   Pulmonary:      Effort: Pulmonary effort is normal.   Neurological:      Mental Status: He is lethargic.      Cranial Nerves: No facial asymmetry.      Sensory: No sensory deficit.      Motor: No weakness, tremor or seizure activity.      Comments: Able to move all 4 extremities          Vitals:   Temp:  [96.7 °F (35.9 °C)-97.4 °F (36.3 °C)] 96.7 °F (35.9 °C)  Heart Rate:  [97] 97  Resp:  [16-18] 16  BP: (165-179)/(70-80) 165/80    Current Medications    Current Facility-Administered Medications:     acetaminophen (TYLENOL) tablet 650 mg, 650 mg, Oral, Q4H PRN **OR** acetaminophen (TYLENOL) 160 MG/5ML oral solution 650 mg, 650 mg, Oral, Q4H PRN **OR** acetaminophen (TYLENOL) suppository 650 mg, 650 mg, Rectal, Q4H PRN, Traci, Nerissa G, DO    amLODIPine (NORVASC) tablet 10 mg, 10 mg, Oral, Q24H, Traci, Nerissa G, DO, 10 mg at 24 0859     sennosides-docusate (PERICOLACE) 8.6-50 MG per tablet 2 tablet, 2 tablet, Oral, BID PRN **AND** polyethylene glycol (MIRALAX) packet 17 g, 17 g, Oral, Daily PRN **AND** bisacodyl (DULCOLAX) EC tablet 5 mg, 5 mg, Oral, Daily PRN, 5 mg at 04/23/24 0859 **AND** bisacodyl (DULCOLAX) suppository 10 mg, 10 mg, Rectal, Daily PRN, Traci, Nerissa G, DO    Calcium Replacement - Follow Nurse / BPA Driven Protocol, , Does not apply, PRN, Traci, Nerissa G, DO    cyanocobalamin injection 1,000 mcg, 1,000 mcg, Intramuscular, Q28 Days, Darrell Jhaveri, DO, 1,000 mcg at 04/19/24 1532    dextrose (D50W) (25 g/50 mL) IV injection 25 g, 25 g, Intravenous, Q15 Min PRN, Traci, Nerissa G, DO    dextrose (GLUTOSE) oral gel 15 g, 15 g, Oral, Q15 Min PRN, Traci, Nerissa G, DO    Divalproex Sodium (DEPAKOTE SPRINKLE) capsule 500 mg, 500 mg, Oral, Q8H, Meliza Kahn K, APRN, 500 mg at 04/23/24 0900    donepezil (ARICEPT) tablet 22.5 mg, 22.5 mg, Oral, Daily, Meliza Kahn, APRN, 22.5 mg at 04/23/24 0858    folic acid (FOLVITE) tablet 1 mg, 1 mg, Oral, Daily, Traci, Nerissa G, DO, 1 mg at 04/23/24 0859    glucagon (GLUCAGEN) injection 1 mg, 1 mg, Intramuscular, Q15 Min PRN, Traci, Nerissa G, DO    haloperidol (HALDOL) tablet 5 mg, 5 mg, Oral, 4x Daily PRN, Traci, Nerissa G, DO    heparin (porcine) 5000 UNIT/ML injection 5,000 Units, 5,000 Units, Subcutaneous, Q8H, Traci, Nerissa G, DO, 5,000 Units at 04/22/24 2035    insulin glargine (LANTUS, SEMGLEE) injection 15 Units, 15 Units, Subcutaneous, Q12H, Cecily Bain, , 15 Units at 04/23/24 0858    Insulin Lispro (humaLOG) injection 2-7 Units, 2-7 Units, Subcutaneous, 4x Daily AC & at Bedtime, Nerissa Aviles DO, 3 Units at 04/23/24 0859    Insulin Lispro (humaLOG) injection 5 Units, 5 Units, Subcutaneous, TID With Meals, Parisa Madrigal PA-C, 5 Units at 04/23/24 0859    lisinopril (PRINIVIL,ZESTRIL) tablet 20 mg, 20 mg, Oral, Daily, Nerissa Aviles DO, 20 mg at 04/23/24 0859    Magnesium  Standard Dose Replacement - Follow Nurse / BPA Driven Protocol, , Does not apply, PRN, Traci, Nerissa G, DO    melatonin tablet 5 mg, 5 mg, Oral, Nightly PRN, Traci, Nerissa G, DO    miconazole (MICOTIN) 2 % powder 1 Application, 1 Application, Topical, Q12H, Cecily Bain, DO, 1 Application at 04/23/24 0902    mirtazapine (REMERON) tablet 15 mg, 15 mg, Oral, Nightly, Darrell Jhaveri, DO, 15 mg at 04/22/24 2036    Phosphorus Replacement - Follow Nurse / BPA Driven Protocol, , Does not apply, PRN, Traci, Nerissa G, DO    Potassium Replacement - Follow Nurse / BPA Driven Protocol, , Does not apply, PRN, Traci, Nerissa G, DO    QUEtiapine (SEROquel) tablet 100 mg, 100 mg, Oral, Q8H, Kahn, April K, APRN, 100 mg at 04/23/24 0858    sodium chloride 0.9 % flush 10 mL, 10 mL, Intravenous, PRN, Traci, Nerissa G, DO    sodium chloride 0.9 % flush 10 mL, 10 mL, Intravenous, Q12H, Traci, Nerissa G, DO, 10 mL at 04/23/24 0902    sodium chloride 0.9 % flush 10 mL, 10 mL, Intravenous, PRN, Traci, Nerissa G, DO    sodium chloride 0.9 % infusion 40 mL, 40 mL, Intravenous, PRN, Traci, Nerissa G, DO, 40 mL at 04/23/24 0546    temazepam (RESTORIL) capsule 30 mg, 30 mg, Oral, Nightly, Cecily Bain, DO, 30 mg at 04/22/24 2043    ziprasidone (GEODON) injection 10 mg, 10 mg, Intramuscular, Q4H PRN, Butler, April K, APRN, 10 mg at 04/23/24 0858    Laboratory Results:   Lab Results   Component Value Date    GLUCOSE 166 (H) 04/19/2024    CALCIUM 9.9 04/19/2024     04/19/2024    K 4.4 04/19/2024    CO2 30.0 (H) 04/19/2024     04/19/2024    BUN 16 04/19/2024    CREATININE 0.82 04/19/2024    EGFRIFAFRI 102 02/21/2022    EGFRIFNONA 88 02/21/2022    BCR 19.5 04/19/2024    ANIONGAP 8.0 04/19/2024     Lab Results   Component Value Date    WBC 10.47 04/19/2024    HGB 15.5 04/19/2024    HCT 46.7 04/19/2024    MCV 92.7 04/19/2024     04/19/2024     Lab Results   Component Value Date    CHOL 220 (H) 01/08/2024    CHOL 120 08/01/2019     CHOL 117 04/15/2019     Lab Results   Component Value Date    HDL 41 01/08/2024    HDL 37 (L) 08/03/2023    HDL 41 04/03/2023     Lab Results   Component Value Date     (H) 01/08/2024     (H) 08/03/2023     (H) 04/03/2023     Lab Results   Component Value Date    TRIG 108 01/08/2024    TRIG 208 (H) 08/03/2023    TRIG 138 04/03/2023     Lab Results   Component Value Date    HGBA1C 9.50 (H) 04/18/2024     Lab Results   Component Value Date    INR 1.1 11/25/2019    INR 1.1 09/10/2018    PROTIME 12.5 11/25/2019    PROTIME 13.3 09/10/2018     Lab Results   Component Value Date    FOLATE 10.20 02/24/2024     Lab Results   Component Value Date    ZFSPFXVL08 384 04/18/2024             Assessment / Plan   Brief Patient Summary:  Too Tai is a 68 y.o. male with a past medical history of advanced dementia with behavioral disturbances and nonverbal, CAD, T2DM, HLD, PVD who presented to [facility] ED due to increase in agitation.  Patient has had multiple recent hospitalizations.      Plan:   Advanced dementia with behavioral disturbances  Sleep Deprivation   Continue home Aricept 23 mg daily  Continue Depakote 500 mg 3 times daily  VPA 60.8  Continue home Remeron 15 mg nightly  Increase Seroquel 100 mg/100 mg / 200 mg  QTc 456 this AM; repeat EKG in a.m. for QTc assessment  Continue Restoril 30 mg nightly, patient has been on this dose for approximately 10 days post last hospitalization.  Geodon as needed for extreme agitation; last dose 4/23 at 0858  CT head negative for acute abnormality  Vitamin B12 384, IM cyanocobalamin 1000 mcg  Patient currently requiring 4-point restraints  Case management following for difficult posthospitalization disposition.  Patient is not safe to return home due to concern of being in danger for his wife and himself.   Palliative consulted, appreciate recommendations.   General neurology will continue to follow    I have discussed the above with the patient, bedside RN   Odell  Time spent with patient: 50 minutes in face-to-face evaluation and management of the patient.    Copied text in this note has been reviewed and is accurate as of 04/23/24.     Meliza Kahn, APRN

## 2024-04-23 NOTE — CASE MANAGEMENT/SOCIAL WORK
"Continued Stay Note  [facility]     Patient Name: Too Tai  MRN: [id number]  Today's Date: 4/23/2024    Admit Date: 4/18/2024    Plan: TBD   Discharge Plan       Row Name 04/23/24 1434       Plan    Plan TBD    Patient/Family in Agreement with Plan yes    Plan Comments Discussed patient in MDR. Patient is not medically ready. Therapy worked with the patient on 4/20 and recommended extended-care facility & stated \"at this time, pt is not appropriate for skilled PT services.\"  Discussed plan with wife at bedside. At this time, the goal is long-term care in a facility with a secured unit, pending financials. He would have to apply for long-term medicaid & pay privately, until that is processed. This will require discussions with the wife and a facility, once a bed is offered, to see if they can afford to pay. The cost will be dependent on the facility that has bed availability and a bed offer when he is stabilized. Lizbeth with [facility] is following. CM will continue to follow and assist.    Final Discharge Disposition Code 30 - still a patient                   Discharge Codes    No documentation.                 Expected Discharge Date and Time       Expected Discharge Date Expected Discharge Time    Apr 26, 2024               Ilene Brown RN    "

## 2024-04-23 NOTE — PLAN OF CARE
Problem: Restraint, Nonviolent  Goal: Absence of Harm or Injury  Outcome: Ongoing, Progressing  Intervention: Implement Least Restrictive Safety Strategies  Recent Flowsheet Documentation  Taken 4/23/2024 0200 by Steve Smith RN  Medical Device Protection:   IV pole/bag removed from visual field   torso covered   tubing secured  Less Restrictive Alternative:   bed alarm in use   calming techniques promoted   environment adjusted   family presence promoted   medication offered   positive reinforcement provided   safety enhancements provided   surveillance provided   therapeutic music  De-Escalation Techniques:   appropriate behavior reinforced   diversional activity encouraged   increased round frequency   quiet time facilitated   stimulation decreased  Diversional Activities: television  Taken 4/23/2024 0000 by Steve Smith, RN  Medical Device Protection:   IV pole/bag removed from visual field   skin sleeve   torso covered   tubing secured  Less Restrictive Alternative:   bed alarm in use   calming techniques promoted   environment adjusted   family presence promoted   medication offered   positive reinforcement provided   safety enhancements provided   surveillance provided   therapeutic music  De-Escalation Techniques:   appropriate behavior reinforced   increased round frequency   quiet time facilitated   stimulation decreased  Diversional Activities: television  Taken 4/22/2024 2200 by Steve Smith RN  Medical Device Protection:   IV pole/bag removed from visual field   skin sleeve   torso covered   tubing secured  Less Restrictive Alternative:   bed alarm in use   calming techniques promoted   environment adjusted   family presence promoted   medication offered   positive reinforcement provided   safety enhancements provided   surveillance provided   therapeutic music  De-Escalation Techniques:   appropriate behavior reinforced   quiet time facilitated   reoriented   stimulation  decreased  Diversional Activities: television  Taken 4/22/2024 2005 by Steve Smith RN  Medical Device Protection:   IV pole/bag removed from visual field   torso covered   tubing secured  Taken 4/22/2024 2000 by Steve Smith RN  Medical Device Protection:   IV pole/bag removed from visual field   torso covered   tubing secured  Less Restrictive Alternative:   bed alarm in use   calming techniques promoted   environment adjusted   family presence promoted   medication offered   positive reinforcement provided   safety enhancements provided   surveillance provided   therapeutic music  De-Escalation Techniques:   appropriate behavior reinforced   diversional activity encouraged   increased round frequency   medication administered   reoriented   stimulation decreased   time out facilitated   verbally redirected  Diversional Activities: television  Intervention: Protect Skin and Joint Integrity  Recent Flowsheet Documentation  Taken 4/23/2024 0000 by Steve Smith RN  Body Position:   supine   supine, legs elevated  Taken 4/22/2024 2005 by Steve Smith RN  Body Position:   legs elevated   weight shifting     Problem: Skin Injury Risk Increased  Goal: Skin Health and Integrity  Outcome: Ongoing, Progressing  Intervention: Optimize Skin Protection  Recent Flowsheet Documentation  Taken 4/23/2024 0000 by Steve Smith RN  Pressure Reduction Techniques:   frequent weight shift encouraged   heels elevated off bed   pressure points protected   weight shift assistance provided  Head of Bed (HOB) Positioning: HOB elevated  Pressure Reduction Devices:   specialty bed utilized   pressure-redistributing mattress utilized   positioning supports utilized   heel offloading device utilized   foam padding utilized  Skin Protection:   adhesive use limited   incontinence pads utilized   skin-to-skin areas padded   transparent dressing maintained   tubing/devices free from skin contact  Taken 4/22/2024  2005 by Steve Smith RN  Pressure Reduction Techniques:   frequent weight shift encouraged   heels elevated off bed   positioned off wounds   pressure points protected   weight shift assistance provided  Head of Bed (HOB) Positioning: HOB at 20-30 degrees  Pressure Reduction Devices:   specialty bed utilized   pressure-redistributing mattress utilized   positioning supports utilized   foam padding utilized  Skin Protection:   adhesive use limited   incontinence pads utilized   silicone foam dressing in place   skin-to-skin areas padded   transparent dressing maintained   tubing/devices free from skin contact     Problem: Fall Injury Risk  Goal: Absence of Fall and Fall-Related Injury  Outcome: Ongoing, Progressing  Intervention: Identify and Manage Contributors  Recent Flowsheet Documentation  Taken 4/23/2024 0000 by Steve Smith, RN  Self-Care Promotion:   independence encouraged   BADL personal objects within reach   BADL personal routines maintained  Taken 4/22/2024 2005 by Steve Smith RN  Medication Review/Management: medications reviewed  Self-Care Promotion:   independence encouraged   BADL personal objects within reach   BADL personal routines maintained  Intervention: Promote Injury-Free Environment  Recent Flowsheet Documentation  Taken 4/23/2024 0000 by Steve Smith, RN  Safety Promotion/Fall Prevention:   safety round/check completed   room organization consistent   nonskid shoes/slippers when out of bed   clutter free environment maintained   assistive device/personal items within reach   activity supervised   fall prevention program maintained   lighting adjusted  Taken 4/22/2024 2005 by Steve Smith RN  Safety Promotion/Fall Prevention:   activity supervised   room organization consistent   safety round/check completed     Problem: Adult Inpatient Plan of Care  Goal: Plan of Care Review  Outcome: Ongoing, Progressing  Goal: Patient-Specific Goal  (Individualized)  Outcome: Ongoing, Progressing  Goal: Absence of Hospital-Acquired Illness or Injury  Outcome: Ongoing, Progressing  Intervention: Identify and Manage Fall Risk  Recent Flowsheet Documentation  Taken 4/23/2024 0000 by Steve Smith RN  Safety Promotion/Fall Prevention:   safety round/check completed   room organization consistent   nonskid shoes/slippers when out of bed   clutter free environment maintained   assistive device/personal items within reach   activity supervised   fall prevention program maintained   lighting adjusted  Taken 4/22/2024 2005 by Steve Smith RN  Safety Promotion/Fall Prevention:   activity supervised   room organization consistent   safety round/check completed  Intervention: Prevent Skin Injury  Recent Flowsheet Documentation  Taken 4/23/2024 0000 by Steve Smith RN  Body Position:   supine   supine, legs elevated  Skin Protection:   adhesive use limited   incontinence pads utilized   skin-to-skin areas padded   transparent dressing maintained   tubing/devices free from skin contact  Taken 4/22/2024 2005 by Steve Smith RN  Body Position:   legs elevated   weight shifting  Skin Protection:   adhesive use limited   incontinence pads utilized   silicone foam dressing in place   skin-to-skin areas padded   transparent dressing maintained   tubing/devices free from skin contact  Intervention: Prevent and Manage VTE (Venous Thromboembolism) Risk  Recent Flowsheet Documentation  Taken 4/23/2024 0000 by Steve Smith RN  Activity Management: activity minimized  Taken 4/22/2024 2005 by Steve Smith RN  Activity Management: bedrest  VTE Prevention/Management: (hep subq) --  Intervention: Prevent Infection  Recent Flowsheet Documentation  Taken 4/23/2024 0000 by Steve Smith, RN  Infection Prevention:   environmental surveillance performed   rest/sleep promoted  Taken 4/22/2024 2005 by Steve Smith RN  Infection Prevention:  single patient room provided  Goal: Optimal Comfort and Wellbeing  Outcome: Ongoing, Progressing  Intervention: Monitor Pain and Promote Comfort  Recent Flowsheet Documentation  Taken 4/23/2024 0000 by Steve Smith RN  Pain Management Interventions:   quiet environment facilitated   care clustered  Goal: Readiness for Transition of Care  Outcome: Ongoing, Progressing   Goal Outcome Evaluation:

## 2024-04-23 NOTE — PROGRESS NOTES
[facility]  PROGRESS NOTE    Patient Name: Too Tai  : 1955  MRN: [id number]    Date of Admission: 2024  Primary Care Physician: Des Geller MD    Subjective     CC: f/u dementia with agitation    HPI:  In bed, resting comfortably. Unable to be taken out of restraints today without combative behaviors so back in 4 point restraints. Neurology titrating medications. Long discussion with wife at bedside.     Notifed by RN later today that wife not happy that patient is either restrained or too tired to eat. Eating bites with meds. Per RN, she untied an arm to let him try to feed himself but he smeared his sandwich on her top instead     Objective     Vital Signs:   Temp:  [96.7 °F (35.9 °C)-97.4 °F (36.3 °C)] 96.7 °F (35.9 °C)  Heart Rate:  [97] 97  Resp:  [16-18] 16  BP: (165-179)/(70-80) 165/80     Physical Exam:  Constitutional: No acute distress. Asleep - did not wake for exam   HENT: NCAT, mucous membranes moist  Respiratory: Clear to auscultation bilaterally, respiratory effort normal on room air   Cardiovascular: RRR, no murmurs, rubs, or gallops  Gastrointestinal: Positive bowel sounds, soft, nontender, nondistended  Musculoskeletal: No bilateral ankle edema. Extremities in 4-point restraints   Psychiatric: Currently calm   Neurologic: No focal deficits. Speech clear and coherent     Results Reviewed:  LAB RESULTS:      Lab 24  0829 24  2356   WBC 10.47 9.87   HEMOGLOBIN 15.5 13.8   HEMATOCRIT 46.7 42.2   PLATELETS 307 292   NEUTROS ABS 4.74 4.68   IMMATURE GRANS (ABS) 0.04 0.05   LYMPHS ABS 3.52* 3.34*   MONOS ABS 1.33* 1.14*   EOS ABS 0.69* 0.56*   MCV 92.7 95.3         Lab 24  0829 24  2356   SODIUM 144 142   POTASSIUM 4.4 4.9   CHLORIDE 106 104   CO2 30.0* 28.0   ANION GAP 8.0 10.0   BUN 16 22   CREATININE 0.82 1.12   EGFR 96.3 72.0   GLUCOSE 166* 226*   CALCIUM 9.9 9.4   MAGNESIUM 1.9 1.8   PHOSPHORUS 3.4  --     HEMOGLOBIN A1C  --  9.50*   TSH  --  3.220         Lab 04/18/24  2356   TOTAL PROTEIN 6.9   ALBUMIN 3.9   GLOBULIN 3.0   ALT (SGPT) 13   AST (SGOT) 17   BILIRUBIN 0.3   ALK PHOS 120*         Lab 04/18/24  2356   HSTROP T 22*         Lab 04/18/24  2356   VITAMIN B 12 384     Brief Urine Lab Results  (Last result in the past 365 days)        Color   Clarity   Blood   Leuk Est   Nitrite   Protein   CREAT   Urine HCG        04/19/24 0243 Yellow   Clear   Negative   Negative   Negative   Negative                 Microbiology Results Abnormal       None          No radiology results from the last 24 hrs    Results for orders placed during the hospital encounter of 03/27/24    Adult Transthoracic Echo Complete W/ Cont if Necessary Per Protocol    Interpretation Summary    Left ventricular systolic function is normal. Calculated left ventricular EF = 55.1% Normal left ventricular cavity size noted. Left ventricular wall thickness is consistent with mild concentric hypertrophy. Left ventricular diastolic function is consistent with (grade I) impaired relaxation.    The right ventricular cavity is mildly dilated. Normal right ventricular systolic function noted.    There is calcification of the aortic valve. The aortic valve appears trileaflet. Mild aortic valve regurgitation is present. No aortic valve stenosis is present.    Mitral annular calcification is present. Trace mitral valve regurgitation is present. No significant mitral valve stenosis is present.    The tricuspid valve is structurally normal with no stenosis present. Trace tricuspid valve regurgitation is present. Insufficient TR velocity profile to estimate the right ventricular systolic pressure.    Mild dilation of the sinuses of Valsalva is present. Mild dilation of the ascending aorta is present. Ascending aorta = 4.2 cm    Current medications:  Scheduled Meds:amLODIPine, 10 mg, Oral, Q24H  cyanocobalamin, 1,000 mcg, Intramuscular, Q28 Days  Divalproex  Sodium, 500 mg, Oral, Q8H  [START ON 4/24/2024] donepezil, 10 mg, Oral, Nightly  folic acid, 1 mg, Oral, Daily  heparin (porcine), 5,000 Units, Subcutaneous, Q8H  insulin glargine, 15 Units, Subcutaneous, Q12H  insulin lispro, 2-7 Units, Subcutaneous, 4x Daily AC & at Bedtime  Insulin Lispro, 5 Units, Subcutaneous, TID With Meals  lisinopril, 20 mg, Oral, Daily  miconazole, 1 Application, Topical, Q12H  mirtazapine, 15 mg, Oral, Nightly  QUEtiapine, 100 mg, Oral, BID  QUEtiapine, 200 mg, Oral, Nightly  sodium chloride, 10 mL, Intravenous, Q12H  temazepam, 30 mg, Oral, Nightly      Continuous Infusions:   PRN Meds:.  acetaminophen **OR** acetaminophen **OR** acetaminophen    senna-docusate sodium **AND** polyethylene glycol **AND** bisacodyl **AND** bisacodyl    Calcium Replacement - Follow Nurse / BPA Driven Protocol    dextrose    dextrose    glucagon (human recombinant)    haloperidol    Magnesium Standard Dose Replacement - Follow Nurse / BPA Driven Protocol    melatonin    Phosphorus Replacement - Follow Nurse / BPA Driven Protocol    Potassium Replacement - Follow Nurse / BPA Driven Protocol    sodium chloride    sodium chloride    sodium chloride    ziprasidone    Assessment & Plan     Active Hospital Problems    Diagnosis  POA    **Agitation due to dementia [F03.911]  Yes    Agitation [R45.1]  Yes    Type 2 diabetes mellitus with hyperglycemia, with long-term current use of insulin [E11.65, Z79.4]  Not Applicable    Peripheral neuropathy [G62.9]  Yes    Hyperlipidemia [E78.5]  Yes    Benign prostatic hyperplasia [N40.0]  Yes    Benign essential HTN [I10]  Yes    Peripheral vascular disease [I73.9]  Yes    Coronary artery disease involving native coronary artery of native heart [I25.10]  Yes      Resolved Hospital Problems   No resolved problems to display.     Brief Hospital Course to date:  Too Tai is a 68yoM with PMH significant for advanced dementia, CAD, diabetes mellitus type 2, HLD and PVD.  Admitted to Psychiatric ED on 4/18/24 for agitation / behavioral disturbances. Recently admitted to [facility] 2/24-3/4/24 for dementia with behavioral disturbances and again 3/27-4/4/24 for similar issues.      Advanced dementia complicated by behavioral disturbance with superimposed sleep disturbance   - Infectious work up unremarkable. UA reassuring. CXR without acute findings. CT head without acute findings  - Neuro following  - Still in restraints   - Continue home Aricept and Mirtazapine 15mg QHS   - AM doses of Depakote and Seroquel were delayed by a couple of hours, resulting in the need for PRN Geodon administration. Neurology has adjusted timing of medications  - Continue Depakote 500mg TID, Seroquel increased to 100mg BID and 200mg QHS today   - Continue Temazepam 30mg QHS  - May need geriatric psych admission - CM looking into this   - Not safe to return home at this time as he is a danger to himself and his wife   - Will ask palliative for assistance with goals of care     DMII with hyperglycemia  - A1c 9.5%  - Continue Lantus 15 units BID, Lispro 5 units TID AC + SSI     HTN, continue Amlodipine 10mg daily and Lisinopril 20mg daily. May need additional therapy     Expected Discharge Location and Transportation: TBD  Expected Discharge Expected Discharge Date: 4/26/2024; Expected Discharge Time:      DVT prophylaxis:Medical and mechanical DVT prophylaxis orders are present.    AM-PAC 6 Clicks Score (PT): 10 (04/23/24 0858)    CODE STATUS:   Code Status and Medical Interventions:   Ordered at: 04/19/24 1037     Medical Intervention Limits:    NO intubation (DNI)     Level Of Support Discussed With:    Next of Kin (If No Surrogate)     Code Status (Patient has no pulse and is not breathing):    No CPR (Do Not Attempt to Resuscitate)     Medical Interventions (Patient has pulse or is breathing):    Limited Support     Parisa Madrigal PA-C  04/23/24

## 2024-04-24 LAB
GLUCOSE BLDC GLUCOMTR-MCNC: 168 MG/DL (ref 70–130)
GLUCOSE BLDC GLUCOMTR-MCNC: 198 MG/DL (ref 70–130)
GLUCOSE BLDC GLUCOMTR-MCNC: 255 MG/DL (ref 70–130)
GLUCOSE BLDC GLUCOMTR-MCNC: 83 MG/DL (ref 70–130)
GLUCOSE BLDC GLUCOMTR-MCNC: 86 MG/DL (ref 70–130)

## 2024-04-24 PROCEDURE — 82948 REAGENT STRIP/BLOOD GLUCOSE: CPT

## 2024-04-24 PROCEDURE — 63710000001 INSULIN LISPRO (HUMAN) PER 5 UNITS: Performed by: PHYSICIAN ASSISTANT

## 2024-04-24 PROCEDURE — 99233 SBSQ HOSP IP/OBS HIGH 50: CPT

## 2024-04-24 PROCEDURE — 25010000002 ZIPRASIDONE MESYLATE PER 10 MG

## 2024-04-24 PROCEDURE — 25010000002 HEPARIN (PORCINE) PER 1000 UNITS: Performed by: INTERNAL MEDICINE

## 2024-04-24 PROCEDURE — 93010 ELECTROCARDIOGRAM REPORT: CPT | Performed by: INTERNAL MEDICINE

## 2024-04-24 PROCEDURE — 63710000001 INSULIN LISPRO (HUMAN) PER 5 UNITS: Performed by: INTERNAL MEDICINE

## 2024-04-24 PROCEDURE — 25810000003 SODIUM CHLORIDE 0.9 % SOLUTION: Performed by: PHYSICIAN ASSISTANT

## 2024-04-24 PROCEDURE — 63710000001 INSULIN GLARGINE PER 5 UNITS: Performed by: INTERNAL MEDICINE

## 2024-04-24 PROCEDURE — 93005 ELECTROCARDIOGRAM TRACING: CPT

## 2024-04-24 PROCEDURE — 25010000002 PHENOBARBITAL PER 120 MG: Performed by: STUDENT IN AN ORGANIZED HEALTH CARE EDUCATION/TRAINING PROGRAM

## 2024-04-24 PROCEDURE — 99232 SBSQ HOSP IP/OBS MODERATE 35: CPT | Performed by: PHYSICIAN ASSISTANT

## 2024-04-24 PROCEDURE — 25810000003 LACTATED RINGERS PER 1000 ML: Performed by: PHYSICIAN ASSISTANT

## 2024-04-24 RX ORDER — PHENOBARBITAL SODIUM 65 MG/ML
65 INJECTION, SOLUTION INTRAMUSCULAR; INTRAVENOUS 3 TIMES DAILY
Status: CANCELLED | OUTPATIENT
Start: 2024-04-24 | End: 2024-04-29

## 2024-04-24 RX ORDER — QUETIAPINE FUMARATE 100 MG/1
200 TABLET, FILM COATED ORAL NIGHTLY
Status: DISCONTINUED | OUTPATIENT
Start: 2024-04-24 | End: 2024-04-30

## 2024-04-24 RX ORDER — AMLODIPINE BESYLATE 10 MG/1
10 TABLET ORAL
Status: DISCONTINUED | OUTPATIENT
Start: 2024-04-25 | End: 2024-05-09 | Stop reason: HOSPADM

## 2024-04-24 RX ORDER — PHENOBARBITAL 32.4 MG/1
32.4 TABLET ORAL 2 TIMES DAILY
Status: DISCONTINUED | OUTPATIENT
Start: 2024-04-24 | End: 2024-04-25

## 2024-04-24 RX ORDER — DONEPEZIL HYDROCHLORIDE 10 MG/1
10 TABLET, FILM COATED ORAL NIGHTLY
Status: DISCONTINUED | OUTPATIENT
Start: 2024-04-24 | End: 2024-04-24

## 2024-04-24 RX ORDER — PHENOBARBITAL 20 MG/5ML
60 SOLUTION ORAL 3 TIMES DAILY
Status: CANCELLED | OUTPATIENT
Start: 2024-04-24 | End: 2024-04-29

## 2024-04-24 RX ORDER — DONEPEZIL HYDROCHLORIDE 10 MG/1
10 TABLET, FILM COATED ORAL 2 TIMES DAILY
Status: DISCONTINUED | OUTPATIENT
Start: 2024-04-24 | End: 2024-05-09 | Stop reason: HOSPADM

## 2024-04-24 RX ORDER — TEMAZEPAM 15 MG/1
30 CAPSULE ORAL NIGHTLY
Status: DISCONTINUED | OUTPATIENT
Start: 2024-04-24 | End: 2024-05-09 | Stop reason: HOSPADM

## 2024-04-24 RX ORDER — PHENOBARBITAL 32.4 MG/1
32.4 TABLET ORAL 2 TIMES DAILY
Status: DISCONTINUED | OUTPATIENT
Start: 2024-04-24 | End: 2024-04-24

## 2024-04-24 RX ORDER — DIVALPROEX SODIUM 125 MG/1
500 CAPSULE, COATED PELLETS ORAL EVERY 8 HOURS SCHEDULED
Status: DISCONTINUED | OUTPATIENT
Start: 2024-04-24 | End: 2024-04-24

## 2024-04-24 RX ORDER — MIRTAZAPINE 15 MG/1
15 TABLET, FILM COATED ORAL NIGHTLY
Status: DISCONTINUED | OUTPATIENT
Start: 2024-04-24 | End: 2024-05-09 | Stop reason: HOSPADM

## 2024-04-24 RX ORDER — SODIUM CHLORIDE, SODIUM LACTATE, POTASSIUM CHLORIDE, CALCIUM CHLORIDE 600; 310; 30; 20 MG/100ML; MG/100ML; MG/100ML; MG/100ML
75 INJECTION, SOLUTION INTRAVENOUS CONTINUOUS
Status: DISCONTINUED | OUTPATIENT
Start: 2024-04-24 | End: 2024-05-06

## 2024-04-24 RX ORDER — PHENOBARBITAL SODIUM 65 MG/ML
32.4 INJECTION, SOLUTION INTRAMUSCULAR; INTRAVENOUS 2 TIMES DAILY
Status: DISCONTINUED | OUTPATIENT
Start: 2024-04-24 | End: 2024-04-24

## 2024-04-24 RX ORDER — SENNOSIDES A AND B 8.6 MG/1
1 TABLET, FILM COATED ORAL NIGHTLY
Status: DISCONTINUED | OUTPATIENT
Start: 2024-04-24 | End: 2024-04-26

## 2024-04-24 RX ORDER — PHENOBARBITAL SODIUM 65 MG/ML
32.4 INJECTION, SOLUTION INTRAMUSCULAR; INTRAVENOUS 2 TIMES DAILY
Status: DISCONTINUED | OUTPATIENT
Start: 2024-04-24 | End: 2024-04-25

## 2024-04-24 RX ORDER — LISINOPRIL 10 MG/1
10 TABLET ORAL ONCE
Status: COMPLETED | OUTPATIENT
Start: 2024-04-24 | End: 2024-04-24

## 2024-04-24 RX ADMIN — QUETIAPINE FUMARATE 100 MG: 100 TABLET ORAL at 08:52

## 2024-04-24 RX ADMIN — INSULIN GLARGINE 15 UNITS: 100 INJECTION, SOLUTION SUBCUTANEOUS at 08:54

## 2024-04-24 RX ADMIN — INSULIN LISPRO 2 UNITS: 100 INJECTION, SOLUTION INTRAVENOUS; SUBCUTANEOUS at 11:55

## 2024-04-24 RX ADMIN — PHENOBARBITAL SODIUM 32.4 MG: 65 INJECTION INTRAMUSCULAR at 21:38

## 2024-04-24 RX ADMIN — INSULIN LISPRO 5 UNITS: 100 INJECTION, SOLUTION INTRAVENOUS; SUBCUTANEOUS at 11:55

## 2024-04-24 RX ADMIN — INSULIN LISPRO 2 UNITS: 100 INJECTION, SOLUTION INTRAVENOUS; SUBCUTANEOUS at 08:56

## 2024-04-24 RX ADMIN — HEPARIN SODIUM 5000 UNITS: 5000 INJECTION INTRAVENOUS; SUBCUTANEOUS at 05:50

## 2024-04-24 RX ADMIN — ZIPRASIDONE MESYLATE 10 MG: 20 INJECTION, POWDER, LYOPHILIZED, FOR SOLUTION INTRAMUSCULAR at 10:45

## 2024-04-24 RX ADMIN — INSULIN LISPRO 4 UNITS: 100 INJECTION, SOLUTION INTRAVENOUS; SUBCUTANEOUS at 17:16

## 2024-04-24 RX ADMIN — DONEPEZIL HYDROCHLORIDE 10 MG: 10 TABLET, FILM COATED ORAL at 11:55

## 2024-04-24 RX ADMIN — QUETIAPINE FUMARATE 100 MG: 100 TABLET ORAL at 14:03

## 2024-04-24 RX ADMIN — INSULIN LISPRO 5 UNITS: 100 INJECTION, SOLUTION INTRAVENOUS; SUBCUTANEOUS at 17:16

## 2024-04-24 RX ADMIN — DIVALPROEX SODIUM 500 MG: 125 CAPSULE ORAL at 08:54

## 2024-04-24 RX ADMIN — AMLODIPINE BESYLATE 10 MG: 10 TABLET ORAL at 08:53

## 2024-04-24 RX ADMIN — FOLIC ACID 1 MG: 1 TABLET ORAL at 08:52

## 2024-04-24 RX ADMIN — LISINOPRIL 20 MG: 20 TABLET ORAL at 08:53

## 2024-04-24 RX ADMIN — SODIUM CHLORIDE, POTASSIUM CHLORIDE, SODIUM LACTATE AND CALCIUM CHLORIDE 75 ML/HR: 600; 310; 30; 20 INJECTION, SOLUTION INTRAVENOUS at 14:07

## 2024-04-24 RX ADMIN — INSULIN LISPRO 5 UNITS: 100 INJECTION, SOLUTION INTRAVENOUS; SUBCUTANEOUS at 08:55

## 2024-04-24 RX ADMIN — LISINOPRIL 10 MG: 10 TABLET ORAL at 14:00

## 2024-04-24 RX ADMIN — PHENOBARBITAL 32.4 MG: 32.4 TABLET ORAL at 14:06

## 2024-04-24 NOTE — CASE MANAGEMENT/SOCIAL WORK
Continued Stay Note  [facility]     Patient Name: Too Tai  MRN: [id number]  Today's Date: 4/24/2024    Admit Date: 4/18/2024    Plan: TBD   Discharge Plan       Row Name 04/24/24 1339       Plan    Plan TBD    Patient/Family in Agreement with Plan yes    Plan Comments Neurology & palliative following. Neurology added phenobarbital today. Continued medication adjustments. His plan is TBD. LTC placement will be dependent on LTC bed availability, a facility offering the patient a bed & private pay costs, once patient is stabilized. CM will continue to follow and assist.    Final Discharge Disposition Code 30 - still a patient                   Discharge Codes    No documentation.                 Expected Discharge Date and Time       Expected Discharge Date Expected Discharge Time    Apr 26, 2024               Ilene Brown RN

## 2024-04-24 NOTE — PAYOR COMM NOTE
"Ref# [id number]   Still Pending  4/19/24 Observation Admission  4/20/24 Flipped to Inpatient (Saturday)  4/22/24 Submitted per portal & faxed clinicals (Monday)  4/22/24 Today, Clinical Update     Utilization Review  Phone [phone]  Fax [phone]     [facility]  [address] [place] [id number]       Too Richter (68 y.o. Male)       Date of Birth   1955    Social Security Number       Address   [address] [place] [address]  [place] [id number]    Home Phone   [phone]    MRN   [id number]       Mandaen   Orthodoxy    Marital Status                               Admission Date   4/18/24    Admission Type   Emergency    Admitting Provider   Calin Benitez MD    Attending Provider   Calin Benitez MD    Department, Room/Bed   [facility] 5G, S551/1       Discharge Date       Discharge Disposition       Discharge Destination                                 Attending Provider: Calin Benitez MD    Allergies: Bactrim [Sulfamethoxazole-trimethoprim], Adhesive Tape, Neosporin [Neomycin-bacitracin Zn-polymyx]    Isolation: None   Infection: None   Code Status: No CPR    Ht: 188 cm (74\")   Wt: 97.5 kg (215 lb)    Admission Cmt: None   Principal Problem: Agitation due to dementia [F03.911]                   Active Insurance as of 4/18/2024       Primary Coverage       Payor Plan Insurance Group Employer/Plan Group    AETNA MEDICARE REPLACEMENT AETNA MED ADV PPO [id number]       Payor Plan Address Payor Plan Phone Number Payor Plan Fax Number Effective Dates    [address] [phone]  1/1/2024 - None Entered    [facility] [id number]         Subscriber Name Subscriber Birth Date Member ID       TOO RICHTER 1955 [id number]                     Emergency Contacts        (Rel.) Home Phone Work Phone Mobile Phone    WILLIE RICHTER (Spouse) [phone] -- [phone]    Ritchie Richter (Son) [phone] -- [phone]    Meg Bustillo (Relative) " [phone] -- [phone]                 History & Physical        Collin Rogers MD at 04/24/24 1117          Des Geller MD  Consulting physician: Ken    Chief Complaint   Patient presents with    Altered Mental Status       Reason for consult: agitated delirium    HPI: 69 yo male lives with wife at home . Four years ago dxed with dementia . 6-8 months ago with neuropsych behavior problems. Otherwise ADL 1-2/6 with FAST 6 scoring per wife commnetary. Severe agitation prompted hospitalization. Sometimes violent. H/O DM, BPH, CAD, bilat PVD , neuropathy. Given Midazolam in ED. Wife knows he is declining and narcisoley needs palcement but was asked not to return to NH last time because of violence. On multpile mediscation and followed well by Neurology. Trajectory more fits Lewy Body?      Pain assesment: none    Dyspnea: none    N/V: none    PPS: 40%      Past Medical History:   Diagnosis Date    Coronary artery disease     Dementia     Diabetes mellitus     Hyperlipidemia     Peripheral vascular disease      Past Surgical History:   Procedure Laterality Date    CORONARY ARTERY BYPASS GRAFT      FEMORAL ARTERY - POPLITEAL ARTERY BYPASS GRAFT       Current Code Status       Date Active Code Status Order ID Comments User Context       4/19/2024 1037 No CPR (Do Not Attempt to Resuscitate) [id number]  Cecily Bain DO Inpatient        Question Answer    Code Status (Patient has no pulse and is not breathing) No CPR (Do Not Attempt to Resuscitate)    Medical Interventions (Patient has pulse or is breathing) Limited Support    Medical Intervention Limits: NO intubation (DNI)    Level Of Support Discussed With Next of Kin (If No Surrogate)                  Current Facility-Administered Medications   Medication Dose Route Frequency Provider Last Rate Last Admin    acetaminophen (TYLENOL) tablet 650 mg  650 mg Oral Q4H PRN Nerissa Aviles DO        Or    acetaminophen (TYLENOL) 160 MG/5ML oral solution 650 mg  650  mg Oral Q4H PRN [place], Nerissa G, DO        Or    acetaminophen (TYLENOL) suppository 650 mg  650 mg Rectal Q4H PRN [place], Nerissa G, DO        [START ON 4/25/2024] amLODIPine (NORVASC) tablet 10 mg  10 mg Oral Q24H Calin Benitez MD        sennosides-docusate (PERICOLACE) 8.6-50 MG per tablet 2 tablet  2 tablet Oral BID PRN [place], Nerissa G, DO        And    polyethylene glycol (MIRALAX) packet 17 g  17 g Oral Daily PRN [place], Neirssa G, DO        And    bisacodyl (DULCOLAX) EC tablet 5 mg  5 mg Oral Daily PRN [place], Nerissa G, DO   5 mg at 04/23/24 0859    And    bisacodyl (DULCOLAX) suppository 10 mg  10 mg Rectal Daily PRN [place], Nerissa G, DO        Calcium Replacement - Follow Nurse / BPA Driven Protocol   Does not apply PRN [place], Nerissa G, DO        cyanocobalamin injection 1,000 mcg  1,000 mcg Intramuscular Q28 Days Darrell Jhaveri A, DO   1,000 mcg at 04/19/24 1532    dextrose (D50W) (25 g/50 mL) IV injection 25 g  25 g Intravenous Q15 Min PRN [place], Nerissa G, DO        dextrose (GLUTOSE) oral gel 15 g  15 g Oral Q15 Min PRN [place], Nerissa G, DO        Divalproex Sodium (DEPAKOTE SPRINKLE) capsule 500 mg  500 mg Oral Q8H Guille Jhaveriy A, DO        donepezil (ARICEPT) tablet 10 mg  10 mg Oral BID Guille Jhaveriy A, DO        folic acid (FOLVITE) tablet 1 mg  1 mg Oral Daily [place], Nerissa G, DO   1 mg at 04/24/24 0852    glucagon (GLUCAGEN) injection 1 mg  1 mg Intramuscular Q15 Min PRN [place], Nerissa G, DO        haloperidol (HALDOL) tablet 5 mg  5 mg Oral 4x Daily PRN [place], Nerissa G, DO        heparin (porcine) 5000 UNIT/ML injection 5,000 Units  5,000 Units Subcutaneous Q8H [place]aci, Nerissa G, DO   5,000 Units at 04/24/24 0550    insulin glargine (LANTUS, SEMGLEE) injection 15 Units  15 Units Subcutaneous Q12H Cecily Bain DO   15 Units at 04/24/24 0854    Insulin Lispro (humaLOG) injection 2-7 Units  2-7 Units Subcutaneous 4x Daily AC & at Bedtime Nerissa Aviles DO   2 Units at 04/24/24 0856    Insulin Lispro  (humaLOG) injection 5 Units  5 Units Subcutaneous TID With Meals Parisa Madrigal PA-C   5 Units at 04/24/24 0855    lisinopril (PRINIVIL,ZESTRIL) tablet 20 mg  20 mg Oral Daily [place]aci, Nerissa G, DO   20 mg at 04/24/24 0853    Magnesium Standard Dose Replacement - Follow Nurse / BPA Driven Protocol   Does not apply PRN [place], Nerissa G, DO        melatonin tablet 5 mg  5 mg Oral Nightly PRN [place], Nerissa G, DO        miconazole (MICOTIN) 2 % powder 1 Application  1 Application Topical Q12H Cecily Bain DO   1 Application at 04/23/24 2046    mirtazapine (REMERON) tablet 15 mg  15 mg Oral Nightly Darrell Jhaveri A, DO        Phosphorus Replacement - Follow Nurse / BPA Driven Protocol   Does not apply PRN [place], Nerissa G, DO        Potassium Replacement - Follow Nurse / BPA Driven Protocol   Does not apply PRN [place], Nerissa G, DO        QUEtiapine (SEROquel) tablet 100 mg  100 mg Oral BID Meliza Kahn, APRN   100 mg at 04/24/24 0852    QUEtiapine (SEROquel) tablet 200 mg  200 mg Oral Nightly Guille Jhaveriy A, DO        sodium chloride 0.9 % flush 10 mL  10 mL Intravenous PRN [place], Nerissa G, DO        sodium chloride 0.9 % flush 10 mL  10 mL Intravenous Q12H [place]aci, Nerissa G, DO   10 mL at 04/23/24 2047    sodium chloride 0.9 % flush 10 mL  10 mL Intravenous PRN [place], Nerissa G, DO        sodium chloride 0.9 % infusion 40 mL  40 mL Intravenous PRN [place], Nerissa G, DO   40 mL at 04/23/24 0546    sodium chloride 0.9 % infusion  100 mL/hr Intravenous Continuous Parisa Madrigal PA-C 100 mL/hr at 04/23/24 1534 100 mL/hr at 04/23/24 1534    temazepam (RESTORIL) capsule 30 mg  30 mg Oral Nightly Darrell Jhaveri A, DO        ziprasidone (GEODON) injection 10 mg  10 mg Intramuscular Q4H PRN Meliza Kahn, APRN   10 mg at 04/23/24 1927     sodium chloride, 100 mL/hr, Last Rate: 100 mL/hr (04/23/24 1534)        acetaminophen **OR** acetaminophen **OR** acetaminophen    senna-docusate sodium **AND** polyethylene glycol  "**AND** bisacodyl **AND** bisacodyl    Calcium Replacement - Follow Nurse / BPA Driven Protocol    dextrose    dextrose    glucagon (human recombinant)    haloperidol    Magnesium Standard Dose Replacement - Follow Nurse / BPA Driven Protocol    melatonin    Phosphorus Replacement - Follow Nurse / BPA Driven Protocol    Potassium Replacement - Follow Nurse / BPA Driven Protocol    sodium chloride    sodium chloride    sodium chloride    ziprasidone  Allergies   Allergen Reactions    Bactrim [Sulfamethoxazole-Trimethoprim] Rash    Adhesive Tape Rash    Neosporin [Neomycin-Bacitracin Zn-Polymyx] Rash     Family History   Problem Relation Age of Onset    Diabetes Mother     Heart disease Father     Hypertension Father     Hyperlipidemia Father     Diabetes Sister     Diabetes Maternal Grandfather     Diabetes Sister     Diabetes Sister      Social History     Socioeconomic History    Marital status:    Tobacco Use    Smoking status: Former     Current packs/day: 0.00     Types: Cigarettes     Quit date:      Years since quittin.3     Passive exposure: Never    Smokeless tobacco: Former   Vaping Use    Vaping status: Never Used   Substance and Sexual Activity    Alcohol use: No    Drug use: No    Sexual activity: Never     Review of Systems - not able      BP (!) 184/80 (BP Location: Right arm, Patient Position: Lying)   Pulse 86   Temp 97.4 °F (36.3 °C) (Axillary)   Resp 20   Ht 188 cm (74\")   Wt 97.5 kg (215 lb)   SpO2 95%   BMI 27.60 kg/m²     Intake/Output Summary (Last 24 hours) at 2024 1117  Last data filed at 2024 1930  Gross per 24 hour   Intake 300 ml   Output --   Net 300 ml     Physical Exam:    Outburst of voacl phases, otherwise no thrashing about. Lungs clear, abd flat , ext no edema      Results from last 7 days   Lab Units 24  0829   WBC 10*3/mm3 10.47   HEMOGLOBIN g/dL 15.5   HEMATOCRIT % 46.7   PLATELETS 10*3/mm3 307     Results from last 7 days   Lab Units " "24  0829 24  2356   SODIUM mmol/L 144 142   POTASSIUM mmol/L 4.4 4.9   CHLORIDE mmol/L 106 104   CO2 mmol/L 30.0* 28.0   BUN mg/dL 16 22   CREATININE mg/dL 0.82 1.12   CALCIUM mg/dL 9.9 9.4   BILIRUBIN mg/dL  --  0.3   ALK PHOS U/L  --  120*   ALT (SGPT) U/L  --  13   AST (SGOT) U/L  --  17   GLUCOSE mg/dL 166* 226*     Results from last 7 days   Lab Units 24  0829   SODIUM mmol/L 144   POTASSIUM mmol/L 4.4   CHLORIDE mmol/L 106   CO2 mmol/L 30.0*   BUN mg/dL 16   CREATININE mg/dL 0.82   GLUCOSE mg/dL 166*   CALCIUM mg/dL 9.9     Imaging Results (Last 72 Hours)       ** No results found for the last 72 hours. **          Impression: Plan: Agitated Delirium: Agree with medication plan. Full discussion with patient's wife at bedside. Maximize medication/ antipsychotics . Consider Phenobarbitol ( 65 mg 2-4 times a day to start with dose titration 130mg etc/ and might consider scheduled dosing for 2-3 days? ) if breakthrough medication regimen. Discussed with wife on both phenobarb and \"respite sedation for 2-3 days. Also Midazolam PRN if severe break through agitation. Placement: wife aware difficult secondary to violent behavior. Will continue to discuss as possible Lewy Body type and will get worse. ( HOLD any Haloperidol secondary to dementia type?) Will continue to follow        Time: 35 minutes    Collin Rogers MD  24  11:17 EDT             Electronically signed by Collin Rogers MD at 24 1133       Nerissa Aviles DO at 24 0218              [facility] Medicine Services  HISTORY AND PHYSICAL    Patient Name: Too Tai  : 1955  MRN: [id number]  Primary Care Physician: Des Geller MD  Date of admission: 2024      Subjective  Subjective     Chief Complaint:  Agitation    HPI:  Too Tai is a 68 y.o. male with a PMH significant for advanced dementia, CAD, diabetes mellitus type 2, HLD, PVD who comes to the ED due to agitation.  Patient " with advanced dementia, nonverbal.  No family present.  HPI obtained from EMR.  Patient was hospitalized with d/c on 3/4/2024 after presenting with frequent falls and increased myoclonus.  Symptoms were felt to be secondary to Rexulti which was discontinued.  Patient was hospitalized again with d/c on 4/4/2024 where he was evaluated after having falls at home with increased confusion.  He was found to be orthostatic with concerns for UTI and pneumonia.  He was treated with antibiotics discharged home.  Patient has become more agitated, restless and combative over the past 2 days.  He has been seen by neurology outpatient with medication adjustments.  He has had no improvement of agitation.  He was brought to the ED by spouse due to these concerns.      Personal History     Past Medical History:   Diagnosis Date    Coronary artery disease     Dementia     Diabetes mellitus     Hyperlipidemia     Peripheral vascular disease            Past Surgical History:   Procedure Laterality Date    CORONARY ARTERY BYPASS GRAFT      FEMORAL ARTERY - POPLITEAL ARTERY BYPASS GRAFT         Family History: family history includes Diabetes in his maternal grandfather, mother, sister, sister, and sister; Heart disease in his father; Hyperlipidemia in his father; Hypertension in his father.     Social History:  reports that he quit smoking about 27 years ago. His smoking use included cigarettes. He has never been exposed to tobacco smoke. He has quit using smokeless tobacco. He reports that he does not drink alcohol and does not use drugs.  Social History     Social History Narrative    Not on file       Medications:  Available home medication information reviewed.  Accu-Chek Geri, Accu-Chek Geri Plus, Alcohol Prep, Divalproex Sodium, Glucagon, Insulin Glargine, Insulin Pen Needle, QUEtiapine, QUEtiapine fumarate ER, accu-chek soft touch, amLODIPine, donepezil, haloperidol, lisinopril, mirtazapine, and temazepam    Allergies    Allergen Reactions    Bactrim [Sulfamethoxazole-Trimethoprim] Rash    Adhesive Tape Rash    Neosporin [Neomycin-Bacitracin Zn-Polymyx] Rash       Objective  Objective     Vital Signs:   Temp:  [97.2 °F (36.2 °C)] 97.2 °F (36.2 °C)  Heart Rate:  [91-93] 91  Resp:  [20] 20  BP: (148-158)/(75-84) 158/84       Physical Exam   Constitutional: Awake, alert, fidgety  Eyes: PERRLA, sclerae anicteric, no conjunctival injection  HENT: NCAT, mucous membranes moist  Neck: Supple, no thyromegaly, no lymphadenopathy, trachea midline  Respiratory: Clear to auscultation bilaterally, nonlabored respirations   Cardiovascular: RRR, no murmurs, rubs, or gallops, palpable pedal pulses bilaterally  Gastrointestinal: Positive bowel sounds, soft, nontender, nondistended  Musculoskeletal: No bilateral ankle edema, no clubbing or cyanosis to extremities  Psychiatric: Fidgety  Neurologic: Unable to assess, not following commands  Skin: No rashes      Result Review:  I have personally reviewed the results from the time of this admission to 4/19/2024 03:04 EDT and agree with these findings:  [x]  Laboratory list / accordion  []  Microbiology  [x]  Radiology  [x]  EKG/Telemetry   []  Cardiology/Vascular   []  Pathology  [x]  Old records      LAB RESULTS:      Lab 04/18/24  2356   WBC 9.87   HEMOGLOBIN 13.8   HEMATOCRIT 42.2   PLATELETS 292   NEUTROS ABS 4.68   IMMATURE GRANS (ABS) 0.05   LYMPHS ABS 3.34*   MONOS ABS 1.14*   EOS ABS 0.56*   MCV 95.3         Lab 04/18/24  2356   SODIUM 142   POTASSIUM 4.9   CHLORIDE 104   CO2 28.0   ANION GAP 10.0   BUN 22   CREATININE 1.12   EGFR 72.0   GLUCOSE 226*   CALCIUM 9.4   MAGNESIUM 1.8         Lab 04/18/24  2356   TOTAL PROTEIN 6.9   ALBUMIN 3.9   GLOBULIN 3.0   ALT (SGPT) 13   AST (SGOT) 17   BILIRUBIN 0.3   ALK PHOS 120*         Lab 04/18/24  2356   HSTROP T 22*                 UA          2/24/2024    23:54 3/27/2024    16:33 4/19/2024    02:43   Urinalysis   Squamous Epithelial Cells, UA 0-2  0-2      Specific Gravity, UA 1.032  1.038  <=1.005    Ketones, UA Trace  40 mg/dL (2+)  Negative    Blood, UA Negative  Negative  Negative    Leukocytes, UA Negative  Negative  Negative    Nitrite, UA Negative  Positive  Negative    RBC, UA 0-2  0-2     WBC, UA 0-2  0-2     Bacteria, UA None Seen  4+         Microbiology Results (last 10 days)       ** No results found for the last 240 hours. **            CT Head Without Contrast    Result Date: 4/19/2024  CT HEAD WO CONTRAST Date of Exam: 4/19/2024 2:11 AM EDT Indication: Mental status change, unknown cause. Comparison: 3/28/2024. Technique: Axial CT images were obtained of the head without contrast administration.  Automated exposure control and iterative construction methods were used. Findings: There is no acute intracranial hemorrhage. No mass effect, midline shift or abnormal extra-axial collection. There is stable moderate patchy periventricular white matter hypoattenuation. Mild age-appropriate generalized parenchymal volume loss. No new hypoattenuating lesions in the brain. Cortical gray-white differentiation appears intact. The orbits are unremarkable. Mastoids and paranasal sinuses appear well aerated.     Impression: Impression: 1. No acute intracranial abnormality. 2. Moderate chronic small vessel ischemic change. Electronically Signed: Reg Pelaez MD  4/19/2024 2:29 AM EDT  Workstation ID: [id number]    XR Chest 1 View    Result Date: 4/19/2024  XR CHEST 1 VW Date of Exam: 4/19/2024 12:24 AM EDT Indication: Weak/Dizzy/AMS triage protocol Comparison: 3/27/2024. Findings: There is evidence of prior median sternotomy and CABG. Cardiac silhouette appears unchanged. Pulmonary vasculature is within normal limits. Evaluation of the left lung base is limited due to lordotic view and positioning. No definite lung consolidation. No pneumothorax or pleural effusion.     Impression: Impression: Limited study demonstrates no acute abnormality in the lungs.  Electronically Signed: Reg Pelaez MD  4/19/2024 1:29 AM EDT  Workstation ID: [id number]     Results for orders placed during the hospital encounter of 03/27/24    Adult Transthoracic Echo Complete W/ Cont if Necessary Per Protocol    Interpretation Summary    Left ventricular systolic function is normal. Calculated left ventricular EF = 55.1% Normal left ventricular cavity size noted. Left ventricular wall thickness is consistent with mild concentric hypertrophy. Left ventricular diastolic function is consistent with (grade I) impaired relaxation.    The right ventricular cavity is mildly dilated. Normal right ventricular systolic function noted.    There is calcification of the aortic valve. The aortic valve appears trileaflet. Mild aortic valve regurgitation is present. No aortic valve stenosis is present.    Mitral annular calcification is present. Trace mitral valve regurgitation is present. No significant mitral valve stenosis is present.    The tricuspid valve is structurally normal with no stenosis present. Trace tricuspid valve regurgitation is present. Insufficient TR velocity profile to estimate the right ventricular systolic pressure.    Mild dilation of the sinuses of Valsalva is present. Mild dilation of the ascending aorta is present. Ascending aorta = 4.2 cm      Assessment & Plan  Assessment & Plan       Agitation due to dementia    Benign essential HTN    Benign prostatic hyperplasia    Coronary artery disease involving native coronary artery of native heart    Type 2 diabetes mellitus with hyperglycemia, with long-term current use of insulin    Hyperlipidemia    Peripheral neuropathy    Peripheral vascular disease    Too Tai is a 68 y.o. male with a PMH significant for advanced dementia, CAD, diabetes mellitus type 2, HLD, PVD who comes to the ED due to agitation.        Severe dementia complicated by behavioral disturbance  -Check UA  - CT head wnl  - Consult neurology  - Fall  precautions  - Case management consult  - Continue home Depakote, Aricept, Haldol, Seroquel, Restoril, Remeron  - QTc 452    Diabetes mellitus type 2  - No active home meds  - SSI with Accu-Cheks  - Hemoglobin A1c for a.m.    HTN  PVD  CAD  - Continue home meds    Home med list reviewed    DVT prophylaxis:  Mechanical and medical DVT prophylaxis orders are signed and held.       CODE STATUS: No family present.  CODE STATUS will need to be clarified with family, was DNR during prior stay.  There are no questions and answers to display.     Expected Discharge   Expected Discharge Date: 4/21/2024; Expected Discharge Time:      Nerissa Aviles DO  04/19/24      Electronically signed by Nerissa Aviles DO at 04/19/24 0305          Emergency Department Notes        Elysia Espinosa, RN at 04/19/24 0459           Too Tai    Nursing Report ED to Floor:  Mental status: aox4  Ambulatory status: ambulatory at home  Oxygen Therapy:  ra  Cardiac Rhythm: nsr  Admitted from: home  Safety Concerns:  Fall risk.,   Social Issues: none  ED Room #:  17    ED Nurse Phone Extension - [phone] or may call [phone].      HPI:   Chief Complaint   Patient presents with    Altered Mental Status       Past Medical History:  Past Medical History:   Diagnosis Date    Coronary artery disease     Dementia     Diabetes mellitus     Hyperlipidemia     Peripheral vascular disease         Past Surgical History:  Past Surgical History:   Procedure Laterality Date    CORONARY ARTERY BYPASS GRAFT      FEMORAL ARTERY - POPLITEAL ARTERY BYPASS GRAFT          Admitting Doctor:   Nerissa Aviles DO    Consulting Provider(s):  Consults       Date and Time Order Name Status Description    3/27/2024 10:27 PM Inpatient Palliative Care MD Consult Completed     3/27/2024 10:13 PM Inpatient Neurology Consult General Completed              Admitting Diagnosis:   The primary encounter diagnosis was Dementia with behavioral disturbance. Diagnoses of Aggressive behavior,  Agitation due to dementia, and Hyperglycemia due to diabetes mellitus were also pertinent to this visit.    Most Recent Vitals:   Vitals:    04/18/24 2351 04/18/24 2357 04/18/24 2359 04/19/24 0231   BP: 148/75   158/84   BP Location: Left arm      Patient Position: Lying      Pulse: 93  91    Resp: 20      Temp:  97.2 °F (36.2 °C)     TempSrc:  Axillary     SpO2: 97%  91% 96%   Weight:       Height:           Active LDAs/IV Access:   Lines, Drains & Airways       Active LDAs       Name Placement date Placement time Site Days    Peripheral IV 04/19/24 0300 Anterior;Left Foot 04/19/24  0300  Foot  less than 1                    Labs (abnormal labs have a star):   Labs Reviewed   COMPREHENSIVE METABOLIC PANEL - Abnormal; Notable for the following components:       Result Value    Glucose 226 (*)     Alkaline Phosphatase 120 (*)     All other components within normal limits    Narrative:     GFR Normal >60  Chronic Kidney Disease <60  Kidney Failure <15     SINGLE HS TROPONIN T - Abnormal; Notable for the following components:    HS Troponin T 22 (*)     All other components within normal limits    Narrative:     High Sensitive Troponin T Reference Range:  <14.0 ng/L- Negative Female for AMI  <22.0 ng/L- Negative Male for AMI  >=14 - Abnormal Female indicating possible myocardial injury.  >=22 - Abnormal Male indicating possible myocardial injury.   Clinicians would have to utilize clinical acumen, EKG, Troponin, and serial changes to determine if it is an Acute Myocardial Infarction or myocardial injury due to an underlying chronic condition.        CBC WITH AUTO DIFFERENTIAL - Abnormal; Notable for the following components:    RDW 11.9 (*)     Lymphocytes, Absolute 3.34 (*)     Monocytes, Absolute 1.14 (*)     Eosinophils, Absolute 0.56 (*)     All other components within normal limits   VALPROIC ACID LEVEL, TOTAL - Abnormal; Notable for the following components:    Valproic Acid 36.4 (*)     All other components  within normal limits    Narrative:     Therapeutic Ranges for Valproic Acid    Epilepsy:       mcg/ml  Bipolar/Kacey  up to 125 mcg/ml     URINALYSIS W/ MICROSCOPIC IF INDICATED (NO CULTURE) - Abnormal; Notable for the following components:    Glucose,  mg/dL (Trace) (*)     All other components within normal limits    Narrative:     Urine microscopic not indicated.   MAGNESIUM - Normal   AMMONIA - Normal   RAINBOW DRAW    Narrative:     The following orders were created for panel order [place] Draw.  Procedure                               Abnormality         Status                     ---------                               -----------         ------                     Green Top (Gel)[[id number]]                                  Final result               Lavender Top[[id number]]                                     Final result               Gold Top - SST[[id number]]                                   Final result               Roberson Top[[id number]]                                         Final result               Light Blue Top[[id number]]                                   Final result                 Please view results for these tests on the individual orders.   CBC AND DIFFERENTIAL    Narrative:     The following orders were created for panel order CBC & Differential.  Procedure                               Abnormality         Status                     ---------                               -----------         ------                     CBC Auto Differential[[id number]]        Abnormal            Final result                 Please view results for these tests on the individual orders.   GREEN TOP   LAVENDER TOP   GOLD TOP - SST   GRAY TOP   LIGHT BLUE TOP       Meds Given in ED:   Medications   sodium chloride 0.9 % flush 10 mL (has no administration in time range)   ziprasidone (GEODON) injection 20 mg (20 mg Intramuscular Given 4/19/24 0029)   sterile water (preservative free) injection  - ADS  Override Pull (1.2 mL  Given 4/19/24 0030)   midazolam (VERSED) injection 1 mg (1 mg Intravenous Given 4/19/24 0230)   midazolam (VERSED) injection 1 mg (1 mg Intravenous Given 4/19/24 0306)           Last NIH score:                                                          Dysphagia screening results:  Patient Factors Component (Dysphagia:Stroke or Rule-out)  Best Eye Response: 4-->(E4) spontaneous (04/19/24 0054)  Best Motor Response: 6-->(M6) obeys commands (04/19/24 0054)  Best Verbal Response: 4-->(V4) confused (04/19/24 0054)  Alicia Coma Scale Score: 14 (04/19/24 0054)     Alicia Coma Scale:  No data recorded     CIWA:        Restraint Type:            Isolation Status:  No active isolations          Electronically signed by Elysia Espinosa RN at 04/19/24 0500       Delmar Rocha MD at 04/18/24 2350          Subjective   History of Present Illness  Patient presents for evaluation of agitation in the setting of known dementia.  Symptoms have been worsening over the past 2 days.  He has been restless, agitated, combative.  He has had 2 recent hospitalizations for similar episodes.  They have also been working without outpatient neurology for medication adjustments.  Tonight the wife gave double the normal dose of temazepam and held his evening mirtazepine at the instruction of his neurologist but did not see improvement.  [facility] was called to bring him to the ER.  Patient himself cannot provide any history.      History provided by:  Patient      Review of Systems    Past Medical History:   Diagnosis Date    Coronary artery disease     Dementia     Diabetes mellitus     Hyperlipidemia     Peripheral vascular disease        Allergies   Allergen Reactions    Bactrim [Sulfamethoxazole-Trimethoprim] Rash    Adhesive Tape Rash    Neosporin [Neomycin-Bacitracin Zn-Polymyx] Rash       Past Surgical History:   Procedure Laterality Date    CORONARY ARTERY BYPASS GRAFT      FEMORAL ARTERY - POPLITEAL ARTERY BYPASS GRAFT          Family History   Problem Relation Age of Onset    Diabetes Mother     Heart disease Father     Hypertension Father     Hyperlipidemia Father     Diabetes Sister     Diabetes Maternal Grandfather     Diabetes Sister     Diabetes Sister        Social History     Socioeconomic History    Marital status:    Tobacco Use    Smoking status: Former     Current packs/day: 0.00     Types: Cigarettes     Quit date:      Years since quittin.3     Passive exposure: Never    Smokeless tobacco: Former   Vaping Use    Vaping status: Never Used   Substance and Sexual Activity    Alcohol use: No    Drug use: No    Sexual activity: Never           Objective   Physical Exam  Constitutional:       Comments: Alert, does not directly engage with examiner.  Agitated, swinging at staff members   HENT:      Head: Normocephalic and atraumatic.   Eyes:      Conjunctiva/sclera: Conjunctivae normal.      Pupils: Pupils are equal, round, and reactive to light.   Cardiovascular:      Rate and Rhythm: Normal rate and regular rhythm.      Pulses: Normal pulses.      Heart sounds: No murmur heard.     No gallop.   Pulmonary:      Effort: Pulmonary effort is normal. No respiratory distress.   Abdominal:      General: Abdomen is flat. There is no distension.      Tenderness: There is no abdominal tenderness.   Musculoskeletal:         General: No swelling or deformity. Normal range of motion.   Skin:     General: Skin is warm and dry.      Capillary Refill: Capillary refill takes less than 2 seconds.   Neurological:      Comments: EMV = 4-5-3.  Moves all extremities equally.  Noncooperative with neurologic exam         Procedures          ED Course  ED Course as of 24 0309      0049 Twelve-lead ECG independently interpreted by myself demonstrates normal sinus rhythm with a first-degree AV block, NM interval of 240, no ST segment elevation or depression. [KB]   0246 CT scan of the brain independently  interpreted by myself demonstrates no acute intracranial hemorrhage [KB]   0246 Laboratory workup independently interpreted by myself is notable for hyperglycemia without evidence of DKA.  elevated high-sensitivity troponin is also seen [KB]      ED Course User Index  [KB] Delmar Rocha MD                                             Medical Decision Making  Differential diagnosis includes acute infectious or metabolic derangement to cause exacerbation of patient's underlying dementia or this could simply be progression of patient's dementia with agitation features.  Will obtain lab studies and reevaluate the patient.    While in the emergency department patient continues to be agitated, striking at nursing staff.  He was given multiple doses of IV benzodiazepines as well as 20 mg intramuscular Geodon with some improvement.  Ultimately I believe he will require hospitalization for further management.    Problems Addressed:  Aggressive behavior: complicated acute illness or injury  Agitation due to dementia: complicated acute illness or injury  Dementia with behavioral disturbance: complicated acute illness or injury  Hyperglycemia due to diabetes mellitus: complicated acute illness or injury    Amount and/or Complexity of Data Reviewed  Independent Historian: EMS     Details: Prehospital course given by EMS  External Data Reviewed: notes.     Details: 4/4/2024 reviewed most recent discharge summary where patient was hospitalized for similar situation  Labs: ordered. Decision-making details documented in ED Course.  Radiology: ordered and independent interpretation performed. Decision-making details documented in ED Course.  ECG/medicine tests: ordered and independent interpretation performed. Decision-making details documented in ED Course.  Discussion of management or test interpretation with external provider(s): Hospital medicine consulted for admission    Risk  Prescription drug management.  Decision regarding  hospitalization.        Final diagnoses:   Dementia with behavioral disturbance   Aggressive behavior   Agitation due to dementia   Hyperglycemia due to diabetes mellitus       ED Disposition  ED Disposition       ED Disposition   Decision to Admit    Condition   --    Comment   Level of Care: Telemetry [5]   Diagnosis: Agitation due to dementia [8207630]   Admitting Physician: DONYA MCGRAW [[id number]]   Attending Physician: DONYA MCGRAW [[id number]]   Bed Request Comments: tele               Recent Results (from the past 24 hour(s))   Comprehensive Metabolic Panel    Collection Time: 04/18/24 11:56 PM    Specimen: Blood   Result Value Ref Range    Glucose 226 (H) 65 - 99 mg/dL    BUN 22 8 - 23 mg/dL    Creatinine 1.12 0.76 - 1.27 mg/dL    Sodium 142 136 - 145 mmol/L    Potassium 4.9 3.5 - 5.2 mmol/L    Chloride 104 98 - 107 mmol/L    CO2 28.0 22.0 - 29.0 mmol/L    Calcium 9.4 8.6 - 10.5 mg/dL    Total Protein 6.9 6.0 - 8.5 g/dL    Albumin 3.9 3.5 - 5.2 g/dL    ALT (SGPT) 13 1 - 41 U/L    AST (SGOT) 17 1 - 40 U/L    Alkaline Phosphatase 120 (H) 39 - 117 U/L    Total Bilirubin 0.3 0.0 - 1.2 mg/dL    Globulin 3.0 gm/dL    A/G Ratio 1.3 g/dL    BUN/Creatinine Ratio 19.6 7.0 - 25.0    Anion Gap 10.0 5.0 - 15.0 mmol/L    eGFR 72.0 >60.0 mL/min/1.73   Single High Sensitivity Troponin T    Collection Time: 04/18/24 11:56 PM    Specimen: Blood   Result Value Ref Range    HS Troponin T 22 (H) <22 ng/L   Magnesium    Collection Time: 04/18/24 11:56 PM    Specimen: Blood   Result Value Ref Range    Magnesium 1.8 1.6 - 2.4 mg/dL   Green Top (Gel)    Collection Time: 04/18/24 11:56 PM   Result Value Ref Range    Extra Tube Hold for add-ons.    Lavender Top    Collection Time: 04/18/24 11:56 PM   Result Value Ref Range    Extra Tube hold for add-on    Gold Top - SST    Collection Time: 04/18/24 11:56 PM   Result Value Ref Range    Extra Tube Hold for add-ons.    Light Blue Top    Collection Time: 04/18/24 11:56 PM   Result Value  Ref Range    Extra Tube Hold for add-ons.    CBC Auto Differential    Collection Time: 04/18/24 11:56 PM    Specimen: Blood   Result Value Ref Range    WBC 9.87 3.40 - 10.80 10*3/mm3    RBC 4.43 4.14 - 5.80 10*6/mm3    Hemoglobin 13.8 13.0 - 17.7 g/dL    Hematocrit 42.2 37.5 - 51.0 %    MCV 95.3 79.0 - 97.0 fL    MCH 31.2 26.6 - 33.0 pg    MCHC 32.7 31.5 - 35.7 g/dL    RDW 11.9 (L) 12.3 - 15.4 %    RDW-SD 41.3 37.0 - 54.0 fl    MPV 9.9 6.0 - 12.0 fL    Platelets 292 140 - 450 10*3/mm3    Neutrophil % 47.4 42.7 - 76.0 %    Lymphocyte % 33.8 19.6 - 45.3 %    Monocyte % 11.6 5.0 - 12.0 %    Eosinophil % 5.7 0.3 - 6.2 %    Basophil % 1.0 0.0 - 1.5 %    Immature Grans % 0.5 0.0 - 0.5 %    Neutrophils, Absolute 4.68 1.70 - 7.00 10*3/mm3    Lymphocytes, Absolute 3.34 (H) 0.70 - 3.10 10*3/mm3    Monocytes, Absolute 1.14 (H) 0.10 - 0.90 10*3/mm3    Eosinophils, Absolute 0.56 (H) 0.00 - 0.40 10*3/mm3    Basophils, Absolute 0.10 0.00 - 0.20 10*3/mm3    Immature Grans, Absolute 0.05 0.00 - 0.05 10*3/mm3    nRBC 0.0 0.0 - 0.2 /100 WBC   Valproic Acid Level, Total    Collection Time: 04/18/24 11:56 PM    Specimen: Blood   Result Value Ref Range    Valproic Acid 36.4 (L) 50.0 - 125.0 mcg/mL   ECG 12 Lead ED Triage Standing Order; Weak / Dizzy / AMS    Collection Time: 04/19/24 12:46 AM   Result Value Ref Range    QT Interval 380 ms    QTC Interval 452 ms   Urinalysis With Microscopic If Indicated (No Culture) - Urine, Catheter    Collection Time: 04/19/24  2:43 AM    Specimen: Urine, Catheter   Result Value Ref Range    Color, UA Yellow Yellow, Straw    Appearance, UA Clear Clear    pH, UA 7.0 5.0 - 8.0    Specific Gravity, UA <=1.005 1.001 - 1.030    Glucose,  mg/dL (Trace) (A) Negative    Ketones, UA Negative Negative    Bilirubin, UA Negative Negative    Blood, UA Negative Negative    Protein, UA Negative Negative    Leuk Esterase, UA Negative Negative    Nitrite, UA Negative Negative    Urobilinogen, UA 0.2 E.U./dL 0.2 -  1.0 E.U./dL     Note: In addition to lab results from this visit, the labs listed above may include labs taken at another facility or during a different encounter within the last 24 hours. Please correlate lab times with ED admission and discharge times for further clarification of the services performed during this visit.    CT Head Without Contrast   Final Result   Impression:      1. No acute intracranial abnormality.   2. Moderate chronic small vessel ischemic change.            Electronically Signed: Reg Pelaez MD     4/19/2024 2:29 AM EDT     Workstation ID: [id number]      XR Chest 1 View   Final Result   Impression:   Limited study demonstrates no acute abnormality in the lungs.         Electronically Signed: Reg Pelaez MD     4/19/2024 1:29 AM EDT     Workstation ID: [id number]        Vitals:    04/18/24 2351 04/18/24 2357 04/18/24 2359 04/19/24 0231   BP: 148/75   158/84   BP Location: Left arm      Patient Position: Lying      Pulse: 93  91    Resp: 20      Temp:  97.2 °F (36.2 °C)     TempSrc:  Axillary     SpO2: 97%  91% 96%   Weight:       Height:         Medications   sodium chloride 0.9 % flush 10 mL (has no administration in time range)   ziprasidone (GEODON) injection 20 mg (20 mg Intramuscular Given 4/19/24 0029)   sterile water (preservative free) injection  - ADS Override Pull (1.2 mL  Given 4/19/24 0030)   midazolam (VERSED) injection 1 mg (1 mg Intravenous Given 4/19/24 0230)   midazolam (VERSED) injection 1 mg (1 mg Intravenous Given 4/19/24 0306)     ECG/EMG Results (last 24 hours)       Procedure Component Value Units Date/Time    ECG 12 Lead ED Triage Standing Order; Weak / Dizzy / AMS [[id number]] Collected: 04/19/24 0046     Updated: 04/19/24 0138     QT Interval 380 ms      QTC Interval 452 ms     Narrative:      Test Reason : ED Triage Standing Order~  Blood Pressure :   */*   mmHG  Vent. Rate :  85 BPM     Atrial Rate :  85 BPM     P-R Int : 240 ms          QRS Dur : 110 ms      QT  Int : 380 ms       P-R-T Axes :  59 -24  58 degrees     QTc Int : 452 ms    Sinus rhythm with 1st degree AV block  Minimal voltage criteria for LVH, may be normal variant  Septal infarct (cited on or before 24-FEB-2024)  Abnormal ECG  When compared with ECG of 27-MAR-2024 16:04,  No significant change was found  Confirmed by MD ROGEL KYLE ([id number]) on 4/19/2024 1:38:21 AM    Referred By: MARAL           Confirmed By: MONIQUE ROGEL MD          ECG 12 Lead ED Triage Standing Order; Weak / Dizzy / AMS   Final Result   Test Reason : ED Triage Standing Order~   Blood Pressure :   */*   mmHG   Vent. Rate :  85 BPM     Atrial Rate :  85 BPM      P-R Int : 240 ms          QRS Dur : 110 ms       QT Int : 380 ms       P-R-T Axes :  59 -24  58 degrees      QTc Int : 452 ms      Sinus rhythm with 1st degree AV block   Minimal voltage criteria for LVH, may be normal variant   Septal infarct (cited on or before 24-FEB-2024)   Abnormal ECG   When compared with ECG of 27-MAR-2024 16:04,   No significant change was found   Confirmed by MD ROGEL KYLE ([id number]) on 4/19/2024 1:38:21 AM      Referred By: EDMD           Confirmed By: MONIQUE ROGEL MD              No follow-up provider specified.       Medication List      No changes were made to your prescriptions during this visit.            Monique Rogel MD  04/19/24 0309      Electronically signed by Monique Rogel MD at 04/19/24 0309       Facility-Administered Medications as of 4/24/2024   Medication Dose Route Frequency Provider Last Rate Last Admin    acetaminophen (TYLENOL) tablet 650 mg  650 mg Oral Q4H PRN Traci, Nerissa G, DO        Or    acetaminophen (TYLENOL) 160 MG/5ML oral solution 650 mg  650 mg Oral Q4H PRN Traci, Nerissa G, DO        Or    acetaminophen (TYLENOL) suppository 650 mg  650 mg Rectal Q4H PRN Traci, Nerissa G, DO        [START ON 4/25/2024] amLODIPine (NORVASC) tablet 10 mg  10 mg Oral Q24H Calin Benitez MD        sennosides-docusate (PERICOLACE) 8.6-50 MG  per tablet 2 tablet  2 tablet Oral BID PRN Traci, Nerissa G, DO        And    polyethylene glycol (MIRALAX) packet 17 g  17 g Oral Daily PRN Traci, Nerissa G, DO        And    bisacodyl (DULCOLAX) EC tablet 5 mg  5 mg Oral Daily PRN Traci, Nerissa G, DO   5 mg at 04/23/24 0859    And    bisacodyl (DULCOLAX) suppository 10 mg  10 mg Rectal Daily PRN Traci, Nerissa G, DO        Calcium Replacement - Follow Nurse / BPA Driven Protocol   Does not apply PRN Traci, Nerissa G, DO        cyanocobalamin injection 1,000 mcg  1,000 mcg Intramuscular Q28 Days Darrell Jhaveri A, DO   1,000 mcg at 04/19/24 1532    dextrose (D50W) (25 g/50 mL) IV injection 25 g  25 g Intravenous Q15 Min PRN Traci, Nerissa G, DO        dextrose (GLUTOSE) oral gel 15 g  15 g Oral Q15 Min PRN Traci, Nerissa G, DO        donepezil (ARICEPT) tablet 10 mg  10 mg Oral BID Guille Jhaveriy A, DO   10 mg at 04/24/24 1155    folic acid (FOLVITE) tablet 1 mg  1 mg Oral Daily Traci, Nerissa G, DO   1 mg at 04/24/24 0852    glucagon (GLUCAGEN) injection 1 mg  1 mg Intramuscular Q15 Min PRN Traci, Nerissa G, DO        [COMPLETED] haloperidol lactate (HALDOL) injection 1 mg  1 mg Intramuscular Once Shawn Kathleen PA-C   1 mg at 04/19/24 0607    heparin (porcine) 5000 UNIT/ML injection 5,000 Units  5,000 Units Subcutaneous Q8H Tresa Avilese G, DO   5,000 Units at 04/24/24 0550    insulin glargine (LANTUS, SEMGLEE) injection 15 Units  15 Units Subcutaneous Q12H Cecily Bain DO   15 Units at 04/24/24 0854    Insulin Lispro (humaLOG) injection 2-7 Units  2-7 Units Subcutaneous 4x Daily AC & at Bedtime TraciTresa bacae G, DO   2 Units at 04/24/24 1155    Insulin Lispro (humaLOG) injection 5 Units  5 Units Subcutaneous TID With Meals Parisa Madrigal PA-C   5 Units at 04/24/24 1155    lactated ringers infusion  75 mL/hr Intravenous Continuous Parisa Madrigal PA-C        lisinopril (PRINIVIL,ZESTRIL) tablet 10 mg  10 mg Oral Once Parisa Madrigal PA-C         [START ON 4/25/2024] lisinopril (PRINIVIL,ZESTRIL) tablet 30 mg  30 mg Oral Daily Parisa Madrigal PA-C        Magnesium Standard Dose Replacement - Follow Nurse / BPA Driven Protocol   Does not apply PRN Traci, Nerissa G, DO        melatonin tablet 5 mg  5 mg Oral Nightly PRN Traci, Nerissa G, DO        miconazole (MICOTIN) 2 % powder 1 Application  1 Application Topical Q12H Cecily Bain,    1 Application at 04/23/24 2046    [COMPLETED] midazolam (VERSED) injection 1 mg  1 mg Intravenous Once Delmar Rocha MD   1 mg at 04/19/24 0230    [COMPLETED] midazolam (VERSED) injection 1 mg  1 mg Intravenous Once Delmar Rocha MD   1 mg at 04/19/24 0306    mirtazapine (REMERON) tablet 15 mg  15 mg Oral Nightly Emilio Darrell A, DO        PHENobarbital tablet 32.4 mg  32.4 mg Oral BID Butler, April K, APRN        And    PHENobarbital injection 32.4 mg  32.4 mg Intramuscular BID Butler, April K, APRN        Phosphorus Replacement - Follow Nurse / BPA Driven Protocol   Does not apply PRN Traci, Nerissa G, DO        Potassium Replacement - Follow Nurse / BPA Driven Protocol   Does not apply PRN Traci, Nerissa G, DO        QUEtiapine (SEROquel) tablet 100 mg  100 mg Oral BID Butler, April K, APRN   100 mg at 04/24/24 0852    QUEtiapine (SEROquel) tablet 200 mg  200 mg Oral Nightly Jhaveri, Darrell A, DO        sodium chloride 0.9 % flush 10 mL  10 mL Intravenous PRN Traci, Nerissa G, DO        sodium chloride 0.9 % flush 10 mL  10 mL Intravenous Q12H Traci, Nerissa G, DO   10 mL at 04/23/24 2047    sodium chloride 0.9 % flush 10 mL  10 mL Intravenous PRN Traci, Nerissa G, DO        sodium chloride 0.9 % infusion 40 mL  40 mL Intravenous PRN Traci, Nerissa G, DO   40 mL at 04/23/24 0546    [COMPLETED] sterile water (preservative free) injection  - ADS Override Pull        1.2 mL at 04/19/24 0030    temazepam (RESTORIL) capsule 30 mg  30 mg Oral Nightly Darrell Jhaveri DO        ziprasidone (GEODON) injection 10 mg  10 mg  Intramuscular Q4H TRANN Meliza Kahn, IVONNE   10 mg at 04/24/24 1045    [COMPLETED] ziprasidone (GEODON) injection 20 mg  20 mg Intramuscular Once Delmar Rocha MD   20 mg at 04/19/24 0029     Lab Results (all)       Procedure Component Value Units Date/Time    POC Glucose Once [[id number]]  (Abnormal) Collected: 04/24/24 1112    Specimen: Blood Updated: 04/24/24 1122     Glucose 198 mg/dL     POC Glucose Once [[id number]]  (Abnormal) Collected: 04/24/24 0712    Specimen: Blood Updated: 04/24/24 0721     Glucose 168 mg/dL     POC Glucose Once [[id number]]  (Abnormal) Collected: 04/23/24 2014    Specimen: Blood Updated: 04/23/24 2023     Glucose 177 mg/dL     POC Glucose Once [[id number]]  (Abnormal) Collected: 04/23/24 1645    Specimen: Blood Updated: 04/23/24 1648     Glucose 169 mg/dL     POC Glucose Once [[id number]]  (Abnormal) Collected: 04/23/24 1155    Specimen: Blood Updated: 04/23/24 1157     Glucose 195 mg/dL     POC Glucose Once [[id number]]  (Abnormal) Collected: 04/23/24 0713    Specimen: Blood Updated: 04/23/24 0756     Glucose 208 mg/dL     Valproic Acid Level, Total [[id number]]  (Normal) Collected: 04/23/24 0430    Specimen: Blood Updated: 04/23/24 0635     Valproic Acid 60.8 mcg/mL     Narrative:      Therapeutic Ranges for Valproic Acid    Epilepsy:       mcg/ml  Bipolar/Kacey  up to 125 mcg/ml      POC Glucose Once [[id number]]  (Abnormal) Collected: 04/22/24 1935    Specimen: Blood Updated: 04/22/24 1938     Glucose 136 mg/dL     POC Glucose Once [[id number]]  (Abnormal) Collected: 04/22/24 1630    Specimen: Blood Updated: 04/22/24 1631     Glucose 276 mg/dL     POC Glucose Once [[id number]]  (Abnormal) Collected: 04/22/24 1142    Specimen: Blood Updated: 04/22/24 1147     Glucose 289 mg/dL     POC Glucose Once [[id number]]  (Abnormal) Collected: 04/22/24 0712    Specimen: Blood Updated: 04/22/24 0719     Glucose 171 mg/dL     POC Glucose Once [[id number]]  (Abnormal) Collected: 04/21/24 2016     Specimen: Blood Updated: 04/21/24 2035     Glucose 274 mg/dL     POC Glucose Once [[id number]]  (Abnormal) Collected: 04/21/24 1621    Specimen: Blood Updated: 04/21/24 1623     Glucose 420 mg/dL     POC Glucose Once [[id number]]  (Abnormal) Collected: 04/21/24 1112    Specimen: Blood Updated: 04/21/24 1129     Glucose 170 mg/dL     POC Glucose Once [[id number]]  (Abnormal) Collected: 04/21/24 0720    Specimen: Blood Updated: 04/21/24 0741     Glucose 185 mg/dL     POC Glucose Once [[id number]]  (Abnormal) Collected: 04/20/24 2024    Specimen: Blood Updated: 04/20/24 2027     Glucose 225 mg/dL     POC Glucose Once [[id number]]  (Abnormal) Collected: 04/20/24 1612    Specimen: Blood Updated: 04/20/24 1615     Glucose 338 mg/dL     POC Glucose Once [[id number]]  (Abnormal) Collected: 04/20/24 1611    Specimen: Blood Updated: 04/20/24 1614     Glucose 345 mg/dL     POC Glucose Once [[id number]]  (Abnormal) Collected: 04/20/24 1046    Specimen: Blood Updated: 04/20/24 1050     Glucose 174 mg/dL     POC Glucose Once [[id number]]  (Abnormal) Collected: 04/20/24 0717    Specimen: Blood Updated: 04/20/24 0719     Glucose 193 mg/dL     POC Glucose Once [[id number]]  (Abnormal) Collected: 04/19/24 1953    Specimen: Blood Updated: 04/19/24 2014     Glucose 148 mg/dL     POC Glucose Once [[id number]]  (Abnormal) Collected: 04/19/24 1646    Specimen: Blood Updated: 04/19/24 1720     Glucose 182 mg/dL     POC Glucose Once [[id number]]  (Abnormal) Collected: 04/19/24 1123    Specimen: Blood Updated: 04/19/24 1209     Glucose 264 mg/dL     Vitamin B12 [[id number]]  (Normal) Collected: 04/18/24 2356    Specimen: Blood Updated: 04/19/24 0947     Vitamin B-12 384 pg/mL     Narrative:      Results may be falsely increased if patient taking Biotin.      Basic Metabolic Panel [[id number]]  (Abnormal) Collected: 04/19/24 0829    Specimen: Blood Updated: 04/19/24 0913     Glucose 166 mg/dL      BUN 16 mg/dL      Creatinine 0.82 mg/dL      Sodium 144  mmol/L      Potassium 4.4 mmol/L      Comment: Slight hemolysis detected by analyzer. Result may be falsely elevated.        Chloride 106 mmol/L      CO2 30.0 mmol/L      Calcium 9.9 mg/dL      BUN/Creatinine Ratio 19.5     Anion Gap 8.0 mmol/L      eGFR 96.3 mL/min/1.73     Narrative:      GFR Normal >60  Chronic Kidney Disease <60  Kidney Failure <15      Magnesium [[id number]]  (Normal) Collected: 04/19/24 0829    Specimen: Blood Updated: 04/19/24 0913     Magnesium 1.9 mg/dL     Phosphorus [[id number]]  (Normal) Collected: 04/19/24 0829    Specimen: Blood Updated: 04/19/24 0913     Phosphorus 3.4 mg/dL     CBC Auto Differential [[id number]]  (Abnormal) Collected: 04/19/24 0829    Specimen: Blood Updated: 04/19/24 0847     WBC 10.47 10*3/mm3      RBC 5.04 10*6/mm3      Hemoglobin 15.5 g/dL      Hematocrit 46.7 %      MCV 92.7 fL      MCH 30.8 pg      MCHC 33.2 g/dL      RDW 11.8 %      RDW-SD 40.2 fl      MPV 9.6 fL      Platelets 307 10*3/mm3      Neutrophil % 45.3 %      Lymphocyte % 33.6 %      Monocyte % 12.7 %      Eosinophil % 6.6 %      Basophil % 1.4 %      Immature Grans % 0.4 %      Neutrophils, Absolute 4.74 10*3/mm3      Lymphocytes, Absolute 3.52 10*3/mm3      Monocytes, Absolute 1.33 10*3/mm3      Eosinophils, Absolute 0.69 10*3/mm3      Basophils, Absolute 0.15 10*3/mm3      Immature Grans, Absolute 0.04 10*3/mm3      nRBC 0.0 /100 WBC     POC Glucose Once [[id number]]  (Normal) Collected: 04/19/24 0715    Specimen: Blood Updated: 04/19/24 0717     Glucose 104 mg/dL     Hemoglobin A1c [[id number]]  (Abnormal) Collected: 04/18/24 2356    Specimen: Blood Updated: 04/19/24 0609     Hemoglobin A1C 9.50 %     Narrative:      Hemoglobin A1C Ranges:    Increased Risk for Diabetes  5.7% to 6.4%  Diabetes                     >= 6.5%  Diabetic Goal                < 7.0%    TSH Rfx On Abnormal To Free T4 [[id number]]  (Normal) Collected: 04/18/24 2356    Specimen: Blood Updated: 04/19/24 0608     TSH 3.220 uIU/mL      Ammonia [[id number]]  (Normal) Collected: 04/19/24 0345    Specimen: Blood Updated: 04/19/24 0414     Ammonia 34 umol/L      Comment: Specimen hemolyzed.  Results may be affected.       [place] Draw [[id number]] Collected: 04/18/24 2356    Specimen: Blood Updated: 04/19/24 0400    Narrative:      The following orders were created for panel order [place] Draw.  Procedure                               Abnormality         Status                     ---------                               -----------         ------                     Green Top (Gel)[[id number]]                                  Final result               Lavender Top[[id number]]                                     Final result               Gold Top - SST[[id number]]                                   Final result               [place] Top[[id number]]                                         Final result               Light Blue Top[[id number]]                                   Final result                 Please view results for these tests on the individual orders.    [place] Top [[id number]] Collected: 04/18/24 2356    Specimen: Blood Updated: 04/19/24 0400     Extra Tube Hold for add-ons.     Comment: Auto resulted.       Urinalysis With Microscopic If Indicated (No Culture) - Urine, Catheter [[id number]]  (Abnormal) Collected: 04/19/24 0243    Specimen: Urine, Catheter Updated: 04/19/24 0256     Color, UA Yellow     Appearance, UA Clear     pH, UA 7.0     Specific Gravity, UA <=1.005     Glucose,  mg/dL (Trace)     Ketones, UA Negative     Bilirubin, UA Negative     Blood, UA Negative     Protein, UA Negative     Leuk Esterase, UA Negative     Nitrite, UA Negative     Urobilinogen, UA 0.2 E.U./dL    Narrative:      Urine microscopic not indicated.    Valproic Acid Level, Total [[id number]]  (Abnormal) Collected: 04/18/24 2356    Specimen: Blood Updated: 04/19/24 0117     Valproic Acid 36.4 mcg/mL     Narrative:      Therapeutic Ranges for Valproic  Acid    Epilepsy:       mcg/ml  Bipolar/Kacey  up to 125 mcg/ml      Green Top (Gel) [[id number]] Collected: 04/18/24 2356    Specimen: Blood Updated: 04/19/24 0100     Extra Tube Hold for add-ons.     Comment: Auto resulted.       Lavender Top [[id number]] Collected: 04/18/24 2356    Specimen: Blood Updated: 04/19/24 0100     Extra Tube hold for add-on     Comment: Auto resulted       Gold Top - SST [[id number]] Collected: 04/18/24 2356    Specimen: Blood Updated: 04/19/24 0100     Extra Tube Hold for add-ons.     Comment: Auto resulted.       Light Blue Top [[id number]] Collected: 04/18/24 2356    Specimen: Blood Updated: 04/19/24 0100     Extra Tube Hold for add-ons.     Comment: Auto resulted       Comprehensive Metabolic Panel [[id number]]  (Abnormal) Collected: 04/18/24 2356    Specimen: Blood Updated: 04/19/24 0039     Glucose 226 mg/dL      BUN 22 mg/dL      Creatinine 1.12 mg/dL      Sodium 142 mmol/L      Potassium 4.9 mmol/L      Comment: Slight hemolysis detected by analyzer. Result may be falsely elevated.        Chloride 104 mmol/L      CO2 28.0 mmol/L      Calcium 9.4 mg/dL      Total Protein 6.9 g/dL      Albumin 3.9 g/dL      ALT (SGPT) 13 U/L      AST (SGOT) 17 U/L      Alkaline Phosphatase 120 U/L      Total Bilirubin 0.3 mg/dL      Globulin 3.0 gm/dL      Comment: Calculated Result        A/G Ratio 1.3 g/dL      BUN/Creatinine Ratio 19.6     Anion Gap 10.0 mmol/L      eGFR 72.0 mL/min/1.73     Narrative:      GFR Normal >60  Chronic Kidney Disease <60  Kidney Failure <15      Magnesium [[id number]]  (Normal) Collected: 04/18/24 2356    Specimen: Blood Updated: 04/19/24 0039     Magnesium 1.8 mg/dL     Single High Sensitivity Troponin T [[id number]]  (Abnormal) Collected: 04/18/24 2356    Specimen: Blood Updated: 04/19/24 0035     HS Troponin T 22 ng/L     Narrative:      High Sensitive Troponin T Reference Range:  <14.0 ng/L- Negative Female for AMI  <22.0 ng/L- Negative Male for AMI  >=14 -  Abnormal Female indicating possible myocardial injury.  >=22 - Abnormal Male indicating possible myocardial injury.   Clinicians would have to utilize clinical acumen, EKG, Troponin, and serial changes to determine if it is an Acute Myocardial Infarction or myocardial injury due to an underlying chronic condition.         CBC & Differential [[id number]]  (Abnormal) Collected: 04/18/24 2356    Specimen: Blood Updated: 04/19/24 0011    Narrative:      The following orders were created for panel order CBC & Differential.  Procedure                               Abnormality         Status                     ---------                               -----------         ------                     CBC Auto Differential[[id number]]        Abnormal            Final result                 Please view results for these tests on the individual orders.    CBC Auto Differential [[id number]]  (Abnormal) Collected: 04/18/24 2356    Specimen: Blood Updated: 04/19/24 0011     WBC 9.87 10*3/mm3      RBC 4.43 10*6/mm3      Hemoglobin 13.8 g/dL      Hematocrit 42.2 %      MCV 95.3 fL      MCH 31.2 pg      MCHC 32.7 g/dL      RDW 11.9 %      RDW-SD 41.3 fl      MPV 9.9 fL      Platelets 292 10*3/mm3      Neutrophil % 47.4 %      Lymphocyte % 33.8 %      Monocyte % 11.6 %      Eosinophil % 5.7 %      Basophil % 1.0 %      Immature Grans % 0.5 %      Neutrophils, Absolute 4.68 10*3/mm3      Lymphocytes, Absolute 3.34 10*3/mm3      Monocytes, Absolute 1.14 10*3/mm3      Eosinophils, Absolute 0.56 10*3/mm3      Basophils, Absolute 0.10 10*3/mm3      Immature Grans, Absolute 0.05 10*3/mm3      nRBC 0.0 /100 WBC           Imaging Results (All)       Procedure Component Value Units Date/Time    CT Head Without Contrast [[id number]] Collected: 04/19/24 0227     Updated: 04/19/24 0232    Narrative:      CT HEAD WO CONTRAST    Date of Exam: 4/19/2024 2:11 AM EDT    Indication: Mental status change, unknown cause.    Comparison: 3/28/2024.    Technique:  Axial CT images were obtained of the head without contrast administration.  Automated exposure control and iterative construction methods were used.      Findings:  There is no acute intracranial hemorrhage. No mass effect, midline shift or abnormal extra-axial collection. There is stable moderate patchy periventricular white matter hypoattenuation. Mild age-appropriate generalized parenchymal volume loss. No new   hypoattenuating lesions in the brain. Cortical gray-white differentiation appears intact. The orbits are unremarkable. Mastoids and paranasal sinuses appear well aerated.      Impression:      Impression:    1. No acute intracranial abnormality.  2. Moderate chronic small vessel ischemic change.        Electronically Signed: Reg Pelaez MD    2024 2:29 AM EDT    Workstation ID: [id number]    XR Chest 1 View [[id number]] Collected: 24     Updated: 24    Narrative:      XR CHEST 1 VW    Date of Exam: 2024 12:24 AM EDT    Indication: Weak/Dizzy/AMS triage protocol    Comparison: 3/27/2024.    Findings:  There is evidence of prior median sternotomy and CABG. Cardiac silhouette appears unchanged. Pulmonary vasculature is within normal limits. Evaluation of the left lung base is limited due to lordotic view and positioning. No definite lung consolidation.   No pneumothorax or pleural effusion.      Impression:      Impression:  Limited study demonstrates no acute abnormality in the lungs.      Electronically Signed: Reg Pelaez MD    2024 1:29 AM EDT    Workstation ID: [id number]             Physician Progress Notes (all)        Meliza Kahn APRN at 24 0744           [facility] Neurology    Progress Note    Patient Name: Too Tai  : 1955  MRN: [id number]  Primary Care Physician:  Des Geller MD  Date of admission: 2024    Subjective     Chief Complaint: Dementia with behavioral disturbances    History of Present Illness   Patient  seen extremely agitated in bed.  Requiring 4-point restraints.  Attempting to hit RN and tech while they were trying to change his bedding.  He continues to be extremely aggressive with staff requiring as needed medications.    Review of Systems   Unable to assess due to baseline mental status  Objective     Physical Exam  Vitals and nursing note reviewed.   Constitutional:       General: He is not in acute distress.     Appearance: He is not ill-appearing.   Eyes:      Extraocular Movements: Extraocular movements intact.      Pupils: Pupils are equal, round, and reactive to light.      Comments: No nystagmus or deviated gaze noted   Cardiovascular:      Rate and Rhythm: Normal rate.   Pulmonary:      Effort: Pulmonary effort is normal.   Neurological:      Mental Status: He is alert.      Cranial Nerves: No cranial nerve deficit or facial asymmetry.      Sensory: No sensory deficit.      Motor: No weakness or seizure activity.      Comments:     Physical exam extremely limited due to patient agitation    Cranial Nerves   CN II: Pupils are equal, round, and reactive to light. Normal visual acuity and visual fields.    CN III IV VI: Extraocular movements are full without nystagmus.  CN V: Normal facial sensation and strength of muscles of mastication.  CN VII: Facial movements are symmetric. No weakness.  CN VIII:  Auditory acuity is normal.  CN IX & X:  Symmetric palatal movement.  CN XII: The tongue is midline.  No atrophy or fasciculations.              Vitals:   Temp:  [97.4 °F (36.3 °C)-98.4 °F (36.9 °C)] 97.4 °F (36.3 °C)  Heart Rate:  [86] 86  Resp:  [16-20] 20  BP: (144-184)/(72-84) 184/80    Current Medications    Current Facility-Administered Medications:     acetaminophen (TYLENOL) tablet 650 mg, 650 mg, Oral, Q4H PRN **OR** acetaminophen (TYLENOL) 160 MG/5ML oral solution 650 mg, 650 mg, Oral, Q4H PRN **OR** acetaminophen (TYLENOL) suppository 650 mg, 650 mg, Rectal, Q4H PRN, Nerissa Aviles DO     amLODIPine (NORVASC) tablet 10 mg, 10 mg, Oral, Q24H, Traci, Nerissa G, DO, 10 mg at 04/23/24 0859    sennosides-docusate (PERICOLACE) 8.6-50 MG per tablet 2 tablet, 2 tablet, Oral, BID PRN **AND** polyethylene glycol (MIRALAX) packet 17 g, 17 g, Oral, Daily PRN **AND** bisacodyl (DULCOLAX) EC tablet 5 mg, 5 mg, Oral, Daily PRN, 5 mg at 04/23/24 0859 **AND** bisacodyl (DULCOLAX) suppository 10 mg, 10 mg, Rectal, Daily PRN, Traci, Nerissa G, DO    Calcium Replacement - Follow Nurse / BPA Driven Protocol, , Does not apply, PRN, Traci, Nerissa G, DO    cyanocobalamin injection 1,000 mcg, 1,000 mcg, Intramuscular, Q28 Days, Darrell Jhaveri, DO, 1,000 mcg at 04/19/24 1532    dextrose (D50W) (25 g/50 mL) IV injection 25 g, 25 g, Intravenous, Q15 Min PRN, Traci, Nerissa G, DO    dextrose (GLUTOSE) oral gel 15 g, 15 g, Oral, Q15 Min PRN, Traci, Nerissa G, DO    Divalproex Sodium (DEPAKOTE SPRINKLE) capsule 500 mg, 500 mg, Oral, Q8H, Femi, April K, APRN, 500 mg at 04/23/24 1941    donepezil (ARICEPT) tablet 10 mg, 10 mg, Oral, Nightly, Meliza Kahn K, APRN    folic acid (FOLVITE) tablet 1 mg, 1 mg, Oral, Daily, Traci, Nerissa G, DO, 1 mg at 04/23/24 0859    glucagon (GLUCAGEN) injection 1 mg, 1 mg, Intramuscular, Q15 Min PRN, Traci, Nerissa G, DO    haloperidol (HALDOL) tablet 5 mg, 5 mg, Oral, 4x Daily PRN, Traci, Nerissa G, DO    heparin (porcine) 5000 UNIT/ML injection 5,000 Units, 5,000 Units, Subcutaneous, Q8H, Traci, Nerissa G, DO, 5,000 Units at 04/24/24 0550    insulin glargine (LANTUS, SEMGLEE) injection 15 Units, 15 Units, Subcutaneous, Q12H, Cecily Bain, DO, 15 Units at 04/23/24 2046    Insulin Lispro (humaLOG) injection 2-7 Units, 2-7 Units, Subcutaneous, 4x Daily AC & at Bedtime, Nerissa Aviles DO, 2 Units at 04/23/24 2046    Insulin Lispro (humaLOG) injection 5 Units, 5 Units, Subcutaneous, TID With Meals, Parisa Madrigal PA-C, 5 Units at 04/23/24 1655    lisinopril (PRINIVIL,ZESTRIL) tablet 20 mg, 20 mg,  Oral, Daily, Traci, Nerissa G, DO, 20 mg at 04/23/24 0859    Magnesium Standard Dose Replacement - Follow Nurse / BPA Driven Protocol, , Does not apply, PRN, Traci, Nerissa G, DO    melatonin tablet 5 mg, 5 mg, Oral, Nightly PRN, Traci, Nerissa G, DO    miconazole (MICOTIN) 2 % powder 1 Application, 1 Application, Topical, Q12H, Cecily Bain, , 1 Application at 04/23/24 2046    mirtazapine (REMERON) tablet 15 mg, 15 mg, Oral, Nightly, Darrell Jhaveri, DO, 15 mg at 04/23/24 1942    Phosphorus Replacement - Follow Nurse / BPA Driven Protocol, , Does not apply, PRN, Traci, Nerissa G, DO    Potassium Replacement - Follow Nurse / BPA Driven Protocol, , Does not apply, PRN, Traci, Nerissa G, DO    QUEtiapine (SEROquel) tablet 100 mg, 100 mg, Oral, BID, Meliza Khan K, APRN, 100 mg at 04/23/24 1414    QUEtiapine (SEROquel) tablet 200 mg, 200 mg, Oral, Nightly, Meliza Kahn K, APRN, 200 mg at 04/23/24 1941    sodium chloride 0.9 % flush 10 mL, 10 mL, Intravenous, PRN, Traci, Nerissa G, DO    sodium chloride 0.9 % flush 10 mL, 10 mL, Intravenous, Q12H, Traci, Nerissa G, DO, 10 mL at 04/23/24 2047    sodium chloride 0.9 % flush 10 mL, 10 mL, Intravenous, PRN, Traci, Nerissa G, DO    sodium chloride 0.9 % infusion 40 mL, 40 mL, Intravenous, PRN, Traci, Nerissa G, DO, 40 mL at 04/23/24 0546    sodium chloride 0.9 % infusion, 100 mL/hr, Intravenous, Continuous, Parisa Madrigal PA-C, Last Rate: 100 mL/hr at 04/23/24 1534, 100 mL/hr at 04/23/24 1534    temazepam (RESTORIL) capsule 30 mg, 30 mg, Oral, Nightly, Cecily Bain DO, 30 mg at 04/23/24 1942    ziprasidone (GEODON) injection 10 mg, 10 mg, Intramuscular, Q4H PRN, Meliza Kahn K, APRN, 10 mg at 04/23/24 1927    Laboratory Results:   Lab Results   Component Value Date    GLUCOSE 166 (H) 04/19/2024    CALCIUM 9.9 04/19/2024     04/19/2024    K 4.4 04/19/2024    CO2 30.0 (H) 04/19/2024     04/19/2024    BUN 16 04/19/2024    CREATININE 0.82 04/19/2024     EGFRIFAFRI 102 02/21/2022    EGFRIFNONA 88 02/21/2022    BCR 19.5 04/19/2024    ANIONGAP 8.0 04/19/2024     Lab Results   Component Value Date    WBC 10.47 04/19/2024    HGB 15.5 04/19/2024    HCT 46.7 04/19/2024    MCV 92.7 04/19/2024     04/19/2024     Lab Results   Component Value Date    CHOL 220 (H) 01/08/2024    CHOL 120 08/01/2019    CHOL 117 04/15/2019     Lab Results   Component Value Date    HDL 41 01/08/2024    HDL 37 (L) 08/03/2023    HDL 41 04/03/2023     Lab Results   Component Value Date     (H) 01/08/2024     (H) 08/03/2023     (H) 04/03/2023     Lab Results   Component Value Date    TRIG 108 01/08/2024    TRIG 208 (H) 08/03/2023    TRIG 138 04/03/2023     Lab Results   Component Value Date    HGBA1C 9.50 (H) 04/18/2024     Lab Results   Component Value Date    INR 1.1 11/25/2019    INR 1.1 09/10/2018    PROTIME 12.5 11/25/2019    PROTIME 13.3 09/10/2018     Lab Results   Component Value Date    FOLATE 10.20 02/24/2024     Lab Results   Component Value Date    [id number] 384 04/18/2024             Assessment / Plan   Brief Patient Summary:  Too Tai is a 68 y.o. male with a past medical history of advanced dementia with behavioral disturbances and nonverbal, CAD, T2DM, HLD, PVD who presented to [facility] ED due to increase in agitation.  Patient has had multiple recent hospitalizations.      Plan:   Advanced dementia with behavioral disturbances  Sleep Deprivation   Continue home Aricept 10 mg nightly  Discontinue to Depakote  Trial of phenobarbital 32.4 mg twice daily p.o. or IM options available.  Phenobarbital level in a.m.  Continue home Remeron 15 mg nightly  Continue Seroquel 100 mg/100 mg / 200 mg.  Timing of nighttime dose adjusted in hopes to negate the need for as needed medication.  QTc 461 this AM  Continue Restoril 30 mg nightly, patient has been on this dose for approximately 10 days post last hospitalization.  Geodon as needed for extreme agitation; last dose   at 1927  CT head negative for acute abnormality  Vitamin B12 384, IM cyanocobalamin 1000 mcg  Patient currently requiring 4-point restraints  Case management following for difficult posthospitalization disposition.  Patient is not safe to return home due to concern of being in danger for his wife and himself.   Palliative consulted, appreciate recommendations.   General neurology will continue to follow    I have discussed the above with the patient, bedside RN and Dr. Benitez  Time spent with patient: 50 minutes in face-to-face evaluation and management of the patient.    Copied text in this note has been reviewed and is accurate as of 24.     IVONNE Santos              Electronically signed by Meliza Kahn APRN at 24 1206       Parisa Madrigal PA-C at 24 1321       Attestation signed by Cecily Bain DO at 24 1840    I have reviewed this documentation and agree.                      [facility] Medicine Services  PROGRESS NOTE    Patient Name: Too Tai  : 1955  MRN: [id number]    Date of Admission: 2024  Primary Care Physician: Des Geller MD    Subjective     CC: f/u dementia with agitation    HPI:  In bed, resting comfortably. Unable to be taken out of restraints today without combative behaviors so back in 4 point restraints. Neurology titrating medications. Long discussion with wife at bedside.     Notifed by RN later today that wife not happy that patient is either restrained or too tired to eat. Eating bites with meds. Per RN, she untied an arm to let him try to feed himself but he smeared his sandwich on her top instead     Objective     Vital Signs:   Temp:  [96.7 °F (35.9 °C)-97.4 °F (36.3 °C)] 96.7 °F (35.9 °C)  Heart Rate:  [97] 97  Resp:  [16-18] 16  BP: (165-179)/(70-80) 165/80     Physical Exam:  Constitutional: No acute distress. Asleep - did not wake for exam   HENT: NCAT, mucous membranes  moist  Respiratory: Clear to auscultation bilaterally, respiratory effort normal on room air   Cardiovascular: RRR, no murmurs, rubs, or gallops  Gastrointestinal: Positive bowel sounds, soft, nontender, nondistended  Musculoskeletal: No bilateral ankle edema. Extremities in 4-point restraints   Psychiatric: Currently calm   Neurologic: No focal deficits. Speech clear and coherent     Results Reviewed:  LAB RESULTS:      Lab 04/19/24  0829 04/18/24  2356   WBC 10.47 9.87   HEMOGLOBIN 15.5 13.8   HEMATOCRIT 46.7 42.2   PLATELETS 307 292   NEUTROS ABS 4.74 4.68   IMMATURE GRANS (ABS) 0.04 0.05   LYMPHS ABS 3.52* 3.34*   MONOS ABS 1.33* 1.14*   EOS ABS 0.69* 0.56*   MCV 92.7 95.3         Lab 04/19/24  0829 04/18/24  2356   SODIUM 144 142   POTASSIUM 4.4 4.9   CHLORIDE 106 104   CO2 30.0* 28.0   ANION GAP 8.0 10.0   BUN 16 22   CREATININE 0.82 1.12   EGFR 96.3 72.0   GLUCOSE 166* 226*   CALCIUM 9.9 9.4   MAGNESIUM 1.9 1.8   PHOSPHORUS 3.4  --    HEMOGLOBIN A1C  --  9.50*   TSH  --  3.220         Lab 04/18/24  2356   TOTAL PROTEIN 6.9   ALBUMIN 3.9   GLOBULIN 3.0   ALT (SGPT) 13   AST (SGOT) 17   BILIRUBIN 0.3   ALK PHOS 120*         Lab 04/18/24  2356   HSTROP T 22*         Lab 04/18/24  2356   VITAMIN B 12 384     Brief Urine Lab Results  (Last result in the past 365 days)        Color   Clarity   Blood   Leuk Est   Nitrite   Protein   CREAT   Urine HCG        04/19/24 0243 Yellow   Clear   Negative   Negative   Negative   Negative                 Microbiology Results Abnormal       None          No radiology results from the last 24 hrs    Results for orders placed during the hospital encounter of 03/27/24    Adult Transthoracic Echo Complete W/ Cont if Necessary Per Protocol    Interpretation Summary    Left ventricular systolic function is normal. Calculated left ventricular EF = 55.1% Normal left ventricular cavity size noted. Left ventricular wall thickness is consistent with mild concentric hypertrophy. Left  ventricular diastolic function is consistent with (grade I) impaired relaxation.    The right ventricular cavity is mildly dilated. Normal right ventricular systolic function noted.    There is calcification of the aortic valve. The aortic valve appears trileaflet. Mild aortic valve regurgitation is present. No aortic valve stenosis is present.    Mitral annular calcification is present. Trace mitral valve regurgitation is present. No significant mitral valve stenosis is present.    The tricuspid valve is structurally normal with no stenosis present. Trace tricuspid valve regurgitation is present. Insufficient TR velocity profile to estimate the right ventricular systolic pressure.    Mild dilation of the sinuses of Valsalva is present. Mild dilation of the ascending aorta is present. Ascending aorta = 4.2 cm    Current medications:  Scheduled Meds:amLODIPine, 10 mg, Oral, Q24H  cyanocobalamin, 1,000 mcg, Intramuscular, Q28 Days  Divalproex Sodium, 500 mg, Oral, Q8H  [START ON 4/24/2024] donepezil, 10 mg, Oral, Nightly  folic acid, 1 mg, Oral, Daily  heparin (porcine), 5,000 Units, Subcutaneous, Q8H  insulin glargine, 15 Units, Subcutaneous, Q12H  insulin lispro, 2-7 Units, Subcutaneous, 4x Daily AC & at Bedtime  Insulin Lispro, 5 Units, Subcutaneous, TID With Meals  lisinopril, 20 mg, Oral, Daily  miconazole, 1 Application, Topical, Q12H  mirtazapine, 15 mg, Oral, Nightly  QUEtiapine, 100 mg, Oral, BID  QUEtiapine, 200 mg, Oral, Nightly  sodium chloride, 10 mL, Intravenous, Q12H  temazepam, 30 mg, Oral, Nightly      Continuous Infusions:   PRN Meds:.  acetaminophen **OR** acetaminophen **OR** acetaminophen    senna-docusate sodium **AND** polyethylene glycol **AND** bisacodyl **AND** bisacodyl    Calcium Replacement - Follow Nurse / BPA Driven Protocol    dextrose    dextrose    glucagon (human recombinant)    haloperidol    Magnesium Standard Dose Replacement - Follow Nurse / BPA Driven Protocol    melatonin     Phosphorus Replacement - Follow Nurse / BPA Driven Protocol    Potassium Replacement - Follow Nurse / BPA Driven Protocol    sodium chloride    sodium chloride    sodium chloride    ziprasidone    Assessment & Plan     Active Hospital Problems    Diagnosis  POA    **Agitation due to dementia [F03.911]  Yes    Agitation [R45.1]  Yes    Type 2 diabetes mellitus with hyperglycemia, with long-term current use of insulin [E11.65, Z79.4]  Not Applicable    Peripheral neuropathy [G62.9]  Yes    Hyperlipidemia [E78.5]  Yes    Benign prostatic hyperplasia [N40.0]  Yes    Benign essential HTN [I10]  Yes    Peripheral vascular disease [I73.9]  Yes    Coronary artery disease involving native coronary artery of native heart [I25.10]  Yes      Resolved Hospital Problems   No resolved problems to display.     Brief Hospital Course to date:  Too Tai is a 68yoM with PMH significant for advanced dementia, CAD, diabetes mellitus type 2, HLD and PVD. Admitted to [facility] ED on 4/18/24 for agitation / behavioral disturbances. Recently admitted to [facility] 2/24-3/4/24 for dementia with behavioral disturbances and again 3/27-4/4/24 for similar issues.      Advanced dementia complicated by behavioral disturbance with superimposed sleep disturbance   - Infectious work up unremarkable. UA reassuring. CXR without acute findings. CT head without acute findings  - Neuro following  - Still in restraints   - Continue home Aricept and Mirtazapine 15mg QHS   - AM doses of Depakote and Seroquel were delayed by a couple of hours, resulting in the need for PRN Geodon administration. Neurology has adjusted timing of medications  - Continue Depakote 500mg TID, Seroquel increased to 100mg BID and 200mg QHS today   - Continue Temazepam 30mg QHS  - May need geriatric psych admission - CM looking into this   - Not safe to return home at this time as he is a danger to himself and his wife   - Will ask palliative for assistance with goals of  care     Uncontrolled diabetes mellitus type 2  - Appears not on any home meds for DM  - A1c 9.5%  - Continue Lantus 15 units BID, Lispro 5 units TID AC + SSI     HTN, continue Amlodipine 10mg daily and Lisinopril 20mg daily. May need additional therapy     Expected Discharge Location and Transportation: TBD  Expected Discharge Expected Discharge Date: 2024; Expected Discharge Time:      DVT prophylaxis:Medical and mechanical DVT prophylaxis orders are present.    AM-PAC 6 Clicks Score (PT): 10 (24 0858)    CODE STATUS:   Code Status and Medical Interventions:   Ordered at: 24 1037     Medical Intervention Limits:    NO intubation (DNI)     Level Of Support Discussed With:    Next of Kin (If No Surrogate)     Code Status (Patient has no pulse and is not breathing):    No CPR (Do Not Attempt to Resuscitate)     Medical Interventions (Patient has pulse or is breathing):    Limited Support     Parisa Madrigal PA-C  24        Electronically signed by Cecily Bain DO at 24 1840       Meliza Kahn APRN at 24 0955           [facility] Neurology    Progress Note    Patient Name: Too Tai  : 1955  MRN: [id number]  Primary Care Physician:  Des Geller MD  Date of admission: 2024    Subjective     Chief Complaint: Dementia with behavioral disturbances    History of Present Illness   Patient seen resting comfortably in bed.,  Did not wake for exam.  He was in no distress and able to move all 4 extremities.  Still requiring 4-point restraints.  Patient did not receive Depakote or Seroquel this a.m. resulting in administration of as needed Geodon due to uncontrollable agitation.      Per bedside RN patient rested well throughout the night after receiving scheduled medication and as needed dose of Geodon.  He appears to be tolerating increased dose in her his Depakote.    Review of Systems   Unable to assess due to baseline mental status    Objective      Physical Exam  Vitals and nursing note reviewed.   Constitutional:       General: He is not in acute distress.     Appearance: He is not ill-appearing.   Cardiovascular:      Rate and Rhythm: Normal rate.   Pulmonary:      Effort: Pulmonary effort is normal.   Neurological:      Mental Status: He is lethargic.      Cranial Nerves: No facial asymmetry.      Sensory: No sensory deficit.      Motor: No weakness, tremor or seizure activity.      Comments: Able to move all 4 extremities          Vitals:   Temp:  [96.7 °F (35.9 °C)-97.4 °F (36.3 °C)] 96.7 °F (35.9 °C)  Heart Rate:  [97] 97  Resp:  [16-18] 16  BP: (165-179)/(70-80) 165/80    Current Medications    Current Facility-Administered Medications:     acetaminophen (TYLENOL) tablet 650 mg, 650 mg, Oral, Q4H PRN **OR** acetaminophen (TYLENOL) 160 MG/5ML oral solution 650 mg, 650 mg, Oral, Q4H PRN **OR** acetaminophen (TYLENOL) suppository 650 mg, 650 mg, Rectal, Q4H PRN, Traci, Nerissa G, DO    amLODIPine (NORVASC) tablet 10 mg, 10 mg, Oral, Q24H, Traci, Nerissa G, DO, 10 mg at 04/23/24 0859    sennosides-docusate (PERICOLACE) 8.6-50 MG per tablet 2 tablet, 2 tablet, Oral, BID PRN **AND** polyethylene glycol (MIRALAX) packet 17 g, 17 g, Oral, Daily PRN **AND** bisacodyl (DULCOLAX) EC tablet 5 mg, 5 mg, Oral, Daily PRN, 5 mg at 04/23/24 0859 **AND** bisacodyl (DULCOLAX) suppository 10 mg, 10 mg, Rectal, Daily PRN, Traci, Nerissa G, DO    Calcium Replacement - Follow Nurse / BPA Driven Protocol, , Does not apply, PRN, Traci, Nerissa G, DO    cyanocobalamin injection 1,000 mcg, 1,000 mcg, Intramuscular, Q28 Days, Darrell Jhaveri DO, 1,000 mcg at 04/19/24 1532    dextrose (D50W) (25 g/50 mL) IV injection 25 g, 25 g, Intravenous, Q15 Min PRN, Traci, Nerissa G, DO    dextrose (GLUTOSE) oral gel 15 g, 15 g, Oral, Q15 Min PRN, Traci, Nerissa G, DO    Divalproex Sodium (DEPAKOTE SPRINKLE) capsule 500 mg, 500 mg, Oral, Q8H, Meliza Kahn, APRN, 500 mg at 04/23/24 0900     donepezil (ARICEPT) tablet 22.5 mg, 22.5 mg, Oral, Daily, Kahn, April K, APRN, 22.5 mg at 04/23/24 0858    folic acid (FOLVITE) tablet 1 mg, 1 mg, Oral, Daily, Traci, Nerissa G, DO, 1 mg at 04/23/24 0859    glucagon (GLUCAGEN) injection 1 mg, 1 mg, Intramuscular, Q15 Min PRN, TraciTresa bacae G, DO    haloperidol (HALDOL) tablet 5 mg, 5 mg, Oral, 4x Daily PRN, TraciKamalaNerissa G, DO    heparin (porcine) 5000 UNIT/ML injection 5,000 Units, 5,000 Units, Subcutaneous, Q8H, Nerissa Aviles G, DO, 5,000 Units at 04/22/24 2035    insulin glargine (LANTUS, SEMGLEE) injection 15 Units, 15 Units, Subcutaneous, Q12H, Cecily Bain DO, 15 Units at 04/23/24 0858    Insulin Lispro (humaLOG) injection 2-7 Units, 2-7 Units, Subcutaneous, 4x Daily AC & at Bedtime, Tresa Avilese NUHA, DO, 3 Units at 04/23/24 0859    Insulin Lispro (humaLOG) injection 5 Units, 5 Units, Subcutaneous, TID With Meals, Parisa Madrigal PA-C, 5 Units at 04/23/24 0859    lisinopril (PRINIVIL,ZESTRIL) tablet 20 mg, 20 mg, Oral, Daily, Tresa Avilese G, DO, 20 mg at 04/23/24 0859    Magnesium Standard Dose Replacement - Follow Nurse / BPA Driven Protocol, , Does not apply, PRN, TraciKamala bacasie G, DO    melatonin tablet 5 mg, 5 mg, Oral, Nightly PRN, Traci, Nerissa G, DO    miconazole (MICOTIN) 2 % powder 1 Application, 1 Application, Topical, Q12H, Cecily Bain DO, 1 Application at 04/23/24 0902    mirtazapine (REMERON) tablet 15 mg, 15 mg, Oral, Nightly, Darrell Jhaveri, DO, 15 mg at 04/22/24 2036    Phosphorus Replacement - Follow Nurse / BPA Driven Protocol, , Does not apply, PRN, Nerissa Aviles, DO    Potassium Replacement - Follow Nurse / BPA Driven Protocol, , Does not apply, PRN, Nerissa Aviles, DO    QUEtiapine (SEROquel) tablet 100 mg, 100 mg, Oral, Q8H, Meliza Kahn, APRN, 100 mg at 04/23/24 0858    sodium chloride 0.9 % flush 10 mL, 10 mL, Intravenous, PRN, Nerissa Aviles,     sodium chloride 0.9 % flush 10 mL, 10 mL, Intravenous, Q12H,  Traci, Nerissa G, DO, 10 mL at 04/23/24 0902    sodium chloride 0.9 % flush 10 mL, 10 mL, Intravenous, PRN, Traci, Nerissa G, DO    sodium chloride 0.9 % infusion 40 mL, 40 mL, Intravenous, PRN, Traci, Nerissa G, DO, 40 mL at 04/23/24 0546    temazepam (RESTORIL) capsule 30 mg, 30 mg, Oral, Nightly, Cecily Bain, DO, 30 mg at 04/22/24 2043    ziprasidone (GEODON) injection 10 mg, 10 mg, Intramuscular, Q4H PRN, Kahn, April K, APRN, 10 mg at 04/23/24 0858    Laboratory Results:   Lab Results   Component Value Date    GLUCOSE 166 (H) 04/19/2024    CALCIUM 9.9 04/19/2024     04/19/2024    K 4.4 04/19/2024    CO2 30.0 (H) 04/19/2024     04/19/2024    BUN 16 04/19/2024    CREATININE 0.82 04/19/2024    EGFRIFAFRI 102 02/21/2022    EGFRIFNONA 88 02/21/2022    BCR 19.5 04/19/2024    ANIONGAP 8.0 04/19/2024     Lab Results   Component Value Date    WBC 10.47 04/19/2024    HGB 15.5 04/19/2024    HCT 46.7 04/19/2024    MCV 92.7 04/19/2024     04/19/2024     Lab Results   Component Value Date    CHOL 220 (H) 01/08/2024    CHOL 120 08/01/2019    CHOL 117 04/15/2019     Lab Results   Component Value Date    HDL 41 01/08/2024    HDL 37 (L) 08/03/2023    HDL 41 04/03/2023     Lab Results   Component Value Date     (H) 01/08/2024     (H) 08/03/2023     (H) 04/03/2023     Lab Results   Component Value Date    TRIG 108 01/08/2024    TRIG 208 (H) 08/03/2023    TRIG 138 04/03/2023     Lab Results   Component Value Date    HGBA1C 9.50 (H) 04/18/2024     Lab Results   Component Value Date    INR 1.1 11/25/2019    INR 1.1 09/10/2018    PROTIME 12.5 11/25/2019    PROTIME 13.3 09/10/2018     Lab Results   Component Value Date    FOLATE 10.20 02/24/2024     Lab Results   Component Value Date    [id number] 384 04/18/2024             Assessment / Plan   Brief Patient Summary:  Too Tai is a 68 y.o. male with a past medical history of advanced dementia with behavioral disturbances and nonverbal, CAD,  T2DM, HLD, PVD who presented to [facility] ED due to increase in agitation.  Patient has had multiple recent hospitalizations.      Plan:   Advanced dementia with behavioral disturbances  Sleep Deprivation   Continue home Aricept 23 mg daily  Continue Depakote 500 mg 3 times daily  VPA 60.8  Continue home Remeron 15 mg nightly  Increase Seroquel 100 mg/100 mg / 200 mg  QTc 456 this AM; repeat EKG in a.m. for QTc assessment  Continue Restoril 30 mg nightly, patient has been on this dose for approximately 10 days post last hospitalization.  Geodon as needed for extreme agitation; last dose  at 0858  CT head negative for acute abnormality  Vitamin B12 384, IM cyanocobalamin 1000 mcg  Patient currently requiring 4-point restraints  Case management following for difficult posthospitalization disposition.  Patient is not safe to return home due to concern of being in danger for his wife and himself.   Palliative consulted, appreciate recommendations.   General neurology will continue to follow    I have discussed the above with the patient, bedside RN Dr. Bain  Time spent with patient: 50 minutes in face-to-face evaluation and management of the patient.    Copied text in this note has been reviewed and is accurate as of 24.     IVONNE Santos              Electronically signed by Meliza Kahn APRN at 24 1127       Parisa Madrigal PA-C at 24 1405       Attestation signed by Cecily Bain DO at 24 1607    I have reviewed this documentation and agree.                      [facility] Medicine Services  PROGRESS NOTE    Patient Name: Too Tai  : 1955  MRN: [id number]    Date of Admission: 2024  Primary Care Physician: Des Geller MD    Subjective     CC: f/u dementia with agitation    HPI:  In bed. Wife reports he woke this morning and she helped feed him breakfast. Placed in 4 point restraints later this morning due to worsening  agitation     Objective     Vital Signs:   Temp:  [97.1 °F (36.2 °C)] 97.1 °F (36.2 °C)  Heart Rate:  [99] 99  Resp:  [18] 18  BP: (149-163)/(73-78) 163/78     Physical Exam:  Constitutional: No acute distress. Asleep - did not wake for exam   HENT: NCAT, mucous membranes moist  Respiratory: Clear to auscultation bilaterally, respiratory effort normal on room air   Cardiovascular: RRR, no murmurs, rubs, or gallops  Gastrointestinal: Positive bowel sounds, soft, nontender, nondistended  Musculoskeletal: No bilateral ankle edema  Psychiatric: Currently calm   Neurologic: No focal deficits. Speech clear and coherent     Results Reviewed:  LAB RESULTS:      Lab 04/19/24  0829 04/18/24  2356   WBC 10.47 9.87   HEMOGLOBIN 15.5 13.8   HEMATOCRIT 46.7 42.2   PLATELETS 307 292   NEUTROS ABS 4.74 4.68   IMMATURE GRANS (ABS) 0.04 0.05   LYMPHS ABS 3.52* 3.34*   MONOS ABS 1.33* 1.14*   EOS ABS 0.69* 0.56*   MCV 92.7 95.3         Lab 04/19/24  0829 04/18/24  2356   SODIUM 144 142   POTASSIUM 4.4 4.9   CHLORIDE 106 104   CO2 30.0* 28.0   ANION GAP 8.0 10.0   BUN 16 22   CREATININE 0.82 1.12   EGFR 96.3 72.0   GLUCOSE 166* 226*   CALCIUM 9.9 9.4   MAGNESIUM 1.9 1.8   PHOSPHORUS 3.4  --    HEMOGLOBIN A1C  --  9.50*   TSH  --  3.220         Lab 04/18/24  2356   TOTAL PROTEIN 6.9   ALBUMIN 3.9   GLOBULIN 3.0   ALT (SGPT) 13   AST (SGOT) 17   BILIRUBIN 0.3   ALK PHOS 120*         Lab 04/18/24  2356   HSTROP T 22*         Lab 04/18/24  2356   VITAMIN B 12 384     Brief Urine Lab Results  (Last result in the past 365 days)        Color   Clarity   Blood   Leuk Est   Nitrite   Protein   CREAT   Urine HCG        04/19/24 0243 Yellow   Clear   Negative   Negative   Negative   Negative                 Microbiology Results Abnormal       None          No radiology results from the last 24 hrs    Results for orders placed during the hospital encounter of 03/27/24    Adult Transthoracic Echo Complete W/ Cont if Necessary Per  Protocol    Interpretation Summary    Left ventricular systolic function is normal. Calculated left ventricular EF = 55.1% Normal left ventricular cavity size noted. Left ventricular wall thickness is consistent with mild concentric hypertrophy. Left ventricular diastolic function is consistent with (grade I) impaired relaxation.    The right ventricular cavity is mildly dilated. Normal right ventricular systolic function noted.    There is calcification of the aortic valve. The aortic valve appears trileaflet. Mild aortic valve regurgitation is present. No aortic valve stenosis is present.    Mitral annular calcification is present. Trace mitral valve regurgitation is present. No significant mitral valve stenosis is present.    The tricuspid valve is structurally normal with no stenosis present. Trace tricuspid valve regurgitation is present. Insufficient TR velocity profile to estimate the right ventricular systolic pressure.    Mild dilation of the sinuses of Valsalva is present. Mild dilation of the ascending aorta is present. Ascending aorta = 4.2 cm    Current medications:  Scheduled Meds:amLODIPine, 10 mg, Oral, Q24H  cyanocobalamin, 1,000 mcg, Intramuscular, Q28 Days  Divalproex Sodium, 500 mg, Oral, Q8H  donepezil, 22.5 mg, Oral, Daily  folic acid, 1 mg, Oral, Daily  heparin (porcine), 5,000 Units, Subcutaneous, Q8H  insulin glargine, 15 Units, Subcutaneous, Q12H  insulin lispro, 2-7 Units, Subcutaneous, 4x Daily AC & at Bedtime  lisinopril, 20 mg, Oral, Daily  mirtazapine, 15 mg, Oral, Nightly  QUEtiapine, 100 mg, Oral, Q8H  sodium chloride, 10 mL, Intravenous, Q12H  temazepam, 30 mg, Oral, Nightly      Continuous Infusions:   PRN Meds:.  acetaminophen **OR** acetaminophen **OR** acetaminophen    senna-docusate sodium **AND** polyethylene glycol **AND** bisacodyl **AND** bisacodyl    Calcium Replacement - Follow Nurse / BPA Driven Protocol    dextrose    dextrose    glucagon (human recombinant)     haloperidol    Magnesium Standard Dose Replacement - Follow Nurse / BPA Driven Protocol    melatonin    Phosphorus Replacement - Follow Nurse / BPA Driven Protocol    Potassium Replacement - Follow Nurse / BPA Driven Protocol    sodium chloride    sodium chloride    sodium chloride    ziprasidone    Assessment & Plan     Active Hospital Problems    Diagnosis  POA    **Agitation due to dementia [F03.911]  Yes    Agitation [R45.1]  Yes    Type 2 diabetes mellitus with hyperglycemia, with long-term current use of insulin [E11.65, Z79.4]  Not Applicable    Peripheral neuropathy [G62.9]  Yes    Hyperlipidemia [E78.5]  Yes    Benign prostatic hyperplasia [N40.0]  Yes    Benign essential HTN [I10]  Yes    Peripheral vascular disease [I73.9]  Yes    Coronary artery disease involving native coronary artery of native heart [I25.10]  Yes      Resolved Hospital Problems   No resolved problems to display.     Brief Hospital Course to date:  Too Tai is a 68yoM with PMH significant for advanced dementia, CAD, diabetes mellitus type 2, HLD and PVD. Admitted to [facility] ED on 4/18/24 for agitation / behavioral disturbances.      Severe dementia complicated by behavioral disturbance  - Infectious work up unremarkable. UA reassuring. CXR without acute findings. CT head without acute findings  - Neuro following  - Still in restraints   - Continue home Aricept and Mirtazapine 15mg QHS   - Depakote increased to 500mg TID and Seroquel increased to 100mg TID today  - Continue Temazepam 30mg QHS  - May need geriatric psych admission - CM looking into this     Uncontrolled Diabetes mellitus type 2  - Appears not on any home meds for DM  - A1c 9.5%  - Continue Lantus 15 units BID, add Lispro 5 units TID AC, continue SSI     HTN, continue Amlodipine 10mg daily     Expected Discharge Location and Transportation: TBD  Expected Discharge Expected Discharge Date: 4/26/2024; Expected Discharge Time:      DVT  prophylaxis:Medical and mechanical DVT prophylaxis orders are present.    AM-PAC 6 Clicks Score (PT): 12 (24 0807)    CODE STATUS:   Code Status and Medical Interventions:   Ordered at: 24 1037     Medical Intervention Limits:    NO intubation (DNI)     Level Of Support Discussed With:    Next of Kin (If No Surrogate)     Code Status (Patient has no pulse and is not breathing):    No CPR (Do Not Attempt to Resuscitate)     Medical Interventions (Patient has pulse or is breathing):    Limited Support     Parisa Madrigal PA-C  24        Electronically signed by Cecily Bain DO at 24 1607       Meliza Kahn APRN at 24 0918           [facility] Neurology    Progress Note    Patient Name: Too Tai  : 1955  MRN: [id number]  Primary Care Physician:  Des Geller MD  Date of admission: 2024    Subjective     Chief Complaint: Dementia with behavioral disturbances    History of Present Illness   Patient seen resting comfortably in bed.  Still requiring 2-point restraints and as needed medications for agitation.  Per bedside RN patient responds well to Seroquel.  Patient rested intermittently throughout night.    Review of Systems   Unable to assess due to mental status    Objective     Physical Exam  Vitals reviewed.   Constitutional:       General: He is not in acute distress.     Appearance: He is not ill-appearing.   Eyes:      General: No visual field deficit.     Extraocular Movements: Extraocular movements intact.      Pupils: Pupils are equal, round, and reactive to light.      Comments: No nystagmus or deviated gaze noted   Cardiovascular:      Rate and Rhythm: Normal rate.   Pulmonary:      Effort: Pulmonary effort is normal.   Neurological:      Mental Status: He is alert. Mental status is at baseline. He is disoriented.      Cranial Nerves: No cranial nerve deficit or facial asymmetry.      Sensory: No sensory deficit.      Motor: No  weakness, tremor or seizure activity.          Vitals:   Temp:  [97.1 °F (36.2 °C)] 97.1 °F (36.2 °C)  Heart Rate:  [99] 99  Resp:  [18] 18  BP: (149-163)/(73-78) 163/78    Current Medications    Current Facility-Administered Medications:     acetaminophen (TYLENOL) tablet 650 mg, 650 mg, Oral, Q4H PRN **OR** acetaminophen (TYLENOL) 160 MG/5ML oral solution 650 mg, 650 mg, Oral, Q4H PRN **OR** acetaminophen (TYLENOL) suppository 650 mg, 650 mg, Rectal, Q4H PRN, Traci, Nerissa G, DO    amLODIPine (NORVASC) tablet 10 mg, 10 mg, Oral, Q24H, Traci, Nerissa G, DO, 10 mg at 04/22/24 0808    sennosides-docusate (PERICOLACE) 8.6-50 MG per tablet 2 tablet, 2 tablet, Oral, BID PRN **AND** polyethylene glycol (MIRALAX) packet 17 g, 17 g, Oral, Daily PRN **AND** bisacodyl (DULCOLAX) EC tablet 5 mg, 5 mg, Oral, Daily PRN **AND** bisacodyl (DULCOLAX) suppository 10 mg, 10 mg, Rectal, Daily PRN, Traci, Nerissa G, DO    Calcium Replacement - Follow Nurse / BPA Driven Protocol, , Does not apply, PRN, Traci, Nerissa G, DO    cyanocobalamin injection 1,000 mcg, 1,000 mcg, Intramuscular, Q28 Days, Darrell Jhaveri, DO, 1,000 mcg at 04/19/24 1532    dextrose (D50W) (25 g/50 mL) IV injection 25 g, 25 g, Intravenous, Q15 Min PRN, Traci, Nerissa G, DO    dextrose (GLUTOSE) oral gel 15 g, 15 g, Oral, Q15 Min PRN, Traci, Nerissa G, DO    Divalproex Sodium (DEPAKOTE SPRINKLE) capsule 250 mg, 250 mg, Oral, Q8H, Traci, Nerissa G, DO, 250 mg at 04/22/24 0810    donepezil (ARICEPT) tablet 22.5 mg, 22.5 mg, Oral, Daily, Meliza Kahn K, APRN, 22.5 mg at 04/22/24 0807    folic acid (FOLVITE) tablet 1 mg, 1 mg, Oral, Daily, Traci, Nerissa G, DO, 1 mg at 04/22/24 0808    glucagon (GLUCAGEN) injection 1 mg, 1 mg, Intramuscular, Q15 Min PRN, Traci, Nerissa G, DO    haloperidol (HALDOL) tablet 5 mg, 5 mg, Oral, 4x Daily PRN, Traci, Nerissa G, DO    heparin (porcine) 5000 UNIT/ML injection 5,000 Units, 5,000 Units, Subcutaneous, Q8H, Traci, Nerissa G, DO, 5,000 Units  at 04/21/24 2044    insulin glargine (LANTUS, SEMGLEE) injection 15 Units, 15 Units, Subcutaneous, Q12H, Cecily Bain, DO, 15 Units at 04/22/24 0809    Insulin Lispro (humaLOG) injection 2-7 Units, 2-7 Units, Subcutaneous, 4x Daily AC & at Bedtime, Kamala Avilessie G, DO, 2 Units at 04/22/24 0809    lisinopril (PRINIVIL,ZESTRIL) tablet 20 mg, 20 mg, Oral, Daily, TraciTresa bacae G, DO, 20 mg at 04/22/24 0808    Magnesium Standard Dose Replacement - Follow Nurse / BPA Driven Protocol, , Does not apply, PRN, Traci, Nerissa G, DO    melatonin tablet 5 mg, 5 mg, Oral, Nightly PRN, Traci, Nerissa G, DO    mirtazapine (REMERON) tablet 15 mg, 15 mg, Oral, Nightly, Darrell Jhaveri, DO, 15 mg at 04/21/24 2022    Phosphorus Replacement - Follow Nurse / BPA Driven Protocol, , Does not apply, PRN, Traci, Nerissa G, DO    Potassium Replacement - Follow Nurse / BPA Driven Protocol, , Does not apply, PRN, Traci, Nerissa G, DO    QUEtiapine (SEROquel) tablet 100 mg, 100 mg, Oral, Q12H, Meliza Kahn K, APRN, 100 mg at 04/22/24 0808    sodium chloride 0.9 % flush 10 mL, 10 mL, Intravenous, PRN, Traci, Nerissa G, DO    sodium chloride 0.9 % flush 10 mL, 10 mL, Intravenous, Q12H, Traci, Nerissa G, DO, 10 mL at 04/19/24 1108    sodium chloride 0.9 % flush 10 mL, 10 mL, Intravenous, PRN, Traci, Nerissa G, DO    sodium chloride 0.9 % infusion 40 mL, 40 mL, Intravenous, PRN, Traci, Nerissa G, DO    temazepam (RESTORIL) capsule 30 mg, 30 mg, Oral, Nightly, Cecily Bain, DO, 30 mg at 04/21/24 2023    ziprasidone (GEODON) injection 10 mg, 10 mg, Intramuscular, Q4H PRN, Meliza Kahn K, APRN, 10 mg at 04/22/24 0643    Laboratory Results:   Lab Results   Component Value Date    GLUCOSE 166 (H) 04/19/2024    CALCIUM 9.9 04/19/2024     04/19/2024    K 4.4 04/19/2024    CO2 30.0 (H) 04/19/2024     04/19/2024    BUN 16 04/19/2024    CREATININE 0.82 04/19/2024    EGFRIFAFRI 102 02/21/2022    EGFRIFNONA 88 02/21/2022    BCR 19.5 04/19/2024     ANIONGAP 8.0 04/19/2024     Lab Results   Component Value Date    WBC 10.47 04/19/2024    HGB 15.5 04/19/2024    HCT 46.7 04/19/2024    MCV 92.7 04/19/2024     04/19/2024     Lab Results   Component Value Date    CHOL 220 (H) 01/08/2024    CHOL 120 08/01/2019    CHOL 117 04/15/2019     Lab Results   Component Value Date    HDL 41 01/08/2024    HDL 37 (L) 08/03/2023    HDL 41 04/03/2023     Lab Results   Component Value Date     (H) 01/08/2024     (H) 08/03/2023     (H) 04/03/2023     Lab Results   Component Value Date    TRIG 108 01/08/2024    TRIG 208 (H) 08/03/2023    TRIG 138 04/03/2023     Lab Results   Component Value Date    HGBA1C 9.50 (H) 04/18/2024     Lab Results   Component Value Date    INR 1.1 11/25/2019    INR 1.1 09/10/2018    PROTIME 12.5 11/25/2019    PROTIME 13.3 09/10/2018     Lab Results   Component Value Date    FOLATE 10.20 02/24/2024     Lab Results   Component Value Date    [id number] 384 04/18/2024             Assessment / Plan   Brief Patient Summary:  Too Tai is a 68 y.o. male with a past medical history of advanced dementia with behavioral disturbances and nonverbal, CAD, T2DM, HLD, PVD who presented to [facility] ED due to increase in agitation.  Patient has had multiple recent hospitalizations.      Plan:   Advanced dementia with behavioral disturbances  Sleep Deprivation   Continue home Aricept 23 mg daily  Increase Depakote 500 mg 3 times daily  VPA in a.m.  Continue home Remeron 15 mg nightly  Increase Seroquel 100 mg 3 times daily  QTc 478, repeat QTc in a.m.  Continue Restoril 30 mg nightly, patient has been on this dose for approximately 10 days post last hospitalization.  Geodon as needed for extreme agitation; last dose 4/22 at 6432  CT head negative for acute abnormality  Vitamin B12 384, IM cyanocobalamin 1000 mcg  Patient currently requiring 2-point restraints  Case management following for difficult posthospitalization disposition.  Patient is not  safe to return home due to concern of being in danger for his wife and himself.   General neurology will continue to follow    I have discussed the above with the patient, bedside RN and Dr. Bain  Time spent with patient: 50 minutes in face-to-face evaluation and management of the patient.      IVONNE Santos              Electronically signed by Meliza Kahn APRN at 24 1334       Cecily Bain DO at 24 1432              [facility] Medicine Services  PROGRESS NOTE    Patient Name: Too Tai  : 1955  MRN: [id number]    Date of Admission: 2024  Primary Care Physician: Des Geller MD    Subjective   Subjective     CC:  agitation    HPI:  No acute events. Sleeping. Received geodon last night at 11.       Objective   Objective     Vital Signs:   Temp:  [97.5 °F (36.4 °C)] 97.5 °F (36.4 °C)  Resp:  [18] 18  BP: (154-155)/(76-83) 155/76     Physical Exam:  Constitutional: sleeping  HENT: NCAT, mucous membranes moist  Respiratory: Clear to auscultation bilaterally, respiratory effort normal   Cardiovascular: RRR, no murmurs, rubs, or gallops  Gastrointestinal: Positive bowel sounds, soft, nontender, nondistended  Musculoskeletal: No bilateral ankle edema  Psychiatric: currently calm  Neurologic: strength symmetric in all extremities, Cranial Nerves grossly intact to confrontation  Skin: No rashes      Results Reviewed:  LAB RESULTS:      Lab 24  0829 24  2356   WBC 10.47 9.87   HEMOGLOBIN 15.5 13.8   HEMATOCRIT 46.7 42.2   PLATELETS 307 292   NEUTROS ABS 4.74 4.68   IMMATURE GRANS (ABS) 0.04 0.05   LYMPHS ABS 3.52* 3.34*   MONOS ABS 1.33* 1.14*   EOS ABS 0.69* 0.56*   MCV 92.7 95.3         Lab 24  0829 24  2356   SODIUM 144 142   POTASSIUM 4.4 4.9   CHLORIDE 106 104   CO2 30.0* 28.0   ANION GAP 8.0 10.0   BUN 16 22   CREATININE 0.82 1.12   EGFR 96.3 72.0   GLUCOSE 166* 226*   CALCIUM 9.9 9.4   MAGNESIUM 1.9 1.8   PHOSPHORUS  3.4  --    HEMOGLOBIN A1C  --  9.50*   TSH  --  3.220         Lab 04/18/24  2356   TOTAL PROTEIN 6.9   ALBUMIN 3.9   GLOBULIN 3.0   ALT (SGPT) 13   AST (SGOT) 17   BILIRUBIN 0.3   ALK PHOS 120*         Lab 04/18/24  2356   HSTROP T 22*             Lab 04/18/24  2356   VITAMIN B 12 384         Brief Urine Lab Results  (Last result in the past 365 days)        Color   Clarity   Blood   Leuk Est   Nitrite   Protein   CREAT   Urine HCG        04/19/24 0243 Yellow   Clear   Negative   Negative   Negative   Negative                   Microbiology Results Abnormal       None            No radiology results from the last 24 hrs    Results for orders placed during the hospital encounter of 03/27/24    Adult Transthoracic Echo Complete W/ Cont if Necessary Per Protocol    Interpretation Summary    Left ventricular systolic function is normal. Calculated left ventricular EF = 55.1% Normal left ventricular cavity size noted. Left ventricular wall thickness is consistent with mild concentric hypertrophy. Left ventricular diastolic function is consistent with (grade I) impaired relaxation.    The right ventricular cavity is mildly dilated. Normal right ventricular systolic function noted.    There is calcification of the aortic valve. The aortic valve appears trileaflet. Mild aortic valve regurgitation is present. No aortic valve stenosis is present.    Mitral annular calcification is present. Trace mitral valve regurgitation is present. No significant mitral valve stenosis is present.    The tricuspid valve is structurally normal with no stenosis present. Trace tricuspid valve regurgitation is present. Insufficient TR velocity profile to estimate the right ventricular systolic pressure.    Mild dilation of the sinuses of Valsalva is present. Mild dilation of the ascending aorta is present. Ascending aorta = 4.2 cm      Current medications:  Scheduled Meds:amLODIPine, 10 mg, Oral, Q24H  cyanocobalamin, 1,000 mcg, Intramuscular,  Q28 Days  Divalproex Sodium, 250 mg, Oral, Q8H  donepezil, 22.5 mg, Oral, Daily  folic acid, 1 mg, Oral, Daily  heparin (porcine), 5,000 Units, Subcutaneous, Q8H  insulin glargine, 10 Units, Subcutaneous, Q12H  insulin lispro, 2-7 Units, Subcutaneous, 4x Daily AC & at Bedtime  lisinopril, 20 mg, Oral, Daily  mirtazapine, 15 mg, Oral, Nightly  QUEtiapine, 100 mg, Oral, Q12H  sodium chloride, 10 mL, Intravenous, Q12H      Continuous Infusions:   PRN Meds:.  acetaminophen **OR** acetaminophen **OR** acetaminophen    senna-docusate sodium **AND** polyethylene glycol **AND** bisacodyl **AND** bisacodyl    Calcium Replacement - Follow Nurse / BPA Driven Protocol    dextrose    dextrose    glucagon (human recombinant)    haloperidol    Magnesium Standard Dose Replacement - Follow Nurse / BPA Driven Protocol    melatonin    Phosphorus Replacement - Follow Nurse / BPA Driven Protocol    Potassium Replacement - Follow Nurse / BPA Driven Protocol    sodium chloride    sodium chloride    sodium chloride    temazepam    ziprasidone    Assessment & Plan   Assessment & Plan     Active Hospital Problems    Diagnosis  POA    **Agitation due to dementia [F03.911]  Yes    Agitation [R45.1]  Yes    Type 2 diabetes mellitus with hyperglycemia, with long-term current use of insulin [E11.65, Z79.4]  Not Applicable    Peripheral neuropathy [G62.9]  Yes    Hyperlipidemia [E78.5]  Yes    Benign prostatic hyperplasia [N40.0]  Yes    Benign essential HTN [I10]  Yes    Peripheral vascular disease [I73.9]  Yes    Coronary artery disease involving native coronary artery of native heart [I25.10]  Yes      Resolved Hospital Problems   No resolved problems to display.        Brief Hospital Course to date:  Too Tai is a 68 y.o. male with a PMH significant for advanced dementia, CAD, diabetes mellitus type 2, HLD, PVD who comes to the ED due to agitation.       Severe dementia complicated by behavioral disturbance  - UA negative; CT head without  acute findings; CXR without acute findings  - Neuro consulted   -Continue home Aricept; continue home Depakote; continue Remeron 50 mg nightly  -Changed to Seroquel 100 mg twice daily  - schedule temazepam 30 mg qHS  -As needed Geodon  -Currently in restraints for patient and staff safety. Wean off as able  -May need Pat psych on discharge. Will discuss with CM      Diabetes mellitus type 2  - No active home meds  - SSI with Accu-Cheks  - Hemoglobin A1c 9.5     HTN  PVD  CAD  - Continue home meds    Expected Discharge Location and Transportation: TBD  Expected Discharge   Expected Discharge Date: 2024; Expected Discharge Time:      DVT prophylaxis:  Medical and mechanical DVT prophylaxis orders are present.         AM-PAC 6 Clicks Score (PT): 13 (24)    CODE STATUS:   Code Status and Medical Interventions:   Ordered at: 24 1037     Medical Intervention Limits:    NO intubation (DNI)     Level Of Support Discussed With:    Next of Kin (If No Surrogate)     Code Status (Patient has no pulse and is not breathing):    No CPR (Do Not Attempt to Resuscitate)     Medical Interventions (Patient has pulse or is breathing):    Limited Support       Cecily Bain DO  24        Electronically signed by Cecily Bain DO at 24 1439       Cecily Bain DO at 24 1501              [facility] Medicine Services  PROGRESS NOTE    Patient Name: Too Tai  : 1955  MRN: [id number]    Date of Admission: 2024  Primary Care Physician: Des Geller MD    Subjective   Subjective     CC:  AMS; agitation    HPI:  Likely no change.  Sleeping this morning.  Remains in restraints.  Wife at bedside and updated.       Objective   Objective     Vital Signs:   Temp:  [96.8 °F (36 °C)-97.5 °F (36.4 °C)] 97.5 °F (36.4 °C)  Heart Rate:  [101] 101  Resp:  [18-19] 18  BP: (138-175)/(70-91) 154/83     Physical Exam:  Constitutional: No acute distress, sleeping,  in restraints   HENT: NCAT, mucous membranes moist  Respiratory: Clear to auscultation bilaterally, respiratory effort normal   Cardiovascular: sinus rhythm  Musculoskeletal: No bilateral ankle edema  Psychiatric: sleeping  Neurologic: moving all ext  Skin: No rashes    Results Reviewed:  LAB RESULTS:      Lab 04/19/24  0829 04/18/24  2356   WBC 10.47 9.87   HEMOGLOBIN 15.5 13.8   HEMATOCRIT 46.7 42.2   PLATELETS 307 292   NEUTROS ABS 4.74 4.68   IMMATURE GRANS (ABS) 0.04 0.05   LYMPHS ABS 3.52* 3.34*   MONOS ABS 1.33* 1.14*   EOS ABS 0.69* 0.56*   MCV 92.7 95.3         Lab 04/19/24  0829 04/18/24  2356   SODIUM 144 142   POTASSIUM 4.4 4.9   CHLORIDE 106 104   CO2 30.0* 28.0   ANION GAP 8.0 10.0   BUN 16 22   CREATININE 0.82 1.12   EGFR 96.3 72.0   GLUCOSE 166* 226*   CALCIUM 9.9 9.4   MAGNESIUM 1.9 1.8   PHOSPHORUS 3.4  --    HEMOGLOBIN A1C  --  9.50*   TSH  --  3.220         Lab 04/18/24  2356   TOTAL PROTEIN 6.9   ALBUMIN 3.9   GLOBULIN 3.0   ALT (SGPT) 13   AST (SGOT) 17   BILIRUBIN 0.3   ALK PHOS 120*         Lab 04/18/24  2356   HSTROP T 22*             Lab 04/18/24  2356   VITAMIN B 12 384         Brief Urine Lab Results  (Last result in the past 365 days)        Color   Clarity   Blood   Leuk Est   Nitrite   Protein   CREAT   Urine HCG        04/19/24 0243 Yellow   Clear   Negative   Negative   Negative   Negative                   Microbiology Results Abnormal       None            CT Head Without Contrast    Result Date: 4/19/2024  CT HEAD WO CONTRAST Date of Exam: 4/19/2024 2:11 AM EDT Indication: Mental status change, unknown cause. Comparison: 3/28/2024. Technique: Axial CT images were obtained of the head without contrast administration.  Automated exposure control and iterative construction methods were used. Findings: There is no acute intracranial hemorrhage. No mass effect, midline shift or abnormal extra-axial collection. There is stable moderate patchy periventricular white matter  hypoattenuation. Mild age-appropriate generalized parenchymal volume loss. No new hypoattenuating lesions in the brain. Cortical gray-white differentiation appears intact. The orbits are unremarkable. Mastoids and paranasal sinuses appear well aerated.     Impression: Impression: 1. No acute intracranial abnormality. 2. Moderate chronic small vessel ischemic change. Electronically Signed: Reg Pelaez MD  4/19/2024 2:29 AM EDT  Workstation ID: [id number]    XR Chest 1 View    Result Date: 4/19/2024  XR CHEST 1 VW Date of Exam: 4/19/2024 12:24 AM EDT Indication: Weak/Dizzy/AMS triage protocol Comparison: 3/27/2024. Findings: There is evidence of prior median sternotomy and CABG. Cardiac silhouette appears unchanged. Pulmonary vasculature is within normal limits. Evaluation of the left lung base is limited due to lordotic view and positioning. No definite lung consolidation. No pneumothorax or pleural effusion.     Impression: Impression: Limited study demonstrates no acute abnormality in the lungs. Electronically Signed: Reg Pelaez MD  4/19/2024 1:29 AM EDT  Workstation ID: [id number]     Results for orders placed during the hospital encounter of 03/27/24    Adult Transthoracic Echo Complete W/ Cont if Necessary Per Protocol    Interpretation Summary    Left ventricular systolic function is normal. Calculated left ventricular EF = 55.1% Normal left ventricular cavity size noted. Left ventricular wall thickness is consistent with mild concentric hypertrophy. Left ventricular diastolic function is consistent with (grade I) impaired relaxation.    The right ventricular cavity is mildly dilated. Normal right ventricular systolic function noted.    There is calcification of the aortic valve. The aortic valve appears trileaflet. Mild aortic valve regurgitation is present. No aortic valve stenosis is present.    Mitral annular calcification is present. Trace mitral valve regurgitation is present. No significant mitral  valve stenosis is present.    The tricuspid valve is structurally normal with no stenosis present. Trace tricuspid valve regurgitation is present. Insufficient TR velocity profile to estimate the right ventricular systolic pressure.    Mild dilation of the sinuses of Valsalva is present. Mild dilation of the ascending aorta is present. Ascending aorta = 4.2 cm      Current medications:  Scheduled Meds:amLODIPine, 10 mg, Oral, Q24H  cyanocobalamin, 1,000 mcg, Intramuscular, Q28 Days  Divalproex Sodium, 250 mg, Oral, Q8H  donepezil, 22.5 mg, Oral, Daily  folic acid, 1 mg, Oral, Daily  heparin (porcine), 5,000 Units, Subcutaneous, Q8H  insulin glargine, 10 Units, Subcutaneous, Q12H  insulin lispro, 2-7 Units, Subcutaneous, 4x Daily AC & at Bedtime  lisinopril, 20 mg, Oral, Daily  mirtazapine, 15 mg, Oral, Nightly  QUEtiapine, 100 mg, Oral, Q12H  sodium chloride, 10 mL, Intravenous, Q12H      Continuous Infusions:   PRN Meds:.  acetaminophen **OR** acetaminophen **OR** acetaminophen    senna-docusate sodium **AND** polyethylene glycol **AND** bisacodyl **AND** bisacodyl    Calcium Replacement - Follow Nurse / BPA Driven Protocol    dextrose    dextrose    glucagon (human recombinant)    haloperidol    Magnesium Standard Dose Replacement - Follow Nurse / BPA Driven Protocol    melatonin    Phosphorus Replacement - Follow Nurse / BPA Driven Protocol    Potassium Replacement - Follow Nurse / BPA Driven Protocol    sodium chloride    sodium chloride    sodium chloride    temazepam    ziprasidone    Assessment & Plan   Assessment & Plan     Active Hospital Problems    Diagnosis  POA    **Agitation due to dementia [F03.911]  Yes    Agitation [R45.1]  Yes    Type 2 diabetes mellitus with hyperglycemia, with long-term current use of insulin [E11.65, Z79.4]  Not Applicable    Peripheral neuropathy [G62.9]  Yes    Hyperlipidemia [E78.5]  Yes    Benign prostatic hyperplasia [N40.0]  Yes    Benign essential HTN [I10]  Yes     Peripheral vascular disease [I73.9]  Yes    Coronary artery disease involving native coronary artery of native heart [I25.10]  Yes      Resolved Hospital Problems   No resolved problems to display.        Brief Hospital Course to date:  Too Tai is a 68 y.o. male with a PMH significant for advanced dementia, CAD, diabetes mellitus type 2, HLD, PVD who comes to the ED due to agitation.       Severe dementia complicated by behavioral disturbance  - UA negative; CT head without acute findings; CXR without acute findings  - Neuro consulted   -Continue home Aricept; continue home Depakote; continue Remeron 50 mg nightly  -Changed to Seroquel 100 mg twice daily  -Continue Restoril 30 mg nightly  -As needed Geodon  -Currently in restraints for patient and staff safety  -May need Pat psych on discharge. Will discuss with CM      Diabetes mellitus type 2  - No active home meds  - SSI with Accu-Cheks  - Hemoglobin A1c 9.5     HTN  PVD  CAD  - Continue home meds    Expected Discharge Location and Transportation: TBD  Expected Discharge   Expected Discharge Date: 4/21/2024; Expected Discharge Time:      DVT prophylaxis:  Medical and mechanical DVT prophylaxis orders are present.         AM-PAC 6 Clicks Score (PT): 13 (04/20/24 1417)    CODE STATUS:   Code Status and Medical Interventions:   Ordered at: 04/19/24 1037     Medical Intervention Limits:    NO intubation (DNI)     Level Of Support Discussed With:    Next of Kin (If No Surrogate)     Code Status (Patient has no pulse and is not breathing):    No CPR (Do Not Attempt to Resuscitate)     Medical Interventions (Patient has pulse or is breathing):    Limited Support       Cecily Bain DO  04/20/24        Electronically signed by Cecily Bain DO at 04/20/24 1503       Cecily Bain DO at 04/19/24 1402                [facility] Medicine Services  ADMISSION FOLLOW-UP NOTE          Patient admitted after midnight, H&P by my partner  performed earlier on today's date reviewed.  Interim findings, labs, and charting also reviewed.        [facility] Hospital Problem List has been managed and updated to include any new diagnoses:  Active Hospital Problems    Diagnosis  POA    **Agitation due to dementia [F03.911]  Yes    Type 2 diabetes mellitus with hyperglycemia, with long-term current use of insulin [E11.65, Z79.4]  Not Applicable    Peripheral neuropathy [G62.9]  Yes    Hyperlipidemia [E78.5]  Yes    Benign prostatic hyperplasia [N40.0]  Yes    Benign essential HTN [I10]  Yes    Peripheral vascular disease [I73.9]  Yes    Coronary artery disease involving native coronary artery of native heart [I25.10]  Yes      Resolved Hospital Problems   No resolved problems to display.         ADDITIONAL PLAN:  - detailed assessment and plan from admission reviewed    Too Tai is a 68 y.o. male with a PMH significant for advanced dementia, CAD, diabetes mellitus type 2, HLD, PVD who comes to the ED due to agitation.          Severe dementia complicated by behavioral disturbance  - UA negative; CT head without acute findings; CXR without acute findings  - Neuro consulted   -Continue home Aricept; continue home Depakote; continue Remeron 50 mg nightly  -Changed to Seroquel 100 mg twice daily  -Continue Restoril 30 mg nightly  -As needed Geodon  -Currently in restraints for patient and staff safety  -May need Pat psych on discharge     Diabetes mellitus type 2  - No active home meds  - SSI with Accu-Cheks  - Hemoglobin A1c 9.5     HTN  PVD  CAD  - Continue home meds    Expected Discharge   Expected Discharge Date: 4/21/2024; Expected Discharge Time:      Cecily Bain DO  04/19/24        Electronically signed by Cecily Bain DO at 04/19/24 1405          Consult Notes (all)        Meliza Kahn, IVONNE at 04/19/24 0930        Consult Orders    1. Inpatient Neurology Consult General [[id number]] ordered by Nerissa Aviles DO at 04/19/24 2193               Attestation signed by Wili Nicole MD at 24 1700    I have reviewed this documentation and agree.                  [facility] Neurology  Consult Note    Patient Name: Too Tai  : 1955  MRN: [id number]  Primary Care Physician:  Des Geller MD  Referring Physician: No ref. provider found  Date of admission: 2024    Subjective     Reason for Consult/ Chief Complaint: Agitation and dementia    Too Tai is a 68 y.o. male with a past medical history of advanced dementia with behavioral disturbances and nonverbal, CAD, T2DM, HLD, PVD who presented to [facility] ED due to increase in agitation.  Patient has had multiple recent hospitalizations, see below for summary.  Patient has recently been seen by his PCP Dr. Wili Du.  At that appointment patient's Seroquel was adjusted to Seroquel  mg nightly.  Patient was also given a refill of Restoril.    Per wife's report after Seroquel was adjusted patient began to not sleep.  She then increased his home Remeron to 30 mg (2 tablets) nightly.  Patient continued to not sleep, resulting in admission.    In the ED patient received multiple doses of Versed and, Geodon and Haldol for agitation.  CT head was negative for acute abnormality.  Creatinine 0.82.  Electrolytes unremarkable.  Glucose 166.  Infection markers unremarkable.  VPA 36.4.  UA negative for signs of infection.  /75, temp 97.2.    Upon assessment patient was extremely agitated.  He just received Geodon.  He was requiring 2-point restraints.  He was able to move all extremities.  Speech was clear and not garbled.  He was unable to answer questions.    Patient was hospitalized from 2024 to 3/4/2024 for frequent falls with myoclonic jerking.  At that time there was concern that patient's myoclonic jerking was her related to Rexulti and it was discontinued.  Patient continued to have behavioral disturbances related to his dementia and was started on  Depakote 250 mg 3 times daily.  [facility] 20 mg nightly, Remeron 15 mg nightly, Restoril 15 mg nightly, Seroquel 50 mg nightly and Depakote 250 mg 3 times daily.  Patient was discharged from rehab on 3/21.  At that time patient's wife then resumed his medication regimen that he had been on prior to hospitalization.  Patient's wife did not continue Depakote or Remeron at that time.    Patient then returned to the hospital on 3/27/2024 to 4/4/2024 for altered mental status and frequent falls.  At that time he was found to be orthostatic and there was concerns for UTI and pneumonia.  Patient was restarted on previous regimen and for his agitation with good results.  Patient completed course of antibiotics.  Patient was ultimately discharged home to his wife with caregivers.    Review Of Systems   Limited to assess due to baseline mental status    Personal History     Past Medical History:   Diagnosis Date    Coronary artery disease     Dementia     Diabetes mellitus     Hyperlipidemia     Peripheral vascular disease        Past Surgical History:   Procedure Laterality Date    CORONARY ARTERY BYPASS GRAFT      FEMORAL ARTERY - POPLITEAL ARTERY BYPASS GRAFT         Family History: family history includes Diabetes in his maternal grandfather, mother, sister, sister, and sister; Heart disease in his father; Hyperlipidemia in his father; Hypertension in his father. Otherwise pertinent FHx was reviewed and not pertinent to current issue.    Social History:  reports that he quit smoking about 27 years ago. His smoking use included cigarettes. He has never been exposed to tobacco smoke. He has quit using smokeless tobacco. He reports that he does not drink alcohol and does not use drugs.    Home Medications:   Accu-Chek Geri, Accu-Chek Geri Plus, Alcohol Prep, Divalproex Sodium, Glucagon, Insulin Glargine, Insulin Pen Needle, QUEtiapine, QUEtiapine fumarate ER, accu-chek soft touch,  amLODIPine, donepezil, haloperidol, lisinopril, mirtazapine, and temazepam    Current Medications:     Current Facility-Administered Medications:     acetaminophen (TYLENOL) tablet 650 mg, 650 mg, Oral, Q4H PRN **OR** acetaminophen (TYLENOL) 160 MG/5ML oral solution 650 mg, 650 mg, Oral, Q4H PRN **OR** acetaminophen (TYLENOL) suppository 650 mg, 650 mg, Rectal, Q4H PRN, Traci, Nerissa G, DO    amLODIPine (NORVASC) tablet 10 mg, 10 mg, Oral, Q24H, Traci, Nerissa G, DO    sennosides-docusate (PERICOLACE) 8.6-50 MG per tablet 2 tablet, 2 tablet, Oral, BID PRN **AND** polyethylene glycol (MIRALAX) packet 17 g, 17 g, Oral, Daily PRN **AND** bisacodyl (DULCOLAX) EC tablet 5 mg, 5 mg, Oral, Daily PRN **AND** bisacodyl (DULCOLAX) suppository 10 mg, 10 mg, Rectal, Daily PRN, Traci, Nerissa G, DO    Calcium Replacement - Follow Nurse / BPA Driven Protocol, , Does not apply, PRN, Traci, Nerissa G, DO    dextrose (D50W) (25 g/50 mL) IV injection 25 g, 25 g, Intravenous, Q15 Min PRN, Traci, Nerissa G, DO    dextrose (GLUTOSE) oral gel 15 g, 15 g, Oral, Q15 Min PRN, Traci, Nerissa G, DO    Divalproex Sodium (DEPAKOTE SPRINKLE) capsule 250 mg, 250 mg, Oral, Q8H, Traci, Nerissa G, DO    donepezil (ARICEPT) tablet 10 mg, 10 mg, Oral, Daily, Traci, Nerissa G, DO    folic acid (FOLVITE) tablet 1 mg, 1 mg, Oral, Daily, Traci, Nerissa G, DO    glucagon (GLUCAGEN) injection 1 mg, 1 mg, Intramuscular, Q15 Min PRN, Traci, Nerissa G, DO    haloperidol (HALDOL) tablet 5 mg, 5 mg, Oral, 4x Daily PRN, Traci, Nerissa G,     heparin (porcine) 5000 UNIT/ML injection 5,000 Units, 5,000 Units, Subcutaneous, Q8H, Nerissa Aviles,     insulin glargine (LANTUS, SEMGLEE) injection 10 Units, 10 Units, Subcutaneous, Q12H, Nerissa Aviles,     Insulin Lispro (humaLOG) injection 2-7 Units, 2-7 Units, Subcutaneous, 4x Daily AC & at Bedtime, Nerissa Aviles DO    lisinopril (PRINIVIL,ZESTRIL) tablet 20 mg, 20 mg, Oral, Daily, Nerissa Aviles,     Magnesium  Standard Dose Replacement - Follow Nurse / BPA Driven Protocol, , Does not apply, PRN, Traci, Nerissa G, DO    melatonin tablet 5 mg, 5 mg, Oral, Nightly PRN, Traci, Nerissa G, DO    mirtazapine (REMERON) tablet 15 mg, 15 mg, Oral, Nightly, Traci, Nerissa G, DO    Phosphorus Replacement - Follow Nurse / BPA Driven Protocol, , Does not apply, PRN, Traci, Nerissa G, DO    Potassium Replacement - Follow Nurse / BPA Driven Protocol, , Does not apply, PRN, Traci, Nerissa G, DO    QUEtiapine (SEROquel) tablet 50 mg, 50 mg, Oral, QAM, Traci, Nerissa G, DO    QUEtiapine fumarate ER (SEROquel XR) tablet 100 mg, 100 mg, Oral, Nightly, Traci, Nerissa G, DO    sodium chloride 0.9 % flush 10 mL, 10 mL, Intravenous, PRN, Traci, Nerissa G, DO    sodium chloride 0.9 % flush 10 mL, 10 mL, Intravenous, Q12H, Traci, Nerissa G, DO    sodium chloride 0.9 % flush 10 mL, 10 mL, Intravenous, PRN, Traci, Nerissa G, DO    sodium chloride 0.9 % infusion 40 mL, 40 mL, Intravenous, PRN, Traci, Nerissa G, DO    temazepam (RESTORIL) capsule 15 mg, 15 mg, Oral, Nightly PRN, Traci, Nerissa G, DO     Allergies:  Allergies   Allergen Reactions    Bactrim [Sulfamethoxazole-Trimethoprim] Rash    Adhesive Tape Rash    Neosporin [Neomycin-Bacitracin Zn-Polymyx] Rash       Objective     Physical Exam  Vitals and nursing note reviewed.   Constitutional:       General: He is not in acute distress.     Appearance: He is not ill-appearing.   Eyes:      Extraocular Movements: Extraocular movements intact.      Pupils: Pupils are equal, round, and reactive to light.      Comments: No nystagmus or deviated gaze noted   Cardiovascular:      Rate and Rhythm: Normal rate.   Pulmonary:      Effort: Pulmonary effort is normal.   Neurological:      Mental Status: He is alert. He is disoriented.      Cranial Nerves: No cranial nerve deficit, dysarthria or facial asymmetry.      Sensory: No sensory deficit.      Motor: No weakness or seizure activity.      Comments:       Physical  exam extremely limited due to patient agitation.  Patient requiring 2-point restraints    Cranial Nerves   CN II: Pupils are equal, round, and reactive to light. Normal visual acuity and visual fields.    CN III IV VI: Extraocular movements are full without nystagmus.  CN V: Normal facial sensation and strength of muscles of mastication.  CN VII: Facial movements are symmetric. No weakness.  CN VIII:  Auditory acuity is normal.  CN IX & X:  Symmetric palatal movement.  CN XII: The tongue is midline.  No atrophy or fasciculations.    Motor: Able to move all extremities    Unable to elicit DTRs or Babinski's due to agitation   Psychiatric:         Behavior: Behavior is agitated, aggressive and combative.         Vitals:  Temp:  [96.5 °F (35.8 °C)-97.2 °F (36.2 °C)] 96.5 °F (35.8 °C)  Heart Rate:  [91-97] 97  Resp:  [20] 20  BP: (148-169)/(75-93) 155/93    Laboratory Results:   Lab Results   Component Value Date    GLUCOSE 166 (H) 04/19/2024    CALCIUM 9.9 04/19/2024     04/19/2024    K 4.4 04/19/2024    CO2 30.0 (H) 04/19/2024     04/19/2024    BUN 16 04/19/2024    CREATININE 0.82 04/19/2024    EGFRIFAFRI 102 02/21/2022    EGFRIFNONA 88 02/21/2022    BCR 19.5 04/19/2024    ANIONGAP 8.0 04/19/2024     Lab Results   Component Value Date    WBC 10.47 04/19/2024    HGB 15.5 04/19/2024    HCT 46.7 04/19/2024    MCV 92.7 04/19/2024     04/19/2024     Lab Results   Component Value Date    CHOL 220 (H) 01/08/2024    CHOL 120 08/01/2019    CHOL 117 04/15/2019     Lab Results   Component Value Date    HDL 41 01/08/2024    HDL 37 (L) 08/03/2023    HDL 41 04/03/2023     Lab Results   Component Value Date     (H) 01/08/2024     (H) 08/03/2023     (H) 04/03/2023     Lab Results   Component Value Date    TRIG 108 01/08/2024    TRIG 208 (H) 08/03/2023    TRIG 138 04/03/2023     Lab Results   Component Value Date    HGBA1C 9.50 (H) 04/18/2024     Lab Results   Component Value Date    INR 1.1  11/25/2019    INR 1.1 09/10/2018    PROTIME 12.5 11/25/2019    PROTIME 13.3 09/10/2018     Lab Results   Component Value Date    OIZHRYZK45 380 03/27/2024     Lab Results   Component Value Date    FOLATE 10.20 02/24/2024             Assessment / Plan   Brief Patient Summary:  Too Tai is a 68 y.o. male with a past medical history of advanced dementia with behavioral disturbances and nonverbal, CAD, T2DM, HLD, PVD who presented to [facility] ED due to increase in agitation.  Patient has had multiple recent hospitalizations.     Plan:   Advanced dementia with behavioral disturbances  Sleep Deprivation   Continue home Aricept 23 mg daily  Continue home Depakote 250 3 times daily  Continue home Remeron 15 mg nightly  Discontinue Seroquel XR.  Initiate Seroquel 100 mg twice daily  Continue Restoril 30 mg nightly, patient has been on this dose for approximately 10 days post last hospitalization.  Geodon as needed for extreme agitation  QTc 452  CT head negative for acute abnormality  Vitamin B12 384, IM cyanocobalamin 1000 mcg  Patient currently requiring 2-point restraints  At this time would recommend consideration of a [facility]/behavioral facility postdischarge.  Concern patient is not only a harm to self or others as well.  Case management following for difficult posthospitalization disposition.  General neurology will see as needed over the weekend. Will follow up on monday    I have discussed the above with the patient, bedside RN, patient's wife/son, case management and Dr. Bain.    Time spent with patient: 80 minutes in face-to-face evaluation and management of the patient.       IVONNE Santos           Electronically signed by Wili Nicole MD at 04/20/24 9146

## 2024-04-24 NOTE — PAYOR COMM NOTE
"Ref# [id number]   Still Pending  4/19/24 Observation Admission  4/20/24 Flipped to Inpatient (Saturday)  4/22/24 Submitted per portal & faxed clinicals (Monday)  4/22/24 Today, Clinical Update    Utilization Review  Phone [phone]  Fax [phone]    [facility]  [address] [place] [id number]       Too Richter (68 y.o. Male)       Date of Birth   1955    Social Security Number       Address   [address] [place] [address]  [place] [id number]    Home Phone   [phone]    MRN   [id number]       Sikh   Samaritan    Marital Status                               Admission Date   4/18/24    Admission Type   Emergency    Admitting Provider   Calin Benitez MD    Attending Provider   Calin Benitez MD    Department, Room/Bed   [facility] 5G, S551/1       Discharge Date       Discharge Disposition       Discharge Destination                                 Attending Provider: Calin Benitez MD    Allergies: Bactrim [Sulfamethoxazole-trimethoprim], Adhesive Tape, Neosporin [Neomycin-bacitracin Zn-polymyx]    Isolation: None   Infection: None   Code Status: No CPR    Ht: 188 cm (74\")   Wt: 97.5 kg (215 lb)    Admission Cmt: None   Principal Problem: Agitation due to dementia [F03.911]                   Active Insurance as of 4/18/2024       Primary Coverage       Payor Plan Insurance Group Employer/Plan Group    AETNA MEDICARE REPLACEMENT AETNA MED ADV PPO [id number]       Payor Plan Address Payor Plan Phone Number Payor Plan Fax Number Effective Dates    [address] [phone]  1/1/2024 - None Entered    [facility] [id number]         Subscriber Name Subscriber Birth Date Member ID       TOO RICHTER 1955 [id number]                     Emergency Contacts        (Rel.) Home Phone Work Phone Mobile Phone    WILLIE RICHTER (Spouse) [phone] -- [phone]    Ritchie Richter (Son) [phone] -- [phone]    Meg Bustillo (Relative) " [phone] -- [phone]              Facility-Administered Medications as of 4/24/2024   Medication Dose Route Frequency Provider Last Rate Last Admin    acetaminophen (TYLENOL) tablet 650 mg  650 mg Oral Q4H PRN Traci, Nerissa G, DO        Or    acetaminophen (TYLENOL) 160 MG/5ML oral solution 650 mg  650 mg Oral Q4H PRN Traci, Nerissa G, DO        Or    acetaminophen (TYLENOL) suppository 650 mg  650 mg Rectal Q4H PRN Traci, Nerissa G, DO        [START ON 4/25/2024] amLODIPine (NORVASC) tablet 10 mg  10 mg Oral Q24H Calin Benitez MD        sennosides-docusate (PERICOLACE) 8.6-50 MG per tablet 2 tablet  2 tablet Oral BID PRN Traci, Nerissa G, DO        And    polyethylene glycol (MIRALAX) packet 17 g  17 g Oral Daily PRN Traci, Nerissa G, DO        And    bisacodyl (DULCOLAX) EC tablet 5 mg  5 mg Oral Daily PRN Traci, Nerissa G, DO   5 mg at 04/23/24 0859    And    bisacodyl (DULCOLAX) suppository 10 mg  10 mg Rectal Daily PRN Traci, Nerissa G, DO        Calcium Replacement - Follow Nurse / BPA Driven Protocol   Does not apply PRN Traci, Nerissa G, DO        cyanocobalamin injection 1,000 mcg  1,000 mcg Intramuscular Q28 Days Jhaveri, Darrell A, DO   1,000 mcg at 04/19/24 1532    dextrose (D50W) (25 g/50 mL) IV injection 25 g  25 g Intravenous Q15 Min PRN Traci, Nerissa G, DO        dextrose (GLUTOSE) oral gel 15 g  15 g Oral Q15 Min PRN Traci, Nerissa G, DO        donepezil (ARICEPT) tablet 10 mg  10 mg Oral BID Jhaveri, Darrell A, DO   10 mg at 04/24/24 1155    folic acid (FOLVITE) tablet 1 mg  1 mg Oral Daily Traci, Nerissa G, DO   1 mg at 04/24/24 0852    glucagon (GLUCAGEN) injection 1 mg  1 mg Intramuscular Q15 Min PRN Traci, Nerissa G, DO        [COMPLETED] haloperidol lactate (HALDOL) injection 1 mg  1 mg Intramuscular Once Shawn Kathleen PA-C   1 mg at 04/19/24 0607    heparin (porcine) 5000 UNIT/ML injection 5,000 Units  5,000 Units Subcutaneous Q8H Nerissa Aviles DO   5,000 Units at 04/24/24 0587     insulin glargine (LANTUS, SEMGLEE) injection 15 Units  15 Units Subcutaneous Q12H Cecily Bain DO   15 Units at 04/24/24 0854    Insulin Lispro (humaLOG) injection 2-7 Units  2-7 Units Subcutaneous 4x Daily AC & at Bedtime Nerissa Aviles DO   2 Units at 04/24/24 1155    Insulin Lispro (humaLOG) injection 5 Units  5 Units Subcutaneous TID With Meals Parisa Madrigal PA-C   5 Units at 04/24/24 1155    lactated ringers infusion  75 mL/hr Intravenous Continuous Parisa Madrigal PA-C        lisinopril (PRINIVIL,ZESTRIL) tablet 10 mg  10 mg Oral Once Parisa Madrigal PA-C        [START ON 4/25/2024] lisinopril (PRINIVIL,ZESTRIL) tablet 30 mg  30 mg Oral Daily Parisa Madrigal PA-C        Magnesium Standard Dose Replacement - Follow Nurse / BPA Driven Protocol   Does not apply PRN Nerissa Aviles DO        melatonin tablet 5 mg  5 mg Oral Nightly PRN Nerissa Aviles DO        miconazole (MICOTIN) 2 % powder 1 Application  1 Application Topical Q12H Cecily Bain DO   1 Application at 04/23/24 2046    [COMPLETED] midazolam (VERSED) injection 1 mg  1 mg Intravenous Once Delmar Rocha MD   1 mg at 04/19/24 0230    [COMPLETED] midazolam (VERSED) injection 1 mg  1 mg Intravenous Once Delmar Rocha MD   1 mg at 04/19/24 0306    mirtazapine (REMERON) tablet 15 mg  15 mg Oral Nightly Darrell Jhaveri,         PHENobarbital tablet 32.4 mg  32.4 mg Oral BID Butler, April K, APRN        And    PHENobarbital injection 32.4 mg  32.4 mg Intramuscular BID Butler, April K, APRN        Phosphorus Replacement - Follow Nurse / BPA Driven Protocol   Does not apply PRN Nerissa Aviles, DO        Potassium Replacement - Follow Nurse / BPA Driven Protocol   Does not apply PRN Nerissa Aviles, DO        QUEtiapine (SEROquel) tablet 100 mg  100 mg Oral BID Butler, April K, APRN   100 mg at 04/24/24 0852    QUEtiapine (SEROquel) tablet 200 mg  200 mg Oral Nightly Darrell Jhaveri DO        sodium chloride 0.9 %  flush 10 mL  10 mL Intravenous PRN Traci, Nerissa G, DO        sodium chloride 0.9 % flush 10 mL  10 mL Intravenous Q12H Traci, Nerissa G, DO   10 mL at 04/23/24 2047    sodium chloride 0.9 % flush 10 mL  10 mL Intravenous PRN Traci, Nerissa G, DO        sodium chloride 0.9 % infusion 40 mL  40 mL Intravenous PRN Traci, Nerissa G, DO   40 mL at 04/23/24 0546    [COMPLETED] sterile water (preservative free) injection  - ADS Override Pull        1.2 mL at 04/19/24 0030    temazepam (RESTORIL) capsule 30 mg  30 mg Oral Nightly Darrell Jhaveri,         ziprasidone (GEODON) injection 10 mg  10 mg Intramuscular Q4H PRN Meliza Kahn, IVONNE   10 mg at 04/24/24 1045    [COMPLETED] ziprasidone (GEODON) injection 20 mg  20 mg Intramuscular Once Delmar Rocha MD   20 mg at 04/19/24 0029     Lab Results (last 72 hours)       Procedure Component Value Units Date/Time    POC Glucose Once [[id number]]  (Abnormal) Collected: 04/24/24 1112    Specimen: Blood Updated: 04/24/24 1122     Glucose 198 mg/dL     POC Glucose Once [[id number]]  (Abnormal) Collected: 04/24/24 0712    Specimen: Blood Updated: 04/24/24 0721     Glucose 168 mg/dL     POC Glucose Once [[id number]]  (Abnormal) Collected: 04/23/24 2014    Specimen: Blood Updated: 04/23/24 2023     Glucose 177 mg/dL     POC Glucose Once [[id number]]  (Abnormal) Collected: 04/23/24 1645    Specimen: Blood Updated: 04/23/24 1648     Glucose 169 mg/dL     POC Glucose Once [[id number]]  (Abnormal) Collected: 04/23/24 1155    Specimen: Blood Updated: 04/23/24 1157     Glucose 195 mg/dL     POC Glucose Once [[id number]]  (Abnormal) Collected: 04/23/24 0713    Specimen: Blood Updated: 04/23/24 0756     Glucose 208 mg/dL     Valproic Acid Level, Total [[id number]]  (Normal) Collected: 04/23/24 0430    Specimen: Blood Updated: 04/23/24 0635     Valproic Acid 60.8 mcg/mL     Narrative:      Therapeutic Ranges for Valproic Acid    Epilepsy:       mcg/ml  Bipolar/Kacey  up to 125 mcg/ml      POC  Glucose Once [[id number]]  (Abnormal) Collected: 24 1935    Specimen: Blood Updated: 24 1938     Glucose 136 mg/dL     POC Glucose Once [[id number]]  (Abnormal) Collected: 24 1630    Specimen: Blood Updated: 24 1631     Glucose 276 mg/dL     POC Glucose Once [[id number]]  (Abnormal) Collected: 24 1142    Specimen: Blood Updated: 24 1147     Glucose 289 mg/dL     POC Glucose Once [[id number]]  (Abnormal) Collected: 24 0712    Specimen: Blood Updated: 24 0719     Glucose 171 mg/dL     POC Glucose Once [[id number]]  (Abnormal) Collected: 24    Specimen: Blood Updated: 24     Glucose 274 mg/dL     POC Glucose Once [[id number]]  (Abnormal) Collected: 24 162    Specimen: Blood Updated: 24 1623     Glucose 420 mg/dL           Imaging Results (Last 72 Hours)       ** No results found for the last 72 hours. **             Physician Progress Notes (last 72 hours)        Meliza Kahn K, APRN at 24 0744           [facility] Neurology    Progress Note    Patient Name: Too Tai  : 1955  MRN: [id number]  Primary Care Physician:  Des Geller MD  Date of admission: 2024    Subjective     Chief Complaint: Dementia with behavioral disturbances    History of Present Illness   Patient seen extremely agitated in bed.  Requiring 4-point restraints.  Attempting to hit RN and tech while they were trying to change his bedding.  He continues to be extremely aggressive with staff requiring as needed medications.    Review of Systems   Unable to assess due to baseline mental status  Objective     Physical Exam  Vitals and nursing note reviewed.   Constitutional:       General: He is not in acute distress.     Appearance: He is not ill-appearing.   Eyes:      Extraocular Movements: Extraocular movements intact.      Pupils: Pupils are equal, round, and reactive to light.      Comments: No nystagmus or deviated gaze noted    Cardiovascular:      Rate and Rhythm: Normal rate.   Pulmonary:      Effort: Pulmonary effort is normal.   Neurological:      Mental Status: He is alert.      Cranial Nerves: No cranial nerve deficit or facial asymmetry.      Sensory: No sensory deficit.      Motor: No weakness or seizure activity.      Comments:     Physical exam extremely limited due to patient agitation    Cranial Nerves   CN II: Pupils are equal, round, and reactive to light. Normal visual acuity and visual fields.    CN III IV VI: Extraocular movements are full without nystagmus.  CN V: Normal facial sensation and strength of muscles of mastication.  CN VII: Facial movements are symmetric. No weakness.  CN VIII:  Auditory acuity is normal.  CN IX & X:  Symmetric palatal movement.  CN XII: The tongue is midline.  No atrophy or fasciculations.              Vitals:   Temp:  [97.4 °F (36.3 °C)-98.4 °F (36.9 °C)] 97.4 °F (36.3 °C)  Heart Rate:  [86] 86  Resp:  [16-20] 20  BP: (144-184)/(72-84) 184/80    Current Medications    Current Facility-Administered Medications:     acetaminophen (TYLENOL) tablet 650 mg, 650 mg, Oral, Q4H PRN **OR** acetaminophen (TYLENOL) 160 MG/5ML oral solution 650 mg, 650 mg, Oral, Q4H PRN **OR** acetaminophen (TYLENOL) suppository 650 mg, 650 mg, Rectal, Q4H PRN, Traci, Nerissa G, DO    amLODIPine (NORVASC) tablet 10 mg, 10 mg, Oral, Q24H, Traci, Nerissa G, DO, 10 mg at 04/23/24 0859    sennosides-docusate (PERICOLACE) 8.6-50 MG per tablet 2 tablet, 2 tablet, Oral, BID PRN **AND** polyethylene glycol (MIRALAX) packet 17 g, 17 g, Oral, Daily PRN **AND** bisacodyl (DULCOLAX) EC tablet 5 mg, 5 mg, Oral, Daily PRN, 5 mg at 04/23/24 0859 **AND** bisacodyl (DULCOLAX) suppository 10 mg, 10 mg, Rectal, Daily PRN, Traci, Nerissa G, DO    Calcium Replacement - Follow Nurse / BPA Driven Protocol, , Does not apply, PRN, Neirssa Aviles DO    cyanocobalamin injection 1,000 mcg, 1,000 mcg, Intramuscular, Q28 Days, Darrlel Jhaveri DO,  1,000 mcg at 04/19/24 1532    dextrose (D50W) (25 g/50 mL) IV injection 25 g, 25 g, Intravenous, Q15 Min PRN, Traci, Nerissa G, DO    dextrose (GLUTOSE) oral gel 15 g, 15 g, Oral, Q15 Min PRN, Traci, Nerissa G, DO    Divalproex Sodium (DEPAKOTE SPRINKLE) capsule 500 mg, 500 mg, Oral, Q8H, Butler, April K, APRN, 500 mg at 04/23/24 1941    donepezil (ARICEPT) tablet 10 mg, 10 mg, Oral, Nightly, Butler April K, APRN    folic acid (FOLVITE) tablet 1 mg, 1 mg, Oral, Daily, Traci, Nerissa G, DO, 1 mg at 04/23/24 0859    glucagon (GLUCAGEN) injection 1 mg, 1 mg, Intramuscular, Q15 Min PRN, Traci, Nerissa G, DO    haloperidol (HALDOL) tablet 5 mg, 5 mg, Oral, 4x Daily PRN, Traci, Nerissa G, DO    heparin (porcine) 5000 UNIT/ML injection 5,000 Units, 5,000 Units, Subcutaneous, Q8H, Traci, Nerissa G, DO, 5,000 Units at 04/24/24 0550    insulin glargine (LANTUS, SEMGLEE) injection 15 Units, 15 Units, Subcutaneous, Q12H, Cecily Bain, DO, 15 Units at 04/23/24 2046    Insulin Lispro (humaLOG) injection 2-7 Units, 2-7 Units, Subcutaneous, 4x Daily AC & at Bedtime, Traci, Nerissa G, DO, 2 Units at 04/23/24 2046    Insulin Lispro (humaLOG) injection 5 Units, 5 Units, Subcutaneous, TID With Meals, Parisa Madrigal PA-C, 5 Units at 04/23/24 1655    lisinopril (PRINIVIL,ZESTRIL) tablet 20 mg, 20 mg, Oral, Daily, Traci, Nerissa G, DO, 20 mg at 04/23/24 0859    Magnesium Standard Dose Replacement - Follow Nurse / BPA Driven Protocol, , Does not apply, PRN, Traci, Nerissa G, DO    melatonin tablet 5 mg, 5 mg, Oral, Nightly PRN, Nerissa Aviles DO    miconazole (MICOTIN) 2 % powder 1 Application, 1 Application, Topical, Q12H, Cecily Bain, , 1 Application at 04/23/24 2046    mirtazapine (REMERON) tablet 15 mg, 15 mg, Oral, Nightly, Darrell Jhaveri DO, 15 mg at 04/23/24 1942    Phosphorus Replacement - Follow Nurse / BPA Driven Protocol, , Does not apply, PRN, Nerissa Aviles DO    Potassium Replacement - Follow Nurse / BPA Driven  Protocol, , Does not apply, PRN, Traci, Nerissa G, DO    QUEtiapine (SEROquel) tablet 100 mg, 100 mg, Oral, BID, Meliza Kahn, APRN, 100 mg at 04/23/24 1414    QUEtiapine (SEROquel) tablet 200 mg, 200 mg, Oral, Nightly, Meliza Kahn, APRN, 200 mg at 04/23/24 1941    sodium chloride 0.9 % flush 10 mL, 10 mL, Intravenous, PRN, Traci, Nerissa G, DO    sodium chloride 0.9 % flush 10 mL, 10 mL, Intravenous, Q12H, Traci, Nerissa G, DO, 10 mL at 04/23/24 2047    sodium chloride 0.9 % flush 10 mL, 10 mL, Intravenous, PRN, Traci, Nerissa G, DO    sodium chloride 0.9 % infusion 40 mL, 40 mL, Intravenous, PRN, Traci, Nerissa G, DO, 40 mL at 04/23/24 0546    sodium chloride 0.9 % infusion, 100 mL/hr, Intravenous, Continuous, Parisa Madrigal PA-C, Last Rate: 100 mL/hr at 04/23/24 1534, 100 mL/hr at 04/23/24 1534    temazepam (RESTORIL) capsule 30 mg, 30 mg, Oral, Nightly, Cecily Bain, DO, 30 mg at 04/23/24 1942    ziprasidone (GEODON) injection 10 mg, 10 mg, Intramuscular, Q4H PRN, Meliza Kahn, APRN, 10 mg at 04/23/24 1927    Laboratory Results:   Lab Results   Component Value Date    GLUCOSE 166 (H) 04/19/2024    CALCIUM 9.9 04/19/2024     04/19/2024    K 4.4 04/19/2024    CO2 30.0 (H) 04/19/2024     04/19/2024    BUN 16 04/19/2024    CREATININE 0.82 04/19/2024    EGFRIFAFRI 102 02/21/2022    EGFRIFNONA 88 02/21/2022    BCR 19.5 04/19/2024    ANIONGAP 8.0 04/19/2024     Lab Results   Component Value Date    WBC 10.47 04/19/2024    HGB 15.5 04/19/2024    HCT 46.7 04/19/2024    MCV 92.7 04/19/2024     04/19/2024     Lab Results   Component Value Date    CHOL 220 (H) 01/08/2024    CHOL 120 08/01/2019    CHOL 117 04/15/2019     Lab Results   Component Value Date    HDL 41 01/08/2024    HDL 37 (L) 08/03/2023    HDL 41 04/03/2023     Lab Results   Component Value Date     (H) 01/08/2024     (H) 08/03/2023     (H) 04/03/2023     Lab Results   Component Value Date    TRIG 108  01/08/2024    TRIG 208 (H) 08/03/2023    TRIG 138 04/03/2023     Lab Results   Component Value Date    HGBA1C 9.50 (H) 04/18/2024     Lab Results   Component Value Date    INR 1.1 11/25/2019    INR 1.1 09/10/2018    PROTIME 12.5 11/25/2019    PROTIME 13.3 09/10/2018     Lab Results   Component Value Date    FOLATE 10.20 02/24/2024     Lab Results   Component Value Date    [id number] 384 04/18/2024             Assessment / Plan   Brief Patient Summary:  Too Tai is a 68 y.o. male with a past medical history of advanced dementia with behavioral disturbances and nonverbal, CAD, T2DM, HLD, PVD who presented to [facility] ED due to increase in agitation.  Patient has had multiple recent hospitalizations.      Plan:   Advanced dementia with behavioral disturbances  Sleep Deprivation   Continue home Aricept 10 mg nightly  Discontinue to Depakote  Trial of phenobarbital 32.4 mg twice daily p.o. or IM options available.  Phenobarbital level in a.m.  Continue home Remeron 15 mg nightly  Continue Seroquel 100 mg/100 mg / 200 mg.  Timing of nighttime dose adjusted in hopes to negate the need for as needed medication.  QTc 461 this AM  Continue Restoril 30 mg nightly, patient has been on this dose for approximately 10 days post last hospitalization.  Geodon as needed for extreme agitation; last dose 4/23 at 1927  CT head negative for acute abnormality  Vitamin B12 384, IM cyanocobalamin 1000 mcg  Patient currently requiring 4-point restraints  Case management following for difficult posthospitalization disposition.  Patient is not safe to return home due to concern of being in danger for his wife and himself.   Palliative consulted, appreciate recommendations.   General neurology will continue to follow    I have discussed the above with the patient, bedside RN and Dr. Benitez  Time spent with patient: 50 minutes in face-to-face evaluation and management of the patient.    Copied text in this note has been reviewed and is accurate  as of 24.     IVONNE Santos              Electronically signed by Meliza Kahn APRN at 24 1206       Parisa Madrigal PA-C at 24 1321       Attestation signed by Cecily Bain DO at 24 1840    I have reviewed this documentation and agree.                      [facility] Medicine Services  PROGRESS NOTE    Patient Name: Too Tai  : 1955  MRN: [id number]    Date of Admission: 2024  Primary Care Physician: Des Geller MD    Subjective     CC: f/u dementia with agitation    HPI:  In bed, resting comfortably. Unable to be taken out of restraints today without combative behaviors so back in 4 point restraints. Neurology titrating medications. Long discussion with wife at bedside.     Notifed by RN later today that wife not happy that patient is either restrained or too tired to eat. Eating bites with meds. Per RN, she untied an arm to let him try to feed himself but he smeared his sandwich on her top instead     Objective     Vital Signs:   Temp:  [96.7 °F (35.9 °C)-97.4 °F (36.3 °C)] 96.7 °F (35.9 °C)  Heart Rate:  [97] 97  Resp:  [16-18] 16  BP: (165-179)/(70-80) 165/80     Physical Exam:  Constitutional: No acute distress. Asleep - did not wake for exam   HENT: NCAT, mucous membranes moist  Respiratory: Clear to auscultation bilaterally, respiratory effort normal on room air   Cardiovascular: RRR, no murmurs, rubs, or gallops  Gastrointestinal: Positive bowel sounds, soft, nontender, nondistended  Musculoskeletal: No bilateral ankle edema. Extremities in 4-point restraints   Psychiatric: Currently calm   Neurologic: No focal deficits. Speech clear and coherent     Results Reviewed:  LAB RESULTS:      Lab 24  0829 24  2356   WBC 10.47 9.87   HEMOGLOBIN 15.5 13.8   HEMATOCRIT 46.7 42.2   PLATELETS 307 292   NEUTROS ABS 4.74 4.68   IMMATURE GRANS (ABS) 0.04 0.05   LYMPHS ABS 3.52* 3.34*   MONOS ABS 1.33* 1.14*   EOS  ABS 0.69* 0.56*   MCV 92.7 95.3         Lab 04/19/24  0829 04/18/24  2356   SODIUM 144 142   POTASSIUM 4.4 4.9   CHLORIDE 106 104   CO2 30.0* 28.0   ANION GAP 8.0 10.0   BUN 16 22   CREATININE 0.82 1.12   EGFR 96.3 72.0   GLUCOSE 166* 226*   CALCIUM 9.9 9.4   MAGNESIUM 1.9 1.8   PHOSPHORUS 3.4  --    HEMOGLOBIN A1C  --  9.50*   TSH  --  3.220         Lab 04/18/24  2356   TOTAL PROTEIN 6.9   ALBUMIN 3.9   GLOBULIN 3.0   ALT (SGPT) 13   AST (SGOT) 17   BILIRUBIN 0.3   ALK PHOS 120*         Lab 04/18/24  2356   HSTROP T 22*         Lab 04/18/24  2356   VITAMIN B 12 384     Brief Urine Lab Results  (Last result in the past 365 days)        Color   Clarity   Blood   Leuk Est   Nitrite   Protein   CREAT   Urine HCG        04/19/24 0243 Yellow   Clear   Negative   Negative   Negative   Negative                 Microbiology Results Abnormal       None          No radiology results from the last 24 hrs    Results for orders placed during the hospital encounter of 03/27/24    Adult Transthoracic Echo Complete W/ Cont if Necessary Per Protocol    Interpretation Summary    Left ventricular systolic function is normal. Calculated left ventricular EF = 55.1% Normal left ventricular cavity size noted. Left ventricular wall thickness is consistent with mild concentric hypertrophy. Left ventricular diastolic function is consistent with (grade I) impaired relaxation.    The right ventricular cavity is mildly dilated. Normal right ventricular systolic function noted.    There is calcification of the aortic valve. The aortic valve appears trileaflet. Mild aortic valve regurgitation is present. No aortic valve stenosis is present.    Mitral annular calcification is present. Trace mitral valve regurgitation is present. No significant mitral valve stenosis is present.    The tricuspid valve is structurally normal with no stenosis present. Trace tricuspid valve regurgitation is present. Insufficient TR velocity profile to estimate the  right ventricular systolic pressure.    Mild dilation of the sinuses of Valsalva is present. Mild dilation of the ascending aorta is present. Ascending aorta = 4.2 cm    Current medications:  Scheduled Meds:amLODIPine, 10 mg, Oral, Q24H  cyanocobalamin, 1,000 mcg, Intramuscular, Q28 Days  Divalproex Sodium, 500 mg, Oral, Q8H  [START ON 4/24/2024] donepezil, 10 mg, Oral, Nightly  folic acid, 1 mg, Oral, Daily  heparin (porcine), 5,000 Units, Subcutaneous, Q8H  insulin glargine, 15 Units, Subcutaneous, Q12H  insulin lispro, 2-7 Units, Subcutaneous, 4x Daily AC & at Bedtime  Insulin Lispro, 5 Units, Subcutaneous, TID With Meals  lisinopril, 20 mg, Oral, Daily  miconazole, 1 Application, Topical, Q12H  mirtazapine, 15 mg, Oral, Nightly  QUEtiapine, 100 mg, Oral, BID  QUEtiapine, 200 mg, Oral, Nightly  sodium chloride, 10 mL, Intravenous, Q12H  temazepam, 30 mg, Oral, Nightly      Continuous Infusions:   PRN Meds:.  acetaminophen **OR** acetaminophen **OR** acetaminophen    senna-docusate sodium **AND** polyethylene glycol **AND** bisacodyl **AND** bisacodyl    Calcium Replacement - Follow Nurse / BPA Driven Protocol    dextrose    dextrose    glucagon (human recombinant)    haloperidol    Magnesium Standard Dose Replacement - Follow Nurse / BPA Driven Protocol    melatonin    Phosphorus Replacement - Follow Nurse / BPA Driven Protocol    Potassium Replacement - Follow Nurse / BPA Driven Protocol    sodium chloride    sodium chloride    sodium chloride    ziprasidone    Assessment & Plan     Active Hospital Problems    Diagnosis  POA    **Agitation due to dementia [F03.911]  Yes    Agitation [R45.1]  Yes    Type 2 diabetes mellitus with hyperglycemia, with long-term current use of insulin [E11.65, Z79.4]  Not Applicable    Peripheral neuropathy [G62.9]  Yes    Hyperlipidemia [E78.5]  Yes    Benign prostatic hyperplasia [N40.0]  Yes    Benign essential HTN [I10]  Yes    Peripheral vascular disease [I73.9]  Yes    Coronary  artery disease involving native coronary artery of native heart [I25.10]  Yes      Resolved Hospital Problems   No resolved problems to display.     Brief Hospital Course to date:  Too Tai is a 68yoM with PMH significant for advanced dementia, CAD, diabetes mellitus type 2, HLD and PVD. Admitted to [facility] ED on 4/18/24 for agitation / behavioral disturbances. Recently admitted to [facility] 2/24-3/4/24 for dementia with behavioral disturbances and again 3/27-4/4/24 for similar issues.      Advanced dementia complicated by behavioral disturbance with superimposed sleep disturbance   - Infectious work up unremarkable. UA reassuring. CXR without acute findings. CT head without acute findings  - Neuro following  - Still in restraints   - Continue home Aricept and Mirtazapine 15mg QHS   - AM doses of Depakote and Seroquel were delayed by a couple of hours, resulting in the need for PRN Geodon administration. Neurology has adjusted timing of medications  - Continue Depakote 500mg TID, Seroquel increased to 100mg BID and 200mg QHS today   - Continue Temazepam 30mg QHS  - May need geriatric psych admission - CM looking into this   - Not safe to return home at this time as he is a danger to himself and his wife   - Will ask palliative for assistance with goals of care     Uncontrolled diabetes mellitus type 2  - Appears not on any home meds for DM  - A1c 9.5%  - Continue Lantus 15 units BID, Lispro 5 units TID AC + SSI     HTN, continue Amlodipine 10mg daily and Lisinopril 20mg daily. May need additional therapy     Expected Discharge Location and Transportation: TBD  Expected Discharge Expected Discharge Date: 4/26/2024; Expected Discharge Time:      DVT prophylaxis:Medical and mechanical DVT prophylaxis orders are present.    AM-PAC 6 Clicks Score (PT): 10 (04/23/24 0858)    CODE STATUS:   Code Status and Medical Interventions:   Ordered at: 04/19/24 1037     Medical Intervention Limits:    NO intubation  (DNI)     Level Of Support Discussed With:    Next of Kin (If No Surrogate)     Code Status (Patient has no pulse and is not breathing):    No CPR (Do Not Attempt to Resuscitate)     Medical Interventions (Patient has pulse or is breathing):    Limited Support     Parisa Madrigal PA-C  24        Electronically signed by Cecily Bain DO at 24 1840       Meliza Kahn APRN at 24 0946           [facility] Neurology    Progress Note    Patient Name: Too Tai  : 1955  MRN: [id number]  Primary Care Physician:  Des Geller MD  Date of admission: 2024    Subjective     Chief Complaint: Dementia with behavioral disturbances    History of Present Illness   Patient seen resting comfortably in bed.,  Did not wake for exam.  He was in no distress and able to move all 4 extremities.  Still requiring 4-point restraints.  Patient did not receive Depakote or Seroquel this a.m. resulting in administration of as needed Geodon due to uncontrollable agitation.      Per bedside RN patient rested well throughout the night after receiving scheduled medication and as needed dose of Geodon.  He appears to be tolerating increased dose in her his Depakote.    Review of Systems   Unable to assess due to baseline mental status    Objective     Physical Exam  Vitals and nursing note reviewed.   Constitutional:       General: He is not in acute distress.     Appearance: He is not ill-appearing.   Cardiovascular:      Rate and Rhythm: Normal rate.   Pulmonary:      Effort: Pulmonary effort is normal.   Neurological:      Mental Status: He is lethargic.      Cranial Nerves: No facial asymmetry.      Sensory: No sensory deficit.      Motor: No weakness, tremor or seizure activity.      Comments: Able to move all 4 extremities          Vitals:   Temp:  [96.7 °F (35.9 °C)-97.4 °F (36.3 °C)] 96.7 °F (35.9 °C)  Heart Rate:  [97] 97  Resp:  [16-18] 16  BP: (165-179)/(70-80) 165/80    Current  Medications    Current Facility-Administered Medications:     acetaminophen (TYLENOL) tablet 650 mg, 650 mg, Oral, Q4H PRN **OR** acetaminophen (TYLENOL) 160 MG/5ML oral solution 650 mg, 650 mg, Oral, Q4H PRN **OR** acetaminophen (TYLENOL) suppository 650 mg, 650 mg, Rectal, Q4H PRN, Traci, Nerissa G, DO    amLODIPine (NORVASC) tablet 10 mg, 10 mg, Oral, Q24H, Traci, Nerissa G, DO, 10 mg at 04/23/24 0859    sennosides-docusate (PERICOLACE) 8.6-50 MG per tablet 2 tablet, 2 tablet, Oral, BID PRN **AND** polyethylene glycol (MIRALAX) packet 17 g, 17 g, Oral, Daily PRN **AND** bisacodyl (DULCOLAX) EC tablet 5 mg, 5 mg, Oral, Daily PRN, 5 mg at 04/23/24 0859 **AND** bisacodyl (DULCOLAX) suppository 10 mg, 10 mg, Rectal, Daily PRN, Traci, Nerissa G, DO    Calcium Replacement - Follow Nurse / BPA Driven Protocol, , Does not apply, PRN, Traci, Nerissa G, DO    cyanocobalamin injection 1,000 mcg, 1,000 mcg, Intramuscular, Q28 Days, Darrell Jhaveri, DO, 1,000 mcg at 04/19/24 1532    dextrose (D50W) (25 g/50 mL) IV injection 25 g, 25 g, Intravenous, Q15 Min PRN, Traci, Nerissa G, DO    dextrose (GLUTOSE) oral gel 15 g, 15 g, Oral, Q15 Min PRN, Traci, Nerissa G, DO    Divalproex Sodium (DEPAKOTE SPRINKLE) capsule 500 mg, 500 mg, Oral, Q8H, Meliza Kahn, APRN, 500 mg at 04/23/24 0900    donepezil (ARICEPT) tablet 22.5 mg, 22.5 mg, Oral, Daily, Meliza Kahn, APRN, 22.5 mg at 04/23/24 0858    folic acid (FOLVITE) tablet 1 mg, 1 mg, Oral, Daily, Nerissa Aviles, DO, 1 mg at 04/23/24 0859    glucagon (GLUCAGEN) injection 1 mg, 1 mg, Intramuscular, Q15 Min PRN, Nerissa Aviles, DO    haloperidol (HALDOL) tablet 5 mg, 5 mg, Oral, 4x Daily PRN, Traci, Nerissa G, DO    heparin (porcine) 5000 UNIT/ML injection 5,000 Units, 5,000 Units, Subcutaneous, Q8H, Nerissa Aviles, DO, 5,000 Units at 04/22/24 2035    insulin glargine (LANTUS, SEMGLEE) injection 15 Units, 15 Units, Subcutaneous, Q12H, Cecily Bain, DO, 15 Units at 04/23/24 0858     Insulin Lispro (humaLOG) injection 2-7 Units, 2-7 Units, Subcutaneous, 4x Daily AC & at Bedtime, Kamala Avilessie G, DO, 3 Units at 04/23/24 0859    Insulin Lispro (humaLOG) injection 5 Units, 5 Units, Subcutaneous, TID With Meals, Parisa Madrigal PA-C, 5 Units at 04/23/24 0859    lisinopril (PRINIVIL,ZESTRIL) tablet 20 mg, 20 mg, Oral, Daily, Traci, Nerissa G, DO, 20 mg at 04/23/24 0859    Magnesium Standard Dose Replacement - Follow Nurse / BPA Driven Protocol, , Does not apply, PRN, Traci, Nerissa G, DO    melatonin tablet 5 mg, 5 mg, Oral, Nightly PRN, Traci, Nerissa G, DO    miconazole (MICOTIN) 2 % powder 1 Application, 1 Application, Topical, Q12H, Cecily Bain, DO, 1 Application at 04/23/24 0902    mirtazapine (REMERON) tablet 15 mg, 15 mg, Oral, Nightly, Darrell Jhaveri, DO, 15 mg at 04/22/24 2036    Phosphorus Replacement - Follow Nurse / BPA Driven Protocol, , Does not apply, PRN, Traci, Nerissa G, DO    Potassium Replacement - Follow Nurse / BPA Driven Protocol, , Does not apply, PRN, Traci, Nerissa G, DO    QUEtiapine (SEROquel) tablet 100 mg, 100 mg, Oral, Q8H, Meliza Kahn K, APRN, 100 mg at 04/23/24 0858    sodium chloride 0.9 % flush 10 mL, 10 mL, Intravenous, PRN, Traci, Nerissa G, DO    sodium chloride 0.9 % flush 10 mL, 10 mL, Intravenous, Q12H, Traci, Nerissa G, DO, 10 mL at 04/23/24 0902    sodium chloride 0.9 % flush 10 mL, 10 mL, Intravenous, PRN, Traci, Nerissa G, DO    sodium chloride 0.9 % infusion 40 mL, 40 mL, Intravenous, PRN, Traci, Nerissa G, DO, 40 mL at 04/23/24 0546    temazepam (RESTORIL) capsule 30 mg, 30 mg, Oral, Nightly, Cecily Bain, , 30 mg at 04/22/24 2043    ziprasidone (GEODON) injection 10 mg, 10 mg, Intramuscular, Q4H PRN, Meliza Kahn K, APRN, 10 mg at 04/23/24 0858    Laboratory Results:   Lab Results   Component Value Date    GLUCOSE 166 (H) 04/19/2024    CALCIUM 9.9 04/19/2024     04/19/2024    K 4.4 04/19/2024    CO2 30.0 (H) 04/19/2024      04/19/2024    BUN 16 04/19/2024    CREATININE 0.82 04/19/2024    EGFRIFAFRI 102 02/21/2022    EGFRIFNONA 88 02/21/2022    BCR 19.5 04/19/2024    ANIONGAP 8.0 04/19/2024     Lab Results   Component Value Date    WBC 10.47 04/19/2024    HGB 15.5 04/19/2024    HCT 46.7 04/19/2024    MCV 92.7 04/19/2024     04/19/2024     Lab Results   Component Value Date    CHOL 220 (H) 01/08/2024    CHOL 120 08/01/2019    CHOL 117 04/15/2019     Lab Results   Component Value Date    HDL 41 01/08/2024    HDL 37 (L) 08/03/2023    HDL 41 04/03/2023     Lab Results   Component Value Date     (H) 01/08/2024     (H) 08/03/2023     (H) 04/03/2023     Lab Results   Component Value Date    TRIG 108 01/08/2024    TRIG 208 (H) 08/03/2023    TRIG 138 04/03/2023     Lab Results   Component Value Date    HGBA1C 9.50 (H) 04/18/2024     Lab Results   Component Value Date    INR 1.1 11/25/2019    INR 1.1 09/10/2018    PROTIME 12.5 11/25/2019    PROTIME 13.3 09/10/2018     Lab Results   Component Value Date    FOLATE 10.20 02/24/2024     Lab Results   Component Value Date    [id number] 384 04/18/2024             Assessment / Plan   Brief Patient Summary:  Too Tai is a 68 y.o. male with a past medical history of advanced dementia with behavioral disturbances and nonverbal, CAD, T2DM, HLD, PVD who presented to [facility] ED due to increase in agitation.  Patient has had multiple recent hospitalizations.      Plan:   Advanced dementia with behavioral disturbances  Sleep Deprivation   Continue home Aricept 23 mg daily  Continue Depakote 500 mg 3 times daily  VPA 60.8  Continue home Remeron 15 mg nightly  Increase Seroquel 100 mg/100 mg / 200 mg  QTc 456 this AM; repeat EKG in a.m. for QTc assessment  Continue Restoril 30 mg nightly, patient has been on this dose for approximately 10 days post last hospitalization.  Geodon as needed for extreme agitation; last dose 4/23 at 0858  CT head negative for acute abnormality  Vitamin B12  384, IM cyanocobalamin 1000 mcg  Patient currently requiring 4-point restraints  Case management following for difficult posthospitalization disposition.  Patient is not safe to return home due to concern of being in danger for his wife and himself.   Palliative consulted, appreciate recommendations.   General neurology will continue to follow    I have discussed the above with the patient, bedside RN Dr. Bain  Time spent with patient: 50 minutes in face-to-face evaluation and management of the patient.    Copied text in this note has been reviewed and is accurate as of 24.     IVONNE Santos              Electronically signed by Meliza Kahn APRN at 24 1127       Parisa Madrigal PA-C at 24 1405       Attestation signed by Cecily Bain DO at 24 1608    I have reviewed this documentation and agree.                      [facility] Medicine Services  PROGRESS NOTE    Patient Name: Too Tai  : 1955  MRN: [id number]    Date of Admission: 2024  Primary Care Physician: Des Geller MD    Subjective     CC: f/u dementia with agitation    HPI:  In bed. Wife reports he woke this morning and she helped feed him breakfast. Placed in 4 point restraints later this morning due to worsening agitation     Objective     Vital Signs:   Temp:  [97.1 °F (36.2 °C)] 97.1 °F (36.2 °C)  Heart Rate:  [99] 99  Resp:  [18] 18  BP: (149-163)/(73-78) 163/78     Physical Exam:  Constitutional: No acute distress. Asleep - did not wake for exam   HENT: NCAT, mucous membranes moist  Respiratory: Clear to auscultation bilaterally, respiratory effort normal on room air   Cardiovascular: RRR, no murmurs, rubs, or gallops  Gastrointestinal: Positive bowel sounds, soft, nontender, nondistended  Musculoskeletal: No bilateral ankle edema  Psychiatric: Currently calm   Neurologic: No focal deficits. Speech clear and coherent     Results Reviewed:  LAB RESULTS:       Lab 04/19/24  0829 04/18/24  2356   WBC 10.47 9.87   HEMOGLOBIN 15.5 13.8   HEMATOCRIT 46.7 42.2   PLATELETS 307 292   NEUTROS ABS 4.74 4.68   IMMATURE GRANS (ABS) 0.04 0.05   LYMPHS ABS 3.52* 3.34*   MONOS ABS 1.33* 1.14*   EOS ABS 0.69* 0.56*   MCV 92.7 95.3         Lab 04/19/24  0829 04/18/24  2356   SODIUM 144 142   POTASSIUM 4.4 4.9   CHLORIDE 106 104   CO2 30.0* 28.0   ANION GAP 8.0 10.0   BUN 16 22   CREATININE 0.82 1.12   EGFR 96.3 72.0   GLUCOSE 166* 226*   CALCIUM 9.9 9.4   MAGNESIUM 1.9 1.8   PHOSPHORUS 3.4  --    HEMOGLOBIN A1C  --  9.50*   TSH  --  3.220         Lab 04/18/24  2356   TOTAL PROTEIN 6.9   ALBUMIN 3.9   GLOBULIN 3.0   ALT (SGPT) 13   AST (SGOT) 17   BILIRUBIN 0.3   ALK PHOS 120*         Lab 04/18/24  2356   HSTROP T 22*         Lab 04/18/24  2356   VITAMIN B 12 384     Brief Urine Lab Results  (Last result in the past 365 days)        Color   Clarity   Blood   Leuk Est   Nitrite   Protein   CREAT   Urine HCG        04/19/24 0243 Yellow   Clear   Negative   Negative   Negative   Negative                 Microbiology Results Abnormal       None          No radiology results from the last 24 hrs    Results for orders placed during the hospital encounter of 03/27/24    Adult Transthoracic Echo Complete W/ Cont if Necessary Per Protocol    Interpretation Summary    Left ventricular systolic function is normal. Calculated left ventricular EF = 55.1% Normal left ventricular cavity size noted. Left ventricular wall thickness is consistent with mild concentric hypertrophy. Left ventricular diastolic function is consistent with (grade I) impaired relaxation.    The right ventricular cavity is mildly dilated. Normal right ventricular systolic function noted.    There is calcification of the aortic valve. The aortic valve appears trileaflet. Mild aortic valve regurgitation is present. No aortic valve stenosis is present.    Mitral annular calcification is present. Trace mitral valve regurgitation is  present. No significant mitral valve stenosis is present.    The tricuspid valve is structurally normal with no stenosis present. Trace tricuspid valve regurgitation is present. Insufficient TR velocity profile to estimate the right ventricular systolic pressure.    Mild dilation of the sinuses of Valsalva is present. Mild dilation of the ascending aorta is present. Ascending aorta = 4.2 cm    Current medications:  Scheduled Meds:amLODIPine, 10 mg, Oral, Q24H  cyanocobalamin, 1,000 mcg, Intramuscular, Q28 Days  Divalproex Sodium, 500 mg, Oral, Q8H  donepezil, 22.5 mg, Oral, Daily  folic acid, 1 mg, Oral, Daily  heparin (porcine), 5,000 Units, Subcutaneous, Q8H  insulin glargine, 15 Units, Subcutaneous, Q12H  insulin lispro, 2-7 Units, Subcutaneous, 4x Daily AC & at Bedtime  lisinopril, 20 mg, Oral, Daily  mirtazapine, 15 mg, Oral, Nightly  QUEtiapine, 100 mg, Oral, Q8H  sodium chloride, 10 mL, Intravenous, Q12H  temazepam, 30 mg, Oral, Nightly      Continuous Infusions:   PRN Meds:.  acetaminophen **OR** acetaminophen **OR** acetaminophen    senna-docusate sodium **AND** polyethylene glycol **AND** bisacodyl **AND** bisacodyl    Calcium Replacement - Follow Nurse / BPA Driven Protocol    dextrose    dextrose    glucagon (human recombinant)    haloperidol    Magnesium Standard Dose Replacement - Follow Nurse / BPA Driven Protocol    melatonin    Phosphorus Replacement - Follow Nurse / BPA Driven Protocol    Potassium Replacement - Follow Nurse / BPA Driven Protocol    sodium chloride    sodium chloride    sodium chloride    ziprasidone    Assessment & Plan     Active Hospital Problems    Diagnosis  POA    **Agitation due to dementia [F03.911]  Yes    Agitation [R45.1]  Yes    Type 2 diabetes mellitus with hyperglycemia, with long-term current use of insulin [E11.65, Z79.4]  Not Applicable    Peripheral neuropathy [G62.9]  Yes    Hyperlipidemia [E78.5]  Yes    Benign prostatic hyperplasia [N40.0]  Yes    Benign  essential HTN [I10]  Yes    Peripheral vascular disease [I73.9]  Yes    Coronary artery disease involving native coronary artery of native heart [I25.10]  Yes      Resolved Hospital Problems   No resolved problems to display.     Brief Hospital Course to date:  Too Tia is a 68yoM with PMH significant for advanced dementia, CAD, diabetes mellitus type 2, HLD and PVD. Admitted to [facility] ED on 4/18/24 for agitation / behavioral disturbances.      Severe dementia complicated by behavioral disturbance  - Infectious work up unremarkable. UA reassuring. CXR without acute findings. CT head without acute findings  - Neuro following  - Still in restraints   - Continue home Aricept and Mirtazapine 15mg QHS   - Depakote increased to 500mg TID and Seroquel increased to 100mg TID today  - Continue Temazepam 30mg QHS  - May need geriatric psych admission - CM looking into this     Uncontrolled Diabetes mellitus type 2  - Appears not on any home meds for DM  - A1c 9.5%  - Continue Lantus 15 units BID, add Lispro 5 units TID AC, continue SSI     HTN, continue Amlodipine 10mg daily     Expected Discharge Location and Transportation: TBD  Expected Discharge Expected Discharge Date: 4/26/2024; Expected Discharge Time:      DVT prophylaxis:Medical and mechanical DVT prophylaxis orders are present.    AM-PAC 6 Clicks Score (PT): 12 (04/22/24 0807)    CODE STATUS:   Code Status and Medical Interventions:   Ordered at: 04/19/24 1037     Medical Intervention Limits:    NO intubation (DNI)     Level Of Support Discussed With:    Next of Kin (If No Surrogate)     Code Status (Patient has no pulse and is not breathing):    No CPR (Do Not Attempt to Resuscitate)     Medical Interventions (Patient has pulse or is breathing):    Limited Support     Parisa Madrigal PA-C  04/22/24        Electronically signed by Cecily Bain DO at 04/22/24 7967       Meliza Kahn APRN at 04/22/24 8835           [facility]  Neurology    Progress Note    Patient Name: Too Tai  : 1955  MRN: [id number]  Primary Care Physician:  Des Geller MD  Date of admission: 2024    Subjective     Chief Complaint: Dementia with behavioral disturbances    History of Present Illness   Patient seen resting comfortably in bed.  Still requiring 2-point restraints and as needed medications for agitation.  Per bedside RN patient responds well to Seroquel.  Patient rested intermittently throughout night.    Review of Systems   Unable to assess due to mental status    Objective     Physical Exam  Vitals reviewed.   Constitutional:       General: He is not in acute distress.     Appearance: He is not ill-appearing.   Eyes:      General: No visual field deficit.     Extraocular Movements: Extraocular movements intact.      Pupils: Pupils are equal, round, and reactive to light.      Comments: No nystagmus or deviated gaze noted   Cardiovascular:      Rate and Rhythm: Normal rate.   Pulmonary:      Effort: Pulmonary effort is normal.   Neurological:      Mental Status: He is alert. Mental status is at baseline. He is disoriented.      Cranial Nerves: No cranial nerve deficit or facial asymmetry.      Sensory: No sensory deficit.      Motor: No weakness, tremor or seizure activity.          Vitals:   Temp:  [97.1 °F (36.2 °C)] 97.1 °F (36.2 °C)  Heart Rate:  [99] 99  Resp:  [18] 18  BP: (149-163)/(73-78) 163/78    Current Medications    Current Facility-Administered Medications:     acetaminophen (TYLENOL) tablet 650 mg, 650 mg, Oral, Q4H PRN **OR** acetaminophen (TYLENOL) 160 MG/5ML oral solution 650 mg, 650 mg, Oral, Q4H PRN **OR** acetaminophen (TYLENOL) suppository 650 mg, 650 mg, Rectal, Q4H PRN, Traci, Nerissa G, DO    amLODIPine (NORVASC) tablet 10 mg, 10 mg, Oral, Q24H, Traci, Nerissa G, DO, 10 mg at 24 0808    sennosides-docusate (PERICOLACE) 8.6-50 MG per tablet 2 tablet, 2 tablet, Oral, BID PRN **AND** polyethylene glycol  (MIRALAX) packet 17 g, 17 g, Oral, Daily PRN **AND** bisacodyl (DULCOLAX) EC tablet 5 mg, 5 mg, Oral, Daily PRN **AND** bisacodyl (DULCOLAX) suppository 10 mg, 10 mg, Rectal, Daily PRN, Traci, Nerissa G, DO    Calcium Replacement - Follow Nurse / BPA Driven Protocol, , Does not apply, PRN, Traci, Nerissa G, DO    cyanocobalamin injection 1,000 mcg, 1,000 mcg, Intramuscular, Q28 Days, Darrell Jhaveri A, DO, 1,000 mcg at 04/19/24 1532    dextrose (D50W) (25 g/50 mL) IV injection 25 g, 25 g, Intravenous, Q15 Min PRN, Traci, Nerissa G, DO    dextrose (GLUTOSE) oral gel 15 g, 15 g, Oral, Q15 Min PRN, Traci, Nerissa G, DO    Divalproex Sodium (DEPAKOTE SPRINKLE) capsule 250 mg, 250 mg, Oral, Q8H, Traci, Nerissa G, DO, 250 mg at 04/22/24 0810    donepezil (ARICEPT) tablet 22.5 mg, 22.5 mg, Oral, Daily, Meliza Kahn K, APRN, 22.5 mg at 04/22/24 0807    folic acid (FOLVITE) tablet 1 mg, 1 mg, Oral, Daily, Traci, Nerissa G, DO, 1 mg at 04/22/24 0808    glucagon (GLUCAGEN) injection 1 mg, 1 mg, Intramuscular, Q15 Min PRN, Traci, Nerissa G, DO    haloperidol (HALDOL) tablet 5 mg, 5 mg, Oral, 4x Daily PRN, Traci, Nerissa G, DO    heparin (porcine) 5000 UNIT/ML injection 5,000 Units, 5,000 Units, Subcutaneous, Q8H, Traci, Nerissa G, DO, 5,000 Units at 04/21/24 2044    insulin glargine (LANTUS, SEMGLEE) injection 15 Units, 15 Units, Subcutaneous, Q12H, Cecily Bain, DO, 15 Units at 04/22/24 0809    Insulin Lispro (humaLOG) injection 2-7 Units, 2-7 Units, Subcutaneous, 4x Daily AC & at Bedtime, Nerissa Aviles DO, 2 Units at 04/22/24 0809    lisinopril (PRINIVIL,ZESTRIL) tablet 20 mg, 20 mg, Oral, Daily, Nerissa Aviles DO, 20 mg at 04/22/24 0808    Magnesium Standard Dose Replacement - Follow Nurse / BPA Driven Protocol, , Does not apply, PRN, Nerissa Aviles DO    melatonin tablet 5 mg, 5 mg, Oral, Nightly PRN, Nerissa Aviles G, DO    mirtazapine (REMERON) tablet 15 mg, 15 mg, Oral, Nightly, Darrell Jhaveri DO, 15 mg at 04/21/24 2022     Phosphorus Replacement - Follow Nurse / BPA Driven Protocol, , Does not apply, PRN, Traci, Nerissa G, DO    Potassium Replacement - Follow Nurse / BPA Driven Protocol, , Does not apply, PRN, Traci, Nerissa G, DO    QUEtiapine (SEROquel) tablet 100 mg, 100 mg, Oral, Q12H, Meliza Kahn, APRN, 100 mg at 04/22/24 0808    sodium chloride 0.9 % flush 10 mL, 10 mL, Intravenous, PRN, Traci, Nerissa G, DO    sodium chloride 0.9 % flush 10 mL, 10 mL, Intravenous, Q12H, Traci, Nerissa G, DO, 10 mL at 04/19/24 1108    sodium chloride 0.9 % flush 10 mL, 10 mL, Intravenous, PRN, Traci, Nerissa G, DO    sodium chloride 0.9 % infusion 40 mL, 40 mL, Intravenous, PRN, Traci, Nerissa G, DO    temazepam (RESTORIL) capsule 30 mg, 30 mg, Oral, Nightly, Cecily Bain, DO, 30 mg at 04/21/24 2023    ziprasidone (GEODON) injection 10 mg, 10 mg, Intramuscular, Q4H PRN, Femi April K, APRN, 10 mg at 04/22/24 0643    Laboratory Results:   Lab Results   Component Value Date    GLUCOSE 166 (H) 04/19/2024    CALCIUM 9.9 04/19/2024     04/19/2024    K 4.4 04/19/2024    CO2 30.0 (H) 04/19/2024     04/19/2024    BUN 16 04/19/2024    CREATININE 0.82 04/19/2024    EGFRIFAFRI 102 02/21/2022    EGFRIFNONA 88 02/21/2022    BCR 19.5 04/19/2024    ANIONGAP 8.0 04/19/2024     Lab Results   Component Value Date    WBC 10.47 04/19/2024    HGB 15.5 04/19/2024    HCT 46.7 04/19/2024    MCV 92.7 04/19/2024     04/19/2024     Lab Results   Component Value Date    CHOL 220 (H) 01/08/2024    CHOL 120 08/01/2019    CHOL 117 04/15/2019     Lab Results   Component Value Date    HDL 41 01/08/2024    HDL 37 (L) 08/03/2023    HDL 41 04/03/2023     Lab Results   Component Value Date     (H) 01/08/2024     (H) 08/03/2023     (H) 04/03/2023     Lab Results   Component Value Date    TRIG 108 01/08/2024    TRIG 208 (H) 08/03/2023    TRIG 138 04/03/2023     Lab Results   Component Value Date    HGBA1C 9.50 (H) 04/18/2024     Lab Results    Component Value Date    INR 1.1 2019    INR 1.1 09/10/2018    PROTIME 12.5 2019    PROTIME 13.3 09/10/2018     Lab Results   Component Value Date    FOLATE 10.20 2024     Lab Results   Component Value Date    [id number] 384 2024             Assessment / Plan   Brief Patient Summary:  Too Tai is a 68 y.o. male with a past medical history of advanced dementia with behavioral disturbances and nonverbal, CAD, T2DM, HLD, PVD who presented to [facility] ED due to increase in agitation.  Patient has had multiple recent hospitalizations.      Plan:   Advanced dementia with behavioral disturbances  Sleep Deprivation   Continue home Aricept 23 mg daily  Increase Depakote 500 mg 3 times daily  VPA in a.m.  Continue home Remeron 15 mg nightly  Increase Seroquel 100 mg 3 times daily  QTc 478, repeat QTc in a.m.  Continue Restoril 30 mg nightly, patient has been on this dose for approximately 10 days post last hospitalization.  Geodon as needed for extreme agitation; last dose  at 6432  CT head negative for acute abnormality  Vitamin B12 384, IM cyanocobalamin 1000 mcg  Patient currently requiring 2-point restraints  Case management following for difficult posthospitalization disposition.  Patient is not safe to return home due to concern of being in danger for his wife and himself.   General neurology will continue to follow    I have discussed the above with the patient, bedside RN and Dr. Bain  Time spent with patient: 50 minutes in face-to-face evaluation and management of the patient.      IVONNE Santos              Electronically signed by Meliza Kahn APRN at 24 1334       Cecily Bain DO at 24 1432              [facility] Medicine Services  PROGRESS NOTE    Patient Name: Too Tai  : 1955  MRN: [id number]    Date of Admission: 2024  Primary Care Physician: Des Geller MD    Subjective   Subjective      CC:  agitation    HPI:  No acute events. Sleeping. Received geodon last night at 11.       Objective   Objective     Vital Signs:   Temp:  [97.5 °F (36.4 °C)] 97.5 °F (36.4 °C)  Resp:  [18] 18  BP: (154-155)/(76-83) 155/76     Physical Exam:  Constitutional: sleeping  HENT: NCAT, mucous membranes moist  Respiratory: Clear to auscultation bilaterally, respiratory effort normal   Cardiovascular: RRR, no murmurs, rubs, or gallops  Gastrointestinal: Positive bowel sounds, soft, nontender, nondistended  Musculoskeletal: No bilateral ankle edema  Psychiatric: currently calm  Neurologic: strength symmetric in all extremities, Cranial Nerves grossly intact to confrontation  Skin: No rashes      Results Reviewed:  LAB RESULTS:      Lab 04/19/24  0829 04/18/24 2356   WBC 10.47 9.87   HEMOGLOBIN 15.5 13.8   HEMATOCRIT 46.7 42.2   PLATELETS 307 292   NEUTROS ABS 4.74 4.68   IMMATURE GRANS (ABS) 0.04 0.05   LYMPHS ABS 3.52* 3.34*   MONOS ABS 1.33* 1.14*   EOS ABS 0.69* 0.56*   MCV 92.7 95.3         Lab 04/19/24  0829 04/18/24  2356   SODIUM 144 142   POTASSIUM 4.4 4.9   CHLORIDE 106 104   CO2 30.0* 28.0   ANION GAP 8.0 10.0   BUN 16 22   CREATININE 0.82 1.12   EGFR 96.3 72.0   GLUCOSE 166* 226*   CALCIUM 9.9 9.4   MAGNESIUM 1.9 1.8   PHOSPHORUS 3.4  --    HEMOGLOBIN A1C  --  9.50*   TSH  --  3.220         Lab 04/18/24  2356   TOTAL PROTEIN 6.9   ALBUMIN 3.9   GLOBULIN 3.0   ALT (SGPT) 13   AST (SGOT) 17   BILIRUBIN 0.3   ALK PHOS 120*         Lab 04/18/24  2356   HSTROP T 22*             Lab 04/18/24  2356   VITAMIN B 12 384         Brief Urine Lab Results  (Last result in the past 365 days)        Color   Clarity   Blood   Leuk Est   Nitrite   Protein   CREAT   Urine HCG        04/19/24 0243 Yellow   Clear   Negative   Negative   Negative   Negative                   Microbiology Results Abnormal       None            No radiology results from the last 24 hrs    Results for orders placed during the hospital encounter of  03/27/24    Adult Transthoracic Echo Complete W/ Cont if Necessary Per Protocol    Interpretation Summary    Left ventricular systolic function is normal. Calculated left ventricular EF = 55.1% Normal left ventricular cavity size noted. Left ventricular wall thickness is consistent with mild concentric hypertrophy. Left ventricular diastolic function is consistent with (grade I) impaired relaxation.    The right ventricular cavity is mildly dilated. Normal right ventricular systolic function noted.    There is calcification of the aortic valve. The aortic valve appears trileaflet. Mild aortic valve regurgitation is present. No aortic valve stenosis is present.    Mitral annular calcification is present. Trace mitral valve regurgitation is present. No significant mitral valve stenosis is present.    The tricuspid valve is structurally normal with no stenosis present. Trace tricuspid valve regurgitation is present. Insufficient TR velocity profile to estimate the right ventricular systolic pressure.    Mild dilation of the sinuses of Valsalva is present. Mild dilation of the ascending aorta is present. Ascending aorta = 4.2 cm      Current medications:  Scheduled Meds:amLODIPine, 10 mg, Oral, Q24H  cyanocobalamin, 1,000 mcg, Intramuscular, Q28 Days  Divalproex Sodium, 250 mg, Oral, Q8H  donepezil, 22.5 mg, Oral, Daily  folic acid, 1 mg, Oral, Daily  heparin (porcine), 5,000 Units, Subcutaneous, Q8H  insulin glargine, 10 Units, Subcutaneous, Q12H  insulin lispro, 2-7 Units, Subcutaneous, 4x Daily AC & at Bedtime  lisinopril, 20 mg, Oral, Daily  mirtazapine, 15 mg, Oral, Nightly  QUEtiapine, 100 mg, Oral, Q12H  sodium chloride, 10 mL, Intravenous, Q12H      Continuous Infusions:   PRN Meds:.  acetaminophen **OR** acetaminophen **OR** acetaminophen    senna-docusate sodium **AND** polyethylene glycol **AND** bisacodyl **AND** bisacodyl    Calcium Replacement - Follow Nurse / BPA Driven Protocol    dextrose    dextrose     glucagon (human recombinant)    haloperidol    Magnesium Standard Dose Replacement - Follow Nurse / BPA Driven Protocol    melatonin    Phosphorus Replacement - Follow Nurse / BPA Driven Protocol    Potassium Replacement - Follow Nurse / BPA Driven Protocol    sodium chloride    sodium chloride    sodium chloride    temazepam    ziprasidone    Assessment & Plan   Assessment & Plan     Active Hospital Problems    Diagnosis  POA    **Agitation due to dementia [F03.911]  Yes    Agitation [R45.1]  Yes    Type 2 diabetes mellitus with hyperglycemia, with long-term current use of insulin [E11.65, Z79.4]  Not Applicable    Peripheral neuropathy [G62.9]  Yes    Hyperlipidemia [E78.5]  Yes    Benign prostatic hyperplasia [N40.0]  Yes    Benign essential HTN [I10]  Yes    Peripheral vascular disease [I73.9]  Yes    Coronary artery disease involving native coronary artery of native heart [I25.10]  Yes      Resolved Hospital Problems   No resolved problems to display.        Brief Hospital Course to date:  Too Tai is a 68 y.o. male with a PMH significant for advanced dementia, CAD, diabetes mellitus type 2, HLD, PVD who comes to the ED due to agitation.       Severe dementia complicated by behavioral disturbance  - UA negative; CT head without acute findings; CXR without acute findings  - Neuro consulted   -Continue home Aricept; continue home Depakote; continue Remeron 50 mg nightly  -Changed to Seroquel 100 mg twice daily  - schedule temazepam 30 mg qHS  -As needed Geodon  -Currently in restraints for patient and staff safety. Wean off as able  -May need Pat psych on discharge. Will discuss with CM      Diabetes mellitus type 2  - No active home meds  - SSI with Accu-Cheks  - Hemoglobin A1c 9.5     HTN  PVD  CAD  - Continue home meds    Expected Discharge Location and Transportation: TBD  Expected Discharge   Expected Discharge Date: 4/21/2024; Expected Discharge Time:      DVT prophylaxis:  Medical and mechanical DVT  prophylaxis orders are present.         AM-PAC 6 Clicks Score (PT): 13 (04/20/24 2000)    CODE STATUS:   Code Status and Medical Interventions:   Ordered at: 04/19/24 1037     Medical Intervention Limits:    NO intubation (DNI)     Level Of Support Discussed With:    Next of Kin (If No Surrogate)     Code Status (Patient has no pulse and is not breathing):    No CPR (Do Not Attempt to Resuscitate)     Medical Interventions (Patient has pulse or is breathing):    Limited Support       Cecily Bain DO  04/21/24        Electronically signed by Cecily Bain DO at 04/21/24 1439       Consult Notes (last 72 hours)  Notes from 04/21/24 1249 through 04/24/24 1249   No notes of this type exist for this encounter.

## 2024-04-24 NOTE — PROGRESS NOTES
[facility] Medicine Services  PROGRESS NOTE    Patient Name: Too Tai  : 1955  MRN: [id number]    Date of Admission: 2024  Primary Care Physician: Des Geller MD    Subjective     CC: f/u dementia with agitation    HPI:  In bed. Agitated. Wife at bedside trying to feed him - she says he won't even take a drink of the cherry Coke she brought him. He remains in 4-point restraints. Neurology starting phenobarbital     Objective     Vital Signs:   Temp:  [97.4 °F (36.3 °C)-98.4 °F (36.9 °C)] 97.4 °F (36.3 °C)  Heart Rate:  [86] 86  Resp:  [16-20] 20  BP: (144-184)/(72-84) 184/80     Physical Exam:  Constitutional: No acute distress. Awake    HENT: NCAT, mucous membranes moist  Respiratory: Clear to auscultation bilaterally, respiratory effort normal on room air   Cardiovascular: RRR, no murmurs, rubs, or gallops  Gastrointestinal: Positive bowel sounds, soft, nontender, nondistended  Musculoskeletal: No bilateral ankle edema. Extremities in 4-point restraints   Psychiatric: Agitated   Neurologic: No focal deficits. Speech clear and coherent     Results Reviewed:  LAB RESULTS:      Lab 24  0829 24   WBC 10.47 9.87   HEMOGLOBIN 15.5 13.8   HEMATOCRIT 46.7 42.2   PLATELETS 307 292   NEUTROS ABS 4.74 4.68   IMMATURE GRANS (ABS) 0.04 0.05   LYMPHS ABS 3.52* 3.34*   MONOS ABS 1.33* 1.14*   EOS ABS 0.69* 0.56*   MCV 92.7 95.3         Lab 24  0829 24  2356   SODIUM 144 142   POTASSIUM 4.4 4.9   CHLORIDE 106 104   CO2 30.0* 28.0   ANION GAP 8.0 10.0   BUN 16 22   CREATININE 0.82 1.12   EGFR 96.3 72.0   GLUCOSE 166* 226*   CALCIUM 9.9 9.4   MAGNESIUM 1.9 1.8   PHOSPHORUS 3.4  --    HEMOGLOBIN A1C  --  9.50*   TSH  --  3.220         Lab 24  2356   TOTAL PROTEIN 6.9   ALBUMIN 3.9   GLOBULIN 3.0   ALT (SGPT) 13   AST (SGOT) 17   BILIRUBIN 0.3   ALK PHOS 120*         Lab 24  2356   HSTROP T 22*         Lab 24  2356   VITAMIN B 12 384      Brief Urine Lab Results  (Last result in the past 365 days)        Color   Clarity   Blood   Leuk Est   Nitrite   Protein   CREAT   Urine HCG        04/19/24 0243 Yellow   Clear   Negative   Negative   Negative   Negative                 Microbiology Results Abnormal       None          No radiology results from the last 24 hrs    Results for orders placed during the hospital encounter of 03/27/24    Adult Transthoracic Echo Complete W/ Cont if Necessary Per Protocol    Interpretation Summary    Left ventricular systolic function is normal. Calculated left ventricular EF = 55.1% Normal left ventricular cavity size noted. Left ventricular wall thickness is consistent with mild concentric hypertrophy. Left ventricular diastolic function is consistent with (grade I) impaired relaxation.    The right ventricular cavity is mildly dilated. Normal right ventricular systolic function noted.    There is calcification of the aortic valve. The aortic valve appears trileaflet. Mild aortic valve regurgitation is present. No aortic valve stenosis is present.    Mitral annular calcification is present. Trace mitral valve regurgitation is present. No significant mitral valve stenosis is present.    The tricuspid valve is structurally normal with no stenosis present. Trace tricuspid valve regurgitation is present. Insufficient TR velocity profile to estimate the right ventricular systolic pressure.    Mild dilation of the sinuses of Valsalva is present. Mild dilation of the ascending aorta is present. Ascending aorta = 4.2 cm    Current medications:  Scheduled Meds:[START ON 4/25/2024] amLODIPine, 10 mg, Oral, Q24H  cyanocobalamin, 1,000 mcg, Intramuscular, Q28 Days  donepezil, 10 mg, Oral, BID  folic acid, 1 mg, Oral, Daily  heparin (porcine), 5,000 Units, Subcutaneous, Q8H  insulin glargine, 15 Units, Subcutaneous, Q12H  insulin lispro, 2-7 Units, Subcutaneous, 4x Daily AC & at Bedtime  Insulin Lispro, 5 Units, Subcutaneous,  TID With Meals  lisinopril, 10 mg, Oral, Once  [START ON 4/25/2024] lisinopril, 30 mg, Oral, Daily  miconazole, 1 Application, Topical, Q12H  mirtazapine, 15 mg, Oral, Nightly  PHENobarbital, 32.4 mg, Oral, BID   And  PHENobarbital, 32.4 mg, Intramuscular, BID  QUEtiapine, 100 mg, Oral, BID  QUEtiapine, 200 mg, Oral, Nightly  sodium chloride, 10 mL, Intravenous, Q12H  temazepam, 30 mg, Oral, Nightly      Continuous Infusions:lactated ringers, 75 mL/hr      PRN Meds:.  acetaminophen **OR** acetaminophen **OR** acetaminophen    senna-docusate sodium **AND** polyethylene glycol **AND** bisacodyl **AND** bisacodyl    Calcium Replacement - Follow Nurse / BPA Driven Protocol    dextrose    dextrose    glucagon (human recombinant)    Magnesium Standard Dose Replacement - Follow Nurse / BPA Driven Protocol    melatonin    Phosphorus Replacement - Follow Nurse / BPA Driven Protocol    Potassium Replacement - Follow Nurse / BPA Driven Protocol    sodium chloride    sodium chloride    sodium chloride    ziprasidone    Assessment & Plan     Active Hospital Problems    Diagnosis  POA    **Agitation due to dementia [F03.911]  Yes    Agitation [R45.1]  Yes    Type 2 diabetes mellitus with hyperglycemia, with long-term current use of insulin [E11.65, Z79.4]  Not Applicable    Peripheral neuropathy [G62.9]  Yes    Hyperlipidemia [E78.5]  Yes    Benign prostatic hyperplasia [N40.0]  Yes    Benign essential HTN [I10]  Yes    Peripheral vascular disease [I73.9]  Yes    Coronary artery disease involving native coronary artery of native heart [I25.10]  Yes      Resolved Hospital Problems   No resolved problems to display.     Brief Hospital Course to date:  Too Tai is a 68yoM with PMH significant for advanced dementia, CAD, diabetes mellitus type 2, HLD and PVD. Admitted to [facility] ED on 4/18/24 for agitation / behavioral disturbances. Recently admitted to [facility] 2/24-3/4/24 for dementia with behavioral disturbances  and again 3/27-4/4/24 for similar issues.      Advanced dementia complicated by behavioral disturbance with superimposed sleep disturbance   - Infectious work up unremarkable. UA reassuring. CXR without acute findings. CT head without acute findings  - Neuro following  - Still in restraints   - Continue home Aricept and Mirtazapine 15mg QHS   - AM doses of Depakote and Seroquel were delayed by a couple of hours, resulting in the need for PRN Geodon administration. Neurology has adjusted timing of medications  - Continue Seroquel 100mg BID and 200mg QHS   - Neurology starting Phenobarbital today   - Continue Temazepam 30mg QHS  - Appreciate palliative care assistance  - Not safe to return home at this time as he is a danger to himself and his wife   - May need geriatric psych facility vs LTC - CM looking into this   - IV fluids while taking in minimal PO     DMII with hyperglycemia  - A1c 9.5%  - Continue Lantus 15 units BID, Lispro 5 units TID AC + SSI     HTN, continue Amlodipine 10mg daily  - Increase Lisinopril to 30mg daily     Expected Discharge Location and Transportation: TBD  Expected Discharge Expected Discharge Date: 4/26/2024; Expected Discharge Time:      DVT prophylaxis:Medical and mechanical DVT prophylaxis orders are present.    AM-PAC 6 Clicks Score (PT): 10 (04/23/24 1930)    CODE STATUS:   Code Status and Medical Interventions:   Ordered at: 04/19/24 1037     Medical Intervention Limits:    NO intubation (DNI)     Level Of Support Discussed With:    Next of Kin (If No Surrogate)     Code Status (Patient has no pulse and is not breathing):    No CPR (Do Not Attempt to Resuscitate)     Medical Interventions (Patient has pulse or is breathing):    Limited Support     Parisa Madrigal PA-C  04/24/24

## 2024-04-24 NOTE — PLAN OF CARE
Goal Outcome Evaluation:  Plan of Care Reviewed With: spouse, son        Progress: no change  Outcome Evaluation: Palliative consult for GOC/ACP; seen today by KIERA Rogers MD, and by Palliative RN at 1035. Pt sleeping at time of RN encounter, had received prn medication prior for agitation; remained in 4-pt restraints for pt, family, and staff safety. Neuro following, adjusting medications for severe agitated delirium; likely unsafe for pt to return home under wife's care. Palliative following for continued support to pt and family in ongoing GOC/POC discussion; Dr. Rogers discussed behavioral management strategies with Neuro APRN.    1300 Palliative IDT meeting:  MD, APRN, RN, SW,   After hours, weekends and holidays, contact Palliative Provider by calling [phone]       Problem: Palliative Care  Goal: Enhanced Quality of Life  Outcome: Ongoing, Progressing  Intervention: Promote Advance Care Planning  Flowsheets (Taken 4/24/2024 1557)  Life Transition/Adjustment:   palliative care discussed   palliative care initiated  Intervention: Maximize Comfort  Flowsheets (Taken 4/24/2024 1557)  Pain Management Interventions: (no observable signs of pain; agitated delirium being addressed by neuro) other (see comments)  Intervention: Optimize Function  Flowsheets (Taken 4/24/2024 1557)  Sensory Stimulation Regulation: quiet environment promoted  Fatigue Management: frequent rest breaks encouraged  Sleep/Rest Enhancement:   consistent schedule promoted   family presence promoted  Intervention: Optimize Psychosocial Wellbeing  Flowsheets (Taken 4/24/2024 1557)  Supportive Measures: positive reinforcement provided  Spiritual Activities Assistance: (Spiritual Care consult) other (see comments)  Family/Support System Care:   caregiver stress acknowledged   involvement promoted   presence promoted   self-care encouraged   support provided

## 2024-04-24 NOTE — PLAN OF CARE
Problem: Restraint, Nonviolent  Goal: Absence of Harm or Injury  Outcome: Ongoing, Progressing  Intervention: Implement Least Restrictive Safety Strategies  Recent Flowsheet Documentation  Taken 4/24/2024 0600 by Steve Smith, RN  Medical Device Protection:   IV pole/bag removed from visual field   skin sleeve   torso covered   tubing secured  Less Restrictive Alternative:   bed alarm in use   calming techniques promoted   environment adjusted   family presence promoted   medication offered   positive reinforcement provided   safety enhancements provided   surveillance provided   therapeutic music  De-Escalation Techniques:   appropriate behavior reinforced   increased round frequency   reoriented   stimulation decreased   verbally redirected  Diversional Activities: television  Taken 4/24/2024 0400 by Steve Smith, RN  Medical Device Protection:   IV pole/bag removed from visual field   skin sleeve   torso covered   tubing secured  Less Restrictive Alternative:   bed alarm in use   calming techniques promoted   environment adjusted   family presence promoted   medication offered   positive reinforcement provided   safety enhancements provided   surveillance provided   therapeutic music  De-Escalation Techniques:   increased round frequency   stimulation decreased   quiet time facilitated  Diversional Activities: television  Taken 4/24/2024 0200 by Steve Smith, RN  Medical Device Protection:   IV pole/bag removed from visual field   skin sleeve   torso covered   tubing secured  Less Restrictive Alternative:   bed alarm in use   calming techniques promoted   environment adjusted   family presence promoted   medication offered   positive reinforcement provided   safety enhancements provided   surveillance provided   therapeutic music  De-Escalation Techniques:   quiet time facilitated   increased round frequency  Diversional Activities: television  Taken 4/24/2024 0000 by Steve Smith  RN  Medical Device Protection:   IV pole/bag removed from visual field   torso covered   tubing secured   skin sleeve  Less Restrictive Alternative:   bed alarm in use   calming techniques promoted   environment adjusted   family presence promoted   medication offered   positive reinforcement provided   safety enhancements provided   surveillance provided   therapeutic music  De-Escalation Techniques:   increased round frequency   stimulation decreased  Diversional Activities: television  Taken 4/23/2024 2200 by Steve Smith RN  Medical Device Protection:   IV pole/bag removed from visual field   skin sleeve   torso covered   tubing secured  Less Restrictive Alternative:   bed alarm in use   calming techniques promoted   environment adjusted   family presence promoted   medication offered   positive reinforcement provided   safety enhancements provided   surveillance provided   therapeutic music  Diversional Activities: television  Taken 4/23/2024 2000 by Steve Smith, RN  Medical Device Protection:   IV pole/bag removed from visual field   skin sleeve   tubing secured   torso covered  Less Restrictive Alternative:   bed alarm in use   calming techniques promoted   environment adjusted   family presence promoted   medication offered   positive reinforcement provided   safety enhancements provided   surveillance provided   therapeutic music  De-Escalation Techniques:   reoriented   quiet time facilitated   medication administered   increased round frequency   diversional activity encouraged   verbally redirected  Diversional Activities: television  Taken 4/23/2024 1930 by Steve Smith, RN  Medical Device Protection:   IV pole/bag removed from visual field   skin sleeve   torso covered   tubing secured  Diversional Activities: television  Intervention: Protect Dignity, Rights, and Personal Wellbeing  Recent Flowsheet Documentation  Taken 4/23/2024 1930 by Steve Smith RN  Trust  Relationship/Rapport:   care explained   empathic listening provided   emotional support provided   questions answered   reassurance provided   thoughts/feelings acknowledged  Intervention: Protect Skin and Joint Integrity  Recent Flowsheet Documentation  Taken 4/24/2024 0400 by Steve Smith RN  Body Position:   neutral body alignment   turned   weight shifting  Taken 4/24/2024 0000 by Steve Smith RN  Body Position:   neutral body alignment   weight shifting  Taken 4/23/2024 1930 by Steve Smith RN  Body Position:   turned   weight shifting   legs elevated     Problem: Skin Injury Risk Increased  Goal: Skin Health and Integrity  Outcome: Ongoing, Progressing  Intervention: Optimize Skin Protection  Recent Flowsheet Documentation  Taken 4/24/2024 0400 by Steve Smith RN  Pressure Reduction Techniques:   frequent weight shift encouraged   heels elevated off bed   pressure points protected   weight shift assistance provided  Head of Bed (HOB) Positioning: [facility] elevated  Pressure Reduction Devices:   specialty bed utilized   pressure-redistributing mattress utilized   positioning supports utilized   heel offloading device utilized   foam padding utilized  Skin Protection:   adhesive use limited   incontinence pads utilized   skin-to-skin areas padded   transparent dressing maintained   tubing/devices free from skin contact  Taken 4/24/2024 0000 by Steve Smith RN  Pressure Reduction Techniques:   frequent weight shift encouraged   heels elevated off bed   pressure points protected   rest period provided between sit times   weight shift assistance provided  Head of Bed (HOB) Positioning: [facility] elevated  Pressure Reduction Devices:   specialty bed utilized   pressure-redistributing mattress utilized   positioning supports utilized   heel offloading device utilized   foam padding utilized  Skin Protection:   adhesive use limited   incontinence pads utilized   pulse oximeter probe site  changed   skin-to-skin areas padded   transparent dressing maintained   tubing/devices free from skin contact  Taken 4/23/2024 1930 by Steve Smith, RN  Pressure Reduction Techniques:   frequent weight shift encouraged   heels elevated off bed   pressure points protected   weight shift assistance provided  Head of Bed (HOB) Positioning: HOB at 20-30 degrees  Pressure Reduction Devices:   specialty bed utilized   pressure-redistributing mattress utilized   positioning supports utilized   foam padding utilized   feet on footrest/footstool   elbow protectors utilized  Skin Protection:   adhesive use limited   incontinence pads utilized   silicone foam dressing in place   skin sealant/moisture barrier applied   skin-to-device areas padded   skin-to-skin areas padded   transparent dressing maintained   tubing/devices free from skin contact     Problem: Fall Injury Risk  Goal: Absence of Fall and Fall-Related Injury  Outcome: Ongoing, Progressing  Intervention: Identify and Manage Contributors  Recent Flowsheet Documentation  Taken 4/24/2024 0400 by Steve Smith, RN  Medication Review/Management: medications reviewed  Self-Care Promotion:   independence encouraged   BADL personal objects within reach   BADL personal routines maintained  Taken 4/24/2024 0000 by Steve Smith RN  Medication Review/Management: medications reviewed  Self-Care Promotion:   independence encouraged   BADL personal objects within reach   BADL personal routines maintained  Taken 4/23/2024 1930 by Steve Smith RN  Medication Review/Management: medications reviewed  Self-Care Promotion:   independence encouraged   BADL personal objects within reach   BADL personal routines maintained   meal set-up provided   safe use of adaptive equipment encouraged  Intervention: Promote Injury-Free Environment  Recent Flowsheet Documentation  Taken 4/24/2024 0400 by Steve Smith, RN  Safety Promotion/Fall Prevention:   safety  round/check completed   room organization consistent   nonskid shoes/slippers when out of bed   clutter free environment maintained   assistive device/personal items within reach   activity supervised   fall prevention program maintained   lighting adjusted  Taken 4/24/2024 0000 by Steve Smith RN  Safety Promotion/Fall Prevention:   safety round/check completed   room organization consistent   nonskid shoes/slippers when out of bed   clutter free environment maintained   assistive device/personal items within reach   activity supervised   fall prevention program maintained   lighting adjusted  Taken 4/23/2024 1930 by Steve Smith RN  Safety Promotion/Fall Prevention:   activity supervised   room organization consistent   safety round/check completed     Problem: Adult Inpatient Plan of Care  Goal: Plan of Care Review  Outcome: Ongoing, Progressing  Goal: Patient-Specific Goal (Individualized)  Outcome: Ongoing, Progressing  Goal: Absence of Hospital-Acquired Illness or Injury  Outcome: Ongoing, Progressing  Intervention: Identify and Manage Fall Risk  Recent Flowsheet Documentation  Taken 4/24/2024 0400 by Setve Smith RN  Safety Promotion/Fall Prevention:   safety round/check completed   room organization consistent   nonskid shoes/slippers when out of bed   clutter free environment maintained   assistive device/personal items within reach   activity supervised   fall prevention program maintained   lighting adjusted  Taken 4/24/2024 0000 by Steve Smith RN  Safety Promotion/Fall Prevention:   safety round/check completed   room organization consistent   nonskid shoes/slippers when out of bed   clutter free environment maintained   assistive device/personal items within reach   activity supervised   fall prevention program maintained   lighting adjusted  Taken 4/23/2024 1930 by Steve Smith, RN  Safety Promotion/Fall Prevention:   activity supervised   room organization  consistent   safety round/check completed  Intervention: Prevent Skin Injury  Recent Flowsheet Documentation  Taken 4/24/2024 0400 by Steve Smith RN  Body Position:   neutral body alignment   turned   weight shifting  Skin Protection:   adhesive use limited   incontinence pads utilized   skin-to-skin areas padded   transparent dressing maintained   tubing/devices free from skin contact  Taken 4/24/2024 0000 by Steve Smith RN  Body Position:   neutral body alignment   weight shifting  Skin Protection:   adhesive use limited   incontinence pads utilized   pulse oximeter probe site changed   skin-to-skin areas padded   transparent dressing maintained   tubing/devices free from skin contact  Taken 4/23/2024 1930 by Steve Smith RN  Body Position:   turned   weight shifting   legs elevated  Skin Protection:   adhesive use limited   incontinence pads utilized   silicone foam dressing in place   skin sealant/moisture barrier applied   skin-to-device areas padded   skin-to-skin areas padded   transparent dressing maintained   tubing/devices free from skin contact  Intervention: Prevent and Manage VTE (Venous Thromboembolism) Risk  Recent Flowsheet Documentation  Taken 4/24/2024 0400 by Steve Smith RN  Activity Management: activity encouraged  Taken 4/24/2024 0000 by Steve Smith RN  Activity Management: activity minimized  Taken 4/23/2024 1930 by Steve Simth RN  Activity Management: bedrest  VTE Prevention/Management: (hep SQ) other (see comments)  Intervention: Prevent Infection  Recent Flowsheet Documentation  Taken 4/24/2024 0400 by Steve Smith RN  Infection Prevention:   environmental surveillance performed   rest/sleep promoted  Taken 4/24/2024 0000 by Steve Smith RN  Infection Prevention: environmental surveillance performed  Taken 4/23/2024 1930 by Steve Smith RN  Infection Prevention:   cohorting utilized   rest/sleep promoted   environmental  surveillance performed  Goal: Optimal Comfort and Wellbeing  Outcome: Ongoing, Progressing  Intervention: Monitor Pain and Promote Comfort  Recent Flowsheet Documentation  Taken 4/24/2024 0400 by Steve Smtih, RN  Pain Management Interventions:   care clustered   pillow support provided   position adjusted   quiet environment facilitated  Taken 4/24/2024 0000 by Steve Smith, RN  Pain Management Interventions:   care clustered   position adjusted   pillow support provided  Intervention: Provide Person-Centered Care  Recent Flowsheet Documentation  Taken 4/23/2024 1930 by Steve Smith, RN  Trust Relationship/Rapport:   care explained   empathic listening provided   emotional support provided   questions answered   reassurance provided   thoughts/feelings acknowledged  Goal: Readiness for Transition of Care  Outcome: Ongoing, Progressing   Goal Outcome Evaluation:

## 2024-04-24 NOTE — H&P
Des Geller MD  Consulting physician: Ken    Chief Complaint   Patient presents with    Altered Mental Status       Reason for consult: agitated delirium    HPI: 69 yo male lives with wife at home . Four years ago dxed with dementia . 6-8 months ago with neuropsych behavior problems. Otherwise ADL 1-2/6 with FAST 6 scoring per wife commnetary. Severe agitation prompted hospitalization. Sometimes violent. H/O DM, BPH, CAD, bilat PVD , neuropathy. Given Midazolam in ED. Wife knows he is declining and narcisoley needs palcement but was asked not to return to NH last time because of violence. On multpile mediscation and followed well by Neurology. Trajectory more fits Lewy Body?      Pain assesment: none    Dyspnea: none    N/V: none    PPS: 40%      Past Medical History:   Diagnosis Date    Coronary artery disease     Dementia     Diabetes mellitus     Hyperlipidemia     Peripheral vascular disease      Past Surgical History:   Procedure Laterality Date    CORONARY ARTERY BYPASS GRAFT      FEMORAL ARTERY - POPLITEAL ARTERY BYPASS GRAFT       Current Code Status       Date Active Code Status Order ID Comments User Context       4/19/2024 1037 No CPR (Do Not Attempt to Resuscitate) [id number]  Cecily Bain DO Inpatient        Question Answer    Code Status (Patient has no pulse and is not breathing) No CPR (Do Not Attempt to Resuscitate)    Medical Interventions (Patient has pulse or is breathing) Limited Support    Medical Intervention Limits: NO intubation (DNI)    Level Of Support Discussed With Next of Kin (If No Surrogate)                  Current Facility-Administered Medications   Medication Dose Route Frequency Provider Last Rate Last Admin    acetaminophen (TYLENOL) tablet 650 mg  650 mg Oral Q4H PRN Traci, Nerissa G, DO        Or    acetaminophen (TYLENOL) 160 MG/5ML oral solution 650 mg  650 mg Oral Q4H PRN Traci, Nerissa G, DO        Or    acetaminophen (TYLENOL) suppository 650 mg  650 mg Rectal Q4H  PRN Traci, Nerissa G, DO        [START ON 4/25/2024] amLODIPine (NORVASC) tablet 10 mg  10 mg Oral Q24H Calin Benitez MD        sennosides-docusate (PERICOLACE) 8.6-50 MG per tablet 2 tablet  2 tablet Oral BID PRN Traci, Nerissa G, DO        And    polyethylene glycol (MIRALAX) packet 17 g  17 g Oral Daily PRN Traci, Nerissa G, DO        And    bisacodyl (DULCOLAX) EC tablet 5 mg  5 mg Oral Daily PRN Traci, Nerissa G, DO   5 mg at 04/23/24 0859    And    bisacodyl (DULCOLAX) suppository 10 mg  10 mg Rectal Daily PRN Traci, Nerissa G, DO        Calcium Replacement - Follow Nurse / BPA Driven Protocol   Does not apply PRN Traci, Nerissa G, DO        cyanocobalamin injection 1,000 mcg  1,000 mcg Intramuscular Q28 Days Darrell Jhaveri, DO   1,000 mcg at 04/19/24 1532    dextrose (D50W) (25 g/50 mL) IV injection 25 g  25 g Intravenous Q15 Min PRN Traci, Nerissa G, DO        dextrose (GLUTOSE) oral gel 15 g  15 g Oral Q15 Min PRN Traci, Nerissa G, DO        Divalproex Sodium (DEPAKOTE SPRINKLE) capsule 500 mg  500 mg Oral Q8H Guille Jhaveriy A, DO        donepezil (ARICEPT) tablet 10 mg  10 mg Oral BID Darrell Jhaveri, DO        folic acid (FOLVITE) tablet 1 mg  1 mg Oral Daily Traci, Nerissa G, DO   1 mg at 04/24/24 0852    glucagon (GLUCAGEN) injection 1 mg  1 mg Intramuscular Q15 Min PRN Traci, Nerissa G, DO        haloperidol (HALDOL) tablet 5 mg  5 mg Oral 4x Daily PRN Tarci, Nerissa G, DO        heparin (porcine) 5000 UNIT/ML injection 5,000 Units  5,000 Units Subcutaneous Q8H Traci, Nerissa G, DO   5,000 Units at 04/24/24 0550    insulin glargine (LANTUS, SEMGLEE) injection 15 Units  15 Units Subcutaneous Q12H Cecily Bain    15 Units at 04/24/24 0854    Insulin Lispro (humaLOG) injection 2-7 Units  2-7 Units Subcutaneous 4x Daily AC & at Bedtime Nerissa Aviles DO   2 Units at 04/24/24 0856    Insulin Lispro (humaLOG) injection 5 Units  5 Units Subcutaneous TID With Meals Parisa Madrigal, HAZEL   5 Units at 04/24/24  0855    lisinopril (PRINIVIL,ZESTRIL) tablet 20 mg  20 mg Oral Daily Traci, Nerissa G, DO   20 mg at 04/24/24 0853    Magnesium Standard Dose Replacement - Follow Nurse / BPA Driven Protocol   Does not apply PRN Traci, Nerissa G, DO        melatonin tablet 5 mg  5 mg Oral Nightly PRN Traci, Nerissa G, DO        miconazole (MICOTIN) 2 % powder 1 Application  1 Application Topical Q12H Cecily Bain DO   1 Application at 04/23/24 2046    mirtazapine (REMERON) tablet 15 mg  15 mg Oral Nightly Jhaveri, Darrell A, DO        Phosphorus Replacement - Follow Nurse / BPA Driven Protocol   Does not apply PRN Traci, Nerissa G, DO        Potassium Replacement - Follow Nurse / BPA Driven Protocol   Does not apply PRN Traci, Nerissa G, DO        QUEtiapine (SEROquel) tablet 100 mg  100 mg Oral BID Kahn, April VARGAS, APRN   100 mg at 04/24/24 0852    QUEtiapine (SEROquel) tablet 200 mg  200 mg Oral Nightly Emilio, Darrell A, DO        sodium chloride 0.9 % flush 10 mL  10 mL Intravenous PRN Traci, Nerissa G, DO        sodium chloride 0.9 % flush 10 mL  10 mL Intravenous Q12H Traci, Nerissa G, DO   10 mL at 04/23/24 2047    sodium chloride 0.9 % flush 10 mL  10 mL Intravenous PRN Traci, Nerissa G, DO        sodium chloride 0.9 % infusion 40 mL  40 mL Intravenous PRN Traci, Nerissa G, DO   40 mL at 04/23/24 0546    sodium chloride 0.9 % infusion  100 mL/hr Intravenous Continuous Parisa Madrigal PA-C 100 mL/hr at 04/23/24 1534 100 mL/hr at 04/23/24 1534    temazepam (RESTORIL) capsule 30 mg  30 mg Oral Nightly Darrell Jhaveri, DO        ziprasidone (GEODON) injection 10 mg  10 mg Intramuscular Q4H PRN Femi April VARGAS, APRN   10 mg at 04/23/24 1927     sodium chloride, 100 mL/hr, Last Rate: 100 mL/hr (04/23/24 1534)        acetaminophen **OR** acetaminophen **OR** acetaminophen    senna-docusate sodium **AND** polyethylene glycol **AND** bisacodyl **AND** bisacodyl    Calcium Replacement - Follow Nurse / BPA Driven Protocol    dextrose     "dextrose    glucagon (human recombinant)    haloperidol    Magnesium Standard Dose Replacement - Follow Nurse / BPA Driven Protocol    melatonin    Phosphorus Replacement - Follow Nurse / BPA Driven Protocol    Potassium Replacement - Follow Nurse / BPA Driven Protocol    sodium chloride    sodium chloride    sodium chloride    ziprasidone  Allergies   Allergen Reactions    Bactrim [Sulfamethoxazole-Trimethoprim] Rash    Adhesive Tape Rash    Neosporin [Neomycin-Bacitracin Zn-Polymyx] Rash     Family History   Problem Relation Age of Onset    Diabetes Mother     Heart disease Father     Hypertension Father     Hyperlipidemia Father     Diabetes Sister     Diabetes Maternal Grandfather     Diabetes Sister     Diabetes Sister      Social History     Socioeconomic History    Marital status:    Tobacco Use    Smoking status: Former     Current packs/day: 0.00     Types: Cigarettes     Quit date:      Years since quittin.3     Passive exposure: Never    Smokeless tobacco: Former   Vaping Use    Vaping status: Never Used   Substance and Sexual Activity    Alcohol use: No    Drug use: No    Sexual activity: Never     Review of Systems - not able      BP (!) 184/80 (BP Location: Right arm, Patient Position: Lying)   Pulse 86   Temp 97.4 °F (36.3 °C) (Axillary)   Resp 20   Ht 188 cm (74\")   Wt 97.5 kg (215 lb)   SpO2 95%   BMI 27.60 kg/m²     Intake/Output Summary (Last 24 hours) at 2024 1117  Last data filed at 2024 1930  Gross per 24 hour   Intake 300 ml   Output --   Net 300 ml     Physical Exam:    Outburst of voacl phases, otherwise no thrashing about. Lungs clear, abd flat , ext no edema      Results from last 7 days   Lab Units 24  0829   WBC 10*3/mm3 10.47   HEMOGLOBIN g/dL 15.5   HEMATOCRIT % 46.7   PLATELETS 10*3/mm3 307     Results from last 7 days   Lab Units 24  0829 24  2356   SODIUM mmol/L 144 142   POTASSIUM mmol/L 4.4 4.9   CHLORIDE mmol/L 106 104   CO2 " "mmol/L 30.0* 28.0   BUN mg/dL 16 22   CREATININE mg/dL 0.82 1.12   CALCIUM mg/dL 9.9 9.4   BILIRUBIN mg/dL  --  0.3   ALK PHOS U/L  --  120*   ALT (SGPT) U/L  --  13   AST (SGOT) U/L  --  17   GLUCOSE mg/dL 166* 226*     Results from last 7 days   Lab Units 04/19/24  0829   SODIUM mmol/L 144   POTASSIUM mmol/L 4.4   CHLORIDE mmol/L 106   CO2 mmol/L 30.0*   BUN mg/dL 16   CREATININE mg/dL 0.82   GLUCOSE mg/dL 166*   CALCIUM mg/dL 9.9     Imaging Results (Last 72 Hours)       ** No results found for the last 72 hours. **          Impression: Plan: Agitated Delirium: Agree with medication plan. Full discussion with patient's wife at bedside. Maximize medication/ antipsychotics . Consider Phenobarbitol ( 65 mg 2-4 times a day to start with dose titration 130mg etc/ and might consider scheduled dosing for 2-3 days? ) if breakthrough medication regimen. Discussed with wife on both phenobarb and \"respite sedation for 2-3 days. Also Midazolam PRN if severe break through agitation. Placement: wife aware difficult secondary to violent behavior. Will continue to discuss as possible Lewy Body type and will get worse. ( HOLD any Haloperidol secondary to dementia type?) Will continue to follow        Time: 35 minutes    Collin Rogers MD  04/24/24  11:17 EDT           "

## 2024-04-25 LAB
GLUCOSE BLDC GLUCOMTR-MCNC: 115 MG/DL (ref 70–130)
GLUCOSE BLDC GLUCOMTR-MCNC: 135 MG/DL (ref 70–130)
GLUCOSE BLDC GLUCOMTR-MCNC: 142 MG/DL (ref 70–130)
GLUCOSE BLDC GLUCOMTR-MCNC: 71 MG/DL (ref 70–130)
PHENOBARB SERPL-MCNC: <2.4 MCG/ML (ref 10–30)
QT INTERVAL: 388 MS
QTC INTERVAL: 461 MS

## 2024-04-25 PROCEDURE — 25810000003 LACTATED RINGERS PER 1000 ML: Performed by: PHYSICIAN ASSISTANT

## 2024-04-25 PROCEDURE — 99233 SBSQ HOSP IP/OBS HIGH 50: CPT | Performed by: STUDENT IN AN ORGANIZED HEALTH CARE EDUCATION/TRAINING PROGRAM

## 2024-04-25 PROCEDURE — 82948 REAGENT STRIP/BLOOD GLUCOSE: CPT

## 2024-04-25 PROCEDURE — 80184 ASSAY OF PHENOBARBITAL: CPT | Performed by: STUDENT IN AN ORGANIZED HEALTH CARE EDUCATION/TRAINING PROGRAM

## 2024-04-25 PROCEDURE — 63710000001 INSULIN LISPRO (HUMAN) PER 5 UNITS: Performed by: PHYSICIAN ASSISTANT

## 2024-04-25 PROCEDURE — 25010000002 PHENOBARBITAL PER 120 MG: Performed by: STUDENT IN AN ORGANIZED HEALTH CARE EDUCATION/TRAINING PROGRAM

## 2024-04-25 PROCEDURE — 99232 SBSQ HOSP IP/OBS MODERATE 35: CPT | Performed by: NURSE PRACTITIONER

## 2024-04-25 PROCEDURE — 25010000002 HEPARIN (PORCINE) PER 1000 UNITS: Performed by: INTERNAL MEDICINE

## 2024-04-25 PROCEDURE — 25010000002 ZIPRASIDONE MESYLATE PER 10 MG

## 2024-04-25 PROCEDURE — 63710000001 INSULIN GLARGINE PER 5 UNITS: Performed by: INTERNAL MEDICINE

## 2024-04-25 RX ORDER — PHENOBARBITAL SODIUM 65 MG/ML
65 INJECTION, SOLUTION INTRAMUSCULAR; INTRAVENOUS 2 TIMES DAILY
Status: DISCONTINUED | OUTPATIENT
Start: 2024-04-25 | End: 2024-04-27

## 2024-04-25 RX ORDER — WATER 10 ML/10ML
INJECTION INTRAMUSCULAR; INTRAVENOUS; SUBCUTANEOUS
Status: ACTIVE
Start: 2024-04-25 | End: 2024-04-26

## 2024-04-25 RX ORDER — PHENOBARBITAL 32.4 MG/1
64.8 TABLET ORAL 2 TIMES DAILY
Status: DISCONTINUED | OUTPATIENT
Start: 2024-04-25 | End: 2024-04-27

## 2024-04-25 RX ADMIN — ZIPRASIDONE MESYLATE 10 MG: 20 INJECTION, POWDER, LYOPHILIZED, FOR SOLUTION INTRAMUSCULAR at 11:41

## 2024-04-25 RX ADMIN — INSULIN GLARGINE 15 UNITS: 100 INJECTION, SOLUTION SUBCUTANEOUS at 09:32

## 2024-04-25 RX ADMIN — HEPARIN SODIUM 5000 UNITS: 5000 INJECTION INTRAVENOUS; SUBCUTANEOUS at 21:06

## 2024-04-25 RX ADMIN — QUETIAPINE FUMARATE 100 MG: 100 TABLET ORAL at 09:34

## 2024-04-25 RX ADMIN — PHENOBARBITAL 64.8 MG: 32.4 TABLET ORAL at 20:55

## 2024-04-25 RX ADMIN — QUETIAPINE FUMARATE 100 MG: 100 TABLET ORAL at 13:17

## 2024-04-25 RX ADMIN — TEMAZEPAM 30 MG: 15 CAPSULE ORAL at 20:55

## 2024-04-25 RX ADMIN — QUETIAPINE FUMARATE 200 MG: 100 TABLET ORAL at 20:55

## 2024-04-25 RX ADMIN — MIRTAZAPINE 15 MG: 15 TABLET, FILM COATED ORAL at 21:05

## 2024-04-25 RX ADMIN — HEPARIN SODIUM 5000 UNITS: 5000 INJECTION INTRAVENOUS; SUBCUTANEOUS at 09:35

## 2024-04-25 RX ADMIN — LISINOPRIL 30 MG: 20 TABLET ORAL at 09:35

## 2024-04-25 RX ADMIN — HEPARIN SODIUM 5000 UNITS: 5000 INJECTION INTRAVENOUS; SUBCUTANEOUS at 13:17

## 2024-04-25 RX ADMIN — MICONAZOLE NITRATE 1 APPLICATION: 20 POWDER TOPICAL at 09:36

## 2024-04-25 RX ADMIN — INSULIN GLARGINE 15 UNITS: 100 INJECTION, SOLUTION SUBCUTANEOUS at 21:05

## 2024-04-25 RX ADMIN — MICONAZOLE NITRATE 1 APPLICATION: 20 POWDER TOPICAL at 20:56

## 2024-04-25 RX ADMIN — Medication 10 ML: at 09:36

## 2024-04-25 RX ADMIN — DONEPEZIL HYDROCHLORIDE 10 MG: 10 TABLET, FILM COATED ORAL at 09:33

## 2024-04-25 RX ADMIN — SODIUM CHLORIDE, POTASSIUM CHLORIDE, SODIUM LACTATE AND CALCIUM CHLORIDE 75 ML/HR: 600; 310; 30; 20 INJECTION, SOLUTION INTRAVENOUS at 16:46

## 2024-04-25 RX ADMIN — AMLODIPINE BESYLATE 10 MG: 10 TABLET ORAL at 09:34

## 2024-04-25 RX ADMIN — SENNOSIDES 1 TABLET: 8.6 TABLET, FILM COATED ORAL at 20:55

## 2024-04-25 RX ADMIN — PHENOBARBITAL SODIUM 32.4 MG: 65 INJECTION INTRAMUSCULAR at 09:35

## 2024-04-25 RX ADMIN — FOLIC ACID 1 MG: 1 TABLET ORAL at 09:33

## 2024-04-25 RX ADMIN — ZIPRASIDONE MESYLATE 10 MG: 20 INJECTION, POWDER, LYOPHILIZED, FOR SOLUTION INTRAMUSCULAR at 18:43

## 2024-04-25 RX ADMIN — Medication 10 ML: at 21:17

## 2024-04-25 RX ADMIN — INSULIN LISPRO 5 UNITS: 100 INJECTION, SOLUTION INTRAVENOUS; SUBCUTANEOUS at 13:18

## 2024-04-25 RX ADMIN — DONEPEZIL HYDROCHLORIDE 10 MG: 10 TABLET, FILM COATED ORAL at 20:55

## 2024-04-25 RX ADMIN — INSULIN LISPRO 5 UNITS: 100 INJECTION, SOLUTION INTRAVENOUS; SUBCUTANEOUS at 09:33

## 2024-04-25 NOTE — PLAN OF CARE
Problem: Palliative Care  Goal: Enhanced Quality of Life  Intervention: Optimize Psychosocial Wellbeing  Flowsheets  Taken 4/25/2024 5073  Supportive Measures: (palliative IDT: RNIRLANDA, MD IVONNE, ) other (see comments)  Taken 4/25/2024 1200  Family/Support System Care: (palliative IDT; IRLANDA MYRICK APRN, MD, ) other (see comments)   Goal Outcome Evaluation:           Progress: no change  Outcome Evaluation: Pt is very agitated and remains in 4 pt restraints. Pt is yelling and rn gave geodon.  Phenobarb titrating for agitation.  Continue palliative support.    Palliative IDT; IRLANDA Myrick, MD IVONNE,   After hours# [phone]

## 2024-04-25 NOTE — PROGRESS NOTES
Palliative Care Progress Note    Date of Admission: 4/18/2024    Subjective:  much settled with phenobarb starting. Wife ok with all.   Current Code Status       Date Active Code Status Order ID Comments User Context       4/19/2024 1037 No CPR (Do Not Attempt to Resuscitate) [id number]  Cecily Bain DO Inpatient        Question Answer    Code Status (Patient has no pulse and is not breathing) No CPR (Do Not Attempt to Resuscitate)    Medical Interventions (Patient has pulse or is breathing) Limited Support    Medical Intervention Limits: NO intubation (DNI)    Level Of Support Discussed With Next of Kin (If No Surrogate)                  No current facility-administered medications on file prior to encounter.     Current Outpatient Medications on File Prior to Encounter   Medication Sig Dispense Refill    Alcohol Swabs (ALCOHOL PREP) pads 1 swab 3 (Three) Times a Day. 200 each 0    amLODIPine (NORVASC) 10 MG tablet Take 1 tablet by mouth Daily for 30 days. 30 tablet 0    B-D UF III MINI PEN NEEDLES 31G X 5 MM misc USE AS DIRECTED 200 each 1    Blood Glucose Calibration (ACCU-CHEK LOR) solution 1 bottle by In Vitro route As Needed (Use as directed). 1 each 0    Blood Glucose Monitoring Suppl (ACCU-CHEK LOR PLUS) w/Device kit 1 kit 3 (Three) Times a Day. 1 kit 0    Divalproex Sodium (DEPAKOTE SPRINKLE) 125 MG capsule Take 2 capsules by mouth Every 8 (Eight) Hours for 30 days. 180 capsule 0    donepezil (ARICEPT) 23 MG tablet Take 1 tablet by mouth Every Night. Do NOT Crush, swallow whole      Glucagon 1 MG/0.2ML solution auto-injector Inject 1 mg under the skin into the appropriate area as directed Every 15 (Fifteen) Minutes As Needed (Hypoglycemia). 0.4 mL 3    haloperidol (HALDOL) 5 MG tablet Take 1 tablet by mouth As Needed for Agitation for up to 30 days. 30 tablet 0    Lancets (ACCU-CHEK SOFT TOUCH) lancets Check sugars 3 times daily 1 each 12    Lantus SoloStar 100 UNIT/ML injection pen Inject 20 Units  "under the skin into the appropriate area as directed 2 (Two) Times a Day. 15 mL 3    lisinopril (PRINIVIL,ZESTRIL) 20 MG tablet Take 1 tablet by mouth Daily for 30 days. 30 tablet 0    mirtazapine (REMERON) 15 MG tablet Take 1 tablet by mouth Every Night for 30 days. 30 tablet 0    QUEtiapine (SEROquel) 50 MG tablet Take 50 mg by mouth Every Morning.      QUEtiapine fumarate ER (SEROquel XR) 50 MG tablet sustained-release 24 hour tablet Take 2 tablets by mouth Every Night. 60 tablet 1    temazepam (RESTORIL) 15 MG capsule Take 1 capsule by mouth At Night As Needed for Anxiety. 14 capsule 0     lactated ringers, 75 mL/hr, Last Rate: 75 mL/hr (04/24/24 1407)        acetaminophen **OR** acetaminophen **OR** acetaminophen    senna-docusate sodium **AND** polyethylene glycol **AND** bisacodyl **AND** bisacodyl    Calcium Replacement - Follow Nurse / BPA Driven Protocol    dextrose    dextrose    glucagon (human recombinant)    Magnesium Standard Dose Replacement - Follow Nurse / BPA Driven Protocol    melatonin    Phosphorus Replacement - Follow Nurse / BPA Driven Protocol    Potassium Replacement - Follow Nurse / BPA Driven Protocol    sodium chloride    sodium chloride    sodium chloride    ziprasidone    Objective: /87 (BP Location: Right arm, Patient Position: Lying)   Pulse 84   Temp 95.3 °F (35.2 °C) (Axillary)   Resp 18   Ht 188 cm (74\")   Wt 97.5 kg (215 lb)   SpO2 95%   BMI 27.60 kg/m²      Intake/Output Summary (Last 24 hours) at 4/25/2024 1039  Last data filed at 4/24/2024 1407  Gross per 24 hour   Intake 2000 ml   Output --   Net 2000 ml     Physical Exam:    Nl exam, sleeping, no signs distress  Results from last 7 days   Lab Units 04/19/24  0829   WBC 10*3/mm3 10.47   HEMOGLOBIN g/dL 15.5   HEMATOCRIT % 46.7   PLATELETS 10*3/mm3 307     Results from last 7 days   Lab Units 04/19/24  0829 04/18/24  2356   SODIUM mmol/L 144 142   POTASSIUM mmol/L 4.4 4.9   CHLORIDE mmol/L 106 104   CO2 mmol/L " 30.0* 28.0   BUN mg/dL 16 22   CREATININE mg/dL 0.82 1.12   CALCIUM mg/dL 9.9 9.4   BILIRUBIN mg/dL  --  0.3   ALK PHOS U/L  --  120*   ALT (SGPT) U/L  --  13   AST (SGOT) U/L  --  17   GLUCOSE mg/dL 166* 226*       Impression: Plan: Agitated delirium/ Dementia: responded well to 32.5 mg BiD of Phenobarb. Wife reassured. Continue support and dc plan based on continued response of all meds . Reviewed labs meds and spoke to RN atr bedside on plan.  Time: 25 minutes      Collin Rogers MD  04/25/24  10:39 EDT

## 2024-04-25 NOTE — PROGRESS NOTES
[facility] Medicine Services  PROGRESS NOTE    Patient Name: Too Tai  : 1955  MRN: [id number]    Date of Admission: 2024  Primary Care Physician: Des Geller MD    Subjective     CC: f/u dementia with agitation    HPI:  Wife at bedside trying to get patient to eat/ drink, and take medications in his yogurt. She is tearful, exhausted. Encouraged her to go home and rest. Patient did sleep last night, and is currently sitting up at end of bed in 4 point restraints, very agitated, yelling. No new needs from staff or wife.     Objective     Vital Signs:   Temp:  [95.3 °F (35.2 °C)] 95.3 °F (35.2 °C)  Heart Rate:  [84] 84  Resp:  [18] 18  BP: (170)/(87) 170/87     Physical Exam:  Constitutional: No acute distress. Awake    HENT: NCAT, mucous membranes moist  Respiratory: Clear to auscultation bilaterally, respiratory effort normal on room air   Cardiovascular: RRR, no murmurs, rubs, or gallops  Gastrointestinal: Positive bowel sounds, soft, nontender, nondistended  Musculoskeletal: No bilateral ankle edema. Extremities in 4-point restraints   Psychiatric: Agitated   Neurologic: No focal deficits. Speech clear and coherent     Results Reviewed:  LAB RESULTS:      Lab 24  0829 24  2356   WBC 10.47 9.87   HEMOGLOBIN 15.5 13.8   HEMATOCRIT 46.7 42.2   PLATELETS 307 292   NEUTROS ABS 4.74 4.68   IMMATURE GRANS (ABS) 0.04 0.05   LYMPHS ABS 3.52* 3.34*   MONOS ABS 1.33* 1.14*   EOS ABS 0.69* 0.56*   MCV 92.7 95.3         Lab 24  0829 24  2356   SODIUM 144 142   POTASSIUM 4.4 4.9   CHLORIDE 106 104   CO2 30.0* 28.0   ANION GAP 8.0 10.0   BUN 16 22   CREATININE 0.82 1.12   EGFR 96.3 72.0   GLUCOSE 166* 226*   CALCIUM 9.9 9.4   MAGNESIUM 1.9 1.8   PHOSPHORUS 3.4  --    HEMOGLOBIN A1C  --  9.50*   TSH  --  3.220         Lab 24  2356   TOTAL PROTEIN 6.9   ALBUMIN 3.9   GLOBULIN 3.0   ALT (SGPT) 13   AST (SGOT) 17   BILIRUBIN 0.3   ALK PHOS 120*          Lab 04/18/24  2356   HSTROP T 22*         Lab 04/18/24  2356   VITAMIN B 12 384     Brief Urine Lab Results  (Last result in the past 365 days)        Color   Clarity   Blood   Leuk Est   Nitrite   Protein   CREAT   Urine HCG        04/19/24 0243 Yellow   Clear   Negative   Negative   Negative   Negative                 Microbiology Results Abnormal       None          No radiology results from the last 24 hrs    Results for orders placed during the hospital encounter of 03/27/24    Adult Transthoracic Echo Complete W/ Cont if Necessary Per Protocol    Interpretation Summary    Left ventricular systolic function is normal. Calculated left ventricular EF = 55.1% Normal left ventricular cavity size noted. Left ventricular wall thickness is consistent with mild concentric hypertrophy. Left ventricular diastolic function is consistent with (grade I) impaired relaxation.    The right ventricular cavity is mildly dilated. Normal right ventricular systolic function noted.    There is calcification of the aortic valve. The aortic valve appears trileaflet. Mild aortic valve regurgitation is present. No aortic valve stenosis is present.    Mitral annular calcification is present. Trace mitral valve regurgitation is present. No significant mitral valve stenosis is present.    The tricuspid valve is structurally normal with no stenosis present. Trace tricuspid valve regurgitation is present. Insufficient TR velocity profile to estimate the right ventricular systolic pressure.    Mild dilation of the sinuses of Valsalva is present. Mild dilation of the ascending aorta is present. Ascending aorta = 4.2 cm    Current medications:  Scheduled Meds:amLODIPine, 10 mg, Oral, Q24H  cyanocobalamin, 1,000 mcg, Intramuscular, Q28 Days  donepezil, 10 mg, Oral, BID  folic acid, 1 mg, Oral, Daily  heparin (porcine), 5,000 Units, Subcutaneous, Q8H  insulin glargine, 15 Units, Subcutaneous, Q12H  insulin lispro, 2-7 Units, Subcutaneous, 4x  Daily AC & at Bedtime  Insulin Lispro, 5 Units, Subcutaneous, TID With Meals  lisinopril, 30 mg, Oral, Daily  miconazole, 1 Application, Topical, Q12H  mirtazapine, 15 mg, Oral, Nightly  PHENobarbital, 32.4 mg, Oral, BID   Or  PHENobarbital, 32.4 mg, Intramuscular, BID  QUEtiapine, 100 mg, Oral, BID  QUEtiapine, 200 mg, Oral, Nightly  senna, 1 tablet, Oral, Nightly  sodium chloride, 10 mL, Intravenous, Q12H  temazepam, 30 mg, Oral, Nightly      Continuous Infusions:lactated ringers, 75 mL/hr, Last Rate: 75 mL/hr (04/24/24 1407)      PRN Meds:.  acetaminophen **OR** acetaminophen **OR** acetaminophen    senna-docusate sodium **AND** polyethylene glycol **AND** bisacodyl **AND** bisacodyl    Calcium Replacement - Follow Nurse / BPA Driven Protocol    dextrose    dextrose    glucagon (human recombinant)    Magnesium Standard Dose Replacement - Follow Nurse / BPA Driven Protocol    melatonin    Phosphorus Replacement - Follow Nurse / BPA Driven Protocol    Potassium Replacement - Follow Nurse / BPA Driven Protocol    sodium chloride    sodium chloride    sodium chloride    ziprasidone    Assessment & Plan     Active Hospital Problems    Diagnosis  POA    **Agitation due to dementia [F03.911]  Yes    Agitation [R45.1]  Yes    Type 2 diabetes mellitus with hyperglycemia, with long-term current use of insulin [E11.65, Z79.4]  Not Applicable    Peripheral neuropathy [G62.9]  Yes    Hyperlipidemia [E78.5]  Yes    Benign prostatic hyperplasia [N40.0]  Yes    Benign essential HTN [I10]  Yes    Peripheral vascular disease [I73.9]  Yes    Coronary artery disease involving native coronary artery of native heart [I25.10]  Yes      Resolved Hospital Problems   No resolved problems to display.     Brief Hospital Course to date:  Too Tai is a 68yoM with PMH significant for advanced dementia, CAD, diabetes mellitus type 2, HLD and PVD. Admitted to [facility] ED on 4/18/24 for agitation / behavioral disturbances.  Recently admitted to [facility] 2/24-3/4/24 for dementia with behavioral disturbances and again 3/27-4/4/24 for similar issues.      Advanced dementia complicated by behavioral disturbance with superimposed sleep disturbance   - Infectious work up unremarkable. UA reassuring. CXR without acute findings. CT head without acute findings  - Neuro following  - Still in restraints   - Continue home Aricept and Mirtazapine 15mg QHS   - AM doses of Depakote and Seroquel were delayed by a couple of hours, resulting in the need for PRN Geodon administration. Neurology has adjusted timing of medications  - Continue Seroquel 100mg BID and 200mg QHS   - Neurology starting Phenobarbital today   - Continue Temazepam 30mg QHS  - Appreciate palliative care assistance  - Not safe to return home at this time as he is a danger to himself and his wife   - May need geriatric psych facility vs LTC - CM looking into this   - IV fluids while taking in minimal PO     Uncontrolled diabetes mellitus type 2  - Appears not on any home meds for DM  - A1c 9.5%  - Continue Lantus 15 units BID, Lispro 5 units TID AC + SSI     HTN, continue Amlodipine 10mg daily  - Increase Lisinopril to 30mg daily     Expected Discharge Location and Transportation: TBD  Expected Discharge Expected Discharge Date: 4/29/2024; Expected Discharge Time:      DVT prophylaxis:Medical and mechanical DVT prophylaxis orders are present.    AM-PAC 6 Clicks Score (PT): 6 (04/25/24 0800)    CODE STATUS:   Code Status and Medical Interventions:   Ordered at: 04/19/24 1037     Medical Intervention Limits:    NO intubation (DNI)     Level Of Support Discussed With:    Next of Kin (If No Surrogate)     Code Status (Patient has no pulse and is not breathing):    No CPR (Do Not Attempt to Resuscitate)     Medical Interventions (Patient has pulse or is breathing):    Limited Support     Abbi Blackmon, APRN  04/25/24

## 2024-04-25 NOTE — PROGRESS NOTES
[facility] Neurology    Progress Note    Patient Name: Too Tai  : 1955  MRN: [id number]  Primary Care Physician:  Des Geller MD  Date of admission: 2024    Subjective     Reason for consult: Dementia with behavioral disturbances    History of Present Illness   Started on phenobarb yesterday.  Patient initially refused nighttime medications, however wife was able to get him to take them later in the evening.  Per bedside RN he slept well overnight.  But did become agitated this morning requiring Geodon.  He is now resting comfortably.    Review of Systems   Unable to obtain    Objective     Vitals:   Temp:  [95.3 °F (35.2 °C)] 95.3 °F (35.2 °C)  Heart Rate:  [84] 84  Resp:  [18] 18  BP: (170-171)/(81-87) 170/87    Neurologic Exam   Mental status/Cognition: Initially awake, falls asleep during questioning.  Given agitation did not wake for formal exam      Current Medications    Current Facility-Administered Medications:     acetaminophen (TYLENOL) tablet 650 mg, 650 mg, Oral, Q4H PRN **OR** acetaminophen (TYLENOL) 160 MG/5ML oral solution 650 mg, 650 mg, Oral, Q4H PRN **OR** acetaminophen (TYLENOL) suppository 650 mg, 650 mg, Rectal, Q4H PRN, Traci, Nerissa G, DO    amLODIPine (NORVASC) tablet 10 mg, 10 mg, Oral, Q24H, Calin Benitez MD, 10 mg at 24 0934    sennosides-docusate (PERICOLACE) 8.6-50 MG per tablet 2 tablet, 2 tablet, Oral, BID PRN **AND** polyethylene glycol (MIRALAX) packet 17 g, 17 g, Oral, Daily PRN **AND** bisacodyl (DULCOLAX) EC tablet 5 mg, 5 mg, Oral, Daily PRN, 5 mg at 24 0859 **AND** bisacodyl (DULCOLAX) suppository 10 mg, 10 mg, Rectal, Daily PRN, Traci, Nerissa G, DO    Calcium Replacement - Follow Nurse / BPA Driven Protocol, , Does not apply, PRN, Traci, Nerissa G, DO    cyanocobalamin injection 1,000 mcg, 1,000 mcg, Intramuscular, Q28 Days, Darrell Jhaveri DO, 1,000 mcg at 24 1532    dextrose (D50W) (25 g/50 mL) IV injection 25 g, 25 g,  Intravenous, Q15 Min PRN, TraciKamala bacasie G, DO    dextrose (GLUTOSE) oral gel 15 g, 15 g, Oral, Q15 Min PRN, Traci Nerissa G, DO    donepezil (ARICEPT) tablet 10 mg, 10 mg, Oral, BID, Darrell Jhaveri, DO, 10 mg at 04/25/24 0933    folic acid (FOLVITE) tablet 1 mg, 1 mg, Oral, Daily, TraciKamalaNerissa G, DO, 1 mg at 04/25/24 0933    glucagon (GLUCAGEN) injection 1 mg, 1 mg, Intramuscular, Q15 Min PRN, Traci, Nerissa G, DO    heparin (porcine) 5000 UNIT/ML injection 5,000 Units, 5,000 Units, Subcutaneous, Q8H, Tresa Avilese G, DO, 5,000 Units at 04/25/24 0935    insulin glargine (LANTUS, SEMGLEE) injection 15 Units, 15 Units, Subcutaneous, Q12H, Cecily Bain, DO, 15 Units at 04/25/24 0932    Insulin Lispro (humaLOG) injection 2-7 Units, 2-7 Units, Subcutaneous, 4x Daily AC & at Bedtime, Nerissa Aviles, DO, 4 Units at 04/24/24 1716    Insulin Lispro (humaLOG) injection 5 Units, 5 Units, Subcutaneous, TID With Meals, Parisa Madrigal PA-C, 5 Units at 04/25/24 0933    lactated ringers infusion, 75 mL/hr, Intravenous, Continuous, Parisa Madrigal PA-C, Last Rate: 75 mL/hr at 04/24/24 1407, 75 mL/hr at 04/24/24 1407    lisinopril (PRINIVIL,ZESTRIL) tablet 30 mg, 30 mg, Oral, Daily, Parisa Madrigal PA-C, 30 mg at 04/25/24 0935    Magnesium Standard Dose Replacement - Follow Nurse / BPA Driven Protocol, , Does not apply, PRN, Traci, Nerissa G, DO    melatonin tablet 5 mg, 5 mg, Oral, Nightly PRN, Traci, Nerissa G, DO    miconazole (MICOTIN) 2 % powder 1 Application, 1 Application, Topical, Q12H, Cecily Bain DO, 1 Application at 04/25/24 0936    mirtazapine (REMERON) tablet 15 mg, 15 mg, Oral, Nightly, Darrell Jhaveri DO    PHENobarbital tablet 32.4 mg, 32.4 mg, Oral, BID, 32.4 mg at 04/24/24 1406 **OR** PHENobarbital injection 32.4 mg, 32.4 mg, Intramuscular, BID, Darrell Jhaveri DO, 32.4 mg at 04/25/24 0935    Phosphorus Replacement - Follow Nurse / BPA Driven Protocol, , Does not apply, PRN, Traci,  Nerissa BREEN DO    Potassium Replacement - Follow Nurse / BPA Driven Protocol, , Does not apply, PRN, Traci, Nerissa G, DO    QUEtiapine (SEROquel) tablet 100 mg, 100 mg, Oral, BID, Meliza Kahn, APRN, 100 mg at 04/25/24 0934    QUEtiapine (SEROquel) tablet 200 mg, 200 mg, Oral, Nightly, Darrell Jhaveri,     senna (SENOKOT) tablet 1 tablet, 1 tablet, Oral, Nightly, Collin Rogers MD    sodium chloride 0.9 % flush 10 mL, 10 mL, Intravenous, PRN, Traci, Nerissa G, DO    sodium chloride 0.9 % flush 10 mL, 10 mL, Intravenous, Q12H, Traci, Nerissa G, DO, 10 mL at 04/25/24 0936    sodium chloride 0.9 % flush 10 mL, 10 mL, Intravenous, PRN, Traci, Nerissa G, DO    sodium chloride 0.9 % infusion 40 mL, 40 mL, Intravenous, PRN, Traci, Nerissa G, DO, 40 mL at 04/23/24 0546    temazepam (RESTORIL) capsule 30 mg, 30 mg, Oral, Nightly, Darrell Jhaveri,     ziprasidone (GEODON) injection 10 mg, 10 mg, Intramuscular, Q4H PRN, Meliza Kahn, APRN, 10 mg at 04/24/24 1045    Laboratory Results:   Lab Results   Component Value Date    GLUCOSE 166 (H) 04/19/2024    CALCIUM 9.9 04/19/2024     04/19/2024    K 4.4 04/19/2024    CO2 30.0 (H) 04/19/2024     04/19/2024    BUN 16 04/19/2024    CREATININE 0.82 04/19/2024    EGFRIFAFRI 102 02/21/2022    EGFRIFNONA 88 02/21/2022    BCR 19.5 04/19/2024    ANIONGAP 8.0 04/19/2024     Lab Results   Component Value Date    WBC 10.47 04/19/2024    HGB 15.5 04/19/2024    HCT 46.7 04/19/2024    MCV 92.7 04/19/2024     04/19/2024     Lab Results   Component Value Date    CHOL 220 (H) 01/08/2024    CHOL 120 08/01/2019    CHOL 117 04/15/2019     Lab Results   Component Value Date    HDL 41 01/08/2024    HDL 37 (L) 08/03/2023    HDL 41 04/03/2023     Lab Results   Component Value Date     (H) 01/08/2024     (H) 08/03/2023     (H) 04/03/2023     Lab Results   Component Value Date    TRIG 108 01/08/2024    TRIG 208 (H) 08/03/2023    TRIG 138 04/03/2023     Lab Results    Component Value Date    HGBA1C 9.50 (H) 04/18/2024     Lab Results   Component Value Date    FOLATE 10.20 02/24/2024     Lab Results   Component Value Date    [id number] 384 04/18/2024     Phenobarbital level <2.4    Images/Procedures   CT head 4/19/2024  Moderate chronic small vessel ischemic change.  No acute intracranial abnormality    Assessment / Plan   Active Hospital Problems:      Brief Patient Summary:  Too Tai is a 68 y.o. male with history of advanced dementia with behavioral disturbances, CAD, diabetes type 2, hyperlipidemia presenting with increasing agitation.  Patient sleeping well at night, but continues to have ongoing agitation throughout the day.  Will continue to increase phenobarbital as tolerated.    Plan:   -Increase phenobarbital to 64.8 mg twice daily  -Phenobarbital level tomorrow a.m.  -Remeron 15 mg nightly  -Aricept 10 mg nightly  -Seroquel 100 mg/100 mg / 200 mg  -Restoril 30 mg nightly  -IM Geodon 10 mg as needed for agitation    I have discussed the above with the patient, family, bedside RN  Time spent with patient: 45 minutes in face-to-face evaluation and management of the patient.      Darrell Jhaveri DO, MS

## 2024-04-25 NOTE — PLAN OF CARE
Problem: Restraint, Nonviolent  Goal: Absence of Harm or Injury  Outcome: Ongoing, Not Progressing  Intervention: Implement Least Restrictive Safety Strategies  Recent Flowsheet Documentation  Taken 4/25/2024 1000 by Elfego George RN  Less Restrictive Alternative:   bed alarm in use   calming techniques promoted   environment adjusted  De-Escalation Techniques:   diversional activity encouraged   stimulation decreased  Diversional Activities: music  Taken 4/25/2024 0800 by Elfego George RN  Medical Device Protection:   IV pole/bag removed from visual field   tubing secured  Less Restrictive Alternative:   bed alarm in use   calming techniques promoted   environment adjusted  De-Escalation Techniques:   diversional activity encouraged   medication administered   stimulation decreased  Diversional Activities: music  Intervention: Protect Skin and Joint Integrity  Recent Flowsheet Documentation  Taken 4/25/2024 1600 by Elfego George RN  Body Position: supine  Taken 4/25/2024 1400 by Elfego George RN  Body Position: supine  Taken 4/25/2024 1200 by Elfego George RN  Body Position: supine  Taken 4/25/2024 1000 by Elfego George RN  Body Position: position changed independently  Taken 4/25/2024 0800 by Elfego George RN  Body Position: supine     Problem: Skin Injury Risk Increased  Goal: Skin Health and Integrity  Outcome: Ongoing, Not Progressing  Intervention: Optimize Skin Protection  Recent Flowsheet Documentation  Taken 4/25/2024 1600 by Elfego George RN  Pressure Reduction Techniques: frequent weight shift encouraged  Head of Bed (HOB) Positioning: HOB at 30 degrees  Pressure Reduction Devices: specialty bed utilized  Skin Protection:   adhesive use limited   incontinence pads utilized   transparent dressing maintained   tubing/devices free from skin contact  Taken 4/25/2024 1400 by Elfego George RN  Pressure Reduction Techniques: frequent weight shift encouraged  Head of  Bed ([facility]) Positioning: HOB at 30 degrees  Pressure Reduction Devices: specialty bed utilized  Skin Protection:   adhesive use limited   incontinence pads utilized   transparent dressing maintained   tubing/devices free from skin contact  Taken 4/25/2024 1200 by Elfego George RN  Pressure Reduction Techniques: frequent weight shift encouraged  Head of Bed (HOB) Positioning: HOB at 30-45 degrees  Pressure Reduction Devices: specialty bed utilized  Skin Protection:   adhesive use limited   incontinence pads utilized   transparent dressing maintained   tubing/devices free from skin contact  Taken 4/25/2024 1000 by Elfego George RN  Head of Bed ([facility]) Positioning: HOB at 30 degrees  Taken 4/25/2024 0800 by Elfego George RN  Head of Bed ([facility]) Positioning: HOB at 30-45 degrees     Problem: Fall Injury Risk  Goal: Absence of Fall and Fall-Related Injury  Outcome: Ongoing, Not Progressing  Intervention: Identify and Manage Contributors  Recent Flowsheet Documentation  Taken 4/25/2024 1600 by Elfego George RN  Medication Review/Management: medications reviewed  Taken 4/25/2024 1400 by Elfego George RN  Medication Review/Management: medications reviewed  Taken 4/25/2024 1200 by Elfego George RN  Medication Review/Management: medications reviewed  Taken 4/25/2024 1000 by Elfego George RN  Medication Review/Management: medications reviewed  Taken 4/25/2024 0800 by Elfego George RN  Medication Review/Management: medications reviewed  Intervention: Promote Injury-Free Environment  Recent Flowsheet Documentation  Taken 4/25/2024 1600 by Elfego George RN  Safety Promotion/Fall Prevention:   activity supervised   assistive device/personal items within reach   clutter free environment maintained   fall prevention program maintained   lighting adjusted   nonskid shoes/slippers when out of bed   room organization consistent   safety round/check completed  Taken 4/25/2024 1400 by Ariel  MANJINDER Telles  Safety Promotion/Fall Prevention:   activity supervised   assistive device/personal items within reach   clutter free environment maintained   fall prevention program maintained   lighting adjusted   nonskid shoes/slippers when out of bed   room organization consistent   safety round/check completed  Taken 4/25/2024 1200 by Elfego George RN  Safety Promotion/Fall Prevention:   activity supervised   assistive device/personal items within reach   clutter free environment maintained   fall prevention program maintained   lighting adjusted   nonskid shoes/slippers when out of bed   room organization consistent   safety round/check completed  Taken 4/25/2024 1000 by Elfego George, RN  Safety Promotion/Fall Prevention:   activity supervised   assistive device/personal items within reach   clutter free environment maintained   fall prevention program maintained   lighting adjusted   nonskid shoes/slippers when out of bed   room organization consistent   safety round/check completed  Taken 4/25/2024 0800 by Elfego George, RN  Safety Promotion/Fall Prevention:   activity supervised   assistive device/personal items within reach   clutter free environment maintained   fall prevention program maintained   lighting adjusted   nonskid shoes/slippers when out of bed   room organization consistent   safety round/check completed     Problem: Adult Inpatient Plan of Care  Goal: Plan of Care Review  Outcome: Ongoing, Not Progressing  Goal: Patient-Specific Goal (Individualized)  Outcome: Ongoing, Not Progressing  Goal: Absence of Hospital-Acquired Illness or Injury  Outcome: Ongoing, Not Progressing  Intervention: Identify and Manage Fall Risk  Recent Flowsheet Documentation  Taken 4/25/2024 1600 by Elfego George, RN  Safety Promotion/Fall Prevention:   activity supervised   assistive device/personal items within reach   clutter free environment maintained   fall prevention program maintained   lighting  adjusted   nonskid shoes/slippers when out of bed   room organization consistent   safety round/check completed  Taken 4/25/2024 1400 by Elfego George RN  Safety Promotion/Fall Prevention:   activity supervised   assistive device/personal items within reach   clutter free environment maintained   fall prevention program maintained   lighting adjusted   nonskid shoes/slippers when out of bed   room organization consistent   safety round/check completed  Taken 4/25/2024 1200 by Elfego George RN  Safety Promotion/Fall Prevention:   activity supervised   assistive device/personal items within reach   clutter free environment maintained   fall prevention program maintained   lighting adjusted   nonskid shoes/slippers when out of bed   room organization consistent   safety round/check completed  Taken 4/25/2024 1000 by Elfego George RN  Safety Promotion/Fall Prevention:   activity supervised   assistive device/personal items within reach   clutter free environment maintained   fall prevention program maintained   lighting adjusted   nonskid shoes/slippers when out of bed   room organization consistent   safety round/check completed  Taken 4/25/2024 0800 by Elfego George RN  Safety Promotion/Fall Prevention:   activity supervised   assistive device/personal items within reach   clutter free environment maintained   fall prevention program maintained   lighting adjusted   nonskid shoes/slippers when out of bed   room organization consistent   safety round/check completed  Intervention: Prevent Skin Injury  Recent Flowsheet Documentation  Taken 4/25/2024 1600 by Elfego George RN  Body Position: supine  Skin Protection:   adhesive use limited   incontinence pads utilized   transparent dressing maintained   tubing/devices free from skin contact  Taken 4/25/2024 1400 by Elfego George RN  Body Position: supine  Skin Protection:   adhesive use limited   incontinence pads utilized   transparent  dressing maintained   tubing/devices free from skin contact  Taken 4/25/2024 1200 by Elfego George RN  Body Position: supine  Skin Protection:   adhesive use limited   incontinence pads utilized   transparent dressing maintained   tubing/devices free from skin contact  Taken 4/25/2024 1000 by Elfego George RN  Body Position: position changed independently  Taken 4/25/2024 0800 by Elfego George RN  Body Position: supine  Intervention: Prevent and Manage VTE (Venous Thromboembolism) Risk  Recent Flowsheet Documentation  Taken 4/25/2024 1600 by Elfego George RN  Activity Management: activity minimized  Taken 4/25/2024 1400 by Elfego George RN  Activity Management: activity minimized  Taken 4/25/2024 1200 by Elfego George RN  Activity Management: activity minimized  Taken 4/25/2024 1000 by Elfego George RN  Activity Management: activity minimized  Taken 4/25/2024 0800 by Elfego George RN  Activity Management: activity minimized  Intervention: Prevent Infection  Recent Flowsheet Documentation  Taken 4/25/2024 1600 by Elfego George RN  Infection Prevention:   environmental surveillance performed   hand hygiene promoted   rest/sleep promoted  Taken 4/25/2024 1400 by Elfego George RN  Infection Prevention:   environmental surveillance performed   hand hygiene promoted   rest/sleep promoted  Taken 4/25/2024 1200 by Elfego George RN  Infection Prevention:   environmental surveillance performed   hand hygiene promoted   rest/sleep promoted  Taken 4/25/2024 1000 by Elfego George RN  Infection Prevention:   environmental surveillance performed   hand hygiene promoted   rest/sleep promoted  Goal: Optimal Comfort and Wellbeing  Outcome: Ongoing, Not Progressing  Goal: Readiness for Transition of Care  Outcome: Ongoing, Not Progressing     Problem: Palliative Care  Goal: Enhanced Quality of Life  Outcome: Ongoing, Not Progressing  Intervention: Optimize Function  Recent  Flowsheet Documentation  Taken 4/25/2024 0800 by Elfego George, RN  Sensory Stimulation Regulation:   auditory stimulation minimized   care clustered   quiet environment promoted   visual stimulation minimized   Goal Outcome Evaluation:   Plan of care reviewed with: Patient & spouse

## 2024-04-25 NOTE — CASE MANAGEMENT/SOCIAL WORK
Continued Stay Note  [facility]     Patient Name: Too Tai  MRN: [id number]  Today's Date: 4/25/2024    Admit Date: 4/18/2024    Plan: TBD   Discharge Plan       Row Name 04/25/24 1220       Plan    Plan TBD    Patient/Family in Agreement with Plan yes    Plan Comments Patient is not medically ready for referrals to be sent due to continued need for restraints and PRN medications. Neurology & palliative are following. Continued medication adjustments. His goal is LTC. LTC placement will be dependent on LTC bed availability, a facility that will offer the patient a bed & private pay costs, once patient is stabilized. CM spoke to wife at bedside today to update. She is aware of the private pay range of monthly costs for LTC. CM updated MSW Brenda. CM will continue to follow and assist.    Final Discharge Disposition Code 30 - still a patient                   Discharge Codes    No documentation.                 Expected Discharge Date and Time       Expected Discharge Date Expected Discharge Time    Apr 29, 2024               Ilene Brown RN

## 2024-04-26 LAB
GLUCOSE BLDC GLUCOMTR-MCNC: 135 MG/DL (ref 70–130)
GLUCOSE BLDC GLUCOMTR-MCNC: 174 MG/DL (ref 70–130)
GLUCOSE BLDC GLUCOMTR-MCNC: 204 MG/DL (ref 70–130)
GLUCOSE BLDC GLUCOMTR-MCNC: 225 MG/DL (ref 70–130)
PHENOBARB SERPL-MCNC: <2.4 MCG/ML (ref 10–30)

## 2024-04-26 PROCEDURE — 63710000001 INSULIN GLARGINE PER 5 UNITS: Performed by: INTERNAL MEDICINE

## 2024-04-26 PROCEDURE — 63710000001 INSULIN LISPRO (HUMAN) PER 5 UNITS: Performed by: INTERNAL MEDICINE

## 2024-04-26 PROCEDURE — 82948 REAGENT STRIP/BLOOD GLUCOSE: CPT

## 2024-04-26 PROCEDURE — 99232 SBSQ HOSP IP/OBS MODERATE 35: CPT | Performed by: NURSE PRACTITIONER

## 2024-04-26 PROCEDURE — 63710000001 INSULIN LISPRO (HUMAN) PER 5 UNITS: Performed by: PHYSICIAN ASSISTANT

## 2024-04-26 PROCEDURE — 99232 SBSQ HOSP IP/OBS MODERATE 35: CPT | Performed by: STUDENT IN AN ORGANIZED HEALTH CARE EDUCATION/TRAINING PROGRAM

## 2024-04-26 PROCEDURE — 25810000003 LACTATED RINGERS PER 1000 ML: Performed by: PHYSICIAN ASSISTANT

## 2024-04-26 PROCEDURE — 25010000002 HEPARIN (PORCINE) PER 1000 UNITS: Performed by: INTERNAL MEDICINE

## 2024-04-26 PROCEDURE — 80184 ASSAY OF PHENOBARBITAL: CPT | Performed by: STUDENT IN AN ORGANIZED HEALTH CARE EDUCATION/TRAINING PROGRAM

## 2024-04-26 RX ORDER — POLYETHYLENE GLYCOL 3350 17 G/17G
17 POWDER, FOR SOLUTION ORAL DAILY PRN
Status: DISCONTINUED | OUTPATIENT
Start: 2024-04-26 | End: 2024-05-09 | Stop reason: HOSPADM

## 2024-04-26 RX ORDER — BISACODYL 5 MG/1
5 TABLET, DELAYED RELEASE ORAL DAILY PRN
Status: DISCONTINUED | OUTPATIENT
Start: 2024-04-26 | End: 2024-05-09 | Stop reason: HOSPADM

## 2024-04-26 RX ORDER — BISACODYL 10 MG
10 SUPPOSITORY, RECTAL RECTAL DAILY PRN
Status: DISCONTINUED | OUTPATIENT
Start: 2024-04-26 | End: 2024-05-09 | Stop reason: HOSPADM

## 2024-04-26 RX ORDER — AMOXICILLIN 250 MG
2 CAPSULE ORAL 2 TIMES DAILY
Status: DISCONTINUED | OUTPATIENT
Start: 2024-04-26 | End: 2024-05-09 | Stop reason: HOSPADM

## 2024-04-26 RX ADMIN — PHENOBARBITAL 64.8 MG: 32.4 TABLET ORAL at 09:33

## 2024-04-26 RX ADMIN — MIRTAZAPINE 15 MG: 15 TABLET, FILM COATED ORAL at 21:06

## 2024-04-26 RX ADMIN — INSULIN LISPRO 5 UNITS: 100 INJECTION, SOLUTION INTRAVENOUS; SUBCUTANEOUS at 12:31

## 2024-04-26 RX ADMIN — MICONAZOLE NITRATE 1 APPLICATION: 20 POWDER TOPICAL at 09:34

## 2024-04-26 RX ADMIN — DONEPEZIL HYDROCHLORIDE 10 MG: 10 TABLET, FILM COATED ORAL at 21:06

## 2024-04-26 RX ADMIN — QUETIAPINE FUMARATE 200 MG: 100 TABLET ORAL at 21:06

## 2024-04-26 RX ADMIN — SENNOSIDES AND DOCUSATE SODIUM 2 TABLET: 8.6; 5 TABLET ORAL at 21:05

## 2024-04-26 RX ADMIN — Medication 10 ML: at 21:07

## 2024-04-26 RX ADMIN — DONEPEZIL HYDROCHLORIDE 10 MG: 10 TABLET, FILM COATED ORAL at 09:33

## 2024-04-26 RX ADMIN — INSULIN GLARGINE 15 UNITS: 100 INJECTION, SOLUTION SUBCUTANEOUS at 09:34

## 2024-04-26 RX ADMIN — LISINOPRIL 30 MG: 20 TABLET ORAL at 09:33

## 2024-04-26 RX ADMIN — SODIUM CHLORIDE, POTASSIUM CHLORIDE, SODIUM LACTATE AND CALCIUM CHLORIDE 75 ML/HR: 600; 310; 30; 20 INJECTION, SOLUTION INTRAVENOUS at 17:51

## 2024-04-26 RX ADMIN — INSULIN LISPRO 2 UNITS: 100 INJECTION, SOLUTION INTRAVENOUS; SUBCUTANEOUS at 21:04

## 2024-04-26 RX ADMIN — PHENOBARBITAL 64.8 MG: 32.4 TABLET ORAL at 21:05

## 2024-04-26 RX ADMIN — INSULIN GLARGINE 15 UNITS: 100 INJECTION, SOLUTION SUBCUTANEOUS at 21:05

## 2024-04-26 RX ADMIN — AMLODIPINE BESYLATE 10 MG: 10 TABLET ORAL at 09:33

## 2024-04-26 RX ADMIN — TEMAZEPAM 30 MG: 15 CAPSULE ORAL at 21:06

## 2024-04-26 RX ADMIN — QUETIAPINE FUMARATE 100 MG: 100 TABLET ORAL at 09:33

## 2024-04-26 RX ADMIN — INSULIN LISPRO 3 UNITS: 100 INJECTION, SOLUTION INTRAVENOUS; SUBCUTANEOUS at 17:52

## 2024-04-26 RX ADMIN — QUETIAPINE FUMARATE 100 MG: 100 TABLET ORAL at 13:56

## 2024-04-26 RX ADMIN — INSULIN LISPRO 5 UNITS: 100 INJECTION, SOLUTION INTRAVENOUS; SUBCUTANEOUS at 17:51

## 2024-04-26 RX ADMIN — HEPARIN SODIUM 5000 UNITS: 5000 INJECTION INTRAVENOUS; SUBCUTANEOUS at 05:33

## 2024-04-26 RX ADMIN — FOLIC ACID 1 MG: 1 TABLET ORAL at 09:33

## 2024-04-26 RX ADMIN — MICONAZOLE NITRATE 1 APPLICATION: 20 POWDER TOPICAL at 21:06

## 2024-04-26 RX ADMIN — INSULIN LISPRO 3 UNITS: 100 INJECTION, SOLUTION INTRAVENOUS; SUBCUTANEOUS at 12:31

## 2024-04-26 RX ADMIN — HEPARIN SODIUM 5000 UNITS: 5000 INJECTION INTRAVENOUS; SUBCUTANEOUS at 21:05

## 2024-04-26 RX ADMIN — Medication 10 ML: at 09:33

## 2024-04-26 RX ADMIN — HEPARIN SODIUM 5000 UNITS: 5000 INJECTION INTRAVENOUS; SUBCUTANEOUS at 13:55

## 2024-04-26 NOTE — PLAN OF CARE
Goal Outcome Evaluation:           Progress: no change  Outcome Evaluation: Pt and wife asleep.  Phenobarb titrated yesterday. No bm since 4/20. Start senna s nightly to help with constipation.  Neurology following and adjusting meds for agitation. Pt continues with intermittent agitation requiring restraints.      Palliative IDT: Rn, APRN, Sw  After hours# [phone]

## 2024-04-26 NOTE — PROGRESS NOTES
"                  Clinical Nutrition   Nutrition Assessment  Reason for Visit: LOSx8    Patient Name: Too Tai  YOB: 1955  MRN: [id number]  Date of Encounter: 04/26/24 15:35 EDT  Admission date: 4/18/2024    Comment:    Promote increased intake  Liberalize diet to allow for increased options  Provide Boost Plus 1x daily     Nutrition Assessment   Admission Diagnosis:  Agitation due to dementia [F03.911]  Agitation [R45.1]    Problem List:    Agitation due to dementia    Benign essential HTN    Benign prostatic hyperplasia    Coronary artery disease involving native coronary artery of native heart    Type 2 diabetes mellitus with hyperglycemia, with long-term current use of insulin    Hyperlipidemia    Peripheral neuropathy    Peripheral vascular disease    Agitation      PMH:   He  has a past medical history of Coronary artery disease, Dementia, Diabetes mellitus, Hyperlipidemia, and Peripheral vascular disease.    PSH:  He  has a past surgical history that includes Coronary artery bypass graft and Femoral artery - popliteal artery bypass graft.    Applicable Nutrition Concerns:   Skin:  Oral:  GI:    Applicable Interval History:       Reported/Observed/Food/Nutrition Related History:     Pt laying in bed with spouse present at bedside, spouse provided all hx. Endorsed limited intake since admission however has had increased success with finger foods. Inquired why pt couldn't have chicken tenders - educated, verbalized understanding but agreeable to liberalizing diet. Endorsed constipation - last BM ~6 days ago. FA    Anthropometrics     Height: Height: 188 cm (74\")  Last Filed Weight: Weight: 97.5 kg (215 lb) (04/18/24 2350)  Method: Weight Method: Estimated  BMI: BMI (Calculated): 27.6    UBW:     Weight      Weight (kg) Weight (lbs) Weight Method   4/3/2023 110.224 kg  243 lb     8/3/2023 110.224 kg  243 lb     11/3/2023 111.585 kg  246 lb  Stated    1/8/2024 102.967 kg  227 lb     2/24/2024 " 99.791 kg  220 lb     2/25/2024 99.8 kg  220 lb 0.3 oz  Bed scale    3/17/2024 90.719 kg  200 lb     3/27/2024 90.7 kg  199 lb 15.3 oz  Stated    3/29/2024 90.7 kg  199 lb 15.3 oz     4/18/2024 97.523 kg  215 lb  Estimated      Weight change:  UTD 2/2 limited measure weight hx    Nutrition Focused Physical Exam     Date:  4/26       Unable to perform due to Patient agitation, Pt unable to participate at time of visit     Current Nutrition Prescription   PO: Diet: Cardiac, Diabetic; Healthy Heart (2-3 Na+); Consistent Carbohydrate; Fluid Consistency: Thin (IDDSI 0)  Oral Nutrition Supplement: N/A  Intake: Insufficient data    Nutrition Diagnosis   Date:  4/26            Updated:    Problem Inadequate oral intake    Etiology Dementia, agitation   Signs/Symptoms Per spouse report   Status: New    Goal:   Nutrition to support treatment and Tolerate PO, Increase intake      Nutrition Intervention      Follow treatment progress, Care plan reviewed, Menu adjusted, Encourage intake, Supplement provided      Monitoring/Evaluation:   Per protocol, I&O, PO intake, Pertinent labs, Symptoms, POC/GOC      Destiny Rosenbaum, MS,RD,LD  Time Spent: 25min

## 2024-04-26 NOTE — PROGRESS NOTES
[facility] Neurology    Progress Note    Patient Name: Too Tai  : 1955  MRN: [id number]  Primary Care Physician:  Des Geller MD  Date of admission: 2024    Subjective     Reason for consult: Dementia with behavioral disturbances    History of Present Illness   Required 1 as needed Geodon around 1843 last night.  Some agitation this morning.    Wife at bedside states that most of his agitation revolves around care like rolling to change bed sheets or pads.    Review of Systems   Unable to obtain    Objective     Vitals:   Temp:  [97.5 °F (36.4 °C)-98.1 °F (36.7 °C)] 97.5 °F (36.4 °C)  Heart Rate:  [115] 115  Resp:  [20-22] 20  BP: (168-174)/(68-92) 171/68    Neurologic Exam   Mental status/Cognition: Awake, calm does not answer questions.  Eating lunch      Current Medications    Current Facility-Administered Medications:     acetaminophen (TYLENOL) tablet 650 mg, 650 mg, Oral, Q4H PRN **OR** acetaminophen (TYLENOL) 160 MG/5ML oral solution 650 mg, 650 mg, Oral, Q4H PRN **OR** acetaminophen (TYLENOL) suppository 650 mg, 650 mg, Rectal, Q4H PRN, Traci, Nerissa G, DO    amLODIPine (NORVASC) tablet 10 mg, 10 mg, Oral, Q24H, Calin Benitez MD, 10 mg at 24 0934    sennosides-docusate (PERICOLACE) 8.6-50 MG per tablet 2 tablet, 2 tablet, Oral, BID PRN **AND** polyethylene glycol (MIRALAX) packet 17 g, 17 g, Oral, Daily PRN **AND** bisacodyl (DULCOLAX) EC tablet 5 mg, 5 mg, Oral, Daily PRN, 5 mg at 24 0859 **AND** bisacodyl (DULCOLAX) suppository 10 mg, 10 mg, Rectal, Daily PRN, Traci, Nerissa G, DO    Calcium Replacement - Follow Nurse / BPA Driven Protocol, , Does not apply, PRN, Traci, Nerissa G, DO    cyanocobalamin injection 1,000 mcg, 1,000 mcg, Intramuscular, Q28 Days, Darrell Jhaevri DO, 1,000 mcg at 24 1532    dextrose (D50W) (25 g/50 mL) IV injection 25 g, 25 g, Intravenous, Q15 Min PRN, Nerissa Aviles DO    dextrose (GLUTOSE) oral gel 15 g, 15 g, Oral, Q15 Min  PRN, Tresa Avilese G, DO    donepezil (ARICEPT) tablet 10 mg, 10 mg, Oral, BID, Darrell Jhaveri, DO, 10 mg at 04/25/24 2055    folic acid (FOLVITE) tablet 1 mg, 1 mg, Oral, Daily, Nerissa Aviles, DO, 1 mg at 04/25/24 0933    glucagon (GLUCAGEN) injection 1 mg, 1 mg, Intramuscular, Q15 Min PRN, Nerissa Aviles G, DO    heparin (porcine) 5000 UNIT/ML injection 5,000 Units, 5,000 Units, Subcutaneous, Q8H, Nerissa Aviles, DO, 5,000 Units at 04/26/24 0533    insulin glargine (LANTUS, SEMGLEE) injection 15 Units, 15 Units, Subcutaneous, Q12H, Cecily Bain DO, 15 Units at 04/25/24 2105    Insulin Lispro (humaLOG) injection 2-7 Units, 2-7 Units, Subcutaneous, 4x Daily AC & at Bedtime, Nerissa Aviles, DO, 4 Units at 04/24/24 1716    Insulin Lispro (humaLOG) injection 5 Units, 5 Units, Subcutaneous, TID With Meals, Parisa Madrigal PA-C, 5 Units at 04/25/24 1318    lactated ringers infusion, 75 mL/hr, Intravenous, Continuous, Parisa Madrigal PA-C, Last Rate: 75 mL/hr at 04/25/24 1646, 75 mL/hr at 04/25/24 1646    lisinopril (PRINIVIL,ZESTRIL) tablet 30 mg, 30 mg, Oral, Daily, Parisa Madrigal PA-C, 30 mg at 04/25/24 0935    Magnesium Standard Dose Replacement - Follow Nurse / BPA Driven Protocol, , Does not apply, PRN, Nerissa Aviles, DO    melatonin tablet 5 mg, 5 mg, Oral, Nightly PRN, Nerissa Aviles G, DO    miconazole (MICOTIN) 2 % powder 1 Application, 1 Application, Topical, Q12H, Cecily Bain DO, 1 Application at 04/25/24 2056    mirtazapine (REMERON) tablet 15 mg, 15 mg, Oral, Nightly, Darrell Jhaveri, , 15 mg at 04/25/24 2105    PHENobarbital tablet 64.8 mg, 64.8 mg, Oral, BID, 64.8 mg at 04/25/24 2055 **OR** PHENobarbital injection 65 mg, 65 mg, Intramuscular, BID, Darrell Jhaveri DO    Phosphorus Replacement - Follow Nurse / BPA Driven Protocol, , Does not apply, PRN, Nerissa Aviles DO    Potassium Replacement - Follow Nurse / BPA Driven Protocol, , Does not apply, PRN, Nerissa Aviles  G, DO    QUEtiapine (SEROquel) tablet 100 mg, 100 mg, Oral, BID, Femi April K, APRN, 100 mg at 04/25/24 1317    QUEtiapine (SEROquel) tablet 200 mg, 200 mg, Oral, Nightly, JhaveriGuille hannony A, DO, 200 mg at 04/25/24 2055    senna (SENOKOT) tablet 1 tablet, 1 tablet, Oral, Nightly, Collin Rogers MD, 1 tablet at 04/25/24 2055    sodium chloride 0.9 % flush 10 mL, 10 mL, Intravenous, PRN, Traci, Nerissa G, DO    sodium chloride 0.9 % flush 10 mL, 10 mL, Intravenous, Q12H, Traci, Nerissa G, DO, 10 mL at 04/25/24 2117    sodium chloride 0.9 % flush 10 mL, 10 mL, Intravenous, PRN, Traci, Nerissa G, DO    sodium chloride 0.9 % infusion 40 mL, 40 mL, Intravenous, PRN, Traci, Nerissa G, DO, 40 mL at 04/23/24 0546    temazepam (RESTORIL) capsule 30 mg, 30 mg, Oral, Nightly, Jhaveri, Darrell A, DO, 30 mg at 04/25/24 2055    ziprasidone (GEODON) injection 10 mg, 10 mg, Intramuscular, Q4H PRN, Femi April K, APRN, 10 mg at 04/25/24 1843    Laboratory Results:   Lab Results   Component Value Date    GLUCOSE 166 (H) 04/19/2024    CALCIUM 9.9 04/19/2024     04/19/2024    K 4.4 04/19/2024    CO2 30.0 (H) 04/19/2024     04/19/2024    BUN 16 04/19/2024    CREATININE 0.82 04/19/2024    EGFRIFAFRI 102 02/21/2022    EGFRIFNONA 88 02/21/2022    BCR 19.5 04/19/2024    ANIONGAP 8.0 04/19/2024     Lab Results   Component Value Date    WBC 10.47 04/19/2024    HGB 15.5 04/19/2024    HCT 46.7 04/19/2024    MCV 92.7 04/19/2024     04/19/2024     Lab Results   Component Value Date    CHOL 220 (H) 01/08/2024    CHOL 120 08/01/2019    CHOL 117 04/15/2019     Lab Results   Component Value Date    HDL 41 01/08/2024    HDL 37 (L) 08/03/2023    HDL 41 04/03/2023     Lab Results   Component Value Date     (H) 01/08/2024     (H) 08/03/2023     (H) 04/03/2023     Lab Results   Component Value Date    TRIG 108 01/08/2024    TRIG 208 (H) 08/03/2023    TRIG 138 04/03/2023     Lab Results   Component Value Date    HGBA1C 9.50 (H)  04/18/2024     Lab Results   Component Value Date    FOLATE 10.20 02/24/2024     Lab Results   Component Value Date    ZCFTFRNY65 384 04/18/2024     Phenobarbital level <2.4    Images/Procedures   CT head 4/19/2024  Moderate chronic small vessel ischemic change.  No acute intracranial abnormality    Assessment / Plan   Active Hospital Problems:  Dementia with behavioral disturbance  Agitation    Brief Patient Summary:  Too Tai is a 68 y.o. male with history of advanced dementia with behavioral disturbances, CAD, diabetes type 2, hyperlipidemia presenting with increasing agitation.  Phenobarbital level remains undetectable after several doses.  Will continue to monitor for ongoing agitation.    Plan:   -Continue phenobarbital to 64.8 mg twice daily  -Phenobarbital level tomorrow a.m.  -Remeron 15 mg nightly  -Aricept 10 mg nightly  -Seroquel 100 mg/100 mg / 200 mg  -Restoril 30 mg nightly  -IM Geodon 10 mg as needed for agitation    I have discussed the above with the patient, family, bedside RN  Time spent with patient: 45 minutes in face-to-face evaluation and management of the patient.      Darrell Jhaveri DO, MS

## 2024-04-26 NOTE — PLAN OF CARE
Problem: Restraint, Nonviolent  Goal: Absence of Harm or Injury  Outcome: Ongoing, Progressing  Intervention: Implement Least Restrictive Safety Strategies  Recent Flowsheet Documentation  Taken 4/26/2024 0600 by Tammie Conteh RN  Medical Device Protection:   IV pole/bag removed from visual field   tubing secured  Less Restrictive Alternative:   bed alarm in use   sensory stimulation limited   positive reinforcement provided  De-Escalation Techniques:   appropriate behavior reinforced   diversional activity encouraged   quiet time facilitated   stimulation decreased   verbally redirected  Diversional Activities: television  Taken 4/26/2024 0400 by Tammie Conteh RN  Medical Device Protection:   IV pole/bag removed from visual field   tubing secured  Less Restrictive Alternative:   bed alarm in use   appropriate expression promoted   positive reinforcement provided   sensory stimulation limited  De-Escalation Techniques:   appropriate behavior reinforced   diversional activity encouraged   quiet time facilitated   stimulation decreased  Diversional Activities: television  Taken 4/26/2024 0200 by Tammie Conteh RN  Medical Device Protection:   IV pole/bag removed from visual field   tubing secured  Less Restrictive Alternative:   bed alarm in use   sensory stimulation limited  De-Escalation Techniques:   appropriate behavior reinforced   increased round frequency   stimulation decreased   quiet time facilitated  Diversional Activities:   music   television  Taken 4/26/2024 0000 by Tammie Conteh RN  Medical Device Protection:   IV pole/bag removed from visual field   tubing secured  Less Restrictive Alternative:   appropriate expression promoted   bed alarm in use   sensory stimulation limited  De-Escalation Techniques:   appropriate behavior reinforced   quiet time facilitated   stimulation decreased  Diversional Activities: music  Taken 4/25/2024 2200 by Tammie Conteh RN  Medical Device  Protection:   IV pole/bag removed from visual field   tubing secured  Less Restrictive Alternative:   appropriate expression promoted   bed alarm in use   calming techniques promoted   family presence promoted   positive reinforcement provided   sensory stimulation limited  De-Escalation Techniques:   appropriate behavior reinforced   diversional activity encouraged   family involvement requested   quiet time facilitated   stimulation decreased  Diversional Activities:   television   music  Taken 4/25/2024 2000 by Tammie Conteh RN  Medical Device Protection:   IV pole/bag removed from visual field   tubing secured  Less Restrictive Alternative:   bed alarm in use   calming techniques promoted   coping techniques promoted   emotional support provided   family presence promoted  De-Escalation Techniques:   appropriate behavior reinforced   diversional activity encouraged   family involvement requested   stimulation decreased   reoriented  Diversional Activities:   music   television  Intervention: Protect Dignity, Rights, and Personal Wellbeing  Recent Flowsheet Documentation  Taken 4/25/2024 2000 by Tammie Conteh RN  Trust Relationship/Rapport:   care explained   choices provided   emotional support provided   questions encouraged   reassurance provided   thoughts/feelings acknowledged  Intervention: Protect Skin and Joint Integrity  Recent Flowsheet Documentation  Taken 4/26/2024 0400 by Tammie Conteh RN  Body Position:   position changed independently   weight shifting  Taken 4/26/2024 0000 by Tammie Conteh RN  Body Position:   position changed independently   weight shifting  Taken 4/25/2024 2000 by Tammie Conteh RN  Body Position:   position changed independently   weight shifting     Problem: Skin Injury Risk Increased  Goal: Skin Health and Integrity  Outcome: Ongoing, Progressing  Intervention: Optimize Skin Protection  Recent Flowsheet Documentation  Taken 4/26/2024 0400 by Tammie Conteh  C, RN  Pressure Reduction Techniques: frequent weight shift encouraged  Head of Bed (HOB) Positioning: HOB lowered  Pressure Reduction Devices:   pressure-redistributing mattress utilized   positioning supports utilized  Skin Protection:   tubing/devices free from skin contact   transparent dressing maintained   skin-to-device areas padded  Taken 4/26/2024 0000 by Tammie Conteh RN  Pressure Reduction Techniques: frequent weight shift encouraged  Head of Bed (HOB) Positioning: HOB lowered  Pressure Reduction Devices:   pressure-redistributing mattress utilized   positioning supports utilized  Skin Protection:   tubing/devices free from skin contact   transparent dressing maintained   skin-to-device areas padded  Taken 4/25/2024 2000 by Tammie Conteh RN  Pressure Reduction Techniques:   frequent weight shift encouraged   weight shift assistance provided  Head of Bed (HOB) Positioning: HOB lowered  Pressure Reduction Devices:   pressure-redistributing mattress utilized   specialty bed utilized   heel offloading device utilized  Skin Protection:   tubing/devices free from skin contact   transparent dressing maintained   skin-to-device areas padded     Problem: Fall Injury Risk  Goal: Absence of Fall and Fall-Related Injury  Outcome: Ongoing, Progressing  Intervention: Identify and Manage Contributors  Recent Flowsheet Documentation  Taken 4/25/2024 2000 by Tammie Conteh RN  Medication Review/Management: medications reviewed  Intervention: Promote Injury-Free Environment  Recent Flowsheet Documentation  Taken 4/26/2024 0400 by Tammie Conteh RN  Safety Promotion/Fall Prevention:   assistive device/personal items within reach   clutter free environment maintained   fall prevention program maintained   nonskid shoes/slippers when out of bed   room organization consistent   safety round/check completed  Taken 4/26/2024 0000 by Tammie Conteh RN  Safety Promotion/Fall Prevention:   assistive device/personal  items within reach   clutter free environment maintained   fall prevention program maintained   nonskid shoes/slippers when out of bed   room organization consistent   safety round/check completed  Taken 4/25/2024 2000 by Tammie Conteh RN  Safety Promotion/Fall Prevention:   assistive device/personal items within reach   clutter free environment maintained   fall prevention program maintained   nonskid shoes/slippers when out of bed   room organization consistent   safety round/check completed     Problem: Adult Inpatient Plan of Care  Goal: Plan of Care Review  Outcome: Ongoing, Progressing  Goal: Patient-Specific Goal (Individualized)  Outcome: Ongoing, Progressing  Goal: Absence of Hospital-Acquired Illness or Injury  Outcome: Ongoing, Progressing  Intervention: Identify and Manage Fall Risk  Recent Flowsheet Documentation  Taken 4/26/2024 0400 by Tammie Conteh RN  Safety Promotion/Fall Prevention:   assistive device/personal items within reach   clutter free environment maintained   fall prevention program maintained   nonskid shoes/slippers when out of bed   room organization consistent   safety round/check completed  Taken 4/26/2024 0000 by Tammie Conteh RN  Safety Promotion/Fall Prevention:   assistive device/personal items within reach   clutter free environment maintained   fall prevention program maintained   nonskid shoes/slippers when out of bed   room organization consistent   safety round/check completed  Taken 4/25/2024 2000 by Tammie Conteh RN  Safety Promotion/Fall Prevention:   assistive device/personal items within reach   clutter free environment maintained   fall prevention program maintained   nonskid shoes/slippers when out of bed   room organization consistent   safety round/check completed  Intervention: Prevent Skin Injury  Recent Flowsheet Documentation  Taken 4/26/2024 0400 by Tammie Conteh RN  Body Position:   position changed independently   weight shifting  Skin  Protection:   tubing/devices free from skin contact   transparent dressing maintained   skin-to-device areas padded  Taken 4/26/2024 0000 by Tammie Conteh RN  Body Position:   position changed independently   weight shifting  Skin Protection:   tubing/devices free from skin contact   transparent dressing maintained   skin-to-device areas padded  Taken 4/25/2024 2000 by Tammie Conteh RN  Body Position:   position changed independently   weight shifting  Skin Protection:   tubing/devices free from skin contact   transparent dressing maintained   skin-to-device areas padded  Intervention: Prevent and Manage VTE (Venous Thromboembolism) Risk  Recent Flowsheet Documentation  Taken 4/26/2024 0400 by Tammie Conteh RN  Activity Management: activity encouraged  Taken 4/26/2024 0000 by Tammie Conteh RN  Activity Management: activity encouraged  Taken 4/25/2024 2000 by Tammie Conteh RN  Activity Management: activity encouraged  Intervention: Prevent Infection  Recent Flowsheet Documentation  Taken 4/26/2024 0400 by Tammie Conteh RN  Infection Prevention:   environmental surveillance performed   hand hygiene promoted   personal protective equipment utilized   single patient room provided  Taken 4/26/2024 0000 by Tammie Conteh RN  Infection Prevention:   environmental surveillance performed   hand hygiene promoted   personal protective equipment utilized   single patient room provided  Taken 4/25/2024 2000 by Tammie Conteh RN  Infection Prevention:   environmental surveillance performed   hand hygiene promoted   personal protective equipment utilized   single patient room provided  Goal: Optimal Comfort and Wellbeing  Outcome: Ongoing, Progressing  Intervention: Provide Person-Centered Care  Recent Flowsheet Documentation  Taken 4/25/2024 2000 by Tammie Conteh RN  Trust Relationship/Rapport:   care explained   choices provided   emotional support provided   questions encouraged   reassurance  provided   thoughts/feelings acknowledged  Goal: Readiness for Transition of Care  Outcome: Ongoing, Progressing     Problem: Palliative Care  Goal: Enhanced Quality of Life  Outcome: Ongoing, Progressing  Intervention: Optimize Psychosocial Wellbeing  Recent Flowsheet Documentation  Taken 4/26/2024 0400 by Tammie Conteh RN  Family/Support System Care: support provided  Taken 4/26/2024 0000 by Tammie Conteh, RN  Family/Support System Care: support provided  Taken 4/25/2024 2000 by Tammie Conteh RN  Family/Support System Care: support provided   Goal Outcome Evaluation:

## 2024-04-26 NOTE — PROGRESS NOTES
[facility] Medicine Services  PROGRESS NOTE    Patient Name: Too Tai  : 1955  MRN: [id number]    Date of Admission: 2024  Primary Care Physician: Des Geller MD    Subjective     CC: f/u dementia with agitation    HPI:  Per RN had a long night, did not sleep well. Patient and wife are currently sleeping, I did not wake. Patient has been very wild this morning per RN     Objective     Vital Signs:   Temp:  [97.5 °F (36.4 °C)-98.1 °F (36.7 °C)] 97.5 °F (36.4 °C)  Heart Rate:  [] 90  Resp:  [20-22] 20  BP: (162-174)/(68-92) 162/76     Physical Exam:  Constitutional: No acute distress. Sleeping   HENT: NCAT, mucous membranes moist  Respiratory: respiratory effort normal on room air   Cardiovascular: RRR on tele   Musculoskeletal: No bilateral ankle edema. Extremities in 4-point restraints   Psychiatric: MARIIA/ sleeping   Neurologic: MARIIA/ sleeping     Results Reviewed:  LAB RESULTS:                                Brief Urine Lab Results  (Last result in the past 365 days)        Color   Clarity   Blood   Leuk Est   Nitrite   Protein   CREAT   Urine HCG        24 0243 Yellow   Clear   Negative   Negative   Negative   Negative                 Microbiology Results Abnormal       None          No radiology results from the last 24 hrs    Results for orders placed during the hospital encounter of 24    Adult Transthoracic Echo Complete W/ Cont if Necessary Per Protocol    Interpretation Summary    Left ventricular systolic function is normal. Calculated left ventricular EF = 55.1% Normal left ventricular cavity size noted. Left ventricular wall thickness is consistent with mild concentric hypertrophy. Left ventricular diastolic function is consistent with (grade I) impaired relaxation.    The right ventricular cavity is mildly dilated. Normal right ventricular systolic function noted.    There is calcification of the aortic valve. The aortic valve appears  trileaflet. Mild aortic valve regurgitation is present. No aortic valve stenosis is present.    Mitral annular calcification is present. Trace mitral valve regurgitation is present. No significant mitral valve stenosis is present.    The tricuspid valve is structurally normal with no stenosis present. Trace tricuspid valve regurgitation is present. Insufficient TR velocity profile to estimate the right ventricular systolic pressure.    Mild dilation of the sinuses of Valsalva is present. Mild dilation of the ascending aorta is present. Ascending aorta = 4.2 cm    Current medications:  Scheduled Meds:amLODIPine, 10 mg, Oral, Q24H  cyanocobalamin, 1,000 mcg, Intramuscular, Q28 Days  donepezil, 10 mg, Oral, BID  folic acid, 1 mg, Oral, Daily  heparin (porcine), 5,000 Units, Subcutaneous, Q8H  insulin glargine, 15 Units, Subcutaneous, Q12H  insulin lispro, 2-7 Units, Subcutaneous, 4x Daily AC & at Bedtime  Insulin Lispro, 5 Units, Subcutaneous, TID With Meals  lisinopril, 30 mg, Oral, Daily  miconazole, 1 Application, Topical, Q12H  mirtazapine, 15 mg, Oral, Nightly  PHENobarbital, 64.8 mg, Oral, BID   Or  PHENobarbital, 65 mg, Intramuscular, BID  QUEtiapine, 100 mg, Oral, BID  QUEtiapine, 200 mg, Oral, Nightly  senna, 1 tablet, Oral, Nightly  sodium chloride, 10 mL, Intravenous, Q12H  temazepam, 30 mg, Oral, Nightly      Continuous Infusions:lactated ringers, 75 mL/hr, Last Rate: 75 mL/hr (04/25/24 1646)      PRN Meds:.  acetaminophen **OR** acetaminophen **OR** acetaminophen    senna-docusate sodium **AND** polyethylene glycol **AND** bisacodyl **AND** bisacodyl    Calcium Replacement - Follow Nurse / BPA Driven Protocol    dextrose    dextrose    glucagon (human recombinant)    Magnesium Standard Dose Replacement - Follow Nurse / BPA Driven Protocol    melatonin    Phosphorus Replacement - Follow Nurse / BPA Driven Protocol    Potassium Replacement - Follow Nurse / BPA Driven Protocol    sodium chloride    sodium  chloride    sodium chloride    ziprasidone    Assessment & Plan     Active Hospital Problems    Diagnosis  POA    **Agitation due to dementia [F03.911]  Yes    Agitation [R45.1]  Yes    Type 2 diabetes mellitus with hyperglycemia, with long-term current use of insulin [E11.65, Z79.4]  Not Applicable    Peripheral neuropathy [G62.9]  Yes    Hyperlipidemia [E78.5]  Yes    Benign prostatic hyperplasia [N40.0]  Yes    Benign essential HTN [I10]  Yes    Peripheral vascular disease [I73.9]  Yes    Coronary artery disease involving native coronary artery of native heart [I25.10]  Yes      Resolved Hospital Problems   No resolved problems to display.     Brief Hospital Course to date:  Too Tai is a 68yoM with PMH significant for advanced dementia, CAD, diabetes mellitus type 2, HLD and PVD. Admitted to [facility] ED on 4/18/24 for agitation / behavioral disturbances. Recently admitted to [facility] 2/24-3/4/24 for dementia with behavioral disturbances and again 3/27-4/4/24 for similar issues.      Advanced dementia complicated by behavioral disturbance with superimposed sleep disturbance   - Infectious work up unremarkable. UA reassuring. CXR without acute findings. CT head without acute findings  - Neuro following  - Still in restraints   - Continue home Aricept and Mirtazapine 15mg QHS   - AM doses of Depakote and Seroquel were delayed by a couple of hours, resulting in the need for PRN Geodon administration. Neurology has adjusted timing of medications  - Continue Seroquel 100mg BID and 200mg QHS   - Neurology started Phenobarbital; increased dose today; monitor   - Continue Temazepam 30mg QHS  - Appreciate palliative care assistance  - Not safe to return home at this time as he is a danger to himself and his wife   - May need geriatric psych facility vs LTC - CM looking into this   - IV fluids while taking in minimal PO     Uncontrolled diabetes mellitus type 2  - Appears not on any home meds for DM  - A1c  9.5%  - Continue Lantus 15 units BID, Lispro 5 units TID AC + SSI     HTN, continue Amlodipine 10mg daily  - Increase Lisinopril to 30mg daily     Expected Discharge Location and Transportation: D  Expected Discharge Expected Discharge Date: 5/1/2024; Expected Discharge Time:      DVT prophylaxis:Medical and mechanical DVT prophylaxis orders are present.    AM-PAC 6 Clicks Score (PT): 6 (04/25/24 2000)    CODE STATUS:   Code Status and Medical Interventions:   Ordered at: 04/19/24 1037     Medical Intervention Limits:    NO intubation (DNI)     Level Of Support Discussed With:    Next of Kin (If No Surrogate)     Code Status (Patient has no pulse and is not breathing):    No CPR (Do Not Attempt to Resuscitate)     Medical Interventions (Patient has pulse or is breathing):    Limited Support     Abbi Blackmon, APRN  04/26/24

## 2024-04-26 NOTE — PROGRESS NOTES
Palliative Care Progress Note    Date of Admission: 4/18/2024    Subjective:  intermittant agitation , ate andf fedd hinself this am   Current Code Status       Date Active Code Status Order ID Comments User Context       4/19/2024 1037 No CPR (Do Not Attempt to Resuscitate) [id number]  Cecily Bain DO Inpatient        Question Answer    Code Status (Patient has no pulse and is not breathing) No CPR (Do Not Attempt to Resuscitate)    Medical Interventions (Patient has pulse or is breathing) Limited Support    Medical Intervention Limits: NO intubation (DNI)    Level Of Support Discussed With Next of Kin (If No Surrogate)                  No current facility-administered medications on file prior to encounter.     Current Outpatient Medications on File Prior to Encounter   Medication Sig Dispense Refill    Alcohol Swabs (ALCOHOL PREP) pads 1 swab 3 (Three) Times a Day. 200 each 0    amLODIPine (NORVASC) 10 MG tablet Take 1 tablet by mouth Daily for 30 days. 30 tablet 0    B-D UF III MINI PEN NEEDLES 31G X 5 MM misc USE AS DIRECTED 200 each 1    Blood Glucose Calibration (ACCU-CHEK LOR) solution 1 bottle by In Vitro route As Needed (Use as directed). 1 each 0    Blood Glucose Monitoring Suppl (ACCU-CHEK LOR PLUS) w/Device kit 1 kit 3 (Three) Times a Day. 1 kit 0    Divalproex Sodium (DEPAKOTE SPRINKLE) 125 MG capsule Take 2 capsules by mouth Every 8 (Eight) Hours for 30 days. 180 capsule 0    donepezil (ARICEPT) 23 MG tablet Take 1 tablet by mouth Every Night. Do NOT Crush, swallow whole      Glucagon 1 MG/0.2ML solution auto-injector Inject 1 mg under the skin into the appropriate area as directed Every 15 (Fifteen) Minutes As Needed (Hypoglycemia). 0.4 mL 3    haloperidol (HALDOL) 5 MG tablet Take 1 tablet by mouth As Needed for Agitation for up to 30 days. 30 tablet 0    Lancets (ACCU-CHEK SOFT TOUCH) lancets Check sugars 3 times daily 1 each 12    Lantus SoloStar 100 UNIT/ML injection pen Inject 20 Units  "under the skin into the appropriate area as directed 2 (Two) Times a Day. 15 mL 3    lisinopril (PRINIVIL,ZESTRIL) 20 MG tablet Take 1 tablet by mouth Daily for 30 days. 30 tablet 0    mirtazapine (REMERON) 15 MG tablet Take 1 tablet by mouth Every Night for 30 days. 30 tablet 0    QUEtiapine (SEROquel) 50 MG tablet Take 50 mg by mouth Every Morning.      QUEtiapine fumarate ER (SEROquel XR) 50 MG tablet sustained-release 24 hour tablet Take 2 tablets by mouth Every Night. 60 tablet 1    temazepam (RESTORIL) 15 MG capsule Take 1 capsule by mouth At Night As Needed for Anxiety. 14 capsule 0     lactated ringers, 75 mL/hr, Last Rate: 75 mL/hr (04/25/24 1646)        acetaminophen **OR** acetaminophen **OR** acetaminophen    senna-docusate sodium **AND** polyethylene glycol **AND** bisacodyl **AND** bisacodyl    Calcium Replacement - Follow Nurse / BPA Driven Protocol    dextrose    dextrose    glucagon (human recombinant)    Magnesium Standard Dose Replacement - Follow Nurse / BPA Driven Protocol    melatonin    Phosphorus Replacement - Follow Nurse / BPA Driven Protocol    Potassium Replacement - Follow Nurse / BPA Driven Protocol    sodium chloride    sodium chloride    sodium chloride    ziprasidone    Objective: /76   Pulse 90   Temp 97.5 °F (36.4 °C) (Oral)   Resp 20   Ht 188 cm (74\")   Wt 97.5 kg (215 lb)   SpO2 94%   BMI 27.60 kg/m²    No intake or output data in the 24 hours ending 04/26/24 1028  Physical Exam:    Awake , stares, nonverbal, Lungs clear, abd flat bs present             Invalid input(s): \"LABALBU\", \"PROT\"    Impression: Plan: Agitated delirium: settled at this moment but episodic, PhenoB inreased to 65 bg BID . Continue Neuro follow-up for med adjustment. Wife / son at bedside and discussed placement efforts . Chart/ med reviewed.   Time: 20 minutes       Collin Rogers MD  04/26/24  10:28 EDT     "

## 2024-04-26 NOTE — PLAN OF CARE
Throughout the shift, patient would attempt to hit and scream at staff when caring for patient.   Problem: Restraint, Nonviolent  Goal: Absence of Harm or Injury  Outcome: Ongoing, Not Progressing  Intervention: Implement Least Restrictive Safety Strategies  Recent Flowsheet Documentation  Taken 4/26/2024 1800 by Desyi Chau, RN  Medical Device Protection:   IV pole/bag removed from visual field   tubing secured  Less Restrictive Alternative:   bed alarm in use   sensory stimulation limited   positive reinforcement provided  De-Escalation Techniques:   1:1 observation initiated   appropriate behavior reinforced   diversional activity encouraged   family involvement requested  Diversional Activities:   television   music  Taken 4/26/2024 1600 by Deysi Chau, RN  Medical Device Protection:   IV pole/bag removed from visual field   torso covered   tubing secured  Less Restrictive Alternative:   bed alarm in use   sensory stimulation limited   positive reinforcement provided  De-Escalation Techniques:   1:1 observation initiated   appropriate behavior reinforced   diversional activity encouraged   family involvement requested  Diversional Activities:   television   music  Taken 4/26/2024 1400 by Deysi Chau, RN  Medical Device Protection: IV pole/bag removed from visual field  Less Restrictive Alternative:   bed alarm in use   sensory stimulation limited   positive reinforcement provided  De-Escalation Techniques:   1:1 observation initiated   appropriate behavior reinforced   diversional activity encouraged   family involvement requested  Diversional Activities: television  Taken 4/26/2024 1200 by Deysi Chau, RN  Medical Device Protection: IV pole/bag removed from visual field  Less Restrictive Alternative:   bed alarm in use   sensory stimulation limited   positive reinforcement provided  De-Escalation Techniques:   1:1 observation initiated   appropriate behavior reinforced   quiet  time facilitated   reoriented   increased round frequency  Diversional Activities: television  Taken 4/26/2024 1000 by Deysi Chau RN  Medical Device Protection: IV pole/bag removed from visual field  Less Restrictive Alternative:   bed alarm in use   sensory stimulation limited   positive reinforcement provided  De-Escalation Techniques:   1:1 observation initiated   appropriate behavior reinforced   diversional activity encouraged   family involvement requested   increased round frequency  Diversional Activities: television  Taken 4/26/2024 0800 by Deysi Chau RN  Medical Device Protection: IV pole/bag removed from visual field  Less Restrictive Alternative:   bed alarm in use   sensory stimulation limited   positive reinforcement provided  De-Escalation Techniques:   appropriate behavior reinforced   1:1 observation initiated   family involvement requested   increased round frequency   medication administered  Diversional Activities: television  Intervention: Protect Dignity, Rights, and Personal Wellbeing  Recent Flowsheet Documentation  Taken 4/26/2024 0800 by Deysi Chau RN  Trust Relationship/Rapport:   care explained   choices provided   emotional support provided  Intervention: Protect Skin and Joint Integrity  Recent Flowsheet Documentation  Taken 4/26/2024 1600 by Deysi Chau RN  Body Position:   weight shifting   upper extremity elevated  Taken 4/26/2024 1400 by Deysi Chau RN  Body Position: position changed independently  Taken 4/26/2024 1200 by Deysi Chau RN  Body Position:   sitting up in bed   weight shifting  Taken 4/26/2024 1000 by Deysi Chau RN  Body Position: lower extremity elevated  Taken 4/26/2024 0800 by Deysi Chau RN  Body Position: supine, legs elevated   Goal Outcome Evaluation:

## 2024-04-26 NOTE — CASE MANAGEMENT/SOCIAL WORK
Continued Stay Note  [facility]     Patient Name: Too Tai  MRN: [id number]  Today's Date: 4/26/2024    Admit Date: 4/18/2024    Plan: TBD   Discharge Plan       Row Name 04/26/24 1547       Plan    Plan TBD    Patient/Family in Agreement with Plan yes    Plan Comments Patient is not medically ready for referrals to be sent due to continued need for restraints and PRN medications. Neurology & palliative are following. His plan is TBD. CM spoke to wife at bedside to update. CM called [facility] to get exact criteria to send a referral. Patient has to be out of restraints for 72 hours prior to a referral being faxed. CM was also told, this does not apply to PRN meds for agitation/behaviors. Referral can be faxed to [phone] once patient meets this criteria ([phone]). LTC placement would be dependent on LTC bed availability, a facility that will offer the patient a bed & private pay costs, once patient is stabilized. Wife is aware of the private pay range of monthly costs for LTC. MSW following. CM will continue to follow and assist.    Final Discharge Disposition Code 30 - still a patient                   Discharge Codes    No documentation.                 Expected Discharge Date and Time       Expected Discharge Date Expected Discharge Time    May 1, 2024               Ilene Brown RN

## 2024-04-27 LAB
GLUCOSE BLDC GLUCOMTR-MCNC: 111 MG/DL (ref 70–130)
GLUCOSE BLDC GLUCOMTR-MCNC: 188 MG/DL (ref 70–130)
GLUCOSE BLDC GLUCOMTR-MCNC: 189 MG/DL (ref 70–130)
GLUCOSE BLDC GLUCOMTR-MCNC: 220 MG/DL (ref 70–130)
PHENOBARB SERPL-MCNC: 5.2 MCG/ML (ref 10–30)

## 2024-04-27 PROCEDURE — 99232 SBSQ HOSP IP/OBS MODERATE 35: CPT | Performed by: STUDENT IN AN ORGANIZED HEALTH CARE EDUCATION/TRAINING PROGRAM

## 2024-04-27 PROCEDURE — 80184 ASSAY OF PHENOBARBITAL: CPT | Performed by: STUDENT IN AN ORGANIZED HEALTH CARE EDUCATION/TRAINING PROGRAM

## 2024-04-27 PROCEDURE — 82948 REAGENT STRIP/BLOOD GLUCOSE: CPT

## 2024-04-27 PROCEDURE — 25010000002 HEPARIN (PORCINE) PER 1000 UNITS: Performed by: INTERNAL MEDICINE

## 2024-04-27 PROCEDURE — 63710000001 INSULIN LISPRO (HUMAN) PER 5 UNITS: Performed by: PHYSICIAN ASSISTANT

## 2024-04-27 PROCEDURE — 63710000001 INSULIN LISPRO (HUMAN) PER 5 UNITS: Performed by: INTERNAL MEDICINE

## 2024-04-27 PROCEDURE — 99231 SBSQ HOSP IP/OBS SF/LOW 25: CPT | Performed by: HOSPITALIST

## 2024-04-27 PROCEDURE — 63710000001 INSULIN GLARGINE PER 5 UNITS: Performed by: INTERNAL MEDICINE

## 2024-04-27 RX ORDER — PHENOBARBITAL 32.4 MG/1
64.8 TABLET ORAL EVERY 8 HOURS SCHEDULED
Status: DISCONTINUED | OUTPATIENT
Start: 2024-04-27 | End: 2024-05-01

## 2024-04-27 RX ORDER — PHENOBARBITAL SODIUM 65 MG/ML
65 INJECTION, SOLUTION INTRAMUSCULAR; INTRAVENOUS EVERY 8 HOURS SCHEDULED
Status: DISCONTINUED | OUTPATIENT
Start: 2024-04-27 | End: 2024-05-01

## 2024-04-27 RX ADMIN — HEPARIN SODIUM 5000 UNITS: 5000 INJECTION INTRAVENOUS; SUBCUTANEOUS at 12:53

## 2024-04-27 RX ADMIN — TEMAZEPAM 30 MG: 15 CAPSULE ORAL at 20:41

## 2024-04-27 RX ADMIN — INSULIN GLARGINE 15 UNITS: 100 INJECTION, SOLUTION SUBCUTANEOUS at 08:34

## 2024-04-27 RX ADMIN — AMLODIPINE BESYLATE 10 MG: 10 TABLET ORAL at 08:35

## 2024-04-27 RX ADMIN — Medication 10 ML: at 08:36

## 2024-04-27 RX ADMIN — QUETIAPINE FUMARATE 200 MG: 100 TABLET ORAL at 20:41

## 2024-04-27 RX ADMIN — INSULIN LISPRO 3 UNITS: 100 INJECTION, SOLUTION INTRAVENOUS; SUBCUTANEOUS at 20:40

## 2024-04-27 RX ADMIN — INSULIN LISPRO 2 UNITS: 100 INJECTION, SOLUTION INTRAVENOUS; SUBCUTANEOUS at 12:46

## 2024-04-27 RX ADMIN — INSULIN LISPRO 5 UNITS: 100 INJECTION, SOLUTION INTRAVENOUS; SUBCUTANEOUS at 17:49

## 2024-04-27 RX ADMIN — LISINOPRIL 30 MG: 20 TABLET ORAL at 08:36

## 2024-04-27 RX ADMIN — QUETIAPINE FUMARATE 100 MG: 100 TABLET ORAL at 08:36

## 2024-04-27 RX ADMIN — INSULIN LISPRO 5 UNITS: 100 INJECTION, SOLUTION INTRAVENOUS; SUBCUTANEOUS at 12:46

## 2024-04-27 RX ADMIN — MICONAZOLE NITRATE 1 APPLICATION: 20 POWDER TOPICAL at 20:42

## 2024-04-27 RX ADMIN — INSULIN LISPRO 2 UNITS: 100 INJECTION, SOLUTION INTRAVENOUS; SUBCUTANEOUS at 17:49

## 2024-04-27 RX ADMIN — HEPARIN SODIUM 5000 UNITS: 5000 INJECTION INTRAVENOUS; SUBCUTANEOUS at 05:54

## 2024-04-27 RX ADMIN — MIRTAZAPINE 15 MG: 15 TABLET, FILM COATED ORAL at 20:41

## 2024-04-27 RX ADMIN — FOLIC ACID 1 MG: 1 TABLET ORAL at 08:35

## 2024-04-27 RX ADMIN — DONEPEZIL HYDROCHLORIDE 10 MG: 10 TABLET, FILM COATED ORAL at 20:41

## 2024-04-27 RX ADMIN — PHENOBARBITAL 64.8 MG: 32.4 TABLET ORAL at 21:19

## 2024-04-27 RX ADMIN — HEPARIN SODIUM 5000 UNITS: 5000 INJECTION INTRAVENOUS; SUBCUTANEOUS at 21:19

## 2024-04-27 RX ADMIN — Medication 10 ML: at 20:43

## 2024-04-27 RX ADMIN — PHENOBARBITAL 64.8 MG: 32.4 TABLET ORAL at 08:35

## 2024-04-27 RX ADMIN — SENNOSIDES AND DOCUSATE SODIUM 2 TABLET: 8.6; 5 TABLET ORAL at 20:41

## 2024-04-27 RX ADMIN — INSULIN GLARGINE 15 UNITS: 100 INJECTION, SOLUTION SUBCUTANEOUS at 20:40

## 2024-04-27 RX ADMIN — DONEPEZIL HYDROCHLORIDE 10 MG: 10 TABLET, FILM COATED ORAL at 08:35

## 2024-04-27 RX ADMIN — INSULIN LISPRO 5 UNITS: 100 INJECTION, SOLUTION INTRAVENOUS; SUBCUTANEOUS at 08:35

## 2024-04-27 RX ADMIN — MICONAZOLE NITRATE 1 APPLICATION: 20 POWDER TOPICAL at 08:36

## 2024-04-27 RX ADMIN — SENNOSIDES AND DOCUSATE SODIUM 2 TABLET: 8.6; 5 TABLET ORAL at 08:35

## 2024-04-27 RX ADMIN — QUETIAPINE FUMARATE 100 MG: 100 TABLET ORAL at 12:53

## 2024-04-27 NOTE — PLAN OF CARE
Problem: Restraint, Nonviolent  Goal: Absence of Harm or Injury  Outcome: Ongoing, Progressing  Intervention: Implement Least Restrictive Safety Strategies  Recent Flowsheet Documentation  Taken 4/27/2024 0400 by Tammie Conteh RN  Medical Device Protection:   IV pole/bag removed from visual field   tubing secured  Less Restrictive Alternative:   bed alarm in use   calming techniques promoted   coping techniques promoted   sensory stimulation limited  De-Escalation Techniques:   appropriate behavior reinforced   quiet time facilitated   stimulation decreased  Diversional Activities:   television   music  Taken 4/27/2024 0200 by Tammie Cnoteh RN  Medical Device Protection:   IV pole/bag removed from visual field   tubing secured  Less Restrictive Alternative:   bed alarm in use   emotional support provided   family presence promoted   positive reinforcement provided   sensory stimulation limited  De-Escalation Techniques:   appropriate behavior reinforced   quiet time facilitated   stimulation decreased  Diversional Activities:   television   music  Taken 4/27/2024 0000 by Tammie Conteh RN  Medical Device Protection:   IV pole/bag removed from visual field   tubing secured  Less Restrictive Alternative:   bed alarm in use   emotional support provided   family presence promoted   positive reinforcement provided   sensory stimulation limited  De-Escalation Techniques:   appropriate behavior reinforced   quiet time facilitated   stimulation decreased  Diversional Activities:   television   music  Taken 4/26/2024 2200 by Tammie Conteh RN  Medical Device Protection:   IV pole/bag removed from visual field   tubing secured  Less Restrictive Alternative:   bed alarm in use   emotional support provided   family presence promoted   positive reinforcement provided   sensory stimulation limited  De-Escalation Techniques:   appropriate behavior reinforced   family involvement requested   quiet time facilitated    stimulation decreased   verbally redirected  Diversional Activities:   television   music  Taken 4/26/2024 2000 by Tammie Conteh RN  Medical Device Protection:   IV pole/bag removed from visual field   tubing secured  Less Restrictive Alternative:   bed alarm in use   emotional support provided   family presence promoted   positive reinforcement provided   sensory stimulation limited  De-Escalation Techniques:   appropriate behavior reinforced   quiet time facilitated   stimulation decreased   verbally redirected   family involvement requested  Diversional Activities:   television   music  Intervention: Protect Dignity, Rights, and Personal Wellbeing  Recent Flowsheet Documentation  Taken 4/26/2024 2000 by Tammie Conteh RN  Trust Relationship/Rapport:   care explained   choices provided   emotional support provided   empathic listening provided   questions answered   questions encouraged   reassurance provided   thoughts/feelings acknowledged  Intervention: Protect Skin and Joint Integrity  Recent Flowsheet Documentation  Taken 4/26/2024 2000 by Tammie Conteh RN  Body Position:   position changed independently   weight shifting     Problem: Skin Injury Risk Increased  Goal: Skin Health and Integrity  Outcome: Ongoing, Progressing  Intervention: Optimize Skin Protection  Recent Flowsheet Documentation  Taken 4/26/2024 2000 by Tammie Conteh RN  Pressure Reduction Techniques:   frequent weight shift encouraged   pressure points protected  Head of Bed (HOB) Positioning: HOB lowered  Pressure Reduction Devices:   positioning supports utilized   specialty bed utilized   pressure-redistributing mattress utilized  Skin Protection:   tubing/devices free from skin contact   transparent dressing maintained   skin-to-device areas padded     Problem: Fall Injury Risk  Goal: Absence of Fall and Fall-Related Injury  Outcome: Ongoing, Progressing  Intervention: Identify and Manage Contributors  Recent Flowsheet  Documentation  Taken 4/26/2024 2000 by Tammie Conteh RN  Medication Review/Management: medications reviewed  Intervention: Promote Injury-Free Environment  Recent Flowsheet Documentation  Taken 4/26/2024 2000 by Tammie Conteh RN  Safety Promotion/Fall Prevention:   assistive device/personal items within reach   clutter free environment maintained   fall prevention program maintained   nonskid shoes/slippers when out of bed   room organization consistent   safety round/check completed     Problem: Adult Inpatient Plan of Care  Goal: Plan of Care Review  Outcome: Ongoing, Progressing  Goal: Patient-Specific Goal (Individualized)  Outcome: Ongoing, Progressing  Goal: Absence of Hospital-Acquired Illness or Injury  Outcome: Ongoing, Progressing  Intervention: Identify and Manage Fall Risk  Recent Flowsheet Documentation  Taken 4/26/2024 2000 by Tammie Conteh RN  Safety Promotion/Fall Prevention:   assistive device/personal items within reach   clutter free environment maintained   fall prevention program maintained   nonskid shoes/slippers when out of bed   room organization consistent   safety round/check completed  Intervention: Prevent Skin Injury  Recent Flowsheet Documentation  Taken 4/26/2024 2000 by Tammie Conteh RN  Body Position:   position changed independently   weight shifting  Skin Protection:   tubing/devices free from skin contact   transparent dressing maintained   skin-to-device areas padded  Intervention: Prevent and Manage VTE (Venous Thromboembolism) Risk  Recent Flowsheet Documentation  Taken 4/26/2024 2000 by Tammie Conteh RN  Activity Management: activity encouraged  VTE Prevention/Management: (see MAR) other (see comments)  Intervention: Prevent Infection  Recent Flowsheet Documentation  Taken 4/26/2024 2000 by Tammie Conteh RN  Infection Prevention:   environmental surveillance performed   hand hygiene promoted   personal protective equipment utilized   single patient  room provided  Goal: Optimal Comfort and Wellbeing  Outcome: Ongoing, Progressing  Intervention: Provide Person-Centered Care  Recent Flowsheet Documentation  Taken 4/26/2024 2000 by Tammie Conteh, RN  Trust Relationship/Rapport:   care explained   choices provided   emotional support provided   empathic listening provided   questions answered   questions encouraged   reassurance provided   thoughts/feelings acknowledged  Goal: Readiness for Transition of Care  Outcome: Ongoing, Progressing     Problem: Palliative Care  Goal: Enhanced Quality of Life  Outcome: Ongoing, Progressing  Intervention: Optimize Psychosocial Wellbeing  Recent Flowsheet Documentation  Taken 4/26/2024 2000 by Tammie Conteh, RN  Family/Support System Care: support provided   Goal Outcome Evaluation:

## 2024-04-27 NOTE — PROGRESS NOTES
[facility] Neurology    Progress Note    Patient Name: Too Tai  : 1955  MRN: [id number]  Primary Care Physician:  Des Geller MD  Date of admission: 2024    Subjective     Reason for consult: Dementia with behavioral disturbances    History of Present Illness   Continues to scream and hit at staff during care.  Currently sleeping.  Wife at bedside.    Review of Systems   Unable to obtain    Objective     Vitals:   Temp:  [97.7 °F (36.5 °C)-97.9 °F (36.6 °C)] 97.7 °F (36.5 °C)  Heart Rate:  [82-90] 89  Resp:  [18-20] 18  BP: (127-179)/(65-91) 127/65    Neurologic Exam   Mental status/Cognition: Asleep, did not wake      Current Medications    Current Facility-Administered Medications:     acetaminophen (TYLENOL) tablet 650 mg, 650 mg, Oral, Q4H PRN **OR** acetaminophen (TYLENOL) 160 MG/5ML oral solution 650 mg, 650 mg, Oral, Q4H PRN **OR** acetaminophen (TYLENOL) suppository 650 mg, 650 mg, Rectal, Q4H PRN, Traci, Nerissa G, DO    amLODIPine (NORVASC) tablet 10 mg, 10 mg, Oral, Q24H, Calin eBnitez MD, 10 mg at 24 0835    sennosides-docusate (PERICOLACE) 8.6-50 MG per tablet 2 tablet, 2 tablet, Oral, BID, 2 tablet at 24 0835 **AND** polyethylene glycol (MIRALAX) packet 17 g, 17 g, Oral, Daily PRN **AND** bisacodyl (DULCOLAX) EC tablet 5 mg, 5 mg, Oral, Daily PRN **AND** bisacodyl (DULCOLAX) suppository 10 mg, 10 mg, Rectal, Daily PRN, Jennifer Bai, APRN    Calcium Replacement - Follow Nurse / BPA Driven Protocol, , Does not apply, PRN, Traci, Nerissa G, DO    cyanocobalamin injection 1,000 mcg, 1,000 mcg, Intramuscular, Q28 Days, Darrell Jhaveri DO, 1,000 mcg at 24 1532    dextrose (D50W) (25 g/50 mL) IV injection 25 g, 25 g, Intravenous, Q15 Min PRN, Traci Nerissa G, DO    dextrose (GLUTOSE) oral gel 15 g, 15 g, Oral, Q15 Min PRN, Traci, Nerissa G, DO    donepezil (ARICEPT) tablet 10 mg, 10 mg, Oral, BID, Darrell Jhaveri DO, 10 mg at 24 0835    folic acid  (FOLVITE) tablet 1 mg, 1 mg, Oral, Daily, Nerissa Aviles, DO, 1 mg at 04/27/24 0835    glucagon (GLUCAGEN) injection 1 mg, 1 mg, Intramuscular, Q15 Min PRN, Nerissa Aviles G, DO    heparin (porcine) 5000 UNIT/ML injection 5,000 Units, 5,000 Units, Subcutaneous, Q8H, Nerissa Aviles, DO, 5,000 Units at 04/27/24 0554    insulin glargine (LANTUS, SEMGLEE) injection 15 Units, 15 Units, Subcutaneous, Q12H, Cecily Bain DO, 15 Units at 04/27/24 0834    Insulin Lispro (humaLOG) injection 2-7 Units, 2-7 Units, Subcutaneous, 4x Daily AC & at Bedtime, Nerissa Aviles, DO, 2 Units at 04/26/24 2104    Insulin Lispro (humaLOG) injection 5 Units, 5 Units, Subcutaneous, TID With Meals, Parisa Madrigal PA-C, 5 Units at 04/27/24 0835    lactated ringers infusion, 75 mL/hr, Intravenous, Continuous, Parisa Madrigal PA-C, Last Rate: 75 mL/hr at 04/26/24 1751, 75 mL/hr at 04/26/24 1751    lisinopril (PRINIVIL,ZESTRIL) tablet 30 mg, 30 mg, Oral, Daily, Parisa Madrigal PA-C, 30 mg at 04/27/24 0836    Magnesium Standard Dose Replacement - Follow Nurse / BPA Driven Protocol, , Does not apply, PRN, Nerissa Aviles G, DO    melatonin tablet 5 mg, 5 mg, Oral, Nightly PRN, Kamala Avilessie G, DO    miconazole (MICOTIN) 2 % powder 1 Application, 1 Application, Topical, Q12H, Cecily Bain DO, 1 Application at 04/27/24 0836    mirtazapine (REMERON) tablet 15 mg, 15 mg, Oral, Nightly, Darrell Jhaveri, DO, 15 mg at 04/26/24 2106    PHENobarbital tablet 64.8 mg, 64.8 mg, Oral, BID, 64.8 mg at 04/27/24 0835 **OR** PHENobarbital injection 65 mg, 65 mg, Intramuscular, BID, Darrell Jhaveri, DO    Phosphorus Replacement - Follow Nurse / BPA Driven Protocol, , Does not apply, PRN, Nerissa Avilse, DO    Potassium Replacement - Follow Nurse / BPA Driven Protocol, , Does not apply, PRN, Nerissa Aviles, DO    QUEtiapine (SEROquel) tablet 100 mg, 100 mg, Oral, BID, Meliza Kahn, APRN, 100 mg at 04/27/24 0836    QUEtiapine (SEROquel)  tablet 200 mg, 200 mg, Oral, Nightly, Jhaveri, Darrell A, DO, 200 mg at 04/26/24 2106    sodium chloride 0.9 % flush 10 mL, 10 mL, Intravenous, Q12H, Traci, Nerissa G, DO, 10 mL at 04/27/24 0836    sodium chloride 0.9 % flush 10 mL, 10 mL, Intravenous, PRN, Traci, Nerissa G, DO    sodium chloride 0.9 % infusion 40 mL, 40 mL, Intravenous, PRN, Traci, Nerissa G, DO, 40 mL at 04/23/24 0546    temazepam (RESTORIL) capsule 30 mg, 30 mg, Oral, Nightly, Jhaveri, Darrell A, DO, 30 mg at 04/26/24 2106    ziprasidone (GEODON) injection 10 mg, 10 mg, Intramuscular, Q4H PRN, Kahnler, April K, APRN, 10 mg at 04/25/24 1843    Laboratory Results:   Lab Results   Component Value Date    GLUCOSE 166 (H) 04/19/2024    CALCIUM 9.9 04/19/2024     04/19/2024    K 4.4 04/19/2024    CO2 30.0 (H) 04/19/2024     04/19/2024    BUN 16 04/19/2024    CREATININE 0.82 04/19/2024    EGFRIFAFRI 102 02/21/2022    EGFRIFNONA 88 02/21/2022    BCR 19.5 04/19/2024    ANIONGAP 8.0 04/19/2024     Lab Results   Component Value Date    WBC 10.47 04/19/2024    HGB 15.5 04/19/2024    HCT 46.7 04/19/2024    MCV 92.7 04/19/2024     04/19/2024     Lab Results   Component Value Date    CHOL 220 (H) 01/08/2024    CHOL 120 08/01/2019    CHOL 117 04/15/2019     Lab Results   Component Value Date    HDL 41 01/08/2024    HDL 37 (L) 08/03/2023    HDL 41 04/03/2023     Lab Results   Component Value Date     (H) 01/08/2024     (H) 08/03/2023     (H) 04/03/2023     Lab Results   Component Value Date    TRIG 108 01/08/2024    TRIG 208 (H) 08/03/2023    TRIG 138 04/03/2023     Lab Results   Component Value Date    HGBA1C 9.50 (H) 04/18/2024     Lab Results   Component Value Date    FOLATE 10.20 02/24/2024     Lab Results   Component Value Date    ACGHZZLK48 384 04/18/2024     Phenobarbital level 5.2    Images/Procedures   CT head 4/19/2024  Moderate chronic small vessel ischemic change.  No acute intracranial abnormality    Assessment / Plan    Active Hospital Problems:  Dementia with behavioral disturbance  Agitation    Brief Patient Summary:  Too Tai is a 68 y.o. male with history of advanced dementia with behavioral disturbances, CAD, diabetes type 2, hyperlipidemia presenting with increasing agitation.  Phenobarbital level 5.2.  Patient continues to be aggressive during nursing care.  Will trial phenobarbital 3 times daily.  Wife has concerns of patient being too drowsy, if patient continues to be sleepy tomorrow will discontinue afternoon dose.    Plan:   -Increase phenobarbital to 64.8 mg 3 times daily  -Phenobarbital level tomorrow a.m.  -EKG tomorrow a.m.  -Remeron 15 mg nightly  -Aricept 10 mg nightly  -Seroquel 100 mg/100 mg / 200 mg  -Restoril 30 mg nightly  -IM Geodon 10 mg as needed for agitation    I have discussed the above with the patient, family, bedside RN  Time spent with patient: 35 minutes in face-to-face evaluation and management of the patient.      Darrell Jhaveri DO, MS

## 2024-04-27 NOTE — NURSING NOTE
Pt's family requested staff to assist with EOB/standing. Pt presented with decreased level of agitation and family/caregivers at bedside to assist with exercises; pt was able to sit EOB with minimal assist. Pt also able to stand EOB 5 reps, and take 5 side-steps with minimal assist. Mild agitation throughout, but with good pt effort and able to redirect. After exercises complete, pt returned to bed with restraints in place for pt/staff safety. Will continue to monitor for de-escalation of restraints.

## 2024-04-27 NOTE — PROGRESS NOTES
[facility] Medicine Services  PROGRESS NOTE    Patient Name: Too Tai  : 1955  MRN: [id number]    Date of Admission: 2024  Primary Care Physician: Des Geller MD    Subjective     CC: f/u dementia with agitation    HPI:  No acute events over the night.  Patient was sleeping this morning.  Apparently had a better night.  Wife at bedside.    Objective     Vital Signs:   Temp:  [97.7 °F (36.5 °C)-97.9 °F (36.6 °C)] 97.9 °F (36.6 °C)  Heart Rate:  [82-90] 89  Resp:  [18-20] 18  BP: (134-179)/(75-91) 179/89     Physical Exam:  Constitutional: No acute distress. Sleeping   HENT: NCAT, mucous membranes moist  Respiratory: respiratory effort normal on room air   Cardiovascular: RRR on tele   Musculoskeletal: No bilateral ankle edema. Extremities in 4-point restraints   Psychiatric: MARIIA/ sleeping   Neurologic: MARIIA/ sleeping     Results Reviewed:  LAB RESULTS:                                Brief Urine Lab Results  (Last result in the past 365 days)        Color   Clarity   Blood   Leuk Est   Nitrite   Protein   CREAT   Urine HCG        24 0243 Yellow   Clear   Negative   Negative   Negative   Negative                 Microbiology Results Abnormal       None          No radiology results from the last 24 hrs    Results for orders placed during the hospital encounter of 24    Adult Transthoracic Echo Complete W/ Cont if Necessary Per Protocol    Interpretation Summary    Left ventricular systolic function is normal. Calculated left ventricular EF = 55.1% Normal left ventricular cavity size noted. Left ventricular wall thickness is consistent with mild concentric hypertrophy. Left ventricular diastolic function is consistent with (grade I) impaired relaxation.    The right ventricular cavity is mildly dilated. Normal right ventricular systolic function noted.    There is calcification of the aortic valve. The aortic valve appears trileaflet. Mild aortic valve  regurgitation is present. No aortic valve stenosis is present.    Mitral annular calcification is present. Trace mitral valve regurgitation is present. No significant mitral valve stenosis is present.    The tricuspid valve is structurally normal with no stenosis present. Trace tricuspid valve regurgitation is present. Insufficient TR velocity profile to estimate the right ventricular systolic pressure.    Mild dilation of the sinuses of Valsalva is present. Mild dilation of the ascending aorta is present. Ascending aorta = 4.2 cm    Current medications:  Scheduled Meds:amLODIPine, 10 mg, Oral, Q24H  cyanocobalamin, 1,000 mcg, Intramuscular, Q28 Days  donepezil, 10 mg, Oral, BID  folic acid, 1 mg, Oral, Daily  heparin (porcine), 5,000 Units, Subcutaneous, Q8H  insulin glargine, 15 Units, Subcutaneous, Q12H  insulin lispro, 2-7 Units, Subcutaneous, 4x Daily AC & at Bedtime  Insulin Lispro, 5 Units, Subcutaneous, TID With Meals  lisinopril, 30 mg, Oral, Daily  miconazole, 1 Application, Topical, Q12H  mirtazapine, 15 mg, Oral, Nightly  PHENobarbital, 64.8 mg, Oral, BID   Or  PHENobarbital, 65 mg, Intramuscular, BID  QUEtiapine, 100 mg, Oral, BID  QUEtiapine, 200 mg, Oral, Nightly  senna-docusate sodium, 2 tablet, Oral, BID  sodium chloride, 10 mL, Intravenous, Q12H  temazepam, 30 mg, Oral, Nightly      Continuous Infusions:lactated ringers, 75 mL/hr, Last Rate: 75 mL/hr (04/26/24 3510)      PRN Meds:.  acetaminophen **OR** acetaminophen **OR** acetaminophen    senna-docusate sodium **AND** polyethylene glycol **AND** bisacodyl **AND** bisacodyl    Calcium Replacement - Follow Nurse / BPA Driven Protocol    dextrose    dextrose    glucagon (human recombinant)    Magnesium Standard Dose Replacement - Follow Nurse / BPA Driven Protocol    melatonin    Phosphorus Replacement - Follow Nurse / BPA Driven Protocol    Potassium Replacement - Follow Nurse / BPA Driven Protocol    sodium chloride    sodium chloride     ziprasidone    Assessment & Plan     Active Hospital Problems    Diagnosis  POA    **Agitation due to dementia [F03.911]  Yes    Agitation [R45.1]  Yes    Type 2 diabetes mellitus with hyperglycemia, with long-term current use of insulin [E11.65, Z79.4]  Not Applicable    Peripheral neuropathy [G62.9]  Yes    Hyperlipidemia [E78.5]  Yes    Benign prostatic hyperplasia [N40.0]  Yes    Benign essential HTN [I10]  Yes    Peripheral vascular disease [I73.9]  Yes    Coronary artery disease involving native coronary artery of native heart [I25.10]  Yes      Resolved Hospital Problems   No resolved problems to display.     Brief Hospital Course to date:  Too Tai is a 68yoM with PMH significant for advanced dementia, CAD, diabetes mellitus type 2, HLD and PVD. Admitted to [facility] ED on 4/18/24 for agitation / behavioral disturbances. Recently admitted to [facility] 2/24-3/4/24 for dementia with behavioral disturbances and again 3/27-4/4/24 for similar issues.      Advanced dementia complicated by behavioral disturbance with superimposed sleep disturbance   - Infectious work up unremarkable. UA reassuring. CXR without acute findings. CT head without acute findings  - Neuro following  - Still in restraints   - Continue home Aricept and Mirtazapine 15mg QHS   - AM doses of Depakote and Seroquel were delayed by a couple of hours, resulting in the need for PRN Geodon administration. Neurology has adjusted timing of medications  - Continue Seroquel 100mg BID and 200mg QHS   - Neurology started Phenobarbital; increased dose today; monitor   - Continue Temazepam 30mg QHS  - Appreciate palliative care assistance  - Not safe to return home at this time as he is a danger to himself and his wife   - May need geriatric psych facility vs LTC - CM looking into this   - IV fluids while taking in minimal PO     Uncontrolled diabetes mellitus type 2  - Appears not on any home meds for DM  - A1c 9.5%  - Continue Lantus 15  units BID, Lispro 5 units TID AC + SSI     HTN, continue Amlodipine 10mg daily  - Increase Lisinopril to 30mg daily     Expected Discharge Location and Transportation: TBD  Expected Discharge Expected Discharge Date: 5/1/2024; Expected Discharge Time:      DVT prophylaxis:Medical and mechanical DVT prophylaxis orders are present.    AM-PAC 6 Clicks Score (PT): 15 (04/26/24 2000)    CODE STATUS:   Code Status and Medical Interventions:   Ordered at: 04/19/24 1037     Medical Intervention Limits:    NO intubation (DNI)     Level Of Support Discussed With:    Next of Kin (If No Surrogate)     Code Status (Patient has no pulse and is not breathing):    No CPR (Do Not Attempt to Resuscitate)     Medical Interventions (Patient has pulse or is breathing):    Limited Support     Calin Benitez MD  04/27/24

## 2024-04-28 LAB
GLUCOSE BLDC GLUCOMTR-MCNC: 181 MG/DL (ref 70–130)
GLUCOSE BLDC GLUCOMTR-MCNC: 204 MG/DL (ref 70–130)
GLUCOSE BLDC GLUCOMTR-MCNC: 230 MG/DL (ref 70–130)
GLUCOSE BLDC GLUCOMTR-MCNC: 301 MG/DL (ref 70–130)
GLUCOSE BLDC GLUCOMTR-MCNC: 356 MG/DL (ref 70–130)
PHENOBARB SERPL-MCNC: 6.2 MCG/ML (ref 10–30)

## 2024-04-28 PROCEDURE — 93005 ELECTROCARDIOGRAM TRACING: CPT | Performed by: STUDENT IN AN ORGANIZED HEALTH CARE EDUCATION/TRAINING PROGRAM

## 2024-04-28 PROCEDURE — 99232 SBSQ HOSP IP/OBS MODERATE 35: CPT | Performed by: NURSE PRACTITIONER

## 2024-04-28 PROCEDURE — 25010000002 ZIPRASIDONE MESYLATE PER 10 MG

## 2024-04-28 PROCEDURE — 82948 REAGENT STRIP/BLOOD GLUCOSE: CPT

## 2024-04-28 PROCEDURE — 25010000002 HEPARIN (PORCINE) PER 1000 UNITS: Performed by: INTERNAL MEDICINE

## 2024-04-28 PROCEDURE — 93010 ELECTROCARDIOGRAM REPORT: CPT | Performed by: INTERNAL MEDICINE

## 2024-04-28 PROCEDURE — 63710000001 INSULIN LISPRO (HUMAN) PER 5 UNITS: Performed by: INTERNAL MEDICINE

## 2024-04-28 PROCEDURE — 63710000001 INSULIN GLARGINE PER 5 UNITS: Performed by: INTERNAL MEDICINE

## 2024-04-28 PROCEDURE — 63710000001 INSULIN LISPRO (HUMAN) PER 5 UNITS: Performed by: PHYSICIAN ASSISTANT

## 2024-04-28 PROCEDURE — 99233 SBSQ HOSP IP/OBS HIGH 50: CPT | Performed by: STUDENT IN AN ORGANIZED HEALTH CARE EDUCATION/TRAINING PROGRAM

## 2024-04-28 PROCEDURE — 25810000003 LACTATED RINGERS PER 1000 ML: Performed by: PHYSICIAN ASSISTANT

## 2024-04-28 PROCEDURE — 80184 ASSAY OF PHENOBARBITAL: CPT | Performed by: STUDENT IN AN ORGANIZED HEALTH CARE EDUCATION/TRAINING PROGRAM

## 2024-04-28 RX ADMIN — INSULIN LISPRO 2 UNITS: 100 INJECTION, SOLUTION INTRAVENOUS; SUBCUTANEOUS at 08:38

## 2024-04-28 RX ADMIN — PHENOBARBITAL 64.8 MG: 32.4 TABLET ORAL at 21:34

## 2024-04-28 RX ADMIN — QUETIAPINE FUMARATE 200 MG: 100 TABLET ORAL at 21:34

## 2024-04-28 RX ADMIN — TEMAZEPAM 30 MG: 15 CAPSULE ORAL at 21:33

## 2024-04-28 RX ADMIN — ZIPRASIDONE MESYLATE 10 MG: 20 INJECTION, POWDER, LYOPHILIZED, FOR SOLUTION INTRAMUSCULAR at 10:22

## 2024-04-28 RX ADMIN — PHENOBARBITAL 64.8 MG: 32.4 TABLET ORAL at 05:19

## 2024-04-28 RX ADMIN — PHENOBARBITAL 64.8 MG: 32.4 TABLET ORAL at 16:00

## 2024-04-28 RX ADMIN — HEPARIN SODIUM 5000 UNITS: 5000 INJECTION INTRAVENOUS; SUBCUTANEOUS at 22:17

## 2024-04-28 RX ADMIN — INSULIN LISPRO 5 UNITS: 100 INJECTION, SOLUTION INTRAVENOUS; SUBCUTANEOUS at 19:11

## 2024-04-28 RX ADMIN — MICONAZOLE NITRATE 1 APPLICATION: 20 POWDER TOPICAL at 09:00

## 2024-04-28 RX ADMIN — LISINOPRIL 30 MG: 20 TABLET ORAL at 08:37

## 2024-04-28 RX ADMIN — MIRTAZAPINE 15 MG: 15 TABLET, FILM COATED ORAL at 21:34

## 2024-04-28 RX ADMIN — FOLIC ACID 1 MG: 1 TABLET ORAL at 08:37

## 2024-04-28 RX ADMIN — DONEPEZIL HYDROCHLORIDE 10 MG: 10 TABLET, FILM COATED ORAL at 21:34

## 2024-04-28 RX ADMIN — HEPARIN SODIUM 5000 UNITS: 5000 INJECTION INTRAVENOUS; SUBCUTANEOUS at 16:00

## 2024-04-28 RX ADMIN — INSULIN LISPRO 3 UNITS: 100 INJECTION, SOLUTION INTRAVENOUS; SUBCUTANEOUS at 22:17

## 2024-04-28 RX ADMIN — Medication 10 ML: at 21:43

## 2024-04-28 RX ADMIN — QUETIAPINE FUMARATE 100 MG: 100 TABLET ORAL at 16:00

## 2024-04-28 RX ADMIN — INSULIN LISPRO 5 UNITS: 100 INJECTION, SOLUTION INTRAVENOUS; SUBCUTANEOUS at 08:38

## 2024-04-28 RX ADMIN — INSULIN GLARGINE 15 UNITS: 100 INJECTION, SOLUTION SUBCUTANEOUS at 22:17

## 2024-04-28 RX ADMIN — HEPARIN SODIUM 5000 UNITS: 5000 INJECTION INTRAVENOUS; SUBCUTANEOUS at 05:18

## 2024-04-28 RX ADMIN — Medication 10 ML: at 08:43

## 2024-04-28 RX ADMIN — AMLODIPINE BESYLATE 10 MG: 10 TABLET ORAL at 08:37

## 2024-04-28 RX ADMIN — SODIUM CHLORIDE, POTASSIUM CHLORIDE, SODIUM LACTATE AND CALCIUM CHLORIDE 75 ML/HR: 600; 310; 30; 20 INJECTION, SOLUTION INTRAVENOUS at 09:19

## 2024-04-28 RX ADMIN — SENNOSIDES AND DOCUSATE SODIUM 2 TABLET: 8.6; 5 TABLET ORAL at 21:34

## 2024-04-28 RX ADMIN — QUETIAPINE FUMARATE 100 MG: 100 TABLET ORAL at 08:37

## 2024-04-28 RX ADMIN — INSULIN GLARGINE 15 UNITS: 100 INJECTION, SOLUTION SUBCUTANEOUS at 08:37

## 2024-04-28 RX ADMIN — DONEPEZIL HYDROCHLORIDE 10 MG: 10 TABLET, FILM COATED ORAL at 08:37

## 2024-04-28 RX ADMIN — SENNOSIDES AND DOCUSATE SODIUM 2 TABLET: 8.6; 5 TABLET ORAL at 08:37

## 2024-04-28 NOTE — PROGRESS NOTES
[facility] Medicine Services  PROGRESS NOTE    Patient Name: Too Tai  : 1955  MRN: [id number]    Date of Admission: 2024  Primary Care Physician: Des Geller MD    Subjective     CC: f/u dementia with agitation    HPI:  Slept well per staff. Wife in room. Patient more calm today on my exam. Ate a good breakfast per wife. No new questions or needs.     Objective     Vital Signs:   Temp:  [96.2 °F (35.7 °C)-98.9 °F (37.2 °C)] 96.2 °F (35.7 °C)  Heart Rate:  [] 79  Resp:  [18-20] 18  BP: (149-174)/(76-85) 169/78     Physical Exam:  Constitutional: No acute distress, awake, alert  HENT: NCAT, mucous membranes moist  Respiratory: Clear to auscultation bilaterally, respiratory effort normal   Cardiovascular: RRR, no murmurs, rubs, or gallops  Gastrointestinal: Positive bowel sounds, soft, nontender, nondistended  Musculoskeletal: No bilateral ankle edema  Psychiatric: Appropriate affect, cooperative  Neurologic: Oriented to person, RICO, speech clear. 4 point restraints   Skin: No rashes     Results Reviewed:  LAB RESULTS:                                Brief Urine Lab Results  (Last result in the past 365 days)        Color   Clarity   Blood   Leuk Est   Nitrite   Protein   CREAT   Urine HCG        24 0243 Yellow   Clear   Negative   Negative   Negative   Negative                 Microbiology Results Abnormal       None          No radiology results from the last 24 hrs    Results for orders placed during the hospital encounter of 24    Adult Transthoracic Echo Complete W/ Cont if Necessary Per Protocol    Interpretation Summary    Left ventricular systolic function is normal. Calculated left ventricular EF = 55.1% Normal left ventricular cavity size noted. Left ventricular wall thickness is consistent with mild concentric hypertrophy. Left ventricular diastolic function is consistent with (grade I) impaired relaxation.    The right ventricular  cavity is mildly dilated. Normal right ventricular systolic function noted.    There is calcification of the aortic valve. The aortic valve appears trileaflet. Mild aortic valve regurgitation is present. No aortic valve stenosis is present.    Mitral annular calcification is present. Trace mitral valve regurgitation is present. No significant mitral valve stenosis is present.    The tricuspid valve is structurally normal with no stenosis present. Trace tricuspid valve regurgitation is present. Insufficient TR velocity profile to estimate the right ventricular systolic pressure.    Mild dilation of the sinuses of Valsalva is present. Mild dilation of the ascending aorta is present. Ascending aorta = 4.2 cm    Current medications:  Scheduled Meds:amLODIPine, 10 mg, Oral, Q24H  cyanocobalamin, 1,000 mcg, Intramuscular, Q28 Days  donepezil, 10 mg, Oral, BID  folic acid, 1 mg, Oral, Daily  heparin (porcine), 5,000 Units, Subcutaneous, Q8H  insulin glargine, 15 Units, Subcutaneous, Q12H  insulin lispro, 2-7 Units, Subcutaneous, 4x Daily AC & at Bedtime  Insulin Lispro, 5 Units, Subcutaneous, TID With Meals  lisinopril, 30 mg, Oral, Daily  miconazole, 1 Application, Topical, Q12H  mirtazapine, 15 mg, Oral, Nightly  PHENobarbital, 64.8 mg, Oral, Q8H   Or  PHENobarbital, 65 mg, Intramuscular, Q8H  QUEtiapine, 100 mg, Oral, BID  QUEtiapine, 200 mg, Oral, Nightly  senna-docusate sodium, 2 tablet, Oral, BID  sodium chloride, 10 mL, Intravenous, Q12H  temazepam, 30 mg, Oral, Nightly      Continuous Infusions:lactated ringers, 75 mL/hr, Last Rate: 75 mL/hr (04/28/24 0919)      PRN Meds:.  acetaminophen **OR** acetaminophen **OR** acetaminophen    senna-docusate sodium **AND** polyethylene glycol **AND** bisacodyl **AND** bisacodyl    Calcium Replacement - Follow Nurse / BPA Driven Protocol    dextrose    dextrose    glucagon (human recombinant)    Magnesium Standard Dose Replacement - Follow Nurse / BPA Driven Protocol     melatonin    Phosphorus Replacement - Follow Nurse / BPA Driven Protocol    Potassium Replacement - Follow Nurse / BPA Driven Protocol    sodium chloride    sodium chloride    ziprasidone    Assessment & Plan     Active Hospital Problems    Diagnosis  POA    **Agitation due to dementia [F03.911]  Yes    Agitation [R45.1]  Yes    Type 2 diabetes mellitus with hyperglycemia, with long-term current use of insulin [E11.65, Z79.4]  Not Applicable    Peripheral neuropathy [G62.9]  Yes    Hyperlipidemia [E78.5]  Yes    Benign prostatic hyperplasia [N40.0]  Yes    Benign essential HTN [I10]  Yes    Peripheral vascular disease [I73.9]  Yes    Coronary artery disease involving native coronary artery of native heart [I25.10]  Yes      Resolved Hospital Problems   No resolved problems to display.     Brief Hospital Course to date:  Too Tai is a 68yoM with PMH significant for advanced dementia, CAD, diabetes mellitus type 2, HLD and PVD. Admitted to [facility] ED on 4/18/24 for agitation / behavioral disturbances. Recently admitted to [facility] 2/24-3/4/24 for dementia with behavioral disturbances and again 3/27-4/4/24 for similar issues.      Advanced dementia complicated by behavioral disturbance with superimposed sleep disturbance   - Infectious work up unremarkable. UA reassuring. CXR without acute findings. CT head without acute findings  - Neuro following  - Still in restraints   - Continue home Aricept and Mirtazapine 15mg QHS   - AM doses of Depakote and Seroquel were delayed by a couple of hours, resulting in the need for PRN Geodon administration. Neurology has adjusted timing of medications  - Continue Seroquel 100mg BID and 200mg QHS   - Neurology started Phenobarbital; increased dose today; monitor   - Continue Temazepam 30mg QHS  - Appreciate palliative care assistance  - Not safe to return home at this time as he is a danger to himself and his wife   - May need geriatric psych facility vs LTC -   looking into this   - IV fluids while taking in minimal PO     Uncontrolled diabetes mellitus type 2  - Appears not on any home meds for DM  - A1c 9.5%  - Continue Lantus 15 units BID, Lispro 5 units TID AC + SSI     HTN, continue Amlodipine 10mg daily  - Increase Lisinopril to 30mg daily     Expected Discharge Location and Transportation: TBD  Expected Discharge Expected Discharge Date: 5/1/2024; Expected Discharge Time:      DVT prophylaxis:Medical and mechanical DVT prophylaxis orders are present.    AM-PAC 6 Clicks Score (PT): 18 (04/27/24 2000)    CODE STATUS:   Code Status and Medical Interventions:   Ordered at: 04/19/24 1037     Medical Intervention Limits:    NO intubation (DNI)     Level Of Support Discussed With:    Next of Kin (If No Surrogate)     Code Status (Patient has no pulse and is not breathing):    No CPR (Do Not Attempt to Resuscitate)     Medical Interventions (Patient has pulse or is breathing):    Limited Support     Abbi Blackmon, IVONNE  04/28/24

## 2024-04-28 NOTE — PLAN OF CARE
Problem: Restraint, Nonviolent  Goal: Absence of Harm or Injury  Outcome: Ongoing, Progressing  Intervention: Implement Least Restrictive Safety Strategies  Recent Flowsheet Documentation  Taken 4/28/2024 0600 by Tammie Conteh RN  Medical Device Protection:   IV pole/bag removed from visual field   tubing secured  Less Restrictive Alternative:   appropriate expression promoted   bed alarm in use   positive reinforcement provided  De-Escalation Techniques:   appropriate behavior reinforced   diversional activity encouraged   quiet time facilitated   stimulation decreased   verbally redirected  Diversional Activities:   music   television  Taken 4/28/2024 0400 by Tammie Conteh, RN  Medical Device Protection:   IV pole/bag removed from visual field   tubing secured  Less Restrictive Alternative:   appropriate expression promoted   bed alarm in use   emotional support provided   positive reinforcement provided  De-Escalation Techniques:   appropriate behavior reinforced   diversional activity encouraged   quiet time facilitated   stimulation decreased   verbally redirected  Diversional Activities:   television   music  Taken 4/28/2024 0200 by Tammie Conteh RN  Medical Device Protection:   IV pole/bag removed from visual field   tubing secured  Less Restrictive Alternative:   appropriate expression promoted   bed alarm in use   sensory stimulation limited  De-Escalation Techniques:   appropriate behavior reinforced   diversional activity encouraged   quiet time facilitated   reoriented   stimulation decreased   verbally redirected  Diversional Activities:   television   music  Taken 4/28/2024 0000 by Tammie Conteh RN  Medical Device Protection:   IV pole/bag removed from visual field   tubing secured  Less Restrictive Alternative:   appropriate expression promoted   bed alarm in use   family presence promoted   positive reinforcement provided   sensory stimulation limited  De-Escalation Techniques:    appropriate behavior reinforced   quiet time facilitated   stimulation decreased  Diversional Activities:   music   television  Taken 4/27/2024 2200 by Tammie Conteh RN  Medical Device Protection:   IV pole/bag removed from visual field   tubing secured  Less Restrictive Alternative:   appropriate expression promoted   bed alarm in use   family presence promoted   positive reinforcement provided   sensory stimulation limited  De-Escalation Techniques:   appropriate behavior reinforced   diversional activity encouraged   family involvement requested   verbally redirected   stimulation decreased   quiet time facilitated  Diversional Activities:   television   music  Taken 4/27/2024 2000 by Tammie Conteh RN  Medical Device Protection:   IV pole/bag removed from visual field   tubing secured  Less Restrictive Alternative:   appropriate expression promoted   bed alarm in use   family presence promoted   positive reinforcement provided   sensory stimulation limited  De-Escalation Techniques:   appropriate behavior reinforced   diversional activity encouraged   family involvement requested   quiet time facilitated   stimulation decreased  Diversional Activities:   television   smartphone  Intervention: Protect Dignity, Rights, and Personal Wellbeing  Recent Flowsheet Documentation  Taken 4/27/2024 2000 by Tammie Conteh RN  Trust Relationship/Rapport:   care explained   choices provided   emotional support provided   empathic listening provided   questions answered   questions encouraged   reassurance provided   thoughts/feelings acknowledged  Intervention: Protect Skin and Joint Integrity  Recent Flowsheet Documentation  Taken 4/28/2024 0400 by Tammie Conteh RN  Body Position:   position changed independently   weight shifting  Taken 4/28/2024 0000 by Tammie Conteh RN  Body Position:   position changed independently   weight shifting  Taken 4/27/2024 2000 by Tammie Conteh RN  Body Position:    position changed independently   weight shifting     Problem: Skin Injury Risk Increased  Goal: Skin Health and Integrity  Outcome: Ongoing, Progressing  Intervention: Optimize Skin Protection  Recent Flowsheet Documentation  Taken 4/28/2024 0400 by Tammie Conteh RN  Pressure Reduction Techniques:   weight shift assistance provided   frequent weight shift encouraged  Head of Bed (HOB) Positioning: HOB lowered  Pressure Reduction Devices:   specialty bed utilized   pressure-redistributing mattress utilized   positioning supports utilized  Skin Protection:   tubing/devices free from skin contact   transparent dressing maintained   skin-to-device areas padded  Taken 4/28/2024 0000 by Tammie Conteh RN  Pressure Reduction Techniques:   weight shift assistance provided   frequent weight shift encouraged  Head of Bed (HOB) Positioning: HOB lowered  Pressure Reduction Devices:   pressure-redistributing mattress utilized   specialty bed utilized   positioning supports utilized  Skin Protection:   tubing/devices free from skin contact   transparent dressing maintained   skin-to-device areas padded  Taken 4/27/2024 2000 by Tammie Conteh RN  Pressure Reduction Techniques:   frequent weight shift encouraged   weight shift assistance provided  Head of Bed (HOB) Positioning: HOB lowered  Pressure Reduction Devices:   specialty bed utilized   pressure-redistributing mattress utilized   positioning supports utilized  Skin Protection:   tubing/devices free from skin contact   transparent dressing maintained   skin-to-device areas padded     Problem: Fall Injury Risk  Goal: Absence of Fall and Fall-Related Injury  Outcome: Ongoing, Progressing  Intervention: Identify and Manage Contributors  Recent Flowsheet Documentation  Taken 4/28/2024 0000 by Tammie Conteh RN  Medication Review/Management: medications reviewed  Taken 4/27/2024 2000 by Tammie Conteh RN  Medication Review/Management: medications  reviewed  Intervention: Promote Injury-Free Environment  Recent Flowsheet Documentation  Taken 4/28/2024 0400 by Tammie Conteh RN  Safety Promotion/Fall Prevention:   assistive device/personal items within reach   clutter free environment maintained   fall prevention program maintained   nonskid shoes/slippers when out of bed   room organization consistent   safety round/check completed  Taken 4/28/2024 0000 by Tammie Conteh RN  Safety Promotion/Fall Prevention:   assistive device/personal items within reach   clutter free environment maintained   fall prevention program maintained   nonskid shoes/slippers when out of bed   room organization consistent   safety round/check completed  Taken 4/27/2024 2000 by Tammie Conteh RN  Safety Promotion/Fall Prevention:   assistive device/personal items within reach   clutter free environment maintained   fall prevention program maintained   nonskid shoes/slippers when out of bed   room organization consistent   safety round/check completed     Problem: Adult Inpatient Plan of Care  Goal: Plan of Care Review  Outcome: Ongoing, Progressing  Goal: Patient-Specific Goal (Individualized)  Outcome: Ongoing, Progressing  Goal: Absence of Hospital-Acquired Illness or Injury  Outcome: Ongoing, Progressing  Intervention: Identify and Manage Fall Risk  Recent Flowsheet Documentation  Taken 4/28/2024 0400 by Tammie Conteh RN  Safety Promotion/Fall Prevention:   assistive device/personal items within reach   clutter free environment maintained   fall prevention program maintained   nonskid shoes/slippers when out of bed   room organization consistent   safety round/check completed  Taken 4/28/2024 0000 by Tammie Conteh RN  Safety Promotion/Fall Prevention:   assistive device/personal items within reach   clutter free environment maintained   fall prevention program maintained   nonskid shoes/slippers when out of bed   room organization consistent   safety round/check  completed  Taken 4/27/2024 2000 by Tammie Conteh RN  Safety Promotion/Fall Prevention:   assistive device/personal items within reach   clutter free environment maintained   fall prevention program maintained   nonskid shoes/slippers when out of bed   room organization consistent   safety round/check completed  Intervention: Prevent Skin Injury  Recent Flowsheet Documentation  Taken 4/28/2024 0400 by Tammie Conteh RN  Body Position:   position changed independently   weight shifting  Skin Protection:   tubing/devices free from skin contact   transparent dressing maintained   skin-to-device areas padded  Taken 4/28/2024 0000 by Tammie Conteh RN  Body Position:   position changed independently   weight shifting  Skin Protection:   tubing/devices free from skin contact   transparent dressing maintained   skin-to-device areas padded  Taken 4/27/2024 2000 by Tammie Conteh RN  Body Position:   position changed independently   weight shifting  Skin Protection:   tubing/devices free from skin contact   transparent dressing maintained   skin-to-device areas padded  Intervention: Prevent and Manage VTE (Venous Thromboembolism) Risk  Recent Flowsheet Documentation  Taken 4/28/2024 0400 by Tammie Conteh RN  Activity Management: activity encouraged  Taken 4/28/2024 0000 by Tammie Conteh RN  Activity Management: activity encouraged  Taken 4/27/2024 2000 by Tammie Conteh RN  Activity Management: activity encouraged  VTE Prevention/Management: (see MAR) other (see comments)  Intervention: Prevent Infection  Recent Flowsheet Documentation  Taken 4/28/2024 0400 by Tammie Conteh RN  Infection Prevention:   environmental surveillance performed   hand hygiene promoted   personal protective equipment utilized   single patient room provided  Taken 4/28/2024 0000 by Tammie Conteh RN  Infection Prevention:   environmental surveillance performed   hand hygiene promoted   personal protective equipment  utilized   single patient room provided  Taken 4/27/2024 2000 by Tammie Conteh RN  Infection Prevention:   environmental surveillance performed   hand hygiene promoted   personal protective equipment utilized   single patient room provided  Goal: Optimal Comfort and Wellbeing  Outcome: Ongoing, Progressing  Intervention: Provide Person-Centered Care  Recent Flowsheet Documentation  Taken 4/27/2024 2000 by Tammie Conteh RN  Trust Relationship/Rapport:   care explained   choices provided   emotional support provided   empathic listening provided   questions answered   questions encouraged   reassurance provided   thoughts/feelings acknowledged  Goal: Readiness for Transition of Care  Outcome: Ongoing, Progressing     Problem: Palliative Care  Goal: Enhanced Quality of Life  Outcome: Ongoing, Progressing  Intervention: Optimize Psychosocial Wellbeing  Recent Flowsheet Documentation  Taken 4/28/2024 0400 by Tammie Conteh RN  Family/Support System Care: support provided  Taken 4/28/2024 0000 by Tammie Conteh RN  Family/Support System Care: support provided  Taken 4/27/2024 2000 by Tammie Conteh RN  Family/Support System Care: support provided   Goal Outcome Evaluation:

## 2024-04-28 NOTE — PROGRESS NOTES
[facility] Neurology    Progress Note    Patient Name: Too Tai  : 1955  MRN: [id number]  Primary Care Physician:  Des Geller MD  Date of admission: 2024    Subjective     Reason for consult: Dementia with behavioral disturbances    History of Present Illness   Family requested staff assist with edge of bed and standing.  Patient was able to sit edge of bed with minimal assistance stand and take 5 steps with minimal assist.  Mild agitation throughout but was able to redirect.  Continues in restraints.    Was given as needed Geodon this morning.    Review of Systems   Unable to obtain    Objective     Vitals:   Temp:  [97.7 °F (36.5 °C)-98.9 °F (37.2 °C)] 97.7 °F (36.5 °C)  Heart Rate:  [] 89  Resp:  [18-20] 20  BP: (127-174)/(65-85) 174/84    Neurologic Exam   Mental status/Cognition: Asleep, did not wake      Current Medications    Current Facility-Administered Medications:     acetaminophen (TYLENOL) tablet 650 mg, 650 mg, Oral, Q4H PRN **OR** acetaminophen (TYLENOL) 160 MG/5ML oral solution 650 mg, 650 mg, Oral, Q4H PRN **OR** acetaminophen (TYLENOL) suppository 650 mg, 650 mg, Rectal, Q4H PRN, Nerissa Aviles, DO    amLODIPine (NORVASC) tablet 10 mg, 10 mg, Oral, Q24H, Calin Benitez MD, 10 mg at 24 0837    sennosides-docusate (PERICOLACE) 8.6-50 MG per tablet 2 tablet, 2 tablet, Oral, BID, 2 tablet at 24 0837 **AND** polyethylene glycol (MIRALAX) packet 17 g, 17 g, Oral, Daily PRN **AND** bisacodyl (DULCOLAX) EC tablet 5 mg, 5 mg, Oral, Daily PRN **AND** bisacodyl (DULCOLAX) suppository 10 mg, 10 mg, Rectal, Daily PRN, Jennifer Bai, APRN    Calcium Replacement - Follow Nurse / BPA Driven Protocol, , Does not apply, PRN, Nerissa Aviles, DO    cyanocobalamin injection 1,000 mcg, 1,000 mcg, Intramuscular, Q28 Days, Darrell Jhaveri DO, 1,000 mcg at 24 1532    dextrose (D50W) (25 g/50 mL) IV injection 25 g, 25 g, Intravenous, Q15 Min PRN, Nerissa Aviles,  DO    dextrose (GLUTOSE) oral gel 15 g, 15 g, Oral, Q15 Min PRN, TraciKamala bacasie G, DO    donepezil (ARICEPT) tablet 10 mg, 10 mg, Oral, BID, Darrell Jhaveri, DO, 10 mg at 04/28/24 0837    folic acid (FOLVITE) tablet 1 mg, 1 mg, Oral, Daily, TraciNerissa baca G, DO, 1 mg at 04/28/24 0837    glucagon (GLUCAGEN) injection 1 mg, 1 mg, Intramuscular, Q15 Min PRN, TraciKamalaNerissa G, DO    heparin (porcine) 5000 UNIT/ML injection 5,000 Units, 5,000 Units, Subcutaneous, Q8H, Nerissa Aviles, DO, 5,000 Units at 04/28/24 0518    insulin glargine (LANTUS, SEMGLEE) injection 15 Units, 15 Units, Subcutaneous, Q12H, Cecily Bain DO, 15 Units at 04/28/24 0837    Insulin Lispro (humaLOG) injection 2-7 Units, 2-7 Units, Subcutaneous, 4x Daily AC & at Bedtime, Nerissa Aviles, DO, 2 Units at 04/28/24 0838    Insulin Lispro (humaLOG) injection 5 Units, 5 Units, Subcutaneous, TID With Meals, Parisa Madrigal PA-C, 5 Units at 04/28/24 0838    lactated ringers infusion, 75 mL/hr, Intravenous, Continuous, Parisa Madrigal PA-C, Last Rate: 75 mL/hr at 04/28/24 0919, 75 mL/hr at 04/28/24 0919    lisinopril (PRINIVIL,ZESTRIL) tablet 30 mg, 30 mg, Oral, Daily, Parisa Madrigal PA-C, 30 mg at 04/28/24 0837    Magnesium Standard Dose Replacement - Follow Nurse / BPA Driven Protocol, , Does not apply, PRN, TraciKamalaNerissa G, DO    melatonin tablet 5 mg, 5 mg, Oral, Nightly PRN, Traci, Nerissa G, DO    miconazole (MICOTIN) 2 % powder 1 Application, 1 Application, Topical, Q12H, Cecily Bain DO, 1 Application at 04/27/24 2042    mirtazapine (REMERON) tablet 15 mg, 15 mg, Oral, Nightly, Darrell Jhaveri DO, 15 mg at 04/27/24 2041    PHENobarbital tablet 64.8 mg, 64.8 mg, Oral, Q8H, 64.8 mg at 04/28/24 0519 **OR** PHENobarbital injection 65 mg, 65 mg, Intramuscular, Q8H, Darrell Jhaveri DO    Phosphorus Replacement - Follow Nurse / BPA Driven Protocol, , Does not apply, PRN, Nerissa Aviles DO    Potassium Replacement - Follow Nurse /  BPA Driven Protocol, , Does not apply, PRN, Traci, Nerissa G, DO    QUEtiapine (SEROquel) tablet 100 mg, 100 mg, Oral, BID, Meliza Kahn K, APRN, 100 mg at 04/28/24 0837    QUEtiapine (SEROquel) tablet 200 mg, 200 mg, Oral, Nightly, Jhaveri, Darrell A, DO, 200 mg at 04/27/24 2041    sodium chloride 0.9 % flush 10 mL, 10 mL, Intravenous, Q12H, Traci, Nerissa G, DO, 10 mL at 04/28/24 0843    sodium chloride 0.9 % flush 10 mL, 10 mL, Intravenous, PRN, Traci, Nersisa G, DO    sodium chloride 0.9 % infusion 40 mL, 40 mL, Intravenous, PRN, Traci, Nerissa G, DO, 40 mL at 04/23/24 0546    temazepam (RESTORIL) capsule 30 mg, 30 mg, Oral, Nightly, Jhaveri, Darrell A, DO, 30 mg at 04/27/24 2041    ziprasidone (GEODON) injection 10 mg, 10 mg, Intramuscular, Q4H PRN, Meliza Kahn K, APRN, 10 mg at 04/28/24 1022    Laboratory Results:   Lab Results   Component Value Date    GLUCOSE 166 (H) 04/19/2024    CALCIUM 9.9 04/19/2024     04/19/2024    K 4.4 04/19/2024    CO2 30.0 (H) 04/19/2024     04/19/2024    BUN 16 04/19/2024    CREATININE 0.82 04/19/2024    EGFRIFAFRI 102 02/21/2022    EGFRIFNONA 88 02/21/2022    BCR 19.5 04/19/2024    ANIONGAP 8.0 04/19/2024     Lab Results   Component Value Date    WBC 10.47 04/19/2024    HGB 15.5 04/19/2024    HCT 46.7 04/19/2024    MCV 92.7 04/19/2024     04/19/2024     Lab Results   Component Value Date    CHOL 220 (H) 01/08/2024    CHOL 120 08/01/2019    CHOL 117 04/15/2019     Lab Results   Component Value Date    HDL 41 01/08/2024    HDL 37 (L) 08/03/2023    HDL 41 04/03/2023     Lab Results   Component Value Date     (H) 01/08/2024     (H) 08/03/2023     (H) 04/03/2023     Lab Results   Component Value Date    TRIG 108 01/08/2024    TRIG 208 (H) 08/03/2023    TRIG 138 04/03/2023     Lab Results   Component Value Date    HGBA1C 9.50 (H) 04/18/2024     Lab Results   Component Value Date    FOLATE 10.20 02/24/2024     Lab Results   Component Value Date     HDILRHUU25 384 04/18/2024     Phenobarbital level 6.2    Images/Procedures   CT head 4/19/2024  Moderate chronic small vessel ischemic change.  No acute intracranial abnormality    Assessment / Plan   Active Hospital Problems:  Dementia with behavioral disturbance  Agitation    Brief Patient Summary:  Too Tai is a 68 y.o. male with history of advanced dementia with behavioral disturbances, CAD, diabetes type 2, hyperlipidemia presenting with increasing agitation.  Phenobarbital level 6.2.  QTc 462.  Ongoing agitation this morning requiring Geodon.  Patient was sleeping during assessment.  Will continue current medications for now.  Would appreciate psych input.    Plan:   -Psych consult  -Continue phenobarbital to 64.8 mg 3 times daily  -Phenobarbital level tomorrow a.m.  -Remeron 15 mg nightly  -Aricept 10 mg nightly  -Seroquel 100 mg/100 mg / 200 mg  -Restoril 30 mg nightly  -IM Geodon 10 mg as needed for agitation    I have discussed the above with the patient, family, bedside RN  Time spent with patient: 35 minutes in face-to-face evaluation and management of the patient.      Darrell Jhaveri DO, MS          Please draw prior to

## 2024-04-28 NOTE — PLAN OF CARE
Goal Outcome Evaluation:         Patient very agitated with all care today. Physically aggressive during care. Patient noted to be yelling even between care. Verbal redirection effective at times.

## 2024-04-29 ENCOUNTER — TELEPHONE (OUTPATIENT)
Age: 69
End: 2024-04-29
Payer: MEDICARE

## 2024-04-29 LAB
GLUCOSE BLDC GLUCOMTR-MCNC: 171 MG/DL (ref 70–130)
GLUCOSE BLDC GLUCOMTR-MCNC: 178 MG/DL (ref 70–130)
GLUCOSE BLDC GLUCOMTR-MCNC: 298 MG/DL (ref 70–130)
GLUCOSE BLDC GLUCOMTR-MCNC: 335 MG/DL (ref 70–130)
PHENOBARB SERPL-MCNC: 8.3 MCG/ML (ref 10–30)

## 2024-04-29 PROCEDURE — 25010000002 HEPARIN (PORCINE) PER 1000 UNITS: Performed by: INTERNAL MEDICINE

## 2024-04-29 PROCEDURE — 63710000001 INSULIN LISPRO (HUMAN) PER 5 UNITS: Performed by: PHYSICIAN ASSISTANT

## 2024-04-29 PROCEDURE — 82948 REAGENT STRIP/BLOOD GLUCOSE: CPT

## 2024-04-29 PROCEDURE — 25010000002 PHENOBARBITAL PER 120 MG: Performed by: STUDENT IN AN ORGANIZED HEALTH CARE EDUCATION/TRAINING PROGRAM

## 2024-04-29 PROCEDURE — 80184 ASSAY OF PHENOBARBITAL: CPT | Performed by: STUDENT IN AN ORGANIZED HEALTH CARE EDUCATION/TRAINING PROGRAM

## 2024-04-29 PROCEDURE — 99232 SBSQ HOSP IP/OBS MODERATE 35: CPT | Performed by: NURSE PRACTITIONER

## 2024-04-29 PROCEDURE — 63710000001 INSULIN LISPRO (HUMAN) PER 5 UNITS: Performed by: INTERNAL MEDICINE

## 2024-04-29 PROCEDURE — 63710000001 INSULIN GLARGINE PER 5 UNITS: Performed by: INTERNAL MEDICINE

## 2024-04-29 PROCEDURE — 99233 SBSQ HOSP IP/OBS HIGH 50: CPT | Performed by: STUDENT IN AN ORGANIZED HEALTH CARE EDUCATION/TRAINING PROGRAM

## 2024-04-29 RX ADMIN — PHENOBARBITAL 64.8 MG: 32.4 TABLET ORAL at 19:52

## 2024-04-29 RX ADMIN — INSULIN LISPRO 5 UNITS: 100 INJECTION, SOLUTION INTRAVENOUS; SUBCUTANEOUS at 18:41

## 2024-04-29 RX ADMIN — AMLODIPINE BESYLATE 10 MG: 10 TABLET ORAL at 07:58

## 2024-04-29 RX ADMIN — HEPARIN SODIUM 5000 UNITS: 5000 INJECTION INTRAVENOUS; SUBCUTANEOUS at 06:51

## 2024-04-29 RX ADMIN — Medication 10 ML: at 20:02

## 2024-04-29 RX ADMIN — INSULIN LISPRO 5 UNITS: 100 INJECTION, SOLUTION INTRAVENOUS; SUBCUTANEOUS at 07:59

## 2024-04-29 RX ADMIN — INSULIN GLARGINE 15 UNITS: 100 INJECTION, SOLUTION SUBCUTANEOUS at 07:58

## 2024-04-29 RX ADMIN — QUETIAPINE FUMARATE 100 MG: 100 TABLET ORAL at 16:19

## 2024-04-29 RX ADMIN — QUETIAPINE FUMARATE 100 MG: 100 TABLET ORAL at 07:59

## 2024-04-29 RX ADMIN — PHENOBARBITAL 64.8 MG: 32.4 TABLET ORAL at 16:19

## 2024-04-29 RX ADMIN — QUETIAPINE FUMARATE 200 MG: 100 TABLET ORAL at 19:52

## 2024-04-29 RX ADMIN — DONEPEZIL HYDROCHLORIDE 10 MG: 10 TABLET, FILM COATED ORAL at 07:56

## 2024-04-29 RX ADMIN — LISINOPRIL 30 MG: 20 TABLET ORAL at 07:56

## 2024-04-29 RX ADMIN — Medication 10 ML: at 19:53

## 2024-04-29 RX ADMIN — INSULIN LISPRO 5 UNITS: 100 INJECTION, SOLUTION INTRAVENOUS; SUBCUTANEOUS at 11:39

## 2024-04-29 RX ADMIN — INSULIN LISPRO 2 UNITS: 100 INJECTION, SOLUTION INTRAVENOUS; SUBCUTANEOUS at 11:39

## 2024-04-29 RX ADMIN — PHENOBARBITAL SODIUM 65 MG: 65 INJECTION INTRAMUSCULAR at 06:51

## 2024-04-29 RX ADMIN — TEMAZEPAM 30 MG: 15 CAPSULE ORAL at 19:52

## 2024-04-29 RX ADMIN — DONEPEZIL HYDROCHLORIDE 10 MG: 10 TABLET, FILM COATED ORAL at 19:52

## 2024-04-29 RX ADMIN — MIRTAZAPINE 15 MG: 15 TABLET, FILM COATED ORAL at 19:52

## 2024-04-29 RX ADMIN — INSULIN LISPRO 2 UNITS: 100 INJECTION, SOLUTION INTRAVENOUS; SUBCUTANEOUS at 07:57

## 2024-04-29 RX ADMIN — INSULIN GLARGINE 15 UNITS: 100 INJECTION, SOLUTION SUBCUTANEOUS at 20:09

## 2024-04-29 NOTE — PLAN OF CARE
Problem: Restraint, Nonviolent  Goal: Absence of Harm or Injury  Outcome: Ongoing, Not Progressing  Intervention: Implement Least Restrictive Safety Strategies  Description: Consider personal and environmental factors contributing to nonviolent or self-destructive behavior.  Utilize diversional activity/alternative device protection.  Document alternative strategies and less restrictive intervention attempts and their effects.  Clearly describe/record behavior leading to need for restraint.  Use the least restrictive method of restraint.  Recognize restraint should only be used if needed to improve the patient's wellbeing and less restrictive interventions have been determined to be ineffective.  Reassess continued need frequently. Remove restraint as soon as unsafe situation is resolved.  Flowsheets  Taken 4/29/2024 1916  De-Escalation Techniques: 1:1 observation initiated  Taken 4/29/2024 1800  Medical Device Protection: IV pole/bag removed from visual field  Less Restrictive Alternative:   1:1 observation maintained   bed alarm in use   calming techniques promoted  De-Escalation Techniques:   increased round frequency   quiet time facilitated   reoriented   stimulation decreased  Diversional Activities: television  Taken 4/29/2024 1600  Medical Device Protection: IV pole/bag removed from visual field  Less Restrictive Alternative:   environment adjusted   family presence promoted   sensory stimulation limited  De-Escalation Techniques:   reoriented   stimulation decreased  Diversional Activities: television  Taken 4/29/2024 1400  Medical Device Protection: IV pole/bag removed from visual field  Less Restrictive Alternative:   bed alarm in use   calming techniques promoted  De-Escalation Techniques:   quiet time facilitated   reoriented  Diversional Activities: television  Taken 4/29/2024 1200  Medical Device Protection: tubing secured  Less Restrictive Alternative: 1:1 observation maintained  De-Escalation  Techniques: 1:1 observation initiated  Diversional Activities: television  Taken 4/29/2024 1000  Medical Device Protection: tubing secured  Less Restrictive Alternative: bed alarm in use  De-Escalation Techniques:   stimulation decreased   reoriented  Diversional Activities: television  Taken 4/29/2024 0800  Medical Device Protection: IV pole/bag removed from visual field  Less Restrictive Alternative: calming techniques promoted  De-Escalation Techniques:   reoriented   stimulation decreased  Diversional Activities: television  Intervention: Protect Dignity, Rights, and Personal Wellbeing  Description: Explain restraint rationale and procedure to patient and family/support person prior to application.  Regularly assess personal needs and for changes in behavior and clinical condition, as well as physical and emotional wellbeing.  Provide reassurance and emotional support.  Flowsheets (Taken 4/29/2024 1800)  Trust Relationship/Rapport: care explained   Goal Outcome Evaluation:

## 2024-04-29 NOTE — PROGRESS NOTES
Continued Stay Note  [facility]     Patient Name: Too Tai  MRN: [id number]  Today's Date: 4/29/2024    Admit Date: 4/18/2024    Plan: TBD   Discharge Plan       Row Name 04/29/24 1211       Plan    Plan Comments There is a [facility] psychiatric appointment scheduled for Friday 5/3/2024 at 2:15 pm.                   Discharge Codes    No documentation.                 Expected Discharge Date and Time       Expected Discharge Date Expected Discharge Time    May 1, 2024               JAMES Reyes

## 2024-04-29 NOTE — CASE MANAGEMENT/SOCIAL WORK
Continued Stay Note  [facility]     Patient Name: Too Tai  MRN: [id number]  Today's Date: 4/29/2024    Admit Date: 4/18/2024    Plan: TBD   Discharge Plan       Row Name 04/29/24 1885       Plan    Plan TBD    Patient/Family in Agreement with Plan yes    Plan Comments Patient is not medically ready for referrals to be sent due to continued need for restraints and PRN medications. Neurology & palliative are following. His plan is TBD. CM spoke to wife at bedside to update. CM called [facility] on Friday, 4/26 to get exact criteria to send a referral. Patient has to be out of restraints for 72 hours prior to a referral being faxed. CM was also told, this does not apply to PRN meds for agitation/behaviors. Referral can be faxed to [phone] once patient meets this criteria ([phone]). CM called [facility] today to find out criteria. CM was directed to call Geoff Guadarrama [phone] ([phone]). CM spoke with [place]. Explained that patient was in restraints. Per [place], fax information to [phone]. Completed. MSW arranged a tele psych assessment for Friday, 5/3/24 at 1415. LTC placement would be dependent on LTC bed availability, a facility that will offer the patient a bed & private pay costs, once patient is stabilized. Wife is aware of the private pay range of monthly costs for LTC. MSW following. CM will continue to follow and assist.    Final Discharge Disposition Code 30 - still a patient                   Discharge Codes    No documentation.                 Expected Discharge Date and Time       Expected Discharge Date Expected Discharge Time    May 1, 2024               Ilene Brown, MANJINDER

## 2024-04-29 NOTE — PROGRESS NOTES
[facility] Medicine Services  PROGRESS NOTE    Patient Name: Too Tai  : 1955  MRN: [id number]    Date of Admission: 2024  Primary Care Physician: Des Geller MD    Subjective     CC: f/u dementia with agitation    HPI:  No new issues overnight  Nursing and family getting ready to stand him up    Objective     Vital Signs:   Temp:  [98 °F (36.7 °C)] 98 °F (36.7 °C)  Heart Rate:  [80] 80  Resp:  [18] 18  BP: (128)/(57) 128/57     Physical Exam:  Constitutional: No acute distress, awake, alert, sitting at foot of bed, wife and another family member at bedside (Located within [facility] employee)  HENT: NCAT, mucous membranes moist  Respiratory: Clear to auscultation bilaterally, respiratory effort normal   Cardiovascular: RRR, no murmurs, rubs, or gallops  Gastrointestinal: Positive bowel sounds, soft, nontender, nondistended  Musculoskeletal: No bilateral ankle edema  Psychiatric: Flat affect, currently calm and cooperative  Neurologic: Oriented x 1, RICO, speech clear  Skin: No rashes      Results Reviewed:  LAB RESULTS:    Brief Urine Lab Results  (Last result in the past 365 days)        Color   Clarity   Blood   Leuk Est   Nitrite   Protein   CREAT   Urine HCG        24 0243 Yellow   Clear   Negative   Negative   Negative   Negative                 Microbiology Results Abnormal       None          No radiology results from the last 24 hrs    Results for orders placed during the hospital encounter of 24    Adult Transthoracic Echo Complete W/ Cont if Necessary Per Protocol    Interpretation Summary    Left ventricular systolic function is normal. Calculated left ventricular EF = 55.1% Normal left ventricular cavity size noted. Left ventricular wall thickness is consistent with mild concentric hypertrophy. Left ventricular diastolic function is consistent with (grade I) impaired relaxation.    The right ventricular cavity is mildly dilated. Normal right ventricular  systolic function noted.    There is calcification of the aortic valve. The aortic valve appears trileaflet. Mild aortic valve regurgitation is present. No aortic valve stenosis is present.    Mitral annular calcification is present. Trace mitral valve regurgitation is present. No significant mitral valve stenosis is present.    The tricuspid valve is structurally normal with no stenosis present. Trace tricuspid valve regurgitation is present. Insufficient TR velocity profile to estimate the right ventricular systolic pressure.    Mild dilation of the sinuses of Valsalva is present. Mild dilation of the ascending aorta is present. Ascending aorta = 4.2 cm    Current medications:  Scheduled Meds:amLODIPine, 10 mg, Oral, Q24H  cyanocobalamin, 1,000 mcg, Intramuscular, Q28 Days  donepezil, 10 mg, Oral, BID  folic acid, 1 mg, Oral, Daily  heparin (porcine), 5,000 Units, Subcutaneous, Q8H  insulin glargine, 15 Units, Subcutaneous, Q12H  insulin lispro, 2-7 Units, Subcutaneous, 4x Daily AC & at Bedtime  Insulin Lispro, 5 Units, Subcutaneous, TID With Meals  lisinopril, 30 mg, Oral, Daily  miconazole, 1 Application, Topical, Q12H  mirtazapine, 15 mg, Oral, Nightly  PHENobarbital, 64.8 mg, Oral, Q8H   Or  PHENobarbital, 65 mg, Intramuscular, Q8H  QUEtiapine, 100 mg, Oral, BID  QUEtiapine, 200 mg, Oral, Nightly  senna-docusate sodium, 2 tablet, Oral, BID  sodium chloride, 10 mL, Intravenous, Q12H  temazepam, 30 mg, Oral, Nightly      Continuous Infusions:lactated ringers, 75 mL/hr, Last Rate: 75 mL/hr (04/28/24 0919)      PRN Meds:.  acetaminophen **OR** acetaminophen **OR** acetaminophen    senna-docusate sodium **AND** polyethylene glycol **AND** bisacodyl **AND** bisacodyl    Calcium Replacement - Follow Nurse / BPA Driven Protocol    dextrose    dextrose    glucagon (human recombinant)    Magnesium Standard Dose Replacement - Follow Nurse / BPA Driven Protocol    melatonin    Phosphorus Replacement - Follow Nurse / BPA  Driven Protocol    Potassium Replacement - Follow Nurse / BPA Driven Protocol    sodium chloride    sodium chloride    ziprasidone    Assessment & Plan     Active Hospital Problems    Diagnosis  POA    **Agitation due to dementia [F03.911]  Yes    Agitation [R45.1]  Yes    Type 2 diabetes mellitus with hyperglycemia, with long-term current use of insulin [E11.65, Z79.4]  Not Applicable    Peripheral neuropathy [G62.9]  Yes    Hyperlipidemia [E78.5]  Yes    Benign prostatic hyperplasia [N40.0]  Yes    Benign essential HTN [I10]  Yes    Peripheral vascular disease [I73.9]  Yes    Coronary artery disease involving native coronary artery of native heart [I25.10]  Yes      Resolved Hospital Problems   No resolved problems to display.     Brief Hospital Course to date:  Too Tai is a 68yoM with PMH significant for advanced dementia, CAD, diabetes mellitus type 2, HLD and PVD. Admitted to [facility] ED on 4/18/24 for agitation / behavioral disturbances. Recently admitted to [facility] 2/24-3/4/24 for dementia with behavioral disturbances and again 3/27-4/4/24 for similar issues.      Advanced dementia complicated by behavioral disturbance with superimposed sleep disturbance   - Infectious work up unremarkable. UA reassuring. CXR without acute findings. CT head without acute findings  - Neuro following  - Still in restraints   - Continue home Aricept and Mirtazapine 15mg QHS   - previous doses of Depakote and Seroquel were delayed by a couple of hours, resulting in the need for PRN Geodon administration. Neurology has adjusted timing of medications  - Continue Seroquel 100mg BID and 200mg QHS   - Neurology started Phenobarbital; continue to monitor. Level slowing improving  - Continue Temazepam 30mg QHS  - Appreciate palliative care assistance  - Not safe to return home at this time as he is a danger to himself and his wife   - May need geriatric psych facility vs LTC - CM looking into this. Telepsych  appointment scheduled for Friday  - IV fluids while taking in minimal PO     Uncontrolled diabetes mellitus type 2  - Appears not on any home meds for DM  - A1c 9.5%  - Continue Lantus 15 units BID, Lispro 5 units TID AC + SSI    Latest Reference Range & Units 04/28/24 21:53 04/29/24 07:32 04/29/24 11:33 04/29/24 16:55   Glucose 70 - 130 mg/dL 230 (H) 171 (H) 178 (H) 335 (H)     HTN  - continue Amlodipine 10mg daily  - Increase Lisinopril to 30mg daily     Expected Discharge Location and Transportation: TBD  Expected Discharge Expected Discharge Date: 5/3/2024; Expected Discharge Time:      DVT prophylaxis:Medical and mechanical DVT prophylaxis orders are present.    AM-PAC 6 Clicks Score (PT): 18 (04/28/24 2000)    CODE STATUS:   Code Status and Medical Interventions:   Ordered at: 04/19/24 1037     Medical Intervention Limits:    NO intubation (DNI)     Level Of Support Discussed With:    Next of Kin (If No Surrogate)     Code Status (Patient has no pulse and is not breathing):    No CPR (Do Not Attempt to Resuscitate)     Medical Interventions (Patient has pulse or is breathing):    Limited Support     IVONNE Rodriguez  04/29/24

## 2024-04-29 NOTE — TELEPHONE ENCOUNTER
An in-patient psychiatric telehealth consult has been scheduled at the request of [facility] Social Work:       Patient: Too Tai   Patient : 1955   MRN: [id number]   Provider Completing: Dr. ZAHRA Thomas MD   Provider Location: [facility]   Consult Date: 5/3/2024   Consult Time: 2:15pm   Complaint / Reason for Consult: Medication & treatment recommendation. Advanced dementia with agitation and agression.    Requesting & Contact Information: Liberty Kidd - [phone]

## 2024-04-29 NOTE — PROGRESS NOTES
"Palliative Care Daily Progress Note     Referring:  Parisa Madrigal PAC  C/C: pt unable    S: Medical record reviewed. Events noted.  Spouse and son bedside.  Spouse expressed concern when seen earlier today that pt is either asleep or agitated.  Believes he is sleeping too much to interact or do therapy.      ROS: pt unable    O: Code Status:   Code Status and Medical Interventions:   Ordered at: 04/19/24 1037     Medical Intervention Limits:    NO intubation (DNI)     Level Of Support Discussed With:    Next of Kin (If No Surrogate)     Code Status (Patient has no pulse and is not breathing):    No CPR (Do Not Attempt to Resuscitate)     Medical Interventions (Patient has pulse or is breathing):    Limited Support      Advanced Directives: Advance Directive Status: Patient does not have advance directive   Goals of Care: Ongoing.   Palliative Performance Scale Score: 40%     /57 (BP Location: Right arm, Patient Position: Lying)   Pulse 80   Temp 98 °F (36.7 °C) (Axillary)   Resp 18   Ht 188 cm (74\")   Wt 97.5 kg (215 lb)   SpO2 96%   BMI 27.60 kg/m²     Intake/Output Summary (Last 24 hours) at 4/29/2024 1103  Last data filed at 4/29/2024 0800  Gross per 24 hour   Intake 240 ml   Output --   Net 240 ml       Physical Exam:    General Appearance:    Arouseable,  NAD   HEENT:    NC/AT, EOMI, anicteric, MMM, face relaxed   Neck:   supple, trachea midline, no JVD   Lungs:     CTA bilat, diminished in bases; respirations regular, even     and unlabored    Heart:    RRR, normal S1 and S2, no M/R/G   Abdomen:     Normal bowel sounds, soft, nontender, nondistended   G/U:   Deferred   MSK/Extremities:   No clubbing , cyanosis or edema, No wasting   Pulses:   Pulses palpable and equal bilaterally   Skin:   Warm, dry, no mottling   Neurologic:   Arouseable, Ox1??, moves extremities x 4, no tremor, nl     tone   Psych:   Easily agitated and pulls at restraints       Current Medications:      Current " Facility-Administered Medications:     acetaminophen (TYLENOL) tablet 650 mg, 650 mg, Oral, Q4H PRN **OR** acetaminophen (TYLENOL) 160 MG/5ML oral solution 650 mg, 650 mg, Oral, Q4H PRN **OR** acetaminophen (TYLENOL) suppository 650 mg, 650 mg, Rectal, Q4H PRN, TraciKamala bacasie G, DO    amLODIPine (NORVASC) tablet 10 mg, 10 mg, Oral, Q24H, Calin Benitez MD, 10 mg at 04/29/24 0758    sennosides-docusate (PERICOLACE) 8.6-50 MG per tablet 2 tablet, 2 tablet, Oral, BID, 2 tablet at 04/28/24 2134 **AND** polyethylene glycol (MIRALAX) packet 17 g, 17 g, Oral, Daily PRN **AND** bisacodyl (DULCOLAX) EC tablet 5 mg, 5 mg, Oral, Daily PRN **AND** bisacodyl (DULCOLAX) suppository 10 mg, 10 mg, Rectal, Daily PRN, Jennifer Bai, IVONNE    Calcium Replacement - Follow Nurse / BPA Driven Protocol, , Does not apply, PRN, Traci, Nerissa G, DO    cyanocobalamin injection 1,000 mcg, 1,000 mcg, Intramuscular, Q28 Days, Darrell Jhaveri, DO, 1,000 mcg at 04/19/24 1532    dextrose (D50W) (25 g/50 mL) IV injection 25 g, 25 g, Intravenous, Q15 Min PRN, Traci, Nerissa G, DO    dextrose (GLUTOSE) oral gel 15 g, 15 g, Oral, Q15 Min PRN, Traci, Nerissa G, DO    donepezil (ARICEPT) tablet 10 mg, 10 mg, Oral, BID, Darrell Jhaveri A, DO, 10 mg at 04/29/24 0756    folic acid (FOLVITE) tablet 1 mg, 1 mg, Oral, Daily, Traci, Nerissa G, DO, 1 mg at 04/28/24 0837    glucagon (GLUCAGEN) injection 1 mg, 1 mg, Intramuscular, Q15 Min PRN, Traci, Nerissa G, DO    heparin (porcine) 5000 UNIT/ML injection 5,000 Units, 5,000 Units, Subcutaneous, Q8H, Nerissa Aviles DO, 5,000 Units at 04/29/24 0651    insulin glargine (LANTUS, SEMGLEE) injection 15 Units, 15 Units, Subcutaneous, Q12H, Cecily Bain, DO, 15 Units at 04/29/24 0758    Insulin Lispro (humaLOG) injection 2-7 Units, 2-7 Units, Subcutaneous, 4x Daily AC & at Bedtime, Nerissa Aviles DO, 2 Units at 04/29/24 0757    Insulin Lispro (humaLOG) injection 5 Units, 5 Units, Subcutaneous, TID With Meals, Rohan  "Pairsa ABDALLA PA-C, 5 Units at 04/29/24 0759    lactated ringers infusion, 75 mL/hr, Intravenous, Continuous, Parisa Madrigal PA-C, Last Rate: 75 mL/hr at 04/28/24 0919, 75 mL/hr at 04/28/24 0919    lisinopril (PRINIVIL,ZESTRIL) tablet 30 mg, 30 mg, Oral, Daily, Parisa Madrigal PA-C, 30 mg at 04/29/24 0756    Magnesium Standard Dose Replacement - Follow Nurse / BPA Driven Protocol, , Does not apply, PRN, TraciNerissa baca G, DO    melatonin tablet 5 mg, 5 mg, Oral, Nightly PRN, TraciNerissa baca G, DO    miconazole (MICOTIN) 2 % powder 1 Application, 1 Application, Topical, Q12H, Cecily Bain, DO, 1 Application at 04/28/24 0900    mirtazapine (REMERON) tablet 15 mg, 15 mg, Oral, Nightly, Darrell Jhaveri A, DO, 15 mg at 04/28/24 2134    PHENobarbital tablet 64.8 mg, 64.8 mg, Oral, Q8H, 64.8 mg at 04/28/24 2134 **OR** PHENobarbital injection 65 mg, 65 mg, Intramuscular, Q8H, Guille Jhaveriy A, DO, 65 mg at 04/29/24 0651    Phosphorus Replacement - Follow Nurse / BPA Driven Protocol, , Does not apply, PRN, TraciKamala bacasie G, DO    Potassium Replacement - Follow Nurse / BPA Driven Protocol, , Does not apply, PRN, Traci Nerissa G, DO    QUEtiapine (SEROquel) tablet 100 mg, 100 mg, Oral, BID, Femi April K, APRN, 100 mg at 04/29/24 0759    QUEtiapine (SEROquel) tablet 200 mg, 200 mg, Oral, Nightly, Jhaveri, Darrell A, DO, 200 mg at 04/28/24 2134    sodium chloride 0.9 % flush 10 mL, 10 mL, Intravenous, Q12H, TraciTresa bacae G, DO, 10 mL at 04/28/24 2143    sodium chloride 0.9 % flush 10 mL, 10 mL, Intravenous, PRN, Traci, Nerissa G, DO    sodium chloride 0.9 % infusion 40 mL, 40 mL, Intravenous, PRN, Traci, Nerissa G, DO, 40 mL at 04/23/24 0546    temazepam (RESTORIL) capsule 30 mg, 30 mg, Oral, Nightly, Darrell Jhaveri, DO, 30 mg at 04/28/24 2133    ziprasidone (GEODON) injection 10 mg, 10 mg, Intramuscular, Q4H PRN, Meliza Kahn K, APRN, 10 mg at 04/28/24 1022     Labs:             Invalid input(s): \"LABALBU\", " "\"PROT\"  Imaging Results (Last 72 Hours)       ** No results found for the last 72 hours. **                  Diagnostics: Reviewed    A:       P:   Palliative Care Team will continue to follow patient.     Carlee Cesar MD, 4/29/2024, 11:03 EDT     "

## 2024-04-29 NOTE — PROGRESS NOTES
"Palliative Care Daily Progress Note      Referring:  Parisa Madrigal PAC  C/C: pt unable     S: Medical record reviewed. Events noted.  Spouse and son bedside.  Spouse expressed concern when seen earlier today that pt is either asleep or agitated.  Believes he is sleeping too much to interact or do therapy.       ROS: pt unable     O: Code Status:       Code Status and Medical Interventions:   Ordered at: 04/19/24 1037     Medical Intervention Limits:     NO intubation (DNI)     Level Of Support Discussed With:     Next of Kin (If No Surrogate)     Code Status (Patient has no pulse and is not breathing):     No CPR (Do Not Attempt to Resuscitate)     Medical Interventions (Patient has pulse or is breathing):     Limited Support      Advanced Directives: Advance Directive Status: Patient does not have advance directive   Goals of Care: Ongoing.   Palliative Performance Scale Score: 40%      /57 (BP Location: Right arm, Patient Position: Lying)   Pulse 80   Temp 98 °F (36.7 °C) (Axillary)   Resp 18   Ht 188 cm (74\")   Wt 97.5 kg (215 lb)   SpO2 96%   BMI 27.60 kg/m²      Intake/Output Summary (Last 24 hours) at 4/29/2024 1103  Last data filed at 4/29/2024 0800      Gross per 24 hour   Intake 240 ml   Output --   Net 240 ml         Physical Exam:     General Appearance:    Arouseable,  NAD   HEENT:    NC/AT, EOMI, anicteric, MMM, face relaxed   Neck:   supple, trachea midline, no JVD   Lungs:     CTA bilat, diminished in bases; respirations regular, even     and unlabored    Heart:    RRR, normal S1 and S2, no M/R/G   Abdomen:     Normal bowel sounds, soft, nontender, nondistended   G/U:   Deferred   MSK/Extremities:   No clubbing , cyanosis or edema, No wasting   Pulses:   Pulses palpable and equal bilaterally   Skin:   Warm, dry, no mottling   Neurologic:   Arouseable, Ox1??, moves extremities x 4, no tremor, nl     tone   Psych:   Easily agitated and pulls at restraints                  Current " Medications:      Current Medications      Current Facility-Administered Medications:     acetaminophen (TYLENOL) tablet 650 mg, 650 mg, Oral, Q4H PRN **OR** acetaminophen (TYLENOL) 160 MG/5ML oral solution 650 mg, 650 mg, Oral, Q4H PRN **OR** acetaminophen (TYLENOL) suppository 650 mg, 650 mg, Rectal, Q4H PRN, TraciKamala bacasie G, DO    amLODIPine (NORVASC) tablet 10 mg, 10 mg, Oral, Q24H, Calin Benitez MD, 10 mg at 04/29/24 0758    sennosides-docusate (PERICOLACE) 8.6-50 MG per tablet 2 tablet, 2 tablet, Oral, BID, 2 tablet at 04/28/24 2134 **AND** polyethylene glycol (MIRALAX) packet 17 g, 17 g, Oral, Daily PRN **AND** bisacodyl (DULCOLAX) EC tablet 5 mg, 5 mg, Oral, Daily PRN **AND** bisacodyl (DULCOLAX) suppository 10 mg, 10 mg, Rectal, Daily PRN, Jennifer Bai, APRN    Calcium Replacement - Follow Nurse / BPA Driven Protocol, , Does not apply, PRN, Traci, Nerissa G, DO    cyanocobalamin injection 1,000 mcg, 1,000 mcg, Intramuscular, Q28 Days, Darrell Jhaveri A, DO, 1,000 mcg at 04/19/24 1532    dextrose (D50W) (25 g/50 mL) IV injection 25 g, 25 g, Intravenous, Q15 Min PRN, Traci Nerissa G, DO    dextrose (GLUTOSE) oral gel 15 g, 15 g, Oral, Q15 Min PRN, Traci, Nerissa G, DO    donepezil (ARICEPT) tablet 10 mg, 10 mg, Oral, BID, Guille Jhaveriy A, DO, 10 mg at 04/29/24 0756    folic acid (FOLVITE) tablet 1 mg, 1 mg, Oral, Daily, Traci, Nerissa G, DO, 1 mg at 04/28/24 0837    glucagon (GLUCAGEN) injection 1 mg, 1 mg, Intramuscular, Q15 Min PRN, TraciKamalaNerissa G, DO    heparin (porcine) 5000 UNIT/ML injection 5,000 Units, 5,000 Units, Subcutaneous, Q8H, Nerissa Aviles DO, 5,000 Units at 04/29/24 0651    insulin glargine (LANTUS, SEMGLEE) injection 15 Units, 15 Units, Subcutaneous, Q12H, Cecily Bain, DO, 15 Units at 04/29/24 0758    Insulin Lispro (humaLOG) injection 2-7 Units, 2-7 Units, Subcutaneous, 4x Daily AC & at Bedtime, Nerissa Aviles DO, 2 Units at 04/29/24 0757    Insulin Lispro (humaLOG) injection 5  Units, 5 Units, Subcutaneous, TID With Meals, Parisa Madrigal PA-C, 5 Units at 04/29/24 0759    lactated ringers infusion, 75 mL/hr, Intravenous, Continuous, Parisa Madrigal PA-C, Last Rate: 75 mL/hr at 04/28/24 0919, 75 mL/hr at 04/28/24 0919    lisinopril (PRINIVIL,ZESTRIL) tablet 30 mg, 30 mg, Oral, Daily, Parisa Madrigal PA-C, 30 mg at 04/29/24 0756    Magnesium Standard Dose Replacement - Follow Nurse / BPA Driven Protocol, , Does not apply, PRN, TraciKamalaNerissa G, DO    melatonin tablet 5 mg, 5 mg, Oral, Nightly PRN, TraciKamalaNerissa G, DO    miconazole (MICOTIN) 2 % powder 1 Application, 1 Application, Topical, Q12H, Cecily Bain, DO, 1 Application at 04/28/24 0900    mirtazapine (REMERON) tablet 15 mg, 15 mg, Oral, Nightly, Jhaveri, Darrell A, DO, 15 mg at 04/28/24 2134    PHENobarbital tablet 64.8 mg, 64.8 mg, Oral, Q8H, 64.8 mg at 04/28/24 2134 **OR** PHENobarbital injection 65 mg, 65 mg, Intramuscular, Q8H, Jhaveri, Darrell A, DO, 65 mg at 04/29/24 0651    Phosphorus Replacement - Follow Nurse / BPA Driven Protocol, , Does not apply, PRN, Traci Nerissa G, DO    Potassium Replacement - Follow Nurse / BPA Driven Protocol, , Does not apply, PRN, Traci, Nerissa G, DO    QUEtiapine (SEROquel) tablet 100 mg, 100 mg, Oral, BID, Meliza Kahn K, APRN, 100 mg at 04/29/24 0759    QUEtiapine (SEROquel) tablet 200 mg, 200 mg, Oral, Nightly, Jhaveri, Darrell A, DO, 200 mg at 04/28/24 2134    sodium chloride 0.9 % flush 10 mL, 10 mL, Intravenous, Q12H, Traci, Nerissa G, DO, 10 mL at 04/28/24 2143    sodium chloride 0.9 % flush 10 mL, 10 mL, Intravenous, PRN, TraciTresa bacae G, DO    sodium chloride 0.9 % infusion 40 mL, 40 mL, Intravenous, PRN, TraciKamalaNerissa G, DO, 40 mL at 04/23/24 0546    temazepam (RESTORIL) capsule 30 mg, 30 mg, Oral, Nightly, Darrell Jhaveri, DO, 30 mg at 04/28/24 2133    ziprasidone (GEODON) injection 10 mg, 10 mg, Intramuscular, Q4H PRN, Meliza Kahn, APRN, 10 mg at 04/28/24 1022        "  Labs:              Invalid input(s): \"LABALBU\", \"PROT\"  Imaging Results (Last 72 Hours)         ** No results found for the last 72 hours. **                       Diagnostics: Reviewed     Impression:  Dementia with agitation  DM 2  Htn    Symptoms:  Agitation  Debility     P:   Encouraged spouse to continue to discuss concerns with neuro as they have been managing agitation.  Note plans for possible psych eval and would agree could be beneficial.  Will monitor for palliative needs.  Palliative Care Team will continue to follow patient.      Carlee Cesar MD, 4/29/2024, 11:03 EDT                     "

## 2024-04-29 NOTE — PROGRESS NOTES
[facility] Neurology    Progress Note    Patient Name: Too Tai  : 1955  MRN: [id number]  Primary Care Physician:  Des Geller MD  Date of admission: 2024    Subjective     Reason for consult: Dementia with behavioral disturbances    History of Present Illness   Ongoing agitation with all nursing care.  Patient did wake up to eat breakfast.  He is now back asleep.  Wife voices frustration with not letting him have more slack in his restraints while he is asleep.    Review of Systems   Unable to obtain    Objective     Vitals:   Temp:  [96.2 °F (35.7 °C)-98 °F (36.7 °C)] 98 °F (36.7 °C)  Heart Rate:  [79-88] 80  Resp:  [18] 18  BP: (128-180)/(57-94) 128/57    Neurologic Exam   Mental status/Cognition: Asleep, did not wake      Current Medications    Current Facility-Administered Medications:     acetaminophen (TYLENOL) tablet 650 mg, 650 mg, Oral, Q4H PRN **OR** acetaminophen (TYLENOL) 160 MG/5ML oral solution 650 mg, 650 mg, Oral, Q4H PRN **OR** acetaminophen (TYLENOL) suppository 650 mg, 650 mg, Rectal, Q4H PRN, Nerissa Aviles, DO    amLODIPine (NORVASC) tablet 10 mg, 10 mg, Oral, Q24H, Calin Benitez MD, 10 mg at 24 0758    sennosides-docusate (PERICOLACE) 8.6-50 MG per tablet 2 tablet, 2 tablet, Oral, BID, 2 tablet at 24 2134 **AND** polyethylene glycol (MIRALAX) packet 17 g, 17 g, Oral, Daily PRN **AND** bisacodyl (DULCOLAX) EC tablet 5 mg, 5 mg, Oral, Daily PRN **AND** bisacodyl (DULCOLAX) suppository 10 mg, 10 mg, Rectal, Daily PRN, Jennifer Bai, APRN    Calcium Replacement - Follow Nurse / BPA Driven Protocol, , Does not apply, PRN, Nerissa Aviles, DO    cyanocobalamin injection 1,000 mcg, 1,000 mcg, Intramuscular, Q28 Days, Darrell Jhaveri, , 1,000 mcg at 24 1532    dextrose (D50W) (25 g/50 mL) IV injection 25 g, 25 g, Intravenous, Q15 Min PRN, Traci, Nerissa G, DO    dextrose (GLUTOSE) oral gel 15 g, 15 g, Oral, Q15 Min PRN, Traci, Nerissa G, DO     Duplicate request, this is being addressed in a refill encounter from today.   donepezil (ARICEPT) tablet 10 mg, 10 mg, Oral, BID, Darrell Jhaveri, DO, 10 mg at 04/29/24 0756    folic acid (FOLVITE) tablet 1 mg, 1 mg, Oral, Daily, Nerissa Aviles, DO, 1 mg at 04/28/24 0837    glucagon (GLUCAGEN) injection 1 mg, 1 mg, Intramuscular, Q15 Min PRN, Nerissa Aviles, DO    heparin (porcine) 5000 UNIT/ML injection 5,000 Units, 5,000 Units, Subcutaneous, Q8H, Nerissa Aviles, DO, 5,000 Units at 04/29/24 0651    insulin glargine (LANTUS, SEMGLEE) injection 15 Units, 15 Units, Subcutaneous, Q12H, Cecily Bain, DO, 15 Units at 04/29/24 0758    Insulin Lispro (humaLOG) injection 2-7 Units, 2-7 Units, Subcutaneous, 4x Daily AC & at Bedtime, Nerissa Aviles DO, 2 Units at 04/29/24 0757    Insulin Lispro (humaLOG) injection 5 Units, 5 Units, Subcutaneous, TID With Meals, Parisa Madrigal PA-C, 5 Units at 04/29/24 0759    lactated ringers infusion, 75 mL/hr, Intravenous, Continuous, Parisa Madrigal PA-C, Last Rate: 75 mL/hr at 04/28/24 0919, 75 mL/hr at 04/28/24 0919    lisinopril (PRINIVIL,ZESTRIL) tablet 30 mg, 30 mg, Oral, Daily, Parisa Madrigal PA-C, 30 mg at 04/29/24 0756    Magnesium Standard Dose Replacement - Follow Nurse / BPA Driven Protocol, , Does not apply, PRN, Nerissa Aviles, DO    melatonin tablet 5 mg, 5 mg, Oral, Nightly PRN, Nerissa Aviles G, DO    miconazole (MICOTIN) 2 % powder 1 Application, 1 Application, Topical, Q12H, Cecily Bain, DO, 1 Application at 04/28/24 0900    mirtazapine (REMERON) tablet 15 mg, 15 mg, Oral, Nightly, Jhaveri, Darrell A, DO, 15 mg at 04/28/24 2134    PHENobarbital tablet 64.8 mg, 64.8 mg, Oral, Q8H, 64.8 mg at 04/28/24 2134 **OR** PHENobarbital injection 65 mg, 65 mg, Intramuscular, Q8H, Darrell Jhaveri DO, 65 mg at 04/29/24 0651    Phosphorus Replacement - Follow Nurse / BPA Driven Protocol, , Does not apply, PRN, Nerissa Aviles DO    Potassium Replacement - Follow Nurse / BPA Driven Protocol, , Does not apply, PRN, Nerissa Aviles,  DO    QUEtiapine (SEROquel) tablet 100 mg, 100 mg, Oral, BID, Kahn, April K, APRN, 100 mg at 04/29/24 0759    QUEtiapine (SEROquel) tablet 200 mg, 200 mg, Oral, Nightly, Jhaveri, Darrell A, DO, 200 mg at 04/28/24 2134    sodium chloride 0.9 % flush 10 mL, 10 mL, Intravenous, Q12H, Traci, Nerissa G, DO, 10 mL at 04/28/24 2143    sodium chloride 0.9 % flush 10 mL, 10 mL, Intravenous, PRN, Traci, Nerissa G, DO    sodium chloride 0.9 % infusion 40 mL, 40 mL, Intravenous, PRN, Traci, Nerissa G, DO, 40 mL at 04/23/24 0546    temazepam (RESTORIL) capsule 30 mg, 30 mg, Oral, Nightly, Jhaveri, Darrell A, DO, 30 mg at 04/28/24 2133    ziprasidone (GEODON) injection 10 mg, 10 mg, Intramuscular, Q4H PRN, Butler, April K, APRN, 10 mg at 04/28/24 1022    Laboratory Results:   Lab Results   Component Value Date    GLUCOSE 166 (H) 04/19/2024    CALCIUM 9.9 04/19/2024     04/19/2024    K 4.4 04/19/2024    CO2 30.0 (H) 04/19/2024     04/19/2024    BUN 16 04/19/2024    CREATININE 0.82 04/19/2024    EGFRIFAFRI 102 02/21/2022    EGFRIFNONA 88 02/21/2022    BCR 19.5 04/19/2024    ANIONGAP 8.0 04/19/2024     Lab Results   Component Value Date    WBC 10.47 04/19/2024    HGB 15.5 04/19/2024    HCT 46.7 04/19/2024    MCV 92.7 04/19/2024     04/19/2024     Lab Results   Component Value Date    CHOL 220 (H) 01/08/2024    CHOL 120 08/01/2019    CHOL 117 04/15/2019     Lab Results   Component Value Date    HDL 41 01/08/2024    HDL 37 (L) 08/03/2023    HDL 41 04/03/2023     Lab Results   Component Value Date     (H) 01/08/2024     (H) 08/03/2023     (H) 04/03/2023     Lab Results   Component Value Date    TRIG 108 01/08/2024    TRIG 208 (H) 08/03/2023    TRIG 138 04/03/2023     Lab Results   Component Value Date    HGBA1C 9.50 (H) 04/18/2024     Lab Results   Component Value Date    FOLATE 10.20 02/24/2024     Lab Results   Component Value Date    [id number] 384 04/18/2024     Phenobarbital level  8.3    Images/Procedures   CT head 4/19/2024  Moderate chronic small vessel ischemic change.  No acute intracranial abnormality    Assessment / Plan   Active Hospital Problems:  Dementia with behavioral disturbance  Agitation    Brief Patient Summary:  Too Tai is a 68 y.o. male with history of advanced dementia with behavioral disturbances, CAD, diabetes type 2, hyperlipidemia presenting with increasing agitation.  Phenobarbital level 8.3.  QTc 462.  Ongoing agitation.  Patient was sleeping during assessment and per wife continues to be sleepy throughout the day.  Will continue current medications for now.  Would appreciate psych input.    Plan:   -Psych consult pending  -Continue phenobarbital to 64.8 mg 3 times daily  -Phenobarbital level tomorrow a.m.  -Remeron 15 mg nightly  -Aricept 10 mg nightly  -Seroquel 100 mg/100 mg / 200 mg  -Restoril 30 mg nightly  -IM Geodon 10 mg as needed for agitation    I have discussed the above with the patient, family, bedside RN, hospitalist  Time spent with patient: 35 minutes in face-to-face evaluation and management of the patient.      Darrell Jhaveri DO, MS

## 2024-04-29 NOTE — DISCHARGE PLACEMENT REQUEST
"Too Richter (68 y.o. Male)     Ilene Brown RN Case Manager  [phone]        Date of Birth   1955    Social Security Number       Address   [address] [place] [address]    Home Phone   [phone]    MRN   [id number]       Buddhist   Jew    Marital Status                               Admission Date   4/18/24    Admission Type   Emergency    Admitting Provider   Calin Benitez MD    Attending Provider   Calin Benitez MD    Department, Room/Bed   [facility] 5G, S551/1       Discharge Date       Discharge Disposition       Discharge Destination                                 Attending Provider: Calin Benitez MD    Allergies: Bactrim [Sulfamethoxazole-trimethoprim], Adhesive Tape, Neosporin [Neomycin-bacitracin Zn-polymyx]    Isolation: None   Infection: None   Code Status: No CPR    Ht: 188 cm (74\")   Wt: 97.5 kg (215 lb)    Admission Cmt: None   Principal Problem: Agitation due to dementia [F03.911]                   Active Insurance as of 4/18/2024       Primary Coverage       Payor Plan Insurance Group Employer/Plan Group    AETNA MEDICARE REPLACEMENT AETNA MED ADV PPO [id number]       Payor Plan Address Payor Plan Phone Number Payor Plan Fax Number Effective Dates    [address] [phone]  1/1/2024 - None Entered    [place] [address]         Subscriber Name Subscriber Birth Date Member ID       TOO RICHTER 1955 [id number]                     Emergency Contacts        (Rel.) Home Phone Work Phone Mobile Phone    NEELAMWILLIE GARCIA (Spouse) [phone] -- [phone]    Ritchie Richter (Son) [phone] -- [phone]    Meg Bustillo (Relative) [phone] -- [phone]                 History & Physical        Collin Rogers MD at 04/24/24 1117          Des Geller MD  Consulting physician: Ken    Chief Complaint   Patient presents with    Altered Mental Status       Reason for consult: agitated delirium    HPI: 69 yo " male lives with wife at home . Four years ago dxed with dementia . 6-8 months ago with neuropsych behavior problems. Otherwise ADL 1-2/6 with FAST 6 scoring per wife commnetary. Severe agitation prompted hospitalization. Sometimes violent. H/O DM, BPH, CAD, bilat PVD , neuropathy. Given Midazolam in ED. Wife knows he is declining and jono needs palcement but was asked not to return to NH last time because of violence. On multpile mediscation and followed well by Neurology. Trajectory more fits Lewy Body?      Pain assesment: none    Dyspnea: none    N/V: none    PPS: 40%      Past Medical History:   Diagnosis Date    Coronary artery disease     Dementia     Diabetes mellitus     Hyperlipidemia     Peripheral vascular disease      Past Surgical History:   Procedure Laterality Date    CORONARY ARTERY BYPASS GRAFT      FEMORAL ARTERY - POPLITEAL ARTERY BYPASS GRAFT       Current Code Status       Date Active Code Status Order ID Comments User Context       4/19/2024 1037 No CPR (Do Not Attempt to Resuscitate) [id number]  Cecily Bain DO Inpatient        Question Answer    Code Status (Patient has no pulse and is not breathing) No CPR (Do Not Attempt to Resuscitate)    Medical Interventions (Patient has pulse or is breathing) Limited Support    Medical Intervention Limits: NO intubation (DNI)    Level Of Support Discussed With Next of Kin (If No Surrogate)                  Current Facility-Administered Medications   Medication Dose Route Frequency Provider Last Rate Last Admin    acetaminophen (TYLENOL) tablet 650 mg  650 mg Oral Q4H PRN Traci, Nerissa G, DO        Or    acetaminophen (TYLENOL) 160 MG/5ML oral solution 650 mg  650 mg Oral Q4H PRN Traci, Nerissa G, DO        Or    acetaminophen (TYLENOL) suppository 650 mg  650 mg Rectal Q4H PRN Traci, Nerissa G, DO        [START ON 4/25/2024] amLODIPine (NORVASC) tablet 10 mg  10 mg Oral Q24H Calin Benitez MD        sennosides-docusate (PERICOLACE) 8.6-50 MG per  tablet 2 tablet  2 tablet Oral BID PRN Traci, Nerissa G, DO        And    polyethylene glycol (MIRALAX) packet 17 g  17 g Oral Daily PRN Traci, Nerissa G, DO        And    bisacodyl (DULCOLAX) EC tablet 5 mg  5 mg Oral Daily PRN Traci, Nerissa G, DO   5 mg at 04/23/24 0859    And    bisacodyl (DULCOLAX) suppository 10 mg  10 mg Rectal Daily PRN Traci, Nerissa G, DO        Calcium Replacement - Follow Nurse / BPA Driven Protocol   Does not apply PRN Traci, Nerissa G, DO        cyanocobalamin injection 1,000 mcg  1,000 mcg Intramuscular Q28 Days JhaveriGuille hannony A, DO   1,000 mcg at 04/19/24 1532    dextrose (D50W) (25 g/50 mL) IV injection 25 g  25 g Intravenous Q15 Min PRN Traci, Nerissa G, DO        dextrose (GLUTOSE) oral gel 15 g  15 g Oral Q15 Min PRN Traci, Nerissa G, DO        Divalproex Sodium (DEPAKOTE SPRINKLE) capsule 500 mg  500 mg Oral Q8H Jhaveri, Darrell A, DO        donepezil (ARICEPT) tablet 10 mg  10 mg Oral BID Jhaveri, Darrell A, DO        folic acid (FOLVITE) tablet 1 mg  1 mg Oral Daily Traci, Nerissa G, DO   1 mg at 04/24/24 0852    glucagon (GLUCAGEN) injection 1 mg  1 mg Intramuscular Q15 Min PRN Traci, Nerissa G, DO        haloperidol (HALDOL) tablet 5 mg  5 mg Oral 4x Daily PRN Traci, Nerissa G, DO        heparin (porcine) 5000 UNIT/ML injection 5,000 Units  5,000 Units Subcutaneous Q8H Traci, Nerissa G, DO   5,000 Units at 04/24/24 0550    insulin glargine (LANTUS, SEMGLEE) injection 15 Units  15 Units Subcutaneous Q12H Cecily Bain, DO   15 Units at 04/24/24 0854    Insulin Lispro (humaLOG) injection 2-7 Units  2-7 Units Subcutaneous 4x Daily AC & at Bedtime Traci, Nerissa G, DO   2 Units at 04/24/24 0856    Insulin Lispro (humaLOG) injection 5 Units  5 Units Subcutaneous TID With Meals Parisa Madrigal PA-C   5 Units at 04/24/24 0855    lisinopril (PRINIVIL,ZESTRIL) tablet 20 mg  20 mg Oral Daily Nerissa Aviles DO   20 mg at 04/24/24 0853    Magnesium Standard Dose Replacement - Follow Nurse / BPA  Driven Protocol   Does not apply PRN Traci, Nerissa G, DO        melatonin tablet 5 mg  5 mg Oral Nightly PRN Traci, Nerissa G, DO        miconazole (MICOTIN) 2 % powder 1 Application  1 Application Topical Q12H Cecily Bain DO   1 Application at 04/23/24 2046    mirtazapine (REMERON) tablet 15 mg  15 mg Oral Nightly Darrell Jhaveri A, DO        Phosphorus Replacement - Follow Nurse / BPA Driven Protocol   Does not apply PRN Traci, Nerissa G, DO        Potassium Replacement - Follow Nurse / BPA Driven Protocol   Does not apply PRN Traci, Nerissa G, DO        QUEtiapine (SEROquel) tablet 100 mg  100 mg Oral BID Meliza Kahn, APRN   100 mg at 04/24/24 0852    QUEtiapine (SEROquel) tablet 200 mg  200 mg Oral Nightly Darrell Jhaveri A, DO        sodium chloride 0.9 % flush 10 mL  10 mL Intravenous PRN Traci, Nerissa G, DO        sodium chloride 0.9 % flush 10 mL  10 mL Intravenous Q12H Traci, Nerissa G, DO   10 mL at 04/23/24 2047    sodium chloride 0.9 % flush 10 mL  10 mL Intravenous PRN Traci, Nerissa G, DO        sodium chloride 0.9 % infusion 40 mL  40 mL Intravenous PRN Traci, Nerissa G, DO   40 mL at 04/23/24 0546    sodium chloride 0.9 % infusion  100 mL/hr Intravenous Continuous Parisa Madrigal PA-C 100 mL/hr at 04/23/24 1534 100 mL/hr at 04/23/24 1534    temazepam (RESTORIL) capsule 30 mg  30 mg Oral Nightly Darrell Jhaveri A, DO        ziprasidone (GEODON) injection 10 mg  10 mg Intramuscular Q4H PRN Meliza Kahn, APRN   10 mg at 04/23/24 1927     sodium chloride, 100 mL/hr, Last Rate: 100 mL/hr (04/23/24 1534)        acetaminophen **OR** acetaminophen **OR** acetaminophen    senna-docusate sodium **AND** polyethylene glycol **AND** bisacodyl **AND** bisacodyl    Calcium Replacement - Follow Nurse / BPA Driven Protocol    dextrose    dextrose    glucagon (human recombinant)    haloperidol    Magnesium Standard Dose Replacement - Follow Nurse / BPA Driven Protocol    melatonin    Phosphorus Replacement -  "Follow Nurse / BPA Driven Protocol    Potassium Replacement - Follow Nurse / BPA Driven Protocol    sodium chloride    sodium chloride    sodium chloride    ziprasidone  Allergies   Allergen Reactions    Bactrim [Sulfamethoxazole-Trimethoprim] Rash    Adhesive Tape Rash    Neosporin [Neomycin-Bacitracin Zn-Polymyx] Rash     Family History   Problem Relation Age of Onset    Diabetes Mother     Heart disease Father     Hypertension Father     Hyperlipidemia Father     Diabetes Sister     Diabetes Maternal Grandfather     Diabetes Sister     Diabetes Sister      Social History     Socioeconomic History    Marital status:    Tobacco Use    Smoking status: Former     Current packs/day: 0.00     Types: Cigarettes     Quit date:      Years since quittin.3     Passive exposure: Never    Smokeless tobacco: Former   Vaping Use    Vaping status: Never Used   Substance and Sexual Activity    Alcohol use: No    Drug use: No    Sexual activity: Never     Review of Systems - not able      BP (!) 184/80 (BP Location: Right arm, Patient Position: Lying)   Pulse 86   Temp 97.4 °F (36.3 °C) (Axillary)   Resp 20   Ht 188 cm (74\")   Wt 97.5 kg (215 lb)   SpO2 95%   BMI 27.60 kg/m²     Intake/Output Summary (Last 24 hours) at 2024 1117  Last data filed at 2024 1930  Gross per 24 hour   Intake 300 ml   Output --   Net 300 ml     Physical Exam:    Outburst of voacl phases, otherwise no thrashing about. Lungs clear, abd flat , ext no edema      Results from last 7 days   Lab Units 24  0829   WBC 10*3/mm3 10.47   HEMOGLOBIN g/dL 15.5   HEMATOCRIT % 46.7   PLATELETS 10*3/mm3 307     Results from last 7 days   Lab Units 24  0829 24  2356   SODIUM mmol/L 144 142   POTASSIUM mmol/L 4.4 4.9   CHLORIDE mmol/L 106 104   CO2 mmol/L 30.0* 28.0   BUN mg/dL 16 22   CREATININE mg/dL 0.82 1.12   CALCIUM mg/dL 9.9 9.4   BILIRUBIN mg/dL  --  0.3   ALK PHOS U/L  --  120*   ALT (SGPT) U/L  --  13   AST " "(SGOT) U/L  --  17   GLUCOSE mg/dL 166* 226*     Results from last 7 days   Lab Units 24  0829   SODIUM mmol/L 144   POTASSIUM mmol/L 4.4   CHLORIDE mmol/L 106   CO2 mmol/L 30.0*   BUN mg/dL 16   CREATININE mg/dL 0.82   GLUCOSE mg/dL 166*   CALCIUM mg/dL 9.9     Imaging Results (Last 72 Hours)       ** No results found for the last 72 hours. **          Impression: Plan: Agitated Delirium: Agree with medication plan. Full discussion with patient's wife at bedside. Maximize medication/ antipsychotics . Consider Phenobarbitol ( 65 mg 2-4 times a day to start with dose titration 130mg etc/ and might consider scheduled dosing for 2-3 days? ) if breakthrough medication regimen. Discussed with wife on both phenobarb and \"respite sedation for 2-3 days. Also Midazolam PRN if severe break through agitation. Placement: wife aware difficult secondary to violent behavior. Will continue to discuss as possible Lewy Body type and will get worse. ( HOLD any Haloperidol secondary to dementia type?) Will continue to follow        Time: 35 minutes    Collin Rogers MD  24  11:17 EDT             Electronically signed by Collin Rogers MD at 24 1133       Nerissa Aviles DO at 24 0218              [facility] Medicine Services  HISTORY AND PHYSICAL    Patient Name: Too Tai  : 1955  MRN: [id number]  Primary Care Physician: Des Geller MD  Date of admission: 2024      Subjective  Subjective     Chief Complaint:  Agitation    HPI:  Too Tai is a 68 y.o. male with a PMH significant for advanced dementia, CAD, diabetes mellitus type 2, HLD, PVD who comes to the ED due to agitation.  Patient with advanced dementia, nonverbal.  No family present.  HPI obtained from EMR.  Patient was hospitalized with d/c on 3/4/2024 after presenting with frequent falls and increased myoclonus.  Symptoms were felt to be secondary to Rexulti which was discontinued.  Patient was hospitalized " again with d/c on 4/4/2024 where he was evaluated after having falls at home with increased confusion.  He was found to be orthostatic with concerns for UTI and pneumonia.  He was treated with antibiotics discharged home.  Patient has become more agitated, restless and combative over the past 2 days.  He has been seen by neurology outpatient with medication adjustments.  He has had no improvement of agitation.  He was brought to the ED by spouse due to these concerns.      Personal History     Past Medical History:   Diagnosis Date    Coronary artery disease     Dementia     Diabetes mellitus     Hyperlipidemia     Peripheral vascular disease            Past Surgical History:   Procedure Laterality Date    CORONARY ARTERY BYPASS GRAFT      FEMORAL ARTERY - POPLITEAL ARTERY BYPASS GRAFT         Family History: family history includes Diabetes in his maternal grandfather, mother, sister, sister, and sister; Heart disease in his father; Hyperlipidemia in his father; Hypertension in his father.     Social History:  reports that he quit smoking about 27 years ago. His smoking use included cigarettes. He has never been exposed to tobacco smoke. He has quit using smokeless tobacco. He reports that he does not drink alcohol and does not use drugs.  Social History     Social History Narrative    Not on file       Medications:  Available home medication information reviewed.  Accu-Chek Geri, Accu-Chek Geri Plus, Alcohol Prep, Divalproex Sodium, Glucagon, Insulin Glargine, Insulin Pen Needle, QUEtiapine, QUEtiapine fumarate ER, accu-chek soft touch, amLODIPine, donepezil, haloperidol, lisinopril, mirtazapine, and temazepam    Allergies   Allergen Reactions    Bactrim [Sulfamethoxazole-Trimethoprim] Rash    Adhesive Tape Rash    Neosporin [Neomycin-Bacitracin Zn-Polymyx] Rash       Objective  Objective     Vital Signs:   Temp:  [97.2 °F (36.2 °C)] 97.2 °F (36.2 °C)  Heart Rate:  [91-93] 91  Resp:  [20] 20  BP:  (148-158)/(75-84) 158/84       Physical Exam   Constitutional: Awake, alert, fidgety  Eyes: PERRLA, sclerae anicteric, no conjunctival injection  HENT: NCAT, mucous membranes moist  Neck: Supple, no thyromegaly, no lymphadenopathy, trachea midline  Respiratory: Clear to auscultation bilaterally, nonlabored respirations   Cardiovascular: RRR, no murmurs, rubs, or gallops, palpable pedal pulses bilaterally  Gastrointestinal: Positive bowel sounds, soft, nontender, nondistended  Musculoskeletal: No bilateral ankle edema, no clubbing or cyanosis to extremities  Psychiatric: Fidgety  Neurologic: Unable to assess, not following commands  Skin: No rashes      Result Review:  I have personally reviewed the results from the time of this admission to 4/19/2024 03:04 EDT and agree with these findings:  [x]  Laboratory list / accordion  []  Microbiology  [x]  Radiology  [x]  EKG/Telemetry   []  Cardiology/Vascular   []  Pathology  [x]  Old records      LAB RESULTS:      Lab 04/18/24  2356   WBC 9.87   HEMOGLOBIN 13.8   HEMATOCRIT 42.2   PLATELETS 292   NEUTROS ABS 4.68   IMMATURE GRANS (ABS) 0.05   LYMPHS ABS 3.34*   MONOS ABS 1.14*   EOS ABS 0.56*   MCV 95.3         Lab 04/18/24  2356   SODIUM 142   POTASSIUM 4.9   CHLORIDE 104   CO2 28.0   ANION GAP 10.0   BUN 22   CREATININE 1.12   EGFR 72.0   GLUCOSE 226*   CALCIUM 9.4   MAGNESIUM 1.8         Lab 04/18/24  2356   TOTAL PROTEIN 6.9   ALBUMIN 3.9   GLOBULIN 3.0   ALT (SGPT) 13   AST (SGOT) 17   BILIRUBIN 0.3   ALK PHOS 120*         Lab 04/18/24  2356   HSTROP T 22*                 UA          2/24/2024    23:54 3/27/2024    16:33 4/19/2024    02:43   Urinalysis   Squamous Epithelial Cells, UA 0-2  0-2     Specific Gravity, UA 1.032  1.038  <=1.005    Ketones, UA Trace  40 mg/dL (2+)  Negative    Blood, UA Negative  Negative  Negative    Leukocytes, UA Negative  Negative  Negative    Nitrite, UA Negative  Positive  Negative    RBC, UA 0-2  0-2     WBC, UA 0-2  0-2      Bacteria, UA None Seen  4+         Microbiology Results (last 10 days)       ** No results found for the last 240 hours. **            CT Head Without Contrast    Result Date: 4/19/2024  CT HEAD WO CONTRAST Date of Exam: 4/19/2024 2:11 AM EDT Indication: Mental status change, unknown cause. Comparison: 3/28/2024. Technique: Axial CT images were obtained of the head without contrast administration.  Automated exposure control and iterative construction methods were used. Findings: There is no acute intracranial hemorrhage. No mass effect, midline shift or abnormal extra-axial collection. There is stable moderate patchy periventricular white matter hypoattenuation. Mild age-appropriate generalized parenchymal volume loss. No new hypoattenuating lesions in the brain. Cortical gray-white differentiation appears intact. The orbits are unremarkable. Mastoids and paranasal sinuses appear well aerated.     Impression: Impression: 1. No acute intracranial abnormality. 2. Moderate chronic small vessel ischemic change. Electronically Signed: Reg Pelaez MD  4/19/2024 2:29 AM EDT  Workstation ID: [id number]    XR Chest 1 View    Result Date: 4/19/2024  XR CHEST 1 VW Date of Exam: 4/19/2024 12:24 AM EDT Indication: Weak/Dizzy/AMS triage protocol Comparison: 3/27/2024. Findings: There is evidence of prior median sternotomy and CABG. Cardiac silhouette appears unchanged. Pulmonary vasculature is within normal limits. Evaluation of the left lung base is limited due to lordotic view and positioning. No definite lung consolidation. No pneumothorax or pleural effusion.     Impression: Impression: Limited study demonstrates no acute abnormality in the lungs. Electronically Signed: Reg Pelaez MD  4/19/2024 1:29 AM EDT  Workstation ID: [id number]     Results for orders placed during the hospital encounter of 03/27/24    Adult Transthoracic Echo Complete W/ Cont if Necessary Per Protocol    Interpretation Summary    Left ventricular  systolic function is normal. Calculated left ventricular EF = 55.1% Normal left ventricular cavity size noted. Left ventricular wall thickness is consistent with mild concentric hypertrophy. Left ventricular diastolic function is consistent with (grade I) impaired relaxation.    The right ventricular cavity is mildly dilated. Normal right ventricular systolic function noted.    There is calcification of the aortic valve. The aortic valve appears trileaflet. Mild aortic valve regurgitation is present. No aortic valve stenosis is present.    Mitral annular calcification is present. Trace mitral valve regurgitation is present. No significant mitral valve stenosis is present.    The tricuspid valve is structurally normal with no stenosis present. Trace tricuspid valve regurgitation is present. Insufficient TR velocity profile to estimate the right ventricular systolic pressure.    Mild dilation of the sinuses of Valsalva is present. Mild dilation of the ascending aorta is present. Ascending aorta = 4.2 cm      Assessment & Plan  Assessment & Plan       Agitation due to dementia    Benign essential HTN    Benign prostatic hyperplasia    Coronary artery disease involving native coronary artery of native heart    Type 2 diabetes mellitus with hyperglycemia, with long-term current use of insulin    Hyperlipidemia    Peripheral neuropathy    Peripheral vascular disease    Too Tai is a 68 y.o. male with a PMH significant for advanced dementia, CAD, diabetes mellitus type 2, HLD, PVD who comes to the ED due to agitation.        Severe dementia complicated by behavioral disturbance  -Check UA  - CT head wnl  - Consult neurology  - Fall precautions  - Case management consult  - Continue home Depakote, Aricept, Haldol, Seroquel, Restoril, Remeron  - QTc 452    Diabetes mellitus type 2  - No active home meds  - SSI with Accu-Cheks  - Hemoglobin A1c for a.m.    HTN  PVD  CAD  - Continue home meds    Home med list  reviewed    DVT prophylaxis:  Mechanical and medical DVT prophylaxis orders are signed and held.       CODE STATUS: No family present.  CODE STATUS will need to be clarified with family, was DNR during prior stay.  There are no questions and answers to display.     Expected Discharge   Expected Discharge Date: 2024; Expected Discharge Time:      Nerissa Aviles DO  24      Electronically signed by Nerissa Aviles DO at 24 0305        Abbi Blackmon APRN   Nurse Practitioner  Medicine     Progress Notes     Signed     Date of Service: 24 134  Creation Time: 24 134     Signed       Expand All Collapse All[]Expand All by Default         [facility] Medicine Services  PROGRESS NOTE     Patient Name: Too Tai  : 1955  MRN: [id number]     Date of Admission: 2024  Primary Care Physician: Des Geller MD     Subjective      CC: f/u dementia with agitation     HPI:  Slept well per staff. Wife in room. Patient more calm today on my exam. Ate a good breakfast per wife. No new questions or needs.      Objective      Vital Signs:   Temp:  [96.2 °F (35.7 °C)-98.9 °F (37.2 °C)] 96.2 °F (35.7 °C)  Heart Rate:  [] 79  Resp:  [18-20] 18  BP: (149-174)/(76-85) 169/78     Physical Exam:  Constitutional: No acute distress, awake, alert  HENT: NCAT, mucous membranes moist  Respiratory: Clear to auscultation bilaterally, respiratory effort normal   Cardiovascular: RRR, no murmurs, rubs, or gallops  Gastrointestinal: Positive bowel sounds, soft, nontender, nondistended  Musculoskeletal: No bilateral ankle edema  Psychiatric: Appropriate affect, cooperative  Neurologic: Oriented to person, RICO, speech clear. 4 point restraints   Skin: No rashes      Results Reviewed:  LAB RESULTS:                                     Brief Urine Lab Results  (Last result in the past 365 days)          Color   Clarity   Blood   Leuk Est   Nitrite   Protein   CREAT   Urine  HCG         04/19/24 0243 Yellow    Clear    Negative    Negative    Negative    Negative                          Microbiology Results Abnormal         None             Radiology results from the last 24 hours:   No radiology results from the last 24 hrs        Results for orders placed during the hospital encounter of 03/27/24     Adult Transthoracic Echo Complete W/ Cont if Necessary Per Protocol     Interpretation Summary    Left ventricular systolic function is normal. Calculated left ventricular EF = 55.1% Normal left ventricular cavity size noted. Left ventricular wall thickness is consistent with mild concentric hypertrophy. Left ventricular diastolic function is consistent with (grade I) impaired relaxation.    The right ventricular cavity is mildly dilated. Normal right ventricular systolic function noted.    There is calcification of the aortic valve. The aortic valve appears trileaflet. Mild aortic valve regurgitation is present. No aortic valve stenosis is present.    Mitral annular calcification is present. Trace mitral valve regurgitation is present. No significant mitral valve stenosis is present.    The tricuspid valve is structurally normal with no stenosis present. Trace tricuspid valve regurgitation is present. Insufficient TR velocity profile to estimate the right ventricular systolic pressure.    Mild dilation of the sinuses of Valsalva is present. Mild dilation of the ascending aorta is present. Ascending aorta = 4.2 cm     Current medications:  Scheduled Meds:  Scheduled Medication   amLODIPine, 10 mg, Oral, Q24H  cyanocobalamin, 1,000 mcg, Intramuscular, Q28 Days  donepezil, 10 mg, Oral, BID  folic acid, 1 mg, Oral, Daily  heparin (porcine), 5,000 Units, Subcutaneous, Q8H  insulin glargine, 15 Units, Subcutaneous, Q12H  insulin lispro, 2-7 Units, Subcutaneous, 4x Daily AC & at Bedtime  Insulin Lispro, 5 Units, Subcutaneous, TID With Meals  lisinopril, 30 mg, Oral, Daily  miconazole, 1  Application, Topical, Q12H  mirtazapine, 15 mg, Oral, Nightly  PHENobarbital, 64.8 mg, Oral, Q8H   Or  PHENobarbital, 65 mg, Intramuscular, Q8H  QUEtiapine, 100 mg, Oral, BID  QUEtiapine, 200 mg, Oral, Nightly  senna-docusate sodium, 2 tablet, Oral, BID  sodium chloride, 10 mL, Intravenous, Q12H  temazepam, 30 mg, Oral, Nightly         Continuous Infusions:  Infusion Medications   lactated ringers, 75 mL/hr, Last Rate: 75 mL/hr (04/28/24 0919)         PRN Meds:.  PRN Medication     acetaminophen **OR** acetaminophen **OR** acetaminophen    senna-docusate sodium **AND** polyethylene glycol **AND** bisacodyl **AND** bisacodyl    Calcium Replacement - Follow Nurse / BPA Driven Protocol    dextrose    dextrose    glucagon (human recombinant)    Magnesium Standard Dose Replacement - Follow Nurse / BPA Driven Protocol    melatonin    Phosphorus Replacement - Follow Nurse / BPA Driven Protocol    Potassium Replacement - Follow Nurse / BPA Driven Protocol    sodium chloride    sodium chloride    ziprasidone        Assessment & Plan            Active Hospital Problems     Diagnosis   POA    **Agitation due to dementia [F03.911]   Yes    Agitation [R45.1]   Yes    Type 2 diabetes mellitus with hyperglycemia, with long-term current use of insulin [E11.65, Z79.4]   Not Applicable    Peripheral neuropathy [G62.9]   Yes    Hyperlipidemia [E78.5]   Yes    Benign prostatic hyperplasia [N40.0]   Yes    Benign essential HTN [I10]   Yes    Peripheral vascular disease [I73.9]   Yes    Coronary artery disease involving native coronary artery of native heart [I25.10]   Yes       Resolved Hospital Problems   No resolved problems to display.      Brief Hospital Course to date:  Too Tai is a 68yoM with PMH significant for advanced dementia, CAD, diabetes mellitus type 2, HLD and PVD. Admitted to [facility] ED on 4/18/24 for agitation / behavioral disturbances. Recently admitted to [facility] 2/24-3/4/24 for dementia with  behavioral disturbances and again 3/27-4/4/24 for similar issues.      Advanced dementia complicated by behavioral disturbance with superimposed sleep disturbance   - Infectious work up unremarkable. UA reassuring. CXR without acute findings. CT head without acute findings  - Neuro following  - Still in restraints   - Continue home Aricept and Mirtazapine 15mg QHS   - AM doses of Depakote and Seroquel were delayed by a couple of hours, resulting in the need for PRN Geodon administration. Neurology has adjusted timing of medications  - Continue Seroquel 100mg BID and 200mg QHS   - Neurology started Phenobarbital; increased dose today; monitor   - Continue Temazepam 30mg QHS  - Appreciate palliative care assistance  - Not safe to return home at this time as he is a danger to himself and his wife   - May need geriatric psych facility vs LTC - CM looking into this   - IV fluids while taking in minimal PO      Uncontrolled diabetes mellitus type 2  - Appears not on any home meds for DM  - A1c 9.5%  - Continue Lantus 15 units BID, Lispro 5 units TID AC + SSI      HTN, continue Amlodipine 10mg daily  - Increase Lisinopril to 30mg daily      Expected Discharge Location and Transportation: TBD  Expected Discharge Expected Discharge Date: 5/1/2024; Expected Discharge Time:       DVT prophylaxis:Medical and mechanical DVT prophylaxis orders are present.     AM-PAC 6 Clicks Score (PT): 18 (04/27/24 2000)     CODE STATUS:       Code Status and Medical Interventions:   Ordered at: 04/19/24 1037     Medical Intervention Limits:     NO intubation (DNI)     Level Of Support Discussed With:     Next of Kin (If No Surrogate)     Code Status (Patient has no pulse and is not breathing):     No CPR (Do Not Attempt to Resuscitate)     Medical Interventions (Patient has pulse or is breathing):     Limited Support      Abbi Blackmon, APRN  04/28/24                        Darrell Jhaveri DO   Physician  Neurology     Progress Notes      Signed     Date of Service: 24  Creation Time: 24     Signed       Expand All Collapse All[]Expand All by Default     [facility] Neurology    Progress Note     Patient Name: Too Tai  : 1955  MRN: [id number]  Primary Care Physician:  Des Geller MD  Date of admission: 2024     Subjective      Reason for consult: Dementia with behavioral disturbances     History of Present Illness   Ongoing agitation with all nursing care.  Patient did wake up to eat breakfast.  He is now back asleep.  Wife voices frustration with not letting him have more slack in his restraints while he is asleep.     Review of Systems   Unable to obtain     Objective      Vitals:   Temp:  [96.2 °F (35.7 °C)-98 °F (36.7 °C)] 98 °F (36.7 °C)  Heart Rate:  [79-88] 80  Resp:  [18] 18  BP: (128-180)/(57-94) 128/57     Neurologic Exam   Mental status/Cognition: Asleep, did not wake        Current Medications    Current Medications      Current Facility-Administered Medications:     acetaminophen (TYLENOL) tablet 650 mg, 650 mg, Oral, Q4H PRN **OR** acetaminophen (TYLENOL) 160 MG/5ML oral solution 650 mg, 650 mg, Oral, Q4H PRN **OR** acetaminophen (TYLENOL) suppository 650 mg, 650 mg, Rectal, Q4H PRN, Nerissa Aviles, DO    amLODIPine (NORVASC) tablet 10 mg, 10 mg, Oral, Q24H, Calin Benitez MD, 10 mg at 24 0758    sennosides-docusate (PERICOLACE) 8.6-50 MG per tablet 2 tablet, 2 tablet, Oral, BID, 2 tablet at 24 1004 **AND** polyethylene glycol (MIRALAX) packet 17 g, 17 g, Oral, Daily PRN **AND** bisacodyl (DULCOLAX) EC tablet 5 mg, 5 mg, Oral, Daily PRN **AND** bisacodyl (DULCOLAX) suppository 10 mg, 10 mg, Rectal, Daily PRN, Richardson, Jennifer E, APRN    Calcium Replacement - Follow Nurse / BPA Driven Protocol, , Does not apply, PRN, Nerissa Aviles DO    cyanocobalamin injection 1,000 mcg, 1,000 mcg, Intramuscular, Q28 Days, Darrell Jhaveri DO, 1,000 mcg at 24 1532    dextrose  (D50W) (25 g/50 mL) IV injection 25 g, 25 g, Intravenous, Q15 Min PRN, TraciKamala bacasie G, DO    dextrose (GLUTOSE) oral gel 15 g, 15 g, Oral, Q15 Min PRN, TraciKamala bacasie G, DO    donepezil (ARICEPT) tablet 10 mg, 10 mg, Oral, BID, Jhaveri, Darrell A, DO, 10 mg at 04/29/24 0756    folic acid (FOLVITE) tablet 1 mg, 1 mg, Oral, Daily, TraciKamalaNerissa G, DO, 1 mg at 04/28/24 0837    glucagon (GLUCAGEN) injection 1 mg, 1 mg, Intramuscular, Q15 Min PRN, TraciKamala bacasie G, DO    heparin (porcine) 5000 UNIT/ML injection 5,000 Units, 5,000 Units, Subcutaneous, Q8H, TraciTresa bacae G, DO, 5,000 Units at 04/29/24 0651    insulin glargine (LANTUS, SEMGLEE) injection 15 Units, 15 Units, Subcutaneous, Q12H, Bradley Bainey, DO, 15 Units at 04/29/24 0758    Insulin Lispro (humaLOG) injection 2-7 Units, 2-7 Units, Subcutaneous, 4x Daily AC & at Bedtime, Nerissa Aviles, DO, 2 Units at 04/29/24 0757    Insulin Lispro (humaLOG) injection 5 Units, 5 Units, Subcutaneous, TID With Meals, Parisa Madrigal PA-C, 5 Units at 04/29/24 0759    lactated ringers infusion, 75 mL/hr, Intravenous, Continuous, Parisa Madrigal PA-C, Last Rate: 75 mL/hr at 04/28/24 0919, 75 mL/hr at 04/28/24 0919    lisinopril (PRINIVIL,ZESTRIL) tablet 30 mg, 30 mg, Oral, Daily, Parisa Madrigal PA-C, 30 mg at 04/29/24 0756    Magnesium Standard Dose Replacement - Follow Nurse / BPA Driven Protocol, , Does not apply, PRN, TraciNerissa baca G, DO    melatonin tablet 5 mg, 5 mg, Oral, Nightly PRN, Nerissa Aviles DO    miconazole (MICOTIN) 2 % powder 1 Application, 1 Application, Topical, Q12H, Cecily Bain DO, 1 Application at 04/28/24 0900    mirtazapine (REMERON) tablet 15 mg, 15 mg, Oral, Nightly, Darrell Jhaveri DO, 15 mg at 04/28/24 2134    PHENobarbital tablet 64.8 mg, 64.8 mg, Oral, Q8H, 64.8 mg at 04/28/24 2134 **OR** PHENobarbital injection 65 mg, 65 mg, Intramuscular, Q8H, Darrell Jhaveri DO, 65 mg at 04/29/24 0651    Phosphorus Replacement - Follow  Nurse / BPA Driven Protocol, , Does not apply, PRN, Traci, Nerissa G, DO    Potassium Replacement - Follow Nurse / BPA Driven Protocol, , Does not apply, PRN, Traci, Nerissa G, DO    QUEtiapine (SEROquel) tablet 100 mg, 100 mg, Oral, BID, Femi April K, APRN, 100 mg at 04/29/24 0759    QUEtiapine (SEROquel) tablet 200 mg, 200 mg, Oral, Nightly, Jhaveri, Darrell A, DO, 200 mg at 04/28/24 2134    sodium chloride 0.9 % flush 10 mL, 10 mL, Intravenous, Q12H, Traci, Nerissa G, DO, 10 mL at 04/28/24 2143    sodium chloride 0.9 % flush 10 mL, 10 mL, Intravenous, PRN, Traci, Nerissa G, DO    sodium chloride 0.9 % infusion 40 mL, 40 mL, Intravenous, PRN, Traci, Nerissa G, DO, 40 mL at 04/23/24 0546    temazepam (RESTORIL) capsule 30 mg, 30 mg, Oral, Nightly, Jhaveri, Darrell A, DO, 30 mg at 04/28/24 2133    ziprasidone (GEODON) injection 10 mg, 10 mg, Intramuscular, Q4H PRN, Butler, April K, APRN, 10 mg at 04/28/24 1022        Laboratory Results:         Lab Results   Component Value Date     GLUCOSE 166 (H) 04/19/2024     CALCIUM 9.9 04/19/2024      04/19/2024     K 4.4 04/19/2024     CO2 30.0 (H) 04/19/2024      04/19/2024     BUN 16 04/19/2024     CREATININE 0.82 04/19/2024     EGFRIFAFRI 102 02/21/2022     EGFRIFNONA 88 02/21/2022     BCR 19.5 04/19/2024     ANIONGAP 8.0 04/19/2024            Lab Results   Component Value Date     WBC 10.47 04/19/2024     HGB 15.5 04/19/2024     HCT 46.7 04/19/2024     MCV 92.7 04/19/2024      04/19/2024            Lab Results   Component Value Date     CHOL 220 (H) 01/08/2024     CHOL 120 08/01/2019     CHOL 117 04/15/2019            Lab Results   Component Value Date     HDL 41 01/08/2024     HDL 37 (L) 08/03/2023     HDL 41 04/03/2023            Lab Results   Component Value Date      (H) 01/08/2024      (H) 08/03/2023      (H) 04/03/2023            Lab Results   Component Value Date     TRIG 108 01/08/2024     TRIG 208 (H) 08/03/2023     TRIG 138  04/03/2023            Lab Results   Component Value Date     HGBA1C 9.50 (H) 04/18/2024            Lab Results   Component Value Date     FOLATE 10.20 02/24/2024            Lab Results   Component Value Date     FEHVBQDK13 384 04/18/2024      Phenobarbital level 8.3     Images/Procedures   CT head 4/19/2024  Moderate chronic small vessel ischemic change.  No acute intracranial abnormality     Assessment / Plan   Active Hospital Problems:  Dementia with behavioral disturbance  Agitation     Brief Patient Summary:  Too Tai is a 68 y.o. male with history of advanced dementia with behavioral disturbances, CAD, diabetes type 2, hyperlipidemia presenting with increasing agitation.  Phenobarbital level 8.3.  QTc 462.  Ongoing agitation.  Patient was sleeping during assessment and per wife continues to be sleepy throughout the day.  Will continue current medications for now.  Would appreciate psych input.     Plan:   -Psych consult pending  -Continue phenobarbital to 64.8 mg 3 times daily  -Phenobarbital level tomorrow a.m.  -Remeron 15 mg nightly  -Aricept 10 mg nightly  -Seroquel 100 mg/100 mg / 200 mg  -Restoril 30 mg nightly  -IM Geodon 10 mg as needed for agitation     I have discussed the above with the patient, family, bedside RN, hospitalist  Time spent with patient: 35 minutes in face-to-face evaluation and management of the patient.        Darrell Jhaveri DO, Carlee Ayala MD   Physician  Palliative Care     Progress Notes     Signed     Date of Service: 04/29/24 1240  Creation Time: 04/29/24 1240     Signed         Palliative Care Daily Progress Note      Referring:  Parisa Madrigal PAC  C/C: pt unable     S: Medical record reviewed. Events noted.  Spouse and son bedside.  Spouse expressed concern when seen earlier today that pt is either asleep or agitated.  Believes he is sleeping too much to interact or do therapy.       ROS: pt unable     O: Code Status:        "  Code Status and Medical Interventions:   Ordered at: 04/19/24 1037     Medical Intervention Limits:     NO intubation (DNI)     Level Of Support Discussed With:     Next of Kin (If No Surrogate)     Code Status (Patient has no pulse and is not breathing):     No CPR (Do Not Attempt to Resuscitate)     Medical Interventions (Patient has pulse or is breathing):     Limited Support      Advanced Directives: Advance Directive Status: Patient does not have advance directive   Goals of Care: Ongoing.   Palliative Performance Scale Score: 40%      /57 (BP Location: Right arm, Patient Position: Lying)   Pulse 80   Temp 98 °F (36.7 °C) (Axillary)   Resp 18   Ht 188 cm (74\")   Wt 97.5 kg (215 lb)   SpO2 96%   BMI 27.60 kg/m²      Intake/Output Summary (Last 24 hours) at 4/29/2024 1103  Last data filed at 4/29/2024 0800        Gross per 24 hour   Intake 240 ml   Output --   Net 240 ml         Physical Exam:     General Appearance:    Arouseable,  NAD   HEENT:    NC/AT, EOMI, anicteric, MMM, face relaxed   Neck:   supple, trachea midline, no JVD   Lungs:     CTA bilat, diminished in bases; respirations regular, even     and unlabored    Heart:    RRR, normal S1 and S2, no M/R/G   Abdomen:     Normal bowel sounds, soft, nontender, nondistended   G/U:   Deferred   MSK/Extremities:   No clubbing , cyanosis or edema, No wasting   Pulses:   Pulses palpable and equal bilaterally   Skin:   Warm, dry, no mottling   Neurologic:   Arouseable, Ox1??, moves extremities x 4, no tremor, nl     tone   Psych:   Easily agitated and pulls at restraints                  Current Medications:       Current Medications      Current Facility-Administered Medications:     acetaminophen (TYLENOL) tablet 650 mg, 650 mg, Oral, Q4H PRN **OR** acetaminophen (TYLENOL) 160 MG/5ML oral solution 650 mg, 650 mg, Oral, Q4H PRN **OR** acetaminophen (TYLENOL) suppository 650 mg, 650 mg, Rectal, Q4H PRN, Nerissa Aviles, DO    amLODIPine (NORVASC) " tablet 10 mg, 10 mg, Oral, Q24H, Calin Benitez MD, 10 mg at 04/29/24 0758    sennosides-docusate (PERICOLACE) 8.6-50 MG per tablet 2 tablet, 2 tablet, Oral, BID, 2 tablet at 04/28/24 2134 **AND** polyethylene glycol (MIRALAX) packet 17 g, 17 g, Oral, Daily PRN **AND** bisacodyl (DULCOLAX) EC tablet 5 mg, 5 mg, Oral, Daily PRN **AND** bisacodyl (DULCOLAX) suppository 10 mg, 10 mg, Rectal, Daily PRN, Jennifer Bai, IVONNE    Calcium Replacement - Follow Nurse / BPA Driven Protocol, , Does not apply, PRN, Traci, Nerissa G, DO    cyanocobalamin injection 1,000 mcg, 1,000 mcg, Intramuscular, Q28 Days, Darrell Jhaveri A, DO, 1,000 mcg at 04/19/24 1532    dextrose (D50W) (25 g/50 mL) IV injection 25 g, 25 g, Intravenous, Q15 Min PRN, Traci, Nerissa G, DO    dextrose (GLUTOSE) oral gel 15 g, 15 g, Oral, Q15 Min PRN, Traci, Nerissa G, DO    donepezil (ARICEPT) tablet 10 mg, 10 mg, Oral, BID, Guille Jhaveriy A, DO, 10 mg at 04/29/24 0756    folic acid (FOLVITE) tablet 1 mg, 1 mg, Oral, Daily, Traci, Nerissa G, DO, 1 mg at 04/28/24 0837    glucagon (GLUCAGEN) injection 1 mg, 1 mg, Intramuscular, Q15 Min PRN, Traci, Nerissa G, DO    heparin (porcine) 5000 UNIT/ML injection 5,000 Units, 5,000 Units, Subcutaneous, Q8H, Nerissa Aviles G, DO, 5,000 Units at 04/29/24 0651    insulin glargine (LANTUS, SEMGLEE) injection 15 Units, 15 Units, Subcutaneous, Q12H, Cecily Bain, DO, 15 Units at 04/29/24 0758    Insulin Lispro (humaLOG) injection 2-7 Units, 2-7 Units, Subcutaneous, 4x Daily AC & at Bedtime, Nerissa Aviles DO, 2 Units at 04/29/24 0757    Insulin Lispro (humaLOG) injection 5 Units, 5 Units, Subcutaneous, TID With Meals, Parisa Madrigal PA-C, 5 Units at 04/29/24 0759    lactated ringers infusion, 75 mL/hr, Intravenous, Continuous, Parisa Madrigal PA-C, Last Rate: 75 mL/hr at 04/28/24 0919, 75 mL/hr at 04/28/24 0919    lisinopril (PRINIVIL,ZESTRIL) tablet 30 mg, 30 mg, Oral, Daily, Parisa Madrigal PA-C, 30 mg at  "04/29/24 0756    Magnesium Standard Dose Replacement - Follow Nurse / BPA Driven Protocol, , Does not apply, PRN, Traci, Nerissa G, DO    melatonin tablet 5 mg, 5 mg, Oral, Nightly PRN, TraciKamalaNerissa G, DO    miconazole (MICOTIN) 2 % powder 1 Application, 1 Application, Topical, Q12H, Cecily Bain, DO, 1 Application at 04/28/24 0900    mirtazapine (REMERON) tablet 15 mg, 15 mg, Oral, Nightly, Jhaveri, Darrell A, DO, 15 mg at 04/28/24 2134    PHENobarbital tablet 64.8 mg, 64.8 mg, Oral, Q8H, 64.8 mg at 04/28/24 2134 **OR** PHENobarbital injection 65 mg, 65 mg, Intramuscular, Q8H, Jhaveri, Darrell A, DO, 65 mg at 04/29/24 0651    Phosphorus Replacement - Follow Nurse / BPA Driven Protocol, , Does not apply, PRN, Traci Nerissa G, DO    Potassium Replacement - Follow Nurse / BPA Driven Protocol, , Does not apply, PRN, Traci, Nerissa G, DO    QUEtiapine (SEROquel) tablet 100 mg, 100 mg, Oral, BID, Kahn, April K, APRN, 100 mg at 04/29/24 0759    QUEtiapine (SEROquel) tablet 200 mg, 200 mg, Oral, Nightly, Jhaveri, Darrell A, DO, 200 mg at 04/28/24 2134    sodium chloride 0.9 % flush 10 mL, 10 mL, Intravenous, Q12H, Traci, Nerissa G, DO, 10 mL at 04/28/24 2143    sodium chloride 0.9 % flush 10 mL, 10 mL, Intravenous, PRN, Traci, Nerissa G, DO    sodium chloride 0.9 % infusion 40 mL, 40 mL, Intravenous, PRN, Traci, Nerissa G, DO, 40 mL at 04/23/24 0546    temazepam (RESTORIL) capsule 30 mg, 30 mg, Oral, Nightly, Jhaveri, Darrell A, DO, 30 mg at 04/28/24 2133    ziprasidone (GEODON) injection 10 mg, 10 mg, Intramuscular, Q4H PRN, Kahn, April K, APRN, 10 mg at 04/28/24 1022         Labs:              Invalid input(s): \"LABALBU\", \"PROT\"  Imaging Results (Last 72 Hours)         ** No results found for the last 72 hours. **                       Diagnostics: Reviewed     Impression:  Dementia with agitation  DM 2  Htn     Symptoms:  Agitation  Debility     P:   Encouraged spouse to continue to discuss concerns with neuro as they have been " managing agitation.  Note plans for possible psych eval and would agree could be beneficial.  Will monitor for palliative needs.  Palliative Care Team will continue to follow patient.      Carlee Cesar MD, 4/29/2024, 11:03 EDT                                     Liberty Kidd MSW     Case Management     Progress Notes     Signed     Date of Service: 04/29/24 1212  Creation Time: 04/29/24 1212     Signed         Continued Stay Note  [facility]     Patient Name: Too Tai                 MRN: [id number]  Today's Date: 4/29/2024                Admit Date: 4/18/2024     Plan: TBD    Discharge Plan         Row Name 04/29/24 1211           Plan     Plan Comments There is a [facility] health psychiatric appointment scheduled for Friday 5/3/2024 at 2:15 pm.                         Discharge Codes    No documentation.                      Expected Discharge Date and Time         Expected Discharge Date Expected Discharge Time     May 1, 2024                     JAMES Reyes                                      Nutrition Notes (most recent note)        Destiny Rosenbaum MS,RD,LD at 04/26/24 1535                              Clinical Nutrition   Nutrition Assessment  Reason for Visit: LOSx8    Patient Name: Too Tai  YOB: 1955  MRN: [id number]  Date of Encounter: 04/26/24 15:35 EDT  Admission date: 4/18/2024    Comment:    Promote increased intake  Liberalize diet to allow for increased options  Provide Boost Plus 1x daily     Nutrition Assessment   Admission Diagnosis:  Agitation due to dementia [F03.911]  Agitation [R45.1]    Problem List:    Agitation due to dementia    Benign essential HTN    Benign prostatic hyperplasia    Coronary artery disease involving native coronary artery of native heart    Type 2 diabetes mellitus with hyperglycemia, with long-term current use of insulin    Hyperlipidemia    Peripheral neuropathy    Peripheral vascular disease     "Agitation      PMH:   He  has a past medical history of Coronary artery disease, Dementia, Diabetes mellitus, Hyperlipidemia, and Peripheral vascular disease.    PSH:  He  has a past surgical history that includes Coronary artery bypass graft and Femoral artery - popliteal artery bypass graft.    Applicable Nutrition Concerns:   Skin:  Oral:  GI:    Applicable Interval History:       Reported/Observed/Food/Nutrition Related History:     Pt laying in bed with spouse present at bedside, spouse provided all hx. Endorsed limited intake since admission however has had increased success with finger foods. Inquired why pt couldn't have chicken tenders - educated, verbalized understanding but agreeable to liberalizing diet. Endorsed constipation - last BM ~6 days ago. [facility]    Anthropometrics     Height: Height: 188 cm (74\")  Last Filed Weight: Weight: 97.5 kg (215 lb) (04/18/24 2350)  Method: Weight Method: Estimated  BMI: BMI (Calculated): 27.6    UBW:     Weight      Weight (kg) Weight (lbs) Weight Method   4/3/2023 110.224 kg  243 lb     8/3/2023 110.224 kg  243 lb     11/3/2023 111.585 kg  246 lb  Stated    1/8/2024 102.967 kg  227 lb     2/24/2024 99.791 kg  220 lb     2/25/2024 99.8 kg  220 lb 0.3 oz  Bed scale    3/17/2024 90.719 kg  200 lb     3/27/2024 90.7 kg  199 lb 15.3 oz  Stated    3/29/2024 90.7 kg  199 lb 15.3 oz     4/18/2024 97.523 kg  215 lb  Estimated      Weight change:  UTD 2/2 limited measure weight hx    Nutrition Focused Physical Exam     Date:  4/26       Unable to perform due to Patient agitation, Pt unable to participate at time of visit     Current Nutrition Prescription   PO: Diet: Cardiac, Diabetic; Healthy Heart (2-3 Na+); Consistent Carbohydrate; Fluid Consistency: Thin (IDDSI 0)  Oral Nutrition Supplement: N/A  Intake: Insufficient data    Nutrition Diagnosis   Date:  4/26            Updated:    Problem Inadequate oral intake    Etiology Dementia, agitation   Signs/Symptoms Per spouse " report   Status: New    Goal:   Nutrition to support treatment and Tolerate PO, Increase intake      Nutrition Intervention      Follow treatment progress, Care plan reviewed, Menu adjusted, Encourage intake, Supplement provided      Monitoring/Evaluation:   Per protocol, I&O, PO intake, Pertinent labs, Symptoms, POC/GOC      Destiny Rosenbaum, MS,RD,LD  Time Spent: 25min    Electronically signed by Destiny Rosenbaum, MS,RD,LD at 24 1544          Physical Therapy Notes (most recent note)        Nina Calvert, PT at 24 1328  Version 1 of 1         Patient Name: Too Tai  : 1955    MRN: [id number]                              Today's Date: 2024       Admit Date: 2024    Visit Dx:     ICD-10-CM ICD-9-CM   1. Dementia with behavioral disturbance  F03.918 294.21   2. Aggressive behavior  R46.89 V40.39   3. Agitation due to dementia  F03.911 294.21   4. Hyperglycemia due to diabetes mellitus  E11.65 250.02     Patient Active Problem List   Diagnosis    Cough    Benign essential HTN    Benign prostatic hyperplasia    Coronary artery disease involving native coronary artery of native heart    Chronic coronary artery disease    Type 2 diabetes mellitus with hyperglycemia, with long-term current use of insulin    Gout    Hyperlipidemia    History of heart attack    Peripheral neuropathy    Peripheral vascular disease    Spasm of muscle    Myoclonic jerking    Hypomagnesemia    Arteriosclerosis of coronary artery    Dementia with behavioral disturbance    Frequent falls    History of DVT (deep vein thrombosis)    Chronic anticoagulation    AMS (altered mental status)    Falls    Bacteriuria    Cystitis    Agitation due to dementia    Agitation     Past Medical History:   Diagnosis Date    Coronary artery disease     Dementia     Diabetes mellitus     Hyperlipidemia     Peripheral vascular disease      Past Surgical History:   Procedure Laterality Date    CORONARY ARTERY BYPASS GRAFT      FEMORAL  ARTERY - POPLITEAL ARTERY BYPASS GRAFT        General Information       Row Name 04/20/24 1403          Physical Therapy Time and Intention    Document Type discharge evaluation/summary  -LM     Mode of Treatment physical therapy  -LM       Row Name 04/20/24 1403          General Information    Patient Profile Reviewed yes  -LM     Prior Level of Function min assist:;feeding;max assist:;grooming;dressing;bathing  Pt's wife states he ambulates around the home but someone is always with him to make sure he doesn't fall  -LM     Existing Precautions/Restrictions fall;other (see comments)  Severe Dementia;  2 Point Wrist Restraints;  Easily Agitated  -LM     Barriers to Rehab previous functional deficit;cognitive status;uncooperative;combative  -LM       Row Name 04/20/24 1403          Living Environment    People in Home spouse  Pt also has 2 caregivers.  One is there for 8 hours each day.  -LM       Row Name 04/20/24 1403          Home Main Entrance    Number of Stairs, Main Entrance --  Entry Ramp  -LM       Row Name 04/20/24 1403          Stairs Within Home, Primary    Number of Stairs, Within Home, Primary none  -LM       Row Name 04/20/24 1403          Cognition    Orientation Status (Cognition) disoriented to;person;place;situation;time  -LM       Row Name 04/20/24 1403          Safety Issues, Functional Mobility    Safety Issues Affecting Function (Mobility) ability to follow commands;at risk behavior observed;awareness of need for assistance;impulsivity;insight into deficits/self-awareness;judgment;problem-solving;safety precaution awareness;safety precautions follow-through/compliance;sequencing abilities  -LM     Impairments Affecting Function (Mobility) balance;cognition;endurance/activity tolerance  -LM               User Key  (r) = Recorded By, (t) = Taken By, (c) = Cosigned By      Initials Name Provider Type    LM Nina Calvert PT Physical Therapist                   Mobility       Row Name 04/20/24  1409          Bed Mobility    Bed Mobility supine-sit;sit-supine  -LM     Supine-Sit [place] (Bed Mobility) standby assist  -LM     Sit-Supine [place] (Bed Mobility) standby assist  -LM     Comment, (Bed Mobility) Pt sitting at end of bed with legs between 2nd handrail and footboard with B arms pulled behind him d/t restraints.  Pt laid down voluntarily once and then returned to sitting.  2nd time, pt did not want to lay back down so 3 people required to return pt to supine.  -LM       Row Name 04/20/24 1409          Sit-Stand Transfer    Sit-Stand [place] (Transfers) minimum assist (75% patient effort);2 person assist;verbal cues;nonverbal cues (demo/gesture)  -LM     Assistive Device (Sit-Stand Transfers) other (see comments)  B HHA  -LM     Comment, (Sit-Stand Transfer) Stood x 2 from EOB.  -LM       Row Name 04/20/24 1409          Gait/Stairs (Locomotion)    [place] Level (Gait) minimum assist (75% patient effort);2 person assist;verbal cues  -LM     Assistive Device (Gait) other (see comments)  B HHA  -LM     Distance in Feet (Gait) 3  -LM     Comment, (Gait/Stairs) Pt able to take sidesteps towards HOB with PT guiding pt towards the HOB.  Pt would not initiate movement on his own.  -LM               User Key  (r) = Recorded By, (t) = Taken By, (c) = Cosigned By      Initials Name Provider Type    Nina Mckinnon, PT Physical Therapist                   Obj/Interventions       Row Name 04/20/24 1413          Range of Motion Comprehensive    General Range of Motion bilateral lower extremity ROM WFL  -LM     Comment, General Range of Motion Noted through observation  -LM       Row Name 04/20/24 1413          Strength Comprehensive (MMT)    General Manual Muscle Testing (MMT) Assessment no strength deficits identified  BLEs  -LM     Comment, General Manual Muscle Testing (MMT) Assessment Noted through functional ability  -LM       Row Name 04/20/24 1413          Balance    Balance Assessment  sitting static balance;standing static balance;standing dynamic balance  -LM     Static Sitting Balance standby assist  -LM     Position, Sitting Balance unsupported;sitting edge of bed  -LM     Static Standing Balance minimal assist;2-person assist  -LM     Dynamic Standing Balance minimal assist;2-person assist  -LM     Position/Device Used, Standing Balance supported  -       Row Name 04/20/24 1413          Sensory Assessment (Somatosensory)    Sensory Assessment (Somatosensory) unable/difficult to assess  -               User Key  (r) = Recorded By, (t) = Taken By, (c) = Cosigned By      Initials Name Provider Type    LM Nina Calvert, PT Physical Therapist                   Goals/Plan    No documentation.                  Clinical Impression       [facility] Name 04/20/24 1414          Pain    Additional Documentation Pain Scale: FACES Pre/Post-Treatment (Group)  -LM       Row Name 04/20/24 1414          Pain Scale: FACES Pre/Post-Treatment    Pain: FACES Scale, Pretreatment 0-->no hurt  -     Posttreatment Pain Rating 0-->no hurt  -LM       Row Name 04/20/24 1414          Plan of Care Review    Plan of Care Reviewed With patient;spouse  -     Outcome Evaluation PT evaluation completed.  Evaluation limited d/t severe dementia.  Pt stood x 2 reps with MinAx2 and took 3 sidesteps towards HOB with MinAx2 with PT guiding him towards HOB.  Unfortunately d/t pt's cognition, pt does not initiate movement and will not allow anything if he doesn't want to do it.  Pt becomes very easily agitated and combative if encouraged.  At this time, pt is not appropriate for skilled PT services.  If pt becomes more cooperative, we will be happy to come back to re-assess.  Recommend extended care facility.  -       Row Name 04/20/24 1414          Therapy Assessment/Plan (PT)    Criteria for Skilled Interventions Met (PT) no;does not meet criteria for skilled intervention  -     Therapy Frequency (PT) evaluation only  -        Row Name 04/20/24 1414          Positioning and Restraints    Pre-Treatment Position in bed  -LM     Post Treatment Position bed  -LM     In Bed supine;call light within reach;encouraged to call for assist;exit alarm on;with family/caregiver;notified nsg  -LM     Restraints released:;reapplied:;soft limb;notified nsg:  -LM               User Key  (r) = Recorded By, (t) = Taken By, (c) = Cosigned By      Initials Name Provider Type    Nina Mckinnon PT Physical Therapist                   Outcome Measures       Row Name 04/20/24 1417          How much help from another person do you currently need...    Turning from your back to your side while in flat bed without using bedrails? 2  -LM     Moving from lying on back to sitting on the side of a flat bed without bedrails? 3  -LM     Moving to and from a bed to a chair (including a wheelchair)? 2  -LM     Standing up from a chair using your arms (e.g., wheelchair, bedside chair)? 3  -LM     Climbing 3-5 steps with a railing? 1  -LM     To walk in hospital room? 2  -LM     AM-PAC 6 Clicks Score (PT) 13  -LM     Highest Level of Mobility Goal 4 --> Transfer to chair/commode  -LM       Row Name 04/20/24 1417          Functional Assessment    Outcome Measure Options AM-PAC 6 Clicks Basic Mobility (PT)  -LM               User Key  (r) = Recorded By, (t) = Taken By, (c) = Cosigned By      Initials Name Provider Type    Nina Mckinnon PT Physical Therapist                  Physical Therapy Education       Title: PT OT SLP Therapies (In Progress)       Topic: Physical Therapy (In Progress)       Point: Mobility training (In Progress)       Learning Progress Summary             Patient Nonacceptance, E, NL by TYRELL at 4/20/2024 1418                         Point: Home exercise program (Not Started)       Learner Progress:  Not documented in this visit.              Point: Body mechanics (Not Started)       Learner Progress:  Not documented in this visit.              Point:  Precautions (In Progress)       Learning Progress Summary             Patient Nonacceptance, E, NL by  at 4/20/2024 1418                                         User Key       Initials Effective Dates Name Provider Type Discipline     07/11/23 -  Nina Cavlert PT Physical Therapist PT                  PT Recommendation and Plan     Plan of Care Reviewed With: patient, spouse  Outcome Evaluation: PT evaluation completed.  Evaluation limited d/t severe dementia.  Pt stood x 2 reps with MinAx2 and took 3 sidesteps towards HOB with MinAx2 with PT guiding him towards HOB.  Unfortunately d/t pt's cognition, pt does not initiate movement and will not allow anything if he doesn't want to do it.  Pt becomes very easily agitated and combative if encouraged.  At this time, pt is not appropriate for skilled PT services.  If pt becomes more cooperative, we will be happy to come back to re-assess.  Recommend extended care facility.     Time Calculation:   PT Evaluation Complexity  History, PT Evaluation Complexity: 3 or more personal factors and/or comorbidities  Examination of Body Systems (PT Eval Complexity): total of 4 or more elements  Clinical Presentation (PT Evaluation Complexity): evolving  Clinical Decision Making (PT Evaluation Complexity): moderate complexity  Overall Complexity (PT Evaluation Complexity): moderate complexity     PT Charges       Row Name 04/20/24 1418             Time Calculation    Start Time 1328  -LM      PT Received On 04/20/24  -LM         Untimed Charges    PT Eval/Re-eval Minutes 46  -LM         Total Minutes    Untimed Charges Total Minutes 46  -LM       Total Minutes 46  -LM                User Key  (r) = Recorded By, (t) = Taken By, (c) = Cosigned By      Initials Name Provider Type     Nina Calvert PT Physical Therapist                  Therapy Charges for Today       Code Description Service Date Service Provider Modifiers Qty    [id number]  PT EVAL MOD COMPLEXITY 4 4/20/2024  Nina Calvert, PT GP 1            PT G-Codes  Outcome Measure Options: AM-PAC 6 Clicks Basic Mobility (PT)  AM-PAC 6 Clicks Score (PT): 13    PT Discharge Summary  Anticipated Discharge Disposition (PT): extended care facility    Nina Calvert, TRACE  2024         Electronically signed by Nina Calvert, PT at 24 1419          Occupational Therapy Notes (most recent note)        Milena Keane, OT at 24 1331          Acute Care - Occupational Therapy Discharge  [facility]    Patient Name: Too Tai  : 1955    MRN: [id number]                              Today's Date: 2024       Admit Date: 2024    Visit Dx:     ICD-10-CM ICD-9-CM   1. Dementia with behavioral disturbance  F03.918 294.21   2. Aggressive behavior  R46.89 V40.39   3. Agitation due to dementia  F03.911 294.21   4. Hyperglycemia due to diabetes mellitus  E11.65 250.02     Patient Active Problem List   Diagnosis    Cough    Benign essential HTN    Benign prostatic hyperplasia    Coronary artery disease involving native coronary artery of native heart    Chronic coronary artery disease    Type 2 diabetes mellitus with hyperglycemia, with long-term current use of insulin    Gout    Hyperlipidemia    History of heart attack    Peripheral neuropathy    Peripheral vascular disease    Spasm of muscle    Myoclonic jerking    Hypomagnesemia    Arteriosclerosis of coronary artery    Dementia with behavioral disturbance    Frequent falls    History of DVT (deep vein thrombosis)    Chronic anticoagulation    AMS (altered mental status)    Falls    Bacteriuria    Cystitis    Agitation due to dementia    Agitation     Past Medical History:   Diagnosis Date    Coronary artery disease     Dementia     Diabetes mellitus     Hyperlipidemia     Peripheral vascular disease      Past Surgical History:   Procedure Laterality Date    CORONARY ARTERY BYPASS GRAFT      FEMORAL ARTERY - POPLITEAL ARTERY BYPASS GRAFT        General Information        Row Name 04/20/24 1331          OT Time and Intention    Document Type discharge evaluation/summary  -     Mode of Treatment occupational therapy  -       Row Name 04/20/24 1331          General Information    Patient Profile Reviewed yes  -     Prior Level of Function min assist:;feeding;max assist:;grooming;dressing;bathing  wife reports someone is always with pt when walking in home  -     Existing Precautions/Restrictions fall  B wrist restraints, advanced dementia, agitated  -     Barriers to Rehab medically complex;previous functional deficit;cognitive status  -       Row Name 04/20/24 1331          Living Environment    People in Home spouse;other (see comments)  CG 8 hrs a day  -       Row Name 04/20/24 1331          Home Main Entrance    Number of Stairs, Main Entrance other (see comments)  entry ramp  -       Row Name 04/20/24 1331          Stairs Within Home, Primary    Number of Stairs, Within Home, Primary none  -       Row Name 04/20/24 1331          Cognition    Orientation Status (Cognition) disoriented to;person;place;situation;time  -       Row Name 04/20/24 1331          Safety Issues, Functional Mobility    Safety Issues Affecting Function (Mobility) ability to follow commands;at risk behavior observed;awareness of need for assistance;impulsivity;insight into deficits/self-awareness;judgment;positioning of assistive device;problem-solving;safety precaution awareness;safety precautions follow-through/compliance;sequencing abilities  -     Impairments Affecting Function (Mobility) balance;cognition;endurance/activity tolerance  -     Cognitive Impairments, Mobility Safety/Performance attention;awareness, need for assistance;impulsivity;insight into deficits/self-awareness;judgment;problem-solving/reasoning;safety precaution awareness;safety precaution follow-through;sequencing abilities  -               User Key  (r) = Recorded By, (t) = Taken By, (c) = Cosigned By       Initials Name Provider Type    Milena Shankar, OT Occupational Therapist                   Mobility/ADL's       Row Name 04/20/24 1331          Bed Mobility    Bed Mobility supine-sit;sit-supine  -     Supine-Sit [place] (Bed Mobility) standby assist  -     Sit-Supine [place] (Bed Mobility) standby assist  pt laid down on his own, but returned to sitting. Pt agitated with encouragement to return to supine and required 3 person assist to return to supine  -     Bed Mobility, Safety Issues cognitive deficits limit understanding  -       Row Name 04/20/24 1331          Functional Mobility    Functional Mobility- Ind. Level minimum assist (75% patient effort);2 person assist required  -     Functional Mobility- Device other (see comments)  UE support  -     Functional Mobility-Distance (Feet) 3  -     Functional Mobility- Safety Issues step length decreased  -     Functional Mobility- Comment 3 side steps toward HOB once given physical/verbal cues to initiate  -       Row Name 04/20/24 1331          Activities of Daily Living    BADL Assessment/Intervention lower body dressing;grooming  -       Row Name 04/20/24 [facility]          Lower Body Dressing Assessment/Training    [place] Level (Lower Body Dressing) don;dependent (less than 25% patient effort)  -AC     Position (Lower Body Dressing) edge of bed sitting  -AC     Comment, (Lower Body Dressing) pt would hold sock and take toward his foot but unable to intiate donning sock  -       Row Name 04/20/24 1331          Grooming Assessment/Training    [place] Level (Grooming) wash face, hands;dependent (less than 25% patient effort)  -AC     Position (Grooming) edge of bed sitting  -AC     Comment, (Grooming) pt held wash cloth in his hand and given [place] A, but pt did not initiate  -               User Key  (r) = Recorded By, (t) = Taken By, (c) = Cosigned By      Initials Name Provider Type    Milena Shankar, OT  Occupational Therapist                   Obj/Interventions       Row Name 04/20/24 1419          Sensory Assessment (Somatosensory)    Sensory Assessment (Somatosensory) unable/difficult to assess  -[facility] 04/20/24 1419          Vision Assessment/Intervention    Visual Impairment/Limitations unable/difficult to assess  -[facility] 04/20/24 1419          Range of Motion Comprehensive    General Range of Motion bilateral upper extremity ROM WNL  -AC     Comment, General Range of Motion pt actively moving arms throughout eval  -AC       Row Name 04/20/24 1419          Strength Comprehensive (MMT)    Comment, General Manual Muscle Testing (MMT) Assessment pt unable to follow direction to formally assess d/t cognition  -AC               User Key  (r) = Recorded By, (t) = Taken By, (c) = Cosigned By      Initials Name Provider Type    Milena Shankar, OT Occupational Therapist                   Goals/Plan    No documentation.                  Clinical Impression       Row Name 04/20/24 1331          Pain Assessment    Additional Documentation Pain Scale: FACES Pre/Post-Treatment (Group)  -AC       Row Name 04/20/24 1331          Pain Scale: FACES Pre/Post-Treatment    Pain: FACES Scale, Pretreatment 0-->no hurt  -     Posttreatment Pain Rating 0-->no hurt  -[facility] 04/20/24 1331          Plan of Care Review    Plan of Care Reviewed With patient;spouse  -     Outcome Evaluation Pt dep to wash face, dep to don socks, CGA STS, min A x 2 to take 3 side steps towad HOB given UE support. Pt is limited by cognitive stratus.   Pt is not appropriate for therapy at this time as he is uncooperative, agitated, and combative when given encouragement to participate. Recommend ECF upon d/c.  -[facility] 04/20/24 3461          Therapy Assessment/Plan (OT)    Criteria for Skilled Therapeutic Interventions Met (OT) no;does not meet criteria for skilled intervention  -     Therapy Frequency (OT)  evaluation only  -       Row Name 04/20/24 1331          Therapy Plan Review/Discharge Plan (OT)    Anticipated Discharge Disposition (OT) [facility] care facility  -       Row Name 04/20/24 1331          Vital Signs    Pre Patient Position Sitting  sitting up in bed with arms in restraints  -     Post Patient Position Supine  -       Row Name 04/20/24 1331          Positioning and Restraints    Pre-Treatment Position in bed  -AC     Post Treatment Position bed  -AC     In Bed notified nsg;supine;call light within reach;encouraged to call for assist;exit alarm on  -AC     Restraints released:;reapplied:;soft limb;notified nsg:  -AC               User Key  (r) = Recorded By, (t) = Taken By, (c) = Cosigned By      Initials Name Provider Type    Milena Shankar, OT Occupational Therapist                   Outcome Measures       Row Name 04/20/24 1429          How much help from another is currently needed...    Putting on and taking off regular lower body clothing? 1  -AC     Bathing (including washing, rinsing, and drying) 1  -AC     Toileting (which includes using toilet bed pan or urinal) 1  -AC     Putting on and taking off regular upper body clothing 1  -AC     Taking care of personal grooming (such as brushing teeth) 1  -AC     Eating meals 2  -AC     AM-PAC 6 Clicks Score (OT) 7  -       Row Name 04/20/24 1417          How much help from another person do you currently need...    Turning from your back to your side while in flat bed without using bedrails? 2  -LM     Moving from lying on back to sitting on the side of a flat bed without bedrails? 3  -LM     Moving to and from a bed to a chair (including a wheelchair)? 2  -LM     Standing up from a chair using your arms (e.g., wheelchair, bedside chair)? 3  -LM     Climbing 3-5 steps with a railing? 1  -LM     To walk in hospital room? 2  -LM     AM-PAC 6 Clicks Score (PT) 13  -LM     Highest Level of Mobility Goal 4 --> Transfer to chair/commode  -LM        Row Name 04/20/24 1429 04/20/24 1417       Functional Assessment    Outcome Measure Options AM-PAC 6 Clicks Daily Activity (OT)  - AM-PAC 6 Clicks Basic Mobility (PT)  -              User Key  (r) = Recorded By, (t) = Taken By, (c) = Cosigned By      Initials Name Provider Type     Milena Keane, OT Occupational Therapist    LM Nina Calvert, PT Physical Therapist                  Occupational Therapy Education       Title: PT OT SLP Therapies (In Progress)       Topic: Occupational Therapy (In Progress)       Point: ADL training (Done)       Description:   Instruct learner(s) on proper safety adaptation and remediation techniques during self care or transfers.   Instruct in proper use of assistive devices.                  Learning Progress Summary             Family CAMILO Ramos, VU by  at 4/20/2024 1433                         Point: Home exercise program (Not Started)       Description:   Instruct learner(s) on appropriate technique for monitoring, assisting and/or progressing therapeutic exercises/activities.                  Learner Progress:  Not documented in this visit.              Point: Precautions (Not Started)       Description:   Instruct learner(s) on prescribed precautions during self-care and functional transfers.                  Learner Progress:  Not documented in this visit.              Point: Body mechanics (Not Started)       Description:   Instruct learner(s) on proper positioning and spine alignment during self-care, functional mobility activities and/or exercises.                  Learner Progress:  Not documented in this visit.                              User Key       Initials Effective Dates Name Provider Type [facility] 02/03/23 -  Milena Keane, OT Occupational Therapist OT                  OT Recommendation and Plan  Therapy Frequency (OT): evaluation only  Plan of Care Review  Plan of Care Reviewed With: patient, spouse  Outcome Evaluation: Pt dep to wash  face, dep to don socks, CGA STS, min A x 2 to take 3 side steps towad HOB given UE support. Pt is limited by cognitive stratus.   Pt is not appropriate for therapy at this time as he is uncooperative, agitated, and combative when given encouragement to participate. Recommend ECF upon d/c.  Plan of Care Reviewed With: patient, spouse  Outcome Evaluation: Pt dep to wash face, dep to don socks, CGA STS, min A x 2 to take 3 side steps towad HOB given UE support. Pt is limited by cognitive stratus.   Pt is not appropriate for therapy at this time as he is uncooperative, agitated, and combative when given encouragement to participate. Recommend ECF upon d/c.     Time Calculation:   Evaluation Complexity (OT)  Review Occupational Profile/Medical/Therapy History Complexity: expanded/moderate complexity  Assessment, Occupational Performance/Identification of Deficit Complexity: 3-5 performance deficits  Clinical Decision Making Complexity (OT): detailed assessment/moderate complexity  Overall Complexity of Evaluation (OT): moderate complexity     Time Calculation- OT       Row Name 04/20/24 1331             Time Calculation- OT    OT Start Time 1331  -AC      OT Received On 04/20/24  -AC         Untimed Charges    OT Eval/Re-eval Minutes 50  -AC         Total Minutes    Untimed Charges Total Minutes 50  -AC       Total Minutes 50  -AC                User Key  (r) = Recorded By, (t) = Taken By, (c) = Cosigned By      Initials Name Provider Type    AC Milena Keane OT Occupational Therapist                  Therapy Charges for Today       Code Description Service Date Service Provider Modifiers Qty    [id number]  OT EVAL MOD COMPLEXITY 4 4/20/2024 Milena Keane OT GO 1               OT Discharge Summary  Anticipated Discharge Disposition (OT): extended care facility    Milena Keane OT  4/20/2024    Electronically signed by Milena Keane OT at 04/20/24 9146       Speech Language Pathology Notes (most recent note)     No notes exist for this encounter.

## 2024-04-30 LAB
GLUCOSE BLDC GLUCOMTR-MCNC: 126 MG/DL (ref 70–130)
GLUCOSE BLDC GLUCOMTR-MCNC: 204 MG/DL (ref 70–130)
GLUCOSE BLDC GLUCOMTR-MCNC: 311 MG/DL (ref 70–130)
GLUCOSE BLDC GLUCOMTR-MCNC: 314 MG/DL (ref 70–130)
PHENOBARB SERPL-MCNC: 9.4 MCG/ML (ref 10–30)
QT INTERVAL: 376 MS
QTC INTERVAL: 462 MS

## 2024-04-30 PROCEDURE — 63710000001 INSULIN GLARGINE PER 5 UNITS: Performed by: INTERNAL MEDICINE

## 2024-04-30 PROCEDURE — 63710000001 INSULIN LISPRO (HUMAN) PER 5 UNITS: Performed by: PHYSICIAN ASSISTANT

## 2024-04-30 PROCEDURE — 25010000002 ZIPRASIDONE MESYLATE PER 10 MG

## 2024-04-30 PROCEDURE — 63710000001 INSULIN LISPRO (HUMAN) PER 5 UNITS: Performed by: NURSE PRACTITIONER

## 2024-04-30 PROCEDURE — 82948 REAGENT STRIP/BLOOD GLUCOSE: CPT

## 2024-04-30 PROCEDURE — 25810000003 LACTATED RINGERS PER 1000 ML: Performed by: PHYSICIAN ASSISTANT

## 2024-04-30 PROCEDURE — 63710000001 INSULIN LISPRO (HUMAN) PER 5 UNITS: Performed by: INTERNAL MEDICINE

## 2024-04-30 PROCEDURE — 80184 ASSAY OF PHENOBARBITAL: CPT | Performed by: STUDENT IN AN ORGANIZED HEALTH CARE EDUCATION/TRAINING PROGRAM

## 2024-04-30 PROCEDURE — 25010000002 HEPARIN (PORCINE) PER 1000 UNITS: Performed by: INTERNAL MEDICINE

## 2024-04-30 PROCEDURE — 99233 SBSQ HOSP IP/OBS HIGH 50: CPT

## 2024-04-30 PROCEDURE — 99232 SBSQ HOSP IP/OBS MODERATE 35: CPT | Performed by: NURSE PRACTITIONER

## 2024-04-30 RX ORDER — INSULIN LISPRO 100 [IU]/ML
7 INJECTION, SOLUTION INTRAVENOUS; SUBCUTANEOUS
Status: DISCONTINUED | OUTPATIENT
Start: 2024-04-30 | End: 2024-05-09 | Stop reason: HOSPADM

## 2024-04-30 RX ORDER — WATER 10 ML/10ML
INJECTION INTRAMUSCULAR; INTRAVENOUS; SUBCUTANEOUS
Status: ACTIVE
Start: 2024-04-30 | End: 2024-04-30

## 2024-04-30 RX ORDER — QUETIAPINE FUMARATE 100 MG/1
300 TABLET, FILM COATED ORAL NIGHTLY
Status: DISCONTINUED | OUTPATIENT
Start: 2024-04-30 | End: 2024-05-08

## 2024-04-30 RX ADMIN — AMLODIPINE BESYLATE 10 MG: 10 TABLET ORAL at 09:33

## 2024-04-30 RX ADMIN — PHENOBARBITAL 64.8 MG: 32.4 TABLET ORAL at 17:15

## 2024-04-30 RX ADMIN — QUETIAPINE FUMARATE 100 MG: 100 TABLET ORAL at 16:59

## 2024-04-30 RX ADMIN — TEMAZEPAM 30 MG: 15 CAPSULE ORAL at 20:22

## 2024-04-30 RX ADMIN — SENNOSIDES AND DOCUSATE SODIUM 2 TABLET: 8.6; 5 TABLET ORAL at 09:35

## 2024-04-30 RX ADMIN — DONEPEZIL HYDROCHLORIDE 10 MG: 10 TABLET, FILM COATED ORAL at 20:23

## 2024-04-30 RX ADMIN — Medication 5 MG: at 20:22

## 2024-04-30 RX ADMIN — QUETIAPINE FUMARATE 100 MG: 100 TABLET ORAL at 09:33

## 2024-04-30 RX ADMIN — INSULIN LISPRO 5 UNITS: 100 INJECTION, SOLUTION INTRAVENOUS; SUBCUTANEOUS at 20:42

## 2024-04-30 RX ADMIN — INSULIN GLARGINE 15 UNITS: 100 INJECTION, SOLUTION SUBCUTANEOUS at 20:42

## 2024-04-30 RX ADMIN — INSULIN GLARGINE 15 UNITS: 100 INJECTION, SOLUTION SUBCUTANEOUS at 09:33

## 2024-04-30 RX ADMIN — HEPARIN SODIUM 5000 UNITS: 5000 INJECTION INTRAVENOUS; SUBCUTANEOUS at 20:24

## 2024-04-30 RX ADMIN — PHENOBARBITAL 64.8 MG: 32.4 TABLET ORAL at 20:52

## 2024-04-30 RX ADMIN — Medication 10 ML: at 20:23

## 2024-04-30 RX ADMIN — INSULIN LISPRO 5 UNITS: 100 INJECTION, SOLUTION INTRAVENOUS; SUBCUTANEOUS at 09:34

## 2024-04-30 RX ADMIN — INSULIN LISPRO 3 UNITS: 100 INJECTION, SOLUTION INTRAVENOUS; SUBCUTANEOUS at 17:31

## 2024-04-30 RX ADMIN — PHENOBARBITAL 64.8 MG: 32.4 TABLET ORAL at 09:33

## 2024-04-30 RX ADMIN — SENNOSIDES AND DOCUSATE SODIUM 2 TABLET: 8.6; 5 TABLET ORAL at 20:22

## 2024-04-30 RX ADMIN — LISINOPRIL 30 MG: 20 TABLET ORAL at 09:33

## 2024-04-30 RX ADMIN — SODIUM CHLORIDE, POTASSIUM CHLORIDE, SODIUM LACTATE AND CALCIUM CHLORIDE 75 ML/HR: 600; 310; 30; 20 INJECTION, SOLUTION INTRAVENOUS at 12:58

## 2024-04-30 RX ADMIN — QUETIAPINE FUMARATE 300 MG: 100 TABLET ORAL at 20:23

## 2024-04-30 RX ADMIN — MICONAZOLE NITRATE 1 APPLICATION: 20 POWDER TOPICAL at 22:14

## 2024-04-30 RX ADMIN — ACETAMINOPHEN 650 MG: 325 TABLET ORAL at 20:42

## 2024-04-30 RX ADMIN — DONEPEZIL HYDROCHLORIDE 10 MG: 10 TABLET, FILM COATED ORAL at 09:33

## 2024-04-30 RX ADMIN — ZIPRASIDONE MESYLATE 10 MG: 20 INJECTION, POWDER, LYOPHILIZED, FOR SOLUTION INTRAMUSCULAR at 11:57

## 2024-04-30 RX ADMIN — MIRTAZAPINE 15 MG: 15 TABLET, FILM COATED ORAL at 20:22

## 2024-04-30 RX ADMIN — SODIUM CHLORIDE, POTASSIUM CHLORIDE, SODIUM LACTATE AND CALCIUM CHLORIDE 75 ML/HR: 600; 310; 30; 20 INJECTION, SOLUTION INTRAVENOUS at 00:53

## 2024-04-30 RX ADMIN — INSULIN LISPRO 7 UNITS: 100 INJECTION, SOLUTION INTRAVENOUS; SUBCUTANEOUS at 17:00

## 2024-04-30 NOTE — PROGRESS NOTES
[facility] Neurology    Progress Note    Patient Name: Too Tai  : 1955  MRN: [id number]  Primary Care Physician:  Des Geller MD  Date of admission: 2024    Subjective     Chief Complaint: Dementia    History of Present Illness   Patient seen resting comfortably in bed.  Per bedside RN patient intermittently rested overnight totaling approximately 3 hours of sleep.  He continues in 4-point restraints with extreme agitation.  They were able to get him to chair yesterday however he only tolerated about 10 minutes and then became extremely agitated.    Review of Systems   Unable to assess due to mental status    Objective     Physical Exam  Vitals and nursing note reviewed.   Constitutional:       General: He is not in acute distress.     Appearance: He is not ill-appearing.   Eyes:      Extraocular Movements: Extraocular movements intact.      Pupils: Pupils are equal, round, and reactive to light.      Comments: No nystagmus noted   Neurological:      Mental Status: He is alert. Mental status is at baseline.      Cranial Nerves: No cranial nerve deficit, dysarthria or facial asymmetry.      Sensory: No sensory deficit.      Motor: No weakness or seizure activity.      Comments:     Cranial Nerves   CN II: Pupils are equal, round, and reactive to light. Normal visual acuity and visual fields.    CN III IV VI: Extraocular movements are full without nystagmus.  CN V: Normal facial sensation and strength of muscles of mastication.  CN VII: Facial movements are symmetric. No weakness.  CN VIII:  Auditory acuity is normal.  CN IX & X:  Symmetric palatal movement.  CN XI: Sternocleidomastoid and trapezius are normal.  No weakness.  CN XII: The tongue is midline.  No atrophy or fasciculations.            Vitals:   Temp:  [98.7 °F (37.1 °C)] 98.7 °F (37.1 °C)  Heart Rate:  [76] 76  Resp:  [20] 20  BP: (171)/(82) 171/82    Current Medications    Current Facility-Administered Medications:      acetaminophen (TYLENOL) tablet 650 mg, 650 mg, Oral, Q4H PRN **OR** acetaminophen (TYLENOL) 160 MG/5ML oral solution 650 mg, 650 mg, Oral, Q4H PRN **OR** acetaminophen (TYLENOL) suppository 650 mg, 650 mg, Rectal, Q4H PRN, TraciKamala bacasie G, DO    amLODIPine (NORVASC) tablet 10 mg, 10 mg, Oral, Q24H, Calin Benitez MD, 10 mg at 04/29/24 0758    sennosides-docusate (PERICOLACE) 8.6-50 MG per tablet 2 tablet, 2 tablet, Oral, BID, 2 tablet at 04/28/24 2134 **AND** polyethylene glycol (MIRALAX) packet 17 g, 17 g, Oral, Daily PRN **AND** bisacodyl (DULCOLAX) EC tablet 5 mg, 5 mg, Oral, Daily PRN **AND** bisacodyl (DULCOLAX) suppository 10 mg, 10 mg, Rectal, Daily PRN, Jennifer Bai, APRN    Calcium Replacement - Follow Nurse / BPA Driven Protocol, , Does not apply, PRN, Rtaci, Nerissa G, DO    cyanocobalamin injection 1,000 mcg, 1,000 mcg, Intramuscular, Q28 Days, Darrell Jhaveri DO, 1,000 mcg at 04/19/24 1532    dextrose (D50W) (25 g/50 mL) IV injection 25 g, 25 g, Intravenous, Q15 Min PRN, Traci, Nerissa G, DO    dextrose (GLUTOSE) oral gel 15 g, 15 g, Oral, Q15 Min PRN, Traci, Nerissa G, DO    donepezil (ARICEPT) tablet 10 mg, 10 mg, Oral, BID, Darrell Jhaveri, DO, 10 mg at 04/29/24 1952    folic acid (FOLVITE) tablet 1 mg, 1 mg, Oral, Daily, TraciKamala bacasie G, DO, 1 mg at 04/28/24 0837    glucagon (GLUCAGEN) injection 1 mg, 1 mg, Intramuscular, Q15 Min PRN, Traci, Nerissa G, DO    heparin (porcine) 5000 UNIT/ML injection 5,000 Units, 5,000 Units, Subcutaneous, Q8H, Nerissa Aviles DO, 5,000 Units at 04/29/24 0651    insulin glargine (LANTUS, SEMGLEE) injection 15 Units, 15 Units, Subcutaneous, Q12H, Cecily Bain, DO, 15 Units at 04/29/24 2009    Insulin Lispro (humaLOG) injection 2-7 Units, 2-7 Units, Subcutaneous, 4x Daily AC & at Bedtime, Nerissa Aviles DO, 5 Units at 04/29/24 1841    Insulin Lispro (humaLOG) injection 5 Units, 5 Units, Subcutaneous, TID With Meals, Parisa Madrigal PA-C, 5 Units at 04/29/24  1841    lactated ringers infusion, 75 mL/hr, Intravenous, Continuous, Parisa Madrigal PA-C, Last Rate: 75 mL/hr at 04/30/24 0053, 75 mL/hr at 04/30/24 0053    lisinopril (PRINIVIL,ZESTRIL) tablet 30 mg, 30 mg, Oral, Daily, Parisa Madrigal PA-C, 30 mg at 04/29/24 0756    Magnesium Standard Dose Replacement - Follow Nurse / BPA Driven Protocol, , Does not apply, PRN, Traci, Nerissa G, DO    melatonin tablet 5 mg, 5 mg, Oral, Nightly PRN, TraciKamalaNerissa G, DO    miconazole (MICOTIN) 2 % powder 1 Application, 1 Application, Topical, Q12H, Cecily Bain DO, 1 Application at 04/28/24 0900    mirtazapine (REMERON) tablet 15 mg, 15 mg, Oral, Nightly, Darrell Jhaveri, , 15 mg at 04/29/24 1952    PHENobarbital tablet 64.8 mg, 64.8 mg, Oral, Q8H, 64.8 mg at 04/29/24 1952 **OR** PHENobarbital injection 65 mg, 65 mg, Intramuscular, Q8H, Darrell Jhaveri DO, 65 mg at 04/29/24 0651    Phosphorus Replacement - Follow Nurse / BPA Driven Protocol, , Does not apply, PRN, Traci Nerissa G, DO    Potassium Replacement - Follow Nurse / BPA Driven Protocol, , Does not apply, PRN, Traci Nerissa G, DO    QUEtiapine (SEROquel) tablet 100 mg, 100 mg, Oral, BID, Meliza Kahn K, APRN, 100 mg at 04/29/24 1619    QUEtiapine (SEROquel) tablet 200 mg, 200 mg, Oral, Nightly, Darrell Jhaveri A, DO, 200 mg at 04/29/24 1952    sodium chloride 0.9 % flush 10 mL, 10 mL, Intravenous, Q12H, TraciKamala bacasie G, DO, 10 mL at 04/29/24 2002    sodium chloride 0.9 % flush 10 mL, 10 mL, Intravenous, PRN, TraciKamalaNerissa G, DO    sodium chloride 0.9 % infusion 40 mL, 40 mL, Intravenous, PRN, Nerissa Aviles, DO, 40 mL at 04/23/24 0546    temazepam (RESTORIL) capsule 30 mg, 30 mg, Oral, Nightly, Darrell Jhaveri, DO, 30 mg at 04/29/24 1952    ziprasidone (GEODON) injection 10 mg, 10 mg, Intramuscular, Q4H PRN, Meliza Kahn, APRN, 10 mg at 04/28/24 1022    Laboratory Results:   Lab Results   Component Value Date    GLUCOSE 166 (H) 04/19/2024    CALCIUM 9.9  04/19/2024     04/19/2024    K 4.4 04/19/2024    CO2 30.0 (H) 04/19/2024     04/19/2024    BUN 16 04/19/2024    CREATININE 0.82 04/19/2024    EGFRIFAFRI 102 02/21/2022    EGFRIFNONA 88 02/21/2022    BCR 19.5 04/19/2024    ANIONGAP 8.0 04/19/2024     Lab Results   Component Value Date    WBC 10.47 04/19/2024    HGB 15.5 04/19/2024    HCT 46.7 04/19/2024    MCV 92.7 04/19/2024     04/19/2024     Lab Results   Component Value Date    CHOL 220 (H) 01/08/2024    CHOL 120 08/01/2019    CHOL 117 04/15/2019     Lab Results   Component Value Date    HDL 41 01/08/2024    HDL 37 (L) 08/03/2023    HDL 41 04/03/2023     Lab Results   Component Value Date     (H) 01/08/2024     (H) 08/03/2023     (H) 04/03/2023     Lab Results   Component Value Date    TRIG 108 01/08/2024    TRIG 208 (H) 08/03/2023    TRIG 138 04/03/2023     Lab Results   Component Value Date    HGBA1C 9.50 (H) 04/18/2024     Lab Results   Component Value Date    INR 1.1 11/25/2019    INR 1.1 09/10/2018    PROTIME 12.5 11/25/2019    PROTIME 13.3 09/10/2018     Lab Results   Component Value Date    FOLATE 10.20 02/24/2024     Lab Results   Component Value Date    NMDSPVCW82 384 04/18/2024             Assessment / Plan   Brief Patient Summary:  Too Tai is a 68 y.o. male with a past medical history of advanced dementia with behavioral disturbances and nonverbal, CAD, T2DM, HLD, PVD who presented to [facility] ED due to increase in agitation.  Patient has had multiple recent hospitalizations.      Plan:   Advanced dementia with behavioral disturbances  Sleep Deprivation   Continue home Aricept 10 mg nightly  Continue phenobarbital 64.8 mg 3 times daily  Phenobarbital level 9.4   Continue home Remeron 15 mg nightly  Increase  Seroquel 100 mg /100 mg / 300 mg.   QTc 461 this AM. Repeat EKG in AM  Continue Restoril 30 mg nightly.  Geodon as needed for extreme agitation; last dose 4/28 at 1022  CT head negative for acute  abnormality  Vitamin B12 384, IM cyanocobalamin 1000 mcg  Case management following for difficult posthospitalization disposition.  Patient is not safe to return home due to concern of being in danger for his wife and himself.   Palliative consulted, appreciate recommendations.   Psych consult pending, scheduled for Friday, 5/13/2024.  Appreciate recommendations  General neurology will continue to follow    I have discussed the above with the patient, bedside RN and IVONNE Rodriguez  Time spent with patient: 50 minutes in face-to-face evaluation and management of the patient.    Copied text in this note has been reviewed and is accurate as of 04/30/24.       IVONNE Santos

## 2024-04-30 NOTE — PLAN OF CARE
Problem: Restraint, Nonviolent  Goal: Absence of Harm or Injury  Outcome: Ongoing, Not Progressing  Intervention: Implement Least Restrictive Safety Strategies  Description: Consider personal and environmental factors contributing to nonviolent or self-destructive behavior.  Utilize diversional activity/alternative device protection.  Document alternative strategies and less restrictive intervention attempts and their effects.  Clearly describe/record behavior leading to need for restraint.  Use the least restrictive method of restraint.  Recognize restraint should only be used if needed to improve the patient's wellbeing and less restrictive interventions have been determined to be ineffective.  Reassess continued need frequently. Remove restraint as soon as unsafe situation is resolved.  Recent Flowsheet Documentation  Taken 4/30/2024 1600 by Ilene Foreman RN  Medical Device Protection: IV pole/bag removed from visual field  Less Restrictive Alternative:   1:1 observation maintained   bed alarm in use   calming techniques promoted  De-Escalation Techniques: reoriented  Diversional Activities: television  Taken 4/30/2024 1400 by Ilene Foreman RN  Medical Device Protection: IV pole/bag removed from visual field  Less Restrictive Alternative: bed alarm in use  De-Escalation Techniques: reoriented  Diversional Activities: television  Taken 4/30/2024 1200 by Ilene Foreman RN  Medical Device Protection: tubing secured  Less Restrictive Alternative: sensory stimulation limited  De-Escalation Techniques:   reoriented   stimulation decreased  Diversional Activities: television  Taken 4/30/2024 1000 by Ilene Foreman RN  Medical Device Protection: IV pole/bag removed from visual field  Less Restrictive Alternative:   1:1 observation maintained   bed alarm in use  De-Escalation Techniques: medication administered  Diversional Activities: television  Taken 4/30/2024 0800 by Ilene Foreman RN  Medical  Device Protection: IV pole/bag removed from visual field  Less Restrictive Alternative: environment adjusted  De-Escalation Techniques: increased round frequency  Diversional Activities: television  Intervention: Protect Dignity, Rights, and Personal Wellbeing  Description: Explain restraint rationale and procedure to patient and family/support person prior to application.  Regularly assess personal needs and for changes in behavior and clinical condition, as well as physical and emotional wellbeing.  Provide reassurance and emotional support.  Flowsheets (Taken 4/30/2024 1724)  Trust Relationship/Rapport:   care explained   choices provided  Intervention: Protect Skin and Joint Integrity  Description: Frequently check restraint application site and document findings.  Release and replace at regular intervals per facility protocol.  Assist with frequent joint range of motion activity.  Flowsheets (Taken 4/30/2024 1727)  Body Position: foot of bed elevated   Goal Outcome Evaluation:

## 2024-04-30 NOTE — PLAN OF CARE
Problem: Palliative Care  Goal: Enhanced Quality of Life  Intervention: Optimize Psychosocial Wellbeing  Flowsheets (Taken 4/30/2024 1617)  Supportive Measures:   active listening utilized   verbalization of feelings encouraged   positive reinforcement provided  Family/Support System Care:   caregiver stress acknowledged   self-care encouraged   support provided   involvement promoted   presence promoted   Goal Outcome Evaluation:  Plan of Care Reviewed With: spouse        Progress: no change  Outcome Evaluation: Pt is sitting up in bed feeding himself a chicken biscuit.  Pt remains in 4 pt restraints. wife states pt was able to stand with assist of staff yesterday.  Pt is calm at present.  Pt slept a few hours last night.  Wife states she is hopeful that pt can go to [facility].  She desires to continue to stand pt.  Neurology managing agitation meds. plans for psych eval.  continue palliative support.        Palliative IDT: Rn, SW, MD IVONNE,   After hours#[phone]

## 2024-04-30 NOTE — DISCHARGE PLACEMENT REQUEST
"Too Richter (68 y.o. Male)     Case Management-Ilene Brown RN  [phone]        Date of Birth   1955    Social Security Number       Address   [address] [place] [address] [facility] [address]    Home Phone   [phone]    MRN   [id number]       Scientology   Restoration    Marital Status                               Admission Date   4/18/24    Admission Type   Emergency    Admitting Provider   Calin Benitez MD    Attending Provider   Calin Benitez MD    Department, Room/Bed   [facility] 5G, S551/1       Discharge Date       Discharge Disposition       Discharge Destination                                 Attending Provider: Calin Benitez MD    Allergies: Bactrim [Sulfamethoxazole-trimethoprim], Adhesive Tape, Neosporin [Neomycin-bacitracin Zn-polymyx]    Isolation: None   Infection: None   Code Status: No CPR    Ht: 188 cm (74\")   Wt: 97.5 kg (215 lb)    Admission Cmt: None   Principal Problem: Agitation due to dementia [F03.911]                   Active Insurance as of 4/18/2024       Primary Coverage       Payor Plan Insurance Group Employer/Plan Group    AETNA MEDICARE REPLACEMENT AETNA MED ADV PPO [id number]       Payor Plan Address Payor Plan Phone Number Payor Plan Fax Number Effective Dates    [address] [phone]  1/1/2024 - None Entered    [place] [address]         Subscriber Name Subscriber Birth Date Member ID       TOO RICHTER 1955 [id number]                     Emergency Contacts        (Rel.) Home Phone Work Phone Mobile Phone    NEELAMWILLIE GARCIA (Spouse) [phone] -- [phone]    Ritchie Richter (Son) [phone] -- [phone]    Meg Bustillo (Relative) [phone] -- [phone]                 History & Physical        Collin Rogers MD at 04/24/24 1117          Des Geller MD  Consulting physician: Ken    Chief Complaint   Patient presents with    Altered Mental Status       Reason for consult: agitated delirium    HPI: 67 yo " male lives with wife at home . Four years ago dxed with dementia . 6-8 months ago with neuropsych behavior problems. Otherwise ADL 1-2/6 with FAST 6 scoring per wife commnetary. Severe agitation prompted hospitalization. Sometimes violent. H/O DM, BPH, CAD, bilat PVD , neuropathy. Given Midazolam in ED. Wife knows he is declining and jono needs palcement but was asked not to return to NH last time because of violence. On multpile mediscation and followed well by Neurology. Trajectory more fits Lewy Body?      Pain assesment: none    Dyspnea: none    N/V: none    PPS: 40%      Past Medical History:   Diagnosis Date    Coronary artery disease     Dementia     Diabetes mellitus     Hyperlipidemia     Peripheral vascular disease      Past Surgical History:   Procedure Laterality Date    CORONARY ARTERY BYPASS GRAFT      FEMORAL ARTERY - POPLITEAL ARTERY BYPASS GRAFT       Current Code Status       Date Active Code Status Order ID Comments User Context       4/19/2024 1037 No CPR (Do Not Attempt to Resuscitate) [id number]  Cecily Bain DO Inpatient        Question Answer    Code Status (Patient has no pulse and is not breathing) No CPR (Do Not Attempt to Resuscitate)    Medical Interventions (Patient has pulse or is breathing) Limited Support    Medical Intervention Limits: NO intubation (DNI)    Level Of Support Discussed With Next of Kin (If No Surrogate)                  Current Facility-Administered Medications   Medication Dose Route Frequency Provider Last Rate Last Admin    acetaminophen (TYLENOL) tablet 650 mg  650 mg Oral Q4H PRN Traci, Nerissa G, DO        Or    acetaminophen (TYLENOL) 160 MG/5ML oral solution 650 mg  650 mg Oral Q4H PRN Traci, Nerissa G, DO        Or    acetaminophen (TYLENOL) suppository 650 mg  650 mg Rectal Q4H PRN Traci, Nerissa G, DO        [START ON 4/25/2024] amLODIPine (NORVASC) tablet 10 mg  10 mg Oral Q24H Calin Benitez MD        sennosides-docusate (PERICOLACE) 8.6-50 MG per  tablet 2 tablet  2 tablet Oral BID PRN Traci, Nerissa G, DO        And    polyethylene glycol (MIRALAX) packet 17 g  17 g Oral Daily PRN Traci, Nerissa G, DO        And    bisacodyl (DULCOLAX) EC tablet 5 mg  5 mg Oral Daily PRN Traci, Nerissa G, DO   5 mg at 04/23/24 0859    And    bisacodyl (DULCOLAX) suppository 10 mg  10 mg Rectal Daily PRN Traci, Nerissa G, DO        Calcium Replacement - Follow Nurse / BPA Driven Protocol   Does not apply PRN Traci, Nerissa G, DO        cyanocobalamin injection 1,000 mcg  1,000 mcg Intramuscular Q28 Days JhaveriGuille hannony A, DO   1,000 mcg at 04/19/24 1532    dextrose (D50W) (25 g/50 mL) IV injection 25 g  25 g Intravenous Q15 Min PRN Traci, Nerissa G, DO        dextrose (GLUTOSE) oral gel 15 g  15 g Oral Q15 Min PRN Traci, Nerissa G, DO        Divalproex Sodium (DEPAKOTE SPRINKLE) capsule 500 mg  500 mg Oral Q8H Jhaveri, Darrell A, DO        donepezil (ARICEPT) tablet 10 mg  10 mg Oral BID Jhaveri, Darrell A, DO        folic acid (FOLVITE) tablet 1 mg  1 mg Oral Daily Traci, Nerissa G, DO   1 mg at 04/24/24 0852    glucagon (GLUCAGEN) injection 1 mg  1 mg Intramuscular Q15 Min PRN Traci, Nerissa G, DO        haloperidol (HALDOL) tablet 5 mg  5 mg Oral 4x Daily PRN Traci, Nerissa G, DO        heparin (porcine) 5000 UNIT/ML injection 5,000 Units  5,000 Units Subcutaneous Q8H Traci, Nerissa G, DO   5,000 Units at 04/24/24 0550    insulin glargine (LANTUS, SEMGLEE) injection 15 Units  15 Units Subcutaneous Q12H Cecily Bain, DO   15 Units at 04/24/24 0854    Insulin Lispro (humaLOG) injection 2-7 Units  2-7 Units Subcutaneous 4x Daily AC & at Bedtime Traci, Nerissa G, DO   2 Units at 04/24/24 0856    Insulin Lispro (humaLOG) injection 5 Units  5 Units Subcutaneous TID With Meals Parisa Madrigal PA-C   5 Units at 04/24/24 0855    lisinopril (PRINIVIL,ZESTRIL) tablet 20 mg  20 mg Oral Daily Nerissa Aviles DO   20 mg at 04/24/24 0853    Magnesium Standard Dose Replacement - Follow Nurse / BPA  Driven Protocol   Does not apply PRN Traci, Nerissa G, DO        melatonin tablet 5 mg  5 mg Oral Nightly PRN Traci, Nerissa G, DO        miconazole (MICOTIN) 2 % powder 1 Application  1 Application Topical Q12H Cecily Bain DO   1 Application at 04/23/24 2046    mirtazapine (REMERON) tablet 15 mg  15 mg Oral Nightly Darrell Jhaveri A, DO        Phosphorus Replacement - Follow Nurse / BPA Driven Protocol   Does not apply PRN Traci, Nerissa G, DO        Potassium Replacement - Follow Nurse / BPA Driven Protocol   Does not apply PRN Traci, Nerissa G, DO        QUEtiapine (SEROquel) tablet 100 mg  100 mg Oral BID Meliza Kahn, APRN   100 mg at 04/24/24 0852    QUEtiapine (SEROquel) tablet 200 mg  200 mg Oral Nightly Darrell Jhaveri A, DO        sodium chloride 0.9 % flush 10 mL  10 mL Intravenous PRN Traci, Nerissa G, DO        sodium chloride 0.9 % flush 10 mL  10 mL Intravenous Q12H Traci, Nerissa G, DO   10 mL at 04/23/24 2047    sodium chloride 0.9 % flush 10 mL  10 mL Intravenous PRN Traci, Nerissa G, DO        sodium chloride 0.9 % infusion 40 mL  40 mL Intravenous PRN Traci, Nerissa G, DO   40 mL at 04/23/24 0546    sodium chloride 0.9 % infusion  100 mL/hr Intravenous Continuous Parisa Madrigal PA-C 100 mL/hr at 04/23/24 1534 100 mL/hr at 04/23/24 1534    temazepam (RESTORIL) capsule 30 mg  30 mg Oral Nightly Darrell Jhaveri A, DO        ziprasidone (GEODON) injection 10 mg  10 mg Intramuscular Q4H PRN Meliza Kahn, APRN   10 mg at 04/23/24 1927     sodium chloride, 100 mL/hr, Last Rate: 100 mL/hr (04/23/24 1534)        acetaminophen **OR** acetaminophen **OR** acetaminophen    senna-docusate sodium **AND** polyethylene glycol **AND** bisacodyl **AND** bisacodyl    Calcium Replacement - Follow Nurse / BPA Driven Protocol    dextrose    dextrose    glucagon (human recombinant)    haloperidol    Magnesium Standard Dose Replacement - Follow Nurse / BPA Driven Protocol    melatonin    Phosphorus Replacement -  "Follow Nurse / BPA Driven Protocol    Potassium Replacement - Follow Nurse / BPA Driven Protocol    sodium chloride    sodium chloride    sodium chloride    ziprasidone  Allergies   Allergen Reactions    Bactrim [Sulfamethoxazole-Trimethoprim] Rash    Adhesive Tape Rash    Neosporin [Neomycin-Bacitracin Zn-Polymyx] Rash     Family History   Problem Relation Age of Onset    Diabetes Mother     Heart disease Father     Hypertension Father     Hyperlipidemia Father     Diabetes Sister     Diabetes Maternal Grandfather     Diabetes Sister     Diabetes Sister      Social History     Socioeconomic History    Marital status:    Tobacco Use    Smoking status: Former     Current packs/day: 0.00     Types: Cigarettes     Quit date:      Years since quittin.3     Passive exposure: Never    Smokeless tobacco: Former   Vaping Use    Vaping status: Never Used   Substance and Sexual Activity    Alcohol use: No    Drug use: No    Sexual activity: Never     Review of Systems - not able      BP (!) 184/80 (BP Location: Right arm, Patient Position: Lying)   Pulse 86   Temp 97.4 °F (36.3 °C) (Axillary)   Resp 20   Ht 188 cm (74\")   Wt 97.5 kg (215 lb)   SpO2 95%   BMI 27.60 kg/m²     Intake/Output Summary (Last 24 hours) at 2024 1117  Last data filed at 2024 1930  Gross per 24 hour   Intake 300 ml   Output --   Net 300 ml     Physical Exam:    Outburst of voacl phases, otherwise no thrashing about. Lungs clear, abd flat , ext no edema      Results from last 7 days   Lab Units 24  0829   WBC 10*3/mm3 10.47   HEMOGLOBIN g/dL 15.5   HEMATOCRIT % 46.7   PLATELETS 10*3/mm3 307     Results from last 7 days   Lab Units 24  0829 24  2356   SODIUM mmol/L 144 142   POTASSIUM mmol/L 4.4 4.9   CHLORIDE mmol/L 106 104   CO2 mmol/L 30.0* 28.0   BUN mg/dL 16 22   CREATININE mg/dL 0.82 1.12   CALCIUM mg/dL 9.9 9.4   BILIRUBIN mg/dL  --  0.3   ALK PHOS U/L  --  120*   ALT (SGPT) U/L  --  13   AST " "(SGOT) U/L  --  17   GLUCOSE mg/dL 166* 226*     Results from last 7 days   Lab Units 24  0829   SODIUM mmol/L 144   POTASSIUM mmol/L 4.4   CHLORIDE mmol/L 106   CO2 mmol/L 30.0*   BUN mg/dL 16   CREATININE mg/dL 0.82   GLUCOSE mg/dL 166*   CALCIUM mg/dL 9.9     Imaging Results (Last 72 Hours)       ** No results found for the last 72 hours. **          Impression: Plan: Agitated Delirium: Agree with medication plan. Full discussion with patient's wife at bedside. Maximize medication/ antipsychotics . Consider Phenobarbitol ( 65 mg 2-4 times a day to start with dose titration 130mg etc/ and might consider scheduled dosing for 2-3 days? ) if breakthrough medication regimen. Discussed with wife on both phenobarb and \"respite sedation for 2-3 days. Also Midazolam PRN if severe break through agitation. Placement: wife aware difficult secondary to violent behavior. Will continue to discuss as possible Lewy Body type and will get worse. ( HOLD any Haloperidol secondary to dementia type?) Will continue to follow        Time: 35 minutes    Collin Rogers MD  24  11:17 EDT             Electronically signed by Collin Rogers MD at 24 1133       Nerissa Aviles DO at 24 0218              [facility] Medicine Services  HISTORY AND PHYSICAL    Patient Name: Too Tai  : 1955  MRN: [id number]  Primary Care Physician: Des Geller MD  Date of admission: 2024      Subjective  Subjective     Chief Complaint:  Agitation    HPI:  Too Tai is a 68 y.o. male with a PMH significant for advanced dementia, CAD, diabetes mellitus type 2, HLD, PVD who comes to the ED due to agitation.  Patient with advanced dementia, nonverbal.  No family present.  HPI obtained from EMR.  Patient was hospitalized with d/c on 3/4/2024 after presenting with frequent falls and increased myoclonus.  Symptoms were felt to be secondary to Rexulti which was discontinued.  Patient was hospitalized " again with d/c on 4/4/2024 where he was evaluated after having falls at home with increased confusion.  He was found to be orthostatic with concerns for UTI and pneumonia.  He was treated with antibiotics discharged home.  Patient has become more agitated, restless and combative over the past 2 days.  He has been seen by neurology outpatient with medication adjustments.  He has had no improvement of agitation.  He was brought to the ED by spouse due to these concerns.      Personal History     Past Medical History:   Diagnosis Date    Coronary artery disease     Dementia     Diabetes mellitus     Hyperlipidemia     Peripheral vascular disease            Past Surgical History:   Procedure Laterality Date    CORONARY ARTERY BYPASS GRAFT      FEMORAL ARTERY - POPLITEAL ARTERY BYPASS GRAFT         Family History: family history includes Diabetes in his maternal grandfather, mother, sister, sister, and sister; Heart disease in his father; Hyperlipidemia in his father; Hypertension in his father.     Social History:  reports that he quit smoking about 27 years ago. His smoking use included cigarettes. He has never been exposed to tobacco smoke. He has quit using smokeless tobacco. He reports that he does not drink alcohol and does not use drugs.  Social History     Social History Narrative    Not on file       Medications:  Available home medication information reviewed.  Accu-Chek Geri, Accu-Chek Geri Plus, Alcohol Prep, Divalproex Sodium, Glucagon, Insulin Glargine, Insulin Pen Needle, QUEtiapine, QUEtiapine fumarate ER, accu-chek soft touch, amLODIPine, donepezil, haloperidol, lisinopril, mirtazapine, and temazepam    Allergies   Allergen Reactions    Bactrim [Sulfamethoxazole-Trimethoprim] Rash    Adhesive Tape Rash    Neosporin [Neomycin-Bacitracin Zn-Polymyx] Rash       Objective  Objective     Vital Signs:   Temp:  [97.2 °F (36.2 °C)] 97.2 °F (36.2 °C)  Heart Rate:  [91-93] 91  Resp:  [20] 20  BP:  (148-158)/(75-84) 158/84       Physical Exam   Constitutional: Awake, alert, fidgety  Eyes: PERRLA, sclerae anicteric, no conjunctival injection  HENT: NCAT, mucous membranes moist  Neck: Supple, no thyromegaly, no lymphadenopathy, trachea midline  Respiratory: Clear to auscultation bilaterally, nonlabored respirations   Cardiovascular: RRR, no murmurs, rubs, or gallops, palpable pedal pulses bilaterally  Gastrointestinal: Positive bowel sounds, soft, nontender, nondistended  Musculoskeletal: No bilateral ankle edema, no clubbing or cyanosis to extremities  Psychiatric: Fidgety  Neurologic: Unable to assess, not following commands  Skin: No rashes      Result Review:  I have personally reviewed the results from the time of this admission to 4/19/2024 03:04 EDT and agree with these findings:  [x]  Laboratory list / accordion  []  Microbiology  [x]  Radiology  [x]  EKG/Telemetry   []  Cardiology/Vascular   []  Pathology  [x]  Old records      LAB RESULTS:      Lab 04/18/24  2356   WBC 9.87   HEMOGLOBIN 13.8   HEMATOCRIT 42.2   PLATELETS 292   NEUTROS ABS 4.68   IMMATURE GRANS (ABS) 0.05   LYMPHS ABS 3.34*   MONOS ABS 1.14*   EOS ABS 0.56*   MCV 95.3         Lab 04/18/24  2356   SODIUM 142   POTASSIUM 4.9   CHLORIDE 104   CO2 28.0   ANION GAP 10.0   BUN 22   CREATININE 1.12   EGFR 72.0   GLUCOSE 226*   CALCIUM 9.4   MAGNESIUM 1.8         Lab 04/18/24  2356   TOTAL PROTEIN 6.9   ALBUMIN 3.9   GLOBULIN 3.0   ALT (SGPT) 13   AST (SGOT) 17   BILIRUBIN 0.3   ALK PHOS 120*         Lab 04/18/24  2356   HSTROP T 22*                 UA          2/24/2024    23:54 3/27/2024    16:33 4/19/2024    02:43   Urinalysis   Squamous Epithelial Cells, UA 0-2  0-2     Specific Gravity, UA 1.032  1.038  <=1.005    Ketones, UA Trace  40 mg/dL (2+)  Negative    Blood, UA Negative  Negative  Negative    Leukocytes, UA Negative  Negative  Negative    Nitrite, UA Negative  Positive  Negative    RBC, UA 0-2  0-2     WBC, UA 0-2  0-2      Bacteria, UA None Seen  4+         Microbiology Results (last 10 days)       ** No results found for the last 240 hours. **            CT Head Without Contrast    Result Date: 4/19/2024  CT HEAD WO CONTRAST Date of Exam: 4/19/2024 2:11 AM EDT Indication: Mental status change, unknown cause. Comparison: 3/28/2024. Technique: Axial CT images were obtained of the head without contrast administration.  Automated exposure control and iterative construction methods were used. Findings: There is no acute intracranial hemorrhage. No mass effect, midline shift or abnormal extra-axial collection. There is stable moderate patchy periventricular white matter hypoattenuation. Mild age-appropriate generalized parenchymal volume loss. No new hypoattenuating lesions in the brain. Cortical gray-white differentiation appears intact. The orbits are unremarkable. Mastoids and paranasal sinuses appear well aerated.     Impression: Impression: 1. No acute intracranial abnormality. 2. Moderate chronic small vessel ischemic change. Electronically Signed: Reg Pelaez MD  4/19/2024 2:29 AM EDT  Workstation ID: [id number]    XR Chest 1 View    Result Date: 4/19/2024  XR CHEST 1 VW Date of Exam: 4/19/2024 12:24 AM EDT Indication: Weak/Dizzy/AMS triage protocol Comparison: 3/27/2024. Findings: There is evidence of prior median sternotomy and CABG. Cardiac silhouette appears unchanged. Pulmonary vasculature is within normal limits. Evaluation of the left lung base is limited due to lordotic view and positioning. No definite lung consolidation. No pneumothorax or pleural effusion.     Impression: Impression: Limited study demonstrates no acute abnormality in the lungs. Electronically Signed: Reg Pelaez MD  4/19/2024 1:29 AM EDT  Workstation ID: [id number]     Results for orders placed during the hospital encounter of 03/27/24    Adult Transthoracic Echo Complete W/ Cont if Necessary Per Protocol    Interpretation Summary    Left ventricular  systolic function is normal. Calculated left ventricular EF = 55.1% Normal left ventricular cavity size noted. Left ventricular wall thickness is consistent with mild concentric hypertrophy. Left ventricular diastolic function is consistent with (grade I) impaired relaxation.    The right ventricular cavity is mildly dilated. Normal right ventricular systolic function noted.    There is calcification of the aortic valve. The aortic valve appears trileaflet. Mild aortic valve regurgitation is present. No aortic valve stenosis is present.    Mitral annular calcification is present. Trace mitral valve regurgitation is present. No significant mitral valve stenosis is present.    The tricuspid valve is structurally normal with no stenosis present. Trace tricuspid valve regurgitation is present. Insufficient TR velocity profile to estimate the right ventricular systolic pressure.    Mild dilation of the sinuses of Valsalva is present. Mild dilation of the ascending aorta is present. Ascending aorta = 4.2 cm      Assessment & Plan  Assessment & Plan       Agitation due to dementia    Benign essential HTN    Benign prostatic hyperplasia    Coronary artery disease involving native coronary artery of native heart    Type 2 diabetes mellitus with hyperglycemia, with long-term current use of insulin    Hyperlipidemia    Peripheral neuropathy    Peripheral vascular disease    Too Tai is a 68 y.o. male with a PMH significant for advanced dementia, CAD, diabetes mellitus type 2, HLD, PVD who comes to the ED due to agitation.        Severe dementia complicated by behavioral disturbance  -Check UA  - CT head wnl  - Consult neurology  - Fall precautions  - Case management consult  - Continue home Depakote, Aricept, Haldol, Seroquel, Restoril, Remeron  - QTc 452    Diabetes mellitus type 2  - No active home meds  - SSI with Accu-Cheks  - Hemoglobin A1c for a.m.    HTN  PVD  CAD  - Continue home meds    Home med list  reviewed    DVT prophylaxis:  Mechanical and medical DVT prophylaxis orders are signed and held.       CODE STATUS: No family present.  CODE STATUS will need to be clarified with family, was DNR during prior stay.  There are no questions and answers to display.     Expected Discharge   Expected Discharge Date: 4/21/2024; Expected Discharge Time:      Nerissa Aviles DO  04/19/24      Electronically signed by Nerissa Aviles DO at 04/19/24 0305       Current Facility-Administered Medications   Medication Dose Route Frequency Provider Last Rate Last Admin    acetaminophen (TYLENOL) tablet 650 mg  650 mg Oral Q4H PRN Nerissa Aviles DO        Or    acetaminophen (TYLENOL) 160 MG/5ML oral solution 650 mg  650 mg Oral Q4H PRN Nerissa Aviles DO        Or    acetaminophen (TYLENOL) suppository 650 mg  650 mg Rectal Q4H PRN Nerissa Aviles, DO        amLODIPine (NORVASC) tablet 10 mg  10 mg Oral Q24H Calin Benitez MD   10 mg at 04/30/24 0933    sennosides-docusate (PERICOLACE) 8.6-50 MG per tablet 2 tablet  2 tablet Oral BID Richardson, Jennifer E, APRN   2 tablet at 04/30/24 0935    And    polyethylene glycol (MIRALAX) packet 17 g  17 g Oral Daily PRN Richardson, Jennifer E, APRN        And    bisacodyl (DULCOLAX) EC tablet 5 mg  5 mg Oral Daily PRN Richardson, Jennifer E, APRN        And    bisacodyl (DULCOLAX) suppository 10 mg  10 mg Rectal Daily PRN Richardson, Jennifer E, APRN        Calcium Replacement - Follow Nurse / BPA Driven Protocol   Does not apply PRN Nerissa Aviles, DO        cyanocobalamin injection 1,000 mcg  1,000 mcg Intramuscular Q28 Days Darrell Jhaveri A, DO   1,000 mcg at 04/19/24 1532    dextrose (D50W) (25 g/50 mL) IV injection 25 g  25 g Intravenous Q15 Min PRN Nerissa Aviles, DO        dextrose (GLUTOSE) oral gel 15 g  15 g Oral Q15 Min PRN Nerissa Aviles, DO        donepezil (ARICEPT) tablet 10 mg  10 mg Oral BID Darrell Jhaveri DO   10 mg at 04/30/24 0933    folic acid (FOLVITE) tablet 1 mg  1 mg Oral Daily Traci,  Nerissa BREEN DO   1 mg at 04/28/24 0837    glucagon (GLUCAGEN) injection 1 mg  1 mg Intramuscular Q15 Min PRN Nerissa Aviles DO        heparin (porcine) 5000 UNIT/ML injection 5,000 Units  5,000 Units Subcutaneous Q8H Nerissa Aviles DO   5,000 Units at 04/29/24 0651    insulin glargine (LANTUS, SEMGLEE) injection 15 Units  15 Units Subcutaneous Q12H Cecily Bain DO   15 Units at 04/30/24 0933    Insulin Lispro (humaLOG) injection 2-7 Units  2-7 Units Subcutaneous 4x Daily AC & at Bedtime Nerissa Aviles DO   5 Units at 04/29/24 1841    Insulin Lispro (humaLOG) injection 7 Units  7 Units Subcutaneous TID With Meals Jaclyn Holguin, IVONNE        lactated ringers infusion  75 mL/hr Intravenous Continuous Parisa Madrigal PA-C 75 mL/hr at 04/30/24 1258 75 mL/hr at 04/30/24 1258    lisinopril (PRINIVIL,ZESTRIL) tablet 30 mg  30 mg Oral Daily Parisa Madrigal PA-C   30 mg at 04/30/24 0933    Magnesium Standard Dose Replacement - Follow Nurse / BPA Driven Protocol   Does not apply PRN Nerissa Aviles DO        melatonin tablet 5 mg  5 mg Oral Nightly PRN Nerissa Aviles DO        miconazole (MICOTIN) 2 % powder 1 Application  1 Application Topical Q12H Cecily Bain DO   1 Application at 04/28/24 0900    mirtazapine (REMERON) tablet 15 mg  15 mg Oral Nightly Jhaveri, Darrell A, DO   15 mg at 04/29/24 1952    PHENobarbital tablet 64.8 mg  64.8 mg Oral Q8H Jhaveri, Darrell A, DO   64.8 mg at 04/30/24 0933    Or    PHENobarbital injection 65 mg  65 mg Intramuscular Q8H Jhaveri, Darrell A, DO   65 mg at 04/29/24 0651    Phosphorus Replacement - Follow Nurse / BPA Driven Protocol   Does not apply PRN Nerissa Aviles DO        Potassium Replacement - Follow Nurse / BPA Driven Protocol   Does not apply PRN Nerissa Aviles DO        QUEtiapine (SEROquel) tablet 100 mg  100 mg Oral BID Meliza Kahn, IVONNE   100 mg at 04/30/24 0933    QUEtiapine (SEROquel) tablet 300 mg  300 mg Oral Nightly Meliza Kahn, APRN         sodium chloride 0.9 % flush 10 mL  10 mL Intravenous Q12H Traci, Nerissa G, DO   10 mL at 24    sodium chloride 0.9 % flush 10 mL  10 mL Intravenous PRN Traci, Nerissa G, DO        sodium chloride 0.9 % infusion 40 mL  40 mL Intravenous PRN Traci, Nerissa G, DO   40 mL at 24 0546    sterile water (preservative free) injection  - ADS Override Pull             temazepam (RESTORIL) capsule 30 mg  30 mg Oral Nightly Darrell Jhaveri, DO   30 mg at 24    ziprasidone (GEODON) injection 10 mg  10 mg Intramuscular Q4H PRN Femi April K, APRDEACON   10 mg at 24          Femi , APRN   Nurse Practitioner  Neurology     Progress Notes     Signed     Date of Service: 24  Creation Time: 24     Signed       Expand All Collapse All[]Expand All by Default     [facility] Neurology    Progress Note     Patient Name: Too Tai  : 1955  MRN: [id number]  Primary Care Physician:  Des Geller MD  Date of admission: 2024     Subjective      Chief Complaint: Dementia     History of Present Illness   Patient seen resting comfortably in bed.  Per bedside RN patient intermittently rested overnight totaling approximately 3 hours of sleep.  He continues in 4-point restraints with extreme agitation.  They were able to get him to chair yesterday however he only tolerated about 10 minutes and then became extremely agitated.     Review of Systems   Unable to assess due to mental status     Objective      Physical Exam  Vitals and nursing note reviewed.   Constitutional:       General: He is not in acute distress.     Appearance: He is not ill-appearing.   Eyes:      Extraocular Movements: Extraocular movements intact.      Pupils: Pupils are equal, round, and reactive to light.      Comments: No nystagmus noted   Neurological:      Mental Status: He is alert. Mental status is at baseline.      Cranial Nerves: No cranial nerve deficit, dysarthria or facial  asymmetry.      Sensory: No sensory deficit.      Motor: No weakness or seizure activity.      Comments:      Cranial Nerves   CN II: Pupils are equal, round, and reactive to light. Normal visual acuity and visual fields.    CN III IV VI: Extraocular movements are full without nystagmus.  CN V: Normal facial sensation and strength of muscles of mastication.  CN VII: Facial movements are symmetric. No weakness.  CN VIII:  Auditory acuity is normal.  CN IX & X:  Symmetric palatal movement.  CN XI: Sternocleidomastoid and trapezius are normal.  No weakness.  CN XII: The tongue is midline.  No atrophy or fasciculations.                         Vitals:   Temp:  [98.7 °F (37.1 °C)] 98.7 °F (37.1 °C)  Heart Rate:  [76] 76  Resp:  [20] 20  BP: (171)/(82) 171/82     Current Medications    Current Medications      Current Facility-Administered Medications:     acetaminophen (TYLENOL) tablet 650 mg, 650 mg, Oral, Q4H PRN **OR** acetaminophen (TYLENOL) 160 MG/5ML oral solution 650 mg, 650 mg, Oral, Q4H PRN **OR** acetaminophen (TYLENOL) suppository 650 mg, 650 mg, Rectal, Q4H PRN, Nerissa Aviles G, DO    amLODIPine (NORVASC) tablet 10 mg, 10 mg, Oral, Q24H, Calin Benitez MD, 10 mg at 04/29/24 0758    sennosides-docusate (PERICOLACE) 8.6-50 MG per tablet 2 tablet, 2 tablet, Oral, BID, 2 tablet at 04/28/24 2134 **AND** polyethylene glycol (MIRALAX) packet 17 g, 17 g, Oral, Daily PRN **AND** bisacodyl (DULCOLAX) EC tablet 5 mg, 5 mg, Oral, Daily PRN **AND** bisacodyl (DULCOLAX) suppository 10 mg, 10 mg, Rectal, Daily PRN, Jennifer Bai, APRN    Calcium Replacement - Follow Nurse / BPA Driven Protocol, , Does not apply, PRN, Nerissa Aviles G, DO    cyanocobalamin injection 1,000 mcg, 1,000 mcg, Intramuscular, Q28 Days, Darrell Jhaveri, , 1,000 mcg at 04/19/24 1532    dextrose (D50W) (25 g/50 mL) IV injection 25 g, 25 g, Intravenous, Q15 Min PRN, Nerissa Aviles DO    dextrose (GLUTOSE) oral gel 15 g, 15 g, Oral, Q15 Min PRN,  Nerissa Aviles, DO    donepezil (ARICEPT) tablet 10 mg, 10 mg, Oral, BID, Darrell Jhaveri DO, 10 mg at 04/29/24 1952    folic acid (FOLVITE) tablet 1 mg, 1 mg, Oral, Daily, Nerissa Aviles DO, 1 mg at 04/28/24 0837    glucagon (GLUCAGEN) injection 1 mg, 1 mg, Intramuscular, Q15 Min PRN, Nerissa Aviles DO    heparin (porcine) 5000 UNIT/ML injection 5,000 Units, 5,000 Units, Subcutaneous, Q8H, Nerissa Aviles DO, 5,000 Units at 04/29/24 0651    insulin glargine (LANTUS, SEMGLEE) injection 15 Units, 15 Units, Subcutaneous, Q12H, Cecily Bain DO, 15 Units at 04/29/24 2009    Insulin Lispro (humaLOG) injection 2-7 Units, 2-7 Units, Subcutaneous, 4x Daily AC & at Bedtime, Nerissa Aviles DO, 5 Units at 04/29/24 1841    Insulin Lispro (humaLOG) injection 5 Units, 5 Units, Subcutaneous, TID With Meals, Parisa Madrigal PA-C, 5 Units at 04/29/24 1841    lactated ringers infusion, 75 mL/hr, Intravenous, Continuous, Parisa Madrigal PA-C, Last Rate: 75 mL/hr at 04/30/24 0053, 75 mL/hr at 04/30/24 0053    lisinopril (PRINIVIL,ZESTRIL) tablet 30 mg, 30 mg, Oral, Daily, Parisa Madrigal PA-C, 30 mg at 04/29/24 0756    Magnesium Standard Dose Replacement - Follow Nurse / BPA Driven Protocol, , Does not apply, PRN, Nerissa Aviles DO    melatonin tablet 5 mg, 5 mg, Oral, Nightly PRN, Nerissa Aviles, DO    miconazole (MICOTIN) 2 % powder 1 Application, 1 Application, Topical, Q12H, Cecily Bain DO, 1 Application at 04/28/24 0900    mirtazapine (REMERON) tablet 15 mg, 15 mg, Oral, Nightly, Darrell Jhaveri DO, 15 mg at 04/29/24 1952    PHENobarbital tablet 64.8 mg, 64.8 mg, Oral, Q8H, 64.8 mg at 04/29/24 1952 **OR** PHENobarbital injection 65 mg, 65 mg, Intramuscular, Q8H, Darrell Jhaveri DO, 65 mg at 04/29/24 0651    Phosphorus Replacement - Follow Nurse / BPA Driven Protocol, , Does not apply, PRN, Nerissa Aviles DO    Potassium Replacement - Follow Nurse / BPA Driven Protocol, , Does not apply,  PRN, Traci, Nerissa G, DO    QUEtiapine (SEROquel) tablet 100 mg, 100 mg, Oral, BID, Femi April K, APRN, 100 mg at 04/29/24 1619    QUEtiapine (SEROquel) tablet 200 mg, 200 mg, Oral, Nightly, Jhaveri, Darrell A, DO, 200 mg at 04/29/24 1952    sodium chloride 0.9 % flush 10 mL, 10 mL, Intravenous, Q12H, Traci, Nerissa G, DO, 10 mL at 04/29/24 2002    sodium chloride 0.9 % flush 10 mL, 10 mL, Intravenous, PRN, Traci, Nerissa G, DO    sodium chloride 0.9 % infusion 40 mL, 40 mL, Intravenous, PRN, Traci, Nerissa G, DO, 40 mL at 04/23/24 0546    temazepam (RESTORIL) capsule 30 mg, 30 mg, Oral, Nightly, Jhaveri, Darrell A, DO, 30 mg at 04/29/24 1952    ziprasidone (GEODON) injection 10 mg, 10 mg, Intramuscular, Q4H PRN, Femi April K, APRN, 10 mg at 04/28/24 1022        Laboratory Results:         Lab Results   Component Value Date     GLUCOSE 166 (H) 04/19/2024     CALCIUM 9.9 04/19/2024      04/19/2024     K 4.4 04/19/2024     CO2 30.0 (H) 04/19/2024      04/19/2024     BUN 16 04/19/2024     CREATININE 0.82 04/19/2024     EGFRIFAFRI 102 02/21/2022     EGFRIFNONA 88 02/21/2022     BCR 19.5 04/19/2024     ANIONGAP 8.0 04/19/2024            Lab Results   Component Value Date     WBC 10.47 04/19/2024     HGB 15.5 04/19/2024     HCT 46.7 04/19/2024     MCV 92.7 04/19/2024      04/19/2024            Lab Results   Component Value Date     CHOL 220 (H) 01/08/2024     CHOL 120 08/01/2019     CHOL 117 04/15/2019            Lab Results   Component Value Date     HDL 41 01/08/2024     HDL 37 (L) 08/03/2023     HDL 41 04/03/2023            Lab Results   Component Value Date      (H) 01/08/2024      (H) 08/03/2023      (H) 04/03/2023            Lab Results   Component Value Date     TRIG 108 01/08/2024     TRIG 208 (H) 08/03/2023     TRIG 138 04/03/2023            Lab Results   Component Value Date     HGBA1C 9.50 (H) 04/18/2024            Lab Results   Component Value Date     INR 1.1 11/25/2019      INR 1.1 09/10/2018     PROTIME 12.5 11/25/2019     PROTIME 13.3 09/10/2018            Lab Results   Component Value Date     FOLATE 10.20 02/24/2024            Lab Results   Component Value Date     CKJRNYVE41 384 04/18/2024         MRI of head results from the last 21 days:             Assessment / Plan   Brief Patient Summary:  Too Tai is a 68 y.o. male with a past medical history of advanced dementia with behavioral disturbances and nonverbal, CAD, T2DM, HLD, PVD who presented to [facility] ED due to increase in agitation.  Patient has had multiple recent hospitalizations.      Plan:   Advanced dementia with behavioral disturbances  Sleep Deprivation   Continue home Aricept 10 mg nightly  Continue phenobarbital 64.8 mg 3 times daily  Phenobarbital level 9.4   Continue home Remeron 15 mg nightly  Increase  Seroquel 100 mg /100 mg / 300 mg.   QTc 461 this AM. Repeat EKG in AM  Continue Restoril 30 mg nightly.  Geodon as needed for extreme agitation; last dose 4/28 at 1022  CT head negative for acute abnormality  Vitamin B12 384, IM cyanocobalamin 1000 mcg  Case management following for difficult posthospitalization disposition.  Patient is not safe to return home due to concern of being in danger for his wife and himself.   Palliative consulted, appreciate recommendations.   Psych consult pending, scheduled for Friday, 5/13/2024.  Appreciate recommendations  General neurology will continue to follow     I have discussed the above with the patient, bedside RN and IVONNE Rodriguez  Time spent with patient: 50 minutes in face-to-face evaluation and management of the patient.     Copied text in this note has been reviewed and is accurate as of 04/30/24.         IVONNE Santos Anna, APRN   Nurse Practitioner  Hospitalist     Progress Notes     Signed     Date of Service: 04/30/24 1021  Creation Time: 04/30/24 1245     Signed       Expand All Collapse All[]Expand All by  [facility] Medicine Services  PROGRESS NOTE     Patient Name: Too Tai  : 1955  MRN: [id number]     Date of Admission: 2024  Primary Care Physician: Des Geller MD     Subjective      CC: f/u dementia with agitation     HPI:  Had a few hours of sleep overnight  [place] yelling in the room after exam     Objective      Vital Signs:   Temp:  [98.7 °F (37.1 °C)] 98.7 °F (37.1 °C)  Heart Rate:  [76] 76  Resp:  [20] 20  BP: (171)/(82) 171/82     Physical Exam:  Constitutional: No acute distress, awake, alert, wife at bedside  HENT: NCAT, mucous membranes moist  Respiratory: Clear to auscultation bilaterally, respiratory effort normal   Cardiovascular: RRR, no murmurs, rubs, or gallops  Gastrointestinal: Positive bowel sounds, soft, nontender, nondistended  Musculoskeletal: No bilateral ankle edema  Psychiatric: Flat affect, cooperative, currently pleasant  Neurologic: Non-verbal  Skin: No rashes        Results Reviewed:  LAB RESULTS:     Brief Urine Lab Results  (Last result in the past 365 days)          Color   Clarity   Blood   Leuk Est   Nitrite   Protein   CREAT   Urine HCG         24 0243 Yellow    Clear    Negative    Negative    Negative    Negative                          Microbiology Results Abnormal         None             Radiology results from the last 24 hours:   No radiology results from the last 24 hrs        Results for orders placed during the hospital encounter of 24     Adult Transthoracic Echo Complete W/ Cont if Necessary Per Protocol     Interpretation Summary    Left ventricular systolic function is normal. Calculated left ventricular EF = 55.1% Normal left ventricular cavity size noted. Left ventricular wall thickness is consistent with mild concentric hypertrophy. Left ventricular diastolic function is consistent with (grade I) impaired relaxation.    The right ventricular cavity is mildly dilated. Normal right  ventricular systolic function noted.    There is calcification of the aortic valve. The aortic valve appears trileaflet. Mild aortic valve regurgitation is present. No aortic valve stenosis is present.    Mitral annular calcification is present. Trace mitral valve regurgitation is present. No significant mitral valve stenosis is present.    The tricuspid valve is structurally normal with no stenosis present. Trace tricuspid valve regurgitation is present. Insufficient TR velocity profile to estimate the right ventricular systolic pressure.    Mild dilation of the sinuses of Valsalva is present. Mild dilation of the ascending aorta is present. Ascending aorta = 4.2 cm     Current medications:  Scheduled Meds:  Scheduled Medication   amLODIPine, 10 mg, Oral, Q24H  cyanocobalamin, 1,000 mcg, Intramuscular, Q28 Days  donepezil, 10 mg, Oral, BID  folic acid, 1 mg, Oral, Daily  heparin (porcine), 5,000 Units, Subcutaneous, Q8H  insulin glargine, 15 Units, Subcutaneous, Q12H  insulin lispro, 2-7 Units, Subcutaneous, 4x Daily AC & at Bedtime  Insulin Lispro, 5 Units, Subcutaneous, TID With Meals  lisinopril, 30 mg, Oral, Daily  miconazole, 1 Application, Topical, Q12H  mirtazapine, 15 mg, Oral, Nightly  PHENobarbital, 64.8 mg, Oral, Q8H   Or  PHENobarbital, 65 mg, Intramuscular, Q8H  QUEtiapine, 100 mg, Oral, BID  QUEtiapine, 300 mg, Oral, Nightly  senna-docusate sodium, 2 tablet, Oral, BID  sodium chloride, 10 mL, Intravenous, Q12H  sterile water (preservative free), , ,   temazepam, 30 mg, Oral, Nightly         Continuous Infusions:  Infusion Medications   lactated ringers, 75 mL/hr, Last Rate: 75 mL/hr (04/30/24 0053)         PRN Meds:.  PRN Medication     acetaminophen **OR** acetaminophen **OR** acetaminophen    senna-docusate sodium **AND** polyethylene glycol **AND** bisacodyl **AND** bisacodyl    Calcium Replacement - Follow Nurse / BPA Driven Protocol    dextrose    dextrose    glucagon (human recombinant)     Magnesium Standard Dose Replacement - Follow Nurse / BPA Driven Protocol    melatonin    Phosphorus Replacement - Follow Nurse / BPA Driven Protocol    Potassium Replacement - Follow Nurse / BPA Driven Protocol    sodium chloride    sodium chloride    sterile water (preservative free)    ziprasidone        Assessment & Plan            Active Hospital Problems     Diagnosis   POA    **Agitation due to dementia [F03.911]   Yes    Agitation [R45.1]   Yes    Type 2 diabetes mellitus with hyperglycemia, with long-term current use of insulin [E11.65, Z79.4]   Not Applicable    Peripheral neuropathy [G62.9]   Yes    Hyperlipidemia [E78.5]   Yes    Benign prostatic hyperplasia [N40.0]   Yes    Benign essential HTN [I10]   Yes    Peripheral vascular disease [I73.9]   Yes    Coronary artery disease involving native coronary artery of native heart [I25.10]   Yes       Resolved Hospital Problems   No resolved problems to display.      Brief Hospital Course to date:  Too Tai is a 68yoM with PMH significant for advanced dementia, CAD, diabetes mellitus type 2, HLD and PVD. Admitted to [facility] ED on 4/18/24 for agitation / behavioral disturbances. Recently admitted to [facility] 2/24-3/4/24 for dementia with behavioral disturbances and again 3/27-4/4/24 for similar issues.      Advanced dementia complicated by behavioral disturbance with superimposed sleep disturbance   - Infectious work up unremarkable. UA reassuring. CXR without acute findings. CT head without acute findings  - Neuro following, nothing else to offer  - Still in restraints   - Continue home Aricept and Mirtazapine 15mg QHS   - previous doses of Depakote and Seroquel were delayed by a couple of hours, resulting in the need for PRN Geodon administration. Neurology has adjusted timing of medications  - Continue Seroquel 100mg BID and 300mg QHS   - Neurology started Phenobarbital; continue to monitor. Level slowly improving  - Continue Temazepam 30mg  QHS  - Appreciate palliative care assistance  - Not safe to return home at this time as he is a danger to himself and his wife and has been unable to do it from previous admissions  - May need geriatric psych facility vs LTC - CM looking into this. Telepsych appointment scheduled for Friday  - IV fluids while taking in minimal PO      Uncontrolled diabetes mellitus type 2  - Appears not on any home meds for DM  - A1c 9.5%  - Continue Lantus 15 units BID, Lispro 7 units TID AC + SSI     Latest Reference Range & Units 04/29/24 16:55 04/29/24 20:00 04/30/24 07:40 04/30/24 11:35   Glucose 70 - 130 mg/dL 335 (H) 298 (H) 126 314 (H)      HTN  - continue Amlodipine 10mg daily  - Increase Lisinopril to 30mg daily      Expected Discharge Location and Transportation: TBD  Expected Discharge   Expected Discharge Date: 5/3/2024; Expected Discharge Time:       DVT prophylaxis:Medical and mechanical DVT prophylaxis orders are present.     AM-PAC 6 Clicks Score (PT): 17 (04/30/24 0800)     CODE STATUS:       Code Status and Medical Interventions:   Ordered at: 04/19/24 1037     Medical Intervention Limits:     NO intubation (DNI)     Level Of Support Discussed With:     Next of Kin (If No Surrogate)     Code Status (Patient has no pulse and is not breathing):     No CPR (Do Not Attempt to Resuscitate)     Medical Interventions (Patient has pulse or is breathing):     Limited Support      Jaclyn Holguin, IVONNE  04/30/24                     Carlee Cesar MD   Physician  Palliative Care     Progress Notes     Signed     Date of Service: 04/29/24 1240  Creation Time: 04/29/24 1240     Signed         Palliative Care Daily Progress Note      Referring:  Parisa Madrigal PAC  C/C: pt unable     S: Medical record reviewed. Events noted.  Spouse and son bedside.  Spouse expressed concern when seen earlier today that pt is either asleep or agitated.  Believes he is sleeping too much to interact or do therapy.       ROS: pt unable    "  O: Code Status:         Code Status and Medical Interventions:   Ordered at: 04/19/24 1037     Medical Intervention Limits:     NO intubation (DNI)     Level Of Support Discussed With:     Next of Kin (If No Surrogate)     Code Status (Patient has no pulse and is not breathing):     No CPR (Do Not Attempt to Resuscitate)     Medical Interventions (Patient has pulse or is breathing):     Limited Support      Advanced Directives: Advance Directive Status: Patient does not have advance directive   Goals of Care: Ongoing.   Palliative Performance Scale Score: 40%      /57 (BP Location: Right arm, Patient Position: Lying)   Pulse 80   Temp 98 °F (36.7 °C) (Axillary)   Resp 18   Ht 188 cm (74\")   Wt 97.5 kg (215 lb)   SpO2 96%   BMI 27.60 kg/m²      Intake/Output Summary (Last 24 hours) at 4/29/2024 1103  Last data filed at 4/29/2024 0800        Gross per 24 hour   Intake 240 ml   Output --   Net 240 ml         Physical Exam:     General Appearance:    Arouseable,  NAD   HEENT:    NC/AT, EOMI, anicteric, MMM, face relaxed   Neck:   supple, trachea midline, no JVD   Lungs:     CTA bilat, diminished in bases; respirations regular, even     and unlabored    Heart:    RRR, normal S1 and S2, no M/R/G   Abdomen:     Normal bowel sounds, soft, nontender, nondistended   G/U:   Deferred   MSK/Extremities:   No clubbing , cyanosis or edema, No wasting   Pulses:   Pulses palpable and equal bilaterally   Skin:   Warm, dry, no mottling   Neurologic:   Arouseable, Ox1??, moves extremities x 4, no tremor, nl     tone   Psych:   Easily agitated and pulls at restraints                  Current Medications:       Current Medications      Current Facility-Administered Medications:     acetaminophen (TYLENOL) tablet 650 mg, 650 mg, Oral, Q4H PRN **OR** acetaminophen (TYLENOL) 160 MG/5ML oral solution 650 mg, 650 mg, Oral, Q4H PRN **OR** acetaminophen (TYLENOL) suppository 650 mg, 650 mg, Rectal, Q4H PRN, Nerissa Aviles, DO    " amLODIPine (NORVASC) tablet 10 mg, 10 mg, Oral, Q24H, Calin Benitez MD, 10 mg at 04/29/24 0758    sennosides-docusate (PERICOLACE) 8.6-50 MG per tablet 2 tablet, 2 tablet, Oral, BID, 2 tablet at 04/28/24 2134 **AND** polyethylene glycol (MIRALAX) packet 17 g, 17 g, Oral, Daily PRN **AND** bisacodyl (DULCOLAX) EC tablet 5 mg, 5 mg, Oral, Daily PRN **AND** bisacodyl (DULCOLAX) suppository 10 mg, 10 mg, Rectal, Daily PRN, Jennifer Bai APRN    Calcium Replacement - Follow Nurse / BPA Driven Protocol, , Does not apply, PRN, Traci, Nerissa G, DO    cyanocobalamin injection 1,000 mcg, 1,000 mcg, Intramuscular, Q28 Days, Darrell Jhaveri A, DO, 1,000 mcg at 04/19/24 1532    dextrose (D50W) (25 g/50 mL) IV injection 25 g, 25 g, Intravenous, Q15 Min PRN, Traci, Nerissa G, DO    dextrose (GLUTOSE) oral gel 15 g, 15 g, Oral, Q15 Min PRN, Traci, Nerissa G, DO    donepezil (ARICEPT) tablet 10 mg, 10 mg, Oral, BID, Darrell Jhaveri A, DO, 10 mg at 04/29/24 0756    folic acid (FOLVITE) tablet 1 mg, 1 mg, Oral, Daily, Traci, Nerissa G, DO, 1 mg at 04/28/24 0837    glucagon (GLUCAGEN) injection 1 mg, 1 mg, Intramuscular, Q15 Min PRN, Traci, Nerissa G, DO    heparin (porcine) 5000 UNIT/ML injection 5,000 Units, 5,000 Units, Subcutaneous, Q8H, TraciTresa bacae G, DO, 5,000 Units at 04/29/24 0651    insulin glargine (LANTUS, SEMGLEE) injection 15 Units, 15 Units, Subcutaneous, Q12H, Cecily Bain, DO, 15 Units at 04/29/24 0758    Insulin Lispro (humaLOG) injection 2-7 Units, 2-7 Units, Subcutaneous, 4x Daily AC & at Bedtime, Nerissa Aviles DO, 2 Units at 04/29/24 0757    Insulin Lispro (humaLOG) injection 5 Units, 5 Units, Subcutaneous, TID With Meals, Parisa Madrigal PA-C, 5 Units at 04/29/24 0759    lactated ringers infusion, 75 mL/hr, Intravenous, Continuous, Parisa Madrigal PA-C, Last Rate: 75 mL/hr at 04/28/24 0919, 75 mL/hr at 04/28/24 0919    lisinopril (PRINIVIL,ZESTRIL) tablet 30 mg, 30 mg, Oral, Daily, Rohan  "Parisa ABDALLA PA-C, 30 mg at 04/29/24 0756    Magnesium Standard Dose Replacement - Follow Nurse / BPA Driven Protocol, , Does not apply, PRN, TraciTresa bacae G, DO    melatonin tablet 5 mg, 5 mg, Oral, Nightly PRN, TraciKamalaNerissa G, DO    miconazole (MICOTIN) 2 % powder 1 Application, 1 Application, Topical, Q12H, Cecily Bain, DO, 1 Application at 04/28/24 0900    mirtazapine (REMERON) tablet 15 mg, 15 mg, Oral, Nightly, Jhaveri, Darrell A, DO, 15 mg at 04/28/24 2134    PHENobarbital tablet 64.8 mg, 64.8 mg, Oral, Q8H, 64.8 mg at 04/28/24 2134 **OR** PHENobarbital injection 65 mg, 65 mg, Intramuscular, Q8H, Jhaveri, Darrell A, DO, 65 mg at 04/29/24 0651    Phosphorus Replacement - Follow Nurse / BPA Driven Protocol, , Does not apply, PRN, TraciKamala bacasie G, DO    Potassium Replacement - Follow Nurse / BPA Driven Protocol, , Does not apply, PRN, Traci, Nerissa G, DO    QUEtiapine (SEROquel) tablet 100 mg, 100 mg, Oral, BID, Meliza Kahn K, APRN, 100 mg at 04/29/24 0759    QUEtiapine (SEROquel) tablet 200 mg, 200 mg, Oral, Nightly, Jhaveri, Darrell A, DO, 200 mg at 04/28/24 2134    sodium chloride 0.9 % flush 10 mL, 10 mL, Intravenous, Q12H, Traci, Nerissa G, DO, 10 mL at 04/28/24 2143    sodium chloride 0.9 % flush 10 mL, 10 mL, Intravenous, PRN, Traci, Nerissa G, DO    sodium chloride 0.9 % infusion 40 mL, 40 mL, Intravenous, PRN, Traci, Nerissa G, DO, 40 mL at 04/23/24 0546    temazepam (RESTORIL) capsule 30 mg, 30 mg, Oral, Nightly, Jhaveri, Darrell A, DO, 30 mg at 04/28/24 2133    ziprasidone (GEODON) injection 10 mg, 10 mg, Intramuscular, Q4H PRN, Kahn, April K, APRN, 10 mg at 04/28/24 1022         Labs:              Invalid input(s): \"LABALBU\", \"PROT\"  Imaging Results (Last 72 Hours)         ** No results found for the last 72 hours. **                       Diagnostics: Reviewed     Impression:  Dementia with agitation  DM 2  Htn     Symptoms:  Agitation  Debility     P:   Encouraged spouse to continue to discuss concerns with " neuro as they have been managing agitation.  Note plans for possible psych eval and would agree could be beneficial.  Will monitor for palliative needs.  Palliative Care Team will continue to follow patient.      Carlee Cesar MD, 4/29/2024, 11:03 EDT                                             Nutrition Notes (most recent note)        Destiny Rosenbaum MS,RD,LD at 04/26/24 1535                              Clinical Nutrition   Nutrition Assessment  Reason for Visit: LOSx8    Patient Name: Too Tai  YOB: 1955  MRN: [id number]  Date of Encounter: 04/26/24 15:35 EDT  Admission date: 4/18/2024    Comment:    Promote increased intake  Liberalize diet to allow for increased options  Provide Boost Plus 1x daily     Nutrition Assessment   Admission Diagnosis:  Agitation due to dementia [F03.911]  Agitation [R45.1]    Problem List:    Agitation due to dementia    Benign essential HTN    Benign prostatic hyperplasia    Coronary artery disease involving native coronary artery of native heart    Type 2 diabetes mellitus with hyperglycemia, with long-term current use of insulin    Hyperlipidemia    Peripheral neuropathy    Peripheral vascular disease    Agitation      PMH:   He  has a past medical history of Coronary artery disease, Dementia, Diabetes mellitus, Hyperlipidemia, and Peripheral vascular disease.    PSH:  He  has a past surgical history that includes Coronary artery bypass graft and Femoral artery - popliteal artery bypass graft.    Applicable Nutrition Concerns:   Skin:  Oral:  GI:    Applicable Interval History:       Reported/Observed/Food/Nutrition Related History:     Pt laying in bed with spouse present at bedside, spouse provided all hx. Endorsed limited intake since admission however has had increased success with finger foods. Inquired why pt couldn't have chicken tenders - educated, verbalized understanding but agreeable to liberalizing diet. Endorsed constipation - last BM ~6  "days ago. [facility]    Anthropometrics     Height: Height: 188 cm (74\")  Last Filed Weight: Weight: 97.5 kg (215 lb) (24 2350)  Method: Weight Method: Estimated  BMI: BMI (Calculated): 27.6    UBW:     Weight      Weight (kg) Weight (lbs) Weight Method   4/3/2023 110.224 kg  243 lb     8/3/2023 110.224 kg  243 lb     11/3/2023 111.585 kg  246 lb  Stated    2024 102.967 kg  227 lb     2024 99.791 kg  220 lb     2024 99.8 kg  220 lb 0.3 oz  Bed scale    3/17/2024 90.719 kg  200 lb     3/27/2024 90.7 kg  199 lb 15.3 oz  Stated    3/29/2024 90.7 kg  199 lb 15.3 oz     2024 97.523 kg  215 lb  Estimated      Weight change:  UTD  limited measure weight hx    Nutrition Focused Physical Exam     Date:         Unable to perform due to Patient agitation, Pt unable to participate at time of visit     Current Nutrition Prescription   PO: Diet: Cardiac, Diabetic; Healthy Heart (2-3 Na+); Consistent Carbohydrate; Fluid Consistency: Thin (IDDSI 0)  Oral Nutrition Supplement: N/A  Intake: Insufficient data    Nutrition Diagnosis   Date:              Updated:    Problem Inadequate oral intake    Etiology Dementia, agitation   Signs/Symptoms Per spouse report   Status: New    Goal:   Nutrition to support treatment and Tolerate PO, Increase intake      Nutrition Intervention      Follow treatment progress, Care plan reviewed, Menu adjusted, Encourage intake, Supplement provided      Monitoring/Evaluation:   Per protocol, I&O, PO intake, Pertinent labs, Symptoms, POC/GOC      Destiny Rosenbaum MS,RD,LD  Time Spent: 25min    Electronically signed by Destiny Rosenbaum MS,RD,LD at 24 1544          Physical Therapy Notes (most recent note)        Nina Calvert, PT at 24 1328  Version 1 of 1         Patient Name: Too Tai  : 1955    MRN: [id number]                              Today's Date: 2024       Admit Date: 2024    Visit Dx:     ICD-10-CM ICD-9-CM   1. Dementia with " behavioral disturbance  F03.918 294.21   2. Aggressive behavior  R46.89 V40.39   3. Agitation due to dementia  F03.911 294.21   4. Hyperglycemia due to diabetes mellitus  E11.65 250.02     Patient Active Problem List   Diagnosis    Cough    Benign essential HTN    Benign prostatic hyperplasia    Coronary artery disease involving native coronary artery of native heart    Chronic coronary artery disease    Type 2 diabetes mellitus with hyperglycemia, with long-term current use of insulin    Gout    Hyperlipidemia    History of heart attack    Peripheral neuropathy    Peripheral vascular disease    Spasm of muscle    Myoclonic jerking    Hypomagnesemia    Arteriosclerosis of coronary artery    Dementia with behavioral disturbance    Frequent falls    History of DVT (deep vein thrombosis)    Chronic anticoagulation    AMS (altered mental status)    Falls    Bacteriuria    Cystitis    Agitation due to dementia    Agitation     Past Medical History:   Diagnosis Date    Coronary artery disease     Dementia     Diabetes mellitus     Hyperlipidemia     Peripheral vascular disease      Past Surgical History:   Procedure Laterality Date    CORONARY ARTERY BYPASS GRAFT      FEMORAL ARTERY - POPLITEAL ARTERY BYPASS GRAFT        General Information       Row Name 04/20/24 1403          Physical Therapy Time and Intention    Document Type discharge evaluation/summary  -LM     Mode of Treatment physical therapy  -LM       Row Name 04/20/24 1401          General Information    Patient Profile Reviewed yes  -LM     Prior Level of Function min assist:;feeding;max assist:;grooming;dressing;bathing  Pt's wife states he ambulates around the home but someone is always with him to make sure he doesn't fall  -LM     Existing Precautions/Restrictions fall;other (see comments)  Severe Dementia;  2 Point Wrist Restraints;  Easily Agitated  -LM     Barriers to Rehab previous functional deficit;cognitive status;uncooperative;combative  -LM        Row Name 04/20/24 1403          Living Environment    People in Home spouse  Pt also has 2 caregivers.  One is there for 8 hours each day.  -LM       Row Name 04/20/24 1403          Home Main Entrance    Number of Stairs, Main Entrance --  Entry Ramp  -LM       Row Name 04/20/24 1403          Stairs Within Home, Primary    Number of Stairs, Within Home, Primary none  -LM       Row Name 04/20/24 1403          Cognition    Orientation Status (Cognition) disoriented to;person;place;situation;time  -LM       Row Name 04/20/24 1403          Safety Issues, Functional Mobility    Safety Issues Affecting Function (Mobility) ability to follow commands;at risk behavior observed;awareness of need for assistance;impulsivity;insight into deficits/self-awareness;judgment;problem-solving;safety precaution awareness;safety precautions follow-through/compliance;sequencing abilities  -LM     Impairments Affecting Function (Mobility) balance;cognition;endurance/activity tolerance  -LM               User Key  (r) = Recorded By, (t) = Taken By, (c) = Cosigned By      Initials Name Provider Type    LM Nina Calvert, TRACE Physical Therapist                   Mobility       Row Name 04/20/24 1409          Bed Mobility    Bed Mobility supine-sit;sit-supine  -LM     Supine-Sit [place] (Bed Mobility) standby assist  -LM     Sit-Supine [place] (Bed Mobility) standby assist  -LM     Comment, (Bed Mobility) Pt sitting at end of bed with legs between 2nd handrail and footboard with B arms pulled behind him d/t restraints.  Pt laid down voluntarily once and then returned to sitting.  2nd time, pt did not want to lay back down so 3 people required to return pt to supine.  -LM       Row Name 04/20/24 1404          Sit-Stand Transfer    Sit-Stand [place] (Transfers) minimum assist (75% patient effort);2 person assist;verbal cues;nonverbal cues (demo/gesture)  -LM     Assistive Device (Sit-Stand Transfers) other (see comments)  B HHA  -LM      Comment, (Sit-Stand Transfer) Stood x 2 from EOB.  -LM       Row Name 04/20/24 1409          Gait/Stairs (Locomotion)    [place] Level (Gait) minimum assist (75% patient effort);2 person assist;verbal cues  -LM     Assistive Device (Gait) other (see comments)  B HHA  -LM     Distance in Feet (Gait) 3  -LM     Comment, (Gait/Stairs) Pt able to take sidesteps towards HOB with PT guiding pt towards the HOB.  Pt would not initiate movement on his own.  -LM               User Key  (r) = Recorded By, (t) = Taken By, (c) = Cosigned By      Initials Name Provider Type    LM Nina Calvert PT Physical Therapist                   Obj/Interventions       Row Name 04/20/24 1413          Range of Motion Comprehensive    General Range of Motion bilateral lower extremity ROM WFL  -LM     Comment, General Range of Motion Noted through observation  -LM       Row Name 04/20/24 1413          Strength Comprehensive (MMT)    General Manual Muscle Testing (MMT) Assessment no strength deficits identified  BLEs  -LM     Comment, General Manual Muscle Testing (MMT) Assessment Noted through functional ability  -LM       Row Name 04/20/24 1413          Balance    Balance Assessment sitting static balance;standing static balance;standing dynamic balance  -LM     Static Sitting Balance standby assist  -LM     Position, Sitting Balance unsupported;sitting edge of bed  -LM     Static Standing Balance minimal assist;2-person assist  -LM     Dynamic Standing Balance minimal assist;2-person assist  -LM     Position/Device Used, Standing Balance supported  -LM       Row Name 04/20/24 1413          Sensory Assessment (Somatosensory)    Sensory Assessment (Somatosensory) unable/difficult to assess  -LM               User Key  (r) = Recorded By, (t) = Taken By, (c) = Cosigned By      Initials Name Provider Type    Nina Mckinnon PT Physical Therapist                   Goals/Plan    No documentation.                  Clinical Impression        Row Name 04/20/24 1414          Pain    Additional Documentation Pain Scale: FACES Pre/Post-Treatment (Group)  -LM       Row Name 04/20/24 1414          Pain Scale: FACES Pre/Post-Treatment    Pain: FACES Scale, Pretreatment 0-->no hurt  -LM     Posttreatment Pain Rating 0-->no hurt  -LM       Row Name 04/20/24 1414          Plan of Care Review    Plan of Care Reviewed With patient;spouse  -LM     Outcome Evaluation PT evaluation completed.  Evaluation limited d/t severe dementia.  Pt stood x 2 reps with MinAx2 and took 3 sidesteps towards HOB with MinAx2 with PT guiding him towards HOB.  Unfortunately d/t pt's cognition, pt does not initiate movement and will not allow anything if he doesn't want to do it.  Pt becomes very easily agitated and combative if encouraged.  At this time, pt is not appropriate for skilled PT services.  If pt becomes more cooperative, we will be happy to come back to re-assess.  Recommend extended care facility.  -LM       Row Name 04/20/24 1414          Therapy Assessment/Plan (PT)    Criteria for Skilled Interventions Met (PT) no;does not meet criteria for skilled intervention  -LM     Therapy Frequency (PT) evaluation only  -LM       Row Name 04/20/24 1414          Positioning and Restraints    Pre-Treatment Position in bed  -LM     Post Treatment Position bed  -LM     In Bed supine;call light within reach;encouraged to call for assist;exit alarm on;with family/caregiver;notified nsg  -LM     Restraints released:;reapplied:;soft limb;notified nsg:  -LM               User Key  (r) = Recorded By, (t) = Taken By, (c) = Cosigned By      Initials Name Provider Type    LM Nina Calvert, PT Physical Therapist                   Outcome Measures       Row Name 04/20/24 1414          How much help from another person do you currently need...    Turning from your back to your side while in flat bed without using bedrails? 2  -LM     Moving from lying on back to sitting on the side of a flat bed  without bedrails? 3  -LM     Moving to and from a bed to a chair (including a wheelchair)? 2  -LM     Standing up from a chair using your arms (e.g., wheelchair, bedside chair)? 3  -LM     Climbing 3-5 steps with a railing? 1  -LM     To walk in hospital room? 2  -LM     AM-PAC 6 Clicks Score (PT) 13  -LM     Highest Level of Mobility Goal 4 --> Transfer to chair/commode  -LM       Row Name 04/20/24 1417          Functional Assessment    Outcome Measure Options AM-PAC 6 Clicks Basic Mobility (PT)  -               User Key  (r) = Recorded By, (t) = Taken By, (c) = Cosigned By      Initials Name Provider Type    LM Nina Calvert PT Physical Therapist                  Physical Therapy Education       Title: PT OT SLP Therapies (In Progress)       Topic: Physical Therapy (In Progress)       Point: Mobility training (In Progress)       Learning Progress Summary             Patient Nonacceptance, E, NL by LM at 4/20/2024 1418                         Point: Home exercise program (Not Started)       Learner Progress:  Not documented in this visit.              Point: Body mechanics (Not Started)       Learner Progress:  Not documented in this visit.              Point: Precautions (In Progress)       Learning Progress Summary             Patient Nonacceptance, E, NL by LM at 4/20/2024 1418                                         User Key       Initials Effective Dates Name Provider Type Discipline     07/11/23 -  Nina Calvert PT Physical Therapist PT                  PT Recommendation and Plan     Plan of Care Reviewed With: patient, spouse  Outcome Evaluation: PT evaluation completed.  Evaluation limited d/t severe dementia.  Pt stood x 2 reps with MinAx2 and took 3 sidesteps towards HOB with MinAx2 with PT guiding him towards HOB.  Unfortunately d/t pt's cognition, pt does not initiate movement and will not allow anything if he doesn't want to do it.  Pt becomes very easily agitated and combative if encouraged.  At  this time, pt is not appropriate for skilled PT services.  If pt becomes more cooperative, we will be happy to come back to re-assess.  Recommend extended care facility.     Time Calculation:   PT Evaluation Complexity  History, PT Evaluation Complexity: 3 or more personal factors and/or comorbidities  Examination of Body Systems (PT Eval Complexity): total of 4 or more elements  Clinical Presentation (PT Evaluation Complexity): evolving  Clinical Decision Making (PT Evaluation Complexity): moderate complexity  Overall Complexity (PT Evaluation Complexity): moderate complexity     PT Charges       Row Name 24 1418             Time Calculation    Start Time 1328  -LM      PT Received On 24  -LM         Untimed Charges    PT Eval/Re-eval Minutes 46  -LM         Total Minutes    Untimed Charges Total Minutes 46  -LM       Total Minutes 46  -LM                User Key  (r) = Recorded By, (t) = Taken By, (c) = Cosigned By      Initials Name Provider Type    LM Nina Calvert, PT Physical Therapist                  Therapy Charges for Today       Code Description Service Date Service Provider Modifiers Qty    [id number] HC PT EVAL MOD COMPLEXITY 4 2024 Nina Calvert, PT GP 1            PT G-Codes  Outcome Measure Options: AM-PAC 6 Clicks Basic Mobility (PT)  AM-PAC 6 Clicks Score (PT): 13    PT Discharge Summary  Anticipated Discharge Disposition (PT): extended care facility    Nina Calvert PT  2024         Electronically signed by Nina Calvert, PT at 24 1419          Occupational Therapy Notes (most recent note)        Milena Keane, OT at 24 1331          Acute Care - Occupational Therapy Discharge  [facility]    Patient Name: Too Tai  : 1955    MRN: [id number]                              Today's Date: 2024       Admit Date: 2024    Visit Dx:     ICD-10-CM ICD-9-CM   1. Dementia with behavioral disturbance  F03.918 294.21   2. Aggressive behavior  R46.89  V40.39   3. Agitation due to dementia  F03.911 294.21   4. Hyperglycemia due to diabetes mellitus  E11.65 250.02     Patient Active Problem List   Diagnosis    Cough    Benign essential HTN    Benign prostatic hyperplasia    Coronary artery disease involving native coronary artery of native heart    Chronic coronary artery disease    Type 2 diabetes mellitus with hyperglycemia, with long-term current use of insulin    Gout    Hyperlipidemia    History of heart attack    Peripheral neuropathy    Peripheral vascular disease    Spasm of muscle    Myoclonic jerking    Hypomagnesemia    Arteriosclerosis of coronary artery    Dementia with behavioral disturbance    Frequent falls    History of DVT (deep vein thrombosis)    Chronic anticoagulation    AMS (altered mental status)    Falls    Bacteriuria    Cystitis    Agitation due to dementia    Agitation     Past Medical History:   Diagnosis Date    Coronary artery disease     Dementia     Diabetes mellitus     Hyperlipidemia     Peripheral vascular disease      Past Surgical History:   Procedure Laterality Date    CORONARY ARTERY BYPASS GRAFT      FEMORAL ARTERY - POPLITEAL ARTERY BYPASS GRAFT        General Information       Row Name 04/20/24 1331          OT Time and Intention    Document Type discharge evaluation/summary  -AC     Mode of Treatment occupational therapy  -AC       Row Name 04/20/24 1336          General Information    Patient Profile Reviewed yes  -AC     Prior Level of Function min assist:;feeding;max assist:;grooming;dressing;bathing  wife reports someone is always with pt when walking in home  -AC     Existing Precautions/Restrictions fall  B wrist restraints, advanced dementia, agitated  -AC     Barriers to Rehab medically complex;previous functional deficit;cognitive status  -AC       Row Name 04/20/24 5053          Living Environment    People in Home spouse;other (see comments)  CG 8 hrs a day  -AC       Row Name 04/20/24 1331          Home Main  Entrance    Number of Stairs, Main Entrance other (see comments)  entry ramp  -       Row Name 04/20/24 1331          Stairs Within Home, Primary    Number of Stairs, Within Home, Primary none  -       Row Name 04/20/24 1331          Cognition    Orientation Status (Cognition) disoriented to;person;place;situation;time  -       Row Name 04/20/24 1331          Safety Issues, Functional Mobility    Safety Issues Affecting Function (Mobility) ability to follow commands;at risk behavior observed;awareness of need for assistance;impulsivity;insight into deficits/self-awareness;judgment;positioning of assistive device;problem-solving;safety precaution awareness;safety precautions follow-through/compliance;sequencing abilities  -     Impairments Affecting Function (Mobility) balance;cognition;endurance/activity tolerance  -     Cognitive Impairments, Mobility Safety/Performance attention;awareness, need for assistance;impulsivity;insight into deficits/self-awareness;judgment;problem-solving/reasoning;safety precaution awareness;safety precaution follow-through;sequencing abilities  -               User Key  (r) = Recorded By, (t) = Taken By, (c) = Cosigned By      Initials Name Provider Type     Milena Keane OT Occupational Therapist                   Mobility/ADL's       Row Name 04/20/24 1331          Bed Mobility    Bed Mobility supine-sit;sit-supine  -     Supine-Sit [place] (Bed Mobility) standby assist  -     Sit-Supine [place] (Bed Mobility) standby assist  pt laid down on his own, but returned to sitting. Pt agitated with encouragement to return to supine and required 3 person assist to return to supine  -     Bed Mobility, Safety Issues cognitive deficits limit understanding  -       Row Name 04/20/24 1331          Functional Mobility    Functional Mobility- Ind. Level minimum assist (75% patient effort);2 person assist required  -     Functional Mobility- Device other (see  comments)  UE support  -     Functional Mobility-Distance (Feet) 3  -AC     Functional Mobility- Safety Issues step length decreased  -     Functional Mobility- Comment 3 side steps toward HOB once given physical/verbal cues to initiate  -       Row Name 04/20/24 1331          Activities of Daily Living    BADL Assessment/Intervention lower body dressing;grooming  -       Row Name 04/20/24 1331          Lower Body Dressing Assessment/Training    [place] Level (Lower Body Dressing) don;dependent (less than 25% patient effort)  -     Position (Lower Body Dressing) edge of bed sitting  -     Comment, (Lower Body Dressing) pt would hold sock and take toward his foot but unable to intiate donning sock  -AC       Row Name 04/20/24 1331          Grooming Assessment/Training    [place] Level (Grooming) wash face, hands;dependent (less than 25% patient effort)  -     Position (Grooming) edge of bed sitting  -     Comment, (Grooming) pt held wash cloth in his hand and given [place] A, but pt did not initiate  -               User Key  (r) = Recorded By, (t) = Taken By, (c) = Cosigned By      Initials Name Provider Type    AC Milena Keane, OT Occupational Therapist                   Obj/Interventions       Row Name 04/20/24 1419          Sensory Assessment (Somatosensory)    Sensory Assessment (Somatosensory) unable/difficult to assess  -       Row Name 04/20/24 1419          Vision Assessment/Intervention    Visual Impairment/Limitations unable/difficult to assess  -       Row Name 04/20/24 1419          Range of Motion Comprehensive    General Range of Motion bilateral upper extremity ROM WNL  -AC     Comment, General Range of Motion pt actively moving arms throughout eval  -       Row Name 04/20/24 1419          Strength Comprehensive (MMT)    Comment, General Manual Muscle Testing (MMT) Assessment pt unable to follow direction to formally assess d/t cognition  -               User Key  (r)  = Recorded By, (t) = Taken By, (c) = Cosigned By      Initials Name Provider Type    Milena Shankar, OT Occupational Therapist                   Goals/Plan    No documentation.                  Clinical Impression       Row Name 04/20/24 1331          Pain Assessment    Additional Documentation Pain Scale: FACES Pre/Post-Treatment (Group)  -AC       Row Name 04/20/24 1331          Pain Scale: FACES Pre/Post-Treatment    Pain: FACES Scale, Pretreatment 0-->no hurt  -     Posttreatment Pain Rating 0-->no hurt  -[facility] 04/20/24 1331          Plan of Care Review    Plan of Care Reviewed With patient;spouse  -     Outcome Evaluation Pt dep to wash face, dep to don socks, CGA STS, min A x 2 to take 3 side steps towad HOB given UE support. Pt is limited by cognitive stratus.   Pt is not appropriate for therapy at this time as he is uncooperative, agitated, and combative when given encouragement to participate. Recommend ECF upon d/c.  -[facility] 04/20/24 1331          Therapy Assessment/Plan (OT)    Criteria for Skilled Therapeutic Interventions Met (OT) no;does not meet criteria for skilled intervention  -     Therapy Frequency (OT) evaluation only  -AC       Row Name 04/20/24 1331          Therapy Plan Review/Discharge Plan (OT)    Anticipated Discharge Disposition (OT) [facility] care facility  -[facility] 04/20/24 1331          Vital Signs    Pre Patient Position Sitting  sitting up in bed with arms in restraints  -     Post Patient Position Supine  -AC       Row Name 04/20/24 1331          Positioning and Restraints    Pre-Treatment Position in bed  -     Post Treatment Position bed  -     In Bed notified nsg;supine;call light within reach;encouraged to call for assist;exit alarm on  -     Restraints released:;reapplied:;soft limb;notified nsg:  -               User Key  (r) = Recorded By, (t) = Taken By, (c) = Cosigned By      Initials Name Provider Type    Milena Shankar  NUHA, OT Occupational Therapist                   Outcome Measures       Row Name 04/20/24 1429          How much help from another is currently needed...    Putting on and taking off regular lower body clothing? 1  -AC     Bathing (including washing, rinsing, and drying) 1  -AC     Toileting (which includes using toilet bed pan or urinal) 1  -AC     Putting on and taking off regular upper body clothing 1  -AC     Taking care of personal grooming (such as brushing teeth) 1  -AC     Eating meals 2  -AC     AM-PAC 6 Clicks Score (OT) 7  -AC       Row Name 04/20/24 1417          How much help from another person do you currently need...    Turning from your back to your side while in flat bed without using bedrails? 2  -LM     Moving from lying on back to sitting on the side of a flat bed without bedrails? 3  -LM     Moving to and from a bed to a chair (including a wheelchair)? 2  -LM     Standing up from a chair using your arms (e.g., wheelchair, bedside chair)? 3  -LM     Climbing 3-5 steps with a railing? 1  -LM     To walk in hospital room? 2  -LM     AM-PAC 6 Clicks Score (PT) 13  -LM     Highest Level of Mobility Goal 4 --> Transfer to chair/commode  -LM       Row Name 04/20/24 1429 04/20/24 1417       Functional Assessment    Outcome Measure Options AM-PAC 6 Clicks Daily Activity (OT)  -AC AM-PAC 6 Clicks Basic Mobility (PT)  -LM              User Key  (r) = Recorded By, (t) = Taken By, (c) = Cosigned By      Initials Name Provider Type    Milena Shankar OT Occupational Therapist    LM Nina Calvert, TRACE Physical Therapist                  Occupational Therapy Education       Title: PT OT SLP Therapies (In Progress)       Topic: Occupational Therapy (In Progress)       Point: ADL training (Done)       Description:   Instruct learner(s) on proper safety adaptation and remediation techniques during self care or transfers.   Instruct in proper use of assistive devices.                  Learning Progress Summary              Family Acceptance, E, VU by  at 4/20/2024 4476                         Point: Home exercise program (Not Started)       Description:   Instruct learner(s) on appropriate technique for monitoring, assisting and/or progressing therapeutic exercises/activities.                  Learner Progress:  Not documented in this visit.              Point: Precautions (Not Started)       Description:   Instruct learner(s) on prescribed precautions during self-care and functional transfers.                  Learner Progress:  Not documented in this visit.              Point: Body mechanics (Not Started)       Description:   Instruct learner(s) on proper positioning and spine alignment during self-care, functional mobility activities and/or exercises.                  Learner Progress:  Not documented in this visit.                              User Key       Initials Effective Dates Name Provider Type Discipline     02/03/23 -  Milena Keane, OT Occupational Therapist OT                  OT Recommendation and Plan  Therapy Frequency (OT): evaluation only  Plan of Care Review  Plan of Care Reviewed With: patient, spouse  Outcome Evaluation: Pt dep to wash face, dep to don socks, CGA STS, min A x 2 to take 3 side steps towad HOB given UE support. Pt is limited by cognitive stratus.   Pt is not appropriate for therapy at this time as he is uncooperative, agitated, and combative when given encouragement to participate. Recommend ECF upon d/c.  Plan of Care Reviewed With: patient, spouse  Outcome Evaluation: Pt dep to wash face, dep to don socks, CGA STS, min A x 2 to take 3 side steps towad HOB given UE support. Pt is limited by cognitive stratus.   Pt is not appropriate for therapy at this time as he is uncooperative, agitated, and combative when given encouragement to participate. Recommend ECF upon d/c.     Time Calculation:   Evaluation Complexity (OT)  Review Occupational Profile/Medical/Therapy History  Complexity: expanded/moderate complexity  Assessment, Occupational Performance/Identification of Deficit Complexity: 3-5 performance deficits  Clinical Decision Making Complexity (OT): detailed assessment/moderate complexity  Overall Complexity of Evaluation (OT): moderate complexity     Time Calculation- OT       Row Name 04/20/24 1331             Time Calculation- OT    OT Start Time 1331  -AC      OT Received On 04/20/24  -AC         Untimed Charges    OT Eval/Re-eval Minutes 50  -AC         Total Minutes    Untimed Charges Total Minutes 50  -AC       Total Minutes 50  -AC                User Key  (r) = Recorded By, (t) = Taken By, (c) = Cosigned By      Initials Name Provider Type    AC Milena Keane, OT Occupational Therapist                  Therapy Charges for Today       Code Description Service Date Service Provider Modifiers Qty    [id number]  OT EVAL MOD COMPLEXITY 4 4/20/2024 Milena Keane OT GO 1               OT Discharge Summary  Anticipated Discharge Disposition (OT): extended care facility    Milena Keane OT  4/20/2024    Electronically signed by Milena Keane OT at 04/20/24 6967       Speech Language Pathology Notes (most recent note)    No notes exist for this encounter.

## 2024-04-30 NOTE — PROGRESS NOTES
[facility] Medicine Services  PROGRESS NOTE    Patient Name: Too Tai  : 1955  MRN: [id number]    Date of Admission: 2024  Primary Care Physician: Des Geller MD    Subjective     CC: f/u dementia with agitation    HPI:  Had a few hours of sleep overnight  [place] yelling in the room after exam    Objective     Vital Signs:   Temp:  [98.7 °F (37.1 °C)] 98.7 °F (37.1 °C)  Heart Rate:  [76] 76  Resp:  [20] 20  BP: (171)/(82) 171/82     Physical Exam:  Constitutional: No acute distress, awake, alert, wife at bedside  HENT: NCAT, mucous membranes moist  Respiratory: Clear to auscultation bilaterally, respiratory effort normal   Cardiovascular: RRR, no murmurs, rubs, or gallops  Gastrointestinal: Positive bowel sounds, soft, nontender, nondistended  Musculoskeletal: No bilateral ankle edema  Psychiatric: Flat affect, cooperative, currently pleasant  Neurologic: Non-verbal  Skin: No rashes      Results Reviewed:  LAB RESULTS:    Brief Urine Lab Results  (Last result in the past 365 days)        Color   Clarity   Blood   Leuk Est   Nitrite   Protein   CREAT   Urine HCG        24 0243 Yellow   Clear   Negative   Negative   Negative   Negative                 Microbiology Results Abnormal       None          No radiology results from the last 24 hrs    Results for orders placed during the hospital encounter of 24    Adult Transthoracic Echo Complete W/ Cont if Necessary Per Protocol    Interpretation Summary    Left ventricular systolic function is normal. Calculated left ventricular EF = 55.1% Normal left ventricular cavity size noted. Left ventricular wall thickness is consistent with mild concentric hypertrophy. Left ventricular diastolic function is consistent with (grade I) impaired relaxation.    The right ventricular cavity is mildly dilated. Normal right ventricular systolic function noted.    There is calcification of the aortic valve. The aortic valve  appears trileaflet. Mild aortic valve regurgitation is present. No aortic valve stenosis is present.    Mitral annular calcification is present. Trace mitral valve regurgitation is present. No significant mitral valve stenosis is present.    The tricuspid valve is structurally normal with no stenosis present. Trace tricuspid valve regurgitation is present. Insufficient TR velocity profile to estimate the right ventricular systolic pressure.    Mild dilation of the sinuses of Valsalva is present. Mild dilation of the ascending aorta is present. Ascending aorta = 4.2 cm    Current medications:  Scheduled Meds:amLODIPine, 10 mg, Oral, Q24H  cyanocobalamin, 1,000 mcg, Intramuscular, Q28 Days  donepezil, 10 mg, Oral, BID  folic acid, 1 mg, Oral, Daily  heparin (porcine), 5,000 Units, Subcutaneous, Q8H  insulin glargine, 15 Units, Subcutaneous, Q12H  insulin lispro, 2-7 Units, Subcutaneous, 4x Daily AC & at Bedtime  Insulin Lispro, 5 Units, Subcutaneous, TID With Meals  lisinopril, 30 mg, Oral, Daily  miconazole, 1 Application, Topical, Q12H  mirtazapine, 15 mg, Oral, Nightly  PHENobarbital, 64.8 mg, Oral, Q8H   Or  PHENobarbital, 65 mg, Intramuscular, Q8H  QUEtiapine, 100 mg, Oral, BID  QUEtiapine, 300 mg, Oral, Nightly  senna-docusate sodium, 2 tablet, Oral, BID  sodium chloride, 10 mL, Intravenous, Q12H  sterile water (preservative free), , ,   temazepam, 30 mg, Oral, Nightly      Continuous Infusions:lactated ringers, 75 mL/hr, Last Rate: 75 mL/hr (04/30/24 0053)      PRN Meds:.  acetaminophen **OR** acetaminophen **OR** acetaminophen    senna-docusate sodium **AND** polyethylene glycol **AND** bisacodyl **AND** bisacodyl    Calcium Replacement - Follow Nurse / BPA Driven Protocol    dextrose    dextrose    glucagon (human recombinant)    Magnesium Standard Dose Replacement - Follow Nurse / BPA Driven Protocol    melatonin    Phosphorus Replacement - Follow Nurse / BPA Driven Protocol    Potassium Replacement - Follow  Nurse / BPA Driven Protocol    sodium chloride    sodium chloride    sterile water (preservative free)    ziprasidone    Assessment & Plan     Active Hospital Problems    Diagnosis  POA    **Agitation due to dementia [F03.911]  Yes    Agitation [R45.1]  Yes    Type 2 diabetes mellitus with hyperglycemia, with long-term current use of insulin [E11.65, Z79.4]  Not Applicable    Peripheral neuropathy [G62.9]  Yes    Hyperlipidemia [E78.5]  Yes    Benign prostatic hyperplasia [N40.0]  Yes    Benign essential HTN [I10]  Yes    Peripheral vascular disease [I73.9]  Yes    Coronary artery disease involving native coronary artery of native heart [I25.10]  Yes      Resolved Hospital Problems   No resolved problems to display.     Brief Hospital Course to date:  Too Tai is a 68yoM with PMH significant for advanced dementia, CAD, diabetes mellitus type 2, HLD and PVD. Admitted to [facility] ED on 4/18/24 for agitation / behavioral disturbances. Recently admitted to [facility] 2/24-3/4/24 for dementia with behavioral disturbances and again 3/27-4/4/24 for similar issues.      Advanced dementia complicated by behavioral disturbance with superimposed sleep disturbance   - Infectious work up unremarkable. UA reassuring. CXR without acute findings. CT head without acute findings  - Neuro following, nothing else to offer  - Still in restraints   - Continue home Aricept and Mirtazapine 15mg QHS   - previous doses of Depakote and Seroquel were delayed by a couple of hours, resulting in the need for PRN Geodon administration. Neurology has adjusted timing of medications  - Continue Seroquel 100mg BID and 300mg QHS   - Neurology started Phenobarbital; continue to monitor. Level slowly improving  - Continue Temazepam 30mg QHS  - Appreciate palliative care assistance  - Not safe to return home at this time as he is a danger to himself and his wife and has been unable to do it from previous admissions  - May need geriatric  psych facility vs LTC - CM looking into this. Telepsych appointment scheduled for Friday  - IV fluids while taking in minimal PO     Uncontrolled diabetes mellitus type 2  - Appears not on any home meds for DM  - A1c 9.5%  - Continue Lantus 15 units BID, Lispro 7 units TID AC + SSI    Latest Reference Range & Units 04/29/24 16:55 04/29/24 20:00 04/30/24 07:40 04/30/24 11:35   Glucose 70 - 130 mg/dL 335 (H) 298 (H) 126 314 (H)     HTN  - continue Amlodipine 10mg daily  - Increase Lisinopril to 30mg daily     Expected Discharge Location and Transportation: TBD  Expected Discharge   Expected Discharge Date: 5/3/2024; Expected Discharge Time:      DVT prophylaxis:Medical and mechanical DVT prophylaxis orders are present.    AM-PAC 6 Clicks Score (PT): 17 (04/30/24 0800)    CODE STATUS:   Code Status and Medical Interventions:   Ordered at: 04/19/24 1037     Medical Intervention Limits:    NO intubation (DNI)     Level Of Support Discussed With:    Next of Kin (If No Surrogate)     Code Status (Patient has no pulse and is not breathing):    No CPR (Do Not Attempt to Resuscitate)     Medical Interventions (Patient has pulse or is breathing):    Limited Support     IVONNE Rodriguez  04/30/24

## 2024-05-01 LAB
GLUCOSE BLDC GLUCOMTR-MCNC: 116 MG/DL (ref 70–130)
GLUCOSE BLDC GLUCOMTR-MCNC: 130 MG/DL (ref 70–130)
GLUCOSE BLDC GLUCOMTR-MCNC: 231 MG/DL (ref 70–130)
GLUCOSE BLDC GLUCOMTR-MCNC: 271 MG/DL (ref 70–130)

## 2024-05-01 PROCEDURE — 99233 SBSQ HOSP IP/OBS HIGH 50: CPT

## 2024-05-01 PROCEDURE — 82948 REAGENT STRIP/BLOOD GLUCOSE: CPT

## 2024-05-01 PROCEDURE — 25010000002 PHENOBARBITAL PER 120 MG: Performed by: STUDENT IN AN ORGANIZED HEALTH CARE EDUCATION/TRAINING PROGRAM

## 2024-05-01 PROCEDURE — 63710000001 INSULIN GLARGINE PER 5 UNITS: Performed by: INTERNAL MEDICINE

## 2024-05-01 PROCEDURE — 93005 ELECTROCARDIOGRAM TRACING: CPT

## 2024-05-01 PROCEDURE — 25810000003 LACTATED RINGERS PER 1000 ML: Performed by: PHYSICIAN ASSISTANT

## 2024-05-01 PROCEDURE — 99232 SBSQ HOSP IP/OBS MODERATE 35: CPT | Performed by: INTERNAL MEDICINE

## 2024-05-01 PROCEDURE — 63710000001 INSULIN LISPRO (HUMAN) PER 5 UNITS: Performed by: NURSE PRACTITIONER

## 2024-05-01 PROCEDURE — 25010000002 PHENOBARBITAL PER 120 MG: Performed by: PSYCHIATRY & NEUROLOGY

## 2024-05-01 PROCEDURE — 25010000002 HEPARIN (PORCINE) PER 1000 UNITS: Performed by: INTERNAL MEDICINE

## 2024-05-01 PROCEDURE — 63710000001 INSULIN LISPRO (HUMAN) PER 5 UNITS: Performed by: INTERNAL MEDICINE

## 2024-05-01 RX ORDER — PHENOBARBITAL SODIUM 65 MG/ML
130 INJECTION, SOLUTION INTRAMUSCULAR; INTRAVENOUS NIGHTLY
Status: DISCONTINUED | OUTPATIENT
Start: 2024-05-01 | End: 2024-05-06

## 2024-05-01 RX ORDER — WATER 10 ML/10ML
INJECTION INTRAMUSCULAR; INTRAVENOUS; SUBCUTANEOUS
Status: ACTIVE
Start: 2024-05-01 | End: 2024-05-01

## 2024-05-01 RX ORDER — PHENOBARBITAL 64.8 MG/1
64.8 TABLET ORAL 2 TIMES DAILY
Status: DISCONTINUED | OUTPATIENT
Start: 2024-05-01 | End: 2024-05-09 | Stop reason: HOSPADM

## 2024-05-01 RX ORDER — PHENOBARBITAL 64.8 MG/1
130 TABLET ORAL NIGHTLY
Status: DISCONTINUED | OUTPATIENT
Start: 2024-05-01 | End: 2024-05-06

## 2024-05-01 RX ORDER — PHENOBARBITAL SODIUM 65 MG/ML
65 INJECTION, SOLUTION INTRAMUSCULAR; INTRAVENOUS 2 TIMES DAILY
Status: DISCONTINUED | OUTPATIENT
Start: 2024-05-01 | End: 2024-05-09 | Stop reason: HOSPADM

## 2024-05-01 RX ADMIN — INSULIN GLARGINE 15 UNITS: 100 INJECTION, SOLUTION SUBCUTANEOUS at 08:29

## 2024-05-01 RX ADMIN — LISINOPRIL 30 MG: 20 TABLET ORAL at 08:30

## 2024-05-01 RX ADMIN — HEPARIN SODIUM 5000 UNITS: 5000 INJECTION INTRAVENOUS; SUBCUTANEOUS at 05:19

## 2024-05-01 RX ADMIN — PHENOBARBITAL SODIUM 130 MG: 65 INJECTION INTRAMUSCULAR at 22:29

## 2024-05-01 RX ADMIN — SODIUM CHLORIDE, POTASSIUM CHLORIDE, SODIUM LACTATE AND CALCIUM CHLORIDE 75 ML/HR: 600; 310; 30; 20 INJECTION, SOLUTION INTRAVENOUS at 02:21

## 2024-05-01 RX ADMIN — INSULIN LISPRO 3 UNITS: 100 INJECTION, SOLUTION INTRAVENOUS; SUBCUTANEOUS at 20:26

## 2024-05-01 RX ADMIN — INSULIN LISPRO 7 UNITS: 100 INJECTION, SOLUTION INTRAVENOUS; SUBCUTANEOUS at 12:18

## 2024-05-01 RX ADMIN — DONEPEZIL HYDROCHLORIDE 10 MG: 10 TABLET, FILM COATED ORAL at 08:27

## 2024-05-01 RX ADMIN — DONEPEZIL HYDROCHLORIDE 10 MG: 10 TABLET, FILM COATED ORAL at 20:11

## 2024-05-01 RX ADMIN — INSULIN LISPRO 7 UNITS: 100 INJECTION, SOLUTION INTRAVENOUS; SUBCUTANEOUS at 08:29

## 2024-05-01 RX ADMIN — QUETIAPINE FUMARATE 100 MG: 100 TABLET ORAL at 13:10

## 2024-05-01 RX ADMIN — INSULIN GLARGINE 15 UNITS: 100 INJECTION, SOLUTION SUBCUTANEOUS at 20:26

## 2024-05-01 RX ADMIN — MICONAZOLE NITRATE 1 APPLICATION: 20 POWDER TOPICAL at 22:45

## 2024-05-01 RX ADMIN — FOLIC ACID 1 MG: 1 TABLET ORAL at 08:29

## 2024-05-01 RX ADMIN — PHENOBARBITAL SODIUM 65 MG: 65 INJECTION INTRAMUSCULAR at 05:19

## 2024-05-01 RX ADMIN — INSULIN LISPRO 7 UNITS: 100 INJECTION, SOLUTION INTRAVENOUS; SUBCUTANEOUS at 17:31

## 2024-05-01 RX ADMIN — MIRTAZAPINE 15 MG: 15 TABLET, FILM COATED ORAL at 20:11

## 2024-05-01 RX ADMIN — AMLODIPINE BESYLATE 10 MG: 10 TABLET ORAL at 08:28

## 2024-05-01 RX ADMIN — SODIUM CHLORIDE, POTASSIUM CHLORIDE, SODIUM LACTATE AND CALCIUM CHLORIDE 75 ML/HR: 600; 310; 30; 20 INJECTION, SOLUTION INTRAVENOUS at 12:19

## 2024-05-01 RX ADMIN — Medication 5 MG: at 20:11

## 2024-05-01 RX ADMIN — HEPARIN SODIUM 5000 UNITS: 5000 INJECTION INTRAVENOUS; SUBCUTANEOUS at 20:11

## 2024-05-01 RX ADMIN — Medication 10 ML: at 20:29

## 2024-05-01 RX ADMIN — PHENOBARBITAL 64.8 MG: 64.8 TABLET ORAL at 12:59

## 2024-05-01 RX ADMIN — SENNOSIDES AND DOCUSATE SODIUM 2 TABLET: 8.6; 5 TABLET ORAL at 20:11

## 2024-05-01 RX ADMIN — INSULIN LISPRO 4 UNITS: 100 INJECTION, SOLUTION INTRAVENOUS; SUBCUTANEOUS at 12:18

## 2024-05-01 RX ADMIN — TEMAZEPAM 30 MG: 15 CAPSULE ORAL at 20:10

## 2024-05-01 RX ADMIN — QUETIAPINE FUMARATE 300 MG: 100 TABLET ORAL at 20:10

## 2024-05-01 RX ADMIN — QUETIAPINE FUMARATE 100 MG: 100 TABLET ORAL at 08:27

## 2024-05-01 NOTE — PROGRESS NOTES
[facility] Neurology    Progress Note    Patient Name: Too Tai  : 1955  MRN: [id number]  Primary Care Physician:  Des Geller MD  Date of admission: 2024    Subjective     Chief Complaint: Dementia with behavioral disturbances    History of Present Illness   Per bedside RN patient did rest approximately 4 to 5 hours overnight, this is an improvement from previous night.  Patient still requiring 4-point restraints he was able to move all extremities with no focal deficit.    Review of Systems   Unable to assess due to baseline mental status    Objective     Physical Exam  Vitals and nursing note reviewed.   Constitutional:       General: He is not in acute distress.     Appearance: He is not ill-appearing.   Eyes:      Extraocular Movements: Extraocular movements intact.      Pupils: Pupils are equal, round, and reactive to light.      Comments: No nystagmus or deviated gaze noted   Cardiovascular:      Rate and Rhythm: Normal rate.   Pulmonary:      Effort: Pulmonary effort is normal.   Neurological:      Mental Status: He is alert. Mental status is at baseline.      Cranial Nerves: Cranial nerves 2-12 are intact.      Sensory: Sensation is intact.      Motor: Motor function is intact.          Vitals:   Temp:  [96.8 °F (36 °C)] 96.8 °F (36 °C)  Resp:  [18] 18  BP: (176)/(84) 176/84    Current Medications    Current Facility-Administered Medications:     acetaminophen (TYLENOL) tablet 650 mg, 650 mg, Oral, Q4H PRN, 650 mg at 24 **OR** acetaminophen (TYLENOL) 160 MG/5ML oral solution 650 mg, 650 mg, Oral, Q4H PRN **OR** acetaminophen (TYLENOL) suppository 650 mg, 650 mg, Rectal, Q4H PRN, Nerissa Aviles G, DO    amLODIPine (NORVASC) tablet 10 mg, 10 mg, Oral, Q24H, Calin Benitez MD, 10 mg at 24 0828    sennosides-docusate (PERICOLACE) 8.6-50 MG per tablet 2 tablet, 2 tablet, Oral, BID, 2 tablet at 24 **AND** polyethylene glycol (MIRALAX) packet 17 g, 17 g,  Oral, Daily PRN **AND** bisacodyl (DULCOLAX) EC tablet 5 mg, 5 mg, Oral, Daily PRN **AND** bisacodyl (DULCOLAX) suppository 10 mg, 10 mg, Rectal, Daily PRN, Jennifer Bai, APRN    Calcium Replacement - Follow Nurse / BPA Driven Protocol, , Does not apply, PRN, TraciKamalaNerissa G, DO    cyanocobalamin injection 1,000 mcg, 1,000 mcg, Intramuscular, Q28 Days, Darrell Jhaveri, DO, 1,000 mcg at 04/19/24 1532    dextrose (D50W) (25 g/50 mL) IV injection 25 g, 25 g, Intravenous, Q15 Min PRN, TraciKamala bacasie G, DO    dextrose (GLUTOSE) oral gel 15 g, 15 g, Oral, Q15 Min PRN, Traci, Nerissa G, DO    donepezil (ARICEPT) tablet 10 mg, 10 mg, Oral, BID, Darrell Jhaveri, DO, 10 mg at 05/01/24 0827    folic acid (FOLVITE) tablet 1 mg, 1 mg, Oral, Daily, TraciKamala bacasie G, DO, 1 mg at 05/01/24 0829    glucagon (GLUCAGEN) injection 1 mg, 1 mg, Intramuscular, Q15 Min PRN, Traci Nerissa G, DO    heparin (porcine) 5000 UNIT/ML injection 5,000 Units, 5,000 Units, Subcutaneous, Q8H, Nerissa Aviles, DO, 5,000 Units at 05/01/24 0519    insulin glargine (LANTUS, SEMGLEE) injection 15 Units, 15 Units, Subcutaneous, Q12H, Cecily Bain, DO, 15 Units at 05/01/24 0829    Insulin Lispro (humaLOG) injection 2-7 Units, 2-7 Units, Subcutaneous, 4x Daily AC & at Bedtime, Tresa Avilese G, DO, 5 Units at 04/30/24 2042    Insulin Lispro (humaLOG) injection 7 Units, 7 Units, Subcutaneous, TID With Meals, Chelsie, Jaclyn, APRN, 7 Units at 05/01/24 0829    lactated ringers infusion, 75 mL/hr, Intravenous, Continuous, Parisa Madrigal PA-C, Last Rate: 75 mL/hr at 05/01/24 0221, 75 mL/hr at 05/01/24 0221    lisinopril (PRINIVIL,ZESTRIL) tablet 30 mg, 30 mg, Oral, Daily, Parisa Madrigal PA-C, 30 mg at 05/01/24 0830    Magnesium Standard Dose Replacement - Follow Nurse / BPA Driven Protocol, , Does not apply, PRN, Nerissa Aviles, DO    melatonin tablet 5 mg, 5 mg, Oral, Nightly PRN, Nerissa Aviles, DO, 5 mg at 04/30/24 2022    miconazole (MICOTIN) 2 %  powder 1 Application, 1 Application, Topical, Q12H, Cecily Bain, DO, 1 Application at 04/30/24 2214    mirtazapine (REMERON) tablet 15 mg, 15 mg, Oral, Nightly, Jhaveri, Darrell A, DO, 15 mg at 04/30/24 2022    PHENobarbital tablet 64.8 mg, 64.8 mg, Oral, Q8H, 64.8 mg at 04/30/24 2052 **OR** PHENobarbital injection 65 mg, 65 mg, Intramuscular, Q8H, Jhaveri, Darrell A, DO, 65 mg at 05/01/24 0519    Phosphorus Replacement - Follow Nurse / BPA Driven Protocol, , Does not apply, PRN, Traci, Nerissa G, DO    Potassium Replacement - Follow Nurse / BPA Driven Protocol, , Does not apply, PRN, Traci, Nerissa G, DO    QUEtiapine (SEROquel) tablet 100 mg, 100 mg, Oral, BID, Meliza Kahn, APRN, 100 mg at 05/01/24 0827    QUEtiapine (SEROquel) tablet 300 mg, 300 mg, Oral, Nightly, Meliza Kahn, APRN, 300 mg at 04/30/24 2023    sodium chloride 0.9 % flush 10 mL, 10 mL, Intravenous, Q12H, Traci, Nerissa G, DO, 10 mL at 04/30/24 2023    sodium chloride 0.9 % flush 10 mL, 10 mL, Intravenous, PRN, Traci, Nerissa G, DO    sodium chloride 0.9 % infusion 40 mL, 40 mL, Intravenous, PRN, Traci, Nerissa G, DO, 40 mL at 04/23/24 0546    temazepam (RESTORIL) capsule 30 mg, 30 mg, Oral, Nightly, Emilio, Darrell A, DO, 30 mg at 04/30/24 2022    ziprasidone (GEODON) injection 10 mg, 10 mg, Intramuscular, Q4H PRN, Meliza Kahn, APRN, 10 mg at 04/30/24 1157    Laboratory Results:   Lab Results   Component Value Date    GLUCOSE 166 (H) 04/19/2024    CALCIUM 9.9 04/19/2024     04/19/2024    K 4.4 04/19/2024    CO2 30.0 (H) 04/19/2024     04/19/2024    BUN 16 04/19/2024    CREATININE 0.82 04/19/2024    EGFRIFAFRI 102 02/21/2022    EGFRIFNONA 88 02/21/2022    BCR 19.5 04/19/2024    ANIONGAP 8.0 04/19/2024     Lab Results   Component Value Date    WBC 10.47 04/19/2024    HGB 15.5 04/19/2024    HCT 46.7 04/19/2024    MCV 92.7 04/19/2024     04/19/2024     Lab Results   Component Value Date    CHOL 220 (H) 01/08/2024    CHOL 120  08/01/2019    CHOL 117 04/15/2019     Lab Results   Component Value Date    HDL 41 01/08/2024    HDL 37 (L) 08/03/2023    HDL 41 04/03/2023     Lab Results   Component Value Date     (H) 01/08/2024     (H) 08/03/2023     (H) 04/03/2023     Lab Results   Component Value Date    TRIG 108 01/08/2024    TRIG 208 (H) 08/03/2023    TRIG 138 04/03/2023     Lab Results   Component Value Date    HGBA1C 9.50 (H) 04/18/2024     Lab Results   Component Value Date    INR 1.1 11/25/2019    INR 1.1 09/10/2018    PROTIME 12.5 11/25/2019    PROTIME 13.3 09/10/2018     Lab Results   Component Value Date    FOLATE 10.20 02/24/2024     Lab Results   Component Value Date    [id number] 384 04/18/2024             Assessment / Plan   Brief Patient Summary:  Too Tai is a 68 y.o. male with a past medical history of advanced dementia with behavioral disturbances and nonverbal, CAD, T2DM, HLD, PVD who presented to [facility] ED due to increase in agitation.  Patient has had multiple recent hospitalizations.      Plan:   Advanced dementia with behavioral disturbances  Sleep Deprivation   Continue home Aricept 10 mg nightly  Increase phenobarbital 64.8 mg/ 64.8 mg/ 129.6 mg  Phenobarbital level 9.4; recheck trough level in a.m.  Continue home Remeron 15 mg nightly  Continue Seroquel 100 mg /100 mg / 300 mg.   QTc 464 this AM.   Continue Restoril 30 mg nightly.  Geodon as needed for extreme agitation; last dose 4/30 at 1157  CT head negative for acute abnormality  Vitamin B12 384, IM cyanocobalamin 1000 mcg  Case management following for difficult posthospitalization disposition.  Patient is not safe to return home due to concern of being in danger for his wife and himself.   Palliative consulted, appreciate recommendations.   Psych consult pending, scheduled for Friday, 5/13/2024.  Appreciate recommendations  General neurology will continue to follow    I have discussed the above with the patient, patient's wife, bedside RN  Dr. Rayo  Time spent with patient: 50 minutes in face-to-face evaluation and management of the patient.    Copied text in this note has been reviewed and is accurate as of 05/01/24.     IVONNE Santos

## 2024-05-01 NOTE — PROGRESS NOTES
[facility] Medicine Services  PROGRESS NOTE    Patient Name: Too Tai  : 1955  MRN: [id number]    Date of Admission: 2024  Primary Care Physician: Des Geller MD    Subjective     CC: f/u dementia with agitation    HPI:  Sitting up in bed, would like to get up.  Wife at bedside.      Objective     Vital Signs:   Temp:  [96.8 °F (36 °C)] 96.8 °F (36 °C)  Resp:  [18] 18  BP: (176)/(84) 176/84     Physical Exam:  Non toxic, in bed  MM moist  RRR  CTAB  Abd soft, NT  No edema  Slightly flat affect  Awake, speech clear, some confusion  In restraints    Spouse at bedside      Results Reviewed:  LAB RESULTS:    Brief Urine Lab Results  (Last result in the past 365 days)        Color   Clarity   Blood   Leuk Est   Nitrite   Protein   CREAT   Urine HCG        24 0243 Yellow   Clear   Negative   Negative   Negative   Negative                 Microbiology Results Abnormal       None          No radiology results from the last 24 hrs    Results for orders placed during the hospital encounter of 24    Adult Transthoracic Echo Complete W/ Cont if Necessary Per Protocol    Interpretation Summary    Left ventricular systolic function is normal. Calculated left ventricular EF = 55.1% Normal left ventricular cavity size noted. Left ventricular wall thickness is consistent with mild concentric hypertrophy. Left ventricular diastolic function is consistent with (grade I) impaired relaxation.    The right ventricular cavity is mildly dilated. Normal right ventricular systolic function noted.    There is calcification of the aortic valve. The aortic valve appears trileaflet. Mild aortic valve regurgitation is present. No aortic valve stenosis is present.    Mitral annular calcification is present. Trace mitral valve regurgitation is present. No significant mitral valve stenosis is present.    The tricuspid valve is structurally normal with no stenosis present. Trace  tricuspid valve regurgitation is present. Insufficient TR velocity profile to estimate the right ventricular systolic pressure.    Mild dilation of the sinuses of Valsalva is present. Mild dilation of the ascending aorta is present. Ascending aorta = 4.2 cm    Current medications:  Scheduled Meds:amLODIPine, 10 mg, Oral, Q24H  cyanocobalamin, 1,000 mcg, Intramuscular, Q28 Days  donepezil, 10 mg, Oral, BID  folic acid, 1 mg, Oral, Daily  heparin (porcine), 5,000 Units, Subcutaneous, Q8H  insulin glargine, 15 Units, Subcutaneous, Q12H  insulin lispro, 2-7 Units, Subcutaneous, 4x Daily AC & at Bedtime  Insulin Lispro, 7 Units, Subcutaneous, TID With Meals  lisinopril, 30 mg, Oral, Daily  miconazole, 1 Application, Topical, Q12H  mirtazapine, 15 mg, Oral, Nightly  PHENobarbital, 130 mg, Oral, Nightly   Or  PHENobarbital, 130 mg, Intramuscular, Nightly  PHENobarbital, 64.8 mg, Oral, BID   Or  PHENobarbital, 65 mg, Intramuscular, BID  QUEtiapine, 100 mg, Oral, BID  QUEtiapine, 300 mg, Oral, Nightly  senna-docusate sodium, 2 tablet, Oral, BID  sodium chloride, 10 mL, Intravenous, Q12H  sterile water (preservative free), , ,   temazepam, 30 mg, Oral, Nightly      Continuous Infusions:lactated ringers, 75 mL/hr, Last Rate: 75 mL/hr (05/01/24 1219)      PRN Meds:.  acetaminophen **OR** acetaminophen **OR** acetaminophen    senna-docusate sodium **AND** polyethylene glycol **AND** bisacodyl **AND** bisacodyl    Calcium Replacement - Follow Nurse / BPA Driven Protocol    dextrose    dextrose    glucagon (human recombinant)    Magnesium Standard Dose Replacement - Follow Nurse / BPA Driven Protocol    melatonin    Phosphorus Replacement - Follow Nurse / BPA Driven Protocol    Potassium Replacement - Follow Nurse / BPA Driven Protocol    sodium chloride    sodium chloride    sterile water (preservative free)    ziprasidone    Assessment & Plan     Active Hospital Problems    Diagnosis  POA    **Agitation due to dementia [F03.911]   Yes    Agitation [R45.1]  Yes    Type 2 diabetes mellitus with hyperglycemia, with long-term current use of insulin [E11.65, Z79.4]  Not Applicable    Peripheral neuropathy [G62.9]  Yes    Hyperlipidemia [E78.5]  Yes    Benign prostatic hyperplasia [N40.0]  Yes    Benign essential HTN [I10]  Yes    Peripheral vascular disease [I73.9]  Yes    Coronary artery disease involving native coronary artery of native heart [I25.10]  Yes      Resolved Hospital Problems   No resolved problems to display.     Brief Hospital Course to date:  Too Tai is a 68yoM with PMH significant for advanced dementia, CAD, diabetes mellitus type 2, HLD and PVD. Admitted to [facility] ED on 4/18/24 for agitation / behavioral disturbances. Recently admitted to [facility] 2/24-3/4/24 for dementia with behavioral disturbances and again 3/27-4/4/24 for similar issues.      Advanced dementia complicated by behavioral disturbance with superimposed sleep disturbance   - Infectious work up unremarkable. UA reassuring. CXR without acute findings. CT head without acute findings  - Continue home Aricept and Mirtazapine 15mg QHS   - Continue Seroquel   - Neurology started Phenobarbital   - Continue Temazepam 30mg QHS  - Appreciate palliative care assistance  - discussed treatment plan with Neurology   - May need geriatric psych facility vs LTC - CM looking into this. Telepsych appointment scheduled for Friday  - IV fluids while taking in minimal PO     Uncontrolled diabetes mellitus type 2  - Appears not on any home meds for DM  - A1c 9.5%  - Continue Lantus 15 units BID, Lispro 7 units TID AC + SSI   - glucose reviewed    HTN  - continue Amlodipine 10mg daily  - continue Lisinopril 30mg daily   - bmp am    Expected Discharge Location and Transportation: TBD  Expected Discharge   Expected Discharge Date: 5/6/2024; Expected Discharge Time:      DVT prophylaxis:Medical and mechanical DVT prophylaxis orders are present.    AM-PAC 6 Clicks Score  (PT): 13 (05/01/24 0800)    CODE STATUS:   Code Status and Medical Interventions:   Ordered at: 04/19/24 1037     Medical Intervention Limits:    NO intubation (DNI)     Level Of Support Discussed With:    Next of Kin (If No Surrogate)     Code Status (Patient has no pulse and is not breathing):    No CPR (Do Not Attempt to Resuscitate)     Medical Interventions (Patient has pulse or is breathing):    Limited Support     Kemal Rayo MD  05/01/24

## 2024-05-01 NOTE — PLAN OF CARE
Problem: Restraint, Nonviolent  Goal: Absence of Harm or Injury  Outcome: Ongoing, Not Progressing  Intervention: Implement Least Restrictive Safety Strategies  Description: Consider personal and environmental factors contributing to nonviolent or self-destructive behavior.  Utilize diversional activity/alternative device protection.  Document alternative strategies and less restrictive intervention attempts and their effects.  Clearly describe/record behavior leading to need for restraint.  Use the least restrictive method of restraint.  Recognize restraint should only be used if needed to improve the patient's wellbeing and less restrictive interventions have been determined to be ineffective.  Reassess continued need frequently. Remove restraint as soon as unsafe situation is resolved.  Recent Flowsheet Documentation  Taken 5/1/2024 1800 by Ilene Foreman RN  Medical Device Protection:   IV pole/bag removed from visual field   tubing secured  Less Restrictive Alternative:   bed alarm in use   sensory stimulation limited  De-Escalation Techniques:   reoriented   stimulation decreased   1:1 observation initiated  Diversional Activities: television  Taken 5/1/2024 1600 by Ilene Foreman RN  Medical Device Protection:   IV pole/bag removed from visual field   tubing secured  Less Restrictive Alternative:   bed alarm in use   calming techniques promoted  De-Escalation Techniques: 1:1 observation initiated  Diversional Activities: television  Taken 5/1/2024 1400 by Ilene Foreman RN  Medical Device Protection: IV pole/bag removed from visual field  Less Restrictive Alternative:   bed alarm in use   calming techniques promoted   environment adjusted  De-Escalation Techniques:   1:1 observation initiated   reoriented   stimulation decreased  Diversional Activities: television  Taken 5/1/2024 1200 by Ilene Foreman RN  Medical Device Protection: tubing secured  Less Restrictive Alternative: bed alarm in  use  De-Escalation Techniques: stimulation decreased  Diversional Activities: television  Taken 5/1/2024 1000 by Ilene Foreman RN  Medical Device Protection:   tubing secured   IV pole/bag removed from visual field  Less Restrictive Alternative:   calming techniques promoted   family presence promoted   sensory stimulation limited  De-Escalation Techniques:   1:1 observation initiated   quiet time facilitated   reoriented   stimulation decreased  Diversional Activities: television  Taken 5/1/2024 0800 by Ilene Foreman RN  Medical Device Protection: tubing secured  Less Restrictive Alternative:   bed alarm in use   environment adjusted  De-Escalation Techniques: verbally redirected  Diversional Activities: television   Goal Outcome Evaluation:  Plan of Care Reviewed With: patient, spouse

## 2024-05-01 NOTE — PLAN OF CARE
Problem: Palliative Care  Goal: Enhanced Quality of Life  Intervention: Optimize Psychosocial Wellbeing  Flowsheets (Taken 5/1/2024 8271)  Supportive Measures:   active listening utilized   verbalization of feelings encouraged  Family/Support System Care:   caregiver stress acknowledged   self-care encouraged   support provided   involvement promoted   presence promoted   Goal Outcome Evaluation:  Plan of Care Reviewed With: spouse        Progress: no change  Outcome Evaluation: Pt is asleep at present. Wife states pt has wanted to get up today.  Pt remains in 4 pt restraints. Wife states he has eaten well today.  Pt gets agitated when he has to urinate/have a bm.  Phenobarb adjusted by neurology for agitation.   support to wife. continue palliative support.  tele psych appointment this Friday.  Palliative IDT: Rn, MD IVONNE,   After hours# [phone]

## 2024-05-01 NOTE — CASE MANAGEMENT/SOCIAL WORK
Continued Stay Note  [facility]     Patient Name: Too Tai  MRN: [id number]  Today's Date: 5/1/2024    Admit Date: 4/18/2024    Plan: TBD   Discharge Plan       Row Name 05/01/24 1231       Plan    Plan Comments Patient is not medically ready for referrals to be sent due to continued need for restraints and PRN medications.  Neurology & palliative are following.  His plan is TBD.  CM updated wife & son at bedside this morning.  CM called [facility] on Friday, 4/26 to get exact criteria to send a referral. Patient has to be out of restraints for 72 hours prior to a referral being faxed.  CM was also told, this does not apply to PRN meds for agitation/behaviors.  Referral can be faxed to [phone] once patient meets this criteria ([phone]).  CM called [facility] on 4/29 to find out criteria.  CM was directed to call [facility] at [phone] ([phone]).  CM spoke with [place] on 4/29. Explained that patient was in restraints & was checking on criteria.  Per [place], fax information to [phone].  Completed & re-faxed on 4/30.  Referral to [facility] was declined today, 5/1 because patient would require one on one care, staffing, & he would have to be able to complete all ADL's.  CM called [facility] to discuss the criteria & was told he is not appropriate due to advanced dementia with behaviors per Don and their PA who got on the phone. He gave CM the number for their  supervisor, Faustino Danielle ([phone]) to contact for potential resources. CM left a message for Faustino.  Awaiting a call back.  MSW arranged a tele psych assessment for Friday, 5/3/24 at 1415.  LTC placement would be dependent on LTC bed availability, a facility that will offer the patient a bed & private pay costs, once patient is stabilized.  Wife is aware of the private pay range of monthly costs for LTC.  CM will  continue to follow and assist.    Final Discharge Disposition Code 30 - still a patient      Row Name 05/01/24 1101       Plan    Plan TBD    Final Discharge Disposition Code 03 - skilled nursing facility (SNF)                   Discharge Codes    No documentation.                 Expected Discharge Date and Time       Expected Discharge Date Expected Discharge Time    May 6, 2024               Ilene Brown, RN

## 2024-05-01 NOTE — PLAN OF CARE
Problem: Restraint, Nonviolent  Goal: Absence of Harm or Injury  Outcome: Ongoing, Progressing  Intervention: Implement Least Restrictive Safety Strategies  Description: Consider personal and environmental factors contributing to nonviolent or self-destructive behavior.  Utilize diversional activity/alternative device protection.  Document alternative strategies and less restrictive intervention attempts and their effects.  Clearly describe/record behavior leading to need for restraint.  Use the least restrictive method of restraint.  Recognize restraint should only be used if needed to improve the patient's wellbeing and less restrictive interventions have been determined to be ineffective.  Reassess continued need frequently. Remove restraint as soon as unsafe situation is resolved.  Recent Flowsheet Documentation  Taken 5/1/2024 0400 by Mini Moon RN  Medical Device Protection: IV pole/bag removed from visual field  Less Restrictive Alternative:   environment adjusted   sensory stimulation limited  De-Escalation Techniques:   reoriented   stimulation decreased  Diversional Activities: television  Taken 5/1/2024 0200 by Mini Moon RN  Medical Device Protection: IV pole/bag removed from visual field  Less Restrictive Alternative:   environment adjusted   sensory stimulation limited  De-Escalation Techniques: reoriented  Diversional Activities: television  Taken 5/1/2024 0000 by Mini Moon RN  Medical Device Protection: IV pole/bag removed from visual field  Less Restrictive Alternative:   environment adjusted   sensory stimulation limited  De-Escalation Techniques: reoriented  Diversional Activities: television  Taken 4/30/2024 2200 by Mini Moon RN  Medical Device Protection: IV pole/bag removed from visual field  Less Restrictive Alternative:   environment adjusted   sensory stimulation limited  De-Escalation Techniques: reoriented  Diversional Activities: television  Taken 4/30/2024 2000 by  Mini Moon RN  Medical Device Protection: IV pole/bag removed from visual field  Less Restrictive Alternative:   environment adjusted   sensory stimulation limited  De-Escalation Techniques: reoriented  Diversional Activities: television  Intervention: Protect Dignity, Rights, and Personal Wellbeing  Description: Explain restraint rationale and procedure to patient and family/support person prior to application.  Regularly assess personal needs and for changes in behavior and clinical condition, as well as physical and emotional wellbeing.  Provide reassurance and emotional support.  Recent Flowsheet Documentation  Taken 4/30/2024 2000 by Mini Moon RN  Trust Relationship/Rapport:   care explained   choices provided   emotional support provided   empathic listening provided   questions answered   questions encouraged   thoughts/feelings acknowledged  Intervention: Protect Skin and Joint Integrity  Description: Frequently check restraint application site and document findings.  Release and replace at regular intervals per facility protocol.  Assist with frequent joint range of motion activity.  Recent Flowsheet Documentation  Taken 5/1/2024 0200 by Mini Moon RN  Body Position: weight shifting  Taken 5/1/2024 0000 by Mini Moon RN  Body Position: weight shifting  Taken 4/30/2024 2200 by Mini Moon RN  Body Position: weight shifting  Taken 4/30/2024 2000 by Mini Moon RN  Body Position: turned     Problem: Skin Injury Risk Increased  Goal: Skin Health and Integrity  Outcome: Ongoing, Progressing  Intervention: Optimize Skin Protection  Description: Perform a full pressure injury risk assessment, as indicated by screening, upon admission to care unit.  Reassess skin (injury risk, full inspection) frequently (e.g., scheduled interval, with change in condition) to provide optimal early detection and prevention.  Maintain adequate tissue perfusion (e.g., encourage fluid balance; avoid  crossing legs, constrictive clothing or devices) to promote tissue oxygenation.  Maintain head of bed at lowest degree of elevation tolerated, considering medical condition and other restrictions.  Avoid positioning onto an area that remains reddened.  Minimize incontinence and moisture (e.g., toileting schedule; moisture-wicking pad, diaper or incontinence collection device; skin moisture barrier).  Cleanse skin promptly and gently when soiled utilizing a pH-balanced cleanser.  Relieve and redistribute pressure (e.g., scheduled position changes, weight shifts, use of support surface, medical device repositioning, protective dressing application, use of positioning device, microclimate control, use of pressure-injury-monitor  Encourage increased activity, such as sitting in a chair at the bedside or early mobilization, when able to tolerate.  Recent Flowsheet Documentation  Taken 5/1/2024 0200 by Mini Moon RN  Pressure Reduction Techniques:   frequent weight shift encouraged   weight shift assistance provided   heels elevated off bed   positioned off wounds   pressure points protected  Head of Bed (HOB) Positioning: HOB at 30-45 degrees  Pressure Reduction Devices:   pressure-redistributing mattress utilized   positioning supports utilized   heel offloading device utilized  Skin Protection:   adhesive use limited   tubing/devices free from skin contact   transparent dressing maintained   skin-to-skin areas padded   skin-to-device areas padded   incontinence pads utilized   skin sealant/moisture barrier applied  Taken 5/1/2024 0000 by Mini Moon RN  Pressure Reduction Techniques:   frequent weight shift encouraged   weight shift assistance provided   heels elevated off bed   positioned off wounds   pressure points protected  Head of Bed (HOB) Positioning: HOB at 30-45 degrees  Pressure Reduction Devices:   pressure-redistributing mattress utilized   positioning supports utilized   heel offloading device  utilized  Skin Protection:   adhesive use limited   tubing/devices free from skin contact   transparent dressing maintained   skin-to-skin areas padded   skin-to-device areas padded   incontinence pads utilized   skin sealant/moisture barrier applied  Taken 4/30/2024 2200 by Mini Moon RN  Pressure Reduction Techniques:   frequent weight shift encouraged   weight shift assistance provided   heels elevated off bed   positioned off wounds   pressure points protected  Head of Bed (HOB) Positioning: HOB at 30-45 degrees  Pressure Reduction Devices:   pressure-redistributing mattress utilized   positioning supports utilized   heel offloading device utilized  Skin Protection:   adhesive use limited   tubing/devices free from skin contact   transparent dressing maintained   skin-to-skin areas padded   skin-to-device areas padded   incontinence pads utilized   skin sealant/moisture barrier applied  Taken 4/30/2024 2000 by Mini Moon RN  Pressure Reduction Techniques:   frequent weight shift encouraged   weight shift assistance provided   heels elevated off bed   positioned off wounds   pressure points protected  Head of Bed (HOB) Positioning: HOB at 30-45 degrees  Pressure Reduction Devices:   pressure-redistributing mattress utilized   positioning supports utilized   heel offloading device utilized  Skin Protection:   adhesive use limited   tubing/devices free from skin contact   transparent dressing maintained   skin-to-device areas padded   skin-to-skin areas padded   incontinence pads utilized   skin sealant/moisture barrier applied     Problem: Fall Injury Risk  Goal: Absence of Fall and Fall-Related Injury  Outcome: Ongoing, Progressing  Intervention: Identify and Manage Contributors  Description: Develop a fall prevention plan with the patient and caregiver/family.  Provide reorientation, appropriate sensory stimulation and routines with changes in mental status to decrease risk of fall.  Promote use of  personal vision and auditory aids.  Assess assistance level required for safe and effective self-care; provide support as needed, such as toileting, mobilization. For age 65 and older, implement timed toileting with assistance.  Encourage physical activity, such as performance of mobility and self-care at highest level of patient ability, multicomponent exercise program and provision of appropriate assistive devices.  If fall occurs, assess the severity of injury; implement fall injury protocol. Determine the cause and revise fall injury prevention plan.  Regularly review medication contribution to fall risk; adjust medication administration times to minimize risk of falling.  Consider risk related to polypharmacy and age.  Balance adequate pain management with potential for oversedation.  Recent Flowsheet Documentation  Taken 5/1/2024 0200 by Mini Moon RN  Medication Review/Management: medications reviewed  Taken 5/1/2024 0000 by Mini Moon RN  Medication Review/Management: medications reviewed  Taken 4/30/2024 2200 by Mini Moon RN  Medication Review/Management: medications reviewed  Taken 4/30/2024 2000 by Mini Moon RN  Medication Review/Management: medications reviewed  Intervention: Promote Injury-Free Environment  Description: Provide a safe, barrier-free environment that encourages independent activity.  Keep care area uncluttered and well-lighted.  Determine need for increased observation or monitoring.  Avoid use of devices that minimize mobility, such as restraints or indwelling urinary catheter.  Recent Flowsheet Documentation  Taken 5/1/2024 0200 by Mini Moon RN  Safety Promotion/Fall Prevention:   activity supervised   assistive device/personal items within reach   clutter free environment maintained   fall prevention program maintained   lighting adjusted   mobility aid in reach   nonskid shoes/slippers when out of bed   room organization consistent   safety round/check  completed  Taken 5/1/2024 0000 by Mini Moon RN  Safety Promotion/Fall Prevention:   activity supervised   assistive device/personal items within reach   clutter free environment maintained   fall prevention program maintained   lighting adjusted   mobility aid in reach   nonskid shoes/slippers when out of bed   room organization consistent   safety round/check completed  Taken 4/30/2024 2200 by Mini Moon RN  Safety Promotion/Fall Prevention:   activity supervised   assistive device/personal items within reach   clutter free environment maintained   fall prevention program maintained   lighting adjusted   mobility aid in reach   nonskid shoes/slippers when out of bed   room organization consistent   safety round/check completed  Taken 4/30/2024 2000 by Mini Moon RN  Safety Promotion/Fall Prevention:   activity supervised   assistive device/personal items within reach   clutter free environment maintained   fall prevention program maintained   lighting adjusted   mobility aid in reach   nonskid shoes/slippers when out of bed   room organization consistent   safety round/check completed     Problem: Adult Inpatient Plan of Care  Goal: Plan of Care Review  Outcome: Ongoing, Progressing  Goal: Patient-Specific Goal (Individualized)  Outcome: Ongoing, Progressing  Goal: Absence of Hospital-Acquired Illness or Injury  Outcome: Ongoing, Progressing  Intervention: Identify and Manage Fall Risk  Description: Perform standard risk assessment on admission using a validated tool or comprehensive approach appropriate to the patient; reassess fall risk frequently, with change in status or transfer to another level of care.  Communicate fall injury risk to interprofessional healthcare team.  Determine need for increased observation, equipment and environmental modification, such as low bed, signage and supportive, nonskid footwear.  Adjust safety measures to individual developmental age, stage and identified risk  factors.  Reinforce the importance of safety and physical activity with patient and family.  Perform regular intentional rounding to assess need for position change, pain assessment and personal needs, including assistance with toileting.  Recent Flowsheet Documentation  Taken 5/1/2024 0200 by Mini Moon RN  Safety Promotion/Fall Prevention:   activity supervised   assistive device/personal items within reach   clutter free environment maintained   fall prevention program maintained   lighting adjusted   mobility aid in reach   nonskid shoes/slippers when out of bed   room organization consistent   safety round/check completed  Taken 5/1/2024 0000 by Mini Moon RN  Safety Promotion/Fall Prevention:   activity supervised   assistive device/personal items within reach   clutter free environment maintained   fall prevention program maintained   lighting adjusted   mobility aid in reach   nonskid shoes/slippers when out of bed   room organization consistent   safety round/check completed  Taken 4/30/2024 2200 by Mini Moon RN  Safety Promotion/Fall Prevention:   activity supervised   assistive device/personal items within reach   clutter free environment maintained   fall prevention program maintained   lighting adjusted   mobility aid in reach   nonskid shoes/slippers when out of bed   room organization consistent   safety round/check completed  Taken 4/30/2024 2000 by Mini Moon RN  Safety Promotion/Fall Prevention:   activity supervised   assistive device/personal items within reach   clutter free environment maintained   fall prevention program maintained   lighting adjusted   mobility aid in reach   nonskid shoes/slippers when out of bed   room organization consistent   safety round/check completed  Intervention: Prevent Skin Injury  Description: Perform a screening for skin injury risk, such as pressure or moisture associated skin damage on admission and at regular intervals throughout hospital  stay.  Keep all areas of skin (especially folds) clean and dry.  Maintain adequate skin hydration.  Relieve and redistribute pressure and protect bony prominences; implement measures based on patient-specific risk factors.  Match turning and repositioning schedule to clinical condition.  Encourage weight shift frequently; assist with reposition if unable to complete independently.  Float heels off bed; avoid pressure on the Achilles tendon.  Keep skin free from extended contact with medical devices.  Encourage functional activity and mobility, as early as tolerated.  Use aids (e.g., slide boards, mechanical lift) during transfer.  Recent Flowsheet Documentation  Taken 5/1/2024 0200 by Mini Moon RN  Body Position: weight shifting  Skin Protection:   adhesive use limited   tubing/devices free from skin contact   transparent dressing maintained   skin-to-skin areas padded   skin-to-device areas padded   incontinence pads utilized   skin sealant/moisture barrier applied  Taken 5/1/2024 0000 by Mini Moon RN  Body Position: weight shifting  Skin Protection:   adhesive use limited   tubing/devices free from skin contact   transparent dressing maintained   skin-to-skin areas padded   skin-to-device areas padded   incontinence pads utilized   skin sealant/moisture barrier applied  Taken 4/30/2024 2200 by Mini Moon RN  Body Position: weight shifting  Skin Protection:   adhesive use limited   tubing/devices free from skin contact   transparent dressing maintained   skin-to-skin areas padded   skin-to-device areas padded   incontinence pads utilized   skin sealant/moisture barrier applied  Taken 4/30/2024 2000 by Mini Moon RN  Body Position: turned  Skin Protection:   adhesive use limited   tubing/devices free from skin contact   transparent dressing maintained   skin-to-device areas padded   skin-to-skin areas padded   incontinence pads utilized   skin sealant/moisture barrier applied  Intervention:  Prevent and Manage VTE (Venous Thromboembolism) Risk  Description: Assess for VTE (venous thromboembolism) risk.  Encourage and assist with early ambulation.  Initiate and maintain compression or other therapy, as indicated, based on identified risk in accordance with organizational protocol and provider order.  Encourage both active and passive leg exercises while in bed, if unable to ambulate.  Recent Flowsheet Documentation  Taken 5/1/2024 0200 by Mini Moon RN  Activity Management: activity encouraged  Taken 5/1/2024 0000 by Mini Moon RN  Activity Management: activity minimized  Taken 4/30/2024 2200 by Mini Moon RN  Activity Management: activity minimized  Taken 4/30/2024 2000 by Mini Moon RN  Activity Management: activity minimized  Intervention: Prevent Infection  Description: Maintain skin and mucous membrane integrity; promote hand, oral and pulmonary hygiene.  Optimize fluid balance, nutrition, sleep and glycemic control to maximize infection resistance.  Identify potential sources of infection early to prevent or mitigate progression of infection (e.g., wound, lines, devices).  Evaluate ongoing need for invasive devices; remove promptly when no longer indicated.  Recent Flowsheet Documentation  Taken 5/1/2024 0200 by Mini Moon RN  Infection Prevention:   environmental surveillance performed   equipment surfaces disinfected   hand hygiene promoted   personal protective equipment utilized   rest/sleep promoted  Taken 4/30/2024 2200 by Mini Moon RN  Infection Prevention:   environmental surveillance performed   equipment surfaces disinfected   personal protective equipment utilized   hand hygiene promoted   rest/sleep promoted  Taken 4/30/2024 2000 by Mini Moon RN  Infection Prevention:   environmental surveillance performed   equipment surfaces disinfected   hand hygiene promoted   personal protective equipment utilized   rest/sleep promoted  Goal: Optimal Comfort and  Wellbeing  Outcome: Ongoing, Progressing  Intervention: Provide Person-Centered Care  Description: Use a family-focused approach to care.  Develop trust and rapport by proactively providing information, encouraging questions, addressing concerns and offering reassurance.  Acknowledge emotional response to hospitalization.  Recognize and utilize personal coping strategies.  Honor spiritual and cultural preferences.  Recent Flowsheet Documentation  Taken 4/30/2024 2000 by Mini Moon RN  Trust Relationship/Rapport:   care explained   choices provided   emotional support provided   empathic listening provided   questions answered   questions encouraged   thoughts/feelings acknowledged  Goal: Readiness for Transition of Care  Outcome: Ongoing, Progressing     Problem: Palliative Care  Goal: Enhanced Quality of Life  Outcome: Ongoing, Progressing   Goal Outcome Evaluation:

## 2024-05-02 LAB
ANION GAP SERPL CALCULATED.3IONS-SCNC: 6 MMOL/L (ref 5–15)
BUN SERPL-MCNC: 14 MG/DL (ref 8–23)
BUN/CREAT SERPL: 15.9 (ref 7–25)
CALCIUM SPEC-SCNC: 8.2 MG/DL (ref 8.6–10.5)
CHLORIDE SERPL-SCNC: 105 MMOL/L (ref 98–107)
CO2 SERPL-SCNC: 29 MMOL/L (ref 22–29)
CREAT SERPL-MCNC: 0.88 MG/DL (ref 0.76–1.27)
EGFRCR SERPLBLD CKD-EPI 2021: 94.2 ML/MIN/1.73
GLUCOSE BLDC GLUCOMTR-MCNC: 146 MG/DL (ref 70–130)
GLUCOSE BLDC GLUCOMTR-MCNC: 196 MG/DL (ref 70–130)
GLUCOSE BLDC GLUCOMTR-MCNC: 220 MG/DL (ref 70–130)
GLUCOSE BLDC GLUCOMTR-MCNC: 94 MG/DL (ref 70–130)
GLUCOSE SERPL-MCNC: 265 MG/DL (ref 65–99)
PHENOBARB SERPL-MCNC: 12.4 MCG/ML (ref 10–30)
POTASSIUM SERPL-SCNC: 3.6 MMOL/L (ref 3.5–5.2)
QT INTERVAL: 400 MS
QTC INTERVAL: 464 MS
SODIUM SERPL-SCNC: 140 MMOL/L (ref 136–145)

## 2024-05-02 PROCEDURE — 25010000002 HEPARIN (PORCINE) PER 1000 UNITS: Performed by: INTERNAL MEDICINE

## 2024-05-02 PROCEDURE — 82948 REAGENT STRIP/BLOOD GLUCOSE: CPT

## 2024-05-02 PROCEDURE — 63710000001 INSULIN GLARGINE PER 5 UNITS: Performed by: INTERNAL MEDICINE

## 2024-05-02 PROCEDURE — 80184 ASSAY OF PHENOBARBITAL: CPT | Performed by: PSYCHIATRY & NEUROLOGY

## 2024-05-02 PROCEDURE — 99222 1ST HOSP IP/OBS MODERATE 55: CPT | Performed by: NURSE PRACTITIONER

## 2024-05-02 PROCEDURE — 99232 SBSQ HOSP IP/OBS MODERATE 35: CPT | Performed by: NURSE PRACTITIONER

## 2024-05-02 PROCEDURE — 63710000001 INSULIN LISPRO (HUMAN) PER 5 UNITS: Performed by: NURSE PRACTITIONER

## 2024-05-02 PROCEDURE — 80048 BASIC METABOLIC PNL TOTAL CA: CPT | Performed by: INTERNAL MEDICINE

## 2024-05-02 PROCEDURE — 99233 SBSQ HOSP IP/OBS HIGH 50: CPT

## 2024-05-02 PROCEDURE — 63710000001 INSULIN LISPRO (HUMAN) PER 5 UNITS: Performed by: INTERNAL MEDICINE

## 2024-05-02 PROCEDURE — 25010000002 ZIPRASIDONE MESYLATE PER 10 MG

## 2024-05-02 PROCEDURE — 25810000003 LACTATED RINGERS PER 1000 ML: Performed by: PHYSICIAN ASSISTANT

## 2024-05-02 RX ORDER — POTASSIUM CHLORIDE 20 MEQ/1
40 TABLET, EXTENDED RELEASE ORAL EVERY 4 HOURS
Status: DISPENSED | OUTPATIENT
Start: 2024-05-02 | End: 2024-05-02

## 2024-05-02 RX ORDER — CHOLECALCIFEROL (VITAMIN D3) 125 MCG
5 CAPSULE ORAL NIGHTLY
Status: DISCONTINUED | OUTPATIENT
Start: 2024-05-02 | End: 2024-05-09 | Stop reason: HOSPADM

## 2024-05-02 RX ADMIN — QUETIAPINE FUMARATE 100 MG: 100 TABLET ORAL at 09:56

## 2024-05-02 RX ADMIN — DONEPEZIL HYDROCHLORIDE 10 MG: 10 TABLET, FILM COATED ORAL at 20:31

## 2024-05-02 RX ADMIN — TEMAZEPAM 30 MG: 15 CAPSULE ORAL at 20:31

## 2024-05-02 RX ADMIN — HEPARIN SODIUM 5000 UNITS: 5000 INJECTION INTRAVENOUS; SUBCUTANEOUS at 05:07

## 2024-05-02 RX ADMIN — ZIPRASIDONE MESYLATE 10 MG: 20 INJECTION, POWDER, LYOPHILIZED, FOR SOLUTION INTRAMUSCULAR at 15:13

## 2024-05-02 RX ADMIN — MICONAZOLE NITRATE 1 APPLICATION: 20 POWDER TOPICAL at 20:31

## 2024-05-02 RX ADMIN — QUETIAPINE FUMARATE 100 MG: 100 TABLET ORAL at 13:39

## 2024-05-02 RX ADMIN — PHENOBARBITAL 64.8 MG: 64.8 TABLET ORAL at 13:39

## 2024-05-02 RX ADMIN — INSULIN GLARGINE 15 UNITS: 100 INJECTION, SOLUTION SUBCUTANEOUS at 09:56

## 2024-05-02 RX ADMIN — INSULIN LISPRO 7 UNITS: 100 INJECTION, SOLUTION INTRAVENOUS; SUBCUTANEOUS at 16:58

## 2024-05-02 RX ADMIN — INSULIN LISPRO 2 UNITS: 100 INJECTION, SOLUTION INTRAVENOUS; SUBCUTANEOUS at 09:57

## 2024-05-02 RX ADMIN — PHENOBARBITAL 130 MG: 64.8 TABLET ORAL at 20:29

## 2024-05-02 RX ADMIN — MIRTAZAPINE 15 MG: 15 TABLET, FILM COATED ORAL at 20:31

## 2024-05-02 RX ADMIN — POTASSIUM CHLORIDE 40 MEQ: 1500 TABLET, EXTENDED RELEASE ORAL at 13:39

## 2024-05-02 RX ADMIN — SENNOSIDES AND DOCUSATE SODIUM 2 TABLET: 8.6; 5 TABLET ORAL at 09:55

## 2024-05-02 RX ADMIN — QUETIAPINE FUMARATE 300 MG: 100 TABLET ORAL at 20:31

## 2024-05-02 RX ADMIN — DONEPEZIL HYDROCHLORIDE 10 MG: 10 TABLET, FILM COATED ORAL at 09:55

## 2024-05-02 RX ADMIN — PHENOBARBITAL 64.8 MG: 64.8 TABLET ORAL at 09:50

## 2024-05-02 RX ADMIN — INSULIN LISPRO 3 UNITS: 100 INJECTION, SOLUTION INTRAVENOUS; SUBCUTANEOUS at 16:57

## 2024-05-02 RX ADMIN — HEPARIN SODIUM 5000 UNITS: 5000 INJECTION INTRAVENOUS; SUBCUTANEOUS at 13:39

## 2024-05-02 RX ADMIN — LISINOPRIL 30 MG: 20 TABLET ORAL at 09:50

## 2024-05-02 RX ADMIN — INSULIN LISPRO 7 UNITS: 100 INJECTION, SOLUTION INTRAVENOUS; SUBCUTANEOUS at 09:57

## 2024-05-02 RX ADMIN — ZIPRASIDONE MESYLATE 10 MG: 20 INJECTION, POWDER, LYOPHILIZED, FOR SOLUTION INTRAMUSCULAR at 19:06

## 2024-05-02 RX ADMIN — INSULIN LISPRO 7 UNITS: 100 INJECTION, SOLUTION INTRAVENOUS; SUBCUTANEOUS at 13:39

## 2024-05-02 RX ADMIN — HEPARIN SODIUM 5000 UNITS: 5000 INJECTION INTRAVENOUS; SUBCUTANEOUS at 20:31

## 2024-05-02 RX ADMIN — Medication 5 MG: at 20:31

## 2024-05-02 RX ADMIN — SODIUM CHLORIDE, POTASSIUM CHLORIDE, SODIUM LACTATE AND CALCIUM CHLORIDE 75 ML/HR: 600; 310; 30; 20 INJECTION, SOLUTION INTRAVENOUS at 01:46

## 2024-05-02 RX ADMIN — Medication 10 ML: at 20:31

## 2024-05-02 RX ADMIN — FOLIC ACID 1 MG: 1 TABLET ORAL at 09:55

## 2024-05-02 RX ADMIN — AMLODIPINE BESYLATE 10 MG: 10 TABLET ORAL at 09:55

## 2024-05-02 NOTE — PLAN OF CARE
Problem: Restraint, Nonviolent  Goal: Absence of Harm or Injury  Outcome: Ongoing, Progressing  Intervention: Implement Least Restrictive Safety Strategies  Recent Flowsheet Documentation  Taken 5/2/2024 1000 by Sridhar Echevarria RN  Medical Device Protection: IV pole/bag removed from visual field  Less Restrictive Alternative:   bed alarm in use   sensory stimulation limited  De-Escalation Techniques:   diversional activity encouraged   increased round frequency   medication administered  Diversional Activities: television  Taken 5/2/2024 0950 by Sridhar Echevarria RN  Medical Device Protection: IV pole/bag removed from visual field  Diversional Activities: television  Taken 5/2/2024 0800 by Sridhar Echevarria RN  Medical Device Protection: IV pole/bag removed from visual field  Less Restrictive Alternative:   bed alarm in use   sensory stimulation limited  De-Escalation Techniques:   appropriate behavior reinforced   diversional activity encouraged  Diversional Activities: television  Intervention: Protect Dignity, Rights, and Personal Wellbeing  Recent Flowsheet Documentation  Taken 5/2/2024 0950 by Sridhar Echevarria RN  Trust Relationship/Rapport:   care explained   choices provided  Intervention: Protect Skin and Joint Integrity  Recent Flowsheet Documentation  Taken 5/2/2024 1200 by Sridhar Echevarria RN  Body Position: position changed independently   Goal Outcome Evaluation:         Patient was released from restraints at 1330 on 5/2/24. PRN geodon was given in order to keep patient from getting out of bed. Patient has been confused but redirectable. Multiple incontinent urine episodes. Meliza Kahn with Neurology was paged in order to review case. With the expected added use of geodon, daily EKGs were added in order to monitor for QT elongation.

## 2024-05-02 NOTE — CONSULTS
Hospital Consult      This provider is located at [facility], [facility], located at [address], [address] and is conducting  a secure MyChart Video Visit through [employer]. Patient is being evaluated today as an inpatient at [facility] and states they are in a secure environment for this appointment. The patient consents to be seen remotely, and when consent is given they understand that the consent allows for patient identifiable information to be sent to a third party as needed. They may refuse to be seen remotely at any time. The electronic data is encrypted and password protected, and the patient  has been advised of the potential risks to privacy not withstanding such measures. The patient's condition being diagnosed/treated is appropriate for telemedicine. The provider identified herself as well as her credentials to patient. Patient's identity confirmed by requesting wife bianca to to correctly state his name and date of birth.    Date: 24  Patient Name: Too Tai  : 1955   MRN: [id number]   Attending: Kemal Rayo MD    Referring Provider: No ref. provider found    Chief Complaint:      ICD-10-CM ICD-9-CM   1. Dementia with behavioral disturbance  F03.918 294.21   2. Aggressive behavior  R46.89 V40.39   3. Agitation due to dementia  F03.911 294.21   4. Hyperglycemia due to diabetes mellitus  E11.65 250.02      History of Present Illness:   Too Tai is a 68 y.o. male currently hospitalized at [facility] for dementia with behavioral disturbances. Behavioral Health was consulted to evaluate patient due to concerns regarding increasing agitation that has prompted 3 recent hospital admissions.. This is his  initial encounter with this provider. Too Tai is a 67yo M with PMH significant for advanced dementia, CAD, diabetes mellitus type 2, HLD and PVD. Admitted to [facility] ED on 24 for  "agitation / behavioral disturbances. Recently admitted to [facility] 2/24-3/4/24 for dementia with behavioral disturbances and again 3/27-4/4/24 for increased confusion and falls.     Patient  lying in bed awake with wrist restraints during visit. He is alert, nonverbal and unresponsive to provider's questions.  He is calm with intermittent restlessness fidgeting with wrist restraints and sitting up in bed. Cognition/mental status unable to be evaluated due to his lack of speech and interaction with provider. He is essentially unable to recognize an evaluation of is taking place on his behalf. He did however, initially wave at provider with wife prompting him.  His wife of 47 years, Caroline, and son Bill are at bedside and provide historical information. Other son passed away five years ago.  Patient currently lives at home  ([facility]) with his wife, son Bill, his wife, his 2 children, his mother-in-law, and 4 grandchildren that visit often . Patient was born in [facility] and raised by parents. Has sisters. No family mental health history wife is aware of . HS graduate.  Worked for [employer] 45 yrs before he had to retire in 2017 due to complications from femoral bypass.     Wife states she has never been given a formal diagnosis regarding patient's condition.  She denies any  history of depression, anxiety, substance use, psych hospitalizations and legal history.  Former smoker. He has no history of seizures.  Fell off a horse in his 20s and \"busted \"the back of his head. Wife reports pt saw Dr Costa at [facility] who said he thinks he has Alzheimer's but has never been given formal diagnosis she is aware of.  Caroline noticed 5-6 yrs ago \"things weren't quite right\" She recalls one of the kids  needed air in their bicycle tire and he didn't know how to perform the task, one he had done many times before.  Agitation started 2 years ago and has gotten worse. 9/22 the family went to " "[place] and he wondered off for 1 hr and stranger brought him back. He was delusional and thought they were selling drugs in [place]. Wife states sometimes he tries to talk to people who are not there and in the last month he started arguing with the mirror. Wife says he acts like he can't see her. She puts her hand in front of her eyes but he doesn't blink.  Wife says she is usually able to de-escalate him if he acts like he is going to hit her at home.  She sates he gets agitated when he puts his  shoes on the wrong feet and argues when she would correct him. However, in  an hour, he would have a moment of clarity and tell her  his shoes on the wrong feet. Wife states he is in  restraints due to hitting staff.  He was unable to stand upright and walk last time she took him home and she had to restrain at home because  he feel 6-7 times in 24 hrs.    She reports he lost his ability to function independently last fall. At home he would tell her he had to go to the bathroom and she would have to show him the toilet and he still sometimes urinates in the trash can or  in the laundry room. He is able to sit an hour at home before he gets  restless. She says he is constantly moving and has tried to get out of the house. They now have ADT alarms on  windows and doors. She has a stick placed to jam the sliding door he has nearly torn out trying to get the door open. He will let he wash his face and shave him but gets mad if she tries to  shower him or wash his private area. Appetite is \"fair\" as long as he can hold it and eat it himself. He wont let wife spoon feed him. Appetite is  good at home.  Wife reports insomnia at home is relatively new, and he didn't sleep the last  3 days at home. He  is sleeping good in hospital and  \" right now he is pretty mellow.\" Seroquel was initially prescribed  to calm him down, not for insomnia.  Wife states he was going to go to [facility] but they declined. She could probably take him " "home but needs more  help at home. She has a helper but is not much help. She is concerned about his weakness and having only stood 2x in 3 weeks. She states PT wont see him because  pt gets \"grouchy with them\". Wife feels PT should be more persistnet Prior meds per wife: Seroquel >1yr, Restoril 30 mg Nov 2023, Depakote-March, Rexulti titration up to 2 mg x  1 mo came to hospital Feb because  he was jerking in torso and arms.       Subjective   Subjective      Review of Systems   Constitutional:  Positive for activity change and fatigue.   Neurological:  Positive for speech difficulty, memory problem and confusion.   Psychiatric/Behavioral:  Positive for agitation and decreased concentration.    All other systems reviewed and are negative.      Depression Screening:  Level of Risk per Screen: screen negative (4/19/2024 12:55 AM)    PHQ-9 Depression Screening     C-SSRS (Recent)  Q1 Wished to be Dead (Past Month): no  Q2 Suicidal Thoughts (Past Month): no  Q6 Suicide Behavior (Lifetime): no  Level of Risk per Screen: screen negative       Past Psychiatric History:  none per wife       Substance Abuse History/Last use: quit smoking 27 yrs ago. No alcohol or illicit substance use                   Legal History:     Work History:               Highest level of education obtained: 12th grade               History? no              Patient's Occupation: retired    Interpersonal/Relational:              Marital Status:  47 yrs              Support system: significant other and son     Social History:  Where was patient born: [facility]  Upbringing: parents  Where does patient currently live: [facility]  Living situation: wife, son, MIL, COLEMAN and grandkids     Family History:   Family History   Problem Relation Age of Onset    Diabetes Mother     Heart disease Father     Hypertension Father     Hyperlipidemia Father     Diabetes Sister     Diabetes Maternal Grandfather     Diabetes Sister     Diabetes " Sister        Past Medical History:   Diagnosis Date    Coronary artery disease     Dementia     Diabetes mellitus     Hyperlipidemia     Peripheral vascular disease        Past Surgical History:   Procedure Laterality Date    CORONARY ARTERY BYPASS GRAFT      FEMORAL ARTERY - POPLITEAL ARTERY BYPASS GRAFT           Current Facility-Administered Medications:     acetaminophen (TYLENOL) tablet 650 mg, 650 mg, Oral, Q4H PRN, 650 mg at 04/30/24 2042 **OR** acetaminophen (TYLENOL) 160 MG/5ML oral solution 650 mg, 650 mg, Oral, Q4H PRN **OR** acetaminophen (TYLENOL) suppository 650 mg, 650 mg, Rectal, Q4H PRN, Nerissa Aviles G, DO    amLODIPine (NORVASC) tablet 10 mg, 10 mg, Oral, Q24H, Calin Benitez MD, 10 mg at 05/01/24 0828    sennosides-docusate (PERICOLACE) 8.6-50 MG per tablet 2 tablet, 2 tablet, Oral, BID, 2 tablet at 05/01/24 2011 **AND** polyethylene glycol (MIRALAX) packet 17 g, 17 g, Oral, Daily PRN **AND** bisacodyl (DULCOLAX) EC tablet 5 mg, 5 mg, Oral, Daily PRN **AND** bisacodyl (DULCOLAX) suppository 10 mg, 10 mg, Rectal, Daily PRN, Jennifer Bai APRN    Calcium Replacement - Follow Nurse / BPA Driven Protocol, , Does not apply, PRN, Tresa Avilese G, DO    cyanocobalamin injection 1,000 mcg, 1,000 mcg, Intramuscular, Q28 Days, Darrell Jhaveri, DO, 1,000 mcg at 04/19/24 1532    dextrose (D50W) (25 g/50 mL) IV injection 25 g, 25 g, Intravenous, Q15 Min PRN, Kamala Avilessie G, DO    dextrose (GLUTOSE) oral gel 15 g, 15 g, Oral, Q15 Min PRN, Nerissa Aviles G, DO    donepezil (ARICEPT) tablet 10 mg, 10 mg, Oral, BID, Darrell Jhaveri A, DO, 10 mg at 05/01/24 2011    folic acid (FOLVITE) tablet 1 mg, 1 mg, Oral, Daily, TraciNerissa baca G, DO, 1 mg at 05/01/24 0829    glucagon (GLUCAGEN) injection 1 mg, 1 mg, Intramuscular, Q15 Min PRN, Nerissa Aviles DO    heparin (porcine) 5000 UNIT/ML injection 5,000 Units, 5,000 Units, Subcutaneous, Q8H, Nerissa Aviles DO, 5,000 Units at 05/02/24 0507    insulin glargine  (LANTUS, SEMGLEE) injection 15 Units, 15 Units, Subcutaneous, Q12H, Cecily Bain, , 15 Units at 05/01/24 2026    Insulin Lispro (humaLOG) injection 2-7 Units, 2-7 Units, Subcutaneous, 4x Daily AC & at Bedtime, Nerissa Aviles DO, 3 Units at 05/01/24 2026    Insulin Lispro (humaLOG) injection 7 Units, 7 Units, Subcutaneous, TID With Meals, [place], Jaclyn, APRN, 7 Units at 05/01/24 1731    lactated ringers infusion, 75 mL/hr, Intravenous, Continuous, Parisa Madrigal PA-C, Last Rate: 75 mL/hr at 05/02/24 0146, 75 mL/hr at 05/02/24 0146    lisinopril (PRINIVIL,ZESTRIL) tablet 30 mg, 30 mg, Oral, Daily, Parisa Madrigal PA-C, 30 mg at 05/01/24 0830    Magnesium Standard Dose Replacement - Follow Nurse / BPA Driven Protocol, , Does not apply, PRN, Nerissa Aviles G, DO    melatonin tablet 5 mg, 5 mg, Oral, Nightly PRN, Nerissa Aviles DO, 5 mg at 05/01/24 2011    miconazole (MICOTIN) 2 % powder 1 Application, 1 Application, Topical, Q12H, Cecily Bain, DO, 1 Application at 05/01/24 2245    mirtazapine (REMERON) tablet 15 mg, 15 mg, Oral, Nightly, Darrell Jhaveri DO, 15 mg at 05/01/24 2011    PHENobarbital tablet 130 mg, 130 mg, Oral, Nightly **OR** PHENobarbital injection 130 mg, 130 mg, Intramuscular, Nightly, Brijesh Mac MD, 130 mg at 05/01/24 2229    PHENobarbital tablet 64.8 mg, 64.8 mg, Oral, BID, 64.8 mg at 05/01/24 1259 **OR** PHENobarbital injection 65 mg, 65 mg, Intramuscular, BID, Brijesh Mac MD    Phosphorus Replacement - Follow Nurse / BPA Driven Protocol, , Does not apply, PRN, TraciNerissa baca G, DO    Potassium Replacement - Follow Nurse / BPA Driven Protocol, , Does not apply, PRN, Nerissa Aviles,     QUEtiapine (SEROquel) tablet 100 mg, 100 mg, Oral, BID, Meliza Kahn K, APRN, 100 mg at 05/01/24 1310    QUEtiapine (SEROquel) tablet 300 mg, 300 mg, Oral, Nightly, Femi April K, APRN, 300 mg at 05/01/24 2010    sodium chloride 0.9 % flush 10 mL, 10 mL, Intravenous,  Q12H, Traci, Nerissa G, DO, 10 mL at 05/01/24 2029    sodium chloride 0.9 % flush 10 mL, 10 mL, Intravenous, PRN, Traci, Nerissa G, DO    sodium chloride 0.9 % infusion 40 mL, 40 mL, Intravenous, PRN, Traci, Nerissa G, DO, 40 mL at 04/23/24 0546    temazepam (RESTORIL) capsule 30 mg, 30 mg, Oral, Nightly, Darrell Jhaveri, DO, 30 mg at 05/01/24 2010    ziprasidone (GEODON) injection 10 mg, 10 mg, Intramuscular, Q4H PRN, Kahn, April K, APRN, 10 mg at 04/30/24 1157    Medication Considerations:  ELISEO reviewed and appropriate.      Allergies   Allergen Reactions    Bactrim [Sulfamethoxazole-Trimethoprim] Rash    Adhesive Tape Rash    Neosporin [Neomycin-Bacitracin Zn-Polymyx] Rash        Objective   Objective     Physical Exam:  Temp:  [96.7 °F (35.9 °C)-97.8 °F (36.6 °C)] 96.7 °F (35.9 °C)  Heart Rate:  [71-74] 71  Resp:  [16-18] 16  BP: (160-175)/(79-86) 160/79  Body mass index is 27.6 kg/m².     Mental Status Exam:   MENTAL STATUS EXAM   General Appearance:  Well developed  Eye Contact:  Poor eye contact  Attitude:  Other  Other Comment:  Calm, unable to participate due to cognitive status  Motor Activity:  Other  Other Comment:  MARIIA-waved at camera, sat up in bed, per wife, has not walked in 3 weeks  Muscle Strength:  Other  Other Comment:  MARIIA  Speech:  Other  Other Comment:  Mute  Language:  Other  Other Comment:  Mute  Mood and affect:  Other  Other Comment:  Calm, restless (trying to get restraint off)  Hopelessness:  Denies  Loneliness: Denies  Thought Process:  Other  Other Comment:  MARIIA pt nonverbal  Associations/ Thought Content:  Other  Other Comment:  Nonverbal  Hallucinations:  Visual, auditory and other  Other Comment:  Reported per wife  Suicidal Ideations:  Not present  Homicidal Ideation:  Not present  Sensorium:  Confused  Orientation:  Other  Other Comment:  MARIIA nonverbal  Immediate Recall, Recent, and Remote Memory:  Other  Other Comment:  MARIIA nonverbal  Attention Span/ Concentration:  Easily  distracted  Fund of Knowledge:  Poor  Intellectual Functioning:  Below average  Insight:  Poor  Judgement:  Poor  Reliability:  Poor  Impulse Control:  Poor       TSH   Date Value Ref Range Status   04/18/2024 3.220 0.270 - 4.200 uIU/mL Final     Vitamin B-12   Date Value Ref Range Status   04/18/2024 384 211 - 946 pg/mL Final     Magnesium   Date Value Ref Range Status   04/19/2024 1.9 1.6 - 2.4 mg/dL Final     Folate   Date Value Ref Range Status   02/24/2024 10.20 4.78 - 24.20 ng/mL Final       Lab Results   Component Value Date    GLUCOSE 166 (H) 04/19/2024    BUN 16 04/19/2024    CREATININE 0.82 04/19/2024    EGFRRESULT 73.6 08/03/2023    EGFR 96.3 04/19/2024    BCR 19.5 04/19/2024    K 4.4 04/19/2024    CO2 30.0 (H) 04/19/2024    CALCIUM 9.9 04/19/2024    PROTENTOTREF 7.0 08/03/2023    ALBUMIN 3.9 04/18/2024    BILITOT 0.3 04/18/2024    AST 17 04/18/2024    ALT 13 04/18/2024       Lab Results   Component Value Date    WBC 10.47 04/19/2024    HGB 15.5 04/19/2024    HCT 46.7 04/19/2024    MCV 92.7 04/19/2024     04/19/2024       Lab Results   Component Value Date    CHOL 220 (H) 01/08/2024    CHLPL 201 (H) 08/03/2023    TRIG 108 01/08/2024    HDL 41 01/08/2024     (H) 01/08/2024       3/28/24 CT head  1. No acute intracranial abnormality.  2. Moderate chronic small vessel ischemic change.        Assessment / Plan      Visit Diagnosis/Orders Placed This Visit:  Dementia with psychosis    Recommendations:   Encourage  staff to prioritize regulating circadian rhythm, minimize night-time interruptions, etc  Change melatonin to 5 mg hs scheduled and administer around 7pm  Consider ODT  or IM Olanzipine for acute agitation prn (instead of geodon)  (Qtc sparing) start with 2.5 to 5 mg    Consider starting Zoloft or Lexapro (qtc sparing) SSRIs  have best evidence for agitation with dementia   Taper off Seroquel (more prone to cause orthostatic hypotension, hyponatremia and increases fall risk. Would favor  Abilify (fewer EPS side effects) or Risperdal if insomnia is persistent  Consider ativan 0.25 mg prn before bathing/therapy to calm agitation  Taper off Temazepam not recommended in this population  Avoid anticholinergic meds hen possible  F/U with behavioral health provider after discharge  MRI ordered 12/7/23-results not available to view    Impression/Formulation: Patient appears alert, Unable to assess cognition due to nonverbal presentation and minimal interaction with provider    Tanesha Delgado PMHNP-[facility] [address]

## 2024-05-02 NOTE — CASE MANAGEMENT/SOCIAL WORK
Continued Stay Note  [facility]     Patient Name: Too Tai  MRN: [id number]  Today's Date: 5/2/2024    Admit Date: 4/18/2024    Plan: TBD   Discharge Plan       Row Name 05/02/24 0924       Plan    Plan TBD    Patient/Family in Agreement with Plan yes    Plan Comments CM spoke to wife at bedside. Telepsych this morning. Will await outcome & recommendations. CM will continue to follow.    Final Discharge Disposition Code 30 - still a patient                   Discharge Codes    No documentation.                 Expected Discharge Date and Time       Expected Discharge Date Expected Discharge Time    May 6, 2024               Ilene Brown RN

## 2024-05-02 NOTE — CASE MANAGEMENT/SOCIAL WORK
Continued Stay Note  [facility]     Patient Name: Too Tai  MRN: [id number]  Today's Date: 5/2/2024    Admit Date: 4/18/2024    Plan: TBD   Discharge Plan       Row Name 05/02/24 1632       Plan    Plan TBD    Patient/Family in Agreement with Plan yes    Plan Comments Update. NENA spoke with Payam at [facility] ([phone]) to check on sitter criteria. Per Payam, there is no criteria for a sitter or PRN medications for agitation/behaviors. The ONLY criteria is that the patient has to be out of restraints for 72 hours prior to a referral being faxed.  Referral can be faxed to [phone] once patient meets this criteria. CM updated primary RN. CM will continue to follow.    Final Discharge Disposition Code 30 - still a patient                   Discharge Codes    No documentation.                 Expected Discharge Date and Time       Expected Discharge Date Expected Discharge Time    May 6, 2024               Ilene Brown RN

## 2024-05-02 NOTE — PROGRESS NOTES
[facility] Neurology    Progress Note    Patient Name: Too Tai  : 1955  MRN: [id number]  Primary Care Physician:  Des Geller MD  Date of admission: 2024    Subjective     Chief Complaint: Dementia with behavioral disturbances    History of Present Illness   Patient seen resting in bed.  Appears more calm than previous assessment.  Still requiring 4-point restraints.  Per bedside RN patient rested more throughout night.  Did not require any as needed medications overnight.    Review of Systems   Unable to assess due to baseline mental status    Objective     Physical Exam  Vitals and nursing note reviewed.   Constitutional:       General: He is not in acute distress.     Appearance: He is not ill-appearing.   Eyes:      Extraocular Movements: Extraocular movements intact.      Pupils: Pupils are equal, round, and reactive to light.      Comments: No nystagmus or deviated gaze noted   Cardiovascular:      Rate and Rhythm: Normal rate.   Pulmonary:      Effort: Pulmonary effort is normal.   Neurological:      Mental Status: He is alert. Mental status is at baseline.      Cranial Nerves: No facial asymmetry.      Sensory: No sensory deficit.      Motor: No weakness or seizure activity.          Vitals:   Temp:  [96.7 °F (35.9 °C)-97.8 °F (36.6 °C)] 96.7 °F (35.9 °C)  Heart Rate:  [71-74] 71  Resp:  [16-18] 16  BP: (160-175)/(79-86) 160/79    Current Medications    Current Facility-Administered Medications:     acetaminophen (TYLENOL) tablet 650 mg, 650 mg, Oral, Q4H PRN, 650 mg at 24 **OR** acetaminophen (TYLENOL) 160 MG/5ML oral solution 650 mg, 650 mg, Oral, Q4H PRN **OR** acetaminophen (TYLENOL) suppository 650 mg, 650 mg, Rectal, Q4H PRN, Nerissa Aviles DO    amLODIPine (NORVASC) tablet 10 mg, 10 mg, Oral, Q24H, Calin Benitez MD, 10 mg at 24 0828    sennosides-docusate (PERICOLACE) 8.6-50 MG per tablet 2 tablet, 2 tablet, Oral, BID, 2 tablet at 24  **AND** polyethylene glycol (MIRALAX) packet 17 g, 17 g, Oral, Daily PRN **AND** bisacodyl (DULCOLAX) EC tablet 5 mg, 5 mg, Oral, Daily PRN **AND** bisacodyl (DULCOLAX) suppository 10 mg, 10 mg, Rectal, Daily PRN, Jennifer Bai, APRN    Calcium Replacement - Follow Nurse / BPA Driven Protocol, , Does not apply, PRN, TraciaKmala bacasie G, DO    cyanocobalamin injection 1,000 mcg, 1,000 mcg, Intramuscular, Q28 Days, Darrell Jhaveri A, DO, 1,000 mcg at 04/19/24 1532    dextrose (D50W) (25 g/50 mL) IV injection 25 g, 25 g, Intravenous, Q15 Min PRN, TraciKamala bacasie G, DO    dextrose (GLUTOSE) oral gel 15 g, 15 g, Oral, Q15 Min PRN, TraciKamala bacasie G, DO    donepezil (ARICEPT) tablet 10 mg, 10 mg, Oral, BID, Darrell Jhaveri A, DO, 10 mg at 05/01/24 2011    folic acid (FOLVITE) tablet 1 mg, 1 mg, Oral, Daily, Nerissa Aviles, DO, 1 mg at 05/01/24 0829    glucagon (GLUCAGEN) injection 1 mg, 1 mg, Intramuscular, Q15 Min PRN, TraciKamala bacasie G, DO    heparin (porcine) 5000 UNIT/ML injection 5,000 Units, 5,000 Units, Subcutaneous, Q8H, Nerissa Aviles, DO, 5,000 Units at 05/02/24 0507    insulin glargine (LANTUS, SEMGLEE) injection 15 Units, 15 Units, Subcutaneous, Q12H, Cecily Bain, DO, 15 Units at 05/01/24 2026    Insulin Lispro (humaLOG) injection 2-7 Units, 2-7 Units, Subcutaneous, 4x Daily AC & at Bedtime, Nerissa Aviles, DO, 3 Units at 05/01/24 2026    Insulin Lispro (humaLOG) injection 7 Units, 7 Units, Subcutaneous, TID With Meals, [place], Jaclyn, APRN, 7 Units at 05/01/24 1731    lactated ringers infusion, 75 mL/hr, Intravenous, Continuous, Parisa Madrigal PA-C, Last Rate: 75 mL/hr at 05/02/24 0146, 75 mL/hr at 05/02/24 0146    lisinopril (PRINIVIL,ZESTRIL) tablet 30 mg, 30 mg, Oral, Daily, Parisa Madrigal PA-C, 30 mg at 05/01/24 0830    Magnesium Standard Dose Replacement - Follow Nurse / BPA Driven Protocol, , Does not apply, PRN, Nerissa Aviles G, DO    melatonin tablet 5 mg, 5 mg, Oral, Nightly PRN, Nerissa Aviles  G, DO, 5 mg at 05/01/24 2011    miconazole (MICOTIN) 2 % powder 1 Application, 1 Application, Topical, Q12H, Cecily Bain, DO, 1 Application at 05/01/24 2245    mirtazapine (REMERON) tablet 15 mg, 15 mg, Oral, Nightly, Darrell Jhaveri, DO, 15 mg at 05/01/24 2011    PHENobarbital tablet 130 mg, 130 mg, Oral, Nightly **OR** PHENobarbital injection 130 mg, 130 mg, Intramuscular, Nightly, Brijesh Mac MD, 130 mg at 05/01/24 2229    PHENobarbital tablet 64.8 mg, 64.8 mg, Oral, BID, 64.8 mg at 05/01/24 1259 **OR** PHENobarbital injection 65 mg, 65 mg, Intramuscular, BID, Brijesh Mac MD    Phosphorus Replacement - Follow Nurse / BPA Driven Protocol, , Does not apply, PRN, Nerissa Aviles, DO    Potassium Replacement - Follow Nurse / BPA Driven Protocol, , Does not apply, PRN, TraciNerissa baca G, DO    QUEtiapine (SEROquel) tablet 100 mg, 100 mg, Oral, BID, Meliza Kahn, APRN, 100 mg at 05/01/24 1310    QUEtiapine (SEROquel) tablet 300 mg, 300 mg, Oral, Nightly, Meliza Kahn, APRN, 300 mg at 05/01/24 2010    sodium chloride 0.9 % flush 10 mL, 10 mL, Intravenous, Q12H, Nerissa Aviles, DO, 10 mL at 05/01/24 2029    sodium chloride 0.9 % flush 10 mL, 10 mL, Intravenous, PRN, Nerissa Aviles G, DO    sodium chloride 0.9 % infusion 40 mL, 40 mL, Intravenous, PRN, Nerissa Aviles G, DO, 40 mL at 04/23/24 0546    temazepam (RESTORIL) capsule 30 mg, 30 mg, Oral, Nightly, Darrell Jhaveri, DO, 30 mg at 05/01/24 2010    ziprasidone (GEODON) injection 10 mg, 10 mg, Intramuscular, Q4H PRN, Meliza Kahn, APRN, 10 mg at 04/30/24 1157    Laboratory Results:   Lab Results   Component Value Date    GLUCOSE 166 (H) 04/19/2024    CALCIUM 9.9 04/19/2024     04/19/2024    K 4.4 04/19/2024    CO2 30.0 (H) 04/19/2024     04/19/2024    BUN 16 04/19/2024    CREATININE 0.82 04/19/2024    EGFRIFAFRI 102 02/21/2022    EGFRIFNONA 88 02/21/2022    BCR 19.5 04/19/2024    ANIONGAP 8.0 04/19/2024     Lab Results   Component  Value Date    WBC 10.47 04/19/2024    HGB 15.5 04/19/2024    HCT 46.7 04/19/2024    MCV 92.7 04/19/2024     04/19/2024     Lab Results   Component Value Date    CHOL 220 (H) 01/08/2024    CHOL 120 08/01/2019    CHOL 117 04/15/2019     Lab Results   Component Value Date    HDL 41 01/08/2024    HDL 37 (L) 08/03/2023    HDL 41 04/03/2023     Lab Results   Component Value Date     (H) 01/08/2024     (H) 08/03/2023     (H) 04/03/2023     Lab Results   Component Value Date    TRIG 108 01/08/2024    TRIG 208 (H) 08/03/2023    TRIG 138 04/03/2023     Lab Results   Component Value Date    HGBA1C 9.50 (H) 04/18/2024     Lab Results   Component Value Date    INR 1.1 11/25/2019    INR 1.1 09/10/2018    PROTIME 12.5 11/25/2019    PROTIME 13.3 09/10/2018     Lab Results   Component Value Date    FOLATE 10.20 02/24/2024     Lab Results   Component Value Date    CXYXUSSM41 384 04/18/2024             Assessment / Plan   Brief Patient Summary:  Too Tai is a 68 y.o. male with a past medical history of advanced dementia with behavioral disturbances and nonverbal, CAD, T2DM, HLD, PVD who presented to [facility] ED due to increase in agitation.  Patient has had multiple recent hospitalizations.      Plan:   Advanced dementia with behavioral disturbances  Sleep Deprivation   Continue home Aricept 10 mg nightly  Increase phenobarbital 64.8 mg/ 64.8 mg/ 129.6 mg  Phenobarbital level 12.4; recheck trough level on Monday  Continue home Remeron 15 mg nightly  Continue Seroquel 100 mg /100 mg / 300 mg.   QTc 464 this AM.   Continue Restoril 30 mg nightly.  Geodon as needed for extreme agitation; last dose 4/30 at 1157  CT head negative for acute abnormality  Vitamin B12 384, IM cyanocobalamin 1000 mcg  Case management following for difficult posthospitalization disposition.  Patient is not safe to return home due to concern of being in danger for his wife and himself.   Palliative consulted, appreciate  recommendations.   Psych consult pending, appreciate recommendations  General neurology will continue to follow    I have discussed the above with the patient, bedside RN and IVONNE Rodriguez  Time spent with patient: 50 minutes in face-to-face evaluation and management of the patient.    Copied text in this note has been reviewed and is accurate as of 05/02/24.     IVONNE Santos

## 2024-05-02 NOTE — PROGRESS NOTES
[facility] Services  PROGRESS NOTE    Patient Name: Too Tai  : 1955  MRN: [id number]    Date of Admission: 2024  Primary Care Physician: Des Geller MD    Subjective     CC: f/u dementia with agitation    HPI:  No new issues overnight  Just completed telepsych eval  Currently calm    Objective     Vital Signs:   Temp:  [96.4 °F (35.8 °C)-97.8 °F (36.6 °C)] 96.4 °F (35.8 °C)  Heart Rate:  [71-92] 92  Resp:  [16-18] 16  BP: (156-175)/(79-86) 156/82     Physical Exam:  Constitutional: No acute distress, awake, alert, wife at bedside  HENT: NCAT, mucous membranes moist  Respiratory: Clear to auscultation bilaterally, respiratory effort normal   Cardiovascular: RRR, no murmurs, rubs, or gallops  Gastrointestinal: Positive bowel sounds, soft, nontender, nondistended  Musculoskeletal: No bilateral ankle edema. Upper extremities in restraints  Psychiatric: Appropriate affect, cooperative  Neurologic: Oriented x 1, RICO, speech clear  Skin: No rashes noted      Results Reviewed:  LAB RESULTS:    Brief Urine Lab Results  (Last result in the past 365 days)        Color   Clarity   Blood   Leuk Est   Nitrite   Protein   CREAT   Urine HCG        24 0243 Yellow   Clear   Negative   Negative   Negative   Negative                 Microbiology Results Abnormal       None          No radiology results from the last 24 hrs    Results for orders placed during the hospital encounter of 24    Adult Transthoracic Echo Complete W/ Cont if Necessary Per Protocol    Interpretation Summary    Left ventricular systolic function is normal. Calculated left ventricular EF = 55.1% Normal left ventricular cavity size noted. Left ventricular wall thickness is consistent with mild concentric hypertrophy. Left ventricular diastolic function is consistent with (grade I) impaired relaxation.    The right ventricular cavity is mildly dilated. Normal right ventricular systolic  function noted.    There is calcification of the aortic valve. The aortic valve appears trileaflet. Mild aortic valve regurgitation is present. No aortic valve stenosis is present.    Mitral annular calcification is present. Trace mitral valve regurgitation is present. No significant mitral valve stenosis is present.    The tricuspid valve is structurally normal with no stenosis present. Trace tricuspid valve regurgitation is present. Insufficient TR velocity profile to estimate the right ventricular systolic pressure.    Mild dilation of the sinuses of Valsalva is present. Mild dilation of the ascending aorta is present. Ascending aorta = 4.2 cm    Current medications:  Scheduled Meds:amLODIPine, 10 mg, Oral, Q24H  cyanocobalamin, 1,000 mcg, Intramuscular, Q28 Days  donepezil, 10 mg, Oral, BID  folic acid, 1 mg, Oral, Daily  heparin (porcine), 5,000 Units, Subcutaneous, Q8H  insulin glargine, 15 Units, Subcutaneous, Q12H  insulin lispro, 2-7 Units, Subcutaneous, 4x Daily AC & at Bedtime  Insulin Lispro, 7 Units, Subcutaneous, TID With Meals  lisinopril, 30 mg, Oral, Daily  miconazole, 1 Application, Topical, Q12H  mirtazapine, 15 mg, Oral, Nightly  PHENobarbital, 130 mg, Oral, Nightly   Or  PHENobarbital, 130 mg, Intramuscular, Nightly  PHENobarbital, 64.8 mg, Oral, BID   Or  PHENobarbital, 65 mg, Intramuscular, BID  potassium chloride ER, 40 mEq, Oral, Q4H  QUEtiapine, 100 mg, Oral, BID  QUEtiapine, 300 mg, Oral, Nightly  senna-docusate sodium, 2 tablet, Oral, BID  sodium chloride, 10 mL, Intravenous, Q12H  temazepam, 30 mg, Oral, Nightly      Continuous Infusions:lactated ringers, 75 mL/hr, Last Rate: 75 mL/hr (05/02/24 0146)      PRN Meds:.  acetaminophen **OR** acetaminophen **OR** acetaminophen    senna-docusate sodium **AND** polyethylene glycol **AND** bisacodyl **AND** bisacodyl    Calcium Replacement - Follow Nurse / BPA Driven Protocol    dextrose    dextrose    glucagon (human recombinant)    Magnesium  Standard Dose Replacement - Follow Nurse / BPA Driven Protocol    melatonin    Phosphorus Replacement - Follow Nurse / BPA Driven Protocol    Potassium Replacement - Follow Nurse / BPA Driven Protocol    sodium chloride    sodium chloride    ziprasidone    Assessment & Plan     Active Hospital Problems    Diagnosis  POA    **Agitation due to dementia [F03.911]  Yes    Agitation [R45.1]  Yes    Type 2 diabetes mellitus with hyperglycemia, with long-term current use of insulin [E11.65, Z79.4]  Not Applicable    Peripheral neuropathy [G62.9]  Yes    Hyperlipidemia [E78.5]  Yes    Benign prostatic hyperplasia [N40.0]  Yes    Benign essential HTN [I10]  Yes    Peripheral vascular disease [I73.9]  Yes    Coronary artery disease involving native coronary artery of native heart [I25.10]  Yes      Resolved Hospital Problems   No resolved problems to display.     Brief Hospital Course to date:  Too Tai is a 68yoM with PMH significant for advanced dementia, CAD, diabetes mellitus type 2, HLD and PVD. Admitted to [facility] ED on 4/18/24 for agitation / behavioral disturbances. Recently admitted to [facility] 2/24-3/4/24 for dementia with behavioral disturbances and again 3/27-4/4/24 for similar issues.      Advanced dementia complicated by behavioral disturbance with superimposed sleep disturbance   - Infectious work up unremarkable. UA reassuring. CXR without acute findings. CT head without acute findings  - Continue home Aricept and Mirtazapine 15mg QHS   - Continue Seroquel   - Neurology started Phenobarbital, level now WNL.  Will continue to monitor effect and recheck level on Monday per neurology recs  - Continue Temazepam 30mg QHS  - Appreciate palliative care assistance  - discussed treatment plan with Neurology multiple   - May need geriatric psych facility vs LTC - CM looking into this. Telepsych eval done today. Will review note when done  - IV fluids while taking in minimal PO   - PRN Geodon, last given  4/30/24    Uncontrolled diabetes mellitus type 2  - Appears not on any home meds for DM  - A1c 9.5%  - Continue Lantus, increased to 17 units BID, Lispro 7 units TID AC + SSI    Latest Reference Range & Units 05/01/24 20:04 05/02/24 07:21 05/02/24 08:50 05/02/24 11:31   Glucose 70 - 130 mg/dL 231 (H) 196 (H) 265 (H) 146 (H)     HTN  - continue Amlodipine 10mg daily  - continue Lisinopril 30mg daily     Expected Discharge Location and Transportation: TBD  Expected Discharge   Expected Discharge Date: 5/6/2024; Expected Discharge Time:      DVT prophylaxis:Medical and mechanical DVT prophylaxis orders are present.    AM-PAC 6 Clicks Score (PT): 13 (05/01/24 2000)    CODE STATUS:   Code Status and Medical Interventions:   Ordered at: 04/19/24 1037     Medical Intervention Limits:    NO intubation (DNI)     Level Of Support Discussed With:    Next of Kin (If No Surrogate)     Code Status (Patient has no pulse and is not breathing):    No CPR (Do Not Attempt to Resuscitate)     Medical Interventions (Patient has pulse or is breathing):    Limited Support     Jaclyn Holguin, APRN  05/02/24

## 2024-05-03 LAB
GLUCOSE BLDC GLUCOMTR-MCNC: 105 MG/DL (ref 70–130)
GLUCOSE BLDC GLUCOMTR-MCNC: 123 MG/DL (ref 70–130)
GLUCOSE BLDC GLUCOMTR-MCNC: 205 MG/DL (ref 70–130)
GLUCOSE BLDC GLUCOMTR-MCNC: 295 MG/DL (ref 70–130)
QT INTERVAL: 392 MS
QTC INTERVAL: 446 MS

## 2024-05-03 PROCEDURE — 25010000002 ZIPRASIDONE MESYLATE PER 10 MG

## 2024-05-03 PROCEDURE — 93005 ELECTROCARDIOGRAM TRACING: CPT

## 2024-05-03 PROCEDURE — 99232 SBSQ HOSP IP/OBS MODERATE 35: CPT | Performed by: NURSE PRACTITIONER

## 2024-05-03 PROCEDURE — 82948 REAGENT STRIP/BLOOD GLUCOSE: CPT

## 2024-05-03 PROCEDURE — 63710000001 INSULIN GLARGINE PER 5 UNITS: Performed by: NURSE PRACTITIONER

## 2024-05-03 PROCEDURE — 25010000002 MIDAZOLAM PER 1 MG: Performed by: PSYCHIATRY & NEUROLOGY

## 2024-05-03 PROCEDURE — 93010 ELECTROCARDIOGRAM REPORT: CPT | Performed by: INTERNAL MEDICINE

## 2024-05-03 PROCEDURE — 63710000001 INSULIN LISPRO (HUMAN) PER 5 UNITS: Performed by: INTERNAL MEDICINE

## 2024-05-03 PROCEDURE — 63710000001 INSULIN LISPRO (HUMAN) PER 5 UNITS: Performed by: NURSE PRACTITIONER

## 2024-05-03 PROCEDURE — 99233 SBSQ HOSP IP/OBS HIGH 50: CPT

## 2024-05-03 PROCEDURE — 25010000002 HEPARIN (PORCINE) PER 1000 UNITS: Performed by: INTERNAL MEDICINE

## 2024-05-03 PROCEDURE — 25810000003 LACTATED RINGERS PER 1000 ML: Performed by: PHYSICIAN ASSISTANT

## 2024-05-03 RX ORDER — MIDAZOLAM HYDROCHLORIDE 1 MG/ML
0.5 INJECTION INTRAMUSCULAR; INTRAVENOUS 2 TIMES DAILY PRN
Status: DISCONTINUED | OUTPATIENT
Start: 2024-05-03 | End: 2024-05-09 | Stop reason: HOSPADM

## 2024-05-03 RX ADMIN — PHENOBARBITAL 64.8 MG: 64.8 TABLET ORAL at 08:49

## 2024-05-03 RX ADMIN — PHENOBARBITAL 130 MG: 64.8 TABLET ORAL at 19:16

## 2024-05-03 RX ADMIN — TEMAZEPAM 30 MG: 15 CAPSULE ORAL at 19:16

## 2024-05-03 RX ADMIN — SERTRALINE HYDROCHLORIDE 50 MG: 50 TABLET ORAL at 12:37

## 2024-05-03 RX ADMIN — INSULIN LISPRO 7 UNITS: 100 INJECTION, SOLUTION INTRAVENOUS; SUBCUTANEOUS at 08:50

## 2024-05-03 RX ADMIN — MIDAZOLAM HYDROCHLORIDE 0.5 MG: 1 INJECTION, SOLUTION INTRAMUSCULAR; INTRAVENOUS at 12:38

## 2024-05-03 RX ADMIN — ZIPRASIDONE MESYLATE 10 MG: 20 INJECTION, POWDER, LYOPHILIZED, FOR SOLUTION INTRAMUSCULAR at 08:25

## 2024-05-03 RX ADMIN — DONEPEZIL HYDROCHLORIDE 10 MG: 10 TABLET, FILM COATED ORAL at 08:49

## 2024-05-03 RX ADMIN — INSULIN GLARGINE 17 UNITS: 100 INJECTION, SOLUTION SUBCUTANEOUS at 19:17

## 2024-05-03 RX ADMIN — FOLIC ACID 1 MG: 1 TABLET ORAL at 08:49

## 2024-05-03 RX ADMIN — INSULIN LISPRO 7 UNITS: 100 INJECTION, SOLUTION INTRAVENOUS; SUBCUTANEOUS at 11:40

## 2024-05-03 RX ADMIN — QUETIAPINE FUMARATE 100 MG: 100 TABLET ORAL at 12:37

## 2024-05-03 RX ADMIN — MIRTAZAPINE 15 MG: 15 TABLET, FILM COATED ORAL at 19:16

## 2024-05-03 RX ADMIN — INSULIN GLARGINE 17 UNITS: 100 INJECTION, SOLUTION SUBCUTANEOUS at 08:49

## 2024-05-03 RX ADMIN — INSULIN LISPRO 7 UNITS: 100 INJECTION, SOLUTION INTRAVENOUS; SUBCUTANEOUS at 17:48

## 2024-05-03 RX ADMIN — HEPARIN SODIUM 5000 UNITS: 5000 INJECTION INTRAVENOUS; SUBCUTANEOUS at 19:18

## 2024-05-03 RX ADMIN — MICONAZOLE NITRATE 1 APPLICATION: 20 POWDER TOPICAL at 08:51

## 2024-05-03 RX ADMIN — SODIUM CHLORIDE, POTASSIUM CHLORIDE, SODIUM LACTATE AND CALCIUM CHLORIDE 75 ML/HR: 600; 310; 30; 20 INJECTION, SOLUTION INTRAVENOUS at 02:42

## 2024-05-03 RX ADMIN — Medication 5 MG: at 19:16

## 2024-05-03 RX ADMIN — ZIPRASIDONE MESYLATE 10 MG: 20 INJECTION, POWDER, LYOPHILIZED, FOR SOLUTION INTRAMUSCULAR at 17:43

## 2024-05-03 RX ADMIN — AMLODIPINE BESYLATE 10 MG: 10 TABLET ORAL at 08:49

## 2024-05-03 RX ADMIN — SENNOSIDES AND DOCUSATE SODIUM 2 TABLET: 8.6; 5 TABLET ORAL at 08:49

## 2024-05-03 RX ADMIN — QUETIAPINE FUMARATE 100 MG: 100 TABLET ORAL at 08:49

## 2024-05-03 RX ADMIN — INSULIN LISPRO 3 UNITS: 100 INJECTION, SOLUTION INTRAVENOUS; SUBCUTANEOUS at 11:39

## 2024-05-03 RX ADMIN — HEPARIN SODIUM 5000 UNITS: 5000 INJECTION INTRAVENOUS; SUBCUTANEOUS at 05:57

## 2024-05-03 RX ADMIN — DONEPEZIL HYDROCHLORIDE 10 MG: 10 TABLET, FILM COATED ORAL at 19:16

## 2024-05-03 RX ADMIN — LISINOPRIL 30 MG: 20 TABLET ORAL at 08:48

## 2024-05-03 RX ADMIN — MICONAZOLE NITRATE 1 APPLICATION: 20 POWDER TOPICAL at 19:18

## 2024-05-03 RX ADMIN — QUETIAPINE FUMARATE 300 MG: 100 TABLET ORAL at 19:16

## 2024-05-03 RX ADMIN — PHENOBARBITAL 64.8 MG: 64.8 TABLET ORAL at 12:37

## 2024-05-03 RX ADMIN — HEPARIN SODIUM 5000 UNITS: 5000 INJECTION INTRAVENOUS; SUBCUTANEOUS at 12:36

## 2024-05-03 RX ADMIN — INSULIN LISPRO 4 UNITS: 100 INJECTION, SOLUTION INTRAVENOUS; SUBCUTANEOUS at 19:57

## 2024-05-03 NOTE — PLAN OF CARE
Goal Outcome Evaluation:      Patient was given geodon twice through the shift and versed once. During these times patient was agitated with loud yelling and attempt to push past RN and get out of bed. Patient is constantly grabbing Ivs but easily redirected by sitter. Report given to night shift sitter.

## 2024-05-03 NOTE — PLAN OF CARE
Goal Outcome Evaluation:  Plan of Care Reviewed With: other (see comments) (pt unable to participate; no family present at time of encounter)        Progress: no change  Outcome Evaluation: Pt seen at 1050; awake, did not react or respond to voice and name, pt gently picking at bed linens and moving his feet slowly along the mattress, sitter at bedside, no family present. Pt continued out of restraints at that time. CM pursuing possible placement with capability to manage behaviors.    1300 Palliative IDT meeting:  IVONNE EAST, RN,   After hours, weekends and holidays, contact Palliative Provider by calling [phone]       Problem: Palliative Care  Goal: Enhanced Quality of Life  Outcome: Ongoing, Progressing  Intervention: Optimize Function  Flowsheets (Taken 5/3/2024 1099)  Sensory Stimulation Regulation: (sitter)   quiet environment promoted   other (see comments)

## 2024-05-03 NOTE — CASE MANAGEMENT/SOCIAL WORK
Continued Stay Note  [facility]     Patient Name: Too Tai  MRN: [id number]  Today's Date: 5/3/2024    Admit Date: 4/18/2024    Plan: Goal is [facility]   Discharge Plan       Row Name 05/03/24 1059       Plan    Plan Goal is [facility]    Patient/Family in Agreement with Plan yes    Plan Comments NENA spoke with Payam at [facility] on 5/2 at ([phone] to check on sitter criteria. Per Payam, there is no criteria for a sitter and verified again that there is no criteria for PRN medications for agitation/behaviors. The ONLY criteria is that the patient has to be out of restraints for (72) hours PRIOR to a referral being faxed. Once a referral is faxed, the facility can accept OR decline. Referral can be faxed to [phone], ONCE patient meets this criteria. Updated wife, [place]. CM will continue to follow and assist.    Final Discharge Disposition Code 30 - still a patient                   Discharge Codes    No documentation.                 Expected Discharge Date and Time       Expected Discharge Date Expected Discharge Time    May 7, 2024               Ilene Brown, RN

## 2024-05-03 NOTE — PROGRESS NOTES
[facility]  PROGRESS NOTE    Patient Name: Too Tai  : 1955  MRN: [id number]    Date of Admission: 2024  Primary Care Physician: Des Geller MD    Subjective     CC: f/u dementia with agitation    HPI:  Staff reports patient slept well last night. Not in restraints. Wife at the bedside.     Copied text in this note has been reviewed and is accurate as of 24.       Objective     Vital Signs:   Temp:  [97 °F (36.1 °C)-97.9 °F (36.6 °C)] 97 °F (36.1 °C)  Heart Rate:  [86-90] 90  Resp:  [14-16] 16  BP: (138-167)/() 138/84     Physical Exam:  Constitutional: Awake, alert, NAD  HENT: NCAT, mucous membranes moist  Respiratory: Clear to auscultation bilaterally, nonlabored respirations   Cardiovascular: RRR, no murmurs, rubs, or gallops  Gastrointestinal: Positive bowel sounds, soft, nontender, nondistended  Musculoskeletal: No bilateral ankle edema  Psychiatric: Flat affect, cooperative  Neurologic: Oriented to self, RICO,  speech clear  Skin: No rashes        Results Reviewed:  LAB RESULTS:    Brief Urine Lab Results  (Last result in the past 365 days)        Color   Clarity   Blood   Leuk Est   Nitrite   Protein   CREAT   Urine HCG        24 0243 Yellow   Clear   Negative   Negative   Negative   Negative                 Microbiology Results Abnormal       None          No radiology results from the last 24 hrs    Results for orders placed during the hospital encounter of 24    Adult Transthoracic Echo Complete W/ Cont if Necessary Per Protocol    Interpretation Summary    Left ventricular systolic function is normal. Calculated left ventricular EF = 55.1% Normal left ventricular cavity size noted. Left ventricular wall thickness is consistent with mild concentric hypertrophy. Left ventricular diastolic function is consistent with (grade I) impaired relaxation.    The right ventricular cavity is mildly dilated. Normal right  ventricular systolic function noted.    There is calcification of the aortic valve. The aortic valve appears trileaflet. Mild aortic valve regurgitation is present. No aortic valve stenosis is present.    Mitral annular calcification is present. Trace mitral valve regurgitation is present. No significant mitral valve stenosis is present.    The tricuspid valve is structurally normal with no stenosis present. Trace tricuspid valve regurgitation is present. Insufficient TR velocity profile to estimate the right ventricular systolic pressure.    Mild dilation of the sinuses of Valsalva is present. Mild dilation of the ascending aorta is present. Ascending aorta = 4.2 cm    Current medications:  Scheduled Meds:amLODIPine, 10 mg, Oral, Q24H  cyanocobalamin, 1,000 mcg, Intramuscular, Q28 Days  donepezil, 10 mg, Oral, BID  folic acid, 1 mg, Oral, Daily  heparin (porcine), 5,000 Units, Subcutaneous, Q8H  insulin glargine, 17 Units, Subcutaneous, Q12H  insulin lispro, 2-7 Units, Subcutaneous, 4x Daily AC & at Bedtime  Insulin Lispro, 7 Units, Subcutaneous, TID With Meals  lisinopril, 30 mg, Oral, Daily  melatonin, 5 mg, Oral, Nightly  miconazole, 1 Application, Topical, Q12H  mirtazapine, 15 mg, Oral, Nightly  PHENobarbital, 130 mg, Oral, Nightly   Or  PHENobarbital, 130 mg, Intramuscular, Nightly  PHENobarbital, 64.8 mg, Oral, BID   Or  PHENobarbital, 65 mg, Intramuscular, BID  QUEtiapine, 100 mg, Oral, BID  QUEtiapine, 300 mg, Oral, Nightly  senna-docusate sodium, 2 tablet, Oral, BID  sertraline, 50 mg, Oral, Daily  sodium chloride, 10 mL, Intravenous, Q12H  temazepam, 30 mg, Oral, Nightly      Continuous Infusions:lactated ringers, 75 mL/hr, Last Rate: Stopped (05/03/24 0731)      PRN Meds:.  acetaminophen **OR** acetaminophen **OR** acetaminophen    senna-docusate sodium **AND** polyethylene glycol **AND** bisacodyl **AND** bisacodyl    Calcium Replacement - Follow Nurse / BPA Driven Protocol    dextrose    dextrose     glucagon (human recombinant)    Magnesium Standard Dose Replacement - Follow Nurse / BPA Driven Protocol    midazolam    Phosphorus Replacement - Follow Nurse / BPA Driven Protocol    Potassium Replacement - Follow Nurse / BPA Driven Protocol    sodium chloride    sodium chloride    ziprasidone    Assessment & Plan     Active Hospital Problems    Diagnosis  POA    **Agitation due to dementia [F03.911]  Yes    Agitation [R45.1]  Yes    Type 2 diabetes mellitus with hyperglycemia, with long-term current use of insulin [E11.65, Z79.4]  Not Applicable    Peripheral neuropathy [G62.9]  Yes    Hyperlipidemia [E78.5]  Yes    Benign prostatic hyperplasia [N40.0]  Yes    Benign essential HTN [I10]  Yes    Peripheral vascular disease [I73.9]  Yes    Coronary artery disease involving native coronary artery of native heart [I25.10]  Yes      Resolved Hospital Problems   No resolved problems to display.     Brief Hospital Course to date:  Too Tai is a 68yoM with PMH significant for advanced dementia, CAD, diabetes mellitus type 2, HLD and PVD. Admitted to [facility] ED on 4/18/24 for agitation / behavioral disturbances. Recently admitted to [facility] 2/24-3/4/24 for dementia with behavioral disturbances and again 3/27-4/4/24 for similar issues.      Advanced dementia complicated by behavioral disturbance with superimposed sleep disturbance   - Infectious work up unremarkable. UA reassuring. CXR without acute findings. CT head without acute findings  - Continue home Aricept and Mirtazapine 15mg QHS and melatonin 5 mg at 7 pm  - Continue Seroquel   - 5/3 trial of zoloft 50 mg and PRN versed 0.5 mg as needed for nursing care. (Ativan 0.25mg recommended per psych but it is not available)  - Neurology started Phenobarbital, level now WNL.  Will continue to monitor effect and recheck level on Monday per neurology recs  - Continue Temazepam 30mg QHS  - Appreciate palliative care assistance  - discussed treatment plan with  Neurology today 5/3  - May need geriatric psych facility vs LTC - CM looking into this. Telepsych eval completed 5/2/24  - IV fluids while taking in minimal PO   - PRN Yvonne, last given 5/3/24 at 8:25 am    Uncontrolled diabetes mellitus type 2  - Appears not on any home meds for DM  - A1c 9.5%  - Continue Lantus, increased to 17 units BID, Lispro 7 units TID AC + SSI     HTN  - continue Amlodipine 10mg daily  - continue Lisinopril 30mg daily     Expected Discharge Location and Transportation: TBD  Expected Discharge   Expected Discharge Date: 5/7/2024; Expected Discharge Time:      DVT prophylaxis:Medical and mechanical DVT prophylaxis orders are present.    AM-PAC 6 Clicks Score (PT): 14 (05/03/24 0850)    CODE STATUS:   Code Status and Medical Interventions:   Ordered at: 04/19/24 1037     Medical Intervention Limits:    NO intubation (DNI)     Level Of Support Discussed With:    Next of Kin (If No Surrogate)     Code Status (Patient has no pulse and is not breathing):    No CPR (Do Not Attempt to Resuscitate)     Medical Interventions (Patient has pulse or is breathing):    Limited Support     Laura Delgado, APRN  05/03/24

## 2024-05-03 NOTE — PAYOR COMM NOTE
"[facility]# [id number]   Clinical Update    Utilization Review  Phone [phone]  Fax [phone]    [place] [id number]     Too Richter (68 y.o. Male)       Date of Birth   1955    Social Security Number       [place]   [facility] [address]    Home Phone   [phone]    MRN   [id number]       Rastafarian   Rastafari    Marital Status                               Admission Date   4/18/24    Admission Type   Emergency    Admitting Provider   Kemal Rayo MD    Attending Provider   Kemal Rayo MD    Department, Room/Bed   [facility] 5G, S551/1       Discharge Date       Discharge Disposition       Discharge Destination                                 Attending Provider: Kemal Rayo MD    Allergies: Bactrim [Sulfamethoxazole-trimethoprim], Adhesive Tape, Neosporin [Neomycin-bacitracin Zn-polymyx]    Isolation: None   Infection: None   Code Status: No CPR    Ht: 188 cm (74\")   Wt: 97.5 kg (215 lb)    Admission Cmt: None   Principal Problem: Agitation due to dementia [F03.911]                   Active Insurance as of 4/18/2024       Primary Coverage       Payor Plan Insurance Group Employer/Plan Group    AETNA MEDICARE REPLACEMENT AETNA MED [id number] PPO [id number]       Payor Plan Address Payor Plan Phone Number Payor Plan Fax Number Effective Dates    [address] [phone]  1/1/2024 - None Entered    [facility] [id number]         Subscriber Name Subscriber Birth Date Member ID       TOO RICHTER [id number]                     Emergency Contacts        (Rel.) Home Phone Work Phone Mobile Phone    WILLIE RICHTER (Spouse) [phone] -- [phone]    Ritchie Richter (Son) [phone] -- [phone]    Meg Bustillo (Relative) [phone] -- [phone]                 Physician Progress Notes (last 72 hours)        Meliza Kahn, APRN at 05/03/24 0857               Ilene Brown, " RN     Case Management     Case Management/Social Work     Signed     Date of Service: 24 1108  Creation Time: 24 110     Signed         Continued Stay Note   Binh     Patient Name: Too Tai                 MRN: [id number]  Today's Date: 5/3/2024                  Admit Date: 2024     Plan: Goal is [facility]    Discharge Plan         Row Name 24 1059           Plan     Plan Goal is [facility]     Patient/Family in Agreement with Plan yes     Plan Comments CM spoke with Payam at [facility] on  at [phone] to check on sitter criteria. Per Payam, there is no criteria for a sitter and verified again that there is no criteria for PRN medications for agitation/behaviors. The ONLY criteria is that the patient has to be out of restraints for (72) hours PRIOR to a referral being faxed. Once a referral is faxed, the facility can accept OR decline. Referral can be faxed to [phone], ONCE patient meets this criteria. CM will continue to follow and assist.     Final Discharge Disposition Code 30 - still a patient                         Discharge Codes    No documentation.                      Expected Discharge Date and Time         Expected Discharge Date Expected Discharge Time     May 7, 2024                     Ilene Brown RN                                  [facility] Neurology    Progress Note    Patient Name: Too Tai  : 1955  MRN: [id number]  Primary Care Physician:  Des Geller MD  Date of admission: 2024    Subjective     Chief Complaint: Dementia with behavioral disturbance     History of Present Illness   Patient seen resting comfortably in bed.  He was asleep and did not awake for exam.  He was in no distress.  Able to move all extremities.  Per nursing he rested well overnight.  Sitter at bedside no restraints in  use.    Review of Systems   Unable to assess due to lethargy    Objective     Physical Exam  Vitals and nursing note reviewed.   Constitutional:       General: He is not in acute distress.     Appearance: He is not ill-appearing.   Cardiovascular:      Rate and Rhythm: Normal rate.   Pulmonary:      Effort: Pulmonary effort is normal.   Neurological:      Mental Status: He is lethargic.      Cranial Nerves: No cranial nerve deficit or facial asymmetry.      Sensory: No sensory deficit.      Motor: No weakness or seizure activity.      Comments:             Vitals:   Temp:  [96.4 °F (35.8 °C)-97.9 °F (36.6 °C)] 97.9 °F (36.6 °C)  Heart Rate:  [86-92] 90  Resp:  [14-16] 14  BP: (138-167)/() 138/84    Current Medications    Current Facility-Administered Medications:     acetaminophen (TYLENOL) tablet 650 mg, 650 mg, Oral, Q4H PRN, 650 mg at 04/30/24 2042 **OR** acetaminophen (TYLENOL) 160 MG/5ML oral solution 650 mg, 650 mg, Oral, Q4H PRN **OR** acetaminophen (TYLENOL) suppository 650 mg, 650 mg, Rectal, Q4H PRN, TraciKamala bacasie G, DO    amLODIPine (NORVASC) tablet 10 mg, 10 mg, Oral, Q24H, Calin Benitez MD, 10 mg at 05/03/24 0849    sennosides-docusate (PERICOLACE) 8.6-50 MG per tablet 2 tablet, 2 tablet, Oral, BID, 2 tablet at 05/03/24 0849 **AND** polyethylene glycol (MIRALAX) packet 17 g, 17 g, Oral, Daily PRN **AND** bisacodyl (DULCOLAX) EC tablet 5 mg, 5 mg, Oral, Daily PRN **AND** bisacodyl (DULCOLAX) suppository 10 mg, 10 mg, Rectal, Daily PRN, Jennifer Bai, APRN    Calcium Replacement - Follow Nurse / BPA Driven Protocol, , Does not apply, PRN, Traci, Nerissa G, DO    cyanocobalamin injection 1,000 mcg, 1,000 mcg, Intramuscular, Q28 Days, Darrell Jhaveri, DO, 1,000 mcg at 04/19/24 1532    dextrose (D50W) (25 g/50 mL) IV injection 25 g, 25 g, Intravenous, Q15 Min PRN, Nerissa Aviles, DO    dextrose (GLUTOSE) oral gel 15 g, 15 g, Oral, Q15 Min PRN, Nerissa Aviles G, DO    donepezil (ARICEPT) tablet 10 mg,  10 mg, Oral, BID, Darrell Jhaveri, DO, 10 mg at 05/03/24 0849    folic acid (FOLVITE) tablet 1 mg, 1 mg, Oral, Daily, Nerissa Aviles, DO, 1 mg at 05/03/24 0849    glucagon (GLUCAGEN) injection 1 mg, 1 mg, Intramuscular, Q15 Min PRN, TraciKamala bacasie G, DO    heparin (porcine) 5000 UNIT/ML injection 5,000 Units, 5,000 Units, Subcutaneous, Q8H, Nerissa Aviles G, DO, 5,000 Units at 05/03/24 0557    insulin glargine (LANTUS, SEMGLEE) injection 17 Units, 17 Units, Subcutaneous, Q12H, Jaclyn Holguin APRN, 17 Units at 05/03/24 0849    Insulin Lispro (humaLOG) injection 2-7 Units, 2-7 Units, Subcutaneous, 4x Daily AC & at Bedtime, Nerissa Aviles DO, 3 Units at 05/02/24 1657    Insulin Lispro (humaLOG) injection 7 Units, 7 Units, Subcutaneous, TID With Meals, Jaclyn Holguin APRN, 7 Units at 05/03/24 0850    lactated ringers infusion, 75 mL/hr, Intravenous, Continuous, Parisa Madrigal PA-C, Stopped at 05/03/24 0731    lisinopril (PRINIVIL,ZESTRIL) tablet 30 mg, 30 mg, Oral, Daily, Parisa Madrigal PA-C, 30 mg at 05/03/24 0848    Magnesium Standard Dose Replacement - Follow Nurse / BPA Driven Protocol, , Does not apply, PRN, Nerissa Aviles G, DO    melatonin tablet 5 mg, 5 mg, Oral, Nightly, Jaclyn Holguin APRN, 5 mg at 05/02/24 2031    miconazole (MICOTIN) 2 % powder 1 Application, 1 Application, Topical, Q12H, Cecily Bain, DO, 1 Application at 05/03/24 0851    mirtazapine (REMERON) tablet 15 mg, 15 mg, Oral, Nightly, Darrell Jhaveri, DO, 15 mg at 05/02/24 2031    PHENobarbital tablet 130 mg, 130 mg, Oral, Nightly, 130 mg at 05/02/24 2029 **OR** PHENobarbital injection 130 mg, 130 mg, Intramuscular, Nightly, Brijesh Mac MD, 130 mg at 05/01/24 2229    PHENobarbital tablet 64.8 mg, 64.8 mg, Oral, BID, 64.8 mg at 05/03/24 0849 **OR** PHENobarbital injection 65 mg, 65 mg, Intramuscular, BID, Brijesh Mac MD    Phosphorus Replacement - Follow Nurse / BPA Driven Protocol, , Does not apply, PRN, Traci,  Nerissa G, DO    Potassium Replacement - Follow Nurse / BPA Driven Protocol, , Does not apply, PRN, Traci, Nerissa G, DO    QUEtiapine (SEROquel) tablet 100 mg, 100 mg, Oral, BID, Kahn, April K, APRN, 100 mg at 05/03/24 0849    QUEtiapine (SEROquel) tablet 300 mg, 300 mg, Oral, Nightly, Kahn April K, APRN, 300 mg at 05/02/24 2031    sodium chloride 0.9 % flush 10 mL, 10 mL, Intravenous, Q12H, Traci, Nerissa G, DO, 10 mL at 05/02/24 2031    sodium chloride 0.9 % flush 10 mL, 10 mL, Intravenous, PRN, Traci, Nerissa G, DO    sodium chloride 0.9 % infusion 40 mL, 40 mL, Intravenous, PRN, Traci, Nerissa G, DO, 40 mL at 04/23/24 0546    temazepam (RESTORIL) capsule 30 mg, 30 mg, Oral, Nightly, Darrell Jhaveri, DO, 30 mg at 05/02/24 2031    ziprasidone (GEODON) injection 10 mg, 10 mg, Intramuscular, Q4H PRN, Butler April K, APRN, 10 mg at 05/03/24 0825    Laboratory Results:   Lab Results   Component Value Date    GLUCOSE 265 (H) 05/02/2024    CALCIUM 8.2 (L) 05/02/2024     05/02/2024    K 3.6 05/02/2024    CO2 29.0 05/02/2024     05/02/2024    BUN 14 05/02/2024    CREATININE 0.88 05/02/2024    EGFRIFAFRI 102 02/21/2022    EGFRIFNONA 88 02/21/2022    BCR 15.9 05/02/2024    ANIONGAP 6.0 05/02/2024     Lab Results   Component Value Date    WBC 10.47 04/19/2024    HGB 15.5 04/19/2024    HCT 46.7 04/19/2024    MCV 92.7 04/19/2024     04/19/2024     Lab Results   Component Value Date    CHOL 220 (H) 01/08/2024    CHOL 120 08/01/2019    CHOL 117 04/15/2019     Lab Results   Component Value Date    HDL 41 01/08/2024    HDL 37 (L) 08/03/2023    HDL 41 04/03/2023     Lab Results   Component Value Date     (H) 01/08/2024     (H) 08/03/2023     (H) 04/03/2023     Lab Results   Component Value Date    TRIG 108 01/08/2024    TRIG 208 (H) 08/03/2023    TRIG 138 04/03/2023     Lab Results   Component Value Date    HGBA1C 9.50 (H) 04/18/2024     Lab Results   Component Value Date    INR 1.1  11/25/2019    INR 1.1 09/10/2018    PROTIME 12.5 11/25/2019    PROTIME 13.3 09/10/2018     Lab Results   Component Value Date    FOLATE 10.20 02/24/2024     Lab Results   Component Value Date    [id number] 384 04/18/2024             Assessment / Plan   Brief Patient Summary:  Too Tai is a 68 y.o. male with a past medical history of advanced dementia with behavioral disturbances and nonverbal, CAD, T2DM, HLD, PVD who presented to [facility] ED due to increase in agitation.  Patient has had multiple recent hospitalizations.      Plan:   Advanced dementia with behavioral disturbances  Sleep Deprivation   Continue home Aricept 10 mg nightly  Continue phenobarbital 64.8 mg/ 64.8 mg/ 129.6 mg  Phenobarbital level 12.4; recheck trough level on Monday  Continue home Remeron 15 mg nightly  Continue Seroquel 100 mg /100 mg / 300 mg.   QTc 446 this AM.  Patient is not tolerating continuous telemetry.  Scheduled EKGs daily.  Continue Restoril 30 mg nightly.  Geodon as needed for extreme agitation; last dose 5/3 at 8:25 AM  CT head negative for acute abnormality  Vitamin B12 384, IM cyanocobalamin 1000 mcg  Case management following for difficult posthospitalization disposition.  Patient is not safe to return home due to concern of being in danger for his wife and himself.   Palliative consulted, appreciate recommendations  Psych consult pending, appreciate recommendations please see consult note for recommendations  Schedule melatonin 5 mg nightly for 7 PM per psychology recommendations  Trial of Zoloft 50 mg daily per psych recommendations.  Trial of Versed 0.5 mg as needed for nursing care.  Please use prior to Geodon.  Psychology recommended Ativan 0.25 mg with nursing care however it is not readily available.  General neurology will follow-up on Monday.  Please feel free to reach out the weekend if needed.       I have discussed the above with the patient, bedside RN and IVONNE Greenberg  Time spent with patient: 50  minutes in face-to-face evaluation and management of the patient.    Copied text in this note has been reviewed and is accurate as of 24.     IVONNE Santos              Electronically signed by Meliza Kahn APRN at 24 1210       Jaclyn Holguin APRN at 24 1019              [facility] Medicine Services  PROGRESS NOTE    Patient Name: Too Tai  : 1955  MRN: [id number]    Date of Admission: 2024  Primary Care Physician: Des Geller MD    Subjective     CC: f/u dementia with agitation    HPI:  No new issues overnight  Just completed telepsych eval  Currently calm    Objective     Vital Signs:   Temp:  [96.4 °F (35.8 °C)-97.8 °F (36.6 °C)] 96.4 °F (35.8 °C)  Heart Rate:  [71-92] 92  Resp:  [16-18] 16  BP: (156-175)/(79-86) 156/82     Physical Exam:  Constitutional: No acute distress, awake, alert, wife at bedside  HENT: NCAT, mucous membranes moist  Respiratory: Clear to auscultation bilaterally, respiratory effort normal   Cardiovascular: RRR, no murmurs, rubs, or gallops  Gastrointestinal: Positive bowel sounds, soft, nontender, nondistended  Musculoskeletal: No bilateral ankle edema. Upper extremities in restraints  Psychiatric: Appropriate affect, cooperative  Neurologic: Oriented x 1, RICO, speech clear  Skin: No rashes noted      Results Reviewed:  LAB RESULTS:    Brief Urine Lab Results  (Last result in the past 365 days)        Color   Clarity   Blood   Leuk Est   Nitrite   Protein   CREAT   Urine HCG        24 0243 Yellow   Clear   Negative   Negative   Negative   Negative                 Microbiology Results Abnormal       None          No radiology results from the last 24 hrs    Results for orders placed during the hospital encounter of 24    Adult Transthoracic Echo Complete W/ Cont if Necessary Per Protocol    Interpretation Summary    Left ventricular systolic function is normal. Calculated left ventricular EF = 55.1%  Normal left ventricular cavity size noted. Left ventricular wall thickness is consistent with mild concentric hypertrophy. Left ventricular diastolic function is consistent with (grade I) impaired relaxation.    The right ventricular cavity is mildly dilated. Normal right ventricular systolic function noted.    There is calcification of the aortic valve. The aortic valve appears trileaflet. Mild aortic valve regurgitation is present. No aortic valve stenosis is present.    Mitral annular calcification is present. Trace mitral valve regurgitation is present. No significant mitral valve stenosis is present.    The tricuspid valve is structurally normal with no stenosis present. Trace tricuspid valve regurgitation is present. Insufficient TR velocity profile to estimate the right ventricular systolic pressure.    Mild dilation of the sinuses of Valsalva is present. Mild dilation of the ascending aorta is present. Ascending aorta = 4.2 cm    Current medications:  Scheduled Meds:amLODIPine, 10 mg, Oral, Q24H  cyanocobalamin, 1,000 mcg, Intramuscular, Q28 Days  donepezil, 10 mg, Oral, BID  folic acid, 1 mg, Oral, Daily  heparin (porcine), 5,000 Units, Subcutaneous, Q8H  insulin glargine, 15 Units, Subcutaneous, Q12H  insulin lispro, 2-7 Units, Subcutaneous, 4x Daily AC & at Bedtime  Insulin Lispro, 7 Units, Subcutaneous, TID With Meals  lisinopril, 30 mg, Oral, Daily  miconazole, 1 Application, Topical, Q12H  mirtazapine, 15 mg, Oral, Nightly  PHENobarbital, 130 mg, Oral, Nightly   Or  PHENobarbital, 130 mg, Intramuscular, Nightly  PHENobarbital, 64.8 mg, Oral, BID   Or  PHENobarbital, 65 mg, Intramuscular, BID  potassium chloride ER, 40 mEq, Oral, Q4H  QUEtiapine, 100 mg, Oral, BID  QUEtiapine, 300 mg, Oral, Nightly  senna-docusate sodium, 2 tablet, Oral, BID  sodium chloride, 10 mL, Intravenous, Q12H  temazepam, 30 mg, Oral, Nightly      Continuous Infusions:lactated ringers, 75 mL/hr, Last Rate: 75 mL/hr (05/02/24  0146)      PRN Meds:.  acetaminophen **OR** acetaminophen **OR** acetaminophen    senna-docusate sodium **AND** polyethylene glycol **AND** bisacodyl **AND** bisacodyl    Calcium Replacement - Follow Nurse / BPA Driven Protocol    dextrose    dextrose    glucagon (human recombinant)    Magnesium Standard Dose Replacement - Follow Nurse / BPA Driven Protocol    melatonin    Phosphorus Replacement - Follow Nurse / BPA Driven Protocol    Potassium Replacement - Follow Nurse / BPA Driven Protocol    sodium chloride    sodium chloride    ziprasidone    Assessment & Plan     Active Hospital Problems    Diagnosis  POA    **Agitation due to dementia [F03.911]  Yes    Agitation [R45.1]  Yes    Type 2 diabetes mellitus with hyperglycemia, with long-term current use of insulin [E11.65, Z79.4]  Not Applicable    Peripheral neuropathy [G62.9]  Yes    Hyperlipidemia [E78.5]  Yes    Benign prostatic hyperplasia [N40.0]  Yes    Benign essential HTN [I10]  Yes    Peripheral vascular disease [I73.9]  Yes    Coronary artery disease involving native coronary artery of native heart [I25.10]  Yes      Resolved Hospital Problems   No resolved problems to display.     Brief Hospital Course to date:  Too Tai is a 68yoM with PMH significant for advanced dementia, CAD, diabetes mellitus type 2, HLD and PVD. Admitted to [facility] ED on 4/18/24 for agitation / behavioral disturbances. Recently admitted to [facility] 2/24-3/4/24 for dementia with behavioral disturbances and again 3/27-4/4/24 for similar issues.      Advanced dementia complicated by behavioral disturbance with superimposed sleep disturbance   - Infectious work up unremarkable. UA reassuring. CXR without acute findings. CT head without acute findings  - Continue home Aricept and Mirtazapine 15mg QHS   - Continue Seroquel   - Neurology started Phenobarbital, level now WNL.  Will continue to monitor effect and recheck level on Monday per neurology recs  - Continue  Temazepam 30mg QHS  - Appreciate palliative care assistance  - discussed treatment plan with Neurology multiple   - May need geriatric psych facility vs LTC - CM looking into this. Telepsych eval done today. Will review note when done  - IV fluids while taking in minimal PO   - PRN Yvonne, last given 24    Uncontrolled diabetes mellitus type 2  - Appears not on any home meds for DM  - A1c 9.5%  - Continue Lantus, increased to 17 units BID, Lispro 7 units TID AC + SSI    Latest Reference Range & Units 24 20:04 24 07:21 24 08:50 24 11:31   Glucose 70 - 130 mg/dL 231 (H) 196 (H) 265 (H) 146 (H)     HTN  - continue Amlodipine 10mg daily  - continue Lisinopril 30mg daily     Expected Discharge Location and Transportation: TBD  Expected Discharge   Expected Discharge Date: 2024; Expected Discharge Time:      DVT prophylaxis:Medical and mechanical DVT prophylaxis orders are present.    AM-PAC 6 Clicks Score (PT): 13 (24)    CODE STATUS:   Code Status and Medical Interventions:   Ordered at: 24 1037     Medical Intervention Limits:    NO intubation (DNI)     Level Of Support Discussed With:    Next of Kin (If No Surrogate)     Code Status (Patient has no pulse and is not breathing):    No CPR (Do Not Attempt to Resuscitate)     Medical Interventions (Patient has pulse or is breathing):    Limited Support     IVONNE Rodriguez  24        Electronically signed by Jaclyn Holguin APRN at 24 1251       Meliza Kahn APRN at 24 0844           [facility] Neurology    Progress Note    Patient Name: Too Tai  : 1955  MRN: [id number]  Primary Care Physician:  Des Geller MD  Date of admission: 2024    Subjective     Chief Complaint: Dementia with behavioral disturbances    History of Present Illness   Patient seen resting in bed.  Appears more calm than previous assessment.  Still requiring 4-point restraints.  Per bedside RN patient  rested more throughout night.  Did not require any as needed medications overnight.    Review of Systems   Unable to assess due to baseline mental status    Objective     Physical Exam  Vitals and nursing note reviewed.   Constitutional:       General: He is not in acute distress.     Appearance: He is not ill-appearing.   Eyes:      Extraocular Movements: Extraocular movements intact.      Pupils: Pupils are equal, round, and reactive to light.      Comments: No nystagmus or deviated gaze noted   Cardiovascular:      Rate and Rhythm: Normal rate.   Pulmonary:      Effort: Pulmonary effort is normal.   Neurological:      Mental Status: He is alert. Mental status is at baseline.      Cranial Nerves: No facial asymmetry.      Sensory: No sensory deficit.      Motor: No weakness or seizure activity.          Vitals:   Temp:  [96.7 °F (35.9 °C)-97.8 °F (36.6 °C)] 96.7 °F (35.9 °C)  Heart Rate:  [71-74] 71  Resp:  [16-18] 16  BP: (160-175)/(79-86) 160/79    Current Medications    Current Facility-Administered Medications:     acetaminophen (TYLENOL) tablet 650 mg, 650 mg, Oral, Q4H PRN, 650 mg at 04/30/24 2042 **OR** acetaminophen (TYLENOL) 160 MG/5ML oral solution 650 mg, 650 mg, Oral, Q4H PRN **OR** acetaminophen (TYLENOL) suppository 650 mg, 650 mg, Rectal, Q4H PRN, Nerissa Aviles, DO    amLODIPine (NORVASC) tablet 10 mg, 10 mg, Oral, Q24H, Calin Benitez MD, 10 mg at 05/01/24 0828    sennosides-docusate (PERICOLACE) 8.6-50 MG per tablet 2 tablet, 2 tablet, Oral, BID, 2 tablet at 05/01/24 2011 **AND** polyethylene glycol (MIRALAX) packet 17 g, 17 g, Oral, Daily PRN **AND** bisacodyl (DULCOLAX) EC tablet 5 mg, 5 mg, Oral, Daily PRN **AND** bisacodyl (DULCOLAX) suppository 10 mg, 10 mg, Rectal, Daily PRN, Jennifer Bai, APRN    Calcium Replacement - Follow Nurse / BPA Driven Protocol, , Does not apply, PRN, Nerissa Aviles, DO    cyanocobalamin injection 1,000 mcg, 1,000 mcg, Intramuscular, Q28 Days, Darrell Jhaveri,  DO, 1,000 mcg at 04/19/24 1532    dextrose (D50W) (25 g/50 mL) IV injection 25 g, 25 g, Intravenous, Q15 Min PRN, Kamala Avilessie G, DO    dextrose (GLUTOSE) oral gel 15 g, 15 g, Oral, Q15 Min PRN, TraciKamala bacasie G, DO    donepezil (ARICEPT) tablet 10 mg, 10 mg, Oral, BID, Darrell Jhaveri, DO, 10 mg at 05/01/24 2011    folic acid (FOLVITE) tablet 1 mg, 1 mg, Oral, Daily, TraciNerissa baca G, DO, 1 mg at 05/01/24 0829    glucagon (GLUCAGEN) injection 1 mg, 1 mg, Intramuscular, Q15 Min PRN, TraciKamala bacasie G, DO    heparin (porcine) 5000 UNIT/ML injection 5,000 Units, 5,000 Units, Subcutaneous, Q8H, Nerissa Aviles G, DO, 5,000 Units at 05/02/24 0507    insulin glargine (LANTUS, SEMGLEE) injection 15 Units, 15 Units, Subcutaneous, Q12H, Bradley Bainey, DO, 15 Units at 05/01/24 2026    Insulin Lispro (humaLOG) injection 2-7 Units, 2-7 Units, Subcutaneous, 4x Daily AC & at Bedtime, Nerissa Aviles, DO, 3 Units at 05/01/24 2026    Insulin Lispro (humaLOG) injection 7 Units, 7 Units, Subcutaneous, TID With Meals, [place], Jaclyn, APRN, 7 Units at 05/01/24 1731    lactated ringers infusion, 75 mL/hr, Intravenous, Continuous, Parisa Madrigal PA-C, Last Rate: 75 mL/hr at 05/02/24 0146, 75 mL/hr at 05/02/24 0146    lisinopril (PRINIVIL,ZESTRIL) tablet 30 mg, 30 mg, Oral, Daily, Parisa Madrigal PA-C, 30 mg at 05/01/24 0830    Magnesium Standard Dose Replacement - Follow Nurse / BPA Driven Protocol, , Does not apply, PRN, TraciNerissa baca G, DO    melatonin tablet 5 mg, 5 mg, Oral, Nightly PRN, Nerissa Aviles, , 5 mg at 05/01/24 2011    miconazole (MICOTIN) 2 % powder 1 Application, 1 Application, Topical, Q12H, Cecily Bain, DO, 1 Application at 05/01/24 2245    mirtazapine (REMERON) tablet 15 mg, 15 mg, Oral, Nightly, Darrell Jhaveri, DO, 15 mg at 05/01/24 2011    PHENobarbital tablet 130 mg, 130 mg, Oral, Nightly **OR** PHENobarbital injection 130 mg, 130 mg, Intramuscular, Nightly, Brijesh Mac MD, 130 mg at  05/01/24 2229    PHENobarbital tablet 64.8 mg, 64.8 mg, Oral, BID, 64.8 mg at 05/01/24 1259 **OR** PHENobarbital injection 65 mg, 65 mg, Intramuscular, BID, Brijesh Mac MD    Phosphorus Replacement - Follow Nurse / BPA Driven Protocol, , Does not apply, PRN, Traci, Nerissa G, DO    Potassium Replacement - Follow Nurse / BPA Driven Protocol, , Does not apply, PRN, Traci, Nerissa G, DO    QUEtiapine (SEROquel) tablet 100 mg, 100 mg, Oral, BID, Meliza Kahn, APRN, 100 mg at 05/01/24 1310    QUEtiapine (SEROquel) tablet 300 mg, 300 mg, Oral, Nightly, Meliza Kahn, APRN, 300 mg at 05/01/24 2010    sodium chloride 0.9 % flush 10 mL, 10 mL, Intravenous, Q12H, Traci, Nerissa G, DO, 10 mL at 05/01/24 2029    sodium chloride 0.9 % flush 10 mL, 10 mL, Intravenous, PRN, Traci, Nerissa G, DO    sodium chloride 0.9 % infusion 40 mL, 40 mL, Intravenous, PRN, Traci, Nerissa G, DO, 40 mL at 04/23/24 0546    temazepam (RESTORIL) capsule 30 mg, 30 mg, Oral, Nightly, Darrell Jahveri, DO, 30 mg at 05/01/24 2010    ziprasidone (GEODON) injection 10 mg, 10 mg, Intramuscular, Q4H PRN, Meliza Kahn, APRN, 10 mg at 04/30/24 1157    Laboratory Results:   Lab Results   Component Value Date    GLUCOSE 166 (H) 04/19/2024    CALCIUM 9.9 04/19/2024     04/19/2024    K 4.4 04/19/2024    CO2 30.0 (H) 04/19/2024     04/19/2024    BUN 16 04/19/2024    CREATININE 0.82 04/19/2024    EGFRIFAFRI 102 02/21/2022    EGFRIFNONA 88 02/21/2022    BCR 19.5 04/19/2024    ANIONGAP 8.0 04/19/2024     Lab Results   Component Value Date    WBC 10.47 04/19/2024    HGB 15.5 04/19/2024    HCT 46.7 04/19/2024    MCV 92.7 04/19/2024     04/19/2024     Lab Results   Component Value Date    CHOL 220 (H) 01/08/2024    CHOL 120 08/01/2019    CHOL 117 04/15/2019     Lab Results   Component Value Date    HDL 41 01/08/2024    HDL 37 (L) 08/03/2023    HDL 41 04/03/2023     Lab Results   Component Value Date     (H) 01/08/2024     (H)  08/03/2023     (H) 04/03/2023     Lab Results   Component Value Date    TRIG 108 01/08/2024    TRIG 208 (H) 08/03/2023    TRIG 138 04/03/2023     Lab Results   Component Value Date    HGBA1C 9.50 (H) 04/18/2024     Lab Results   Component Value Date    INR 1.1 11/25/2019    INR 1.1 09/10/2018    PROTIME 12.5 11/25/2019    PROTIME 13.3 09/10/2018     Lab Results   Component Value Date    FOLATE 10.20 02/24/2024     Lab Results   Component Value Date    [id number] 384 04/18/2024             Assessment / Plan   Brief Patient Summary:  Too Tai is a 68 y.o. male with a past medical history of advanced dementia with behavioral disturbances and nonverbal, CAD, T2DM, HLD, PVD who presented to [facility] ED due to increase in agitation.  Patient has had multiple recent hospitalizations.      Plan:   Advanced dementia with behavioral disturbances  Sleep Deprivation   Continue home Aricept 10 mg nightly  Increase phenobarbital 64.8 mg/ 64.8 mg/ 129.6 mg  Phenobarbital level 12.4; recheck trough level on Monday  Continue home Remeron 15 mg nightly  Continue Seroquel 100 mg /100 mg / 300 mg.   QTc 464 this AM.   Continue Restoril 30 mg nightly.  Geodon as needed for extreme agitation; last dose 4/30 at 1157  CT head negative for acute abnormality  Vitamin B12 384, IM cyanocobalamin 1000 mcg  Case management following for difficult posthospitalization disposition.  Patient is not safe to return home due to concern of being in danger for his wife and himself.   Palliative consulted, appreciate recommendations.   Psych consult pending, appreciate recommendations  General neurology will continue to follow    I have discussed the above with the patient, bedside RN and IVONNE Rodriguez  Time spent with patient: 50 minutes in face-to-face evaluation and management of the patient.    Copied text in this note has been reviewed and is accurate as of 05/02/24.     IVONNE Santos              Electronically signed by Femi  IVONNE Webb at 24 1242       Kemal Rayo MD at 24 8357              [facility] Medicine Services  PROGRESS NOTE    Patient Name: Too Tai  : 1955  MRN: [id number]    Date of Admission: 2024  Primary Care Physician: Des Geller MD    Subjective     CC: f/u dementia with agitation    HPI:  Sitting up in bed, would like to get up.  Wife at bedside.      Objective     Vital Signs:   Temp:  [96.8 °F (36 °C)] 96.8 °F (36 °C)  Resp:  [18] 18  BP: (176)/(84) 176/84     Physical Exam:  Non toxic, in bed  MM moist  RRR  CTAB  Abd soft, NT  No edema  Slightly flat affect  Awake, speech clear, some confusion  In restraints    Spouse at bedside      Results Reviewed:  LAB RESULTS:    Brief Urine Lab Results  (Last result in the past 365 days)        Color   Clarity   Blood   Leuk Est   Nitrite   Protein   CREAT   Urine HCG        24 0243 Yellow   Clear   Negative   Negative   Negative   Negative                 Microbiology Results Abnormal       None          No radiology results from the last 24 hrs    Results for orders placed during the hospital encounter of 24    Adult Transthoracic Echo Complete W/ Cont if Necessary Per Protocol    Interpretation Summary    Left ventricular systolic function is normal. Calculated left ventricular EF = 55.1% Normal left ventricular cavity size noted. Left ventricular wall thickness is consistent with mild concentric hypertrophy. Left ventricular diastolic function is consistent with (grade I) impaired relaxation.    The right ventricular cavity is mildly dilated. Normal right ventricular systolic function noted.    There is calcification of the aortic valve. The aortic valve appears trileaflet. Mild aortic valve regurgitation is present. No aortic valve stenosis is present.    Mitral annular calcification is present. Trace mitral valve regurgitation is present. No significant mitral valve stenosis is present.     The tricuspid valve is structurally normal with no stenosis present. Trace tricuspid valve regurgitation is present. Insufficient TR velocity profile to estimate the right ventricular systolic pressure.    Mild dilation of the sinuses of Valsalva is present. Mild dilation of the ascending aorta is present. Ascending aorta = 4.2 cm    Current medications:  Scheduled Meds:amLODIPine, 10 mg, Oral, Q24H  cyanocobalamin, 1,000 mcg, Intramuscular, Q28 Days  donepezil, 10 mg, Oral, BID  folic acid, 1 mg, Oral, Daily  heparin (porcine), 5,000 Units, Subcutaneous, Q8H  insulin glargine, 15 Units, Subcutaneous, Q12H  insulin lispro, 2-7 Units, Subcutaneous, 4x Daily AC & at Bedtime  Insulin Lispro, 7 Units, Subcutaneous, TID With Meals  lisinopril, 30 mg, Oral, Daily  miconazole, 1 Application, Topical, Q12H  mirtazapine, 15 mg, Oral, Nightly  PHENobarbital, 130 mg, Oral, Nightly   Or  PHENobarbital, 130 mg, Intramuscular, Nightly  PHENobarbital, 64.8 mg, Oral, BID   Or  PHENobarbital, 65 mg, Intramuscular, BID  QUEtiapine, 100 mg, Oral, BID  QUEtiapine, 300 mg, Oral, Nightly  senna-docusate sodium, 2 tablet, Oral, BID  sodium chloride, 10 mL, Intravenous, Q12H  sterile water (preservative free), , ,   temazepam, 30 mg, Oral, Nightly      Continuous Infusions:lactated ringers, 75 mL/hr, Last Rate: 75 mL/hr (05/01/24 1219)      PRN Meds:.  acetaminophen **OR** acetaminophen **OR** acetaminophen    senna-docusate sodium **AND** polyethylene glycol **AND** bisacodyl **AND** bisacodyl    Calcium Replacement - Follow Nurse / BPA Driven Protocol    dextrose    dextrose    glucagon (human recombinant)    Magnesium Standard Dose Replacement - Follow Nurse / BPA Driven Protocol    melatonin    Phosphorus Replacement - Follow Nurse / BPA Driven Protocol    Potassium Replacement - Follow Nurse / BPA Driven Protocol    sodium chloride    sodium chloride    sterile water (preservative free)    ziprasidone    Assessment & Plan     Active  Hospital Problems    Diagnosis  POA    **Agitation due to dementia [F03.911]  Yes    Agitation [R45.1]  Yes    Type 2 diabetes mellitus with hyperglycemia, with long-term current use of insulin [E11.65, Z79.4]  Not Applicable    Peripheral neuropathy [G62.9]  Yes    Hyperlipidemia [E78.5]  Yes    Benign prostatic hyperplasia [N40.0]  Yes    Benign essential HTN [I10]  Yes    Peripheral vascular disease [I73.9]  Yes    Coronary artery disease involving native coronary artery of native heart [I25.10]  Yes      Resolved Hospital Problems   No resolved problems to display.     Brief Hospital Course to date:  Too Tai is a 68yoM with PMH significant for advanced dementia, CAD, diabetes mellitus type 2, HLD and PVD. Admitted to [facility] ED on 4/18/24 for agitation / behavioral disturbances. Recently admitted to [facility] 2/24-3/4/24 for dementia with behavioral disturbances and again 3/27-4/4/24 for similar issues.      Advanced dementia complicated by behavioral disturbance with superimposed sleep disturbance   - Infectious work up unremarkable. UA reassuring. CXR without acute findings. CT head without acute findings  - Continue home Aricept and Mirtazapine 15mg QHS   - Continue Seroquel   - Neurology started Phenobarbital   - Continue Temazepam 30mg QHS  - Appreciate palliative care assistance  - discussed treatment plan with Neurology   - May need geriatric psych facility vs LTC - CM looking into this. Telepsych appointment scheduled for Friday  - IV fluids while taking in minimal PO     Uncontrolled diabetes mellitus type 2  - Appears not on any home meds for DM  - A1c 9.5%  - Continue Lantus 15 units BID, Lispro 7 units TID AC + SSI   - glucose reviewed    HTN  - continue Amlodipine 10mg daily  - continue Lisinopril 30mg daily   - bmp am    Expected Discharge Location and Transportation: TBD  Expected Discharge   Expected Discharge Date: 5/6/2024; Expected Discharge Time:      DVT prophylaxis:Medical  and mechanical DVT prophylaxis orders are present.    AM-PAC 6 Clicks Score (PT): 13 (24 0800)    CODE STATUS:   Code Status and Medical Interventions:   Ordered at: 24 1037     Medical Intervention Limits:    NO intubation (DNI)     Level Of Support Discussed With:    Next of Kin (If No Surrogate)     Code Status (Patient has no pulse and is not breathing):    No CPR (Do Not Attempt to Resuscitate)     Medical Interventions (Patient has pulse or is breathing):    Limited Support     Kemal Rayo MD  24        Electronically signed by Kemal Rayo MD at 24 1404       Meliza Kahn APRN at 24 0900           [facility] Neurology    Progress Note    Patient Name: Too Tai  : 1955  MRN: [id number]  Primary Care Physician:  Des Geller MD  Date of admission: 2024    Subjective     Chief Complaint: Dementia with behavioral disturbances    History of Present Illness   Per bedside RN patient did rest approximately 4 to 5 hours overnight, this is an improvement from previous night.  Patient still requiring 4-point restraints he was able to move all extremities with no focal deficit.    Review of Systems   Unable to assess due to baseline mental status    Objective     Physical Exam  Vitals and nursing note reviewed.   Constitutional:       General: He is not in acute distress.     Appearance: He is not ill-appearing.   Eyes:      Extraocular Movements: Extraocular movements intact.      Pupils: Pupils are equal, round, and reactive to light.      Comments: No nystagmus or deviated gaze noted   Cardiovascular:      Rate and Rhythm: Normal rate.   Pulmonary:      Effort: Pulmonary effort is normal.   Neurological:      Mental Status: He is alert. Mental status is at baseline.      Cranial Nerves: Cranial nerves 2-12 are intact.      Sensory: Sensation is intact.      Motor: Motor function is intact.          Vitals:   Temp:  [96.8 °F (36 °C)] 96.8 °F (36  °C)  Resp:  [18] 18  BP: (176)/(84) 176/84    Current Medications    Current Facility-Administered Medications:     acetaminophen (TYLENOL) tablet 650 mg, 650 mg, Oral, Q4H PRN, 650 mg at 04/30/24 2042 **OR** acetaminophen (TYLENOL) 160 MG/5ML oral solution 650 mg, 650 mg, Oral, Q4H PRN **OR** acetaminophen (TYLENOL) suppository 650 mg, 650 mg, Rectal, Q4H PRN, Traci, Nerissa G, DO    amLODIPine (NORVASC) tablet 10 mg, 10 mg, Oral, Q24H, Calin Benitez MD, 10 mg at 05/01/24 0828    sennosides-docusate (PERICOLACE) 8.6-50 MG per tablet 2 tablet, 2 tablet, Oral, BID, 2 tablet at 04/30/24 2022 **AND** polyethylene glycol (MIRALAX) packet 17 g, 17 g, Oral, Daily PRN **AND** bisacodyl (DULCOLAX) EC tablet 5 mg, 5 mg, Oral, Daily PRN **AND** bisacodyl (DULCOLAX) suppository 10 mg, 10 mg, Rectal, Daily PRN, Jennifer Bai APRN    Calcium Replacement - Follow Nurse / BPA Driven Protocol, , Does not apply, PRN, Traci, Nerissa G, DO    cyanocobalamin injection 1,000 mcg, 1,000 mcg, Intramuscular, Q28 Days, Darrell Jhaveri A, DO, 1,000 mcg at 04/19/24 1532    dextrose (D50W) (25 g/50 mL) IV injection 25 g, 25 g, Intravenous, Q15 Min PRN, Traci, Nerissa G, DO    dextrose (GLUTOSE) oral gel 15 g, 15 g, Oral, Q15 Min PRN, Traci, Nerissa G, DO    donepezil (ARICEPT) tablet 10 mg, 10 mg, Oral, BID, Darrell Jhaveri A, DO, 10 mg at 05/01/24 0827    folic acid (FOLVITE) tablet 1 mg, 1 mg, Oral, Daily, Traci, Nerissa G, DO, 1 mg at 05/01/24 0829    glucagon (GLUCAGEN) injection 1 mg, 1 mg, Intramuscular, Q15 Min PRN, Nerissa Aviles DO    heparin (porcine) 5000 UNIT/ML injection 5,000 Units, 5,000 Units, Subcutaneous, Q8H, Nerissa Aviles DO, 5,000 Units at 05/01/24 0519    insulin glargine (LANTUS, SEMGLEE) injection 15 Units, 15 Units, Subcutaneous, Q12H, Cecily Bain, DO, 15 Units at 05/01/24 0829    Insulin Lispro (humaLOG) injection 2-7 Units, 2-7 Units, Subcutaneous, 4x Daily AC & at Bedtime, Nerissa Aviles DO, 5 Units at 04/30/24  2042    Insulin Lispro (humaLOG) injection 7 Units, 7 Units, Subcutaneous, TID With Meals, Jaclyn Holguin, APRN, 7 Units at 05/01/24 0829    lactated ringers infusion, 75 mL/hr, Intravenous, Continuous, Parisa Madrigal PA-C, Last Rate: 75 mL/hr at 05/01/24 0221, 75 mL/hr at 05/01/24 0221    lisinopril (PRINIVIL,ZESTRIL) tablet 30 mg, 30 mg, Oral, Daily, Parisa Madrigal PA-C, 30 mg at 05/01/24 0830    Magnesium Standard Dose Replacement - Follow Nurse / BPA Driven Protocol, , Does not apply, PRN, TraciNerissa baca G, DO    melatonin tablet 5 mg, 5 mg, Oral, Nightly PRN, Nerissa Aviles G, DO, 5 mg at 04/30/24 2022    miconazole (MICOTIN) 2 % powder 1 Application, 1 Application, Topical, Q12H, Cecily Bain, DO, 1 Application at 04/30/24 2214    mirtazapine (REMERON) tablet 15 mg, 15 mg, Oral, Nightly, Darrell Jhaveri A, DO, 15 mg at 04/30/24 2022    PHENobarbital tablet 64.8 mg, 64.8 mg, Oral, Q8H, 64.8 mg at 04/30/24 2052 **OR** PHENobarbital injection 65 mg, 65 mg, Intramuscular, Q8H, Darrell Jhaveri, DO, 65 mg at 05/01/24 0519    Phosphorus Replacement - Follow Nurse / BPA Driven Protocol, , Does not apply, PRN, Traci Nerissa G, DO    Potassium Replacement - Follow Nurse / BPA Driven Protocol, , Does not apply, PRN, Traci, Nerissa G, DO    QUEtiapine (SEROquel) tablet 100 mg, 100 mg, Oral, BID, Butler, April K, APRN, 100 mg at 05/01/24 0827    QUEtiapine (SEROquel) tablet 300 mg, 300 mg, Oral, Nightly, Butler, April K, APRN, 300 mg at 04/30/24 2023    sodium chloride 0.9 % flush 10 mL, 10 mL, Intravenous, Q12H, Traci, Nerissa G, DO, 10 mL at 04/30/24 2023    sodium chloride 0.9 % flush 10 mL, 10 mL, Intravenous, PRN, Traci, Nerissa G, DO    sodium chloride 0.9 % infusion 40 mL, 40 mL, Intravenous, PRN, Traci, Nerissa G, DO, 40 mL at 04/23/24 0546    temazepam (RESTORIL) capsule 30 mg, 30 mg, Oral, Nightly, Darrell Jhaveri, DO, 30 mg at 04/30/24 2022    ziprasidone (GEODON) injection 10 mg, 10 mg, Intramuscular, Q4H  PRN, Kahn, April K, APRN, 10 mg at 04/30/24 1157    Laboratory Results:   Lab Results   Component Value Date    GLUCOSE 166 (H) 04/19/2024    CALCIUM 9.9 04/19/2024     04/19/2024    K 4.4 04/19/2024    CO2 30.0 (H) 04/19/2024     04/19/2024    BUN 16 04/19/2024    CREATININE 0.82 04/19/2024    EGFRIFAFRI 102 02/21/2022    EGFRIFNONA 88 02/21/2022    BCR 19.5 04/19/2024    ANIONGAP 8.0 04/19/2024     Lab Results   Component Value Date    WBC 10.47 04/19/2024    HGB 15.5 04/19/2024    HCT 46.7 04/19/2024    MCV 92.7 04/19/2024     04/19/2024     Lab Results   Component Value Date    CHOL 220 (H) 01/08/2024    CHOL 120 08/01/2019    CHOL 117 04/15/2019     Lab Results   Component Value Date    HDL 41 01/08/2024    HDL 37 (L) 08/03/2023    HDL 41 04/03/2023     Lab Results   Component Value Date     (H) 01/08/2024     (H) 08/03/2023     (H) 04/03/2023     Lab Results   Component Value Date    TRIG 108 01/08/2024    TRIG 208 (H) 08/03/2023    TRIG 138 04/03/2023     Lab Results   Component Value Date    HGBA1C 9.50 (H) 04/18/2024     Lab Results   Component Value Date    INR 1.1 11/25/2019    INR 1.1 09/10/2018    PROTIME 12.5 11/25/2019    PROTIME 13.3 09/10/2018     Lab Results   Component Value Date    FOLATE 10.20 02/24/2024     Lab Results   Component Value Date    [id number] 384 04/18/2024             Assessment / Plan   Brief Patient Summary:  Too Tai is a 68 y.o. male with a past medical history of advanced dementia with behavioral disturbances and nonverbal, CAD, T2DM, HLD, PVD who presented to [facility] ED due to increase in agitation.  Patient has had multiple recent hospitalizations.      Plan:   Advanced dementia with behavioral disturbances  Sleep Deprivation   Continue home Aricept 10 mg nightly  Increase phenobarbital 64.8 mg/ 64.8 mg/ 129.6 mg  Phenobarbital level 9.4; recheck trough level in a.m.  Continue home Remeron 15 mg nightly  Continue Seroquel 100 mg  /100 mg / 300 mg.   QTc 464 this AM.   Continue Restoril 30 mg nightly.  Geodon as needed for extreme agitation; last dose 4/30 at 1157  CT head negative for acute abnormality  Vitamin B12 384, IM cyanocobalamin 1000 mcg  Case management following for difficult posthospitalization disposition.  Patient is not safe to return home due to concern of being in danger for his wife and himself.   Palliative consulted, appreciate recommendations.   Psych consult pending, scheduled for Friday, 5/13/2024.  Appreciate recommendations  General neurology will continue to follow    I have discussed the above with the patient, patient's wife, bedside RN Dr. Rayo  Time spent with patient: 50 minutes in face-to-face evaluation and management of the patient.    Copied text in this note has been reviewed and is accurate as of 05/01/24.     IVONNE Santos              Electronically signed by Meliza Kahn APRN at 05/01/24 1047          Consult Notes (last 72 hours)        Tanesha Delgado APRN at 05/02/24 0859              Hospital Consult      This provider is located at [facility], [facility], located at [address] [place] [address], [facility] and is conducting  a secure MyChart Video Visit through [employer]. Patient is being evaluated today as an inpatient at [facility] and states they are in a secure environment for this appointment. The patient consents to be seen remotely, and when consent is given they understand that the consent allows for patient identifiable information to be sent to a third party as needed. They may refuse to be seen remotely at any time. The electronic data is encrypted and password protected, and the patient  has been advised of the potential risks to privacy not withstanding such measures. The patient's condition being diagnosed/treated is appropriate for telemedicine. The provider identified herself as well as her credentials to patient. Patient's  identity confirmed by requesting wife t to to correctly state his name and date of birth.    Date: 24  Patient Name: Too Tai  : 1955   MRN: [id number]   Attending: Kemal Rayo MD    Referring Provider: No ref. provider found    Chief Complaint:      ICD-10-CM ICD-9-CM   1. Dementia with behavioral disturbance  F03.918 294.21   2. Aggressive behavior  R46.89 V40.39   3. Agitation due to dementia  F03.911 294.21   4. Hyperglycemia due to diabetes mellitus  E11.65 250.02      History of Present Illness:   Too Tai is a 68 y.o. male currently hospitalized at [facility] for dementia with behavioral disturbances. Behavioral Health was consulted to evaluate patient due to concerns regarding increasing agitation that has prompted 3 recent hospital admissions.. This is his  initial encounter with this provider. Too Tai is a 67yo M with PMH significant for advanced dementia, CAD, diabetes mellitus type 2, HLD and PVD. Admitted to [facility] ED on 24 for agitation / behavioral disturbances. Recently admitted to [facility] -3/4/24 for dementia with behavioral disturbances and again 3/27-24 for increased confusion and falls.     Patient  lying in bed awake with wrist restraints during visit. He is alert, nonverbal and unresponsive to provider's questions.  He is calm with intermittent restlessness fidgeting with wrist restraints and sitting up in bed. Cognition/mental status unable to be evaluated due to his lack of speech and interaction with provider. He is essentially unable to recognize an evaluation of is taking place on his behalf. He did however, initially wave at provider with wife prompting him.  His wife of 47 years, Caroline, and son Bill are at bedside and provide historical information. Other son passed away five years ago.  Patient currently lives at home  ([facility]) with his wife, son Bill, his wife, his 2 children, his mother-in-law, and  "4 grandchildren that visit often . Patient was born in [facility] and raised by parents. Has sisters. No family mental health history wife is aware of . HS graduate.  Worked for [employer]  [employer] 45 yrs before he had to retire in 2017 due to complications from femoral bypass.     Wife states she has never been given a formal diagnosis regarding patient's condition.  She denies any  history of depression, anxiety, substance use, psych hospitalizations and legal history.  Former smoker. He has no history of seizures.  Fell off a horse in his 20s and \"busted \"the back of his head. Wife reports pt saw Dr Costa at [facility] who said he thinks he has Alzheimer's but has never been given formal diagnosis she is aware of.  Caroline noticed 5-6 yrs ago \"things weren't quite right\" She recalls one of the kids  needed air in their bicycle tire and he didn't know how to perform the task, one he had done many times before.  Agitation started 2 years ago and has gotten worse. 9/22 the family went to [facility] and he wondered off for 1 hr and stranger brought him back. He was delusional and thought they were selling drugs in [facility]. Wife states sometimes he tries to talk to people who are not there and in the last month he started arguing with the mirror. Wife says he acts like he can't see her. She puts her hand in front of her eyes but he doesn't blink.  Wife says she is usually able to de-escalate him if he acts like he is going to hit her at home.  She sates he gets agitated when he puts his  shoes on the wrong feet and argues when she would correct him. However, in  an hour, he would have a moment of clarity and tell her  his shoes on the wrong feet. Wife states he is in  restraints due to hitting staff.  He was unable to stand upright and walk last time she took him home and she had to restrain at home because  he feel 6-7 times in 24 hrs.    She reports he lost his ability to function independently " "last fall. At home he would tell her he had to go to the bathroom and she would have to show him the toilet and he still sometimes urinates in the trash can or  in the laundry room. He is able to sit an hour at home before he gets  restless. She says he is constantly moving and has tried to get out of the house. They now have ADT alarms on  windows and doors. She has a stick placed to jam the sliding door he has nearly torn out trying to get the door open. He will let he wash his face and shave him but gets mad if she tries to  shower him or wash his private area. Appetite is \"fair\" as long as he can hold it and eat it himself. He wont let wife spoon feed him. Appetite is  good at home.  Wife reports insomnia at home is relatively new, and he didn't sleep the last  3 days at home. He  is sleeping good in hospital and  \" right now he is pretty mellow.\" Seroquel was initially prescribed  to calm him down, not for insomnia.  Wife states he was going to go to [facility] but they declined. She could probably take him home but needs more  help at home. She has a helper but is not much help. She is concerned about his weakness and having only stood 2x in 3 weeks. She states PT wont see him because  pt gets \"grouchy with them\". Wife feels PT should be more persistnet Prior meds per wife: Seroquel >1yr, Restoril 30 mg Nov 2023, Depakote-March, Rexulti titration up to 2 mg x  1 mo came to hospital Feb because  he was jerking in torso and arms.       Subjective   Subjective      Review of Systems   Constitutional:  Positive for activity change and fatigue.   Neurological:  Positive for speech difficulty, memory problem and confusion.   Psychiatric/Behavioral:  Positive for agitation and decreased concentration.    All other systems reviewed and are negative.      Depression Screening:  Level of Risk per Screen: screen negative (4/19/2024 12:55 AM)    PHQ-9 Depression Screening     C-SSRS (Recent)  Q1 Wished to be Dead (Past Month): " no  Q2 Suicidal Thoughts (Past Month): no  Q6 Suicide Behavior (Lifetime): no  Level of Risk per Screen: screen negative       Past Psychiatric History:  none per wife       Substance Abuse History/Last use: quit smoking 27 yrs ago. No alcohol or illicit substance use                   Legal History:     Work History:               Highest level of education obtained: 12th grade               History? no              Patient's Occupation: retired    Interpersonal/Relational:              Marital Status:  47 yrs              Support system: significant other and son     Social History:  Where was patient born: [facility]  Upbringing: parents  Where does patient currently live: [facility]  Living situation: wife, son, MIL, COLEMAN and grandkids     Family History:   Family History   Problem Relation Age of Onset    Diabetes Mother     Heart disease Father     Hypertension Father     Hyperlipidemia Father     Diabetes Sister     Diabetes Maternal Grandfather     Diabetes Sister     Diabetes Sister        Past Medical History:   Diagnosis Date    Coronary artery disease     Dementia     Diabetes mellitus     Hyperlipidemia     Peripheral vascular disease        Past Surgical History:   Procedure Laterality Date    CORONARY ARTERY BYPASS GRAFT      FEMORAL ARTERY - POPLITEAL ARTERY BYPASS GRAFT           Current Facility-Administered Medications:     acetaminophen (TYLENOL) tablet 650 mg, 650 mg, Oral, Q4H PRN, 650 mg at 04/30/24 2042 **OR** acetaminophen (TYLENOL) 160 MG/5ML oral solution 650 mg, 650 mg, Oral, Q4H PRN **OR** acetaminophen (TYLENOL) suppository 650 mg, 650 mg, Rectal, Q4H PRN, Nerissa Aviles DO    amLODIPine (NORVASC) tablet 10 mg, 10 mg, Oral, Q24H, Calin Benitez MD, 10 mg at 05/01/24 0828    sennosides-docusate (PERICOLACE) 8.6-50 MG per tablet 2 tablet, 2 tablet, Oral, BID, 2 tablet at 05/01/24 2011 **AND** polyethylene glycol (MIRALAX) packet 17 g, 17 g, Oral, Daily PRN **AND**  bisacodyl (DULCOLAX) EC tablet 5 mg, 5 mg, Oral, Daily PRN **AND** bisacodyl (DULCOLAX) suppository 10 mg, 10 mg, Rectal, Daily PRN, Jennifer Bai, APRN    Calcium Replacement - Follow Nurse / BPA Driven Protocol, , Does not apply, PRN, Traci, Nerissa G, DO    cyanocobalamin injection 1,000 mcg, 1,000 mcg, Intramuscular, Q28 Days, Darrell Jhaveri A, DO, 1,000 mcg at 04/19/24 1532    dextrose (D50W) (25 g/50 mL) IV injection 25 g, 25 g, Intravenous, Q15 Min PRN, TraciKamala bacasie G, DO    dextrose (GLUTOSE) oral gel 15 g, 15 g, Oral, Q15 Min PRN, Traci Nerissa G, DO    donepezil (ARICEPT) tablet 10 mg, 10 mg, Oral, BID, Darrell Jhaveri A, DO, 10 mg at 05/01/24 2011    folic acid (FOLVITE) tablet 1 mg, 1 mg, Oral, Daily, TraciKamala bacasie G, DO, 1 mg at 05/01/24 0829    glucagon (GLUCAGEN) injection 1 mg, 1 mg, Intramuscular, Q15 Min PRN, Traci, Nerissa G, DO    heparin (porcine) 5000 UNIT/ML injection 5,000 Units, 5,000 Units, Subcutaneous, Q8H, Tresa Avilese G, DO, 5,000 Units at 05/02/24 0507    insulin glargine (LANTUS, SEMGLEE) injection 15 Units, 15 Units, Subcutaneous, Q12H, Cecily Bain, DO, 15 Units at 05/01/24 2026    Insulin Lispro (humaLOG) injection 2-7 Units, 2-7 Units, Subcutaneous, 4x Daily AC & at Bedtime, Nerissa Aviles G, DO, 3 Units at 05/01/24 2026    Insulin Lispro (humaLOG) injection 7 Units, 7 Units, Subcutaneous, TID With Meals, Jaclyn Holguin, APRN, 7 Units at 05/01/24 1731    lactated ringers infusion, 75 mL/hr, Intravenous, Continuous, Parisa Madrigal PA-C, Last Rate: 75 mL/hr at 05/02/24 0146, 75 mL/hr at 05/02/24 0146    lisinopril (PRINIVIL,ZESTRIL) tablet 30 mg, 30 mg, Oral, Daily, Parisa Madrigal PA-C, 30 mg at 05/01/24 0830    Magnesium Standard Dose Replacement - Follow Nurse / BPA Driven Protocol, , Does not apply, PRN, Nerissa Aviles DO    melatonin tablet 5 mg, 5 mg, Oral, Nightly PRN, Nerissa Aviles DO, 5 mg at 05/01/24 2011    miconazole (MICOTIN) 2 % powder 1 Application, 1  Application, Topical, Q12H, Cecily Bain, DO, 1 Application at 05/01/24 2245    mirtazapine (REMERON) tablet 15 mg, 15 mg, Oral, Nightly, Darrell Jhaveri, , 15 mg at 05/01/24 2011    PHENobarbital tablet 130 mg, 130 mg, Oral, Nightly **OR** PHENobarbital injection 130 mg, 130 mg, Intramuscular, Nightly, Brijesh Mac MD, 130 mg at 05/01/24 2229    PHENobarbital tablet 64.8 mg, 64.8 mg, Oral, BID, 64.8 mg at 05/01/24 1259 **OR** PHENobarbital injection 65 mg, 65 mg, Intramuscular, BID, Brijesh Mac MD    Phosphorus Replacement - Follow Nurse / BPA Driven Protocol, , Does not apply, PRN, Traci, Nerissa G, DO    Potassium Replacement - Follow Nurse / BPA Driven Protocol, , Does not apply, PRN, Traci, Nerissa G, DO    QUEtiapine (SEROquel) tablet 100 mg, 100 mg, Oral, BID, Meliza Kahn, APRN, 100 mg at 05/01/24 1310    QUEtiapine (SEROquel) tablet 300 mg, 300 mg, Oral, Nightly, Meliza Kahn, APRN, 300 mg at 05/01/24 2010    sodium chloride 0.9 % flush 10 mL, 10 mL, Intravenous, Q12H, Traci, Nerissa G, DO, 10 mL at 05/01/24 2029    sodium chloride 0.9 % flush 10 mL, 10 mL, Intravenous, PRN, Traci, Nerissa G, DO    sodium chloride 0.9 % infusion 40 mL, 40 mL, Intravenous, PRN, Traci, Nerissa G, DO, 40 mL at 04/23/24 0546    temazepam (RESTORIL) capsule 30 mg, 30 mg, Oral, Nightly, Darrell Jhaveri DO, 30 mg at 05/01/24 2010    ziprasidone (GEODON) injection 10 mg, 10 mg, Intramuscular, Q4H PRN, Meliza Kahn, APRN, 10 mg at 04/30/24 1157    Medication Considerations:  ELISEO reviewed and appropriate.      Allergies   Allergen Reactions    Bactrim [Sulfamethoxazole-Trimethoprim] Rash    Adhesive Tape Rash    Neosporin [Neomycin-Bacitracin Zn-Polymyx] Rash       Objective   Objective     Physical Exam:  Temp:  [96.7 °F (35.9 °C)-97.8 °F (36.6 °C)] 96.7 °F (35.9 °C)  Heart Rate:  [71-74] 71  Resp:  [16-18] 16  BP: (160-175)/(79-86) 160/79  Body mass index is 27.6 kg/m².     Mental Status Exam:   MENTAL  STATUS EXAM   General Appearance:  Well developed  Eye Contact:  Poor eye contact  Attitude:  Other  Other Comment:  Calm, unable to participate due to cognitive status  Motor Activity:  Other  Other Comment:  MARIIA-waved at camera, sat up in bed, per wife, has not walked in 3 weeks  Muscle Strength:  Other  Other Comment:  MARIIA  Speech:  Other  Other Comment:  Mute  Language:  Other  Other Comment:  Mute  Mood and affect:  Other  Other Comment:  Calm, restless (trying to get restraint off)  Hopelessness:  Denies  Loneliness: Denies  Thought Process:  Other  Other Comment:  MARIIA pt nonverbal  Associations/ Thought Content:  Other  Other Comment:  Nonverbal  Hallucinations:  Visual, auditory and other  Other Comment:  Reported per wife  Suicidal Ideations:  Not present  Homicidal Ideation:  Not present  Sensorium:  Confused  Orientation:  Other  Other Comment:  MARIIA nonverbal  Immediate Recall, Recent, and Remote Memory:  Other  Other Comment:  MARIIA nonverbal  Attention Span/ Concentration:  Easily distracted  Fund of Knowledge:  Poor  Intellectual Functioning:  Below average  Insight:  Poor  Judgement:  Poor  Reliability:  Poor  Impulse Control:  Poor       TSH   Date Value Ref Range Status   04/18/2024 3.220 0.270 - 4.200 uIU/mL Final     Vitamin B-12   Date Value Ref Range Status   04/18/2024 384 211 - 946 pg/mL Final     Magnesium   Date Value Ref Range Status   04/19/2024 1.9 1.6 - 2.4 mg/dL Final     Folate   Date Value Ref Range Status   02/24/2024 10.20 4.78 - 24.20 ng/mL Final       Lab Results   Component Value Date    GLUCOSE 166 (H) 04/19/2024    BUN 16 04/19/2024    CREATININE 0.82 04/19/2024    EGFRRESULT 73.6 08/03/2023    EGFR 96.3 04/19/2024    BCR 19.5 04/19/2024    K 4.4 04/19/2024    CO2 30.0 (H) 04/19/2024    CALCIUM 9.9 04/19/2024    PROTENTOTREF 7.0 08/03/2023    ALBUMIN 3.9 04/18/2024    BILITOT 0.3 04/18/2024    AST 17 04/18/2024    ALT 13 04/18/2024       Lab Results   Component Value Date    WBC  10.47 04/19/2024    HGB 15.5 04/19/2024    HCT 46.7 04/19/2024    MCV 92.7 04/19/2024     04/19/2024       Lab Results   Component Value Date    CHOL 220 (H) 01/08/2024    CHLPL 201 (H) 08/03/2023    TRIG 108 01/08/2024    HDL 41 01/08/2024     (H) 01/08/2024       3/28/24 CT head  1. No acute intracranial abnormality.  2. Moderate chronic small vessel ischemic change.        Assessment / Plan      Visit Diagnosis/Orders Placed This Visit:  Dementia with psychosis    Recommendations:   Encourage  staff to prioritize regulating circadian rhythm, minimize night-time interruptions, etc  Change melatonin to 5 mg hs scheduled and administer around 7pm  Consider ODT  or IM Olanzipine for acute agitation prn (instead of geodon)  (Qtc sparing) start with 2.5 to 5 mg    Consider starting Zoloft or Lexapro (qtc sparing) SSRIs  have best evidence for agitation with dementia   Taper off Seroquel (more prone to cause orthostatic hypotension, hyponatremia and increases fall risk. Would favor Abilify (fewer EPS side effects) or Risperdal if insomnia is persistent  Consider ativan 0.25 mg prn before bathing/therapy to calm agitation  Taper off Temazepam not recommended in this population  Avoid anticholinergic meds hen possible  F/U with behavioral health provider after discharge  MRI ordered 12/7/23-results not available to view    Impression/Formulation: Patient appears alert, Unable to assess cognition due to nonverbal presentation and minimal interaction with provider    Tanesha Delgado [facility]     Electronically signed by Tanesha Delgado, IVONNE at 05/02/24 0862

## 2024-05-03 NOTE — PROGRESS NOTES
[facility] Neurology    Progress Note    Patient Name: Too Tai  : 1955  MRN: [id number]  Primary Care Physician:  Des Geller MD  Date of admission: 2024    Subjective     Chief Complaint: Dementia with behavioral disturbance     History of Present Illness   Patient seen resting comfortably in bed.  He was asleep and did not awake for exam.  He was in no distress.  Able to move all extremities.  Per nursing he rested well overnight.  Sitter at bedside no restraints in use.    Review of Systems   Unable to assess due to lethargy    Objective     Physical Exam  Vitals and nursing note reviewed.   Constitutional:       General: He is not in acute distress.     Appearance: He is not ill-appearing.   Cardiovascular:      Rate and Rhythm: Normal rate.   Pulmonary:      Effort: Pulmonary effort is normal.   Neurological:      Mental Status: He is lethargic.      Cranial Nerves: No cranial nerve deficit or facial asymmetry.      Sensory: No sensory deficit.      Motor: No weakness or seizure activity.      Comments:             Vitals:   Temp:  [96.4 °F (35.8 °C)-97.9 °F (36.6 °C)] 97.9 °F (36.6 °C)  Heart Rate:  [86-92] 90  Resp:  [14-16] 14  BP: (138-167)/() 138/84    Current Medications    Current Facility-Administered Medications:     acetaminophen (TYLENOL) tablet 650 mg, 650 mg, Oral, Q4H PRN, 650 mg at 24 **OR** acetaminophen (TYLENOL) 160 MG/5ML oral solution 650 mg, 650 mg, Oral, Q4H PRN **OR** acetaminophen (TYLENOL) suppository 650 mg, 650 mg, Rectal, Q4H PRN, Nerissa Aviles G, DO    amLODIPine (NORVASC) tablet 10 mg, 10 mg, Oral, Q24H, Calin Benitez MD, 10 mg at 24 0849    sennosides-docusate (PERICOLACE) 8.6-50 MG per tablet 2 tablet, 2 tablet, Oral, BID, 2 tablet at 24 0849 **AND** polyethylene glycol (MIRALAX) packet 17 g, 17 g, Oral, Daily PRN **AND** bisacodyl (DULCOLAX) EC tablet 5 mg, 5 mg, Oral, Daily PRN **AND** bisacodyl (DULCOLAX)  suppository 10 mg, 10 mg, Rectal, Daily PRN, Jennifer Bai, APRN    Calcium Replacement - Follow Nurse / BPA Driven Protocol, , Does not apply, PRN, Kamala Avilessie G, DO    cyanocobalamin injection 1,000 mcg, 1,000 mcg, Intramuscular, Q28 Days, Darrell Jhaveri A, DO, 1,000 mcg at 04/19/24 1532    dextrose (D50W) (25 g/50 mL) IV injection 25 g, 25 g, Intravenous, Q15 Min PRN, TraciKamala bacasie G, DO    dextrose (GLUTOSE) oral gel 15 g, 15 g, Oral, Q15 Min PRN, Traci, Nerissa G, DO    donepezil (ARICEPT) tablet 10 mg, 10 mg, Oral, BID, Darrell Jhaveri A, DO, 10 mg at 05/03/24 0849    folic acid (FOLVITE) tablet 1 mg, 1 mg, Oral, Daily, Nerissa Aviles G, DO, 1 mg at 05/03/24 0849    glucagon (GLUCAGEN) injection 1 mg, 1 mg, Intramuscular, Q15 Min PRN, Kamala Avilessie G, DO    heparin (porcine) 5000 UNIT/ML injection 5,000 Units, 5,000 Units, Subcutaneous, Q8H, Tresa Avilese G, DO, 5,000 Units at 05/03/24 0557    insulin glargine (LANTUS, SEMGLEE) injection 17 Units, 17 Units, Subcutaneous, Q12H, Jaclyn Holguin APRN, 17 Units at 05/03/24 0849    Insulin Lispro (humaLOG) injection 2-7 Units, 2-7 Units, Subcutaneous, 4x Daily AC & at Bedtime, Nerissa Aviles G, DO, 3 Units at 05/02/24 1657    Insulin Lispro (humaLOG) injection 7 Units, 7 Units, Subcutaneous, TID With Meals, Jaclyn Holguin APRN, 7 Units at 05/03/24 0850    lactated ringers infusion, 75 mL/hr, Intravenous, Continuous, Parisa Madrigal PA-C, Stopped at 05/03/24 0731    lisinopril (PRINIVIL,ZESTRIL) tablet 30 mg, 30 mg, Oral, Daily, Parisa Madrigal PA-C, 30 mg at 05/03/24 0848    Magnesium Standard Dose Replacement - Follow Nurse / BPA Driven Protocol, , Does not apply, PRN, Nerissa Aviles DO    melatonin tablet 5 mg, 5 mg, Oral, Nightly, Jaclyn Holguin APRN, 5 mg at 05/02/24 2031    miconazole (MICOTIN) 2 % powder 1 Application, 1 Application, Topical, Q12H, Cecily Bain DO, 1 Application at 05/03/24 0851    mirtazapine (REMERON) tablet 15 mg, 15 mg, Oral,  Nightly, Darrell Jhaveri, DO, 15 mg at 05/02/24 2031    PHENobarbital tablet 130 mg, 130 mg, Oral, Nightly, 130 mg at 05/02/24 2029 **OR** PHENobarbital injection 130 mg, 130 mg, Intramuscular, Nightly, Brijesh Mac MD, 130 mg at 05/01/24 2229    PHENobarbital tablet 64.8 mg, 64.8 mg, Oral, BID, 64.8 mg at 05/03/24 0849 **OR** PHENobarbital injection 65 mg, 65 mg, Intramuscular, BID, Brijesh Mac MD    Phosphorus Replacement - Follow Nurse / BPA Driven Protocol, , Does not apply, PRN, Traci, Nerissa G, DO    Potassium Replacement - Follow Nurse / BPA Driven Protocol, , Does not apply, PRN, Traci, Nerissa G, DO    QUEtiapine (SEROquel) tablet 100 mg, 100 mg, Oral, BID, Meliza Kahn, APRN, 100 mg at 05/03/24 0849    QUEtiapine (SEROquel) tablet 300 mg, 300 mg, Oral, Nightly, Meliza Kahn, APRN, 300 mg at 05/02/24 2031    sodium chloride 0.9 % flush 10 mL, 10 mL, Intravenous, Q12H, Traci, Nerissa G, DO, 10 mL at 05/02/24 2031    sodium chloride 0.9 % flush 10 mL, 10 mL, Intravenous, PRN, Traci, Nerissa G, DO    sodium chloride 0.9 % infusion 40 mL, 40 mL, Intravenous, PRN, Traci, Nerissa G, DO, 40 mL at 04/23/24 0546    temazepam (RESTORIL) capsule 30 mg, 30 mg, Oral, Nightly, Darrell Jhaveri, DO, 30 mg at 05/02/24 2031    ziprasidone (GEODON) injection 10 mg, 10 mg, Intramuscular, Q4H PRN, Meliza Kahn, APRN, 10 mg at 05/03/24 0825    Laboratory Results:   Lab Results   Component Value Date    GLUCOSE 265 (H) 05/02/2024    CALCIUM 8.2 (L) 05/02/2024     05/02/2024    K 3.6 05/02/2024    CO2 29.0 05/02/2024     05/02/2024    BUN 14 05/02/2024    CREATININE 0.88 05/02/2024    EGFRIFAFRI 102 02/21/2022    EGFRIFNONA 88 02/21/2022    BCR 15.9 05/02/2024    ANIONGAP 6.0 05/02/2024     Lab Results   Component Value Date    WBC 10.47 04/19/2024    HGB 15.5 04/19/2024    HCT 46.7 04/19/2024    MCV 92.7 04/19/2024     04/19/2024     Lab Results   Component Value Date    CHOL 220 (H)  01/08/2024    CHOL 120 08/01/2019    CHOL 117 04/15/2019     Lab Results   Component Value Date    HDL 41 01/08/2024    HDL 37 (L) 08/03/2023    HDL 41 04/03/2023     Lab Results   Component Value Date     (H) 01/08/2024     (H) 08/03/2023     (H) 04/03/2023     Lab Results   Component Value Date    TRIG 108 01/08/2024    TRIG 208 (H) 08/03/2023    TRIG 138 04/03/2023     Lab Results   Component Value Date    HGBA1C 9.50 (H) 04/18/2024     Lab Results   Component Value Date    INR 1.1 11/25/2019    INR 1.1 09/10/2018    PROTIME 12.5 11/25/2019    PROTIME 13.3 09/10/2018     Lab Results   Component Value Date    FOLATE 10.20 02/24/2024     Lab Results   Component Value Date    [id number] 384 04/18/2024             Assessment / Plan   Brief Patient Summary:  Too Tai is a 68 y.o. male with a past medical history of advanced dementia with behavioral disturbances and nonverbal, CAD, T2DM, HLD, PVD who presented to [facility] ED due to increase in agitation.  Patient has had multiple recent hospitalizations.      Plan:   Advanced dementia with behavioral disturbances  Sleep Deprivation   Continue home Aricept 10 mg nightly  Continue phenobarbital 64.8 mg/ 64.8 mg/ 129.6 mg  Phenobarbital level 12.4; recheck trough level on Monday  Continue home Remeron 15 mg nightly  Continue Seroquel 100 mg /100 mg / 300 mg.   QTc 446 this AM.  Patient is not tolerating continuous telemetry.  Scheduled EKGs daily.  Continue Restoril 30 mg nightly.  Geodon as needed for extreme agitation; last dose 5/3 at 8:25 AM  CT head negative for acute abnormality  Vitamin B12 384, IM cyanocobalamin 1000 mcg  Case management following for difficult posthospitalization disposition.  Patient is not safe to return home due to concern of being in danger for his wife and himself.   Palliative consulted, appreciate recommendations  Psych consult pending, appreciate recommendations please see consult note for recommendations  Schedule  melatonin 5 mg nightly for 7 PM per psychology recommendations  Trial of Zoloft 50 mg daily per psych recommendations.  Trial of Versed 0.5 mg as needed for nursing care.  Please use prior to Geodon.  Psychology recommended Ativan 0.25 mg with nursing care however it is not readily available.  General neurology will follow-up on Monday.  Please feel free to reach out the weekend if needed.       I have discussed the above with the patient, bedside RN and IVONNE Greenberg  Time spent with patient: 50 minutes in face-to-face evaluation and management of the patient.    Copied text in this note has been reviewed and is accurate as of 05/03/24.     IVONNE Santos

## 2024-05-03 NOTE — PLAN OF CARE
Goal Outcome Evaluation:  Plan of Care Reviewed With: patient, spouse                Problem: Skin Injury Risk Increased  Goal: Skin Health and Integrity  Outcome: Ongoing, Progressing  Intervention: Optimize Skin Protection  Recent Flowsheet Documentation  Taken 5/3/2024 0400 by Carole Ortiz RN  Pressure Reduction Techniques:   frequent weight shift encouraged   heels elevated off bed   positioned off wounds  Head of Bed (HOB) Positioning: HOB at 30-45 degrees  Pressure Reduction Devices:   pressure-redistributing mattress utilized   positioning supports utilized  Skin Protection:   adhesive use limited   incontinence pads utilized   tubing/devices free from skin contact   transparent dressing maintained  Taken 5/3/2024 0000 by Carole Ortiz RN  Pressure Reduction Techniques:   frequent weight shift encouraged   heels elevated off bed   positioned off wounds  Head of Bed (HOB) Positioning: HOB flat  Pressure Reduction Devices:   positioning supports utilized   pressure-redistributing mattress utilized  Skin Protection:   adhesive use limited   incontinence pads utilized   transparent dressing maintained   tubing/devices free from skin contact  Taken 5/2/2024 2000 by Carole Ortiz RN  Pressure Reduction Techniques:   frequent weight shift encouraged   positioned off wounds   heels elevated off bed  Pressure Reduction Devices:   pressure-redistributing mattress utilized   positioning supports utilized  Skin Protection:   adhesive use limited   incontinence pads utilized   transparent dressing maintained   tubing/devices free from skin contact

## 2024-05-04 LAB
GLUCOSE BLDC GLUCOMTR-MCNC: 113 MG/DL (ref 70–130)
GLUCOSE BLDC GLUCOMTR-MCNC: 143 MG/DL (ref 70–130)
GLUCOSE BLDC GLUCOMTR-MCNC: 195 MG/DL (ref 70–130)
GLUCOSE BLDC GLUCOMTR-MCNC: 200 MG/DL (ref 70–130)

## 2024-05-04 PROCEDURE — 63710000001 INSULIN LISPRO (HUMAN) PER 5 UNITS: Performed by: INTERNAL MEDICINE

## 2024-05-04 PROCEDURE — 25810000003 LACTATED RINGERS PER 1000 ML: Performed by: PHYSICIAN ASSISTANT

## 2024-05-04 PROCEDURE — 63710000001 INSULIN LISPRO (HUMAN) PER 5 UNITS: Performed by: NURSE PRACTITIONER

## 2024-05-04 PROCEDURE — 82948 REAGENT STRIP/BLOOD GLUCOSE: CPT

## 2024-05-04 PROCEDURE — 93005 ELECTROCARDIOGRAM TRACING: CPT

## 2024-05-04 PROCEDURE — 25010000002 HEPARIN (PORCINE) PER 1000 UNITS: Performed by: INTERNAL MEDICINE

## 2024-05-04 PROCEDURE — 25010000002 ZIPRASIDONE MESYLATE PER 10 MG

## 2024-05-04 PROCEDURE — 99232 SBSQ HOSP IP/OBS MODERATE 35: CPT | Performed by: NURSE PRACTITIONER

## 2024-05-04 PROCEDURE — 63710000001 INSULIN GLARGINE PER 5 UNITS: Performed by: NURSE PRACTITIONER

## 2024-05-04 PROCEDURE — 25010000002 MIDAZOLAM PER 1 MG: Performed by: PSYCHIATRY & NEUROLOGY

## 2024-05-04 PROCEDURE — 93010 ELECTROCARDIOGRAM REPORT: CPT | Performed by: INTERNAL MEDICINE

## 2024-05-04 PROCEDURE — 25010000002 PHENOBARBITAL PER 120 MG: Performed by: NURSE PRACTITIONER

## 2024-05-04 RX ADMIN — PHENOBARBITAL 64.8 MG: 64.8 TABLET ORAL at 10:18

## 2024-05-04 RX ADMIN — DONEPEZIL HYDROCHLORIDE 10 MG: 10 TABLET, FILM COATED ORAL at 10:18

## 2024-05-04 RX ADMIN — FOLIC ACID 1 MG: 1 TABLET ORAL at 10:18

## 2024-05-04 RX ADMIN — DONEPEZIL HYDROCHLORIDE 10 MG: 10 TABLET, FILM COATED ORAL at 19:35

## 2024-05-04 RX ADMIN — INSULIN LISPRO 2 UNITS: 100 INJECTION, SOLUTION INTRAVENOUS; SUBCUTANEOUS at 11:43

## 2024-05-04 RX ADMIN — TEMAZEPAM 30 MG: 15 CAPSULE ORAL at 19:42

## 2024-05-04 RX ADMIN — Medication 5 MG: at 19:35

## 2024-05-04 RX ADMIN — LISINOPRIL 30 MG: 20 TABLET ORAL at 10:51

## 2024-05-04 RX ADMIN — ZIPRASIDONE MESYLATE 10 MG: 20 INJECTION, POWDER, LYOPHILIZED, FOR SOLUTION INTRAMUSCULAR at 08:08

## 2024-05-04 RX ADMIN — SODIUM CHLORIDE, POTASSIUM CHLORIDE, SODIUM LACTATE AND CALCIUM CHLORIDE 75 ML/HR: 600; 310; 30; 20 INJECTION, SOLUTION INTRAVENOUS at 05:21

## 2024-05-04 RX ADMIN — HEPARIN SODIUM 5000 UNITS: 5000 INJECTION INTRAVENOUS; SUBCUTANEOUS at 14:58

## 2024-05-04 RX ADMIN — HEPARIN SODIUM 5000 UNITS: 5000 INJECTION INTRAVENOUS; SUBCUTANEOUS at 20:10

## 2024-05-04 RX ADMIN — ZIPRASIDONE MESYLATE 10 MG: 20 INJECTION, POWDER, LYOPHILIZED, FOR SOLUTION INTRAMUSCULAR at 03:24

## 2024-05-04 RX ADMIN — QUETIAPINE FUMARATE 300 MG: 100 TABLET ORAL at 19:42

## 2024-05-04 RX ADMIN — QUETIAPINE FUMARATE 100 MG: 100 TABLET ORAL at 14:58

## 2024-05-04 RX ADMIN — PHENOBARBITAL 64.8 MG: 64.8 TABLET ORAL at 14:58

## 2024-05-04 RX ADMIN — MICONAZOLE NITRATE 1 APPLICATION: 20 POWDER TOPICAL at 22:05

## 2024-05-04 RX ADMIN — AMLODIPINE BESYLATE 10 MG: 10 TABLET ORAL at 10:18

## 2024-05-04 RX ADMIN — INSULIN LISPRO 3 UNITS: 100 INJECTION, SOLUTION INTRAVENOUS; SUBCUTANEOUS at 17:01

## 2024-05-04 RX ADMIN — INSULIN LISPRO 7 UNITS: 100 INJECTION, SOLUTION INTRAVENOUS; SUBCUTANEOUS at 17:01

## 2024-05-04 RX ADMIN — PHENOBARBITAL SODIUM 130 MG: 65 INJECTION INTRAMUSCULAR at 22:06

## 2024-05-04 RX ADMIN — QUETIAPINE FUMARATE 100 MG: 100 TABLET ORAL at 10:18

## 2024-05-04 RX ADMIN — MIDAZOLAM HYDROCHLORIDE 0.5 MG: 1 INJECTION, SOLUTION INTRAMUSCULAR; INTRAVENOUS at 02:46

## 2024-05-04 RX ADMIN — INSULIN LISPRO 7 UNITS: 100 INJECTION, SOLUTION INTRAVENOUS; SUBCUTANEOUS at 10:14

## 2024-05-04 RX ADMIN — INSULIN GLARGINE 17 UNITS: 100 INJECTION, SOLUTION SUBCUTANEOUS at 10:14

## 2024-05-04 RX ADMIN — INSULIN LISPRO 7 UNITS: 100 INJECTION, SOLUTION INTRAVENOUS; SUBCUTANEOUS at 11:43

## 2024-05-04 RX ADMIN — SODIUM CHLORIDE, POTASSIUM CHLORIDE, SODIUM LACTATE AND CALCIUM CHLORIDE 75 ML/HR: 600; 310; 30; 20 INJECTION, SOLUTION INTRAVENOUS at 18:24

## 2024-05-04 RX ADMIN — MIRTAZAPINE 15 MG: 15 TABLET, FILM COATED ORAL at 19:35

## 2024-05-04 RX ADMIN — MICONAZOLE NITRATE 1 APPLICATION: 20 POWDER TOPICAL at 10:14

## 2024-05-04 RX ADMIN — SERTRALINE HYDROCHLORIDE 50 MG: 50 TABLET ORAL at 10:18

## 2024-05-04 RX ADMIN — INSULIN GLARGINE 17 UNITS: 100 INJECTION, SOLUTION SUBCUTANEOUS at 20:09

## 2024-05-04 NOTE — PLAN OF CARE
Goal Outcome Evaluation:  Plan of Care Reviewed With: patient        Progress: no change  Outcome Evaluation: Pt resting and calm at end of shift. Geodon given x2 during shift due to hitting and yelling. LR running @75 ml/hr. Protection provided on heels for notable redness. Multiple BMs and urine ocurrences during shift. Pt had no pain during shift. No other complaints. Continue POC.

## 2024-05-04 NOTE — PROGRESS NOTES
[facility] Medicine Services  PROGRESS NOTE    Patient Name: Too Tai  : 1955  MRN: [id number]    Date of Admission: 2024  Primary Care Physician: Des Geller MD    Subjective     CC: f/u dementia with agitation    HPI:  Pt resting in bed without restraints with sitter at the bedside. Pt calm during assessment and exam.     Copied text in this note has been reviewed and is accurate as of 24.       Objective     Vital Signs:   Heart Rate:  [76] 76  BP: (172)/(92) 172/92     Physical Exam:  Constitutional: Awake, alert, NAD  HENT: NCAT, mucous membranes moist  Respiratory: Clear to auscultation bilaterally, nonlabored  Cardiovascular: RRR, no murmurs, rubs, or gallops  Gastrointestinal: Positive bowel sounds, soft, nontender, nondistended  Musculoskeletal: No bilateral ankle edema  Psychiatric: Flat affect, cooperative  Neurologic: Oriented to self, RICO, speech clear  Skin: No rashes    Results Reviewed:  LAB RESULTS:    Brief Urine Lab Results  (Last result in the past 365 days)        Color   Clarity   Blood   Leuk Est   Nitrite   Protein   CREAT   Urine HCG        24 0243 Yellow   Clear   Negative   Negative   Negative   Negative                 Microbiology Results Abnormal       None          No radiology results from the last 24 hrs    Results for orders placed during the hospital encounter of 24    Adult Transthoracic Echo Complete W/ Cont if Necessary Per Protocol    Interpretation Summary    Left ventricular systolic function is normal. Calculated left ventricular EF = 55.1% Normal left ventricular cavity size noted. Left ventricular wall thickness is consistent with mild concentric hypertrophy. Left ventricular diastolic function is consistent with (grade I) impaired relaxation.    The right ventricular cavity is mildly dilated. Normal right ventricular systolic function noted.    There is calcification of the aortic valve. The aortic  valve appears trileaflet. Mild aortic valve regurgitation is present. No aortic valve stenosis is present.    Mitral annular calcification is present. Trace mitral valve regurgitation is present. No significant mitral valve stenosis is present.    The tricuspid valve is structurally normal with no stenosis present. Trace tricuspid valve regurgitation is present. Insufficient TR velocity profile to estimate the right ventricular systolic pressure.    Mild dilation of the sinuses of Valsalva is present. Mild dilation of the ascending aorta is present. Ascending aorta = 4.2 cm    Current medications:  Scheduled Meds:amLODIPine, 10 mg, Oral, Q24H  cyanocobalamin, 1,000 mcg, Intramuscular, Q28 Days  donepezil, 10 mg, Oral, BID  folic acid, 1 mg, Oral, Daily  heparin (porcine), 5,000 Units, Subcutaneous, Q8H  insulin glargine, 17 Units, Subcutaneous, Q12H  insulin lispro, 2-7 Units, Subcutaneous, 4x Daily AC & at Bedtime  Insulin Lispro, 7 Units, Subcutaneous, TID With Meals  lisinopril, 30 mg, Oral, Daily  melatonin, 5 mg, Oral, Nightly  miconazole, 1 Application, Topical, Q12H  mirtazapine, 15 mg, Oral, Nightly  PHENobarbital, 130 mg, Oral, Nightly   Or  PHENobarbital, 130 mg, Intramuscular, Nightly  PHENobarbital, 64.8 mg, Oral, BID   Or  PHENobarbital, 65 mg, Intramuscular, BID  QUEtiapine, 100 mg, Oral, BID  QUEtiapine, 300 mg, Oral, Nightly  senna-docusate sodium, 2 tablet, Oral, BID  sertraline, 50 mg, Oral, Daily  sodium chloride, 10 mL, Intravenous, Q12H  temazepam, 30 mg, Oral, Nightly      Continuous Infusions:lactated ringers, 75 mL/hr, Last Rate: 75 mL/hr (05/04/24 0521)      PRN Meds:.  acetaminophen **OR** acetaminophen **OR** acetaminophen    senna-docusate sodium **AND** polyethylene glycol **AND** bisacodyl **AND** bisacodyl    Calcium Replacement - Follow Nurse / BPA Driven Protocol    dextrose    dextrose    glucagon (human recombinant)    Magnesium Standard Dose Replacement - Follow Nurse / BPA Driven  Protocol    midazolam    Phosphorus Replacement - Follow Nurse / BPA Driven Protocol    Potassium Replacement - Follow Nurse / BPA Driven Protocol    sodium chloride    sodium chloride    ziprasidone    Assessment & Plan     Active Hospital Problems    Diagnosis  POA    **Agitation due to dementia [F03.911]  Yes    Agitation [R45.1]  Yes    Type 2 diabetes mellitus with hyperglycemia, with long-term current use of insulin [E11.65, Z79.4]  Not Applicable    Peripheral neuropathy [G62.9]  Yes    Hyperlipidemia [E78.5]  Yes    Benign prostatic hyperplasia [N40.0]  Yes    Benign essential HTN [I10]  Yes    Peripheral vascular disease [I73.9]  Yes    Coronary artery disease involving native coronary artery of native heart [I25.10]  Yes      Resolved Hospital Problems   No resolved problems to display.     Brief Hospital Course to date:  Too Tai is a 68yoM with PMH significant for advanced dementia, CAD, diabetes mellitus type 2, HLD and PVD. Admitted to [facility] ED on 4/18/24 for agitation / behavioral disturbances. Recently admitted to [facility] 2/24-3/4/24 for dementia with behavioral disturbances and again 3/27-4/4/24 for similar issues.      Advanced dementia complicated by behavioral disturbance with superimposed sleep disturbance   - Infectious work up unremarkable. UA reassuring. CXR without acute findings. CT head without acute findings  - Continue home Aricept and Mirtazapine 15mg QHS and melatonin 5 mg at 7 pm  - Continue Seroquel   - 5/3 trial of zoloft 50 mg and PRN versed 0.5 mg as needed for nursing care. (Ativan 0.25mg recommended per psych but it is not available)  - Neurology started Phenobarbital, level now WNL.  Will continue to monitor effect and recheck level on Monday per neurology recs  - Continue Temazepam 30mg QHS  - Appreciate palliative care assistance  - discussed treatment plan with Neurology today 5/3  - May need geriatric psych facility vs LTC - CM looking into this.  Telepsych eval completed 5/2/24  - IV fluids while taking in minimal PO   - PRN Geodon    Uncontrolled diabetes mellitus type 2  - Appears not on any home meds for DM  - A1c 9.5%  - Continue Lantus, increased to 17 units BID, Lispro 7 units TID AC + SSI     HTN  - continue Amlodipine 10mg daily  - continue Lisinopril 30mg daily     Expected Discharge Location and Transportation: TBD  Expected Discharge   Expected Discharge Date: 5/7/2024; Expected Discharge Time:      DVT prophylaxis:Medical and mechanical DVT prophylaxis orders are present.    AM-PAC 6 Clicks Score (PT): 14 (05/03/24 2000)    CODE STATUS:   Code Status and Medical Interventions:   Ordered at: 04/19/24 1037     Medical Intervention Limits:    NO intubation (DNI)     Level Of Support Discussed With:    Next of Kin (If No Surrogate)     Code Status (Patient has no pulse and is not breathing):    No CPR (Do Not Attempt to Resuscitate)     Medical Interventions (Patient has pulse or is breathing):    Limited Support     Laura Delgado, IVONNE  05/04/24

## 2024-05-05 LAB
GLUCOSE BLDC GLUCOMTR-MCNC: 100 MG/DL (ref 70–130)
GLUCOSE BLDC GLUCOMTR-MCNC: 120 MG/DL (ref 70–130)
GLUCOSE BLDC GLUCOMTR-MCNC: 239 MG/DL (ref 70–130)
GLUCOSE BLDC GLUCOMTR-MCNC: 297 MG/DL (ref 70–130)
PHENOBARB SERPL-MCNC: 19.8 MCG/ML (ref 10–30)

## 2024-05-05 PROCEDURE — 25010000002 HEPARIN (PORCINE) PER 1000 UNITS: Performed by: INTERNAL MEDICINE

## 2024-05-05 PROCEDURE — 93010 ELECTROCARDIOGRAM REPORT: CPT | Performed by: INTERNAL MEDICINE

## 2024-05-05 PROCEDURE — 82948 REAGENT STRIP/BLOOD GLUCOSE: CPT

## 2024-05-05 PROCEDURE — 63710000001 INSULIN LISPRO (HUMAN) PER 5 UNITS: Performed by: NURSE PRACTITIONER

## 2024-05-05 PROCEDURE — 93005 ELECTROCARDIOGRAM TRACING: CPT

## 2024-05-05 PROCEDURE — 99232 SBSQ HOSP IP/OBS MODERATE 35: CPT | Performed by: INTERNAL MEDICINE

## 2024-05-05 PROCEDURE — 25810000003 LACTATED RINGERS PER 1000 ML: Performed by: PHYSICIAN ASSISTANT

## 2024-05-05 PROCEDURE — 25010000002 ZIPRASIDONE MESYLATE PER 10 MG

## 2024-05-05 PROCEDURE — 63710000001 INSULIN LISPRO (HUMAN) PER 5 UNITS: Performed by: INTERNAL MEDICINE

## 2024-05-05 PROCEDURE — 63710000001 INSULIN GLARGINE PER 5 UNITS: Performed by: NURSE PRACTITIONER

## 2024-05-05 PROCEDURE — 80184 ASSAY OF PHENOBARBITAL: CPT | Performed by: NURSE PRACTITIONER

## 2024-05-05 RX ORDER — WATER 10 ML/10ML
INJECTION INTRAMUSCULAR; INTRAVENOUS; SUBCUTANEOUS
Status: COMPLETED
Start: 2024-05-05 | End: 2024-05-05

## 2024-05-05 RX ADMIN — INSULIN LISPRO 7 UNITS: 100 INJECTION, SOLUTION INTRAVENOUS; SUBCUTANEOUS at 16:48

## 2024-05-05 RX ADMIN — Medication 5 MG: at 19:54

## 2024-05-05 RX ADMIN — AMLODIPINE BESYLATE 10 MG: 10 TABLET ORAL at 12:04

## 2024-05-05 RX ADMIN — INSULIN LISPRO 4 UNITS: 100 INJECTION, SOLUTION INTRAVENOUS; SUBCUTANEOUS at 16:48

## 2024-05-05 RX ADMIN — MICONAZOLE NITRATE 1 APPLICATION: 20 POWDER TOPICAL at 20:02

## 2024-05-05 RX ADMIN — SERTRALINE HYDROCHLORIDE 50 MG: 50 TABLET ORAL at 12:05

## 2024-05-05 RX ADMIN — QUETIAPINE FUMARATE 300 MG: 100 TABLET ORAL at 19:54

## 2024-05-05 RX ADMIN — DONEPEZIL HYDROCHLORIDE 10 MG: 10 TABLET, FILM COATED ORAL at 19:54

## 2024-05-05 RX ADMIN — TEMAZEPAM 30 MG: 15 CAPSULE ORAL at 19:54

## 2024-05-05 RX ADMIN — WATER 10 ML: 1 INJECTION INTRAMUSCULAR; INTRAVENOUS; SUBCUTANEOUS at 07:34

## 2024-05-05 RX ADMIN — MICONAZOLE NITRATE 1 APPLICATION: 20 POWDER TOPICAL at 07:29

## 2024-05-05 RX ADMIN — INSULIN LISPRO 3 UNITS: 100 INJECTION, SOLUTION INTRAVENOUS; SUBCUTANEOUS at 20:14

## 2024-05-05 RX ADMIN — Medication 10 ML: at 20:44

## 2024-05-05 RX ADMIN — QUETIAPINE FUMARATE 100 MG: 100 TABLET ORAL at 12:05

## 2024-05-05 RX ADMIN — HEPARIN SODIUM 5000 UNITS: 5000 INJECTION INTRAVENOUS; SUBCUTANEOUS at 20:13

## 2024-05-05 RX ADMIN — ZIPRASIDONE MESYLATE 10 MG: 20 INJECTION, POWDER, LYOPHILIZED, FOR SOLUTION INTRAMUSCULAR at 07:27

## 2024-05-05 RX ADMIN — FOLIC ACID 1 MG: 1 TABLET ORAL at 12:04

## 2024-05-05 RX ADMIN — PHENOBARBITAL 64.8 MG: 64.8 TABLET ORAL at 13:09

## 2024-05-05 RX ADMIN — PHENOBARBITAL 130 MG: 64.8 TABLET ORAL at 19:54

## 2024-05-05 RX ADMIN — SODIUM CHLORIDE, POTASSIUM CHLORIDE, SODIUM LACTATE AND CALCIUM CHLORIDE 75 ML/HR: 600; 310; 30; 20 INJECTION, SOLUTION INTRAVENOUS at 07:56

## 2024-05-05 RX ADMIN — MIRTAZAPINE 15 MG: 15 TABLET, FILM COATED ORAL at 19:54

## 2024-05-05 RX ADMIN — LISINOPRIL 30 MG: 20 TABLET ORAL at 12:03

## 2024-05-05 RX ADMIN — HEPARIN SODIUM 5000 UNITS: 5000 INJECTION INTRAVENOUS; SUBCUTANEOUS at 05:45

## 2024-05-05 RX ADMIN — DONEPEZIL HYDROCHLORIDE 10 MG: 10 TABLET, FILM COATED ORAL at 12:10

## 2024-05-05 RX ADMIN — INSULIN GLARGINE 17 UNITS: 100 INJECTION, SOLUTION SUBCUTANEOUS at 20:14

## 2024-05-05 RX ADMIN — HEPARIN SODIUM 5000 UNITS: 5000 INJECTION INTRAVENOUS; SUBCUTANEOUS at 12:59

## 2024-05-05 NOTE — PROGRESS NOTES
[facility] Medicine Services  PROGRESS NOTE    Patient Name: Too Tai  : 1955  MRN: [id number]    Date of Admission: 2024  Primary Care Physician: Des Geller MD    Subjective     CC: f/u dementia with agitation    HPI:  Patient currently asleep.  Spouse at bedside.        Objective     Vital Signs:   Temp:  [97 °F (36.1 °C)-97.8 °F (36.6 °C)] 97.6 °F (36.4 °C)  Heart Rate:  [70-78] 71  Resp:  [16] 16  BP: (141-173)/(62-93) 158/80     Physical Exam:  Non toxic appearing, in bed  MM moist  RRR  CTAB  Abd soft, NT  No edema  Flat affect    Results Reviewed:  LAB RESULTS:    Brief Urine Lab Results  (Last result in the past 365 days)        Color   Clarity   Blood   Leuk Est   Nitrite   Protein   CREAT   Urine HCG        24 0243 Yellow   Clear   Negative   Negative   Negative   Negative                 Microbiology Results Abnormal       None          No radiology results from the last 24 hrs    Results for orders placed during the hospital encounter of 24    Adult Transthoracic Echo Complete W/ Cont if Necessary Per Protocol    Interpretation Summary    Left ventricular systolic function is normal. Calculated left ventricular EF = 55.1% Normal left ventricular cavity size noted. Left ventricular wall thickness is consistent with mild concentric hypertrophy. Left ventricular diastolic function is consistent with (grade I) impaired relaxation.    The right ventricular cavity is mildly dilated. Normal right ventricular systolic function noted.    There is calcification of the aortic valve. The aortic valve appears trileaflet. Mild aortic valve regurgitation is present. No aortic valve stenosis is present.    Mitral annular calcification is present. Trace mitral valve regurgitation is present. No significant mitral valve stenosis is present.    The tricuspid valve is structurally normal with no stenosis present. Trace tricuspid valve regurgitation is  present. Insufficient TR velocity profile to estimate the right ventricular systolic pressure.    Mild dilation of the sinuses of Valsalva is present. Mild dilation of the ascending aorta is present. Ascending aorta = 4.2 cm    Current medications:  Scheduled Meds:amLODIPine, 10 mg, Oral, Q24H  cyanocobalamin, 1,000 mcg, Intramuscular, Q28 Days  donepezil, 10 mg, Oral, BID  folic acid, 1 mg, Oral, Daily  heparin (porcine), 5,000 Units, Subcutaneous, Q8H  insulin glargine, 17 Units, Subcutaneous, Q12H  insulin lispro, 2-7 Units, Subcutaneous, 4x Daily AC & at Bedtime  Insulin Lispro, 7 Units, Subcutaneous, TID With Meals  lisinopril, 30 mg, Oral, Daily  melatonin, 5 mg, Oral, Nightly  miconazole, 1 Application, Topical, Q12H  mirtazapine, 15 mg, Oral, Nightly  PHENobarbital, 130 mg, Oral, Nightly   Or  PHENobarbital, 130 mg, Intramuscular, Nightly  PHENobarbital, 64.8 mg, Oral, BID   Or  PHENobarbital, 65 mg, Intramuscular, BID  QUEtiapine, 100 mg, Oral, BID  QUEtiapine, 300 mg, Oral, Nightly  senna-docusate sodium, 2 tablet, Oral, BID  sertraline, 50 mg, Oral, Daily  sodium chloride, 10 mL, Intravenous, Q12H  temazepam, 30 mg, Oral, Nightly      Continuous Infusions:lactated ringers, 75 mL/hr, Last Rate: 75 mL/hr (05/05/24 0756)      PRN Meds:.  acetaminophen **OR** acetaminophen **OR** acetaminophen    senna-docusate sodium **AND** polyethylene glycol **AND** bisacodyl **AND** bisacodyl    Calcium Replacement - Follow Nurse / BPA Driven Protocol    dextrose    dextrose    glucagon (human recombinant)    Magnesium Standard Dose Replacement - Follow Nurse / BPA Driven Protocol    midazolam    Phosphorus Replacement - Follow Nurse / BPA Driven Protocol    Potassium Replacement - Follow Nurse / BPA Driven Protocol    sodium chloride    sodium chloride    ziprasidone    Assessment & Plan     Active Hospital Problems    Diagnosis  POA    **Agitation due to dementia [F03.911]  Yes    Agitation [R45.1]  Yes    Type 2  diabetes mellitus with hyperglycemia, with long-term current use of insulin [E11.65, Z79.4]  Not Applicable    Peripheral neuropathy [G62.9]  Yes    Hyperlipidemia [E78.5]  Yes    Benign prostatic hyperplasia [N40.0]  Yes    Benign essential HTN [I10]  Yes    Peripheral vascular disease [I73.9]  Yes    Coronary artery disease involving native coronary artery of native heart [I25.10]  Yes      Resolved Hospital Problems   No resolved problems to display.     Brief Hospital Course to date:  Too Tai is a 68yoM with PMH significant for advanced dementia, CAD, diabetes mellitus type 2, HLD and PVD. Admitted to [facility] ED on 4/18/24 for agitation / behavioral disturbances. Recently admitted to [facility] 2/24-3/4/24 for dementia with behavioral disturbances and again 3/27-4/4/24 for similar issues.      Advanced dementia complicated by behavioral disturbance with superimposed sleep disturbance   - Infectious work up unremarkable. UA reassuring. CXR without acute findings. CT head without acute findings  - Continue home Aricept and Mirtazapine 15mg QHS and melatonin 5 mg at 7 pm  - Continue Seroquel   - 5/3 trial of zoloft 50 mg and PRN versed 0.5 mg as needed for nursing care. (Ativan 0.25mg recommended per psych but it is not available)  - Neurology started Phenobarbital, level now WNL.  Will continue to monitor effect and recheck level on Monday per neurology recs  - Continue Temazepam 30mg QHS  - Appreciate palliative care assistance  - May need geriatric psych facility vs LTC - CM looking into this. Telepsych eval completed 5/2/24  - IV fluids while taking in minimal PO   - PRN Geodon    Uncontrolled diabetes mellitus type 2  - Appears not on any home meds for DM  - A1c 9.5%  - Continue Lantus, increased to 17 units BID, Lispro 7 units TID AC + SSI   - glucose reviewed    HTN  - continue Amlodipine 10mg daily  - continue Lisinopril 30mg daily     Expected Discharge Location and Transportation:  TBD  Expected Discharge   Expected Discharge Date: 5/7/2024; Expected Discharge Time:      DVT prophylaxis:Medical and mechanical DVT prophylaxis orders are present.    AM-PAC 6 Clicks Score (PT): 14 (05/05/24 4130)    CODE STATUS:   Code Status and Medical Interventions:   Ordered at: 04/19/24 1037     Medical Intervention Limits:    NO intubation (DNI)     Level Of Support Discussed With:    Next of Kin (If No Surrogate)     Code Status (Patient has no pulse and is not breathing):    No CPR (Do Not Attempt to Resuscitate)     Medical Interventions (Patient has pulse or is breathing):    Limited Support     Kemal Rayo MD  05/05/24

## 2024-05-05 NOTE — PLAN OF CARE
Goal Outcome Evaluation:           Progress: no change  Outcome Evaluation: VSS, RA, remain confused at the baseline,  no sign of distress, slept well  throughout the night after night medications, adequate uop and had BM x1, no new concern, will continue to monitor.      Problem: Fall Injury Risk  Goal: Absence of Fall and Fall-Related Injury  Intervention: Promote Injury-Free Environment  Recent Flowsheet Documentation  Taken 5/5/2024 0000 by Alesia Kurtz RN  Safety Promotion/Fall Prevention: safety round/check completed  Taken 5/4/2024 2000 by Alesia Kurtz RN  Safety Promotion/Fall Prevention: activity supervised     Problem: Adult Inpatient Plan of Care  Goal: Absence of Hospital-Acquired Illness or Injury  Intervention: Prevent Skin Injury  Recent Flowsheet Documentation  Taken 5/5/2024 0000 by Alesia Kurtz RN  Body Position: position changed independently  Taken 5/4/2024 2200 by Alesia Kurtz RN  Body Position:   position changed independently   supine, legs elevated   supine  Taken 5/4/2024 2000 by Alesia Kurtz RN  Body Position: position changed independently     Problem: Adult Inpatient Plan of Care  Goal: Absence of Hospital-Acquired Illness or Injury  Intervention: Prevent and Manage VTE (Venous Thromboembolism) Risk  Recent Flowsheet Documentation  Taken 5/5/2024 0000 by Alesia Kurtz RN  Activity Management: activity minimized  Taken 5/4/2024 2200 by Alesia Kurtz RN  Activity Management: activity minimized  Taken 5/4/2024 2000 by Alesia Kurtz RN  Activity Management: activity minimized

## 2024-05-06 LAB
GLUCOSE BLDC GLUCOMTR-MCNC: 127 MG/DL (ref 70–130)
GLUCOSE BLDC GLUCOMTR-MCNC: 165 MG/DL (ref 70–130)
GLUCOSE BLDC GLUCOMTR-MCNC: 176 MG/DL (ref 70–130)
GLUCOSE BLDC GLUCOMTR-MCNC: 289 MG/DL (ref 70–130)
PHENOBARB SERPL-MCNC: 22.8 MCG/ML (ref 10–30)
QT INTERVAL: 378 MS
QT INTERVAL: 388 MS
QT INTERVAL: 418 MS
QTC INTERVAL: 433 MS
QTC INTERVAL: 441 MS
QTC INTERVAL: 460 MS

## 2024-05-06 PROCEDURE — 82948 REAGENT STRIP/BLOOD GLUCOSE: CPT

## 2024-05-06 PROCEDURE — 80184 ASSAY OF PHENOBARBITAL: CPT | Performed by: NURSE PRACTITIONER

## 2024-05-06 PROCEDURE — 25010000002 HEPARIN (PORCINE) PER 1000 UNITS: Performed by: INTERNAL MEDICINE

## 2024-05-06 PROCEDURE — 93005 ELECTROCARDIOGRAM TRACING: CPT

## 2024-05-06 PROCEDURE — 93010 ELECTROCARDIOGRAM REPORT: CPT | Performed by: INTERNAL MEDICINE

## 2024-05-06 PROCEDURE — 99233 SBSQ HOSP IP/OBS HIGH 50: CPT

## 2024-05-06 PROCEDURE — 63710000001 INSULIN GLARGINE PER 5 UNITS: Performed by: NURSE PRACTITIONER

## 2024-05-06 PROCEDURE — 63710000001 INSULIN LISPRO (HUMAN) PER 5 UNITS: Performed by: INTERNAL MEDICINE

## 2024-05-06 PROCEDURE — 25010000002 ZIPRASIDONE MESYLATE PER 10 MG

## 2024-05-06 PROCEDURE — 99232 SBSQ HOSP IP/OBS MODERATE 35: CPT | Performed by: FAMILY MEDICINE

## 2024-05-06 PROCEDURE — 25010000002 PHENOBARBITAL PER 120 MG: Performed by: PSYCHIATRY & NEUROLOGY

## 2024-05-06 PROCEDURE — 63710000001 INSULIN LISPRO (HUMAN) PER 5 UNITS: Performed by: NURSE PRACTITIONER

## 2024-05-06 RX ORDER — PHENOBARBITAL 64.8 MG/1
130 TABLET ORAL NIGHTLY
Status: DISCONTINUED | OUTPATIENT
Start: 2024-05-06 | End: 2024-05-07

## 2024-05-06 RX ORDER — PHENOBARBITAL SODIUM 65 MG/ML
130 INJECTION, SOLUTION INTRAMUSCULAR; INTRAVENOUS NIGHTLY
Status: DISCONTINUED | OUTPATIENT
Start: 2024-05-06 | End: 2024-05-07

## 2024-05-06 RX ORDER — WATER 10 ML/10ML
INJECTION INTRAMUSCULAR; INTRAVENOUS; SUBCUTANEOUS
Status: COMPLETED
Start: 2024-05-06 | End: 2024-05-06

## 2024-05-06 RX ADMIN — AMLODIPINE BESYLATE 10 MG: 10 TABLET ORAL at 09:39

## 2024-05-06 RX ADMIN — QUETIAPINE FUMARATE 100 MG: 100 TABLET ORAL at 09:41

## 2024-05-06 RX ADMIN — QUETIAPINE FUMARATE 100 MG: 100 TABLET ORAL at 15:13

## 2024-05-06 RX ADMIN — Medication 5 MG: at 19:42

## 2024-05-06 RX ADMIN — PHENOBARBITAL 130 MG: 64.8 TABLET ORAL at 21:40

## 2024-05-06 RX ADMIN — SENNOSIDES AND DOCUSATE SODIUM 2 TABLET: 8.6; 5 TABLET ORAL at 21:40

## 2024-05-06 RX ADMIN — WATER 10 ML: 1 INJECTION INTRAMUSCULAR; INTRAVENOUS; SUBCUTANEOUS at 05:47

## 2024-05-06 RX ADMIN — TEMAZEPAM 30 MG: 15 CAPSULE ORAL at 19:42

## 2024-05-06 RX ADMIN — MICONAZOLE NITRATE 1 APPLICATION: 20 POWDER TOPICAL at 23:56

## 2024-05-06 RX ADMIN — PHENOBARBITAL 64.8 MG: 64.8 TABLET ORAL at 15:14

## 2024-05-06 RX ADMIN — HEPARIN SODIUM 5000 UNITS: 5000 INJECTION INTRAVENOUS; SUBCUTANEOUS at 15:13

## 2024-05-06 RX ADMIN — INSULIN LISPRO 2 UNITS: 100 INJECTION, SOLUTION INTRAVENOUS; SUBCUTANEOUS at 21:39

## 2024-05-06 RX ADMIN — DONEPEZIL HYDROCHLORIDE 10 MG: 10 TABLET, FILM COATED ORAL at 19:42

## 2024-05-06 RX ADMIN — MICONAZOLE NITRATE 1 APPLICATION: 20 POWDER TOPICAL at 09:44

## 2024-05-06 RX ADMIN — INSULIN GLARGINE 17 UNITS: 100 INJECTION, SOLUTION SUBCUTANEOUS at 21:39

## 2024-05-06 RX ADMIN — INSULIN LISPRO 7 UNITS: 100 INJECTION, SOLUTION INTRAVENOUS; SUBCUTANEOUS at 17:58

## 2024-05-06 RX ADMIN — PHENOBARBITAL SODIUM 65 MG: 65 INJECTION INTRAMUSCULAR at 09:42

## 2024-05-06 RX ADMIN — QUETIAPINE FUMARATE 300 MG: 100 TABLET ORAL at 19:42

## 2024-05-06 RX ADMIN — HEPARIN SODIUM 5000 UNITS: 5000 INJECTION INTRAVENOUS; SUBCUTANEOUS at 21:39

## 2024-05-06 RX ADMIN — MIRTAZAPINE 15 MG: 15 TABLET, FILM COATED ORAL at 19:42

## 2024-05-06 RX ADMIN — LISINOPRIL 30 MG: 20 TABLET ORAL at 09:51

## 2024-05-06 RX ADMIN — INSULIN GLARGINE 17 UNITS: 100 INJECTION, SOLUTION SUBCUTANEOUS at 09:44

## 2024-05-06 RX ADMIN — SERTRALINE HYDROCHLORIDE 50 MG: 50 TABLET ORAL at 09:41

## 2024-05-06 RX ADMIN — ZIPRASIDONE MESYLATE 10 MG: 20 INJECTION, POWDER, LYOPHILIZED, FOR SOLUTION INTRAMUSCULAR at 05:46

## 2024-05-06 RX ADMIN — INSULIN LISPRO 4 UNITS: 100 INJECTION, SOLUTION INTRAVENOUS; SUBCUTANEOUS at 17:57

## 2024-05-06 RX ADMIN — HEPARIN SODIUM 5000 UNITS: 5000 INJECTION INTRAVENOUS; SUBCUTANEOUS at 05:16

## 2024-05-06 RX ADMIN — SENNOSIDES AND DOCUSATE SODIUM 2 TABLET: 8.6; 5 TABLET ORAL at 09:39

## 2024-05-06 RX ADMIN — DONEPEZIL HYDROCHLORIDE 10 MG: 10 TABLET, FILM COATED ORAL at 09:40

## 2024-05-06 RX ADMIN — FOLIC ACID 1 MG: 1 TABLET ORAL at 09:39

## 2024-05-06 NOTE — PLAN OF CARE
Goal Outcome Evaluation:  Plan of Care Reviewed With: spouse, son        Progress: improving  Outcome Evaluation: Pt seen at 1135, wife and son, sitter present. Pt sleeping, no observable signs of discomfort; per documentation, has been more calm, out of restraints since Friday, with intermittent medical intervention for restlessness/agitation. CM has sent referral to facility. Neuro following for management of medications for behavioral disturbance. Palliative following for continued support to family.    1300 Palliative IDT meeting:  MD, APRN, RN, SW,   After hours, weekends and holidays, contact Palliative Provider by calling [phone]       Problem: Palliative Care  Goal: Enhanced Quality of Life  Outcome: Ongoing, Progressing  Intervention: Promote Advance Care Planning  Flowsheets (Taken 5/6/2024 1553)  Life Transition/Adjustment: (out of restraints >72 hours; CM has sent referral to facility) other (see comments)  Intervention: Maximize Comfort  Flowsheets (Taken 5/6/2024 1553)  Pain Management Interventions: (reviewed medical interventions for agitation with pt's spouse and son) other (see comments)  Intervention: Optimize Function  Flowsheets (Taken 5/6/2024 1553)  Sensory Stimulation Regulation: quiet environment promoted  Sleep/Rest Enhancement: consistent schedule promoted  Intervention: Optimize Psychosocial Wellbeing  Flowsheets (Taken 5/6/2024 1553)  Family/Support System Care:   caregiver stress acknowledged   involvement promoted   presence promoted   self-care encouraged   support provided

## 2024-05-06 NOTE — PROGRESS NOTES
[facility] Medicine Services  PROGRESS NOTE    Patient Name: Too Tai  : 1955  MRN: [id number]    Date of Admission: 2024  Primary Care Physician: Des Geller MD    Subjective     CC: f/u dementia with agitation    HPI:  Patient is a 67 yo M seen and examined by me this AM, resting comfortably in bed, wife at bedside along with sitter. Working on possible placement at [facility] at this time. Patient has been without restraints over the weekend, no other acute changes at this time, continue to follow.       Objective     Vital Signs:   Temp:  [97.5 °F (36.4 °C)-98.4 °F (36.9 °C)] 98.4 °F (36.9 °C)  Heart Rate:  [74-88] 84  Resp:  [15-18] 15  BP: (121-189)/(61-90) 136/61     Physical Exam:  Constitutional: No acute distress, sleeping, resting comfortably in bed, currently on RA  HENT: NCAT, nares patent, mucous membranes moist  Respiratory: Clear to auscultation bilaterally, respiratory effort normal   Cardiovascular: RRR, no murmurs, rubs, or gallops  Gastrointestinal: Positive bowel sounds, soft, nontender, nondistended  Musculoskeletal: No bilateral ankle edema  Psychiatric: limited   Neurologic: limited, currently sleeping  Skin: No rashes      Results Reviewed:  LAB RESULTS:    Brief Urine Lab Results  (Last result in the past 365 days)        Color   Clarity   Blood   Leuk Est   Nitrite   Protein   CREAT   Urine HCG        24 0243 Yellow   Clear   Negative   Negative   Negative   Negative                 Microbiology Results Abnormal       None          No radiology results from the last 24 hrs    Results for orders placed during the hospital encounter of 24    Adult Transthoracic Echo Complete W/ Cont if Necessary Per Protocol    Interpretation Summary    Left ventricular systolic function is normal. Calculated left ventricular EF = 55.1% Normal left ventricular cavity size noted. Left ventricular wall thickness is consistent with mild  concentric hypertrophy. Left ventricular diastolic function is consistent with (grade I) impaired relaxation.    The right ventricular cavity is mildly dilated. Normal right ventricular systolic function noted.    There is calcification of the aortic valve. The aortic valve appears trileaflet. Mild aortic valve regurgitation is present. No aortic valve stenosis is present.    Mitral annular calcification is present. Trace mitral valve regurgitation is present. No significant mitral valve stenosis is present.    The tricuspid valve is structurally normal with no stenosis present. Trace tricuspid valve regurgitation is present. Insufficient TR velocity profile to estimate the right ventricular systolic pressure.    Mild dilation of the sinuses of Valsalva is present. Mild dilation of the ascending aorta is present. Ascending aorta = 4.2 cm    Current medications:  Scheduled Meds:amLODIPine, 10 mg, Oral, Q24H  cyanocobalamin, 1,000 mcg, Intramuscular, Q28 Days  donepezil, 10 mg, Oral, BID  folic acid, 1 mg, Oral, Daily  heparin (porcine), 5,000 Units, Subcutaneous, Q8H  insulin glargine, 17 Units, Subcutaneous, Q12H  insulin lispro, 2-7 Units, Subcutaneous, 4x Daily AC & at Bedtime  Insulin Lispro, 7 Units, Subcutaneous, TID With Meals  lisinopril, 30 mg, Oral, Daily  melatonin, 5 mg, Oral, Nightly  miconazole, 1 Application, Topical, Q12H  mirtazapine, 15 mg, Oral, Nightly  PHENobarbital, 130 mg, Oral, Nightly   Or  PHENobarbital, 130 mg, Intramuscular, Nightly  PHENobarbital, 64.8 mg, Oral, BID   Or  PHENobarbital, 65 mg, Intramuscular, BID  QUEtiapine, 100 mg, Oral, BID  QUEtiapine, 300 mg, Oral, Nightly  senna-docusate sodium, 2 tablet, Oral, BID  sertraline, 50 mg, Oral, Daily  sodium chloride, 10 mL, Intravenous, Q12H  temazepam, 30 mg, Oral, Nightly      Continuous Infusions:     PRN Meds:.  acetaminophen **OR** acetaminophen **OR** acetaminophen    senna-docusate sodium **AND** polyethylene glycol **AND**  bisacodyl **AND** bisacodyl    Calcium Replacement - Follow Nurse / BPA Driven Protocol    dextrose    dextrose    glucagon (human recombinant)    Magnesium Standard Dose Replacement - Follow Nurse / BPA Driven Protocol    midazolam    Phosphorus Replacement - Follow Nurse / BPA Driven Protocol    Potassium Replacement - Follow Nurse / BPA Driven Protocol    sodium chloride    sodium chloride    ziprasidone    Assessment & Plan     Active Hospital Problems    Diagnosis  POA    **Agitation due to dementia [F03.911]  Yes    Agitation [R45.1]  Yes    Type 2 diabetes mellitus with hyperglycemia, with long-term current use of insulin [E11.65, Z79.4]  Not Applicable    Peripheral neuropathy [G62.9]  Yes    Hyperlipidemia [E78.5]  Yes    Benign prostatic hyperplasia [N40.0]  Yes    Benign essential HTN [I10]  Yes    Peripheral vascular disease [I73.9]  Yes    Coronary artery disease involving native coronary artery of native heart [I25.10]  Yes      Resolved Hospital Problems   No resolved problems to display.     Brief Hospital Course to date:  Too Tai is a 68yoM with PMH significant for advanced dementia, CAD, diabetes mellitus type 2, HLD and PVD. Admitted to [facility] ED on 4/18/24 for agitation / behavioral disturbances. Recently admitted to [facility] 2/24-3/4/24 for dementia with behavioral disturbances and again 3/27-4/4/24 for similar issues. Patient transferred to my services on 5/6, reviewed chart and working on possible transfer to [facility] facility, continue to follow with CM and neurology at this time. Sitter remains at bedside, patient has been out of restrains since 5/3, continue to follow at this time.      Advanced dementia complicated by behavioral disturbance with superimposed sleep disturbance   - Infectious work up unremarkable. UA reassuring. CXR without acute findings. CT head without acute findings  - Continue home Aricept and Mirtazapine 15mg QHS and melatonin 5 mg at 7 pm  -  Continue Seroquel   - 5/3 trial of zoloft 50 mg and PRN versed 0.5 mg as needed for nursing care. (Ativan 0.25mg recommended per psych but it is not available)  - Neurology started Phenobarbital, level now WNL.  Will continue to monitor effect, again level remains WNL on 5/6   - Continue Temazepam 30mg QHS  - Appreciate palliative care assistance  - May need geriatric psych facility vs LTC - CM looking into this. Telepsych eval completed 5/2/24  - Lost IV access, will need to monitor PO intake, may need to replace IVFs, continue to follow.   - PRN Geodon    Uncontrolled diabetes mellitus type 2  - Appears not on any home meds for DM  - A1c 9.5%  - Continue Lantus, increased to 17 units BID, Lispro 7 units TID AC + SSI   - glucose reviewed    HTN  - continue Amlodipine 10mg daily  - continue Lisinopril 30mg daily     Expected Discharge Location and Transportation: TBD  Expected Discharge   Expected Discharge Date: 5/7/2024; Expected Discharge Time:      DVT prophylaxis:Medical and mechanical DVT prophylaxis orders are present.    AM-PAC 6 Clicks Score (PT): 14 (05/05/24 2000)    CODE STATUS:   Code Status and Medical Interventions:   Ordered at: 04/19/24 1037     Medical Intervention Limits:    NO intubation (DNI)     Level Of Support Discussed With:    Next of Kin (If No Surrogate)     Code Status (Patient has no pulse and is not breathing):    No CPR (Do Not Attempt to Resuscitate)     Medical Interventions (Patient has pulse or is breathing):    Limited Support     GLENN Palacios, DO  05/06/24

## 2024-05-06 NOTE — DISCHARGE PLACEMENT REQUEST
"Too Richter (68 y.o. Male)     DAVEY GALLEGO RN CASE MANAGER  [phone]        Date of Birth   1955    Social Security Number       Address   [address] [place] [address]    Home Phone   [phone]    MRN   [id number]       Hindu   Zoroastrian    Marital Status                               Admission Date   4/18/24    Admission Type   Emergency    Admitting Provider   RANDALL Palacios DO    Attending Provider   RANDALL Palacios DO    Department, Room/Bed   [address], S551/1       Discharge Date       Discharge Disposition       Discharge Destination                                 Attending Provider: RANDALL Palacios DO    Allergies: Bactrim [Sulfamethoxazole-trimethoprim], Adhesive Tape, Neosporin [Neomycin-bacitracin Zn-polymyx]    Isolation: None   Infection: None   Code Status: No CPR    Ht: 188 cm (74\")   Wt: 97.5 kg (215 lb)    Admission Cmt: None   Principal Problem: Agitation due to dementia [F03.911]                   Active Insurance as of 4/18/2024       Primary Coverage       Payor Plan Insurance Group Employer/Plan Group    AETNA MEDICARE REPLACEMENT AETNA MED ADV PPO [id number]       Payor Plan Address Payor Plan Phone Number Payor Plan Fax Number Effective Dates    [address] [phone]  1/1/2024 - None Entered    [facility] [id number]         Subscriber Name Subscriber Birth Date Member ID       TOO RICHTER 1955 [id number]                     Emergency Contacts        (Rel.) Home Phone Work Phone Mobile Phone    NEELAMWILLIE GARCIA (Spouse) [phone] -- [phone]    Ritchie Richter (Son) [phone] -- [phone]    Meg Bustillo (Relative) [phone] -- [phone]                 History & Physical        Collin Rogers MD at 04/24/24 1117          Des Geller MD  Consulting physician: Ken    Chief Complaint   Patient presents with    Altered Mental Status       Reason for consult: agitated delirium    HPI: 69 yo " male lives with wife at home . Four years ago dxed with dementia . 6-8 months ago with neuropsych behavior problems. Otherwise ADL 1-2/6 with FAST 6 scoring per wife commnetary. Severe agitation prompted hospitalization. Sometimes violent. H/O DM, BPH, CAD, bilat PVD , neuropathy. Given Midazolam in ED. Wife knows he is declining and jono needs palcement but was asked not to return to NH last time because of violence. On multpile mediscation and followed well by Neurology. Trajectory more fits Lewy Body?      Pain assesment: none    Dyspnea: none    N/V: none    PPS: 40%      Past Medical History:   Diagnosis Date    Coronary artery disease     Dementia     Diabetes mellitus     Hyperlipidemia     Peripheral vascular disease      Past Surgical History:   Procedure Laterality Date    CORONARY ARTERY BYPASS GRAFT      FEMORAL ARTERY - POPLITEAL ARTERY BYPASS GRAFT       Current Code Status       Date Active Code Status Order ID Comments User Context       4/19/2024 1037 No CPR (Do Not Attempt to Resuscitate) [id number]  Cecily Bain DO Inpatient        Question Answer    Code Status (Patient has no pulse and is not breathing) No CPR (Do Not Attempt to Resuscitate)    Medical Interventions (Patient has pulse or is breathing) Limited Support    Medical Intervention Limits: NO intubation (DNI)    Level Of Support Discussed With Next of Kin (If No Surrogate)                  Current Facility-Administered Medications   Medication Dose Route Frequency Provider Last Rate Last Admin    acetaminophen (TYLENOL) tablet 650 mg  650 mg Oral Q4H PRN Traci, Nerissa G, DO        Or    acetaminophen (TYLENOL) 160 MG/5ML oral solution 650 mg  650 mg Oral Q4H PRN Traci, Nerissa G, DO        Or    acetaminophen (TYLENOL) suppository 650 mg  650 mg Rectal Q4H PRN Traci, Nerissa G, DO        [START ON 4/25/2024] amLODIPine (NORVASC) tablet 10 mg  10 mg Oral Q24H Calin Benitez MD        sennosides-docusate (PERICOLACE) 8.6-50 MG per  tablet 2 tablet  2 tablet Oral BID PRN Traci, Nerissa G, DO        And    polyethylene glycol (MIRALAX) packet 17 g  17 g Oral Daily PRN Traci, Nerissa G, DO        And    bisacodyl (DULCOLAX) EC tablet 5 mg  5 mg Oral Daily PRN Traci, Nerissa G, DO   5 mg at 04/23/24 0859    And    bisacodyl (DULCOLAX) suppository 10 mg  10 mg Rectal Daily PRN Traci, Nerissa G, DO        Calcium Replacement - Follow Nurse / BPA Driven Protocol   Does not apply PRN Traci, Nerissa G, DO        cyanocobalamin injection 1,000 mcg  1,000 mcg Intramuscular Q28 Days JhaveriGuille hannony A, DO   1,000 mcg at 04/19/24 1532    dextrose (D50W) (25 g/50 mL) IV injection 25 g  25 g Intravenous Q15 Min PRN Traci, Nerissa G, DO        dextrose (GLUTOSE) oral gel 15 g  15 g Oral Q15 Min PRN Traci, Nerissa G, DO        Divalproex Sodium (DEPAKOTE SPRINKLE) capsule 500 mg  500 mg Oral Q8H Jhaveri, Darrell A, DO        donepezil (ARICEPT) tablet 10 mg  10 mg Oral BID Jhaveri, Darrell A, DO        folic acid (FOLVITE) tablet 1 mg  1 mg Oral Daily Traci, Nerissa G, DO   1 mg at 04/24/24 0852    glucagon (GLUCAGEN) injection 1 mg  1 mg Intramuscular Q15 Min PRN Traci, Nerissa G, DO        haloperidol (HALDOL) tablet 5 mg  5 mg Oral 4x Daily PRN Traci, Nerissa G, DO        heparin (porcine) 5000 UNIT/ML injection 5,000 Units  5,000 Units Subcutaneous Q8H Traci, Nersisa G, DO   5,000 Units at 04/24/24 0550    insulin glargine (LANTUS, SEMGLEE) injection 15 Units  15 Units Subcutaneous Q12H Cecily Bain, DO   15 Units at 04/24/24 0854    Insulin Lispro (humaLOG) injection 2-7 Units  2-7 Units Subcutaneous 4x Daily AC & at Bedtime Traci, Nerissa G, DO   2 Units at 04/24/24 0856    Insulin Lispro (humaLOG) injection 5 Units  5 Units Subcutaneous TID With Meals Parisa Madrigal PA-C   5 Units at 04/24/24 0855    lisinopril (PRINIVIL,ZESTRIL) tablet 20 mg  20 mg Oral Daily Nerissa Aviles DO   20 mg at 04/24/24 0853    Magnesium Standard Dose Replacement - Follow Nurse / BPA  Driven Protocol   Does not apply PRN Traci, Nerissa G, DO        melatonin tablet 5 mg  5 mg Oral Nightly PRN Traci, Nerissa G, DO        miconazole (MICOTIN) 2 % powder 1 Application  1 Application Topical Q12H Cecily Bain DO   1 Application at 04/23/24 2046    mirtazapine (REMERON) tablet 15 mg  15 mg Oral Nightly Darrell Jahveri A, DO        Phosphorus Replacement - Follow Nurse / BPA Driven Protocol   Does not apply PRN Traci, Nerissa G, DO        Potassium Replacement - Follow Nurse / BPA Driven Protocol   Does not apply PRN Traci, Nerissa G, DO        QUEtiapine (SEROquel) tablet 100 mg  100 mg Oral BID Meliza Kahn, APRN   100 mg at 04/24/24 0852    QUEtiapine (SEROquel) tablet 200 mg  200 mg Oral Nightly Darrell Jhaveri A, DO        sodium chloride 0.9 % flush 10 mL  10 mL Intravenous PRN Traci, Nerissa G, DO        sodium chloride 0.9 % flush 10 mL  10 mL Intravenous Q12H Traci, Nerissa G, DO   10 mL at 04/23/24 2047    sodium chloride 0.9 % flush 10 mL  10 mL Intravenous PRN Traci, Nerissa G, DO        sodium chloride 0.9 % infusion 40 mL  40 mL Intravenous PRN Traci, Nerissa G, DO   40 mL at 04/23/24 0546    sodium chloride 0.9 % infusion  100 mL/hr Intravenous Continuous Parisa Madrigal PA-C 100 mL/hr at 04/23/24 1534 100 mL/hr at 04/23/24 1534    temazepam (RESTORIL) capsule 30 mg  30 mg Oral Nightly Darrell Jhaveri A, DO        ziprasidone (GEODON) injection 10 mg  10 mg Intramuscular Q4H PRN Meliza Kahn, APRN   10 mg at 04/23/24 1927     sodium chloride, 100 mL/hr, Last Rate: 100 mL/hr (04/23/24 1534)        acetaminophen **OR** acetaminophen **OR** acetaminophen    senna-docusate sodium **AND** polyethylene glycol **AND** bisacodyl **AND** bisacodyl    Calcium Replacement - Follow Nurse / BPA Driven Protocol    dextrose    dextrose    glucagon (human recombinant)    haloperidol    Magnesium Standard Dose Replacement - Follow Nurse / BPA Driven Protocol    melatonin    Phosphorus Replacement -  "Follow Nurse / BPA Driven Protocol    Potassium Replacement - Follow Nurse / BPA Driven Protocol    sodium chloride    sodium chloride    sodium chloride    ziprasidone  Allergies   Allergen Reactions    Bactrim [Sulfamethoxazole-Trimethoprim] Rash    Adhesive Tape Rash    Neosporin [Neomycin-Bacitracin Zn-Polymyx] Rash     Family History   Problem Relation Age of Onset    Diabetes Mother     Heart disease Father     Hypertension Father     Hyperlipidemia Father     Diabetes Sister     Diabetes Maternal Grandfather     Diabetes Sister     Diabetes Sister      Social History     Socioeconomic History    Marital status:    Tobacco Use    Smoking status: Former     Current packs/day: 0.00     Types: Cigarettes     Quit date:      Years since quittin.3     Passive exposure: Never    Smokeless tobacco: Former   Vaping Use    Vaping status: Never Used   Substance and Sexual Activity    Alcohol use: No    Drug use: No    Sexual activity: Never     Review of Systems - not able      BP (!) 184/80 (BP Location: Right arm, Patient Position: Lying)   Pulse 86   Temp 97.4 °F (36.3 °C) (Axillary)   Resp 20   Ht 188 cm (74\")   Wt 97.5 kg (215 lb)   SpO2 95%   BMI 27.60 kg/m²     Intake/Output Summary (Last 24 hours) at 2024 1117  Last data filed at 2024 1930  Gross per 24 hour   Intake 300 ml   Output --   Net 300 ml     Physical Exam:    Outburst of voacl phases, otherwise no thrashing about. Lungs clear, abd flat , ext no edema      Results from last 7 days   Lab Units 24  0829   WBC 10*3/mm3 10.47   HEMOGLOBIN g/dL 15.5   HEMATOCRIT % 46.7   PLATELETS 10*3/mm3 307     Results from last 7 days   Lab Units 24  0829 24  2356   SODIUM mmol/L 144 142   POTASSIUM mmol/L 4.4 4.9   CHLORIDE mmol/L 106 104   CO2 mmol/L 30.0* 28.0   BUN mg/dL 16 22   CREATININE mg/dL 0.82 1.12   CALCIUM mg/dL 9.9 9.4   BILIRUBIN mg/dL  --  0.3   ALK PHOS U/L  --  120*   ALT (SGPT) U/L  --  13   AST " "(SGOT) U/L  --  17   GLUCOSE mg/dL 166* 226*     Results from last 7 days   Lab Units 24  0829   SODIUM mmol/L 144   POTASSIUM mmol/L 4.4   CHLORIDE mmol/L 106   CO2 mmol/L 30.0*   BUN mg/dL 16   CREATININE mg/dL 0.82   GLUCOSE mg/dL 166*   CALCIUM mg/dL 9.9     Imaging Results (Last 72 Hours)       ** No results found for the last 72 hours. **          Impression: Plan: Agitated Delirium: Agree with medication plan. Full discussion with patient's wife at bedside. Maximize medication/ antipsychotics . Consider Phenobarbitol ( 65 mg 2-4 times a day to start with dose titration 130mg etc/ and might consider scheduled dosing for 2-3 days? ) if breakthrough medication regimen. Discussed with wife on both phenobarb and \"respite sedation for 2-3 days. Also Midazolam PRN if severe break through agitation. Placement: wife aware difficult secondary to violent behavior. Will continue to discuss as possible Lewy Body type and will get worse. ( HOLD any Haloperidol secondary to dementia type?) Will continue to follow        Time: 35 minutes    Collin Rogers MD  24  11:17 EDT             Electronically signed by Collin Rogers MD at 24 1133       Nerissa Avlies DO at 24 0218              [facility] Services  HISTORY AND PHYSICAL    Patient Name: Too Tai  : 1955  MRN: [id number]  Primary Care Physician: Des Geller MD  Date of admission: 2024      Subjective  Subjective     Chief Complaint:  Agitation    HPI:  Too Tai is a 68 y.o. male with a PMH significant for advanced dementia, CAD, diabetes mellitus type 2, HLD, PVD who comes to the ED due to agitation.  Patient with advanced dementia, nonverbal.  No family present.  HPI obtained from EMR.  Patient was hospitalized with d/c on 3/4/2024 after presenting with frequent falls and increased myoclonus.  Symptoms were felt to be secondary to Rexulti which was discontinued.  Patient was hospitalized " again with d/c on 4/4/2024 where he was evaluated after having falls at home with increased confusion.  He was found to be orthostatic with concerns for UTI and pneumonia.  He was treated with antibiotics discharged home.  Patient has become more agitated, restless and combative over the past 2 days.  He has been seen by neurology outpatient with medication adjustments.  He has had no improvement of agitation.  He was brought to the ED by spouse due to these concerns.      Personal History     Past Medical History:   Diagnosis Date    Coronary artery disease     Dementia     Diabetes mellitus     Hyperlipidemia     Peripheral vascular disease            Past Surgical History:   Procedure Laterality Date    CORONARY ARTERY BYPASS GRAFT      FEMORAL ARTERY - POPLITEAL ARTERY BYPASS GRAFT         Family History: family history includes Diabetes in his maternal grandfather, mother, sister, sister, and sister; Heart disease in his father; Hyperlipidemia in his father; Hypertension in his father.     Social History:  reports that he quit smoking about 27 years ago. His smoking use included cigarettes. He has never been exposed to tobacco smoke. He has quit using smokeless tobacco. He reports that he does not drink alcohol and does not use drugs.  Social History     Social History Narrative    Not on file       Medications:  Available home medication information reviewed.  Accu-Chek Geri, Accu-Chek Geri Plus, Alcohol Prep, Divalproex Sodium, Glucagon, Insulin Glargine, Insulin Pen Needle, QUEtiapine, QUEtiapine fumarate ER, accu-chek soft touch, amLODIPine, donepezil, haloperidol, lisinopril, mirtazapine, and temazepam    Allergies   Allergen Reactions    Bactrim [Sulfamethoxazole-Trimethoprim] Rash    Adhesive Tape Rash    Neosporin [Neomycin-Bacitracin Zn-Polymyx] Rash       Objective  Objective     Vital Signs:   Temp:  [97.2 °F (36.2 °C)] 97.2 °F (36.2 °C)  Heart Rate:  [91-93] 91  Resp:  [20] 20  BP:  (148-158)/(75-84) 158/84       Physical Exam   Constitutional: Awake, alert, fidgety  Eyes: PERRLA, sclerae anicteric, no conjunctival injection  HENT: NCAT, mucous membranes moist  Neck: Supple, no thyromegaly, no lymphadenopathy, trachea midline  Respiratory: Clear to auscultation bilaterally, nonlabored respirations   Cardiovascular: RRR, no murmurs, rubs, or gallops, palpable pedal pulses bilaterally  Gastrointestinal: Positive bowel sounds, soft, nontender, nondistended  Musculoskeletal: No bilateral ankle edema, no clubbing or cyanosis to extremities  Psychiatric: Fidgety  Neurologic: Unable to assess, not following commands  Skin: No rashes      Result Review:  I have personally reviewed the results from the time of this admission to 4/19/2024 03:04 EDT and agree with these findings:  [x]  Laboratory list / accordion  []  Microbiology  [x]  Radiology  [x]  EKG/Telemetry   []  Cardiology/Vascular   []  Pathology  [x]  Old records      LAB RESULTS:      Lab 04/18/24  2356   WBC 9.87   HEMOGLOBIN 13.8   HEMATOCRIT 42.2   PLATELETS 292   NEUTROS ABS 4.68   IMMATURE GRANS (ABS) 0.05   LYMPHS ABS 3.34*   MONOS ABS 1.14*   EOS ABS 0.56*   MCV 95.3         Lab 04/18/24  2356   SODIUM 142   POTASSIUM 4.9   CHLORIDE 104   CO2 28.0   ANION GAP 10.0   BUN 22   CREATININE 1.12   EGFR 72.0   GLUCOSE 226*   CALCIUM 9.4   MAGNESIUM 1.8         Lab 04/18/24  2356   TOTAL PROTEIN 6.9   ALBUMIN 3.9   GLOBULIN 3.0   ALT (SGPT) 13   AST (SGOT) 17   BILIRUBIN 0.3   ALK PHOS 120*         Lab 04/18/24  2356   HSTROP T 22*                 UA          2/24/2024    23:54 3/27/2024    16:33 4/19/2024    02:43   Urinalysis   Squamous Epithelial Cells, UA 0-2  0-2     Specific Gravity, UA 1.032  1.038  <=1.005    Ketones, UA Trace  40 mg/dL (2+)  Negative    Blood, UA Negative  Negative  Negative    Leukocytes, UA Negative  Negative  Negative    Nitrite, UA Negative  Positive  Negative    RBC, UA 0-2  0-2     WBC, UA 0-2  0-2      Bacteria, UA None Seen  4+         Microbiology Results (last 10 days)       ** No results found for the last 240 hours. **            CT Head Without Contrast    Result Date: 4/19/2024  CT HEAD WO CONTRAST Date of Exam: 4/19/2024 2:11 AM EDT Indication: Mental status change, unknown cause. Comparison: 3/28/2024. Technique: Axial CT images were obtained of the head without contrast administration.  Automated exposure control and iterative construction methods were used. Findings: There is no acute intracranial hemorrhage. No mass effect, midline shift or abnormal extra-axial collection. There is stable moderate patchy periventricular white matter hypoattenuation. Mild age-appropriate generalized parenchymal volume loss. No new hypoattenuating lesions in the brain. Cortical gray-white differentiation appears intact. The orbits are unremarkable. Mastoids and paranasal sinuses appear well aerated.     Impression: Impression: 1. No acute intracranial abnormality. 2. Moderate chronic small vessel ischemic change. Electronically Signed: Reg Pelaez MD  4/19/2024 2:29 AM EDT  Workstation ID: [id number]    XR Chest 1 View    Result Date: 4/19/2024  XR CHEST 1 VW Date of Exam: 4/19/2024 12:24 AM EDT Indication: Weak/Dizzy/AMS triage protocol Comparison: 3/27/2024. Findings: There is evidence of prior median sternotomy and CABG. Cardiac silhouette appears unchanged. Pulmonary vasculature is within normal limits. Evaluation of the left lung base is limited due to lordotic view and positioning. No definite lung consolidation. No pneumothorax or pleural effusion.     Impression: Impression: Limited study demonstrates no acute abnormality in the lungs. Electronically Signed: Reg Pelaez MD  4/19/2024 1:29 AM EDT  Workstation ID: [id number]     Results for orders placed during the hospital encounter of 03/27/24    Adult Transthoracic Echo Complete W/ Cont if Necessary Per Protocol    Interpretation Summary    Left ventricular  systolic function is normal. Calculated left ventricular EF = 55.1% Normal left ventricular cavity size noted. Left ventricular wall thickness is consistent with mild concentric hypertrophy. Left ventricular diastolic function is consistent with (grade I) impaired relaxation.    The right ventricular cavity is mildly dilated. Normal right ventricular systolic function noted.    There is calcification of the aortic valve. The aortic valve appears trileaflet. Mild aortic valve regurgitation is present. No aortic valve stenosis is present.    Mitral annular calcification is present. Trace mitral valve regurgitation is present. No significant mitral valve stenosis is present.    The tricuspid valve is structurally normal with no stenosis present. Trace tricuspid valve regurgitation is present. Insufficient TR velocity profile to estimate the right ventricular systolic pressure.    Mild dilation of the sinuses of Valsalva is present. Mild dilation of the ascending aorta is present. Ascending aorta = 4.2 cm      Assessment & Plan  Assessment & Plan       Agitation due to dementia    Benign essential HTN    Benign prostatic hyperplasia    Coronary artery disease involving native coronary artery of native heart    Type 2 diabetes mellitus with hyperglycemia, with long-term current use of insulin    Hyperlipidemia    Peripheral neuropathy    Peripheral vascular disease    Too Tai is a 68 y.o. male with a PMH significant for advanced dementia, CAD, diabetes mellitus type 2, HLD, PVD who comes to the ED due to agitation.        Severe dementia complicated by behavioral disturbance  -Check UA  - CT head wnl  - Consult neurology  - Fall precautions  - Case management consult  - Continue home Depakote, Aricept, Haldol, Seroquel, Restoril, Remeron  - QTc 452    Diabetes mellitus type 2  - No active home meds  - SSI with Accu-Cheks  - Hemoglobin A1c for a.m.    HTN  PVD  CAD  - Continue home meds    Home med list  reviewed    DVT prophylaxis:  Mechanical and medical DVT prophylaxis orders are signed and held.       CODE STATUS: No family present.  CODE STATUS will need to be clarified with family, was DNR during prior stay.  There are no questions and answers to display.     Expected Discharge   Expected Discharge Date: 4/21/2024; Expected Discharge Time:      Nerissa Aviles DO  04/19/24      Electronically signed by Nerissa Aviles DO at 04/19/24 0305       Current Facility-Administered Medications   Medication Dose Route Frequency Provider Last Rate Last Admin    acetaminophen (TYLENOL) tablet 650 mg  650 mg Oral Q4H PRN Nerissa Aviles, DO   650 mg at 04/30/24 2042    Or    acetaminophen (TYLENOL) 160 MG/5ML oral solution 650 mg  650 mg Oral Q4H PRN Nerissa Aviles DO        Or    acetaminophen (TYLENOL) suppository 650 mg  650 mg Rectal Q4H PRN Nerissa Aviles G, DO        amLODIPine (NORVASC) tablet 10 mg  10 mg Oral Q24H Calin Benitez MD   10 mg at 05/05/24 1204    sennosides-docusate (PERICOLACE) 8.6-50 MG per tablet 2 tablet  2 tablet Oral BID Richardson, Jennifer E, APRN   2 tablet at 05/03/24 0849    And    polyethylene glycol (MIRALAX) packet 17 g  17 g Oral Daily PRN Richardson, Jennifer E, APRN        And    bisacodyl (DULCOLAX) EC tablet 5 mg  5 mg Oral Daily PRN Richardson, Jennifer E, APRN        And    bisacodyl (DULCOLAX) suppository 10 mg  10 mg Rectal Daily PRN Richardson, Jennifer E, APRN        Calcium Replacement - Follow Nurse / BPA Driven Protocol   Does not apply PRN Kamala Avilessie G, DO        cyanocobalamin injection 1,000 mcg  1,000 mcg Intramuscular Q28 Days Darrell Jhaveri DO   1,000 mcg at 04/19/24 1532    dextrose (D50W) (25 g/50 mL) IV injection 25 g  25 g Intravenous Q15 Min PRN TraciKamala bacasie G, DO        dextrose (GLUTOSE) oral gel 15 g  15 g Oral Q15 Min PRN Nerissa Aviles G, DO        donepezil (ARICEPT) tablet 10 mg  10 mg Oral BID Darrell Jhaveri DO   10 mg at 05/05/24 1954    folic acid (FOLVITE) tablet 1 mg  1  mg Oral Daily Nerissa Aviles, DO   1 mg at 05/05/24 1204    glucagon (GLUCAGEN) injection 1 mg  1 mg Intramuscular Q15 Min PRN Nerissa Aviles, DO        heparin (porcine) 5000 UNIT/ML injection 5,000 Units  5,000 Units Subcutaneous Q8H Nerissa Aviles, DO   5,000 Units at 05/06/24 0516    insulin glargine (LANTUS, SEMGLEE) injection 17 Units  17 Units Subcutaneous Q12H Jaclyn Holguin APRN   17 Units at 05/05/24 2014    Insulin Lispro (humaLOG) injection 2-7 Units  2-7 Units Subcutaneous 4x Daily AC & at Bedtime Nerissa Aviles DO   3 Units at 05/05/24 2014    Insulin Lispro (humaLOG) injection 7 Units  7 Units Subcutaneous TID With Meals Jaclyn Holguin APRN   7 Units at 05/05/24 1648    lisinopril (PRINIVIL,ZESTRIL) tablet 30 mg  30 mg Oral Daily Parisa Madrigal PA-C   30 mg at 05/05/24 1203    Magnesium Standard Dose Replacement - Follow Nurse / BPA Driven Protocol   Does not apply PRN Nerissa Aviles, DO        melatonin tablet 5 mg  5 mg Oral Nightly Jaclyn Holguin APRN   5 mg at 05/05/24 1954    miconazole (MICOTIN) 2 % powder 1 Application  1 Application Topical Q12H Cecily Bain DO   1 Application at 05/05/24 2002    midazolam (VERSED) injection 0.5 mg  0.5 mg Intravenous BID PRN Wili Nicole MD   0.5 mg at 05/04/24 0246    mirtazapine (REMERON) tablet 15 mg  15 mg Oral Nightly Darrell Jhaveri DO   15 mg at 05/05/24 1954    PHENobarbital tablet 130 mg  130 mg Oral Nightly Laura Delgado APRN   130 mg at 05/05/24 1954    Or    PHENobarbital injection 130 mg  130 mg Intramuscular Nightly Laura Delgado APRN   130 mg at 05/04/24 2206    PHENobarbital tablet 64.8 mg  64.8 mg Oral BID Brijesh Mac MD   64.8 mg at 05/05/24 1309    Or    PHENobarbital injection 65 mg  65 mg Intramuscular BID Brijesh Mac MD        Phosphorus Replacement - Follow Nurse / BPA Driven Protocol   Does not apply PRN Nerissa Aviles DO        Potassium Replacement - Follow Nurse / BPA Driven  Protocol   Does not apply PRN Traci, Nerissa G, DO        QUEtiapine (SEROquel) tablet 100 mg  100 mg Oral BID , APRN   100 mg at 24 120    QUEtiapine (SEROquel) tablet 300 mg  300 mg Oral Nightly , APRN   300 mg at 24    sertraline (ZOLOFT) tablet 50 mg  50 mg Oral Daily , APRN   50 mg at 24    sodium chloride 0.9 % flush 10 mL  10 mL Intravenous Q12H Traci, Nerissa G, DO   10 mL at 24    sodium chloride 0.9 % flush 10 mL  10 mL Intravenous PRN Traci, Nerissa G, DO        sodium chloride 0.9 % infusion 40 mL  40 mL Intravenous PRN Traci, Nerissa G, DO   40 mL at 24 0546    temazepam (RESTORIL) capsule 30 mg  30 mg Oral Nightly Darrell Jhaveri, DO   30 mg at 24    ziprasidone (GEODON) injection 10 mg  10 mg Intramuscular Q4H PRN , APRN   10 mg at 24 0546        Physician Progress Notes (most recent note)        Kemal Rayo MD at 24 1200              [facility] Medicine Services  PROGRESS NOTE    Patient Name: Too Tai  : 1955  MRN: [id number]    Date of Admission: 2024  Primary Care Physician: Des Geller MD    Subjective     CC: f/u dementia with agitation    HPI:  Patient currently asleep.  Spouse at bedside.        Objective     Vital Signs:   Temp:  [97 °F (36.1 °C)-97.8 °F (36.6 °C)] 97.6 °F (36.4 °C)  Heart Rate:  [70-78] 71  Resp:  [16] 16  BP: (141-173)/(62-93) 158/80     Physical Exam:  Non toxic appearing, in bed  MM moist  RRR  CTAB  Abd soft, NT  No edema  Flat affect    Results Reviewed:  LAB RESULTS:    Brief Urine Lab Results  (Last result in the past 365 days)        Color   Clarity   Blood   Leuk Est   Nitrite   Protein   CREAT   Urine HCG        24 0243 Yellow   Clear   Negative   Negative   Negative   Negative                 Microbiology Results Abnormal       None          No radiology results from the last 24  hrs    Results for orders placed during the hospital encounter of 03/27/24    Adult Transthoracic Echo Complete W/ Cont if Necessary Per Protocol    Interpretation Summary    Left ventricular systolic function is normal. Calculated left ventricular EF = 55.1% Normal left ventricular cavity size noted. Left ventricular wall thickness is consistent with mild concentric hypertrophy. Left ventricular diastolic function is consistent with (grade I) impaired relaxation.    The right ventricular cavity is mildly dilated. Normal right ventricular systolic function noted.    There is calcification of the aortic valve. The aortic valve appears trileaflet. Mild aortic valve regurgitation is present. No aortic valve stenosis is present.    Mitral annular calcification is present. Trace mitral valve regurgitation is present. No significant mitral valve stenosis is present.    The tricuspid valve is structurally normal with no stenosis present. Trace tricuspid valve regurgitation is present. Insufficient TR velocity profile to estimate the right ventricular systolic pressure.    Mild dilation of the sinuses of Valsalva is present. Mild dilation of the ascending aorta is present. Ascending aorta = 4.2 cm    Current medications:  Scheduled Meds:amLODIPine, 10 mg, Oral, Q24H  cyanocobalamin, 1,000 mcg, Intramuscular, Q28 Days  donepezil, 10 mg, Oral, BID  folic acid, 1 mg, Oral, Daily  heparin (porcine), 5,000 Units, Subcutaneous, Q8H  insulin glargine, 17 Units, Subcutaneous, Q12H  insulin lispro, 2-7 Units, Subcutaneous, 4x Daily AC & at Bedtime  Insulin Lispro, 7 Units, Subcutaneous, TID With Meals  lisinopril, 30 mg, Oral, Daily  melatonin, 5 mg, Oral, Nightly  miconazole, 1 Application, Topical, Q12H  mirtazapine, 15 mg, Oral, Nightly  PHENobarbital, 130 mg, Oral, Nightly   Or  PHENobarbital, 130 mg, Intramuscular, Nightly  PHENobarbital, 64.8 mg, Oral, BID   Or  PHENobarbital, 65 mg, Intramuscular, BID  QUEtiapine, 100 mg,  Oral, BID  QUEtiapine, 300 mg, Oral, Nightly  senna-docusate sodium, 2 tablet, Oral, BID  sertraline, 50 mg, Oral, Daily  sodium chloride, 10 mL, Intravenous, Q12H  temazepam, 30 mg, Oral, Nightly      Continuous Infusions:lactated ringers, 75 mL/hr, Last Rate: 75 mL/hr (05/05/24 0756)      PRN Meds:.  acetaminophen **OR** acetaminophen **OR** acetaminophen    senna-docusate sodium **AND** polyethylene glycol **AND** bisacodyl **AND** bisacodyl    Calcium Replacement - Follow Nurse / BPA Driven Protocol    dextrose    dextrose    glucagon (human recombinant)    Magnesium Standard Dose Replacement - Follow Nurse / BPA Driven Protocol    midazolam    Phosphorus Replacement - Follow Nurse / BPA Driven Protocol    Potassium Replacement - Follow Nurse / BPA Driven Protocol    sodium chloride    sodium chloride    ziprasidone    Assessment & Plan     Active Hospital Problems    Diagnosis  POA    **Agitation due to dementia [F03.911]  Yes    Agitation [R45.1]  Yes    Type 2 diabetes mellitus with hyperglycemia, with long-term current use of insulin [E11.65, Z79.4]  Not Applicable    Peripheral neuropathy [G62.9]  Yes    Hyperlipidemia [E78.5]  Yes    Benign prostatic hyperplasia [N40.0]  Yes    Benign essential HTN [I10]  Yes    Peripheral vascular disease [I73.9]  Yes    Coronary artery disease involving native coronary artery of native heart [I25.10]  Yes      Resolved Hospital Problems   No resolved problems to display.     Brief Hospital Course to date:  Too Tai is a 68yoM with PMH significant for advanced dementia, CAD, diabetes mellitus type 2, HLD and PVD. Admitted to [facility] ED on 4/18/24 for agitation / behavioral disturbances. Recently admitted to [facility] 2/24-3/4/24 for dementia with behavioral disturbances and again 3/27-4/4/24 for similar issues.      Advanced dementia complicated by behavioral disturbance with superimposed sleep disturbance   - Infectious work up unremarkable. UA  reassuring. CXR without acute findings. CT head without acute findings  - Continue home Aricept and Mirtazapine 15mg QHS and melatonin 5 mg at 7 pm  - Continue Seroquel   - 5/3 trial of zoloft 50 mg and PRN versed 0.5 mg as needed for nursing care. (Ativan 0.25mg recommended per psych but it is not available)  - Neurology started Phenobarbital, level now WNL.  Will continue to monitor effect and recheck level on Monday per neurology recs  - Continue Temazepam 30mg QHS  - Appreciate palliative care assistance  - May need geriatric psych facility vs LTC - CM looking into this. Telepsych eval completed 5/2/24  - IV fluids while taking in minimal PO   - PRN Geodon    Uncontrolled diabetes mellitus type 2  - Appears not on any home meds for DM  - A1c 9.5%  - Continue Lantus, increased to 17 units BID, Lispro 7 units TID AC + SSI   - glucose reviewed    HTN  - continue Amlodipine 10mg daily  - continue Lisinopril 30mg daily     Expected Discharge Location and Transportation: TBD  Expected Discharge   Expected Discharge Date: 5/7/2024; Expected Discharge Time:      DVT prophylaxis:Medical and mechanical DVT prophylaxis orders are present.    AM-PAC 6 Clicks Score (PT): 14 (05/05/24 0730)    CODE STATUS:   Code Status and Medical Interventions:   Ordered at: 04/19/24 1037     Medical Intervention Limits:    NO intubation (DNI)     Level Of Support Discussed With:    Next of Kin (If No Surrogate)     Code Status (Patient has no pulse and is not breathing):    No CPR (Do Not Attempt to Resuscitate)     Medical Interventions (Patient has pulse or is breathing):    Limited Support     Kemal Rayo MD  05/05/24        Electronically signed by Kemal Rayo MD at 05/05/24 1203          Consult Notes (most recent note)        Tanesha Delgado, APRN at 05/02/24 0859              Hospital Consult      This provider is located at [facility], [facility], located at [address] [place] [address]  [place], [address] and is conducting  a secure MyChart Video Visit through "[employer]. Patient is being evaluated today as an inpatient at [facility] and states they are in a secure environment for this appointment. The patient consents to be seen remotely, and when consent is given they understand that the consent allows for patient identifiable information to be sent to a third party as needed. They may refuse to be seen remotely at any time. The electronic data is encrypted and password protected, and the patient  has been advised of the potential risks to privacy not withstanding such measures. The patient's condition being diagnosed/treated is appropriate for telemedicine. The provider identified herself as well as her credentials to patient. Patient's identity confirmed by requesting wife t to to correctly state his name and date of birth.    Date: 24  Patient Name: Too Tai  : 1955   MRN: [id number]   Attending: Kemal Rayo MD    Referring Provider: No ref. provider found    Chief Complaint:      ICD-10-CM ICD-9-CM   1. Dementia with behavioral disturbance  F03.918 294.21   2. Aggressive behavior  R46.89 V40.39   3. Agitation due to dementia  F03.911 294.21   4. Hyperglycemia due to diabetes mellitus  E11.65 250.02      History of Present Illness:   Too Tai is a 68 y.o. male currently hospitalized at [facility] for dementia with behavioral disturbances. Behavioral Health was consulted to evaluate patient due to concerns regarding increasing agitation that has prompted 3 recent hospital admissions.. This is his  initial encounter with this provider. Too Tai is a 69yo M with PMH significant for advanced dementia, CAD, diabetes mellitus type 2, HLD and PVD. Admitted to [facility] ED on 24 for agitation / behavioral disturbances. Recently admitted to [facility] -3/4/24 for dementia with behavioral disturbances and again 3/27-24 for increased  "confusion and falls.     Patient  lying in bed awake with wrist restraints during visit. He is alert, nonverbal and unresponsive to provider's questions.  He is calm with intermittent restlessness fidgeting with wrist restraints and sitting up in bed. Cognition/mental status unable to be evaluated due to his lack of speech and interaction with provider. He is essentially unable to recognize an evaluation of is taking place on his behalf. He did however, initially wave at provider with wife prompting him.  His wife of 47 years, Caroline, and son Bill are at bedside and provide historical information. Other son passed away five years ago.  Patient currently lives at home  ([facility]) with his wife, son Bill, his wife, his 2 children, his mother-in-law, and 4 grandchildren that visit often . Patient was born in [facility] and raised by parents. Has sisters. No family mental health history wife is aware of . HS graduate.  Worked for [employer] 45 yrs before he had to retire in 2017 due to complications from femoral bypass.     Wife states she has never been given a formal diagnosis regarding patient's condition.  She denies any  history of depression, anxiety, substance use, psych hospitalizations and legal history.  Former smoker. He has no history of seizures.  Fell off a horse in his 20s and \"busted \"the back of his head. Wife reports pt saw Dr Costa at [facility] who said he thinks he has Alzheimer's but has never been given formal diagnosis she is aware of.  Caroline noticed 5-6 yrs ago \"things weren't quite right\" She recalls one of the kids  needed air in their bicycle tire and he didn't know how to perform the task, one he had done many times before.  Agitation started 2 years ago and has gotten worse. 9/22 the family went to [employer] and he wondered off for 1 hr and stranger brought him back. He was delusional and thought they were selling drugs in [place]. Wife states " "sometimes he tries to talk to people who are not there and in the last month he started arguing with the mirror. Wife says he acts like he can't see her. She puts her hand in front of her eyes but he doesn't blink.  Wife says she is usually able to de-escalate him if he acts like he is going to hit her at home.  She sates he gets agitated when he puts his  shoes on the wrong feet and argues when she would correct him. However, in  an hour, he would have a moment of clarity and tell her  his shoes on the wrong feet. Wife states he is in  restraints due to hitting staff.  He was unable to stand upright and walk last time she took him home and she had to restrain at home because  he feel 6-7 times in 24 hrs.    She reports he lost his ability to function independently last fall. At home he would tell her he had to go to the bathroom and she would have to show him the toilet and he still sometimes urinates in the trash can or  in the laundry room. He is able to sit an hour at home before he gets  restless. She says he is constantly moving and has tried to get out of the house. They now have ADT alarms on  windows and doors. She has a stick placed to jam the sliding door he has nearly torn out trying to get the door open. He will let he wash his face and shave him but gets mad if she tries to  shower him or wash his private area. Appetite is \"fair\" as long as he can hold it and eat it himself. He wont let wife spoon feed him. Appetite is  good at home.  Wife reports insomnia at home is relatively new, and he didn't sleep the last  3 days at home. He  is sleeping good in hospital and  \" right now he is pretty mellow.\" Seroquel was initially prescribed  to calm him down, not for insomnia.  Wife states he was going to go to [facility] but they declined. She could probably take him home but needs more  help at home. She has a helper but is not much help. She is concerned about his weakness and having only stood 2x in 3 weeks. She " "states PT wont see him because  pt gets \"grouchy with them\". Wife feels PT should be more persistnet Prior meds per wife: Seroquel >1yr, Restoril 30 mg Nov 2023, Depakote-March, Rexulti titration up to 2 mg x  1 mo came to hospital Feb because  he was jerking in torso and arms.       Subjective   Subjective      Review of Systems   Constitutional:  Positive for activity change and fatigue.   Neurological:  Positive for speech difficulty, memory problem and confusion.   Psychiatric/Behavioral:  Positive for agitation and decreased concentration.    All other systems reviewed and are negative.      Depression Screening:  Level of Risk per Screen: screen negative (4/19/2024 12:55 AM)    PHQ-9 Depression Screening     C-SSRS (Recent)  Q1 Wished to be Dead (Past Month): no  Q2 Suicidal Thoughts (Past Month): no  Q6 Suicide Behavior (Lifetime): no  Level of Risk per Screen: screen negative       Past Psychiatric History:  none per wife       Substance Abuse History/Last use: quit smoking 27 yrs ago. No alcohol or illicit substance use                   Legal History:     Work History:               Highest level of education obtained: 12th grade               History? no              Patient's Occupation: retired    Interpersonal/Relational:              Marital Status:  47 yrs              Support system: significant other and son     Social History:  Where was patient born: [facility]  Upbringing: parents  Where does patient currently live: [facility]  Living situation: wife, son, MIL, COLEMAN and grandkids     Family History:   Family History   Problem Relation Age of Onset    Diabetes Mother     Heart disease Father     Hypertension Father     Hyperlipidemia Father     Diabetes Sister     Diabetes Maternal Grandfather     Diabetes Sister     Diabetes Sister        Past Medical History:   Diagnosis Date    Coronary artery disease     Dementia     Diabetes mellitus     Hyperlipidemia     Peripheral " vascular disease        Past Surgical History:   Procedure Laterality Date    CORONARY ARTERY BYPASS GRAFT      FEMORAL ARTERY - POPLITEAL ARTERY BYPASS GRAFT           Current Facility-Administered Medications:     acetaminophen (TYLENOL) tablet 650 mg, 650 mg, Oral, Q4H PRN, 650 mg at 04/30/24 2042 **OR** acetaminophen (TYLENOL) 160 MG/5ML oral solution 650 mg, 650 mg, Oral, Q4H PRN **OR** acetaminophen (TYLENOL) suppository 650 mg, 650 mg, Rectal, Q4H PRN, TraciTresa bacae G, DO    amLODIPine (NORVASC) tablet 10 mg, 10 mg, Oral, Q24H, Calin Benitez MD, 10 mg at 05/01/24 0828    sennosides-docusate (PERICOLACE) 8.6-50 MG per tablet 2 tablet, 2 tablet, Oral, BID, 2 tablet at 05/01/24 2011 **AND** polyethylene glycol (MIRALAX) packet 17 g, 17 g, Oral, Daily PRN **AND** bisacodyl (DULCOLAX) EC tablet 5 mg, 5 mg, Oral, Daily PRN **AND** bisacodyl (DULCOLAX) suppository 10 mg, 10 mg, Rectal, Daily PRN, Jennifer Bai APRN    Calcium Replacement - Follow Nurse / BPA Driven Protocol, , Does not apply, PRN, Traci, Nerissa G, DO    cyanocobalamin injection 1,000 mcg, 1,000 mcg, Intramuscular, Q28 Days, Darrell Jhaveri, DO, 1,000 mcg at 04/19/24 1532    dextrose (D50W) (25 g/50 mL) IV injection 25 g, 25 g, Intravenous, Q15 Min PRN, Traci, Nerissa G, DO    dextrose (GLUTOSE) oral gel 15 g, 15 g, Oral, Q15 Min PRN, Traci, Nerissa G, DO    donepezil (ARICEPT) tablet 10 mg, 10 mg, Oral, BID, Darrell Jhaveri A, DO, 10 mg at 05/01/24 2011    folic acid (FOLVITE) tablet 1 mg, 1 mg, Oral, Daily, Nerissa Aviles G, DO, 1 mg at 05/01/24 0829    glucagon (GLUCAGEN) injection 1 mg, 1 mg, Intramuscular, Q15 Min PRN, Nerissa Aviles,     heparin (porcine) 5000 UNIT/ML injection 5,000 Units, 5,000 Units, Subcutaneous, Q8H, Nerissa Aviles DO, 5,000 Units at 05/02/24 0507    insulin glargine (LANTUS, SEMGLEE) injection 15 Units, 15 Units, Subcutaneous, Q12H, Cecily Bain DO, 15 Units at 05/01/24 2026    Insulin Lispro (humaLOG) injection  2-7 Units, 2-7 Units, Subcutaneous, 4x Daily AC & at Bedtime, Nerissa Aviles, DO, 3 Units at 05/01/24 2026    Insulin Lispro (humaLOG) injection 7 Units, 7 Units, Subcutaneous, TID With Meals, [place], Jaclyn, APRN, 7 Units at 05/01/24 1731    lactated ringers infusion, 75 mL/hr, Intravenous, Continuous, Parisa Madrigal PA-C, Last Rate: 75 mL/hr at 05/02/24 0146, 75 mL/hr at 05/02/24 0146    lisinopril (PRINIVIL,ZESTRIL) tablet 30 mg, 30 mg, Oral, Daily, Parisa Madrigal PA-C, 30 mg at 05/01/24 0830    Magnesium Standard Dose Replacement - Follow Nurse / BPA Driven Protocol, , Does not apply, PRN, TraciKamalaNerissa G, DO    melatonin tablet 5 mg, 5 mg, Oral, Nightly PRN, Nerissa Aviles G, DO, 5 mg at 05/01/24 2011    miconazole (MICOTIN) 2 % powder 1 Application, 1 Application, Topical, Q12H, Cecily Bain, DO, 1 Application at 05/01/24 2245    mirtazapine (REMERON) tablet 15 mg, 15 mg, Oral, Nightly, Darrell Jhaveri, DO, 15 mg at 05/01/24 2011    PHENobarbital tablet 130 mg, 130 mg, Oral, Nightly **OR** PHENobarbital injection 130 mg, 130 mg, Intramuscular, Nightly, Brijesh Mac MD, 130 mg at 05/01/24 2229    PHENobarbital tablet 64.8 mg, 64.8 mg, Oral, BID, 64.8 mg at 05/01/24 1259 **OR** PHENobarbital injection 65 mg, 65 mg, Intramuscular, BID, Brijesh Mac MD    Phosphorus Replacement - Follow Nurse / BPA Driven Protocol, , Does not apply, PRN, Traci Nerissa G, DO    Potassium Replacement - Follow Nurse / BPA Driven Protocol, , Does not apply, PRN, Traci, Nerissa G, DO    QUEtiapine (SEROquel) tablet 100 mg, 100 mg, Oral, BID, Meliza Kahn K, APRN, 100 mg at 05/01/24 1310    QUEtiapine (SEROquel) tablet 300 mg, 300 mg, Oral, Nightly, Meliza Kahn, APRN, 300 mg at 05/01/24 2010    sodium chloride 0.9 % flush 10 mL, 10 mL, Intravenous, Q12H, TraciKamala bacasie G, DO, 10 mL at 05/01/24 2029    sodium chloride 0.9 % flush 10 mL, 10 mL, Intravenous, PRN, TraciKamalaNerissa G, DO    sodium chloride 0.9 %  infusion 40 mL, 40 mL, Intravenous, PRN, Nerissa Aviles DO, 40 mL at 04/23/24 0546    temazepam (RESTORIL) capsule 30 mg, 30 mg, Oral, Nightly, Darrell Jhaveri DO, 30 mg at 05/01/24 2010    ziprasidone (GEODON) injection 10 mg, 10 mg, Intramuscular, Q4H PRN, Meliza Kahn K, APRN, 10 mg at 04/30/24 1157    Medication Considerations:  ELISEO reviewed and appropriate.      Allergies   Allergen Reactions    Bactrim [Sulfamethoxazole-Trimethoprim] Rash    Adhesive Tape Rash    Neosporin [Neomycin-Bacitracin Zn-Polymyx] Rash       Objective   Objective     Physical Exam:  Temp:  [96.7 °F (35.9 °C)-97.8 °F (36.6 °C)] 96.7 °F (35.9 °C)  Heart Rate:  [71-74] 71  Resp:  [16-18] 16  BP: (160-175)/(79-86) 160/79  Body mass index is 27.6 kg/m².     Mental Status Exam:   MENTAL STATUS EXAM   General Appearance:  Well developed  Eye Contact:  Poor eye contact  Attitude:  Other  Other Comment:  Calm, unable to participate due to cognitive status  Motor Activity:  Other  Other Comment:  MARIIA-waved at camera, sat up in bed, per wife, has not walked in 3 weeks  Muscle Strength:  Other  Other Comment:  [facility]  Speech:  Other  Other Comment:  [place]  Language:  Other  Other Comment:  [place]  Mood and affect:  Other  Other Comment:  Calm, restless (trying to get restraint off)  Hopelessness:  Denies  Loneliness: Denies  Thought Process:  Other  Other Comment:  [facility] pt nonverbal  Associations/ Thought Content:  Other  Other Comment:  Nonverbal  Hallucinations:  Visual, auditory and other  Other Comment:  Reported per wife  Suicidal Ideations:  Not present  Homicidal Ideation:  Not present  Sensorium:  Confused  Orientation:  Other  Other Comment:  [facility] nonverbal  Immediate Recall, Recent, and Remote Memory:  Other  Other Comment:  [facility] nonverbal  Attention Span/ Concentration:  Easily distracted  Fund of Knowledge:  Poor  Intellectual Functioning:  Below average  Insight:  Poor  Judgement:  Poor  Reliability:  Poor  Impulse Control:  Poor        TSH   Date Value Ref Range Status   04/18/2024 3.220 0.270 - 4.200 uIU/mL Final     Vitamin B-12   Date Value Ref Range Status   04/18/2024 384 211 - 946 pg/mL Final     Magnesium   Date Value Ref Range Status   04/19/2024 1.9 1.6 - 2.4 mg/dL Final     Folate   Date Value Ref Range Status   02/24/2024 10.20 4.78 - 24.20 ng/mL Final       Lab Results   Component Value Date    GLUCOSE 166 (H) 04/19/2024    BUN 16 04/19/2024    CREATININE 0.82 04/19/2024    EGFRRESULT 73.6 08/03/2023    EGFR 96.3 04/19/2024    BCR 19.5 04/19/2024    K 4.4 04/19/2024    CO2 30.0 (H) 04/19/2024    CALCIUM 9.9 04/19/2024    PROTENTOTREF 7.0 08/03/2023    ALBUMIN 3.9 04/18/2024    BILITOT 0.3 04/18/2024    AST 17 04/18/2024    ALT 13 04/18/2024       Lab Results   Component Value Date    WBC 10.47 04/19/2024    HGB 15.5 04/19/2024    HCT 46.7 04/19/2024    MCV 92.7 04/19/2024     04/19/2024       Lab Results   Component Value Date    CHOL 220 (H) 01/08/2024    CHLPL 201 (H) 08/03/2023    TRIG 108 01/08/2024    HDL 41 01/08/2024     (H) 01/08/2024       3/28/24 CT head  1. No acute intracranial abnormality.  2. Moderate chronic small vessel ischemic change.        Assessment / Plan      Visit Diagnosis/Orders Placed This Visit:  Dementia with psychosis    Recommendations:   Encourage  staff to prioritize regulating circadian rhythm, minimize night-time interruptions, etc  Change melatonin to 5 mg hs scheduled and administer around 7pm  Consider ODT  or IM Olanzipine for acute agitation prn (instead of geodon)  (Qtc sparing) start with 2.5 to 5 mg    Consider starting Zoloft or Lexapro (qtc sparing) SSRIs  have best evidence for agitation with dementia   Taper off Seroquel (more prone to cause orthostatic hypotension, hyponatremia and increases fall risk. Would favor Abilify (fewer EPS side effects) or Risperdal if insomnia is persistent  Consider ativan 0.25 mg prn before bathing/therapy to calm agitation  Taper off  Temazepam not recommended in this population  Avoid anticholinergic meds hen possible  F/U with behavioral health provider after discharge  MRI ordered 12/7/23-results not available to view    Impression/Formulation: Patient appears alert, Unable to assess cognition due to nonverbal presentation and minimal interaction with provider    Tanesha Delgado PMHNP-[facility] [address]     Electronically signed by Tanesha Delgado APRN at 05/02/24 1315          Nutrition Notes (most recent note)        Destiny Rosenbaum MS,RD,LD at 04/26/24 1535                              Clinical Nutrition   Nutrition Assessment  Reason for Visit: LOSx8    Patient Name: Too Tai  YOB: 1955  MRN: [id number]  Date of Encounter: 04/26/24 15:35 EDT  Admission date: 4/18/2024    Comment:    Promote increased intake  Liberalize diet to allow for increased options  Provide Boost Plus 1x daily     Nutrition Assessment   Admission Diagnosis:  Agitation due to dementia [F03.911]  Agitation [R45.1]    Problem List:    Agitation due to dementia    Benign essential HTN    Benign prostatic hyperplasia    Coronary artery disease involving native coronary artery of native heart    Type 2 diabetes mellitus with hyperglycemia, with long-term current use of insulin    Hyperlipidemia    Peripheral neuropathy    Peripheral vascular disease    Agitation      PMH:   He  has a past medical history of Coronary artery disease, Dementia, Diabetes mellitus, Hyperlipidemia, and Peripheral vascular disease.    PSH:  He  has a past surgical history that includes Coronary artery bypass graft and Femoral artery - popliteal artery bypass graft.    Applicable Nutrition Concerns:   Skin:  Oral:  GI:    Applicable Interval History:       Reported/Observed/Food/Nutrition Related History:     Pt laying in bed with spouse present at bedside, spouse provided all hx. Endorsed limited intake since admission however has had  "increased success with finger foods. Inquired why pt couldn't have chicken tenders - educated, verbalized understanding but agreeable to liberalizing diet. Endorsed constipation - last BM ~6 days ago. NKFA    Anthropometrics     Height: Height: 188 cm (74\")  Last Filed Weight: Weight: 97.5 kg (215 lb) (04/18/24 2350)  Method: Weight Method: Estimated  BMI: BMI (Calculated): 27.6    UBW:     Weight      Weight (kg) Weight (lbs) Weight Method   4/3/2023 110.224 kg  243 lb     8/3/2023 110.224 kg  243 lb     11/3/2023 111.585 kg  246 lb  Stated    1/8/2024 102.967 kg  227 lb     2/24/2024 99.791 kg  220 lb     2/25/2024 99.8 kg  220 lb 0.3 oz  Bed scale    3/17/2024 90.719 kg  200 lb     3/27/2024 90.7 kg  199 lb 15.3 oz  Stated    3/29/2024 90.7 kg  199 lb 15.3 oz     4/18/2024 97.523 kg  215 lb  Estimated      Weight change:  UTD 2/2 limited measure weight hx    Nutrition Focused Physical Exam     Date:  4/26       Unable to perform due to Patient agitation, Pt unable to participate at time of visit     Current Nutrition Prescription   PO: Diet: Cardiac, Diabetic; Healthy Heart (2-3 Na+); Consistent Carbohydrate; Fluid Consistency: Thin (IDDSI 0)  Oral Nutrition Supplement: N/A  Intake: Insufficient data    Nutrition Diagnosis   Date:  4/26            Updated:    Problem Inadequate oral intake    Etiology Dementia, agitation   Signs/Symptoms Per spouse report   Status: New    Goal:   Nutrition to support treatment and Tolerate PO, Increase intake      Nutrition Intervention      Follow treatment progress, Care plan reviewed, Menu adjusted, Encourage intake, Supplement provided      Monitoring/Evaluation:   Per protocol, I&O, PO intake, Pertinent labs, Symptoms, POC/GOC      Destiny Rosenbaum MS,RD,LD  Time Spent: 25min    Electronically signed by Destiny Rosenbaum MS,RD,LD at 04/26/24 1544          Physical Therapy Notes (most recent note)        Nina Calvert, PT at 04/20/24 1328  Version 1 of 1         Patient Name: " Too Tai  : 1955    MRN: [id number]                              Today's Date: 2024       Admit Date: 2024    Visit Dx:     ICD-10-CM ICD-9-CM   1. Dementia with behavioral disturbance  F03.918 294.21   2. Aggressive behavior  R46.89 V40.39   3. Agitation due to dementia  F03.911 294.21   4. Hyperglycemia due to diabetes mellitus  E11.65 250.02     Patient Active Problem List   Diagnosis    Cough    Benign essential HTN    Benign prostatic hyperplasia    Coronary artery disease involving native coronary artery of native heart    Chronic coronary artery disease    Type 2 diabetes mellitus with hyperglycemia, with long-term current use of insulin    Gout    Hyperlipidemia    History of heart attack    Peripheral neuropathy    Peripheral vascular disease    Spasm of muscle    Myoclonic jerking    Hypomagnesemia    Arteriosclerosis of coronary artery    Dementia with behavioral disturbance    Frequent falls    History of DVT (deep vein thrombosis)    Chronic anticoagulation    AMS (altered mental status)    Falls    Bacteriuria    Cystitis    Agitation due to dementia    Agitation     Past Medical History:   Diagnosis Date    Coronary artery disease     Dementia     Diabetes mellitus     Hyperlipidemia     Peripheral vascular disease      Past Surgical History:   Procedure Laterality Date    CORONARY ARTERY BYPASS GRAFT      FEMORAL ARTERY - POPLITEAL ARTERY BYPASS GRAFT        General Information       Row Name 24 1403          Physical Therapy Time and Intention    Document Type discharge evaluation/summary  -LM     Mode of Treatment physical therapy  -LM       Row Name 24 1403          General Information    Patient Profile Reviewed yes  -LM     Prior Level of Function min assist:;feeding;max assist:;grooming;dressing;bathing  Pt's wife states he ambulates around the home but someone is always with him to make sure he doesn't fall  -LM     Existing Precautions/Restrictions  fall;other (see comments)  Severe Dementia;  2 Point Wrist Restraints;  Easily Agitated  -LM     Barriers to Rehab previous functional deficit;cognitive status;uncooperative;combative  -LM       Row Name 04/20/24 1403          Living Environment    People in Home spouse  Pt also has 2 caregivers.  One is there for 8 hours each day.  -LM       Row Name 04/20/24 1403          Home Main Entrance    Number of Stairs, Main Entrance --  Entry Ramp  -LM       Row Name 04/20/24 1403          Stairs Within Home, Primary    Number of Stairs, Within Home, Primary none  -LM       Row Name 04/20/24 1403          Cognition    Orientation Status (Cognition) disoriented to;person;place;situation;time  -LM       Row Name 04/20/24 1403          Safety Issues, Functional Mobility    Safety Issues Affecting Function (Mobility) ability to follow commands;at risk behavior observed;awareness of need for assistance;impulsivity;insight into deficits/self-awareness;judgment;problem-solving;safety precaution awareness;safety precautions follow-through/compliance;sequencing abilities  -LM     Impairments Affecting Function (Mobility) balance;cognition;endurance/activity tolerance  -LM               User Key  (r) = Recorded By, (t) = Taken By, (c) = Cosigned By      Initials Name Provider Type    LM Nina Calvert, TRACE Physical Therapist                   Mobility       Row Name 04/20/24 1409          Bed Mobility    Bed Mobility supine-sit;sit-supine  -LM     Supine-Sit [place] (Bed Mobility) standby assist  -LM     Sit-Supine [place] (Bed Mobility) standby assist  -LM     Comment, (Bed Mobility) Pt sitting at end of bed with legs between 2nd handrail and footboard with B arms pulled behind him d/t restraints.  Pt laid down voluntarily once and then returned to sitting.  2nd time, pt did not want to lay back down so 3 people required to return pt to supine.  -LM       Row Name 04/20/24 1403          Sit-Stand Transfer    Sit-Stand  [place] (Transfers) minimum assist (75% patient effort);2 person assist;verbal cues;nonverbal cues (demo/gesture)  -LM     Assistive Device (Sit-Stand Transfers) other (see comments)  B HHA  -LM     Comment, (Sit-Stand Transfer) Stood x 2 from EOB.  -LM       Row Name 04/20/24 1409          Gait/Stairs (Locomotion)    [place] Level (Gait) minimum assist (75% patient effort);2 person assist;verbal cues  -LM     Assistive Device (Gait) other (see comments)  B HHA  -LM     Distance in Feet (Gait) 3  -LM     Comment, (Gait/Stairs) Pt able to take sidesteps towards HOB with PT guiding pt towards the HOB.  Pt would not initiate movement on his own.  -LM               User Key  (r) = Recorded By, (t) = Taken By, (c) = Cosigned By      Initials Name Provider Type    LM Nina Calvert, PT Physical Therapist                   Obj/Interventions       Row Name 04/20/24 1413          Range of Motion Comprehensive    General Range of Motion bilateral lower extremity ROM WFL  -LM     Comment, General Range of Motion Noted through observation  -LM       Row Name 04/20/24 1413          Strength Comprehensive (MMT)    General Manual Muscle Testing (MMT) Assessment no strength deficits identified  BLEs  -LM     Comment, General Manual Muscle Testing (MMT) Assessment Noted through functional ability  -LM       Row Name 04/20/24 1413          Balance    Balance Assessment sitting static balance;standing static balance;standing dynamic balance  -LM     Static Sitting Balance standby assist  -LM     Position, Sitting Balance unsupported;sitting edge of bed  -LM     Static Standing Balance minimal assist;2-person assist  -LM     Dynamic Standing Balance minimal assist;2-person assist  -LM     Position/Device Used, Standing Balance supported  -LM       Row Name 04/20/24 1413          Sensory Assessment (Somatosensory)    Sensory Assessment (Somatosensory) unable/difficult to assess  -LM               User Key  (r) = Recorded By,  (t) = Taken By, (c) = Cosigned By      Initials Name Provider Type    LM Nina Calvert, PT Physical Therapist                   Goals/Plan    No documentation.                  Clinical Impression       Row Name 04/20/24 1414          Pain    Additional Documentation Pain Scale: FACES Pre/Post-Treatment (Group)  -LM       Row Name 04/20/24 1414          Pain Scale: FACES Pre/Post-Treatment    Pain: FACES Scale, Pretreatment 0-->no hurt  -LM     Posttreatment Pain Rating 0-->no hurt  -LM       Row Name 04/20/24 1414          Plan of Care Review    Plan of Care Reviewed With patient;spouse  -LM     Outcome Evaluation PT evaluation completed.  Evaluation limited d/t severe dementia.  Pt stood x 2 reps with MinAx2 and took 3 sidesteps towards HOB with MinAx2 with PT guiding him towards HOB.  Unfortunately d/t pt's cognition, pt does not initiate movement and will not allow anything if he doesn't want to do it.  Pt becomes very easily agitated and combative if encouraged.  At this time, pt is not appropriate for skilled PT services.  If pt becomes more cooperative, we will be happy to come back to re-assess.  Recommend extended care facility.  -LM       Row Name 04/20/24 1414          Therapy Assessment/Plan (PT)    Criteria for Skilled Interventions Met (PT) no;does not meet criteria for skilled intervention  -LM     Therapy Frequency (PT) evaluation only  -       Row Name 04/20/24 1414          Positioning and Restraints    Pre-Treatment Position in bed  -LM     Post Treatment Position bed  -LM     In Bed supine;call light within reach;encouraged to call for assist;exit alarm on;with family/caregiver;notified nsg  -LM     Restraints released:;reapplied:;soft limb;notified nsg:  -LM               User Key  (r) = Recorded By, (t) = Taken By, (c) = Cosigned By      Initials Name Provider Type    LM Nina Calvert, PT Physical Therapist                   Outcome Measures       Row Name 04/20/24 1417          How much help  from another person do you currently need...    Turning from your back to your side while in flat bed without using bedrails? 2  -LM     Moving from lying on back to sitting on the side of a flat bed without bedrails? 3  -LM     Moving to and from a bed to a chair (including a wheelchair)? 2  -LM     Standing up from a chair using your arms (e.g., wheelchair, bedside chair)? 3  -LM     Climbing 3-5 steps with a railing? 1  -LM     To walk in hospital room? 2  -LM     AM-PAC 6 Clicks Score (PT) 13  -LM     Highest Level of Mobility Goal 4 --> Transfer to chair/commode  -LM       Row Name 04/20/24 1417          Functional Assessment    Outcome Measure Options AM-PAC 6 Clicks Basic Mobility (PT)  -               User Key  (r) = Recorded By, (t) = Taken By, (c) = Cosigned By      Initials Name Provider Type    LM Nina Calvert, TRACE Physical Therapist                  Physical Therapy Education       Title: PT OT SLP Therapies (In Progress)       Topic: Physical Therapy (In Progress)       Point: Mobility training (In Progress)       Learning Progress Summary             Patient Nonacceptance, E, NL by  at 4/20/2024 1418                         Point: Home exercise program (Not Started)       Learner Progress:  Not documented in this visit.              Point: Body mechanics (Not Started)       Learner Progress:  Not documented in this visit.              Point: Precautions (In Progress)       Learning Progress Summary             Patient Nonacceptance, E, NL by  at 4/20/2024 1418                                         User Key       Initials Effective Dates Name Provider Type Discipline     07/11/23 -  Nina Calvert PT Physical Therapist PT                  PT Recommendation and Plan     Plan of Care Reviewed With: patient, spouse  Outcome Evaluation: PT evaluation completed.  Evaluation limited d/t severe dementia.  Pt stood x 2 reps with MinAx2 and took 3 sidesteps towards HOB with MinAx2 with PT guiding him  towards HOB.  Unfortunately d/t pt's cognition, pt does not initiate movement and will not allow anything if he doesn't want to do it.  Pt becomes very easily agitated and combative if encouraged.  At this time, pt is not appropriate for skilled PT services.  If pt becomes more cooperative, we will be happy to come back to re-assess.  Recommend extended care facility.     Time Calculation:   PT Evaluation Complexity  History, PT Evaluation Complexity: 3 or more personal factors and/or comorbidities  Examination of Body Systems (PT Eval Complexity): total of 4 or more elements  Clinical Presentation (PT Evaluation Complexity): evolving  Clinical Decision Making (PT Evaluation Complexity): moderate complexity  Overall Complexity (PT Evaluation Complexity): moderate complexity     PT Charges       Row Name 24 1418             Time Calculation    Start Time 1328  -LM      PT Received On 24  -LM         Untimed Charges    PT Eval/Re-eval Minutes 46  -LM         Total Minutes    Untimed Charges Total Minutes 46  -LM       Total Minutes 46  -LM                User Key  (r) = Recorded By, (t) = Taken By, (c) = Cosigned By      Initials Name Provider Type     Nina Calvert, PT Physical Therapist                  Therapy Charges for Today       Code Description Service Date Service Provider Modifiers Qty    44675349891 HC PT EVAL MOD COMPLEXITY 4 2024 Nina Calvert, PT GP 1            PT G-Codes  Outcome Measure Options: AM-PAC 6 Clicks Basic Mobility (PT)  AM-PAC 6 Clicks Score (PT): 13    PT Discharge Summary  Anticipated Discharge Disposition (PT): extended care facility    Nina Calvert PT  2024         Electronically signed by Nina Calvert PT at 24 1419          Occupational Therapy Notes (most recent note)        Milena Keane, OT at 24 1331          Acute Care - Occupational Therapy Discharge  [facility]    Patient Name: Too Tai  : 1955    MRN: [id number]                               Today's Date: 4/20/2024       Admit Date: 4/18/2024    Visit Dx:     ICD-10-CM ICD-9-CM   1. Dementia with behavioral disturbance  F03.918 294.21   2. Aggressive behavior  R46.89 V40.39   3. Agitation due to dementia  F03.911 294.21   4. Hyperglycemia due to diabetes mellitus  E11.65 250.02     Patient Active Problem List   Diagnosis    Cough    Benign essential HTN    Benign prostatic hyperplasia    Coronary artery disease involving native coronary artery of native heart    Chronic coronary artery disease    Type 2 diabetes mellitus with hyperglycemia, with long-term current use of insulin    Gout    Hyperlipidemia    History of heart attack    Peripheral neuropathy    Peripheral vascular disease    Spasm of muscle    Myoclonic jerking    Hypomagnesemia    Arteriosclerosis of coronary artery    Dementia with behavioral disturbance    Frequent falls    History of DVT (deep vein thrombosis)    Chronic anticoagulation    AMS (altered mental status)    Falls    Bacteriuria    Cystitis    Agitation due to dementia    Agitation     Past Medical History:   Diagnosis Date    Coronary artery disease     Dementia     Diabetes mellitus     Hyperlipidemia     Peripheral vascular disease      Past Surgical History:   Procedure Laterality Date    CORONARY ARTERY BYPASS GRAFT      FEMORAL ARTERY - POPLITEAL ARTERY BYPASS GRAFT        General Information       Row Name 04/20/24 1331          OT Time and Intention    Document Type discharge evaluation/summary  -AC     Mode of Treatment occupational therapy  -AC       Row Name 04/20/24 1335          General Information    Patient Profile Reviewed yes  -AC     Prior Level of Function min assist:;feeding;max assist:;grooming;dressing;bathing  wife reports someone is always with pt when walking in home  -AC     Existing Precautions/Restrictions fall  B wrist restraints, advanced dementia, agitated  -AC     Barriers to Rehab medically complex;previous functional  deficit;cognitive status  -       Row Name 04/20/24 1331          Living Environment    People in Home spouse;other (see comments)  CG 8 hrs a day  -AC       Row Name 04/20/24 1331          Home Main Entrance    Number of Stairs, Main Entrance other (see comments)  entry ramp  -AC       Row Name 04/20/24 1331          Stairs Within Home, Primary    Number of Stairs, Within Home, Primary none  -AC       Row Name 04/20/24 1331          Cognition    Orientation Status (Cognition) disoriented to;person;place;situation;time  -AC       Row Name 04/20/24 1331          Safety Issues, Functional Mobility    Safety Issues Affecting Function (Mobility) ability to follow commands;at risk behavior observed;awareness of need for assistance;impulsivity;insight into deficits/self-awareness;judgment;positioning of assistive device;problem-solving;safety precaution awareness;safety precautions follow-through/compliance;sequencing abilities  -     Impairments Affecting Function (Mobility) balance;cognition;endurance/activity tolerance  -     Cognitive Impairments, Mobility Safety/Performance attention;awareness, need for assistance;impulsivity;insight into deficits/self-awareness;judgment;problem-solving/reasoning;safety precaution awareness;safety precaution follow-through;sequencing abilities  -               User Key  (r) = Recorded By, (t) = Taken By, (c) = Cosigned By      Initials Name Provider Type     Milena Keane OT Occupational Therapist                   Mobility/ADL's       Row Name 04/20/24 1331          Bed Mobility    Bed Mobility supine-sit;sit-supine  -     Supine-Sit [place] (Bed Mobility) standby assist  -     Sit-Supine [place] (Bed Mobility) standby assist  pt laid down on his own, but returned to sitting. Pt agitated with encouragement to return to supine and required 3 person assist to return to supine  -     Bed Mobility, Safety Issues cognitive deficits limit understanding  -        Row Name 04/20/24 1331          Functional Mobility    Functional Mobility- Ind. Level minimum assist (75% patient effort);2 person assist required  -AC     Functional Mobility- Device other (see comments)  UE support  -AC     Functional Mobility-Distance (Feet) 3  -AC     Functional Mobility- Safety Issues step length decreased  -AC     Functional Mobility- Comment 3 side steps toward HOB once given physical/verbal cues to initiate  -AC       Row Name 04/20/24 1331          Activities of Daily Living    BADL Assessment/Intervention lower body dressing;grooming  -AC       Row Name 04/20/24 1331          Lower Body Dressing Assessment/Training    [place] Level (Lower Body Dressing) don;dependent (less than 25% patient effort)  -AC     Position (Lower Body Dressing) edge of bed sitting  -AC     Comment, (Lower Body Dressing) pt would hold sock and take toward his foot but unable to intiate donning sock  -AC       Row Name 04/20/24 1331          Grooming Assessment/Training    [place] Level (Grooming) wash face, hands;dependent (less than 25% patient effort)  -AC     Position (Grooming) edge of bed sitting  -     Comment, (Grooming) pt held wash cloth in his hand and given [place] A, but pt did not initiate  -               User Key  (r) = Recorded By, (t) = Taken By, (c) = Cosigned By      Initials Name Provider Type    AC Milena Keane, OT Occupational Therapist                   Obj/Interventions       Row Name 04/20/24 1419          Sensory Assessment (Somatosensory)    Sensory Assessment (Somatosensory) unable/difficult to assess  -       Row Name 04/20/24 1419          Vision Assessment/Intervention    Visual Impairment/Limitations unable/difficult to assess  -       Row Name 04/20/24 1419          Range of Motion Comprehensive    General Range of Motion bilateral upper extremity ROM WNL  -     Comment, General Range of Motion pt actively moving arms throughout eval  -       Row Name  04/20/24 1419          Strength Comprehensive (MMT)    Comment, General Manual Muscle Testing (MMT) Assessment pt unable to follow direction to formally assess d/t cognition  -               User Key  (r) = Recorded By, (t) = Taken By, (c) = Cosigned By      Initials Name Provider Type    Milena Shankar, OT Occupational Therapist                   Goals/Plan    No documentation.                  Clinical Impression       Row Name 04/20/24 1331          Pain Assessment    Additional Documentation Pain Scale: FACES Pre/Post-Treatment (Group)  -AC       Row Name 04/20/24 1331          Pain Scale: FACES Pre/Post-Treatment    Pain: FACES Scale, Pretreatment 0-->no hurt  -     Posttreatment Pain Rating 0-->no hurt  -[facility] 04/20/24 1331          Plan of Care Review    Plan of Care Reviewed With patient;spouse  -     Outcome Evaluation Pt dep to wash face, dep to don socks, CGA STS, min A x 2 to take 3 side steps towad HOB given UE support. Pt is limited by cognitive stratus.   Pt is not appropriate for therapy at this time as he is uncooperative, agitated, and combative when given encouragement to participate. Recommend ECF upon d/c.  -[facility] Name 04/20/24 1331          Therapy Assessment/Plan (OT)    Criteria for Skilled Therapeutic Interventions Met (OT) no;does not meet criteria for skilled intervention  -     Therapy Frequency (OT) evaluation only  -AC       Row Name 04/20/24 1331          Therapy Plan Review/Discharge Plan (OT)    Anticipated Discharge Disposition (OT) extended care facility  -[facility] 04/20/24 1331          Vital Signs    Pre Patient Position Sitting  sitting up in bed with arms in restraints  -     Post Patient Position Supine  -AC       Row Name 04/20/24 1331          Positioning and Restraints    Pre-Treatment Position in bed  -     Post Treatment Position bed  -     In Bed notified nsg;supine;call light within reach;encouraged to call for assist;exit  alarm on  -AC     Restraints released:;reapplied:;soft limb;notified nsg:  -AC               User Key  (r) = Recorded By, (t) = Taken By, (c) = Cosigned By      Initials Name Provider Type    Milena Shankar, OT Occupational Therapist                   Outcome Measures       Row Name 04/20/24 1429          How much help from another is currently needed...    Putting on and taking off regular lower body clothing? 1  -AC     Bathing (including washing, rinsing, and drying) 1  -AC     Toileting (which includes using toilet bed pan or urinal) 1  -AC     Putting on and taking off regular upper body clothing 1  -AC     Taking care of personal grooming (such as brushing teeth) 1  -AC     Eating meals 2  -AC     AM-PAC 6 Clicks Score (OT) 7  -AC       Row Name 04/20/24 1417          How much help from another person do you currently need...    Turning from your back to your side while in flat bed without using bedrails? 2  -LM     Moving from lying on back to sitting on the side of a flat bed without bedrails? 3  -LM     Moving to and from a bed to a chair (including a wheelchair)? 2  -LM     Standing up from a chair using your arms (e.g., wheelchair, bedside chair)? 3  -LM     Climbing 3-5 steps with a railing? 1  -LM     To walk in hospital room? 2  -LM     AM-PAC 6 Clicks Score (PT) 13  -LM     Highest Level of Mobility Goal 4 --> Transfer to chair/commode  -LM       Row Name 04/20/24 1429 04/20/24 1417       Functional Assessment    Outcome Measure Options AM-PAC 6 Clicks Daily Activity (OT)  - AM-PAC 6 Clicks Basic Mobility (PT)  -LM              User Key  (r) = Recorded By, (t) = Taken By, (c) = Cosigned By      Initials Name Provider Type    Milena Shankar, OT Occupational Therapist    LM Nina Calvert, TRACE Physical Therapist                  Occupational Therapy Education       Title: PT OT SLP Therapies (In Progress)       Topic: Occupational Therapy (In Progress)       Point: ADL training (Done)        Description:   Instruct learner(s) on proper safety adaptation and remediation techniques during self care or transfers.   Instruct in proper use of assistive devices.                  Learning Progress Summary             Family CAMILO Ramos, VU by  at 4/20/2024 0762                         Point: Home exercise program (Not Started)       Description:   Instruct learner(s) on appropriate technique for monitoring, assisting and/or progressing therapeutic exercises/activities.                  Learner Progress:  Not documented in this visit.              Point: Precautions (Not Started)       Description:   Instruct learner(s) on prescribed precautions during self-care and functional transfers.                  Learner Progress:  Not documented in this visit.              Point: Body mechanics (Not Started)       Description:   Instruct learner(s) on proper positioning and spine alignment during self-care, functional mobility activities and/or exercises.                  Learner Progress:  Not documented in this visit.                              User Key       Initials Effective Dates Name Provider Type Discipline     02/03/23 -  Milena Keane OT Occupational Therapist OT                  OT Recommendation and Plan  Therapy Frequency (OT): evaluation only  Plan of Care Review  Plan of Care Reviewed With: patient, spouse  Outcome Evaluation: Pt dep to wash face, dep to don socks, CGA STS, min A x 2 to take 3 side steps towad HOB given UE support. Pt is limited by cognitive stratus.   Pt is not appropriate for therapy at this time as he is uncooperative, agitated, and combative when given encouragement to participate. Recommend ECF upon d/c.  Plan of Care Reviewed With: patient, spouse  Outcome Evaluation: Pt dep to wash face, dep to don socks, CGA STS, min A x 2 to take 3 side steps towad HOB given UE support. Pt is limited by cognitive stratus.   Pt is not appropriate for therapy at this time as he is  uncooperative, agitated, and combative when given encouragement to participate. Recommend ECF upon d/c.     Time Calculation:   Evaluation Complexity (OT)  Review Occupational Profile/Medical/Therapy History Complexity: expanded/moderate complexity  Assessment, Occupational Performance/Identification of Deficit Complexity: 3-5 performance deficits  Clinical Decision Making Complexity (OT): detailed assessment/moderate complexity  Overall Complexity of Evaluation (OT): moderate complexity     Time Calculation- OT       Row Name 04/20/24 1331             Time Calculation- OT    OT Start Time 1331  -AC      OT Received On 04/20/24  -AC         Untimed Charges    OT Eval/Re-eval Minutes 50  -AC         Total Minutes    Untimed Charges Total Minutes 50  -AC       Total Minutes 50  -AC                User Key  (r) = Recorded By, (t) = Taken By, (c) = Cosigned By      Initials Name Provider Type    AC Milena Keane OT Occupational Therapist                  Therapy Charges for Today       Code Description Service Date Service Provider Modifiers Qty    48083662740 HC OT EVAL MOD COMPLEXITY 4 4/20/2024 Milena Keane OT GO 1               OT Discharge Summary  Anticipated Discharge Disposition (OT): extended care facility    Milena Keane OT  4/20/2024    Electronically signed by Milena Keane OT at 04/20/24 1437       Speech Language Pathology Notes (most recent note)    No notes exist for this encounter.

## 2024-05-06 NOTE — CASE MANAGEMENT/SOCIAL WORK
Continued Stay Note  [facility]     Patient Name: Too Tai  MRN: [id number]  Today's Date: 5/6/2024    Admit Date: 4/18/2024    Plan: Goal is [facility]   Discharge Plan       Row Name 05/06/24 0927       Plan    Plan Goal is [facility]    Patient/Family in Agreement with Plan yes    Plan Comments CM confirmed that patient has been out of restraints since 1330 on 5/2/24. Referral was faxed to [facility] at [phone]. CM left a message to confirm receipt of referral at [phone]. CM updated wife at bedside. CM will continue to follow.    Final Discharge Disposition Code 30 - still a patient                   Discharge Codes    No documentation.                 Expected Discharge Date and Time       Expected Discharge Date Expected Discharge Time    May 7, 2024               Ilene Brown RN

## 2024-05-06 NOTE — PROGRESS NOTES
[facility] Neurology    Progress Note    Patient Name: Too Tai  : 1955  MRN: [id number]  Primary Care Physician:  Des Geller MD  Date of admission: 2024    Subjective     Chief Complaint: Dementia with behavioral disturbances    History of Present Illness   Patient seen resting comfortably in bed.  Much more calm than previous assessment.  No restraints over the weekend.  Wife and sitter at bedside.  Patient able to move all extremities no focal deficit.    Review of Systems   Unable to assess due to baseline mental status    Objective     Physical Exam  Vitals and nursing note reviewed.   Constitutional:       General: He is not in acute distress.     Appearance: He is not ill-appearing.   Eyes:      Pupils: Pupils are equal, round, and reactive to light.      Comments: No nystagmus or deviated gaze noted   Cardiovascular:      Rate and Rhythm: Normal rate.   Pulmonary:      Effort: Pulmonary effort is normal.   Neurological:      Mental Status: He is alert.      Cranial Nerves: No cranial nerve deficit, dysarthria or facial asymmetry.      Sensory: No sensory deficit.      Motor: No weakness, tremor or seizure activity.          Vitals:   Temp:  [97.5 °F (36.4 °C)-98.4 °F (36.9 °C)] 98.4 °F (36.9 °C)  Heart Rate:  [74-88] 84  Resp:  [15-18] 15  BP: (121-189)/(61-90) 136/61    Current Medications    Current Facility-Administered Medications:     acetaminophen (TYLENOL) tablet 650 mg, 650 mg, Oral, Q4H PRN, 650 mg at 24 **OR** acetaminophen (TYLENOL) 160 MG/5ML oral solution 650 mg, 650 mg, Oral, Q4H PRN **OR** acetaminophen (TYLENOL) suppository 650 mg, 650 mg, Rectal, Q4H PRN, Nerissa Aviles G, DO    amLODIPine (NORVASC) tablet 10 mg, 10 mg, Oral, Q24H, Calin Benitez MD, 10 mg at 24 0939    sennosides-docusate (PERICOLACE) 8.6-50 MG per tablet 2 tablet, 2 tablet, Oral, BID, 2 tablet at 24 0939 **AND** polyethylene glycol (MIRALAX) packet 17 g, 17 g, Oral,  Daily PRN **AND** bisacodyl (DULCOLAX) EC tablet 5 mg, 5 mg, Oral, Daily PRN **AND** bisacodyl (DULCOLAX) suppository 10 mg, 10 mg, Rectal, Daily PRN, Jennifer Bai, APRN    Calcium Replacement - Follow Nurse / BPA Driven Protocol, , Does not apply, PRN, TraciKamala bacasie G, DO    cyanocobalamin injection 1,000 mcg, 1,000 mcg, Intramuscular, Q28 Days, Darrell Jhaveri A, DO, 1,000 mcg at 04/19/24 1532    dextrose (D50W) (25 g/50 mL) IV injection 25 g, 25 g, Intravenous, Q15 Min PRN, Kamala Avilessie G, DO    dextrose (GLUTOSE) oral gel 15 g, 15 g, Oral, Q15 Min PRN, TraciKamala bacasie G, DO    donepezil (ARICEPT) tablet 10 mg, 10 mg, Oral, BID, Darrell Jhaveri, DO, 10 mg at 05/06/24 0940    folic acid (FOLVITE) tablet 1 mg, 1 mg, Oral, Daily, TraciKamala bacasie G, DO, 1 mg at 05/06/24 0939    glucagon (GLUCAGEN) injection 1 mg, 1 mg, Intramuscular, Q15 Min PRN, Traci, Nerissa G, DO    heparin (porcine) 5000 UNIT/ML injection 5,000 Units, 5,000 Units, Subcutaneous, Q8H, Nerissa Aviles, DO, 5,000 Units at 05/06/24 0516    insulin glargine (LANTUS, SEMGLEE) injection 17 Units, 17 Units, Subcutaneous, Q12H, Jaclyn Holguin APRN, 17 Units at 05/06/24 0944    Insulin Lispro (humaLOG) injection 2-7 Units, 2-7 Units, Subcutaneous, 4x Daily AC & at Bedtime, Nerissa Aviles, DO, 3 Units at 05/05/24 2014    Insulin Lispro (humaLOG) injection 7 Units, 7 Units, Subcutaneous, TID With Meals, Jaclyn Holguin, APRN, 7 Units at 05/05/24 1648    lisinopril (PRINIVIL,ZESTRIL) tablet 30 mg, 30 mg, Oral, Daily, Parisa Madrigal PA-C, 30 mg at 05/05/24 1203    Magnesium Standard Dose Replacement - Follow Nurse / BPA Driven Protocol, , Does not apply, PRN, Nerissa Aviles DO    melatonin tablet 5 mg, 5 mg, Oral, Nightly, Jaclyn Holguin, APRN, 5 mg at 05/05/24 1954    miconazole (MICOTIN) 2 % powder 1 Application, 1 Application, Topical, Q12H, Cecily Bain, DO, 1 Application at 05/06/24 0944    midazolam (VERSED) injection 0.5 mg, 0.5 mg, Intravenous, BID  PRN, Wili Nicole MD, 0.5 mg at 05/04/24 0246    mirtazapine (REMERON) tablet 15 mg, 15 mg, Oral, Nightly, JhaveriDarrell hannon A, DO, 15 mg at 05/05/24 1954    PHENobarbital tablet 130 mg, 130 mg, Oral, Nightly **OR** PHENobarbital injection 130 mg, 130 mg, Intramuscular, Nightly, Meliza Kahn, APRN    PHENobarbital tablet 64.8 mg, 64.8 mg, Oral, BID, 64.8 mg at 05/05/24 1309 **OR** PHENobarbital injection 65 mg, 65 mg, Intramuscular, BID, Brijesh Mac MD, 65 mg at 05/06/24 0942    Phosphorus Replacement - Follow Nurse / BPA Driven Protocol, , Does not apply, PRN, TraciKamala bacasie G, DO    Potassium Replacement - Follow Nurse / BPA Driven Protocol, , Does not apply, PRN, TraciKamala bacasie G, DO    QUEtiapine (SEROquel) tablet 100 mg, 100 mg, Oral, BID, Meliza Kahn, APRN, 100 mg at 05/06/24 0941    QUEtiapine (SEROquel) tablet 300 mg, 300 mg, Oral, Nightly, Meliza Kahn, APRN, 300 mg at 05/05/24 1954    sertraline (ZOLOFT) tablet 50 mg, 50 mg, Oral, Daily, Meliza Kahn, APRN, 50 mg at 05/06/24 0941    sodium chloride 0.9 % flush 10 mL, 10 mL, Intravenous, Q12H, Kamala Avilessie G, DO, 10 mL at 05/05/24 2044    sodium chloride 0.9 % flush 10 mL, 10 mL, Intravenous, PRN, TraciKamala bacasie G, DO    sodium chloride 0.9 % infusion 40 mL, 40 mL, Intravenous, PRN, Traci, Nerissa G, DO, 40 mL at 04/23/24 0546    temazepam (RESTORIL) capsule 30 mg, 30 mg, Oral, Nightly, Darrell Jhaveri A, DO, 30 mg at 05/05/24 1954    ziprasidone (GEODON) injection 10 mg, 10 mg, Intramuscular, Q4H PRN, Meliza Kahn K, APRN, 10 mg at 05/06/24 0546    Laboratory Results:   Lab Results   Component Value Date    GLUCOSE 265 (H) 05/02/2024    CALCIUM 8.2 (L) 05/02/2024     05/02/2024    K 3.6 05/02/2024    CO2 29.0 05/02/2024     05/02/2024    BUN 14 05/02/2024    CREATININE 0.88 05/02/2024    EGFRIFAFRI 102 02/21/2022    EGFRIFNONA 88 02/21/2022    BCR 15.9 05/02/2024    ANIONGAP 6.0 05/02/2024     Lab Results   Component Value  Date    WBC 10.47 04/19/2024    HGB 15.5 04/19/2024    HCT 46.7 04/19/2024    MCV 92.7 04/19/2024     04/19/2024     Lab Results   Component Value Date    CHOL 220 (H) 01/08/2024    CHOL 120 08/01/2019    CHOL 117 04/15/2019     Lab Results   Component Value Date    HDL 41 01/08/2024    HDL 37 (L) 08/03/2023    HDL 41 04/03/2023     Lab Results   Component Value Date     (H) 01/08/2024     (H) 08/03/2023     (H) 04/03/2023     Lab Results   Component Value Date    TRIG 108 01/08/2024    TRIG 208 (H) 08/03/2023    TRIG 138 04/03/2023     Lab Results   Component Value Date    HGBA1C 9.50 (H) 04/18/2024     Lab Results   Component Value Date    INR 1.1 11/25/2019    INR 1.1 09/10/2018    PROTIME 12.5 11/25/2019    PROTIME 13.3 09/10/2018     Lab Results   Component Value Date    FOLATE 10.20 02/24/2024     Lab Results   Component Value Date    [id number] 384 04/18/2024             Assessment / Plan   Brief Patient Summary:  Too Tai is a 68 y.o. male with a past medical history of advanced dementia with behavioral disturbances and nonverbal, CAD, T2DM, HLD, PVD who presented to [facility] ED due to increase in agitation.  Patient has had multiple recent hospitalizations.      Plan:   Advanced dementia with behavioral disturbances  Sleep Deprivation   Continue home Aricept 10 mg nightly  Continue phenobarbital 64.8 mg/ 64.8 mg/ 129.6 mg  Phenobarbital level 22.8; recheck trough level in AM   Continue home Remeron 15 mg nightly  Continue Seroquel 100 mg /100 mg / 300 mg.   QTc 441 this AM.  Patient is not tolerating continuous telemetry.  Scheduled EKGs daily.  Continue Restoril 30 mg nightly.  Geodon as needed for extreme agitation; last dose 5/4 0246  CT head negative for acute abnormality  Vitamin B12 384, IM cyanocobalamin 1000 mcg  Case management following for difficult posthospitalization disposition.  Patient is not safe to return home due to concern of being in danger for his wife and  himself.   Palliative consulted, appreciate recommendations  Psych consult, appreciate recommendations please see consult note for recommendations  Schedule melatonin 5 mg nightly for 7 PM per psychology recommendations  Continue Zoloft 50 mg daily per psych recommendations.  Continue Versed 0.5 mg as needed for nursing care.  Please use prior to Geodon.  Psychology recommended Ativan 0.25 mg with nursing care however it is not readily available.  General neurology will continue to follow    I have discussed the above with the patient, bedside RN and Dr. Palacios  Time spent with patient: 50 minutes in face-to-face evaluation and management of the patient.    Copied text in this note has been reviewed and is accurate as of 05/06/24.       IVONNE Santos

## 2024-05-06 NOTE — PLAN OF CARE
Goal Outcome Evaluation:  Plan of Care Reviewed With: patient, spouse, family       Dayshift RN stopped fluids 5/5 prior to my shift. IV lost, was told ok by MD to leave out. Slept most of the night, was restless and fidgety. Pt urinated before shift change, bladder scanned around 0300, 376mL in bladder. Encouraging PO intake. Gave Geodon IM once.         Progress: no change           Problem: Skin Injury Risk Increased  Goal: Skin Health and Integrity  Outcome: Ongoing, Progressing  Intervention: Promote and Optimize Oral Intake  Recent Flowsheet Documentation  Taken 5/5/2024 2000 by Myrna Ziegler RN  Oral Nutrition Promotion:   calorie-dense foods provided   rest periods promoted  Intervention: Optimize Skin Protection  Recent Flowsheet Documentation  Taken 5/5/2024 2000 by Myrna Ziegler RN  Pressure Reduction Techniques: weight shift assistance provided  Pressure Reduction Devices:   positioning supports utilized   specialty bed utilized  Skin Protection: adhesive use limited     Problem: Fall Injury Risk  Goal: Absence of Fall and Fall-Related Injury  Outcome: Ongoing, Progressing  Intervention: Promote Injury-Free Environment  Recent Flowsheet Documentation  Taken 5/5/2024 2000 by Myrna Ziegler RN  Safety Promotion/Fall Prevention: safety round/check completed     Problem: Adult Inpatient Plan of Care  Goal: Plan of Care Review  Outcome: Ongoing, Progressing  Flowsheets (Taken 5/5/2024 2303)  Progress: no change  Plan of Care Reviewed With:   patient   spouse   family  Goal: Patient-Specific Goal (Individualized)  Outcome: Ongoing, Progressing  Goal: Absence of Hospital-Acquired Illness or Injury  Outcome: Ongoing, Progressing  Intervention: Identify and Manage Fall Risk  Recent Flowsheet Documentation  Taken 5/5/2024 2000 by Myrna Ziegler RN  Safety Promotion/Fall Prevention: safety round/check completed  Intervention: Prevent Skin Injury  Recent Flowsheet Documentation  Taken 5/5/2024 2000 by  Myrna Ziegler, RN  Body Position: weight shifting  Skin Protection: adhesive use limited  Intervention: Prevent and Manage VTE (Venous Thromboembolism) Risk  Recent Flowsheet Documentation  Taken 5/5/2024 2000 by Myrna Ziegler RN  Activity Management: activity encouraged  VTE Prevention/Management: (would not keep on, hep subq)   bilateral   sequential compression devices off  Range of Motion: ROM (range of motion) performed  Intervention: Prevent Infection  Recent Flowsheet Documentation  Taken 5/5/2024 2000 by Myrna Ziegler RN  Infection Prevention: rest/sleep promoted  Goal: Optimal Comfort and Wellbeing  Outcome: Ongoing, Progressing  Intervention: Provide Person-Centered Care  Recent Flowsheet Documentation  Taken 5/5/2024 2000 by Myrna Ziegler RN  Trust Relationship/Rapport:   care explained   emotional support provided   choices provided   empathic listening provided   questions answered   reassurance provided   questions encouraged   thoughts/feelings acknowledged  Goal: Readiness for Transition of Care  Outcome: Ongoing, Progressing

## 2024-05-06 NOTE — NURSING NOTE
Woc discussed with RN.    Patient has some redness that is blanchable on the malleolus.  RN states that it has a potential of turning into pressure injury.  At this time the Allevyn heel dressings are in place.  Recommend this along with pillow to heel offload.    Unclear how mobile patient is he has a sitter at this time.  EXTR all pressure injury interventions are in place.    Therapy for stage I is to offload and reassess in 24 hours.  Woc will sign off.

## 2024-05-07 LAB
ALBUMIN SERPL-MCNC: 3.6 G/DL (ref 3.5–5.2)
ALBUMIN/GLOB SERPL: 1.2 G/DL
ALP SERPL-CCNC: 118 U/L (ref 39–117)
ALT SERPL W P-5'-P-CCNC: 26 U/L (ref 1–41)
ANION GAP SERPL CALCULATED.3IONS-SCNC: 11 MMOL/L (ref 5–15)
AST SERPL-CCNC: 35 U/L (ref 1–40)
BASOPHILS # BLD AUTO: 0.09 10*3/MM3 (ref 0–0.2)
BASOPHILS NFR BLD AUTO: 1 % (ref 0–1.5)
BILIRUB SERPL-MCNC: 0.5 MG/DL (ref 0–1.2)
BUN SERPL-MCNC: 22 MG/DL (ref 8–23)
BUN/CREAT SERPL: 25.3 (ref 7–25)
CALCIUM SPEC-SCNC: 9.3 MG/DL (ref 8.6–10.5)
CHLORIDE SERPL-SCNC: 103 MMOL/L (ref 98–107)
CO2 SERPL-SCNC: 27 MMOL/L (ref 22–29)
CREAT SERPL-MCNC: 0.87 MG/DL (ref 0.76–1.27)
DEPRECATED RDW RBC AUTO: 42.2 FL (ref 37–54)
EGFRCR SERPLBLD CKD-EPI 2021: 94.6 ML/MIN/1.73
EOSINOPHIL # BLD AUTO: 0.94 10*3/MM3 (ref 0–0.4)
EOSINOPHIL NFR BLD AUTO: 10.7 % (ref 0.3–6.2)
ERYTHROCYTE [DISTWIDTH] IN BLOOD BY AUTOMATED COUNT: 12.3 % (ref 12.3–15.4)
GLOBULIN UR ELPH-MCNC: 3.1 GM/DL
GLUCOSE BLDC GLUCOMTR-MCNC: 114 MG/DL (ref 70–130)
GLUCOSE BLDC GLUCOMTR-MCNC: 180 MG/DL (ref 70–130)
GLUCOSE BLDC GLUCOMTR-MCNC: 216 MG/DL (ref 70–130)
GLUCOSE BLDC GLUCOMTR-MCNC: 262 MG/DL (ref 70–130)
GLUCOSE SERPL-MCNC: 113 MG/DL (ref 65–99)
HCT VFR BLD AUTO: 43.9 % (ref 37.5–51)
HGB BLD-MCNC: 14.8 G/DL (ref 13–17.7)
IMM GRANULOCYTES # BLD AUTO: 0.02 10*3/MM3 (ref 0–0.05)
IMM GRANULOCYTES NFR BLD AUTO: 0.2 % (ref 0–0.5)
LYMPHOCYTES # BLD AUTO: 2.8 10*3/MM3 (ref 0.7–3.1)
LYMPHOCYTES NFR BLD AUTO: 32 % (ref 19.6–45.3)
MAGNESIUM SERPL-MCNC: 1.9 MG/DL (ref 1.6–2.4)
MCH RBC QN AUTO: 31.2 PG (ref 26.6–33)
MCHC RBC AUTO-ENTMCNC: 33.7 G/DL (ref 31.5–35.7)
MCV RBC AUTO: 92.4 FL (ref 79–97)
MONOCYTES # BLD AUTO: 0.88 10*3/MM3 (ref 0.1–0.9)
MONOCYTES NFR BLD AUTO: 10 % (ref 5–12)
NEUTROPHILS NFR BLD AUTO: 4.03 10*3/MM3 (ref 1.7–7)
NEUTROPHILS NFR BLD AUTO: 46.1 % (ref 42.7–76)
NRBC BLD AUTO-RTO: 0 /100 WBC (ref 0–0.2)
PHENOBARB SERPL-MCNC: 25.5 MCG/ML (ref 10–30)
PHENYTOIN SERPL-MCNC: <0.8 MCG/ML (ref 10–20)
PLATELET # BLD AUTO: 314 10*3/MM3 (ref 140–450)
PMV BLD AUTO: 9.4 FL (ref 6–12)
POTASSIUM SERPL-SCNC: 3.8 MMOL/L (ref 3.5–5.2)
PROT SERPL-MCNC: 6.7 G/DL (ref 6–8.5)
RBC # BLD AUTO: 4.75 10*6/MM3 (ref 4.14–5.8)
SODIUM SERPL-SCNC: 141 MMOL/L (ref 136–145)
WBC NRBC COR # BLD AUTO: 8.76 10*3/MM3 (ref 3.4–10.8)

## 2024-05-07 PROCEDURE — 99232 SBSQ HOSP IP/OBS MODERATE 35: CPT | Performed by: FAMILY MEDICINE

## 2024-05-07 PROCEDURE — 80185 ASSAY OF PHENYTOIN TOTAL: CPT

## 2024-05-07 PROCEDURE — 25010000002 HEPARIN (PORCINE) PER 1000 UNITS: Performed by: INTERNAL MEDICINE

## 2024-05-07 PROCEDURE — 63710000001 INSULIN LISPRO (HUMAN) PER 5 UNITS: Performed by: NURSE PRACTITIONER

## 2024-05-07 PROCEDURE — 82948 REAGENT STRIP/BLOOD GLUCOSE: CPT

## 2024-05-07 PROCEDURE — 80184 ASSAY OF PHENOBARBITAL: CPT

## 2024-05-07 PROCEDURE — 63710000001 INSULIN GLARGINE PER 5 UNITS: Performed by: NURSE PRACTITIONER

## 2024-05-07 PROCEDURE — 80053 COMPREHEN METABOLIC PANEL: CPT | Performed by: FAMILY MEDICINE

## 2024-05-07 PROCEDURE — 85025 COMPLETE CBC W/AUTO DIFF WBC: CPT | Performed by: FAMILY MEDICINE

## 2024-05-07 PROCEDURE — 83735 ASSAY OF MAGNESIUM: CPT | Performed by: FAMILY MEDICINE

## 2024-05-07 PROCEDURE — 99233 SBSQ HOSP IP/OBS HIGH 50: CPT

## 2024-05-07 PROCEDURE — 63710000001 INSULIN LISPRO (HUMAN) PER 5 UNITS: Performed by: INTERNAL MEDICINE

## 2024-05-07 RX ORDER — PHENOBARBITAL SODIUM 65 MG/ML
130 INJECTION, SOLUTION INTRAMUSCULAR; INTRAVENOUS NIGHTLY
Status: DISCONTINUED | OUTPATIENT
Start: 2024-05-07 | End: 2024-05-09 | Stop reason: HOSPADM

## 2024-05-07 RX ORDER — PHENOBARBITAL 64.8 MG/1
129.6 TABLET ORAL NIGHTLY
Status: DISCONTINUED | OUTPATIENT
Start: 2024-05-07 | End: 2024-05-09 | Stop reason: HOSPADM

## 2024-05-07 RX ADMIN — LISINOPRIL 30 MG: 20 TABLET ORAL at 08:24

## 2024-05-07 RX ADMIN — PHENOBARBITAL 64.8 MG: 64.8 TABLET ORAL at 15:28

## 2024-05-07 RX ADMIN — INSULIN LISPRO 7 UNITS: 100 INJECTION, SOLUTION INTRAVENOUS; SUBCUTANEOUS at 12:14

## 2024-05-07 RX ADMIN — INSULIN LISPRO 4 UNITS: 100 INJECTION, SOLUTION INTRAVENOUS; SUBCUTANEOUS at 16:55

## 2024-05-07 RX ADMIN — HEPARIN SODIUM 5000 UNITS: 5000 INJECTION INTRAVENOUS; SUBCUTANEOUS at 05:35

## 2024-05-07 RX ADMIN — Medication 5 MG: at 20:07

## 2024-05-07 RX ADMIN — QUETIAPINE FUMARATE 100 MG: 100 TABLET ORAL at 15:28

## 2024-05-07 RX ADMIN — SERTRALINE HYDROCHLORIDE 50 MG: 50 TABLET ORAL at 08:24

## 2024-05-07 RX ADMIN — INSULIN LISPRO 7 UNITS: 100 INJECTION, SOLUTION INTRAVENOUS; SUBCUTANEOUS at 07:39

## 2024-05-07 RX ADMIN — INSULIN GLARGINE 17 UNITS: 100 INJECTION, SOLUTION SUBCUTANEOUS at 20:09

## 2024-05-07 RX ADMIN — TEMAZEPAM 30 MG: 15 CAPSULE ORAL at 20:07

## 2024-05-07 RX ADMIN — MICONAZOLE NITRATE 1 APPLICATION: 20 POWDER TOPICAL at 08:26

## 2024-05-07 RX ADMIN — QUETIAPINE FUMARATE 100 MG: 100 TABLET ORAL at 07:38

## 2024-05-07 RX ADMIN — PHENOBARBITAL 64.8 MG: 64.8 TABLET ORAL at 10:40

## 2024-05-07 RX ADMIN — QUETIAPINE FUMARATE 300 MG: 100 TABLET ORAL at 20:08

## 2024-05-07 RX ADMIN — INSULIN LISPRO 7 UNITS: 100 INJECTION, SOLUTION INTRAVENOUS; SUBCUTANEOUS at 17:02

## 2024-05-07 RX ADMIN — PHENOBARBITAL 129.6 MG: 64.8 TABLET ORAL at 20:07

## 2024-05-07 RX ADMIN — INSULIN LISPRO 4 UNITS: 100 INJECTION, SOLUTION INTRAVENOUS; SUBCUTANEOUS at 12:14

## 2024-05-07 RX ADMIN — HEPARIN SODIUM 5000 UNITS: 5000 INJECTION INTRAVENOUS; SUBCUTANEOUS at 14:32

## 2024-05-07 RX ADMIN — AMLODIPINE BESYLATE 10 MG: 10 TABLET ORAL at 08:24

## 2024-05-07 RX ADMIN — FOLIC ACID 1 MG: 1 TABLET ORAL at 08:24

## 2024-05-07 RX ADMIN — HEPARIN SODIUM 5000 UNITS: 5000 INJECTION INTRAVENOUS; SUBCUTANEOUS at 21:19

## 2024-05-07 RX ADMIN — MIRTAZAPINE 15 MG: 15 TABLET, FILM COATED ORAL at 20:08

## 2024-05-07 RX ADMIN — DONEPEZIL HYDROCHLORIDE 10 MG: 10 TABLET, FILM COATED ORAL at 07:38

## 2024-05-07 RX ADMIN — DONEPEZIL HYDROCHLORIDE 10 MG: 10 TABLET, FILM COATED ORAL at 20:08

## 2024-05-07 RX ADMIN — INSULIN GLARGINE 17 UNITS: 100 INJECTION, SOLUTION SUBCUTANEOUS at 08:26

## 2024-05-07 RX ADMIN — SENNOSIDES AND DOCUSATE SODIUM 2 TABLET: 8.6; 5 TABLET ORAL at 08:27

## 2024-05-07 RX ADMIN — INSULIN LISPRO 3 UNITS: 100 INJECTION, SOLUTION INTRAVENOUS; SUBCUTANEOUS at 21:37

## 2024-05-07 RX ADMIN — MICONAZOLE NITRATE 1 APPLICATION: 20 POWDER TOPICAL at 20:08

## 2024-05-07 NOTE — PROGRESS NOTES
[facility]    Progress Note    Patient Name: Too Tai  : 1955  MRN: [id number]  Primary Care Physician:  Des Geller MD  Date of admission: 2024    Subjective     Chief Complaint: Dementia with behavioral disturbances    History of Present Illness   Patient seen resting comfortably in bed.  He was alert.  No restraints at this time.  Sitter at bedside.  No as needed medications overnight.    Review of Systems   Unable to assess due to mental status    Objective     Physical Exam  Vitals and nursing note reviewed.   Constitutional:       General: He is not in acute distress.     Appearance: He is not ill-appearing.   Eyes:      Extraocular Movements: Extraocular movements intact.      Pupils: Pupils are equal, round, and reactive to light.      Comments: No nystagmus or deviated gaze noted   Cardiovascular:      Rate and Rhythm: Normal rate.   Pulmonary:      Effort: Pulmonary effort is normal.   Neurological:      Mental Status: He is alert. Mental status is at baseline.      Cranial Nerves: No cranial nerve deficit or facial asymmetry.      Sensory: No sensory deficit.      Motor: No weakness, tremor or seizure activity.          Vitals:   Temp:  [97.7 °F (36.5 °C)-99.4 °F (37.4 °C)] 97.7 °F (36.5 °C)  Heart Rate:  [] 109  Resp:  [16-18] 18  BP: (136-157)/(61-80) 157/80    Current Medications    Current Facility-Administered Medications:     acetaminophen (TYLENOL) tablet 650 mg, 650 mg, Oral, Q4H PRN, 650 mg at 242 **OR** acetaminophen (TYLENOL) 160 MG/5ML oral solution 650 mg, 650 mg, Oral, Q4H PRN **OR** acetaminophen (TYLENOL) suppository 650 mg, 650 mg, Rectal, Q4H PRN, Nerissa Aviles G, DO    amLODIPine (NORVASC) tablet 10 mg, 10 mg, Oral, Q24H, Calin Benitez MD, 10 mg at 24 0939    sennosides-docusate (PERICOLACE) 8.6-50 MG per tablet 2 tablet, 2 tablet, Oral, BID, 2 tablet at 24 2140 **AND** polyethylene glycol (MIRALAX) packet 17 g, 17  g, Oral, Daily PRN **AND** bisacodyl (DULCOLAX) EC tablet 5 mg, 5 mg, Oral, Daily PRN **AND** bisacodyl (DULCOLAX) suppository 10 mg, 10 mg, Rectal, Daily PRN, Jennifer Bai, APRN    Calcium Replacement - Follow Nurse / BPA Driven Protocol, , Does not apply, PRN, TraciTresa bacae G, DO    cyanocobalamin injection 1,000 mcg, 1,000 mcg, Intramuscular, Q28 Days, Darrell Jhaveri, DO, 1,000 mcg at 04/19/24 1532    dextrose (D50W) (25 g/50 mL) IV injection 25 g, 25 g, Intravenous, Q15 Min PRN, Kamala Avilessie G, DO    dextrose (GLUTOSE) oral gel 15 g, 15 g, Oral, Q15 Min PRN, TraciKamala bacasie G, DO    donepezil (ARICEPT) tablet 10 mg, 10 mg, Oral, BID, Darrell Jhaveri, DO, 10 mg at 05/07/24 0738    folic acid (FOLVITE) tablet 1 mg, 1 mg, Oral, Daily, TraciNerissa baca G, DO, 1 mg at 05/06/24 0939    glucagon (GLUCAGEN) injection 1 mg, 1 mg, Intramuscular, Q15 Min PRN, Traci, Nerissa G, DO    heparin (porcine) 5000 UNIT/ML injection 5,000 Units, 5,000 Units, Subcutaneous, Q8H, Nerissa Aviles, DO, 5,000 Units at 05/07/24 0535    insulin glargine (LANTUS, SEMGLEE) injection 17 Units, 17 Units, Subcutaneous, Q12H, Jaclyn Holguin, APRN, 17 Units at 05/06/24 2139    Insulin Lispro (humaLOG) injection 2-7 Units, 2-7 Units, Subcutaneous, 4x Daily AC & at Bedtime, Nerissa Aviles G, DO, 2 Units at 05/06/24 2139    Insulin Lispro (humaLOG) injection 7 Units, 7 Units, Subcutaneous, TID With Meals, Jaclyn Holguin, APRN, 7 Units at 05/07/24 0739    lisinopril (PRINIVIL,ZESTRIL) tablet 30 mg, 30 mg, Oral, Daily, Parisa Madrigal PA-C, 30 mg at 05/06/24 0951    Magnesium Standard Dose Replacement - Follow Nurse / BPA Driven Protocol, , Does not apply, PRN, Nerissa Aviles,     melatonin tablet 5 mg, 5 mg, Oral, Nightly, Jaclyn Holguin, APRN, 5 mg at 05/06/24 1942    miconazole (MICOTIN) 2 % powder 1 Application, 1 Application, Topical, Q12H, Cecily Bain, DO, 1 Application at 05/06/24 1531    midazolam (VERSED) injection 0.5 mg, 0.5 mg,  Intravenous, BID PRN, Wili Nicole MD, 0.5 mg at 05/04/24 0246    mirtazapine (REMERON) tablet 15 mg, 15 mg, Oral, Nightly, Darrell Jhaveri DO, 15 mg at 05/06/24 1942    PHENobarbital tablet 130 mg, 130 mg, Oral, Nightly, 130 mg at 05/06/24 2140 **OR** PHENobarbital injection 130 mg, 130 mg, Intramuscular, Nightly, Meliza Kahn K, APRN    PHENobarbital tablet 64.8 mg, 64.8 mg, Oral, BID, 64.8 mg at 05/06/24 1514 **OR** PHENobarbital injection 65 mg, 65 mg, Intramuscular, BID, Brijesh Mac MD, 65 mg at 05/06/24 0942    Phosphorus Replacement - Follow Nurse / BPA Driven Protocol, , Does not apply, PRN, TraciKamala bacasie G, DO    Potassium Replacement - Follow Nurse / BPA Driven Protocol, , Does not apply, PRN, Traci Nerissa G, DO    QUEtiapine (SEROquel) tablet 100 mg, 100 mg, Oral, BID, Meliza Kahn, APRN, 100 mg at 05/07/24 0738    QUEtiapine (SEROquel) tablet 300 mg, 300 mg, Oral, Nightly, Meliza Kahn, APRN, 300 mg at 05/06/24 1942    sertraline (ZOLOFT) tablet 50 mg, 50 mg, Oral, Daily, Femi April VARGAS, APRN, 50 mg at 05/06/24 0941    sodium chloride 0.9 % flush 10 mL, 10 mL, Intravenous, Q12H, TraciKamala bacasie G, DO, 10 mL at 05/05/24 2044    sodium chloride 0.9 % flush 10 mL, 10 mL, Intravenous, PRN, TraciKamala bacasie G, DO    sodium chloride 0.9 % infusion 40 mL, 40 mL, Intravenous, PRN, Traci, Nerissa G, DO, 40 mL at 04/23/24 0546    temazepam (RESTORIL) capsule 30 mg, 30 mg, Oral, Nightly, Darrell Jhaveri DO, 30 mg at 05/06/24 1942    ziprasidone (GEODON) injection 10 mg, 10 mg, Intramuscular, Q4H PRN, Kahn, April K, APRN, 10 mg at 05/06/24 0546    Laboratory Results:   Lab Results   Component Value Date    GLUCOSE 113 (H) 05/07/2024    CALCIUM 9.3 05/07/2024     05/07/2024    K 3.8 05/07/2024    CO2 27.0 05/07/2024     05/07/2024    BUN 22 05/07/2024    CREATININE 0.87 05/07/2024    EGFRIFAFRI 102 02/21/2022    EGFRIFNONA 88 02/21/2022    BCR 25.3 (H) 05/07/2024    ANIONGAP 11.0  05/07/2024     Lab Results   Component Value Date    WBC 8.76 05/07/2024    HGB 14.8 05/07/2024    HCT 43.9 05/07/2024    MCV 92.4 05/07/2024     05/07/2024     Lab Results   Component Value Date    CHOL 220 (H) 01/08/2024    CHOL 120 08/01/2019    CHOL 117 04/15/2019     Lab Results   Component Value Date    HDL 41 01/08/2024    HDL 37 (L) 08/03/2023    HDL 41 04/03/2023     Lab Results   Component Value Date     (H) 01/08/2024     (H) 08/03/2023     (H) 04/03/2023     Lab Results   Component Value Date    TRIG 108 01/08/2024    TRIG 208 (H) 08/03/2023    TRIG 138 04/03/2023     Lab Results   Component Value Date    HGBA1C 9.50 (H) 04/18/2024     Lab Results   Component Value Date    INR 1.1 11/25/2019    INR 1.1 09/10/2018    PROTIME 12.5 11/25/2019    PROTIME 13.3 09/10/2018     Lab Results   Component Value Date    FOLATE 10.20 02/24/2024     Lab Results   Component Value Date    IBQIOYGK14 384 04/18/2024             Assessment / Plan   Brief Patient Summary:  Too Tai is a 68 y.o. male with a past medical history of advanced dementia with behavioral disturbances and nonverbal, CAD, T2DM, HLD, PVD who presented to [facility] ED due to increase in agitation.  Patient has had multiple recent hospitalizations.      Plan:   Advanced dementia with behavioral disturbances  Sleep Deprivation   Continue home Aricept 10 mg nightly  Continue phenobarbital 64.8 mg/ 64.8 mg/ 129.6 mg  Phenobarbital level 22.8; recheck trough level in AM   Continue home Remeron 15 mg nightly  Continue Seroquel 100 mg /100 mg / 300 mg.   QTc 441 this AM.  Patient is not tolerating continuous telemetry.  Scheduled EKGs daily.  Continue Restoril 30 mg nightly.  Geodon as needed for extreme agitation; last dose 5/6 0546  CT head negative for acute abnormality  Vitamin B12 384, IM cyanocobalamin 1000 mcg  Case management following for difficult posthospitalization disposition.  Patient is not safe to return home due to  concern of being in danger for his wife and himself.   Palliative consulted, appreciate recommendations  Psych consult, appreciate recommendations please see consult note for recommendations  Schedule melatonin 5 mg nightly for 7 PM per psychology recommendations  Continue Zoloft 50 mg daily per psych recommendations.  Continue Versed 0.5 mg as needed for nursing care.  Please use prior to Geodon.  Psychology recommended Ativan 0.25 mg with nursing care however it is not readily available.  General neurology will continue to follow    I have discussed the above with the patient, bedside RN Dr. Palacios  Time spent with patient: 50 minutes in face-to-face evaluation and management of the patient.    Copied text in this note has been reviewed and is accurate as of 05/07/24.     IVONNE Santos

## 2024-05-07 NOTE — PLAN OF CARE
Goal Outcome Evaluation:  Plan of Care Reviewed With: spouse        Progress: improving  Outcome Evaluation: Pt seen at 1050, wife present; pt awake, alert, smiling, calm, non-verbal when asked how he is feeling today. Pt has been declined by [facility] facility per spouse report and documentation by CM; plan to seek acceptance by [facility] facility, CM discussing behavioral requirements and financial expectation with spouse. Palliative following for continued support to family in ongoing GOC/POC discussion and planning. Neuro continues to follow for management of behavioral medications.    1300 Palliative IDT meeting:  IVONNE EAST, RN, SW,   After hours, weekends and holidays, contact Palliative Provider by calling [phone]     Problem: Palliative Care  Goal: Enhanced Quality of Life  Outcome: Ongoing, Progressing  Intervention: Promote Advance Care Planning  Flowsheets (Taken 5/7/2024 1235)  Life Transition/Adjustment: (CM pursuing placement for discharge) other (see comments)  Intervention: Maximize Comfort  Flowsheets (Taken 5/7/2024 1235)  Pain Management Interventions: (no prn medications for behavioral disturbance over night; out of restraints > 72 hours) other (see comments)  Intervention: Optimize Function  Flowsheets (Taken 5/7/2024 1235)  Sensory Stimulation Regulation: quiet environment promoted  Sleep/Rest Enhancement:   consistent schedule promoted   family presence promoted  Intervention: Optimize Psychosocial Wellbeing  Flowsheets (Taken 5/7/2024 1235)  Supportive Measures: positive reinforcement provided  Family/Support System Care:   caregiver stress acknowledged   involvement promoted   presence promoted   self-care encouraged   support provided

## 2024-05-07 NOTE — PLAN OF CARE
Goal Outcome Evaluation:  Plan of Care Reviewed With: patient           Outcome Evaluation: vss. denies pain, incont of bladder x's 2 ,no BM this shift. good bed mobility, consumed several PB&J sandwiches today,  one chicken tenders and several feliz strips. awake a good portion of the day. only a few moments of aggresion noted, easily calmed with verbal ques. gave meds easily crushed in yogurt or sherbert

## 2024-05-07 NOTE — PLAN OF CARE
Goal Outcome Evaluation:  Plan of Care Reviewed With: patient        Progress: improving  Outcome Evaluation: VSS, on RA. No c/o pain. Pt pleasantly confused when awake. Medications taken crushed in sherbert well. No aggressive behaviors present during this shift. Pt incontinent of both Bowel and bladder, Incontinence brief changed with assistance from PCT. Nurse sitting at bedside per order. Pt resting well at this time. Awaiting insurance approval and bed offer for SNF. No PRN's needed at this time for agitation. Will continue to monitor.

## 2024-05-07 NOTE — PROGRESS NOTES
"                  Clinical Nutrition   Nutrition Assessment  Reason for Visit: Follow-up protocol    Patient Name: Too Tai  YOB: 1955  MRN: [id number]  Date of Encounter: 05/07/24 14:50 EDT  Admission date: 4/18/2024    Nutrition Assessment   Admission Diagnosis:  Agitation due to dementia [F03.911]  Agitation [R45.1]    Problem List:    Agitation due to dementia    Benign essential HTN    Benign prostatic hyperplasia    Coronary artery disease involving native coronary artery of native heart    Type 2 diabetes mellitus with hyperglycemia, with long-term current use of insulin    Hyperlipidemia    Peripheral neuropathy    Peripheral vascular disease    Agitation      PMH:   He  has a past medical history of Coronary artery disease, Dementia, Diabetes mellitus, Hyperlipidemia, and Peripheral vascular disease.    PSH:  He  has a past surgical history that includes Coronary artery bypass graft and Femoral artery - popliteal artery bypass graft.    Applicable Interval History:       Reported/Observed/Food/Nutrition Related History:     5/7  Pt continues eating uncrustables without difficulty - per spouse, primary intake. Does drink ~25% of ONS - observed in room. Last documented BM on 5/6. Placement pending.     4/26  Pt laying in bed with spouse present at bedside, spouse provided all hx. Endorsed limited intake since admission however has had increased success with finger foods. Inquired why pt couldn't have chicken tenders - educated, verbalized understanding but agreeable to liberalizing diet. Endorsed constipation - last BM ~6 days ago. [facility]    Anthropometrics     Height: Height: 188 cm (74\")  Last Filed Weight: Weight: 97.5 kg (215 lb) (04/18/24 2350)  Method: Weight Method: Estimated  BMI: BMI (Calculated): 27.6    UBW:     Weight      Weight (kg) Weight (lbs) Weight Method   4/3/2023 110.224 kg  243 lb     8/3/2023 110.224 kg  243 lb     11/3/2023 111.585 kg  246 lb  Stated    1/8/2024 " 102.967 kg  227 lb     2/24/2024 99.791 kg  220 lb     2/25/2024 99.8 kg  220 lb 0.3 oz  Bed scale    3/17/2024 90.719 kg  200 lb     3/27/2024 90.7 kg  199 lb 15.3 oz  Stated    3/29/2024 90.7 kg  199 lb 15.3 oz     4/18/2024 97.523 kg  215 lb  Estimated      Weight change:  UTD 2/2 limited measure weight hx    Nutrition Focused Physical Exam     Date:  4/26       Unable to perform due to Patient agitation, Pt unable to participate at time of visit     Current Nutrition Prescription   PO: Diet: Regular/House; Finger Foods; Fluid Consistency: Thin (IDDSI 0)  Oral Nutrition Supplement: N/A  Intake:  53.125% x last 9 meals documented    Nutrition Diagnosis   Date:  4/26            Updated:  5/7  Problem Inadequate oral intake    Etiology Dementia, agitation   Signs/Symptoms Per spouse report   Status: Improvement Shown    Goal:   Nutrition to support treatment and Continue positive trend      Nutrition Intervention      Follow treatment progress, Care plan reviewed, Encourage intake      Monitoring/Evaluation:   Per protocol, I&O, PO intake, POC/GOC      Destiny Rosenbaum, MS,RD,LD  Time Spent: 25min

## 2024-05-07 NOTE — PROGRESS NOTES
[facility] Services  PROGRESS NOTE    Patient Name: Too Tai  : 1955  MRN: [id number]    Date of Admission: 2024  Primary Care Physician: Des Geller MD    Subjective     CC: f/u dementia with agitation    HPI:  Patient is a 67 yo M seen and examined by me this AM, he is awake and resting comfortably in bed, will nod his head to some questions, denies any pain. Wife at bedside and updated, continuing to work on placement at this time.       Objective     Vital Signs:   Temp:  [97.7 °F (36.5 °C)-99.4 °F (37.4 °C)] 97.7 °F (36.5 °C)  Heart Rate:  [101-109] 109  Resp:  [16-18] 18  BP: (144-157)/(72-80) 144/77     Physical Exam:  Constitutional: No acute distress, sleeping, resting comfortably in bed, currently on RA  HENT: NCAT, nares patent, mucous membranes moist  Respiratory: Clear to auscultation bilaterally, no rhonchi or wheezing, respiratory effort normal   Cardiovascular: RRR, no murmurs, rubs, or gallops  Gastrointestinal: Positive bowel sounds, soft, nontender, nondistended  Musculoskeletal: No bilateral ankle edema  Psychiatric: limited   Neurologic: limited, awake, sitting up in bed   Skin: No rashes      Results Reviewed:  LAB RESULTS:    Brief Urine Lab Results  (Last result in the past 365 days)        Color   Clarity   Blood   Leuk Est   Nitrite   Protein   CREAT   Urine HCG        24 0243 Yellow   Clear   Negative   Negative   Negative   Negative                 Microbiology Results Abnormal       None          No radiology results from the last 24 hrs    Results for orders placed during the hospital encounter of 24    Adult Transthoracic Echo Complete W/ Cont if Necessary Per Protocol    Interpretation Summary    Left ventricular systolic function is normal. Calculated left ventricular EF = 55.1% Normal left ventricular cavity size noted. Left ventricular wall thickness is consistent with mild concentric hypertrophy. Left  ventricular diastolic function is consistent with (grade I) impaired relaxation.    The right ventricular cavity is mildly dilated. Normal right ventricular systolic function noted.    There is calcification of the aortic valve. The aortic valve appears trileaflet. Mild aortic valve regurgitation is present. No aortic valve stenosis is present.    Mitral annular calcification is present. Trace mitral valve regurgitation is present. No significant mitral valve stenosis is present.    The tricuspid valve is structurally normal with no stenosis present. Trace tricuspid valve regurgitation is present. Insufficient TR velocity profile to estimate the right ventricular systolic pressure.    Mild dilation of the sinuses of Valsalva is present. Mild dilation of the ascending aorta is present. Ascending aorta = 4.2 cm    Current medications:  Scheduled Meds:amLODIPine, 10 mg, Oral, Q24H  cyanocobalamin, 1,000 mcg, Intramuscular, Q28 Days  donepezil, 10 mg, Oral, BID  folic acid, 1 mg, Oral, Daily  heparin (porcine), 5,000 Units, Subcutaneous, Q8H  insulin glargine, 17 Units, Subcutaneous, Q12H  insulin lispro, 2-7 Units, Subcutaneous, 4x Daily AC & at Bedtime  Insulin Lispro, 7 Units, Subcutaneous, TID With Meals  lisinopril, 30 mg, Oral, Daily  melatonin, 5 mg, Oral, Nightly  miconazole, 1 Application, Topical, Q12H  mirtazapine, 15 mg, Oral, Nightly  PHENobarbital, 130 mg, Oral, Nightly   Or  PHENobarbital, 130 mg, Intramuscular, Nightly  PHENobarbital, 64.8 mg, Oral, BID   Or  PHENobarbital, 65 mg, Intramuscular, BID  QUEtiapine, 100 mg, Oral, BID  QUEtiapine, 300 mg, Oral, Nightly  senna-docusate sodium, 2 tablet, Oral, BID  sertraline, 50 mg, Oral, Daily  sodium chloride, 10 mL, Intravenous, Q12H  temazepam, 30 mg, Oral, Nightly      Continuous Infusions:     PRN Meds:.  acetaminophen **OR** acetaminophen **OR** acetaminophen    senna-docusate sodium **AND** polyethylene glycol **AND** bisacodyl **AND** bisacodyl     Calcium Replacement - Follow Nurse / BPA Driven Protocol    dextrose    dextrose    glucagon (human recombinant)    Magnesium Standard Dose Replacement - Follow Nurse / BPA Driven Protocol    midazolam    Phosphorus Replacement - Follow Nurse / BPA Driven Protocol    Potassium Replacement - Follow Nurse / BPA Driven Protocol    sodium chloride    sodium chloride    ziprasidone    Assessment & Plan     Active Hospital Problems    Diagnosis  POA    **Agitation due to dementia [F03.911]  Yes    Agitation [R45.1]  Yes    Type 2 diabetes mellitus with hyperglycemia, with long-term current use of insulin [E11.65, Z79.4]  Not Applicable    Peripheral neuropathy [G62.9]  Yes    Hyperlipidemia [E78.5]  Yes    Benign prostatic hyperplasia [N40.0]  Yes    Benign essential HTN [I10]  Yes    Peripheral vascular disease [I73.9]  Yes    Coronary artery disease involving native coronary artery of native heart [I25.10]  Yes      Resolved Hospital Problems   No resolved problems to display.     Brief Hospital Course to date:  Too Tai is a 68yoM with PMH significant for advanced dementia, CAD, diabetes mellitus type 2, HLD and PVD. Admitted to [facility] ED on 4/18/24 for agitation / behavioral disturbances. Recently admitted to [facility] 2/24-3/4/24 for dementia with behavioral disturbances and again 3/27-4/4/24 for similar issues. Patient transferred to my services on 5/6, reviewed chart and working on possible transfer to [facility] facility, continue to follow with CM and neurology at this time. Sitter remains at bedside, patient has been out of restrains since 5/3, continue to follow at this time. Patient seen again on 5/7, will continue to follow and awaiting hopeful placement, discussed case with neurology today, continue to follow with CM and wife updated at bedside      Advanced dementia complicated by behavioral disturbance with superimposed sleep disturbance   - Infectious work up unremarkable. UA reassuring.  CXR without acute findings. CT head without acute findings  - Continue home Aricept and Mirtazapine 15mg QHS and melatonin 5 mg at 7 pm  - Continue Seroquel   - 5/3 trial of zoloft 50 mg and PRN versed 0.5 mg as needed for nursing care. (Ativan 0.25mg recommended per psych but it is not available)  - Neurology started Phenobarbital, level now WNL.  Will continue to monitor effect, again level remains WNL on 5/6   - Continue Temazepam 30mg QHS  - Appreciate palliative care assistance  - May need geriatric psych facility vs LTC - CM looking into this. Telepsych eval completed 5/2/24  - Lost IV access, will need to monitor PO intake, may need to replace IVFs, continue to follow.   - PRN Geodon    Uncontrolled diabetes mellitus type 2  - Appears not on any home meds for DM  - A1c 9.5%  - Continue Lantus, increased to 17 units BID, Lispro 7 units TID AC + SSI   - glucose reviewed    HTN  - continue Amlodipine 10mg daily  - continue Lisinopril 30mg daily     Expected Discharge Location and Transportation: Placement   Expected Discharge   Expected Discharge Date: 5/7/2024; Expected Discharge Time:      DVT prophylaxis:Medical and mechanical DVT prophylaxis orders are present.    AM-PAC 6 Clicks Score (PT): 11 (05/07/24 0800)    CODE STATUS:   Code Status and Medical Interventions:   Ordered at: 04/19/24 1037     Medical Intervention Limits:    NO intubation (DNI)     Level Of Support Discussed With:    Next of Kin (If No Surrogate)     Code Status (Patient has no pulse and is not breathing):    No CPR (Do Not Attempt to Resuscitate)     Medical Interventions (Patient has pulse or is breathing):    Limited Support     GLENN Palacios, DO  05/07/24

## 2024-05-07 NOTE — DISCHARGE PLACEMENT REQUEST
"Too Richter (68 y.o. Male)     DAVEY GALLEGO RN  [phone]        Date of Birth   1955    Social Security Number       Address   [address] [place] [address] [facility] [address]    Home Phone   [phone]    MRN   [id number]       Pentecostalism   Hoahaoism    Marital Status                               Admission Date   4/18/24    Admission Type   Emergency    Admitting Provider   RANDALL Palacios DO    Attending Provider   RANDALL Palacios DO    Department, Room/Bed   [facility] 5G, S551/1       Discharge Date       Discharge Disposition       Discharge Destination                                 Attending Provider: RANDALL Palacios DO    Allergies: Bactrim [Sulfamethoxazole-trimethoprim], Adhesive Tape, Neosporin [Neomycin-bacitracin Zn-polymyx]    Isolation: None   Infection: None   Code Status: No CPR    Ht: 188 cm (74\")   Wt: 97.5 kg (215 lb)    Admission Cmt: None   Principal Problem: Agitation due to dementia [F03.911]                   Active Insurance as of 4/18/2024       Primary Coverage       Payor Plan Insurance Group Employer/Plan Group    AETNA MEDICARE REPLACEMENT AETNA MED ADV PPO [id number]       Payor Plan Address Payor Plan Phone Number Payor Plan Fax Number Effective Dates    [address] [phone]  1/1/2024 - None Entered    [facility] [id number]         Subscriber Name Subscriber Birth Date Member ID       TOO RICHTER 1955 [id number]                     Emergency Contacts        (Rel.) Home Phone Work Phone Mobile Phone    WILLIE RICHTER (Spouse) [phone] -- [phone]    ZaireRitchie (Son) [phone] -- [phone]    Meg Bustillo (Relative) [phone] -- [phone]              Current Facility-Administered Medications   Medication Dose Route Frequency Provider Last Rate Last Admin    acetaminophen (TYLENOL) tablet 650 mg  650 mg Oral Q4H PRN Nerissa Aviles DO   650 mg at 04/30/24 2042    Or    acetaminophen (TYLENOL) 160 MG/5ML " oral solution 650 mg  650 mg Oral Q4H PRN Traci, Nerissa G, DO        Or    acetaminophen (TYLENOL) suppository 650 mg  650 mg Rectal Q4H PRN Traci, Nerissa G, DO        amLODIPine (NORVASC) tablet 10 mg  10 mg Oral Q24H Calin Benitez MD   10 mg at 05/07/24 0824    sennosides-docusate (PERICOLACE) 8.6-50 MG per tablet 2 tablet  2 tablet Oral BID Richardson Jennifer E, APRN   2 tablet at 05/07/24 0827    And    polyethylene glycol (MIRALAX) packet 17 g  17 g Oral Daily PRN Richardson, Jennifer E, APRN        And    bisacodyl (DULCOLAX) EC tablet 5 mg  5 mg Oral Daily PRN Richardson, Jennifer E, APRN        And    bisacodyl (DULCOLAX) suppository 10 mg  10 mg Rectal Daily PRN Richardson, Jennifer E, APRN        Calcium Replacement - Follow Nurse / BPA Driven Protocol   Does not apply PRN Traci, Nerissa G, DO        cyanocobalamin injection 1,000 mcg  1,000 mcg Intramuscular Q28 Days Darrell Jhaveri, DO   1,000 mcg at 04/19/24 1532    dextrose (D50W) (25 g/50 mL) IV injection 25 g  25 g Intravenous Q15 Min PRN Traci, Enrissa G, DO        dextrose (GLUTOSE) oral gel 15 g  15 g Oral Q15 Min PRN Traci, Nerissa G, DO        donepezil (ARICEPT) tablet 10 mg  10 mg Oral BID Darrell Jhaveri, DO   10 mg at 05/07/24 0738    folic acid (FOLVITE) tablet 1 mg  1 mg Oral Daily Traci, Nerissa G, DO   1 mg at 05/07/24 0824    glucagon (GLUCAGEN) injection 1 mg  1 mg Intramuscular Q15 Min PRN Traci, Nerissa G, DO        heparin (porcine) 5000 UNIT/ML injection 5,000 Units  5,000 Units Subcutaneous Q8H Traci, Nerissa G, DO   5,000 Units at 05/07/24 1432    insulin glargine (LANTUS, SEMGLEE) injection 17 Units  17 Units Subcutaneous Q12H Jaclyn Holguin APRN   17 Units at 05/07/24 0826    Insulin Lispro (humaLOG) injection 2-7 Units  2-7 Units Subcutaneous 4x Daily AC & at Bedtime Nerissa Aviles DO   4 Units at 05/07/24 1214    Insulin Lispro (humaLOG) injection 7 Units  7 Units Subcutaneous TID With Meals Jaclyn Holguin APRN   7 Units at 05/07/24 1214    lisinopril  (PRINIVIL,ZESTRIL) tablet 30 mg  30 mg Oral Daily Parisa Madrigal PA-C   30 mg at 05/07/24 0824    Magnesium Standard Dose Replacement - Follow Nurse / BPA Driven Protocol   Does not apply PRN Nerissa Aviles DO        melatonin tablet 5 mg  5 mg Oral Nightly Jaclyn Holguin APRN   5 mg at 05/06/24 1942    miconazole (MICOTIN) 2 % powder 1 Application  1 Application Topical Q12H Cecily Bain DO   1 Application at 05/07/24 0826    midazolam (VERSED) injection 0.5 mg  0.5 mg Intravenous BID PRN Wili Nicole MD   0.5 mg at 05/04/24 0246    mirtazapine (REMERON) tablet 15 mg  15 mg Oral Nightly Darrell Jhaveri DO   15 mg at 05/06/24 1942    PHENobarbital tablet 130 mg  130 mg Oral Nightly Femi April VARGAS, APRN   130 mg at 05/06/24 2140    Or    PHENobarbital injection 130 mg  130 mg Intramuscular Nightly Femi April VARGAS, APRN        PHENobarbital tablet 64.8 mg  64.8 mg Oral BID Brijesh Mac MD   64.8 mg at 05/07/24 1040    Or    PHENobarbital injection 65 mg  65 mg Intramuscular BID Brijesh Mac MD   65 mg at 05/06/24 0942    Phosphorus Replacement - Follow Nurse / BPA Driven Protocol   Does not apply PRN Nerissa Aviles DO        Potassium Replacement - Follow Nurse / BPA Driven Protocol   Does not apply PRN Nerissa Aviles DO        QUEtiapine (SEROquel) tablet 100 mg  100 mg Oral BID Femi April VARGAS, APRN   100 mg at 05/07/24 0738    QUEtiapine (SEROquel) tablet 300 mg  300 mg Oral Nightly Femi April VARGAS, APRN   300 mg at 05/06/24 1942    sertraline (ZOLOFT) tablet 50 mg  50 mg Oral Daily Femi April VARGAS, APRN   50 mg at 05/07/24 0824    sodium chloride 0.9 % flush 10 mL  10 mL Intravenous Q12H Nerissa Aviles DO   10 mL at 05/05/24 2044    sodium chloride 0.9 % flush 10 mL  10 mL Intravenous PRN Traci, Nerissa G, DO        sodium chloride 0.9 % infusion 40 mL  40 mL Intravenous PRN Traci, Nerissa G, DO   40 mL at 04/23/24 0546    temazepam (RESTORIL) capsule 30 mg  30 mg Oral  Ernestoly Darrell Jhaveri DO   30 mg at 24    ziprasidone (GEODON) injection 10 mg  10 mg Intramuscular Q4H PRN Kahn, April IVONNE KAYE   10 mg at 24 05           Nerissa Aviles DO   Physician  Hospitalist     H&P     Signed     Date of Service: 24  Creation Time: 24     Signed       Expand All Collapse All[]Expand All by Default         [facility] Medicine Services  HISTORY AND PHYSICAL     Patient Name: Too Tai  : 1955  MRN: [id number]  Primary Care Physician: Des Geller MD  Date of admission: 2024           Subjective  Subjective      Chief Complaint:  Agitation     HPI:  Too Tai is a 68 y.o. male with a PMH significant for advanced dementia, CAD, diabetes mellitus type 2, HLD, PVD who comes to the ED due to agitation.  Patient with advanced dementia, nonverbal.  No family present.  HPI obtained from EMR.  Patient was hospitalized with d/c on 3/4/2024 after presenting with frequent falls and increased myoclonus.  Symptoms were felt to be secondary to Rexulti which was discontinued.  Patient was hospitalized again with d/c on 2024 where he was evaluated after having falls at home with increased confusion.  He was found to be orthostatic with concerns for UTI and pneumonia.  He was treated with antibiotics discharged home.  Patient has become more agitated, restless and combative over the past 2 days.  He has been seen by neurology outpatient with medication adjustments.  He has had no improvement of agitation.  He was brought to the ED by spouse due to these concerns.        Personal History      Medical History        Past Medical History:   Diagnosis Date    Coronary artery disease      Dementia      Diabetes mellitus      Hyperlipidemia      Peripheral vascular disease                    Surgical History         Past Surgical History:   Procedure Laterality Date    CORONARY ARTERY BYPASS GRAFT        FEMORAL  ARTERY - POPLITEAL ARTERY BYPASS GRAFT                Family History: family history includes Diabetes in his maternal grandfather, mother, sister, sister, and sister; Heart disease in his father; Hyperlipidemia in his father; Hypertension in his father.      Social History:  reports that he quit smoking about 27 years ago. His smoking use included cigarettes. He has never been exposed to tobacco smoke. He has quit using smokeless tobacco. He reports that he does not drink alcohol and does not use drugs.  Social History          Social History Narrative    Not on file         Medications:  Available home medication information reviewed.  Accu-Chek Geri, Accu-Chek Geri Plus, Alcohol Prep, Divalproex Sodium, Glucagon, Insulin Glargine, Insulin Pen Needle, QUEtiapine, QUEtiapine fumarate ER, accu-chek soft touch, amLODIPine, donepezil, haloperidol, lisinopril, mirtazapine, and temazepam     Allergies        Allergies   Allergen Reactions    Bactrim [Sulfamethoxazole-Trimethoprim] Rash    Adhesive Tape Rash    Neosporin [Neomycin-Bacitracin Zn-Polymyx] Rash                  Objective  Objective      Vital Signs:   Temp:  [97.2 °F (36.2 °C)] 97.2 °F (36.2 °C)  Heart Rate:  [91-93] 91  Resp:  [20] 20  BP: (148-158)/(75-84) 158/84     Physical Exam   Constitutional: Awake, alert, fidgety  Eyes: PERRLA, sclerae anicteric, no conjunctival injection  HENT: NCAT, mucous membranes moist  Neck: Supple, no thyromegaly, no lymphadenopathy, trachea midline  Respiratory: Clear to auscultation bilaterally, nonlabored respirations   Cardiovascular: RRR, no murmurs, rubs, or gallops, palpable pedal pulses bilaterally  Gastrointestinal: Positive bowel sounds, soft, nontender, nondistended  Musculoskeletal: No bilateral ankle edema, no clubbing or cyanosis to extremities  Psychiatric: Fidgety  Neurologic: Unable to assess, not following commands  Skin: No rashes        Result Review:  I have personally reviewed the results from the  time of this admission to 4/19/2024 03:04 EDT and agree with these findings:  [x]  Laboratory list / accordion  []  Microbiology  [x]  Radiology  [x]  EKG/Telemetry   []  Cardiology/Vascular   []  Pathology  [x]  Old records        LAB RESULTS:          Lab 04/18/24  2356   WBC 9.87   HEMOGLOBIN 13.8   HEMATOCRIT 42.2   PLATELETS 292   NEUTROS ABS 4.68   IMMATURE GRANS (ABS) 0.05   LYMPHS ABS 3.34*   MONOS ABS 1.14*   EOS ABS 0.56*   MCV 95.3              Lab 04/18/24  2356   SODIUM 142   POTASSIUM 4.9   CHLORIDE 104   CO2 28.0   ANION GAP 10.0   BUN 22   CREATININE 1.12   EGFR 72.0   GLUCOSE 226*   CALCIUM 9.4   MAGNESIUM 1.8              Lab 04/18/24  2356   TOTAL PROTEIN 6.9   ALBUMIN 3.9   GLOBULIN 3.0   ALT (SGPT) 13   AST (SGOT) 17   BILIRUBIN 0.3   ALK PHOS 120*              Lab 04/18/24  2356   HSTROP T 22*                  Urinalysis Results:   UA            2/24/2024    23:54 3/27/2024    16:33 4/19/2024    02:43   Urinalysis   Squamous Epithelial Cells, UA 0-2  0-2      Specific Gravity, UA 1.032  1.038  <=1.005    Ketones, UA Trace  40 mg/dL (2+)  Negative    Blood, UA Negative  Negative  Negative    Leukocytes, UA Negative  Negative  Negative    Nitrite, UA Negative  Positive  Negative    RBC, UA 0-2  0-2      WBC, UA 0-2  0-2      Bacteria, UA None Seen  4+               Microbiology Results (last 10 days)         ** No results found for the last 240 hours. **                  Radiology results from the last 24 hours:   CT Head Without Contrast     Result Date: 4/19/2024  CT HEAD WO CONTRAST Date of Exam: 4/19/2024 2:11 AM EDT Indication: Mental status change, unknown cause. Comparison: 3/28/2024. Technique: Axial CT images were obtained of the head without contrast administration.  Automated exposure control and iterative construction methods were used. Findings: There is no acute intracranial hemorrhage. No mass effect, midline shift or abnormal extra-axial collection. There is stable moderate patchy  periventricular white matter hypoattenuation. Mild age-appropriate generalized parenchymal volume loss. No new hypoattenuating lesions in the brain. Cortical gray-white differentiation appears intact. The orbits are unremarkable. Mastoids and paranasal sinuses appear well aerated.      Impression: Impression: 1. No acute intracranial abnormality. 2. Moderate chronic small vessel ischemic change. Electronically Signed: Reg Pelaez MD  4/19/2024 2:29 AM EDT  Workstation ID: [id number]     XR Chest 1 View     Result Date: 4/19/2024  XR CHEST 1 VW Date of Exam: 4/19/2024 12:24 AM EDT Indication: Weak/Dizzy/AMS triage protocol Comparison: 3/27/2024. Findings: There is evidence of prior median sternotomy and CABG. Cardiac silhouette appears unchanged. Pulmonary vasculature is within normal limits. Evaluation of the left lung base is limited due to lordotic view and positioning. No definite lung consolidation. No pneumothorax or pleural effusion.      Impression: Impression: Limited study demonstrates no acute abnormality in the lungs. Electronically Signed: Reg Pelaez MD  4/19/2024 1:29 AM EDT  Workstation ID: [id number]         Results for orders placed during the hospital encounter of 03/27/24     Adult Transthoracic Echo Complete W/ Cont if Necessary Per Protocol     Interpretation Summary    Left ventricular systolic function is normal. Calculated left ventricular EF = 55.1% Normal left ventricular cavity size noted. Left ventricular wall thickness is consistent with mild concentric hypertrophy. Left ventricular diastolic function is consistent with (grade I) impaired relaxation.    The right ventricular cavity is mildly dilated. Normal right ventricular systolic function noted.    There is calcification of the aortic valve. The aortic valve appears trileaflet. Mild aortic valve regurgitation is present. No aortic valve stenosis is present.    Mitral annular calcification is present. Trace mitral valve  regurgitation is present. No significant mitral valve stenosis is present.    The tricuspid valve is structurally normal with no stenosis present. Trace tricuspid valve regurgitation is present. Insufficient TR velocity profile to estimate the right ventricular systolic pressure.    Mild dilation of the sinuses of Valsalva is present. Mild dilation of the ascending aorta is present. Ascending aorta = 4.2 cm              Assessment & Plan  Assessment & Plan        Agitation due to dementia    Benign essential HTN    Benign prostatic hyperplasia    Coronary artery disease involving native coronary artery of native heart    Type 2 diabetes mellitus with hyperglycemia, with long-term current use of insulin    Hyperlipidemia    Peripheral neuropathy    Peripheral vascular disease     Too Tai is a 68 y.o. male with a PMH significant for advanced dementia, CAD, diabetes mellitus type 2, HLD, PVD who comes to the ED due to agitation.          Severe dementia complicated by behavioral disturbance  -Check UA  - CT head wnl  - Consult neurology  - Fall precautions  - Case management consult  - Continue home Depakote, Aricept, Haldol, Seroquel, Restoril, Remeron  - QTc 452     Diabetes mellitus type 2  - No active home meds  - SSI with Accu-Cheks  - Hemoglobin A1c for a.m.     HTN  PVD  CAD  - Continue home meds     Home med list reviewed     DVT prophylaxis:  Mechanical and medical DVT prophylaxis orders are signed and held.         CODE STATUS: No family present.  CODE STATUS will need to be clarified with family, was DNR during prior stay.  There are no questions and answers to display.      Expected Discharge   Expected Discharge Date: 4/21/2024; Expected Discharge Time:       Nerissa Aviles DO  04/19/24                       Routing History          Tanesha Delgado, IVONNE   Nurse Practitioner  Case Management     Consults     Addendum     Date of Service: 05/02/24 0859  Creation Time: 05/02/24 0859     Expand  All Collapse All[]Expand All by Default         Hospital Consult      This provider is located at [facility], located at [address], [address] and is conducting  a secure MyChart Video Visit through [facility]. Patient is being evaluated today as an inpatient at [facility] and states they are in a secure environment for this appointment. The patient consents to be seen remotely, and when consent is given they understand that the consent allows for patient identifiable information to be sent to a third party as needed. They may refuse to be seen remotely at any time. The electronic data is encrypted and password protected, and the patient  has been advised of the potential risks to privacy not withstanding such measures. The patient's condition being diagnosed/treated is appropriate for telemedicine. The provider identified herself as well as her credentials to patient. Patient's identity confirmed by requesting wife bianca to to correctly state his name and date of birth.     Date: 24  Patient Name: Too Tai  : 1955   MRN: [id number]   Attending: Kemal Rayo MD     Referring Provider: No ref. provider found     Chief Complaint:    Visit Diagnosis       ICD-10-CM ICD-9-CM   1. Dementia with behavioral disturbance  F03.918 294.21   2. Aggressive behavior  R46.89 V40.39   3. Agitation due to dementia  F03.911 294.21   4. Hyperglycemia due to diabetes mellitus  E11.65 250.02         History of Present Illness:   Too Tai is a 68 y.o. male currently hospitalized at [facility] for dementia with behavioral disturbances. Behavioral Health was consulted to evaluate patient due to concerns regarding increasing agitation that has prompted 3 recent hospital admissions.. This is his  initial encounter with this provider. Too Tai is a 69yo M with PMH significant for advanced dementia, CAD, diabetes mellitus type 2, HLD  "and PVD. Admitted to [facility] ED on 4/18/24 for agitation / behavioral disturbances. Recently admitted to [facility] 2/24-3/4/24 for dementia with behavioral disturbances and again 3/27-4/4/24 for increased confusion and falls.      Patient  lying in bed awake with wrist restraints during visit. He is alert, nonverbal and unresponsive to provider's questions.  He is calm with intermittent restlessness fidgeting with wrist restraints and sitting up in bed. Cognition/mental status unable to be evaluated due to his lack of speech and interaction with provider. He is essentially unable to recognize an evaluation of is taking place on his behalf. He did however, initially wave at provider with wife prompting him.  His wife of 47 years, Caroline, and son Bill are at bedside and provide historical information. Other son passed away five years ago.  Patient currently lives at home  ([facility]) with his wife, son Bill, his wife, his 2 children, his mother-in-law, and 4 grandchildren that visit often . Patient was born in [facility] and raised by parents. Has sisters. No family mental health history wife is aware of . HS graduate.  Worked for [employer] 45 yrs before he had to retire in 2017 due to complications from femoral bypass.      Wife states she has never been given a formal diagnosis regarding patient's condition.  She denies any  history of depression, anxiety, substance use, psych hospitalizations and legal history.  Former smoker. He has no history of seizures.  Fell off a horse in his 20s and \"busted \"the back of his head. Wife reports pt saw Dr Costa at [facility] who said he thinks he has Alzheimer's but has never been given formal diagnosis she is aware of.  Caroline noticed 5-6 yrs ago \"things weren't quite right\" She recalls one of the kids  needed air in their bicycle tire and he didn't know how to perform the task, one he had done many times before.  Agitation " "started 2 years ago and has gotten worse. 9/22 the family went to [facility] and he wondered off for 1 hr and stranger brought him back. He was delusional and thought they were selling drugs in [facility]. Wife states sometimes he tries to talk to people who are not there and in the last month he started arguing with the mirror. Wife says he acts like he can't see her. She puts her hand in front of her eyes but he doesn't blink.  Wife says she is usually able to de-escalate him if he acts like he is going to hit her at home.  She sates he gets agitated when he puts his  shoes on the wrong feet and argues when she would correct him. However, in  an hour, he would have a moment of clarity and tell her  his shoes on the wrong feet. Wife states he is in  restraints due to hitting staff.  He was unable to stand upright and walk last time she took him home and she had to restrain at home because  he feel 6-7 times in 24 hrs.     She reports he lost his ability to function independently last fall. At home he would tell her he had to go to the bathroom and she would have to show him the toilet and he still sometimes urinates in the trash can or  in the laundry room. He is able to sit an hour at home before he gets  restless. She says he is constantly moving and has tried to get out of the house. They now have ADT alarms on  windows and doors. She has a stick placed to jam the sliding door he has nearly torn out trying to get the door open. He will let he wash his face and shave him but gets mad if she tries to  shower him or wash his private area. Appetite is \"fair\" as long as he can hold it and eat it himself. He wont let wife spoon feed him. Appetite is  good at home.  Wife reports insomnia at home is relatively new, and he didn't sleep the last  3 days at home. He  is sleeping good in hospital and  \" right now he is pretty mellow.\" Seroquel was initially prescribed  to calm him down, not for insomnia.  Wife states he was " "going to go to [facility] but they declined. She could probably take him home but needs more  help at home. She has a helper but is not much help. She is concerned about his weakness and having only stood 2x in 3 weeks. She states PT wont see him because  pt gets \"grouchy with them\". Wife feels PT should be more persistnet Prior meds per wife: Seroquel >1yr, Restoril 30 mg Nov 2023, Depakote-March, Rexulti titration up to 2 mg x  1 mo came to hospital Feb because  he was jerking in torso and arms.           Subjective  Subjective      Review of Systems   Constitutional:  Positive for activity change and fatigue.   Neurological:  Positive for speech difficulty, memory problem and confusion.   Psychiatric/Behavioral:  Positive for agitation and decreased concentration.    All other systems reviewed and are negative.        Depression Screening:  Level of Risk per Screen: screen negative (4/19/2024 12:55 AM)     PHQ-9 Depression Screening      C-SSRS (Recent)  Q1 Wished to be Dead (Past Month): no  Q2 Suicidal Thoughts (Past Month): no  Q6 Suicide Behavior (Lifetime): no  Level of Risk per Screen: screen negative     Past Psychiatric History:  none per wife        Substance Abuse History/Last use: quit smoking 27 yrs ago. No alcohol or illicit substance use                    Legal History:      Work History:               Highest level of education obtained: 12th grade               History? no              Patient's Occupation: retired     Interpersonal/Relational:              Marital Status:  47 yrs              Support system: significant other and son     Social History:  Where was patient born: [facility]  Upbringing: parents  Where does patient currently live: [facility]  Living situation: wife, son, MIL, COLEMAN and grandkids     Family History:         Family History   Problem Relation Age of Onset    Diabetes Mother      Heart disease Father      Hypertension Father      Hyperlipidemia Father      " Diabetes Sister      Diabetes Maternal Grandfather      Diabetes Sister      Diabetes Sister           Medical History        Past Medical History:   Diagnosis Date    Coronary artery disease      Dementia      Diabetes mellitus      Hyperlipidemia      Peripheral vascular disease              Surgical History         Past Surgical History:   Procedure Laterality Date    CORONARY ARTERY BYPASS GRAFT        FEMORAL ARTERY - POPLITEAL ARTERY BYPASS GRAFT                  Current Medications      Current Facility-Administered Medications:     acetaminophen (TYLENOL) tablet 650 mg, 650 mg, Oral, Q4H PRN, 650 mg at 04/30/24 2042 **OR** acetaminophen (TYLENOL) 160 MG/5ML oral solution 650 mg, 650 mg, Oral, Q4H PRN **OR** acetaminophen (TYLENOL) suppository 650 mg, 650 mg, Rectal, Q4H PRN, Nerissa Aviles, DO    amLODIPine (NORVASC) tablet 10 mg, 10 mg, Oral, Q24H, Calin Benitez MD, 10 mg at 05/01/24 0828    sennosides-docusate (PERICOLACE) 8.6-50 MG per tablet 2 tablet, 2 tablet, Oral, BID, 2 tablet at 05/01/24 2011 **AND** polyethylene glycol (MIRALAX) packet 17 g, 17 g, Oral, Daily PRN **AND** bisacodyl (DULCOLAX) EC tablet 5 mg, 5 mg, Oral, Daily PRN **AND** bisacodyl (DULCOLAX) suppository 10 mg, 10 mg, Rectal, Daily PRN, Jennifer Bai, IVONNE    Calcium Replacement - Follow Nurse / BPA Driven Protocol, , Does not apply, PRN, Nerissa Aviles G, DO    cyanocobalamin injection 1,000 mcg, 1,000 mcg, Intramuscular, Q28 Days, Darrell Jhaveri DO, 1,000 mcg at 04/19/24 1532    dextrose (D50W) (25 g/50 mL) IV injection 25 g, 25 g, Intravenous, Q15 Min PRN, Nerissa Aviles G, DO    dextrose (GLUTOSE) oral gel 15 g, 15 g, Oral, Q15 Min PRN, Nerissa Aviles G, DO    donepezil (ARICEPT) tablet 10 mg, 10 mg, Oral, BID, Darrell Jhaveri DO, 10 mg at 05/01/24 2011    folic acid (FOLVITE) tablet 1 mg, 1 mg, Oral, Daily, Nerissa Aviles DO, 1 mg at 05/01/24 0829    glucagon (GLUCAGEN) injection 1 mg, 1 mg, Intramuscular, Q15 Min PRN,  Nerissa Aviles, DO    heparin (porcine) 5000 UNIT/ML injection 5,000 Units, 5,000 Units, Subcutaneous, Q8H, Nerissa Aviles, DO, 5,000 Units at 05/02/24 0507    insulin glargine (LANTUS, SEMGLEE) injection 15 Units, 15 Units, Subcutaneous, Q12H, Cecily Bain, DO, 15 Units at 05/01/24 2026    Insulin Lispro (humaLOG) injection 2-7 Units, 2-7 Units, Subcutaneous, 4x Daily AC & at Bedtime, Nerissa Aviles, DO, 3 Units at 05/01/24 2026    Insulin Lispro (humaLOG) injection 7 Units, 7 Units, Subcutaneous, TID With Meals, Chelsie, Jaclyn, APRN, 7 Units at 05/01/24 1731    lactated ringers infusion, 75 mL/hr, Intravenous, Continuous, Parisa Madrigal PA-C, Last Rate: 75 mL/hr at 05/02/24 0146, 75 mL/hr at 05/02/24 0146    lisinopril (PRINIVIL,ZESTRIL) tablet 30 mg, 30 mg, Oral, Daily, Parisa Madrigal PA-C, 30 mg at 05/01/24 0830    Magnesium Standard Dose Replacement - Follow Nurse / BPA Driven Protocol, , Does not apply, PRN, Nerissa Aviles, DO    melatonin tablet 5 mg, 5 mg, Oral, Nightly PRN, Nerissa Aviles, DO, 5 mg at 05/01/24 2011    miconazole (MICOTIN) 2 % powder 1 Application, 1 Application, Topical, Q12H, Cecily Bain, DO, 1 Application at 05/01/24 2245    mirtazapine (REMERON) tablet 15 mg, 15 mg, Oral, Nightly, Darrell Jhaveri, DO, 15 mg at 05/01/24 2011    PHENobarbital tablet 130 mg, 130 mg, Oral, Nightly **OR** PHENobarbital injection 130 mg, 130 mg, Intramuscular, Nightly, Brijesh Mac MD, 130 mg at 05/01/24 2229    PHENobarbital tablet 64.8 mg, 64.8 mg, Oral, BID, 64.8 mg at 05/01/24 1259 **OR** PHENobarbital injection 65 mg, 65 mg, Intramuscular, BID, Brijesh Mac MD    Phosphorus Replacement - Follow Nurse / BPA Driven Protocol, , Does not apply, PRNTraci Josie G,     Potassium Replacement - Follow Nurse / BPA Driven Protocol, , Does not apply, PRN, Nerissa Aviles, DO    QUEtiapine (SEROquel) tablet 100 mg, 100 mg, Oral, BID, Meliza Kahn, APRN, 100 mg at  05/01/24 1310    QUEtiapine (SEROquel) tablet 300 mg, 300 mg, Oral, Nightly, Kahn, April K, APRN, 300 mg at 05/01/24 2010    sodium chloride 0.9 % flush 10 mL, 10 mL, Intravenous, Q12H, Traci, Nerissa G, DO, 10 mL at 05/01/24 2029    sodium chloride 0.9 % flush 10 mL, 10 mL, Intravenous, PRN, Traci, Nerissa G, DO    sodium chloride 0.9 % infusion 40 mL, 40 mL, Intravenous, PRN, Traci, Nerissa G, DO, 40 mL at 04/23/24 0546    temazepam (RESTORIL) capsule 30 mg, 30 mg, Oral, Nightly, Darrell Jhaveri A, DO, 30 mg at 05/01/24 2010    ziprasidone (GEODON) injection 10 mg, 10 mg, Intramuscular, Q4H PRN, Butler, April K, APRN, 10 mg at 04/30/24 1157        Medication Considerations:  ELISEO reviewed and appropriate.       Allergies        Allergies   Allergen Reactions    Bactrim [Sulfamethoxazole-Trimethoprim] Rash    Adhesive Tape Rash    Neosporin [Neomycin-Bacitracin Zn-Polymyx] Rash                  Objective  Objective      Physical Exam:  Temp:  [96.7 °F (35.9 °C)-97.8 °F (36.6 °C)] 96.7 °F (35.9 °C)  Heart Rate:  [71-74] 71  Resp:  [16-18] 16  BP: (160-175)/(79-86) 160/79  Body mass index is 27.6 kg/m².      Mental Status Exam:   MENTAL STATUS EXAM   General Appearance:  Well developed  Eye Contact:  Poor eye contact  Attitude:  Other  Other Comment:  Calm, unable to participate due to cognitive status  Motor Activity:  Other  Other Comment:  MARIIA-waved at camera, sat up in bed, per wife, has not walked in 3 weeks  Muscle Strength:  Other  Other Comment:  MARIIA  Speech:  Other  Other Comment:  Mute  Language:  Other  Other Comment:  Mute  Mood and affect:  Other  Other Comment:  Calm, restless (trying to get restraint off)  Hopelessness:  Denies  Loneliness: Denies  Thought Process:  Other  Other Comment:  MARIIA pt nonverbal  Associations/ Thought Content:  Other  Other Comment:  Nonverbal  Hallucinations:  Visual, auditory and other  Other Comment:  Reported per wife  Suicidal Ideations:  Not present  Homicidal Ideation:   Not present  Sensorium:  Confused  Orientation:  Other  Other Comment:  MARIIA nonverbal  Immediate Recall, Recent, and Remote Memory:  Other  Other Comment:  MARIIA nonverbal  Attention Span/ Concentration:  Easily distracted  Fund of Knowledge:  Poor  Intellectual Functioning:  Below average  Insight:  Poor  Judgement:  Poor  Reliability:  Poor  Impulse Control:  Poor              TSH   Date Value Ref Range Status   04/18/2024 3.220 0.270 - 4.200 uIU/mL Final            Vitamin B-12   Date Value Ref Range Status   04/18/2024 384 211 - 946 pg/mL Final            Magnesium   Date Value Ref Range Status   04/19/2024 1.9 1.6 - 2.4 mg/dL Final            Folate   Date Value Ref Range Status   02/24/2024 10.20 4.78 - 24.20 ng/mL Final               Lab Results   Component Value Date     GLUCOSE 166 (H) 04/19/2024     BUN 16 04/19/2024     CREATININE 0.82 04/19/2024     EGFRRESULT 73.6 08/03/2023     EGFR 96.3 04/19/2024     BCR 19.5 04/19/2024     K 4.4 04/19/2024     CO2 30.0 (H) 04/19/2024     CALCIUM 9.9 04/19/2024     PROTENTOTREF 7.0 08/03/2023     ALBUMIN 3.9 04/18/2024     BILITOT 0.3 04/18/2024     AST 17 04/18/2024     ALT 13 04/18/2024               Lab Results   Component Value Date     WBC 10.47 04/19/2024     HGB 15.5 04/19/2024     HCT 46.7 04/19/2024     MCV 92.7 04/19/2024      04/19/2024               Lab Results   Component Value Date     CHOL 220 (H) 01/08/2024     CHLPL 201 (H) 08/03/2023     TRIG 108 01/08/2024     HDL 41 01/08/2024      (H) 01/08/2024         3/28/24 CT head  1. No acute intracranial abnormality.  2. Moderate chronic small vessel ischemic change.        Assessment / Plan       Visit Diagnosis/Orders Placed This Visit:  Dementia with psychosis     Recommendations:   Encourage  staff to prioritize regulating circadian rhythm, minimize night-time interruptions, etc  Change melatonin to 5 mg hs scheduled and administer around 7pm  Consider ODT  or IM Olanzipine for acute  agitation prn (instead of geodon)  (Qtc sparing) start with 2.5 to 5 mg    Consider starting Zoloft or Lexapro (qtc sparing) SSRIs  have best evidence for agitation with dementia   Taper off Seroquel (more prone to cause orthostatic hypotension, hyponatremia and increases fall risk. Would favor Abilify (fewer EPS side effects) or Risperdal if insomnia is persistent  Consider ativan 0.25 mg prn before bathing/therapy to calm agitation  Taper off Temazepam not recommended in this population  Avoid anticholinergic meds hen possible  F/U with behavioral health provider after discharge  MRI ordered 23-results not available to view     Impression/Formulation: Patient appears alert, Unable to assess cognition due to nonverbal presentation and minimal interaction with provider     [facility]  [place] Meliza Barth, IVONNE   Nurse Practitioner  Neurology     Progress Notes     Signed     Date of Service: 24  Creation Time: 24     Signed       Expand All Collapse All[]Expand All by Default     [facility] Neurology    Progress Note     Patient Name: Too Tai  : 1955  MRN: [id number]  Primary Care Physician:  Des Geller MD  Date of admission: 2024     Subjective      Chief Complaint: Dementia with behavioral disturbances     History of Present Illness   Patient seen resting comfortably in bed.  He was alert.  No restraints at this time.  Sitter at bedside.  No as needed medications overnight.     Review of Systems   Unable to assess due to mental status     Objective      Physical Exam  Vitals and nursing note reviewed.   Constitutional:       General: He is not in acute distress.     Appearance: He is not ill-appearing.   Eyes:      Extraocular Movements: Extraocular movements intact.      Pupils: Pupils are equal, round, and reactive to light.      Comments: No nystagmus or deviated gaze noted    Cardiovascular:      Rate and Rhythm: Normal rate.   Pulmonary:      Effort: Pulmonary effort is normal.   Neurological:      Mental Status: He is alert. Mental status is at baseline.      Cranial Nerves: No cranial nerve deficit or facial asymmetry.      Sensory: No sensory deficit.      Motor: No weakness, tremor or seizure activity.                       Vitals:   Temp:  [97.7 °F (36.5 °C)-99.4 °F (37.4 °C)] 97.7 °F (36.5 °C)  Heart Rate:  [] 109  Resp:  [16-18] 18  BP: (136-157)/(61-80) 157/80     Current Medications    Current Medications      Current Facility-Administered Medications:     acetaminophen (TYLENOL) tablet 650 mg, 650 mg, Oral, Q4H PRN, 650 mg at 04/30/24 2042 **OR** acetaminophen (TYLENOL) 160 MG/5ML oral solution 650 mg, 650 mg, Oral, Q4H PRN **OR** acetaminophen (TYLENOL) suppository 650 mg, 650 mg, Rectal, Q4H PRN, Nerissa Aviles DO    amLODIPine (NORVASC) tablet 10 mg, 10 mg, Oral, Q24H, Calin Benitez MD, 10 mg at 05/06/24 0939    sennosides-docusate (PERICOLACE) 8.6-50 MG per tablet 2 tablet, 2 tablet, Oral, BID, 2 tablet at 05/06/24 2140 **AND** polyethylene glycol (MIRALAX) packet 17 g, 17 g, Oral, Daily PRN **AND** bisacodyl (DULCOLAX) EC tablet 5 mg, 5 mg, Oral, Daily PRN **AND** bisacodyl (DULCOLAX) suppository 10 mg, 10 mg, Rectal, Daily PRN, Jennifer Bai, APRN    Calcium Replacement - Follow Nurse / BPA Driven Protocol, , Does not apply, PRN, Nerissa Aviles, DO    cyanocobalamin injection 1,000 mcg, 1,000 mcg, Intramuscular, Q28 Days, Darrell Jhaveri DO, 1,000 mcg at 04/19/24 1532    dextrose (D50W) (25 g/50 mL) IV injection 25 g, 25 g, Intravenous, Q15 Min PRN, Nerissa Aviles, DO    dextrose (GLUTOSE) oral gel 15 g, 15 g, Oral, Q15 Min PRN, Nerissa Aviles G, DO    donepezil (ARICEPT) tablet 10 mg, 10 mg, Oral, BID, Darrell Jhaveri, DO, 10 mg at 05/07/24 0738    folic acid (FOLVITE) tablet 1 mg, 1 mg, Oral, Daily, Nerissa Aviles, , 1 mg at 05/06/24 0939    glucagon  (GLUCAGEN) injection 1 mg, 1 mg, Intramuscular, Q15 Min PRN, Nerissa Aviles, DO    heparin (porcine) 5000 UNIT/ML injection 5,000 Units, 5,000 Units, Subcutaneous, Q8H, Nerissa Aviles, DO, 5,000 Units at 05/07/24 0535    insulin glargine (LANTUS, SEMGLEE) injection 17 Units, 17 Units, Subcutaneous, Q12H, Jaclyn Holguin APRN, 17 Units at 05/06/24 2139    Insulin Lispro (humaLOG) injection 2-7 Units, 2-7 Units, Subcutaneous, 4x Daily AC & at Bedtime, Nerissa Aviles DO, 2 Units at 05/06/24 2139    Insulin Lispro (humaLOG) injection 7 Units, 7 Units, Subcutaneous, TID With Meals, Jaclyn Holguin APRN, 7 Units at 05/07/24 0739    lisinopril (PRINIVIL,ZESTRIL) tablet 30 mg, 30 mg, Oral, Daily, Parisa Madrigal PA-C, 30 mg at 05/06/24 0951    Magnesium Standard Dose Replacement - Follow Nurse / BPA Driven Protocol, , Does not apply, PRN, Nerissa Aviles, DO    melatonin tablet 5 mg, 5 mg, Oral, Nightly, Jaclyn Holguin APRN, 5 mg at 05/06/24 1942    miconazole (MICOTIN) 2 % powder 1 Application, 1 Application, Topical, Q12H, Cecily Bain DO, 1 Application at 05/06/24 2356    midazolam (VERSED) injection 0.5 mg, 0.5 mg, Intravenous, BID PRN, Wili Nicole MD, 0.5 mg at 05/04/24 0246    mirtazapine (REMERON) tablet 15 mg, 15 mg, Oral, Nightly, Darrell Jhaveri DO, 15 mg at 05/06/24 1942    PHENobarbital tablet 130 mg, 130 mg, Oral, Nightly, 130 mg at 05/06/24 2140 **OR** PHENobarbital injection 130 mg, 130 mg, Intramuscular, Nightly, Meliza Kahn, APRN    PHENobarbital tablet 64.8 mg, 64.8 mg, Oral, BID, 64.8 mg at 05/06/24 1514 **OR** PHENobarbital injection 65 mg, 65 mg, Intramuscular, BID, Brijesh Mac MD, 65 mg at 05/06/24 0942    Phosphorus Replacement - Follow Nurse / BPA Driven Protocol, , Does not apply, PRN, Nerissa Aviles,     Potassium Replacement - Follow Nurse / BPA Driven Protocol, , Does not apply, PRN, Nerissa Aviles,     QUEtiapine (SEROquel) tablet 100 mg, 100 mg, Oral, BID,  Meliza Kahn K, APRN, 100 mg at 05/07/24 0738    QUEtiapine (SEROquel) tablet 300 mg, 300 mg, Oral, Nightly, Meliza Kahn, APRN, 300 mg at 05/06/24 1942    sertraline (ZOLOFT) tablet 50 mg, 50 mg, Oral, Daily, Meliza Kahn, APRN, 50 mg at 05/06/24 0941    sodium chloride 0.9 % flush 10 mL, 10 mL, Intravenous, Q12H, Traci, Nerissa G, DO, 10 mL at 05/05/24 2044    sodium chloride 0.9 % flush 10 mL, 10 mL, Intravenous, PRN, Traci, Nerissa G, DO    sodium chloride 0.9 % infusion 40 mL, 40 mL, Intravenous, PRN, Traci, Nerissa G, DO, 40 mL at 04/23/24 0546    temazepam (RESTORIL) capsule 30 mg, 30 mg, Oral, Nightly, Darrell Jhaveri, DO, 30 mg at 05/06/24 1942    ziprasidone (GEODON) injection 10 mg, 10 mg, Intramuscular, Q4H PRN, Meliza Kahn, APRN, 10 mg at 05/06/24 0546        Laboratory Results:         Lab Results   Component Value Date     GLUCOSE 113 (H) 05/07/2024     CALCIUM 9.3 05/07/2024      05/07/2024     K 3.8 05/07/2024     CO2 27.0 05/07/2024      05/07/2024     BUN 22 05/07/2024     CREATININE 0.87 05/07/2024     EGFRIFAFRI 102 02/21/2022     EGFRIFNONA 88 02/21/2022     BCR 25.3 (H) 05/07/2024     ANIONGAP 11.0 05/07/2024            Lab Results   Component Value Date     WBC 8.76 05/07/2024     HGB 14.8 05/07/2024     HCT 43.9 05/07/2024     MCV 92.4 05/07/2024      05/07/2024            Lab Results   Component Value Date     CHOL 220 (H) 01/08/2024     CHOL 120 08/01/2019     CHOL 117 04/15/2019            Lab Results   Component Value Date     HDL 41 01/08/2024     HDL 37 (L) 08/03/2023     HDL 41 04/03/2023            Lab Results   Component Value Date      (H) 01/08/2024      (H) 08/03/2023      (H) 04/03/2023            Lab Results   Component Value Date     TRIG 108 01/08/2024     TRIG 208 (H) 08/03/2023     TRIG 138 04/03/2023            Lab Results   Component Value Date     HGBA1C 9.50 (H) 04/18/2024            Lab Results   Component Value Date      INR 1.1 11/25/2019     INR 1.1 09/10/2018     PROTIME 12.5 11/25/2019     PROTIME 13.3 09/10/2018            Lab Results   Component Value Date     FOLATE 10.20 02/24/2024            Lab Results   Component Value Date     [id number] 384 04/18/2024         MRI of head results from the last 21 days:             Assessment / Plan   Brief Patient Summary:  Too Tai is a 68 y.o. male with a past medical history of advanced dementia with behavioral disturbances and nonverbal, CAD, T2DM, HLD, PVD who presented to [facility] ED due to increase in agitation.  Patient has had multiple recent hospitalizations.      Plan:   Advanced dementia with behavioral disturbances  Sleep Deprivation   Continue home Aricept 10 mg nightly  Continue phenobarbital 64.8 mg/ 64.8 mg/ 129.6 mg  Phenobarbital level 22.8; recheck trough level in AM   Continue home Remeron 15 mg nightly  Continue Seroquel 100 mg /100 mg / 300 mg.   QTc 441 this AM.  Patient is not tolerating continuous telemetry.  Scheduled EKGs daily.  Continue Restoril 30 mg nightly.  Geodon as needed for extreme agitation; last dose 5/6 0546  CT head negative for acute abnormality  Vitamin B12 384, IM cyanocobalamin 1000 mcg  Case management following for difficult posthospitalization disposition.  Patient is not safe to return home due to concern of being in danger for his wife and himself.   Palliative consulted, appreciate recommendations  Psych consult, appreciate recommendations please see consult note for recommendations  Schedule melatonin 5 mg nightly for 7 PM per psychology recommendations  Continue Zoloft 50 mg daily per psych recommendations.  Continue Versed 0.5 mg as needed for nursing care.  Please use prior to Geodon.  Psychology recommended Ativan 0.25 mg with nursing care however it is not readily available.  General neurology will continue to follow     I have discussed the above with the patient, bedside RN Dr. Palacios  Time spent with patient: 50 minutes in  face-to-face evaluation and management of the patient.     Copied text in this note has been reviewed and is accurate as of 24.      IVONNE Santos April K, APRN   Nurse Practitioner  Neurology     Progress Notes     Signed     Date of Service: 24  Creation Time: 24     Signed       Expand All Collapse All[]Expand All by Default     [facility] Neurology    Progress Note     Patient Name: Too Tai  : 1955  MRN: [id number]  Primary Care Physician:  Des Geller MD  Date of admission: 2024     Subjective      Chief Complaint: Dementia with behavioral disturbances     History of Present Illness   Patient seen resting comfortably in bed.  Much more calm than previous assessment.  No restraints over the weekend.  Wife and sitter at bedside.  Patient able to move all extremities no focal deficit.     Review of Systems   Unable to assess due to baseline mental status     Objective      Physical Exam  Vitals and nursing note reviewed.   Constitutional:       General: He is not in acute distress.     Appearance: He is not ill-appearing.   Eyes:      Pupils: Pupils are equal, round, and reactive to light.      Comments: No nystagmus or deviated gaze noted   Cardiovascular:      Rate and Rhythm: Normal rate.   Pulmonary:      Effort: Pulmonary effort is normal.   Neurological:      Mental Status: He is alert.      Cranial Nerves: No cranial nerve deficit, dysarthria or facial asymmetry.      Sensory: No sensory deficit.      Motor: No weakness, tremor or seizure activity.                       Vitals:   Temp:  [97.5 °F (36.4 °C)-98.4 °F (36.9 °C)] 98.4 °F (36.9 °C)  Heart Rate:  [74-88] 84  Resp:  [15-18] 15  BP: (121-189)/(61-90) 136/61     Current Medications    Current Medications      Current Facility-Administered Medications:     acetaminophen (TYLENOL) tablet 650 mg, 650 mg, Oral, Q4H PRN, 650 mg at 24 **OR**  acetaminophen (TYLENOL) 160 MG/5ML oral solution 650 mg, 650 mg, Oral, Q4H PRN **OR** acetaminophen (TYLENOL) suppository 650 mg, 650 mg, Rectal, Q4H PRN, TraciKamala bacasie G, DO    amLODIPine (NORVASC) tablet 10 mg, 10 mg, Oral, Q24H, Calin Benitez MD, 10 mg at 05/06/24 0939    sennosides-docusate (PERICOLACE) 8.6-50 MG per tablet 2 tablet, 2 tablet, Oral, BID, 2 tablet at 05/06/24 0939 **AND** polyethylene glycol (MIRALAX) packet 17 g, 17 g, Oral, Daily PRN **AND** bisacodyl (DULCOLAX) EC tablet 5 mg, 5 mg, Oral, Daily PRN **AND** bisacodyl (DULCOLAX) suppository 10 mg, 10 mg, Rectal, Daily PRN, Jennifer Bai, IVONNE    Calcium Replacement - Follow Nurse / BPA Driven Protocol, , Does not apply, PRN, Traci, Nerissa G, DO    cyanocobalamin injection 1,000 mcg, 1,000 mcg, Intramuscular, Q28 Days, Darrell Jhaveri A, DO, 1,000 mcg at 04/19/24 1532    dextrose (D50W) (25 g/50 mL) IV injection 25 g, 25 g, Intravenous, Q15 Min PRN, Traci, Nerissa G, DO    dextrose (GLUTOSE) oral gel 15 g, 15 g, Oral, Q15 Min PRN, Traci, Nerissa G, DO    donepezil (ARICEPT) tablet 10 mg, 10 mg, Oral, BID, Jhaveri, Darrell A, DO, 10 mg at 05/06/24 0940    folic acid (FOLVITE) tablet 1 mg, 1 mg, Oral, Daily, Traci, Nerissa G, DO, 1 mg at 05/06/24 0939    glucagon (GLUCAGEN) injection 1 mg, 1 mg, Intramuscular, Q15 Min PRN, Traci, Nerissa G, DO    heparin (porcine) 5000 UNIT/ML injection 5,000 Units, 5,000 Units, Subcutaneous, Q8H, Traci, Nerissa G, DO, 5,000 Units at 05/06/24 0516    insulin glargine (LANTUS, SEMGLEE) injection 17 Units, 17 Units, Subcutaneous, Q12H, Jaclyn Holguin APRN, 17 Units at 05/06/24 0944    Insulin Lispro (humaLOG) injection 2-7 Units, 2-7 Units, Subcutaneous, 4x Daily AC & at Bedtime, Nerissa Aviles DO, 3 Units at 05/05/24 2014    Insulin Lispro (humaLOG) injection 7 Units, 7 Units, Subcutaneous, TID With Meals, Jaclyn Holguin APRN, 7 Units at 05/05/24 1648    lisinopril (PRINIVIL,ZESTRIL) tablet 30 mg, 30 mg, Oral, Daily,  Parisa Madrigal PA-C, 30 mg at 05/05/24 1203    Magnesium Standard Dose Replacement - Follow Nurse / BPA Driven Protocol, , Does not apply, PRN, Nerissa Aviles DO    melatonin tablet 5 mg, 5 mg, Oral, Nightly, Jaclyn Holguin, APRN, 5 mg at 05/05/24 1954    miconazole (MICOTIN) 2 % powder 1 Application, 1 Application, Topical, Q12H, Cecily Bain, DO, 1 Application at 05/06/24 0944    midazolam (VERSED) injection 0.5 mg, 0.5 mg, Intravenous, BID PRN, Wili Nicole MD, 0.5 mg at 05/04/24 0246    mirtazapine (REMERON) tablet 15 mg, 15 mg, Oral, Nightly, Darrell Jhaveri, DO, 15 mg at 05/05/24 1954    PHENobarbital tablet 130 mg, 130 mg, Oral, Nightly **OR** PHENobarbital injection 130 mg, 130 mg, Intramuscular, Nightly, Meliza Kahn, APRN    PHENobarbital tablet 64.8 mg, 64.8 mg, Oral, BID, 64.8 mg at 05/05/24 1309 **OR** PHENobarbital injection 65 mg, 65 mg, Intramuscular, BID, Brijesh Mac MD, 65 mg at 05/06/24 0942    Phosphorus Replacement - Follow Nurse / BPA Driven Protocol, , Does not apply, PRN, Nerissa Aviles DO    Potassium Replacement - Follow Nurse / BPA Driven Protocol, , Does not apply, PRN, Nerissa Aviles, DO    QUEtiapine (SEROquel) tablet 100 mg, 100 mg, Oral, BID, Meliza Kahn, APRN, 100 mg at 05/06/24 0941    QUEtiapine (SEROquel) tablet 300 mg, 300 mg, Oral, Nightly, Meliza Kahn, APRN, 300 mg at 05/05/24 1954    sertraline (ZOLOFT) tablet 50 mg, 50 mg, Oral, Daily, Meliza Kahn, APRN, 50 mg at 05/06/24 0941    sodium chloride 0.9 % flush 10 mL, 10 mL, Intravenous, Q12H, Tresa Avilese G, DO, 10 mL at 05/05/24 2044    sodium chloride 0.9 % flush 10 mL, 10 mL, Intravenous, PRN, Traci, Nerissa G, DO    sodium chloride 0.9 % infusion 40 mL, 40 mL, Intravenous, PRN, Traci, Nerissa G, DO, 40 mL at 04/23/24 0546    temazepam (RESTORIL) capsule 30 mg, 30 mg, Oral, Nightly, Darrell Jhaveri, , 30 mg at 05/05/24 1954    ziprasidone (GEODON) injection 10 mg, 10 mg,  Intramuscular, Q4H PRN, Kahn, April K, APRN, 10 mg at 05/06/24 0546        Laboratory Results:         Lab Results   Component Value Date     GLUCOSE 265 (H) 05/02/2024     CALCIUM 8.2 (L) 05/02/2024      05/02/2024     K 3.6 05/02/2024     CO2 29.0 05/02/2024      05/02/2024     BUN 14 05/02/2024     CREATININE 0.88 05/02/2024     EGFRIFAFRI 102 02/21/2022     EGFRIFNONA 88 02/21/2022     BCR 15.9 05/02/2024     ANIONGAP 6.0 05/02/2024            Lab Results   Component Value Date     WBC 10.47 04/19/2024     HGB 15.5 04/19/2024     HCT 46.7 04/19/2024     MCV 92.7 04/19/2024      04/19/2024            Lab Results   Component Value Date     CHOL 220 (H) 01/08/2024     CHOL 120 08/01/2019     CHOL 117 04/15/2019            Lab Results   Component Value Date     HDL 41 01/08/2024     HDL 37 (L) 08/03/2023     HDL 41 04/03/2023            Lab Results   Component Value Date      (H) 01/08/2024      (H) 08/03/2023      (H) 04/03/2023            Lab Results   Component Value Date     TRIG 108 01/08/2024     TRIG 208 (H) 08/03/2023     TRIG 138 04/03/2023            Lab Results   Component Value Date     HGBA1C 9.50 (H) 04/18/2024            Lab Results   Component Value Date     INR 1.1 11/25/2019     INR 1.1 09/10/2018     PROTIME 12.5 11/25/2019     PROTIME 13.3 09/10/2018            Lab Results   Component Value Date     FOLATE 10.20 02/24/2024            Lab Results   Component Value Date     [id number] 384 04/18/2024         MRI of head results from the last 21 days:             Assessment / Plan   Brief Patient Summary:  Too Tai is a 68 y.o. male with a past medical history of advanced dementia with behavioral disturbances and nonverbal, CAD, T2DM, HLD, PVD who presented to [facility] ED due to increase in agitation.  Patient has had multiple recent hospitalizations.      Plan:   Advanced dementia with behavioral disturbances  Sleep Deprivation   Continue home Aricept 10 mg  nightly  Continue phenobarbital 64.8 mg/ 64.8 mg/ 129.6 mg  Phenobarbital level 22.8; recheck trough level in AM   Continue home Remeron 15 mg nightly  Continue Seroquel 100 mg /100 mg / 300 mg.   QTc 441 this AM.  Patient is not tolerating continuous telemetry.  Scheduled EKGs daily.  Continue Restoril 30 mg nightly.  Geodon as needed for extreme agitation; last dose  0246  CT head negative for acute abnormality  Vitamin B12 384, IM cyanocobalamin 1000 mcg  Case management following for difficult posthospitalization disposition.  Patient is not safe to return home due to concern of being in danger for his wife and himself.   Palliative consulted, appreciate recommendations  Psych consult, appreciate recommendations please see consult note for recommendations  Schedule melatonin 5 mg nightly for 7 PM per psychology recommendations  Continue Zoloft 50 mg daily per psych recommendations.  Continue Versed 0.5 mg as needed for nursing care.  Please use prior to Geodon.  Psychology recommended Ativan 0.25 mg with nursing care however it is not readily available.  General neurology will continue to follow     I have discussed the above with the patient, bedside RN and Dr. Palacios  Time spent with patient: 50 minutes in face-to-face evaluation and management of the patient.     Copied text in this note has been reviewed and is accurate as of 24.         IVONNE Santos April K, APRN   Nurse Practitioner  Neurology     Progress Notes     Signed     Date of Service: 24  Creation Time: 24     Signed       Expand All Collapse All[]Expand All by Default     [facility] Neurology    Progress Note     Patient Name: Too Tai  : 1955  MRN: [id number]  Primary Care Physician:  Des Geller MD  Date of admission: 2024     Subjective      Chief Complaint: Dementia with behavioral disturbance      History of Present Illness   Patient  seen resting comfortably in bed.  He was asleep and did not awake for exam.  He was in no distress.  Able to move all extremities.  Per nursing he rested well overnight.  Sitter at bedside no restraints in use.     Review of Systems   Unable to assess due to lethargy     Objective      Physical Exam  Vitals and nursing note reviewed.   Constitutional:       General: He is not in acute distress.     Appearance: He is not ill-appearing.   Cardiovascular:      Rate and Rhythm: Normal rate.   Pulmonary:      Effort: Pulmonary effort is normal.   Neurological:      Mental Status: He is lethargic.      Cranial Nerves: No cranial nerve deficit or facial asymmetry.      Sensory: No sensory deficit.      Motor: No weakness or seizure activity.      Comments:                          Vitals:   Temp:  [96.4 °F (35.8 °C)-97.9 °F (36.6 °C)] 97.9 °F (36.6 °C)  Heart Rate:  [86-92] 90  Resp:  [14-16] 14  BP: (138-167)/() 138/84     Current Medications    Current Medications      Current Facility-Administered Medications:     acetaminophen (TYLENOL) tablet 650 mg, 650 mg, Oral, Q4H PRN, 650 mg at 04/30/24 2042 **OR** acetaminophen (TYLENOL) 160 MG/5ML oral solution 650 mg, 650 mg, Oral, Q4H PRN **OR** acetaminophen (TYLENOL) suppository 650 mg, 650 mg, Rectal, Q4H PRN, Nerissa Aviles G, DO    amLODIPine (NORVASC) tablet 10 mg, 10 mg, Oral, Q24H, Calin Benitez MD, 10 mg at 05/03/24 0849    sennosides-docusate (PERICOLACE) 8.6-50 MG per tablet 2 tablet, 2 tablet, Oral, BID, 2 tablet at 05/03/24 0849 **AND** polyethylene glycol (MIRALAX) packet 17 g, 17 g, Oral, Daily PRN **AND** bisacodyl (DULCOLAX) EC tablet 5 mg, 5 mg, Oral, Daily PRN **AND** bisacodyl (DULCOLAX) suppository 10 mg, 10 mg, Rectal, Daily PRN, Jennifer Bai, APRN    Calcium Replacement - Follow Nurse / BPA Driven Protocol, , Does not apply, PRN, Nerissa Aviles DO    cyanocobalamin injection 1,000 mcg, 1,000 mcg, Intramuscular, Q28 Days, Darrell Jhaveri DO,  1,000 mcg at 04/19/24 1532    dextrose (D50W) (25 g/50 mL) IV injection 25 g, 25 g, Intravenous, Q15 Min PRN, TraciKamala bacasie G, DO    dextrose (GLUTOSE) oral gel 15 g, 15 g, Oral, Q15 Min PRN, Traci, Nerissa G, DO    donepezil (ARICEPT) tablet 10 mg, 10 mg, Oral, BID, Darrell Jhaveri A, DO, 10 mg at 05/03/24 0849    folic acid (FOLVITE) tablet 1 mg, 1 mg, Oral, Daily, Traci, Nerissa G, DO, 1 mg at 05/03/24 0849    glucagon (GLUCAGEN) injection 1 mg, 1 mg, Intramuscular, Q15 Min PRN, TraciKamala bacasie G, DO    heparin (porcine) 5000 UNIT/ML injection 5,000 Units, 5,000 Units, Subcutaneous, Q8H, TraciKamala bacasie G, DO, 5,000 Units at 05/03/24 0557    insulin glargine (LANTUS, SEMGLEE) injection 17 Units, 17 Units, Subcutaneous, Q12H, Jaclyn Holguin APRN, 17 Units at 05/03/24 0849    Insulin Lispro (humaLOG) injection 2-7 Units, 2-7 Units, Subcutaneous, 4x Daily AC & at Bedtime, Nerissa Aviles G, DO, 3 Units at 05/02/24 1657    Insulin Lispro (humaLOG) injection 7 Units, 7 Units, Subcutaneous, TID With Meals, Jaclyn Holguin APRN, 7 Units at 05/03/24 0850    lactated ringers infusion, 75 mL/hr, Intravenous, Continuous, Parisa Madrigal PA-C, Stopped at 05/03/24 0731    lisinopril (PRINIVIL,ZESTRIL) tablet 30 mg, 30 mg, Oral, Daily, Parisa Madrigal PA-C, 30 mg at 05/03/24 0848    Magnesium Standard Dose Replacement - Follow Nurse / BPA Driven Protocol, , Does not apply, PRN, Traci, Nerissa G, DO    melatonin tablet 5 mg, 5 mg, Oral, Nightly, Jaclyn Holguin APRN, 5 mg at 05/02/24 2031    miconazole (MICOTIN) 2 % powder 1 Application, 1 Application, Topical, Q12H, Cecily Bain DO, 1 Application at 05/03/24 0851    mirtazapine (REMERON) tablet 15 mg, 15 mg, Oral, Nightly, Darrell Jhaveri DO, 15 mg at 05/02/24 2031    PHENobarbital tablet 130 mg, 130 mg, Oral, Nightly, 130 mg at 05/02/24 2029 **OR** PHENobarbital injection 130 mg, 130 mg, Intramuscular, Nightly, Brijesh Mac MD, 130 mg at 05/01/24 2229    PHENobarbital  tablet 64.8 mg, 64.8 mg, Oral, BID, 64.8 mg at 05/03/24 0849 **OR** PHENobarbital injection 65 mg, 65 mg, Intramuscular, BID, Brijesh Mac MD    Phosphorus Replacement - Follow Nurse / BPA Driven Protocol, , Does not apply, PRN, Traci, Nerissa G, DO    Potassium Replacement - Follow Nurse / BPA Driven Protocol, , Does not apply, PRN, Traci, Nerissa G, DO    QUEtiapine (SEROquel) tablet 100 mg, 100 mg, Oral, BID, Meliza Kahn, APRN, 100 mg at 05/03/24 0849    QUEtiapine (SEROquel) tablet 300 mg, 300 mg, Oral, Nightly, Meliza Kahn, APRN, 300 mg at 05/02/24 2031    sodium chloride 0.9 % flush 10 mL, 10 mL, Intravenous, Q12H, Traci, Nerissa G, DO, 10 mL at 05/02/24 2031    sodium chloride 0.9 % flush 10 mL, 10 mL, Intravenous, PRN, Traci, Nerissa G, DO    sodium chloride 0.9 % infusion 40 mL, 40 mL, Intravenous, PRN, Traci, Nerissa G, DO, 40 mL at 04/23/24 0546    temazepam (RESTORIL) capsule 30 mg, 30 mg, Oral, Nightly, Darrell Jhaveri A, DO, 30 mg at 05/02/24 2031    ziprasidone (GEODON) injection 10 mg, 10 mg, Intramuscular, Q4H PRN, Meliza Kahn, APRN, 10 mg at 05/03/24 0825        Laboratory Results:         Lab Results   Component Value Date     GLUCOSE 265 (H) 05/02/2024     CALCIUM 8.2 (L) 05/02/2024      05/02/2024     K 3.6 05/02/2024     CO2 29.0 05/02/2024      05/02/2024     BUN 14 05/02/2024     CREATININE 0.88 05/02/2024     EGFRIFAFRI 102 02/21/2022     EGFRIFNONA 88 02/21/2022     BCR 15.9 05/02/2024     ANIONGAP 6.0 05/02/2024            Lab Results   Component Value Date     WBC 10.47 04/19/2024     HGB 15.5 04/19/2024     HCT 46.7 04/19/2024     MCV 92.7 04/19/2024      04/19/2024            Lab Results   Component Value Date     CHOL 220 (H) 01/08/2024     CHOL 120 08/01/2019     CHOL 117 04/15/2019            Lab Results   Component Value Date     HDL 41 01/08/2024     HDL 37 (L) 08/03/2023     HDL 41 04/03/2023            Lab Results   Component Value Date       (H) 01/08/2024      (H) 08/03/2023      (H) 04/03/2023            Lab Results   Component Value Date     TRIG 108 01/08/2024     TRIG 208 (H) 08/03/2023     TRIG 138 04/03/2023            Lab Results   Component Value Date     HGBA1C 9.50 (H) 04/18/2024            Lab Results   Component Value Date     INR 1.1 11/25/2019     INR 1.1 09/10/2018     PROTIME 12.5 11/25/2019     PROTIME 13.3 09/10/2018            Lab Results   Component Value Date     FOLATE 10.20 02/24/2024            Lab Results   Component Value Date     XCTQAMWC65 384 04/18/2024         MRI of head results from the last 21 days:             Assessment / Plan   Brief Patient Summary:  Too Tai is a 68 y.o. male with a past medical history of advanced dementia with behavioral disturbances and nonverbal, CAD, T2DM, HLD, PVD who presented to [facility] ED due to increase in agitation.  Patient has had multiple recent hospitalizations.      Plan:   Advanced dementia with behavioral disturbances  Sleep Deprivation   Continue home Aricept 10 mg nightly  Continue phenobarbital 64.8 mg/ 64.8 mg/ 129.6 mg  Phenobarbital level 12.4; recheck trough level on Monday  Continue home Remeron 15 mg nightly  Continue Seroquel 100 mg /100 mg / 300 mg.   QTc 446 this AM.  Patient is not tolerating continuous telemetry.  Scheduled EKGs daily.  Continue Restoril 30 mg nightly.  Geodon as needed for extreme agitation; last dose 5/3 at 8:25 AM  CT head negative for acute abnormality  Vitamin B12 384, IM cyanocobalamin 1000 mcg  Case management following for difficult posthospitalization disposition.  Patient is not safe to return home due to concern of being in danger for his wife and himself.   Palliative consulted, appreciate recommendations  Psych consult pending, appreciate recommendations please see consult note for recommendations  Schedule melatonin 5 mg nightly for 7 PM per psychology recommendations  Trial of Zoloft 50 mg daily per psych  recommendations.  Trial of Versed 0.5 mg as needed for nursing care.  Please use prior to Geodon.  Psychology recommended Ativan 0.25 mg with nursing care however it is not readily available.  General neurology will follow-up on Monday.  Please feel free to reach out the weekend if needed.        I have discussed the above with the patient, bedside RN and IVONNE Greenberg  Time spent with patient: 50 minutes in face-to-face evaluation and management of the patient.     Copied text in this note has been reviewed and is accurate as of 24.      IVONNE Santos M Scott, DO   Physician  Hospitalist     Progress Notes     Signed     Date of Service: 24 0830  Creation Time: 24 1021     Signed       Expand All Collapse All[]Expand All by Default         [facility] Medicine Services  PROGRESS NOTE     Patient Name: Too Tai  : 1955  MRN: [id number]     Date of Admission: 2024  Primary Care Physician: Des Geller MD     Subjective      CC: f/u dementia with agitation     HPI:  Patient is a 67 yo M seen and examined by me this AM, resting comfortably in bed, wife at bedside along with sitter. Working on possible placement at [facility] at this time. Patient has been without restraints over the weekend, no other acute changes at this time, continue to follow.         Objective      Vital Signs:   Temp:  [97.5 °F (36.4 °C)-98.4 °F (36.9 °C)] 98.4 °F (36.9 °C)  Heart Rate:  [74-88] 84  Resp:  [15-18] 15  BP: (121-189)/(61-90) 136/61     Physical Exam:  Constitutional: No acute distress, sleeping, resting comfortably in bed, currently on RA  HENT: NCAT, nares patent, mucous membranes moist  Respiratory: Clear to auscultation bilaterally, respiratory effort normal   Cardiovascular: RRR, no murmurs, rubs, or gallops  Gastrointestinal: Positive bowel sounds, soft, nontender, nondistended  Musculoskeletal: No  bilateral ankle edema  Psychiatric: limited   Neurologic: limited, currently sleeping  Skin: No rashes        Results Reviewed:  LAB RESULTS:     Brief Urine Lab Results  (Last result in the past 365 days)          Color   Clarity   Blood   Leuk Est   Nitrite   Protein   CREAT   Urine HCG         04/19/24 0243 Yellow    Clear    Negative    Negative    Negative    Negative                          Microbiology Results Abnormal         None             Radiology results from the last 24 hours:   No radiology results from the last 24 hrs        Results for orders placed during the hospital encounter of 03/27/24     Adult Transthoracic Echo Complete W/ Cont if Necessary Per Protocol     Interpretation Summary    Left ventricular systolic function is normal. Calculated left ventricular EF = 55.1% Normal left ventricular cavity size noted. Left ventricular wall thickness is consistent with mild concentric hypertrophy. Left ventricular diastolic function is consistent with (grade I) impaired relaxation.    The right ventricular cavity is mildly dilated. Normal right ventricular systolic function noted.    There is calcification of the aortic valve. The aortic valve appears trileaflet. Mild aortic valve regurgitation is present. No aortic valve stenosis is present.    Mitral annular calcification is present. Trace mitral valve regurgitation is present. No significant mitral valve stenosis is present.    The tricuspid valve is structurally normal with no stenosis present. Trace tricuspid valve regurgitation is present. Insufficient TR velocity profile to estimate the right ventricular systolic pressure.    Mild dilation of the sinuses of Valsalva is present. Mild dilation of the ascending aorta is present. Ascending aorta = 4.2 cm     Current medications:  Scheduled Meds:  Scheduled Medication   amLODIPine, 10 mg, Oral, Q24H  cyanocobalamin, 1,000 mcg, Intramuscular, Q28 Days  donepezil, 10 mg, Oral, BID  folic acid, 1 mg,  Oral, Daily  heparin (porcine), 5,000 Units, Subcutaneous, Q8H  insulin glargine, 17 Units, Subcutaneous, Q12H  insulin lispro, 2-7 Units, Subcutaneous, 4x Daily AC & at Bedtime  Insulin Lispro, 7 Units, Subcutaneous, TID With Meals  lisinopril, 30 mg, Oral, Daily  melatonin, 5 mg, Oral, Nightly  miconazole, 1 Application, Topical, Q12H  mirtazapine, 15 mg, Oral, Nightly  PHENobarbital, 130 mg, Oral, Nightly   Or  PHENobarbital, 130 mg, Intramuscular, Nightly  PHENobarbital, 64.8 mg, Oral, BID   Or  PHENobarbital, 65 mg, Intramuscular, BID  QUEtiapine, 100 mg, Oral, BID  QUEtiapine, 300 mg, Oral, Nightly  senna-docusate sodium, 2 tablet, Oral, BID  sertraline, 50 mg, Oral, Daily  sodium chloride, 10 mL, Intravenous, Q12H  temazepam, 30 mg, Oral, Nightly         Continuous Infusions:  Infusion Medications            PRN Meds:.  PRN Medication     acetaminophen **OR** acetaminophen **OR** acetaminophen    senna-docusate sodium **AND** polyethylene glycol **AND** bisacodyl **AND** bisacodyl    Calcium Replacement - Follow Nurse / BPA Driven Protocol    dextrose    dextrose    glucagon (human recombinant)    Magnesium Standard Dose Replacement - Follow Nurse / BPA Driven Protocol    midazolam    Phosphorus Replacement - Follow Nurse / BPA Driven Protocol    Potassium Replacement - Follow Nurse / BPA Driven Protocol    sodium chloride    sodium chloride    ziprasidone        Assessment & Plan            Active Hospital Problems     Diagnosis   POA    **Agitation due to dementia [F03.911]   Yes    Agitation [R45.1]   Yes    Type 2 diabetes mellitus with hyperglycemia, with long-term current use of insulin [E11.65, Z79.4]   Not Applicable    Peripheral neuropathy [G62.9]   Yes    Hyperlipidemia [E78.5]   Yes    Benign prostatic hyperplasia [N40.0]   Yes    Benign essential HTN [I10]   Yes    Peripheral vascular disease [I73.9]   Yes    Coronary artery disease involving native coronary artery of native heart [I25.10]   Yes        Resolved Hospital Problems   No resolved problems to display.      Brief Hospital Course to date:  Too Tai is a 68yoM with PMH significant for advanced dementia, CAD, diabetes mellitus type 2, HLD and PVD. Admitted to [facility] ED on 4/18/24 for agitation / behavioral disturbances. Recently admitted to [facility] 2/24-3/4/24 for dementia with behavioral disturbances and again 3/27-4/4/24 for similar issues. Patient transferred to  services on 5/6, reviewed chart and working on possible transfer to [facility] facility, continue to follow with CM and neurology at this time. Sitter remains at bedside, patient has been out of restrains since 5/3, continue to follow at this time.      Advanced dementia complicated by behavioral disturbance with superimposed sleep disturbance   - Infectious work up unremarkable. UA reassuring. CXR without acute findings. CT head without acute findings  - Continue home Aricept and Mirtazapine 15mg QHS and melatonin 5 mg at 7 pm  - Continue Seroquel   - 5/3 trial of zoloft 50 mg and PRN versed 0.5 mg as needed for nursing care. (Ativan 0.25mg recommended per psych but it is not available)  - Neurology started Phenobarbital, level now WNL.  Will continue to monitor effect, again level remains WNL on 5/6   - Continue Temazepam 30mg QHS  - Appreciate palliative care assistance  - May need geriatric psych facility vs LTC - CM looking into this. Telepsych eval completed 5/2/24  - Lost IV access, will need to monitor PO intake, may need to replace IVFs, continue to follow.   - PRN Geodon     Uncontrolled diabetes mellitus type 2  - Appears not on any home meds for DM  - A1c 9.5%  - Continue Lantus, increased to 17 units BID, Lispro 7 units TID AC + SSI   - glucose reviewed     HTN  - continue Amlodipine 10mg daily  - continue Lisinopril 30mg daily      Expected Discharge Location and Transportation: TBD  Expected Discharge   Expected Discharge Date: 5/7/2024; Expected  Discharge Time:       DVT prophylaxis:Medical and mechanical DVT prophylaxis orders are present.     AM-PAC 6 Clicks Score (PT): 14 (24)     CODE STATUS:       Code Status and Medical Interventions:   Ordered at: 24 1037     Medical Intervention Limits:     NO intubation (DNI)     Level Of Support Discussed With:     Next of Kin (If No Surrogate)     Code Status (Patient has no pulse and is not breathing):     No CPR (Do Not Attempt to Resuscitate)     Medical Interventions (Patient has pulse or is breathing):     Limited Support      GLENN Palacios DO  24                           Kemal Rayo MD   Physician  Medicine     Progress Notes     Signed     Date of Service: 24 1200  Creation Time: 24 1200     Signed       Expand All Collapse All[]Expand All by Default         [facility] Medicine Services  PROGRESS NOTE     Patient Name: Too Tai  : 1955  MRN: [id number]     Date of Admission: 2024  Primary Care Physician: Des Geller MD     Subjective      CC: f/u dementia with agitation     HPI:  Patient currently asleep.  Spouse at bedside.          Objective      Vital Signs:   Temp:  [97 °F (36.1 °C)-97.8 °F (36.6 °C)] 97.6 °F (36.4 °C)  Heart Rate:  [70-78] 71  Resp:  [16] 16  BP: (141-173)/(62-93) 158/80     Physical Exam:  Non toxic appearing, in bed  MM moist  RRR  CTAB  Abd soft, NT  No edema  Flat affect     Results Reviewed:  LAB RESULTS:     Brief Urine Lab Results  (Last result in the past 365 days)          Color   Clarity   Blood   Leuk Est   Nitrite   Protein   CREAT   Urine HCG         24 0243 Yellow    Clear    Negative    Negative    Negative    Negative                          Microbiology Results Abnormal         None             Radiology results from the last 24 hours:   No radiology results from the last 24 hrs        Results for orders placed during the hospital encounter of 24     Adult  Transthoracic Echo Complete W/ Cont if Necessary Per Protocol     Interpretation Summary    Left ventricular systolic function is normal. Calculated left ventricular EF = 55.1% Normal left ventricular cavity size noted. Left ventricular wall thickness is consistent with mild concentric hypertrophy. Left ventricular diastolic function is consistent with (grade I) impaired relaxation.    The right ventricular cavity is mildly dilated. Normal right ventricular systolic function noted.    There is calcification of the aortic valve. The aortic valve appears trileaflet. Mild aortic valve regurgitation is present. No aortic valve stenosis is present.    Mitral annular calcification is present. Trace mitral valve regurgitation is present. No significant mitral valve stenosis is present.    The tricuspid valve is structurally normal with no stenosis present. Trace tricuspid valve regurgitation is present. Insufficient TR velocity profile to estimate the right ventricular systolic pressure.    Mild dilation of the sinuses of Valsalva is present. Mild dilation of the ascending aorta is present. Ascending aorta = 4.2 cm     Current medications:  Scheduled Meds:  Scheduled Medication   amLODIPine, 10 mg, Oral, Q24H  cyanocobalamin, 1,000 mcg, Intramuscular, Q28 Days  donepezil, 10 mg, Oral, BID  folic acid, 1 mg, Oral, Daily  heparin (porcine), 5,000 Units, Subcutaneous, Q8H  insulin glargine, 17 Units, Subcutaneous, Q12H  insulin lispro, 2-7 Units, Subcutaneous, 4x Daily AC & at Bedtime  Insulin Lispro, 7 Units, Subcutaneous, TID With Meals  lisinopril, 30 mg, Oral, Daily  melatonin, 5 mg, Oral, Nightly  miconazole, 1 Application, Topical, Q12H  mirtazapine, 15 mg, Oral, Nightly  PHENobarbital, 130 mg, Oral, Nightly   Or  PHENobarbital, 130 mg, Intramuscular, Nightly  PHENobarbital, 64.8 mg, Oral, BID   Or  PHENobarbital, 65 mg, Intramuscular, BID  QUEtiapine, 100 mg, Oral, BID  QUEtiapine, 300 mg, Oral, Nightly  senna-docusate  sodium, 2 tablet, Oral, BID  sertraline, 50 mg, Oral, Daily  sodium chloride, 10 mL, Intravenous, Q12H  temazepam, 30 mg, Oral, Nightly         Continuous Infusions:  Infusion Medications   lactated ringers, 75 mL/hr, Last Rate: 75 mL/hr (05/05/24 0756)         PRN Meds:.  PRN Medication     acetaminophen **OR** acetaminophen **OR** acetaminophen    senna-docusate sodium **AND** polyethylene glycol **AND** bisacodyl **AND** bisacodyl    Calcium Replacement - Follow Nurse / BPA Driven Protocol    dextrose    dextrose    glucagon (human recombinant)    Magnesium Standard Dose Replacement - Follow Nurse / BPA Driven Protocol    midazolam    Phosphorus Replacement - Follow Nurse / BPA Driven Protocol    Potassium Replacement - Follow Nurse / BPA Driven Protocol    sodium chloride    sodium chloride    ziprasidone        Assessment & Plan            Active Hospital Problems     Diagnosis   POA    **Agitation due to dementia [F03.911]   Yes    Agitation [R45.1]   Yes    Type 2 diabetes mellitus with hyperglycemia, with long-term current use of insulin [E11.65, Z79.4]   Not Applicable    Peripheral neuropathy [G62.9]   Yes    Hyperlipidemia [E78.5]   Yes    Benign prostatic hyperplasia [N40.0]   Yes    Benign essential HTN [I10]   Yes    Peripheral vascular disease [I73.9]   Yes    Coronary artery disease involving native coronary artery of native heart [I25.10]   Yes       Resolved Hospital Problems   No resolved problems to display.      Brief Hospital Course to date:  Too Tai is a 68yoM with PMH significant for advanced dementia, CAD, diabetes mellitus type 2, HLD and PVD. Admitted to [facility] ED on 4/18/24 for agitation / behavioral disturbances. Recently admitted to [facility] 2/24-3/4/24 for dementia with behavioral disturbances and again 3/27-4/4/24 for similar issues.      Advanced dementia complicated by behavioral disturbance with superimposed sleep disturbance   - Infectious work up unremarkable.  UA reassuring. CXR without acute findings. CT head without acute findings  - Continue home Aricept and Mirtazapine 15mg QHS and melatonin 5 mg at 7 pm  - Continue Seroquel   - 5/3 trial of zoloft 50 mg and PRN versed 0.5 mg as needed for nursing care. (Ativan 0.25mg recommended per psych but it is not available)  - Neurology started Phenobarbital, level now WNL.  Will continue to monitor effect and recheck level on Monday per neurology recs  - Continue Temazepam 30mg QHS  - Appreciate palliative care assistance  - May need geriatric psych facility vs LTC - CM looking into this. Telepsych eval completed 5/2/24  - IV fluids while taking in minimal PO   - PRN Geodon     Uncontrolled diabetes mellitus type 2  - Appears not on any home meds for DM  - A1c 9.5%  - Continue Lantus, increased to 17 units BID, Lispro 7 units TID AC + SSI   - glucose reviewed     HTN  - continue Amlodipine 10mg daily  - continue Lisinopril 30mg daily      Expected Discharge Location and Transportation: TBD  Expected Discharge   Expected Discharge Date: 5/7/2024; Expected Discharge Time:       DVT prophylaxis:Medical and mechanical DVT prophylaxis orders are present.     AM-PAC 6 Clicks Score (PT): 14 (05/05/24 3730)     CODE STATUS:       Code Status and Medical Interventions:   Ordered at: 04/19/24 1037     Medical Intervention Limits:     NO intubation (DNI)     Level Of Support Discussed With:     Next of Kin (If No Surrogate)     Code Status (Patient has no pulse and is not breathing):     No CPR (Do Not Attempt to Resuscitate)     Medical Interventions (Patient has pulse or is breathing):     Limited Support      Kemal Rayo MD  05/05/24                        Laura Delgado APRN   Nurse Practitioner  Hospitalist     Progress Notes     Signed     Date of Service: 05/04/24 1501  Creation Time: 05/04/24 1501     Signed       Expand All Collapse All[]Expand All by Default         [facility]  Services  PROGRESS NOTE     Patient Name: Too Tai  : 1955  MRN: [id number]     Date of Admission: 2024  Primary Care Physician: Des Geller MD     Subjective      CC: f/u dementia with agitation     HPI:  Pt resting in bed without restraints with sitter at the bedside. Pt calm during assessment and exam.      Copied text in this note has been reviewed and is accurate as of 24.         Objective      Vital Signs:   Heart Rate:  [76] 76  BP: (172)/(92) 172/92     Physical Exam:  Constitutional: Awake, alert, NAD  HENT: NCAT, mucous membranes moist  Respiratory: Clear to auscultation bilaterally, nonlabored  Cardiovascular: RRR, no murmurs, rubs, or gallops  Gastrointestinal: Positive bowel sounds, soft, nontender, nondistended  Musculoskeletal: No bilateral ankle edema  Psychiatric: Flat affect, cooperative  Neurologic: Oriented to self, RICO, speech clear  Skin: No rashes     Results Reviewed:  LAB RESULTS:     Brief Urine Lab Results  (Last result in the past 365 days)          Color   Clarity   Blood   Leuk Est   Nitrite   Protein   CREAT   Urine HCG         24 0243 Yellow    Clear    Negative    Negative    Negative    Negative                          Microbiology Results Abnormal         None             Radiology results from the last 24 hours:   No radiology results from the last 24 hrs        Results for orders placed during the hospital encounter of 24     Adult Transthoracic Echo Complete W/ Cont if Necessary Per Protocol     Interpretation Summary    Left ventricular systolic function is normal. Calculated left ventricular EF = 55.1% Normal left ventricular cavity size noted. Left ventricular wall thickness is consistent with mild concentric hypertrophy. Left ventricular diastolic function is consistent with (grade I) impaired relaxation.    The right ventricular cavity is mildly dilated. Normal right ventricular systolic function noted.    There is  calcification of the aortic valve. The aortic valve appears trileaflet. Mild aortic valve regurgitation is present. No aortic valve stenosis is present.    Mitral annular calcification is present. Trace mitral valve regurgitation is present. No significant mitral valve stenosis is present.    The tricuspid valve is structurally normal with no stenosis present. Trace tricuspid valve regurgitation is present. Insufficient TR velocity profile to estimate the right ventricular systolic pressure.    Mild dilation of the sinuses of Valsalva is present. Mild dilation of the ascending aorta is present. Ascending aorta = 4.2 cm     Current medications:  Scheduled Meds:  Scheduled Medication   amLODIPine, 10 mg, Oral, Q24H  cyanocobalamin, 1,000 mcg, Intramuscular, Q28 Days  donepezil, 10 mg, Oral, BID  folic acid, 1 mg, Oral, Daily  heparin (porcine), 5,000 Units, Subcutaneous, Q8H  insulin glargine, 17 Units, Subcutaneous, Q12H  insulin lispro, 2-7 Units, Subcutaneous, 4x Daily AC & at Bedtime  Insulin Lispro, 7 Units, Subcutaneous, TID With Meals  lisinopril, 30 mg, Oral, Daily  melatonin, 5 mg, Oral, Nightly  miconazole, 1 Application, Topical, Q12H  mirtazapine, 15 mg, Oral, Nightly  PHENobarbital, 130 mg, Oral, Nightly   Or  PHENobarbital, 130 mg, Intramuscular, Nightly  PHENobarbital, 64.8 mg, Oral, BID   Or  PHENobarbital, 65 mg, Intramuscular, BID  QUEtiapine, 100 mg, Oral, BID  QUEtiapine, 300 mg, Oral, Nightly  senna-docusate sodium, 2 tablet, Oral, BID  sertraline, 50 mg, Oral, Daily  sodium chloride, 10 mL, Intravenous, Q12H  temazepam, 30 mg, Oral, Nightly         Continuous Infusions:  Infusion Medications   lactated ringers, 75 mL/hr, Last Rate: 75 mL/hr (05/04/24 8140)         PRN Meds:.  PRN Medication     acetaminophen **OR** acetaminophen **OR** acetaminophen    senna-docusate sodium **AND** polyethylene glycol **AND** bisacodyl **AND** bisacodyl    Calcium Replacement - Follow Nurse / BPA Driven  Protocol    dextrose    dextrose    glucagon (human recombinant)    Magnesium Standard Dose Replacement - Follow Nurse / BPA Driven Protocol    midazolam    Phosphorus Replacement - Follow Nurse / BPA Driven Protocol    Potassium Replacement - Follow Nurse / BPA Driven Protocol    sodium chloride    sodium chloride    ziprasidone        Assessment & Plan            Active Hospital Problems     Diagnosis   POA    **Agitation due to dementia [F03.911]   Yes    Agitation [R45.1]   Yes    Type 2 diabetes mellitus with hyperglycemia, with long-term current use of insulin [E11.65, Z79.4]   Not Applicable    Peripheral neuropathy [G62.9]   Yes    Hyperlipidemia [E78.5]   Yes    Benign prostatic hyperplasia [N40.0]   Yes    Benign essential HTN [I10]   Yes    Peripheral vascular disease [I73.9]   Yes    Coronary artery disease involving native coronary artery of native heart [I25.10]   Yes       Resolved Hospital Problems   No resolved problems to display.      Brief Hospital Course to date:  Too Tai is a 68yoM with PMH significant for advanced dementia, CAD, diabetes mellitus type 2, HLD and PVD. Admitted to [facility] ED on 4/18/24 for agitation / behavioral disturbances. Recently admitted to [facility] 2/24-3/4/24 for dementia with behavioral disturbances and again 3/27-4/4/24 for similar issues.      Advanced dementia complicated by behavioral disturbance with superimposed sleep disturbance   - Infectious work up unremarkable. UA reassuring. CXR without acute findings. CT head without acute findings  - Continue home Aricept and Mirtazapine 15mg QHS and melatonin 5 mg at 7 pm  - Continue Seroquel   - 5/3 trial of zoloft 50 mg and PRN versed 0.5 mg as needed for nursing care. (Ativan 0.25mg recommended per psych but it is not available)  - Neurology started Phenobarbital, level now WNL.  Will continue to monitor effect and recheck level on Monday per neurology recs  - Continue Temazepam 30mg QHS  - Appreciate  palliative care assistance  - discussed treatment plan with Neurology today 5/3  - May need geriatric psych facility vs LTC - CM looking into this. Telepsych eval completed 5/2/24  - IV fluids while taking in minimal PO   - PRN Geodon     Uncontrolled diabetes mellitus type 2  - Appears not on any home meds for DM  - A1c 9.5%  - Continue Lantus, increased to 17 units BID, Lispro 7 units TID AC + SSI      HTN  - continue Amlodipine 10mg daily  - continue Lisinopril 30mg daily      Expected Discharge Location and Transportation: TBD  Expected Discharge   Expected Discharge Date: 5/7/2024; Expected Discharge Time:       DVT prophylaxis:Medical and mechanical DVT prophylaxis orders are present.     AM-PAC 6 Clicks Score (PT): 14 (05/03/24 2000)     CODE STATUS:       Code Status and Medical Interventions:   Ordered at: 04/19/24 1037     Medical Intervention Limits:     NO intubation (DNI)     Level Of Support Discussed With:     Next of Kin (If No Surrogate)     Code Status (Patient has no pulse and is not breathing):     No CPR (Do Not Attempt to Resuscitate)     Medical Interventions (Patient has pulse or is breathing):     Limited Support      Laura Delgado, IVONNE  05/04/24                        Cheryl Harris RN   Registered Nurse     Plan of Care     Signed     Date of Service: 05/07/24 0419  Creation Time: 05/07/24 0419     Signed         Goal Outcome Evaluation:  Plan of Care Reviewed With: patient  Progress: improving  Outcome Evaluation: VSS, on RA. No c/o pain. Pt pleasantly confused when awake. Medications taken crushed in sherbert well. No aggressive behaviors present during this shift. Pt incontinent of both Bowel and bladder, Incontinence brief changed with assistance from PCT. Nurse sitting at bedside per order. Pt resting well at this time. Awaiting insurance approval and bed offer for SNF. No PRN's needed at this time for agitation. Will continue to monitor.                              Toney  Myrna, RN   Registered Nurse  Nursing     Plan of Care     Addendum     Date of Service: 05/05/24 2303  Creation Time: 05/05/24 2303       Goal Outcome Evaluation:  Plan of Care Reviewed With: patient, spouse, family     Dayshift RN stopped fluids 5/5 prior to my shift. IV lost, was told ok by MD to leave out. Slept most of the night, was restless and fidgety. Pt urinated before shift change, bladder scanned around 0300, 376mL in bladder. Encouraging PO intake. Gave Geodon IM once.        Progress: no change           Problem: Skin Injury Risk Increased  Goal: Skin Health and Integrity  Outcome: Ongoing, Progressing  Intervention: Promote and Optimize Oral Intake  Recent Flowsheet Documentation  Taken 5/5/2024 2000 by Myrna Ziegler RN  Oral Nutrition Promotion:   calorie-dense foods provided   rest periods promoted  Intervention: Optimize Skin Protection  Recent Flowsheet Documentation  Taken 5/5/2024 2000 by Myrna Ziegler RN  Pressure Reduction Techniques: weight shift assistance provided  Pressure Reduction Devices:   positioning supports utilized   specialty bed utilized  Skin Protection: adhesive use limited     Problem: Fall Injury Risk  Goal: Absence of Fall and Fall-Related Injury  Outcome: Ongoing, Progressing  Intervention: Promote Injury-Free Environment  Recent Flowsheet Documentation  Taken 5/5/2024 2000 by Myrna Ziegler RN  Safety Promotion/Fall Prevention: safety round/check completed     Problem: Adult Inpatient Plan of Care  Goal: Plan of Care Review  Outcome: Ongoing, Progressing  Flowsheets (Taken 5/5/2024 2303)  Progress: no change  Plan of Care Reviewed With:   patient   spouse   family  Goal: Patient-Specific Goal (Individualized)  Outcome: Ongoing, Progressing  Goal: Absence of Hospital-Acquired Illness or Injury  Outcome: Ongoing, Progressing  Intervention: Identify and Manage Fall Risk  Recent Flowsheet Documentation  Taken 5/5/2024 2000 by Myrna Ziegler RN  Safety  Promotion/Fall Prevention: safety round/check completed  Intervention: Prevent Skin Injury  Recent Flowsheet Documentation  Taken 5/5/2024 2000 by Myrna Ziegler RN  Body Position: weight shifting  Skin Protection: adhesive use limited  Intervention: Prevent and Manage VTE (Venous Thromboembolism) Risk  Recent Flowsheet Documentation  Taken 5/5/2024 2000 by Myrna Ziegler RN  Activity Management: activity encouraged  VTE Prevention/Management: (would not keep on, hep subq)   bilateral   sequential compression devices off  Range of Motion: ROM (range of motion) performed  Intervention: Prevent Infection  Recent Flowsheet Documentation  Taken 5/5/2024 2000 by Myrna Ziegler RN  Infection Prevention: rest/sleep promoted  Goal: Optimal Comfort and Wellbeing  Outcome: Ongoing, Progressing  Intervention: Provide Person-Centered Care  Recent Flowsheet Documentation  Taken 5/5/2024 2000 by Myrna Ziegler RN  Trust Relationship/Rapport:   care explained   emotional support provided   choices provided   empathic listening provided   questions answered   reassurance provided   questions encouraged   thoughts/feelings acknowledged  Goal: Readiness for Transition of Care  Outcome: Ongoing, Progressing                    Revision History        Alesia Kurtz RN   Registered Nurse     Plan of Care     Signed     Date of Service: 05/05/24 0438  Creation Time: 05/05/24 0438     Signed         Goal Outcome Evaluation:  Progress: no change  Outcome Evaluation: VSS, RA, remain confused at the baseline,  no sign of distress, slept well  throughout the night after night medications, adequate uop and had BM x1, no new concern, will continue to monitor.        Problem: Fall Injury Risk  Goal: Absence of Fall and Fall-Related Injury  Intervention: Promote Injury-Free Environment  Recent Flowsheet Documentation  Taken 5/5/2024 0000 by Alesia Kurtz RN  Safety Promotion/Fall Prevention: safety round/check  completed  Taken 5/4/2024 2000 by Alesia Kurtz RN  Safety Promotion/Fall Prevention: activity supervised     Problem: Adult Inpatient Plan of Care  Goal: Absence of Hospital-Acquired Illness or Injury  Intervention: Prevent Skin Injury  Recent Flowsheet Documentation  Taken 5/5/2024 0000 by Alesia Kurtz RN  Body Position: position changed independently  Taken 5/4/2024 2200 by Alesia Kurtz RN  Body Position:   position changed independently   supine, legs elevated   supine  Taken 5/4/2024 2000 by Alesia Kurtz RN  Body Position: position changed independently     Problem: Adult Inpatient Plan of Care  Goal: Absence of Hospital-Acquired Illness or Injury  Intervention: Prevent and Manage VTE (Venous Thromboembolism) Risk  Recent Flowsheet Documentation  Taken 5/5/2024 0000 by Alesia Kurtz RN  Activity Management: activity minimized  Taken 5/4/2024 2200 by Alesia Kurtz RN  Activity Management: activity minimized  Taken 5/4/2024 2000 by Alesia Kurtz RN  Activity Management: activity minimized                                   Aura Wilson RN   Registered Nurse  Palliative Care     Plan of Care     Signed     Date of Service: 05/07/24 1239  Creation Time: 05/07/24 1239     Signed         Goal Outcome Evaluation:  Plan of Care Reviewed With: spouse  Progress: improving  Outcome Evaluation: Pt seen at 1050, wife present; pt awake, alert, smiling, calm, non-verbal when asked how he is feeling today. Pt has been declined by [facility] facility per spouse report and documentation by CM; plan to seek acceptance by [facility] facility, CM discussing behavioral requirements and financial expectation with spouse. Palliative following for continued support to family in ongoing GOC/POC discussion and planning. Neuro continues to follow for management of behavioral medications.           Problem: Palliative Care  Goal: Enhanced Quality of Life  Outcome: Ongoing, Progressing  Intervention: Promote  Advance Care Planning  Flowsheets (Taken 5/7/2024 1235)  Life Transition/Adjustment: (CM pursuing placement for discharge) other (see comments)  Intervention: Maximize Comfort  Flowsheets (Taken 5/7/2024 1235)  Pain Management Interventions: (no prn medications for behavioral disturbance over night; out of restraints > 72 hours) other (see comments)  Intervention: Optimize Function  Flowsheets (Taken 5/7/2024 1235)  Sensory Stimulation Regulation: quiet environment promoted  Sleep/Rest Enhancement:   consistent schedule promoted   family presence promoted  Intervention: Optimize Psychosocial Wellbeing  Flowsheets (Taken 5/7/2024 1235)  Supportive Measures: positive reinforcement provided  Family/Support System Care:   caregiver stress acknowledged   involvement promoted   presence promoted   self-care encouraged   support provided

## 2024-05-07 NOTE — CASE MANAGEMENT/SOCIAL WORK
Continued Stay Note  [facility]     Patient Name: Too Tai  MRN: [id number]  Today's Date: 5/7/2024    Admit Date: 4/18/2024    Plan: TBD   Discharge Plan       Row Name 05/07/24 1505       Plan    Plan TBD    Patient/Family in Agreement with Plan yes    Plan Comments Update this afternoon:   Received a call back from Víctor with [facility]. She is familiar with the patient and has been following along during his admission. She has talked with wife, [place] today.  Víctor is aware that patient has a sitter & that PRN meds are ordered for agitation/behaviors. Víctor requested last 72 hour notes, psych eval & current med list to be faxed to [phone]. She will also need updated therapy notes. New order for PT/OT in [facility]. CM sent email to request patient to be seen by therapy tomorrow. CM will fax to Víctor when available. CM will continue to follow.    Final Discharge Disposition Code 30 - still a patient                   Discharge Codes    No documentation.                 Expected Discharge Date and Time       Expected Discharge Date Expected Discharge Time    May 9, 2024               Ilene Brown RN

## 2024-05-07 NOTE — CASE MANAGEMENT/SOCIAL WORK
Continued Stay Note  [facility]     Patient Name: Too Tai  MRN: [id number]  Today's Date: 5/7/2024    Admit Date: 4/18/2024    Plan: TBD   Discharge Plan       Row Name 05/07/24 0947       Plan    Plan TBD    Patient/Family in Agreement with Plan yes    Plan Comments [facility] declined [phone]), due to the acuity of their current patient population & staffing. CM called Rosita ([phone]) with [facility]IN (previously suggested CM to check into [facility]). Rosita does not have any availability. CM updated wife, [place] at bedside. LTC placement would be dependent on LTC bed availability, a facility that will offer the patient a bed & private pay costs, once patient is stabilized. For LTC, patient could not have a sitter/restraints or PRN meds for behavior/agitation for generally 24-48 hours, depending on the facility.  Referrals could not be sent until patient meets this criteria. Wife is aware of the private pay range of monthly costs for LTC. CM discussed this with her again today. She verbalized understanding. CM will continue to follow and assist.    Final Discharge Disposition Code 30 - still a patient                   Discharge Codes    No documentation.                 Expected Discharge Date and Time       Expected Discharge Date Expected Discharge Time    May 7, 2024               Ilene Brown, RN

## 2024-05-08 LAB
ALBUMIN SERPL-MCNC: 3.6 G/DL (ref 3.5–5.2)
ALBUMIN/GLOB SERPL: 1.1 G/DL
ALP SERPL-CCNC: 117 U/L (ref 39–117)
ALT SERPL W P-5'-P-CCNC: 25 U/L (ref 1–41)
ANION GAP SERPL CALCULATED.3IONS-SCNC: 8 MMOL/L (ref 5–15)
AST SERPL-CCNC: 30 U/L (ref 1–40)
BASOPHILS # BLD AUTO: 0.1 10*3/MM3 (ref 0–0.2)
BASOPHILS NFR BLD AUTO: 1.2 % (ref 0–1.5)
BILIRUB SERPL-MCNC: 0.5 MG/DL (ref 0–1.2)
BUN SERPL-MCNC: 26 MG/DL (ref 8–23)
BUN/CREAT SERPL: 28.9 (ref 7–25)
CALCIUM SPEC-SCNC: 9.2 MG/DL (ref 8.6–10.5)
CHLORIDE SERPL-SCNC: 105 MMOL/L (ref 98–107)
CO2 SERPL-SCNC: 30 MMOL/L (ref 22–29)
CREAT SERPL-MCNC: 0.9 MG/DL (ref 0.76–1.27)
DEPRECATED RDW RBC AUTO: 43.7 FL (ref 37–54)
EGFRCR SERPLBLD CKD-EPI 2021: 93.6 ML/MIN/1.73
EOSINOPHIL # BLD AUTO: 1.07 10*3/MM3 (ref 0–0.4)
EOSINOPHIL NFR BLD AUTO: 12.9 % (ref 0.3–6.2)
ERYTHROCYTE [DISTWIDTH] IN BLOOD BY AUTOMATED COUNT: 12.7 % (ref 12.3–15.4)
GLOBULIN UR ELPH-MCNC: 3.2 GM/DL
GLUCOSE BLDC GLUCOMTR-MCNC: 137 MG/DL (ref 70–130)
GLUCOSE BLDC GLUCOMTR-MCNC: 152 MG/DL (ref 70–130)
GLUCOSE BLDC GLUCOMTR-MCNC: 173 MG/DL (ref 70–130)
GLUCOSE BLDC GLUCOMTR-MCNC: 229 MG/DL (ref 70–130)
GLUCOSE SERPL-MCNC: 167 MG/DL (ref 65–99)
HCT VFR BLD AUTO: 40.3 % (ref 37.5–51)
HGB BLD-MCNC: 13.4 G/DL (ref 13–17.7)
IMM GRANULOCYTES # BLD AUTO: 0.05 10*3/MM3 (ref 0–0.05)
IMM GRANULOCYTES NFR BLD AUTO: 0.6 % (ref 0–0.5)
LYMPHOCYTES # BLD AUTO: 2.97 10*3/MM3 (ref 0.7–3.1)
LYMPHOCYTES NFR BLD AUTO: 35.9 % (ref 19.6–45.3)
MAGNESIUM SERPL-MCNC: 1.9 MG/DL (ref 1.6–2.4)
MCH RBC QN AUTO: 31.5 PG (ref 26.6–33)
MCHC RBC AUTO-ENTMCNC: 33.3 G/DL (ref 31.5–35.7)
MCV RBC AUTO: 94.8 FL (ref 79–97)
MONOCYTES # BLD AUTO: 0.8 10*3/MM3 (ref 0.1–0.9)
MONOCYTES NFR BLD AUTO: 9.7 % (ref 5–12)
NEUTROPHILS NFR BLD AUTO: 3.28 10*3/MM3 (ref 1.7–7)
NEUTROPHILS NFR BLD AUTO: 39.7 % (ref 42.7–76)
NRBC BLD AUTO-RTO: 0 /100 WBC (ref 0–0.2)
PHENYTOIN SERPL-MCNC: <0.8 MCG/ML (ref 10–20)
PLATELET # BLD AUTO: 319 10*3/MM3 (ref 140–450)
PMV BLD AUTO: 9.6 FL (ref 6–12)
POTASSIUM SERPL-SCNC: 4.6 MMOL/L (ref 3.5–5.2)
PROT SERPL-MCNC: 6.8 G/DL (ref 6–8.5)
QT INTERVAL: 448 MS
QTC INTERVAL: 436 MS
RBC # BLD AUTO: 4.25 10*6/MM3 (ref 4.14–5.8)
SODIUM SERPL-SCNC: 143 MMOL/L (ref 136–145)
WBC NRBC COR # BLD AUTO: 8.27 10*3/MM3 (ref 3.4–10.8)

## 2024-05-08 PROCEDURE — 63710000001 INSULIN GLARGINE PER 5 UNITS: Performed by: NURSE PRACTITIONER

## 2024-05-08 PROCEDURE — 80053 COMPREHEN METABOLIC PANEL: CPT | Performed by: FAMILY MEDICINE

## 2024-05-08 PROCEDURE — 63710000001 INSULIN LISPRO (HUMAN) PER 5 UNITS: Performed by: NURSE PRACTITIONER

## 2024-05-08 PROCEDURE — 99233 SBSQ HOSP IP/OBS HIGH 50: CPT

## 2024-05-08 PROCEDURE — 80185 ASSAY OF PHENYTOIN TOTAL: CPT

## 2024-05-08 PROCEDURE — 63710000001 INSULIN LISPRO (HUMAN) PER 5 UNITS: Performed by: INTERNAL MEDICINE

## 2024-05-08 PROCEDURE — 83735 ASSAY OF MAGNESIUM: CPT | Performed by: FAMILY MEDICINE

## 2024-05-08 PROCEDURE — 99232 SBSQ HOSP IP/OBS MODERATE 35: CPT | Performed by: FAMILY MEDICINE

## 2024-05-08 PROCEDURE — 85025 COMPLETE CBC W/AUTO DIFF WBC: CPT | Performed by: FAMILY MEDICINE

## 2024-05-08 PROCEDURE — 82948 REAGENT STRIP/BLOOD GLUCOSE: CPT

## 2024-05-08 PROCEDURE — 25010000002 HEPARIN (PORCINE) PER 1000 UNITS: Performed by: INTERNAL MEDICINE

## 2024-05-08 PROCEDURE — 93010 ELECTROCARDIOGRAM REPORT: CPT | Performed by: INTERNAL MEDICINE

## 2024-05-08 RX ORDER — QUETIAPINE FUMARATE 100 MG/1
100 TABLET, FILM COATED ORAL DAILY
Status: DISCONTINUED | OUTPATIENT
Start: 2024-05-08 | End: 2024-05-09 | Stop reason: HOSPADM

## 2024-05-08 RX ORDER — QUETIAPINE FUMARATE 100 MG/1
200 TABLET, FILM COATED ORAL NIGHTLY
Status: DISCONTINUED | OUTPATIENT
Start: 2024-05-08 | End: 2024-05-09 | Stop reason: HOSPADM

## 2024-05-08 RX ADMIN — INSULIN LISPRO 2 UNITS: 100 INJECTION, SOLUTION INTRAVENOUS; SUBCUTANEOUS at 08:30

## 2024-05-08 RX ADMIN — MICONAZOLE NITRATE 1 APPLICATION: 20 POWDER TOPICAL at 20:58

## 2024-05-08 RX ADMIN — AMLODIPINE BESYLATE 10 MG: 10 TABLET ORAL at 10:51

## 2024-05-08 RX ADMIN — SERTRALINE HYDROCHLORIDE 50 MG: 50 TABLET ORAL at 10:51

## 2024-05-08 RX ADMIN — TEMAZEPAM 30 MG: 15 CAPSULE ORAL at 20:57

## 2024-05-08 RX ADMIN — MIRTAZAPINE 15 MG: 15 TABLET, FILM COATED ORAL at 20:56

## 2024-05-08 RX ADMIN — PHENOBARBITAL 64.8 MG: 64.8 TABLET ORAL at 13:05

## 2024-05-08 RX ADMIN — DONEPEZIL HYDROCHLORIDE 10 MG: 10 TABLET, FILM COATED ORAL at 20:56

## 2024-05-08 RX ADMIN — FOLIC ACID 1 MG: 1 TABLET ORAL at 10:51

## 2024-05-08 RX ADMIN — DONEPEZIL HYDROCHLORIDE 10 MG: 10 TABLET, FILM COATED ORAL at 10:51

## 2024-05-08 RX ADMIN — LISINOPRIL 30 MG: 20 TABLET ORAL at 10:51

## 2024-05-08 RX ADMIN — INSULIN LISPRO 2 UNITS: 100 INJECTION, SOLUTION INTRAVENOUS; SUBCUTANEOUS at 20:57

## 2024-05-08 RX ADMIN — HEPARIN SODIUM 5000 UNITS: 5000 INJECTION INTRAVENOUS; SUBCUTANEOUS at 13:05

## 2024-05-08 RX ADMIN — SENNOSIDES AND DOCUSATE SODIUM 2 TABLET: 8.6; 5 TABLET ORAL at 10:51

## 2024-05-08 RX ADMIN — PHENOBARBITAL 129.6 MG: 64.8 TABLET ORAL at 20:57

## 2024-05-08 RX ADMIN — SENNOSIDES AND DOCUSATE SODIUM 2 TABLET: 8.6; 5 TABLET ORAL at 20:57

## 2024-05-08 RX ADMIN — MICONAZOLE NITRATE 1 APPLICATION: 20 POWDER TOPICAL at 08:31

## 2024-05-08 RX ADMIN — PHENOBARBITAL 64.8 MG: 64.8 TABLET ORAL at 10:52

## 2024-05-08 RX ADMIN — INSULIN GLARGINE 17 UNITS: 100 INJECTION, SOLUTION SUBCUTANEOUS at 20:57

## 2024-05-08 RX ADMIN — INSULIN LISPRO 7 UNITS: 100 INJECTION, SOLUTION INTRAVENOUS; SUBCUTANEOUS at 08:30

## 2024-05-08 RX ADMIN — QUETIAPINE FUMARATE 200 MG: 100 TABLET ORAL at 20:56

## 2024-05-08 RX ADMIN — INSULIN LISPRO 7 UNITS: 100 INJECTION, SOLUTION INTRAVENOUS; SUBCUTANEOUS at 12:45

## 2024-05-08 RX ADMIN — Medication 5 MG: at 20:56

## 2024-05-08 RX ADMIN — HEPARIN SODIUM 5000 UNITS: 5000 INJECTION INTRAVENOUS; SUBCUTANEOUS at 21:41

## 2024-05-08 RX ADMIN — INSULIN LISPRO 3 UNITS: 100 INJECTION, SOLUTION INTRAVENOUS; SUBCUTANEOUS at 16:46

## 2024-05-08 RX ADMIN — INSULIN LISPRO 7 UNITS: 100 INJECTION, SOLUTION INTRAVENOUS; SUBCUTANEOUS at 17:00

## 2024-05-08 RX ADMIN — QUETIAPINE FUMARATE 100 MG: 100 TABLET ORAL at 10:51

## 2024-05-08 RX ADMIN — HEPARIN SODIUM 5000 UNITS: 5000 INJECTION INTRAVENOUS; SUBCUTANEOUS at 06:19

## 2024-05-08 RX ADMIN — INSULIN GLARGINE 17 UNITS: 100 INJECTION, SOLUTION SUBCUTANEOUS at 08:31

## 2024-05-08 NOTE — PLAN OF CARE
Goal Outcome Evaluation:  Plan of Care Reviewed With: spouse        Progress: no change  Outcome Evaluation: Pt seen at 1140; wife and son present, wife on the phone, sitter present. Pt sleeping, no observable signs of discomfort. Per documentation, more lethargic today, unable to participate in therapy, Seroquel dosage reduced per Neuro. CM pursuing placement; pt out of restraints since last Thursday, no prn meds for behavioral disturbance for two days. Palliative following for continued support to family.    1300 Palliative IDT meeting:  IVONNE EAST, RN, SW,   After hours, weekends and holidays, contact Palliative Provider by calling [phone]       Problem: Palliative Care  Goal: Enhanced Quality of Life  Outcome: Ongoing, Progressing  Intervention: Maximize Comfort  Flowsheets (Taken 5/8/2024 1505)  Pain Management Interventions: (Neuro following, managing medications for behavioral disturbance) other (see comments)  Intervention: Optimize Function  Flowsheets (Taken 5/8/2024 1505)  Sleep/Rest Enhancement: awakenings minimized  Intervention: Optimize Psychosocial Wellbeing  Flowsheets (Taken 5/8/2024 1505)  Family/Support System Care: support provided

## 2024-05-08 NOTE — PROGRESS NOTES
[facility] Medicine Services  PROGRESS NOTE    Patient Name: Too Tai  : 1955  MRN: [id number]    Date of Admission: 2024  Primary Care Physician: Des Geller MD    Subjective     CC: f/u dementia with agitation    HPI:  Patient is a 69 yo M seen and examined by me this AM, he continues to rest this AM and has done well overnight, still working on possible placement options at this time. Wife updated at bedside, no other acute changes overnight.       Objective     Vital Signs:   Temp:  [97.5 °F (36.4 °C)-98.9 °F (37.2 °C)] 97.5 °F (36.4 °C)  Heart Rate:  [71-76] 76  Resp:  [14-20] 16  BP: (127-163)/(64-82) 145/66     Physical Exam:  Constitutional: No acute distress, sleeping comfortably, resting comfortably in bed, currently on RA  HENT: NCAT, nares patent, mucous membranes moist  Respiratory: Clear to auscultation bilaterally, no rhonchi or wheezing, respiratory effort normal   Cardiovascular: RRR, no murmurs, rubs, or gallops  Gastrointestinal: Positive bowel sounds, soft, nontender, nondistended  Musculoskeletal: No bilateral ankle edema  Psychiatric: limited   Neurologic: limited, sleeping this AM, sitting up in bed   Skin: No rashes      Results Reviewed:  LAB RESULTS:    Brief Urine Lab Results  (Last result in the past 365 days)        Color   Clarity   Blood   Leuk Est   Nitrite   Protein   CREAT   Urine HCG        24 0243 Yellow   Clear   Negative   Negative   Negative   Negative                 Microbiology Results Abnormal       None          No radiology results from the last 24 hrs    Results for orders placed during the hospital encounter of 24    Adult Transthoracic Echo Complete W/ Cont if Necessary Per Protocol    Interpretation Summary    Left ventricular systolic function is normal. Calculated left ventricular EF = 55.1% Normal left ventricular cavity size noted. Left ventricular wall thickness is consistent with mild concentric  hypertrophy. Left ventricular diastolic function is consistent with (grade I) impaired relaxation.    The right ventricular cavity is mildly dilated. Normal right ventricular systolic function noted.    There is calcification of the aortic valve. The aortic valve appears trileaflet. Mild aortic valve regurgitation is present. No aortic valve stenosis is present.    Mitral annular calcification is present. Trace mitral valve regurgitation is present. No significant mitral valve stenosis is present.    The tricuspid valve is structurally normal with no stenosis present. Trace tricuspid valve regurgitation is present. Insufficient TR velocity profile to estimate the right ventricular systolic pressure.    Mild dilation of the sinuses of Valsalva is present. Mild dilation of the ascending aorta is present. Ascending aorta = 4.2 cm    Current medications:  Scheduled Meds:amLODIPine, 10 mg, Oral, Q24H  cyanocobalamin, 1,000 mcg, Intramuscular, Q28 Days  donepezil, 10 mg, Oral, BID  folic acid, 1 mg, Oral, Daily  heparin (porcine), 5,000 Units, Subcutaneous, Q8H  insulin glargine, 17 Units, Subcutaneous, Q12H  insulin lispro, 2-7 Units, Subcutaneous, 4x Daily AC & at Bedtime  Insulin Lispro, 7 Units, Subcutaneous, TID With Meals  lisinopril, 30 mg, Oral, Daily  melatonin, 5 mg, Oral, Nightly  miconazole, 1 Application, Topical, Q12H  mirtazapine, 15 mg, Oral, Nightly  PHENobarbital, 129.6 mg, Oral, Nightly   Or  PHENobarbital, 130 mg, Intramuscular, Nightly  PHENobarbital, 64.8 mg, Oral, BID   Or  PHENobarbital, 65 mg, Intramuscular, BID  QUEtiapine, 100 mg, Oral, Daily  QUEtiapine, 300 mg, Oral, Nightly  senna-docusate sodium, 2 tablet, Oral, BID  sertraline, 50 mg, Oral, Daily  sodium chloride, 10 mL, Intravenous, Q12H  temazepam, 30 mg, Oral, Nightly      Continuous Infusions:     PRN Meds:.  acetaminophen **OR** acetaminophen **OR** acetaminophen    senna-docusate sodium **AND** polyethylene glycol **AND** bisacodyl  **AND** bisacodyl    Calcium Replacement - Follow Nurse / BPA Driven Protocol    dextrose    dextrose    glucagon (human recombinant)    Magnesium Standard Dose Replacement - Follow Nurse / BPA Driven Protocol    midazolam    Phosphorus Replacement - Follow Nurse / BPA Driven Protocol    Potassium Replacement - Follow Nurse / BPA Driven Protocol    sodium chloride    sodium chloride    ziprasidone    Assessment & Plan     Active Hospital Problems    Diagnosis  POA    **Agitation due to dementia [F03.911]  Yes    Agitation [R45.1]  Yes    Type 2 diabetes mellitus with hyperglycemia, with long-term current use of insulin [E11.65, Z79.4]  Not Applicable    Peripheral neuropathy [G62.9]  Yes    Hyperlipidemia [E78.5]  Yes    Benign prostatic hyperplasia [N40.0]  Yes    Benign essential HTN [I10]  Yes    Peripheral vascular disease [I73.9]  Yes    Coronary artery disease involving native coronary artery of native heart [I25.10]  Yes      Resolved Hospital Problems   No resolved problems to display.     Brief Hospital Course to date:  Too Tai is a 68yoM with PMH significant for advanced dementia, CAD, diabetes mellitus type 2, HLD and PVD. Admitted to [facility] ED on 4/18/24 for agitation / behavioral disturbances. Recently admitted to [facility] 2/24-3/4/24 for dementia with behavioral disturbances and again 3/27-4/4/24 for similar issues. Patient transferred to my services on 5/6, reviewed chart and working on possible transfer to [facility] facility, continue to follow with CM and neurology at this time. Sitter remains at bedside, patient has been out of restrains since 5/3, continue to follow at this time. Patient seen again on 5/7, will continue to follow and awaiting hopeful placement, discussed case with neurology today, continue to follow with CM and wife updated at bedside Patient seen again on 5/8, still working on potential placement options and following with CM, wife touring another facility  today, continue to follow.      Advanced dementia complicated by behavioral disturbance with superimposed sleep disturbance   - Infectious work up unremarkable. UA reassuring. CXR without acute findings. CT head without acute findings  - Continue home Aricept and Mirtazapine 15mg QHS and melatonin 5 mg at 7 pm  - Continue Seroquel   - 5/3 trial of zoloft 50 mg and PRN versed 0.5 mg as needed for nursing care. (Ativan 0.25mg recommended per psych but it is not available)  - Neurology started Phenobarbital, level now WNL.  Will continue to monitor effect, again level remains WNL on 5/6   - Continue Temazepam 30mg QHS  - Appreciate palliative care assistance  - May need geriatric psych facility vs LTC - CM looking into this. Telepsych eval completed 5/2/24  - Lost IV access, will need to monitor PO intake, may need to replace IVFs, continue to follow.   - PRN Geodon    Uncontrolled diabetes mellitus type 2  - Appears not on any home meds for DM  - A1c 9.5%  - Continue Lantus, increased to 17 units BID, Lispro 7 units TID AC + SSI   - glucose reviewed  - Continue to follow, will likely need continued long term insulin use at this time.     HTN  - continue Amlodipine 10mg daily  - continue Lisinopril 30mg daily     Expected Discharge Location and Transportation: Placement   Expected Discharge   Expected Discharge Date: 5/9/2024; Expected Discharge Time:      DVT prophylaxis:Medical and mechanical DVT prophylaxis orders are present.    AM-PAC 6 Clicks Score (PT): 6 (05/08/24 0800)    CODE STATUS:   Code Status and Medical Interventions:   Ordered at: 04/19/24 1037     Medical Intervention Limits:    NO intubation (DNI)     Level Of Support Discussed With:    Next of Kin (If No Surrogate)     Code Status (Patient has no pulse and is not breathing):    No CPR (Do Not Attempt to Resuscitate)     Medical Interventions (Patient has pulse or is breathing):    Limited Support     GLENN Palacios, DO  05/08/24

## 2024-05-08 NOTE — PLAN OF CARE
Goal Outcome Evaluation:              Outcome Evaluation: .    Pt able to rest comfortably throughout night, no PRN meds required  Adequate UOP  BM x 1   VSS upon intermittent checks

## 2024-05-08 NOTE — CASE MANAGEMENT/SOCIAL WORK
Continued Stay Note  [facility]     Patient Name: Too Tai  MRN: [id number]  Today's Date: 5/8/2024    Admit Date: 4/18/2024    Plan: TBD   Discharge Plan       Row Name 05/08/24 1411       Plan    Plan TBD    Patient/Family in Agreement with Plan yes    Plan Comments Update this afternoon:  CM received a call from therapy. Patient too lethargic for therapy to work with this afternoon. Neurology decreased Seroquel dose today to 100mg in the AM & 200mg in the PM. CM updated Chastity with [facility]. 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 CM will continue to follow.    Final Discharge Disposition Code 30 - still a patient      Row Name 05/08/24 1136       Plan    Plan TBD    Patient/Family in Agreement with Plan yes    Plan Comments Per Víctor with [facility], they are unable to take due to patient receiving insulin. Discussed this in MDR. Not appropriate to switch to oral hypoglycemics. Updated Víctor. Wife did a tour of [facility] this morning. Staff with [facility] present at bedside for evaluation after rounds. All notes faxed to [facility] at  at [phone] ([phone]) with the exception of therapy notes. CM to fax PT/OT notes when available to [phone]. Therapy to see this afternoon (optimal time for patient). Contact for [facility]: Chastity Calvert c-[phone]. CM confirmed with staff present, including DON that patient receiving insulin is not an issue. CM will continue to follow and assist.    Final Discharge Disposition Code 30 - still a patient                   Discharge Codes    No documentation.                 Expected Discharge Date and Time       Expected Discharge Date Expected Discharge Time    May 9, 2024               Ilene Brown RN

## 2024-05-08 NOTE — PLAN OF CARE
Goal Outcome Evaluation:  Plan of Care Reviewed With: patient              VSS. No acute changes. Pt. Somnolent until ~1200.  Pt. Remains confused. Minimal verbal response. On [employer]. Non-tele. -160. Good UOP. LBM 5/7. No new skin breakdown noted. Medications adjusted per neuro to minimize drowsiness and need for PRNs.

## 2024-05-08 NOTE — DISCHARGE PLACEMENT REQUEST
"Too Richter (68 y.o. Male)       Date of Birth   1955    Social Security Number       Address   [facility] [address] [place] [address]    Home Phone   [phone]    MRN   [id number]       Scientology   Orthodox    Marital Status                               Admission Date   4/18/24    Admission Type   Emergency    Admitting Provider   RANDALL Palacios DO    Attending Provider   RANDALL Palacios DO    Department, Room/Bed   [facility] 5G, S551/1       Discharge Date       Discharge Disposition       Discharge Destination                                 Attending Provider: RANDALL Palacios DO    Allergies: Bactrim [Sulfamethoxazole-trimethoprim], Adhesive Tape, Neosporin [Neomycin-bacitracin Zn-polymyx]    Isolation: None   Infection: None   Code Status: No CPR    Ht: 188 cm (74\")   Wt: 97.5 kg (215 lb)    Admission Cmt: None   Principal Problem: Agitation due to dementia [F03.911]                   Active Insurance as of 4/18/2024       Primary Coverage       Payor Plan Insurance Group Employer/Plan Group    AETNA MEDICARE REPLACEMENT AETNA MED ADV PPO [id number]       Payor Plan Address Payor Plan Phone Number Payor Plan Fax Number Effective Dates    [address] [phone]  1/1/2024 - None Entered    [facility] [place] [address]         Subscriber Name Subscriber Birth Date Member ID       TOO RICHTER 1955 [id number]                     Emergency Contacts        (Rel.) Home Phone Work Phone Mobile Phone    WILLIE RICHTER (Spouse) [phone] -- [phone]    Ritchie Richter (Son) [phone] -- [phone]    Meg Bustillo (Relative) [phone] -- [phone]                 Nerissa Aviles DO   Physician  Hospitalist     H&P     Signed     Date of Service: 04/19/24 0218  Creation Time: 04/19/24 0218     Signed       Expand All Collapse All[]Expand All by Default         [facility] Medicine Services  HISTORY AND PHYSICAL     Patient Name: " Too Tai  : 1955  MRN: [id number]  Primary Care Physician: Des Geller MD  Date of admission: 2024           Subjective  Subjective      Chief Complaint:  Agitation     HPI:  Too Tai is a 68 y.o. male with a PMH significant for advanced dementia, CAD, diabetes mellitus type 2, HLD, PVD who comes to the ED due to agitation.  Patient with advanced dementia, nonverbal.  No family present.  HPI obtained from EMR.  Patient was hospitalized with d/c on 3/4/2024 after presenting with frequent falls and increased myoclonus.  Symptoms were felt to be secondary to Rexulti which was discontinued.  Patient was hospitalized again with d/c on 2024 where he was evaluated after having falls at home with increased confusion.  He was found to be orthostatic with concerns for UTI and pneumonia.  He was treated with antibiotics discharged home.  Patient has become more agitated, restless and combative over the past 2 days.  He has been seen by neurology outpatient with medication adjustments.  He has had no improvement of agitation.  He was brought to the ED by spouse due to these concerns.        Personal History      Medical History        Past Medical History:   Diagnosis Date    Coronary artery disease      Dementia      Diabetes mellitus      Hyperlipidemia      Peripheral vascular disease                    Surgical History         Past Surgical History:   Procedure Laterality Date    CORONARY ARTERY BYPASS GRAFT        FEMORAL ARTERY - POPLITEAL ARTERY BYPASS GRAFT                Family History: family history includes Diabetes in his maternal grandfather, mother, sister, sister, and sister; Heart disease in his father; Hyperlipidemia in his father; Hypertension in his father.      Social History:  reports that he quit smoking about 27 years ago. His smoking use included cigarettes. He has never been exposed to tobacco smoke. He has quit using smokeless tobacco. He reports that he does not  drink alcohol and does not use drugs.  Social History          Social History Narrative    Not on file         Medications:  Available home medication information reviewed.  Accu-Chek Geri, Accu-Chek Geri Plus, Alcohol Prep, Divalproex Sodium, Glucagon, Insulin Glargine, Insulin Pen Needle, QUEtiapine, QUEtiapine fumarate ER, accu-chek soft touch, amLODIPine, donepezil, haloperidol, lisinopril, mirtazapine, and temazepam     Allergies        Allergies   Allergen Reactions    Bactrim [Sulfamethoxazole-Trimethoprim] Rash    Adhesive Tape Rash    Neosporin [Neomycin-Bacitracin Zn-Polymyx] Rash                  Objective  Objective      Vital Signs:   Temp:  [97.2 °F (36.2 °C)] 97.2 °F (36.2 °C)  Heart Rate:  [91-93] 91  Resp:  [20] 20  BP: (148-158)/(75-84) 158/84     Physical Exam   Constitutional: Awake, alert, fidgety  Eyes: PERRLA, sclerae anicteric, no conjunctival injection  HENT: NCAT, mucous membranes moist  Neck: Supple, no thyromegaly, no lymphadenopathy, trachea midline  Respiratory: Clear to auscultation bilaterally, nonlabored respirations   Cardiovascular: RRR, no murmurs, rubs, or gallops, palpable pedal pulses bilaterally  Gastrointestinal: Positive bowel sounds, soft, nontender, nondistended  Musculoskeletal: No bilateral ankle edema, no clubbing or cyanosis to extremities  Psychiatric: Fidgety  Neurologic: Unable to assess, not following commands  Skin: No rashes        Result Review:  I have personally reviewed the results from the time of this admission to 4/19/2024 03:04 EDT and agree with these findings:  [x]  Laboratory list / accordion  []  Microbiology  [x]  Radiology  [x]  EKG/Telemetry   []  Cardiology/Vascular   []  Pathology  [x]  Old records        LAB RESULTS:          Lab 04/18/24  2356   WBC 9.87   HEMOGLOBIN 13.8   HEMATOCRIT 42.2   PLATELETS 292   NEUTROS ABS 4.68   IMMATURE GRANS (ABS) 0.05   LYMPHS ABS 3.34*   MONOS ABS 1.14*   EOS ABS 0.56*   MCV 95.3              Lab  04/18/24  2356   SODIUM 142   POTASSIUM 4.9   CHLORIDE 104   CO2 28.0   ANION GAP 10.0   BUN 22   CREATININE 1.12   EGFR 72.0   GLUCOSE 226*   CALCIUM 9.4   MAGNESIUM 1.8              Lab 04/18/24  2356   TOTAL PROTEIN 6.9   ALBUMIN 3.9   GLOBULIN 3.0   ALT (SGPT) 13   AST (SGOT) 17   BILIRUBIN 0.3   ALK PHOS 120*              Lab 04/18/24  2356   HSTROP T 22*                  Urinalysis Results:   UA            2/24/2024    23:54 3/27/2024    16:33 4/19/2024    02:43   Urinalysis   Squamous Epithelial Cells, UA 0-2  0-2      Specific Gravity, UA 1.032  1.038  <=1.005    Ketones, UA Trace  40 mg/dL (2+)  Negative    Blood, UA Negative  Negative  Negative    Leukocytes, UA Negative  Negative  Negative    Nitrite, UA Negative  Positive  Negative    RBC, UA 0-2  0-2      WBC, UA 0-2  0-2      Bacteria, UA None Seen  4+               Microbiology Results (last 10 days)         ** No results found for the last 240 hours. **                  Radiology results from the last 24 hours:   CT Head Without Contrast     Result Date: 4/19/2024  CT HEAD WO CONTRAST Date of Exam: 4/19/2024 2:11 AM EDT Indication: Mental status change, unknown cause. Comparison: 3/28/2024. Technique: Axial CT images were obtained of the head without contrast administration.  Automated exposure control and iterative construction methods were used. Findings: There is no acute intracranial hemorrhage. No mass effect, midline shift or abnormal extra-axial collection. There is stable moderate patchy periventricular white matter hypoattenuation. Mild age-appropriate generalized parenchymal volume loss. No new hypoattenuating lesions in the brain. Cortical gray-white differentiation appears intact. The orbits are unremarkable. Mastoids and paranasal sinuses appear well aerated.      Impression: Impression: 1. No acute intracranial abnormality. 2. Moderate chronic small vessel ischemic change. Electronically Signed: Reg Pelaez MD  4/19/2024 2:29 AM EDT   Workstation ID: [id number]     XR Chest 1 View     Result Date: 4/19/2024  XR CHEST 1 VW Date of Exam: 4/19/2024 12:24 AM EDT Indication: Weak/Dizzy/AMS triage protocol Comparison: 3/27/2024. Findings: There is evidence of prior median sternotomy and CABG. Cardiac silhouette appears unchanged. Pulmonary vasculature is within normal limits. Evaluation of the left lung base is limited due to lordotic view and positioning. No definite lung consolidation. No pneumothorax or pleural effusion.      Impression: Impression: Limited study demonstrates no acute abnormality in the lungs. Electronically Signed: Reg Pelaez MD  4/19/2024 1:29 AM EDT  Workstation ID: [id number]         Results for orders placed during the hospital encounter of 03/27/24     Adult Transthoracic Echo Complete W/ Cont if Necessary Per Protocol     Interpretation Summary    Left ventricular systolic function is normal. Calculated left ventricular EF = 55.1% Normal left ventricular cavity size noted. Left ventricular wall thickness is consistent with mild concentric hypertrophy. Left ventricular diastolic function is consistent with (grade I) impaired relaxation.    The right ventricular cavity is mildly dilated. Normal right ventricular systolic function noted.    There is calcification of the aortic valve. The aortic valve appears trileaflet. Mild aortic valve regurgitation is present. No aortic valve stenosis is present.    Mitral annular calcification is present. Trace mitral valve regurgitation is present. No significant mitral valve stenosis is present.    The tricuspid valve is structurally normal with no stenosis present. Trace tricuspid valve regurgitation is present. Insufficient TR velocity profile to estimate the right ventricular systolic pressure.    Mild dilation of the sinuses of Valsalva is present. Mild dilation of the ascending aorta is present. Ascending aorta = 4.2 cm              Assessment & Plan  Assessment & Plan         Agitation due to dementia    Benign essential HTN    Benign prostatic hyperplasia    Coronary artery disease involving native coronary artery of native heart    Type 2 diabetes mellitus with hyperglycemia, with long-term current use of insulin    Hyperlipidemia    Peripheral neuropathy    Peripheral vascular disease     Too Tai is a 68 y.o. male with a PMH significant for advanced dementia, CAD, diabetes mellitus type 2, HLD, PVD who comes to the ED due to agitation.          Severe dementia complicated by behavioral disturbance  -Check UA  - CT head wnl  - Consult neurology  - Fall precautions  - Case management consult  - Continue home Depakote, Aricept, Haldol, Seroquel, Restoril, Remeron  - QTc 452     Diabetes mellitus type 2  - No active home meds  - SSI with Accu-Cheks  - Hemoglobin A1c for a.m.     HTN  PVD  CAD  - Continue home meds     Home med list reviewed     DVT prophylaxis:  Mechanical and medical DVT prophylaxis orders are signed and held.         CODE STATUS: No family present.  CODE STATUS will need to be clarified with family, was DNR during prior stay.  There are no questions and answers to display.      Expected Discharge   Expected Discharge Date: 4/21/2024; Expected Discharge Time:       Nerissa Aviles DO  04/19/24                       Routing History            Carmelita Vincent RN   Registered Nurse  Intensivist     Plan of Care     Signed     Date of Service: 05/08/24 0625  Creation Time: 05/08/24 0625     Signed         Goal Outcome Evaluation:  Outcome Evaluation: .     Pt able to rest comfortably throughout night, no PRN meds required  Adequate UOP  BM x 1   VSS upon intermittent checks                              Neyda Allen RN   Registered Nurse     Plan of Care     Signed     Date of Service: 05/07/24 1726  Creation Time: 05/07/24 1726     Signed         Goal Outcome Evaluation:  Plan of Care Reviewed With: patient  Outcome Evaluation: vss. denies pain, incont of bladder  x's 2 ,no BM this shift. good bed mobility, consumed several PB&J sandwiches today,  one chicken tenders and several feliz strips. awake a good portion of the day. only a few moments of aggresion noted, easily calmed with verbal ques. gave meds easily crushed in yogurt or sherbert                                Cheryl Harris, RN   Registered Nurse     Plan of Care     Signed     Date of Service: 24  Creation Time: 24     Signed         Goal Outcome Evaluation:  Plan of Care Reviewed With: patient  Progress: improving  Outcome Evaluation: VSS, on RA. No c/o pain. Pt pleasantly confused when awake. Medications taken crushed in sherbert well. No aggressive behaviors present during this shift. Pt incontinent of both Bowel and bladder, Incontinence brief changed with assistance from PCT. Nurse sitting at bedside per order. Pt resting well at this time. Awaiting insurance approval and bed offer for SNF. No PRN's needed at this time for agitation. Will continue to monitor.                                  Physician Progress Notes (last 72 hours)        RANDALL Palacios,  at 24 1100              [facility] Medicine Services  PROGRESS NOTE    Patient Name: Too Tai  : 1955  MRN: [id number]    Date of Admission: 2024  Primary Care Physician: Des Geller MD    Subjective     CC: f/u dementia with agitation    HPI:  Patient is a 67 yo M seen and examined by me this AM, he is awake and resting comfortably in bed, will nod his head to some questions, denies any pain. Wife at bedside and updated, continuing to work on placement at this time.       Objective     Vital Signs:   Temp:  [97.7 °F (36.5 °C)-99.4 °F (37.4 °C)] 97.7 °F (36.5 °C)  Heart Rate:  [101-109] 109  Resp:  [16-18] 18  BP: (144-157)/(72-80) 144/77     Physical Exam:  Constitutional: No acute distress, sleeping, resting comfortably in bed, currently on RA  HENT: NCAT, nares patent,  mucous membranes moist  Respiratory: Clear to auscultation bilaterally, no rhonchi or wheezing, respiratory effort normal   Cardiovascular: RRR, no murmurs, rubs, or gallops  Gastrointestinal: Positive bowel sounds, soft, nontender, nondistended  Musculoskeletal: No bilateral ankle edema  Psychiatric: limited   Neurologic: limited, awake, sitting up in bed   Skin: No rashes      Results Reviewed:  LAB RESULTS:    Brief Urine Lab Results  (Last result in the past 365 days)        Color   Clarity   Blood   Leuk Est   Nitrite   Protein   CREAT   Urine HCG        04/19/24 0243 Yellow   Clear   Negative   Negative   Negative   Negative                 Microbiology Results Abnormal       None          No radiology results from the last 24 hrs    Results for orders placed during the hospital encounter of 03/27/24    Adult Transthoracic Echo Complete W/ Cont if Necessary Per Protocol    Interpretation Summary    Left ventricular systolic function is normal. Calculated left ventricular EF = 55.1% Normal left ventricular cavity size noted. Left ventricular wall thickness is consistent with mild concentric hypertrophy. Left ventricular diastolic function is consistent with (grade I) impaired relaxation.    The right ventricular cavity is mildly dilated. Normal right ventricular systolic function noted.    There is calcification of the aortic valve. The aortic valve appears trileaflet. Mild aortic valve regurgitation is present. No aortic valve stenosis is present.    Mitral annular calcification is present. Trace mitral valve regurgitation is present. No significant mitral valve stenosis is present.    The tricuspid valve is structurally normal with no stenosis present. Trace tricuspid valve regurgitation is present. Insufficient TR velocity profile to estimate the right ventricular systolic pressure.    Mild dilation of the sinuses of Valsalva is present. Mild dilation of the ascending aorta is present. Ascending aorta = 4.2  cm    Current medications:  Scheduled Meds:amLODIPine, 10 mg, Oral, Q24H  cyanocobalamin, 1,000 mcg, Intramuscular, Q28 Days  donepezil, 10 mg, Oral, BID  folic acid, 1 mg, Oral, Daily  heparin (porcine), 5,000 Units, Subcutaneous, Q8H  insulin glargine, 17 Units, Subcutaneous, Q12H  insulin lispro, 2-7 Units, Subcutaneous, 4x Daily AC & at Bedtime  Insulin Lispro, 7 Units, Subcutaneous, TID With Meals  lisinopril, 30 mg, Oral, Daily  melatonin, 5 mg, Oral, Nightly  miconazole, 1 Application, Topical, Q12H  mirtazapine, 15 mg, Oral, Nightly  PHENobarbital, 130 mg, Oral, Nightly   Or  PHENobarbital, 130 mg, Intramuscular, Nightly  PHENobarbital, 64.8 mg, Oral, BID   Or  PHENobarbital, 65 mg, Intramuscular, BID  QUEtiapine, 100 mg, Oral, BID  QUEtiapine, 300 mg, Oral, Nightly  senna-docusate sodium, 2 tablet, Oral, BID  sertraline, 50 mg, Oral, Daily  sodium chloride, 10 mL, Intravenous, Q12H  temazepam, 30 mg, Oral, Nightly      Continuous Infusions:     PRN Meds:.  acetaminophen **OR** acetaminophen **OR** acetaminophen    senna-docusate sodium **AND** polyethylene glycol **AND** bisacodyl **AND** bisacodyl    Calcium Replacement - Follow Nurse / BPA Driven Protocol    dextrose    dextrose    glucagon (human recombinant)    Magnesium Standard Dose Replacement - Follow Nurse / BPA Driven Protocol    midazolam    Phosphorus Replacement - Follow Nurse / BPA Driven Protocol    Potassium Replacement - Follow Nurse / BPA Driven Protocol    sodium chloride    sodium chloride    ziprasidone    Assessment & Plan     Active Hospital Problems    Diagnosis  POA    **Agitation due to dementia [F03.911]  Yes    Agitation [R45.1]  Yes    Type 2 diabetes mellitus with hyperglycemia, with long-term current use of insulin [E11.65, Z79.4]  Not Applicable    Peripheral neuropathy [G62.9]  Yes    Hyperlipidemia [E78.5]  Yes    Benign prostatic hyperplasia [N40.0]  Yes    Benign essential HTN [I10]  Yes    Peripheral vascular disease  [I73.9]  Yes    Coronary artery disease involving native coronary artery of native heart [I25.10]  Yes      Resolved Hospital Problems   No resolved problems to display.     Brief Hospital Course to date:  Too Tai is a 68yoM with PMH significant for advanced dementia, CAD, diabetes mellitus type 2, HLD and PVD. Admitted to [facility] ED on 4/18/24 for agitation / behavioral disturbances. Recently admitted to [facility] 2/24-3/4/24 for dementia with behavioral disturbances and again 3/27-4/4/24 for similar issues. Patient transferred to my services on 5/6, reviewed chart and working on possible transfer to [facility] facility, continue to follow with CM and neurology at this time. Sitter remains at bedside, patient has been out of restrains since 5/3, continue to follow at this time. Patient seen again on 5/7, will continue to follow and awaiting hopeful placement, discussed case with neurology today, continue to follow with CM and wife updated at bedside      Advanced dementia complicated by behavioral disturbance with superimposed sleep disturbance   - Infectious work up unremarkable. UA reassuring. CXR without acute findings. CT head without acute findings  - Continue home Aricept and Mirtazapine 15mg QHS and melatonin 5 mg at 7 pm  - Continue Seroquel   - 5/3 trial of zoloft 50 mg and PRN versed 0.5 mg as needed for nursing care. (Ativan 0.25mg recommended per psych but it is not available)  - Neurology started Phenobarbital, level now WNL.  Will continue to monitor effect, again level remains WNL on 5/6   - Continue Temazepam 30mg QHS  - Appreciate palliative care assistance  - May need geriatric psych facility vs LTC - CM looking into this. Telepsych eval completed 5/2/24  - Lost IV access, will need to monitor PO intake, may need to replace IVFs, continue to follow.   - PRN Geodon    Uncontrolled diabetes mellitus type 2  - Appears not on any home meds for DM  - A1c 9.5%  - Continue Lantus,  increased to 17 units BID, Lispro 7 units TID AC + SSI   - glucose reviewed    HTN  - continue Amlodipine 10mg daily  - continue Lisinopril 30mg daily     Expected Discharge Location and Transportation: Placement   Expected Discharge   Expected Discharge Date: 2024; Expected Discharge Time:      DVT prophylaxis:Medical and mechanical DVT prophylaxis orders are present.    AM-PAC 6 Clicks Score (PT): 11 (24 0800)    CODE STATUS:   Code Status and Medical Interventions:   Ordered at: 24 1037     Medical Intervention Limits:    NO intubation (DNI)     Level Of Support Discussed With:    Next of Kin (If No Surrogate)     Code Status (Patient has no pulse and is not breathing):    No CPR (Do Not Attempt to Resuscitate)     Medical Interventions (Patient has pulse or is breathing):    Limited Support     GLENN Palacios DO  24        Electronically signed by RANDALL Palacios DO at 24 1521       Meliza Kahn, APRN at 24 0807           [facility] Neurology    Progress Note    Patient Name: Too Tai  : 1955  MRN: [id number]  Primary Care Physician:  Des Geller MD  Date of admission: 2024    Subjective     Chief Complaint: Dementia with behavioral disturbances    History of Present Illness   Patient seen resting comfortably in bed.  He was alert.  No restraints at this time.  Sitter at bedside.  No as needed medications overnight.    Review of Systems   Unable to assess due to mental status    Objective     Physical Exam  Vitals and nursing note reviewed.   Constitutional:       General: He is not in acute distress.     Appearance: He is not ill-appearing.   Eyes:      Extraocular Movements: Extraocular movements intact.      Pupils: Pupils are equal, round, and reactive to light.      Comments: No nystagmus or deviated gaze noted   Cardiovascular:      Rate and Rhythm: Normal rate.   Pulmonary:      Effort: Pulmonary effort is normal.   Neurological:       Mental Status: He is alert. Mental status is at baseline.      Cranial Nerves: No cranial nerve deficit or facial asymmetry.      Sensory: No sensory deficit.      Motor: No weakness, tremor or seizure activity.          Vitals:   Temp:  [97.7 °F (36.5 °C)-99.4 °F (37.4 °C)] 97.7 °F (36.5 °C)  Heart Rate:  [] 109  Resp:  [16-18] 18  BP: (136-157)/(61-80) 157/80    Current Medications    Current Facility-Administered Medications:     acetaminophen (TYLENOL) tablet 650 mg, 650 mg, Oral, Q4H PRN, 650 mg at 04/30/24 2042 **OR** acetaminophen (TYLENOL) 160 MG/5ML oral solution 650 mg, 650 mg, Oral, Q4H PRN **OR** acetaminophen (TYLENOL) suppository 650 mg, 650 mg, Rectal, Q4H PRN, Nerissa Aviles, DO    amLODIPine (NORVASC) tablet 10 mg, 10 mg, Oral, Q24H, Calin Benitez MD, 10 mg at 05/06/24 0939    sennosides-docusate (PERICOLACE) 8.6-50 MG per tablet 2 tablet, 2 tablet, Oral, BID, 2 tablet at 05/06/24 2140 **AND** polyethylene glycol (MIRALAX) packet 17 g, 17 g, Oral, Daily PRN **AND** bisacodyl (DULCOLAX) EC tablet 5 mg, 5 mg, Oral, Daily PRN **AND** bisacodyl (DULCOLAX) suppository 10 mg, 10 mg, Rectal, Daily PRN, Jennifer Bai, APRN    Calcium Replacement - Follow Nurse / BPA Driven Protocol, , Does not apply, PRN, Nerissa Aviles G, DO    cyanocobalamin injection 1,000 mcg, 1,000 mcg, Intramuscular, Q28 Days, Darrell Jhaveri DO, 1,000 mcg at 04/19/24 1532    dextrose (D50W) (25 g/50 mL) IV injection 25 g, 25 g, Intravenous, Q15 Min PRN, Nerissa Aviles, DO    dextrose (GLUTOSE) oral gel 15 g, 15 g, Oral, Q15 Min PRN, Traci, Nerissa G, DO    donepezil (ARICEPT) tablet 10 mg, 10 mg, Oral, BID, Darrell Jhaveri DO, 10 mg at 05/07/24 0738    folic acid (FOLVITE) tablet 1 mg, 1 mg, Oral, Daily, Tresa Avilese G, DO, 1 mg at 05/06/24 0939    glucagon (GLUCAGEN) injection 1 mg, 1 mg, Intramuscular, Q15 Min PRN, Kamala Avilessie G, DO    heparin (porcine) 5000 UNIT/ML injection 5,000 Units, 5,000 Units, Subcutaneous,  Q8H, Nerissa Aviles DO, 5,000 Units at 05/07/24 0535    insulin glargine (LANTUS, SEMGLEE) injection 17 Units, 17 Units, Subcutaneous, Q12H, Jaclyn Holguin APRN, 17 Units at 05/06/24 2139    Insulin Lispro (humaLOG) injection 2-7 Units, 2-7 Units, Subcutaneous, 4x Daily AC & at Bedtime, Nerissa Aviles DO, 2 Units at 05/06/24 2139    Insulin Lispro (humaLOG) injection 7 Units, 7 Units, Subcutaneous, TID With Meals, Jaclyn Holguin APRN, 7 Units at 05/07/24 0739    lisinopril (PRINIVIL,ZESTRIL) tablet 30 mg, 30 mg, Oral, Daily, Parisa Madrigal PA-C, 30 mg at 05/06/24 0951    Magnesium Standard Dose Replacement - Follow Nurse / BPA Driven Protocol, , Does not apply, PRN, Nerissa Aviles DO    melatonin tablet 5 mg, 5 mg, Oral, Nightly, Jaclyn Holguin APRN, 5 mg at 05/06/24 1942    miconazole (MICOTIN) 2 % powder 1 Application, 1 Application, Topical, Q12H, Cecily Bain DO, 1 Application at 05/06/24 2356    midazolam (VERSED) injection 0.5 mg, 0.5 mg, Intravenous, BID PRN, Wili Nicole MD, 0.5 mg at 05/04/24 0246    mirtazapine (REMERON) tablet 15 mg, 15 mg, Oral, Nightly, Darrell Jhaveri DO, 15 mg at 05/06/24 1942    PHENobarbital tablet 130 mg, 130 mg, Oral, Nightly, 130 mg at 05/06/24 2140 **OR** PHENobarbital injection 130 mg, 130 mg, Intramuscular, Nightly, Meliza Kahn, APRN    PHENobarbital tablet 64.8 mg, 64.8 mg, Oral, BID, 64.8 mg at 05/06/24 1514 **OR** PHENobarbital injection 65 mg, 65 mg, Intramuscular, BID, Brijesh Mac MD, 65 mg at 05/06/24 0942    Phosphorus Replacement - Follow Nurse / BPA Driven Protocol, , Does not apply, PRN, Nerissa Aviles,     Potassium Replacement - Follow Nurse / BPA Driven Protocol, , Does not apply, PRN, Nerissa Aviles, DO    QUEtiapine (SEROquel) tablet 100 mg, 100 mg, Oral, BID, Meliza Kahn, APRN, 100 mg at 05/07/24 0738    QUEtiapine (SEROquel) tablet 300 mg, 300 mg, Oral, Nightly, Meliza Kahn, APRN, 300 mg at 05/06/24 1947     sertraline (ZOLOFT) tablet 50 mg, 50 mg, Oral, Daily, Butler, April K, APRN, 50 mg at 05/06/24 0941    sodium chloride 0.9 % flush 10 mL, 10 mL, Intravenous, Q12H, Traci, Nerissa G, DO, 10 mL at 05/05/24 2044    sodium chloride 0.9 % flush 10 mL, 10 mL, Intravenous, PRN, Traci, Nerissa G, DO    sodium chloride 0.9 % infusion 40 mL, 40 mL, Intravenous, PRN, Traci, Nerissa G, DO, 40 mL at 04/23/24 0546    temazepam (RESTORIL) capsule 30 mg, 30 mg, Oral, Nightly, Darrell Jhaveri A, DO, 30 mg at 05/06/24 1942    ziprasidone (GEODON) injection 10 mg, 10 mg, Intramuscular, Q4H PRN, Butler, April K, APRN, 10 mg at 05/06/24 0546    Laboratory Results:   Lab Results   Component Value Date    GLUCOSE 113 (H) 05/07/2024    CALCIUM 9.3 05/07/2024     05/07/2024    K 3.8 05/07/2024    CO2 27.0 05/07/2024     05/07/2024    BUN 22 05/07/2024    CREATININE 0.87 05/07/2024    EGFRIFAFRI 102 02/21/2022    EGFRIFNONA 88 02/21/2022    BCR 25.3 (H) 05/07/2024    ANIONGAP 11.0 05/07/2024     Lab Results   Component Value Date    WBC 8.76 05/07/2024    HGB 14.8 05/07/2024    HCT 43.9 05/07/2024    MCV 92.4 05/07/2024     05/07/2024     Lab Results   Component Value Date    CHOL 220 (H) 01/08/2024    CHOL 120 08/01/2019    CHOL 117 04/15/2019     Lab Results   Component Value Date    HDL 41 01/08/2024    HDL 37 (L) 08/03/2023    HDL 41 04/03/2023     Lab Results   Component Value Date     (H) 01/08/2024     (H) 08/03/2023     (H) 04/03/2023     Lab Results   Component Value Date    TRIG 108 01/08/2024    TRIG 208 (H) 08/03/2023    TRIG 138 04/03/2023     Lab Results   Component Value Date    HGBA1C 9.50 (H) 04/18/2024     Lab Results   Component Value Date    INR 1.1 11/25/2019    INR 1.1 09/10/2018    PROTIME 12.5 11/25/2019    PROTIME 13.3 09/10/2018     Lab Results   Component Value Date    FOLATE 10.20 02/24/2024     Lab Results   Component Value Date    [id number] 384 04/18/2024             Assessment  / Plan   Brief Patient Summary:  Too Tai is a 68 y.o. male with a past medical history of advanced dementia with behavioral disturbances and nonverbal, CAD, T2DM, HLD, PVD who presented to [facility] ED due to increase in agitation.  Patient has had multiple recent hospitalizations.      Plan:   Advanced dementia with behavioral disturbances  Sleep Deprivation   Continue home Aricept 10 mg nightly  Continue phenobarbital 64.8 mg/ 64.8 mg/ 129.6 mg  Phenobarbital level 22.8; recheck trough level in AM   Continue home Remeron 15 mg nightly  Continue Seroquel 100 mg /100 mg / 300 mg.   QTc 441 this AM.  Patient is not tolerating continuous telemetry.  Scheduled EKGs daily.  Continue Restoril 30 mg nightly.  Geodon as needed for extreme agitation; last dose 5/6 0546  CT head negative for acute abnormality  Vitamin B12 384, IM cyanocobalamin 1000 mcg  Case management following for difficult posthospitalization disposition.  Patient is not safe to return home due to concern of being in danger for his wife and himself.   Palliative consulted, appreciate recommendations  Psych consult, appreciate recommendations please see consult note for recommendations  Schedule melatonin 5 mg nightly for 7 PM per psychology recommendations  Continue Zoloft 50 mg daily per psych recommendations.  Continue Versed 0.5 mg as needed for nursing care.  Please use prior to Geodon.  Psychology recommended Ativan 0.25 mg with nursing care however it is not readily available.  General neurology will continue to follow    I have discussed the above with the patient, bedside RN Dr. Palacios  Time spent with patient: 50 minutes in face-to-face evaluation and management of the patient.    Copied text in this note has been reviewed and is accurate as of 05/07/24.     IVONNE Santos              Electronically signed by Meliza Kahn APRN at 05/07/24 1805       Meliza Kahn APRN at 05/06/24 0908           [facility] Neurology    Progress  Note    Patient Name: Too Tai  : 1955  MRN: [id number]  Primary Care Physician:  Des Geller MD  Date of admission: 2024    Subjective     Chief Complaint: Dementia with behavioral disturbances    History of Present Illness   Patient seen resting comfortably in bed.  Much more calm than previous assessment.  No restraints over the weekend.  Wife and sitter at bedside.  Patient able to move all extremities no focal deficit.    Review of Systems   Unable to assess due to baseline mental status    Objective     Physical Exam  Vitals and nursing note reviewed.   Constitutional:       General: He is not in acute distress.     Appearance: He is not ill-appearing.   Eyes:      Pupils: Pupils are equal, round, and reactive to light.      Comments: No nystagmus or deviated gaze noted   Cardiovascular:      Rate and Rhythm: Normal rate.   Pulmonary:      Effort: Pulmonary effort is normal.   Neurological:      Mental Status: He is alert.      Cranial Nerves: No cranial nerve deficit, dysarthria or facial asymmetry.      Sensory: No sensory deficit.      Motor: No weakness, tremor or seizure activity.          Vitals:   Temp:  [97.5 °F (36.4 °C)-98.4 °F (36.9 °C)] 98.4 °F (36.9 °C)  Heart Rate:  [74-88] 84  Resp:  [15-18] 15  BP: (121-189)/(61-90) 136/61    Current Medications    Current Facility-Administered Medications:     acetaminophen (TYLENOL) tablet 650 mg, 650 mg, Oral, Q4H PRN, 650 mg at 24 **OR** acetaminophen (TYLENOL) 160 MG/5ML oral solution 650 mg, 650 mg, Oral, Q4H PRN **OR** acetaminophen (TYLENOL) suppository 650 mg, 650 mg, Rectal, Q4H PRN, Nerissa Aviles G, DO    amLODIPine (NORVASC) tablet 10 mg, 10 mg, Oral, Q24H, Calin Benitez MD, 10 mg at 24 0939    sennosides-docusate (PERICOLACE) 8.6-50 MG per tablet 2 tablet, 2 tablet, Oral, BID, 2 tablet at 24 0939 **AND** polyethylene glycol (MIRALAX) packet 17 g, 17 g, Oral, Daily PRN **AND** bisacodyl (DULCOLAX)  EC tablet 5 mg, 5 mg, Oral, Daily PRN **AND** bisacodyl (DULCOLAX) suppository 10 mg, 10 mg, Rectal, Daily PRN, Jennifer Bai APRN    Calcium Replacement - Follow Nurse / BPA Driven Protocol, , Does not apply, PRN, Traci, Nerissa G, DO    cyanocobalamin injection 1,000 mcg, 1,000 mcg, Intramuscular, Q28 Days, Jhaveri, Darrell A, DO, 1,000 mcg at 04/19/24 1532    dextrose (D50W) (25 g/50 mL) IV injection 25 g, 25 g, Intravenous, Q15 Min PRN, Traci, Nerissa G, DO    dextrose (GLUTOSE) oral gel 15 g, 15 g, Oral, Q15 Min PRN, Traci, Nerissa G, DO    donepezil (ARICEPT) tablet 10 mg, 10 mg, Oral, BID, Guille Jhaveriy A, DO, 10 mg at 05/06/24 0940    folic acid (FOLVITE) tablet 1 mg, 1 mg, Oral, Daily, TraciTresa bacae G, DO, 1 mg at 05/06/24 0939    glucagon (GLUCAGEN) injection 1 mg, 1 mg, Intramuscular, Q15 Min PRN, Traci, Nerissa G, DO    heparin (porcine) 5000 UNIT/ML injection 5,000 Units, 5,000 Units, Subcutaneous, Q8H, Nerissa Aviles G, DO, 5,000 Units at 05/06/24 0516    insulin glargine (LANTUS, SEMGLEE) injection 17 Units, 17 Units, Subcutaneous, Q12H, Jaclyn Holguin APRN, 17 Units at 05/06/24 0944    Insulin Lispro (humaLOG) injection 2-7 Units, 2-7 Units, Subcutaneous, 4x Daily AC & at Bedtime, Nerissa Aviles, DO, 3 Units at 05/05/24 2014    Insulin Lispro (humaLOG) injection 7 Units, 7 Units, Subcutaneous, TID With Meals, Jaclyn Holguin, APRN, 7 Units at 05/05/24 1648    lisinopril (PRINIVIL,ZESTRIL) tablet 30 mg, 30 mg, Oral, Daily, Parisa Madrigal PA-C, 30 mg at 05/05/24 1203    Magnesium Standard Dose Replacement - Follow Nurse / BPA Driven Protocol, , Does not apply, PRN, Nerissa Aviles DO    melatonin tablet 5 mg, 5 mg, Oral, Nightly, Jaclyn Holguin, APRN, 5 mg at 05/05/24 1954    miconazole (MICOTIN) 2 % powder 1 Application, 1 Application, Topical, Q12H, Cecily Bain, , 1 Application at 05/06/24 0944    midazolam (VERSED) injection 0.5 mg, 0.5 mg, Intravenous, BID PRN, Wili Nicole MD, 0.5 mg  at 05/04/24 0246    mirtazapine (REMERON) tablet 15 mg, 15 mg, Oral, Nightly, Darrell Jhaveri A, DO, 15 mg at 05/05/24 1954    PHENobarbital tablet 130 mg, 130 mg, Oral, Nightly **OR** PHENobarbital injection 130 mg, 130 mg, Intramuscular, Nightly, Meliza Kahn, APRN    PHENobarbital tablet 64.8 mg, 64.8 mg, Oral, BID, 64.8 mg at 05/05/24 1309 **OR** PHENobarbital injection 65 mg, 65 mg, Intramuscular, BID, Brijesh Mac MD, 65 mg at 05/06/24 0942    Phosphorus Replacement - Follow Nurse / BPA Driven Protocol, , Does not apply, PRN, TraciKamala bacasie G, DO    Potassium Replacement - Follow Nurse / BPA Driven Protocol, , Does not apply, PRN, Traci, Nerissa G, DO    QUEtiapine (SEROquel) tablet 100 mg, 100 mg, Oral, BID, Meliza Kahn, APRN, 100 mg at 05/06/24 0941    QUEtiapine (SEROquel) tablet 300 mg, 300 mg, Oral, Nightly, Meliza Kahn, APRN, 300 mg at 05/05/24 1954    sertraline (ZOLOFT) tablet 50 mg, 50 mg, Oral, Daily, Meliza Kahn, APRN, 50 mg at 05/06/24 0941    sodium chloride 0.9 % flush 10 mL, 10 mL, Intravenous, Q12H, TraciKamala bacasie G, DO, 10 mL at 05/05/24 2044    sodium chloride 0.9 % flush 10 mL, 10 mL, Intravenous, PRN, Traci Nerissa G, DO    sodium chloride 0.9 % infusion 40 mL, 40 mL, Intravenous, PRN, Traci, Nerissa G, DO, 40 mL at 04/23/24 0546    temazepam (RESTORIL) capsule 30 mg, 30 mg, Oral, Nightly, Darrell Jhaveri A, DO, 30 mg at 05/05/24 1954    ziprasidone (GEODON) injection 10 mg, 10 mg, Intramuscular, Q4H PRN, Meliza Kahn, APRN, 10 mg at 05/06/24 0546    Laboratory Results:   Lab Results   Component Value Date    GLUCOSE 265 (H) 05/02/2024    CALCIUM 8.2 (L) 05/02/2024     05/02/2024    K 3.6 05/02/2024    CO2 29.0 05/02/2024     05/02/2024    BUN 14 05/02/2024    CREATININE 0.88 05/02/2024    EGFRIFAFRI 102 02/21/2022    EGFRIFNONA 88 02/21/2022    BCR 15.9 05/02/2024    ANIONGAP 6.0 05/02/2024     Lab Results   Component Value Date    WBC 10.47 04/19/2024    HGB  15.5 04/19/2024    HCT 46.7 04/19/2024    MCV 92.7 04/19/2024     04/19/2024     Lab Results   Component Value Date    CHOL 220 (H) 01/08/2024    CHOL 120 08/01/2019    CHOL 117 04/15/2019     Lab Results   Component Value Date    HDL 41 01/08/2024    HDL 37 (L) 08/03/2023    HDL 41 04/03/2023     Lab Results   Component Value Date     (H) 01/08/2024     (H) 08/03/2023     (H) 04/03/2023     Lab Results   Component Value Date    TRIG 108 01/08/2024    TRIG 208 (H) 08/03/2023    TRIG 138 04/03/2023     Lab Results   Component Value Date    HGBA1C 9.50 (H) 04/18/2024     Lab Results   Component Value Date    INR 1.1 11/25/2019    INR 1.1 09/10/2018    PROTIME 12.5 11/25/2019    PROTIME 13.3 09/10/2018     Lab Results   Component Value Date    FOLATE 10.20 02/24/2024     Lab Results   Component Value Date    LVNHUSJJ09 384 04/18/2024             Assessment / Plan   Brief Patient Summary:  Too Tai is a 68 y.o. male with a past medical history of advanced dementia with behavioral disturbances and nonverbal, CAD, T2DM, HLD, PVD who presented to [facility] ED due to increase in agitation.  Patient has had multiple recent hospitalizations.      Plan:   Advanced dementia with behavioral disturbances  Sleep Deprivation   Continue home Aricept 10 mg nightly  Continue phenobarbital 64.8 mg/ 64.8 mg/ 129.6 mg  Phenobarbital level 22.8; recheck trough level in AM   Continue home Remeron 15 mg nightly  Continue Seroquel 100 mg /100 mg / 300 mg.   QTc 441 this AM.  Patient is not tolerating continuous telemetry.  Scheduled EKGs daily.  Continue Restoril 30 mg nightly.  Geodon as needed for extreme agitation; last dose 5/4 0246  CT head negative for acute abnormality  Vitamin B12 384, IM cyanocobalamin 1000 mcg  Case management following for difficult posthospitalization disposition.  Patient is not safe to return home due to concern of being in danger for his wife and himself.   Palliative consulted,  appreciate recommendations  Psych consult, appreciate recommendations please see consult note for recommendations  Schedule melatonin 5 mg nightly for 7 PM per psychology recommendations  Continue Zoloft 50 mg daily per psych recommendations.  Continue Versed 0.5 mg as needed for nursing care.  Please use prior to Geodon.  Psychology recommended Ativan 0.25 mg with nursing care however it is not readily available.  General neurology will continue to follow    I have discussed the above with the patient, bedside RN and Dr. Palacios  Time spent with patient: 50 minutes in face-to-face evaluation and management of the patient.    Copied text in this note has been reviewed and is accurate as of 24.       IVONNE Santos              Electronically signed by Meliza Kahn APRN at 24 0964       RANDALL Palacios DO at 24 0830              [facility] Services  PROGRESS NOTE    Patient Name: Too Tai  : 1955  MRN: [id number]    Date of Admission: 2024  Primary Care Physician: Des Geller MD    Subjective     CC: f/u dementia with agitation    HPI:  Patient is a 67 yo M seen and examined by me this AM, resting comfortably in bed, wife at bedside along with sitter. Working on possible placement at [facility] at this time. Patient has been without restraints over the weekend, no other acute changes at this time, continue to follow.       Objective     Vital Signs:   Temp:  [97.5 °F (36.4 °C)-98.4 °F (36.9 °C)] 98.4 °F (36.9 °C)  Heart Rate:  [74-88] 84  Resp:  [15-18] 15  BP: (121-189)/(61-90) 136/61     Physical Exam:  Constitutional: No acute distress, sleeping, resting comfortably in bed, currently on RA  HENT: NCAT, nares patent, mucous membranes moist  Respiratory: Clear to auscultation bilaterally, respiratory effort normal   Cardiovascular: RRR, no murmurs, rubs, or gallops  Gastrointestinal: Positive bowel sounds, soft, nontender,  nondistended  Musculoskeletal: No bilateral ankle edema  Psychiatric: limited   Neurologic: limited, currently sleeping  Skin: No rashes      Results Reviewed:  LAB RESULTS:    Brief Urine Lab Results  (Last result in the past 365 days)        Color   Clarity   Blood   Leuk Est   Nitrite   Protein   CREAT   Urine HCG        04/19/24 0243 Yellow   Clear   Negative   Negative   Negative   Negative                 Microbiology Results Abnormal       None          No radiology results from the last 24 hrs    Results for orders placed during the hospital encounter of 03/27/24    Adult Transthoracic Echo Complete W/ Cont if Necessary Per Protocol    Interpretation Summary    Left ventricular systolic function is normal. Calculated left ventricular EF = 55.1% Normal left ventricular cavity size noted. Left ventricular wall thickness is consistent with mild concentric hypertrophy. Left ventricular diastolic function is consistent with (grade I) impaired relaxation.    The right ventricular cavity is mildly dilated. Normal right ventricular systolic function noted.    There is calcification of the aortic valve. The aortic valve appears trileaflet. Mild aortic valve regurgitation is present. No aortic valve stenosis is present.    Mitral annular calcification is present. Trace mitral valve regurgitation is present. No significant mitral valve stenosis is present.    The tricuspid valve is structurally normal with no stenosis present. Trace tricuspid valve regurgitation is present. Insufficient TR velocity profile to estimate the right ventricular systolic pressure.    Mild dilation of the sinuses of Valsalva is present. Mild dilation of the ascending aorta is present. Ascending aorta = 4.2 cm    Current medications:  Scheduled Meds:amLODIPine, 10 mg, Oral, Q24H  cyanocobalamin, 1,000 mcg, Intramuscular, Q28 Days  donepezil, 10 mg, Oral, BID  folic acid, 1 mg, Oral, Daily  heparin (porcine), 5,000 Units, Subcutaneous,  Q8H  insulin glargine, 17 Units, Subcutaneous, Q12H  insulin lispro, 2-7 Units, Subcutaneous, 4x Daily AC & at Bedtime  Insulin Lispro, 7 Units, Subcutaneous, TID With Meals  lisinopril, 30 mg, Oral, Daily  melatonin, 5 mg, Oral, Nightly  miconazole, 1 Application, Topical, Q12H  mirtazapine, 15 mg, Oral, Nightly  PHENobarbital, 130 mg, Oral, Nightly   Or  PHENobarbital, 130 mg, Intramuscular, Nightly  PHENobarbital, 64.8 mg, Oral, BID   Or  PHENobarbital, 65 mg, Intramuscular, BID  QUEtiapine, 100 mg, Oral, BID  QUEtiapine, 300 mg, Oral, Nightly  senna-docusate sodium, 2 tablet, Oral, BID  sertraline, 50 mg, Oral, Daily  sodium chloride, 10 mL, Intravenous, Q12H  temazepam, 30 mg, Oral, Nightly      Continuous Infusions:     PRN Meds:.  acetaminophen **OR** acetaminophen **OR** acetaminophen    senna-docusate sodium **AND** polyethylene glycol **AND** bisacodyl **AND** bisacodyl    Calcium Replacement - Follow Nurse / BPA Driven Protocol    dextrose    dextrose    glucagon (human recombinant)    Magnesium Standard Dose Replacement - Follow Nurse / BPA Driven Protocol    midazolam    Phosphorus Replacement - Follow Nurse / BPA Driven Protocol    Potassium Replacement - Follow Nurse / BPA Driven Protocol    sodium chloride    sodium chloride    ziprasidone    Assessment & Plan     Active Hospital Problems    Diagnosis  POA    **Agitation due to dementia [F03.911]  Yes    Agitation [R45.1]  Yes    Type 2 diabetes mellitus with hyperglycemia, with long-term current use of insulin [E11.65, Z79.4]  Not Applicable    Peripheral neuropathy [G62.9]  Yes    Hyperlipidemia [E78.5]  Yes    Benign prostatic hyperplasia [N40.0]  Yes    Benign essential HTN [I10]  Yes    Peripheral vascular disease [I73.9]  Yes    Coronary artery disease involving native coronary artery of native heart [I25.10]  Yes      Resolved Hospital Problems   No resolved problems to display.     Brief Hospital Course to date:  Too Tai is a 68yoM with  PMH significant for advanced dementia, CAD, diabetes mellitus type 2, HLD and PVD. Admitted to [facility] ED on 4/18/24 for agitation / behavioral disturbances. Recently admitted to [facility] 2/24-3/4/24 for dementia with behavioral disturbances and again 3/27-4/4/24 for similar issues. Patient transferred to  services on 5/6, reviewed chart and working on possible transfer to [facility] facility, continue to follow with CM and neurology at this time. Sitter remains at bedside, patient has been out of restrains since 5/3, continue to follow at this time.      Advanced dementia complicated by behavioral disturbance with superimposed sleep disturbance   - Infectious work up unremarkable. UA reassuring. CXR without acute findings. CT head without acute findings  - Continue home Aricept and Mirtazapine 15mg QHS and melatonin 5 mg at 7 pm  - Continue Seroquel   - 5/3 trial of zoloft 50 mg and PRN versed 0.5 mg as needed for nursing care. (Ativan 0.25mg recommended per psych but it is not available)  - Neurology started Phenobarbital, level now WNL.  Will continue to monitor effect, again level remains WNL on 5/6   - Continue Temazepam 30mg QHS  - Appreciate palliative care assistance  - May need geriatric psych facility vs LTC - CM looking into this. Telepsych eval completed 5/2/24  - Lost IV access, will need to monitor PO intake, may need to replace IVFs, continue to follow.   - PRN Geodon    Uncontrolled diabetes mellitus type 2  - Appears not on any home meds for DM  - A1c 9.5%  - Continue Lantus, increased to 17 units BID, Lispro 7 units TID AC + SSI   - glucose reviewed    HTN  - continue Amlodipine 10mg daily  - continue Lisinopril 30mg daily     Expected Discharge Location and Transportation: TBD  Expected Discharge   Expected Discharge Date: 5/7/2024; Expected Discharge Time:      DVT prophylaxis:Medical and mechanical DVT prophylaxis orders are present.    AM-PAC 6 Clicks Score (PT): 14 (05/05/24  )    CODE STATUS:   Code Status and Medical Interventions:   Ordered at: 24 1037     Medical Intervention Limits:    NO intubation (DNI)     Level Of Support Discussed With:    Next of Kin (If No Surrogate)     Code Status (Patient has no pulse and is not breathing):    No CPR (Do Not Attempt to Resuscitate)     Medical Interventions (Patient has pulse or is breathing):    Limited Support     GLENN Palacios DO  24        Electronically signed by RANDALL Palacios DO at 24 1026       Kemal Rayo MD at 24 1200              [facility] Medicine Services  PROGRESS NOTE    Patient Name: Too Tai  : 1955  MRN: [id number]    Date of Admission: 2024  Primary Care Physician: Des Geller MD    Subjective     CC: f/u dementia with agitation    HPI:  Patient currently asleep.  Spouse at bedside.        Objective     Vital Signs:   Temp:  [97 °F (36.1 °C)-97.8 °F (36.6 °C)] 97.6 °F (36.4 °C)  Heart Rate:  [70-78] 71  Resp:  [16] 16  BP: (141-173)/(62-93) 158/80     Physical Exam:  Non toxic appearing, in bed  MM moist  RRR  CTAB  Abd soft, NT  No edema  Flat affect    Results Reviewed:  LAB RESULTS:    Brief Urine Lab Results  (Last result in the past 365 days)        Color   Clarity   Blood   Leuk Est   Nitrite   Protein   CREAT   Urine HCG        24 0243 Yellow   Clear   Negative   Negative   Negative   Negative                 Microbiology Results Abnormal       None          No radiology results from the last 24 hrs    Results for orders placed during the hospital encounter of 24    Adult Transthoracic Echo Complete W/ Cont if Necessary Per Protocol    Interpretation Summary    Left ventricular systolic function is normal. Calculated left ventricular EF = 55.1% Normal left ventricular cavity size noted. Left ventricular wall thickness is consistent with mild concentric hypertrophy. Left ventricular diastolic function is consistent  with (grade I) impaired relaxation.    The right ventricular cavity is mildly dilated. Normal right ventricular systolic function noted.    There is calcification of the aortic valve. The aortic valve appears trileaflet. Mild aortic valve regurgitation is present. No aortic valve stenosis is present.    Mitral annular calcification is present. Trace mitral valve regurgitation is present. No significant mitral valve stenosis is present.    The tricuspid valve is structurally normal with no stenosis present. Trace tricuspid valve regurgitation is present. Insufficient TR velocity profile to estimate the right ventricular systolic pressure.    Mild dilation of the sinuses of Valsalva is present. Mild dilation of the ascending aorta is present. Ascending aorta = 4.2 cm    Current medications:  Scheduled Meds:amLODIPine, 10 mg, Oral, Q24H  cyanocobalamin, 1,000 mcg, Intramuscular, Q28 Days  donepezil, 10 mg, Oral, BID  folic acid, 1 mg, Oral, Daily  heparin (porcine), 5,000 Units, Subcutaneous, Q8H  insulin glargine, 17 Units, Subcutaneous, Q12H  insulin lispro, 2-7 Units, Subcutaneous, 4x Daily AC & at Bedtime  Insulin Lispro, 7 Units, Subcutaneous, TID With Meals  lisinopril, 30 mg, Oral, Daily  melatonin, 5 mg, Oral, Nightly  miconazole, 1 Application, Topical, Q12H  mirtazapine, 15 mg, Oral, Nightly  PHENobarbital, 130 mg, Oral, Nightly   Or  PHENobarbital, 130 mg, Intramuscular, Nightly  PHENobarbital, 64.8 mg, Oral, BID   Or  PHENobarbital, 65 mg, Intramuscular, BID  QUEtiapine, 100 mg, Oral, BID  QUEtiapine, 300 mg, Oral, Nightly  senna-docusate sodium, 2 tablet, Oral, BID  sertraline, 50 mg, Oral, Daily  sodium chloride, 10 mL, Intravenous, Q12H  temazepam, 30 mg, Oral, Nightly      Continuous Infusions:lactated ringers, 75 mL/hr, Last Rate: 75 mL/hr (05/05/24 8786)      PRN Meds:.  acetaminophen **OR** acetaminophen **OR** acetaminophen    senna-docusate sodium **AND** polyethylene glycol **AND** bisacodyl  **AND** bisacodyl    Calcium Replacement - Follow Nurse / BPA Driven Protocol    dextrose    dextrose    glucagon (human recombinant)    Magnesium Standard Dose Replacement - Follow Nurse / BPA Driven Protocol    midazolam    Phosphorus Replacement - Follow Nurse / BPA Driven Protocol    Potassium Replacement - Follow Nurse / BPA Driven Protocol    sodium chloride    sodium chloride    ziprasidone    Assessment & Plan     Active Hospital Problems    Diagnosis  POA    **Agitation due to dementia [F03.911]  Yes    Agitation [R45.1]  Yes    Type 2 diabetes mellitus with hyperglycemia, with long-term current use of insulin [E11.65, Z79.4]  Not Applicable    Peripheral neuropathy [G62.9]  Yes    Hyperlipidemia [E78.5]  Yes    Benign prostatic hyperplasia [N40.0]  Yes    Benign essential HTN [I10]  Yes    Peripheral vascular disease [I73.9]  Yes    Coronary artery disease involving native coronary artery of native heart [I25.10]  Yes      Resolved Hospital Problems   No resolved problems to display.     Brief Hospital Course to date:  Too Tai is a 68yoM with PMH significant for advanced dementia, CAD, diabetes mellitus type 2, HLD and PVD. Admitted to [facility] ED on 4/18/24 for agitation / behavioral disturbances. Recently admitted to [facility] 2/24-3/4/24 for dementia with behavioral disturbances and again 3/27-4/4/24 for similar issues.      Advanced dementia complicated by behavioral disturbance with superimposed sleep disturbance   - Infectious work up unremarkable. UA reassuring. CXR without acute findings. CT head without acute findings  - Continue home Aricept and Mirtazapine 15mg QHS and melatonin 5 mg at 7 pm  - Continue Seroquel   - 5/3 trial of zoloft 50 mg and PRN versed 0.5 mg as needed for nursing care. (Ativan 0.25mg recommended per psych but it is not available)  - Neurology started Phenobarbital, level now WNL.  Will continue to monitor effect and recheck level on Monday per neurology  recs  - Continue Temazepam 30mg QHS  - Appreciate palliative care assistance  - May need geriatric psych facility vs LTC - CM looking into this. Telepsych eval completed 5/2/24  - IV fluids while taking in minimal PO   - PRN Geodon    Uncontrolled diabetes mellitus type 2  - Appears not on any home meds for DM  - A1c 9.5%  - Continue Lantus, increased to 17 units BID, Lispro 7 units TID AC + SSI   - glucose reviewed    HTN  - continue Amlodipine 10mg daily  - continue Lisinopril 30mg daily     Expected Discharge Location and Transportation: TBD  Expected Discharge   Expected Discharge Date: 5/7/2024; Expected Discharge Time:      DVT prophylaxis:Medical and mechanical DVT prophylaxis orders are present.    AM-PAC 6 Clicks Score (PT): 14 (05/05/24 0730)    CODE STATUS:   Code Status and Medical Interventions:   Ordered at: 04/19/24 1037     Medical Intervention Limits:    NO intubation (DNI)     Level Of Support Discussed With:    Next of Kin (If No Surrogate)     Code Status (Patient has no pulse and is not breathing):    No CPR (Do Not Attempt to Resuscitate)     Medical Interventions (Patient has pulse or is breathing):    Limited Support     Kemal Rayo MD  05/05/24        Electronically signed by Kemal Rayo MD at 05/05/24 1203           Tanesha Delgado, APRN   Nurse Practitioner  Case Management     Consults     Addendum     Date of Service: 05/02/24 0859  Creation Time: 05/02/24 0859     Expand All Collapse All[]Expand All by Default         Hospital Consult      This provider is located at [facility], [facility], located at [address] [place] [address], [address] and is conducting  a secure MyChart Video Visit through [employer]. Patient is being evaluated today as an inpatient at [facility] and states they are in a secure environment for this appointment. The patient consents to be seen remotely, and when consent is given they understand that the consent  allows for patient identifiable information to be sent to a third party as needed. They may refuse to be seen remotely at any time. The electronic data is encrypted and password protected, and the patient  has been advised of the potential risks to privacy not withstanding such measures. The patient's condition being diagnosed/treated is appropriate for telemedicine. The provider identified herself as well as her credentials to patient. Patient's identity confirmed by requesting wife t to to correctly state his name and date of birth.     Date: 24  Patient Name: Too Tai  : 1955   MRN: [id number]   Attending: Kemal Rayo MD     Referring Provider: No ref. provider found     Chief Complaint:    Visit Diagnosis       ICD-10-CM ICD-9-CM   1. Dementia with behavioral disturbance  F03.918 294.21   2. Aggressive behavior  R46.89 V40.39   3. Agitation due to dementia  F03.911 294.21   4. Hyperglycemia due to diabetes mellitus  E11.65 250.02         History of Present Illness:   Too Tai is a 68 y.o. male currently hospitalized at [facility] for dementia with behavioral disturbances. Behavioral Health was consulted to evaluate patient due to concerns regarding increasing agitation that has prompted 3 recent hospital admissions.. This is his  initial encounter with this provider. Too Tai is a 67yo M with PMH significant for advanced dementia, CAD, diabetes mellitus type 2, HLD and PVD. Admitted to [facility] ED on 24 for agitation / behavioral disturbances. Recently admitted to [facility] -3/4/24 for dementia with behavioral disturbances and again 3/27-24 for increased confusion and falls.      Patient  lying in bed awake with wrist restraints during visit. He is alert, nonverbal and unresponsive to provider's questions.  He is calm with intermittent restlessness fidgeting with wrist restraints and sitting up in bed. Cognition/mental status unable to be  "evaluated due to his lack of speech and interaction with provider. He is essentially unable to recognize an evaluation of is taking place on his behalf. He did however, initially wave at provider with wife prompting him.  His wife of 47 years, Caroline, and son Bill are at bedside and provide historical information. Other son passed away five years ago.  Patient currently lives at home  ([facility]) with his wife, son Bill, his wife, his 2 children, his mother-in-law, and 4 grandchildren that visit often . Patient was born in [place] and raised by parents. Has sisters. No family mental health history wife is aware of . HS graduate.  Worked for [employer] 45 yrs before he had to retire in 2017 due to complications from femoral bypass.      Wife states she has never been given a formal diagnosis regarding patient's condition.  She denies any  history of depression, anxiety, substance use, psych hospitalizations and legal history.  Former smoker. He has no history of seizures.  Fell off a horse in his 20s and \"busted \"the back of his head. Wife reports pt saw Dr Costa at [facility] who said he thinks he has Alzheimer's but has never been given formal diagnosis she is aware of.  Caroline noticed 5-6 yrs ago \"things weren't quite right\" She recalls one of the kids  needed air in their bicycle tire and he didn't know how to perform the task, one he had done many times before.  Agitation started 2 years ago and has gotten worse. 9/22 the family went to [facility] and he wondered off for 1 hr and stranger brought him back. He was delusional and thought they were selling drugs in [facility]. Wife states sometimes he tries to talk to people who are not there and in the last month he started arguing with the mirror. Wife says he acts like he can't see her. She puts her hand in front of her eyes but he doesn't blink.  Wife says she is usually able to de-escalate him if he acts like he is going to " "hit her at home.  She sates he gets agitated when he puts his  shoes on the wrong feet and argues when she would correct him. However, in  an hour, he would have a moment of clarity and tell her  his shoes on the wrong feet. Wife states he is in  restraints due to hitting staff.  He was unable to stand upright and walk last time she took him home and she had to restrain at home because  he feel 6-7 times in 24 hrs.     She reports he lost his ability to function independently last fall. At home he would tell her he had to go to the bathroom and she would have to show him the toilet and he still sometimes urinates in the trash can or  in the laundry room. He is able to sit an hour at home before he gets  restless. She says he is constantly moving and has tried to get out of the house. They now have ADT alarms on  windows and doors. She has a stick placed to jam the sliding door he has nearly torn out trying to get the door open. He will let he wash his face and shave him but gets mad if she tries to  shower him or wash his private area. Appetite is \"fair\" as long as he can hold it and eat it himself. He wont let wife spoon feed him. Appetite is  good at home.  Wife reports insomnia at home is relatively new, and he didn't sleep the last  3 days at home. He  is sleeping good in hospital and  \" right now he is pretty mellow.\" Seroquel was initially prescribed  to calm him down, not for insomnia.  Wife states he was going to go to [facility] but they declined. She could probably take him home but needs more  help at home. She has a helper but is not much help. She is concerned about his weakness and having only stood 2x in 3 weeks. She states PT wont see him because  pt gets \"grouchy with them\". Wife feels PT should be more persistnet Prior meds per wife: Seroquel >1yr, Restoril 30 mg Nov 2023, Depakote-March, Rexulti titration up to 2 mg x  1 mo came to hospital Feb because  he was jerking in torso and arms.         "   Subjective  Subjective      Review of Systems   Constitutional:  Positive for activity change and fatigue.   Neurological:  Positive for speech difficulty, memory problem and confusion.   Psychiatric/Behavioral:  Positive for agitation and decreased concentration.    All other systems reviewed and are negative.        Depression Screening:  Level of Risk per Screen: screen negative (4/19/2024 12:55 AM)     PHQ-9 Depression Screening      C-SSRS (Recent)  Q1 Wished to be Dead (Past Month): no  Q2 Suicidal Thoughts (Past Month): no  Q6 Suicide Behavior (Lifetime): no  Level of Risk per Screen: screen negative     Past Psychiatric History:  none per wife        Substance Abuse History/Last use: quit smoking 27 yrs ago. No alcohol or illicit substance use                    Legal History:      Work History:               Highest level of education obtained: 12th grade               History? no              Patient's Occupation: retired     Interpersonal/Relational:              Marital Status:  47 yrs              Support system: significant other and son     Social History:  Where was patient born: [facility]  Upbringing: parents  Where does patient currently live: [facility]  Living situation: wife, son, [facility], [place] and grandkids     Family History:         Family History   Problem Relation Age of Onset    Diabetes Mother      Heart disease Father      Hypertension Father      Hyperlipidemia Father      Diabetes Sister      Diabetes Maternal Grandfather      Diabetes Sister      Diabetes Sister           Medical History        Past Medical History:   Diagnosis Date    Coronary artery disease      Dementia      Diabetes mellitus      Hyperlipidemia      Peripheral vascular disease              Surgical History         Past Surgical History:   Procedure Laterality Date    CORONARY ARTERY BYPASS GRAFT        FEMORAL ARTERY - POPLITEAL ARTERY BYPASS GRAFT                  Current Medications       Current Facility-Administered Medications:     acetaminophen (TYLENOL) tablet 650 mg, 650 mg, Oral, Q4H PRN, 650 mg at 04/30/24 2042 **OR** acetaminophen (TYLENOL) 160 MG/5ML oral solution 650 mg, 650 mg, Oral, Q4H PRN **OR** acetaminophen (TYLENOL) suppository 650 mg, 650 mg, Rectal, Q4H PRN, TraciKamala bacasie G, DO    amLODIPine (NORVASC) tablet 10 mg, 10 mg, Oral, Q24H, Calin Benitez MD, 10 mg at 05/01/24 0828    sennosides-docusate (PERICOLACE) 8.6-50 MG per tablet 2 tablet, 2 tablet, Oral, BID, 2 tablet at 05/01/24 2011 **AND** polyethylene glycol (MIRALAX) packet 17 g, 17 g, Oral, Daily PRN **AND** bisacodyl (DULCOLAX) EC tablet 5 mg, 5 mg, Oral, Daily PRN **AND** bisacodyl (DULCOLAX) suppository 10 mg, 10 mg, Rectal, Daily PRN, Jennifer Bai, APRN    Calcium Replacement - Follow Nurse / BPA Driven Protocol, , Does not apply, PRN, Traci, Nerissa G, DO    cyanocobalamin injection 1,000 mcg, 1,000 mcg, Intramuscular, Q28 Days, Darrell Jhaveri, DO, 1,000 mcg at 04/19/24 1532    dextrose (D50W) (25 g/50 mL) IV injection 25 g, 25 g, Intravenous, Q15 Min PRN, Traci, Nerissa G, DO    dextrose (GLUTOSE) oral gel 15 g, 15 g, Oral, Q15 Min PRN, Traci, Nerissa G, DO    donepezil (ARICEPT) tablet 10 mg, 10 mg, Oral, BID, Darrell Jhaveri A, DO, 10 mg at 05/01/24 2011    folic acid (FOLVITE) tablet 1 mg, 1 mg, Oral, Daily, Traci, Nerissa G, DO, 1 mg at 05/01/24 0829    glucagon (GLUCAGEN) injection 1 mg, 1 mg, Intramuscular, Q15 Min PRN, Traci, Nerissa G, DO    heparin (porcine) 5000 UNIT/ML injection 5,000 Units, 5,000 Units, Subcutaneous, Q8H, Nerissa Aviles DO, 5,000 Units at 05/02/24 0507    insulin glargine (LANTUS, SEMGLEE) injection 15 Units, 15 Units, Subcutaneous, Q12H, Cecily Bain, DO, 15 Units at 05/01/24 2026    Insulin Lispro (humaLOG) injection 2-7 Units, 2-7 Units, Subcutaneous, 4x Daily AC & at Bedtime, Nerissa Aviles DO, 3 Units at 05/01/24 2026    Insulin Lispro (humaLOG) injection 7 Units, 7 Units,  Subcutaneous, TID With Meals, Jaclyn Holguin, APRN, 7 Units at 05/01/24 1731    lactated ringers infusion, 75 mL/hr, Intravenous, Continuous, Parisa Madrigal PA-C, Last Rate: 75 mL/hr at 05/02/24 0146, 75 mL/hr at 05/02/24 0146    lisinopril (PRINIVIL,ZESTRIL) tablet 30 mg, 30 mg, Oral, Daily, Parisa Madrigal PA-C, 30 mg at 05/01/24 0830    Magnesium Standard Dose Replacement - Follow Nurse / BPA Driven Protocol, , Does not apply, PRN, TraciKamalaNerissa G, DO    melatonin tablet 5 mg, 5 mg, Oral, Nightly PRN, Nerissa Aviles G, DO, 5 mg at 05/01/24 2011    miconazole (MICOTIN) 2 % powder 1 Application, 1 Application, Topical, Q12H, Cecily Bain, DO, 1 Application at 05/01/24 2245    mirtazapine (REMERON) tablet 15 mg, 15 mg, Oral, Nightly, Darrell Jhaveri, DO, 15 mg at 05/01/24 2011    PHENobarbital tablet 130 mg, 130 mg, Oral, Nightly **OR** PHENobarbital injection 130 mg, 130 mg, Intramuscular, Nightly, Brijesh Mac MD, 130 mg at 05/01/24 2229    PHENobarbital tablet 64.8 mg, 64.8 mg, Oral, BID, 64.8 mg at 05/01/24 1259 **OR** PHENobarbital injection 65 mg, 65 mg, Intramuscular, BID, Brijesh Mac MD    Phosphorus Replacement - Follow Nurse / BPA Driven Protocol, , Does not apply, PRN, Traci Nerissa G, DO    Potassium Replacement - Follow Nurse / BPA Driven Protocol, , Does not apply, PRN, Traci, Nerissa G, DO    QUEtiapine (SEROquel) tablet 100 mg, 100 mg, Oral, BID, Meliza Kahn K, APRN, 100 mg at 05/01/24 1310    QUEtiapine (SEROquel) tablet 300 mg, 300 mg, Oral, Nightly, Femi April K, APRN, 300 mg at 05/01/24 2010    sodium chloride 0.9 % flush 10 mL, 10 mL, Intravenous, Q12H, Traci, Nerissa G, DO, 10 mL at 05/01/24 2029    sodium chloride 0.9 % flush 10 mL, 10 mL, Intravenous, PRN, Traci, Nerissa G, DO    sodium chloride 0.9 % infusion 40 mL, 40 mL, Intravenous, PRN, Traci, Nerissa G, DO, 40 mL at 04/23/24 8308    temazepam (RESTORIL) capsule 30 mg, 30 mg, Oral, Nightly, Darrell Jhaveri,  DO, 30 mg at 05/01/24 2010    ziprasidone (GEODON) injection 10 mg, 10 mg, Intramuscular, Q4H PRN, Kahn, April K, APRN, 10 mg at 04/30/24 5577        Medication Considerations:  ELISEO reviewed and appropriate.       Allergies        Allergies   Allergen Reactions    Bactrim [Sulfamethoxazole-Trimethoprim] Rash    Adhesive Tape Rash    Neosporin [Neomycin-Bacitracin Zn-Polymyx] Rash                  Objective  Objective      Physical Exam:  Temp:  [96.7 °F (35.9 °C)-97.8 °F (36.6 °C)] 96.7 °F (35.9 °C)  Heart Rate:  [71-74] 71  Resp:  [16-18] 16  BP: (160-175)/(79-86) 160/79  Body mass index is 27.6 kg/m².      Mental Status Exam:   MENTAL STATUS EXAM   General Appearance:  Well developed  Eye Contact:  Poor eye contact  Attitude:  Other  Other Comment:  Calm, unable to participate due to cognitive status  Motor Activity:  Other  Other Comment:  MARIIA-waved at camera, sat up in bed, per wife, has not walked in 3 weeks  Muscle Strength:  Other  Other Comment:  MARIIA  Speech:  Other  Other Comment:  Mute  Language:  Other  Other Comment:  Mute  Mood and affect:  Other  Other Comment:  Calm, restless (trying to get restraint off)  Hopelessness:  Denies  Loneliness: Denies  Thought Process:  Other  Other Comment:  MARIIA pt nonverbal  Associations/ Thought Content:  Other  Other Comment:  Nonverbal  Hallucinations:  Visual, auditory and other  Other Comment:  Reported per wife  Suicidal Ideations:  Not present  Homicidal Ideation:  Not present  Sensorium:  Confused  Orientation:  Other  Other Comment:  MARIIA nonverbal  Immediate Recall, Recent, and Remote Memory:  Other  Other Comment:  MARIIA nonverbal  Attention Span/ Concentration:  Easily distracted  Fund of Knowledge:  Poor  Intellectual Functioning:  Below average  Insight:  Poor  Judgement:  Poor  Reliability:  Poor  Impulse Control:  Poor              TSH   Date Value Ref Range Status   04/18/2024 3.220 0.270 - 4.200 uIU/mL Final            Vitamin B-12   Date Value Ref  Range Status   04/18/2024 384 211 - 946 pg/mL Final            Magnesium   Date Value Ref Range Status   04/19/2024 1.9 1.6 - 2.4 mg/dL Final            Folate   Date Value Ref Range Status   02/24/2024 10.20 4.78 - 24.20 ng/mL Final               Lab Results   Component Value Date     GLUCOSE 166 (H) 04/19/2024     BUN 16 04/19/2024     CREATININE 0.82 04/19/2024     EGFRRESULT 73.6 08/03/2023     EGFR 96.3 04/19/2024     BCR 19.5 04/19/2024     K 4.4 04/19/2024     CO2 30.0 (H) 04/19/2024     CALCIUM 9.9 04/19/2024     PROTENTOTREF 7.0 08/03/2023     ALBUMIN 3.9 04/18/2024     BILITOT 0.3 04/18/2024     AST 17 04/18/2024     ALT 13 04/18/2024               Lab Results   Component Value Date     WBC 10.47 04/19/2024     HGB 15.5 04/19/2024     HCT 46.7 04/19/2024     MCV 92.7 04/19/2024      04/19/2024               Lab Results   Component Value Date     CHOL 220 (H) 01/08/2024     CHLPL 201 (H) 08/03/2023     TRIG 108 01/08/2024     HDL 41 01/08/2024      (H) 01/08/2024         3/28/24 CT head  1. No acute intracranial abnormality.  2. Moderate chronic small vessel ischemic change.        Assessment / Plan       Visit Diagnosis/Orders Placed This Visit:  Dementia with psychosis     Recommendations:   Encourage  staff to prioritize regulating circadian rhythm, minimize night-time interruptions, etc  Change melatonin to 5 mg hs scheduled and administer around 7pm  Consider ODT  or IM Olanzipine for acute agitation prn (instead of geodon)  (Qtc sparing) start with 2.5 to 5 mg    Consider starting Zoloft or Lexapro (qtc sparing) SSRIs  have best evidence for agitation with dementia   Taper off Seroquel (more prone to cause orthostatic hypotension, hyponatremia and increases fall risk. Would favor Abilify (fewer EPS side effects) or Risperdal if insomnia is persistent  Consider ativan 0.25 mg prn before bathing/therapy to calm agitation  Taper off Temazepam not recommended in this population  Avoid  anticholinergic meds hen possible  F/U with behavioral health provider after discharge  MRI ordered 12/7/23-results not available to view     Impression/Formulation: Patient appears alert, Unable to assess cognition due to nonverbal presentation and minimal interaction with provider     Tanesha Delgado PMDAWSONP-BC [facility]  [place]                  All medication doses during the admission are shown, including meds that are no longer on order.  Scheduled Meds Sorted by Name  for Too Tai as of 5/6/24 through 5/8/24    1 Day 3 Days 7 Days 10 Days < Today >   Legend:       Medications 05/06/24 05/07/24 05/08/24   amLODIPine (NORVASC) tablet 10 mg  Dose: 10 mg  Freq: Every 24 Hours Scheduled Route: PO  Indications of Use: HYPERTENSION  Start: 04/25/24 0900   Admin Instructions:       0939        0824        1051          amLODIPine (NORVASC) tablet 10 mg  Dose: 10 mg  Freq: Every 24 Hours Scheduled Route: PO  Indications of Use: HYPERTENSION  Start: 04/19/24 0900 End: 04/24/24 1101   Admin Instructions:            cyanocobalamin injection 1,000 mcg  Dose: 1,000 mcg  Freq: Every 28 Days Route: IM  Start: 04/19/24 1300         Divalproex Sodium (DEPAKOTE SPRINKLE) capsule 250 mg  Dose: 250 mg  Freq: Every 8 Hours Scheduled Route: PO  Start: 04/19/24 0630 End: 04/22/24 1133   Admin Instructions:            Divalproex Sodium (DEPAKOTE SPRINKLE) capsule 500 mg  Dose: 500 mg  Freq: Every 8 Hours Scheduled Route: PO  Start: 04/24/24 1400 End: 04/24/24 1153   Admin Instructions:            Divalproex Sodium (DEPAKOTE SPRINKLE) capsule 500 mg  Dose: 500 mg  Freq: Every 8 Hours Scheduled Route: PO  Start: 04/23/24 1400 End: 04/24/24 0905   Admin Instructions:            Divalproex Sodium (DEPAKOTE SPRINKLE) capsule 500 mg  Dose: 500 mg  Freq: Every 8 Hours Scheduled Route: PO  Start: 04/22/24 1600 End: 04/23/24 1057   Admin Instructions:            donepezil (ARICEPT) tablet 10 mg  Dose: 10  mg  Freq: 2 Times Daily Route: PO  Indications Comment: dementia  Start: 04/24/24 1000    0940   1942       0738   2008 1051 2000         donepezil (ARICEPT) tablet 10 mg  Dose: 10 mg  Freq: Nightly Route: PO  Indications Comment: dementia  Start: 04/24/24 2100 End: 04/24/24 0905         donepezil (ARICEPT) tablet 10 mg  Dose: 10 mg  Freq: Nightly Route: PO  Indications Comment: dementia  Start: 04/24/24 2100 End: 04/24/24 0822         donepezil (ARICEPT) tablet 10 mg  Dose: 10 mg  Freq: Daily Route: PO  Indications Comment: dementia  Start: 04/19/24 0900 End: 04/19/24 1006         donepezil (ARICEPT) tablet 22.5 mg  Dose: 22.5 mg  Freq: Nightly Route: PO  Indications Comment: dementia  Start: 04/24/24 2100 End: 04/24/24 0823         donepezil (ARICEPT) tablet 22.5 mg  Dose: 22.5 mg  Freq: Daily Route: PO  Indications Comment: dementia  Start: 04/20/24 0900 End: 04/23/24 1234         folic acid (FOLVITE) tablet 1 mg  Dose: 1 mg  Freq: Daily Route: PO  Start: 04/19/24 0900    0939        0824        1051          haloperidol lactate (HALDOL) injection 1 mg  Dose: 1 mg  Freq: Once Route: IM  Start: 04/19/24 0645 End: 04/19/24 0607   Admin Instructions:            heparin (porcine) 5000 UNIT/ML injection 5,000 Units  Dose: 5,000 Units  Freq: Every 8 Hours Scheduled Route: SC  Indications of Use: PROPHYLAXIS OF VENOUS THROMBOEMBOLISM  Start: 04/19/24 0630    0516   1513   2139      0535   1432   2119      0619   1400   2200        insulin glargine (LANTUS, SEMGLEE) injection 10 Units  Dose: 10 Units  Freq: Nightly Route: SC  Start: 04/21/24 2100 End: 04/21/24 1702   Admin Instructions:            insulin glargine (LANTUS, SEMGLEE) injection 10 Units  Dose: 10 Units  Freq: Every 12 Hours Scheduled Route: SC  Start: 04/19/24 0900 End: 04/21/24 1702   Admin Instructions:            insulin glargine (LANTUS, SEMGLEE) injection 15 Units  Dose: 15 Units  Freq: Every 12 Hours Scheduled Route: SC  Start: 04/21/24 2100  End: 05/02/24 1244   Admin Instructions:            insulin glargine (LANTUS, SEMGLEE) injection 17 Units  Dose: 17 Units  Freq: Every 12 Hours Scheduled Route: SC  Start: 05/02/24 2100   Admin Instructions:       0944   2131       0826 2009 0831 2100         Insulin Lispro (humaLOG) injection 2-7 Units  Dose: 2-7 Units  Freq: 4 Times Daily Before Meals & Nightly Route: SC  Start: 04/19/24 0730   Admin Instructions:       0807)   (2311) [C]   1752     2132 [C]        0701)   1218   1655     2137 [C]        0830   1130   1730     2100          Insulin Lispro (humaLOG) injection 5 Units  Dose: 5 Units  Freq: 3 Times Daily With Meals Route: SC  Start: 04/22/24 1800 End: 04/30/24 1249   Admin Instructions:            Insulin Lispro (humaLOG) injection 7 Units  Dose: 7 Units  Freq: 3 Times Daily With Meals Route: SC  Start: 04/30/24 1800   Admin Instructions:       0943) [C]   1212) [C]   1759      0739   1214   1702      0830   1200   1800        lisinopril (PRINIVIL,ZESTRIL) tablet 10 mg  Dose: 10 mg  Freq: Once Route: PO  Start: 04/24/24 1400 End: 04/24/24 1400   Admin Instructions:            lisinopril (PRINIVIL,ZESTRIL) tablet 20 mg  Dose: 20 mg  Freq: Daily Route: PO  Indications of Use: HYPERTENSION  Start: 04/19/24 0900 End: 04/24/24 1232   Admin Instructions:            lisinopril (PRINIVIL,ZESTRIL) tablet 30 mg  Dose: 30 mg  Freq: Daily Route: PO  Indications of Use: HYPERTENSION  Start: 04/25/24 0900   Admin Instructions:       0951        0824        1051          melatonin tablet 5 mg  Dose: 5 mg  Freq: Nightly Route: PO  Start: 05/02/24 1900    1942 2007 2100          miconazole (MICOTIN) 2 % powder 1 Application  Dose: 1 Application  Freq: Every 12 Hours Scheduled Route: TOP  Start: 04/22/24 1700   Admin Instructions:       0944   2356       0826 2008 0831 2100         midazolam (VERSED) injection 1 mg  Dose: 1 mg  Freq: Once Route: IV  Start: 04/19/24 0315 End:  04/19/24 0306   Admin Instructions:            midazolam (VERSED) injection 1 mg  Dose: 1 mg  Freq: Once Route: IV  Start: 04/19/24 0242 End: 04/19/24 0230   Admin Instructions:            mirtazapine (REMERON) tablet 15 mg  Dose: 15 mg  Freq: Nightly Route: PO  Start: 04/24/24 2000 1942 2008 2000          mirtazapine (REMERON) tablet 15 mg  Dose: 15 mg  Freq: Nightly Route: PO  Start: 04/19/24 2100 End: 04/24/24 0905         mirtazapine (REMERON) tablet 15 mg  Dose: 15 mg  Freq: Nightly Route: PO  Start: 04/19/24 2100 End: 04/19/24 1154         mirtazapine (REMERON) tablet 30 mg  Dose: 30 mg  Freq: Nightly Route: PO  Start: 04/19/24 2100 End: 04/19/24 1200          PHENobarbital tablet 129.6 mg  Dose: 129.6 mg  Freq: Nightly Route: PO  Start: 05/07/24 2100 End: 05/13/24 2059   Admin Instructions:        2007 2100          Or   PHENobarbital injection 130 mg  Dose: 130 mg  Freq: Nightly Route: IM  Start: 05/07/24 2100 End: 05/13/24 2059   Admin Instructions:                2100           PHENobarbital tablet 130 mg  Dose: 130 mg  Freq: Nightly Route: PO  Start: 05/06/24 2100 End: 05/07/24 1959   Admin Instructions:       2140        1959-D/C'd         Or   PHENobarbital injection 130 mg  Dose: 130 mg  Freq: Nightly Route: IM  Start: 05/06/24 2100 End: 05/07/24 1959   Admin Instructions:               1959-D/C'd          PHENobarbital tablet 130 mg  Dose: 130 mg  Freq: Nightly Route: PO  Start: 05/01/24 2100 End: 05/06/24 0931   Admin Instructions:       0931-D/C'd          Or   PHENobarbital injection 130 mg  Dose: 130 mg  Freq: Nightly Route: IM  Start: 05/01/24 2100 End: 05/06/24 0931   Admin Instructions:       0931-D/C'd           PHENobarbital tablet 32.4 mg  Dose: 32.4 mg  Freq: 2 Times Daily Route: PO  Start: 04/24/24 1400 End: 04/25/24 1224   Admin Instructions:            Or   PHENobarbital injection 32.4 mg  Dose: 32.4 mg  Freq: 2 Times Daily Route: IM  Start: 04/24/24 1400 End:  04/25/24 1224   Admin Instructions:             PHENobarbital tablet 32.4 mg  Dose: 32.4 mg  Freq: 2 Times Daily Route: PO  Start: 04/24/24 1245 End: 04/24/24 1316   Admin Instructions:            And   PHENobarbital injection 32.4 mg  Dose: 32.4 mg  Freq: 2 Times Daily Route: IM  Start: 04/24/24 1245 End: 04/24/24 1311   Admin Instructions:             PHENobarbital tablet 32.4 mg  Dose: 32.4 mg  Freq: 2 Times Daily Route: PO  Start: 04/24/24 1245 End: 04/24/24 1154   Admin Instructions:            And   PHENobarbital injection 32.4 mg  Dose: 32.4 mg  Freq: 2 Times Daily Route: IM  Start: 04/24/24 1245 End: 04/24/24 1154   Admin Instructions:             PHENobarbital tablet 64.8 mg  Dose: 64.8 mg  Freq: 2 Times Daily Route: PO  Start: 05/01/24 1400   Admin Instructions:          1514       1040   1528       1052   1400         Or   PHENobarbital injection 65 mg  Dose: 65 mg  Freq: 2 Times Daily Route: IM  Start: 05/01/24 1400   Admin Instructions:       0942                       1400          PHENobarbital tablet 64.8 mg  Dose: 64.8 mg  Freq: Every 8 Hours Scheduled Route: PO  Start: 04/27/24 2200 End: 05/01/24 1050   Admin Instructions:            Or   PHENobarbital injection 65 mg  Dose: 65 mg  Freq: Every 8 Hours Scheduled Route: IM  Start: 04/27/24 2200 End: 05/01/24 1050   Admin Instructions:             PHENobarbital tablet 64.8 mg  Dose: 64.8 mg  Freq: 2 Times Daily Route: PO  Start: 04/25/24 2000 End: 04/27/24 1129   Admin Instructions:            Or   PHENobarbital injection 65 mg  Dose: 65 mg  Freq: 2 Times Daily Route: IM  Start: 04/25/24 2000 End: 04/27/24 1129   Admin Instructions:            potassium chloride (KLOR-CON M20) CR tablet 40 mEq  Dose: 40 mEq  Freq: Every 4 Hours Route: PO  Start: 05/02/24 1200 End: 05/02/24 1959   Admin Instructions:            QUEtiapine (SEROquel) tablet 100 mg  Dose: 100 mg  Freq: Daily Route: PO  Start: 05/08/24 1000   Admin Instructions:         1051           QUEtiapine (SEROquel) tablet 100 mg  Dose: 100 mg  Freq: 2 Times Daily Route: PO  Start: 04/23/24 1400 End: 05/08/24 0902   Admin Instructions:       0941   1513       0738   1528       0902-D/C'd  (1052)          QUEtiapine (SEROquel) tablet 100 mg  Dose: 100 mg  Freq: Every 8 Hours Scheduled Route: PO  Start: 04/23/24 1400 End: 04/23/24 1100   Admin Instructions:            QUEtiapine (SEROquel) tablet 100 mg  Dose: 100 mg  Freq: Every 8 Hours Scheduled Route: PO  Start: 04/22/24 1600 End: 04/23/24 1057   Admin Instructions:            QUEtiapine (SEROquel) tablet 100 mg  Dose: 100 mg  Freq: Every 12 Hours Scheduled Route: PO  Start: 04/19/24 1245 End: 04/22/24 1133   Admin Instructions:            QUEtiapine (SEROquel) tablet 200 mg  Dose: 200 mg  Freq: Nightly Route: PO  Start: 04/24/24 2000 End: 04/30/24 1016   Admin Instructions:            QUEtiapine (SEROquel) tablet 200 mg  Dose: 200 mg  Freq: Nightly Route: PO  Start: 04/23/24 2100 End: 04/24/24 0905   Admin Instructions:            QUEtiapine (SEROquel) tablet 300 mg  Dose: 300 mg  Freq: Nightly Route: PO  Start: 04/30/24 2000   Admin Instructions:       1942 2008 2000          QUEtiapine (SEROquel) tablet 50 mg  Dose: 50 mg  Freq: Every Morning Route: PO  Start: 04/19/24 0900 End: 04/19/24 1006   Admin Instructions:            QUEtiapine fumarate ER (SEROquel XR) tablet 100 mg  Dose: 100 mg  Freq: Nightly Route: PO  Start: 04/19/24 2100 End: 04/19/24 1154   Admin Instructions:            senna (SENOKOT) tablet 1 tablet  Dose: 1 tablet  Freq: Nightly Route: PO  Start: 04/24/24 2100 End: 04/26/24 1346          sennosides-docusate (PERICOLACE) 8.6-50 MG per tablet 2 tablet  Dose: 2 tablet  Freq: 2 Times Daily Route: PO  Start: 04/26/24 2100   Admin Instructions:       0939   2140       0827   (2009) [C]       5044 3817         And   polyethylene glycol (MIRALAX) packet 17 g  Dose: 17 g  Freq: Daily PRN Route: PO  PRN Reason:  Constipation  PRN Comment: Use if senna-docusate is ineffective  Start: 04/26/24 1346   Admin Instructions:            And   bisacodyl (DULCOLAX) EC tablet 5 mg  Dose: 5 mg  Freq: Daily PRN Route: PO  PRN Reason: Constipation  PRN Comment: Use if polyethylene glycol is ineffective  Start: 04/26/24 1346   Admin Instructions:            And   bisacodyl (DULCOLAX) suppository 10 mg  Dose: 10 mg  Freq: Daily PRN Route: RE  PRN Reason: Constipation  PRN Comment: Use if bisacodyl oral is ineffective  Start: 04/26/24 1346   Admin Instructions:            sertraline (ZOLOFT) tablet 50 mg  Dose: 50 mg  Freq: Daily Route: PO  Start: 05/03/24 1000    0941        0824        1051          sodium chloride 0.9 % flush 10 mL  Dose: 10 mL  Freq: Every 12 Hours Scheduled Route: IV  Start: 04/19/24 0900    (0943)   (0028)       (9824)   (2010)       (1631)   2100         sterile water (preservative free) injection - ADS Override Pull  Start: 05/06/24 0536 End: 05/06/24 0547   Admin Instructions:       0547 [C]            sterile water (preservative free) injection - ADS Override Pull  Start: 05/05/24 0726 End: 05/05/24 0734   Admin Instructions:            sterile water (preservative free) injection - ADS Override Pull  Start: 05/01/24 0922 End: 05/01/24 2129   Admin Instructions:            sterile water (preservative free) injection - ADS Override Pull  Start: 04/30/24 1143 End: 04/30/24 2344   Admin Instructions:            sterile water (preservative free) injection - ADS Override Pull  Start: 04/25/24 1839 End: 04/26/24 0644   Admin Instructions:            sterile water (preservative free) injection - ADS Override Pull  Start: 04/19/24 0026 End: 04/19/24 0030   Admin Instructions:            temazepam (RESTORIL) capsule 30 mg  Dose: 30 mg  Freq: Nightly Route: PO  Start: 04/24/24 2000   Admin Instructions:       1942 2007 2000          temazepam (RESTORIL) capsule 30 mg  Dose: 30 mg  Freq: Nightly Route:  PO  Start: 04/21/24 2100 End: 04/24/24 0905   Admin Instructions:            ziprasidone (GEODON) injection 20 mg  Dose: 20 mg  Freq: Once Route: IM  Start: 04/19/24 0038 End: 04/19/24 0029   Admin Instructions:                        Continuous Meds Sorted by Name  for Too Tai as of 5/6/24 through 5/8/24  Legend:       Medications 05/06/24 05/07/24 05/08/24   lactated ringers infusion  Rate: 75 mL/hr Dose: 75 mL/hr  Freq: Continuous Route: IV  Last Dose: Stopped (05/05/24 1900)  Start: 04/24/24 1330 End: 05/06/24 0728       0728-D/C'd         sodium chloride 0.9 % infusion  Rate: 100 mL/hr Dose: 100 mL/hr  Freq: Continuous Route: IV  Last Dose: Stopped (04/24/24 1235)  Start: 04/23/24 1530 End: 04/24/24 1232         sodium chloride 0.9 % infusion  Rate: 100 mL/hr Dose: 100 mL/hr  Freq: Continuous Route: IV  Last Dose: 100 mL/hr (04/22/24 1826)  Start: 04/22/24 1915 End: 04/23/24 0425                     PRN Meds Sorted by Name  for Too Tai as of 5/6/24 through 5/8/24  Legend:       Medications 05/06/24 05/07/24 05/08/24    acetaminophen (TYLENOL) tablet 650 mg  Dose: 650 mg  Freq: Every 4 Hours PRN Route: PO  PRN Reason: Mild Pain  Start: 04/19/24 0541   Admin Instructions:            Or   acetaminophen (TYLENOL) 160 MG/5ML oral solution 650 mg  Dose: 650 mg  Freq: Every 4 Hours PRN Route: PO  PRN Reason: Mild Pain  Start: 04/19/24 0541   Admin Instructions:            Or   acetaminophen (TYLENOL) suppository 650 mg  Dose: 650 mg  Freq: Every 4 Hours PRN Route: RE  PRN Reason: Mild Pain  Start: 04/19/24 0541   Admin Instructions:             sennosides-docusate (PERICOLACE) 8.6-50 MG per tablet 2 tablet  Dose: 2 tablet  Freq: 2 Times Daily Route: PO  Start: 04/26/24 2100   Admin Instructions:       0939   2140       0827   (2009) [C]       5469   2100         And   polyethylene glycol (MIRALAX) packet 17 g  Dose: 17 g  Freq: Daily PRN Route: PO  PRN Reason: Constipation  PRN Comment: Use if  senna-docusate is ineffective  Start: 04/26/24 1346   Admin Instructions:            And   bisacodyl (DULCOLAX) EC tablet 5 mg  Dose: 5 mg  Freq: Daily PRN Route: PO  PRN Reason: Constipation  PRN Comment: Use if polyethylene glycol is ineffective  Start: 04/26/24 1346   Admin Instructions:            And   bisacodyl (DULCOLAX) suppository 10 mg  Dose: 10 mg  Freq: Daily PRN Route: RE  PRN Reason: Constipation  PRN Comment: Use if bisacodyl oral is ineffective  Start: 04/26/24 1346   Admin Instructions:             sennosides-docusate (PERICOLACE) 8.6-50 MG per tablet 2 tablet  Dose: 2 tablet  Freq: 2 Times Daily PRN Route: PO  PRN Reason: Constipation  Start: 04/19/24 0541 End: 04/26/24 1346   Admin Instructions:            And   polyethylene glycol (MIRALAX) packet 17 g  Dose: 17 g  Freq: Daily PRN Route: PO  PRN Reason: Constipation  PRN Comment: Use if senna-docusate is ineffective  Start: 04/19/24 0541 End: 04/26/24 1346   Admin Instructions:            And   bisacodyl (DULCOLAX) EC tablet 5 mg  Dose: 5 mg  Freq: Daily PRN Route: PO  PRN Reason: Constipation  PRN Comment: Use if polyethylene glycol is ineffective  Start: 04/19/24 0541 End: 04/26/24 1346   Admin Instructions:            And   bisacodyl (DULCOLAX) suppository 10 mg  Dose: 10 mg  Freq: Daily PRN Route: RE  PRN Reason: Constipation  PRN Comment: Use if bisacodyl oral is ineffective  Start: 04/19/24 0541 End: 04/26/24 1346   Admin Instructions:            Calcium Replacement - Follow Nurse / BPA Driven Protocol  Freq: As Needed Route: XX  PRN Reason: Other  Start: 04/19/24 0541   Admin Instructions:            dextrose (D50W) (25 g/50 mL) IV injection 25 g  Dose: 25 g  Freq: Every 15 Minutes PRN Route: IV  PRN Reason: Low Blood Sugar  PRN Comment: Blood Sugar Less Than 70  Start: 04/19/24 0541   Admin Instructions:            dextrose (GLUTOSE) oral gel 15 g  Dose: 15 g  Freq: Every 15 Minutes PRN Route: PO  PRN Reason: Low Blood Sugar  PRN Comment:  Blood sugar less than 70  Start: 04/19/24 0541   Admin Instructions:            glucagon (GLUCAGEN) injection 1 mg  Dose: 1 mg  Freq: Every 15 Minutes PRN Route: IM  PRN Reason: Low Blood Sugar  PRN Comment: Blood Glucose Less Than 70  Start: 04/19/24 0541   Admin Instructions:            haloperidol (HALDOL) tablet 5 mg  Dose: 5 mg  Freq: 4 Times Daily PRN Route: PO  PRN Reason: Agitation  Indications of Use: BEHAVIORAL PROBLEMS  Start: 04/19/24 0552 End: 04/24/24 1134         Magnesium Standard Dose Replacement - Follow Nurse / BPA Driven Protocol  Freq: As Needed Route: XX  PRN Reason: Other  Start: 04/19/24 0541   Admin Instructions:            melatonin tablet 5 mg  Dose: 5 mg  Freq: Nightly PRN Route: PO  PRN Reason: Sleep  Start: 04/19/24 0541 End: 05/02/24 1444         midazolam (VERSED) injection 0.5 mg  Dose: 0.5 mg  Freq: 2 Times Daily PRN Route: IV  PRN Reason: Sedation  Start: 05/03/24 1209   Admin Instructions:            Phosphorus Replacement - Follow Nurse / BPA Driven Protocol  Freq: As Needed Route: XX  PRN Reason: Other  Start: 04/19/24 0541   Admin Instructions:            Potassium Replacement - Follow Nurse / BPA Driven Protocol  Freq: As Needed Route: XX  PRN Reason: Other  Start: 04/19/24 0541   Admin Instructions:            sodium chloride 0.9 % flush 10 mL  Dose: 10 mL  Freq: As Needed Route: IV  PRN Reason: Line Care  Start: 04/19/24 0541         sodium chloride 0.9 % flush 10 mL  Dose: 10 mL  Freq: As Needed Route: IV  PRN Reason: Line Care  Start: 04/18/24 2350 End: 04/26/24 1405         sodium chloride 0.9 % infusion 40 mL  Dose: 40 mL  Freq: As Needed Route: IV  PRN Reason: Line Care  Start: 04/19/24 0541   Admin Instructions:            sterile water (preservative free) injection - ADS Override Pull  Start: 05/06/24 0536 End: 05/06/24 0547   Admin Instructions:       0547 [C]            sterile water (preservative free) injection - ADS Override Pull  Start: 05/05/24 0726 End:  05/05/24 0734   Admin Instructions:            sterile water (preservative free) injection - ADS Override Pull  Start: 05/01/24 0922 End: 05/01/24 2129   Admin Instructions:            sterile water (preservative free) injection - ADS Override Pull  Start: 04/30/24 1143 End: 04/30/24 2344   Admin Instructions:            sterile water (preservative free) injection - ADS Override Pull  Start: 04/25/24 1839 End: 04/26/24 0644   Admin Instructions:            sterile water (preservative free) injection - ADS Override Pull  Start: 04/19/24 0026 End: 04/19/24 0030   Admin Instructions:            temazepam (RESTORIL) capsule 15 mg  Dose: 15 mg  Freq: Nightly PRN Route: PO  PRN Reason: Anxiety  Start: 04/19/24 0541 End: 04/19/24 1200   Admin Instructions:            temazepam (RESTORIL) capsule 30 mg  Dose: 30 mg  Freq: Nightly PRN Route: PO  PRN Reason: Anxiety  Start: 04/19/24 1200 End: 04/21/24 1432   Admin Instructions:            ziprasidone (GEODON) injection 10 mg  Dose: 10 mg  Freq: Every 4 Hours PRN Route: IM  PRN Reason: Agitation  Start: 04/19/24 1007   Admin Instructions:       0546

## 2024-05-08 NOTE — CASE MANAGEMENT/SOCIAL WORK
Continued Stay Note  [facility]     Patient Name: Too Tai  MRN: [id number]  Today's Date: 5/8/2024    Admit Date: 4/18/2024    Plan: [facility]   Discharge Plan       Row Name 05/08/24 1559       Plan    Plan [facility]    Patient/Family in Agreement with Plan yes    Plan Comments Update this evening:  Received a call from Chastity from [facility]. Patient is accepted and a room offered. Room will be available once wife signs lease, belongs are brought to the room and a bed or hospital bed is delivered. Wife requested a hospital bed. Order is in [place]. Referral called to Buzz with [employer]. This would be a self-pay/monthly rental. Buzz going over details with wife. Bed should be able to be delivered tomorrow, 5/9. Not confirmed at this time. CM TENTATIVELY arranged [facility] stretcher for 5/9 at 1600. PCS is in dropbox. Per Chastity, room offer is NOT dependent on therapy notes. They would prefer to have notes, but if therapy is unable to work with patient tomorrow, [facility] will need detailed PT/OT orders prior to arrival. CM spoke to wife and son at bedside. Both are agreeable with the plan. CM updated Meliza Melara APRN & primary RN on tentative plan. CM will continue to follow.    Final Discharge Disposition Code 01 - home or self-care      Row Name 05/08/24 1411       Plan    Plan TBD    Patient/Family in Agreement with Plan yes    Plan Comments Update this afternoon:  CM received a call from therapy. Patient too lethargic for therapy to work with this afternoon. Neurology decreased Seroquel dose today to 100mg in the AM & 200mg in the PM. NENA updated Chastity with [facility]. 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 CM will continue to follow.    Final Discharge Disposition Code 30 - still a patient                   Discharge Codes    No documentation.                 Expected Discharge Date and  Time       Expected Discharge Date Expected Discharge Time    May 9, 2024               Ilene Brown, RN

## 2024-05-08 NOTE — CASE MANAGEMENT/SOCIAL WORK
Continued Stay Note  [facility]     Patient Name: Too Tai  MRN: [id number]  Today's Date: 5/8/2024    Admit Date: 4/18/2024    Plan: TBD   Discharge Plan       Row Name 05/08/24 1136       Plan    Plan TBD    Patient/Family in Agreement with Plan yes    Plan Comments Per V[facility]ctor with [facility], they are unable to take due to patient receiving insulin. Discussed this in MDR. Not appropriate to switch to oral hypoglycemics. Updated Víctor. Wife did a tour of [facility] this morning. Staff with [facility] present at bedside for evaluation after rounds. All notes faxed to [facility] at  at [phone] ([phone]) with the exception of therapy notes. CM to fax PT/OT notes when available to [phone]. Therapy to see this afternoon (optimal time for patient). Contact for [facility]: Chastity Calvert [phone]. CM confirmed with staff present, including DON that patient receiving insulin is not an issue. CM will continue to follow and assist.    Final Discharge Disposition Code 30 - still a patient                   Discharge Codes    No documentation.                 Expected Discharge Date and Time       Expected Discharge Date Expected Discharge Time    May 9, 2024               Ilene Brown RN

## 2024-05-08 NOTE — PROGRESS NOTES
[facility] Neurology    Progress Note    Patient Name: Too Tai  : 1955  MRN: [id number]  Primary Care Physician:  Des Geller MD  Date of admission: 2024    Subjective     Chief Complaint: Dementia with behavioral disturbances    History of Present Illness   Patient slightly more lethargic than yesterday.  Able to move all 4 extremities.  No restraints or as needed medications over the past 72 hours.  Sitter at bedside.    Review of Systems   General: Negative for fever, nausea, or vomiting.   Neurological: Negative for headache, pain, or weakness.     Objective     Physical Exam  Vitals and nursing note reviewed.   Constitutional:       General: He is not in acute distress.     Appearance: He is not ill-appearing.   Eyes:      Pupils: Pupils are equal, round, and reactive to light.   Neurological:      Mental Status: He is lethargic.      Cranial Nerves: No cranial nerve deficit or facial asymmetry.      Motor: No weakness or seizure activity.      Comments:     Cranial Nerves   CN II: Pupils are equal, round, and reactive to light. Normal visual acuity and visual fields.    CN III IV VI: Extraocular movements are full without nystagmus.  CN V: Normal facial sensation and strength of muscles of mastication.  CN VII: Facial movements are symmetric. No weakness.  CN VIII:  Auditory acuity is normal.  CN XII: The tongue is midline.  No atrophy or fasciculations.    Motor: Able to move all 4 extremities          Vitals:   Temp:  [97.5 °F (36.4 °C)-98.9 °F (37.2 °C)] 97.5 °F (36.4 °C)  Heart Rate:  [71] 71  Resp:  [14-20] 14  BP: (127-163)/(64-82) 163/82    Current Medications    Current Facility-Administered Medications:     acetaminophen (TYLENOL) tablet 650 mg, 650 mg, Oral, Q4H PRN, 650 mg at 24 **OR** acetaminophen (TYLENOL) 160 MG/5ML oral solution 650 mg, 650 mg, Oral, Q4H PRN **OR** acetaminophen (TYLENOL) suppository 650 mg, 650 mg, Rectal, Q4H PRN, Nerissa Aviles  DO    amLODIPine (NORVASC) tablet 10 mg, 10 mg, Oral, Q24H, Calin Benitez MD, 10 mg at 05/07/24 0824    sennosides-docusate (PERICOLACE) 8.6-50 MG per tablet 2 tablet, 2 tablet, Oral, BID, 2 tablet at 05/07/24 0827 **AND** polyethylene glycol (MIRALAX) packet 17 g, 17 g, Oral, Daily PRN **AND** bisacodyl (DULCOLAX) EC tablet 5 mg, 5 mg, Oral, Daily PRN **AND** bisacodyl (DULCOLAX) suppository 10 mg, 10 mg, Rectal, Daily PRN, Jennifer Bai APRN    Calcium Replacement - Follow Nurse / BPA Driven Protocol, , Does not apply, PRN, Traci, Nerissa G, DO    cyanocobalamin injection 1,000 mcg, 1,000 mcg, Intramuscular, Q28 Days, Darrell Jhaveri A, DO, 1,000 mcg at 04/19/24 1532    dextrose (D50W) (25 g/50 mL) IV injection 25 g, 25 g, Intravenous, Q15 Min PRN, Traci, Nerissa G, DO    dextrose (GLUTOSE) oral gel 15 g, 15 g, Oral, Q15 Min PRN, Traci, Nerissa G, DO    donepezil (ARICEPT) tablet 10 mg, 10 mg, Oral, BID, Darrell Jhaveri A, DO, 10 mg at 05/07/24 2008    folic acid (FOLVITE) tablet 1 mg, 1 mg, Oral, Daily, Traci, Nerissa G, DO, 1 mg at 05/07/24 0824    glucagon (GLUCAGEN) injection 1 mg, 1 mg, Intramuscular, Q15 Min PRN, Traci, Nerissa G, DO    heparin (porcine) 5000 UNIT/ML injection 5,000 Units, 5,000 Units, Subcutaneous, Q8H, Traci, Nerissa G, DO, 5,000 Units at 05/08/24 0619    insulin glargine (LANTUS, SEMGLEE) injection 17 Units, 17 Units, Subcutaneous, Q12H, Jaclyn Holguin APRN, 17 Units at 05/08/24 0831    Insulin Lispro (humaLOG) injection 2-7 Units, 2-7 Units, Subcutaneous, 4x Daily AC & at Bedtime, Nerissa Aviles DO, 2 Units at 05/08/24 0830    Insulin Lispro (humaLOG) injection 7 Units, 7 Units, Subcutaneous, TID With Meals, Jaclyn Holguin, APRN, 7 Units at 05/08/24 0830    lisinopril (PRINIVIL,ZESTRIL) tablet 30 mg, 30 mg, Oral, Daily, Parisa Madrigal PA-C, 30 mg at 05/07/24 0824    Magnesium Standard Dose Replacement - Follow Nurse / BPA Driven Protocol, , Does not apply, PRN, Nerissa Aviles DO     melatonin tablet 5 mg, 5 mg, Oral, Nightly, Jaclyn Holguin, APRN, 5 mg at 05/07/24 2007    miconazole (MICOTIN) 2 % powder 1 Application, 1 Application, Topical, Q12H, Cecily Bain, DO, 1 Application at 05/08/24 0831    midazolam (VERSED) injection 0.5 mg, 0.5 mg, Intravenous, BID PRN, Wili Nicole MD, 0.5 mg at 05/04/24 0246    mirtazapine (REMERON) tablet 15 mg, 15 mg, Oral, Nightly, Darrell Jhaveri, DO, 15 mg at 05/07/24 2008    PHENobarbital tablet 129.6 mg, 129.6 mg, Oral, Nightly, 129.6 mg at 05/07/24 2007 **OR** PHENobarbital injection 130 mg, 130 mg, Intramuscular, Nightly, Catina Lawler, [facility]    PHENobarbital tablet 64.8 mg, 64.8 mg, Oral, BID, 64.8 mg at 05/07/24 1528 **OR** PHENobarbital injection 65 mg, 65 mg, Intramuscular, BID, Brijesh Mac MD, 65 mg at 05/06/24 0942    Phosphorus Replacement - Follow Nurse / BPA Driven Protocol, , Does not apply, PRN, Nerissa Aviles, DO    Potassium Replacement - Follow Nurse / BPA Driven Protocol, , Does not apply, PRN, Kamala Avilessie G, DO    QUEtiapine (SEROquel) tablet 100 mg, 100 mg, Oral, BID, Meliza Kahn, APRN, 100 mg at 05/07/24 1528    QUEtiapine (SEROquel) tablet 300 mg, 300 mg, Oral, Nightly, Meliza Kahn, APRN, 300 mg at 05/07/24 2008    sertraline (ZOLOFT) tablet 50 mg, 50 mg, Oral, Daily, Meliza Kahn, APRN, 50 mg at 05/07/24 0824    sodium chloride 0.9 % flush 10 mL, 10 mL, Intravenous, Q12H, Nerissa Aviles DO, 10 mL at 05/05/24 2044    sodium chloride 0.9 % flush 10 mL, 10 mL, Intravenous, PRN, Traci, Nerissa G, DO    sodium chloride 0.9 % infusion 40 mL, 40 mL, Intravenous, PRN, Traci, Nerissa G, DO, 40 mL at 04/23/24 0546    temazepam (RESTORIL) capsule 30 mg, 30 mg, Oral, Nightly, Darrell Jhaveri A, DO, 30 mg at 05/07/24 2007    ziprasidone (GEODON) injection 10 mg, 10 mg, Intramuscular, Q4H PRN, Meliza Kahn, APRN, 10 mg at 05/06/24 0546    Laboratory Results:   Lab Results   Component Value Date    GLUCOSE 167 (H)  05/08/2024    CALCIUM 9.2 05/08/2024     05/08/2024    K 4.6 05/08/2024    CO2 30.0 (H) 05/08/2024     05/08/2024    BUN 26 (H) 05/08/2024    CREATININE 0.90 05/08/2024    EGFRIFAFRI 102 02/21/2022    EGFRIFNONA 88 02/21/2022    BCR 28.9 (H) 05/08/2024    ANIONGAP 8.0 05/08/2024     Lab Results   Component Value Date    WBC 8.27 05/08/2024    HGB 13.4 05/08/2024    HCT 40.3 05/08/2024    MCV 94.8 05/08/2024     05/08/2024     Lab Results   Component Value Date    CHOL 220 (H) 01/08/2024    CHOL 120 08/01/2019    CHOL 117 04/15/2019     Lab Results   Component Value Date    HDL 41 01/08/2024    HDL 37 (L) 08/03/2023    HDL 41 04/03/2023     Lab Results   Component Value Date     (H) 01/08/2024     (H) 08/03/2023     (H) 04/03/2023     Lab Results   Component Value Date    TRIG 108 01/08/2024    TRIG 208 (H) 08/03/2023    TRIG 138 04/03/2023     Lab Results   Component Value Date    HGBA1C 9.50 (H) 04/18/2024     Lab Results   Component Value Date    INR 1.1 11/25/2019    INR 1.1 09/10/2018    PROTIME 12.5 11/25/2019    PROTIME 13.3 09/10/2018     Lab Results   Component Value Date    FOLATE 10.20 02/24/2024     Lab Results   Component Value Date    FMEIIPVD64 384 04/18/2024             Assessment / Plan   Brief Patient Summary:  Too Tai is a 68 y.o. male with a past medical history of advanced dementia with behavioral disturbances and nonverbal, CAD, T2DM, HLD, PVD who presented to [facility] ED due to increase in agitation.  Patient has had multiple recent hospitalizations.      Plan:   Advanced dementia with behavioral disturbances  Sleep Deprivation   Continue home Aricept 10 mg nightly  Continue phenobarbital 64.8 mg/ 64.8 mg/ 129.6 mg  Phenobarbital level 25.5; recheck level pending  Continue home Remeron 15 mg nightly  Reduce Seroquel to 100 mg in a.m. and 200 mg in p.m.  QTc 436 this AM.  Patient is not tolerating continuous telemetry.  Scheduled EKGs daily.  Continue  Restoril 30 mg nightly.  Geodon as needed for extreme agitation; last dose 5/6 0546  CT head negative for acute abnormality  Vitamin B12 384, IM cyanocobalamin 1000 mcg  Case management following for difficult posthospitalization disposition.  Patient is not safe to return home due to concern of being in danger for his wife and himself.   Palliative consulted, appreciate recommendations  Psych consult, appreciate recommendations please see consult note for recommendations  Schedule melatonin 5 mg nightly for 7 PM per psychology recommendations  Continue Zoloft 50 mg daily per psych recommendations.  Continue Versed 0.5 mg as needed for nursing care.  Please use prior to Geodon.  last dose 5/4 at 2:46 AM. Psychology recommended Ativan 0.25 mg with nursing care however it is not readily available.  General neurology will continue to follow    I have discussed the above with the patient, bedside RN Dr. Palacios  Time spent with patient: 35 minutes in face-to-face evaluation and management of the patient.  Copied text in this note has been reviewed and is accurate as of 05/08/24.       IVONNE Santos

## 2024-05-09 VITALS
TEMPERATURE: 97.5 F | DIASTOLIC BLOOD PRESSURE: 71 MMHG | SYSTOLIC BLOOD PRESSURE: 154 MMHG | BODY MASS INDEX: 27.59 KG/M2 | OXYGEN SATURATION: 95 % | HEART RATE: 83 BPM | RESPIRATION RATE: 18 BRPM | HEIGHT: 74 IN | WEIGHT: 215 LBS

## 2024-05-09 PROBLEM — R45.1 AGITATION: Status: RESOLVED | Noted: 2024-04-20 | Resolved: 2024-05-09

## 2024-05-09 PROBLEM — F03.918 DEMENTIA WITH BEHAVIORAL DISTURBANCE: Status: ACTIVE | Noted: 2024-05-09

## 2024-05-09 LAB
ALBUMIN SERPL-MCNC: 3.4 G/DL (ref 3.5–5.2)
ALBUMIN/GLOB SERPL: 1.1 G/DL
ALP SERPL-CCNC: 113 U/L (ref 39–117)
ALT SERPL W P-5'-P-CCNC: 28 U/L (ref 1–41)
ANION GAP SERPL CALCULATED.3IONS-SCNC: 10 MMOL/L (ref 5–15)
AST SERPL-CCNC: 31 U/L (ref 1–40)
BASOPHILS # BLD AUTO: 0.09 10*3/MM3 (ref 0–0.2)
BASOPHILS NFR BLD AUTO: 1.1 % (ref 0–1.5)
BILIRUB SERPL-MCNC: 0.5 MG/DL (ref 0–1.2)
BUN SERPL-MCNC: 23 MG/DL (ref 8–23)
BUN/CREAT SERPL: 28.8 (ref 7–25)
CALCIUM SPEC-SCNC: 8.8 MG/DL (ref 8.6–10.5)
CHLORIDE SERPL-SCNC: 105 MMOL/L (ref 98–107)
CO2 SERPL-SCNC: 27 MMOL/L (ref 22–29)
CREAT SERPL-MCNC: 0.8 MG/DL (ref 0.76–1.27)
DEPRECATED RDW RBC AUTO: 42.4 FL (ref 37–54)
EGFRCR SERPLBLD CKD-EPI 2021: 97 ML/MIN/1.73
EOSINOPHIL # BLD AUTO: 0.93 10*3/MM3 (ref 0–0.4)
EOSINOPHIL NFR BLD AUTO: 11 % (ref 0.3–6.2)
ERYTHROCYTE [DISTWIDTH] IN BLOOD BY AUTOMATED COUNT: 12.4 % (ref 12.3–15.4)
GLOBULIN UR ELPH-MCNC: 3.1 GM/DL
GLUCOSE BLDC GLUCOMTR-MCNC: 110 MG/DL (ref 70–130)
GLUCOSE BLDC GLUCOMTR-MCNC: 219 MG/DL (ref 70–130)
GLUCOSE BLDC GLUCOMTR-MCNC: 76 MG/DL (ref 70–130)
GLUCOSE SERPL-MCNC: 112 MG/DL (ref 65–99)
HCT VFR BLD AUTO: 42.2 % (ref 37.5–51)
HGB BLD-MCNC: 14.1 G/DL (ref 13–17.7)
IMM GRANULOCYTES # BLD AUTO: 0.03 10*3/MM3 (ref 0–0.05)
IMM GRANULOCYTES NFR BLD AUTO: 0.4 % (ref 0–0.5)
LYMPHOCYTES # BLD AUTO: 2.86 10*3/MM3 (ref 0.7–3.1)
LYMPHOCYTES NFR BLD AUTO: 33.9 % (ref 19.6–45.3)
MAGNESIUM SERPL-MCNC: 1.9 MG/DL (ref 1.6–2.4)
MCH RBC QN AUTO: 31.1 PG (ref 26.6–33)
MCHC RBC AUTO-ENTMCNC: 33.4 G/DL (ref 31.5–35.7)
MCV RBC AUTO: 93 FL (ref 79–97)
MONOCYTES # BLD AUTO: 0.78 10*3/MM3 (ref 0.1–0.9)
MONOCYTES NFR BLD AUTO: 9.2 % (ref 5–12)
NEUTROPHILS NFR BLD AUTO: 3.75 10*3/MM3 (ref 1.7–7)
NEUTROPHILS NFR BLD AUTO: 44.4 % (ref 42.7–76)
NRBC BLD AUTO-RTO: 0 /100 WBC (ref 0–0.2)
PLATELET # BLD AUTO: 332 10*3/MM3 (ref 140–450)
PMV BLD AUTO: 9.4 FL (ref 6–12)
POTASSIUM SERPL-SCNC: 3.5 MMOL/L (ref 3.5–5.2)
PROT SERPL-MCNC: 6.5 G/DL (ref 6–8.5)
RBC # BLD AUTO: 4.54 10*6/MM3 (ref 4.14–5.8)
SODIUM SERPL-SCNC: 142 MMOL/L (ref 136–145)
WBC NRBC COR # BLD AUTO: 8.44 10*3/MM3 (ref 3.4–10.8)

## 2024-05-09 PROCEDURE — 63710000001 INSULIN GLARGINE PER 5 UNITS: Performed by: NURSE PRACTITIONER

## 2024-05-09 PROCEDURE — 63710000001 INSULIN LISPRO (HUMAN) PER 5 UNITS: Performed by: NURSE PRACTITIONER

## 2024-05-09 PROCEDURE — 63710000001 INSULIN LISPRO (HUMAN) PER 5 UNITS: Performed by: INTERNAL MEDICINE

## 2024-05-09 PROCEDURE — 82948 REAGENT STRIP/BLOOD GLUCOSE: CPT

## 2024-05-09 PROCEDURE — 97164 PT RE-EVAL EST PLAN CARE: CPT

## 2024-05-09 PROCEDURE — 97530 THERAPEUTIC ACTIVITIES: CPT

## 2024-05-09 PROCEDURE — 80053 COMPREHEN METABOLIC PANEL: CPT | Performed by: FAMILY MEDICINE

## 2024-05-09 PROCEDURE — 97168 OT RE-EVAL EST PLAN CARE: CPT

## 2024-05-09 PROCEDURE — 83735 ASSAY OF MAGNESIUM: CPT | Performed by: FAMILY MEDICINE

## 2024-05-09 PROCEDURE — 25010000002 HEPARIN (PORCINE) PER 1000 UNITS: Performed by: INTERNAL MEDICINE

## 2024-05-09 PROCEDURE — 99233 SBSQ HOSP IP/OBS HIGH 50: CPT

## 2024-05-09 PROCEDURE — 99239 HOSP IP/OBS DSCHRG MGMT >30: CPT | Performed by: FAMILY MEDICINE

## 2024-05-09 PROCEDURE — 85025 COMPLETE CBC W/AUTO DIFF WBC: CPT | Performed by: FAMILY MEDICINE

## 2024-05-09 RX ORDER — DONEPEZIL HYDROCHLORIDE 10 MG/1
10 TABLET, FILM COATED ORAL 2 TIMES DAILY
Qty: 60 TABLET | Refills: 0 | Status: ON HOLD | OUTPATIENT
Start: 2024-05-09 | End: 2024-06-08

## 2024-05-09 RX ORDER — FOLIC ACID 1 MG/1
1 TABLET ORAL DAILY
Qty: 30 TABLET | Refills: 0 | Status: ON HOLD | OUTPATIENT
Start: 2024-05-10

## 2024-05-09 RX ORDER — PHENOBARBITAL 64.8 MG/1
129.6 TABLET ORAL NIGHTLY
Qty: 6 TABLET | Refills: 0 | Status: ON HOLD | OUTPATIENT
Start: 2024-05-09 | End: 2024-05-12

## 2024-05-09 RX ORDER — PHENOBARBITAL 64.8 MG/1
64.8 TABLET ORAL 2 TIMES DAILY
Qty: 6 TABLET | Refills: 0 | Status: ON HOLD | OUTPATIENT
Start: 2024-05-09 | End: 2024-05-12

## 2024-05-09 RX ORDER — CYANOCOBALAMIN 1000 UG/ML
1000 INJECTION, SOLUTION INTRAMUSCULAR; SUBCUTANEOUS
Status: ON HOLD
Start: 2024-05-17

## 2024-05-09 RX ORDER — QUETIAPINE FUMARATE 100 MG/1
100 TABLET, FILM COATED ORAL DAILY
Qty: 30 TABLET | Refills: 0 | Status: ON HOLD | OUTPATIENT
Start: 2024-05-10

## 2024-05-09 RX ORDER — TEMAZEPAM 30 MG/1
30 CAPSULE ORAL NIGHTLY
Qty: 30 CAPSULE | Refills: 0 | Status: ON HOLD | OUTPATIENT
Start: 2024-05-09

## 2024-05-09 RX ORDER — MIRTAZAPINE 15 MG/1
15 TABLET, FILM COATED ORAL NIGHTLY
Qty: 30 TABLET | Refills: 0 | Status: ON HOLD | OUTPATIENT
Start: 2024-05-09

## 2024-05-09 RX ORDER — QUETIAPINE FUMARATE 200 MG/1
200 TABLET, FILM COATED ORAL NIGHTLY
Qty: 30 TABLET | Refills: 0 | Status: ON HOLD | OUTPATIENT
Start: 2024-05-09

## 2024-05-09 RX ORDER — AMLODIPINE BESYLATE 10 MG/1
10 TABLET ORAL
Qty: 30 TABLET | Refills: 0 | Status: ON HOLD | OUTPATIENT
Start: 2024-05-10

## 2024-05-09 RX ORDER — LISINOPRIL 30 MG/1
30 TABLET ORAL DAILY
Qty: 30 TABLET | Refills: 0 | Status: ON HOLD | OUTPATIENT
Start: 2024-05-10

## 2024-05-09 RX ADMIN — HEPARIN SODIUM 5000 UNITS: 5000 INJECTION INTRAVENOUS; SUBCUTANEOUS at 13:40

## 2024-05-09 RX ADMIN — PHENOBARBITAL 64.8 MG: 64.8 TABLET ORAL at 09:24

## 2024-05-09 RX ADMIN — HEPARIN SODIUM 5000 UNITS: 5000 INJECTION INTRAVENOUS; SUBCUTANEOUS at 05:38

## 2024-05-09 RX ADMIN — PHENOBARBITAL 64.8 MG: 64.8 TABLET ORAL at 13:41

## 2024-05-09 RX ADMIN — SENNOSIDES AND DOCUSATE SODIUM 2 TABLET: 8.6; 5 TABLET ORAL at 09:24

## 2024-05-09 RX ADMIN — INSULIN LISPRO 7 UNITS: 100 INJECTION, SOLUTION INTRAVENOUS; SUBCUTANEOUS at 13:41

## 2024-05-09 RX ADMIN — INSULIN LISPRO 7 UNITS: 100 INJECTION, SOLUTION INTRAVENOUS; SUBCUTANEOUS at 18:11

## 2024-05-09 RX ADMIN — SERTRALINE HYDROCHLORIDE 50 MG: 50 TABLET ORAL at 09:25

## 2024-05-09 RX ADMIN — QUETIAPINE FUMARATE 100 MG: 100 TABLET ORAL at 09:25

## 2024-05-09 RX ADMIN — FOLIC ACID 1 MG: 1 TABLET ORAL at 09:24

## 2024-05-09 RX ADMIN — INSULIN LISPRO 7 UNITS: 100 INJECTION, SOLUTION INTRAVENOUS; SUBCUTANEOUS at 09:25

## 2024-05-09 RX ADMIN — LISINOPRIL 30 MG: 20 TABLET ORAL at 09:24

## 2024-05-09 RX ADMIN — INSULIN GLARGINE 17 UNITS: 100 INJECTION, SOLUTION SUBCUTANEOUS at 09:25

## 2024-05-09 RX ADMIN — INSULIN LISPRO 3 UNITS: 100 INJECTION, SOLUTION INTRAVENOUS; SUBCUTANEOUS at 18:11

## 2024-05-09 RX ADMIN — AMLODIPINE BESYLATE 10 MG: 10 TABLET ORAL at 09:24

## 2024-05-09 RX ADMIN — DONEPEZIL HYDROCHLORIDE 10 MG: 10 TABLET, FILM COATED ORAL at 09:24

## 2024-05-09 NOTE — THERAPY DISCHARGE NOTE
Patient Name: Too Tai  : 1955    MRN: [id number]                              Today's Date: 2024       Admit Date: 2024    Visit Dx:     ICD-10-CM ICD-9-CM   1. Dementia with behavioral disturbance  F03.918 294.21   2. Aggressive behavior  R46.89 V40.39   3. Agitation due to dementia  F03.911 294.21   4. Hyperglycemia due to diabetes mellitus  E11.65 250.02   5. Frequent falls  R29.6 V15.88   6. Spasm of muscle  M62.838 728.85   7. Myoclonic jerking  G25.3 333.2   8. Peripheral vascular disease  I73.9 443.9   9. Type 2 diabetes mellitus with hyperglycemia, with long-term current use of insulin  E11.65 250.00    Z79.4 790.29     V58.67   10. Chronic coronary artery disease  I25.10 414.00     Patient Active Problem List   Diagnosis    Cough    Benign essential HTN    Benign prostatic hyperplasia    Coronary artery disease involving native coronary artery of native heart    Chronic coronary artery disease    Type 2 diabetes mellitus with hyperglycemia, with long-term current use of insulin    Gout    Hyperlipidemia    History of heart attack    Peripheral neuropathy    Peripheral vascular disease    Spasm of muscle    Myoclonic jerking    Hypomagnesemia    Arteriosclerosis of coronary artery    Dementia with behavioral disturbance    Frequent falls    History of DVT (deep vein thrombosis)    Chronic anticoagulation    AMS (altered mental status)    Falls    Bacteriuria    Cystitis    Agitation due to dementia    Dementia with behavioral disturbance     Past Medical History:   Diagnosis Date    Coronary artery disease     Dementia     Diabetes mellitus     Hyperlipidemia     Peripheral vascular disease      Past Surgical History:   Procedure Laterality Date    CORONARY ARTERY BYPASS GRAFT      FEMORAL ARTERY - POPLITEAL ARTERY BYPASS GRAFT        General Information       Row Name 24 1146          Physical Therapy Time and Intention    Document Type discharge evaluation/summary  -     Mode  of Treatment physical therapy  -       Row Name 05/09/24 1146          General Information    Patient Profile Reviewed yes  -     Prior Level of Function --  see IE  -     Existing Precautions/Restrictions fall;other (see comments)  severe dementia complicated by behavioral disturbance  -     Barriers to Rehab medically complex;previous functional deficit;uncooperative  -       Row Name 05/09/24 1146          Living Environment    People in Home spouse;other (see comments)  Pt also has 2 caregivers. One is there for 8 hours each day.  -       Row Name 05/09/24 1146          Home Main Entrance    Number of Stairs, Main Entrance other (see comments)  ramp  -       Row Name 05/09/24 1146          Cognition    Orientation Status (Cognition) disoriented to;person;place;situation;time  not responding to name verbally or with eye contact  -       Row Name 05/09/24 1146          Safety Issues, Functional Mobility    Safety Issues Affecting Function (Mobility) ability to follow commands;at risk behavior observed;awareness of need for assistance;impulsivity;insight into deficits/self-awareness;judgment;problem-solving;safety precaution awareness;safety precautions follow-through/compliance;sequencing abilities  -     Impairments Affecting Function (Mobility) balance;cognition;endurance/activity tolerance;strength  -     Cognitive Impairments, Mobility Safety/Performance attention;awareness, need for assistance;impulsivity;judgment;problem-solving/reasoning;insight into deficits/self-awareness;safety precaution awareness;safety precaution follow-through;sequencing abilities  -     Comment, Safety Issues/Impairments (Mobility) lethargic state, decreased command following  -               User Key  (r) = Recorded By, (t) = Taken By, (c) = Cosigned By      Initials Name Provider Type     Humera Bustillo PT Physical Therapist                   Mobility       Row Name 05/09/24 1148          Bed Mobility     Bed Mobility supine-sit;sit-supine;rolling left;rolling right  Simultaneous filing. User may be unaware of other data.  -     Rolling Left [place] (Bed Mobility) dependent (less than 25% patient effort)  Simultaneous filing. User may be unaware of other data.  -HM     Rolling Right [place] (Bed Mobility) dependent (less than 25% patient effort)  Simultaneous filing. User may be unaware of other data.  -     Supine-Sit [place] (Bed Mobility) dependent (less than 25% patient effort);2 person assist  Simultaneous filing. User may be unaware of other data.  -HM     Sit-Supine [place] (Bed Mobility) dependent (less than 25% patient effort);2 person assist  Simultaneous filing. User may be unaware of other data.  -     Assistive Device (Bed Mobility) bed rails;draw sheet;head of bed elevated  Simultaneous filing. User may be unaware of other data.  -     Comment, (Bed Mobility) dependent x2 to sit EOB and return to supine, when rolling to the R, pt able to hold onto rail when on sidelying however not able to let go of grasp of bedrail on command. pt sitting extremely forward flexed and unable to lift head up on command, posterior/L lean noted  -       Row Name 05/09/24 1148          Gait/Stairs (Locomotion)    Comment, (Gait/Stairs) pt dependent for sitting balance with posterior and L lean, did not initiate movement on his own therefore STS and further mobility deferred  -               User Key  (r) = Recorded By, (t) = Taken By, (c) = Cosigned By      Initials Name Provider Type     Humera Bustillo PT Physical Therapist                   Obj/Interventions       Row Name 05/09/24 1150          Range of Motion Comprehensive    Comment, General Range of Motion not able to command follow to assess PROM at this date  -       Row Name 05/09/24 1150          Strength Comprehensive (MMT)    General Manual Muscle Testing (MMT) Assessment lower extremity strength deficits identified  -      Comment, General Manual Muscle Testing (MMT) Assessment no command following noted therefore unable to assess  -       Row Name 05/09/24 1150          Balance    Balance Assessment sitting static balance;sitting dynamic balance  -     Static Sitting Balance dependent  -     Dynamic Sitting Balance dependent  -     Position, Sitting Balance supported;sitting edge of bed  -     Balance Interventions sitting;static  -     Comment, Balance L/posterior lean, unable to correct with cues and unable to command follow therefore further mobility deferred  -       Row Name 05/09/24 1150          Sensory Assessment (Somatosensory)    Sensory Assessment (Somatosensory) unable/difficult to assess  -               User Key  (r) = Recorded By, (t) = Taken By, (c) = Cosigned By      Initials Name Provider Type     Humera Bustillo, PT Physical Therapist                   Goals/Plan    No documentation.                  Clinical Impression       [facility] Name 05/09/24 1156          Pain Scale: FACES Pre/Post-Treatment    Pain: FACES Scale, Pretreatment 0-->no hurt  -     Posttreatment Pain Rating 0-->no hurt  -       Row Name 05/09/24 1156          Plan of Care Review    Plan of Care Reviewed With patient  -     Progress no change  -     Outcome Evaluation Pt re-evaluation completed. Pt dependent for all bed mobility at this date and unable to maintain sitting balance therefore STS and further mobility deferred. Pt lethargic and unable to follow commands therefore not appropriate for PT interventions at this time. Please reconsult if improvement in ability to actively participate. d/c rec ECF.  -       Row Name 05/09/24 1159          Therapy Assessment/Plan (PT)    Criteria for Skilled Interventions Met (PT) no;does not meet criteria for skilled intervention  -     Therapy Frequency (PT) evaluation only  -       Row Name 05/09/24 1159          Vital Signs    O2 Delivery Pre Treatment room air  -     O2  Delivery Intra Treatment room air  -HM     O2 Delivery Post Treatment room air  -HM     Pre Patient Position Supine  -HM     Intra Patient Position Sitting  -HM     Post Patient Position Supine  -HM       Row Name 05/09/24 1155          Positioning and Restraints    Pre-Treatment Position in bed  -HM     Post Treatment Position bed  -HM     In Bed notified nsg;fowlers;encouraged to call for assist;exit alarm on;side rails up x3;call light within reach;pillow between legs  with sitter  -HM               User Key  (r) = Recorded By, (t) = Taken By, (c) = Cosigned By      Initials Name Provider Type    Humera Lacy, PT Physical Therapist                   Outcome Measures       Row Name 05/09/24 1158 05/09/24 0800       How much help from another person do you currently need...    Turning from your back to your side while in flat bed without using bedrails? 1  -HM 3  -HMA    Moving from lying on back to sitting on the side of a flat bed without bedrails? 1  - 3  -HMA    Moving to and from a bed to a chair (including a wheelchair)? 1  -HM 2  -HMA    Standing up from a chair using your arms (e.g., wheelchair, bedside chair)? 1  -HM 2  -HMA    Climbing 3-5 steps with a railing? 1  -HM 1  -HMA    To walk in hospital room? 1  -HM 2  -HMA    AM-PAC 6 Clicks Score (PT) 6  -HM 13  -HMA    Highest Level of Mobility Goal 2 --> Bed activities/dependent transfer  -HM 4 --> Transfer to chair/commode  -HMA      Row Name 05/09/24 0500          How much help from another person do you currently need...    Turning from your back to your side while in flat bed without using bedrails? 3  -AS     Moving from lying on back to sitting on the side of a flat bed without bedrails? 3  -AS     Moving to and from a bed to a chair (including a wheelchair)? 2  -AS     Standing up from a chair using your arms (e.g., wheelchair, bedside chair)? 2  -AS     Climbing 3-5 steps with a railing? 1  -AS     To walk in hospital room? 2  -AS      AM-PAC 6 Clicks Score (PT) 13  -AS     Highest Level of Mobility Goal 4 --> Transfer to chair/commode  -AS       Row Name 05/09/24 1158          Functional Assessment    Outcome Measure Options AM-PAC 6 Clicks Basic Mobility (PT)  -               User Key  (r) = Recorded By, (t) = Taken By, (c) = Cosigned By      Initials Name Provider Type     Humera Bustillo, TRACE Physical Therapist    AS Jarrod Hunt, RN Registered Nurse    Humera Stuart, RN Registered Nurse                  Physical Therapy Education       Title: PT OT SLP Therapies (In Progress)       Topic: Physical Therapy (In Progress)       Point: Mobility training (In Progress)       Learning Progress Summary             Patient Acceptance, TB,E, NL by  at 5/9/2024 1158    Acceptance, E, NR,DU,VU by  at 4/23/2024 2343    Acceptance, E, VU,DU,NR by MW at 4/22/2024 2328    Acceptance, E, DU,NR,VU by MW at 4/21/2024 2200    Nonacceptance, E, NL by  at 4/20/2024 1418   Family Acceptance, E, NR,DU,VU by MW at 4/23/2024 2343    Acceptance, E, VU,DU,NR by MW at 4/22/2024 2328    Acceptance, E, DU,NR,VU by MW at 4/21/2024 2200                         Point: Home exercise program (Done)       Learning Progress Summary             Patient Acceptance, E, NR,DU,VU by  at 4/23/2024 2343    Acceptance, E, VU,DU,NR by MW at 4/22/2024 2328    Acceptance, E, DU,NR,VU by MW at 4/21/2024 2200   Family Acceptance, E, NR,DU,VU by MW at 4/23/2024 2343    Acceptance, E, VU,DU,NR by MW at 4/22/2024 2328    Acceptance, E, DU,NR,VU by MW at 4/21/2024 2200                         Point: Body mechanics (In Progress)       Learning Progress Summary             Patient Acceptance, TB,E, NL by  at 5/9/2024 1158    Acceptance, E, NR,DU,VU by MW at 4/23/2024 2343    Acceptance, E, VU,DU,NR by MW at 4/22/2024 2328    Acceptance, E, DU,NR,VU by MW at 4/21/2024 2200   Family Acceptance, E, NR,DU,VU by MW at 4/23/2024 2343    Acceptance, E, VU,DU,NR by MW at 4/22/2024  2328    Acceptance, E, DU,NR,VU by  at 4/21/2024 2200                         Point: Precautions (In Progress)       Learning Progress Summary             Patient Acceptance, TB,E, NL by  at 5/9/2024 1158    Acceptance, E, NR,DU,VU by  at 4/23/2024 2343    Acceptance, E, VU,DU,NR by  at 4/22/2024 2328    Acceptance, E, DU,NR,VU by  at 4/21/2024 2200    Nonacceptance, E, NL by  at 4/20/2024 1418   Family Acceptance, E, NR,DU,VU by MW at 4/23/2024 2343    Acceptance, E, VU,DU,NR by MW at 4/22/2024 2328    Acceptance, E, DU,NR,VU by  at 4/21/2024 2200                                         User Key       Initials Effective Dates Name Provider Type Discipline     07/11/23 -  Nina Calvert, PT Physical Therapist PT     09/22/22 -  Humera Bustillo PT Physical Therapist PT     03/08/24 -  Steve Smith, RN Registered Nurse Nurse                  PT Recommendation and Plan     Plan of Care Reviewed With: patient  Progress: no change  Outcome Evaluation: Pt re-evaluation completed. Pt dependent for all bed mobility at this date and unable to maintain sitting balance therefore STS and further mobility deferred. Pt lethargic and unable to follow commands therefore not appropriate for PT interventions at this time. Please reconsult if improvement in ability to actively participate. d/c rec ECF.     Time Calculation:         PT Charges       Row Name 05/09/24 1159             Time Calculation    Start Time 1020  -HM      PT Received On 05/09/24  -         Timed Charges    61987 - PT Therapeutic Activity Minutes 8  -HM         Untimed Charges    PT Eval/Re-eval Minutes 20  -HM         Total Minutes    Timed Charges Total Minutes 8  -HM      Untimed Charges Total Minutes 20  -HM       Total Minutes 28  -HM                User Key  (r) = Recorded By, (t) = Taken By, (c) = Cosigned By      Initials Name Provider Type     Humera Bustillo, PT Physical Therapist                  Therapy Charges for  Today       Code Description Service Date Service Provider Modifiers Qty    76999878881 HC PT THERAPEUTIC ACT EA 15 MIN 5/9/2024 Humera Bustillo, PT GP 1    13492937646 HC PT RE-EVAL ESTABLISHED PLAN 2 5/9/2024 Humera Bustillo, PT GP 1            PT G-Codes  Outcome Measure Options: AM-PAC 6 Clicks Basic Mobility (PT)  AM-PAC 6 Clicks Score (PT): 6  AM-PAC 6 Clicks Score (OT): 7    PT Discharge Summary  Anticipated Discharge Disposition (PT): extended care facility    Humera Bustillo, TRACE  5/9/2024

## 2024-05-09 NOTE — PLAN OF CARE
Goal Outcome Evaluation:  Plan of Care Reviewed With: patient           Outcome Evaluation: Pt dep to wash face, dep with all bed mobility, post/L lean with sitting balance.  Pt presents with  lethargy, confusion, inability to follow commands, decr postural control limiting ability to participate with therapy.  No further OT indicated at this time as pt does not demo ability to actively participate.  Recommend ECF upon d/c.      Anticipated Discharge Disposition (OT): extended care facility

## 2024-05-09 NOTE — THERAPY DISCHARGE NOTE
Acute Care - Occupational Therapy Discharge  [facility]    Patient Name: Too Tai  : 1955    MRN: [id number]                              Today's Date: 2024       Admit Date: 2024    Visit Dx:     ICD-10-CM ICD-9-CM   1. Dementia with behavioral disturbance  F03.918 294.21   2. Aggressive behavior  R46.89 V40.39   3. Agitation due to dementia  F03.911 294.21   4. Hyperglycemia due to diabetes mellitus  E11.65 250.02   5. Frequent falls  R29.6 V15.88   6. Spasm of muscle  M62.838 728.85   7. Myoclonic jerking  G25.3 333.2   8. Peripheral vascular disease  I73.9 443.9   9. Type 2 diabetes mellitus with hyperglycemia, with long-term current use of insulin  E11.65 250.00    Z79.4 790.29     V58.67   10. Chronic coronary artery disease  I25.10 414.00     Patient Active Problem List   Diagnosis    Cough    Benign essential HTN    Benign prostatic hyperplasia    Coronary artery disease involving native coronary artery of native heart    Chronic coronary artery disease    Type 2 diabetes mellitus with hyperglycemia, with long-term current use of insulin    Gout    Hyperlipidemia    History of heart attack    Peripheral neuropathy    Peripheral vascular disease    Spasm of muscle    Myoclonic jerking    Hypomagnesemia    Arteriosclerosis of coronary artery    Dementia with behavioral disturbance    Frequent falls    History of DVT (deep vein thrombosis)    Chronic anticoagulation    AMS (altered mental status)    Falls    Bacteriuria    Cystitis    Agitation due to dementia    Dementia with behavioral disturbance     Past Medical History:   Diagnosis Date    Coronary artery disease     Dementia     Diabetes mellitus     Hyperlipidemia     Peripheral vascular disease      Past Surgical History:   Procedure Laterality Date    CORONARY ARTERY BYPASS GRAFT      FEMORAL ARTERY - POPLITEAL ARTERY BYPASS GRAFT        General Information       Row Name 24 1015          OT Time and Intention    Document  Type re-evaluation;discharge treatment  -     Mode of Treatment occupational therapy  -       Row Name 05/09/24 1015          General Information    Patient Profile Reviewed yes  -AC     Prior Level of Function min assist:;feeding;max assist:;grooming;dressing;bathing  SBA to ambulate in home  -     Existing Precautions/Restrictions fall  dementia  -     Barriers to Rehab medically complex;previous functional deficit;cognitive status  -       Row Name 05/09/24 1015          Living Environment    People in Home spouse  Pt also has 2 caregivers. One is there for 8 hours each day  -       Row Name 05/09/24 1015          Home Main Entrance    Number of Stairs, Main Entrance other (see comments)  entry ramp  -       Row Name 05/09/24 1015          Cognition    Orientation Status (Cognition) disoriented to;person;place;situation;time  -       Row Name 05/09/24 1015          Safety Issues, Functional Mobility    Safety Issues Affecting Function (Mobility) ability to follow commands;at risk behavior observed;awareness of need for assistance;insight into deficits/self-awareness;judgment;problem-solving;safety precaution awareness;safety precautions follow-through/compliance;sequencing abilities  -     Impairments Affecting Function (Mobility) balance;cognition;postural/trunk control  -     Cognitive Impairments, Mobility Safety/Performance attention;awareness, need for assistance;insight into deficits/self-awareness;judgment;problem-solving/reasoning;safety precaution awareness;safety precaution follow-through;sequencing abilities  -               User Key  (r) = Recorded By, (t) = Taken By, (c) = Cosigned By      Initials Name Provider Type     Milena Keane OT Occupational Therapist                   Mobility/ADL's       Row Name 05/09/24 1148 05/09/24 0945       Bed Mobility    Bed Mobility -- rolling left;rolling right;scooting/bridging;supine-sit;sit-supine  -    Rolling Left [place]  (Bed Mobility) -- dependent (less than 25% patient effort)  -AC    Rolling Right [place] (Bed Mobility) -- dependent (less than 25% patient effort)  grasped bed rail  -AC    Scooting/Bridging [place] (Bed Mobility) verbal cues;nonverbal cues (demo/gesture);dependent (less than 25% patient effort);2 person assist  -AC verbal cues;nonverbal cues (demo/gesture);dependent (less than 25% patient effort);2 person assist  -AC    Supine-Sit [place] (Bed Mobility) -- dependent (less than 25% patient effort);2 person assist  -AC    Sit-Supine [place] (Bed Mobility) -- dependent (less than 25% patient effort);2 person assist  -AC    Bed Mobility, Safety Issues cognitive deficits limit understanding;impaired trunk control for bed mobility  -AC cognitive deficits limit understanding;impaired trunk control for bed mobility  -AC    Assistive Device (Bed Mobility) -- bed rails;draw sheet;head of bed elevated  -AC      Row Name 05/09/24 1148 05/09/24 0945       Transfers    Comment, (Transfers) did not attempt as pt dep with bed mobility, poor sitting balance and not following commands  -AC did not attempt as pt dep with bed mobility, poor sitting balance and not following commands  -AC      Row Name 05/09/24 1148          Activities of Daily Living    BADL Assessment/Intervention grooming  -AC       Row Name 05/09/24 1148 05/09/24 0945       Grooming Assessment/Training    [place] Level (Grooming) dependent (less than 25% patient effort)  -AC --    Assistive Devices (Grooming) hand over hand  -AC hand over hand  -AC    Position (Grooming) -- sitting up in bed  -AC    Comment, (Grooming) pt given [place] A, but pt did not initate  -AC pt given [place] A, but pt  did not initiate  -AC              User Key  (r) = Recorded By, (t) = Taken By, (c) = Cosigned By      Initials Name Provider Type    AC Milena Keane, OT Occupational Therapist                   Obj/Interventions       Row Name 05/09/24 1019           Sensory Assessment (Somatosensory)    Sensory Assessment (Somatosensory) unable/difficult to assess  -[facility] Name 05/09/24 1015          Vision Assessment/Intervention    Visual Impairment/Limitations unable/difficult to assess  -[facility] Name 05/09/24 1015          Range of Motion Comprehensive    Comment, General Range of Motion pt activiely moving arms, but resistant with therapist attempting to passively range  -[facility] Name 05/09/24 1015          Strength Comprehensive (MMT)    Comment, General Manual Muscle Testing (MMT) Assessment unable to formally assess d/t cognition, but pt demo good strength when being resistant to passively range  -[facility] Name 05/09/24 1015          Balance    Balance Assessment sitting static balance;sitting dynamic balance  -AC     Static Sitting Balance dependent  -AC     Dynamic Sitting Balance dependent  -AC     Position, Sitting Balance sitting edge of bed  -AC     Comment, Balance post and L lean  -AC               User Key  (r) = Recorded By, (t) = Taken By, (c) = Cosigned By      Initials Name Provider Type    Milena Shankar, OT Occupational Therapist                   Goals/Plan    No documentation.                  Clinical Impression       Row Name 05/09/24 1015          Pain Scale: FACES Pre/Post-Treatment    Pain: FACES Scale, Pretreatment 0-->no hurt  -AC     Posttreatment Pain Rating 0-->no hurt  -AC       Row Name 05/09/24 1015          Plan of Care Review    Plan of Care Reviewed With patient  -     Outcome Evaluation Pt dep to wash face, dep with all bed mobility, post/L lean with sitting balance.  Pt presents with  lethargy, confusion, inability to follow commands, decr postural control limiting ability to participate with therapy.  No further OT indicated at this time as pt does not demo ability to actively participate.  Recommend ECF upon d/c.  -AC       Row Name 05/09/24 1015          Therapy Assessment/Plan (OT)    Criteria for Skilled  Therapeutic Interventions Met (OT) no;does not meet criteria for skilled intervention  -AC       Row Name 05/09/24 1015          Therapy Plan Review/Discharge Plan (OT)    Anticipated Discharge Disposition (OT) [facility]  -       Row Name 05/09/24 1015          Vital Signs    Pre Patient Position Supine  -AC     Intra Patient Position Sitting  -AC     Post Patient Position Supine  -AC       Row Name 05/09/24 1015          Positioning and Restraints    Pre-Treatment Position in bed  -AC     Post Treatment Position bed  -AC     In Bed notified nsg;fowlers;call light within reach;encouraged to call for assist;exit alarm on;pillow between legs  -AC               User Key  (r) = Recorded By, (t) = Taken By, (c) = Cosigned By      Initials Name Provider Type    Milena Shankar, OT Occupational Therapist                   Outcome Measures       Row Name 05/09/24 1205          How much help from another is currently needed...    Putting on and taking off regular lower body clothing? 1  -AC     Bathing (including washing, rinsing, and drying) 1  -AC     Toileting (which includes using toilet bed pan or urinal) 1  -AC     Putting on and taking off regular upper body clothing 1  -AC     Taking care of personal grooming (such as brushing teeth) 1  -AC     Eating meals 1  -     AM-PAC 6 Clicks Score (OT) 6  -       Row Name 05/09/24 1158 05/09/24 0800       How much help from another person do you currently need...    Turning from your back to your side while in flat bed without using bedrails? 1  - 3  -HMA    Moving from lying on back to sitting on the side of a flat bed without bedrails? 1  - 3  -HMA    Moving to and from a bed to a chair (including a wheelchair)? 1  - 2  -HMA    Standing up from a chair using your arms (e.g., wheelchair, bedside chair)? 1  - 2  -HMA    Climbing 3-5 steps with a railing? 1  - 1  -HMA    To walk in hospital room? 1  - 2  -HMA    AM-PAC 6 Clicks Score (PT) 6  -HM  13  -A    Highest Level of Mobility Goal 2 --> Bed activities/dependent transfer  - 4 --> Transfer to chair/commode  -HMA      Row Name 05/09/24 0500          How much help from another person do you currently need...    Turning from your back to your side while in flat bed without using bedrails? 3  -AS     Moving from lying on back to sitting on the side of a flat bed without bedrails? 3  -AS     Moving to and from a bed to a chair (including a wheelchair)? 2  -AS     Standing up from a chair using your arms (e.g., wheelchair, bedside chair)? 2  -AS     Climbing 3-5 steps with a railing? 1  -AS     To walk in hospital room? 2  -AS     AM-PAC 6 Clicks Score (PT) 13  -AS     Highest Level of Mobility Goal 4 --> Transfer to chair/commode  -AS       Row Name 05/09/24 1205 05/09/24 1158       Functional Assessment    Outcome Measure Options AM-PAC 6 Clicks Daily Activity (OT)  - AM-PAC 6 Clicks Basic Mobility (PT)  -              User Key  (r) = Recorded By, (t) = Taken By, (c) = Cosigned By      Initials Name Provider Type    AC Milena Keane OT Occupational Therapist     Humera Bustillo, PT Physical Therapist    AS Jarrod Hunt, RN Registered Nurse    [facility] Humera Rasheed, RN Registered Nurse                  Occupational Therapy Education       Title: PT OT SLP Therapies (In Progress)       Topic: Occupational Therapy (In Progress)       Point: ADL training (In Progress)       Description:   Instruct learner(s) on proper safety adaptation and remediation techniques during self care or transfers.   Instruct in proper use of assistive devices.                  Learning Progress Summary             Patient Acceptance, E, NL by  at 5/9/2024 1211    Acceptance, E, NR,DU,VU by  at 4/23/2024 2343    Acceptance, E, VU,DU,NR by  at 4/22/2024 2328    Acceptance, E, DU,NR,VU by  at 4/21/2024 2200   Family Acceptance, E, NR,DU,VU by  at 4/23/2024 2343    Acceptance, E, VU,DU,NR by  at 4/22/2024 2328     Acceptance, E, DU,NR,VU by MW at 4/21/2024 2200    Acceptance, E, VU by  at 4/20/2024 1433                         Point: Home exercise program (Done)       Description:   Instruct learner(s) on appropriate technique for monitoring, assisting and/or progressing therapeutic exercises/activities.                  Learning Progress Summary             Patient Acceptance, E, NR,DU,VU by MW at 4/23/2024 2343    Acceptance, E, VU,DU,NR by MW at 4/22/2024 2328    Acceptance, E, DU,NR,VU by MW at 4/21/2024 2200   Family Acceptance, E, NR,DU,VU by MW at 4/23/2024 2343    Acceptance, E, VU,DU,NR by MW at 4/22/2024 2328    Acceptance, E, DU,NR,VU by  at 4/21/2024 2200                         Point: Precautions (Done)       Description:   Instruct learner(s) on prescribed precautions during self-care and functional transfers.                  Learning Progress Summary             Patient Acceptance, E, NR,DU,VU by MW at 4/23/2024 2343    Acceptance, E, VU,DU,NR by  at 4/22/2024 2328    Acceptance, E, DU,NR,VU by MW at 4/21/2024 2200   Family Acceptance, E, NR,DU,VU by MW at 4/23/2024 2343    Acceptance, E, VU,DU,NR by MW at 4/22/2024 2328    Acceptance, E, DU,NR,VU by MW at 4/21/2024 2200                         Point: Body mechanics (Done)       Description:   Instruct learner(s) on proper positioning and spine alignment during self-care, functional mobility activities and/or exercises.                  Learning Progress Summary             Patient Acceptance, E, NR,DU,VU by MW at 4/23/2024 2343    Acceptance, E, VU,DU,NR by MW at 4/22/2024 2328    Acceptance, E, DU,NR,VU by MW at 4/21/2024 2200   Family Acceptance, E, NR,DU,VU by MW at 4/23/2024 2343    Acceptance, E, VU,DU,NR by MW at 4/22/2024 2328    Acceptance, E, DU,NR,VU by  at 4/21/2024 2200                                         User Key       Initials Effective Dates Name Provider Type Discipline     02/03/23 -  Milena Keane, OT Occupational Therapist  OT    MW 03/08/24 -  Steve Smith, RN Registered Nurse Nurse                  OT Recommendation and Plan  Therapy Frequency (OT): evaluation only  Plan of Care Review  Plan of Care Reviewed With: patient  Outcome Evaluation: Pt dep to wash face, dep with all bed mobility, post/L lean with sitting balance.  Pt presents with  lethargy, confusion, inability to follow commands, decr postural control limiting ability to participate with therapy.  No further OT indicated at this time as pt does not demo ability to actively participate.  Recommend ECF upon d/c.  Plan of Care Reviewed With: patient  Outcome Evaluation: Pt dep to wash face, dep with all bed mobility, post/L lean with sitting balance.  Pt presents with  lethargy, confusion, inability to follow commands, decr postural control limiting ability to participate with therapy.  No further OT indicated at this time as pt does not demo ability to actively participate.  Recommend ECF upon d/c.     Time Calculation:   Evaluation Complexity (OT)  Review Occupational Profile/Medical/Therapy History Complexity: expanded/moderate complexity  Assessment, Occupational Performance/Identification of Deficit Complexity: 3-5 performance deficits  Clinical Decision Making Complexity (OT): detailed assessment/moderate complexity  Overall Complexity of Evaluation (OT): moderate complexity     Time Calculation- OT       Row Name 05/09/24 1015             Time Calculation- OT    OT Start Time 1015  -AC      OT Received On 05/09/24  -AC         Timed Charges    97631 - OT Therapeutic Activity Minutes 10  -AC         Untimed Charges    OT Eval/Re-eval Minutes 30  -AC         Total Minutes    Timed Charges Total Minutes 10  -AC      Untimed Charges Total Minutes 30  -AC       Total Minutes 40  -AC                User Key  (r) = Recorded By, (t) = Taken By, (c) = Cosigned By      Initials Name Provider Type    Milena Shankar, OT Occupational Therapist                  Therapy  Charges for Today       Code Description Service Date Service Provider Modifiers Qty    [id number]  OT THERAPEUTIC ACT EA 15 MIN 5/9/2024 Milena Keane, OT GO 1    [id number]  OT RE-EVAL 2 5/9/2024 Milena Keane OT GO 1               OT Discharge Summary  Anticipated Discharge Disposition (OT): extended care facility    Milena Keane OT  5/9/2024

## 2024-05-09 NOTE — PLAN OF CARE
Goal Outcome Evaluation:  Plan of Care Reviewed With: patient        Progress: no change  Outcome Evaluation: Pt re-evaluation completed. Pt dependent for all bed mobility at this date and unable to maintain sitting balance therefore STS and further mobility deferred. Pt lethargic and unable to follow commands therefore not appropriate for PT interventions at this time. Please reconsult if improvement in ability to actively participate. d/c rec ECF.      Anticipated Discharge Disposition (PT): extended care facility

## 2024-05-09 NOTE — PROGRESS NOTES
[facility]    Progress Note    Patient Name: Too Tai  : 1955  MRN: [id number]  Primary Care Physician:  Des Geller MD  Date of admission: 2024    Subjective     Chief Complaint: Dementia with behavioral disturbances    History of Present Illness   Patient seen resting comfortably in bed.  No acute events overnight.  No PRNs required.  Patient continues to be out of restraints.  Sitter at bedside.    Review of Systems   Unable to assess due to baseline mental status    Objective     Physical Exam  Vitals and nursing note reviewed.   Constitutional:       General: He is not in acute distress.     Appearance: He is not ill-appearing.   Eyes:      General: No visual field deficit.     Extraocular Movements: Extraocular movements intact.      Pupils: Pupils are equal, round, and reactive to light.      Comments: No nystagmus or deviated gaze normal   Neurological:      Mental Status: Mental status is at baseline. He is lethargic.      Cranial Nerves: No cranial nerve deficit or facial asymmetry.      Sensory: No sensory deficit.      Motor: No weakness.      Comments:     Cranial Nerves   CN II: Pupils are equal, round, and reactive to light. Normal visual acuity and visual fields.    CN III IV VI: Extraocular movements are full without nystagmus.  CN V: Normal facial sensation and strength of muscles of mastication.  CN VII: Facial movements are symmetric. No weakness.  CN VIII:  Auditory acuity is normal.  CN IX & X:  Symmetric palatal movement.  CN XI: Sternocleidomastoid and trapezius are normal.  No weakness.  CN XII: The tongue is midline.  No atrophy or fasciculations.            Vitals:   Heart Rate:  [] 83  Resp:  [16-18] 18  BP: (145-168)/() 168/102    Current Medications    Current Facility-Administered Medications:     acetaminophen (TYLENOL) tablet 650 mg, 650 mg, Oral, Q4H PRN, 650 mg at 24 **OR** acetaminophen (TYLENOL) 160 MG/5ML oral  solution 650 mg, 650 mg, Oral, Q4H PRN **OR** acetaminophen (TYLENOL) suppository 650 mg, 650 mg, Rectal, Q4H PRN, Kamala Avilessie G, DO    amLODIPine (NORVASC) tablet 10 mg, 10 mg, Oral, Q24H, Calin Benitez MD, 10 mg at 05/08/24 1051    sennosides-docusate (PERICOLACE) 8.6-50 MG per tablet 2 tablet, 2 tablet, Oral, BID, 2 tablet at 05/08/24 2057 **AND** polyethylene glycol (MIRALAX) packet 17 g, 17 g, Oral, Daily PRN **AND** bisacodyl (DULCOLAX) EC tablet 5 mg, 5 mg, Oral, Daily PRN **AND** bisacodyl (DULCOLAX) suppository 10 mg, 10 mg, Rectal, Daily PRN, Jennifer Bai, IVONNE    Calcium Replacement - Follow Nurse / BPA Driven Protocol, , Does not apply, PRN, Traci, Nerissa G, DO    cyanocobalamin injection 1,000 mcg, 1,000 mcg, Intramuscular, Q28 Days, Emilio, Darrell A, DO, 1,000 mcg at 04/19/24 1532    dextrose (D50W) (25 g/50 mL) IV injection 25 g, 25 g, Intravenous, Q15 Min PRN, Traci, Nerissa G, DO    dextrose (GLUTOSE) oral gel 15 g, 15 g, Oral, Q15 Min PRN, Traci, Nerissa G, DO    donepezil (ARICEPT) tablet 10 mg, 10 mg, Oral, BID, Jhaveri, Darrell A, DO, 10 mg at 05/08/24 2056    folic acid (FOLVITE) tablet 1 mg, 1 mg, Oral, Daily, TraciTresa bacae G, DO, 1 mg at 05/08/24 1051    glucagon (GLUCAGEN) injection 1 mg, 1 mg, Intramuscular, Q15 Min PRN, Traci, Nerissa G, DO    heparin (porcine) 5000 UNIT/ML injection 5,000 Units, 5,000 Units, Subcutaneous, Q8H, Traci, Nerissa G, DO, 5,000 Units at 05/09/24 0538    insulin glargine (LANTUS, SEMGLEE) injection 17 Units, 17 Units, Subcutaneous, Q12H, Jaclyn Holguin APRN, 17 Units at 05/08/24 2057    Insulin Lispro (humaLOG) injection 2-7 Units, 2-7 Units, Subcutaneous, 4x Daily AC & at Bedtime, Nerissa Aviles DO, 2 Units at 05/08/24 2057    Insulin Lispro (humaLOG) injection 7 Units, 7 Units, Subcutaneous, TID With Meals, Jaclyn Holguin APRN, 7 Units at 05/08/24 1700    lisinopril (PRINIVIL,ZESTRIL) tablet 30 mg, 30 mg, Oral, Daily, Parisa Madrigal PA-C, 30 mg at 05/08/24  1051    Magnesium Standard Dose Replacement - Follow Nurse / BPA Driven Protocol, , Does not apply, PRN, Nerissa Aviles, DO    melatonin tablet 5 mg, 5 mg, Oral, Nightly, Jaclyn Holguin, APRN, 5 mg at 05/08/24 2056    miconazole (MICOTIN) 2 % powder 1 Application, 1 Application, Topical, Q12H, Cecily Bain, DO, 1 Application at 05/08/24 2058    midazolam (VERSED) injection 0.5 mg, 0.5 mg, Intravenous, BID PRN, Wili Nicole MD, 0.5 mg at 05/04/24 0246    mirtazapine (REMERON) tablet 15 mg, 15 mg, Oral, Nightly, Darrell Jhaveri DO, 15 mg at 05/08/24 2056    PHENobarbital tablet 129.6 mg, 129.6 mg, Oral, Nightly, 129.6 mg at 05/08/24 2057 **OR** PHENobarbital injection 130 mg, 130 mg, Intramuscular, Nightly, Catina Lawler, [facility]    PHENobarbital tablet 64.8 mg, 64.8 mg, Oral, BID, 64.8 mg at 05/08/24 1305 **OR** PHENobarbital injection 65 mg, 65 mg, Intramuscular, BID, Brijesh Mac MD, 65 mg at 05/06/24 0942    Phosphorus Replacement - Follow Nurse / BPA Driven Protocol, , Does not apply, PRN, Nerissa Aviles, DO    Potassium Replacement - Follow Nurse / BPA Driven Protocol, , Does not apply, PRN, Nerissa Aviles, DO    QUEtiapine (SEROquel) tablet 100 mg, 100 mg, Oral, Daily, Meliza Kahn, APRN, 100 mg at 05/08/24 1051    QUEtiapine (SEROquel) tablet 200 mg, 200 mg, Oral, Nightly, Meliza Kahn, APRN, 200 mg at 05/08/24 2056    sertraline (ZOLOFT) tablet 50 mg, 50 mg, Oral, Daily, Meliza Kahn, APRN, 50 mg at 05/08/24 1051    sodium chloride 0.9 % flush 10 mL, 10 mL, Intravenous, Q12H, Traci, Nerissa G, DO, 10 mL at 05/05/24 2044    sodium chloride 0.9 % flush 10 mL, 10 mL, Intravenous, PRN, Traci, Nerissa G, DO    sodium chloride 0.9 % infusion 40 mL, 40 mL, Intravenous, PRN, Traci, Nerissa G, DO, 40 mL at 04/23/24 0546    temazepam (RESTORIL) capsule 30 mg, 30 mg, Oral, Nightly, Darrell Jhaveri, DO, 30 mg at 05/08/24 2057    ziprasidone (GEODON) injection 10 mg, 10 mg, Intramuscular, Q4H PRN,  Femi, April K, APRN, 10 mg at 05/06/24 0546    Laboratory Results:   Lab Results   Component Value Date    GLUCOSE 112 (H) 05/09/2024    CALCIUM 8.8 05/09/2024     05/09/2024    K 3.5 05/09/2024    CO2 27.0 05/09/2024     05/09/2024    BUN 23 05/09/2024    CREATININE 0.80 05/09/2024    EGFRIFAFRI 102 02/21/2022    EGFRIFNONA 88 02/21/2022    BCR 28.8 (H) 05/09/2024    ANIONGAP 10.0 05/09/2024     Lab Results   Component Value Date    WBC 8.44 05/09/2024    HGB 14.1 05/09/2024    HCT 42.2 05/09/2024    MCV 93.0 05/09/2024     05/09/2024     Lab Results   Component Value Date    CHOL 220 (H) 01/08/2024    CHOL 120 08/01/2019    CHOL 117 04/15/2019     Lab Results   Component Value Date    HDL 41 01/08/2024    HDL 37 (L) 08/03/2023    HDL 41 04/03/2023     Lab Results   Component Value Date     (H) 01/08/2024     (H) 08/03/2023     (H) 04/03/2023     Lab Results   Component Value Date    TRIG 108 01/08/2024    TRIG 208 (H) 08/03/2023    TRIG 138 04/03/2023     Lab Results   Component Value Date    HGBA1C 9.50 (H) 04/18/2024     Lab Results   Component Value Date    INR 1.1 11/25/2019    INR 1.1 09/10/2018    PROTIME 12.5 11/25/2019    PROTIME 13.3 09/10/2018     Lab Results   Component Value Date    FOLATE 10.20 02/24/2024     Lab Results   Component Value Date    HWSFVNBU93 384 04/18/2024             Assessment / Plan   Brief Patient Summary:  Too Tai is a 68 y.o. male with a past medical history of advanced dementia with behavioral disturbances and nonverbal, CAD, T2DM, HLD, PVD who presented to [facility] ED due to increase in agitation.  Patient has had multiple recent hospitalizations.      Plan:   Advanced dementia with behavioral disturbances  Sleep Deprivation   Continue home Aricept 10 mg nightly  Continue phenobarbital 64.8 mg/ 64.8 mg/ 129.6 mg  Phenobarbital level 25.5; recheck level pending  Continue home Remeron 15 mg nightly  Reduce Seroquel to 100 mg in a.m. and  200 mg in p.m. Continue to wean medication as tolerated by patient  QTc 436.  Patient is not tolerating continuous telemetry.  Scheduled EKGs daily.  Continue Restoril 30 mg nightly.  Geodon as needed for extreme agitation; last dose 5/6 0546  CT head negative for acute abnormality  Vitamin B12 384, IM cyanocobalamin 1000 mcg  Case management following for difficult posthospitalization disposition.  Patient is not safe to return home due to concern of being in danger for his wife and himself.   Palliative consulted, appreciate recommendations  Psych consult, appreciate recommendations please see consult note for recommendations  Schedule melatonin 5 mg nightly for 7 PM per psychology recommendations  Continue Zoloft 50 mg daily per psych recommendations.  Continue Versed 0.5 mg as needed for nursing care.  Please use prior to Geodon.  last dose 5/4 at 2:46 AM. Psychology recommended Ativan 0.25 mg with nursing care however it is not readily available.  Patient to follow-up with IVONNE Eckert  Patient is okay to discharge from a neurologic standpoint.  Please feel free to reach out with any questions.    I have discussed the above with the patient, bedside RN and Dr. Palacios  Time spent with patient: 50 minutes in face-to-face evaluation and management of the patient.    Copied text in this note has been reviewed and is accurate as of 05/09/24.     IVONNE Santos

## 2024-05-09 NOTE — DISCHARGE SUMMARY
[facility]  DISCHARGE SUMMARY    Patient Name: Too Tai  : 1955  MRN: [id number]    Date of Admission: 2024 11:43 PM  Date of Discharge:  2024  Primary Care Physician: Des Geller MD    Consults       Date and Time Order Name Status Description    2024  8:50 AM Inpatient Psychiatrist Consult      2024  2:30 PM Inpatient Palliative Care MD Consult Completed     2024  5:42 AM Inpatient Neurology Consult General Completed     3/27/2024 10:27 PM Inpatient Palliative Care MD Consult Completed     3/27/2024 10:13 PM Inpatient Neurology Consult General Completed             Hospital Course     Presenting Problem: Agitation     Active Hospital Problems    Diagnosis  POA    **Agitation due to dementia [F03.911]  Yes    Dementia with behavioral disturbance [F03.918]  Yes    Type 2 diabetes mellitus with hyperglycemia, with long-term current use of insulin [E11.65, Z79.4]  Not Applicable    Peripheral neuropathy [G62.9]  Yes    Hyperlipidemia [E78.5]  Yes    Benign prostatic hyperplasia [N40.0]  Yes    Benign essential HTN [I10]  Yes    Peripheral vascular disease [I73.9]  Yes    Coronary artery disease involving native coronary artery of native heart [I25.10]  Yes      Resolved Hospital Problems    Diagnosis Date Resolved POA    Agitation [R45.1] 2024 Yes          Hospital Course:  Too Tai is a 68yoM with PMH significant for advanced dementia, CAD, diabetes mellitus type 2, HLD and PVD. Admitted to [facility] ED on 24 for agitation / behavioral disturbances. Recently admitted to [facility] -3/4/24 for dementia with behavioral disturbances and again 3/27-24 for similar issues. Patient transferred to  services on , reviewed chart [facility], continue to follow with CM and neurology at this time. Sitter remains at bedside, patient has been out of restrains  since 5/3, continue to follow at this time. Patient seen again on 5/7, will continue to follow and awaiting hopeful placement, discussed case with neurology today, continue to follow with CM and wife updated at bedside Patient seen again on 5/8, still working on potential placement options and following with CM, wife touring another facility today, continue to follow.     Patient will be accepted to the [facility] on 5/9 . Patient will be transported via EMS as he has had issues with severe dementia  with behaviors/agitation, is a fall risk as he has poor trunk control, episodes of lethargy,  impaired functional mobility, issues with his balance and gait and overall has limited endurance. Patient will be discharged there later today.  I have discussed case with neurology today, advised patient likely can have continued weaning of Seroquel as tolerated now that other medications are therapeutic. Patient to transported to facility later today for continued care going forward, medications has been called into [facility] pharamacy as well. Patient at facility will also likely require wheelchair for continued mobility at SNF. Patients' mobility limitations impair his ability to participate in activities such as toileting, feeding, dressing, grooming, and bathing. Mobility limitations cannot be resolved by a cane or walker at this current time. The patient is able to safely use a manual wheelchair and he will also have a caregiver at the facility as well to help assist with the patient maneuvering.      Advanced dementia complicated by behavioral disturbance with superimposed sleep disturbance   - Infectious work up unremarkable. UA reassuring. CXR without acute findings. CT head without acute findings  - Continue home Aricept and Mirtazapine 15mg QHS and melatonin 5 mg at 7 pm  - Continue Seroquel but neurology has been reducing dosage and plans are to make it likely PRN   - 5/3 trial of zoloft 50 mg  and PRN versed 0.5 mg as needed for nursing care. (Ativan 0.25mg recommended per psych but it is not available)  - Neurology started Phenobarbital, level now WNL.  Will continue to monitor effect, again level remains WNL on 5/6   - Continue Temazepam 30mg QHS  - Appreciate palliative care assistance  - May need geriatric psych facility vs LTC - CM looking into this. Telepsych eval completed 5/2/24  - Lost IV access, will need to monitor PO intake, may need to replace IVFs, continue to follow.   - PRN Geodon has been stopped   - Patient will be discharged to the [facility] for continued care at this time and transferred by EMS secondary to the above.      Uncontrolled diabetes mellitus type 2  - Appears not on any home meds for DM  - A1c 9.5%  - Continue Lantus, increased to 17 units BID, Lispro 7 units TID AC + SSI   - glucose reviewed  - Continue to follow, will likely need continued long term insulin use at this time.      HTN  - continue Amlodipine 10mg daily  - continue Lisinopril 30mg daily       Discharge Follow Up Recommendations for outpatient labs/diagnostics:   Follow up with facility PCP upon admission   Follow up Neurology in 1 month     Day of Discharge     HPI:   Patient is a 69 yo M seen and examined by me this AM, he is resting comfortably in bed, eyes open and will make eye contact but does not currently answer questions at this time. Wife not present this AM       Vital Signs:   Heart Rate:  [] 83  Resp:  [16-18] 18  BP: (145-168)/() 168/102      Physical Exam:  Constitutional: No acute distress, awake but resting comfortably, remains on RA  HENT: NCAT, nares patent, mucous membranes moist  Respiratory: Clear to auscultation bilaterally, no rhonchi or wheezing, respiratory effort normal   Cardiovascular: RRR, no murmurs, rubs, or gallops  Gastrointestinal: Positive bowel sounds, soft, nontender, nondistended  Musculoskeletal: No bilateral ankle edema, no clubbing or  cyanosis   Psychiatric: pleasant, resting comfortably, not agitated    Neurologic: moves all extremities, no obvious focal deficits, sitting up in bed   Skin: No rashes      Pertinent  and/or Most Recent Results     LAB RESULTS:      Lab 05/09/24  0443 05/08/24  0514 05/07/24  0534   WBC 8.44 8.27 8.76   HEMOGLOBIN 14.1 13.4 14.8   HEMATOCRIT 42.2 40.3 43.9   PLATELETS 332 319 314   NEUTROS ABS 3.75 3.28 4.03   IMMATURE GRANS (ABS) 0.03 0.05 0.02   LYMPHS ABS 2.86 2.97 2.80   MONOS ABS 0.78 0.80 0.88   EOS ABS 0.93* 1.07* 0.94*   MCV 93.0 94.8 92.4         Lab 05/09/24 0443 05/08/24  0702 05/07/24  0534   SODIUM 142 143 141   POTASSIUM 3.5 4.6 3.8   CHLORIDE 105 105 103   CO2 27.0 30.0* 27.0   ANION GAP 10.0 8.0 11.0   BUN 23 26* 22   CREATININE 0.80 0.90 0.87   EGFR 97.0 93.6 94.6   GLUCOSE 112* 167* 113*   CALCIUM 8.8 9.2 9.3   MAGNESIUM 1.9 1.9 1.9         Lab 05/09/24 0443 05/08/24  0702 05/07/24  0534   TOTAL PROTEIN 6.5 6.8 6.7   ALBUMIN 3.4* 3.6 3.6   GLOBULIN 3.1 3.2 3.1   ALT (SGPT) 28 25 26   AST (SGOT) 31 30 35   BILIRUBIN 0.5 0.5 0.5   ALK PHOS 113 117 118*                     Brief Urine Lab Results  (Last result in the past 365 days)        Color   Clarity   Blood   Leuk Est   Nitrite   Protein   CREAT   Urine HCG        04/19/24 0243 Yellow   Clear   Negative   Negative   Negative   Negative                 Microbiology Results (last 10 days)       ** No results found for the last 240 hours. **            No radiology results for the last 10 days            Results for orders placed during the hospital encounter of 03/27/24    Adult Transthoracic Echo Complete W/ Cont if Necessary Per Protocol    Interpretation Summary    Left ventricular systolic function is normal. Calculated left ventricular EF = 55.1% Normal left ventricular cavity size noted. Left ventricular wall thickness is consistent with mild concentric hypertrophy. Left ventricular diastolic function is consistent with (grade I) impaired  relaxation.    The right ventricular cavity is mildly dilated. Normal right ventricular systolic function noted.    There is calcification of the aortic valve. The aortic valve appears trileaflet. Mild aortic valve regurgitation is present. No aortic valve stenosis is present.    Mitral annular calcification is present. Trace mitral valve regurgitation is present. No significant mitral valve stenosis is present.    The tricuspid valve is structurally normal with no stenosis present. Trace tricuspid valve regurgitation is present. Insufficient TR velocity profile to estimate the right ventricular systolic pressure.    Mild dilation of the sinuses of Valsalva is present. Mild dilation of the ascending aorta is present. Ascending aorta = 4.2 cm      Plan for Follow-up of Pending Labs/Results: N/A     Discharge Details        Discharge Medications        New Medications        Instructions Start Date   amLODIPine 10 MG tablet  Commonly known as: NORVASC  If you were already taking this medication, take it this way instead.   10 mg, Oral, Every 24 Hours Scheduled   Start Date: May 10, 2024     cyanocobalamin 1000 MCG/ML injection   1,000 mcg, Intramuscular, Every 28 Days   Start Date: May 17, 2024     folic acid 1 MG tablet  Commonly known as: FOLVITE   1 mg, Oral, Daily   Start Date: May 10, 2024     PHENobarbital 64.8 MG tablet   129.6 mg, Oral, Nightly      PHENobarbital 64.8 MG tablet   64.8 mg, Oral, 2 Times Daily      sertraline 50 MG tablet  Commonly known as: ZOLOFT   50 mg, Oral, Daily   Start Date: May 10, 2024            Changes to Medications        Instructions Start Date   donepezil 10 MG tablet  Commonly known as: ARICEPT  What changed:   how much to take  when to take this  Another medication with the same name was removed. Continue taking this medication, and follow the directions you see here.   10 mg, Oral, 2 Times Daily      lisinopril 20 MG tablet  Commonly known as: PRINIVIL,ZESTRIL  What changed:  Another medication with the same name was added. Make sure you understand how and when to take each.   20 mg, Oral, Daily      lisinopril 30 MG tablet  Commonly known as: PRINIVILZESTRIL  What changed: You were already taking a medication with the same name, and this prescription was added. Make sure you understand how and when to take each.   30 mg, Oral, Daily   Start Date: May 10, 2024     QUEtiapine 200 MG tablet  Commonly known as: SEROquel  What changed: You were already taking a medication with the same name, and this prescription was added. Make sure you understand how and when to take each.  Replaces: QUEtiapine fumarate ER 50 MG tablet sustained-release 24 hour tablet   200 mg, Oral, Nightly      QUEtiapine 100 MG tablet  Commonly known as: SEROquel  What changed:   medication strength  how much to take  when to take this  Another medication with the same name was removed. Continue taking this medication, and follow the directions you see here.   100 mg, Oral, Daily   Start Date: May 10, 2024     temazepam 30 MG capsule  Commonly known as: RESTORIL  What changed:   medication strength  how much to take  when to take this  reasons to take this   30 mg, Oral, Nightly             Continue These Medications        Instructions Start Date   Accu-Chek Geri Plus w/Device kit   1 kit, Does not apply, 3 Times Daily      Accu-Chek Geri solution   1 bottle, In Vitro, As Needed      accu-chek soft touch lancets   Check sugars 3 times daily      Alcohol Prep pads   1 swab , Does not apply, 3 Times Daily      B-D UF III MINI PEN NEEDLES 31G X 5 MM misc  Generic drug: Insulin Pen Needle   USE AS DIRECTED      Divalproex Sodium 125 MG capsule  Commonly known as: DEPAKOTE SPRINKLE   250 mg, Oral, Every 8 Hours Scheduled      Glucagon 1 MG/0.2ML solution auto-injector   1 mg, Subcutaneous, Every 15 Minutes PRN      haloperidol 5 MG tablet  Commonly known as: HALDOL   5 mg, Oral, As Needed      Lantus SoloStar 100 UNIT/ML  injection pen  Generic drug: Insulin Glargine   20 Units, Subcutaneous, 2 Times Daily      mirtazapine 15 MG tablet  Commonly known as: REMERON   15 mg, Oral, Nightly             Stop These Medications      QUEtiapine fumarate ER 50 MG tablet sustained-release 24 hour tablet  Commonly known as: SEROquel XR  Replaced by: QUEtiapine 200 MG tablet  You also have another medication with the same name that you need to continue taking as instructed.            ASK your doctor about these medications        Instructions Start Date   amLODIPine 10 MG tablet  Commonly known as: NORVASC  Ask about: Should I take this medication?   10 mg, Oral, Every 24 Hours Scheduled               Allergies   Allergen Reactions    Bactrim [Sulfamethoxazole-Trimethoprim] Rash    Adhesive Tape Rash    Neosporin [Neomycin-Bacitracin Zn-Polymyx] Rash         Discharge Disposition:  Skilled Nursing Facility (DC - External)    Diet:  Hospital:  Diet Order   Procedures    Diet: Regular/House; Finger Foods; Fluid Consistency: Thin (IDDSI 0)            Activity:  Continue to advance as tolerated     Restrictions or Other Recommendations:  Follow up with Neurology in 1 month        CODE STATUS:    Code Status and Medical Interventions:   Ordered at: 04/19/24 1037     Medical Intervention Limits:    NO intubation (DNI)     Level Of Support Discussed With:    Next of Kin (If No Surrogate)     Code Status (Patient has no pulse and is not breathing):    No CPR (Do Not Attempt to Resuscitate)     Medical Interventions (Patient has pulse or is breathing):    Limited Support       Future Appointments   Date Time Provider Department Center   5/9/2024  4:00 PM MED 7  [facility]       Additional Instructions for the Follow-ups that You Need to Schedule       Ambulatory Referral to Occupational Therapy   As directed      We recommend based on our assessments, that Mr. Tai would benefit and should continue OT    Order Comments: We recommend based on our  assessments, that Mr. Tai would benefit and should continue OT    Specialty needed: Evaluate and treat   Follow-up needed: Yes        Ambulatory Referral to Physical Therapy   As directed      We recommend based on our assessments, that Mr. Tai would benefit and should continue PT.    Order Comments: We recommend based on our assessments, that Mr. Tai would benefit and should continue PT.    Specialty needed: Evaluate and treat   Weight Bearing Status: Full weight bearing   Follow-up needed: Yes        Discharge Follow-up with Specialty: Follow up with Neurology in 1 month   As directed      Specialty: Follow up with Neurology in 1 month                      GLENN Palacios DO  05/09/24      Time Spent on Discharge:  I spent  45  minutes on this discharge activity which included: face-to-face encounter with the patient, reviewing the data in the system, coordination of the care with the nursing staff as well as consultants, documentation, and entering orders.

## 2024-05-09 NOTE — DISCHARGE PLACEMENT REQUEST
"Too Richter (68 y.o. Male)     Ilene Brown RN  [phone]        Date of Birth   1955    Social Security Number       Address   [facility] [address]  [facility] [address]    Home Phone   [phone]    MRN   [id number]       Cheondoism   Jehovah's witness    Marital Status                               Admission Date   4/18/24    Admission Type   Emergency    Admitting Provider   RANDALL Palacios DO    Attending Provider   RANDALL Palacios DO    Department, Room/Bed   [facility] 5G, S551/1       Discharge Date       Discharge Disposition   Skilled Nursing Facility (DC - External)    Discharge Destination                                 Attending Provider: RANDALL Palacios DO    Allergies: Bactrim [Sulfamethoxazole-trimethoprim], Adhesive Tape, Neosporin [Neomycin-bacitracin Zn-polymyx]    Isolation: None   Infection: None   Code Status: No CPR    Ht: 188 cm (74\")   Wt: 97.5 kg (215 lb)    Admission Cmt: None   Principal Problem: Agitation due to dementia [F03.911]                   Active Insurance as of 4/18/2024       Primary Coverage       Payor Plan Insurance Group Employer/Plan Group    AETNA MEDICARE REPLACEMENT AETNA MED ADV PPO [id number]       Payor Plan Address Payor Plan Phone Number Payor Plan Fax Number Effective Dates    [address] [phone]  1/1/2024 - None Entered    [place] [address]         Subscriber Name Subscriber Birth Date Member ID       TOO RICHTER 1955 [id number]                     Emergency Contacts        (Rel.) Home Phone Work Phone Mobile Phone    WILLIE RICHTER (Spouse) [phone] -- [phone]    Ritchie Richter (Son) [phone] -- [phone]    Meg Bustillo (Relative) [phone] -- [phone]           [address]  [address] [facility] [address]  Phone:  [phone]  Fax:  [phone] Date: May 9, 2024      Ambulatory Referral to Occupational Therapy     Patient:  Too Richter MRN:  " [id number]   [address] [place] [address] :  1955  SSN:    Phone: [phone] Sex:  M      INSURANCE PAYOR PLAN GROUP # SUBSCRIBER ID   Primary:    [employer]VALDOTJUANCARLOS MEDICARE REPLACEMENT [id number]      Referring Provider Information:  RANDALL PALACIOS Phone: [phone] Fax: [phone]       Referral Information:   # Visits:  1 Referral Type: Occupational Therapy [AD1]   Urgency:  Routine Referral Reason: Specialty Services Required   Start Date: May 9, 2024 End Date:  To be determined by Insurer   Diagnosis: Dementia with behavioral disturbance (F03.918 [ICD-10-CM] 294.21 [ICD-9-CM])  Hyperglycemia due to diabetes mellitus (E11.65 [ICD-10-CM] 250.02 [ICD-9-CM])  Frequent falls (R29.6 [ICD-10-CM] V15.88 [ICD-9-CM])  Spasm of muscle (M62.838 [ICD-10-CM] 728.85 [ICD-9-CM])  Myoclonic jerking (G25.3 [ICD-10-CM] 333.2 [ICD-9-CM])  Peripheral vascular disease (I73.9 [ICD-10-CM] 443.9 [ICD-9-CM])  Type 2 diabetes mellitus with hyperglycemia, with long-term current use of insulin (E11.65,Z79.4 [ICD-10-CM] 250.00,790.29,V58.67 [ICD-9-CM])  Chronic coronary artery disease (I25.10 [ICD-10-CM] 414.00 [ICD-9-CM])      Refer to Dept:   Refer to Provider:   Refer to Provider Phone:   Refer to Facility:    We recommend based on our assessments, that Mr. Tai would benefit and should continue OT  Specialty needed: Evaluate and treat  Follow-up needed: Yes     This document serves as a request of services and does not constitute Insurance authorization or approval of services.  To determine eligibility, please contact the members Insurance carrier to verify and review coverage.     If you have medical questions regarding this request for services. Please contact [address] at [phone] during normal business hours.        Verbal Order Mode: Verbal with readback   Authorizing Provider: RANDALL Palacios DO  Authorizing Provider's NPI: [id number]     Order Entered By: Kevin  Ilene HERNANDEZ RN 2024 10:18 AM     Electronically signed by: RANDALL Ortiz DO 2024 11:43 AM              [address]  [address] [facility] [address]  Phone:  [phone]  Fax:  [phone] Date: May 9, 2024      Ambulatory Referral to Physical Therapy     Patient:  Too Tai MRN:  [id number]   [address] [facility] [address] :  1955  SSN:    Phone: [phone] Sex:  M      INSURANCE PAYOR PLAN GROUP # SUBSCRIBER ID   Primary:    AETNA MEDICARE REPLACEMENT [id number]      Referring Provider Information:  RANDALL ORTIZ Phone: [phone] Fax: [phone]       Referral Information:   # Visits:  1 Referral Type: Physical Therapy [AE1]   Urgency:  Routine Referral Reason: Specialty Services Required   Start Date: May 9, 2024 End Date:  To be determined by Insurer   Diagnosis: Dementia with behavioral disturbance (F03.918 [ICD-10-CM] 294.21 [ICD-9-CM])  Hyperglycemia due to diabetes mellitus (E11.65 [ICD-10-CM] 250.02 [ICD-9-CM])  Frequent falls (R29.6 [ICD-10-CM] V15.88 [ICD-9-CM])  Spasm of muscle (M62.838 [ICD-10-CM] 728.85 [ICD-9-CM])  Myoclonic jerking (G25.3 [ICD-10-CM] 333.2 [ICD-9-CM])  Peripheral vascular disease (I73.9 [ICD-10-CM] 443.9 [ICD-9-CM])  Type 2 diabetes mellitus with hyperglycemia, with long-term current use of insulin (E11.65,Z79.4 [ICD-10-CM] 250.00,790.29,V58.67 [ICD-9-CM])  Chronic coronary artery disease (I25.10 [ICD-10-CM] 414.00 [ICD-9-CM])      Refer to Dept:   Refer to Provider:   Refer to Provider Phone:   Refer to Facility:    We recommend based on our assessments, that Mr. Tai would benefit and should continue PT.  Specialty needed: Evaluate and treat  Weight Bearing Status: Full weight bearing  Follow-up needed: Yes     This document serves as a request of services and does not constitute Insurance authorization or approval of services.  To determine eligibility, please contact the members  Insurance carrier to verify and review coverage.     If you have medical questions regarding this request for services. Please contact [address] at [phone] during normal business hours.        Verbal Order Mode: Verbal with readback   Authorizing Provider: RANDALL Palacios DO  Authorizing Provider's NPI: [id number]     Order Entered By: Ilene Brown RN 2024 10:18 AM     Electronically signed by: RANDALL Palacios DO 2024 11:43 AM        Discharge Summary        RANDALL Palacios DO at 24 0901              [facility] Medicine Services  DISCHARGE SUMMARY    Patient Name: Too Tai  : 1955  MRN: [id number]    Date of Admission: 2024 11:43 PM  Date of Discharge:  2024  Primary Care Physician: Des Geller MD    Consults       Date and Time Order Name Status Description    2024  8:50 AM Inpatient Psychiatrist Consult      2024  2:30 PM Inpatient Palliative Care MD Consult Completed     2024  5:42 AM Inpatient Neurology Consult General Completed     3/27/2024 10:27 PM Inpatient Palliative Care MD Consult Completed     3/27/2024 10:13 PM Inpatient Neurology Consult General Completed             Hospital Course     Presenting Problem: Agitation     Active Hospital Problems    Diagnosis  POA    **Agitation due to dementia [F03.911]  Yes    Dementia with behavioral disturbance [F03.918]  Yes    Type 2 diabetes mellitus with hyperglycemia, with long-term current use of insulin [E11.65, Z79.4]  Not Applicable    Peripheral neuropathy [G62.9]  Yes    Hyperlipidemia [E78.5]  Yes    Benign prostatic hyperplasia [N40.0]  Yes    Benign essential HTN [I10]  Yes    Peripheral vascular disease [I73.9]  Yes    Coronary artery disease involving native coronary artery of native heart [I25.10]  Yes      Resolved Hospital Problems    Diagnosis Date Resolved POA    Agitation [R45.1] 2024 Yes          Hospital Course:  Too Tai  is a 68yoM with PMH significant for advanced dementia, CAD, diabetes mellitus type 2, HLD and PVD. Admitted to [facility] ED on 4/18/24 for agitation / behavioral disturbances. Recently admitted to [facility] 2/24-3/4/24 for dementia with behavioral disturbances and again 3/27-4/4/24 for similar issues. Patient transferred to my services on 5/6, reviewed chart and working on possible transfer to [facility] facility, continue to follow with CM and neurology at this time. Sitter remains at bedside, patient has been out of restrains since 5/3, continue to follow at this time. Patient seen again on 5/7, will continue to follow and awaiting hopeful placement, discussed case with neurology today, continue to follow with CM and wife updated at bedside Patient seen again on 5/8, still working on potential placement options and following with CM, wife touring another facility today, continue to follow.     Patient will be accepted to the [facility] on 5/ . Patient will be transported via EMS as he has had issues with severe dementia  with behaviors/agitation, is a fall risk as he has poor trunk control, episodes of lethargy,  impaired functional mobility, issues with his balance and gait and overall has limited endurance. Patient will be discharged there later today.  I have discussed case with neurology today, advised patient likely can have continued weaning of Seroquel as tolerated now that other medications are therapeutic. Patient to transported to facility later today for continued care going forward, medications has been called into [facility] as well.      Advanced dementia complicated by behavioral disturbance with superimposed sleep disturbance   - Infectious work up unremarkable. UA reassuring. CXR without acute findings. CT head without acute findings  - Continue home Aricept and Mirtazapine 15mg QHS and melatonin 5 mg at 7 pm  - Continue Seroquel but neurology has been reducing  dosage and plans are to make it likely PRN   - 5/3 trial of zoloft 50 mg and PRN versed 0.5 mg as needed for nursing care. (Ativan 0.25mg recommended per psych but it is not available)  - Neurology started Phenobarbital, level now WNL.  Will continue to monitor effect, again level remains WNL on 5/6   - Continue Temazepam 30mg QHS  - Appreciate palliative care assistance  - May need geriatric psych facility vs LTC - CM looking into this. Telepsych eval completed 5/2/24  - Lost IV access, will need to monitor PO intake, may need to replace IVFs, continue to follow.   - PRN Geodon has been stopped   - Patient will be discharged to the [facility] for continued care at this time and transferred by EMS secondary to the above.      Uncontrolled diabetes mellitus type 2  - Appears not on any home meds for DM  - A1c 9.5%  - Continue Lantus, increased to 17 units BID, Lispro 7 units TID AC + SSI   - glucose reviewed  - Continue to follow, will likely need continued long term insulin use at this time.      HTN  - continue Amlodipine 10mg daily  - continue Lisinopril 30mg daily       Discharge Follow Up Recommendations for outpatient labs/diagnostics:   Follow up with facility PCP upon admission   Follow up Neurology in 1 month     Day of Discharge     HPI:   Patient is a 69 yo M seen and examined by me this AM, he is resting comfortably in bed, eyes open and will make eye contact but does not currently answer questions at this time. Wife not present this AM       Vital Signs:   Heart Rate:  [] 83  Resp:  [16-18] 18  BP: (145-168)/() 168/102      Physical Exam:  Constitutional: No acute distress, awake but resting comfortably, remains on RA  HENT: NCAT, nares patent, mucous membranes moist  Respiratory: Clear to auscultation bilaterally, no rhonchi or wheezing, respiratory effort normal   Cardiovascular: RRR, no murmurs, rubs, or gallops  Gastrointestinal: Positive bowel sounds, soft, nontender,  nondistended  Musculoskeletal: No bilateral ankle edema, no clubbing or cyanosis   Psychiatric: pleasant, resting comfortably, not agitated    Neurologic: moves all extremities, no obvious focal deficits, sitting up in bed   Skin: No rashes      Pertinent  and/or Most Recent Results     LAB RESULTS:      Lab 05/09/24  0443 05/08/24  0514 05/07/24  0534   WBC 8.44 8.27 8.76   HEMOGLOBIN 14.1 13.4 14.8   HEMATOCRIT 42.2 40.3 43.9   PLATELETS 332 319 314   NEUTROS ABS 3.75 3.28 4.03   IMMATURE GRANS (ABS) 0.03 0.05 0.02   LYMPHS ABS 2.86 2.97 2.80   MONOS ABS 0.78 0.80 0.88   EOS ABS 0.93* 1.07* 0.94*   MCV 93.0 94.8 92.4         Lab 05/09/24  0443 05/08/24  0702 05/07/24  0534   SODIUM 142 143 141   POTASSIUM 3.5 4.6 3.8   CHLORIDE 105 105 103   CO2 27.0 30.0* 27.0   ANION GAP 10.0 8.0 11.0   BUN 23 26* 22   CREATININE 0.80 0.90 0.87   EGFR 97.0 93.6 94.6   GLUCOSE 112* 167* 113*   CALCIUM 8.8 9.2 9.3   MAGNESIUM 1.9 1.9 1.9         Lab 05/09/24  0443 05/08/24  0702 05/07/24  0534   TOTAL PROTEIN 6.5 6.8 6.7   ALBUMIN 3.4* 3.6 3.6   GLOBULIN 3.1 3.2 3.1   ALT (SGPT) 28 25 26   AST (SGOT) 31 30 35   BILIRUBIN 0.5 0.5 0.5   ALK PHOS 113 117 118*                     Brief Urine Lab Results  (Last result in the past 365 days)        Color   Clarity   Blood   Leuk Est   Nitrite   Protein   CREAT   Urine HCG        04/19/24 0243 Yellow   Clear   Negative   Negative   Negative   Negative                 Microbiology Results (last 10 days)       ** No results found for the last 240 hours. **            No radiology results for the last 10 days            Results for orders placed during the hospital encounter of 03/27/24    Adult Transthoracic Echo Complete W/ Cont if Necessary Per Protocol    Interpretation Summary    Left ventricular systolic function is normal. Calculated left ventricular EF = 55.1% Normal left ventricular cavity size noted. Left ventricular wall thickness is consistent with mild concentric hypertrophy.  Left ventricular diastolic function is consistent with (grade I) impaired relaxation.    The right ventricular cavity is mildly dilated. Normal right ventricular systolic function noted.    There is calcification of the aortic valve. The aortic valve appears trileaflet. Mild aortic valve regurgitation is present. No aortic valve stenosis is present.    Mitral annular calcification is present. Trace mitral valve regurgitation is present. No significant mitral valve stenosis is present.    The tricuspid valve is structurally normal with no stenosis present. Trace tricuspid valve regurgitation is present. Insufficient TR velocity profile to estimate the right ventricular systolic pressure.    Mild dilation of the sinuses of Valsalva is present. Mild dilation of the ascending aorta is present. Ascending aorta = 4.2 cm      Plan for Follow-up of Pending Labs/Results: N/A     Discharge Details        Discharge Medications        New Medications        Instructions Start Date   amLODIPine 10 MG tablet  Commonly known as: NORVASC  If you were already taking this medication, take it this way instead.   10 mg, Oral, Every 24 Hours Scheduled   Start Date: May 10, 2024     cyanocobalamin 1000 MCG/ML injection   1,000 mcg, Intramuscular, Every 28 Days   Start Date: May 17, 2024     folic acid 1 MG tablet  Commonly known as: FOLVITE   1 mg, Oral, Daily   Start Date: May 10, 2024     PHENobarbital 64.8 MG tablet   129.6 mg, Oral, Nightly      PHENobarbital 64.8 MG tablet   64.8 mg, Oral, 2 Times Daily      sertraline 50 MG tablet  Commonly known as: ZOLOFT   50 mg, Oral, Daily   Start Date: May 10, 2024            Changes to Medications        Instructions Start Date   donepezil 10 MG tablet  Commonly known as: ARICEPT  What changed:   how much to take  when to take this  Another medication with the same name was removed. Continue taking this medication, and follow the directions you see here.   10 mg, Oral, 2 Times Daily       lisinopril 20 MG tablet  Commonly known as: PRINIVIL,ZESTRIL  What changed: Another medication with the same name was added. Make sure you understand how and when to take each.   20 mg, Oral, Daily      lisinopril 30 MG tablet  Commonly known as: PRINIVIL,ZESTRIL  What changed: You were already taking a medication with the same name, and this prescription was added. Make sure you understand how and when to take each.   30 mg, Oral, Daily   Start Date: May 10, 2024     QUEtiapine 200 MG tablet  Commonly known as: SEROquel  What changed: You were already taking a medication with the same name, and this prescription was added. Make sure you understand how and when to take each.  Replaces: QUEtiapine fumarate ER 50 MG tablet sustained-release 24 hour tablet   200 mg, Oral, Nightly      QUEtiapine 100 MG tablet  Commonly known as: SEROquel  What changed:   medication strength  how much to take  when to take this  Another medication with the same name was removed. Continue taking this medication, and follow the directions you see here.   100 mg, Oral, Daily   Start Date: May 10, 2024     temazepam 30 MG capsule  Commonly known as: RESTORIL  What changed:   medication strength  how much to take  when to take this  reasons to take this   30 mg, Oral, Nightly             Continue These Medications        Instructions Start Date   Accu-Chek Geri Plus w/Device kit   1 kit, Does not apply, 3 Times Daily      Accu-Chek Geri solution   1 bottle, In Vitro, As Needed      accu-chek soft touch lancets   Check sugars 3 times daily      Alcohol Prep pads   1 swab , Does not apply, 3 Times Daily      B-D UF III MINI PEN NEEDLES 31G X 5 MM misc  Generic drug: Insulin Pen Needle   USE AS DIRECTED      Divalproex Sodium 125 MG capsule  Commonly known as: DEPAKOTE SPRINKLE   250 mg, Oral, Every 8 Hours Scheduled      Glucagon 1 MG/0.2ML solution auto-injector   1 mg, Subcutaneous, Every 15 Minutes PRN      haloperidol 5 MG  tablet  Commonly known as: HALDOL   5 mg, Oral, As Needed      Lantus SoloStar 100 UNIT/ML injection pen  Generic drug: Insulin Glargine   20 Units, Subcutaneous, 2 Times Daily      mirtazapine 15 MG tablet  Commonly known as: REMERON   15 mg, Oral, Nightly             Stop These Medications      QUEtiapine fumarate ER 50 MG tablet sustained-release 24 hour tablet  Commonly known as: SEROquel XR  Replaced by: QUEtiapine 200 MG tablet  You also have another medication with the same name that you need to continue taking as instructed.            ASK your doctor about these medications        Instructions Start Date   amLODIPine 10 MG tablet  Commonly known as: NORVASC  Ask about: Should I take this medication?   10 mg, Oral, Every 24 Hours Scheduled               Allergies   Allergen Reactions    Bactrim [Sulfamethoxazole-Trimethoprim] Rash    Adhesive Tape Rash    Neosporin [Neomycin-Bacitracin Zn-Polymyx] Rash         Discharge Disposition:  Skilled Nursing Facility (DC - External)    Diet:  Hospital:  Diet Order   Procedures    Diet: Regular/House; Finger Foods; Fluid Consistency: Thin (IDDSI 0)            Activity:  Continue to advance as tolerated     Restrictions or Other Recommendations:  Follow up with Neurology in 1 month        CODE STATUS:    Code Status and Medical Interventions:   Ordered at: 04/19/24 1037     Medical Intervention Limits:    NO intubation (DNI)     Level Of Support Discussed With:    Next of Kin (If No Surrogate)     Code Status (Patient has no pulse and is not breathing):    No CPR (Do Not Attempt to Resuscitate)     Medical Interventions (Patient has pulse or is breathing):    Limited Support       Future Appointments   Date Time Provider Department Center   5/9/2024  4:00 PM MED [facility]       Additional Instructions for the Follow-ups that You Need to Schedule       Ambulatory Referral to Occupational Therapy   As directed      We recommend based on our assessments, that   Zaire would benefit and should continue OT    Order Comments: We recommend based on our assessments, that Mr. Tai would benefit and should continue OT    Specialty needed: Evaluate and treat   Follow-up needed: Yes        Ambulatory Referral to Physical Therapy   As directed      We recommend based on our assessments, that Mr. Tai would benefit and should continue PT.    Order Comments: We recommend based on our assessments, that Mr. Tai would benefit and should continue PT.    Specialty needed: Evaluate and treat   Weight Bearing Status: Full weight bearing   Follow-up needed: Yes        Discharge Follow-up with Specialty: Follow up with Neurology in 1 month   As directed      Specialty: Follow up with Neurology in 1 month                      GLENN Palacios DO  05/09/24      Time Spent on Discharge:  I spent  45  minutes on this discharge activity which included: face-to-face encounter with the patient, reviewing the data in the system, coordination of the care with the nursing staff as well as consultants, documentation, and entering orders.            Electronically signed by RANDALL Palacios DO at 05/09/24 1145

## 2024-05-09 NOTE — DISCHARGE PLACEMENT REQUEST
"Too Richter (68 y.o. Male)       Date of Birth   1955    Social Security Number       Address   [address] [place] [address] [facility] [address]    Home Phone   [phone]    MRN   [id number]       Restorationist   [place]    Marital Status                               Admission Date   24    Admission Type   Emergency    Admitting Provider   RANDALL Palacios DO    Attending Provider   RANDALL Palacios DO    Department, Room/Bed   [address], S551/1       Discharge Date       Discharge Disposition   Skilled Nursing Facility (DC - External)    Discharge Destination                                 Attending Provider: RANDALL Palacios DO    Allergies: Bactrim [Sulfamethoxazole-trimethoprim], Adhesive Tape, Neosporin [Neomycin-bacitracin Zn-polymyx]    Isolation: None   Infection: None   Code Status: No CPR    Ht: 188 cm (74\")   Wt: 97.5 kg (215 lb)    Admission Cmt: None   Principal Problem: Agitation due to dementia [F03.911]                   Active Insurance as of 2024       Primary Coverage       Payor Plan Insurance Group Employer/Plan Group    AETNA MEDICARE REPLACEMENT AETNA MED ADV PPO [id number]       Payor Plan Address Payor Plan Phone Number Payor Plan Fax Number Effective Dates    [address] [phone]  2024 - None Entered    [facility] [address]         Subscriber Name Subscriber Birth Date Member ID       TOO RICHTER 1955 [id number]                     Emergency Contacts        (Rel.) Home Phone Work Phone Mobile Phone    NEELAMWILLIE GARCIA (Spouse) [phone] -- [phone]    Ritchie Richter (Son) [phone] -- [phone]    Meg Bustillo (Relative) [phone] -- [phone]                 Physical Therapy Notes (most recent note)        Humera Bustillo, PT at 24 1020  Version 1 of 1         Patient Name: Too Richter  : 1955    MRN: [id number]                              Today's Date: 2024       Admit Date: " 4/18/2024    Visit Dx:     ICD-10-CM ICD-9-CM   1. Dementia with behavioral disturbance  F03.918 294.21   2. Aggressive behavior  R46.89 V40.39   3. Agitation due to dementia  F03.911 294.21   4. Hyperglycemia due to diabetes mellitus  E11.65 250.02   5. Frequent falls  R29.6 V15.88   6. Spasm of muscle  M62.838 728.85   7. Myoclonic jerking  G25.3 333.2   8. Peripheral vascular disease  I73.9 443.9   9. Type 2 diabetes mellitus with hyperglycemia, with long-term current use of insulin  E11.65 250.00    Z79.4 790.29     V58.67   10. Chronic coronary artery disease  I25.10 414.00     Patient Active Problem List   Diagnosis    Cough    Benign essential HTN    Benign prostatic hyperplasia    Coronary artery disease involving native coronary artery of native heart    Chronic coronary artery disease    Type 2 diabetes mellitus with hyperglycemia, with long-term current use of insulin    Gout    Hyperlipidemia    History of heart attack    Peripheral neuropathy    Peripheral vascular disease    Spasm of muscle    Myoclonic jerking    Hypomagnesemia    Arteriosclerosis of coronary artery    Dementia with behavioral disturbance    Frequent falls    History of DVT (deep vein thrombosis)    Chronic anticoagulation    AMS (altered mental status)    Falls    Bacteriuria    Cystitis    Agitation due to dementia    Dementia with behavioral disturbance     Past Medical History:   Diagnosis Date    Coronary artery disease     Dementia     Diabetes mellitus     Hyperlipidemia     Peripheral vascular disease      Past Surgical History:   Procedure Laterality Date    CORONARY ARTERY BYPASS GRAFT      FEMORAL ARTERY - POPLITEAL ARTERY BYPASS GRAFT        General Information       Row Name 05/09/24 1146          Physical Therapy Time and Intention    Document Type discharge evaluation/summary  -     Mode of Treatment physical therapy  -       Row Name 05/09/24 1146          General Information    Patient Profile Reviewed yes  -HM      Prior Level of Function --  see IE  -     Existing Precautions/Restrictions fall;other (see comments)  severe dementia complicated by behavioral disturbance  -     Barriers to Rehab medically complex;previous functional deficit;uncooperative  -       Row Name 05/09/24 1146          Living Environment    People in Home spouse;other (see comments)  Pt also has 2 caregivers. One is there for 8 hours each day.  -       Row Name 05/09/24 1146          Home Main Entrance    Number of Stairs, Main Entrance other (see comments)  ramp  -       Row Name 05/09/24 1146          Cognition    Orientation Status (Cognition) disoriented to;person;place;situation;time  not responding to name verbally or with eye contact  -       Row Name 05/09/24 1146          Safety Issues, Functional Mobility    Safety Issues Affecting Function (Mobility) ability to follow commands;at risk behavior observed;awareness of need for assistance;impulsivity;insight into deficits/self-awareness;judgment;problem-solving;safety precaution awareness;safety precautions follow-through/compliance;sequencing abilities  -     Impairments Affecting Function (Mobility) balance;cognition;endurance/activity tolerance;strength  -     Cognitive Impairments, Mobility Safety/Performance attention;awareness, need for assistance;impulsivity;judgment;problem-solving/reasoning;insight into deficits/self-awareness;safety precaution awareness;safety precaution follow-through;sequencing abilities  -     Comment, Safety Issues/Impairments (Mobility) lethargic state, decreased command following  -               User Key  (r) = Recorded By, (t) = Taken By, (c) = Cosigned By      Initials Name Provider Type     Humera Bustillo PT Physical Therapist                   Mobility       Row Name 05/09/24 1148          Bed Mobility    Bed Mobility supine-sit;sit-supine;rolling left;rolling right  Simultaneous filing. User may be unaware of other data.  -      Rolling Left [place] (Bed Mobility) dependent (less than 25% patient effort)  Simultaneous filing. User may be unaware of other data.  -HM     Rolling Right [place] (Bed Mobility) dependent (less than 25% patient effort)  Simultaneous filing. User may be unaware of other data.  -     Supine-Sit [place] (Bed Mobility) dependent (less than 25% patient effort);2 person assist  Simultaneous filing. User may be unaware of other data.  -HM     Sit-Supine [place] (Bed Mobility) dependent (less than 25% patient effort);2 person assist  Simultaneous filing. User may be unaware of other data.  -     Assistive Device (Bed Mobility) bed rails;draw sheet;head of bed elevated  Simultaneous filing. User may be unaware of other data.  -     Comment, (Bed Mobility) dependent x2 to sit EOB and return to supine, when rolling to the R, pt able to hold onto rail when on sidelying however not able to let go of grasp of bedrail on command. pt sitting extremely forward flexed and unable to lift head up on command, posterior/L lean noted  -       Row Name 05/09/24 1148          Gait/Stairs (Locomotion)    Comment, (Gait/Stairs) pt dependent for sitting balance with posterior and L lean, did not initiate movement on his own therefore STS and further mobility deferred  -               User Key  (r) = Recorded By, (t) = Taken By, (c) = Cosigned By      Initials Name Provider Type     Humera Bustillo PT Physical Therapist                   Obj/Interventions       Row Name 05/09/24 1150          Range of Motion Comprehensive    Comment, General Range of Motion not able to command follow to assess PROM at this date  -       Row Name 05/09/24 1150          Strength Comprehensive (MMT)    General Manual Muscle Testing (MMT) Assessment lower extremity strength deficits identified  -     Comment, General Manual Muscle Testing (MMT) Assessment no command following noted therefore unable to assess  -       Row  Name 05/09/24 1150          Balance    Balance Assessment sitting static balance;sitting dynamic balance  -     Static Sitting Balance dependent  -     Dynamic Sitting Balance dependent  -HM     Position, Sitting Balance supported;sitting edge of bed  -     Balance Interventions sitting;static  -     Comment, Balance L/posterior lean, unable to correct with cues and unable to command follow therefore further mobility deferred  -       Row Name 05/09/24 1150          Sensory Assessment (Somatosensory)    Sensory Assessment (Somatosensory) unable/difficult to assess  -               User Key  (r) = Recorded By, (t) = Taken By, (c) = Cosigned By      Initials Name Provider Type     Humera Bustillo, PT Physical Therapist                   Goals/Plan    No documentation.                  Clinical Impression       Row Name 05/09/24 1155          Pain Scale: FACES Pre/Post-Treatment    Pain: FACES Scale, Pretreatment 0-->no hurt  -     Posttreatment Pain Rating 0-->no hurt  -       Row Name 05/09/24 1155          Plan of Care Review    Plan of Care Reviewed With patient  -     Progress no change  -     Outcome Evaluation Pt re-evaluation completed. Pt dependent for all bed mobility at this date and unable to maintain sitting balance therefore STS and further mobility deferred. Pt lethargic and unable to follow commands therefore not appropriate for PT interventions at this time. Please reconsult if improvement in ability to actively participate. d/c rec ECF.  -       Row Name 05/09/24 1154          Therapy Assessment/Plan (PT)    Criteria for Skilled Interventions Met (PT) no;does not meet criteria for skilled intervention  -     Therapy Frequency (PT) evaluation only  -       Row Name 05/09/24 1158          Vital Signs    O2 Delivery Pre Treatment room air  -     O2 Delivery Intra Treatment room air  -     O2 Delivery Post Treatment room air  -     Pre Patient Position Supine  -      Intra Patient Position Sitting  -     Post Patient Position Supine  -       Row Name 05/09/24 1155          Positioning and Restraints    Pre-Treatment Position in bed  -HM     Post Treatment Position bed  -HM     In Bed notified nsg;fowlers;encouraged to call for assist;exit alarm on;side rails up x3;call light within reach;pillow between legs  with sitter  -               User Key  (r) = Recorded By, (t) = Taken By, (c) = Cosigned By      Initials Name Provider Type    Humera Lacy, TRACE Physical Therapist                   Outcome Measures       Row Name 05/09/24 1158 05/09/24 0800       How much help from another person do you currently need...    Turning from your back to your side while in flat bed without using bedrails? 1  - 3  -HMA    Moving from lying on back to sitting on the side of a flat bed without bedrails? 1  - 3  -HMA    Moving to and from a bed to a chair (including a wheelchair)? 1  - 2  -HMA    Standing up from a chair using your arms (e.g., wheelchair, bedside chair)? 1  - 2  -HMA    Climbing 3-5 steps with a railing? 1  - 1  -HMA    To walk in hospital room? 1  -HM 2  -HMA    AM-PAC 6 Clicks Score (PT) 6  - 13  -HMA    Highest Level of Mobility Goal 2 --> Bed activities/dependent transfer  - 4 --> Transfer to chair/commode  -HMA      Row Name 05/09/24 0500          How much help from another person do you currently need...    Turning from your back to your side while in flat bed without using bedrails? 3  -AS     Moving from lying on back to sitting on the side of a flat bed without bedrails? 3  -AS     Moving to and from a bed to a chair (including a wheelchair)? 2  -AS     Standing up from a chair using your arms (e.g., wheelchair, bedside chair)? 2  -AS     Climbing 3-5 steps with a railing? 1  -AS     To walk in hospital room? 2  -AS     AM-PAC 6 Clicks Score (PT) 13  -AS     Highest Level of Mobility Goal 4 --> Transfer to chair/commode  -AS       Row Name  05/09/24 1158          Functional Assessment    Outcome Measure Options AM-PAC 6 Clicks Basic Mobility (PT)  -HM               User Key  (r) = Recorded By, (t) = Taken By, (c) = Cosigned By      Initials Name Provider Type     Humera Bustillo, PT Physical Therapist    AS Jarrod Hunt, RN Registered Nurse    Humera Stuart, RN Registered Nurse                  Physical Therapy Education       Title: PT OT SLP Therapies (In Progress)       Topic: Physical Therapy (In Progress)       Point: Mobility training (In Progress)       Learning Progress Summary             Patient Acceptance, TB,E, NL by  at 5/9/2024 1158    Acceptance, E, NR,DU,VU by  at 4/23/2024 2343    Acceptance, E, VU,DU,NR by  at 4/22/2024 2328    Acceptance, E, DU,NR,VU by  at 4/21/2024 2200    Nonacceptance, E, NL by  at 4/20/2024 1418   Family Acceptance, E, NR,DU,VU by  at 4/23/2024 2343    Acceptance, E, VU,DU,NR by  at 4/22/2024 2328    Acceptance, E, DU,NR,VU by  at 4/21/2024 2200                         Point: Home exercise program (Done)       Learning Progress Summary             Patient Acceptance, E, NR,DU,VU by  at 4/23/2024 2343    Acceptance, E, VU,DU,NR by  at 4/22/2024 2328    Acceptance, E, DU,NR,VU by  at 4/21/2024 2200   Family Acceptance, E, NR,DU,VU by  at 4/23/2024 2343    Acceptance, E, VU,DU,NR by  at 4/22/2024 2328    Acceptance, E, DU,NR,VU by  at 4/21/2024 2200                         Point: Body mechanics (In Progress)       Learning Progress Summary             Patient Acceptance, TB,E, NL by  at 5/9/2024 1158    Acceptance, E, NR,DU,VU by  at 4/23/2024 2343    Acceptance, E, VU,DU,NR by  at 4/22/2024 2328    Acceptance, E, DU,NR,VU by  at 4/21/2024 2200   Family Acceptance, E, NR,DU,VU by MW at 4/23/2024 2343    Acceptance, E, VU,DU,NR by MW at 4/22/2024 2328    Acceptance, E, DU,NR,VU by MW at 4/21/2024 2200                         Point: Precautions (In Progress)        Learning Progress Summary             Patient Acceptance, TB,E, NL by  at 5/9/2024 1158    Acceptance, E, NR,DU,VU by  at 4/23/2024 2343    Acceptance, E, VU,DU,NR by  at 4/22/2024 2328    Acceptance, E, DU,NR,VU by  at 4/21/2024 2200    Nonacceptance, E, NL by  at 4/20/2024 1418   Family Acceptance, E, NR,DU,VU by  at 4/23/2024 2343    Acceptance, E, VU,DU,NR by  at 4/22/2024 2328    Acceptance, E, DU,NR,VU by  at 4/21/2024 2200                                         User Key       Initials Effective Dates Name Provider Type Discipline     07/11/23 -  Nina Calvert, PT Physical Therapist PT     09/22/22 -  Humera Bustillo, PT Physical Therapist PT     03/08/24 -  Steve Smith, RN Registered Nurse Nurse                  PT Recommendation and Plan     Plan of Care Reviewed With: patient  Progress: no change  Outcome Evaluation: Pt re-evaluation completed. Pt dependent for all bed mobility at this date and unable to maintain sitting balance therefore STS and further mobility deferred. Pt lethargic and unable to follow commands therefore not appropriate for PT interventions at this time. Please reconsult if improvement in ability to actively participate. d/c rec ECF.     Time Calculation:         PT Charges       Row Name 05/09/24 1159             Time Calculation    Start Time 1020  -HM      PT Received On 05/09/24  -HM         Timed Charges    05325 - PT Therapeutic Activity Minutes 8  -HM         Untimed Charges    PT Eval/Re-eval Minutes 20  -HM         Total Minutes    Timed Charges Total Minutes 8  -HM      Untimed Charges Total Minutes 20  -HM       Total Minutes 28  -HM                User Key  (r) = Recorded By, (t) = Taken By, (c) = Cosigned By      Initials Name Provider Type     Humera Bustillo, PT Physical Therapist                  Therapy Charges for Today       Code Description Service Date Service Provider Modifiers Qty    [id number]  PT THERAPEUTIC ACT EA 15 MIN  2024 Humera Bustillo, PT GP 1    [id number]  PT RE-EVAL ESTABLISHED PLAN 2 2024 Humera Bustillo, PT GP 1            PT G-Bhargav  Outcome Measure Options: AM-PAC 6 Clicks Basic Mobility (PT)  AM-PAC 6 Clicks Score (PT): 6  AM-PAC 6 Clicks Score (OT): 7    PT Discharge Summary  Anticipated Discharge Disposition (PT): extended care facility    Humera Bustillo PT  2024         Electronically signed by Humera Bustillo, PT at 24 1159          Occupational Therapy Notes (most recent note)        Milena Keane, OT at 24 1015          Acute Care - Occupational Therapy Discharge  [facility]    Patient Name: Too Tai  : 1955    MRN: [id number]                              Today's Date: 2024       Admit Date: 2024    Visit Dx:     ICD-10-CM ICD-9-CM   1. Dementia with behavioral disturbance  F03.918 294.21   2. Aggressive behavior  R46.89 V40.39   3. Agitation due to dementia  F03.911 294.21   4. Hyperglycemia due to diabetes mellitus  E11.65 250.02   5. Frequent falls  R29.6 V15.88   6. Spasm of muscle  M62.838 728.85   7. Myoclonic jerking  G25.3 333.2   8. Peripheral vascular disease  I73.9 443.9   9. Type 2 diabetes mellitus with hyperglycemia, with long-term current use of insulin  E11.65 250.00    Z79.4 790.29     V58.67   10. Chronic coronary artery disease  I25.10 414.00     Patient Active Problem List   Diagnosis    Cough    Benign essential HTN    Benign prostatic hyperplasia    Coronary artery disease involving native coronary artery of native heart    Chronic coronary artery disease    Type 2 diabetes mellitus with hyperglycemia, with long-term current use of insulin    Gout    Hyperlipidemia    History of heart attack    Peripheral neuropathy    Peripheral vascular disease    Spasm of muscle    Myoclonic jerking    Hypomagnesemia    Arteriosclerosis of coronary artery    Dementia with behavioral disturbance    Frequent falls    History of DVT (deep vein  thrombosis)    Chronic anticoagulation    AMS (altered mental status)    Falls    Bacteriuria    Cystitis    Agitation due to dementia    Dementia with behavioral disturbance     Past Medical History:   Diagnosis Date    Coronary artery disease     Dementia     Diabetes mellitus     Hyperlipidemia     Peripheral vascular disease      Past Surgical History:   Procedure Laterality Date    CORONARY ARTERY BYPASS GRAFT      FEMORAL ARTERY - POPLITEAL ARTERY BYPASS GRAFT        General Information       Row Name 05/09/24 1015          OT Time and Intention    Document Type re-evaluation;discharge treatment  -     Mode of Treatment occupational therapy  -       Row Name 05/09/24 1015          General Information    Patient Profile Reviewed yes  -     Prior Level of Function min assist:;feeding;max assist:;grooming;dressing;bathing  SBA to ambulate in home  -     Existing Precautions/Restrictions fall  dementia  -     Barriers to Rehab medically complex;previous functional deficit;cognitive status  -       Row Name 05/09/24 1015          Living Environment    People in Home spouse  Pt also has 2 caregivers. One is there for 8 hours each day  -       Row Name 05/09/24 1015          Home Main Entrance    Number of Stairs, Main Entrance other (see comments)  entry ramp  -       Row Name 05/09/24 1015          Cognition    Orientation Status (Cognition) disoriented to;person;place;situation;time  -       Row Name 05/09/24 1015          Safety Issues, Functional Mobility    Safety Issues Affecting Function (Mobility) ability to follow commands;at risk behavior observed;awareness of need for assistance;insight into deficits/self-awareness;judgment;problem-solving;safety precaution awareness;safety precautions follow-through/compliance;sequencing abilities  -     Impairments Affecting Function (Mobility) balance;cognition;postural/trunk control  -     Cognitive Impairments, Mobility Safety/Performance  attention;awareness, need for assistance;insight into deficits/self-awareness;judgment;problem-solving/reasoning;safety precaution awareness;safety precaution follow-through;sequencing abilities  -               User Key  (r) = Recorded By, (t) = Taken By, (c) = Cosigned By      Initials Name Provider Type    AC Milena Keane OT Occupational Therapist                   Mobility/ADL's       Row Name 05/09/24 1148 05/09/24 0945       Bed Mobility    Bed Mobility -- rolling left;rolling right;scooting/bridging;supine-sit;sit-supine  -    Rolling Left [place] (Bed Mobility) -- dependent (less than 25% patient effort)  -AC    Rolling Right [place] (Bed Mobility) -- dependent (less than 25% patient effort)  grasped bed rail  -AC    Scooting/Bridging [place] (Bed Mobility) verbal cues;nonverbal cues (demo/gesture);dependent (less than 25% patient effort);2 person assist  -AC verbal cues;nonverbal cues (demo/gesture);dependent (less than 25% patient effort);2 person assist  -AC    Supine-Sit [place] (Bed Mobility) -- dependent (less than 25% patient effort);2 person assist  -AC    Sit-Supine [place] (Bed Mobility) -- dependent (less than 25% patient effort);2 person assist  -AC    Bed Mobility, Safety Issues cognitive deficits limit understanding;impaired trunk control for bed mobility  - cognitive deficits limit understanding;impaired trunk control for bed mobility  -    Assistive Device (Bed Mobility) -- bed rails;draw sheet;head of bed elevated  -      Row Name 05/09/24 1148 05/09/24 0945       Transfers    Comment, (Transfers) did not attempt as pt dep with bed mobility, poor sitting balance and not following commands  - did not attempt as pt dep with bed mobility, poor sitting balance and not following commands  -      Row Name 05/09/24 1148          Activities of Daily Living    BADL Assessment/Intervention grooming  -       Row Name 05/09/24 1148 05/09/24 0945       Grooming  Assessment/Training    [place] (Grooming) dependent (less than 25% patient effort)  -AC --    Assistive Devices (Grooming) hand over hand  -AC hand over hand  -AC    Position (Grooming) -- sitting up in bed  -AC    Comment, (Grooming) pt given [place] A, but pt did not initate  -AC pt given [place] A, but pt  did not initiate  -AC              User Key  (r) = Recorded By, (t) = Taken By, (c) = Cosigned By      Initials Name Provider Type    Milena Shankar, OT Occupational Therapist                   Obj/Interventions       Row Name 05/09/24 1015          Sensory Assessment (Somatosensory)    Sensory Assessment (Somatosensory) unable/difficult to assess  -AC       Row Name 05/09/24 1015          Vision Assessment/Intervention    Visual Impairment/Limitations unable/difficult to assess  -AC       Row Name 05/09/24 1015          Range of Motion Comprehensive    Comment, General Range of Motion pt activiely moving arms, but resistant with therapist attempting to passively range  -AC       Row Name 05/09/24 1015          Strength Comprehensive (MMT)    Comment, General Manual Muscle Testing (MMT) Assessment unable to formally assess d/t cognition, but pt demo good strength when being resistant to passively range  -AC       Row Name 05/09/24 1015          Balance    Balance Assessment sitting static balance;sitting dynamic balance  -AC     Static Sitting Balance dependent  -AC     Dynamic Sitting Balance dependent  -AC     Position, Sitting Balance sitting edge of bed  -AC     Comment, Balance post and L lean  -AC               User Key  (r) = Recorded By, (t) = Taken By, (c) = Cosigned By      Initials Name Provider Type    Milena Shankar, OT Occupational Therapist                   Goals/Plan    No documentation.                  Clinical Impression       Row Name 05/09/24 1015          Pain Scale: FACES Pre/Post-Treatment    Pain: FACES Scale, Pretreatment 0-->no hurt  -AC     Posttreatment Pain Rating 0-->no  hurt  -       Row Name 05/09/24 1015          Plan of Care Review    Plan of Care Reviewed With patient  -AC     Outcome Evaluation Pt dep to wash face, dep with all bed mobility, post/L lean with sitting balance.  Pt presents with  lethargy, confusion, inability to follow commands, decr postural control limiting ability to participate with therapy.  No further OT indicated at this time as pt does not demo ability to actively participate.  Recommend ECF upon d/c.  -       Row Name 05/09/24 1015          Therapy Assessment/Plan (OT)    Criteria for Skilled Therapeutic Interventions Met (OT) no;does not meet criteria for skilled intervention  -       Row Name 05/09/24 1015          Therapy Plan Review/Discharge Plan (OT)    Anticipated Discharge Disposition (OT) [facility]  -       Row Name 05/09/24 1015          Vital Signs    Pre Patient Position Supine  -AC     Intra Patient Position Sitting  -AC     Post Patient Position Supine  -       Row Name 05/09/24 1015          Positioning and Restraints    Pre-Treatment Position in bed  -AC     Post Treatment Position bed  -AC     In Bed notified nsg;fowlers;call light within reach;encouraged to call for assist;exit alarm on;pillow between legs  -               User Key  (r) = Recorded By, (t) = Taken By, (c) = Cosigned By      Initials Name Provider Type    Milena Shankar, OT Occupational Therapist                   Outcome Measures       Row Name 05/09/24 1205          How much help from another is currently needed...    Putting on and taking off regular lower body clothing? 1  -AC     Bathing (including washing, rinsing, and drying) 1  -AC     Toileting (which includes using toilet bed pan or urinal) 1  -AC     Putting on and taking off regular upper body clothing 1  -AC     Taking care of personal grooming (such as brushing teeth) 1  -AC     Eating meals 1  -AC     AM-PAC 6 Clicks Score (OT) 6  -       Row Name 05/09/24 1158 05/09/24 0800        How much help from another person do you currently need...    Turning from your back to your side while in flat bed without using bedrails? 1  -HM 3  -HMA    Moving from lying on back to sitting on the side of a flat bed without bedrails? 1  -HM 3  -HMA    Moving to and from a bed to a chair (including a wheelchair)? 1  -HM 2  -HMA    Standing up from a chair using your arms (e.g., wheelchair, bedside chair)? 1  -HM 2  -HMA    Climbing 3-5 steps with a railing? 1  -HM 1  -HMA    To walk in hospital room? 1  -HM 2  -HMA    AM-PAC 6 Clicks Score (PT) 6  -HM 13  -HMA    Highest Level of Mobility Goal 2 --> Bed activities/dependent transfer  - 4 --> Transfer to chair/commode  -HMA      Row Name 05/09/24 0500          How much help from another person do you currently need...    Turning from your back to your side while in flat bed without using bedrails? 3  -AS     Moving from lying on back to sitting on the side of a flat bed without bedrails? 3  -AS     Moving to and from a bed to a chair (including a wheelchair)? 2  -AS     Standing up from a chair using your arms (e.g., wheelchair, bedside chair)? 2  -AS     Climbing 3-5 steps with a railing? 1  -AS     To walk in hospital room? 2  -AS     AM-PAC 6 Clicks Score (PT) 13  -AS     Highest Level of Mobility Goal 4 --> Transfer to chair/commode  -AS       Row Name 05/09/24 1205 05/09/24 1158       Functional Assessment    Outcome Measure Options AM-PAC 6 Clicks Daily Activity (OT)  - AM-PAC 6 Clicks Basic Mobility (PT)  -              User Key  (r) = Recorded By, (t) = Taken By, (c) = Cosigned By      Initials Name Provider Type    Milena Shankar OT Occupational Therapist     Humera Bustillo, PT Physical Therapist    AS Jarrod Hunt, RN Registered Nurse    [facility] Humera Rasheed, RN Registered Nurse                  Occupational Therapy Education       Title: PT OT SLP Therapies (In Progress)       Topic: Occupational Therapy (In Progress)        Point: ADL training (In Progress)       Description:   Instruct learner(s) on proper safety adaptation and remediation techniques during self care or transfers.   Instruct in proper use of assistive devices.                  Learning Progress Summary             Patient Acceptance, E, NL by  at 5/9/2024 1211    Acceptance, E, NR,DU,VU by MW at 4/23/2024 2343    Acceptance, E, VU,DU,NR by MW at 4/22/2024 2328    Acceptance, E, DU,NR,VU by MW at 4/21/2024 2200   Family Acceptance, E, NR,DU,VU by MW at 4/23/2024 2343    Acceptance, E, VU,DU,NR by MW at 4/22/2024 2328    Acceptance, E, DU,NR,VU by MW at 4/21/2024 2200    Acceptance, E, VU by  at 4/20/2024 1433                         Point: Home exercise program (Done)       Description:   Instruct learner(s) on appropriate technique for monitoring, assisting and/or progressing therapeutic exercises/activities.                  Learning Progress Summary             Patient Acceptance, E, NR,DU,VU by MW at 4/23/2024 2343    Acceptance, E, VU,DU,NR by MW at 4/22/2024 2328    Acceptance, E, DU,NR,VU by MW at 4/21/2024 2200   Family Acceptance, E, NR,DU,VU by MW at 4/23/2024 2343    Acceptance, E, VU,DU,NR by MW at 4/22/2024 2328    Acceptance, E, DU,NR,VU by MW at 4/21/2024 2200                         Point: Precautions (Done)       Description:   Instruct learner(s) on prescribed precautions during self-care and functional transfers.                  Learning Progress Summary             Patient Acceptance, E, NR,DU,VU by MW at 4/23/2024 2343    Acceptance, E, VU,DU,NR by MW at 4/22/2024 2328    Acceptance, E, DU,NR,VU by MW at 4/21/2024 2200   Family Acceptance, E, NR,DU,VU by MW at 4/23/2024 2343    Acceptance, E, VU,DU,NR by MW at 4/22/2024 2328    Acceptance, E, DU,NR,VU by  at 4/21/2024 2200                         Point: Body mechanics (Done)       Description:   Instruct learner(s) on proper positioning and spine alignment during self-care, functional  mobility activities and/or exercises.                  Learning Progress Summary             Patient Acceptance, E, NR,DU,VU by  at 4/23/2024 2343    Acceptance, E, VU,DU,NR by  at 4/22/2024 2328    Acceptance, E, DU,NR,VU by  at 4/21/2024 2200   Family Acceptance, E, NR,DU,VU by  at 4/23/2024 2343    Acceptance, E, VU,DU,NR by  at 4/22/2024 2328    Acceptance, E, DU,NR,VU by  at 4/21/2024 2200                                         User Key       Initials Effective Dates Name Provider Type Discipline     02/03/23 -  Milena Keane OT Occupational Therapist OT     03/08/24 -  Steve Smith, RN Registered Nurse Nurse                  OT Recommendation and Plan  Therapy Frequency (OT): evaluation only  Plan of Care Review  Plan of Care Reviewed With: patient  Outcome Evaluation: Pt dep to wash face, dep with all bed mobility, post/L lean with sitting balance.  Pt presents with  lethargy, confusion, inability to follow commands, decr postural control limiting ability to participate with therapy.  No further OT indicated at this time as pt does not demo ability to actively participate.  Recommend ECF upon d/c.  Plan of Care Reviewed With: patient  Outcome Evaluation: Pt dep to wash face, dep with all bed mobility, post/L lean with sitting balance.  Pt presents with  lethargy, confusion, inability to follow commands, decr postural control limiting ability to participate with therapy.  No further OT indicated at this time as pt does not demo ability to actively participate.  Recommend ECF upon d/c.     Time Calculation:   Evaluation Complexity (OT)  Review Occupational Profile/Medical/Therapy History Complexity: expanded/moderate complexity  Assessment, Occupational Performance/Identification of Deficit Complexity: 3-5 performance deficits  Clinical Decision Making Complexity (OT): detailed assessment/moderate complexity  Overall Complexity of Evaluation (OT): moderate complexity     Time  Calculation- OT       Row Name 05/09/24 1015             Time Calculation- OT    OT Start Time 1015  -AC      OT Received On 05/09/24  -AC         Timed Charges    79347 - OT Therapeutic Activity Minutes 10  -AC         Untimed Charges    OT Eval/Re-eval Minutes 30  -AC         Total Minutes    Timed Charges Total Minutes 10  -AC      Untimed Charges Total Minutes 30  -AC       Total Minutes 40  -AC                User Key  (r) = Recorded By, (t) = Taken By, (c) = Cosigned By      Initials Name Provider Type    AC Milena Keane, OT Occupational Therapist                  Therapy Charges for Today       Code Description Service Date Service Provider Modifiers Qty    95147477349  OT THERAPEUTIC ACT EA 15 MIN 5/9/2024 Milena Keane OT GO 1    [id number]  OT RE-EVAL 2 5/9/2024 Milena Keane, OT GO 1               OT Discharge Summary  Anticipated Discharge Disposition (OT): extended care facility    Milena Keane OT  5/9/2024    Electronically signed by Milena Keane OT at 05/09/24 1214

## 2024-05-09 NOTE — PLAN OF CARE
Problem: Adult Inpatient Plan of Care  Goal: Plan of Care Review  Outcome: Ongoing, Progressing  Flowsheets (Taken 5/9/2024 0552)  Progress: no change  Goal: Patient-Specific Goal (Individualized)  Outcome: Ongoing, Progressing  Goal: Absence of Hospital-Acquired Illness or Injury  Outcome: Ongoing, Progressing  Intervention: Identify and Manage Fall Risk  Recent Flowsheet Documentation  Taken 5/9/2024 0500 by Jarrod Hunt, RN  Safety Promotion/Fall Prevention:   safety round/check completed   nonskid shoes/slippers when out of bed   clutter free environment maintained   assistive device/personal items within reach  Taken 5/9/2024 0400 by Jarrod Hunt RN  Safety Promotion/Fall Prevention:   safety round/check completed   nonskid shoes/slippers when out of bed   clutter free environment maintained   assistive device/personal items within reach  Taken 5/9/2024 0300 by Jarrod Hunt RN  Safety Promotion/Fall Prevention:   safety round/check completed   nonskid shoes/slippers when out of bed   clutter free environment maintained   assistive device/personal items within reach  Taken 5/9/2024 0200 by Jarrod Hunt RN  Safety Promotion/Fall Prevention:   safety round/check completed   nonskid shoes/slippers when out of bed   clutter free environment maintained   assistive device/personal items within reach  Taken 5/9/2024 0100 by Jarrod Hunt RN  Safety Promotion/Fall Prevention:   safety round/check completed   nonskid shoes/slippers when out of bed   clutter free environment maintained   assistive device/personal items within reach  Taken 5/9/2024 0000 by Jarrod Hunt RN  Safety Promotion/Fall Prevention:   safety round/check completed   nonskid shoes/slippers when out of bed   clutter free environment maintained   assistive device/personal items within reach  Taken 5/8/2024 2300 by Jarrod Hunt RN  Safety Promotion/Fall Prevention:   safety round/check completed   nonskid  shoes/slippers when out of bed   clutter free environment maintained   assistive device/personal items within reach  Taken 5/8/2024 2200 by Jarrod Hunt RN  Safety Promotion/Fall Prevention:   safety round/check completed   nonskid shoes/slippers when out of bed   clutter free environment maintained   assistive device/personal items within reach  Taken 5/8/2024 2100 by Jarrod Hunt RN  Safety Promotion/Fall Prevention:   safety round/check completed   nonskid shoes/slippers when out of bed   clutter free environment maintained   assistive device/personal items within reach  Taken 5/8/2024 2000 by Jarrod Hunt RN  Safety Promotion/Fall Prevention:   safety round/check completed   nonskid shoes/slippers when out of bed   clutter free environment maintained   assistive device/personal items within reach  Taken 5/8/2024 1900 by Jarrod Hunt RN  Safety Promotion/Fall Prevention:   safety round/check completed   nonskid shoes/slippers when out of bed   clutter free environment maintained   assistive device/personal items within reach  Intervention: Prevent Infection  Recent Flowsheet Documentation  Taken 5/9/2024 0500 by Jarrod Hunt RN  Infection Prevention: environmental surveillance performed  Taken 5/9/2024 0400 by Jarrod Hunt RN  Infection Prevention: environmental surveillance performed  Taken 5/9/2024 0300 by Jarrod Hunt RN  Infection Prevention: environmental surveillance performed  Taken 5/9/2024 0200 by Jarrod Hunt RN  Infection Prevention: environmental surveillance performed  Taken 5/9/2024 0100 by Jarrod Hunt RN  Infection Prevention: environmental surveillance performed  Taken 5/9/2024 0000 by Jarrod Hunt RN  Infection Prevention: environmental surveillance performed  Taken 5/8/2024 2300 by Jarrod Hunt RN  Infection Prevention: environmental surveillance performed  Taken 5/8/2024 2200 by Jarrod Hunt RN  Infection  Prevention: environmental surveillance performed  Taken 5/8/2024 2100 by Jarrod Hunt RN  Infection Prevention: environmental surveillance performed  Taken 5/8/2024 2000 by Jarrod Hunt RN  Infection Prevention: environmental surveillance performed  Taken 5/8/2024 1900 by Jarrod Hunt RN  Infection Prevention: environmental surveillance performed  Goal: Optimal Comfort and Wellbeing  Outcome: Ongoing, Progressing  Goal: Readiness for Transition of Care  Outcome: Ongoing, Progressing     Problem: Fall Injury Risk  Goal: Absence of Fall and Fall-Related Injury  Outcome: Ongoing, Progressing  Intervention: Promote Injury-Free Environment  Recent Flowsheet Documentation  Taken 5/9/2024 0500 by Jarrod Hunt RN  Safety Promotion/Fall Prevention:   safety round/check completed   nonskid shoes/slippers when out of bed   clutter free environment maintained   assistive device/personal items within reach  Taken 5/9/2024 0400 by Jarrod Hunt RN  Safety Promotion/Fall Prevention:   safety round/check completed   nonskid shoes/slippers when out of bed   clutter free environment maintained   assistive device/personal items within reach  Taken 5/9/2024 0300 by Jarrod Hunt RN  Safety Promotion/Fall Prevention:   safety round/check completed   nonskid shoes/slippers when out of bed   clutter free environment maintained   assistive device/personal items within reach  Taken 5/9/2024 0200 by Jarrod Hunt RN  Safety Promotion/Fall Prevention:   safety round/check completed   nonskid shoes/slippers when out of bed   clutter free environment maintained   assistive device/personal items within reach  Taken 5/9/2024 0100 by Jarrod Hunt RN  Safety Promotion/Fall Prevention:   safety round/check completed   nonskid shoes/slippers when out of bed   clutter free environment maintained   assistive device/personal items within reach  Taken 5/9/2024 0000 by Jarrod Hunt RN  Safety  Promotion/Fall Prevention:   safety round/check completed   nonskid shoes/slippers when out of bed   clutter free environment maintained   assistive device/personal items within reach  Taken 5/8/2024 2300 by Jarrod Hunt, RN  Safety Promotion/Fall Prevention:   safety round/check completed   nonskid shoes/slippers when out of bed   clutter free environment maintained   assistive device/personal items within reach  Taken 5/8/2024 2200 by Jarrod Hunt, RN  Safety Promotion/Fall Prevention:   safety round/check completed   nonskid shoes/slippers when out of bed   clutter free environment maintained   assistive device/personal items within reach  Taken 5/8/2024 2100 by Jarrod Hunt, RN  Safety Promotion/Fall Prevention:   safety round/check completed   nonskid shoes/slippers when out of bed   clutter free environment maintained   assistive device/personal items within reach  Taken 5/8/2024 2000 by Jarrod Hunt, RN  Safety Promotion/Fall Prevention:   safety round/check completed   nonskid shoes/slippers when out of bed   clutter free environment maintained   assistive device/personal items within reach  Taken 5/8/2024 1900 by Jarrod Hunt, RN  Safety Promotion/Fall Prevention:   safety round/check completed   nonskid shoes/slippers when out of bed   clutter free environment maintained   assistive device/personal items within reach   Goal Outcome Evaluation:           Progress: no change

## 2024-05-09 NOTE — CASE MANAGEMENT/SOCIAL WORK
Case Management Discharge Note      Final Note: Patient is discharging today, 5/9. CM spoke to wifeJamaica via phone & Chastity with [facility] this morning to confirm the plan.  His plan is a memory care bed at [facility] at [place], [place].  Nurse to call report to [phone]. [facility] stretcher arranged for 1600.  PCS in dropbox.  Pharmacy updated to [facility] in [place].  CM faxed discharge summary, PT/OT orders & PT/OT notes from today to [phone]. CM confirmed with Buzz with [facility] that bed will be delivered prior to 1600.  Wife, Chastity and Humera (with [facility]) aware of transportation time and status of hospital bed.  Wheelchair will be delivered today or tomorrow to [facility] at .  Order in epic & called to Buzz with [facility] per request of Chastity at [facility]. No other needs noted.         Selected Continued Care - Admitted Since 4/18/2024       Destination Coordination complete.      Service Provider Selected Services Address Phone Fax Patient Preferred    [place] [facility] [address] , [place] [id number] [phone] --       Internal Comment last updated by Ilene Brown, RN 5/9/2024 1157    [facility] [place]   [address]  [phone]  [phone]                         Durable Medical Equipment       Service Provider Selected Services Address Phone Fax Patient Preferred    [facility] Durable Medical Equipment [address] [address] [phone] --       Internal Comment last updated by Ilene Brown, RN 5/9/2024 1239    Hospital Bed & WC                         Dialysis/Infusion    No services have been selected for the patient.                Home Medical Care    No services have been selected for the patient.                Therapy    No services have been selected for the patient.                Community Resources    No services have been  selected for the patient.                Community & [facility]    No services have been selected for the patient.                    Selected Continued Care - Episodes Includes continued care and service providers with selected services from the active episodes listed below      Ambulatory Social Work Case Management Episode start date: 4/12/2024   There are no active outsourced providers for this episode.             High Risk Care Management Episode start date: 1/9/2024 (Paused)   There are no active outsourced providers for this episode.                 Selected Continued Care - Prior Encounters Includes continued care and service providers with selected services from prior encounters from 1/19/2024 to 5/9/2024      Discharged on 4/4/2024 Admission date: 3/27/2024 - Discharge disposition: Home-Health Care [facility]      Home Medical Care       Service Provider Selected Services Address Phone Fax Patient Preferred    [facility] ,  Home Rehabilitation [address] [id number] [phone] --                      Discharged on 3/4/2024 Admission date: 2/24/2024 - Discharge disposition: Skilled Nursing Facility (DC - External)      Destination       Service Provider Selected Services Address Phone Fax Patient Preferred    [facility] [address] [address] [phone] --                          Transportation Services  Ambulance: [facility]    Final Discharge Disposition Code: 01 - home or self-care

## 2024-05-10 ENCOUNTER — PATIENT OUTREACH (OUTPATIENT)
Dept: CASE MANAGEMENT | Facility: OTHER | Age: 69
End: 2024-05-10
Payer: MEDICARE

## 2024-05-10 ENCOUNTER — PATIENT OUTREACH (OUTPATIENT)
Age: 69
End: 2024-05-10
Payer: MEDICARE

## 2024-05-10 NOTE — PAYOR COMM NOTE
"RUST# [id number]   5/9/24 Discharge Date    Utilization Review  Phone [phone]  Fax [phone]    [place] [id number]           Too Richter (68 y.o. Male)       Date of Birth   1955    Social Security Number       Address   [address] [place] [address]  [facility] [address]    Home Phone   [phone]    MRN   [id number]       [employer]ar[employer]   Shinto    Marital Status                               Admission Date   4/18/24    Admission Type   Emergency    Admitting Provider   RANDALL Palacios DO    Attending Provider       Department, Room/Bed   [facility] 5G, S551/1       Discharge Date   5/9/2024    Discharge Disposition   Skilled Nursing Facility (DC - External)    Discharge Destination                                 Attending Provider: (none)   Allergies: Bactrim [Sulfamethoxazole-trimethoprim], Adhesive Tape, Neosporin [Neomycin-bacitracin Zn-polymyx]    Isolation: None   Infection: None   Code Status: Prior    Ht: 188 cm (74\")   Wt: 97.5 kg (215 lb)    Admission Cmt: None   Principal Problem: Agitation due to dementia [F03.911]                   Active Insurance as of 4/18/2024       Primary Coverage       Payor Plan Insurance Group Employer/Plan Group    AETNA MEDICARE REPLACEMENT AETNA MED ADV PPO [id number]       Payor Plan Address Payor Plan Phone Number Payor Plan Fax Number Effective Dates    [address] [phone]  1/1/2024 - None Entered    [facility] [id number]         Subscriber Name Subscriber Birth Date Member ID       TOO RICHTER 1955 [id number]                     Emergency Contacts        (Rel.) Home Phone Work Phone Mobile Phone    NEELAMWILLIE (Spouse) [phone] -- [phone]    Ritchie Richter (Son) [phone] -- [phone]    eMg Bustillo (Relative) [phone] -- [phone]                 Discharge Summary        RANDALL Palacios DO at 05/09/24 0901              [facility]" [facility]  DISCHARGE SUMMARY    Patient Name: Too Tai  : 1955  MRN: [id number]    Date of Admission: 2024 11:43 PM  Date of Discharge:  2024  Primary Care Physician: Des Geller MD    Consults       Date and Time Order Name Status Description    2024  8:50 AM Inpatient Psychiatrist Consult      2024  2:30 PM Inpatient Palliative Care MD Consult Completed     2024  5:42 AM Inpatient Neurology Consult General Completed     3/27/2024 10:27 PM Inpatient Palliative Care MD Consult Completed     3/27/2024 10:13 PM Inpatient Neurology Consult General Completed             Hospital Course     Presenting Problem: Agitation     Active Hospital Problems    Diagnosis  POA    **Agitation due to dementia [F03.911]  Yes    Dementia with behavioral disturbance [F03.918]  Yes    Type 2 diabetes mellitus with hyperglycemia, with long-term current use of insulin [E11.65, Z79.4]  Not Applicable    Peripheral neuropathy [G62.9]  Yes    Hyperlipidemia [E78.5]  Yes    Benign prostatic hyperplasia [N40.0]  Yes    Benign essential HTN [I10]  Yes    Peripheral vascular disease [I73.9]  Yes    Coronary artery disease involving native coronary artery of native heart [I25.10]  Yes      Resolved Hospital Problems    Diagnosis Date Resolved POA    Agitation [R45.1] 2024 Yes          Hospital Course:  Too Tai is a 68yoM with PMH significant for advanced dementia, CAD, diabetes mellitus type 2, HLD and PVD. Admitted to [facility] ED on 24 for agitation / behavioral disturbances. Recently admitted to [facility] -3/4/24 for dementia with behavioral disturbances and again 3/27-24 for similar issues. Patient transferred to  services on , reviewed chart [facility], continue to follow with CM and neurology at this time. Sitter remains at bedside, patient has been out of restrains since 5/3, continue to  follow at this time. Patient seen again on 5/7, will continue to follow and awaiting hopeful placement, discussed case with neurology today, continue to follow with CM and wife updated at bedside Patient seen again on 5/8, still working on potential placement options and following with CM, wife touring another facility today, continue to follow.     Patient will be accepted to the [facility] on 5/9 . Patient will be transported via EMS as he has had issues with severe dementia  with behaviors/agitation, is a fall risk as he has poor trunk control, episodes of lethargy,  impaired functional mobility, issues with his balance and gait and overall has limited endurance. Patient will be discharged there later today.  I have discussed case with neurology today, advised patient likely can have continued weaning of Seroquel as tolerated now that other medications are therapeutic. Patient to transported to facility later today for continued care going forward, medications has been called into [facility] pharamacy as well. Patient at facility will also likely require wheelchair for continued mobility at SNF. Patients' mobility limitations impair his ability to participate in activities such as toileting, feeding, dressing, grooming, and bathing. Mobility limitations cannot be resolved by a cane or walker at this current time. The patient is able to safely use a manual wheelchair and he will also have a caregiver at the facility as well to help assist with the patient maneuvering.      Advanced dementia complicated by behavioral disturbance with superimposed sleep disturbance   - Infectious work up unremarkable. UA reassuring. CXR without acute findings. CT head without acute findings  - Continue home Aricept and Mirtazapine 15mg QHS and melatonin 5 mg at 7 pm  - Continue Seroquel but neurology has been reducing dosage and plans are to make it likely PRN   - 5/3 trial of zoloft 50 mg and PRN versed 0.5 mg as  needed for nursing care. (Ativan 0.25mg recommended per psych but it is not available)  - Neurology started Phenobarbital, level now WNL.  Will continue to monitor effect, again level remains WNL on 5/6   - Continue Temazepam 30mg QHS  - Appreciate palliative care assistance  - May need geriatric psych facility vs LTC - CM looking into this. Telepsych eval completed 5/2/24  - Lost IV access, will need to monitor PO intake, may need to replace IVFs, continue to follow.   - PRN Geodon has been stopped   - Patient will be discharged to the [facility] for continued care at this time and transferred by EMS secondary to the above.      Uncontrolled diabetes mellitus type 2  - Appears not on any home meds for DM  - A1c 9.5%  - Continue Lantus, increased to 17 units BID, Lispro 7 units TID AC + SSI   - glucose reviewed  - Continue to follow, will likely need continued long term insulin use at this time.      HTN  - continue Amlodipine 10mg daily  - continue Lisinopril 30mg daily       Discharge Follow Up Recommendations for outpatient labs/diagnostics:   Follow up with facility PCP upon admission   Follow up Neurology in 1 month     Day of Discharge     HPI:   Patient is a 67 yo M seen and examined by me this AM, he is resting comfortably in bed, eyes open and will make eye contact but does not currently answer questions at this time. Wife not present this AM       Vital Signs:   Heart Rate:  [] 83  Resp:  [16-18] 18  BP: (145-168)/() 168/102      Physical Exam:  Constitutional: No acute distress, awake but resting comfortably, remains on RA  HENT: NCAT, nares patent, mucous membranes moist  Respiratory: Clear to auscultation bilaterally, no rhonchi or wheezing, respiratory effort normal   Cardiovascular: RRR, no murmurs, rubs, or gallops  Gastrointestinal: Positive bowel sounds, soft, nontender, nondistended  Musculoskeletal: No bilateral ankle edema, no clubbing or cyanosis   Psychiatric:  pleasant, resting comfortably, not agitated    Neurologic: moves all extremities, no obvious focal deficits, sitting up in bed   Skin: No rashes      Pertinent  and/or Most Recent Results     LAB RESULTS:      Lab 05/09/24  0443 05/08/24  0514 05/07/24  0534   WBC 8.44 8.27 8.76   HEMOGLOBIN 14.1 13.4 14.8   HEMATOCRIT 42.2 40.3 43.9   PLATELETS 332 319 314   NEUTROS ABS 3.75 3.28 4.03   IMMATURE GRANS (ABS) 0.03 0.05 0.02   LYMPHS ABS 2.86 2.97 2.80   MONOS ABS 0.78 0.80 0.88   EOS ABS 0.93* 1.07* 0.94*   MCV 93.0 94.8 92.4         Lab 05/09/24 0443 05/08/24  0702 05/07/24  0534   SODIUM 142 143 141   POTASSIUM 3.5 4.6 3.8   CHLORIDE 105 105 103   CO2 27.0 30.0* 27.0   ANION GAP 10.0 8.0 11.0   BUN 23 26* 22   CREATININE 0.80 0.90 0.87   EGFR 97.0 93.6 94.6   GLUCOSE 112* 167* 113*   CALCIUM 8.8 9.2 9.3   MAGNESIUM 1.9 1.9 1.9         Lab 05/09/24 0443 05/08/24  0702 05/07/24  0534   TOTAL PROTEIN 6.5 6.8 6.7   ALBUMIN 3.4* 3.6 3.6   GLOBULIN 3.1 3.2 3.1   ALT (SGPT) 28 25 26   AST (SGOT) 31 30 35   BILIRUBIN 0.5 0.5 0.5   ALK PHOS 113 117 118*                     Brief Urine Lab Results  (Last result in the past 365 days)        Color   Clarity   Blood   Leuk Est   Nitrite   Protein   CREAT   Urine HCG        04/19/24 0243 Yellow   Clear   Negative   Negative   Negative   Negative                 Microbiology Results (last 10 days)       ** No results found for the last 240 hours. **            No radiology results for the last 10 days            Results for orders placed during the hospital encounter of 03/27/24    Adult Transthoracic Echo Complete W/ Cont if Necessary Per Protocol    Interpretation Summary    Left ventricular systolic function is normal. Calculated left ventricular EF = 55.1% Normal left ventricular cavity size noted. Left ventricular wall thickness is consistent with mild concentric hypertrophy. Left ventricular diastolic function is consistent with (grade I) impaired relaxation.    The right  ventricular cavity is mildly dilated. Normal right ventricular systolic function noted.    There is calcification of the aortic valve. The aortic valve appears trileaflet. Mild aortic valve regurgitation is present. No aortic valve stenosis is present.    Mitral annular calcification is present. Trace mitral valve regurgitation is present. No significant mitral valve stenosis is present.    The tricuspid valve is structurally normal with no stenosis present. Trace tricuspid valve regurgitation is present. Insufficient TR velocity profile to estimate the right ventricular systolic pressure.    Mild dilation of the sinuses of Valsalva is present. Mild dilation of the ascending aorta is present. Ascending aorta = 4.2 cm      Plan for Follow-up of Pending Labs/Results: N/A     Discharge Details        Discharge Medications        New Medications        Instructions Start Date   amLODIPine 10 MG tablet  Commonly known as: NORVASC  If you were already taking this medication, take it this way instead.   10 mg, Oral, Every 24 Hours Scheduled   Start Date: May 10, 2024     cyanocobalamin 1000 MCG/ML injection   1,000 mcg, Intramuscular, Every 28 Days   Start Date: May 17, 2024     folic acid 1 MG tablet  Commonly known as: FOLVITE   1 mg, Oral, Daily   Start Date: May 10, 2024     PHENobarbital 64.8 MG tablet   129.6 mg, Oral, Nightly      PHENobarbital 64.8 MG tablet   64.8 mg, Oral, 2 Times Daily      sertraline 50 MG tablet  Commonly known as: ZOLOFT   50 mg, Oral, Daily   Start Date: May 10, 2024            Changes to Medications        Instructions Start Date   donepezil 10 MG tablet  Commonly known as: ARICEPT  What changed:   how much to take  when to take this  Another medication with the same name was removed. Continue taking this medication, and follow the directions you see here.   10 mg, Oral, 2 Times Daily      lisinopril 20 MG tablet  Commonly known as: PRINIVIL,ZESTRIL  What changed: Another medication with  the same name was added. Make sure you understand how and when to take each.   20 mg, Oral, Daily      lisinopril 30 MG tablet  Commonly known as: PRINIVILZESTRIL  What changed: You were already taking a medication with the same name, and this prescription was added. Make sure you understand how and when to take each.   30 mg, Oral, Daily   Start Date: May 10, 2024     QUEtiapine 200 MG tablet  Commonly known as: SEROquel  What changed: You were already taking a medication with the same name, and this prescription was added. Make sure you understand how and when to take each.  Replaces: QUEtiapine fumarate ER 50 MG tablet sustained-release 24 hour tablet   200 mg, Oral, Nightly      QUEtiapine 100 MG tablet  Commonly known as: SEROquel  What changed:   medication strength  how much to take  when to take this  Another medication with the same name was removed. Continue taking this medication, and follow the directions you see here.   100 mg, Oral, Daily   Start Date: May 10, 2024     temazepam 30 MG capsule  Commonly known as: RESTORIL  What changed:   medication strength  how much to take  when to take this  reasons to take this   30 mg, Oral, Nightly             Continue These Medications        Instructions Start Date   Accu-Chek Geri Plus w/Device kit   1 kit, Does not apply, 3 Times Daily      Accu-Chek Geri solution   1 bottle, In Vitro, As Needed      accu-chek soft touch lancets   Check sugars 3 times daily      Alcohol Prep pads   1 swab , Does not apply, 3 Times Daily      B-D UF III MINI PEN NEEDLES 31G X 5 MM misc  Generic drug: Insulin Pen Needle   USE AS DIRECTED      Divalproex Sodium 125 MG capsule  Commonly known as: DEPAKOTE SPRINKLE   250 mg, Oral, Every 8 Hours Scheduled      Glucagon 1 MG/0.2ML solution auto-injector   1 mg, Subcutaneous, Every 15 Minutes PRN      haloperidol 5 MG tablet  Commonly known as: HALDOL   5 mg, Oral, As Needed      Lantus SoloStar 100 UNIT/ML injection pen  Generic  drug: Insulin Glargine   20 Units, Subcutaneous, 2 Times Daily      mirtazapine 15 MG tablet  Commonly known as: REMERON   15 mg, Oral, Nightly             Stop These Medications      QUEtiapine fumarate ER 50 MG tablet sustained-release 24 hour tablet  Commonly known as: SEROquel XR  Replaced by: QUEtiapine 200 MG tablet  You also have another medication with the same name that you need to continue taking as instructed.            ASK your doctor about these medications        Instructions Start Date   amLODIPine 10 MG tablet  Commonly known as: NORVASC  Ask about: Should I take this medication?   10 mg, Oral, Every 24 Hours Scheduled               Allergies   Allergen Reactions    Bactrim [Sulfamethoxazole-Trimethoprim] Rash    Adhesive Tape Rash    Neosporin [Neomycin-Bacitracin Zn-Polymyx] Rash         Discharge Disposition:  Skilled Nursing Facility (DC - External)    Diet:  Hospital:  Diet Order   Procedures    Diet: Regular/House; Finger Foods; Fluid Consistency: Thin (IDDSI 0)            Activity:  Continue to advance as tolerated     Restrictions or Other Recommendations:  Follow up with Neurology in 1 month        CODE STATUS:    Code Status and Medical Interventions:   Ordered at: 04/19/24 1037     Medical Intervention Limits:    NO intubation (DNI)     Level Of Support Discussed With:    Next of Kin (If No Surrogate)     Code Status (Patient has no pulse and is not breathing):    No CPR (Do Not Attempt to Resuscitate)     Medical Interventions (Patient has pulse or is breathing):    Limited Support       Future Appointments   Date Time Provider Department Center   5/9/2024  4:00 PM MED 7  [facility]       Additional Instructions for the Follow-ups that You Need to Schedule       Ambulatory Referral to Occupational Therapy   As directed      We recommend based on our assessments, that Mr. Tai would benefit and should continue OT    Order Comments: We recommend based on our assessments, that Mr. Tai  would benefit and should continue OT    Specialty needed: Evaluate and treat   Follow-up needed: Yes        Ambulatory Referral to Physical Therapy   As directed      We recommend based on our assessments, that Mr. Tai would benefit and should continue PT.    Order Comments: We recommend based on our assessments, that Mr. Tai would benefit and should continue PT.    Specialty needed: Evaluate and treat   Weight Bearing Status: Full weight bearing   Follow-up needed: Yes        Discharge Follow-up with Specialty: Follow up with Neurology in 1 month   As directed      Specialty: Follow up with Neurology in 1 month                      GLENN Palacios DO  05/09/24      Time Spent on Discharge:  I spent  45  minutes on this discharge activity which included: face-to-face encounter with the patient, reviewing the data in the system, coordination of the care with the nursing staff as well as consultants, documentation, and entering orders.            Electronically signed by RANDALL Palacios DO at 05/09/24 1257       Discharge Order (From admission, onward)       Start     Ordered    05/09/24 1137  Discharge patient  Once        Expected Discharge Date: 05/09/24   Expected Discharge Time: Afternoon   Discharge Disposition: Skilled Nursing Facility (DC - External)   Physician of Record for Attribution - Please select from Treatment Team: RANDALL PALACIOS [[id number]]   Review needed by CMO to determine Physician of Record: No      Question Answer Comment   Physician of Record for Attribution - Please select from Treatment Team RANDALL PALACIOS    Review needed by CMO to determine Physician of Record No        05/09/24 1136

## 2024-05-11 ENCOUNTER — HOSPITAL ENCOUNTER (INPATIENT)
Facility: HOSPITAL | Age: 69
LOS: 2 days | Discharge: NURSING, REHAB OR LONG-TERM CARE | End: 2024-05-15
Attending: EMERGENCY MEDICINE | Admitting: FAMILY MEDICINE
Payer: MEDICARE

## 2024-05-11 ENCOUNTER — APPOINTMENT (OUTPATIENT)
Dept: GENERAL RADIOLOGY | Facility: HOSPITAL | Age: 69
End: 2024-05-11
Payer: MEDICARE

## 2024-05-11 ENCOUNTER — APPOINTMENT (OUTPATIENT)
Dept: CT IMAGING | Facility: HOSPITAL | Age: 69
End: 2024-05-11
Payer: MEDICARE

## 2024-05-11 DIAGNOSIS — E11.65 TYPE 2 DIABETES MELLITUS WITH HYPERGLYCEMIA, WITH LONG-TERM CURRENT USE OF INSULIN: ICD-10-CM

## 2024-05-11 DIAGNOSIS — I95.9 HYPOTENSIVE EPISODE: ICD-10-CM

## 2024-05-11 DIAGNOSIS — Z79.4 TYPE 2 DIABETES MELLITUS WITH HYPERGLYCEMIA, WITH LONG-TERM CURRENT USE OF INSULIN: ICD-10-CM

## 2024-05-11 DIAGNOSIS — Z86.59 HISTORY OF DEMENTIA: ICD-10-CM

## 2024-05-11 DIAGNOSIS — F03.911 AGITATION DUE TO DEMENTIA: ICD-10-CM
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
## 2024-05-11 DIAGNOSIS — R46.4 SLOWNESS AND POOR RESPONSIVENESS: Primary | ICD-10-CM

## 2024-05-11 DIAGNOSIS — G62.9 PERIPHERAL POLYNEUROPATHY: ICD-10-CM

## 2024-05-11 DIAGNOSIS — Z91.89 AT RISK FOR POLYPHARMACY: ICD-10-CM

## 2024-05-11 DIAGNOSIS — R79.89 ELEVATED LACTIC ACID LEVEL: ICD-10-CM

## 2024-05-11 DIAGNOSIS — I10 BENIGN ESSENTIAL HTN: ICD-10-CM

## 2024-05-11 DIAGNOSIS — Z74.09 IMPAIRED FUNCTIONAL MOBILITY, BALANCE, GAIT, AND ENDURANCE: ICD-10-CM

## 2024-05-11 DIAGNOSIS — F03.918 DEMENTIA WITH BEHAVIORAL DISTURBANCE: ICD-10-CM

## 2024-05-11 PROBLEM — R41.89 UNRESPONSIVENESS: Status: ACTIVE | Noted: 2024-05-11

## 2024-05-11 LAB
ALBUMIN SERPL-MCNC: 3 G/DL (ref 3.5–5.2)
ALBUMIN/GLOB SERPL: 1.4 G/DL
ALP SERPL-CCNC: 91 U/L (ref 39–117)
ALT SERPL W P-5'-P-CCNC: 18 U/L (ref 1–41)
ANION GAP SERPL CALCULATED.3IONS-SCNC: 11 MMOL/L (ref 5–15)
ARTERIAL PATENCY WRIST A: POSITIVE
AST SERPL-CCNC: 23 U/L (ref 1–40)
ATMOSPHERIC PRESS: ABNORMAL MM[HG]
BASE EXCESS BLDA CALC-SCNC: 3.4 MMOL/L (ref 0–2)
BASOPHILS # BLD AUTO: 0.05 10*3/MM3 (ref 0–0.2)
BASOPHILS NFR BLD AUTO: 0.7 % (ref 0–1.5)
BDY SITE: ABNORMAL
BILIRUB SERPL-MCNC: 0.5 MG/DL (ref 0–1.2)
BODY TEMPERATURE: 37
BUN SERPL-MCNC: 22 MG/DL (ref 8–23)
BUN/CREAT SERPL: 26.5 (ref 7–25)
CALCIUM SPEC-SCNC: 8.4 MG/DL (ref 8.6–10.5)
CHLORIDE SERPL-SCNC: 101 MMOL/L (ref 98–107)
CO2 BLDA-SCNC: 29 MMOL/L (ref 22–33)
CO2 SERPL-SCNC: 26 MMOL/L (ref 22–29)
COHGB MFR BLD: 1.1 % (ref 0–2)
CREAT SERPL-MCNC: 0.83 MG/DL (ref 0.76–1.27)
D-LACTATE SERPL-SCNC: 10.3 MMOL/L (ref 0.5–2)
DEPRECATED RDW RBC AUTO: 43.2 FL (ref 37–54)
EGFRCR SERPLBLD CKD-EPI 2021: 95.9 ML/MIN/1.73
EOSINOPHIL # BLD AUTO: 0.32 10*3/MM3 (ref 0–0.4)
EOSINOPHIL NFR BLD AUTO: 4.4 % (ref 0.3–6.2)
EPAP: 0
ERYTHROCYTE [DISTWIDTH] IN BLOOD BY AUTOMATED COUNT: 12.3 % (ref 12.3–15.4)
GLOBULIN UR ELPH-MCNC: 2.1 GM/DL
GLUCOSE BLDC GLUCOMTR-MCNC: 260 MG/DL (ref 70–130)
GLUCOSE SERPL-MCNC: 226 MG/DL (ref 65–99)
HCO3 BLDA-SCNC: 27.7 MMOL/L (ref 20–26)
HCT VFR BLD AUTO: 33.8 % (ref 37.5–51)
HCT VFR BLD CALC: 43.7 % (ref 38–51)
HGB BLD-MCNC: 10.8 G/DL (ref 13–17.7)
HGB BLDA-MCNC: 14.3 G/DL (ref 13.5–17.5)
HOLD SPECIMEN: NORMAL
IMM GRANULOCYTES # BLD AUTO: 0.02 10*3/MM3 (ref 0–0.05)
IMM GRANULOCYTES NFR BLD AUTO: 0.3 % (ref 0–0.5)
INHALED O2 CONCENTRATION: 100 %
IPAP: 0
LYMPHOCYTES # BLD AUTO: 2.18 10*3/MM3 (ref 0.7–3.1)
LYMPHOCYTES NFR BLD AUTO: 29.7 % (ref 19.6–45.3)
Lab: ABNORMAL
MAGNESIUM SERPL-MCNC: 1.3 MG/DL (ref 1.6–2.4)
MCH RBC QN AUTO: 30.8 PG (ref 26.6–33)
MCHC RBC AUTO-ENTMCNC: 32 G/DL (ref 31.5–35.7)
MCV RBC AUTO: 96.3 FL (ref 79–97)
METHGB BLD QL: 0.2 % (ref 0–1.5)
MODALITY: ABNORMAL
MONOCYTES # BLD AUTO: 0.71 10*3/MM3 (ref 0.1–0.9)
MONOCYTES NFR BLD AUTO: 9.7 % (ref 5–12)
NEUTROPHILS NFR BLD AUTO: 4.05 10*3/MM3 (ref 1.7–7)
NEUTROPHILS NFR BLD AUTO: 55.2 % (ref 42.7–76)
NOTIFIED BY: ABNORMAL
NOTIFIED WHO: ABNORMAL
NRBC BLD AUTO-RTO: 0 /100 WBC (ref 0–0.2)
OXYHGB MFR BLDV: ABNORMAL %
PAW @ PEAK INSP FLOW SETTING VENT: 0 CMH2O
PCO2 BLDA: 40.4 MM HG (ref 35–45)
PCO2 TEMP ADJ BLD: 40.4 MM HG (ref 35–48)
PH BLDA: 7.45 PH UNITS (ref 7.35–7.45)
PH, TEMP CORRECTED: 7.45 PH UNITS
PHENOBARB SERPL-MCNC: 20.3 MCG/ML (ref 10–30)
PLATELET # BLD AUTO: 214 10*3/MM3 (ref 140–450)
PMV BLD AUTO: 8.9 FL (ref 6–12)
PO2 BLDA: 442 MM HG (ref 83–108)
PO2 TEMP ADJ BLD: 442 MM HG (ref 83–108)
POTASSIUM SERPL-SCNC: 4.1 MMOL/L (ref 3.5–5.2)
PROCALCITONIN SERPL-MCNC: 0.04 NG/ML (ref 0–0.25)
PROT SERPL-MCNC: 5.1 G/DL (ref 6–8.5)
RBC # BLD AUTO: 3.51 10*6/MM3 (ref 4.14–5.8)
SODIUM SERPL-SCNC: 138 MMOL/L (ref 136–145)
TOTAL RATE: 0 BREATHS/MINUTE
TROPONIN T SERPL HS-MCNC: 25 NG/L
VALPROATE SERPL-MCNC: 18.9 MCG/ML (ref 50–125)
WBC NRBC COR # BLD AUTO: 7.33 10*3/MM3 (ref 3.4–10.8)
WHOLE BLOOD HOLD COAG: NORMAL
WHOLE BLOOD HOLD SPECIMEN: NORMAL

## 2024-05-11 PROCEDURE — 83735 ASSAY OF MAGNESIUM: CPT | Performed by: EMERGENCY MEDICINE

## 2024-05-11 PROCEDURE — 71045 X-RAY EXAM CHEST 1 VIEW: CPT

## 2024-05-11 PROCEDURE — 36600 WITHDRAWAL OF ARTERIAL BLOOD: CPT

## 2024-05-11 PROCEDURE — 70450 CT HEAD/BRAIN W/O DYE: CPT

## 2024-05-11 PROCEDURE — 85025 COMPLETE CBC W/AUTO DIFF WBC: CPT | Performed by: EMERGENCY MEDICINE

## 2024-05-11 PROCEDURE — 80184 ASSAY OF PHENOBARBITAL: CPT | Performed by: EMERGENCY MEDICINE

## 2024-05-11 PROCEDURE — 83605 ASSAY OF LACTIC ACID: CPT | Performed by: EMERGENCY MEDICINE

## 2024-05-11 PROCEDURE — 99291 CRITICAL CARE FIRST HOUR: CPT

## 2024-05-11 PROCEDURE — 82805 BLOOD GASES W/O2 SATURATION: CPT

## 2024-05-11 PROCEDURE — 84145 PROCALCITONIN (PCT): CPT | Performed by: EMERGENCY MEDICINE

## 2024-05-11 PROCEDURE — 25810000003 SODIUM CHLORIDE 0.9 % SOLUTION: Performed by: EMERGENCY MEDICINE

## 2024-05-11 PROCEDURE — 87040 BLOOD CULTURE FOR BACTERIA: CPT | Performed by: EMERGENCY MEDICINE

## 2024-05-11 PROCEDURE — 82375 ASSAY CARBOXYHB QUANT: CPT

## 2024-05-11 PROCEDURE — 83050 HGB METHEMOGLOBIN QUAN: CPT

## 2024-05-11 PROCEDURE — P9612 CATHETERIZE FOR URINE SPEC: HCPCS

## 2024-05-11 PROCEDURE — 82948 REAGENT STRIP/BLOOD GLUCOSE: CPT

## 2024-05-11 PROCEDURE — 80053 COMPREHEN METABOLIC PANEL: CPT | Performed by: EMERGENCY MEDICINE

## 2024-05-11 PROCEDURE — 36415 COLL VENOUS BLD VENIPUNCTURE: CPT

## 2024-05-11 PROCEDURE — 93005 ELECTROCARDIOGRAM TRACING: CPT | Performed by: EMERGENCY MEDICINE

## 2024-05-11 PROCEDURE — 84484 ASSAY OF TROPONIN QUANT: CPT | Performed by: EMERGENCY MEDICINE

## 2024-05-11 PROCEDURE — 80164 ASSAY DIPROPYLACETIC ACD TOT: CPT | Performed by: EMERGENCY MEDICINE

## 2024-05-11 RX ORDER — SODIUM CHLORIDE 0.9 % (FLUSH) 0.9 %
10 SYRINGE (ML) INJECTION AS NEEDED
Status: DISCONTINUED | OUTPATIENT
Start: 2024-05-11 | End: 2024-05-16 | Stop reason: HOSPADM

## 2024-05-11 RX ADMIN — SODIUM CHLORIDE 1000 ML: 9 INJECTION, SOLUTION INTRAVENOUS at 22:48

## 2024-05-11 NOTE — LETTER
EMS Transport Request  For use at [facility], [place], [place], [place], and [place] only   Patient Name: Too Tai : 1955   Weight:97.5 kg (215 lb) Pick-up Location: [facility] ([facility]) BLS/ALS: BLS/ALS: BLS   Insurance: AETNA MEDICARE REPLACEMENT Auth End Date:    Pre-Cert #: D/C Summary complete:    Destination: [facility] [address] [place] [address]   Contact Precautions: None   Equipment (O2, Fluids, etc.): None   Arrive By Date/Time: 5/15 at4pm Stretcher/WC: Stretcher   CM Requesting: JAMES Tyson (Kay) Ext: [phone]   Notes/Medical Necessity: poor sitting balance, fall risk and generalized weakness     ______________________________________________________________________    *Only 2 patient bags OR 1 carry-on size bag are permitted.  Wheelchairs and walkers CANNOT transported with the patient. Acknowledge: Yes

## 2024-05-12 ENCOUNTER — APPOINTMENT (OUTPATIENT)
Dept: CT IMAGING | Facility: HOSPITAL | Age: 69
End: 2024-05-12
Payer: MEDICARE

## 2024-05-12 PROBLEM — E87.20 LACTIC ACIDOSIS: Status: ACTIVE | Noted: 2024-05-12

## 2024-05-12 LAB
ALBUMIN SERPL-MCNC: 3.3 G/DL (ref 3.5–5.2)
ALBUMIN/GLOB SERPL: 1.4 G/DL
ALP SERPL-CCNC: 102 U/L (ref 39–117)
ALT SERPL W P-5'-P-CCNC: 21 U/L (ref 1–41)
AMPHET+METHAMPHET UR QL: NEGATIVE
AMPHETAMINES UR QL: NEGATIVE
ANION GAP SERPL CALCULATED.3IONS-SCNC: 8 MMOL/L (ref 5–15)
AST SERPL-CCNC: 28 U/L (ref 1–40)
B PARAPERT DNA SPEC QL NAA+PROBE: NOT DETECTED
B PERT DNA SPEC QL NAA+PROBE: NOT DETECTED
BACTERIA UR QL AUTO: NORMAL /HPF
BARBITURATES UR QL SCN: POSITIVE
BASOPHILS # BLD AUTO: 0.04 10*3/MM3 (ref 0–0.2)
BASOPHILS NFR BLD AUTO: 0.5 % (ref 0–1.5)
BENZODIAZ UR QL SCN: POSITIVE
BILIRUB SERPL-MCNC: 0.6 MG/DL (ref 0–1.2)
BILIRUB UR QL STRIP: NEGATIVE
BUN SERPL-MCNC: 23 MG/DL (ref 8–23)
BUN/CREAT SERPL: 29.9 (ref 7–25)
BUPRENORPHINE SERPL-MCNC: NEGATIVE NG/ML
C PNEUM DNA NPH QL NAA+NON-PROBE: NOT DETECTED
CALCIUM SPEC-SCNC: 8.7 MG/DL (ref 8.6–10.5)
CANNABINOIDS SERPL QL: NEGATIVE
CHLORIDE SERPL-SCNC: 104 MMOL/L (ref 98–107)
CK SERPL-CCNC: 592 U/L (ref 20–200)
CLARITY UR: CLEAR
CO2 SERPL-SCNC: 28 MMOL/L (ref 22–29)
COCAINE UR QL: NEGATIVE
COLOR UR: YELLOW
CREAT SERPL-MCNC: 0.77 MG/DL (ref 0.76–1.27)
D-LACTATE SERPL-SCNC: 1.7 MMOL/L (ref 0.5–2)
DEPRECATED RDW RBC AUTO: 42.5 FL (ref 37–54)
EGFRCR SERPLBLD CKD-EPI 2021: 98.1 ML/MIN/1.73
EOSINOPHIL # BLD AUTO: 0.43 10*3/MM3 (ref 0–0.4)
EOSINOPHIL NFR BLD AUTO: 5.4 % (ref 0.3–6.2)
ERYTHROCYTE [DISTWIDTH] IN BLOOD BY AUTOMATED COUNT: 12.4 % (ref 12.3–15.4)
FENTANYL UR-MCNC: NEGATIVE NG/ML
FLUAV SUBTYP SPEC NAA+PROBE: NOT DETECTED
FLUBV RNA ISLT QL NAA+PROBE: NOT DETECTED
GLOBULIN UR ELPH-MCNC: 2.4 GM/DL
GLUCOSE BLDC GLUCOMTR-MCNC: 124 MG/DL (ref 70–130)
GLUCOSE BLDC GLUCOMTR-MCNC: 162 MG/DL (ref 70–130)
GLUCOSE BLDC GLUCOMTR-MCNC: 197 MG/DL (ref 70–130)
GLUCOSE BLDC GLUCOMTR-MCNC: 247 MG/DL (ref 70–130)
GLUCOSE BLDC GLUCOMTR-MCNC: 258 MG/DL (ref 70–130)
GLUCOSE BLDC GLUCOMTR-MCNC: 402 MG/DL (ref 70–130)
GLUCOSE BLDC GLUCOMTR-MCNC: 82 MG/DL (ref 70–130)
GLUCOSE SERPL-MCNC: 184 MG/DL (ref 65–99)
GLUCOSE UR STRIP-MCNC: ABNORMAL MG/DL
HADV DNA SPEC NAA+PROBE: NOT DETECTED
HCOV 229E RNA SPEC QL NAA+PROBE: NOT DETECTED
HCOV HKU1 RNA SPEC QL NAA+PROBE: NOT DETECTED
HCOV NL63 RNA SPEC QL NAA+PROBE: NOT DETECTED
HCOV OC43 RNA SPEC QL NAA+PROBE: NOT DETECTED
HCT VFR BLD AUTO: 37 % (ref 37.5–51)
HGB BLD-MCNC: 12.5 G/DL (ref 13–17.7)
HGB UR QL STRIP.AUTO: NEGATIVE
HMPV RNA NPH QL NAA+NON-PROBE: NOT DETECTED
HPIV1 RNA ISLT QL NAA+PROBE: NOT DETECTED
HPIV2 RNA SPEC QL NAA+PROBE: NOT DETECTED
HPIV3 RNA NPH QL NAA+PROBE: NOT DETECTED
HPIV4 P GENE NPH QL NAA+PROBE: NOT DETECTED
HYALINE CASTS UR QL AUTO: NORMAL /LPF
IMM GRANULOCYTES # BLD AUTO: 0.02 10*3/MM3 (ref 0–0.05)
IMM GRANULOCYTES NFR BLD AUTO: 0.2 % (ref 0–0.5)
KETONES UR QL STRIP: ABNORMAL
LEUKOCYTE ESTERASE UR QL STRIP.AUTO: NEGATIVE
LYMPHOCYTES # BLD AUTO: 1.93 10*3/MM3 (ref 0.7–3.1)
LYMPHOCYTES NFR BLD AUTO: 24.1 % (ref 19.6–45.3)
M PNEUMO IGG SER IA-ACNC: NOT DETECTED
MAGNESIUM SERPL-MCNC: 1.7 MG/DL (ref 1.6–2.4)
MCH RBC QN AUTO: 31.6 PG (ref 26.6–33)
MCHC RBC AUTO-ENTMCNC: 33.8 G/DL (ref 31.5–35.7)
MCV RBC AUTO: 93.4 FL (ref 79–97)
METHADONE UR QL SCN: NEGATIVE
MONOCYTES # BLD AUTO: 0.7 10*3/MM3 (ref 0.1–0.9)
MONOCYTES NFR BLD AUTO: 8.7 % (ref 5–12)
NEUTROPHILS NFR BLD AUTO: 4.9 10*3/MM3 (ref 1.7–7)
NEUTROPHILS NFR BLD AUTO: 61.1 % (ref 42.7–76)
NITRITE UR QL STRIP: NEGATIVE
NRBC BLD AUTO-RTO: 0 /100 WBC (ref 0–0.2)
OPIATES UR QL: NEGATIVE
OXYCODONE UR QL SCN: NEGATIVE
PCP UR QL SCN: NEGATIVE
PH UR STRIP.AUTO: 5.5 [PH] (ref 5–8)
PLATELET # BLD AUTO: 259 10*3/MM3 (ref 140–450)
PMV BLD AUTO: 9.4 FL (ref 6–12)
POTASSIUM SERPL-SCNC: 3.3 MMOL/L (ref 3.5–5.2)
PROT SERPL-MCNC: 5.7 G/DL (ref 6–8.5)
PROT UR QL STRIP: ABNORMAL
RBC # BLD AUTO: 3.96 10*6/MM3 (ref 4.14–5.8)
RBC # UR STRIP: NORMAL /HPF
REF LAB TEST METHOD: NORMAL
RHINOVIRUS RNA SPEC NAA+PROBE: NOT DETECTED
RSV RNA NPH QL NAA+NON-PROBE: NOT DETECTED
SARS-COV-2 RNA NPH QL NAA+NON-PROBE: NOT DETECTED
SODIUM SERPL-SCNC: 140 MMOL/L (ref 136–145)
SP GR UR STRIP: 1.03 (ref 1–1.03)
SQUAMOUS #/AREA URNS HPF: NORMAL /HPF
TRICYCLICS UR QL SCN: POSITIVE
UROBILINOGEN UR QL STRIP: ABNORMAL
WBC # UR STRIP: NORMAL /HPF
WBC NRBC COR # BLD AUTO: 8.02 10*3/MM3 (ref 3.4–10.8)

## 2024-05-12 PROCEDURE — G0378 HOSPITAL OBSERVATION PER HR: HCPCS

## 2024-05-12 PROCEDURE — 0202U NFCT DS 22 TRGT SARS-COV-2: CPT | Performed by: EMERGENCY MEDICINE

## 2024-05-12 PROCEDURE — 99223 1ST HOSP IP/OBS HIGH 75: CPT

## 2024-05-12 PROCEDURE — 80307 DRUG TEST PRSMV CHEM ANLYZR: CPT | Performed by: INTERNAL MEDICINE

## 2024-05-12 PROCEDURE — 82948 REAGENT STRIP/BLOOD GLUCOSE: CPT

## 2024-05-12 PROCEDURE — 70450 CT HEAD/BRAIN W/O DYE: CPT

## 2024-05-12 PROCEDURE — 99222 1ST HOSP IP/OBS MODERATE 55: CPT | Performed by: INTERNAL MEDICINE

## 2024-05-12 PROCEDURE — 83735 ASSAY OF MAGNESIUM: CPT

## 2024-05-12 PROCEDURE — 82550 ASSAY OF CK (CPK): CPT

## 2024-05-12 PROCEDURE — 63710000001 INSULIN REGULAR HUMAN PER 5 UNITS: Performed by: PHYSICIAN ASSISTANT

## 2024-05-12 PROCEDURE — 85025 COMPLETE CBC W/AUTO DIFF WBC: CPT | Performed by: PHYSICIAN ASSISTANT

## 2024-05-12 PROCEDURE — 81001 URINALYSIS AUTO W/SCOPE: CPT | Performed by: EMERGENCY MEDICINE

## 2024-05-12 PROCEDURE — 83605 ASSAY OF LACTIC ACID: CPT | Performed by: EMERGENCY MEDICINE

## 2024-05-12 PROCEDURE — 80053 COMPREHEN METABOLIC PANEL: CPT | Performed by: PHYSICIAN ASSISTANT

## 2024-05-12 RX ORDER — AMLODIPINE BESYLATE 10 MG/1
10 TABLET ORAL
Status: DISCONTINUED | OUTPATIENT
Start: 2024-05-12 | End: 2024-05-16 | Stop reason: HOSPADM

## 2024-05-12 RX ORDER — SODIUM CHLORIDE 0.9 % (FLUSH) 0.9 %
10 SYRINGE (ML) INJECTION AS NEEDED
Status: DISCONTINUED | OUTPATIENT
Start: 2024-05-12 | End: 2024-05-16 | Stop reason: HOSPADM

## 2024-05-12 RX ORDER — NICOTINE POLACRILEX 4 MG
15 LOZENGE BUCCAL
Status: DISCONTINUED | OUTPATIENT
Start: 2024-05-12 | End: 2024-05-13 | Stop reason: SDUPTHER

## 2024-05-12 RX ORDER — PHENOBARBITAL 64.8 MG/1
129.6 TABLET ORAL NIGHTLY
Status: DISCONTINUED | OUTPATIENT
Start: 2024-05-12 | End: 2024-05-13

## 2024-05-12 RX ORDER — SODIUM CHLORIDE 9 MG/ML
40 INJECTION, SOLUTION INTRAVENOUS AS NEEDED
Status: DISCONTINUED | OUTPATIENT
Start: 2024-05-12 | End: 2024-05-16 | Stop reason: HOSPADM

## 2024-05-12 RX ORDER — BISACODYL 5 MG/1
5 TABLET, DELAYED RELEASE ORAL DAILY PRN
Status: DISCONTINUED | OUTPATIENT
Start: 2024-05-12 | End: 2024-05-16 | Stop reason: HOSPADM

## 2024-05-12 RX ORDER — FOLIC ACID 1 MG/1
1 TABLET ORAL DAILY
Status: DISCONTINUED | OUTPATIENT
Start: 2024-05-12 | End: 2024-05-16 | Stop reason: HOSPADM

## 2024-05-12 RX ORDER — DIVALPROEX SODIUM 125 MG/1
250 CAPSULE, COATED PELLETS ORAL EVERY 8 HOURS SCHEDULED
Status: DISCONTINUED | OUTPATIENT
Start: 2024-05-12 | End: 2024-05-12

## 2024-05-12 RX ORDER — SODIUM CHLORIDE, SODIUM LACTATE, POTASSIUM CHLORIDE, CALCIUM CHLORIDE 600; 310; 30; 20 MG/100ML; MG/100ML; MG/100ML; MG/100ML
75 INJECTION, SOLUTION INTRAVENOUS CONTINUOUS
Status: DISCONTINUED | OUTPATIENT
Start: 2024-05-12 | End: 2024-05-12

## 2024-05-12 RX ORDER — DEXTROSE MONOHYDRATE 25 G/50ML
25 INJECTION, SOLUTION INTRAVENOUS
Status: DISCONTINUED | OUTPATIENT
Start: 2024-05-12 | End: 2024-05-13

## 2024-05-12 RX ORDER — ACETAMINOPHEN 325 MG/1
650 TABLET ORAL EVERY 4 HOURS PRN
Status: DISCONTINUED | OUTPATIENT
Start: 2024-05-12 | End: 2024-05-16 | Stop reason: HOSPADM

## 2024-05-12 RX ORDER — POTASSIUM CHLORIDE 750 MG/1
40 CAPSULE, EXTENDED RELEASE ORAL ONCE
Status: DISCONTINUED | OUTPATIENT
Start: 2024-05-12 | End: 2024-05-14

## 2024-05-12 RX ORDER — PHENOBARBITAL 64.8 MG/1
130 TABLET ORAL NIGHTLY
Status: DISCONTINUED | OUTPATIENT
Start: 2024-05-12 | End: 2024-05-12

## 2024-05-12 RX ORDER — CHOLECALCIFEROL (VITAMIN D3) 125 MCG
5 CAPSULE ORAL NIGHTLY PRN
Status: DISCONTINUED | OUTPATIENT
Start: 2024-05-12 | End: 2024-05-13

## 2024-05-12 RX ORDER — POLYETHYLENE GLYCOL 3350 17 G/17G
17 POWDER, FOR SOLUTION ORAL DAILY PRN
Status: DISCONTINUED | OUTPATIENT
Start: 2024-05-12 | End: 2024-05-16 | Stop reason: HOSPADM

## 2024-05-12 RX ORDER — PHENOBARBITAL 64.8 MG/1
64.8 TABLET ORAL 2 TIMES DAILY
Status: DISCONTINUED | OUTPATIENT
Start: 2024-05-12 | End: 2024-05-13

## 2024-05-12 RX ORDER — SODIUM CHLORIDE 0.9 % (FLUSH) 0.9 %
10 SYRINGE (ML) INJECTION EVERY 12 HOURS SCHEDULED
Status: DISCONTINUED | OUTPATIENT
Start: 2024-05-12 | End: 2024-05-16 | Stop reason: HOSPADM

## 2024-05-12 RX ORDER — AMOXICILLIN 250 MG
2 CAPSULE ORAL 2 TIMES DAILY PRN
Status: DISCONTINUED | OUTPATIENT
Start: 2024-05-12 | End: 2024-05-16 | Stop reason: HOSPADM

## 2024-05-12 RX ORDER — IBUPROFEN 600 MG/1
1 TABLET ORAL
Status: DISCONTINUED | OUTPATIENT
Start: 2024-05-12 | End: 2024-05-13 | Stop reason: SDUPTHER

## 2024-05-12 RX ORDER — BISACODYL 10 MG
10 SUPPOSITORY, RECTAL RECTAL DAILY PRN
Status: DISCONTINUED | OUTPATIENT
Start: 2024-05-12 | End: 2024-05-16 | Stop reason: HOSPADM

## 2024-05-12 RX ORDER — LISINOPRIL 20 MG/1
20 TABLET ORAL DAILY
Status: DISCONTINUED | OUTPATIENT
Start: 2024-05-12 | End: 2024-05-16 | Stop reason: HOSPADM

## 2024-05-12 RX ADMIN — PHENOBARBITAL 64.8 MG: 64.8 TABLET ORAL at 13:17

## 2024-05-12 RX ADMIN — INSULIN HUMAN 2 UNITS: 100 INJECTION, SOLUTION PARENTERAL at 13:16

## 2024-05-12 RX ADMIN — INSULIN HUMAN 4 UNITS: 100 INJECTION, SOLUTION PARENTERAL at 18:48

## 2024-05-12 NOTE — PROGRESS NOTES
"      [facility] Medicine Services  ADMISSION FOLLOW-UP NOTE          Patient admitted after midnight, H&P by my partner performed earlier on today's date reviewed.  Interim findings, labs, and charting also reviewed.        [facility] Hospital Problem List has been managed and updated to include any new diagnoses:  Active Hospital Problems    Diagnosis  POA    **Unresponsiveness [R41.89]  Yes    Lactic acidosis [E87.20]  Yes    History of DVT (deep vein thrombosis) [Z86.718]  Not Applicable    Dementia with behavioral disturbance [F03.918]  Yes    Benign essential HTN [I10]  Yes    Type 2 diabetes mellitus with hyperglycemia, with long-term current use of insulin [E11.65, Z79.4]  Not Applicable    Gout [M10.9]  Yes    Hyperlipidemia [E78.5]  Yes    Peripheral neuropathy [G62.9]  Yes    Benign prostatic hyperplasia [N40.0]  Yes    Coronary artery disease involving native coronary artery of native heart [I25.10]  Yes      Resolved Hospital Problems   No resolved problems to display.         ADDITIONAL PLAN:  - detailed assessment and plan from admission reviewed    Unresponsive  Advanced dementia complicated by behavioral disturbance with superimposed sleep disturbance   -discussed with wife at bedside, she believes he may have received double medication dose yesterday as the facility was \"crazy\" yesterday due to Mother's day event   -Discussed with Neurology, planning to DC Depakote and Seroquel. Continue Phenobarb   -Pulled 2 IV's out, can leave out for now       Uncontrolled diabetes mellitus type 2  - Appears not on any home meds for DM  - A1c 9.5%  - SSI with scheduled accu checks      HTN  - continue Amlodipine 10mg daily  - continue Lisinopril 30mg daily     Expected Discharge Back to [facility]   Expected Discharge Date: 5/13/2024; Expected Discharge Time:      Theresa Gallardo DO  05/12/24     "

## 2024-05-12 NOTE — PLAN OF CARE
Goal Outcome Evaluation:              Outcome Evaluation: VSS o n RA. Patient is much more alert this afternoon although he is still confused and speech is incomprehensible. Wife has been bedside all afternoon. He does not have an IV in place at this time. Will continue to monitor.

## 2024-05-12 NOTE — ED PROVIDER NOTES
Subjective   History of Present Illness  Patient is a pleasant 68-year-old male with a severe dementia.  Recently hospitalized and just transferred to rehab 2 days ago.  This evening he was found by staff at the facility and was said that he had slid out of his chair.  EMS was called as a lift assist but as they could not arouse the patient the decided to bring him to the emergency department.  They state that his initial pressures were less than 80 systolic.  Otherwise vital signs were reassuring.  Following 500 cc of LR and round pressure has improved.  Last pressure prior to arrival was approximately 120 systolic.  Patient has responded to EMS with painful stimuli but otherwise has been unresponsive.  He has not followed commands or had meaningful verbal interaction throughout transport.  Baseline is unclear at the time of arrival.    Accu-Chek was normal.  Patient is on multiple sedative medications which could be contributing to his current state.      Review of Systems   All other systems reviewed and are negative.      Past Medical History:   Diagnosis Date    Coronary artery disease     Dementia     Diabetes mellitus     Hyperlipidemia     Peripheral vascular disease        Allergies   Allergen Reactions    Bactrim [Sulfamethoxazole-Trimethoprim] Rash    Adhesive Tape Rash    Neosporin [Neomycin-Bacitracin Zn-Polymyx] Rash       Past Surgical History:   Procedure Laterality Date    CORONARY ARTERY BYPASS GRAFT      FEMORAL ARTERY - POPLITEAL ARTERY BYPASS GRAFT         Family History   Problem Relation Age of Onset    Diabetes Mother     Heart disease Father     Hypertension Father     Hyperlipidemia Father     Diabetes Sister     Diabetes Maternal Grandfather     Diabetes Sister     Diabetes Sister        Social History     Socioeconomic History    Marital status:    Tobacco Use    Smoking status: Former     Current packs/day: 0.00     Types: Cigarettes     Quit date: 1997     Years since quitting:  27.3     Passive exposure: Never    Smokeless tobacco: Former   Vaping Use    Vaping status: Never Used   Substance and Sexual Activity    Alcohol use: No    Drug use: No    Sexual activity: Never           Objective   Physical Exam  Vitals and nursing note reviewed.   Constitutional:       General: He is in acute distress.      Appearance: He is ill-appearing. He is not diaphoretic.   HENT:      Head: Normocephalic and atraumatic.   Eyes:      Conjunctiva/sclera: Conjunctivae normal.      Pupils: Pupils are equal, round, and reactive to light.   Neck:      Thyroid: No thyromegaly.   Cardiovascular:      Rate and Rhythm: Normal rate and regular rhythm.      Heart sounds: Normal heart sounds.   Pulmonary:      Effort: Pulmonary effort is normal. No respiratory distress.      Breath sounds: Normal breath sounds.   Abdominal:      General: Bowel sounds are normal.      Palpations: Abdomen is soft.      Tenderness: There is no abdominal tenderness.   Musculoskeletal:         General: Normal range of motion.      Cervical back: Normal range of motion and neck supple.   Lymphadenopathy:      Cervical: No cervical adenopathy.   Skin:     General: Skin is warm and dry.      Capillary Refill: Capillary refill takes less than 2 seconds.   Neurological:      Mental Status: He is unresponsive.      GCS: GCS eye subscore is 2. GCS verbal subscore is 2. GCS motor subscore is 5.   Psychiatric:         Mood and Affect: Mood normal.         Speech: Speech normal.         Behavior: Behavior normal.         Critical Care    Performed by: Gurvinder Carranza DO  Authorized by: Gurvinder Carranza DO    Critical care provider statement:     Critical care time (minutes):  35    Critical care time was exclusive of:  Separately billable procedures and treating other patients    Critical care was necessary to treat or prevent imminent or life-threatening deterioration of the following conditions:  CNS failure or compromise    Critical care was time spent  personally by me on the following activities:  Ordering and performing treatments and interventions, ordering and review of laboratory studies, ordering and review of radiographic studies, pulse oximetry, re-evaluation of patient's condition, review of old charts, obtaining history from patient or surrogate, examination of patient, evaluation of patient's response to treatment, discussions with consultants and development of treatment plan with patient or surrogate    I assumed direction of critical care for this patient from another provider in my specialty: no      Care discussed with: admitting provider               ED Course      Recent Results (from the past 24 hour(s))   Comprehensive Metabolic Panel    Collection Time: 05/11/24  9:41 PM    Specimen: Blood   Result Value Ref Range    Glucose 226 (H) 65 - 99 mg/dL    BUN 22 8 - 23 mg/dL    Creatinine 0.83 0.76 - 1.27 mg/dL    Sodium 138 136 - 145 mmol/L    Potassium 4.1 3.5 - 5.2 mmol/L    Chloride 101 98 - 107 mmol/L    CO2 26.0 22.0 - 29.0 mmol/L    Calcium 8.4 (L) 8.6 - 10.5 mg/dL    Total Protein 5.1 (L) 6.0 - 8.5 g/dL    Albumin 3.0 (L) 3.5 - 5.2 g/dL    ALT (SGPT) 18 1 - 41 U/L    AST (SGOT) 23 1 - 40 U/L    Alkaline Phosphatase 91 39 - 117 U/L    Total Bilirubin 0.5 0.0 - 1.2 mg/dL    Globulin 2.1 gm/dL    A/G Ratio 1.4 g/dL    BUN/Creatinine Ratio 26.5 (H) 7.0 - 25.0    Anion Gap 11.0 5.0 - 15.0 mmol/L    eGFR 95.9 >60.0 mL/min/1.73   Single High Sensitivity Troponin T    Collection Time: 05/11/24  9:41 PM    Specimen: Blood   Result Value Ref Range    HS Troponin T 25 (H) <22 ng/L   Magnesium    Collection Time: 05/11/24  9:41 PM    Specimen: Blood   Result Value Ref Range    Magnesium 1.3 (L) 1.6 - 2.4 mg/dL   Green Top (Gel)    Collection Time: 05/11/24  9:41 PM   Result Value Ref Range    Extra Tube Hold for add-ons.    Lavender Top    Collection Time: 05/11/24  9:41 PM   Result Value Ref Range    Extra Tube hold for add-on    Gold Top - SST     Collection Time: 05/11/24  9:41 PM   Result Value Ref Range    Extra Tube Hold for add-ons.    Gray Top    Collection Time: 05/11/24  9:41 PM   Result Value Ref Range    Extra Tube Hold for add-ons.    Light Blue Top    Collection Time: 05/11/24  9:41 PM   Result Value Ref Range    Extra Tube Hold for add-ons.    CBC Auto Differential    Collection Time: 05/11/24  9:41 PM    Specimen: Blood   Result Value Ref Range    WBC 7.33 3.40 - 10.80 10*3/mm3    RBC 3.51 (L) 4.14 - 5.80 10*6/mm3    Hemoglobin 10.8 (L) 13.0 - 17.7 g/dL    Hematocrit 33.8 (L) 37.5 - 51.0 %    MCV 96.3 79.0 - 97.0 fL    MCH 30.8 26.6 - 33.0 pg    MCHC 32.0 31.5 - 35.7 g/dL    RDW 12.3 12.3 - 15.4 %    RDW-SD 43.2 37.0 - 54.0 fl    MPV 8.9 6.0 - 12.0 fL    Platelets 214 140 - 450 10*3/mm3    Neutrophil % 55.2 42.7 - 76.0 %    Lymphocyte % 29.7 19.6 - 45.3 %    Monocyte % 9.7 5.0 - 12.0 %    Eosinophil % 4.4 0.3 - 6.2 %    Basophil % 0.7 0.0 - 1.5 %    Immature Grans % 0.3 0.0 - 0.5 %    Neutrophils, Absolute 4.05 1.70 - 7.00 10*3/mm3    Lymphocytes, Absolute 2.18 0.70 - 3.10 10*3/mm3    Monocytes, Absolute 0.71 0.10 - 0.90 10*3/mm3    Eosinophils, Absolute 0.32 0.00 - 0.40 10*3/mm3    Basophils, Absolute 0.05 0.00 - 0.20 10*3/mm3    Immature Grans, Absolute 0.02 0.00 - 0.05 10*3/mm3    nRBC 0.0 0.0 - 0.2 /100 WBC   Lactic Acid, Plasma    Collection Time: 05/11/24  9:41 PM    Specimen: Blood   Result Value Ref Range    Lactate 10.3 (C) 0.5 - 2.0 mmol/L   Procalcitonin    Collection Time: 05/11/24  9:41 PM    Specimen: Blood   Result Value Ref Range    Procalcitonin 0.04 0.00 - 0.25 ng/mL   Valproic Acid Level, Total    Collection Time: 05/11/24  9:41 PM    Specimen: Blood   Result Value Ref Range    Valproic Acid 18.9 (L) 50.0 - 125.0 mcg/mL   Phenobarbital Level    Collection Time: 05/11/24  9:41 PM    Specimen: Blood   Result Value Ref Range    Phenobarbital 20.3 10.0 - 30.0 mcg/mL   ECG 12 Lead ED Triage Standing Order; Weak / Dizzy / AMS     Collection Time: 05/11/24  9:43 PM   Result Value Ref Range    QT Interval 354 ms    QTC Interval 463 ms   POC Glucose Once    Collection Time: 05/11/24  9:49 PM    Specimen: Blood   Result Value Ref Range    Glucose 260 (H) 70 - 130 mg/dL   Blood Gas, Arterial With Co-Ox    Collection Time: 05/11/24  9:51 PM    Specimen: Arterial Blood   Result Value Ref Range    Site Right Radial     Sang's Test Positive     pH, Arterial 7.445 7.350 - 7.450 pH units    pCO2, Arterial 40.4 35.0 - 45.0 mm Hg    pO2, Arterial 442.0 (H) 83.0 - 108.0 mm Hg    HCO3, Arterial 27.7 (H) 20.0 - 26.0 mmol/L    Base Excess, Arterial 3.4 (H) 0.0 - 2.0 mmol/L    Hemoglobin, Blood Gas 14.3 13.5 - 17.5 g/dL    Hematocrit, Blood Gas 43.7 38.0 - 51.0 %    Oxyhemoglobin      Methemoglobin 0.20 0.00 - 1.50 %    Carboxyhemoglobin 1.1 0 - 2 %    CO2 Content 29.0 22 - 33 mmol/L    Temperature 37.0     Barometric Pressure for Blood Gas      Modality NRB     FIO2 100 %    Rate 0 Breaths/minute    PIP 0 cmH2O    IPAP 0     EPAP 0     Notified Who KAM BOOKER DO.     Notified By [id number]     Notified Time 05/11/2024 21:51     pH, Temp Corrected 7.445 pH Units    pCO2, Temperature Corrected 40.4 35 - 48 mm Hg    pO2, Temperature Corrected 442 (H) 83 - 108 mm Hg     Note: In addition to lab results from this visit, the labs listed above may include labs taken at another facility or during a different encounter within the last 24 hours. Please correlate lab times with ED admission and discharge times for further clarification of the services performed during this visit.    CT Head Without Contrast   Final Result   Impression:   No acute intracranial process.            Electronically Signed: Estrellita Finnegan MD     5/11/2024 10:11 PM EDT     Workstation ID: [id number]      XR Chest 1 View   Final Result   Impression:   No acute cardiopulmonary process. Stable cardiomegaly.         Electronically Signed: Estrellita Finnegan MD     5/11/2024 10:00 PM EDT     Workstation ID:  [id number]        Vitals:    05/11/24 2158 05/11/24 2228 05/11/24 2230 05/11/24 2245   BP: 135/73 104/60 104/60 132/79   BP Location:       Patient Position:       Pulse: 101 97 97 100   Resp:       Temp:       TempSrc:       SpO2: 99% 95% 94% 96%   Weight:       Height:         Medications   sodium chloride 0.9 % flush 10 mL (has no administration in time range)   sodium chloride 0.9 % bolus 1,000 mL (1,000 mL Intravenous New Bag 5/11/24 2248)     ECG/EMG Results (last 24 hours)       Procedure Component Value Units Date/Time    ECG 12 Lead ED Triage Standing Order; Weak / Dizzy / AMS [[id number]] Collected: 05/11/24 2143     Updated: 05/11/24 2143     QT Interval 354 ms      QTC Interval 463 ms     Narrative:      Test Reason : ED Triage Standing Order~  Blood Pressure :   */*   mmHG  Vent. Rate : 103 BPM     Atrial Rate : 103 BPM     P-R Int : 220 ms          QRS Dur : 102 ms      QT Int : 354 ms       P-R-T Axes :  60 -24  65 degrees     QTc Int : 463 ms    ** Poor data quality, interpretation may be adversely affected  Sinus tachycardia with 1st degree AV block  Moderate voltage criteria for LVH, may be normal variant  Borderline ECG  When compared with ECG of 08-MAY-2024 05:37,  Vent. rate has increased BY  46 BPM  Nonspecific T wave abnormality no longer evident in Inferior leads    Referred By: EDMD           Confirmed By:           ECG 12 Lead ED Triage Standing Order; Weak / Dizzy / AMS   Preliminary Result   Test Reason : ED Triage Standing Order~   Blood Pressure :   */*   mmHG   Vent. Rate : 103 BPM     Atrial Rate : 103 BPM      P-R Int : 220 ms          QRS Dur : 102 ms       QT Int : 354 ms       P-R-T Axes :  60 -24  65 degrees      QTc Int : 463 ms      ** Poor data quality, interpretation may be adversely affected   Sinus tachycardia with 1st degree AV block   Moderate voltage criteria for LVH, may be normal variant   Borderline ECG   When compared with ECG of 08-MAY-2024 05:37,   Vent. rate has  increased BY  46 BPM   Nonspecific T wave abnormality no longer evident in Inferior leads      Referred By: EDMD           Confirmed By:                                                  Medical Decision Making  Pt responded well to IV fluids and had no additional hypotensive episodes while in the ED.  Cause of decreased mentation is not known at this time.  He takes numerous sedative medications and polypharmacy is a concern.      Problems Addressed:  At risk for polypharmacy: complicated acute illness or injury  Elevated lactic acid level: complicated acute illness or injury  History of dementia: complicated acute illness or injury  Hypotensive episode: complicated acute illness or injury  Slowness and poor responsiveness: complicated acute illness or injury    Amount and/or Complexity of Data Reviewed  Independent Historian: EMS  External Data Reviewed: notes.  Labs: ordered. Decision-making details documented in ED Course.  Radiology: ordered and independent interpretation performed. Decision-making details documented in ED Course.  ECG/medicine tests: ordered and independent interpretation performed. Decision-making details documented in ED Course.    Risk  Prescription drug management.  Decision regarding hospitalization.        Final diagnoses:   Slowness and poor responsiveness   History of dementia   At risk for polypharmacy   Hypotensive episode   Elevated lactic acid level       ED Disposition  ED Disposition       ED Disposition   Decision to Admit    Condition   --    Comment   Level of Care: Med/Surg [1]   Diagnosis: Unresponsiveness [[id number]]   Admitting Physician: OSWALDO DOMINGUEZ [[id number]]                        Gurvinder Carranza DO  05/14/24 1125

## 2024-05-12 NOTE — PLAN OF CARE
Goal Outcome Evaluation:  Plan of Care Reviewed With: patient        Progress: no change  Outcome Evaluation: VSS.  68 year old white male admitted on 5/12/2024, he is on day 1 of this hospitalization.  Patient is responsive to pain but does not speak or achnowledge care giver presence.  He has an IV in place and patent.  He had a BM on admission.  Patient is incontinent of bowel and bladder.  Wife is present at BS.  Will continue to monitor patient throughout the rest of this shift.

## 2024-05-12 NOTE — H&P
"    [facility] Medicine Services  HISTORY AND PHYSICAL    Patient Name: Too Tai  : 1955  MRN: [id number]  Primary Care Physician: Des Geller MD  Date of admission: 2024    Subjective   Subjective     Chief Complaint:  unresponsive    HPI:  Too Tai is a 68 y.o. male with PMH significant for advanced dementia, CAD, diabetes mellitus type 2, HLD and PVD. Patient was recently admitted to this service 24 to 24 with agitation and behavioral disturbance. Was discharged to [facility] [place]. Returns today with increasing lethargy and unresponsive episode. HPI limited secondary to patient disposition. Wife at bedside reports patient had been sitting in wheel chair all day and seemed somnolent. He later apparently slid out of chair and when staff came to assist, noted that patient was non verbal and unresponsive. Last known well unknown. Wife suspects that patient was \"over medicated\" and notes that he was on a \"trial\" of phenobarbital after adjusting some of his seizure medications. She feels he needs to see his neurologist. GSC calculated at 9. EMS was called.    Currently there are no reports of fever, cough, congestion, SOB, or chest pain. No abdominal pain or N/v/D. No focal weakness/ paresthesias. No witnessed seizure activity or falls. Will admit for further evaluation and treatment.      Review of Systems   Unable to perform ROS: Dementia            Personal History     Past Medical History:   Diagnosis Date    Coronary artery disease     Dementia     Diabetes mellitus     Hyperlipidemia     Peripheral vascular disease              Past Surgical History:   Procedure Laterality Date    CORONARY ARTERY BYPASS GRAFT      FEMORAL ARTERY - POPLITEAL ARTERY BYPASS GRAFT         Family History: family history includes Diabetes in his maternal grandfather, mother, sister, sister, and sister; Heart disease in his father; Hyperlipidemia in his father; " Hypertension in his father.     Social History:  reports that he quit smoking about 27 years ago. His smoking use included cigarettes. He has never been exposed to tobacco smoke. He has quit using smokeless tobacco. He reports that he does not drink alcohol and does not use drugs.  Social History     Social History Narrative    Not on file       Medications:  Accu-Chek Geri, Accu-Chek Geri Plus, Alcohol Prep, Divalproex Sodium, Glucagon, Insulin Glargine, Insulin Pen Needle, PHENobarbital, QUEtiapine, accu-chek soft touch, amLODIPine, cyanocobalamin, donepezil, folic acid, haloperidol, lisinopril, mirtazapine, sertraline, and temazepam    Allergies   Allergen Reactions    Bactrim [Sulfamethoxazole-Trimethoprim] Rash    Adhesive Tape Rash    Neosporin [Neomycin-Bacitracin Zn-Polymyx] Rash       Objective   Objective     Vital Signs:   Temp:  [98.9 °F (37.2 °C)] 98.9 °F (37.2 °C)  Heart Rate:  [] 96  Resp:  [16] 16  BP: (104-167)/(60-90) 167/82    Physical Exam   Constitutional: obtunded  Eyes: PERRLA, sclerae anicteric, no conjunctival injection  HENT: NCAT, mucous membranes moist  Neck: Supple, no thyromegaly, no lymphadenopathy, trachea midline  Respiratory: Clear to auscultation bilaterally, nonlabored respirations   Cardiovascular: RRR, no murmurs, rubs, or gallops, palpable pedal pulses bilaterally  Gastrointestinal: Positive bowel sounds, soft, nontender, nondistended  Musculoskeletal: No bilateral ankle edema, no clubbing or cyanosis to extremities  Psychiatric: Appropriate affect, cooperative deferred  Neurologic: Oriented x 3, strength symmetric in all extremities, Cranial Nerves grossly intact to confrontation, speech clear deferred   Skin: No rashes      Result Review:  I have personally reviewed the results from the time of this admission to 5/12/2024 00:55 EDT and agree with these findings:  [x]  Laboratory list / accordion  []  Microbiology  []  Radiology  []  EKG/Telemetry   []   Cardiology/Vascular   []  Pathology  [x]  Old records  []  Other:  Most notable findings include: vitals stable. Glucose 219. Lactate 10.3. H/H 10.8 and 33.8. CT head negative.    LAB RESULTS:      Lab 05/11/24 2141 05/09/24 0443 05/08/24 0514 05/07/24  0534   WBC 7.33 8.44 8.27 8.76   HEMOGLOBIN 10.8* 14.1 13.4 14.8   HEMATOCRIT 33.8* 42.2 40.3 43.9   PLATELETS 214 332 319 314   NEUTROS ABS 4.05 3.75 3.28 4.03   IMMATURE GRANS (ABS) 0.02 0.03 0.05 0.02   LYMPHS ABS 2.18 2.86 2.97 2.80   MONOS ABS 0.71 0.78 0.80 0.88   EOS ABS 0.32 0.93* 1.07* 0.94*   MCV 96.3 93.0 94.8 92.4   PROCALCITONIN 0.04  --   --   --    LACTATE 10.3*  --   --   --          Lab 05/11/24 2141 05/09/24 0443 05/08/24 0702 05/07/24  0534   SODIUM 138 142 143 141   POTASSIUM 4.1 3.5 4.6 3.8   CHLORIDE 101 105 105 103   CO2 26.0 27.0 30.0* 27.0   ANION GAP 11.0 10.0 8.0 11.0   BUN 22 23 26* 22   CREATININE 0.83 0.80 0.90 0.87   EGFR 95.9 97.0 93.6 94.6   GLUCOSE 226* 112* 167* 113*   CALCIUM 8.4* 8.8 9.2 9.3   MAGNESIUM 1.3* 1.9 1.9 1.9         Lab 05/11/24 2141 05/09/24 0443 05/08/24 0702 05/07/24  0534   TOTAL PROTEIN 5.1* 6.5 6.8 6.7   ALBUMIN 3.0* 3.4* 3.6 3.6   GLOBULIN 2.1 3.1 3.2 3.1   ALT (SGPT) 18 28 25 26   AST (SGOT) 23 31 30 35   BILIRUBIN 0.5 0.5 0.5 0.5   ALK PHOS 91 113 117 118*         Lab 05/11/24 2141   HSTROP T 25*                 Lab 05/11/24 2151   PH, ARTERIAL 7.445   PCO2, ARTERIAL 40.4   PO2 .0*   FIO2 100   HCO3 ART 27.7*   BASE EXCESS ART 3.4*   CARBOXYHEMOGLOBIN 1.1     Brief Urine Lab Results  (Last result in the past 365 days)        Color   Clarity   Blood   Leuk Est   Nitrite   Protein   CREAT   Urine HCG        05/12/24 0004 Yellow   Clear   Negative   Negative   Negative   30 mg/dL (1+)                 Microbiology Results (last 10 days)       ** No results found for the last 240 hours. **            CT Head Without Contrast    Result Date: 5/11/2024  CT HEAD WO CONTRAST Date of Exam: 5/11/2024  10:03 PM EDT Indication: poorly responsive. Comparison: 4/19/2024. Technique: Axial CT images were obtained of the head without contrast administration.  Automated exposure control and iterative construction methods were used. Findings: There is no evidence of hemorrhage. There is no mass effect or midline shift. There is no extracerebral collection. Ventricles are normal in size and configuration for patient's stated age.  Posterior fossa is within normal limits. Calvarium and skull base appear intact.   Visualized sinuses show no air fluid levels. Visualized orbits are unremarkable.     Impression: Impression: No acute intracranial process. Electronically Signed: Estrellita Finnegan MD  5/11/2024 10:11 PM EDT  Workstation ID: [id number]    XR Chest 1 View    Result Date: 5/11/2024  XR CHEST 1 VW Date of Exam: 5/11/2024 9:38 PM EDT Indication: Weak/Dizzy/AMS triage protocol Comparison: 4/19/2024. Findings: There are no airspace consolidations. No pleural fluid. No pneumothorax. The pulmonary vasculature appears within normal limits. The heart appears enlarged. Median sternotomy wires are present.. No acute osseous abnormality identified.     Impression: Impression: No acute cardiopulmonary process. Stable cardiomegaly. Electronically Signed: Estrellita Finnegan MD  5/11/2024 10:00 PM EDT  Workstation ID: [id number]     Results for orders placed during the hospital encounter of 03/27/24    Adult Transthoracic Echo Complete W/ Cont if Necessary Per Protocol    Interpretation Summary    Left ventricular systolic function is normal. Calculated left ventricular EF = 55.1% Normal left ventricular cavity size noted. Left ventricular wall thickness is consistent with mild concentric hypertrophy. Left ventricular diastolic function is consistent with (grade I) impaired relaxation.    The right ventricular cavity is mildly dilated. Normal right ventricular systolic function noted.    There is calcification of the aortic valve. The aortic  valve appears trileaflet. Mild aortic valve regurgitation is present. No aortic valve stenosis is present.    Mitral annular calcification is present. Trace mitral valve regurgitation is present. No significant mitral valve stenosis is present.    The tricuspid valve is structurally normal with no stenosis present. Trace tricuspid valve regurgitation is present. Insufficient TR velocity profile to estimate the right ventricular systolic pressure.    Mild dilation of the sinuses of Valsalva is present. Mild dilation of the ascending aorta is present. Ascending aorta = 4.2 cm      Assessment & Plan   Assessment & Plan       Unresponsiveness    Lactic acidosis    Benign essential HTN    Coronary artery disease involving native coronary artery of native heart    Type 2 diabetes mellitus with hyperglycemia, with long-term current use of insulin    Benign prostatic hyperplasia    Gout    Hyperlipidemia    Peripheral neuropathy    Dementia with behavioral disturbance    History of DVT (deep vein thrombosis)    Unresponsive  Advanced dementia complicated by behavioral disturbance with superimposed sleep disturbance   - ? Seizure vs polypharmacy  - Infectious work up unremarkable. UA reassuring. CXR without acute findings. CT head without acute findings  - lactic acid 10.3  - Continue home Dilantin, valproic acid level low  - phenobarb level WNL. Defer continuing to neurology  - appreciate neurology input  - EEG  - hold other sedating/psych meds  - seizure precautions  - IV fluids, supportive care  - UDS  - neuro checks       Uncontrolled diabetes mellitus type 2  - Appears not on any home meds for DM  - A1c 9.5%  -   - SSI with scheduled accu checks     HTN  - continue Amlodipine 10mg daily  - continue Lisinopril 30mg daily       DVT prophylaxis:  mechanical    CODE STATUS:  DNR  Medical Intervention Limits: NO intubation (DNI)  Level Of Support Discussed With: Health Care Surrogate  Code Status (Patient has no pulse  and is not breathing): No CPR (Do Not Attempt to Resuscitate)  Medical Interventions (Patient has pulse or is breathing): Limited Support      Expected Discharge  TBD      This note has been completed as part of a split-shared workflow.     Signature: Electronically signed by Leeanne Rizzo PA-C, 05/12/24, 12:59 AM EDT    Patient seen and examined at the bedside.  Patient is a 68-year-old  male with past medical history significant for diabetes mellitus type 2, coronary artery disease, peripheral vascular disease, hyperlipidemia, severe dementia with behavioral disturbances.  Patient was recently hospitalized here at [facility] and was discharged to [facility] on 5/9/2024.  Patient was sent back to the emergency room today because of unresponsiveness.  Patient is on multiple sedative medication including benzodiazepine and also barbiturates.  Patient currently is breathing spontaneously and saturating well while he is unresponsive.  An ABG was done at the emergency room which shows normal pH, pCO2 of 40.4, PaO2 of 442.  This was done on FiO2 of 100%.  Patient's wife is present at the bedside and emphasizes that patient is DNR DNI and they do not want the patient to go to ICU.    Physical exam:  Constitutional: No acute distress  HENT: NCAT, mucous membranes moist  Respiratory: Clear to auscultation bilaterally, respiratory effort normal.  Breathing spontaneously and saturating well.  Cardiovascular: RRR, no murmurs, rubs, or gallops  Gastrointestinal: Positive bowel sounds, soft, nontender, nondistended  Musculoskeletal:  bilateral ankle edema  Psychiatric: Unable to assess  Neurologic: Resting in bed, is unresponsive, on the withdraws response to painful stimuli.  Skin: No rashes     Assessment and plan:  68-year-old  male with severe dementia with behavioral disturbances.  Patient was sent to [facility] couple of days ago.  Patient was sent back as he was unresponsive this  afternoon.  He is breathing spontaneously and saturating well.  ABG shows normal pH, pCO2 of 40.4.  Although etiology of unresponsiveness is not quite clear but most likely is medication induced.  Patient is on multiple sedative medications.  Will admit the patient to telemetry and monitor.  Will also ask neurology to see the patient.  Please see the details above.  Patient will be admitted as inpatient.    Time spent was 40 minutes.

## 2024-05-12 NOTE — SIGNIFICANT NOTE
05/12/24 1321   SLP Deferred Reason   SLP Deferred Reason Unable to evaluate, medical status change  (RN reports going home with  hospice)

## 2024-05-12 NOTE — CONSULTS
[facility] Neurology  Consult Note    Patient Name: Too Tai  : 1955  MRN: [id number]  Primary Care Physician:  Des Geller MD  Referring Physician: No Known Provider  Date of admission: 2024    Subjective     Reason for Consult/ Chief Complaint: Somnolence    Too Tai is a 68 y.o. male with past medical history of advanced dementia with behavioral disturbances and nonverbal, CAD, T2DM, HLD, PVD who presented to [facility] ED with complaint of increasing lethargy and somnolence.  Per wife she was visiting with her  yesterday from approximately 10 PM to 6:45 PM and he was in his baseline cognitive state.  She stated that he was able to eat, drink and interact appropriately.  She states that approximately 9:30 PM she got a call from the nursing home that he had experienced an unwitnessed fall.  Unclear if he hit his head.  Unclear if patient fell from a standing position or if he slid out of his wheelchair.  Last known well is unknown.     Of note patient was admitted from 24 to 2024 with agitation and behavioral disturbances.  Patient was weaned off of Depakote last admission as it was not effective for his agitation.  He was discharged on phenobarbital 64.8 mg/64.8 mg/129.6 mg and Seroquel 100 mg in a.m./200 mg in p.m.  It was recommended to continue to wean Seroquel as patient tolerated.  Patient was also seen by psych at last admission.  Please see note for recommendations.  Patient was discharged on Zoloft 50 mg daily. Phenobarbital level on admission was 20.3.  CT head negative for acute abnormalities.  Lactic 10.3.    Of note patient has had multiple hospitalizations this year. Patient was also hospitalized from 3/27/2024 to 2024 for altered mental status and frequent falls.  There was concerns for UTI and pneumonia during that admission.  Patient also became orthostatic however it was difficult to manage due to patient's agitation.    Patient was also admitted  from 2/24/2024 to 3/24/2024 for frequent falls and myoclonic jerking.  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  Patient was trialed on Depakote at that time however was unsuccessful management of his extreme agitation.    Review Of Systems   Unable to assess due to baseline mental status    Personal History     Past Medical History:   Diagnosis Date    Coronary artery disease     Dementia     Diabetes mellitus     Hyperlipidemia     Peripheral vascular disease        Past Surgical History:   Procedure Laterality Date    CORONARY ARTERY BYPASS GRAFT      FEMORAL ARTERY - POPLITEAL ARTERY BYPASS GRAFT         Family History: family history includes Diabetes in his maternal grandfather, mother, sister, sister, and sister; Heart disease in his father; Hyperlipidemia in his father; Hypertension in his father. Otherwise pertinent FHx was reviewed and not pertinent to current issue.    Social History:  reports that he quit smoking about 27 years ago. His smoking use included cigarettes. He has never been exposed to tobacco smoke. He has quit using smokeless tobacco. He reports that he does not drink alcohol and does not use drugs.    Home Medications:   Accu-Chek Geri, Accu-Chek Geri Plus, Alcohol Prep, Divalproex Sodium, Glucagon, Insulin Glargine, Insulin Pen Needle, PHENobarbital, QUEtiapine, accu-chek soft touch, amLODIPine, cyanocobalamin, donepezil, folic acid, haloperidol, lisinopril, mirtazapine, sertraline, and temazepam    Current Medications:     Current Facility-Administered Medications:     acetaminophen (TYLENOL) tablet 650 mg, 650 mg, Oral, Q4H PRN, Leeanne Rizzo PA-C    amLODIPine (NORVASC) tablet 10 mg, 10 mg, Oral, Q24H, Leeanne Rizzo PA-C    sennosides-docusate (PERICOLACE) 8.6-50 MG per tablet 2 tablet, 2 tablet, Oral, BID PRN **AND** polyethylene glycol (MIRALAX) packet 17 g, 17 g, Oral, Daily PRN **AND** bisacodyl (DULCOLAX) EC tablet 5  mg, 5 mg, Oral, Daily PRN **AND** bisacodyl (DULCOLAX) suppository 10 mg, 10 mg, Rectal, Daily PRN, Leeanne Rizzo PA-C    dextrose (D50W) (25 g/50 mL) IV injection 25 g, 25 g, Intravenous, Q15 Min PRN, Leeanne Rizzo PA-C    dextrose (GLUTOSE) oral gel 15 g, 15 g, Oral, Q15 Min PRN, Leeanne Rizzo PA-C    Divalproex Sodium (DEPAKOTE SPRINKLE) capsule 250 mg, 250 mg, Oral, Q8H, Leeanne Rizzo PA-C    folic acid (FOLVITE) tablet 1 mg, 1 mg, Oral, Daily, Leeanne Rizzo PA-C    glucagon (GLUCAGEN) injection 1 mg, 1 mg, Intramuscular, Q15 Min PRN, Leeanne Rizzo PA-C    insulin regular (humuLIN R,novoLIN R) injection 2-7 Units, 2-7 Units, Subcutaneous, Q6H, Leeanne Rizzo PA-ROBINA    lactated ringers infusion, 75 mL/hr, Intravenous, Continuous, Leeanne Rizzo PA-C    lisinopril (PRINIVIL,ZESTRIL) tablet 20 mg, 20 mg, Oral, Daily, Leeanne Rizzo PA-C    melatonin tablet 5 mg, 5 mg, Oral, Nightly PRN, Leeanne Rizzo PA-ROBINA    sodium chloride 0.9 % flush 10 mL, 10 mL, Intravenous, PRN, [place], Gurvinder, DO    sodium chloride 0.9 % flush 10 mL, 10 mL, Intravenous, Q12H, Leeanne Rizzo PA-ROBINA    sodium chloride 0.9 % flush 10 mL, 10 mL, Intravenous, PRN, Leeanne Rizzo PA-C    sodium chloride 0.9 % infusion 40 mL, 40 mL, Intravenous, PRN, Leeanne Rizzo PA-C     Allergies:  Allergies   Allergen Reactions    Bactrim [Sulfamethoxazole-Trimethoprim] Rash    Adhesive Tape Rash    Neosporin [Neomycin-Bacitracin Zn-Polymyx] Rash       Objective     Physical Exam  Vitals and nursing note reviewed.   Constitutional:       General: He is not in acute distress.     Appearance: He is not ill-appearing.   Eyes:      Extraocular Movements: Extraocular movements intact.      Pupils: Pupils are equal, round, and reactive to light.      Comments: No nystagmus or deviated gaze noted   Cardiovascular:      Rate and Rhythm: Normal rate.   Pulmonary:      Effort: Pulmonary effort is normal.   Neurological:       Mental Status: He is lethargic.      Cranial Nerves: No cranial nerve deficit or facial asymmetry.      Sensory: No sensory deficit.      Motor: Weakness present. No seizure activity.      Deep Tendon Reflexes:      Reflex Scores:       Bicep reflexes are 2+ on the right side and 2+ on the left side.       Patellar reflexes are 1+ on the right side and 1+ on the left side.     Comments:     Physical exam limited to patient cooperation.  Able to move all 4 extremities.  Did not follow basic commands cleaning wiggling his toes.             Vitals:  Temp:  [97.5 °F (36.4 °C)-98.9 °F (37.2 °C)] 97.9 °F (36.6 °C)  Heart Rate:  [] 82  Resp:  [14-16] 16  BP: (104-167)/(53-90) 130/53    Laboratory Results:   Lab Results   Component Value Date    GLUCOSE 226 (H) 05/11/2024    CALCIUM 8.4 (L) 05/11/2024     05/11/2024    K 4.1 05/11/2024    CO2 26.0 05/11/2024     05/11/2024    BUN 22 05/11/2024    CREATININE 0.83 05/11/2024    EGFRIFAFRI 102 02/21/2022    EGFRIFNONA 88 02/21/2022    BCR 26.5 (H) 05/11/2024    ANIONGAP 11.0 05/11/2024     Lab Results   Component Value Date    WBC 7.33 05/11/2024    HGB 10.8 (L) 05/11/2024    HCT 33.8 (L) 05/11/2024    MCV 96.3 05/11/2024     05/11/2024     Lab Results   Component Value Date    CHOL 220 (H) 01/08/2024    CHOL 120 08/01/2019    CHOL 117 04/15/2019     Lab Results   Component Value Date    HDL 41 01/08/2024    HDL 37 (L) 08/03/2023    HDL 41 04/03/2023     Lab Results   Component Value Date     (H) 01/08/2024     (H) 08/03/2023     (H) 04/03/2023     Lab Results   Component Value Date    TRIG 108 01/08/2024    TRIG 208 (H) 08/03/2023    TRIG 138 04/03/2023     Lab Results   Component Value Date    HGBA1C 9.50 (H) 04/18/2024     Lab Results   Component Value Date    INR 1.1 11/25/2019    INR 1.1 09/10/2018    PROTIME 12.5 11/25/2019    PROTIME 13.3 09/10/2018     Lab Results   Component Value Date    TKAARWWA36 384 04/18/2024      Lab Results   Component Value Date    FOLATE 10.20 02/24/2024             Assessment / Plan   Brief Patient Summary:  Too Tai is a 68 y.o. male with past medical history of advanced dementia with behavioral disturbances and nonverbal, CAD, T2DM, HLD, PVD who presented to [facility] ED with complaint of increasing lethargy and somnolence.  Per wife's report which is bedside patient was sitting in a wheelchair when he seemed more lethargic    Plan:   Lethargy  Advanced dementia complicated by behavioral disturbances.  Concern patient's lethargy is related to medications and less likely to be seizure activity given his, patient has no history of seizures.  Cannot entirely rule out infectious process.  Will obtain EEG.  Blood cultures pending  Recommend transitioning back to phenobarbital.   Phenobarbital level 20.5, daily phenobarbital levels  Lactate 10.3 corrected to 1.7 after 1 L of fluids. concern initial lactate was a false positive.  CT head negative for acute abnormalities,   Will hold other sedative medications at this time including Seroquel, Remeron and temazepam.  Continue Zoloft 50 daily  Continue fall precautions  General neurology will continue to follow      I have discussed the above with the patient, bedside RN and Dr. Gallardo  Time spent with patient: 80 minutes in face-to-face evaluation and management of the patient.     Copied text in this note has been reviewed and is accurate as of 05/12/24.     IVONNE Santos

## 2024-05-12 NOTE — ED NOTES
Too Tai    Nursing Report ED to Floor:  Mental status: disoriented x 4, GCS 8  Ambulatory status: non ambuatory  Oxygen Therapy:  RA  Cardiac Rhythm: sinus tach  [facility]  Safety Concerns:  fall risk, confusion  Social Issues: na  ED Room #:  11    ED Nurse Phone Extension - [phone] or may call [phone].      HPI:   Chief Complaint   Patient presents with    Altered Mental Status       Past Medical History:  Past Medical History:   Diagnosis Date    Coronary artery disease     Dementia     Diabetes mellitus     Hyperlipidemia     Peripheral vascular disease         Past Surgical History:  Past Surgical History:   Procedure Laterality Date    CORONARY ARTERY BYPASS GRAFT      FEMORAL ARTERY - POPLITEAL ARTERY BYPASS GRAFT          Admitting Doctor:   Tarik Farrell MD    Consulting Provider(s):  Consults       Date and Time Order Name Status Description    4/23/2024  2:30 PM Inpatient Palliative Care MD Consult Completed     4/19/2024  5:42 AM Inpatient Neurology Consult General Completed              Admitting Diagnosis:   The primary encounter diagnosis was Slowness and poor responsiveness. Diagnoses of History of dementia, At risk for polypharmacy, Hypotensive episode, and Elevated lactic acid level were also pertinent to this visit.    Most Recent Vitals:   Vitals:    05/11/24 2230 05/11/24 2245 05/11/24 2315 05/11/24 2345   BP: 104/60 132/79 149/78 167/83   BP Location:       Patient Position:       Pulse: 97 100 93 91   Resp:       Temp:       TempSrc:       SpO2: 94% 96%     Weight:       Height:           Active LDAs/IV Access:   Lines, Drains & Airways       Active LDAs       Name Placement date Placement time Site Days    Peripheral IV 05/11/24 2145 Anterior;Left;Upper Arm 05/11/24 2145  Arm  less than 1    Peripheral IV Left;Posterior Hand --  --  Hand  --                    Labs (abnormal labs have a star):   Labs Reviewed   COMPREHENSIVE METABOLIC PANEL - Abnormal; Notable for the  following components:       Result Value    Glucose 226 (*)     Calcium 8.4 (*)     Total Protein 5.1 (*)     Albumin 3.0 (*)     BUN/Creatinine Ratio 26.5 (*)     All other components within normal limits    Narrative:     GFR Normal >60  Chronic Kidney Disease <60  Kidney Failure <15     SINGLE HS TROPONIN T - Abnormal; Notable for the following components:    HS Troponin T 25 (*)     All other components within normal limits    Narrative:     High Sensitive Troponin T Reference Range:  <14.0 ng/L- Negative Female for AMI  <22.0 ng/L- Negative Male for AMI  >=14 - Abnormal Female indicating possible myocardial injury.  >=22 - Abnormal Male indicating possible myocardial injury.   Clinicians would have to utilize clinical acumen, EKG, Troponin, and serial changes to determine if it is an Acute Myocardial Infarction or myocardial injury due to an underlying chronic condition.        MAGNESIUM - Abnormal; Notable for the following components:    Magnesium 1.3 (*)     All other components within normal limits   CBC WITH AUTO DIFFERENTIAL - Abnormal; Notable for the following components:    RBC 3.51 (*)     Hemoglobin 10.8 (*)     Hematocrit 33.8 (*)     All other components within normal limits   LACTIC ACID, PLASMA - Abnormal; Notable for the following components:    Lactate 10.3 (*)     All other components within normal limits   VALPROIC ACID LEVEL, TOTAL - Abnormal; Notable for the following components:    Valproic Acid 18.9 (*)     All other components within normal limits    Narrative:     Therapeutic Ranges for Valproic Acid    Epilepsy:       mcg/ml  Bipolar/Kacey  up to 125 mcg/ml     BLOOD GAS, ARTERIAL W/CO-OXIMETRY - Abnormal; Notable for the following components:    pO2, Arterial 442.0 (*)     HCO3, Arterial 27.7 (*)     Base Excess, Arterial 3.4 (*)     pO2, Temperature Corrected 442 (*)     All other components within normal limits   POCT GLUCOSE FINGERSTICK - Abnormal; Notable for the following  "components:    Glucose 260 (*)     All other components within normal limits   PROCALCITONIN - Normal    Narrative:     As a Marker for Sepsis (Non-Neonates):    1. <0.5 ng/mL represents a low risk of severe sepsis and/or septic shock.  2. >2 ng/mL represents a high risk of severe sepsis and/or septic shock.    As a Marker for Lower Respiratory Tract Infections that require antibiotic therapy:    PCT on Admission    Antibiotic Therapy       6-12 Hrs later    >0.5                Strongly Recommended  >0.25 - <0.5        Recommended   0.1 - 0.25          Discouraged              Remeasure/reassess PCT  <0.1                Strongly Discouraged     Remeasure/reassess PCT    As 28 day mortality risk marker: \"Change in Procalcitonin Result\" (>80% or <=80%) if Day 0 (or Day 1) and Day 4 values are available. Refer to http://www.[employer] [email or web address]-pct-calculator.com    Change in PCT <=80%  A decrease of PCT levels below or equal to 80% defines a positive change in PCT test result representing a higher risk for 28-day all-cause mortality of patients diagnosed with severe sepsis for septic shock.    Change in PCT >80%  A decrease of PCT levels of more than 80% defines a negative change in PCT result representing a lower risk for 28-day all-cause mortality of patients diagnosed with severe sepsis or septic shock.      PHENOBARBITAL LEVEL - Normal   BLOOD CULTURE   BLOOD CULTURE   COVID PRE-OP / PRE-PROCEDURE SCREENING ORDER (NO ISOLATION)    Narrative:     The following orders were created for panel order COVID PRE-OP / PRE-PROCEDURE SCREENING ORDER (NO ISOLATION) - Swab, Nasopharynx.  Procedure                               Abnormality         Status                     ---------                               -----------         ------                     Respiratory Panel PCR w/...[[id number]]                                                   Please view results for these tests on the individual orders.   RESPIRATORY PANEL PCR W/ " COVID-19 (SARS-COV-2), NP SWAB IN UTM/VTP, 2 HR TAT   RAINBOW DRAW    Narrative:     The following orders were created for panel order [facility] Draw.  Procedure                               Abnormality         Status                     ---------                               -----------         ------                     Green Top (Gel)[[id number]]                                  Final result               Lavender Top[[id number]]                                     Final result               Gold Top - SST[[id number]]                                   Final result               Gray Top[[id number]]                                         Final result               Light Blue Top[[id number]]                                   Final result                 Please view results for these tests on the individual orders.   BLOOD GAS, ARTERIAL   URINALYSIS W/ CULTURE IF INDICATED   LACTIC ACID, REFLEX   URINE DRUG SCREEN   POCT GLUCOSE FINGERSTICK   CBC AND DIFFERENTIAL    Narrative:     The following orders were created for panel order CBC & Differential.  Procedure                               Abnormality         Status                     ---------                               -----------         ------                     CBC Auto Differential[[id number]]        Abnormal            Final result                 Please view results for these tests on the individual orders.   GREEN TOP   LAVENDER TOP   GOLD TOP - SST   GRAY TOP   LIGHT BLUE TOP       Meds Given in ED:   Medications   sodium chloride 0.9 % flush 10 mL (has no administration in time range)   sodium chloride 0.9 % bolus 1,000 mL (1,000 mL Intravenous New Bag 5/11/24 9865)           Last NIH score:                                                          Dysphagia screening results:  Patient Factors Component (Dysphagia:Stroke or Rule-out)  Best Eye Response: (S) 2-->(E2) to pain (05/11/24 6069)  Best Motor Response: (S) 4-->(M4) withdraws from pain (05/11/24  2203)  Best Verbal Response: (S) 2-->(V2) incomprehensible speech (05/11/24 2203)  [place] Coma Scale Score: 8 (05/11/24 2203)     [place] Coma Scale:  No data recorded     CIWA:        Restraint Type:            Isolation Status:  No active isolations

## 2024-05-13 ENCOUNTER — APPOINTMENT (OUTPATIENT)
Dept: NEUROLOGY | Facility: HOSPITAL | Age: 69
End: 2024-05-13
Payer: MEDICARE

## 2024-05-13 LAB
GLUCOSE BLDC GLUCOMTR-MCNC: 164 MG/DL (ref 70–130)
GLUCOSE BLDC GLUCOMTR-MCNC: 180 MG/DL (ref 70–130)
GLUCOSE BLDC GLUCOMTR-MCNC: 231 MG/DL (ref 70–130)
GLUCOSE BLDC GLUCOMTR-MCNC: 282 MG/DL (ref 70–130)
GLUCOSE BLDC GLUCOMTR-MCNC: 309 MG/DL (ref 70–130)

## 2024-05-13 PROCEDURE — 63710000001 INSULIN REGULAR HUMAN PER 5 UNITS: Performed by: PHYSICIAN ASSISTANT

## 2024-05-13 PROCEDURE — 99232 SBSQ HOSP IP/OBS MODERATE 35: CPT | Performed by: FAMILY MEDICINE

## 2024-05-13 PROCEDURE — 95819 EEG AWAKE AND ASLEEP: CPT

## 2024-05-13 PROCEDURE — 63710000001 INSULIN LISPRO (HUMAN) PER 5 UNITS: Performed by: PHYSICIAN ASSISTANT

## 2024-05-13 PROCEDURE — 99233 SBSQ HOSP IP/OBS HIGH 50: CPT

## 2024-05-13 PROCEDURE — 82948 REAGENT STRIP/BLOOD GLUCOSE: CPT

## 2024-05-13 PROCEDURE — 95819 EEG AWAKE AND ASLEEP: CPT | Performed by: PSYCHIATRY & NEUROLOGY

## 2024-05-13 RX ORDER — PHENOBARBITAL 64.8 MG/1
64.8 TABLET ORAL 2 TIMES DAILY
Qty: 9 TABLET | Refills: 0 | Status: DISCONTINUED | OUTPATIENT
Start: 2024-05-13 | End: 2024-05-16 | Stop reason: HOSPADM

## 2024-05-13 RX ORDER — INSULIN LISPRO 100 [IU]/ML
2-7 INJECTION, SOLUTION INTRAVENOUS; SUBCUTANEOUS
Status: DISCONTINUED | OUTPATIENT
Start: 2024-05-13 | End: 2024-05-13

## 2024-05-13 RX ORDER — INSULIN LISPRO 100 [IU]/ML
2-7 INJECTION, SOLUTION INTRAVENOUS; SUBCUTANEOUS
Status: DISCONTINUED | OUTPATIENT
Start: 2024-05-13 | End: 2024-05-13 | Stop reason: SDUPTHER

## 2024-05-13 RX ORDER — OLANZAPINE 10 MG/2ML
5 INJECTION, POWDER, FOR SOLUTION INTRAMUSCULAR EVERY 8 HOURS PRN
Status: DISCONTINUED | OUTPATIENT
Start: 2024-05-13 | End: 2024-05-16 | Stop reason: HOSPADM

## 2024-05-13 RX ORDER — DEXTROSE MONOHYDRATE 25 G/50ML
25 INJECTION, SOLUTION INTRAVENOUS
Status: DISCONTINUED | OUTPATIENT
Start: 2024-05-13 | End: 2024-05-13

## 2024-05-13 RX ORDER — CHOLECALCIFEROL (VITAMIN D3) 125 MCG
5 CAPSULE ORAL NIGHTLY
Status: DISCONTINUED | OUTPATIENT
Start: 2024-05-13 | End: 2024-05-16 | Stop reason: HOSPADM

## 2024-05-13 RX ORDER — NICOTINE POLACRILEX 4 MG
15 LOZENGE BUCCAL
Status: DISCONTINUED | OUTPATIENT
Start: 2024-05-13 | End: 2024-05-13

## 2024-05-13 RX ORDER — PHENOBARBITAL 64.8 MG/1
129.6 TABLET ORAL NIGHTLY
Status: DISCONTINUED | OUTPATIENT
Start: 2024-05-13 | End: 2024-05-16 | Stop reason: HOSPADM

## 2024-05-13 RX ORDER — INSULIN LISPRO 100 [IU]/ML
2-7 INJECTION, SOLUTION INTRAVENOUS; SUBCUTANEOUS
Status: DISCONTINUED | OUTPATIENT
Start: 2024-05-13 | End: 2024-05-16 | Stop reason: HOSPADM

## 2024-05-13 RX ORDER — PHENOBARBITAL SODIUM 65 MG/ML
64.8 INJECTION, SOLUTION INTRAMUSCULAR; INTRAVENOUS 2 TIMES DAILY
Qty: 9 ML | Refills: 0 | Status: DISCONTINUED | OUTPATIENT
Start: 2024-05-13 | End: 2024-05-16 | Stop reason: HOSPADM

## 2024-05-13 RX ORDER — DEXTROSE MONOHYDRATE 25 G/50ML
25 INJECTION, SOLUTION INTRAVENOUS
Status: DISCONTINUED | OUTPATIENT
Start: 2024-05-13 | End: 2024-05-16 | Stop reason: HOSPADM

## 2024-05-13 RX ORDER — PHENOBARBITAL SODIUM 65 MG/ML
129.6 INJECTION, SOLUTION INTRAMUSCULAR; INTRAVENOUS NIGHTLY
Status: DISCONTINUED | OUTPATIENT
Start: 2024-05-13 | End: 2024-05-16 | Stop reason: HOSPADM

## 2024-05-13 RX ORDER — IBUPROFEN 600 MG/1
1 TABLET ORAL
Status: DISCONTINUED | OUTPATIENT
Start: 2024-05-13 | End: 2024-05-16 | Stop reason: HOSPADM

## 2024-05-13 RX ORDER — PHENOBARBITAL SODIUM 65 MG/ML
129.6 INJECTION, SOLUTION INTRAMUSCULAR; INTRAVENOUS NIGHTLY
Status: DISCONTINUED | OUTPATIENT
Start: 2024-05-13 | End: 2024-05-13 | Stop reason: SDUPTHER

## 2024-05-13 RX ORDER — NICOTINE POLACRILEX 4 MG
15 LOZENGE BUCCAL
Status: DISCONTINUED | OUTPATIENT
Start: 2024-05-13 | End: 2024-05-16 | Stop reason: HOSPADM

## 2024-05-13 RX ORDER — TEMAZEPAM 15 MG/1
15 CAPSULE ORAL NIGHTLY PRN
Status: DISCONTINUED | OUTPATIENT
Start: 2024-05-13 | End: 2024-05-16 | Stop reason: HOSPADM

## 2024-05-13 RX ORDER — IBUPROFEN 600 MG/1
1 TABLET ORAL
Status: DISCONTINUED | OUTPATIENT
Start: 2024-05-13 | End: 2024-05-13

## 2024-05-13 RX ADMIN — INSULIN HUMAN 3 UNITS: 100 INJECTION, SOLUTION PARENTERAL at 17:44

## 2024-05-13 RX ADMIN — INSULIN HUMAN 2 UNITS: 100 INJECTION, SOLUTION PARENTERAL at 00:30

## 2024-05-13 RX ADMIN — SERTRALINE HYDROCHLORIDE 50 MG: 50 TABLET ORAL at 10:23

## 2024-05-13 RX ADMIN — AMLODIPINE BESYLATE 10 MG: 10 TABLET ORAL at 10:23

## 2024-05-13 RX ADMIN — PHENOBARBITAL 129.6 MG: 64.8 TABLET ORAL at 20:00

## 2024-05-13 RX ADMIN — INSULIN HUMAN 4 UNITS: 100 INJECTION, SOLUTION PARENTERAL at 12:27

## 2024-05-13 RX ADMIN — INSULIN LISPRO 5 UNITS: 100 INJECTION, SOLUTION INTRAVENOUS; SUBCUTANEOUS at 21:29

## 2024-05-13 RX ADMIN — Medication 5 MG: at 20:01

## 2024-05-13 RX ADMIN — LISINOPRIL 20 MG: 20 TABLET ORAL at 10:23

## 2024-05-13 RX ADMIN — PHENOBARBITAL 64.8 MG: 64.8 TABLET ORAL at 10:26

## 2024-05-13 RX ADMIN — FOLIC ACID 1 MG: 1 TABLET ORAL at 10:23

## 2024-05-13 RX ADMIN — INSULIN HUMAN 2 UNITS: 100 INJECTION, SOLUTION PARENTERAL at 07:27

## 2024-05-13 RX ADMIN — PHENOBARBITAL 64.8 MG: 64.8 TABLET ORAL at 14:51

## 2024-05-13 NOTE — PROGRESS NOTES
[facility]    Progress Note    Patient Name: Too Tai  : 1955  MRN: [id number]  Primary Care Physician:  Des Geller MD  Date of admission: 2024    Subjective     Chief Complaint: Advanced dementia with behavioral disturbances    History of Present Illness   Patient seen resting comfortably bed.  Per bedside RN he did not rest well overnight.  He refused medications overnight.  Per wife which is bedside he appears back to his normal cognitive state.    Review of Systems   Unable to assess due to baseline mental status    Objective     Physical Exam  Vitals and nursing note reviewed.   Constitutional:       General: He is not in acute distress.     Appearance: He is not ill-appearing.   Eyes:      Extraocular Movements: Extraocular movements intact.      Pupils: Pupils are equal, round, and reactive to light.      Comments: No nystagmus or deviated gaze noted   Cardiovascular:      Rate and Rhythm: Normal rate.   Pulmonary:      Effort: Pulmonary effort is normal.   Neurological:      Mental Status: He is alert. Mental status is at baseline.      Cranial Nerves: No cranial nerve deficit or facial asymmetry.      Sensory: No sensory deficit.      Motor: No weakness or seizure activity.          Vitals:   Temp:  [98.3 °F (36.8 °C)-99.2 °F (37.3 °C)] 98.3 °F (36.8 °C)  Heart Rate:  [] 82  Resp:  [17-19] 19  BP: (133-182)/(59-78) 182/78    Current Medications    Current Facility-Administered Medications:     acetaminophen (TYLENOL) tablet 650 mg, 650 mg, Oral, Q4H PRN, Leeanne Rizzo PA-C    amLODIPine (NORVASC) tablet 10 mg, 10 mg, Oral, Q24H, Leeanne Rizzo PA-C    sennosides-docusate (PERICOLACE) 8.6-50 MG per tablet 2 tablet, 2 tablet, Oral, BID PRN **AND** polyethylene glycol (MIRALAX) packet 17 g, 17 g, Oral, Daily PRN **AND** bisacodyl (DULCOLAX) EC tablet 5 mg, 5 mg, Oral, Daily PRN **AND** bisacodyl (DULCOLAX) suppository 10 mg, 10 mg, Rectal, Daily PRN,  Leeanne Rizzo PA-C    dextrose (D50W) (25 g/50 mL) IV injection 25 g, 25 g, Intravenous, Q15 Min PRN, Leeanne Rizzo PA-C    dextrose (GLUTOSE) oral gel 15 g, 15 g, Oral, Q15 Min PRN, Leeanne Rizzo PA-C    folic acid (FOLVITE) tablet 1 mg, 1 mg, Oral, Daily, Leeanne Rizzo PA-C    glucagon (GLUCAGEN) injection 1 mg, 1 mg, Intramuscular, Q15 Min PRN, Leeanne Rizzo PA-C    insulin regular (humuLIN R,novoLIN R) injection 2-7 Units, 2-7 Units, Subcutaneous, Q6H, Leeanne Rizzo PA-C, 2 Units at 05/13/24 0727    lisinopril (PRINIVIL,ZESTRIL) tablet 20 mg, 20 mg, Oral, Daily, Leeanne Rizzo PA-C    melatonin tablet 5 mg, 5 mg, Oral, Nightly PRN, Leeanne Rizzo PA-C    PHENobarbital tablet 129.6 mg, 129.6 mg, Oral, Nightly, Catina Lawler, RPH    PHENobarbital tablet 64.8 mg, 64.8 mg, Oral, BID, Darrell Jhaveri DO, 64.8 mg at 05/12/24 1317    potassium chloride (MICRO-K/KLOR-CON) CR capsule, 40 mEq, Oral, Once, Theresa Gallardo,     sodium chloride 0.9 % flush 10 mL, 10 mL, Intravenous, PRN, Gurvinder Carranza,     sodium chloride 0.9 % flush 10 mL, 10 mL, Intravenous, Q12H, Leeanne Rizzo PA-ROBINA    sodium chloride 0.9 % flush 10 mL, 10 mL, Intravenous, PRN, Leeanne Rizzo PA-ROBINA    sodium chloride 0.9 % infusion 40 mL, 40 mL, Intravenous, PRN, Leeanne Rizzo PA-ROBINA    Laboratory Results:   Lab Results   Component Value Date    GLUCOSE 184 (H) 05/12/2024    CALCIUM 8.7 05/12/2024     05/12/2024    K 3.3 (L) 05/12/2024    CO2 28.0 05/12/2024     05/12/2024    BUN 23 05/12/2024    CREATININE 0.77 05/12/2024    EGFRIFAFRI 102 02/21/2022    EGFRIFNONA 88 02/21/2022    BCR 29.9 (H) 05/12/2024    ANIONGAP 8.0 05/12/2024     Lab Results   Component Value Date    WBC 8.02 05/12/2024    HGB 12.5 (L) 05/12/2024    HCT 37.0 (L) 05/12/2024    MCV 93.4 05/12/2024     05/12/2024     Lab Results   Component Value Date    CHOL 220 (H) 01/08/2024    CHOL 120 08/01/2019    CHOL 117  04/15/2019     Lab Results   Component Value Date    HDL 41 01/08/2024    HDL 37 (L) 08/03/2023    HDL 41 04/03/2023     Lab Results   Component Value Date     (H) 01/08/2024     (H) 08/03/2023     (H) 04/03/2023     Lab Results   Component Value Date    TRIG 108 01/08/2024    TRIG 208 (H) 08/03/2023    TRIG 138 04/03/2023     Lab Results   Component Value Date    HGBA1C 9.50 (H) 04/18/2024     Lab Results   Component Value Date    INR 1.1 11/25/2019    INR 1.1 09/10/2018    PROTIME 12.5 11/25/2019    PROTIME 13.3 09/10/2018     Lab Results   Component Value Date    FOLATE 10.20 02/24/2024     Lab Results   Component Value Date    RRDJQBIJ30 384 04/18/2024             Assessment / Plan   Brief Patient Summary:  Too Tai is a 68 y.o. male with past medical history of advanced dementia with behavioral disturbances and nonverbal, CAD, T2DM, HLD, PVD who presented to [facility] ED with complaint of increasing lethargy and somnolence.  Per wife's report which is bedside patient was sitting in a wheelchair when he seemed more lethargic     Plan:   Lethargy  Advanced dementia complicated by behavioral disturbances.  Continue phenobarbital 64.8 mg/64.8 mg / 129.6 mg.  p.o. available to be used first; IM if patient refuses.  Phenobarbital level 20.3, daily phenobarb levels  EEG with no focal features or epileptic activity.  Mild nonspecific diffuse cerebral dysfunction.  Blood cultures pending  CT head negative for acute abnormalities.  Repeat 24-hour CT head negative  Restart home temazepam 15 mg nightly and melatonin 5 mg nightly  IM Zyprexa as needed for extreme agitation  Continue Zoloft 50 daily  QTc 463  Continue fall precautions  General neurology will continue to follow      I have discussed the above with the patient, bedside RN Dr. Gallardo  Time spent with patient: 50 minutes in face-to-face evaluation and management of the patient.    Copied text in this note has been reviewed and is accurate  as of 05/13/24.     Meliza Kahn, APRN

## 2024-05-13 NOTE — PLAN OF CARE
Problem: Adult Inpatient Plan of Care  Goal: Optimal Comfort and Wellbeing  Outcome: Ongoing, Progressing  Intervention: Provide Person-Centered Care  Recent Flowsheet Documentation  Taken 5/13/2024 1020 by Steve Richard, RN  Trust Relationship/Rapport:   care explained   choices provided   empathic listening provided   emotional support provided   Goal Outcome Evaluation:  Plan of Care Reviewed With: spouse        Progress: improving       Wife at bedside and interacting with patient. Took pills crushed/whole. Bm 5/13. Incontinent. Alert to self. Awaiting dc plans

## 2024-05-13 NOTE — CASE MANAGEMENT/SOCIAL WORK
Discharge Planning Assessment  [facility]     Patient Name: Too Tai  MRN: [id number]  Today's Date: 5/13/2024    Admit Date: 5/11/2024    Plan: Returning to [facility] via EMS   Discharge Needs Assessment       Row Name 05/13/24 1556       Living Environment    People in Home spouse;facility resident    Name(s) of People in Home wife Jamaica [phone]. Lives [place] [facility] [phone]    Primary Care Provided by self    Provides Primary Care For no one    Family Caregiver if Needed spouse    Family Caregiver Names wife Jamaica [phone]    Quality of Family Relationships helpful;involved    Able to Return to Prior Arrangements yes       Transition Planning    Transportation Anticipated health plan transportation       Discharge Needs Assessment    Readmission Within the Last 30 Days no previous admission in last 30 days    Equipment Currently Used at Home wheelchair                   Discharge Plan       Row Name 05/13/24 8592       Plan    Plan Returning to [facility] via EMS    Patient/Family in Agreement with Plan other (see comments)  [facility] [phone] rep who explained they can assess patient when closer to discharge to determine if he can return (call and contact RN to set up site visit)    [facility] and wife Jamaica [phone]. Lives in [facility] [phone]. Patient is dependent with ADL's care, DME includes wheelchair, and no HH. Patients' wife explains she has POA but unsure where paperwork is. Patients PCP is Des Geller MD. Patient has [employer] Medicare Replacement insurance.  IRLANDA'er and patient's wife discussed patient returning with a private sitter option; handout list provided to patient. [facility]elfino [facility]k who explained  they can assess patient when closer to discharge to determine if he can return (call and contact RN to set up site visit).  Plans is returning to [facility] via EMS    Final Discharge Disposition Code 04 - intermediate care facility                  Continued Care and Services - Admitted Since 5/11/2024    No active coordination exists for this encounter.       Selected Continued Care - Prior Encounters Includes continued care and service providers with selected services from prior encounters from 2/11/2024 to 5/13/2024      Discharged on 5/9/2024 Admission date: 4/18/2024 - Discharge disposition: Skilled Nursing Facility (DC - External)      Destination       Service Provider Selected Services Address Phone Fax Patient Preferred    [facility] [address] , [place] [id number] [phone] --       Internal Comment last updated by Ilene Brown, RN 5/9/2024 1157    [facility]   [address] [place] [phone]  [phone]                         Durable Medical Equipment       Service Provider Selected Services Address Phone Fax Patient Preferred    [facility] [address]  [place] [address] [id number] [phone] --       Internal Comment last updated by Ilene Brown, RN 5/9/2024 1239    Hospital Bed & WC                                 Discharged on 4/4/2024 Admission date: 3/27/2024 - Discharge disposition: Home-Health Care c      Home Medical Care       Service Provider Selected Services Address Phone Fax Patient Preferred     [facility] ,  [facility] [address] [address] [phone] --                      Discharged on 3/4/2024 Admission date: 2/24/2024 - Discharge disposition: Skilled Nursing Facility (DC - External)      Destination       Service Provider Selected Services Address Phone Fax Patient Preferred     [facility] [address] [place] [address] [phone] --                          Expected Discharge Date and Time       Expected Discharge Date Expected Discharge Time    May 14, 2024            Demographic Summary       Row Name 05/13/24 1551       General Information    Admission Type inpatient    Referral Source admission list    Reason for Consult discharge planning                   Functional Status       Row Name 05/13/24 1552       Functional Status    Usual Activity Tolerance good    Current Activity Tolerance good       Functional Status, IADL    Medications independent    Meal Preparation independent    Housekeeping independent    Laundry independent    Shopping independent    IADL Comments dependent with ADL's care, DME includes wheelchair, and no HH       Employment/    Employment/ Comments [employer] Medicare Replacement insurance                   Psychosocial    No documentation.                  Abuse/Neglect    No documentation.                  Legal    No documentation.                  Substance Abuse    No documentation.                  Patient Forms    No documentation.                     JAMES Tyson (Kay)

## 2024-05-13 NOTE — PROGRESS NOTES
[facility] Medicine Services  PROGRESS NOTE    Patient Name: Too Tai  : 1955  MRN: [id number]    Date of Admission: 2024  Primary Care Physician: Des Geller MD    Subjective   Subjective     CC:  F/U agitation     HPI:  Patient seen and examined. Didn't receive meds overnight as refused PO. Sleeping this AM. No family at bedside.    Objective   Objective     Vital Signs:   Temp:  [98.3 °F (36.8 °C)-99.2 °F (37.3 °C)] 98.3 °F (36.8 °C)  Heart Rate:  [] 82  Resp:  [17-19] 19  BP: (133-182)/(59-78) 182/78     Physical Exam:  Constitutional: No acute distress, sleeping   HENT: NCAT, mucous membranes moist  Respiratory: Clear to auscultation bilaterally, respiratory effort normal   Cardiovascular: RRR, no murmurs, rubs, or gallops  Gastrointestinal: Positive bowel sounds, soft, nontender, nondistended  Musculoskeletal: No bilateral ankle edema  Psychiatric: Calm currently   Neurologic: no focal deficits  Skin: No rashes     Results Reviewed:  LAB RESULTS:      Lab 24  1111 24  0037 24  2141 24  0443 24  0514 24  0534   WBC 8.02  --  7.33 8.44 8.27 8.76   HEMOGLOBIN 12.5*  --  10.8* 14.1 13.4 14.8   HEMATOCRIT 37.0*  --  33.8* 42.2 40.3 43.9   PLATELETS 259  --  214 332 319 314   NEUTROS ABS 4.90  --  4.05 3.75 3.28 4.03   IMMATURE GRANS (ABS) 0.02  --  0.02 0.03 0.05 0.02   LYMPHS ABS 1.93  --  2.18 2.86 2.97 2.80   MONOS ABS 0.70  --  0.71 0.78 0.80 0.88   EOS ABS 0.43*  --  0.32 0.93* 1.07* 0.94*   MCV 93.4  --  96.3 93.0 94.8 92.4   PROCALCITONIN  --   --  0.04  --   --   --    LACTATE  --  1.7 10.3*  --   --   --          Lab 24  1111 24  2141 24  0443 24  0702 24  0534   SODIUM 140 138 142 143 141   POTASSIUM 3.3* 4.1 3.5 4.6 3.8   CHLORIDE 104 101 105 105 103   CO2 28.0 26.0 27.0 30.0* 27.0   ANION GAP 8.0 11.0 10.0 8.0 11.0   BUN 23 22 23 26* 22   CREATININE 0.77 0.83 0.80 0.90 0.87    EGFR 98.1 95.9 97.0 93.6 94.6   GLUCOSE 184* 226* 112* 167* 113*   CALCIUM 8.7 8.4* 8.8 9.2 9.3   MAGNESIUM 1.7 1.3* 1.9 1.9 1.9         Lab 05/12/24  1111 05/11/24  2141 05/09/24  0443 05/08/24  0702 05/07/24  0534   TOTAL PROTEIN 5.7* 5.1* 6.5 6.8 6.7   ALBUMIN 3.3* 3.0* 3.4* 3.6 3.6   GLOBULIN 2.4 2.1 3.1 3.2 3.1   ALT (SGPT) 21 18 28 25 26   AST (SGOT) 28 23 31 30 35   BILIRUBIN 0.6 0.5 0.5 0.5 0.5   ALK PHOS 102 91 113 117 118*         Lab 05/11/24 2141   HSTROP T 25*                 Lab 05/11/24 2151   PH, ARTERIAL 7.445   PCO2, ARTERIAL 40.4   PO2 .0*   FIO2 100   HCO3 ART 27.7*   BASE EXCESS ART 3.4*   CARBOXYHEMOGLOBIN 1.1     Brief Urine Lab Results  (Last result in the past 365 days)        Color   Clarity   Blood   Leuk Est   Nitrite   Protein   CREAT   Urine HCG        05/12/24 0004 Yellow   Clear   Negative   Negative   Negative   30 mg/dL (1+)                   Microbiology Results Abnormal       Procedure Component Value - Date/Time    Blood Culture - Blood, Arm, Left [[id number]]  (Normal) Collected: 05/11/24 2152    Lab Status: Preliminary result Specimen: Blood from Arm, Left Updated: 05/12/24 2215     Blood Culture No growth at 24 hours    Blood Culture - Blood, Hand, Right [[id number]]  (Normal) Collected: 05/11/24 2152    Lab Status: Preliminary result Specimen: Blood from Hand, Right Updated: 05/12/24 2215     Blood Culture No growth at 24 hours    COVID PRE-OP / PRE-PROCEDURE SCREENING ORDER (NO ISOLATION) - Swab, Nasopharynx [[id number]]  (Normal) Collected: 05/12/24 0004    Lab Status: Final result Specimen: Swab from Nasopharynx Updated: 05/12/24 0124    Narrative:      The following orders were created for panel order COVID PRE-OP / PRE-PROCEDURE SCREENING ORDER (NO ISOLATION) - Swab, Nasopharynx.  Procedure                               Abnormality         Status                     ---------                               -----------         ------                     Respiratory  Panel PCR w/...[[id number]]  Normal              Final result                 Please view results for these tests on the individual orders.    Respiratory Panel PCR w/COVID-19(SARS-CoV-2) AFTAB/LINA/ERNESTINE/PAD/COR/STANLEY In-House, NP Swab in UTM/VTM, 2 HR TAT - Swab, Nasopharynx [[id number]]  (Normal) Collected: 05/12/24 0004    Lab Status: Final result Specimen: Swab from Nasopharynx Updated: 05/12/24 0124     ADENOVIRUS, PCR Not Detected     Coronavirus 229E Not Detected     Coronavirus HKU1 Not Detected     Coronavirus NL63 Not Detected     Coronavirus OC43 Not Detected     COVID19 Not Detected     Human Metapneumovirus Not Detected     Human Rhinovirus/Enterovirus Not Detected     Influenza A PCR Not Detected     Influenza B PCR Not Detected     Parainfluenza Virus 1 Not Detected     Parainfluenza Virus 2 Not Detected     Parainfluenza Virus 3 Not Detected     Parainfluenza Virus 4 Not Detected     RSV, PCR Not Detected     Bordetella pertussis pcr Not Detected     Bordetella parapertussis PCR Not Detected     Chlamydophila pneumoniae PCR Not Detected     Mycoplasma pneumo by PCR Not Detected    Narrative:      In the setting of a positive respiratory panel with a viral infection PLUS a negative procalcitonin without other underlying concern for bacterial infection, consider observing off antibiotics or discontinuation of antibiotics and continue supportive care. If the respiratory panel is positive for atypical bacterial infection (Bordetella pertussis, Chlamydophila pneumoniae, or Mycoplasma pneumoniae), consider antibiotic de-escalation to target atypical bacterial infection.            EEG    Result Date: 5/13/2024  Reason for referral: 68 y.o.male with altered mental status Technical Summary:  A 19 channel digital EEG was performed using the international 10-20 placement system, including eye leads and EKG leads. Duration: 24 minutes Findings: The patient is asleep at the beginning of the study.  Diffuse low to medium  amplitude 4 to 7 Hz intermixed delta and theta activity are seen symmetrically over both hemispheres.  Occasional vertex waves are present.  Photic stimulation does not change the background.  Hyperventilation is not performed.  No focal features or epileptiform activity are present. Video: Available Technical quality: Superior EKG: Regular, 60 bpm SUMMARY: Excessive drowsiness Mild generalized slow No focal features or epileptiform activity are seen     Impression: Diffuse cerebral dysfunction mild degree, nonspecific This report is transcribed using the Dragon dictation system.      CT Head Without Contrast    Result Date: 5/13/2024  CT HEAD WO CONTRAST Date of Exam: 5/12/2024 9:58 PM EDT Indication: AMS. Comparison: 5/11/2024 Technique: Axial CT images were obtained of the head without contrast administration.  Automated exposure control and iterative construction methods were used. Findings: There is no evidence of acute territorial infarction. There is no acute intracranial hemorrhage. There are no extra-axial collections. Ventricles and CSF spaces are symmetrically prominent. No mass effect nor hydrocephalus. Patchy subcortical and periventricular white matter hypodensities most likely reflect the sequela of chronic microvascular ischemic disease.  Paranasal sinuses and mastoid air cells are adequately aerated.  Osseous structures and orbits appear intact.     Impression: Impression: No acute intracranial process. No interval adverse change. Electronically Signed: Yanet Ramírez MD  5/13/2024 7:04 AM EDT  Workstation ID: [id number]    CT Head Without Contrast    Result Date: 5/11/2024  CT HEAD WO CONTRAST Date of Exam: 5/11/2024 10:03 PM EDT Indication: poorly responsive. Comparison: 4/19/2024. Technique: Axial CT images were obtained of the head without contrast administration.  Automated exposure control and iterative construction methods were used. Findings: There is no evidence of hemorrhage. There is no  mass effect or midline shift. There is no extracerebral collection. Ventricles are normal in size and configuration for patient's stated age.  Posterior fossa is within normal limits. Calvarium and skull base appear intact.   Visualized sinuses show no air fluid levels. Visualized orbits are unremarkable.     Impression: Impression: No acute intracranial process. Electronically Signed: Estrellita Finnegan MD  5/11/2024 10:11 PM EDT  Workstation ID: [id number]    XR Chest 1 View    Result Date: 5/11/2024  XR CHEST 1 VW Date of Exam: 5/11/2024 9:38 PM EDT Indication: Weak/Dizzy/AMS triage protocol Comparison: 4/19/2024. Findings: There are no airspace consolidations. No pleural fluid. No pneumothorax. The pulmonary vasculature appears within normal limits. The heart appears enlarged. Median sternotomy wires are present.. No acute osseous abnormality identified.     Impression: Impression: No acute cardiopulmonary process. Stable cardiomegaly. Electronically Signed: Estrellita Finnegan MD  5/11/2024 10:00 PM EDT  Workstation ID: [id number]     Results for orders placed during the hospital encounter of 03/27/24    Adult Transthoracic Echo Complete W/ Cont if Necessary Per Protocol    Interpretation Summary    Left ventricular systolic function is normal. Calculated left ventricular EF = 55.1% Normal left ventricular cavity size noted. Left ventricular wall thickness is consistent with mild concentric hypertrophy. Left ventricular diastolic function is consistent with (grade I) impaired relaxation.    The right ventricular cavity is mildly dilated. Normal right ventricular systolic function noted.    There is calcification of the aortic valve. The aortic valve appears trileaflet. Mild aortic valve regurgitation is present. No aortic valve stenosis is present.    Mitral annular calcification is present. Trace mitral valve regurgitation is present. No significant mitral valve stenosis is present.    The tricuspid valve is structurally  normal with no stenosis present. Trace tricuspid valve regurgitation is present. Insufficient TR velocity profile to estimate the right ventricular systolic pressure.    Mild dilation of the sinuses of Valsalva is present. Mild dilation of the ascending aorta is present. Ascending aorta = 4.2 cm      Current medications:  Scheduled Meds:amLODIPine, 10 mg, Oral, Q24H  folic acid, 1 mg, Oral, Daily  insulin regular, 2-7 Units, Subcutaneous, Q6H  lisinopril, 20 mg, Oral, Daily  PHENobarbital, 129.6 mg, Oral, Nightly   Or  PHENobarbital, 129.6 mg, Intramuscular, Nightly  PHENobarbital, 64.8 mg, Oral, BID   Or  PHENobarbital, 64.8 mg, Intramuscular, BID  potassium chloride, 40 mEq, Oral, Once  sertraline, 50 mg, Oral, Daily  sodium chloride, 10 mL, Intravenous, Q12H      Continuous Infusions:   PRN Meds:.  acetaminophen    senna-docusate sodium **AND** polyethylene glycol **AND** bisacodyl **AND** bisacodyl    dextrose    dextrose    glucagon (human recombinant)    melatonin    sodium chloride    sodium chloride    sodium chloride    Assessment & Plan   Assessment & Plan     Active Hospital Problems    Diagnosis  POA    **Unresponsiveness [R41.89]  Yes    Lactic acidosis [E87.20]  Yes    History of DVT (deep vein thrombosis) [Z86.718]  Not Applicable    Dementia with behavioral disturbance [F03.918]  Yes    Benign essential HTN [I10]  Yes    Type 2 diabetes mellitus with hyperglycemia, with long-term current use of insulin [E11.65, Z79.4]  Not Applicable    Gout [M10.9]  Yes    Hyperlipidemia [E78.5]  Yes    Peripheral neuropathy [G62.9]  Yes    Benign prostatic hyperplasia [N40.0]  Yes    Coronary artery disease involving native coronary artery of native heart [I25.10]  Yes      Resolved Hospital Problems   No resolved problems to display.        Brief Hospital Course to date:  Too Tai is a 68 y.o. male ith [facility] significant for advanced dementia, CAD, diabetes mellitus type 2, HLD and PVD. Patient was recently  admitted to this service 4/18/24 to 5/9/24 with agitation and behavioral disturbance. Was discharged to [facility] [place]. Returned to ED with increasing lethargy and unresponsive episode.     This patient's problems and plans were partially entered by my partner and updated as appropriate by me 05/13/24.     Unresponsive  Advanced dementia complicated by behavioral disturbance with superimposed sleep disturbance   -Discussed case with Neurology this AM. Pt did not receive meds last night as refused PO. Order changed to linked order with IM option   -EEG negative for seizures     Uncontrolled diabetes mellitus type 2  - Appears not on any home meds for DM  - A1c 9.5%  - SSI with scheduled accu checks      HTN  - continue Amlodipine 10mg daily  - continue Lisinopril 30mg daily     Hypokalemia  -oral replacement ordered     Lactic acidosis  -Lactic 10 on admission, normalized to 1.7 with 1L IVF, suspect spurious result.    *Pt refusing a lot of PO meds. Pulls IV's out.     Expected Discharge Location and Transportation: Back to [facility]  Expected Discharge   Expected Discharge Date: 5/14/2024; Expected Discharge Time:      DVT prophylaxis:  Mechanical DVT prophylaxis orders are present.         AM-PAC 6 Clicks Score (PT): 6 (05/13/24 3209)    CODE STATUS:   Code Status and Medical Interventions:   Ordered at: 05/11/24 8519     Medical Intervention Limits:    NO intubation (DNI)     Level Of Support Discussed With:    Health Care Surrogate     Code Status (Patient has no pulse and is not breathing):    No CPR (Do Not Attempt to Resuscitate)     Medical Interventions (Patient has pulse or is breathing):    Limited Support       Theresa Gallardo, DO  05/13/24

## 2024-05-13 NOTE — PROGRESS NOTES
"          Clinical Nutrition Assessment     Patient Name: Too Tai  YOB: 1955  MRN: [id number]  Date of Encounter: 05/13/24 13:42 EDT  Admission date: 5/11/2024  Reason for Visit: MST score 2+, Reduced oral intake, \"Unsure\" unintentional weight loss    Assessment   Nutrition Assessment   Admission Diagnosis:  Unresponsiveness [R41.89]    Problem List:    Unresponsiveness    Benign essential HTN    Benign prostatic hyperplasia    Coronary artery disease involving native coronary artery of native heart    Type 2 diabetes mellitus with hyperglycemia, with long-term current use of insulin    Gout    Hyperlipidemia    Peripheral neuropathy    Dementia with behavioral disturbance    History of DVT (deep vein thrombosis)    Lactic acidosis      PMH:   He  has a past medical history of Coronary artery disease, Dementia, Diabetes mellitus, Hyperlipidemia, and Peripheral vascular disease.    PSH:  He  has a past surgical history that includes Coronary artery bypass graft and Femoral artery - popliteal artery bypass graft.    Applicable Nutrition History:     Anthropometrics     Height: Height: 188 cm (74\")  Last Filed Weight: Weight: 97.5 kg (215 lb) (05/11/24 2142)  Method: Weight Method: Estimated  BMI: BMI (Calculated): 27.6    UBW:  ~200-220 lb per wife, confirmed by EMR  Weight change:  no verifiable significant changes    Nutrition Focused Physical Exam    Date:  5/13       Unable to perform due to Patient agitation, Pt unable to participate at time of visit   Pt confused and easily agitated, did not perform NFPE. However pt naked at visit with no visual signs wasting/fat loss.     Subjective   Reported/Observed/Food/Nutrition Related History:     Visited with pt and wife at bedside, known to our services from recent admission. Pt with advanced dementia, very confused at baseline. Naked sitting in bed at time visit. Wife tells me no big changes weight or appetite. He eats regular meals with " encouragement. Wife requests boost and uncrustables all meals stating she hides his medicine in the uncrustables. Wife denies pt having further dietary needs/preferences or nutritional questions/concerns, NKFA.     Current Nutrition Prescription   PO: Diet: Regular/House; Fluid Consistency: Thin (IDDSI 0)  Oral Nutrition Supplement:   Intake: 50% X 1 meal documented, 0% X 1 meal documented    Assessment & Plan   Nutrition Diagnosis   Date:  5/13            Updated:    Problem Predicted suboptimal energy intake    Etiology Per clinical status   Signs/Symptoms Advanced dementia, recent unresponsiveness   Status: New    Goal:   Nutrition to support treatment and Maintain intake    Nutrition Intervention      Follow treatment progress, Care plan reviewed, Advise alternate selection, Advised available snacks, Interview for preferences, Menu provided, Encourage intake, Supplement provided    No apparent acute nutritional needs, encourage/assist as needed.   Boost and uncrustables all meals per spouse request    Monitoring/Evaluation:   Per protocol, I&O, PO intake, Supplement intake, Symptoms, POC/GOC, Swallow function    Destiny Lopez RD  Time Spent: 25m

## 2024-05-13 NOTE — PLAN OF CARE
Goal Outcome Evaluation:  Plan of Care Reviewed With: family        Progress: no change  Outcome Evaluation: Patient rested minimally this shift. VSS on RA. Pt taken for head CT w/o contrast at beginning of shift. Adequate UOP with brief in place. No other conerns noted at this time.

## 2024-05-14 LAB
GLUCOSE BLDC GLUCOMTR-MCNC: 196 MG/DL (ref 70–130)
GLUCOSE BLDC GLUCOMTR-MCNC: 356 MG/DL (ref 70–130)
GLUCOSE BLDC GLUCOMTR-MCNC: 396 MG/DL (ref 70–130)
PHENOBARB SERPL-MCNC: 26.9 MCG/ML (ref 10–30)
QT INTERVAL: 354 MS
QTC INTERVAL: 463 MS

## 2024-05-14 PROCEDURE — 82948 REAGENT STRIP/BLOOD GLUCOSE: CPT

## 2024-05-14 PROCEDURE — 99233 SBSQ HOSP IP/OBS HIGH 50: CPT

## 2024-05-14 PROCEDURE — 97166 OT EVAL MOD COMPLEX 45 MIN: CPT

## 2024-05-14 PROCEDURE — 63710000001 INSULIN LISPRO (HUMAN) PER 5 UNITS: Performed by: PHYSICIAN ASSISTANT

## 2024-05-14 PROCEDURE — 80184 ASSAY OF PHENOBARBITAL: CPT

## 2024-05-14 PROCEDURE — 97535 SELF CARE MNGMENT TRAINING: CPT

## 2024-05-14 PROCEDURE — 99232 SBSQ HOSP IP/OBS MODERATE 35: CPT | Performed by: FAMILY MEDICINE

## 2024-05-14 PROCEDURE — 97162 PT EVAL MOD COMPLEX 30 MIN: CPT

## 2024-05-14 RX ADMIN — AMLODIPINE BESYLATE 10 MG: 10 TABLET ORAL at 10:36

## 2024-05-14 RX ADMIN — FOLIC ACID 1 MG: 1 TABLET ORAL at 10:36

## 2024-05-14 RX ADMIN — PHENOBARBITAL 64.8 MG: 64.8 TABLET ORAL at 15:13

## 2024-05-14 RX ADMIN — INSULIN LISPRO 2 UNITS: 100 INJECTION, SOLUTION INTRAVENOUS; SUBCUTANEOUS at 12:02

## 2024-05-14 RX ADMIN — INSULIN LISPRO 6 UNITS: 100 INJECTION, SOLUTION INTRAVENOUS; SUBCUTANEOUS at 20:21

## 2024-05-14 RX ADMIN — SERTRALINE HYDROCHLORIDE 50 MG: 50 TABLET ORAL at 10:36

## 2024-05-14 RX ADMIN — Medication 5 MG: at 20:22

## 2024-05-14 RX ADMIN — PHENOBARBITAL 129.6 MG: 64.8 TABLET ORAL at 20:21

## 2024-05-14 RX ADMIN — PHENOBARBITAL 64.8 MG: 64.8 TABLET ORAL at 10:36

## 2024-05-14 RX ADMIN — TEMAZEPAM 15 MG: 15 CAPSULE ORAL at 20:21

## 2024-05-14 RX ADMIN — INSULIN LISPRO 6 UNITS: 100 INJECTION, SOLUTION INTRAVENOUS; SUBCUTANEOUS at 17:47

## 2024-05-14 RX ADMIN — LISINOPRIL 20 MG: 20 TABLET ORAL at 10:36

## 2024-05-14 NOTE — THERAPY EVALUATION
Patient Name: Too Tai  : 1955    MRN: [id number]                              Today's Date: 2024       Admit Date: 2024    Visit Dx:     ICD-10-CM ICD-9-CM   1. Slowness and poor responsiveness  R46.4 780.99   2. History of dementia  Z86.59 V11.8   3. At risk for polypharmacy  Z91.89 V49.89   4. Hypotensive episode  I95.9 458.9   5. Elevated lactic acid level  R79.89 276.2   6. Benign essential HTN  I10 401.1   7. Agitation due to dementia  F03.911 294.21   8. Fall, initial encounter  W19.XXXA E888.9   9. Peripheral polyneuropathy  G62.9 356.9   10. Impaired functional mobility, balance, gait, and endurance  Z74.09 V49.89     Patient Active Problem List   Diagnosis    Cough    Benign essential HTN    Benign prostatic hyperplasia    Coronary artery disease involving native coronary artery of native heart    Chronic coronary artery disease    Type 2 diabetes mellitus with hyperglycemia, with long-term current use of insulin    Gout    Hyperlipidemia    History of heart attack    Peripheral neuropathy    Peripheral vascular disease    Spasm of muscle    Myoclonic jerking    Hypomagnesemia    Arteriosclerosis of coronary artery    Dementia with behavioral disturbance    Frequent falls    History of DVT (deep vein thrombosis)    Chronic anticoagulation    AMS (altered mental status)    Falls    Bacteriuria    Cystitis    Agitation due to dementia    Dementia with behavioral disturbance    Unresponsiveness    Lactic acidosis     Past Medical History:   Diagnosis Date    Coronary artery disease     Dementia     Diabetes mellitus     Hyperlipidemia     Peripheral vascular disease      Past Surgical History:   Procedure Laterality Date    CORONARY ARTERY BYPASS GRAFT      FEMORAL ARTERY - POPLITEAL ARTERY BYPASS GRAFT        General Information       Row Name 24 1100          Physical Therapy Time and Intention    Document Type evaluation  -SD     Mode of Treatment physical therapy  -SD        Row Name 05/14/24 1100          General Information    Patient Profile Reviewed yes  -SD     Prior Level of Function max assist:;bed mobility;transfer;w/c or scooter;ADL's  Pt w/ [facility] 4/19-5/9, prior to admission pt ambulating household distances w/ SBA, toileting/grooming/self-feeding, req A for dressing only. Since d/c from hospital pt has req x2 person A for txfrs to/from w/c and max A/dep for ADLs.  -SD     Existing Precautions/Restrictions fall;other (see comments)  baseline dementia  -SD     Barriers to Rehab medically complex;previous functional deficit;cognitive status  -SD       Row Name 05/14/24 1100          Living Environment    People in Home facility resident  memory care  -SD       Row Name 05/14/24 1100          Home Main Entrance    Number of Stairs, Main Entrance none  -SD       Row Name 05/14/24 1100          Stairs Within Home, Primary    Number of Stairs, Within Home, Primary none  -SD       Row Name 05/14/24 1100          Cognition    Orientation Status (Cognition) oriented to;person;unable/difficult to assess  -SD       Row Name 05/14/24 1100          Safety Issues, Functional Mobility    Safety Issues Affecting Function (Mobility) ability to follow commands;awareness of need for assistance;friction/shear risk;impulsivity;insight into deficits/self-awareness;judgment;sequencing abilities;safety precautions follow-through/compliance;safety precaution awareness;problem-solving  -SD     Impairments Affecting Function (Mobility) balance;cognition;endurance/activity tolerance;motor control;muscle tone abnormal;strength;postural/trunk control  -SD               User Key  (r) = Recorded By, (t) = Taken By, (c) = Cosigned By      Initials Name Provider Type    Reba Phelan, PT Physical Therapist                   Mobility       Row Name 05/14/24 1441          Bed Mobility    Bed Mobility sit-supine;supine-sit  -SD     Supine-Sit [place] (Bed Mobility) maximum assist (25% patient  "effort);2 person assist;verbal cues;nonverbal cues (demo/gesture)  -SD     Sit-Supine [place] (Bed Mobility) maximum assist (25% patient effort);2 person assist;nonverbal cues (demo/gesture);verbal cues  -SD     Assistive Device (Bed Mobility) bed rails;draw sheet;head of bed elevated  -SD     Comment, (Bed Mobility) max cues for sequencing  -SD       Row Name 05/14/24 1441          Transfers    Comment, (Transfers) STS from EOB x5 reps with gait belt and UE support. Son present and supportive, able to assist with transfer with patient responding well to family involvement. Pt unable to reach full trunk and knee extension, dem fear of falling.  -SD       Row Name 05/14/24 1441          Sit-Stand Transfer    Sit-Stand [place] (Transfers) maximum assist (25% patient effort);2 person assist;verbal cues  -SD     Assistive Device (Sit-Stand Transfers) other (see comments)  -SD       Row Name 05/14/24 1441          Gait/Stairs (Locomotion)    [place] Level (Gait) not tested  -SD               User Key  (r) = Recorded By, (t) = Taken By, (c) = Cosigned By      Initials Name Provider Type    Reba Phelan PT Physical Therapist                   Obj/Interventions       Row Name 05/14/24 1444          Range of Motion Comprehensive    General Range of Motion lower extremity range of motion deficits identified  -SD     Comment, General Range of Motion Pt guarding LE\"s  -SD       Row Name 05/14/24 1444          Strength Comprehensive (MMT)    General Manual Muscle Testing (MMT) Assessment upper extremity strength deficits identified  -SD     Comment, General Manual Muscle Testing (MMT) Assessment unable to formally assess due to cognition  -SD               User Key  (r) = Recorded By, (t) = Taken By, (c) = Cosigned By      Initials Name Provider Type    Reba Phelan PT Physical Therapist                   Goals/Plan       Row Name 05/14/24 1447          Bed Mobility Goal 1 (PT)    " Activity/Assistive Device (Bed Mobility Goal 1, PT) sit to supine;supine to sit  -SD     [place] Level/Cues Needed (Bed Mobility Goal 1, PT) moderate assist (50-74% patient effort)  -SD     Time Frame (Bed Mobility Goal 1, PT) long term goal (LTG);2 weeks  -SD       Row Name 05/14/24 1447          Transfer Goal 1 (PT)    Activity/Assistive Device (Transfer Goal 1, PT) sit-to-stand/stand-to-sit;bed-to-chair/chair-to-bed  -SD     [place] Level/Cues Needed (Transfer Goal 1, PT) moderate assist (50-74% patient effort)  -SD     Time Frame (Transfer Goal 1, PT) long term goal (LTG);2 weeks  -SD       Row Name 05/14/24 1447          Gait Training Goal 1 (PT)    Activity/Assistive Device (Gait Training Goal 1, PT) gait (walking locomotion)  -SD     [place] Level (Gait Training Goal 1, PT) moderate assist (50-74% patient effort)  -SD     Distance (Gait Training Goal 1, PT) 15ft  -SD     Time Frame (Gait Training Goal 1, PT) long term goal (LTG);2 weeks  -SD       Row Name 05/14/24 9966          Therapy Assessment/Plan (PT)    Planned Therapy Interventions (PT) balance training;bed mobility training;gait training;home exercise program;patient/family education;neuromuscular re-education;transfer training;strengthening;ROM (range of motion)  -SD               User Key  (r) = Recorded By, (t) = Taken By, (c) = Cosigned By      Initials Name Provider Type    Reba Phelan PT Physical Therapist                   Clinical Impression       Row Name 05/14/24 3741          Pain Scale: FACES Pre/Post-Treatment    Pain: FACES Scale, Pretreatment 4-->hurts little more  -SD     Posttreatment Pain Rating 4-->hurts little more  -SD     Pain Location generalized  -SD       Row Name 05/14/24 1497          Plan of Care Review    Plan of Care Reviewed With patient;son  -SD     Outcome Evaluation Pt presents with weakness, decreased ROM, decreased functional mobility independence and is below baseline level of function  for mobility. Pt t/f STS from EOB x5 reps with maxA x2 gait belt and UE support. Son present and supportive, able to assist with transfer with patient responding well to family involvement. Pt will benefit from IPPT services to address limitations. PT rec d/c SNF rehab.  -SD       Row Name 05/14/24 1444          Therapy Assessment/Plan (PT)    Patient/Family Therapy Goals Statement (PT) return to memory care  -SD     Rehab Potential (PT) fair, will monitor progress closely  -SD     Criteria for Skilled Interventions Met (PT) yes;skilled treatment is necessary  -SD     Therapy Frequency (PT) daily  -SD     Predicted Duration of Therapy Intervention (PT) 2wks  -SD       Row Name 05/14/24 1444          Positioning and Restraints    Pre-Treatment Position in bed  -SD     Post Treatment Position bed  -SD     In Bed notified nsg;fowlers;call light within reach;encouraged to call for assist;exit alarm on;with family/caregiver;heels elevated  -SD               User Key  (r) = Recorded By, (t) = Taken By, (c) = Cosigned By      Initials Name Provider Type    Reba Phelan, PT Physical Therapist                   Outcome Measures       Row Name 05/14/24 1448 05/14/24 1025       How much help from another person do you currently need...    Turning from your back to your side while in flat bed without using bedrails? 2  -SD 2  -MA    Moving from lying on back to sitting on the side of a flat bed without bedrails? 2  -SD 1  -MA    Moving to and from a bed to a chair (including a wheelchair)? 1  -SD 1  -MA    Standing up from a chair using your arms (e.g., wheelchair, bedside chair)? 2  -SD 1  -MA    Climbing 3-5 steps with a railing? 1  -SD 1  -MA    To walk in hospital room? 1  -SD 1  -MA    AM-PAC 6 Clicks Score (PT) 9  -SD 7  -MA    Highest Level of Mobility Goal 3 --> Sit at edge of bed  -SD 2 --> Bed activities/dependent transfer  -MA      Row Name 05/14/24 1448 05/14/24 1141       Functional Assessment    Outcome  Measure Options AM-PAC 6 Clicks Basic Mobility (PT)  -SD AM-PAC 6 Clicks Daily Activity (OT)  -              User Key  (r) = Recorded By, (t) = Taken By, (c) = Cosigned By      Initials Name Provider Type    Reba Phelan, TRACE Physical Therapist    Steve Jacobson, RN Registered Nurse    Jenny Barnes, OT Occupational Therapist                                 Physical Therapy Education       Title: PT OT SLP Therapies (In Progress)       Topic: Physical Therapy (In Progress)       Point: Mobility training (In Progress)       Learning Progress Summary             Patient Acceptance, E, NR by SD at 5/14/2024 1448   Family Acceptance, E, NR by SD at 5/14/2024 1448                         Point: Home exercise program (In Progress)       Learning Progress Summary             Patient Acceptance, E, NR by SD at 5/14/2024 1448   Family Acceptance, E, NR by SD at 5/14/2024 1448                         Point: Body mechanics (In Progress)       Learning Progress Summary             Patient Acceptance, E, NR by SD at 5/14/2024 1448   Family Acceptance, E, NR by SD at 5/14/2024 1448                         Point: Precautions (In Progress)       Learning Progress Summary             Patient Acceptance, E, NR by SD at 5/14/2024 1448   Family Acceptance, E, NR by SD at 5/14/2024 1448                                         User Key       Initials Effective Dates Name Provider Type Discipline    SD 03/13/23 -  Reba Garcia PT Physical Therapist PT                  PT Recommendation and Plan  Planned Therapy Interventions (PT): balance training, bed mobility training, gait training, home exercise program, patient/family education, neuromuscular re-education, transfer training, strengthening, ROM (range of motion)  Plan of Care Reviewed With: patient, son  Outcome Evaluation: Pt presents with weakness, decreased ROM, decreased functional mobility independence and is below baseline level of function for  mobility. Pt t/f STS from EOB x5 reps with maxA x2 gait belt and UE support. Son present and supportive, able to assist with transfer with patient responding well to family involvement. Pt will benefit from IPPT services to address limitations. PT rec d/c SNF rehab.     Time Calculation:   PT Evaluation Complexity  History, PT Evaluation Complexity: 3 or more personal factors and/or comorbidities  Examination of Body Systems (PT Eval Complexity): total of 3 or more elements  Clinical Presentation (PT Evaluation Complexity): evolving  Clinical Decision Making (PT Evaluation Complexity): moderate complexity  Overall Complexity (PT Evaluation Complexity): moderate complexity     PT Charges       Row Name 05/14/24 1449             Time Calculation    Start Time 1100  -SD      PT Non-Billable Time (min) 46 min  -SD      PT Received On 05/14/24  -SD      PT Goal Re-Cert Due Date 05/24/24  -SD                User Key  (r) = Recorded By, (t) = Taken By, (c) = Cosigned By      Initials Name Provider Type    Reba Phelan, PT Physical Therapist                  Therapy Charges for Today       Code Description Service Date Service Provider Modifiers Qty    [id number] HC PT EVAL MOD COMPLEXITY 4 5/14/2024 Reba Garcia, PT GP 1            PT G-Codes  Outcome Measure Options: AM-PAC 6 Clicks Basic Mobility (PT)  AM-PAC 6 Clicks Score (PT): 9  AM-PAC 6 Clicks Score (OT): 10  PT Discharge Summary  Anticipated Discharge Disposition (PT): skilled nursing facility    Reba Garcia, TRACE  5/14/2024

## 2024-05-14 NOTE — PLAN OF CARE
Problem: Adult Inpatient Plan of Care  Goal: Optimal Comfort and Wellbeing  Outcome: Ongoing, Progressing  Intervention: Provide Person-Centered Care  Recent Flowsheet Documentation  Taken 5/14/2024 1025 by Steve Richard, RN  Trust Relationship/Rapport:   care explained   choices provided   emotional support provided   empathic listening provided   Goal Outcome Evaluation:  Plan of Care Reviewed With: spouse        Progress: improving       Confused but very cooperative with staff. Medications crushed without issues. Spouse at bedside. Awaiting dc plans

## 2024-05-14 NOTE — TELEPHONE ENCOUNTER
Spoke to gordy and notified of message. Good verbalization was given.     urgent follow up for abdominal pain, weight loss

## 2024-05-14 NOTE — PLAN OF CARE
Goal Outcome Evaluation:  Plan of Care Reviewed With: spouse        Progress: no change  Outcome Evaluation: Pt rested well throughout the shift. VSS on RA. Adequate UOP. Awaiting dc plans. No other concerns noted at this time.

## 2024-05-14 NOTE — THERAPY EVALUATION
Patient Name: Too Tai  : 1955    MRN: [id number]                              Today's Date: 2024       Admit Date: 2024    Visit Dx:     ICD-10-CM ICD-9-CM   1. Slowness and poor responsiveness  R46.4 780.99   2. History of dementia  Z86.59 V11.8   3. At risk for polypharmacy  Z91.89 V49.89   4. Hypotensive episode  I95.9 458.9   5. Elevated lactic acid level  R79.89 276.2   6. Benign essential HTN  I10 401.1   7. Agitation due to dementia  F03.911 294.21   8. Fall, initial encounter  W19.XXXA E888.9   9. Peripheral polyneuropathy  G62.9 356.9     Patient Active Problem List   Diagnosis    Cough    Benign essential HTN    Benign prostatic hyperplasia    Coronary artery disease involving native coronary artery of native heart    Chronic coronary artery disease    Type 2 diabetes mellitus with hyperglycemia, with long-term current use of insulin    Gout    Hyperlipidemia    History of heart attack    Peripheral neuropathy    Peripheral vascular disease    Spasm of muscle    Myoclonic jerking    Hypomagnesemia    Arteriosclerosis of coronary artery    Dementia with behavioral disturbance    Frequent falls    History of DVT (deep vein thrombosis)    Chronic anticoagulation    AMS (altered mental status)    Falls    Bacteriuria    Cystitis    Agitation due to dementia    Dementia with behavioral disturbance    Unresponsiveness    Lactic acidosis     Past Medical History:   Diagnosis Date    Coronary artery disease     Dementia     Diabetes mellitus     Hyperlipidemia     Peripheral vascular disease      Past Surgical History:   Procedure Laterality Date    CORONARY ARTERY BYPASS GRAFT      FEMORAL ARTERY - POPLITEAL ARTERY BYPASS GRAFT        General Information       Row Name 24 1133          General Information    Prior Level of Function max assist:;bed mobility;transfer;w/c or scooter;ADL's  Pt w/ [facility] -, prior to admission pt ambulating household distances w/ SBA,  toileting/grooming/self-feeding, req A for dressing only. Since d/c from hospital pt has req x2 person A for txfrs to/from w/c and max A/dep for ADLs.  -     Existing Precautions/Restrictions fall;other (see comments)  baseline dementia  -     Barriers to Rehab medically complex;previous functional deficit;cognitive status;hearing deficit  -       Row Name 05/14/24 1133          Living Environment    [facility] in Home [facility]  -       Row Name 05/14/24 1133          Home Main Entrance    Number of Stairs, Main Entrance none  -       Row Name 05/14/24 1133          Stairs Within Home, Primary    Number of Stairs, Within Home, Primary none  -       Row Name 05/14/24 1133          Cognition    Orientation Status (Cognition) oriented to;person;unable/difficult to assess  -       Row Name 05/14/24 1133          Safety Issues, Functional Mobility    Safety Issues Affecting Function (Mobility) awareness of need for assistance;insight into deficits/self-awareness;safety precaution awareness;safety precautions follow-through/compliance;sequencing abilities  -     Impairments Affecting Function (Mobility) balance;cognition;endurance/activity tolerance;motor control;muscle tone abnormal;strength  -     Cognitive Impairments, Mobility Safety/Performance awareness, need for assistance;insight into deficits/self-awareness;safety precaution awareness;safety precaution follow-through;sequencing abilities  -               User Key  (r) = Recorded By, (t) = Taken By, (c) = Cosigned By      Initials Name Provider Type     Jenny Gallardo OT Occupational Therapist                     Mobility/ADL's       Row Name 05/14/24 1136          Bed Mobility    Bed Mobility supine-sit  -     Supine-Sit [place] (Bed Mobility) maximum assist (25% patient effort);2 person assist;verbal cues  -     Bed Mobility, Safety Issues cognitive deficits limit understanding;decreased use  of arms for pushing/pulling;decreased use of legs for bridging/pushing  -     Assistive Device (Bed Mobility) bed rails;draw sheet;head of bed elevated  -     Comment, (Bed Mobility) Upon sitting EOB pt demo'd ability to maintain static sitting balance w/ CGA  -[facility] Name 05/14/24 1136          Transfers    Transfers sit-stand transfer;stand-sit transfer  -     Comment, (Transfers) STSx3  -[facility] Name 05/14/24 1136          Sit-Stand Transfer    Sit-Stand [place] (Transfers) maximum assist (25% patient effort);2 person assist;verbal cues  -     Assistive Device (Sit-Stand Transfers) other (see comments)  -       Row Name 05/14/24 1136          Stand-Sit Transfer    Stand-Sit [place] (Transfers) maximum assist (25% patient effort);2 person assist;verbal cues  -     Assistive Device (Stand-Sit Transfers) other (see comments)  -[facility] Name 05/14/24 1136          Activities of Daily Living    BADL Assessment/Intervention lower body dressing;upper body dressing;grooming;feeding  -[facility] Name 05/14/24 1136          Lower Body Dressing Assessment/Training    [place] Level (Lower Body Dressing) don;socks;dependent (less than 25% patient effort);verbal cues  -     Position (Lower Body Dressing) supine  -[facility] Name 05/14/24 1136          Upper Body Dressing Assessment/Training    [place] Level (Upper Body Dressing) don;pajama/robe;maximum assist (25% patient effort);verbal cues  -     Position (Upper Body Dressing) sitting up in bed  -[facility] Name 05/14/24 1136          Grooming Assessment/Training    [place] Level (Grooming) wash face, hands;moderate assist (50% patient effort);verbal cues;nonverbal cues (demo/gesture)  -     Assistive Devices (Grooming) hand over hand  -     Position (Grooming) sitting up in bed  -[facility] Name 05/14/24 1136          Self-Feeding Assessment/Training    [place] Level (Feeding) scoop food and bring to  mouth;maximum assist (25% patient effort);liquids to mouth;minimum assist (75% patient effort);verbal cues  -     Position (Self-Feeding) sitting up in bed;unsupported sitting  -               User Key  (r) = Recorded By, (t) = Taken By, (c) = Cosigned By      Initials Name Provider Type     Jenny Gallardo OT Occupational Therapist                   Obj/Interventions       [facility] Name 05/14/24 1138          Sensory Assessment (Somatosensory)    Sensory Assessment (Somatosensory) UE sensation intact  -[facility] 05/14/24 1138          Vision Assessment/Intervention    Visual Impairment/Limitations WFL  -[facility] Name 05/14/24 1138          Range of Motion Comprehensive    General Range of Motion bilateral upper extremity ROM WFL  -[facility] 05/14/24 1138          Strength Comprehensive (MMT)    General Manual Muscle Testing (MMT) Assessment upper extremity strength deficits identified  -     Comment, General Manual Muscle Testing (MMT) Assessment Formal assessment limited d/t pt cognitive status, based on functional activity BUE grossly 4/5  -[facility]       Row Name 05/14/24 1138          Shoulder (Therapeutic Exercise)    Shoulder (Therapeutic Exercise) AAROM (active assistive range of motion)  -     Shoulder AAROM (Therapeutic Exercise) bilateral;flexion;extension;aBduction;aDduction;10 repetitions;sitting  -[facility] 05/14/24 1138          Elbow/Forearm (Therapeutic Exercise)    Elbow/Forearm (Therapeutic Exercise) AAROM (active assistive range of motion)  -     Elbow/Forearm AAROM (Therapeutic Exercise) bilateral;flexion;extension;10 repetitions;sitting  -[facility] 05/14/24 1138          Wrist (Therapeutic Exercise)    Wrist (Therapeutic Exercise) AAROM (active assistive range of motion)  -     Wrist AAROM (Therapeutic Exercise) bilateral;flexion;extension;10 repetitions  -[facility]       Row Name 05/14/24 1138          Hand (Therapeutic Exercise)    Hand (Therapeutic  Exercise) AROM (active range of motion)  -     Hand AROM/AAROM (Therapeutic Exercise) bilateral;AAROM (active assistive range of motion);finger flexion;finger extension;10 repetitions  -[facility] Name 05/14/24 1138          Motor Skills    Therapeutic Exercise shoulder;elbow/forearm;wrist;hand  -[facility] Name 05/14/24 1138          Balance    Balance Assessment sitting static balance;sitting dynamic balance;sit to stand dynamic balance;standing static balance  -     Static Sitting Balance contact guard  -     Dynamic Sitting Balance minimal assist  -     Position, Sitting Balance unsupported;sitting edge of bed  -     Sit to Stand Dynamic Balance maximum assist;2-person assist;verbal cues  -     Static Standing Balance maximum assist;2-person assist;verbal cues  -     Position/Device Used, Standing Balance supported  -     Balance Interventions sitting;sit to stand;occupation based/functional task  -               User Key  (r) = Recorded By, (t) = Taken By, (c) = Cosigned By      Initials Name Provider Type     Jenny Gallardo OT Occupational Therapist                   Goals/Plan       [facility] Name 05/14/24 1141          Bed Mobility Goal 1 (OT)    Activity/Assistive Device (Bed Mobility Goal 1, OT) sit to supine;supine to sit  -     [place] Level/Cues Needed (Bed Mobility Goal 1, OT) moderate assist (50-74% patient effort);verbal cues required  -     Time Frame (Bed Mobility Goal 1, OT) long term goal (LTG);10 days  -     Progress/Outcomes (Bed Mobility Goal 1, OT) goal ongoing  -[facility] Name 05/14/24 1141          Transfer Goal 1 (OT)    Activity/Assistive Device (Transfer Goal 1, OT) sit-to-stand/stand-to-sit;bed-to-chair/chair-to-bed;commode, bedside without drop arms  -     [place] Level/Cues Needed (Transfer Goal 1, OT) moderate assist (50-74% patient effort);verbal cues required  -     Time Frame (Transfer Goal 1, OT) long term goal (LTG);10 days  -      Progress/Outcome (Transfer Goal 1, OT) goal ongoing  -[facility] Name 05/14/24 1141          Grooming Goal 1 (OT)    Activity/Device (Grooming Goal 1, OT) oral care;wash face, hands  -     [place] (Grooming Goal 1, OT) minimum assist (75% or more patient effort);verbal cues required  -     Time Frame (Grooming Goal 1, OT) long term goal (LTG);10 days  -     Progress/Outcome (Grooming Goal 1, OT) goal ongoing  -[facility] Name 05/14/24 1141          Self-Feeding Goal 1 (OT)    Activity/Device (Self-Feeding Goal 1, OT) liquids to mouth;scoop food and bring to mouth  -     [place] Level/Cues Needed (Self-Feeding Goal 1, OT) minimum assist (75% or more patient effort);verbal cues required  -     Time Frame (Self-Feeding Goal 1, OT) long term goal (LTG);10 days  -     Progress/Outcomes (Self-Feeding Goal 1, OT) goal ongoing  -[facility] 05/14/24 1141          Therapy Assessment/Plan (OT)    Planned Therapy Interventions (OT) activity tolerance training;adaptive equipment training;BADL retraining;functional balance retraining;IADL retraining;occupation/activity based interventions;patient/caregiver education/training;ROM/therapeutic exercise;strengthening exercise;transfer/mobility retraining  -               User Key  (r) = Recorded By, (t) = Taken By, (c) = Cosigned By      Initials Name Provider Type     Jenny Gallardo, OT Occupational Therapist                   Clinical Impression       Row Name 05/14/24 1139          Pain Assessment    Pain Intervention(s) Repositioned;Ambulation/increased activity  -     Additional Documentation Pain Scale: FACES Pre/Post-Treatment (Group)  -[facility]       Row Name 05/14/24 1134          Pain Scale: FACES Pre/Post-Treatment    Pain: FACES Scale, Pretreatment 4-->hurts little more  -     Posttreatment Pain Rating 4-->hurts little more  -     Pain Location generalized  -     Pain Location - hand  -[facility] 05/14/24 1136           Plan of Care Review    Plan of Care Reviewed With patient;family  -     Outcome Evaluation Pt presents w/ generalized weakness, decreased functional endurance, impaired cognition, and balance deficits limiting his ADL independence. Pt would benefit from continued skilled IPOT services to address current functional deficits. Rec SNF at d/c.  -       Row Name 05/14/24 1139          Therapy Assessment/Plan (OT)    Rehab Potential (OT) good, to achieve stated therapy goals  -     Criteria for Skilled Therapeutic Interventions Met (OT) yes;skilled treatment is necessary  -     Therapy Frequency (OT) daily  -       Row Name 05/14/24 1139          Therapy Plan Review/Discharge Plan (OT)    Anticipated Discharge Disposition (OT) skilled nursing facility  -       Row Name 05/14/24 1139          Vital Signs    O2 Delivery Pre Treatment room air  -     O2 Delivery Intra Treatment room air  -     O2 Delivery Post Treatment room air  -     Pre Patient Position Supine  -     Intra Patient Position Standing  -     Post Patient Position Sitting  -       Row Name 05/14/24 1139          Positioning and Restraints    Pre-Treatment Position in bed  -     Post Treatment Position bed  -     In Bed sitting EOB;with PT  -               User Key  (r) = Recorded By, (t) = Taken By, (c) = Cosigned By      Initials Name Provider Type    Jenny Barnes, OT Occupational Therapist                   Outcome Measures       Row Name 05/14/24 1141          How much help from another is currently needed...    Putting on and taking off regular lower body clothing? 1  -MC     Bathing (including washing, rinsing, and drying) 2  -MC     Toileting (which includes using toilet bed pan or urinal) 1  -MC     Putting on and taking off regular upper body clothing 2  -MC     Taking care of personal grooming (such as brushing teeth) 2  -MC     Eating meals 2  -MC     AM-PAC 6 Clicks Score (OT) 10  -       Row Name 05/14/24  1025          How much help from another person do you currently need...    Turning from your back to your side while in flat bed without using bedrails? 2  -MA     Moving from lying on back to sitting on the side of a flat bed without bedrails? 1  -MA     Moving to and from a bed to a chair (including a wheelchair)? 1  -MA     Standing up from a chair using your arms (e.g., wheelchair, bedside chair)? 1  -MA     Climbing 3-5 steps with a railing? 1  -MA     To walk in hospital room? 1  -MA     AM-PAC 6 Clicks Score (PT) 7  -MA     Highest Level of Mobility Goal 2 --> Bed activities/dependent transfer  -MA       Row Name 05/14/24 1141          Functional Assessment    Outcome Measure Options AM-PAC 6 Clicks Daily Activity (OT)  -               User Key  (r) = Recorded By, (t) = Taken By, (c) = Cosigned By      Initials Name Provider Type    MA Steve Richard, RN Registered Nurse    Jenny Barnes OT Occupational Therapist                    Occupational Therapy Education       Title: PT OT SLP Therapies (In Progress)       Topic: Occupational Therapy (In Progress)       Point: ADL training (In Progress)       Description:   Instruct learner(s) on proper safety adaptation and remediation techniques during self care or transfers.   Instruct in proper use of assistive devices.                  Learning Progress Summary             Patient Acceptance, E, NR by  at 5/14/2024 1142                         Point: Home exercise program (Not Started)       Description:   Instruct learner(s) on appropriate technique for monitoring, assisting and/or progressing therapeutic exercises/activities.                  Learner Progress:  Not documented in this visit.              Point: Precautions (In Progress)       Description:   Instruct learner(s) on prescribed precautions during self-care and functional transfers.                  Learning Progress Summary             Patient Acceptance, E, NR by  at 5/14/2024  1142                         Point: Body mechanics (In Progress)       Description:   Instruct learner(s) on proper positioning and spine alignment during self-care, functional mobility activities and/or exercises.                  Learning Progress Summary             Patient Acceptance, E, NR by  at 5/14/2024 1142                                         User Key       Initials Effective Dates Name Provider Type Discipline     10/14/22 -  Jenny Gallardo OT Occupational Therapist OT                  OT Recommendation and Plan  Planned Therapy Interventions (OT): activity tolerance training, adaptive equipment training, BADL retraining, functional balance retraining, IADL retraining, occupation/activity based interventions, patient/caregiver education/training, ROM/therapeutic exercise, strengthening exercise, transfer/mobility retraining  Therapy Frequency (OT): daily  Plan of Care Review  Plan of Care Reviewed With: patient, family  Outcome Evaluation: Pt presents w/ generalized weakness, decreased functional endurance, impaired cognition, and balance deficits limiting his ADL independence. Pt would benefit from continued skilled IPOT services to address current functional deficits. Rec SNF at d/c.     Time Calculation:   Evaluation Complexity (OT)  Review Occupational Profile/Medical/Therapy History Complexity: expanded/moderate complexity  Assessment, Occupational Performance/Identification of Deficit Complexity: 3-5 performance deficits  Clinical Decision Making Complexity (OT): detailed assessment/moderate complexity  Overall Complexity of Evaluation (OT): moderate complexity     Time Calculation- OT       Row Name 05/14/24 1142             Time Calculation-     OT Start Time 1043  -      OT Received On 05/14/24  -      OT Goal Re-Cert Due Date 05/24/24  -         Timed Charges    85265 - OT Therapeutic Activity Minutes 6  -      63679 - OT Self Care/Mgmt Minutes 9  -         Untimed  Charges    OT Eval/Re-eval Minutes 31  -MC         Total Minutes    Timed Charges Total Minutes 15  -MC      Untimed Charges Total Minutes 31  -MC       Total Minutes 46  -MC                User Key  (r) = Recorded By, (t) = Taken By, (c) = Cosigned By      Initials Name Provider Type     Jenny Gallardo OT Occupational Therapist                  Therapy Charges for Today       Code Description Service Date Service Provider Modifiers Qty    [id number]  OT SELF CARE/MGMT/TRAIN EA 15 MIN 5/14/2024 Jenny Gallardo OT GO 1    [id number]  OT EVAL MOD COMPLEXITY 3 5/14/2024 Jenny Gallardo OT GO 1                 Jenny Gallardo OT  5/14/2024

## 2024-05-14 NOTE — DISCHARGE PLACEMENT REQUEST
" Contact Nissa Danielle [phone]    Too Richter (68 y.o. Male)       Date of Birth   1955    Social Security Number       Address   [address] [place] [address] [address] [place] [address]    Home Phone   [phone]    MRN   [id number]       Denominational   Mandaen    Marital Status                               Admission Date   24    Admission Type   Emergency    Admitting Provider   Theresa Gallardo DO    Attending Provider   Theresa Gallardo DO    Department, Room/Bed   [facility] 5B, N537/1       Discharge Date       Discharge Disposition       Discharge Destination                                 Attending Provider: Theresa Glalardo DO    Allergies: Bactrim [Sulfamethoxazole-trimethoprim], Adhesive Tape, Neosporin [Neomycin-bacitracin Zn-polymyx]    Isolation: None   Infection: None   Code Status: No CPR    Ht: 188 cm (74\")   Wt: 97.5 kg (215 lb)    Admission Cmt: None   Principal Problem: Unresponsiveness [R41.89]                   Active Insurance as of 2024       Primary Coverage       Payor Plan Insurance Group Employer/Plan Group    AETNA MEDICARE REPLACEMENT AETNA MED ADV PPO [id number]       Payor Plan Address Payor Plan Phone Number Payor Plan Fax Number Effective Dates    [address] [phone]  2024 - None Entered    [facility] [id number]         Subscriber Name Subscriber Birth Date Member ID       OTO RICHTER 1955 [id number]                     Emergency Contacts        (Rel.) Home Phone Work Phone Mobile Phone    WILLIE RICHTER (Spouse) [phone] -- [phone]    ZaireRitchie (Son) [phone] -- [phone]    Meg Bustillo (Relative) [phone] -- [phone]                 History & Physical        Tarik Farrell MD at 24 [facility]              [facility] Medicine Services  HISTORY AND PHYSICAL    Patient Name: Too Richter  : 1955  MRN: [id number]  Primary Care Physician: Duyen" "Des LANDA MD  Date of admission: 5/11/2024    Subjective  Subjective     Chief Complaint:  unresponsive    HPI:  Too Tai is a 68 y.o. male with PMH significant for advanced dementia, CAD, diabetes mellitus type 2, HLD and PVD. Patient was recently admitted to this service 4/18/24 to 5/9/24 with agitation and behavioral disturbance. Was discharged to [facility]. Returns today with increasing lethargy and unresponsive episode. HPI limited secondary to patient disposition. Wife at bedside reports patient had been sitting in wheel chair all day and seemed somnolent. He later apparently slid out of chair and when staff came to assist, noted that patient was non verbal and unresponsive. Last known well unknown. Wife suspects that patient was \"over medicated\" and notes that he was on a \"trial\" of phenobarbital after adjusting some of his seizure medications. She feels he needs to see his neurologist. GSC calculated at 9. EMS was called.    Currently there are no reports of fever, cough, congestion, SOB, or chest pain. No abdominal pain or N/v/D. No focal weakness/ paresthesias. No witnessed seizure activity or falls. Will admit for further evaluation and treatment.      Review of Systems   Unable to perform ROS: Dementia            Personal History     Past Medical History:   Diagnosis Date    Coronary artery disease     Dementia     Diabetes mellitus     Hyperlipidemia     Peripheral vascular disease              Past Surgical History:   Procedure Laterality Date    CORONARY ARTERY BYPASS GRAFT      FEMORAL ARTERY - POPLITEAL ARTERY BYPASS GRAFT         Family History: family history includes Diabetes in his maternal grandfather, mother, sister, sister, and sister; Heart disease in his father; Hyperlipidemia in his father; Hypertension in his father.     Social History:  reports that he quit smoking about 27 years ago. His smoking use included cigarettes. He has never been exposed to tobacco smoke. " He has quit using smokeless tobacco. He reports that he does not drink alcohol and does not use drugs.  Social History     Social History Narrative    Not on file       Medications:  Accu-Chek Geri, Accu-Chek Geri Plus, Alcohol Prep, Divalproex Sodium, Glucagon, Insulin Glargine, Insulin Pen Needle, PHENobarbital, QUEtiapine, accu-chek soft touch, amLODIPine, cyanocobalamin, donepezil, folic acid, haloperidol, lisinopril, mirtazapine, sertraline, and temazepam    Allergies   Allergen Reactions    Bactrim [Sulfamethoxazole-Trimethoprim] Rash    Adhesive Tape Rash    Neosporin [Neomycin-Bacitracin Zn-Polymyx] Rash       Objective  Objective     Vital Signs:   Temp:  [98.9 °F (37.2 °C)] 98.9 °F (37.2 °C)  Heart Rate:  [] 96  Resp:  [16] 16  BP: (104-167)/(60-90) 167/82    Physical Exam   Constitutional: obtunded  Eyes: PERRLA, sclerae anicteric, no conjunctival injection  HENT: NCAT, mucous membranes moist  Neck: Supple, no thyromegaly, no lymphadenopathy, trachea midline  Respiratory: Clear to auscultation bilaterally, nonlabored respirations   Cardiovascular: RRR, no murmurs, rubs, or gallops, palpable pedal pulses bilaterally  Gastrointestinal: Positive bowel sounds, soft, nontender, nondistended  Musculoskeletal: No bilateral ankle edema, no clubbing or cyanosis to extremities  Psychiatric: Appropriate affect, cooperative deferred  Neurologic: Oriented x 3, strength symmetric in all extremities, Cranial Nerves grossly intact to confrontation, speech clear deferred   Skin: No rashes      Result Review:  I have personally reviewed the results from the time of this admission to 5/12/2024 00:55 EDT and agree with these findings:  [x]  Laboratory list / accordion  []  Microbiology  []  Radiology  []  EKG/Telemetry   []  Cardiology/Vascular   []  Pathology  [x]  Old records  []  Other:  Most notable findings include: vitals stable. Glucose 219. Lactate 10.3. H/H 10.8 and 33.8. CT head negative.    LAB RESULTS:       Lab 05/11/24 2141 05/09/24 0443 05/08/24  0514 05/07/24  0534   WBC 7.33 8.44 8.27 8.76   HEMOGLOBIN 10.8* 14.1 13.4 14.8   HEMATOCRIT 33.8* 42.2 40.3 43.9   PLATELETS 214 332 319 314   NEUTROS ABS 4.05 3.75 3.28 4.03   IMMATURE GRANS (ABS) 0.02 0.03 0.05 0.02   LYMPHS ABS 2.18 2.86 2.97 2.80   MONOS ABS 0.71 0.78 0.80 0.88   EOS ABS 0.32 0.93* 1.07* 0.94*   MCV 96.3 93.0 94.8 92.4   PROCALCITONIN 0.04  --   --   --    LACTATE 10.3*  --   --   --          Lab 05/11/24 2141 05/09/24 0443 05/08/24 0702 05/07/24  0534   SODIUM 138 142 143 141   POTASSIUM 4.1 3.5 4.6 3.8   CHLORIDE 101 105 105 103   CO2 26.0 27.0 30.0* 27.0   ANION GAP 11.0 10.0 8.0 11.0   BUN 22 23 26* 22   CREATININE 0.83 0.80 0.90 0.87   EGFR 95.9 97.0 93.6 94.6   GLUCOSE 226* 112* 167* 113*   CALCIUM 8.4* 8.8 9.2 9.3   MAGNESIUM 1.3* 1.9 1.9 1.9         Lab 05/11/24 2141 05/09/24 0443 05/08/24 0702 05/07/24  0534   TOTAL PROTEIN 5.1* 6.5 6.8 6.7   ALBUMIN 3.0* 3.4* 3.6 3.6   GLOBULIN 2.1 3.1 3.2 3.1   ALT (SGPT) 18 28 25 26   AST (SGOT) 23 31 30 35   BILIRUBIN 0.5 0.5 0.5 0.5   ALK PHOS 91 113 117 118*         Lab 05/11/24 2141   HSTROP T 25*                 Lab 05/11/24 2151   PH, ARTERIAL 7.445   PCO2, ARTERIAL 40.4   PO2 .0*   FIO2 100   HCO3 ART 27.7*   BASE EXCESS ART 3.4*   CARBOXYHEMOGLOBIN 1.1     Brief Urine Lab Results  (Last result in the past 365 days)        Color   Clarity   Blood   Leuk Est   Nitrite   Protein   CREAT   Urine HCG        05/12/24 0004 Yellow   Clear   Negative   Negative   Negative   30 mg/dL (1+)                 Microbiology Results (last 10 days)       ** No results found for the last 240 hours. **            CT Head Without Contrast    Result Date: 5/11/2024  CT HEAD WO CONTRAST Date of Exam: 5/11/2024 10:03 PM EDT Indication: poorly responsive. Comparison: 4/19/2024. Technique: Axial CT images were obtained of the head without contrast administration.  Automated exposure control and iterative  construction methods were used. Findings: There is no evidence of hemorrhage. There is no mass effect or midline shift. There is no extracerebral collection. Ventricles are normal in size and configuration for patient's stated age.  Posterior fossa is within normal limits. Calvarium and skull base appear intact.   Visualized sinuses show no air fluid levels. Visualized orbits are unremarkable.     Impression: Impression: No acute intracranial process. Electronically Signed: Estrellita Finnegan MD  5/11/2024 10:11 PM EDT  Workstation ID: [id number]    XR Chest 1 View    Result Date: 5/11/2024  XR CHEST 1 VW Date of Exam: 5/11/2024 9:38 PM EDT Indication: Weak/Dizzy/AMS triage protocol Comparison: 4/19/2024. Findings: There are no airspace consolidations. No pleural fluid. No pneumothorax. The pulmonary vasculature appears within normal limits. The heart appears enlarged. Median sternotomy wires are present.. No acute osseous abnormality identified.     Impression: Impression: No acute cardiopulmonary process. Stable cardiomegaly. Electronically Signed: Estrellita Finnegan MD  5/11/2024 10:00 PM EDT  Workstation ID: [id number]     Results for orders placed during the hospital encounter of 03/27/24    Adult Transthoracic Echo Complete W/ Cont if Necessary Per Protocol    Interpretation Summary    Left ventricular systolic function is normal. Calculated left ventricular EF = 55.1% Normal left ventricular cavity size noted. Left ventricular wall thickness is consistent with mild concentric hypertrophy. Left ventricular diastolic function is consistent with (grade I) impaired relaxation.    The right ventricular cavity is mildly dilated. Normal right ventricular systolic function noted.    There is calcification of the aortic valve. The aortic valve appears trileaflet. Mild aortic valve regurgitation is present. No aortic valve stenosis is present.    Mitral annular calcification is present. Trace mitral valve regurgitation is present. No  significant mitral valve stenosis is present.    The tricuspid valve is structurally normal with no stenosis present. Trace tricuspid valve regurgitation is present. Insufficient TR velocity profile to estimate the right ventricular systolic pressure.    Mild dilation of the sinuses of Valsalva is present. Mild dilation of the ascending aorta is present. Ascending aorta = 4.2 cm      Assessment & Plan  Assessment & Plan       Unresponsiveness    Lactic acidosis    Benign essential HTN    Coronary artery disease involving native coronary artery of native heart    Type 2 diabetes mellitus with hyperglycemia, with long-term current use of insulin    Benign prostatic hyperplasia    Gout    Hyperlipidemia    Peripheral neuropathy    Dementia with behavioral disturbance    History of DVT (deep vein thrombosis)    Unresponsive  Advanced dementia complicated by behavioral disturbance with superimposed sleep disturbance   - ? Seizure vs polypharmacy  - Infectious work up unremarkable. UA reassuring. CXR without acute findings. CT head without acute findings  - lactic acid 10.3  - Continue home Dilantin, valproic acid level low  - phenobarb level WNL. Defer continuing to neurology  - appreciate neurology input  - EEG  - hold other sedating/psych meds  - seizure precautions  - IV fluids, supportive care  - UDS  - neuro checks       Uncontrolled diabetes mellitus type 2  - Appears not on any home meds for DM  - A1c 9.5%  -   - SSI with scheduled accu checks     HTN  - continue Amlodipine 10mg daily  - continue Lisinopril 30mg daily       DVT prophylaxis:  mechanical    CODE STATUS:  DNR  Medical Intervention Limits: NO intubation (DNI)  Level Of Support Discussed With: Health Care Surrogate  Code Status (Patient has no pulse and is not breathing): No CPR (Do Not Attempt to Resuscitate)  Medical Interventions (Patient has pulse or is breathing): Limited Support      Expected Discharge  TBD      This note has been completed  as part of a split-shared workflow.     Signature: Electronically signed by Leeanne Rizzo PA-C, 05/12/24, 12:59 AM EDT    Patient seen and examined at the bedside.  Patient is a 68-year-old  male with past medical history significant for diabetes mellitus type 2, coronary artery disease, peripheral vascular disease, hyperlipidemia, severe dementia with behavioral disturbances.  Patient was recently hospitalized here at [facility] and was [facility] on 5/9/2024.  Patient was sent back to the emergency room today because of unresponsiveness.  Patient is on multiple sedative medication including benzodiazepine and also barbiturates.  Patient currently is breathing spontaneously and saturating well while he is unresponsive.  An ABG was done at the emergency room which shows normal pH, pCO2 of 40.4, PaO2 of 442.  This was done on FiO2 of 100%.  Patient's wife is present at the bedside and emphasizes that patient is DNR DNI and they do not want the patient to go to ICU.    Physical exam:  Constitutional: No acute distress  HENT: NCAT, mucous membranes moist  Respiratory: Clear to auscultation bilaterally, respiratory effort normal.  Breathing spontaneously and saturating well.  Cardiovascular: RRR, no murmurs, rubs, or gallops  Gastrointestinal: Positive bowel sounds, soft, nontender, nondistended  Musculoskeletal:  bilateral ankle edema  Psychiatric: Unable to assess  Neurologic: Resting in bed, is unresponsive, on the withdraws response to painful stimuli.  Skin: No rashes     Assessment and plan:  68-year-old  male with severe dementia with behavioral disturbances.  Patient was sent [facility] of days ago.  Patient was sent back as he was unresponsive this afternoon.  He is breathing spontaneously and saturating well.  ABG shows normal pH, pCO2 of 40.4.  Although etiology of unresponsiveness is not quite clear but most likely is medication induced.   Patient is on multiple sedative medications.  Will admit the patient to telemetry and monitor.  Will also ask neurology to see the patient.  Please see the details above.  Patient will be admitted as inpatient.    Time spent was 40 minutes.        Electronically signed by Tarik Farrell MD at 24 0315          Physician Progress Notes (most recent note)        Theresa Gallardo DO at 24 1018              [facility] Medicine Services  PROGRESS NOTE    Patient Name: Too Tai  : 1955  MRN: [id number]    Date of Admission: 2024  Primary Care Physician: Des Geller MD    Subjective   Subjective     CC:  F/U agitation     HPI:  Patient seen and examined. Didn't receive meds overnight as refused PO. Sleeping this AM. No family at bedside.    Objective   Objective     Vital Signs:   Temp:  [98.3 °F (36.8 °C)-99.2 °F (37.3 °C)] 98.3 °F (36.8 °C)  Heart Rate:  [] 82  Resp:  [17-19] 19  BP: (133-182)/(59-78) 182/78     Physical Exam:  Constitutional: No acute distress, sleeping   HENT: NCAT, mucous membranes moist  Respiratory: Clear to auscultation bilaterally, respiratory effort normal   Cardiovascular: RRR, no murmurs, rubs, or gallops  Gastrointestinal: Positive bowel sounds, soft, nontender, nondistended  Musculoskeletal: No bilateral ankle edema  Psychiatric: Calm currently   Neurologic: no focal deficits  Skin: No rashes     Results Reviewed:  LAB RESULTS:      Lab 24  1111 24  0037 24  2141 24  0443 24  0514 24  0534   WBC 8.02  --  7.33 8.44 8.27 8.76   HEMOGLOBIN 12.5*  --  10.8* 14.1 13.4 14.8   HEMATOCRIT 37.0*  --  33.8* 42.2 40.3 43.9   PLATELETS 259  --  214 332 319 314   NEUTROS ABS 4.90  --  4.05 3.75 3.28 4.03   IMMATURE GRANS (ABS) 0.02  --  0.02 0.03 0.05 0.02   LYMPHS ABS 1.93  --  2.18 2.86 2.97 2.80   MONOS ABS 0.70  --  0.71 0.78 0.80 0.88   EOS ABS 0.43*  --  0.32 0.93* 1.07* 0.94*   MCV 93.4  --   96.3 93.0 94.8 92.4   PROCALCITONIN  --   --  0.04  --   --   --    LACTATE  --  1.7 10.3*  --   --   --          Lab 05/12/24  1111 05/11/24 2141 05/09/24 0443 05/08/24 0702 05/07/24  0534   SODIUM 140 138 142 143 141   POTASSIUM 3.3* 4.1 3.5 4.6 3.8   CHLORIDE 104 101 105 105 103   CO2 28.0 26.0 27.0 30.0* 27.0   ANION GAP 8.0 11.0 10.0 8.0 11.0   BUN 23 22 23 26* 22   CREATININE 0.77 0.83 0.80 0.90 0.87   EGFR 98.1 95.9 97.0 93.6 94.6   GLUCOSE 184* 226* 112* 167* 113*   CALCIUM 8.7 8.4* 8.8 9.2 9.3   MAGNESIUM 1.7 1.3* 1.9 1.9 1.9         Lab 05/12/24  1111 05/11/24 2141 05/09/24 0443 05/08/24 0702 05/07/24  0534   TOTAL PROTEIN 5.7* 5.1* 6.5 6.8 6.7   ALBUMIN 3.3* 3.0* 3.4* 3.6 3.6   GLOBULIN 2.4 2.1 3.1 3.2 3.1   ALT (SGPT) 21 18 28 25 26   AST (SGOT) 28 23 31 30 35   BILIRUBIN 0.6 0.5 0.5 0.5 0.5   ALK PHOS 102 91 113 117 118*         Lab 05/11/24 2141   HSTROP T 25*                 Lab 05/11/24 2151   PH, ARTERIAL 7.445   PCO2, ARTERIAL 40.4   PO2 .0*   FIO2 100   HCO3 ART 27.7*   BASE EXCESS ART 3.4*   CARBOXYHEMOGLOBIN 1.1     Brief Urine Lab Results  (Last result in the past 365 days)        Color   Clarity   Blood   Leuk Est   Nitrite   Protein   CREAT   Urine HCG        05/12/24 0004 Yellow   Clear   Negative   Negative   Negative   30 mg/dL (1+)                   Microbiology Results Abnormal       Procedure Component Value - Date/Time    Blood Culture - Blood, Arm, Left [[id number]]  (Normal) Collected: 05/11/24 2152    Lab Status: Preliminary result Specimen: Blood from Arm, Left Updated: 05/12/24 2215     Blood Culture No growth at 24 hours    Blood Culture - Blood, Hand, Right [[id number]]  (Normal) Collected: 05/11/24 2152    Lab Status: Preliminary result Specimen: Blood from Hand, Right Updated: 05/12/24 2215     Blood Culture No growth at 24 hours    COVID PRE-OP / PRE-PROCEDURE SCREENING ORDER (NO ISOLATION) - Swab, Nasopharynx [[id number]]  (Normal) Collected: 05/12/24 0004     Lab Status: Final result Specimen: Swab from Nasopharynx Updated: 05/12/24 0124    Narrative:      The following orders were created for panel order COVID PRE-OP / PRE-PROCEDURE SCREENING ORDER (NO ISOLATION) - Swab, Nasopharynx.  Procedure                               Abnormality         Status                     ---------                               -----------         ------                     Respiratory Panel PCR w/...[[id number]]  Normal              Final result                 Please view results for these tests on the individual orders.    Respiratory Panel PCR w/COVID-19(SARS-CoV-2) AFTAB/LINA/ERNESTINE/PAD/COR/STANLEY In-House, NP Swab in UTM/VTM, 2 HR TAT - Swab, Nasopharynx [[id number]]  (Normal) Collected: 05/12/24 0004    Lab Status: Final result Specimen: Swab from Nasopharynx Updated: 05/12/24 0124     ADENOVIRUS, PCR Not Detected     Coronavirus 229E Not Detected     Coronavirus HKU1 Not Detected     Coronavirus NL63 Not Detected     Coronavirus OC43 Not Detected     COVID19 Not Detected     Human Metapneumovirus Not Detected     Human Rhinovirus/Enterovirus Not Detected     Influenza A PCR Not Detected     Influenza B PCR Not Detected     Parainfluenza Virus 1 Not Detected     Parainfluenza Virus 2 Not Detected     Parainfluenza Virus 3 Not Detected     Parainfluenza Virus 4 Not Detected     RSV, PCR Not Detected     Bordetella pertussis pcr Not Detected     Bordetella parapertussis PCR Not Detected     Chlamydophila pneumoniae PCR Not Detected     Mycoplasma pneumo by PCR Not Detected    Narrative:      In the setting of a positive respiratory panel with a viral infection PLUS a negative procalcitonin without other underlying concern for bacterial infection, consider observing off antibiotics or discontinuation of antibiotics and continue supportive care. If the respiratory panel is positive for atypical bacterial infection (Bordetella pertussis, Chlamydophila pneumoniae, or Mycoplasma pneumoniae),  consider antibiotic de-escalation to target atypical bacterial infection.            EEG    Result Date: 5/13/2024  Reason for referral: 68 y.o.male with altered mental status Technical Summary:  A 19 channel digital EEG was performed using the international 10-20 placement system, including eye leads and EKG leads. Duration: 24 minutes Findings: The patient is asleep at the beginning of the study.  Diffuse low to medium amplitude 4 to 7 Hz intermixed delta and theta activity are seen symmetrically over both hemispheres.  Occasional vertex waves are present.  Photic stimulation does not change the background.  Hyperventilation is not performed.  No focal features or epileptiform activity are present. Video: Available Technical quality: Superior EKG: Regular, 60 bpm SUMMARY: Excessive drowsiness Mild generalized slow No focal features or epileptiform activity are seen     Impression: Diffuse cerebral dysfunction mild degree, nonspecific This report is transcribed using the Dragon dictation system.      CT Head Without Contrast    Result Date: 5/13/2024  CT HEAD WO CONTRAST Date of Exam: 5/12/2024 9:58 PM EDT Indication: AMS. Comparison: 5/11/2024 Technique: Axial CT images were obtained of the head without contrast administration.  Automated exposure control and iterative construction methods were used. Findings: There is no evidence of acute territorial infarction. There is no acute intracranial hemorrhage. There are no extra-axial collections. Ventricles and CSF spaces are symmetrically prominent. No mass effect nor hydrocephalus. Patchy subcortical and periventricular white matter hypodensities most likely reflect the sequela of chronic microvascular ischemic disease.  Paranasal sinuses and mastoid air cells are adequately aerated.  Osseous structures and orbits appear intact.     Impression: Impression: No acute intracranial process. No interval adverse change. Electronically Signed: Yanet Ramírez MD  5/13/2024  7:04 AM EDT  Workstation ID: [id number]    CT Head Without Contrast    Result Date: 5/11/2024  CT HEAD WO CONTRAST Date of Exam: 5/11/2024 10:03 PM EDT Indication: poorly responsive. Comparison: 4/19/2024. Technique: Axial CT images were obtained of the head without contrast administration.  Automated exposure control and iterative construction methods were used. Findings: There is no evidence of hemorrhage. There is no mass effect or midline shift. There is no extracerebral collection. Ventricles are normal in size and configuration for patient's stated age.  Posterior fossa is within normal limits. Calvarium and skull base appear intact.   Visualized sinuses show no air fluid levels. Visualized orbits are unremarkable.     Impression: Impression: No acute intracranial process. Electronically Signed: Estrellita Finnegan MD  5/11/2024 10:11 PM EDT  Workstation ID: [id number]    XR Chest 1 View    Result Date: 5/11/2024  XR CHEST 1 VW Date of Exam: 5/11/2024 9:38 PM EDT Indication: Weak/Dizzy/AMS triage protocol Comparison: 4/19/2024. Findings: There are no airspace consolidations. No pleural fluid. No pneumothorax. The pulmonary vasculature appears within normal limits. The heart appears enlarged. Median sternotomy wires are present.. No acute osseous abnormality identified.     Impression: Impression: No acute cardiopulmonary process. Stable cardiomegaly. Electronically Signed: Estrellita Finnegan MD  5/11/2024 10:00 PM EDT  Workstation ID: [id number]     Results for orders placed during the hospital encounter of 03/27/24    Adult Transthoracic Echo Complete W/ Cont if Necessary Per Protocol    Interpretation Summary    Left ventricular systolic function is normal. Calculated left ventricular EF = 55.1% Normal left ventricular cavity size noted. Left ventricular wall thickness is consistent with mild concentric hypertrophy. Left ventricular diastolic function is consistent with (grade I) impaired relaxation.    The right ventricular  cavity is mildly dilated. Normal right ventricular systolic function noted.    There is calcification of the aortic valve. The aortic valve appears trileaflet. Mild aortic valve regurgitation is present. No aortic valve stenosis is present.    Mitral annular calcification is present. Trace mitral valve regurgitation is present. No significant mitral valve stenosis is present.    The tricuspid valve is structurally normal with no stenosis present. Trace tricuspid valve regurgitation is present. Insufficient TR velocity profile to estimate the right ventricular systolic pressure.    Mild dilation of the sinuses of Valsalva is present. Mild dilation of the ascending aorta is present. Ascending aorta = 4.2 cm      Current medications:  Scheduled Meds:amLODIPine, 10 mg, Oral, Q24H  folic acid, 1 mg, Oral, Daily  insulin regular, 2-7 Units, Subcutaneous, Q6H  lisinopril, 20 mg, Oral, Daily  PHENobarbital, 129.6 mg, Oral, Nightly   Or  PHENobarbital, 129.6 mg, Intramuscular, Nightly  PHENobarbital, 64.8 mg, Oral, BID   Or  PHENobarbital, 64.8 mg, Intramuscular, BID  potassium chloride, 40 mEq, Oral, Once  sertraline, 50 mg, Oral, Daily  sodium chloride, 10 mL, Intravenous, Q12H      Continuous Infusions:   PRN Meds:.  acetaminophen    senna-docusate sodium **AND** polyethylene glycol **AND** bisacodyl **AND** bisacodyl    dextrose    dextrose    glucagon (human recombinant)    melatonin    sodium chloride    sodium chloride    sodium chloride    Assessment & Plan   Assessment & Plan     Active Hospital Problems    Diagnosis  POA    **Unresponsiveness [R41.89]  Yes    Lactic acidosis [E87.20]  Yes    History of DVT (deep vein thrombosis) [Z86.718]  Not Applicable    Dementia with behavioral disturbance [F03.918]  Yes    Benign essential HTN [I10]  Yes    Type 2 diabetes mellitus with hyperglycemia, with long-term current use of insulin [E11.65, Z79.4]  Not Applicable    Gout [M10.9]  Yes    Hyperlipidemia [E78.5]  Yes     Peripheral neuropathy [G62.9]  Yes    Benign prostatic hyperplasia [N40.0]  Yes    Coronary artery disease involving native coronary artery of native heart [I25.10]  Yes      Resolved Hospital Problems   No resolved problems to display.        Brief Hospital Course to date:  Too Tai is a 68 y.o. male [facility] significant for advanced dementia, CAD, diabetes mellitus type 2, HLD and PVD. Patient was recently admitted to this service 4/18/24 to 5/9/24 with agitation and behavioral disturbance. Was discharged to [facility] at [facility]. Returned to ED with increasing lethargy and unresponsive episode.     This patient's problems and plans were partially entered by my partner and updated as appropriate by me 05/13/24.     Unresponsive  Advanced dementia complicated by behavioral disturbance with superimposed sleep disturbance   -Discussed case with Neurology this AM. Pt did not receive meds last night as refused PO. Order changed to linked order with IM option   -EEG negative for seizures     Uncontrolled diabetes mellitus type 2  - Appears not on any home meds for DM  - A1c 9.5%  - SSI with scheduled accu checks      HTN  - continue Amlodipine 10mg daily  - continue Lisinopril 30mg daily     Hypokalemia  -oral replacement ordered     Lactic acidosis  -Lactic 10 on admission, normalized to 1.7 with 1L IVF, suspect spurious result.    *Pt refusing a lot of PO meds. Pulls IV's out.     Expected Discharge Location and Transportation: Back to [facility]  Expected Discharge   Expected Discharge Date: 5/14/2024; Expected Discharge Time:      DVT prophylaxis:  Mechanical DVT prophylaxis orders are present.         AM-PAC 6 Clicks Score (PT): 6 (05/13/24 1091)    CODE STATUS:   Code Status and Medical Interventions:   Ordered at: 05/11/24 9992     Medical Intervention Limits:    NO intubation (DNI)     Level Of Support Discussed With:    Health Care Surrogate     Code Status (Patient has no pulse and is not breathing):     No CPR (Do Not Attempt to Resuscitate)     Medical Interventions (Patient has pulse or is breathing):    Limited Support       Theresa Gallardo DO  05/13/24        Electronically signed by Theresa Gallardo DO at 05/13/24 1024       Physical Therapy Notes (most recent note)    No notes exist for this encounter.       Occupational Therapy Notes (most recent note)    No notes exist for this encounter.

## 2024-05-14 NOTE — PROGRESS NOTES
[facility] Medicine Services  PROGRESS NOTE    Patient Name: Too Tai  : 1955  MRN: [id number]    Date of Admission: 2024  Primary Care Physician: Des Geller MD    Subjective   Subjective     CC:  F/U agitation     HPI:  Patient seen and examined. No issues reported overnight. [facility] to evaluate patient to return today.     Objective   Objective     Vital Signs:   Temp:  [97.8 °F (36.6 °C)-98.2 °F (36.8 °C)] 98 °F (36.7 °C)  Heart Rate:  [78-92] 81  Resp:  [16-18] 16  BP: (156-172)/(63-87) 156/66     Physical Exam:  Constitutional: No acute distress, sleeping   HENT: NCAT, mucous membranes moist  Respiratory: Clear to auscultation bilaterally, respiratory effort normal   Cardiovascular: RRR, no murmurs, rubs, or gallops  Gastrointestinal: Positive bowel sounds, soft, nontender, nondistended  Musculoskeletal: No bilateral ankle edema  Psychiatric: Calm   Neurologic: no focal deficits  Skin: No rashes     Results Reviewed:  LAB RESULTS:      Lab 24  1111 24  0037 24  0514   WBC 8.02  --  7.33 8.44 8.27   HEMOGLOBIN 12.5*  --  10.8* 14.1 13.4   HEMATOCRIT 37.0*  --  33.8* 42.2 40.3   PLATELETS 259  --  214 332 319   NEUTROS ABS 4.90  --  4.05 3.75 3.28   IMMATURE GRANS (ABS) 0.02  --  0.02 0.03 0.05   LYMPHS ABS 1.93  --  2.18 2.86 2.97   MONOS ABS 0.70  --  0.71 0.78 0.80   EOS ABS 0.43*  --  0.32 0.93* 1.07*   MCV 93.4  --  96.3 93.0 94.8   PROCALCITONIN  --   --  0.04  --   --    LACTATE  --  1.7 10.3*  --   --          Lab 24  1111 24  21424  0443 24  0702   SODIUM 140 138 142 143   POTASSIUM 3.3* 4.1 3.5 4.6   CHLORIDE 104 101 105 105   CO2 28.0 26.0 27.0 30.0*   ANION GAP 8.0 11.0 10.0 8.0   BUN 23 22 23 26*   CREATININE 0.77 0.83 0.80 0.90   EGFR 98.1 95.9 97.0 93.6   GLUCOSE 184* 226* 112* 167*   CALCIUM 8.7 8.4* 8.8 9.2   MAGNESIUM 1.7 1.3* 1.9 1.9         Lab 24  1111  05/11/24  2141 05/09/24  0443 05/08/24  0702   TOTAL PROTEIN 5.7* 5.1* 6.5 6.8   ALBUMIN 3.3* 3.0* 3.4* 3.6   GLOBULIN 2.4 2.1 3.1 3.2   ALT (SGPT) 21 18 28 25   AST (SGOT) 28 23 31 30   BILIRUBIN 0.6 0.5 0.5 0.5   ALK PHOS 102 91 113 117         Lab 05/11/24 2141   HSTROP T 25*                 Lab 05/11/24 2151   PH, ARTERIAL 7.445   PCO2, ARTERIAL 40.4   PO2 .0*   FIO2 100   HCO3 ART 27.7*   BASE EXCESS ART 3.4*   CARBOXYHEMOGLOBIN 1.1     Brief Urine Lab Results  (Last result in the past 365 days)        Color   Clarity   Blood   Leuk Est   Nitrite   Protein   CREAT   Urine HCG        05/12/24 0004 Yellow   Clear   Negative   Negative   Negative   30 mg/dL (1+)                   Microbiology Results Abnormal       Procedure Component Value - Date/Time    Blood Culture - Blood, Arm, Left [[id number]]  (Normal) Collected: 05/11/24 2152    Lab Status: Preliminary result Specimen: Blood from Arm, Left Updated: 05/13/24 2215     Blood Culture No growth at 2 days    Blood Culture - Blood, Hand, Right [[id number]]  (Normal) Collected: 05/11/24 2152    Lab Status: Preliminary result Specimen: Blood from Hand, Right Updated: 05/13/24 2215     Blood Culture No growth at 2 days    COVID PRE-OP / PRE-PROCEDURE SCREENING ORDER (NO ISOLATION) - Swab, Nasopharynx [[id number]]  (Normal) Collected: 05/12/24 0004    Lab Status: Final result Specimen: Swab from Nasopharynx Updated: 05/12/24 0124    Narrative:      The following orders were created for panel order COVID PRE-OP / PRE-PROCEDURE SCREENING ORDER (NO ISOLATION) - Swab, Nasopharynx.  Procedure                               Abnormality         Status                     ---------                               -----------         ------                     Respiratory Panel PCR w/...[[id number]]  Normal              Final result                 Please view results for these tests on the individual orders.    Respiratory Panel PCR w/COVID-19(SARS-CoV-2)  AFTAB/LINA/ERNESTINE/PAD/COR/STANLEY In-House, NP Swab in UTM/VTM, 2 HR TAT - Swab, Nasopharynx [[id number]]  (Normal) Collected: 05/12/24 0004    Lab Status: Final result Specimen: Swab from Nasopharynx Updated: 05/12/24 0124     ADENOVIRUS, PCR Not Detected     Coronavirus 229E Not Detected     Coronavirus HKU1 Not Detected     Coronavirus NL63 Not Detected     Coronavirus OC43 Not Detected     COVID19 Not Detected     Human Metapneumovirus Not Detected     Human Rhinovirus/Enterovirus Not Detected     Influenza A PCR Not Detected     Influenza B PCR Not Detected     Parainfluenza Virus 1 Not Detected     Parainfluenza Virus 2 Not Detected     Parainfluenza Virus 3 Not Detected     Parainfluenza Virus 4 Not Detected     RSV, PCR Not Detected     Bordetella pertussis pcr Not Detected     Bordetella parapertussis PCR Not Detected     Chlamydophila pneumoniae PCR Not Detected     Mycoplasma pneumo by PCR Not Detected    Narrative:      In the setting of a positive respiratory panel with a viral infection PLUS a negative procalcitonin without other underlying concern for bacterial infection, consider observing off antibiotics or discontinuation of antibiotics and continue supportive care. If the respiratory panel is positive for atypical bacterial infection (Bordetella pertussis, Chlamydophila pneumoniae, or Mycoplasma pneumoniae), consider antibiotic de-escalation to target atypical bacterial infection.            EEG    Result Date: 5/13/2024  Reason for referral: 68 y.o.male with altered mental status Technical Summary:  A 19 channel digital EEG was performed using the international 10-20 placement system, including eye leads and EKG leads. Duration: 24 minutes Findings: The patient is asleep at the beginning of the study.  Diffuse low to medium amplitude 4 to 7 Hz intermixed delta and theta activity are seen symmetrically over both hemispheres.  Occasional vertex waves are present.  Photic stimulation does not change the  background.  Hyperventilation is not performed.  No focal features or epileptiform activity are present. Video: Available Technical quality: Superior EKG: Regular, 60 bpm SUMMARY: Excessive drowsiness Mild generalized slow No focal features or epileptiform activity are seen     Impression: Diffuse cerebral dysfunction mild degree, nonspecific This report is transcribed using the Dragon dictation system.      CT Head Without Contrast    Result Date: 5/13/2024  CT HEAD WO CONTRAST Date of Exam: 5/12/2024 9:58 PM EDT Indication: AMS. Comparison: 5/11/2024 Technique: Axial CT images were obtained of the head without contrast administration.  Automated exposure control and iterative construction methods were used. Findings: There is no evidence of acute territorial infarction. There is no acute intracranial hemorrhage. There are no extra-axial collections. Ventricles and CSF spaces are symmetrically prominent. No mass effect nor hydrocephalus. Patchy subcortical and periventricular white matter hypodensities most likely reflect the sequela of chronic microvascular ischemic disease.  Paranasal sinuses and mastoid air cells are adequately aerated.  Osseous structures and orbits appear intact.     Impression: Impression: No acute intracranial process. No interval adverse change. Electronically Signed: Yanet Ramírez MD  5/13/2024 7:04 AM EDT  Workstation ID: [id number]     Results for orders placed during the hospital encounter of 03/27/24    Adult Transthoracic Echo Complete W/ Cont if Necessary Per Protocol    Interpretation Summary    Left ventricular systolic function is normal. Calculated left ventricular EF = 55.1% Normal left ventricular cavity size noted. Left ventricular wall thickness is consistent with mild concentric hypertrophy. Left ventricular diastolic function is consistent with (grade I) impaired relaxation.    The right ventricular cavity is mildly dilated. Normal right ventricular systolic function  noted.    There is calcification of the aortic valve. The aortic valve appears trileaflet. Mild aortic valve regurgitation is present. No aortic valve stenosis is present.    Mitral annular calcification is present. Trace mitral valve regurgitation is present. No significant mitral valve stenosis is present.    The tricuspid valve is structurally normal with no stenosis present. Trace tricuspid valve regurgitation is present. Insufficient TR velocity profile to estimate the right ventricular systolic pressure.    Mild dilation of the sinuses of Valsalva is present. Mild dilation of the ascending aorta is present. Ascending aorta = 4.2 cm      Current medications:  Scheduled Meds:amLODIPine, 10 mg, Oral, Q24H  folic acid, 1 mg, Oral, Daily  insulin lispro, 2-7 Units, Subcutaneous, 4x Daily AC & at Bedtime  lisinopril, 20 mg, Oral, Daily  melatonin, 5 mg, Oral, Nightly  PHENobarbital, 129.6 mg, Oral, Nightly   Or  PHENobarbital, 129.6 mg, Intramuscular, Nightly  PHENobarbital, 64.8 mg, Oral, BID   Or  PHENobarbital, 64.8 mg, Intramuscular, BID  potassium chloride, 40 mEq, Oral, Once  sertraline, 50 mg, Oral, Daily  sodium chloride, 10 mL, Intravenous, Q12H      Continuous Infusions:   PRN Meds:.  acetaminophen    senna-docusate sodium **AND** polyethylene glycol **AND** bisacodyl **AND** bisacodyl    dextrose    dextrose    glucagon (human recombinant)    OLANZapine    sodium chloride    sodium chloride    sodium chloride    temazepam    Assessment & Plan   Assessment & Plan     Active Hospital Problems    Diagnosis  POA    **Unresponsiveness [R41.89]  Yes    Lactic acidosis [E87.20]  Yes    History of DVT (deep vein thrombosis) [Z86.718]  Not Applicable    Dementia with behavioral disturbance [F03.918]  Yes    Benign essential HTN [I10]  Yes    Type 2 diabetes mellitus with hyperglycemia, with long-term current use of insulin [E11.65, Z79.4]  Not Applicable    Gout [M10.9]  Yes    Hyperlipidemia [E78.5]  Yes     Peripheral neuropathy [G62.9]  Yes    Benign prostatic hyperplasia [N40.0]  Yes    Coronary artery disease involving native coronary artery of native heart [I25.10]  Yes      Resolved Hospital Problems   No resolved problems to display.        Brief Hospital Course to date:  Too Tai is a 68 y.o. male [facility] significant for advanced dementia, CAD, diabetes mellitus type 2, HLD and PVD. Patient was recently admitted to this service 4/18/24 to 5/9/24 with agitation and behavioral disturbance. Was discharged to [facility] at [facility]. Returned to ED with increasing lethargy and unresponsive episode.     This patient's problems and plans were partially entered by my partner and updated as appropriate by me 05/14/24.     Unresponsive  Advanced dementia complicated by behavioral disturbance with superimposed sleep disturbance   -Discussed with Neurology, ok to transfer to [facility] today if they will take him   -Continue Phenobarb   -EEG negative for seizures     Uncontrolled diabetes mellitus type 2  - Appears not on any home meds for DM  - A1c 9.5%  - SSI with scheduled accu checks      HTN  - continue Amlodipine 10mg daily  - continue Lisinopril 30mg daily     Hypokalemia  -oral replacement ordered but patient refused     Lactic acidosis  -Lactic 10 on admission, normalized to 1.7 with 1L IVF, suspect spurious result.    Expected Discharge Location and Transportation: Back to [facility]   Expected Discharge   Expected Discharge Date: 5/14/2024; Expected Discharge Time:      DVT prophylaxis:  Mechanical DVT prophylaxis orders are present.         AM-PAC 6 Clicks Score (PT): 7 (05/14/24 1025)    CODE STATUS:   Code Status and Medical Interventions:   Ordered at: 05/11/24 9710     Medical Intervention Limits:    NO intubation (DNI)     Level Of Support Discussed With:    Health Care Surrogate     Code Status (Patient has no pulse and is not breathing):    No CPR (Do Not Attempt to Resuscitate)      Medical Interventions (Patient has pulse or is breathing):    Limited Support       Theresa Gallardo, DO  05/14/24

## 2024-05-14 NOTE — PROGRESS NOTES
[facility] Neurology    Progress Note    Patient Name: Too aTi  : 1955  MRN: [id number]  Primary Care Physician:  Des Geller MD  Date of admission: 2024    Subjective     Chief Complaint: Advanced dementia with behavioral disturbances    History of Present Illness   Patient seen resting comfortably in bed.  Patient was calm, not requiring restraints.  Wife was bedside.  She stated that he appears back to his previous cognitive state, patient was more interactive than previous assessment.  Bedside RN patient fell asleep approximately 2 AM but then rested well.  Patient required no as needed medications    Review of Systems   Unable to assess due to baseline mental status    Objective     Physical Exam  Vitals and nursing note reviewed.   Constitutional:       General: He is not in acute distress.     Appearance: He is not ill-appearing.   Eyes:      Extraocular Movements: Extraocular movements intact.      Pupils: Pupils are equal, round, and reactive to light.      Comments: No nystagmus or deviated gaze noted   Cardiovascular:      Rate and Rhythm: Normal rate.   Pulmonary:      Effort: Pulmonary effort is normal.   Neurological:      Mental Status: He is alert. Mental status is at baseline.      Cranial Nerves: No cranial nerve deficit or facial asymmetry.      Sensory: No sensory deficit.      Motor: No weakness, tremor or seizure activity.      Comments:     Cranial Nerves   CN II: Pupils are equal, round, and reactive to light. Normal visual acuity and visual fields.    CN III IV VI: Extraocular movements are full without nystagmus.  CN V: Normal facial sensation and strength of muscles of mastication.  CN VII: Facial movements are symmetric. No weakness.  CN VIII:  Auditory acuity is normal.  CN IX & X:  Symmetric palatal movement.  CN XI: Sternocleidomastoid and trapezius are normal.  No weakness.  CN XII: The tongue is midline.  No atrophy or fasciculations.             Vitals:   Temp:  [97.6 °F (36.4 °C)-98.2 °F (36.8 °C)] 98.2 °F (36.8 °C)  Heart Rate:  [78-92] 92  Resp:  [16-18] 18  BP: (171-189)/(63-87) 172/63    Current Medications    Current Facility-Administered Medications:     acetaminophen (TYLENOL) tablet 650 mg, 650 mg, Oral, Q4H PRN, Leeanne Rizzo PA-ROBINA    amLODIPine (NORVASC) tablet 10 mg, 10 mg, Oral, Q24H, Leeanne Rizzo PA-C, 10 mg at 05/13/24 1023    sennosides-docusate (PERICOLACE) 8.6-50 MG per tablet 2 tablet, 2 tablet, Oral, BID PRN **AND** polyethylene glycol (MIRALAX) packet 17 g, 17 g, Oral, Daily PRN **AND** bisacodyl (DULCOLAX) EC tablet 5 mg, 5 mg, Oral, Daily PRN **AND** bisacodyl (DULCOLAX) suppository 10 mg, 10 mg, Rectal, Daily PRN, Leeanne Rizzo PA-ROBINA    dextrose (D50W) (25 g/50 mL) IV injection 25 g, 25 g, Intravenous, Q15 Min PRN, Leeanne Rizzo PA-C    dextrose (GLUTOSE) oral gel 15 g, 15 g, Oral, Q15 Min PRN, Leeanne Rizzo PA-C    folic acid (FOLVITE) tablet 1 mg, 1 mg, Oral, Daily, Leeanne Rizzo PA-C, 1 mg at 05/13/24 1023    glucagon (GLUCAGEN) injection 1 mg, 1 mg, Intramuscular, Q15 Min PRN, Leeanne Rizzo PA-ROBINA    Insulin Lispro (humaLOG) injection 2-7 Units, 2-7 Units, Subcutaneous, 4x Daily AC & at Bedtime, Leeanne Rizzo PA-C, 5 Units at 05/13/24 2129    lisinopril (PRINIVIL,ZESTRIL) tablet 20 mg, 20 mg, Oral, Daily, Leeanne Rizzo PA-C, 20 mg at 05/13/24 1023    melatonin tablet 5 mg, 5 mg, Oral, Nightly, Butler, April K, APRN, 5 mg at 05/13/24 2001    OLANZapine (zyPREXA) injection 5 mg, 5 mg, Intramuscular, Q8H PRN, Butler, April K, APRN    PHENobarbital tablet 129.6 mg, 129.6 mg, Oral, Nightly, 129.6 mg at 05/13/24 2000 **OR** PHENobarbital injection 129.6 mg, 129.6 mg, Intramuscular, Nightly, Darrell Jhaveri A, DO    PHENobarbital tablet 64.8 mg, 64.8 mg, Oral, BID, 64.8 mg at 05/13/24 1451 **OR** PHENobarbital injection 64.8 mg, 64.8 mg, Intramuscular, BID, Darrell Jhaveri A, DO    potassium  chloride (MICRO-K/KLOR-CON) CR capsule, 40 mEq, Oral, Once, Theresa Gallardo,     sertraline (ZOLOFT) tablet 50 mg, 50 mg, Oral, Daily, Meliza Kahn, IVONNE, 50 mg at 05/13/24 1023    sodium chloride 0.9 % flush 10 mL, 10 mL, Intravenous, PRN, [place] g[place], DO    sodium chloride 0.9 % flush 10 mL, 10 mL, Intravenous, Q12H, Leeanne Rizzo L, PA-C    sodium chloride 0.9 % flush 10 mL, 10 mL, Intravenous, PRN, Leeanne Rizzo L, PA-C    sodium chloride 0.9 % infusion 40 mL, 40 mL, Intravenous, PRN, Leeanne Rizzo, PA-C    temazepam (RESTORIL) capsule 15 mg, 15 mg, Oral, Nightly PRN, Darrell Jhaveri, DO    Laboratory Results:   Lab Results   Component Value Date    GLUCOSE 184 (H) 05/12/2024    CALCIUM 8.7 05/12/2024     05/12/2024    K 3.3 (L) 05/12/2024    CO2 28.0 05/12/2024     05/12/2024    BUN 23 05/12/2024    CREATININE 0.77 05/12/2024    EGFRIFAFRI 102 02/21/2022    EGFRIFNONA 88 02/21/2022    BCR 29.9 (H) 05/12/2024    ANIONGAP 8.0 05/12/2024     Lab Results   Component Value Date    WBC 8.02 05/12/2024    HGB 12.5 (L) 05/12/2024    HCT 37.0 (L) 05/12/2024    MCV 93.4 05/12/2024     05/12/2024     Lab Results   Component Value Date    CHOL 220 (H) 01/08/2024    CHOL 120 08/01/2019    CHOL 117 04/15/2019     Lab Results   Component Value Date    HDL 41 01/08/2024    HDL 37 (L) 08/03/2023    HDL 41 04/03/2023     Lab Results   Component Value Date     (H) 01/08/2024     (H) 08/03/2023     (H) 04/03/2023     Lab Results   Component Value Date    TRIG 108 01/08/2024    TRIG 208 (H) 08/03/2023    TRIG 138 04/03/2023     Lab Results   Component Value Date    HGBA1C 9.50 (H) 04/18/2024     Lab Results   Component Value Date    INR 1.1 11/25/2019    INR 1.1 09/10/2018    PROTIME 12.5 11/25/2019    PROTIME 13.3 09/10/2018     Lab Results   Component Value Date    FOLATE 10.20 02/24/2024     Lab Results   Component Value Date    ANCBNFUF36 384 04/18/2024              Assessment / Plan   Brief Patient Summary:    Too Tai is a 68 y.o. male with past medical history of advanced dementia with behavioral disturbances and nonverbal, CAD, T2DM, HLD, PVD who presented to [facility] ED with complaint of increasing lethargy and somnolence.  Per wife's report which is bedside patient was sitting in a wheelchair when he seemed more lethargic     Plan:   Lethargy  Advanced dementia complicated by behavioral disturbances.  Continue phenobarbital 64.8 mg/64.8 mg / 129.6 mg.  p.o. available to be used first; IM if patient refuses.  Phenobarbital level 26.4,   EEG with no focal features or epileptic activity.  Mild nonspecific diffuse cerebral dysfunction.  Blood cultures pending  CT head negative for acute abnormalities.  Repeat 24-hour CT head negative  Continue home temazepam 15 mg nightly and melatonin 5 mg nightly  IM Zyprexa as needed for extreme agitation, no required doses  Continue Zoloft 50 daily  QTc 463  Continue fall precautions  Patient to follow-up with IVONNE Eckert, appointment scheduled for 9/20/2024  General neurology will continue to follow    I have discussed the above with the patient, bedside RN Dr. Gallardo  Time spent with patient: 50 minutes in face-to-face evaluation and management of the patient.    Copied text in this note has been reviewed and is accurate as of 05/14/24.     IVONNE Santos

## 2024-05-14 NOTE — PLAN OF CARE
Goal Outcome Evaluation:  Plan of Care Reviewed With: patient, family           Outcome Evaluation: Pt presents w/ generalized weakness, decreased functional endurance, impaired cognition, and balance deficits limiting his ADL independence. Pt would benefit from continued skilled IPOT services to address current functional deficits. Rec SNF at d/c.      Anticipated Discharge Disposition (OT): skilled nursing facility

## 2024-05-14 NOTE — CASE MANAGEMENT/SOCIAL WORK
Continued Stay Note  [facility]     Patient Name: Too Tai  MRN: [id number]  Today's Date: 5/14/2024    Admit Date: 5/11/2024    Plan: Awaiting assessment from [facility]   Discharge Plan       Row Name 05/14/24 0922       Plan    Plan Awaiting assessment from [facility]    Plan Comments SW'er spoke with [facility] [place] [phone] rep Gutierrez who explained they Don will be over to assess patient at 11am. SW'er spoke with PT who is going to work with patient as well. SW'er spoke with patient's wife who explains she will be her at 11am as well. Awaiting completion of assessment.          @12:10 Received ca call from Christoph at [facility]  rep who explains that the DON Fannie would come out tomorrow ( at 10 am) explaining she is unable to make it today. She did confirm that she received the faxed documentation.     Final Discharge Disposition Code 04 - intermediate care facility                   Discharge Codes    No documentation.                 Expected Discharge Date and Time       Expected Discharge Date Expected Discharge Time    May 14, 2024               JAMES Tyson (Kay)

## 2024-05-14 NOTE — PLAN OF CARE
Goal Outcome Evaluation:  Plan of Care Reviewed With: patient, son           Outcome Evaluation: Pt presents with weakness, decreased ROM, decreased functional mobility independence and is below baseline level of function for mobility. Pt t/f STS from EOB x5 reps with maxA x2 gait belt and UE support. Son present and supportive, able to assist with transfer with patient responding well to family involvement. Pt will benefit from IPPT services to address limitations. PT rec d/c SNF rehab.      Anticipated Discharge Disposition (PT): skilled nursing facility

## 2024-05-15 VITALS
HEART RATE: 97 BPM | WEIGHT: 215 LBS | TEMPERATURE: 99 F | BODY MASS INDEX: 27.59 KG/M2 | RESPIRATION RATE: 16 BRPM | OXYGEN SATURATION: 92 % | DIASTOLIC BLOOD PRESSURE: 70 MMHG | HEIGHT: 74 IN | SYSTOLIC BLOOD PRESSURE: 137 MMHG

## 2024-05-15 PROBLEM — E87.20 LACTIC ACIDOSIS: Status: RESOLVED | Noted: 2024-05-12 | Resolved: 2024-05-15

## 2024-05-15 PROBLEM — R41.89 UNRESPONSIVENESS: Status: RESOLVED | Noted: 2024-05-11 | Resolved: 2024-05-15

## 2024-05-15 LAB
GLUCOSE BLDC GLUCOMTR-MCNC: 229 MG/DL (ref 70–130)
GLUCOSE BLDC GLUCOMTR-MCNC: 234 MG/DL (ref 70–130)
GLUCOSE BLDC GLUCOMTR-MCNC: 290 MG/DL (ref 70–130)
GLUCOSE BLDC GLUCOMTR-MCNC: 326 MG/DL (ref 70–130)
GLUCOSE BLDC GLUCOMTR-MCNC: 414 MG/DL (ref 70–130)
GLUCOSE BLDC GLUCOMTR-MCNC: 417 MG/DL (ref 70–130)
GLUCOSE BLDC GLUCOMTR-MCNC: 426 MG/DL (ref 70–130)
PHENOBARB SERPL-MCNC: 29 MCG/ML (ref 10–30)

## 2024-05-15 PROCEDURE — 80184 ASSAY OF PHENOBARBITAL: CPT

## 2024-05-15 PROCEDURE — 99239 HOSP IP/OBS DSCHRG MGMT >30: CPT | Performed by: FAMILY MEDICINE

## 2024-05-15 PROCEDURE — 97530 THERAPEUTIC ACTIVITIES: CPT

## 2024-05-15 PROCEDURE — 82948 REAGENT STRIP/BLOOD GLUCOSE: CPT

## 2024-05-15 PROCEDURE — 99233 SBSQ HOSP IP/OBS HIGH 50: CPT

## 2024-05-15 PROCEDURE — 63710000001 INSULIN LISPRO (HUMAN) PER 5 UNITS: Performed by: PHYSICIAN ASSISTANT

## 2024-05-15 PROCEDURE — 97535 SELF CARE MNGMENT TRAINING: CPT

## 2024-05-15 RX ORDER — PHENOBARBITAL 64.8 MG/1
129.6 TABLET ORAL NIGHTLY
Qty: 30 TABLET | Refills: 0 | Status: SHIPPED | OUTPATIENT
Start: 2024-05-15

## 2024-05-15 RX ORDER — LISINOPRIL 20 MG/1
20 TABLET ORAL DAILY
Qty: 30 TABLET | Refills: 0 | Status: SHIPPED | OUTPATIENT
Start: 2024-05-16

## 2024-05-15 RX ORDER — TEMAZEPAM 15 MG/1
15 CAPSULE ORAL NIGHTLY PRN
Qty: 30 CAPSULE | Refills: 0 | Status: SHIPPED | OUTPATIENT
Start: 2024-05-15

## 2024-05-15 RX ORDER — PHENOBARBITAL 64.8 MG/1
64.8 TABLET ORAL 2 TIMES DAILY
Qty: 60 TABLET | Refills: 0 | Status: SHIPPED | OUTPATIENT
Start: 2024-05-15

## 2024-05-15 RX ORDER — INSULIN GLARGINE 100 [IU]/ML
15 INJECTION, SOLUTION SUBCUTANEOUS 2 TIMES DAILY
Qty: 15 ML | Refills: 0 | Status: SHIPPED | OUTPATIENT
Start: 2024-05-15

## 2024-05-15 RX ORDER — CHOLECALCIFEROL (VITAMIN D3) 125 MCG
5 CAPSULE ORAL NIGHTLY
Qty: 30 TABLET | Refills: 0 | Status: SHIPPED | OUTPATIENT
Start: 2024-05-15

## 2024-05-15 RX ADMIN — Medication 5 MG: at 21:47

## 2024-05-15 RX ADMIN — SERTRALINE HYDROCHLORIDE 50 MG: 50 TABLET ORAL at 08:54

## 2024-05-15 RX ADMIN — AMLODIPINE BESYLATE 10 MG: 10 TABLET ORAL at 08:55

## 2024-05-15 RX ADMIN — INSULIN LISPRO 3 UNITS: 100 INJECTION, SOLUTION INTRAVENOUS; SUBCUTANEOUS at 08:55

## 2024-05-15 RX ADMIN — PHENOBARBITAL 64.8 MG: 64.8 TABLET ORAL at 14:27

## 2024-05-15 RX ADMIN — INSULIN LISPRO 7 UNITS: 100 INJECTION, SOLUTION INTRAVENOUS; SUBCUTANEOUS at 12:36

## 2024-05-15 RX ADMIN — FOLIC ACID 1 MG: 1 TABLET ORAL at 08:55

## 2024-05-15 RX ADMIN — INSULIN LISPRO 3 UNITS: 100 INJECTION, SOLUTION INTRAVENOUS; SUBCUTANEOUS at 21:28

## 2024-05-15 RX ADMIN — PHENOBARBITAL 129.6 MG: 64.8 TABLET ORAL at 21:47

## 2024-05-15 RX ADMIN — PHENOBARBITAL 64.8 MG: 64.8 TABLET ORAL at 08:54

## 2024-05-15 RX ADMIN — LISINOPRIL 20 MG: 20 TABLET ORAL at 08:54

## 2024-05-15 RX ADMIN — INSULIN LISPRO 5 UNITS: 100 INJECTION, SOLUTION INTRAVENOUS; SUBCUTANEOUS at 17:35

## 2024-05-15 NOTE — THERAPY TREATMENT NOTE
Patient Name: Too Tai  : 1955    MRN: [id number]                              Today's Date: 5/15/2024       Admit Date: 2024    Visit Dx:     ICD-10-CM ICD-9-CM   1. Slowness and poor responsiveness  R46.4 780.99   2. History of dementia  Z86.59 V11.8   3. At risk for polypharmacy  Z91.89 V49.89   4. Hypotensive episode  I95.9 458.9   5. Elevated lactic acid level  R79.89 276.2   6. Benign essential HTN  I10 401.1   7. Agitation due to dementia  F03.911 294.21   8. Fall, initial encounter  W19.XXXA E888.9   9. Peripheral polyneuropathy  G62.9 356.9   10. Impaired functional mobility, balance, gait, and endurance  Z74.09 V49.89   11. Dementia with behavioral disturbance  F03.918 294.21   12. Type 2 diabetes mellitus with hyperglycemia, with long-term current use of insulin  E11.65 250.00    Z79.4 790.29     V58.67     Patient Active Problem List   Diagnosis    Cough    Benign essential HTN    Benign prostatic hyperplasia    Coronary artery disease involving native coronary artery of native heart    Chronic coronary artery disease    Type 2 diabetes mellitus with hyperglycemia, with long-term current use of insulin    Gout    Hyperlipidemia    History of heart attack    Peripheral neuropathy    Peripheral vascular disease    Spasm of muscle    Myoclonic jerking    Hypomagnesemia    Arteriosclerosis of coronary artery    Dementia with behavioral disturbance    Frequent falls    History of DVT (deep vein thrombosis)    Chronic anticoagulation    AMS (altered mental status)    Falls    Bacteriuria    Cystitis    Agitation due to dementia    Dementia with behavioral disturbance     Past Medical History:   Diagnosis Date    Coronary artery disease     Dementia     Diabetes mellitus     Hyperlipidemia     Peripheral vascular disease      Past Surgical History:   Procedure Laterality Date    CORONARY ARTERY BYPASS GRAFT      FEMORAL ARTERY - POPLITEAL ARTERY BYPASS GRAFT        General Information        Row Name 05/15/24 1122          Physical Therapy Time and Intention    Document Type therapy note (daily note)  -SD     Mode of Treatment physical therapy  -SD       Row Name 05/15/24 1122          General Information    Existing Precautions/Restrictions fall;other (see comments)  baseline dementia, fearful of falling  -SD       Row Name 05/15/24 1122          Cognition    Orientation Status (Cognition) oriented to;person  -SD       Row Name 05/15/24 1122          Safety Issues, Functional Mobility    Safety Issues Affecting Function (Mobility) ability to follow commands;awareness of need for assistance;impulsivity;insight into deficits/self-awareness;judgment;sequencing abilities;safety precautions follow-through/compliance;safety precaution awareness;problem-solving  -SD     Impairments Affecting Function (Mobility) balance;cognition;endurance/activity tolerance;motor control;muscle tone abnormal;strength;postural/trunk control  -SD               User Key  (r) = Recorded By, (t) = Taken By, (c) = Cosigned By      Initials Name Provider Type    SD Reba Garcia PT Physical Therapist                   Mobility       Row Name 05/15/24 1305          Bed Mobility    Bed Mobility scooting/bridging;supine-sit;sit-supine  -SD     Scooting/Bridging [place] (Bed Mobility) dependent (less than 25% patient effort);2 person assist;verbal cues  -SD     Supine-Sit [place] (Bed Mobility) maximum assist (25% patient effort);2 person assist;verbal cues;nonverbal cues (demo/gesture)  -SD     Sit-Supine [place] (Bed Mobility) maximum assist (25% patient effort);2 person assist;nonverbal cues (demo/gesture);verbal cues  -SD     Assistive Device (Bed Mobility) bed rails;draw sheet;head of bed elevated  -SD     Comment, (Bed Mobility) max cues for sequencing.  -SD       Row Name 05/15/24 1305          Transfers    Comment, (Transfers) STS from EOB x4 reps with gait belt and UE support. Son present and supportive,  able to assist with transfer with patient responding well to family involvement. Pt able to achieve improved trunk extension this date, however standing adan limited by weakness and fear of falling. Pt then performed SPT from EOB > WC with maxA x2 and attempted STS x2 reps from WC, however pt unable to clear hips. Pt then t/f back to EOB with maxA x2 and returned to supine.  -SD       Row Name 05/15/24 1305          Bed-Chair Transfer    Bed-Chair [place] (Transfers) maximum assist (25% patient effort);2 person assist;verbal cues;nonverbal cues (demo/gesture)  -SD       Row Name 05/15/24 1305          Sit-Stand Transfer    Sit-Stand [place] (Transfers) maximum assist (25% patient effort);2 person assist;verbal cues  -SD     Assistive Device (Sit-Stand Transfers) other (see comments)  -SD       Row Name 05/15/24 1305          Gait/Stairs (Locomotion)    [place] Level (Gait) not tested  -SD               User Key  (r) = Recorded By, (t) = Taken By, (c) = Cosigned By      Initials Name Provider Type    Reba Phelan PT Physical Therapist                   Obj/Interventions       Row Name 05/15/24 1309          Balance    Balance Assessment sitting static balance;standing static balance  -SD     Static Sitting Balance contact guard  -SD     Position, Sitting Balance unsupported;sitting edge of bed  -SD     Static Standing Balance maximum assist;2-person assist  -SD     Position/Device Used, Standing Balance supported  -SD               User Key  (r) = Recorded By, (t) = Taken By, (c) = Cosigned By      Initials Name Provider Type    Reba Phelan PT Physical Therapist                   Goals/Plan       Row Name 05/15/24 1313          Bed Mobility Goal 1 (PT)    Activity/Assistive Device (Bed Mobility Goal 1, PT) sit to supine;supine to sit  -SD     [place] Level/Cues Needed (Bed Mobility Goal 1, PT) moderate assist (50-74% patient effort)  -SD     Time Frame (Bed Mobility Goal 1,  PT) long term goal (LTG);2 weeks  -SD     Progress/Outcomes (Bed Mobility Goal 1, PT) goal not met  -SD       Row Name 05/15/24 1313          Transfer Goal 1 (PT)    Activity/Assistive Device (Transfer Goal 1, PT) sit-to-stand/stand-to-sit;bed-to-chair/chair-to-bed  -SD     [place] Level/Cues Needed (Transfer Goal 1, PT) moderate assist (50-74% patient effort)  -SD     Time Frame (Transfer Goal 1, PT) long term goal (LTG);2 weeks  -SD     Progress/Outcome (Transfer Goal 1, PT) goal not met  -SD       Row Name 05/15/24 1313          Gait Training Goal 1 (PT)    Activity/Assistive Device (Gait Training Goal 1, PT) gait (walking locomotion)  -SD     [place] Level (Gait Training Goal 1, PT) moderate assist (50-74% patient effort)  -SD     Distance (Gait Training Goal 1, PT) 15ft  -SD     Time Frame (Gait Training Goal 1, PT) long term goal (LTG);2 weeks  -SD     Progress/Outcome (Gait Training Goal 1, PT) goal not met  -SD               User Key  (r) = Recorded By, (t) = Taken By, (c) = Cosigned By      Initials Name Provider Type    Reba Phelan, PT Physical Therapist                   Clinical Impression       Row Name 05/15/24 1311          Pain    Pretreatment Pain Rating 0/10 - no pain  -SD     Posttreatment Pain Rating 0/10 - no pain  -SD       Row Name 05/15/24 1311          Plan of Care Review    Plan of Care Reviewed With patient;spouse;son  -SD     Progress no change  -SD     Outcome Evaluation Pt worked on STS t/f's from EOB and WC, requiring maxA x2 for all OOB mobility. Pt limited by weakness and fear of falling. Pt to d/c to memory care facility today. Will benefit from HHPT upon return.  -SD       Row Name 05/15/24 1311          Positioning and Restraints    Pre-Treatment Position in bed  -SD     Post Treatment Position bed  -SD     In Bed notified nsg;fowlers;encouraged to call for assist;call light within reach;exit alarm on;pillow between legs;heels elevated  -SD               User  Key  (r) = Recorded By, (t) = Taken By, (c) = Cosigned By      Initials Name Provider Type    Reba Phelan, TRACE Physical Therapist                   Outcome Measures       Row Name 05/15/24 1314 05/15/24 0800       How much help from another person do you currently need...    Turning from your back to your side while in flat bed without using bedrails? 2  -SD 2  -LG    Moving from lying on back to sitting on the side of a flat bed without bedrails? 2  -SD 2  -LG    Moving to and from a bed to a chair (including a wheelchair)? 2  -SD 2  -LG    Standing up from a chair using your arms (e.g., wheelchair, bedside chair)? 2  -SD 2  -LG    Climbing 3-5 steps with a railing? 1  -SD 2  -LG    To walk in hospital room? 1  -SD 2  -LG    AM-PAC 6 Clicks Score (PT) 10  -SD 12  -LG    Highest Level of Mobility Goal 4 --> Transfer to chair/commode  -SD 4 --> Transfer to chair/commode  -LG      Row Name 05/15/24 1314          Functional Assessment    Outcome Measure Options AM-PAC 6 Clicks Basic Mobility (PT)  -SD               User Key  (r) = Recorded By, (t) = Taken By, (c) = Cosigned By      Initials Name Provider Type    Reba Phelan, TRACE Physical Therapist    Beth Mederos RN Registered Nurse                                 Physical Therapy Education       Title: PT OT SLP Therapies (In Progress)       Topic: Physical Therapy (In Progress)       Point: Mobility training (In Progress)       Learning Progress Summary             Patient Acceptance, E, NR by SD at 5/14/2024 1448   Family Acceptance, E, VU by SD at 5/15/2024 1314    Acceptance, E, NR by SD at 5/14/2024 1448                         Point: Home exercise program (In Progress)       Learning Progress Summary             Patient Acceptance, E, NR by SD at 5/14/2024 1448   Family Acceptance, E, VU by SD at 5/15/2024 1314    Acceptance, E, NR by SD at 5/14/2024 1448                         Point: Body mechanics (In Progress)       Learning  Progress Summary             Patient Acceptance, E, NR by SD at 5/14/2024 1448   Family Acceptance, E, VU by SD at 5/15/2024 1314    Acceptance, E, NR by SD at 5/14/2024 1448                         Point: Precautions (In Progress)       Learning Progress Summary             Patient Acceptance, E, NR by SD at 5/14/2024 1448   Family Acceptance, E, VU by SD at 5/15/2024 1314    Acceptance, E, NR by SD at 5/14/2024 1448                                         User Key       Initials Effective Dates Name Provider Type Discipline    SD 03/13/23 -  Reba Garcia, PT Physical Therapist PT                  PT Recommendation and Plan  Planned Therapy Interventions (PT): balance training, bed mobility training, gait training, home exercise program, patient/family education, neuromuscular re-education, transfer training, strengthening, ROM (range of motion)  Plan of Care Reviewed With: patient, spouse, son  Progress: no change  Outcome Evaluation: Pt worked on STS t/f's from EOB and WC, requiring maxA x2 for all OOB mobility. Pt limited by weakness and fear of falling. Pt to d/c to memory care facility today. Will benefit from HHPT upon return.     Time Calculation:   PT Evaluation Complexity  History, PT Evaluation Complexity: 3 or more personal factors and/or comorbidities  Examination of Body Systems (PT Eval Complexity): total of 3 or more elements  Clinical Presentation (PT Evaluation Complexity): evolving  Clinical Decision Making (PT Evaluation Complexity): moderate complexity  Overall Complexity (PT Evaluation Complexity): moderate complexity     PT Charges       Row Name 05/15/24 1315             Time Calculation    Start Time 1122  -SD      PT Received On 05/15/24  -SD      PT Goal Re-Cert Due Date 05/24/24  -SD         Time Calculation- PT    Total Timed Code Minutes- PT 15 minute(s)  -SD                User Key  (r) = Recorded By, (t) = Taken By, (c) = Cosigned By      Initials Name Provider Type    SD  Reba Garcia, PT Physical Therapist                  Therapy Charges for Today       Code Description Service Date Service Provider Modifiers Qty    88278504831 HC PT EVAL MOD COMPLEXITY 4 5/14/2024 Reba Garcia, PT GP 1    49688243301 HC PT THERAPEUTIC ACT EA 15 MIN 5/15/2024 Reba Garcia, PT GP 1            PT G-Codes  Outcome Measure Options: AM-PAC 6 Clicks Basic Mobility (PT)  AM-PAC 6 Clicks Score (PT): 10  AM-PAC 6 Clicks Score (OT): 10  PT Discharge Summary  Anticipated Discharge Disposition (PT): assisted living, home with home health    Reba Garcia, TRACE  5/15/2024

## 2024-05-15 NOTE — THERAPY TREATMENT NOTE
Patient Name: Too Tai  : 1955    MRN: [id number]                              Today's Date: 5/15/2024       Admit Date: 2024    Visit Dx:     ICD-10-CM ICD-9-CM   1. Slowness and poor responsiveness  R46.4 780.99   2. History of dementia  Z86.59 V11.8   3. At risk for polypharmacy  Z91.89 V49.89   4. Hypotensive episode  I95.9 458.9   5. Elevated lactic acid level  R79.89 276.2   6. Benign essential HTN  I10 401.1   7. Agitation due to dementia  F03.911 294.21   8. Fall, initial encounter  W19.XXXA E888.9   9. Peripheral polyneuropathy  G62.9 356.9   10. Impaired functional mobility, balance, gait, and endurance  Z74.09 V49.89   11. Dementia with behavioral disturbance  F03.918 294.21   12. Type 2 diabetes mellitus with hyperglycemia, with long-term current use of insulin  E11.65 250.00    Z79.4 790.29     V58.67     Patient Active Problem List   Diagnosis    Cough    Benign essential HTN    Benign prostatic hyperplasia    Coronary artery disease involving native coronary artery of native heart    Chronic coronary artery disease    Type 2 diabetes mellitus with hyperglycemia, with long-term current use of insulin    Gout    Hyperlipidemia    History of heart attack    Peripheral neuropathy    Peripheral vascular disease    Spasm of muscle    Myoclonic jerking    Hypomagnesemia    Arteriosclerosis of coronary artery    Dementia with behavioral disturbance    Frequent falls    History of DVT (deep vein thrombosis)    Chronic anticoagulation    AMS (altered mental status)    Falls    Bacteriuria    Cystitis    Agitation due to dementia    Dementia with behavioral disturbance     Past Medical History:   Diagnosis Date    Coronary artery disease     Dementia     Diabetes mellitus     Hyperlipidemia     Peripheral vascular disease      Past Surgical History:   Procedure Laterality Date    CORONARY ARTERY BYPASS GRAFT      FEMORAL ARTERY - POPLITEAL ARTERY BYPASS GRAFT        General Information        [facility] Name 05/15/24 1120          OT Time and Intention    Document Type therapy note (daily note)  -     Mode of Treatment occupational therapy  -[facility] Name 05/15/24 1120          General Information    Patient Profile Reviewed yes  -     Existing Precautions/Restrictions fall;other (see comments)  baseline dementia  -[facility] Name 05/15/24 1120          Cognition    Orientation Status (Cognition) oriented to;person;unable/difficult to assess  -SW       Row Name 05/15/24 1120          Safety Issues, Functional Mobility    Impairments Affecting Function (Mobility) balance;cognition;endurance/activity tolerance;motor control;muscle tone abnormal;strength;postural/trunk control  -               User Key  (r) = Recorded By, (t) = Taken By, (c) = Cosigned By      Initials Name Provider Type     Edith Hooks OT Occupational Therapist                     Mobility/ADL's       [facility] Name 05/15/24 1120          Bed Mobility    Bed Mobility scooting/bridging;supine-sit-supine  -     Scooting/Bridging [place] (Bed Mobility) dependent (less than 25% patient effort);2 person assist;verbal cues  -     Supine-Sit-Supine [place] (Bed Mobility) 2 person assist;maximum assist (25% patient effort);verbal cues;nonverbal cues (demo/gesture)  -     Bed Mobility, Safety Issues cognitive deficits limit understanding;decreased use of arms for pushing/pulling;decreased use of legs for bridging/pushing  -     Assistive Device (Bed Mobility) bed rails;draw sheet;head of bed elevated  -SW       Row Name 05/15/24 1120          Transfers    Transfers sit-stand transfer;stand-sit transfer;bed-chair transfer  -SW       Row Name 05/15/24 1120          Bed-Chair Transfer    Bed-Chair [place] (Transfers) maximum assist (25% patient effort);2 person assist;verbal cues;nonverbal cues (demo/gesture)  -     Comment, (Bed-Chair Transfer) sit pivot/stand pivot  -SW       Row Name 05/15/24 1120          Sit-Stand  Transfer    Sit-Stand [place] (Transfers) maximum assist (25% patient effort);2 person assist;verbal cues;nonverbal cues (demo/gesture)  -[facility] Name 05/15/24 1120          Stand-Sit Transfer    Stand-Sit [place] (Transfers) maximum assist (25% patient effort);2 person assist;verbal cues  -               User Key  (r) = Recorded By, (t) = Taken By, (c) = Cosigned By      Initials Name Provider Type    Edith De La Cruz OT Occupational Therapist                   Obj/Interventions    No documentation.                  Goals/Plan    No documentation.                  Clinical Impression       [facility] Name 05/15/24 1120          Pain Scale: FACES Pre/Post-Treatment    Pain: FACES Scale, Pretreatment 0-->no hurt  -     Posttreatment Pain Rating 0-->no hurt  -SW       Row Name 05/15/24 1120          Plan of Care Review    Plan of Care Reviewed With patient;spouse;son  -     Progress no change  -     Outcome Evaluation OT promoted mob and interaction with environment. Pt responded to short commands at times saying yes or no. Family supportive and helpful with command following for sts and for t/f. Pt max assist of 2 person with manual and vcs. Pt has flexed posture. T/f to and from w/c and max assist x 2 with sit pivot and stand pivot techniques.  -[facility] Name 05/15/24 1120          Vital Signs    O2 Delivery Pre Treatment room air  -SW     O2 Delivery Intra Treatment room air  -SW     Pre Patient Position Supine  -SW     Intra Patient Position Standing  -SW     Post Patient Position Supine  -SW       Row Name 05/15/24 1120          Positioning and Restraints    Pre-Treatment Position in bed  -SW     Post Treatment Position bed  -SW     In Bed notified nsg;supine;fowlers;call light within reach;encouraged to call for assist;exit alarm on;side rails up x3  -               User Key  (r) = Recorded By, (t) = Taken By, (c) = Cosigned By      Initials Name Provider Type    Edith De La Cruz OT  Occupational Therapist                   Outcome Measures       Row Name 05/15/24 1329          How much help from another is currently needed...    Putting on and taking off regular lower body clothing? 1  -SW     Bathing (including washing, rinsing, and drying) 2  -SW     Toileting (which includes using toilet bed pan or urinal) 1  -SW     Putting on and taking off regular upper body clothing 2  -SW     Taking care of personal grooming (such as brushing teeth) 2  -SW     Eating meals 2  -SW     AM-PAC 6 Clicks Score (OT) 10  -SW       Row Name 05/15/24 1314 05/15/24 0800       How much help from another person do you currently need...    Turning from your back to your side while in flat bed without using bedrails? 2  -SD 2  -LG    Moving from lying on back to sitting on the side of a flat bed without bedrails? 2  -SD 2  -LG    Moving to and from a bed to a chair (including a wheelchair)? 2  -SD 2  -LG    Standing up from a chair using your arms (e.g., wheelchair, bedside chair)? 2  -SD 2  -LG    Climbing 3-5 steps with a railing? 1  -SD 2  -LG    To walk in hospital room? 1  -SD 2  -LG    AM-PAC 6 Clicks Score (PT) 10  -SD 12  -LG    Highest Level of Mobility Goal 4 --> Transfer to chair/commode  -SD 4 --> Transfer to chair/commode  -LG      Row Name 05/15/24 1329 05/15/24 1314       Functional Assessment    Outcome Measure Options AM-PAC 6 Clicks Daily Activity (OT)  -SW AM-PAC 6 Clicks Basic Mobility (PT)  -SD              User Key  (r) = Recorded By, (t) = Taken By, (c) = Cosigned By      Initials Name Provider Type    Reba Phelan, PT Physical Therapist    Beth Mederos RN Registered Nurse    Edith De La Cruz OT Occupational Therapist                    Occupational Therapy Education       Title: PT OT SLP Therapies (In Progress)       Topic: Occupational Therapy (In Progress)       Point: ADL training (In Progress)       Description:   Instruct learner(s) on proper safety adaptation and  remediation techniques during self care or transfers.   Instruct in proper use of assistive devices.                  Learning Progress Summary             Patient Acceptance, E, NR by  at 5/14/2024 1142                         Point: Home exercise program (Not Started)       Description:   Instruct learner(s) on appropriate technique for monitoring, assisting and/or progressing therapeutic exercises/activities.                  Learner Progress:  Not documented in this visit.              Point: Precautions (Done)       Description:   Instruct learner(s) on prescribed precautions during self-care and functional transfers.                  Learning Progress Summary             Patient Acceptance, E, VU,NL,NR by  at 5/15/2024 1330    Acceptance, E, NR by  at 5/14/2024 1142   Family Acceptance, E, VU,NL,NR by  at 5/15/2024 1330                         Point: Body mechanics (Done)       Description:   Instruct learner(s) on proper positioning and spine alignment during self-care, functional mobility activities and/or exercises.                  Learning Progress Summary             Patient Acceptance, E, VU,NL,NR by  at 5/15/2024 1330    Acceptance, E, NR by  at 5/14/2024 1142   Family Acceptance, E, VU,NL,NR by  at 5/15/2024 1330                                         User Key       Initials Effective Dates Name Provider Type Discipline     06/16/21 -  Edith Hooks OT Occupational Therapist OT     10/14/22 -  Jenny Gallardo OT Occupational Therapist OT                  OT Recommendation and Plan     Plan of Care Review  Plan of Care Reviewed With: patient, spouse, son  Progress: no change  Outcome Evaluation: OT promoted mob and interaction with environment. Pt responded to short commands at times saying yes or no. Family supportive and helpful with command following for sts and for t/f. Pt max assist of 2 person with manual and vcs. Pt has flexed posture. T/f to and from w/c and max assist x 2  with sit pivot and stand pivot techniques.     Time Calculation:         Time Calculation- OT       Row Name 05/15/24 1120             Time Calculation- OT    OT Start Time 1120  -SW      OT Received On 05/15/24  -SW         Timed Charges    75231 - OT Self Care/Mgmt Minutes 15  -SW         Total Minutes    Timed Charges Total Minutes 15  -SW       Total Minutes 15  -SW                User Key  (r) = Recorded By, (t) = Taken By, (c) = Cosigned By      Initials Name Provider Type     Edith Hooks OT Occupational Therapist                  Therapy Charges for Today       Code Description Service Date Service Provider Modifiers Qty    [id number]  OT SELF CARE/MGMT/TRAIN EA 15 MIN 5/15/2024 Edith Hooks OT GO 1                 Edith Hooks OT  5/15/2024

## 2024-05-15 NOTE — CASE MANAGEMENT/SOCIAL WORK
Case Management Discharge Note      Final Note: SW'er met with patient and wife at bedside. Patient approved to return to [facility]. Patient and wife are agreeable to this plan. Report can be called into [phone] fax number [phone]. Pharmacy is updated in Epic to  [address] [phone] [place] [phone] FX . Plan is return to [facility] via  EMS at 16:45.         Selected Continued Care - Admitted Since 5/11/2024       Destination    No services have been selected for the patient.                Durable Medical Equipment    No services have been selected for the patient.                Dialysis/Infusion    No services have been selected for the patient.                Home Medical Care    No services have been selected for the patient.                Therapy    No services have been selected for the patient.                Community Resources    No services have been selected for the patient.                Community & DME    No services have been selected for the patient.                    Selected Continued Care - Prior Encounters Includes continued care and service providers with selected services from prior encounters from 2/11/2024 to 5/15/2024      Discharged on 5/9/2024 Admission date: 4/18/2024 - Discharge disposition: Skilled Nursing Facility (DC - External)      Destination       Service Provider Selected Services Address Phone Fax Patient Preferred    [address] , LTAC, located within [facility] [address] [phone] --       Internal Comment last updated by Ilene Brown, RN 5/9/2024 1157    [facility]   [address]  [phone]  [phone]                         Durable Medical Equipment       Service Provider Selected Services Address Phone Fax Patient Preferred    [facility]  [place] [facility] [address] [place] [address] [id number] [phone] --       Internal Comment last updated by Ilene Brown, RN 5/9/2024 1239    Hospital Bed & WC                                 Discharged on 4/4/2024 Admission date: 3/27/2024 - Discharge disposition: Home-Health Care Svc      Home Medical Care       Service Provider Selected Services Address Phone Fax Patient Preferred    [facility] ,  [facility] [address] [facility] [address] [phone] --                      Discharged on 3/4/2024 Admission date: 2/24/2024 - Discharge disposition: Skilled Nursing Facility (DC - External)      Destination       Service Provider Selected Services Address Phone Fax Patient Preferred    [facility] [address] [facility] [address] [phone] --                               Final Discharge Disposition Code: 04 - intermediate care facility

## 2024-05-15 NOTE — DISCHARGE SUMMARY
[facility] Medicine Services  DISCHARGE SUMMARY    Patient Name: Too Tai  : 1955  MRN: [id number]    Date of Admission: 2024  9:36 PM  Date of Discharge:  5/15/2024  Primary Care Physician: Des Geller MD    Consults       Date and Time Order Name Status Description    2024  7:28 AM Inpatient Neurology Consult General Completed     2024  2:30 PM Inpatient Palliative Care MD Consult Completed     2024  5:42 AM Inpatient Neurology Consult General Completed             Hospital Course       Active Hospital Problems    Diagnosis  POA    History of DVT (deep vein thrombosis) [Z86.718]  Not Applicable    Dementia with behavioral disturbance [F03.918]  Yes    Benign essential HTN [I10]  Yes    Type 2 diabetes mellitus with hyperglycemia, with long-term current use of insulin [E11.65, Z79.4]  Not Applicable    Gout [M10.9]  Yes    Hyperlipidemia [E78.5]  Yes    Peripheral neuropathy [G62.9]  Yes    Benign prostatic hyperplasia [N40.0]  Yes    Coronary artery disease involving native coronary artery of native heart [I25.10]  Yes      Resolved Hospital Problems    Diagnosis Date Resolved POA    **Unresponsiveness [R41.89] 05/15/2024 Yes    Lactic acidosis [E87.20] 05/15/2024 Yes          Hospital Course:  Too Tai is a 68 y.o. male significant for advanced dementia, CAD, diabetes mellitus type 2, HLD and PVD. Patient was recently admitted to this service 24 to 24 with agitation and behavioral disturbance. Was discharged to [facility]. Returned to ED with increasing lethargy and unresponsive episode.      Unresponsive episode -> resolved   Advanced dementia complicated by behavioral disturbance with superimposed sleep disturbance   -Continue Phenobarbital 64.8mg BID and 129.6mg nightly   -PRN Restoril 15mg nightly   -Continue Melatonin nightly   -EEG negative for seizures  -Discontinued Seroquel, Depakote     Uncontrolled  diabetes mellitus type 2  - A1c 9.5%  - Lantus 15 units BID      HTN  - continue Amlodipine 10mg daily  - continue Lisinopril 30mg daily      Hypokalemia  -oral replacement ordered but patient refused      Lactic acidosis  -Lactic 10 on admission, normalized to 1.7 with 1L IVF, suspect spurious result.    Discharge Follow Up Recommendations for outpatient labs/diagnostics:  -PCP 1 week  -Neurology as scheduled     Day of Discharge     HPI:   Patient seen and examined. No issues overnight. Wife at bedside this AM.     Review of Systems  Gen- No fevers, chills  CV- No chest pain, palpitations  Resp- No cough, dyspnea  GI- No N/V/D, abd pain    Vital Signs:   Temp:  [96.7 °F (35.9 °C)-98.8 °F (37.1 °C)] 96.7 °F (35.9 °C)  Heart Rate:  [55-78] 78  Resp:  [16-18] 18  BP: (118-121)/(54-60) 121/60      Physical Exam:  Constitutional: No acute distress, awake, alert  HENT: NCAT, mucous membranes moist  Respiratory: Clear to auscultation bilaterally, respiratory effort normal   Cardiovascular: RRR, no murmurs, rubs, or gallops  Gastrointestinal: Positive bowel sounds, soft, nontender, nondistended  Musculoskeletal: No bilateral ankle edema  Psychiatric: calm, cooperative  Neurologic: No focal deficits  Skin: No rashes     Pertinent  and/or Most Recent Results     LAB RESULTS:      Lab 05/12/24  1111 05/12/24  0037 05/11/24  2141 05/09/24  0443   WBC 8.02  --  7.33 8.44   HEMOGLOBIN 12.5*  --  10.8* 14.1   HEMATOCRIT 37.0*  --  33.8* 42.2   PLATELETS 259  --  214 332   NEUTROS ABS 4.90  --  4.05 3.75   IMMATURE GRANS (ABS) 0.02  --  0.02 0.03   LYMPHS ABS 1.93  --  2.18 2.86   MONOS ABS 0.70  --  0.71 0.78   EOS ABS 0.43*  --  0.32 0.93*   MCV 93.4  --  96.3 93.0   PROCALCITONIN  --   --  0.04  --    LACTATE  --  1.7 10.3*  --          Lab 05/12/24  1111 05/11/24  2141 05/09/24  0443   SODIUM 140 138 142   POTASSIUM 3.3* 4.1 3.5   CHLORIDE 104 101 105   CO2 28.0 26.0 27.0   ANION GAP 8.0 11.0 10.0   BUN 23 22 23   CREATININE  0.77 0.83 0.80   EGFR 98.1 95.9 97.0   GLUCOSE 184* 226* 112*   CALCIUM 8.7 8.4* 8.8   MAGNESIUM 1.7 1.3* 1.9         Lab 05/12/24  1111 05/11/24  2141 05/09/24  0443   TOTAL PROTEIN 5.7* 5.1* 6.5   ALBUMIN 3.3* 3.0* 3.4*   GLOBULIN 2.4 2.1 3.1   ALT (SGPT) 21 18 28   AST (SGOT) 28 23 31   BILIRUBIN 0.6 0.5 0.5   ALK PHOS 102 91 113         Lab 05/11/24 2141   HSTROP T 25*                 Lab 05/11/24 2151   PH, ARTERIAL 7.445   PCO2, ARTERIAL 40.4   PO2 .0*   FIO2 100   HCO3 ART 27.7*   BASE EXCESS ART 3.4*   CARBOXYHEMOGLOBIN 1.1     Brief Urine Lab Results  (Last result in the past 365 days)        Color   Clarity   Blood   Leuk Est   Nitrite   Protein   CREAT   Urine HCG        05/12/24 0004 Yellow   Clear   Negative   Negative   Negative   30 mg/dL (1+)                 Microbiology Results (last 10 days)       Procedure Component Value - Date/Time    COVID PRE-OP / PRE-PROCEDURE SCREENING ORDER (NO ISOLATION) - Swab, Nasopharynx [[id number]]  (Normal) Collected: 05/12/24 0004    Lab Status: Final result Specimen: Swab from Nasopharynx Updated: 05/12/24 0124    Narrative:      The following orders were created for panel order COVID PRE-OP / PRE-PROCEDURE SCREENING ORDER (NO ISOLATION) - Swab, Nasopharynx.  Procedure                               Abnormality         Status                     ---------                               -----------         ------                     Respiratory Panel PCR w/...[[id number]]  Normal              Final result                 Please view results for these tests on the individual orders.    Respiratory Panel PCR w/COVID-19(SARS-CoV-2) [facility]/ERNESTINE/PAD/COR/STANLEY In-House, NP Swab in [place], 2 HR TAT - Swab, Nasopharynx [[id number]]  (Normal) Collected: 05/12/24 0004    Lab Status: Final result Specimen: Swab from Nasopharynx Updated: 05/12/24 0124     ADENOVIRUS, PCR Not Detected     Coronavirus 229E Not Detected     Coronavirus HKU1 Not Detected     Coronavirus NL63 Not  Detected     Coronavirus OC43 Not Detected     COVID19 Not Detected     Human Metapneumovirus Not Detected     Human Rhinovirus/Enterovirus Not Detected     Influenza A PCR Not Detected     Influenza B PCR Not Detected     Parainfluenza Virus 1 Not Detected     Parainfluenza Virus 2 Not Detected     Parainfluenza Virus 3 Not Detected     Parainfluenza Virus 4 Not Detected     RSV, PCR Not Detected     Bordetella pertussis pcr Not Detected     Bordetella parapertussis PCR Not Detected     Chlamydophila pneumoniae PCR Not Detected     Mycoplasma pneumo by PCR Not Detected    Narrative:      In the setting of a positive respiratory panel with a viral infection PLUS a negative procalcitonin without other underlying concern for bacterial infection, consider observing off antibiotics or discontinuation of antibiotics and continue supportive care. If the respiratory panel is positive for atypical bacterial infection (Bordetella pertussis, Chlamydophila pneumoniae, or Mycoplasma pneumoniae), consider antibiotic de-escalation to target atypical bacterial infection.    Blood Culture - Blood, Hand, Right [[id number]]  (Normal) Collected: 05/11/24 2152    Lab Status: Preliminary result Specimen: Blood from Hand, Right Updated: 05/14/24 2215     Blood Culture No growth at 3 days    Blood Culture - Blood, Arm, Left [[id number]]  (Normal) Collected: 05/11/24 2152    Lab Status: Preliminary result Specimen: Blood from Arm, Left Updated: 05/14/24 2215     Blood Culture No growth at 3 days            EEG    Result Date: 5/13/2024  Reason for referral: 68 y.o.male with altered mental status Technical Summary:  A 19 channel digital EEG was performed using the international 10-20 placement system, including eye leads and EKG leads. Duration: 24 minutes Findings: The patient is asleep at the beginning of the study.  Diffuse low to medium amplitude 4 to 7 Hz intermixed delta and theta activity are seen symmetrically over both hemispheres.   Occasional vertex waves are present.  Photic stimulation does not change the background.  Hyperventilation is not performed.  No focal features or epileptiform activity are present. Video: Available Technical quality: Superior EKG: Regular, 60 bpm SUMMARY: Excessive drowsiness Mild generalized slow No focal features or epileptiform activity are seen     Diffuse cerebral dysfunction mild degree, nonspecific This report is transcribed using the Dragon dictation system.      CT Head Without Contrast    Result Date: 5/13/2024  CT HEAD WO CONTRAST Date of Exam: 5/12/2024 9:58 PM EDT Indication: AMS. Comparison: 5/11/2024 Technique: Axial CT images were obtained of the head without contrast administration.  Automated exposure control and iterative construction methods were used. Findings: There is no evidence of acute territorial infarction. There is no acute intracranial hemorrhage. There are no extra-axial collections. Ventricles and CSF spaces are symmetrically prominent. No mass effect nor hydrocephalus. Patchy subcortical and periventricular white matter hypodensities most likely reflect the sequela of chronic microvascular ischemic disease.  Paranasal sinuses and mastoid air cells are adequately aerated.  Osseous structures and orbits appear intact.     Impression: No acute intracranial process. No interval adverse change. Electronically Signed: Yanet Ramírez MD  5/13/2024 7:04 AM EDT  Workstation ID: [id number]    CT Head Without Contrast    Result Date: 5/11/2024  CT HEAD WO CONTRAST Date of Exam: 5/11/2024 10:03 PM EDT Indication: poorly responsive. Comparison: 4/19/2024. Technique: Axial CT images were obtained of the head without contrast administration.  Automated exposure control and iterative construction methods were used. Findings: There is no evidence of hemorrhage. There is no mass effect or midline shift. There is no extracerebral collection. Ventricles are normal in size and configuration for patient's  stated age.  Posterior fossa is within normal limits. Calvarium and skull base appear intact.   Visualized sinuses show no air fluid levels. Visualized orbits are unremarkable.     Impression: No acute intracranial process. Electronically Signed: Estrellita Finnegan MD  5/11/2024 10:11 PM EDT  Workstation ID: [id number]    XR Chest 1 View    Result Date: 5/11/2024  XR CHEST 1 VW Date of Exam: 5/11/2024 9:38 PM EDT Indication: Weak/Dizzy/AMS triage protocol Comparison: 4/19/2024. Findings: There are no airspace consolidations. No pleural fluid. No pneumothorax. The pulmonary vasculature appears within normal limits. The heart appears enlarged. Median sternotomy wires are present.. No acute osseous abnormality identified.     Impression: No acute cardiopulmonary process. Stable cardiomegaly. Electronically Signed: Estrellita Finnegan MD  5/11/2024 10:00 PM EDT  Workstation ID: [id number]             Results for orders placed during the hospital encounter of 03/27/24    Adult Transthoracic Echo Complete W/ Cont if Necessary Per Protocol    Interpretation Summary    Left ventricular systolic function is normal. Calculated left ventricular EF = 55.1% Normal left ventricular cavity size noted. Left ventricular wall thickness is consistent with mild concentric hypertrophy. Left ventricular diastolic function is consistent with (grade I) impaired relaxation.    The right ventricular cavity is mildly dilated. Normal right ventricular systolic function noted.    There is calcification of the aortic valve. The aortic valve appears trileaflet. Mild aortic valve regurgitation is present. No aortic valve stenosis is present.    Mitral annular calcification is present. Trace mitral valve regurgitation is present. No significant mitral valve stenosis is present.    The tricuspid valve is structurally normal with no stenosis present. Trace tricuspid valve regurgitation is present. Insufficient TR velocity profile to estimate the right ventricular  systolic pressure.    Mild dilation of the sinuses of Valsalva is present. Mild dilation of the ascending aorta is present. Ascending aorta = 4.2 cm      Plan for Follow-up of Pending Labs/Results: Inbox   Pending Labs       Order Current Status    Blood Culture - Blood, Arm, Left Preliminary result    Blood Culture - Blood, Hand, Right Preliminary result          Discharge Details        Discharge Medications        New Medications        Instructions Start Date   melatonin 5 MG tablet tablet   5 mg, Oral, Nightly             Changes to Medications        Instructions Start Date   lisinopril 20 MG tablet  Commonly known as: PRINIVIL,ZESTRIL  What changed: Another medication with the same name was removed. Continue taking this medication, and follow the directions you see here.   20 mg, Oral, Daily   Start Date: May 16, 2024     temazepam 15 MG capsule  Commonly known as: RESTORIL  What changed:   medication strength  how much to take  when to take this  reasons to take this   15 mg, Oral, Nightly PRN             Continue These Medications        Instructions Start Date   Accu-Chek Geri Plus w/Device kit   1 kit, Does not apply, 3 Times Daily      Accu-Chek Geri solution   1 bottle, In Vitro, As Needed      accu-chek soft touch lancets   Check sugars 3 times daily      Alcohol Prep pads   1 swab , Does not apply, 3 Times Daily      amLODIPine 10 MG tablet  Commonly known as: NORVASC   10 mg, Oral, Every 24 Hours Scheduled      B-D UF III MINI PEN NEEDLES 31G X 5 MM misc  Generic drug: Insulin Pen Needle   USE AS DIRECTED      cyanocobalamin 1000 MCG/ML injection   1,000 mcg, Intramuscular, Every 28 Days   Start Date: May 17, 2024     folic acid 1 MG tablet  Commonly known as: FOLVITE   1 mg, Oral, Daily      Glucagon 1 MG/0.2ML solution auto-injector   1 mg, Subcutaneous, Every 15 Minutes PRN      Lantus SoloStar 100 UNIT/ML injection pen  Generic drug: Insulin Glargine   20 Units, Subcutaneous, 2 Times Daily       PHENobarbital 64.8 MG tablet   129.6 mg, Oral, Nightly      PHENobarbital 64.8 MG tablet   64.8 mg, Oral, 2 Times Daily      sertraline 50 MG tablet  Commonly known as: ZOLOFT   50 mg, Oral, Daily             Stop These Medications      Divalproex Sodium 125 MG capsule  Commonly known as: DEPAKOTE SPRINKLE     donepezil 10 MG tablet  Commonly known as: ARICEPT     haloperidol 5 MG tablet  Commonly known as: HALDOL     mirtazapine 15 MG tablet  Commonly known as: REMERON     QUEtiapine 100 MG tablet  Commonly known as: SEROquel     QUEtiapine 200 MG tablet  Commonly known as: SEROquel              Allergies   Allergen Reactions    Bactrim [Sulfamethoxazole-Trimethoprim] Rash    Adhesive Tape Rash    Neosporin [Neomycin-Bacitracin Zn-Polymyx] Rash         Discharge Disposition:  Skilled Nursing Facility (DC - External)    Diet:  Hospital:  Diet Order   Procedures    Diet: Regular/House; Fluid Consistency: Thin (IDDSI 0)            Activity:      Restrictions or Other Recommendations:       CODE STATUS:    Code Status and Medical Interventions:   Ordered at: 05/11/24 7540     Medical Intervention Limits:    NO intubation (DNI)     Level Of Support Discussed With:    Health Care Surrogate     Code Status (Patient has no pulse and is not breathing):    No CPR (Do Not Attempt to Resuscitate)     Medical Interventions (Patient has pulse or is breathing):    Limited Support       Future Appointments   Date Time Provider Department Center   5/15/2024  4:45 PM MED 7  [facility]   9/20/2024  9:00 AM Kamla Castillo APRN [facility]       Additional Instructions for the Follow-ups that You Need to Schedule       Ambulatory Referral to Home Health   As directed      Face to Face Visit Date: 5/14/2024   Follow-up provider for Plan of Care?: I treated the patient in an acute care facility and will not continue treatment after discharge.   Follow-up provider: ABELARDO MURRAY [[id number]]   Reason/Clinical Findings:  Peripheral neuropathy [G62.9], dementia, type 2 diabetes   Describe mobility limitations that make leaving home difficult: impaired functional mobility, balance, gait and endurance   Nursing/Therapeutic Services Requested: Physical Therapy Occupational Therapy   PT orders: Transfer training Gait Training Strengthening Home safety assessment   Weight Bearing Status: As Tolerated   Occupational orders: Activities of daily living Strengthening Energy conservation Fine motor Home safety assessment Cognition   Frequency: 1 Week 1        Discharge Follow-up with PCP   As directed       Currently Documented PCP:    Des Geller MD    PCP Phone Number:    [phone]     Follow Up Details: 1 week        Discharge Follow-up with Specified Provider: Keep scheduled follow-up with Kamla CORONADO, Neurology   As directed      To: Keep scheduled follow-up with Kamla CORONADO, Neurology                      Theresa Gallardo DO  05/15/24      Time Spent on Discharge:  I spent  50  minutes on this discharge activity which included: face-to-face encounter with the patient, reviewing the data in the system, coordination of the care with the nursing staff as well as consultants, documentation, and entering orders.

## 2024-05-15 NOTE — DISCHARGE PLACEMENT REQUEST
" Contact Nissa Danielle [phone]    Too Richter (68 y.o. Male)       Date of Birth   1955    Social Security Number       Address   [address] [place] [address] [address] [place] [address]    Home Phone   [phone]    MRN   [id number]       Buddhist   Sabianist    Marital Status                               Admission Date   24    Admission Type   Emergency    Admitting Provider   Theresa Gallardo DO    Attending Provider   Theresa Gallardo DO    Department, Room/Bed   [facility] 5B, N537/1       Discharge Date       Discharge Disposition       Discharge Destination                                 Attending Provider: Theresa Gallardo DO    Allergies: Bactrim [Sulfamethoxazole-trimethoprim], Adhesive Tape, Neosporin [Neomycin-bacitracin Zn-polymyx]    Isolation: None   Infection: None   Code Status: No CPR    Ht: 188 cm (74\")   Wt: 97.5 kg (215 lb)    Admission Cmt: None   Principal Problem: Unresponsiveness [R41.89]                   Active Insurance as of 2024       Primary Coverage       Payor Plan Insurance Group Employer/Plan Group    AETNA MEDICARE REPLACEMENT AETNA MED ADV PPO [id number]       Payor Plan Address Payor Plan Phone Number Payor Plan Fax Number Effective Dates    [address] [phone]  2024 - None Entered    [facility] [id number]         Subscriber Name Subscriber Birth Date Member ID       TOO RICHTER 1955 [id number]                     Emergency Contacts        (Rel.) Home Phone Work Phone Mobile Phone    WILLIE RICHTER (Spouse) [phone] -- [phone]    ZaireRitchie (Son) [phone] -- [phone]    Meg Bustillo (Relative) [phone] -- [phone]                 Physician Progress Notes (most recent note)        Theresa Gallardo DO at 24 1203              [facility] Medicine Services  PROGRESS NOTE    Patient Name: Too Richter  : 1955  MRN: [id number]    Date of Admission: " 5/11/2024  Primary Care Physician: Des Geller MD    Subjective   Subjective     CC:  F/U agitation     HPI:  Patient seen and examined. No issues reported overnight. [facility] to evaluate patient to return today.     Objective   Objective     Vital Signs:   Temp:  [97.8 °F (36.6 °C)-98.2 °F (36.8 °C)] 98 °F (36.7 °C)  Heart Rate:  [78-92] 81  Resp:  [16-18] 16  BP: (156-172)/(63-87) 156/66     Physical Exam:  Constitutional: No acute distress, sleeping   HENT: NCAT, mucous membranes moist  Respiratory: Clear to auscultation bilaterally, respiratory effort normal   Cardiovascular: RRR, no murmurs, rubs, or gallops  Gastrointestinal: Positive bowel sounds, soft, nontender, nondistended  Musculoskeletal: No bilateral ankle edema  Psychiatric: Calm   Neurologic: no focal deficits  Skin: No rashes     Results Reviewed:  LAB RESULTS:      Lab 05/12/24  1111 05/12/24  0037 05/11/24 2141 05/09/24 0443 05/08/24  0514   WBC 8.02  --  7.33 8.44 8.27   HEMOGLOBIN 12.5*  --  10.8* 14.1 13.4   HEMATOCRIT 37.0*  --  33.8* 42.2 40.3   PLATELETS 259  --  214 332 319   NEUTROS ABS 4.90  --  4.05 3.75 3.28   IMMATURE GRANS (ABS) 0.02  --  0.02 0.03 0.05   LYMPHS ABS 1.93  --  2.18 2.86 2.97   MONOS ABS 0.70  --  0.71 0.78 0.80   EOS ABS 0.43*  --  0.32 0.93* 1.07*   MCV 93.4  --  96.3 93.0 94.8   PROCALCITONIN  --   --  0.04  --   --    LACTATE  --  1.7 10.3*  --   --          Lab 05/12/24  1111 05/11/24 2141 05/09/24 0443 05/08/24  0702   SODIUM 140 138 142 143   POTASSIUM 3.3* 4.1 3.5 4.6   CHLORIDE 104 101 105 105   CO2 28.0 26.0 27.0 30.0*   ANION GAP 8.0 11.0 10.0 8.0   BUN 23 22 23 26*   CREATININE 0.77 0.83 0.80 0.90   EGFR 98.1 95.9 97.0 93.6   GLUCOSE 184* 226* 112* 167*   CALCIUM 8.7 8.4* 8.8 9.2   MAGNESIUM 1.7 1.3* 1.9 1.9         Lab 05/12/24  1111 05/11/24  2141 05/09/24  0443 05/08/24  0702   TOTAL PROTEIN 5.7* 5.1* 6.5 6.8   ALBUMIN 3.3* 3.0* 3.4* 3.6   GLOBULIN 2.4 2.1 3.1 3.2   ALT (SGPT) 21 18 28  25   AST (SGOT) 28 23 31 30   BILIRUBIN 0.6 0.5 0.5 0.5   ALK PHOS 102 91 113 117         Lab 05/11/24 2141   HSTROP T 25*                 Lab 05/11/24 2151   PH, ARTERIAL 7.445   PCO2, ARTERIAL 40.4   PO2 .0*   FIO2 100   HCO3 ART 27.7*   BASE EXCESS ART 3.4*   CARBOXYHEMOGLOBIN 1.1     Brief Urine Lab Results  (Last result in the past 365 days)        Color   Clarity   Blood   Leuk Est   Nitrite   Protein   CREAT   Urine HCG        05/12/24 0004 Yellow   Clear   Negative   Negative   Negative   30 mg/dL (1+)                   Microbiology Results Abnormal       Procedure Component Value - Date/Time    Blood Culture - Blood, Arm, Left [[id number]]  (Normal) Collected: 05/11/24 2152    Lab Status: Preliminary result Specimen: Blood from Arm, Left Updated: 05/13/24 2215     Blood Culture No growth at 2 days    Blood Culture - Blood, Hand, Right [[id number]]  (Normal) Collected: 05/11/24 2152    Lab Status: Preliminary result Specimen: Blood from Hand, Right Updated: 05/13/24 2215     Blood Culture No growth at 2 days    COVID PRE-OP / PRE-PROCEDURE SCREENING ORDER (NO ISOLATION) - Swab, Nasopharynx [[id number]]  (Normal) Collected: 05/12/24 0004    Lab Status: Final result Specimen: Swab from Nasopharynx Updated: 05/12/24 0124    Narrative:      The following orders were created for panel order COVID PRE-OP / PRE-PROCEDURE SCREENING ORDER (NO ISOLATION) - Swab, Nasopharynx.  Procedure                               Abnormality         Status                     ---------                               -----------         ------                     Respiratory Panel PCR w/...[[id number]]  Normal              Final result                 Please view results for these tests on the individual orders.    Respiratory Panel PCR w/COVID-19(SARS-CoV-2) AFTAB/LINA/ERNESTINE/PAD/COR/STANLEY In-House, NP Swab in UTM/VTM, 2 HR TAT - Swab, Nasopharynx [[id number]]  (Normal) Collected: 05/12/24 0004    Lab Status: Final result Specimen: Swab  from Nasopharynx Updated: 05/12/24 0124     ADENOVIRUS, PCR Not Detected     Coronavirus 229E Not Detected     Coronavirus HKU1 Not Detected     Coronavirus NL63 Not Detected     Coronavirus OC43 Not Detected     COVID19 Not Detected     Human Metapneumovirus Not Detected     Human Rhinovirus/Enterovirus Not Detected     Influenza A PCR Not Detected     Influenza B PCR Not Detected     Parainfluenza Virus 1 Not Detected     Parainfluenza Virus 2 Not Detected     Parainfluenza Virus 3 Not Detected     Parainfluenza Virus 4 Not Detected     RSV, PCR Not Detected     Bordetella pertussis pcr Not Detected     Bordetella parapertussis PCR Not Detected     Chlamydophila pneumoniae PCR Not Detected     Mycoplasma pneumo by PCR Not Detected    Narrative:      In the setting of a positive respiratory panel with a viral infection PLUS a negative procalcitonin without other underlying concern for bacterial infection, consider observing off antibiotics or discontinuation of antibiotics and continue supportive care. If the respiratory panel is positive for atypical bacterial infection (Bordetella pertussis, Chlamydophila pneumoniae, or Mycoplasma pneumoniae), consider antibiotic de-escalation to target atypical bacterial infection.            EEG    Result Date: 5/13/2024  Reason for referral: 68 y.o.male with altered mental status Technical Summary:  A 19 channel digital EEG was performed using the international 10-20 placement system, including eye leads and EKG leads. Duration: 24 minutes Findings: The patient is asleep at the beginning of the study.  Diffuse low to medium amplitude 4 to 7 Hz intermixed delta and theta activity are seen symmetrically over both hemispheres.  Occasional vertex waves are present.  Photic stimulation does not change the background.  Hyperventilation is not performed.  No focal features or epileptiform activity are present. Video: Available Technical quality: Superior EKG: Regular, 60 bpm SUMMARY:  Excessive drowsiness Mild generalized slow No focal features or epileptiform activity are seen     Impression: Diffuse cerebral dysfunction mild degree, nonspecific This report is transcribed using the Dragon dictation system.      CT Head Without Contrast    Result Date: 5/13/2024  CT HEAD WO CONTRAST Date of Exam: 5/12/2024 9:58 PM EDT Indication: AMS. Comparison: 5/11/2024 Technique: Axial CT images were obtained of the head without contrast administration.  Automated exposure control and iterative construction methods were used. Findings: There is no evidence of acute territorial infarction. There is no acute intracranial hemorrhage. There are no extra-axial collections. Ventricles and CSF spaces are symmetrically prominent. No mass effect nor hydrocephalus. Patchy subcortical and periventricular white matter hypodensities most likely reflect the sequela of chronic microvascular ischemic disease.  Paranasal sinuses and mastoid air cells are adequately aerated.  Osseous structures and orbits appear intact.     Impression: Impression: No acute intracranial process. No interval adverse change. Electronically Signed: Yanet Ramírez MD  5/13/2024 7:04 AM EDT  Workstation ID: [id number]     Results for orders placed during the hospital encounter of 03/27/24    Adult Transthoracic Echo Complete W/ Cont if Necessary Per Protocol    Interpretation Summary    Left ventricular systolic function is normal. Calculated left ventricular EF = 55.1% Normal left ventricular cavity size noted. Left ventricular wall thickness is consistent with mild concentric hypertrophy. Left ventricular diastolic function is consistent with (grade I) impaired relaxation.    The right ventricular cavity is mildly dilated. Normal right ventricular systolic function noted.    There is calcification of the aortic valve. The aortic valve appears trileaflet. Mild aortic valve regurgitation is present. No aortic valve stenosis is present.    Mitral  annular calcification is present. Trace mitral valve regurgitation is present. No significant mitral valve stenosis is present.    The tricuspid valve is structurally normal with no stenosis present. Trace tricuspid valve regurgitation is present. Insufficient TR velocity profile to estimate the right ventricular systolic pressure.    Mild dilation of the sinuses of Valsalva is present. Mild dilation of the ascending aorta is present. Ascending aorta = 4.2 cm      Current medications:  Scheduled Meds:amLODIPine, 10 mg, Oral, Q24H  folic acid, 1 mg, Oral, Daily  insulin lispro, 2-7 Units, Subcutaneous, 4x Daily AC & at Bedtime  lisinopril, 20 mg, Oral, Daily  melatonin, 5 mg, Oral, Nightly  PHENobarbital, 129.6 mg, Oral, Nightly   Or  PHENobarbital, 129.6 mg, Intramuscular, Nightly  PHENobarbital, 64.8 mg, Oral, BID   Or  PHENobarbital, 64.8 mg, Intramuscular, BID  potassium chloride, 40 mEq, Oral, Once  sertraline, 50 mg, Oral, Daily  sodium chloride, 10 mL, Intravenous, Q12H      Continuous Infusions:   PRN Meds:.  acetaminophen    senna-docusate sodium **AND** polyethylene glycol **AND** bisacodyl **AND** bisacodyl    dextrose    dextrose    glucagon (human recombinant)    OLANZapine    sodium chloride    sodium chloride    sodium chloride    temazepam    Assessment & Plan   Assessment & Plan     Active Hospital Problems    Diagnosis  POA    **Unresponsiveness [R41.89]  Yes    Lactic acidosis [E87.20]  Yes    History of DVT (deep vein thrombosis) [Z86.718]  Not Applicable    Dementia with behavioral disturbance [F03.918]  Yes    Benign essential HTN [I10]  Yes    Type 2 diabetes mellitus with hyperglycemia, with long-term current use of insulin [E11.65, Z79.4]  Not Applicable    Gout [M10.9]  Yes    Hyperlipidemia [E78.5]  Yes    Peripheral neuropathy [G62.9]  Yes    Benign prostatic hyperplasia [N40.0]  Yes    Coronary artery disease involving native coronary artery of native heart [I25.10]  Yes      Resolved  Hospital Problems   No resolved problems to display.        Brief Hospital Course to date:  Too Tai is a 68 y.o. male [facility] significant for advanced dementia, CAD, diabetes mellitus type 2, HLD and PVD. Patient was recently admitted to this service 4/18/24 to 5/9/24 with agitation and behavioral disturbance. Was discharged to [facility] at [facility]. Returned to ED with increasing lethargy and unresponsive episode.     This patient's problems and plans were partially entered by my partner and updated as appropriate by me 05/14/24.     Unresponsive  Advanced dementia complicated by behavioral disturbance with superimposed sleep disturbance   -Discussed with Neurology, ok to transfer to [facility] today if they will take him   -Continue Phenobarb   -EEG negative for seizures     Uncontrolled diabetes mellitus type 2  - Appears not on any home meds for DM  - A1c 9.5%  - SSI with scheduled accu checks      HTN  - continue Amlodipine 10mg daily  - continue Lisinopril 30mg daily     Hypokalemia  -oral replacement ordered but patient refused     Lactic acidosis  -Lactic 10 on admission, normalized to 1.7 with 1L IVF, suspect spurious result.    Expected Discharge Location and Transportation: Back to [facility]   Expected Discharge   Expected Discharge Date: 5/14/2024; Expected Discharge Time:      DVT prophylaxis:  Mechanical DVT prophylaxis orders are present.         AM-PAC 6 Clicks Score (PT): 7 (05/14/24 1025)    CODE STATUS:   Code Status and Medical Interventions:   Ordered at: 05/11/24 1701     Medical Intervention Limits:    NO intubation (DNI)     Level Of Support Discussed With:    Health Care Surrogate     Code Status (Patient has no pulse and is not breathing):    No CPR (Do Not Attempt to Resuscitate)     Medical Interventions (Patient has pulse or is breathing):    Limited Support       Theresa Gallardo DO  05/14/24        Electronically signed by Theresa Gallardo DO at  24 1206          Physical Therapy Notes (most recent note)        Reba Garcia, PT at 24 1100  Version 1 of 1         Patient Name: Too Tai  : 1955    MRN: [id number]                              Today's Date: 2024       Admit Date: 2024    Visit Dx:     ICD-10-CM ICD-9-CM   1. Slowness and poor responsiveness  R46.4 780.99   2. History of dementia  Z86.59 V11.8   3. At risk for polypharmacy  Z91.89 V49.89   4. Hypotensive episode  I95.9 458.9   5. Elevated lactic acid level  R79.89 276.2   6. Benign essential HTN  I10 401.1   7. Agitation due to dementia  F03.911 294.21   8. Fall, initial encounter  W19.XXXA E888.9   9. Peripheral polyneuropathy  G62.9 356.9   10. Impaired functional mobility, balance, gait, and endurance  Z74.09 V49.89     Patient Active Problem List   Diagnosis    Cough    Benign essential HTN    Benign prostatic hyperplasia    Coronary artery disease involving native coronary artery of native heart    Chronic coronary artery disease    Type 2 diabetes mellitus with hyperglycemia, with long-term current use of insulin    Gout    Hyperlipidemia    History of heart attack    Peripheral neuropathy    Peripheral vascular disease    Spasm of muscle    Myoclonic jerking    Hypomagnesemia    Arteriosclerosis of coronary artery    Dementia with behavioral disturbance    Frequent falls    History of DVT (deep vein thrombosis)    Chronic anticoagulation    AMS (altered mental status)    Falls    Bacteriuria    Cystitis    Agitation due to dementia    Dementia with behavioral disturbance    Unresponsiveness    Lactic acidosis     Past Medical History:   Diagnosis Date    Coronary artery disease     Dementia     Diabetes mellitus     Hyperlipidemia     Peripheral vascular disease      Past Surgical History:   Procedure Laterality Date    CORONARY ARTERY BYPASS GRAFT      FEMORAL ARTERY - POPLITEAL ARTERY BYPASS GRAFT        General Information       Row Name  05/14/24 1100          Physical Therapy Time and Intention    Document Type evaluation  -SD     Mode of Treatment physical therapy  -SD       Row Name 05/14/24 1100          General Information    Patient Profile Reviewed yes  -SD     Prior Level of Function max assist:;bed mobility;transfer;w/c or scooter;ADL's  Pt w/ [facility] 4/19-5/9, prior to admission pt ambulating household distances w/ SBA, toileting/grooming/self-feeding, req A for dressing only. Since d/c from hospital pt has req x2 person A for txfrs to/from w/c and max A/dep for ADLs.  -SD     Existing Precautions/Restrictions fall;other (see comments)  baseline dementia  -SD     Barriers to Rehab medically complex;previous functional deficit;cognitive status  -SD       Row Name 05/14/24 1100          Living Environment    People in Home facility resident  memory care  -SD       Row Name 05/14/24 1100          Home Main Entrance    Number of Stairs, Main Entrance none  -SD       Row Name 05/14/24 1100          Stairs Within Home, Primary    Number of Stairs, Within Home, Primary none  -SD       Row Name 05/14/24 1100          Cognition    Orientation Status (Cognition) oriented to;person;unable/difficult to assess  -SD       Row Name 05/14/24 1100          Safety Issues, Functional Mobility    Safety Issues Affecting Function (Mobility) ability to follow commands;awareness of need for assistance;friction/shear risk;impulsivity;insight into deficits/self-awareness;judgment;sequencing abilities;safety precautions follow-through/compliance;safety precaution awareness;problem-solving  -SD     Impairments Affecting Function (Mobility) balance;cognition;endurance/activity tolerance;motor control;muscle tone abnormal;strength;postural/trunk control  -SD               User Key  (r) = Recorded By, (t) = Taken By, (c) = Cosigned By      Initials Name Provider Type    SD Reba Garcia, PT Physical Therapist                   Mobility       Row Name 05/14/24 9423  "         Bed Mobility    Bed Mobility sit-supine;supine-sit  -SD     Supine-Sit Sharp (Bed Mobility) maximum assist (25% patient effort);2 person assist;verbal cues;nonverbal cues (demo/gesture)  -SD     Sit-Supine Sharp (Bed Mobility) maximum assist (25% patient effort);2 person assist;nonverbal cues (demo/gesture);verbal cues  -SD     Assistive Device (Bed Mobility) bed rails;draw sheet;head of bed elevated  -SD     Comment, (Bed Mobility) max cues for sequencing  -SD       Row Name 05/14/24 1441          Transfers    Comment, (Transfers) STS from EOB x5 reps with gait belt and UE support. Son present and supportive, able to assist with transfer with patient responding well to family involvement. Pt unable to reach full trunk and knee extension, dem fear of falling.  -SD       Row Name 05/14/24 1441          Sit-Stand Transfer    Sit-Stand Sharp (Transfers) maximum assist (25% patient effort);2 person assist;verbal cues  -SD     Assistive Device (Sit-Stand Transfers) other (see comments)  -SD       Row Name 05/14/24 1441          Gait/Stairs (Locomotion)    Sharp Level (Gait) not tested  -SD               User Key  (r) = Recorded By, (t) = Taken By, (c) = Cosigned By      Initials Name Provider Type    Reba Phelan PT Physical Therapist                   Obj/Interventions       Row Name 05/14/24 1444          Range of Motion Comprehensive    General Range of Motion lower extremity range of motion deficits identified  -SD     Comment, General Range of Motion Pt guarding LE\"s  -SD       Row Name 05/14/24 1444          Strength Comprehensive (MMT)    General Manual Muscle Testing (MMT) Assessment upper extremity strength deficits identified  -SD     Comment, General Manual Muscle Testing (MMT) Assessment unable to formally assess due to cognition  -SD               User Key  (r) = Recorded By, (t) = Taken By, (c) = Cosigned By      Initials Name Provider Type    LANG Garcia" Reba, PT Physical Therapist                   Goals/Plan       Row Name 05/14/24 1447          Bed Mobility Goal 1 (PT)    Activity/Assistive Device (Bed Mobility Goal 1, PT) sit to supine;supine to sit  -SD     [place] Level/Cues Needed (Bed Mobility Goal 1, PT) moderate assist (50-74% patient effort)  -SD     Time Frame (Bed Mobility Goal 1, PT) long term goal (LTG);2 weeks  -SD       [facility] Name 05/14/24 1447          Transfer Goal 1 (PT)    Activity/Assistive Device (Transfer Goal 1, PT) sit-to-stand/stand-to-sit;bed-to-chair/chair-to-bed  -SD     [place] Level/Cues Needed (Transfer Goal 1, PT) moderate assist (50-74% patient effort)  -SD     Time Frame (Transfer Goal 1, PT) long term goal (LTG);2 weeks  -SD       [facility] Name 05/14/24 1447          Gait Training Goal 1 (PT)    Activity/Assistive Device (Gait Training Goal 1, PT) gait (walking locomotion)  -SD     [place] Level (Gait Training Goal 1, PT) moderate assist (50-74% patient effort)  -SD     Distance (Gait Training Goal 1, PT) 15ft  -SD     Time Frame (Gait Training Goal 1, PT) long term goal (LTG);2 weeks  -SD       [facility] Name 05/14/24 1447          Therapy Assessment/Plan (PT)    Planned Therapy Interventions (PT) balance training;bed mobility training;gait training;home exercise program;patient/family education;neuromuscular re-education;transfer training;strengthening;ROM (range of motion)  -SD               User Key  (r) = Recorded By, (t) = Taken By, (c) = Cosigned By      Initials Name Provider Type    Reba Phelan, TRACE Physical Therapist                   Clinical Impression       Row Name 05/14/24 1443          Pain Scale: FACES Pre/Post-Treatment    Pain: FACES Scale, Pretreatment 4-->hurts little more  -SD     Posttreatment Pain Rating 4-->hurts little more  -SD     Pain Location generalized  -SD       Row Name 05/14/24 1443          Plan of Care Review    Plan of Care Reviewed With patient;son  -SD     Outcome  Evaluation Pt presents with weakness, decreased ROM, decreased functional mobility independence and is below baseline level of function for mobility. Pt t/f STS from EOB x5 reps with maxA x2 gait belt and UE support. Son present and supportive, able to assist with transfer with patient responding well to family involvement. Pt will benefit from IPPT services to address limitations. PT rec d/c SNF rehab.  -SD       Row Name 05/14/24 1444          Therapy Assessment/Plan (PT)    Patient/Family Therapy Goals Statement (PT) return to memory care  -SD     Rehab Potential (PT) fair, will monitor progress closely  -SD     Criteria for Skilled Interventions Met (PT) yes;skilled treatment is necessary  -SD     Therapy Frequency (PT) daily  -SD     Predicted Duration of Therapy Intervention (PT) 2wks  -SD       Row Name 05/14/24 1444          Positioning and Restraints    Pre-Treatment Position in bed  -SD     Post Treatment Position bed  -SD     In Bed notified nsg;fowlers;call light within reach;encouraged to call for assist;exit alarm on;with family/caregiver;heels elevated  -SD               User Key  (r) = Recorded By, (t) = Taken By, (c) = Cosigned By      Initials Name Provider Type    Reba Phelan, PT Physical Therapist                   Outcome Measures       Row Name 05/14/24 1448 05/14/24 1025       How much help from another person do you currently need...    Turning from your back to your side while in flat bed without using bedrails? 2  -SD 2  -MA    Moving from lying on back to sitting on the side of a flat bed without bedrails? 2  -SD 1  -MA    Moving to and from a bed to a chair (including a wheelchair)? 1  -SD 1  -MA    Standing up from a chair using your arms (e.g., wheelchair, bedside chair)? 2  -SD 1  -MA    Climbing 3-5 steps with a railing? 1  -SD 1  -MA    To walk in hospital room? 1  -SD 1  -MA    AM-PAC 6 Clicks Score (PT) 9  -SD 7  -MA    Highest Level of Mobility Goal 3 --> Sit at edge  of bed  -SD 2 --> Bed activities/dependent transfer  -MA      Row Name 05/14/24 1448 05/14/24 1141       Functional Assessment    Outcome Measure Options AM-PAC 6 Clicks Basic Mobility (PT)  -SD AM-PAC 6 Clicks Daily Activity (OT)  -              User Key  (r) = Recorded By, (t) = Taken By, (c) = Cosigned By      Initials Name Provider Type    SD Reba Garcia, PT Physical Therapist    Steve Jacobson, RN Registered Nurse    Jenny Barnes OT Occupational Therapist                                 Physical Therapy Education       Title: PT OT SLP Therapies (In Progress)       Topic: Physical Therapy (In Progress)       Point: Mobility training (In Progress)       Learning Progress Summary             Patient Acceptance, E, NR by SD at 5/14/2024 1448   Family Acceptance, E, NR by SD at 5/14/2024 1448                         Point: Home exercise program (In Progress)       Learning Progress Summary             Patient Acceptance, E, NR by SD at 5/14/2024 1448   Family Acceptance, E, NR by SD at 5/14/2024 1448                         Point: Body mechanics (In Progress)       Learning Progress Summary             Patient Acceptance, E, NR by SD at 5/14/2024 1448   Family Acceptance, E, NR by SD at 5/14/2024 1448                         Point: Precautions (In Progress)       Learning Progress Summary             Patient Acceptance, E, NR by SD at 5/14/2024 1448   Family Acceptance, E, NR by SD at 5/14/2024 1448                                         User Key       Initials Effective Dates Name Provider Type Discipline    SD 03/13/23 -  Reba Garcia, PT Physical Therapist PT                  PT Recommendation and Plan  Planned Therapy Interventions (PT): balance training, bed mobility training, gait training, home exercise program, patient/family education, neuromuscular re-education, transfer training, strengthening, ROM (range of motion)  Plan of Care Reviewed With: patient, son  Outcome  Evaluation: Pt presents with weakness, decreased ROM, decreased functional mobility independence and is below baseline level of function for mobility. Pt t/f STS from EOB x5 reps with maxA x2 gait belt and UE support. Son present and supportive, able to assist with transfer with patient responding well to family involvement. Pt will benefit from IPPT services to address limitations. PT rec d/c SNF rehab.     Time Calculation:   PT Evaluation Complexity  History, PT Evaluation Complexity: 3 or more personal factors and/or comorbidities  Examination of Body Systems (PT Eval Complexity): total of 3 or more elements  Clinical Presentation (PT Evaluation Complexity): evolving  Clinical Decision Making (PT Evaluation Complexity): moderate complexity  Overall Complexity (PT Evaluation Complexity): moderate complexity     PT Charges       Row Name 24 1449             Time Calculation    Start Time 1100  -SD      PT Non-Billable Time (min) 46 min  -SD      PT Received On 24  -SD      PT Goal Re-Cert Due Date 24  -SD                User Key  (r) = Recorded By, (t) = Taken By, (c) = Cosigned By      Initials Name Provider Type    SD Reba Garcia, PT Physical Therapist                  Therapy Charges for Today       Code Description Service Date Service Provider Modifiers Qty    95735033204 HC PT EVAL MOD COMPLEXITY 4 2024 Reba Garcia, PT GP 1            PT G-Codes  Outcome Measure Options: AM-PAC 6 Clicks Basic Mobility (PT)  AM-PAC 6 Clicks Score (PT): 9  AM-PAC 6 Clicks Score (OT): 10  PT Discharge Summary  Anticipated Discharge Disposition (PT): skilled nursing facility    Reba Garcia PT  2024      Electronically signed by Reba Garcia, PT at 24 1450          Occupational Therapy Notes (most recent note)        Jenny Gallardo, OT at 24 1043          Patient Name: Too Tai  : 1955    MRN: [id number]                               Today's Date: 5/14/2024       Admit Date: 5/11/2024    Visit Dx:     ICD-10-CM ICD-9-CM   1. Slowness and poor responsiveness  R46.4 780.99   2. History of dementia  Z86.59 V11.8   3. At risk for polypharmacy  Z91.89 V49.89   4. Hypotensive episode  I95.9 458.9   5. Elevated lactic acid level  R79.89 276.2   6. Benign essential HTN  I10 401.1   7. Agitation due to dementia  F03.911 294.21   8. Fall, initial encounter  W19.XXXA E888.9   9. Peripheral polyneuropathy  G62.9 356.9     Patient Active Problem List   Diagnosis    Cough    Benign essential HTN    Benign prostatic hyperplasia    Coronary artery disease involving native coronary artery of native heart    Chronic coronary artery disease    Type 2 diabetes mellitus with hyperglycemia, with long-term current use of insulin    Gout    Hyperlipidemia    History of heart attack    Peripheral neuropathy    Peripheral vascular disease    Spasm of muscle    Myoclonic jerking    Hypomagnesemia    Arteriosclerosis of coronary artery    Dementia with behavioral disturbance    Frequent falls    History of DVT (deep vein thrombosis)    Chronic anticoagulation    AMS (altered mental status)    Falls    Bacteriuria    Cystitis    Agitation due to dementia    Dementia with behavioral disturbance    Unresponsiveness    Lactic acidosis     Past Medical History:   Diagnosis Date    Coronary artery disease     Dementia     Diabetes mellitus     Hyperlipidemia     Peripheral vascular disease      Past Surgical History:   Procedure Laterality Date    CORONARY ARTERY BYPASS GRAFT      FEMORAL ARTERY - POPLITEAL ARTERY BYPASS GRAFT        General Information       Row Name 05/14/24 1133          General Information    Prior Level of Function max assist:;bed mobility;transfer;w/c or scooter;ADL's  Pt w/ [facility] 4/19-5/9, prior to admission pt ambulating household distances w/ SBA, toileting/grooming/self-feeding, req A for dressing only. Since d/c from hospital pt has req x2 person A for  txfrs to/from w/c and max A/dep for ADLs.  -     Existing Precautions/Restrictions fall;other (see comments)  baseline dementia  -     Barriers to Rehab medically complex;previous functional deficit;cognitive status;hearing deficit  -       Row Name 05/14/24 1133          Living Environment    People in Home facility resident  [facility]  -       Row Name 05/14/24 1133          Home Main Entrance    Number of Stairs, Main Entrance none  -       Row Name 05/14/24 1133          Stairs Within Home, Primary    Number of Stairs, Within Home, Primary none  -       Row Name 05/14/24 1133          Cognition    Orientation Status (Cognition) oriented to;person;unable/difficult to assess  -       Row Name 05/14/24 1133          Safety Issues, Functional Mobility    Safety Issues Affecting Function (Mobility) awareness of need for assistance;insight into deficits/self-awareness;safety precaution awareness;safety precautions follow-through/compliance;sequencing abilities  -     Impairments Affecting Function (Mobility) balance;cognition;endurance/activity tolerance;motor control;muscle tone abnormal;strength  -     Cognitive Impairments, Mobility Safety/Performance awareness, need for assistance;insight into deficits/self-awareness;safety precaution awareness;safety precaution follow-through;sequencing abilities  -               User Key  (r) = Recorded By, (t) = Taken By, (c) = Cosigned By      Initials Name Provider Type     Jenny Gallardo OT Occupational Therapist                     Mobility/ADL's       Row Name 05/14/24 1136          Bed Mobility    Bed Mobility supine-sit  -     Supine-Sit [place] (Bed Mobility) maximum assist (25% patient effort);2 person assist;verbal cues  -     Bed Mobility, Safety Issues cognitive deficits limit understanding;decreased use of arms for pushing/pulling;decreased use of legs for bridging/pushing  -     Assistive Device (Bed  Mobility) bed rails;draw sheet;head of bed elevated  -     Comment, (Bed Mobility) Upon sitting EOB pt demo'd ability to maintain static sitting balance w/ CGA  -[facility] Name 05/14/24 1136          Transfers    Transfers sit-stand transfer;stand-sit transfer  -     Comment, (Transfers) STSx3  -[facility] 05/14/24 1136          Sit-Stand Transfer    Sit-Stand [place] (Transfers) maximum assist (25% patient effort);2 person assist;verbal cues  -     Assistive Device (Sit-Stand Transfers) other (see comments)  -[facility] Name 05/14/24 1136          Stand-Sit Transfer    Stand-Sit [place] (Transfers) maximum assist (25% patient effort);2 person assist;verbal cues  -     Assistive Device (Stand-Sit Transfers) other (see comments)  -[facility] Name 05/14/24 1136          Activities of Daily Living    BADL Assessment/Intervention lower body dressing;upper body dressing;grooming;feeding  -[facility]       Row Name 05/14/24 1136          Lower Body Dressing Assessment/Training    [place] Level (Lower Body Dressing) don;socks;dependent (less than 25% patient effort);verbal cues  -     Position (Lower Body Dressing) supine  -[facility] Name 05/14/24 1136          Upper Body Dressing Assessment/Training    [place] Level (Upper Body Dressing) don;pajama/robe;maximum assist (25% patient effort);verbal cues  -     Position (Upper Body Dressing) sitting up in bed  -[facility] Name 05/14/24 1136          Grooming Assessment/Training    [place] Level (Grooming) wash face, hands;moderate assist (50% patient effort);verbal cues;nonverbal cues (demo/gesture)  -     Assistive Devices (Grooming) hand over hand  -     Position (Grooming) sitting up in bed  -[facility] Name 05/14/24 1136          Self-Feeding Assessment/Training    [place] Level (Feeding) scoop food and bring to mouth;maximum assist (25% patient effort);liquids to mouth;minimum assist (75% patient effort);verbal  cues  -     Position (Self-Feeding) sitting up in bed;unsupported sitting  -               User Key  (r) = Recorded By, (t) = Taken By, (c) = Cosigned By      Initials Name Provider Type     Jenny Gallardo OT Occupational Therapist                   Obj/Interventions       [facility] Name 05/14/24 1138          Sensory Assessment (Somatosensory)    Sensory Assessment (Somatosensory) UE sensation intact  -[facility] 05/14/24 1138          Vision Assessment/Intervention    Visual Impairment/Limitations WFL  -[facility] 05/14/24 1138          Range of Motion Comprehensive    General Range of Motion bilateral upper extremity ROM WFL  -[facility] 05/14/24 1138          Strength Comprehensive (MMT)    General Manual Muscle Testing (MMT) Assessment upper extremity strength deficits identified  -     Comment, General Manual Muscle Testing (MMT) Assessment Formal assessment limited d/t pt cognitive status, based on functional activity BUE grossly 4/5  -[facility]       Row Name 05/14/24 1138          Shoulder (Therapeutic Exercise)    Shoulder (Therapeutic Exercise) AAROM (active assistive range of motion)  -     Shoulder AAROM (Therapeutic Exercise) bilateral;flexion;extension;aBduction;aDduction;10 repetitions;sitting  -[facility] 05/14/24 1138          Elbow/Forearm (Therapeutic Exercise)    Elbow/Forearm (Therapeutic Exercise) AAROM (active assistive range of motion)  -     Elbow/Forearm AAROM (Therapeutic Exercise) bilateral;flexion;extension;10 repetitions;sitting  -[facility] 05/14/24 1138          Wrist (Therapeutic Exercise)    Wrist (Therapeutic Exercise) AAROM (active assistive range of motion)  -     Wrist AAROM (Therapeutic Exercise) bilateral;flexion;extension;10 repetitions  -[facility]       Row Name 05/14/24 1138          Hand (Therapeutic Exercise)    Hand (Therapeutic Exercise) AROM (active range of motion)  -     Hand AROM/AAROM (Therapeutic Exercise) bilateral;AAROM  (active assistive range of motion);finger flexion;finger extension;10 repetitions  -[facility] Name 05/14/24 1138          Motor Skills    Therapeutic Exercise shoulder;elbow/forearm;wrist;hand  -[facility] Name 05/14/24 1138          Balance    Balance Assessment sitting static balance;sitting dynamic balance;sit to stand dynamic balance;standing static balance  -     Static Sitting Balance contact guard  -     Dynamic Sitting Balance minimal assist  -     Position, Sitting Balance unsupported;sitting edge of bed  -     Sit to Stand Dynamic Balance maximum assist;2-person assist;verbal cues  -     Static Standing Balance maximum assist;2-person assist;verbal cues  -     Position/Device Used, Standing Balance supported  -     Balance Interventions sitting;sit to stand;occupation based/functional task  -               User Key  (r) = Recorded By, (t) = Taken By, (c) = Cosigned By      Initials Name Provider Type     Jenny Gallardo OT Occupational Therapist                   Goals/Plan       [facility] Name 05/14/24 1141          Bed Mobility Goal 1 (OT)    Activity/Assistive Device (Bed Mobility Goal 1, OT) sit to supine;supine to sit  -     [place] Level/Cues Needed (Bed Mobility Goal 1, OT) moderate assist (50-74% patient effort);verbal cues required  -     Time Frame (Bed Mobility Goal 1, OT) long term goal (LTG);10 days  -     Progress/Outcomes (Bed Mobility Goal 1, OT) goal ongoing  -[facility] Name 05/14/24 1141          Transfer Goal 1 (OT)    Activity/Assistive Device (Transfer Goal 1, OT) sit-to-stand/stand-to-sit;bed-to-chair/chair-to-bed;commode, bedside without drop arms  -     [place] Level/Cues Needed (Transfer Goal 1, OT) moderate assist (50-74% patient effort);verbal cues required  -     Time Frame (Transfer Goal 1, OT) long term goal (LTG);10 days  -     Progress/Outcome (Transfer Goal 1, OT) goal ongoing  -[facility] Name 05/14/24 1141          Grooming  Goal 1 (OT)    Activity/Device (Grooming Goal 1, OT) oral care;wash face, hands  -     [place] (Grooming Goal 1, OT) minimum assist (75% or more patient effort);verbal cues required  -     Time Frame (Grooming Goal 1, OT) long term goal (LTG);10 days  -     Progress/Outcome (Grooming Goal 1, OT) goal ongoing  -[facility] Name 05/14/24 1141          Self-Feeding Goal 1 (OT)    Activity/Device (Self-Feeding Goal 1, OT) liquids to mouth;scoop food and bring to mouth  -     [place] Level/Cues Needed (Self-Feeding Goal 1, OT) minimum assist (75% or more patient effort);verbal cues required  -     Time Frame (Self-Feeding Goal 1, OT) long term goal (LTG);10 days  -     Progress/Outcomes (Self-Feeding Goal 1, OT) goal ongoing  -[facility] Name 05/14/24 1141          Therapy Assessment/Plan (OT)    Planned Therapy Interventions (OT) activity tolerance training;adaptive equipment training;BADL retraining;functional balance retraining;IADL retraining;occupation/activity based interventions;patient/caregiver education/training;ROM/therapeutic exercise;strengthening exercise;transfer/mobility retraining  -               User Key  (r) = Recorded By, (t) = Taken By, (c) = Cosigned By      Initials Name Provider Type     Jenny Gallardo OT Occupational Therapist                   Clinical Impression       [facility] Name 05/14/24 1139          Pain Assessment    Pain Intervention(s) Repositioned;Ambulation/increased activity  -     Additional Documentation Pain Scale: FACES Pre/Post-Treatment (Group)  -MC       Row Name 05/14/24 1139          Pain Scale: FACES Pre/Post-Treatment    Pain: FACES Scale, Pretreatment 4-->hurts little more  -     Posttreatment Pain Rating 4-->hurts little more  -     Pain Location generalized  -     Pain Location - hand  -       Row Name 05/14/24 1136          Plan of Care Review    Plan of Care Reviewed With patient;family  -     Outcome Evaluation Pt presents w/  generalized weakness, decreased functional endurance, impaired cognition, and balance deficits limiting his ADL independence. Pt would benefit from continued skilled IPOT services to address current functional deficits. Rec SNF at d/c.  -       Row Name 05/14/24 1139          Therapy Assessment/Plan (OT)    Rehab Potential (OT) good, to achieve stated therapy goals  -     Criteria for Skilled Therapeutic Interventions Met (OT) yes;skilled treatment is necessary  -     Therapy Frequency (OT) daily  -       Row Name 05/14/24 1139          Therapy Plan Review/Discharge Plan (OT)    Anticipated Discharge Disposition (OT) skilled nursing facility  -       Row Name 05/14/24 1139          Vital Signs    O2 Delivery Pre Treatment room air  -     O2 Delivery Intra Treatment room air  -     O2 Delivery Post Treatment room air  -     Pre Patient Position Supine  -     Intra Patient Position Standing  -     Post Patient Position Sitting  -[facility] Name 05/14/24 1139          Positioning and Restraints    Pre-Treatment Position in bed  -     Post Treatment Position bed  -     In Bed sitting EOB;with PT  -               User Key  (r) = Recorded By, (t) = Taken By, (c) = Cosigned By      Initials Name Provider Type    Jenny Barnes, OT Occupational Therapist                   Outcome Measures       Row Name 05/14/24 1141          How much help from another is currently needed...    Putting on and taking off regular lower body clothing? 1  -MC     Bathing (including washing, rinsing, and drying) 2  -MC     Toileting (which includes using toilet bed pan or urinal) 1  -MC     Putting on and taking off regular upper body clothing 2  -MC     Taking care of personal grooming (such as brushing teeth) 2  -MC     Eating meals 2  -MC     AM-PAC 6 Clicks Score (OT) 10  -       Row Name 05/14/24 1025          How much help from another person do you currently need...    Turning from your back to your  side while in flat bed without using bedrails? 2  -MA     Moving from lying on back to sitting on the side of a flat bed without bedrails? 1  -MA     Moving to and from a bed to a chair (including a wheelchair)? 1  -MA     Standing up from a chair using your arms (e.g., wheelchair, bedside chair)? 1  -MA     Climbing 3-5 steps with a railing? 1  -MA     To walk in hospital room? 1  -MA     AM-PAC 6 Clicks Score (PT) 7  -MA     Highest Level of Mobility Goal 2 --> Bed activities/dependent transfer  -MA       Row Name 05/14/24 1141          Functional Assessment    Outcome Measure Options AM-PAC 6 Clicks Daily Activity (OT)  -               User Key  (r) = Recorded By, (t) = Taken By, (c) = Cosigned By      Initials Name Provider Type    Steve Jacobson, RN Registered Nurse    Jenny Barnes OT Occupational Therapist                    Occupational Therapy Education       Title: PT OT SLP Therapies (In Progress)       Topic: Occupational Therapy (In Progress)       Point: ADL training (In Progress)       Description:   Instruct learner(s) on proper safety adaptation and remediation techniques during self care or transfers.   Instruct in proper use of assistive devices.                  Learning Progress Summary             Patient Acceptance, E, NR by  at 5/14/2024 1142                         Point: Home exercise program (Not Started)       Description:   Instruct learner(s) on appropriate technique for monitoring, assisting and/or progressing therapeutic exercises/activities.                  Learner Progress:  Not documented in this visit.              Point: Precautions (In Progress)       Description:   Instruct learner(s) on prescribed precautions during self-care and functional transfers.                  Learning Progress Summary             Patient Acceptance, E, NR by  at 5/14/2024 1142                         Point: Body mechanics (In Progress)       Description:   Instruct learner(s) on  proper positioning and spine alignment during self-care, functional mobility activities and/or exercises.                  Learning Progress Summary             Patient Acceptance, E, NR by  at 5/14/2024 1142                                         User Key       Initials Effective Dates Name Provider Type Discipline     10/14/22 -  Jenny Gallardo OT Occupational Therapist OT                  OT Recommendation and Plan  Planned Therapy Interventions (OT): activity tolerance training, adaptive equipment training, BADL retraining, functional balance retraining, IADL retraining, occupation/activity based interventions, patient/caregiver education/training, ROM/therapeutic exercise, strengthening exercise, transfer/mobility retraining  Therapy Frequency (OT): daily  Plan of Care Review  Plan of Care Reviewed With: patient, family  Outcome Evaluation: Pt presents w/ generalized weakness, decreased functional endurance, impaired cognition, and balance deficits limiting his ADL independence. Pt would benefit from continued skilled IPOT services to address current functional deficits. Rec SNF at d/c.     Time Calculation:   Evaluation Complexity (OT)  Review Occupational Profile/Medical/Therapy History Complexity: expanded/moderate complexity  Assessment, Occupational Performance/Identification of Deficit Complexity: 3-5 performance deficits  Clinical Decision Making Complexity (OT): detailed assessment/moderate complexity  Overall Complexity of Evaluation (OT): moderate complexity     Time Calculation- OT       Row Name 05/14/24 1142             Time Calculation- OT    OT Start Time 1043  -      OT Received On 05/14/24  -      OT Goal Re-Cert Due Date 05/24/24  -         Timed Charges    38119 - OT Therapeutic Activity Minutes 6  -      86334 - OT Self Care/Mgmt Minutes 9  -         Untimed Charges    OT Eval/Re-eval Minutes 31  -         Total Minutes    Timed Charges Total Minutes 15  -       Untimed Charges Total Minutes 31  -       Total Minutes 46  -                User Key  (r) = Recorded By, (t) = Taken By, (c) = Cosigned By      Initials Name Provider Type    Jneny Barnes OT Occupational Therapist                  Therapy Charges for Today       Code Description Service Date Service Provider Modifiers Qty    34576564253  OT SELF CARE/MGMT/TRAIN EA 15 MIN 5/14/2024 Jenny Gallardo OT GO 1    [id number]  OT EVAL MOD COMPLEXITY 3 5/14/2024 Jenny Gallardo OT GO 1                 Jenny Gallardo OT  5/14/2024    Electronically signed by Jenny Gallardo OT at 05/14/24 1146

## 2024-05-15 NOTE — DISCHARGE PLACEMENT REQUEST
"Too Richter (68 y.o. Male)       Date of Birth   1955    Social Security Number       Address   [facility] [address] [place] [address]    Home Phone   [phone]    MRN   [id number]       Confucianism   Scientology    Marital Status                               Admission Date   24    Admission Type   Emergency    Admitting Provider   Theresa Gallardo DO    Attending Provider   Theresa Gallardo DO    Department, Room/Bed   [facility] 5B, N537/1       Discharge Date       Discharge Disposition   Skilled Nursing Facility (DC - External)    Discharge Destination                                 Attending Provider: Theresa Gallardo DO    Allergies: Bactrim [Sulfamethoxazole-trimethoprim], Adhesive Tape, Neosporin [Neomycin-bacitracin Zn-polymyx]    Isolation: None   Infection: None   Code Status: No CPR    Ht: 188 cm (74\")   Wt: 97.5 kg (215 lb)    Admission Cmt: None   Principal Problem: Unresponsiveness [R41.89]                   Active Insurance as of 2024       Primary Coverage       Payor Plan Insurance Group Employer/Plan Group    AETNA MEDICARE REPLACEMENT AETNA MED ADV PPO [id number]       Payor Plan Address Payor Plan Phone Number Payor Plan Fax Number Effective Dates    [address] [phone]  2024 - None Entered    [facility] [id number]         Subscriber Name Subscriber Birth Date Member ID       TOO RICHTER 1955 [id number]                     Emergency Contacts        (Rel.) Home Phone Work Phone Mobile Phone    NEELAMWILLIE GARCIA (Spouse) [phone] -- [phone]    Ritchie Richter (Son) [phone] -- [phone]    Meg Bustillo (Relative) [phone] -- [phone]                 Discharge Summary        Theresa Gallardo DO at 05/15/24 1233              [facility]  DISCHARGE SUMMARY    Patient Name: Too Richter  : 1955  MRN: [id number]    Date of Admission: 2024  9:36 " PM  Date of Discharge:  5/15/2024  Primary Care Physician: Des Geller MD    Consults       Date and Time Order Name Status Description    5/12/2024  7:28 AM Inpatient Neurology Consult General Completed     4/23/2024  2:30 PM Inpatient Palliative Care MD Consult Completed     4/19/2024  5:42 AM Inpatient Neurology Consult General Completed             Hospital Course       Active Hospital Problems    Diagnosis  POA    History of DVT (deep vein thrombosis) [Z86.718]  Not Applicable    Dementia with behavioral disturbance [F03.918]  Yes    Benign essential HTN [I10]  Yes    Type 2 diabetes mellitus with hyperglycemia, with long-term current use of insulin [E11.65, Z79.4]  Not Applicable    Gout [M10.9]  Yes    Hyperlipidemia [E78.5]  Yes    Peripheral neuropathy [G62.9]  Yes    Benign prostatic hyperplasia [N40.0]  Yes    Coronary artery disease involving native coronary artery of native heart [I25.10]  Yes      Resolved Hospital Problems    Diagnosis Date Resolved POA    **Unresponsiveness [R41.89] 05/15/2024 Yes    Lactic acidosis [E87.20] 05/15/2024 Yes          Hospital Course:  Too Tai is a 68 y.o. male significant for advanced dementia, CAD, diabetes mellitus type 2, HLD and PVD. Patient was recently admitted to this service 4/18/24 to 5/9/24 with agitation and behavioral disturbance. Was discharged to [facility]. Returned to ED with increasing lethargy and unresponsive episode.      Unresponsive episode -> resolved   Advanced dementia complicated by behavioral disturbance with superimposed sleep disturbance   -Continue Phenobarbital 64.8mg BID and 129.6mg nightly   -PRN Restoril 15mg nightly   -Continue Melatonin nightly   -EEG negative for seizures  -Discontinued Seroquel, Depakote     Uncontrolled diabetes mellitus type 2  - A1c 9.5%  - Lantus 15 units BID      HTN  - continue Amlodipine 10mg daily  - continue Lisinopril 30mg daily      Hypokalemia  -oral replacement ordered but  patient refused      Lactic acidosis  -Lactic 10 on admission, normalized to 1.7 with 1L IVF, suspect spurious result.    Discharge Follow Up Recommendations for outpatient labs/diagnostics:  -PCP 1 week  -Neurology as scheduled     Day of Discharge     HPI:   Patient seen and examined. No issues overnight. Wife at bedside this AM.     Review of Systems  Gen- No fevers, chills  CV- No chest pain, palpitations  Resp- No cough, dyspnea  GI- No N/V/D, abd pain    Vital Signs:   Temp:  [96.7 °F (35.9 °C)-98.8 °F (37.1 °C)] 96.7 °F (35.9 °C)  Heart Rate:  [55-78] 78  Resp:  [16-18] 18  BP: (118-121)/(54-60) 121/60      Physical Exam:  Constitutional: No acute distress, awake, alert  HENT: NCAT, mucous membranes moist  Respiratory: Clear to auscultation bilaterally, respiratory effort normal   Cardiovascular: RRR, no murmurs, rubs, or gallops  Gastrointestinal: Positive bowel sounds, soft, nontender, nondistended  Musculoskeletal: No bilateral ankle edema  Psychiatric: calm, cooperative  Neurologic: No focal deficits  Skin: No rashes     Pertinent  and/or Most Recent Results     LAB RESULTS:      Lab 05/12/24  1111 05/12/24  0037 05/11/24 2141 05/09/24 0443   WBC 8.02  --  7.33 8.44   HEMOGLOBIN 12.5*  --  10.8* 14.1   HEMATOCRIT 37.0*  --  33.8* 42.2   PLATELETS 259  --  214 332   NEUTROS ABS 4.90  --  4.05 3.75   IMMATURE GRANS (ABS) 0.02  --  0.02 0.03   LYMPHS ABS 1.93  --  2.18 2.86   MONOS ABS 0.70  --  0.71 0.78   EOS ABS 0.43*  --  0.32 0.93*   MCV 93.4  --  96.3 93.0   PROCALCITONIN  --   --  0.04  --    LACTATE  --  1.7 10.3*  --          Lab 05/12/24  1111 05/11/24 2141 05/09/24 0443   SODIUM 140 138 142   POTASSIUM 3.3* 4.1 3.5   CHLORIDE 104 101 105   CO2 28.0 26.0 27.0   ANION GAP 8.0 11.0 10.0   BUN 23 22 23   CREATININE 0.77 0.83 0.80   EGFR 98.1 95.9 97.0   GLUCOSE 184* 226* 112*   CALCIUM 8.7 8.4* 8.8   MAGNESIUM 1.7 1.3* 1.9         Lab 05/12/24  1111 05/11/24  2141 05/09/24  0443   TOTAL PROTEIN  5.7* 5.1* 6.5   ALBUMIN 3.3* 3.0* 3.4*   GLOBULIN 2.4 2.1 3.1   ALT (SGPT) 21 18 28   AST (SGOT) 28 23 31   BILIRUBIN 0.6 0.5 0.5   ALK PHOS 102 91 113         Lab 05/11/24  2141   HSTROP T 25*                 Lab 05/11/24  2151   PH, ARTERIAL 7.445   PCO2, ARTERIAL 40.4   PO2 .0*   FIO2 100   HCO3 ART 27.7*   BASE EXCESS ART 3.4*   CARBOXYHEMOGLOBIN 1.1     Brief Urine Lab Results  (Last result in the past 365 days)        Color   Clarity   Blood   Leuk Est   Nitrite   Protein   CREAT   Urine HCG        05/12/24 0004 Yellow   Clear   Negative   Negative   Negative   30 mg/dL (1+)                 Microbiology Results (last 10 days)       Procedure Component Value - Date/Time    COVID PRE-OP / PRE-PROCEDURE SCREENING ORDER (NO ISOLATION) - Swab, Nasopharynx [[id number]]  (Normal) Collected: 05/12/24 0004    Lab Status: Final result Specimen: Swab from Nasopharynx Updated: 05/12/24 0124    Narrative:      The following orders were created for panel order COVID PRE-OP / PRE-PROCEDURE SCREENING ORDER (NO ISOLATION) - Swab, Nasopharynx.  Procedure                               Abnormality         Status                     ---------                               -----------         ------                     Respiratory Panel PCR w/...[[id number]]  Normal              Final result                 Please view results for these tests on the individual orders.    Respiratory Panel PCR w/COVID-19(SARS-CoV-2) AFTAB/LINA/ERNESTINE/PAD/COR/STANLEY In-House, NP Swab in UTM/VTM, 2 HR TAT - Swab, Nasopharynx [[id number]]  (Normal) Collected: 05/12/24 0004    Lab Status: Final result Specimen: Swab from Nasopharynx Updated: 05/12/24 0124     ADENOVIRUS, PCR Not Detected     Coronavirus 229E Not Detected     Coronavirus HKU1 Not Detected     Coronavirus NL63 Not Detected     Coronavirus OC43 Not Detected     COVID19 Not Detected     Human Metapneumovirus Not Detected     Human Rhinovirus/Enterovirus Not Detected     Influenza A PCR Not  Detected     Influenza B PCR Not Detected     Parainfluenza Virus 1 Not Detected     Parainfluenza Virus 2 Not Detected     Parainfluenza Virus 3 Not Detected     Parainfluenza Virus 4 Not Detected     RSV, PCR Not Detected     Bordetella pertussis pcr Not Detected     Bordetella parapertussis PCR Not Detected     Chlamydophila pneumoniae PCR Not Detected     Mycoplasma pneumo by PCR Not Detected    Narrative:      In the setting of a positive respiratory panel with a viral infection PLUS a negative procalcitonin without other underlying concern for bacterial infection, consider observing off antibiotics or discontinuation of antibiotics and continue supportive care. If the respiratory panel is positive for atypical bacterial infection (Bordetella pertussis, Chlamydophila pneumoniae, or Mycoplasma pneumoniae), consider antibiotic de-escalation to target atypical bacterial infection.    Blood Culture - Blood, Hand, Right [[id number]]  (Normal) Collected: 05/11/24 2152    Lab Status: Preliminary result Specimen: Blood from Hand, Right Updated: 05/14/24 2215     Blood Culture No growth at 3 days    Blood Culture - Blood, Arm, Left [[id number]]  (Normal) Collected: 05/11/24 2152    Lab Status: Preliminary result Specimen: Blood from Arm, Left Updated: 05/14/24 2215     Blood Culture No growth at 3 days            EEG    Result Date: 5/13/2024  Reason for referral: 68 y.o.male with altered mental status Technical Summary:  A 19 channel digital EEG was performed using the international 10-20 placement system, including eye leads and EKG leads. Duration: 24 minutes Findings: The patient is asleep at the beginning of the study.  Diffuse low to medium amplitude 4 to 7 Hz intermixed delta and theta activity are seen symmetrically over both hemispheres.  Occasional vertex waves are present.  Photic stimulation does not change the background.  Hyperventilation is not performed.  No focal features or epileptiform activity are  present. Video: Available Technical quality: Superior EKG: Regular, 60 bpm SUMMARY: Excessive drowsiness Mild generalized slow No focal features or epileptiform activity are seen     Diffuse cerebral dysfunction mild degree, nonspecific This report is transcribed using the Dragon dictation system.      CT Head Without Contrast    Result Date: 5/13/2024  CT HEAD WO CONTRAST Date of Exam: 5/12/2024 9:58 PM EDT Indication: AMS. Comparison: 5/11/2024 Technique: Axial CT images were obtained of the head without contrast administration.  Automated exposure control and iterative construction methods were used. Findings: There is no evidence of acute territorial infarction. There is no acute intracranial hemorrhage. There are no extra-axial collections. Ventricles and CSF spaces are symmetrically prominent. No mass effect nor hydrocephalus. Patchy subcortical and periventricular white matter hypodensities most likely reflect the sequela of chronic microvascular ischemic disease.  Paranasal sinuses and mastoid air cells are adequately aerated.  Osseous structures and orbits appear intact.     Impression: No acute intracranial process. No interval adverse change. Electronically Signed: Yanet Ramírez MD  5/13/2024 7:04 AM EDT  Workstation ID: [id number]    CT Head Without Contrast    Result Date: 5/11/2024  CT HEAD WO CONTRAST Date of Exam: 5/11/2024 10:03 PM EDT Indication: poorly responsive. Comparison: 4/19/2024. Technique: Axial CT images were obtained of the head without contrast administration.  Automated exposure control and iterative construction methods were used. Findings: There is no evidence of hemorrhage. There is no mass effect or midline shift. There is no extracerebral collection. Ventricles are normal in size and configuration for patient's stated age.  Posterior fossa is within normal limits. Calvarium and skull base appear intact.   Visualized sinuses show no air fluid levels. Visualized orbits are  unremarkable.     Impression: No acute intracranial process. Electronically Signed: Estrellita Finnegan MD  5/11/2024 10:11 PM EDT  Workstation ID: [id number]    XR Chest 1 View    Result Date: 5/11/2024  XR CHEST 1 VW Date of Exam: 5/11/2024 9:38 PM EDT Indication: Weak/Dizzy/AMS triage protocol Comparison: 4/19/2024. Findings: There are no airspace consolidations. No pleural fluid. No pneumothorax. The pulmonary vasculature appears within normal limits. The heart appears enlarged. Median sternotomy wires are present.. No acute osseous abnormality identified.     Impression: No acute cardiopulmonary process. Stable cardiomegaly. Electronically Signed: Estrellita Finnegan MD  5/11/2024 10:00 PM EDT  Workstation ID: [id number]             Results for orders placed during the hospital encounter of 03/27/24    Adult Transthoracic Echo Complete W/ Cont if Necessary Per Protocol    Interpretation Summary    Left ventricular systolic function is normal. Calculated left ventricular EF = 55.1% Normal left ventricular cavity size noted. Left ventricular wall thickness is consistent with mild concentric hypertrophy. Left ventricular diastolic function is consistent with (grade I) impaired relaxation.    The right ventricular cavity is mildly dilated. Normal right ventricular systolic function noted.    There is calcification of the aortic valve. The aortic valve appears trileaflet. Mild aortic valve regurgitation is present. No aortic valve stenosis is present.    Mitral annular calcification is present. Trace mitral valve regurgitation is present. No significant mitral valve stenosis is present.    The tricuspid valve is structurally normal with no stenosis present. Trace tricuspid valve regurgitation is present. Insufficient TR velocity profile to estimate the right ventricular systolic pressure.    Mild dilation of the sinuses of Valsalva is present. Mild dilation of the ascending aorta is present. Ascending aorta = 4.2 cm      Plan for  Follow-up of Pending Labs/Results: Inbox   Pending Labs       Order Current Status    Blood Culture - Blood, Arm, Left Preliminary result    Blood Culture - Blood, Hand, Right Preliminary result          Discharge Details        Discharge Medications        New Medications        Instructions Start Date   melatonin 5 MG tablet tablet   5 mg, Oral, Nightly             Changes to Medications        Instructions Start Date   lisinopril 20 MG tablet  Commonly known as: PRINIVIL,ZESTRIL  What changed: Another medication with the same name was removed. Continue taking this medication, and follow the directions you see here.   20 mg, Oral, Daily   Start Date: May 16, 2024     temazepam 15 MG capsule  Commonly known as: RESTORIL  What changed:   medication strength  how much to take  when to take this  reasons to take this   15 mg, Oral, Nightly PRN             Continue These Medications        Instructions Start Date   Accu-Chek Geri Plus w/Device kit   1 kit, Does not apply, 3 Times Daily      Accu-Chek Geri solution   1 bottle, In Vitro, As Needed      accu-chek soft touch lancets   Check sugars 3 times daily      Alcohol Prep pads   1 swab , Does not apply, 3 Times Daily      amLODIPine 10 MG tablet  Commonly known as: NORVASC   10 mg, Oral, Every 24 Hours Scheduled      B-D UF III MINI PEN NEEDLES 31G X 5 MM misc  Generic drug: Insulin Pen Needle   USE AS DIRECTED      cyanocobalamin 1000 MCG/ML injection   1,000 mcg, Intramuscular, Every 28 Days   Start Date: May 17, 2024     folic acid 1 MG tablet  Commonly known as: FOLVITE   1 mg, Oral, Daily      Glucagon 1 MG/0.2ML solution auto-injector   1 mg, Subcutaneous, Every 15 Minutes PRN      Lantus SoloStar 100 UNIT/ML injection pen  Generic drug: Insulin Glargine   20 Units, Subcutaneous, 2 Times Daily      PHENobarbital 64.8 MG tablet   129.6 mg, Oral, Nightly      PHENobarbital 64.8 MG tablet   64.8 mg, Oral, 2 Times Daily      sertraline 50 MG tablet  Commonly  known as: ZOLOFT   50 mg, Oral, Daily             Stop These Medications      Divalproex Sodium 125 MG capsule  Commonly known as: DEPAKOTE SPRINKLE     donepezil 10 MG tablet  Commonly known as: ARICEPT     haloperidol 5 MG tablet  Commonly known as: HALDOL     mirtazapine 15 MG tablet  Commonly known as: REMERON     QUEtiapine 100 MG tablet  Commonly known as: SEROquel     QUEtiapine 200 MG tablet  Commonly known as: SEROquel              Allergies   Allergen Reactions    Bactrim [Sulfamethoxazole-Trimethoprim] Rash    Adhesive Tape Rash    Neosporin [Neomycin-Bacitracin Zn-Polymyx] Rash         Discharge Disposition:  Skilled Nursing Facility (DC - External)    Diet:  Hospital:  Diet Order   Procedures    Diet: Regular/House; Fluid Consistency: Thin (IDDSI 0)            Activity:      Restrictions or Other Recommendations:       CODE STATUS:    Code Status and Medical Interventions:   Ordered at: 05/11/24 3792     Medical Intervention Limits:    NO intubation (DNI)     Level Of Support Discussed With:    Health Care Surrogate     Code Status (Patient has no pulse and is not breathing):    No CPR (Do Not Attempt to Resuscitate)     Medical Interventions (Patient has pulse or is breathing):    Limited Support       Future Appointments   Date Time Provider Department Center   5/15/2024  4:45 PM MED 7  [facility]   9/20/2024  9:00 AM Kamla Castillo APRN [facility]       Additional Instructions for the Follow-ups that You Need to Schedule       Ambulatory Referral to Home Health   As directed      Face to Face Visit Date: 5/14/2024   Follow-up provider for Plan of Care?: I treated the patient in an acute care facility and will not continue treatment after discharge.   Follow-up provider: ABELARDO MURRAY [[id number]]   Reason/Clinical Findings: Peripheral neuropathy [G62.9], dementia, type 2 diabetes   Describe mobility limitations that make leaving home difficult: impaired functional mobility, balance,  gait and endurance   Nursing/Therapeutic Services Requested: Physical Therapy Occupational Therapy   PT orders: Transfer training Gait Training Strengthening Home safety assessment   Weight Bearing Status: As Tolerated   Occupational orders: Activities of daily living Strengthening Energy conservation Fine motor Home safety assessment Cognition   Frequency: 1 Week 1        Discharge Follow-up with PCP   As directed       Currently Documented PCP:    Des Geller MD    PCP Phone Number:    [phone]     Follow Up Details: 1 week        Discharge Follow-up with Specified Provider: Keep scheduled follow-up with Kamla CORONADO Neurology   As directed      To: Keep scheduled follow-up with Kamla CORONADO, Neurology                      Theresa Gallardo DO  05/15/24      Time Spent on Discharge:  I spent  50  minutes on this discharge activity which included: face-to-face encounter with the patient, reviewing the data in the system, coordination of the care with the nursing staff as well as consultants, documentation, and entering orders.            Electronically signed by Theresa Gallardo DO at 05/15/24 1337

## 2024-05-15 NOTE — PLAN OF CARE
Goal Outcome Evaluation:  Plan of Care Reviewed With: patient, spouse, son        Progress: no change  Outcome Evaluation: Pt worked on STS t/f's from EOB and WC, requiring maxA x2 for all OOB mobility. Pt limited by weakness and fear of falling. Pt to d/c to memory care facility today. Will benefit from HHPT upon return.      Anticipated Discharge Disposition (PT): assisted living, home with home health

## 2024-05-15 NOTE — PLAN OF CARE
Goal Outcome Evaluation:  Plan of Care Reviewed With: patient, spouse, son        Progress: no change  Outcome Evaluation: OT promoted mob and interaction with environment. Pt responded to short commands at times saying yes or no. Family supportive and helpful with command following for sts and for t/f. Pt max assist of 2 person with manual and vcs. Pt has flexed posture. T/f to and from w/c and max assist x 2 with sit pivot and stand pivot techniques.

## 2024-05-15 NOTE — PROGRESS NOTES
[facility] Neurology    Progress Note    Patient Name: Too Tai  : 1955  MRN: [id number]  Primary Care Physician:  Des Geller MD  Date of admission: 2024    Subjective     Chief Complaint: Advanced dementia    History of Present Illness   Patient seen resting comfortably in bed.  Calm requiring no restraints or sitter.  Per bedside RN he rested well throughout the night.  No as needed medications required for agitation.  No acute events.  Patient's nursing facility coming onsite to evaluate today.    Review of Systems   Unable to assess due to baseline mental status    Objective     Physical Exam  Vitals and nursing note reviewed.   Constitutional:       General: He is not in acute distress.     Appearance: He is not ill-appearing.   Eyes:      Extraocular Movements: Extraocular movements intact.      Pupils: Pupils are equal, round, and reactive to light.      Comments: No nystagmus noted   Neurological:      Mental Status: He is alert. Mental status is at baseline.      Cranial Nerves: No cranial nerve deficit or facial asymmetry.      Sensory: No sensory deficit.      Motor: No weakness.      Comments:     Cranial Nerves   CN II: Pupils are equal, round, and reactive to light. Normal visual acuity and visual fields.    CN III IV VI: Extraocular movements are full without nystagmus.  CN V: Normal facial sensation and strength of muscles of mastication.  CN VII: Facial movements are symmetric. No weakness.  CN VIII:  Auditory acuity is normal.  CN IX & X:  Symmetric palatal movement.  CN XI: Sternocleidomastoid and trapezius are normal.  No weakness.  CN XII: The tongue is midline.  No atrophy or fasciculations.            Vitals:   Temp:  [96.7 °F (35.9 °C)-98.8 °F (37.1 °C)] 96.7 °F (35.9 °C)  Heart Rate:  [55-78] 78  Resp:  [16-18] 18  BP: (118-121)/(54-60) 121/60    Current Medications    Current Facility-Administered Medications:     acetaminophen (TYLENOL) tablet 650 mg, 650  mg, Oral, Q4H PRN, Leeanne Rizzo PA-C    amLODIPine (NORVASC) tablet 10 mg, 10 mg, Oral, Q24H, Leeanne Rizzo PA-C, 10 mg at 05/15/24 0855    sennosides-docusate (PERICOLACE) 8.6-50 MG per tablet 2 tablet, 2 tablet, Oral, BID PRN **AND** polyethylene glycol (MIRALAX) packet 17 g, 17 g, Oral, Daily PRN **AND** bisacodyl (DULCOLAX) EC tablet 5 mg, 5 mg, Oral, Daily PRN **AND** bisacodyl (DULCOLAX) suppository 10 mg, 10 mg, Rectal, Daily PRN, Leeanne Rizzo PA-C    dextrose (D50W) (25 g/50 mL) IV injection 25 g, 25 g, Intravenous, Q15 Min PRN, Leeanne Rizzo PA-C    dextrose (GLUTOSE) oral gel 15 g, 15 g, Oral, Q15 Min PRN, Leeanne Rizzo PA-C    folic acid (FOLVITE) tablet 1 mg, 1 mg, Oral, Daily, Leeanne Rizzo PA-C, 1 mg at 05/15/24 0855    glucagon (GLUCAGEN) injection 1 mg, 1 mg, Intramuscular, Q15 Min PRN, Leeanne Rizzo PA-C    Insulin Lispro (humaLOG) injection 2-7 Units, 2-7 Units, Subcutaneous, 4x Daily AC & at Bedtime, Leeanne Rizzo PA-C, 3 Units at 05/15/24 0855    lisinopril (PRINIVIL,ZESTRIL) tablet 20 mg, 20 mg, Oral, Daily, Leeanne Rizzo PA-C, 20 mg at 05/15/24 0854    melatonin tablet 5 mg, 5 mg, Oral, Nightly, Akhn, April K, APRN, 5 mg at 05/14/24 2022    OLANZapine (zyPREXA) injection 5 mg, 5 mg, Intramuscular, Q8H PRN, Butler April K, APRN    PHENobarbital tablet 129.6 mg, 129.6 mg, Oral, Nightly, 129.6 mg at 05/14/24 2021 **OR** PHENobarbital injection 129.6 mg, 129.6 mg, Intramuscular, Nightly, Darrell Jhaveri,     PHENobarbital tablet 64.8 mg, 64.8 mg, Oral, BID, 64.8 mg at 05/15/24 0854 **OR** PHENobarbital injection 64.8 mg, 64.8 mg, Intramuscular, BID, Darrell Jhaveri,     sertraline (ZOLOFT) tablet 50 mg, 50 mg, Oral, Daily, Meliza Kahn, APRN, 50 mg at 05/15/24 0854    sodium chloride 0.9 % flush 10 mL, 10 mL, Intravenous, PRN, Gurvinder Carranza,     sodium chloride 0.9 % flush 10 mL, 10 mL, Intravenous, Q12H, Leeanne Rizzo, HAZEL    sodium  chloride 0.9 % flush 10 mL, 10 mL, Intravenous, PRN, Leeanne Rizzo PA-ROBINA    sodium chloride 0.9 % infusion 40 mL, 40 mL, Intravenous, PRN, Leeanne Rizzo PA-C    temazepam (RESTORIL) capsule 15 mg, 15 mg, Oral, Nightly PRN, Darrell Jhaveri A, DO, 15 mg at 05/14/24 2021    Laboratory Results:   Lab Results   Component Value Date    GLUCOSE 184 (H) 05/12/2024    CALCIUM 8.7 05/12/2024     05/12/2024    K 3.3 (L) 05/12/2024    CO2 28.0 05/12/2024     05/12/2024    BUN 23 05/12/2024    CREATININE 0.77 05/12/2024    EGFRIFAFRI 102 02/21/2022    EGFRIFNONA 88 02/21/2022    BCR 29.9 (H) 05/12/2024    ANIONGAP 8.0 05/12/2024     Lab Results   Component Value Date    WBC 8.02 05/12/2024    HGB 12.5 (L) 05/12/2024    HCT 37.0 (L) 05/12/2024    MCV 93.4 05/12/2024     05/12/2024     Lab Results   Component Value Date    CHOL 220 (H) 01/08/2024    CHOL 120 08/01/2019    CHOL 117 04/15/2019     Lab Results   Component Value Date    HDL 41 01/08/2024    HDL 37 (L) 08/03/2023    HDL 41 04/03/2023     Lab Results   Component Value Date     (H) 01/08/2024     (H) 08/03/2023     (H) 04/03/2023     Lab Results   Component Value Date    TRIG 108 01/08/2024    TRIG 208 (H) 08/03/2023    TRIG 138 04/03/2023     Lab Results   Component Value Date    HGBA1C 9.50 (H) 04/18/2024     Lab Results   Component Value Date    INR 1.1 11/25/2019    INR 1.1 09/10/2018    PROTIME 12.5 11/25/2019    PROTIME 13.3 09/10/2018     Lab Results   Component Value Date    FOLATE 10.20 02/24/2024     Lab Results   Component Value Date    MIALVLJN37 384 04/18/2024             Assessment / Plan   Brief Patient Summary:  Too Tai is a 68 y.o. male with past medical history of advanced dementia with behavioral disturbances and nonverbal, CAD, T2DM, HLD, PVD who presented to [facility] ED with complaint of increasing lethargy and somnolence.  Per wife's report which is bedside patient was sitting in a wheelchair when he  seemed more lethargic     Plan:   Lethargy-resolved  Advanced dementia complicated by behavioral disturbances  Continue phenobarbital 64.8 mg/64.8 mg / 129.6 mg.  p.o. available to be used first; IM if patient refuses.  Phenobarbital level 29.0   EEG with no focal features or epileptic activity.  Mild nonspecific diffuse cerebral dysfunction.  Blood cultures pending  CT head negative for acute abnormalities.  Repeat 24-hour CT head negative  Continue home temazepam 15 mg nightly and melatonin 5 mg nightly  IM Zyprexa as needed for extreme agitation, no required doses  Continue Zoloft 50 daily  QTc 463  Continue fall precautions  Patient to follow-up with IVONNE Eckert, appointment scheduled for 9/20/2024  General neurology will continue to follow    I have discussed the above with the patient, bedside and Dr. Gallardo  Time spent with patient: 35 minutes in face-to-face evaluation and management of the patient.    Copied text in this note has been reviewed and is accurate as of 05/15/24.     IVONNE Santos

## 2024-05-15 NOTE — PLAN OF CARE
Goal Outcome Evaluation:  Plan of Care Reviewed With: patient        Progress: improving  Outcome Evaluation: VSS, on RA. UOP-WNL. Pt slept well. PRN restoril given to help with sleep. Pt Calm and cooperative this shift. Oriented to self only. Plan for today is to have facility come evaluate him for placement. Bed alarm set. No attempts to get out of bed at this point, bbed alarm set. Will continue to monitor.

## 2024-05-16 ENCOUNTER — HOME HEALTH ADMISSION (OUTPATIENT)
Dept: HOME HEALTH SERVICES | Facility: HOME HEALTHCARE | Age: 69
End: 2024-05-16
Payer: MEDICARE

## 2024-05-16 LAB
BACTERIA SPEC AEROBE CULT: NORMAL
BACTERIA SPEC AEROBE CULT: NORMAL

## 2024-05-16 NOTE — CASE MANAGEMENT/SOCIAL WORK
Continued Stay Note  [facility]     Patient Name: Too Tai  MRN: [id number]  Today's Date: 5/15/2024    Admit Date: 5/11/2024    Plan:  EMS   Discharge Plan       Row Name 05/15/24 2013       Plan    Plan  EMS    Plan Comments  EMS to  2130 for transport.    Final Discharge Disposition Code 04 - intermediate care facility                   Discharge Codes    No documentation.                 Expected Discharge Date and Time       Expected Discharge Date Expected Discharge Time    May 15, 2024               JAMES Gonzales

## 2024-05-16 NOTE — NURSING NOTE
Patient transferred via stretcher to ambulance for transfer to [facility].  Family at bedside awaiting transfer that has been delayed repeatedly by EMS.  They have been very understanding.

## 2024-05-16 NOTE — NURSING NOTE
Spoke with MANJINDER Espino at the [facility] to update them in the delay for EMS transport, they are appreciative for the update and will wait for his arrival.

## 2024-05-17 NOTE — PAYOR COMM NOTE
"  [facility]# [id number]   5/15/24 Discharge Date    Utilization Review  Phone [phone]  Fax [phone]    [facility]  [address] [place], [place] [address]         Too Richter (68 y.o. Male)       Date of Birth   1955    Social Security Number       Address   [address] [place] [address]  [facility] [address]    Home Phone   [phone]    MRN   [id number]       [employer]   [employer]    Marital Status                               Admission Date   5/11/24    Admission Type   Emergency    Admitting Provider   Theresa Gallardo DO    Attending Provider       Department, Room/Bed   [facility] 5B, N537/1       Discharge Date   5/15/2024    Discharge Disposition   Skilled Nursing Facility (DC - External)    Discharge Destination                                 Attending Provider: (none)   Allergies: Bactrim [Sulfamethoxazole-trimethoprim], Adhesive Tape, Neosporin [Neomycin-bacitracin Zn-polymyx]    Isolation: None   Infection: None   Code Status: Prior    Ht: 188 cm (74\")   Wt: 97.5 kg (215 lb)    Admission Cmt: None   Principal Problem: Unresponsiveness [R41.89]                   Active Insurance as of 5/11/2024       Primary Coverage       Payor Plan Insurance Group Employer/Plan Group    AETNA MEDICARE REPLACEMENT AETNA MED ADV PPO [id number]       Payor Plan Address Payor Plan Phone Number Payor Plan Fax Number Effective Dates    [address] [phone]  1/1/2024 - None Entered    [facility] [id number]         Subscriber Name Subscriber Birth Date Member ID       TOO RICHTER 1955 [id number]                     Emergency Contacts        (Rel.) Home Phone Work Phone Mobile Phone    NEELAMWILLIE (Spouse) [phone] -- [phone]    Ritchie Richter (Son) [phone] -- [phone]    Meg Bustillo (Relative) [phone] -- [phone]                 Discharge Summary        Theresa Gallardo DO at 05/15/24 [facility]              [facility]" [facility] Medicine Services  DISCHARGE SUMMARY    Patient Name: Too Tai  : 1955  MRN: [id number]    Date of Admission: 2024  9:36 PM  Date of Discharge:  5/15/2024  Primary Care Physician: Des Geller MD    Consults       Date and Time Order Name Status Description    2024  7:28 AM Inpatient Neurology Consult General Completed     2024  2:30 PM Inpatient Palliative Care MD Consult Completed     2024  5:42 AM Inpatient Neurology Consult General Completed             Hospital Course       Active Hospital Problems    Diagnosis  POA    History of DVT (deep vein thrombosis) [Z86.718]  Not Applicable    Dementia with behavioral disturbance [F03.918]  Yes    Benign essential HTN [I10]  Yes    Type 2 diabetes mellitus with hyperglycemia, with long-term current use of insulin [E11.65, Z79.4]  Not Applicable    Gout [M10.9]  Yes    Hyperlipidemia [E78.5]  Yes    Peripheral neuropathy [G62.9]  Yes    Benign prostatic hyperplasia [N40.0]  Yes    Coronary artery disease involving native coronary artery of native heart [I25.10]  Yes      Resolved Hospital Problems    Diagnosis Date Resolved POA    **Unresponsiveness [R41.89] 05/15/2024 Yes    Lactic acidosis [E87.20] 05/15/2024 Yes          Hospital Course:  Too Tai is a 68 y.o. male significant for advanced dementia, CAD, diabetes mellitus type 2, HLD and PVD. Patient was recently admitted to this service 24 to 24 with agitation and behavioral disturbance. Was discharged to [facility]. Returned to ED with increasing lethargy and unresponsive episode.      Unresponsive episode -> resolved   Advanced dementia complicated by behavioral disturbance with superimposed sleep disturbance   -Continue Phenobarbital 64.8mg BID and 129.6mg nightly   -PRN Restoril 15mg nightly   -Continue Melatonin nightly   -EEG negative for seizures  -Discontinued Seroquel, Depakote     Uncontrolled diabetes mellitus type  2  - A1c 9.5%  - Lantus 15 units BID      HTN  - continue Amlodipine 10mg daily  - continue Lisinopril 30mg daily      Hypokalemia  -oral replacement ordered but patient refused      Lactic acidosis  -Lactic 10 on admission, normalized to 1.7 with 1L IVF, suspect spurious result.    Discharge Follow Up Recommendations for outpatient labs/diagnostics:  -PCP 1 week  -Neurology as scheduled     Day of Discharge     HPI:   Patient seen and examined. No issues overnight. Wife at bedside this AM.     Review of Systems  Gen- No fevers, chills  CV- No chest pain, palpitations  Resp- No cough, dyspnea  GI- No N/V/D, abd pain    Vital Signs:   Temp:  [96.7 °F (35.9 °C)-98.8 °F (37.1 °C)] 96.7 °F (35.9 °C)  Heart Rate:  [55-78] 78  Resp:  [16-18] 18  BP: (118-121)/(54-60) 121/60      Physical Exam:  Constitutional: No acute distress, awake, alert  HENT: NCAT, mucous membranes moist  Respiratory: Clear to auscultation bilaterally, respiratory effort normal   Cardiovascular: RRR, no murmurs, rubs, or gallops  Gastrointestinal: Positive bowel sounds, soft, nontender, nondistended  Musculoskeletal: No bilateral ankle edema  Psychiatric: calm, cooperative  Neurologic: No focal deficits  Skin: No rashes     Pertinent  and/or Most Recent Results     LAB RESULTS:      Lab 05/12/24  1111 05/12/24  0037 05/11/24 2141 05/09/24  0443   WBC 8.02  --  7.33 8.44   HEMOGLOBIN 12.5*  --  10.8* 14.1   HEMATOCRIT 37.0*  --  33.8* 42.2   PLATELETS 259  --  214 332   NEUTROS ABS 4.90  --  4.05 3.75   IMMATURE GRANS (ABS) 0.02  --  0.02 0.03   LYMPHS ABS 1.93  --  2.18 2.86   MONOS ABS 0.70  --  0.71 0.78   EOS ABS 0.43*  --  0.32 0.93*   MCV 93.4  --  96.3 93.0   PROCALCITONIN  --   --  0.04  --    LACTATE  --  1.7 10.3*  --          Lab 05/12/24  1111 05/11/24 2141 05/09/24  0443   SODIUM 140 138 142   POTASSIUM 3.3* 4.1 3.5   CHLORIDE 104 101 105   CO2 28.0 26.0 27.0   ANION GAP 8.0 11.0 10.0   BUN 23 22 23   CREATININE 0.77 0.83 0.80   EGFR  98.1 95.9 97.0   GLUCOSE 184* 226* 112*   CALCIUM 8.7 8.4* 8.8   MAGNESIUM 1.7 1.3* 1.9         Lab 05/12/24  1111 05/11/24  2141 05/09/24  0443   TOTAL PROTEIN 5.7* 5.1* 6.5   ALBUMIN 3.3* 3.0* 3.4*   GLOBULIN 2.4 2.1 3.1   ALT (SGPT) 21 18 28   AST (SGOT) 28 23 31   BILIRUBIN 0.6 0.5 0.5   ALK PHOS 102 91 113         Lab 05/11/24 2141   HSTROP T 25*                 Lab 05/11/24 2151   PH, ARTERIAL 7.445   PCO2, ARTERIAL 40.4   PO2 .0*   FIO2 100   HCO3 ART 27.7*   BASE EXCESS ART 3.4*   CARBOXYHEMOGLOBIN 1.1     Brief Urine Lab Results  (Last result in the past 365 days)        Color   Clarity   Blood   Leuk Est   Nitrite   Protein   CREAT   Urine HCG        05/12/24 0004 Yellow   Clear   Negative   Negative   Negative   30 mg/dL (1+)                 Microbiology Results (last 10 days)       Procedure Component Value - Date/Time    COVID PRE-OP / PRE-PROCEDURE SCREENING ORDER (NO ISOLATION) - Swab, Nasopharynx [[id number]]  (Normal) Collected: 05/12/24 0004    Lab Status: Final result Specimen: Swab from Nasopharynx Updated: 05/12/24 0124    Narrative:      The following orders were created for panel order COVID PRE-OP / PRE-PROCEDURE SCREENING ORDER (NO ISOLATION) - Swab, Nasopharynx.  Procedure                               Abnormality         Status                     ---------                               -----------         ------                     Respiratory Panel PCR w/...[[id number]]  Normal              Final result                 Please view results for these tests on the individual orders.    Respiratory Panel PCR w/COVID-19(SARS-CoV-2) AFTAB/LINA/ERNESTINE/PAD/COR/STANLEY In-House, NP Swab in [place], 2 HR TAT - Swab, Nasopharynx [[id number]]  (Normal) Collected: 05/12/24 0004    Lab Status: Final result Specimen: Swab from Nasopharynx Updated: 05/12/24 0124     ADENOVIRUS, PCR Not Detected     Coronavirus 229E Not Detected     Coronavirus HKU1 Not Detected     Coronavirus NL63 Not Detected      Coronavirus OC43 Not Detected     COVID19 Not Detected     Human Metapneumovirus Not Detected     Human Rhinovirus/Enterovirus Not Detected     Influenza A PCR Not Detected     Influenza B PCR Not Detected     Parainfluenza Virus 1 Not Detected     Parainfluenza Virus 2 Not Detected     Parainfluenza Virus 3 Not Detected     Parainfluenza Virus 4 Not Detected     RSV, PCR Not Detected     Bordetella pertussis pcr Not Detected     Bordetella parapertussis PCR Not Detected     Chlamydophila pneumoniae PCR Not Detected     Mycoplasma pneumo by PCR Not Detected    Narrative:      In the setting of a positive respiratory panel with a viral infection PLUS a negative procalcitonin without other underlying concern for bacterial infection, consider observing off antibiotics or discontinuation of antibiotics and continue supportive care. If the respiratory panel is positive for atypical bacterial infection (Bordetella pertussis, Chlamydophila pneumoniae, or Mycoplasma pneumoniae), consider antibiotic de-escalation to target atypical bacterial infection.    Blood Culture - Blood, Hand, Right [[id number]]  (Normal) Collected: 05/11/24 2152    Lab Status: Preliminary result Specimen: Blood from Hand, Right Updated: 05/14/24 2215     Blood Culture No growth at 3 days    Blood Culture - Blood, Arm, Left [[id number]]  (Normal) Collected: 05/11/24 2152    Lab Status: Preliminary result Specimen: Blood from Arm, Left Updated: 05/14/24 2215     Blood Culture No growth at 3 days            EEG    Result Date: 5/13/2024  Reason for referral: 68 y.o.male with altered mental status Technical Summary:  A 19 channel digital EEG was performed using the international 10-20 placement system, including eye leads and EKG leads. Duration: 24 minutes Findings: The patient is asleep at the beginning of the study.  Diffuse low to medium amplitude 4 to 7 Hz intermixed delta and theta activity are seen symmetrically over both hemispheres.  Occasional  vertex waves are present.  Photic stimulation does not change the background.  Hyperventilation is not performed.  No focal features or epileptiform activity are present. Video: Available Technical quality: Superior EKG: Regular, 60 bpm SUMMARY: Excessive drowsiness Mild generalized slow No focal features or epileptiform activity are seen     Diffuse cerebral dysfunction mild degree, nonspecific This report is transcribed using the Dragon dictation system.      CT Head Without Contrast    Result Date: 5/13/2024  CT HEAD WO CONTRAST Date of Exam: 5/12/2024 9:58 PM EDT Indication: AMS. Comparison: 5/11/2024 Technique: Axial CT images were obtained of the head without contrast administration.  Automated exposure control and iterative construction methods were used. Findings: There is no evidence of acute territorial infarction. There is no acute intracranial hemorrhage. There are no extra-axial collections. Ventricles and CSF spaces are symmetrically prominent. No mass effect nor hydrocephalus. Patchy subcortical and periventricular white matter hypodensities most likely reflect the sequela of chronic microvascular ischemic disease.  Paranasal sinuses and mastoid air cells are adequately aerated.  Osseous structures and orbits appear intact.     Impression: No acute intracranial process. No interval adverse change. Electronically Signed: Yanet Ramírez MD  5/13/2024 7:04 AM EDT  Workstation ID: [id number]    CT Head Without Contrast    Result Date: 5/11/2024  CT HEAD WO CONTRAST Date of Exam: 5/11/2024 10:03 PM EDT Indication: poorly responsive. Comparison: 4/19/2024. Technique: Axial CT images were obtained of the head without contrast administration.  Automated exposure control and iterative construction methods were used. Findings: There is no evidence of hemorrhage. There is no mass effect or midline shift. There is no extracerebral collection. Ventricles are normal in size and configuration for patient's stated age.   Posterior fossa is within normal limits. Calvarium and skull base appear intact.   Visualized sinuses show no air fluid levels. Visualized orbits are unremarkable.     Impression: No acute intracranial process. Electronically Signed: Estrellita Finnegan MD  5/11/2024 10:11 PM EDT  Workstation ID: [id number]    XR Chest 1 View    Result Date: 5/11/2024  XR CHEST 1 VW Date of Exam: 5/11/2024 9:38 PM EDT Indication: Weak/Dizzy/AMS triage protocol Comparison: 4/19/2024. Findings: There are no airspace consolidations. No pleural fluid. No pneumothorax. The pulmonary vasculature appears within normal limits. The heart appears enlarged. Median sternotomy wires are present.. No acute osseous abnormality identified.     Impression: No acute cardiopulmonary process. Stable cardiomegaly. Electronically Signed: Estrellita Finnegan MD  5/11/2024 10:00 PM EDT  Workstation ID: [id number]             Results for orders placed during the hospital encounter of 03/27/24    Adult Transthoracic Echo Complete W/ Cont if Necessary Per Protocol    Interpretation Summary    Left ventricular systolic function is normal. Calculated left ventricular EF = 55.1% Normal left ventricular cavity size noted. Left ventricular wall thickness is consistent with mild concentric hypertrophy. Left ventricular diastolic function is consistent with (grade I) impaired relaxation.    The right ventricular cavity is mildly dilated. Normal right ventricular systolic function noted.    There is calcification of the aortic valve. The aortic valve appears trileaflet. Mild aortic valve regurgitation is present. No aortic valve stenosis is present.    Mitral annular calcification is present. Trace mitral valve regurgitation is present. No significant mitral valve stenosis is present.    The tricuspid valve is structurally normal with no stenosis present. Trace tricuspid valve regurgitation is present. Insufficient TR velocity profile to estimate the right ventricular systolic  pressure.    Mild dilation of the sinuses of Valsalva is present. Mild dilation of the ascending aorta is present. Ascending aorta = 4.2 cm      Plan for Follow-up of Pending Labs/Results: Inbox   Pending Labs       Order Current Status    Blood Culture - Blood, Arm, Left Preliminary result    Blood Culture - Blood, Hand, Right Preliminary result          Discharge Details        Discharge Medications        New Medications        Instructions Start Date   melatonin 5 MG tablet tablet   5 mg, Oral, Nightly             Changes to Medications        Instructions Start Date   lisinopril 20 MG tablet  Commonly known as: PRINIVIL,ZESTRIL  What changed: Another medication with the same name was removed. Continue taking this medication, and follow the directions you see here.   20 mg, Oral, Daily   Start Date: May 16, 2024     temazepam 15 MG capsule  Commonly known as: RESTORIL  What changed:   medication strength  how much to take  when to take this  reasons to take this   15 mg, Oral, Nightly PRN             Continue These Medications        Instructions Start Date   Accu-Chek Geri Plus w/Device kit   1 kit, Does not apply, 3 Times Daily      Accu-Chek Geri solution   1 bottle, In Vitro, As Needed      accu-chek soft touch lancets   Check sugars 3 times daily      Alcohol Prep pads   1 swab , Does not apply, 3 Times Daily      amLODIPine 10 MG tablet  Commonly known as: NORVASC   10 mg, Oral, Every 24 Hours Scheduled      B-D UF III MINI PEN NEEDLES 31G X 5 MM misc  Generic drug: Insulin Pen Needle   USE AS DIRECTED      cyanocobalamin 1000 MCG/ML injection   1,000 mcg, Intramuscular, Every 28 Days   Start Date: May 17, 2024     folic acid 1 MG tablet  Commonly known as: FOLVITE   1 mg, Oral, Daily      Glucagon 1 MG/0.2ML solution auto-injector   1 mg, Subcutaneous, Every 15 Minutes PRN      Lantus SoloStar 100 UNIT/ML injection pen  Generic drug: Insulin Glargine   20 Units, Subcutaneous, 2 Times Daily       PHENobarbital 64.8 MG tablet   129.6 mg, Oral, Nightly      PHENobarbital 64.8 MG tablet   64.8 mg, Oral, 2 Times Daily      sertraline 50 MG tablet  Commonly known as: ZOLOFT   50 mg, Oral, Daily             Stop These Medications      Divalproex Sodium 125 MG capsule  Commonly known as: DEPAKOTE SPRINKLE     donepezil 10 MG tablet  Commonly known as: ARICEPT     haloperidol 5 MG tablet  Commonly known as: HALDOL     mirtazapine 15 MG tablet  Commonly known as: REMERON     QUEtiapine 100 MG tablet  Commonly known as: SEROquel     QUEtiapine 200 MG tablet  Commonly known as: SEROquel              Allergies   Allergen Reactions    Bactrim [Sulfamethoxazole-Trimethoprim] Rash    Adhesive Tape Rash    Neosporin [Neomycin-Bacitracin Zn-Polymyx] Rash         Discharge Disposition:  Skilled Nursing Facility (DC - External)    Diet:  Hospital:  Diet Order   Procedures    Diet: Regular/House; Fluid Consistency: Thin (IDDSI 0)            Activity:      Restrictions or Other Recommendations:       CODE STATUS:    Code Status and Medical Interventions:   Ordered at: 05/11/24 4446     Medical Intervention Limits:    NO intubation (DNI)     Level Of Support Discussed With:    Health Care Surrogate     Code Status (Patient has no pulse and is not breathing):    No CPR (Do Not Attempt to Resuscitate)     Medical Interventions (Patient has pulse or is breathing):    Limited Support       Future Appointments   Date Time Provider Department Center   5/15/2024  4:45 PM MED 7  [facility]   9/20/2024  9:00 AM Kamla Castillo APRN [facility]       Additional Instructions for the Follow-ups that You Need to Schedule       Ambulatory Referral to Home Health   As directed      Face to Face Visit Date: 5/14/2024   Follow-up provider for Plan of Care?: I treated the patient in an acute care facility and will not continue treatment after discharge.   Follow-up provider: ABELARDO MURRAY [[id number]]   Reason/Clinical Findings:  Peripheral neuropathy [G62.9], dementia, type 2 diabetes   Describe mobility limitations that make leaving home difficult: impaired functional mobility, balance, gait and endurance   Nursing/Therapeutic Services Requested: Physical Therapy Occupational Therapy   PT orders: Transfer training Gait Training Strengthening Home safety assessment   Weight Bearing Status: As Tolerated   Occupational orders: Activities of daily living Strengthening Energy conservation Fine motor Home safety assessment Cognition   Frequency: 1 Week 1        Discharge Follow-up with PCP   As directed       Currently Documented PCP:    Des Geller MD    PCP Phone Number:    [phone]     Follow Up Details: 1 week        Discharge Follow-up with Specified Provider: Keep scheduled follow-up with Kamla CORONADO, Neurology   As directed      To: Keep scheduled follow-up with Kamla CORONADO Neurology                      Niki Florian DO  05/15/24      Time Spent on Discharge:  I spent  50  minutes on this discharge activity which included: face-to-face encounter with the patient, reviewing the data in the system, coordination of the care with the nursing staff as well as consultants, documentation, and entering orders.            Electronically signed by Niki Florian DO at 05/15/24 1245       Discharge Order (From admission, onward)       Start     Ordered    05/15/24 1217  Discharge patient  Once        Expected Discharge Date: 05/15/24   Discharge Disposition: Skilled Nursing Facility (DC - External)   Physician of Record for Attribution - Please select from Treatment Team: NIKI FLORIAN [[id number]]   Review needed by CMO to determine Physician of Record: No      Question Answer Comment   Physician of Record for Attribution - Please select from Treatment Team NIKI FLORIAN    Review needed by CMO to determine Physician of Record No        05/15/24 0882

## 2024-05-28 ENCOUNTER — APPOINTMENT (OUTPATIENT)
Dept: GENERAL RADIOLOGY | Facility: HOSPITAL | Age: 69
DRG: 281 | End: 2024-05-28
Payer: MEDICARE

## 2024-05-28 ENCOUNTER — HOSPITAL ENCOUNTER (INPATIENT)
Facility: HOSPITAL | Age: 69
LOS: 5 days | Discharge: HOME | DRG: 281 | End: 2024-06-03
Attending: EMERGENCY MEDICINE | Admitting: STUDENT IN AN ORGANIZED HEALTH CARE EDUCATION/TRAINING PROGRAM
Payer: MEDICARE

## 2024-05-28 DIAGNOSIS — F03.918 DEMENTIA WITH OTHER BEHAVIORAL DISTURBANCE, UNSPECIFIED DEMENTIA SEVERITY, UNSPECIFIED DEMENTIA TYPE: ICD-10-CM

## 2024-05-28 DIAGNOSIS — E11.65 HYPERGLYCEMIA DUE TO DIABETES MELLITUS: ICD-10-CM

## 2024-05-28 DIAGNOSIS — F03.918 DEMENTIA WITH BEHAVIORAL DISTURBANCE: ICD-10-CM

## 2024-05-28 DIAGNOSIS — Z86.79 HISTORY OF CORONARY ARTERY DISEASE: ICD-10-CM

## 2024-05-28 DIAGNOSIS — I21.4 NSTEMI (NON-ST ELEVATED MYOCARDIAL INFARCTION): Primary | ICD-10-CM

## 2024-05-28 DIAGNOSIS — F03.911 AGITATION DUE TO DEMENTIA: ICD-10-CM

## 2024-05-28 LAB
ALBUMIN SERPL-MCNC: 3.1 G/DL (ref 3.5–5.2)
ALBUMIN/GLOB SERPL: 0.9 G/DL
ALP SERPL-CCNC: 126 U/L (ref 39–117)
ALT SERPL W P-5'-P-CCNC: 34 U/L (ref 1–41)
ANION GAP SERPL CALCULATED.3IONS-SCNC: 12 MMOL/L (ref 5–15)
APTT PPP: 31.7 SECONDS (ref 60–90)
ARTERIAL PATENCY WRIST A: ABNORMAL
AST SERPL-CCNC: 39 U/L (ref 1–40)
ATMOSPHERIC PRESS: ABNORMAL MM[HG]
B-OH-BUTYR SERPL-SCNC: 0.18 MMOL/L (ref 0.02–0.27)
BASE EXCESS BLDA CALC-SCNC: -0.5 MMOL/L (ref 0–2)
BASOPHILS # BLD AUTO: 0.08 10*3/MM3 (ref 0–0.2)
BASOPHILS NFR BLD AUTO: 0.7 % (ref 0–1.5)
BDY SITE: ABNORMAL
BILIRUB SERPL-MCNC: 0.7 MG/DL (ref 0–1.2)
BILIRUB UR QL STRIP: NEGATIVE
BODY TEMPERATURE: 37
BUN SERPL-MCNC: 41 MG/DL (ref 8–23)
BUN/CREAT SERPL: 32.5 (ref 7–25)
CALCIUM SPEC-SCNC: 8.8 MG/DL (ref 8.6–10.5)
CHLORIDE SERPL-SCNC: 101 MMOL/L (ref 98–107)
CLARITY UR: ABNORMAL
CO2 BLDA-SCNC: 22.9 MMOL/L (ref 22–33)
CO2 SERPL-SCNC: 21 MMOL/L (ref 22–29)
COHGB MFR BLD: 1.2 % (ref 0–2)
COLOR UR: YELLOW
CREAT SERPL-MCNC: 1.26 MG/DL (ref 0.76–1.27)
D-LACTATE SERPL-SCNC: 2.1 MMOL/L (ref 0.5–2)
D-LACTATE SERPL-SCNC: 2.2 MMOL/L (ref 0.5–2)
D-LACTATE SERPL-SCNC: 2.2 MMOL/L (ref 0.5–2)
DEPRECATED RDW RBC AUTO: 40.9 FL (ref 37–54)
EGFRCR SERPLBLD CKD-EPI 2021: 62.5 ML/MIN/1.73
EOSINOPHIL # BLD AUTO: 0.03 10*3/MM3 (ref 0–0.4)
EOSINOPHIL NFR BLD AUTO: 0.2 % (ref 0.3–6.2)
EPAP: 0
ERYTHROCYTE [DISTWIDTH] IN BLOOD BY AUTOMATED COUNT: 12.3 % (ref 12.3–15.4)
FLUAV SUBTYP SPEC NAA+PROBE: NOT DETECTED
FLUBV RNA ISLT QL NAA+PROBE: NOT DETECTED
GEN 5 2HR TROPONIN T REFLEX: 374 NG/L
GLOBULIN UR ELPH-MCNC: 3.3 GM/DL
GLUCOSE BLDC GLUCOMTR-MCNC: 351 MG/DL (ref 70–130)
GLUCOSE BLDC GLUCOMTR-MCNC: 356 MG/DL (ref 70–130)
GLUCOSE BLDC GLUCOMTR-MCNC: 382 MG/DL (ref 70–130)
GLUCOSE BLDC GLUCOMTR-MCNC: 420 MG/DL (ref 70–130)
GLUCOSE BLDC GLUCOMTR-MCNC: 561 MG/DL (ref 70–130)
GLUCOSE SERPL-MCNC: 495 MG/DL (ref 65–99)
GLUCOSE UR STRIP-MCNC: ABNORMAL MG/DL
HCO3 BLDA-SCNC: 22 MMOL/L (ref 20–26)
HCT VFR BLD AUTO: 39.8 % (ref 37.5–51)
HCT VFR BLD CALC: 43.6 % (ref 38–51)
HGB BLD-MCNC: 13.6 G/DL (ref 13–17.7)
HGB BLDA-MCNC: 14.2 G/DL (ref 13.5–17.5)
HGB UR QL STRIP.AUTO: NEGATIVE
HOLD SPECIMEN: NORMAL
IMM GRANULOCYTES # BLD AUTO: 0.1 10*3/MM3 (ref 0–0.05)
IMM GRANULOCYTES NFR BLD AUTO: 0.8 % (ref 0–0.5)
INHALED O2 CONCENTRATION: 21 %
INR PPP: 1.36 (ref 0.89–1.12)
IPAP: 0
KETONES UR QL STRIP: ABNORMAL
LEUKOCYTE ESTERASE UR QL STRIP.AUTO: NEGATIVE
LYMPHOCYTES # BLD AUTO: 2.56 10*3/MM3 (ref 0.7–3.1)
LYMPHOCYTES NFR BLD AUTO: 21.2 % (ref 19.6–45.3)
MAGNESIUM SERPL-MCNC: 1.6 MG/DL (ref 1.6–2.4)
MCH RBC QN AUTO: 30.7 PG (ref 26.6–33)
MCHC RBC AUTO-ENTMCNC: 34.2 G/DL (ref 31.5–35.7)
MCV RBC AUTO: 89.8 FL (ref 79–97)
METHGB BLD QL: 0.2 % (ref 0–1.5)
MODALITY: ABNORMAL
MONOCYTES # BLD AUTO: 1.25 10*3/MM3 (ref 0.1–0.9)
MONOCYTES NFR BLD AUTO: 10.3 % (ref 5–12)
NEUTROPHILS NFR BLD AUTO: 66.8 % (ref 42.7–76)
NEUTROPHILS NFR BLD AUTO: 8.07 10*3/MM3 (ref 1.7–7)
NITRITE UR QL STRIP: NEGATIVE
NRBC BLD AUTO-RTO: 0 /100 WBC (ref 0–0.2)
NT-PROBNP SERPL-MCNC: 6458 PG/ML (ref 0–900)
OXYHGB MFR BLDV: 90.2 % (ref 94–99)
PAW @ PEAK INSP FLOW SETTING VENT: 0 CMH2O
PCO2 BLDA: 29.8 MM HG (ref 35–45)
PCO2 TEMP ADJ BLD: 29.8 MM HG (ref 35–48)
PH BLDA: 7.48 PH UNITS (ref 7.35–7.45)
PH UR STRIP.AUTO: 5.5 [PH] (ref 5–8)
PH, TEMP CORRECTED: 7.48 PH UNITS
PLATELET # BLD AUTO: 290 10*3/MM3 (ref 140–450)
PMV BLD AUTO: 10.3 FL (ref 6–12)
PO2 BLDA: 58.1 MM HG (ref 83–108)
PO2 TEMP ADJ BLD: 58.1 MM HG (ref 83–108)
POTASSIUM SERPL-SCNC: 4.7 MMOL/L (ref 3.5–5.2)
PROCALCITONIN SERPL-MCNC: 0.25 NG/ML (ref 0–0.25)
PROT SERPL-MCNC: 6.4 G/DL (ref 6–8.5)
PROT UR QL STRIP: ABNORMAL
PROTHROMBIN TIME: 16.9 SECONDS (ref 12.2–14.5)
RBC # BLD AUTO: 4.43 10*6/MM3 (ref 4.14–5.8)
SARS-COV-2 RNA RESP QL NAA+PROBE: NOT DETECTED
SODIUM SERPL-SCNC: 134 MMOL/L (ref 136–145)
SP GR UR STRIP: 1.04 (ref 1–1.03)
TOTAL RATE: 0 BREATHS/MINUTE
TROPONIN T DELTA: -21 NG/L
TROPONIN T SERPL HS-MCNC: 395 NG/L
UFH PPP CHRO-ACNC: 0.1 IU/ML (ref 0.3–0.7)
UROBILINOGEN UR QL STRIP: ABNORMAL
WBC NRBC COR # BLD AUTO: 12.09 10*3/MM3 (ref 3.4–10.8)
WHOLE BLOOD HOLD COAG: NORMAL
WHOLE BLOOD HOLD SPECIMEN: NORMAL

## 2024-05-28 PROCEDURE — 87186 SC STD MICRODIL/AGAR DIL: CPT | Performed by: PHYSICIAN ASSISTANT

## 2024-05-28 PROCEDURE — 71045 X-RAY EXAM CHEST 1 VIEW: CPT

## 2024-05-28 PROCEDURE — 36415 COLL VENOUS BLD VENIPUNCTURE: CPT

## 2024-05-28 PROCEDURE — 82948 REAGENT STRIP/BLOOD GLUCOSE: CPT

## 2024-05-28 PROCEDURE — G0378 HOSPITAL OBSERVATION PER HR: HCPCS

## 2024-05-28 PROCEDURE — 82375 ASSAY CARBOXYHB QUANT: CPT

## 2024-05-28 PROCEDURE — 25010000002 HEPARIN (PORCINE) PER 1000 UNITS: Performed by: PHYSICIAN ASSISTANT

## 2024-05-28 PROCEDURE — 82805 BLOOD GASES W/O2 SATURATION: CPT

## 2024-05-28 PROCEDURE — 84145 PROCALCITONIN (PCT): CPT | Performed by: PHYSICIAN ASSISTANT

## 2024-05-28 PROCEDURE — 25010000002 HEPARIN (PORCINE) 25000-0.45 UT/250ML-% SOLUTION: Performed by: PHYSICIAN ASSISTANT

## 2024-05-28 PROCEDURE — 81003 URINALYSIS AUTO W/O SCOPE: CPT | Performed by: PHYSICIAN ASSISTANT

## 2024-05-28 PROCEDURE — 25810000003 SODIUM CHLORIDE 0.9 % SOLUTION: Performed by: PHYSICIAN ASSISTANT

## 2024-05-28 PROCEDURE — 83605 ASSAY OF LACTIC ACID: CPT | Performed by: PHYSICIAN ASSISTANT

## 2024-05-28 PROCEDURE — 63710000001 INSULIN LISPRO (HUMAN) PER 5 UNITS: Performed by: INTERNAL MEDICINE

## 2024-05-28 PROCEDURE — 85730 THROMBOPLASTIN TIME PARTIAL: CPT | Performed by: PHYSICIAN ASSISTANT

## 2024-05-28 PROCEDURE — 63710000001 INSULIN GLARGINE PER 5 UNITS: Performed by: INTERNAL MEDICINE

## 2024-05-28 PROCEDURE — P9612 CATHETERIZE FOR URINE SPEC: HCPCS

## 2024-05-28 PROCEDURE — 80053 COMPREHEN METABOLIC PANEL: CPT | Performed by: EMERGENCY MEDICINE

## 2024-05-28 PROCEDURE — 85025 COMPLETE CBC W/AUTO DIFF WBC: CPT | Performed by: EMERGENCY MEDICINE

## 2024-05-28 PROCEDURE — 99285 EMERGENCY DEPT VISIT HI MDM: CPT

## 2024-05-28 PROCEDURE — 85520 HEPARIN ASSAY: CPT | Performed by: PHYSICIAN ASSISTANT

## 2024-05-28 PROCEDURE — 83880 ASSAY OF NATRIURETIC PEPTIDE: CPT | Performed by: PHYSICIAN ASSISTANT

## 2024-05-28 PROCEDURE — 93005 ELECTROCARDIOGRAM TRACING: CPT | Performed by: PHYSICIAN ASSISTANT

## 2024-05-28 PROCEDURE — 87147 CULTURE TYPE IMMUNOLOGIC: CPT | Performed by: PHYSICIAN ASSISTANT

## 2024-05-28 PROCEDURE — 85610 PROTHROMBIN TIME: CPT | Performed by: PHYSICIAN ASSISTANT

## 2024-05-28 PROCEDURE — 84484 ASSAY OF TROPONIN QUANT: CPT | Performed by: PHYSICIAN ASSISTANT

## 2024-05-28 PROCEDURE — 87154 CUL TYP ID BLD PTHGN 6+ TRGT: CPT | Performed by: PHYSICIAN ASSISTANT

## 2024-05-28 PROCEDURE — 87636 SARSCOV2 & INF A&B AMP PRB: CPT | Performed by: PHYSICIAN ASSISTANT

## 2024-05-28 PROCEDURE — 83735 ASSAY OF MAGNESIUM: CPT | Performed by: PHYSICIAN ASSISTANT

## 2024-05-28 PROCEDURE — 25010000002 MORPHINE PER 10 MG: Performed by: INTERNAL MEDICINE

## 2024-05-28 PROCEDURE — 87040 BLOOD CULTURE FOR BACTERIA: CPT | Performed by: PHYSICIAN ASSISTANT

## 2024-05-28 PROCEDURE — 83050 HGB METHEMOGLOBIN QUAN: CPT

## 2024-05-28 PROCEDURE — 99223 1ST HOSP IP/OBS HIGH 75: CPT | Performed by: INTERNAL MEDICINE

## 2024-05-28 PROCEDURE — 36600 WITHDRAWAL OF ARTERIAL BLOOD: CPT

## 2024-05-28 PROCEDURE — 82010 KETONE BODYS QUAN: CPT | Performed by: PHYSICIAN ASSISTANT

## 2024-05-28 RX ORDER — AMOXICILLIN 250 MG
2 CAPSULE ORAL 2 TIMES DAILY
Status: DISCONTINUED | OUTPATIENT
Start: 2024-05-28 | End: 2024-06-03 | Stop reason: HOSPADM

## 2024-05-28 RX ORDER — IBUPROFEN 600 MG/1
1 TABLET ORAL
Status: DISCONTINUED | OUTPATIENT
Start: 2024-05-28 | End: 2024-06-03 | Stop reason: HOSPADM

## 2024-05-28 RX ORDER — NICOTINE POLACRILEX 4 MG
15 LOZENGE BUCCAL
Status: DISCONTINUED | OUTPATIENT
Start: 2024-05-28 | End: 2024-06-03 | Stop reason: HOSPADM

## 2024-05-28 RX ORDER — SODIUM CHLORIDE 0.9 % (FLUSH) 0.9 %
10 SYRINGE (ML) INJECTION AS NEEDED
Status: DISCONTINUED | OUTPATIENT
Start: 2024-05-28 | End: 2024-06-03 | Stop reason: HOSPADM

## 2024-05-28 RX ORDER — BISACODYL 5 MG/1
5 TABLET, DELAYED RELEASE ORAL DAILY PRN
Status: DISCONTINUED | OUTPATIENT
Start: 2024-05-28 | End: 2024-06-03 | Stop reason: HOSPADM

## 2024-05-28 RX ORDER — TEMAZEPAM 15 MG/1
15 CAPSULE ORAL NIGHTLY PRN
Status: DISCONTINUED | OUTPATIENT
Start: 2024-05-28 | End: 2024-05-30

## 2024-05-28 RX ORDER — HEPARIN SODIUM 1000 [USP'U]/ML
2000 INJECTION, SOLUTION INTRAVENOUS; SUBCUTANEOUS AS NEEDED
Status: DISCONTINUED | OUTPATIENT
Start: 2024-05-28 | End: 2024-05-28

## 2024-05-28 RX ORDER — SODIUM CHLORIDE 9 MG/ML
40 INJECTION, SOLUTION INTRAVENOUS AS NEEDED
Status: DISCONTINUED | OUTPATIENT
Start: 2024-05-28 | End: 2024-06-03 | Stop reason: HOSPADM

## 2024-05-28 RX ORDER — SODIUM CHLORIDE 0.9 % (FLUSH) 0.9 %
10 SYRINGE (ML) INJECTION EVERY 12 HOURS SCHEDULED
Status: DISCONTINUED | OUTPATIENT
Start: 2024-05-28 | End: 2024-06-03 | Stop reason: HOSPADM

## 2024-05-28 RX ORDER — HEPARIN SODIUM 10000 [USP'U]/100ML
15 INJECTION, SOLUTION INTRAVENOUS
Status: DISCONTINUED | OUTPATIENT
Start: 2024-05-28 | End: 2024-05-31

## 2024-05-28 RX ORDER — INSULIN LISPRO 100 [IU]/ML
2-9 INJECTION, SOLUTION INTRAVENOUS; SUBCUTANEOUS
Status: DISCONTINUED | OUTPATIENT
Start: 2024-05-28 | End: 2024-06-03 | Stop reason: HOSPADM

## 2024-05-28 RX ORDER — ACETAMINOPHEN 160 MG/5ML
650 SOLUTION ORAL EVERY 4 HOURS PRN
Status: DISCONTINUED | OUTPATIENT
Start: 2024-05-28 | End: 2024-06-03 | Stop reason: HOSPADM

## 2024-05-28 RX ORDER — ASPIRIN 81 MG/1
324 TABLET, CHEWABLE ORAL ONCE
Status: COMPLETED | OUTPATIENT
Start: 2024-05-28 | End: 2024-05-28

## 2024-05-28 RX ORDER — BISACODYL 10 MG
10 SUPPOSITORY, RECTAL RECTAL DAILY PRN
Status: DISCONTINUED | OUTPATIENT
Start: 2024-05-28 | End: 2024-06-03 | Stop reason: HOSPADM

## 2024-05-28 RX ORDER — HEPARIN SODIUM 1000 [USP'U]/ML
4000 INJECTION, SOLUTION INTRAVENOUS; SUBCUTANEOUS ONCE
Qty: 10 ML | Refills: 0 | Status: COMPLETED | OUTPATIENT
Start: 2024-05-28 | End: 2024-05-28

## 2024-05-28 RX ORDER — PHENOBARBITAL 64.8 MG/1
129.6 TABLET ORAL NIGHTLY
Status: DISCONTINUED | OUTPATIENT
Start: 2024-05-28 | End: 2024-05-30

## 2024-05-28 RX ORDER — ACETAMINOPHEN 650 MG/1
650 SUPPOSITORY RECTAL EVERY 4 HOURS PRN
Status: DISCONTINUED | OUTPATIENT
Start: 2024-05-28 | End: 2024-06-03 | Stop reason: HOSPADM

## 2024-05-28 RX ORDER — HEPARIN SODIUM 1000 [USP'U]/ML
4000 INJECTION, SOLUTION INTRAVENOUS; SUBCUTANEOUS AS NEEDED
Status: DISCONTINUED | OUTPATIENT
Start: 2024-05-28 | End: 2024-05-28

## 2024-05-28 RX ORDER — FOLIC ACID 1 MG/1
1 TABLET ORAL DAILY
Status: DISCONTINUED | OUTPATIENT
Start: 2024-05-29 | End: 2024-06-03 | Stop reason: HOSPADM

## 2024-05-28 RX ORDER — NALOXONE HCL 0.4 MG/ML
0.4 VIAL (ML) INJECTION
Status: DISCONTINUED | OUTPATIENT
Start: 2024-05-28 | End: 2024-06-02

## 2024-05-28 RX ORDER — ACETAMINOPHEN 325 MG/1
650 TABLET ORAL EVERY 4 HOURS PRN
Status: DISCONTINUED | OUTPATIENT
Start: 2024-05-28 | End: 2024-06-03 | Stop reason: HOSPADM

## 2024-05-28 RX ORDER — POLYETHYLENE GLYCOL 3350 17 G/17G
17 POWDER, FOR SOLUTION ORAL DAILY PRN
Status: DISCONTINUED | OUTPATIENT
Start: 2024-05-28 | End: 2024-06-03 | Stop reason: HOSPADM

## 2024-05-28 RX ORDER — PHENOBARBITAL 64.8 MG/1
64.8 TABLET ORAL
Status: DISCONTINUED | OUTPATIENT
Start: 2024-05-29 | End: 2024-05-30

## 2024-05-28 RX ORDER — UREA 10 %
5 LOTION (ML) TOPICAL NIGHTLY
Status: DISCONTINUED | OUTPATIENT
Start: 2024-05-28 | End: 2024-06-03 | Stop reason: HOSPADM

## 2024-05-28 RX ORDER — AMLODIPINE BESYLATE 10 MG/1
10 TABLET ORAL
Status: DISCONTINUED | OUTPATIENT
Start: 2024-05-29 | End: 2024-06-03 | Stop reason: HOSPADM

## 2024-05-28 RX ORDER — LISINOPRIL 20 MG/1
20 TABLET ORAL DAILY
Status: DISCONTINUED | OUTPATIENT
Start: 2024-05-29 | End: 2024-06-03 | Stop reason: HOSPADM

## 2024-05-28 RX ORDER — MORPHINE SULFATE 2 MG/ML
1 INJECTION, SOLUTION INTRAMUSCULAR; INTRAVENOUS EVERY 4 HOURS PRN
Status: DISCONTINUED | OUTPATIENT
Start: 2024-05-28 | End: 2024-06-02

## 2024-05-28 RX ORDER — DEXTROSE MONOHYDRATE 25 G/50ML
25 INJECTION, SOLUTION INTRAVENOUS
Status: DISCONTINUED | OUTPATIENT
Start: 2024-05-28 | End: 2024-06-03 | Stop reason: HOSPADM

## 2024-05-28 RX ORDER — NITROGLYCERIN 0.4 MG/1
0.4 TABLET SUBLINGUAL
Status: DISCONTINUED | OUTPATIENT
Start: 2024-05-28 | End: 2024-06-03 | Stop reason: HOSPADM

## 2024-05-28 RX ORDER — ASPIRIN 81 MG/1
81 TABLET, CHEWABLE ORAL DAILY
Status: DISCONTINUED | OUTPATIENT
Start: 2024-05-29 | End: 2024-06-03 | Stop reason: HOSPADM

## 2024-05-28 RX ADMIN — MORPHINE SULFATE 1 MG: 2 INJECTION, SOLUTION INTRAMUSCULAR; INTRAVENOUS at 20:33

## 2024-05-28 RX ADMIN — SODIUM CHLORIDE 1000 ML: 9 INJECTION, SOLUTION INTRAVENOUS at 15:13

## 2024-05-28 RX ADMIN — Medication 5 MG: at 22:18

## 2024-05-28 RX ADMIN — INSULIN GLARGINE 15 UNITS: 100 INJECTION, SOLUTION SUBCUTANEOUS at 22:20

## 2024-05-28 RX ADMIN — SODIUM CHLORIDE 1000 ML: 9 INJECTION, SOLUTION INTRAVENOUS at 19:48

## 2024-05-28 RX ADMIN — HEPARIN SODIUM 10 UNITS/KG/HR: 10000 INJECTION, SOLUTION INTRAVENOUS at 19:51

## 2024-05-28 RX ADMIN — TEMAZEPAM 15 MG: 15 CAPSULE ORAL at 22:19

## 2024-05-28 RX ADMIN — PHENOBARBITAL 129.6 MG: 64.8 TABLET ORAL at 22:17

## 2024-05-28 RX ADMIN — ACETAMINOPHEN 650 MG: 325 TABLET ORAL at 22:19

## 2024-05-28 RX ADMIN — ASPIRIN 324 MG: 81 TABLET, CHEWABLE ORAL at 22:19

## 2024-05-28 RX ADMIN — INSULIN LISPRO 8 UNITS: 100 INJECTION, SOLUTION INTRAVENOUS; SUBCUTANEOUS at 22:19

## 2024-05-28 RX ADMIN — HEPARIN SODIUM 4000 UNITS: 1000 INJECTION INTRAVENOUS; SUBCUTANEOUS at 19:50

## 2024-05-28 NOTE — ED PROVIDER NOTES
Subjective   History of Present Illness  Pt is a 67 yo male presenting to ED by EMS from the [facility] with complaints of elevated blood sugar. PMHx significant for CAD s/p CABG, HTN, HLD, PVD and Dementia. Pt sent to ED for blood sugar of 450. Pt unable to provide any hx with his dementia and non verbal state. Family in ED report he is at his normal baseline and that his blood sugar has been running high the past 3 days. They are unsure if nursing home adjusted his meds. No reports of new fever, cough, difficulty breathing or V/D. Patient with recent admissions 5-11 to 5-15 for unresponsiveness and lethargy and then prior 4-18 to 5-9 for agitation and behavior changes.     History provided by:  Medical records, EMS personnel, nursing home and relative  History limited by:  Dementia      Review of Systems   Unable to perform ROS: Dementia       Past Medical History:   Diagnosis Date    Coronary artery disease     Dementia     Diabetes mellitus     Hyperlipidemia     Peripheral vascular disease        Allergies   Allergen Reactions    Bactrim [Sulfamethoxazole-Trimethoprim] Rash    Bacitra-Neomycin-Polymyxin-Hc Unknown - Low Severity    Adhesive Tape Rash    Neosporin [Neomycin-Bacitracin Zn-Polymyx] Rash       Past Surgical History:   Procedure Laterality Date    CORONARY ARTERY BYPASS GRAFT      FEMORAL ARTERY - POPLITEAL ARTERY BYPASS GRAFT         Family History   Problem Relation Age of Onset    Diabetes Mother     Heart disease Father     Hypertension Father     Hyperlipidemia Father     Diabetes Sister     Diabetes Maternal Grandfather     Diabetes Sister     Diabetes Sister        Social History     Socioeconomic History    Marital status:    Tobacco Use    Smoking status: Former     Current packs/day: 0.00     Types: Cigarettes     Quit date:      Years since quittin.4     Passive exposure: Never    Smokeless tobacco: Former   Vaping Use    Vaping status: Never Used   Substance and Sexual  Activity    Alcohol use: No    Drug use: No    Sexual activity: Never           Objective   Physical Exam  Vitals and nursing note reviewed.   Constitutional:       General: He is not in acute distress.  Eyes:      Conjunctiva/sclera: Conjunctivae normal.   Cardiovascular:      Rate and Rhythm: Tachycardia present.   Pulmonary:      Effort: Pulmonary effort is normal. No respiratory distress.   Abdominal:      Palpations: Abdomen is soft.      Tenderness: There is no abdominal tenderness.   Musculoskeletal:      Cervical back: Normal range of motion.   Neurological:      Mental Status: Mental status is at baseline. He is disoriented and confused.      Comments: Does not follow commands or answer questions.   Moving all extremities         Procedures           ED Course  ED Course as of 05/28/24 2300   Tue May 28, 2024   1504 BP: 90/66  Noted hypotension and tachycardiac at 114 [RT]   1724 BP: 118/71  BP improved [RT]   1801 Discussed patient with Dr. Ortez who is agreeable with ED course and tx plan for admission.  [RT]   1814 Discussed patient with cardiologist Dr. Schulz. He recommends heparin and serial Trop. Cards will evaluate in am.  [RT]   1816 HS Troponin T(!!): 395  improving [RT]      ED Course User Index  [RT] Nirmala Dang, PA      Recent Results (from the past 24 hour(s))   POC Glucose Once    Collection Time: 05/28/24  2:49 PM    Specimen: Blood   Result Value Ref Range    Glucose 561 (C) 70 - 130 mg/dL   Comprehensive Metabolic Panel    Collection Time: 05/28/24  2:55 PM    Specimen: Blood   Result Value Ref Range    Glucose 495 (C) 65 - 99 mg/dL    BUN 41 (H) 8 - 23 mg/dL    Creatinine 1.26 0.76 - 1.27 mg/dL    Sodium 134 (L) 136 - 145 mmol/L    Potassium 4.7 3.5 - 5.2 mmol/L    Chloride 101 98 - 107 mmol/L    CO2 21.0 (L) 22.0 - 29.0 mmol/L    Calcium 8.8 8.6 - 10.5 mg/dL    Total Protein 6.4 6.0 - 8.5 g/dL    Albumin 3.1 (L) 3.5 - 5.2 g/dL    ALT (SGPT) 34 1 - 41 U/L    AST (SGOT) 39 1 - 40 U/L     Alkaline Phosphatase 126 (H) 39 - 117 U/L    Total Bilirubin 0.7 0.0 - 1.2 mg/dL    Globulin 3.3 gm/dL    A/G Ratio 0.9 g/dL    BUN/Creatinine Ratio 32.5 (H) 7.0 - 25.0    Anion Gap 12.0 5.0 - 15.0 mmol/L    eGFR 62.5 >60.0 mL/min/1.73   Green Top (Gel)    Collection Time: 05/28/24  2:55 PM   Result Value Ref Range    Extra Tube Hold for add-ons.    Lavender Top    Collection Time: 05/28/24  2:55 PM   Result Value Ref Range    Extra Tube hold for add-on    Gold Top - SST    Collection Time: 05/28/24  2:55 PM   Result Value Ref Range    Extra Tube Hold for add-ons.    Gray Top    Collection Time: 05/28/24  2:55 PM   Result Value Ref Range    Extra Tube Hold for add-ons.    Light Blue Top    Collection Time: 05/28/24  2:55 PM   Result Value Ref Range    Extra Tube Hold for add-ons.    CBC Auto Differential    Collection Time: 05/28/24  2:55 PM    Specimen: Blood   Result Value Ref Range    WBC 12.09 (H) 3.40 - 10.80 10*3/mm3    RBC 4.43 4.14 - 5.80 10*6/mm3    Hemoglobin 13.6 13.0 - 17.7 g/dL    Hematocrit 39.8 37.5 - 51.0 %    MCV 89.8 79.0 - 97.0 fL    MCH 30.7 26.6 - 33.0 pg    MCHC 34.2 31.5 - 35.7 g/dL    RDW 12.3 12.3 - 15.4 %    RDW-SD 40.9 37.0 - 54.0 fl    MPV 10.3 6.0 - 12.0 fL    Platelets 290 140 - 450 10*3/mm3    Neutrophil % 66.8 42.7 - 76.0 %    Lymphocyte % 21.2 19.6 - 45.3 %    Monocyte % 10.3 5.0 - 12.0 %    Eosinophil % 0.2 (L) 0.3 - 6.2 %    Basophil % 0.7 0.0 - 1.5 %    Immature Grans % 0.8 (H) 0.0 - 0.5 %    Neutrophils, Absolute 8.07 (H) 1.70 - 7.00 10*3/mm3    Lymphocytes, Absolute 2.56 0.70 - 3.10 10*3/mm3    Monocytes, Absolute 1.25 (H) 0.10 - 0.90 10*3/mm3    Eosinophils, Absolute 0.03 0.00 - 0.40 10*3/mm3    Basophils, Absolute 0.08 0.00 - 0.20 10*3/mm3    Immature Grans, Absolute 0.10 (H) 0.00 - 0.05 10*3/mm3    nRBC 0.0 0.0 - 0.2 /100 WBC   Lactic Acid, Plasma    Collection Time: 05/28/24  2:55 PM    Specimen: Blood   Result Value Ref Range    Lactate 2.1 (C) 0.5 - 2.0 mmol/L    Procalcitonin    Collection Time: 05/28/24  2:55 PM    Specimen: Blood   Result Value Ref Range    Procalcitonin 0.25 0.00 - 0.25 ng/mL   Magnesium    Collection Time: 05/28/24  2:55 PM    Specimen: Blood   Result Value Ref Range    Magnesium 1.6 1.6 - 2.4 mg/dL   Beta Hydroxybutyrate Quantitative    Collection Time: 05/28/24  2:55 PM    Specimen: Blood   Result Value Ref Range    Beta-Hydroxybutyrate Quant 0.178 0.020 - 0.270 mmol/L   Single High Sensitivity Troponin T    Collection Time: 05/28/24  2:55 PM    Specimen: Blood   Result Value Ref Range    HS Troponin T 395 (C) <22 ng/L   Blood Gas, Arterial With Co-Ox    Collection Time: 05/28/24  3:20 PM    Specimen: Arterial Blood   Result Value Ref Range    Site Right Radial     Sang's Test N/A     pH, Arterial 7.477 (H) 7.350 - 7.450 pH units    pCO2, Arterial 29.8 (L) 35.0 - 45.0 mm Hg    pO2, Arterial 58.1 (L) 83.0 - 108.0 mm Hg    HCO3, Arterial 22.0 20.0 - 26.0 mmol/L    Base Excess, Arterial -0.5 (L) 0.0 - 2.0 mmol/L    Hemoglobin, Blood Gas 14.2 13.5 - 17.5 g/dL    Hematocrit, Blood Gas 43.6 38.0 - 51.0 %    Oxyhemoglobin 90.2 (L) 94 - 99 %    Methemoglobin 0.20 0.00 - 1.50 %    Carboxyhemoglobin 1.2 0 - 2 %    CO2 Content 22.9 22 - 33 mmol/L    Temperature 37.0     Barometric Pressure for Blood Gas      Modality Room Air     FIO2 21 %    Rate 0 Breaths/minute    PIP 0 cmH2O    IPAP 0     EPAP 0     pH, Temp Corrected 7.477 pH Units    pCO2, Temperature Corrected 29.8 (L) 35 - 48 mm Hg    pO2, Temperature Corrected 58.1 (L) 83 - 108 mm Hg   ECG 12 Lead Rhythm Change    Collection Time: 05/28/24  3:20 PM   Result Value Ref Range    QT Interval 320 ms    QTC Interval 448 ms   COVID-19 and FLU A/B PCR, 1 HR TAT - Swab, Nasopharynx    Collection Time: 05/28/24  3:28 PM    Specimen: Nasopharynx; Swab   Result Value Ref Range    COVID19 Not Detected Not Detected - Ref. Range    Influenza A PCR Not Detected Not Detected    Influenza B PCR Not Detected Not  Detected   Urinalysis With Culture If Indicated - Urine, Catheter    Collection Time: 05/28/24  3:54 PM    Specimen: Urine, Catheter   Result Value Ref Range    Color, UA Yellow Yellow, Straw    Appearance, UA Hazy (A) Clear    pH, UA 5.5 5.0 - 8.0    Specific Gravity, UA 1.037 (H) 1.001 - 1.030    Glucose, UA >=1000 mg/dL (3+) (A) Negative    Ketones, UA Trace (A) Negative    Bilirubin, UA Negative Negative    Blood, UA Negative Negative    Protein, UA Trace (A) Negative    Leuk Esterase, UA Negative Negative    Nitrite, UA Negative Negative    Urobilinogen, UA 1.0 E.U./dL 0.2 - 1.0 E.U./dL   POC Glucose Once    Collection Time: 05/28/24  5:54 PM    Specimen: Blood   Result Value Ref Range    Glucose 356 (H) 70 - 130 mg/dL   STAT Lactic Acid, Reflex    Collection Time: 05/28/24  6:02 PM    Specimen: Blood   Result Value Ref Range    Lactate 2.2 (C) 0.5 - 2.0 mmol/L   High Sensitivity Troponin T 2Hr    Collection Time: 05/28/24  6:02 PM    Specimen: Blood   Result Value Ref Range    HS Troponin T 374 (C) <22 ng/L    Troponin T Delta -21 (L) >=-4 - <+4 ng/L   BNP    Collection Time: 05/28/24  6:02 PM    Specimen: Blood   Result Value Ref Range    proBNP 6,458.0 (H) 0.0 - 900.0 pg/mL   Heparin Anti-Xa    Collection Time: 05/28/24  6:40 PM    Specimen: Blood   Result Value Ref Range    Heparin Anti-Xa (UFH) 0.10 (L) 0.30 - 0.70 IU/ml   Protime-INR    Collection Time: 05/28/24  6:40 PM    Specimen: Blood   Result Value Ref Range    Protime 16.9 (H) 12.2 - 14.5 Seconds    INR 1.36 (H) 0.89 - 1.12   aPTT    Collection Time: 05/28/24  6:40 PM    Specimen: Blood   Result Value Ref Range    PTT 31.7 (L) 60.0 - 90.0 seconds   POC Glucose Once    Collection Time: 05/28/24 10:17 PM    Specimen: Blood   Result Value Ref Range    Glucose 382 (H) 70 - 130 mg/dL     Note: In addition to lab results from this visit, the labs listed above may include labs taken at another facility or during a different encounter within the last 24  hours. Please correlate lab times with ED admission and discharge times for further clarification of the services performed during this visit.    XR Chest 1 View   Final Result   Impression:   No acute abnormality.         Electronically Signed: Grover Rivera DO     5/28/2024 4:05 PM EDT     Workstation ID: [id number]        Vitals:    05/28/24 2045 05/28/24 2100 05/28/24 2129 05/28/24 2130   BP:  116/59     Pulse: 109 106 108 109   Resp:       Temp:       TempSrc:       SpO2:   100% 98%   Weight:       Height:         Medications   sodium chloride 0.9 % flush 10 mL (has no administration in time range)   heparin 58655 units/250 mL (100 units/mL) in 0.45 % NaCl infusion (10 Units/kg/hr × 97.5 kg Intravenous Currently Infusing 5/28/24 2013)   Pharmacy to Dose Heparin (has no administration in time range)   amLODIPine (NORVASC) tablet 10 mg ( Oral Dose Auto Held 6/6/24 0900)   folic acid (FOLVITE) tablet 1 mg (has no administration in time range)   insulin glargine (LANTUS, SEMGLEE) injection 15 Units (15 Units Subcutaneous Given 5/28/24 2220)   lisinopril (PRINIVIL,ZESTRIL) tablet 20 mg ( Oral Dose Auto Held 6/6/24 0900)   melatonin tablet 5 mg (5 mg Oral Given 5/28/24 2218)   PHENobarbital tablet 129.6 mg (129.6 mg Oral Given 5/28/24 2217)   PHENobarbital tablet 64.8 mg (has no administration in time range)   sertraline (ZOLOFT) tablet 50 mg (has no administration in time range)   temazepam (RESTORIL) capsule 15 mg (15 mg Oral Given 5/28/24 2219)   sodium chloride 0.9 % flush 10 mL (has no administration in time range)   sodium chloride 0.9 % flush 10 mL (has no administration in time range)   sodium chloride 0.9 % infusion 40 mL (has no administration in time range)   Potassium Replacement - Follow Nurse / BPA Driven Protocol (has no administration in time range)   Magnesium Low Dose Replacement - Follow Nurse / BPA Driven Protocol (has no administration in time range)   Phosphorus Replacement - Follow Nurse /  BPA Driven Protocol (has no administration in time range)   Calcium Replacement - Follow Nurse / BPA Driven Protocol (has no administration in time range)   acetaminophen (TYLENOL) tablet 650 mg (650 mg Oral Given 5/28/24 2219)     Or   acetaminophen (TYLENOL) 160 MG/5ML oral solution 650 mg ( Oral Not Given:  See Alt 5/28/24 2219)     Or   acetaminophen (TYLENOL) suppository 650 mg ( Rectal Not Given:  See Alt 5/28/24 2219)   sennosides-docusate (PERICOLACE) 8.6-50 MG per tablet 2 tablet (has no administration in time range)     And   polyethylene glycol (MIRALAX) packet 17 g (has no administration in time range)     And   bisacodyl (DULCOLAX) EC tablet 5 mg (has no administration in time range)     And   bisacodyl (DULCOLAX) suppository 10 mg (has no administration in time range)   morphine injection 1 mg (1 mg Intravenous Given 5/28/24 2033)     And   naloxone (NARCAN) injection 0.4 mg (has no administration in time range)   nitroglycerin (NITROSTAT) SL tablet 0.4 mg (has no administration in time range)   dextrose (GLUTOSE) oral gel 15 g (has no administration in time range)   dextrose (D50W) (25 g/50 mL) IV injection 25 g (has no administration in time range)   glucagon (GLUCAGEN) injection 1 mg (has no administration in time range)   Insulin Lispro (humaLOG) injection 2-9 Units (8 Units Subcutaneous Given 5/28/24 2219)   aspirin chewable tablet 81 mg (has no administration in time range)   sodium chloride 0.9 % bolus 1,000 mL (0 mL Intravenous Stopped 5/28/24 1724)   heparin (porcine) injection 4,000 Units (4,000 Units Intravenous Given 5/28/24 1950)   sodium chloride 0.9 % bolus 1,000 mL (0 mL Intravenous Stopped 5/28/24 2033)   aspirin chewable tablet 324 mg (324 mg Oral Given 5/28/24 2219)     ECG/EMG Results (last 24 hours)       Procedure Component Value Units Date/Time    ECG 12 Lead Rhythm Change [[id number]] Collected: 05/28/24 1520     Updated: 05/28/24 1519     QT Interval 320 ms      QTC Interval 448  ms     Narrative:      Test Reason : Rhythm Change  Blood Pressure :   */*   mmHG  Vent. Rate : 118 BPM     Atrial Rate : 100 BPM     P-R Int :   * ms          QRS Dur :  88 ms      QT Int : 320 ms       P-R-T Axes :   * -25  55 degrees     QTc Int : 448 ms    Accelerated Junctional rhythm  Anteroseptal infarct , age undetermined  Abnormal ECG  When compared with ECG of 11-MAY-2024 21:43,  Junctional rhythm has replaced Sinus rhythm  Anteroseptal infarct is now present  ST now depressed in Lateral leads    Referred By: ED MD           Confirmed By:           ECG 12 Lead Rhythm Change   Preliminary Result   Test Reason : Rhythm Change   Blood Pressure :   */*   mmHG   Vent. Rate : 118 BPM     Atrial Rate : 100 BPM      P-R Int :   * ms          QRS Dur :  88 ms       QT Int : 320 ms       P-R-T Axes :   * -25  55 degrees      QTc Int : 448 ms      Accelerated Junctional rhythm   Anteroseptal infarct , age undetermined   Abnormal ECG   When compared with ECG of 11-MAY-2024 21:43,   Junctional rhythm has replaced Sinus rhythm   Anteroseptal infarct is now present   ST now depressed in Lateral leads      Referred By: ED MD           Confirmed By:                     HEART Score: 7                              Medical Decision Making  Pt is a 69 yo male presenting to ED with elevated blood sugar. Labs in ED notable for wbc 12, Procal 0.25, Glucose 495, K 4.7, HS Troop 395, BNP 6,458, serum ketones 0.1778 and Mg 1.6. EKG non specific changes and no acute ST elevation. Discussed plan for admission with patient/family. Pt given fluids in ED and started on Heparin.     Discussed patient with Dr. Ortez who is agreeable with ED course and tx plan.  Discussed patient with Elmira Schulz  Discussed admission with hospitalist Dr. Ugarte     DDx  DKA, ACS, NSTEMI, Electrolyte abnormality, UTI, Pneumonia,     Problems Addressed:  Dementia with other behavioral disturbance, unspecified dementia severity, unspecified dementia  type: complicated acute illness or injury  History of coronary artery disease: complicated acute illness or injury  Hyperglycemia due to diabetes mellitus: complicated acute illness or injury  NSTEMI (non-ST elevated myocardial infarction): complicated acute illness or injury    Amount and/or Complexity of Data Reviewed  Independent Historian: spouse  External Data Reviewed: notes.     Details: Reviewed previous non ED visits including prior labs, imaging, available notes, medications, allergies and surgical hx.     Labs: ordered. Decision-making details documented in ED Course.  Radiology: ordered. Decision-making details documented in ED Course.  ECG/medicine tests: ordered. Decision-making details documented in ED Course.    Risk  Prescription drug management.  Decision regarding hospitalization.        Final diagnoses:   NSTEMI (non-ST elevated myocardial infarction)   Hyperglycemia due to diabetes mellitus   Dementia with other behavioral disturbance, unspecified dementia severity, unspecified dementia type   History of coronary artery disease       ED Disposition  ED Disposition       ED Disposition   Decision to Admit    Condition   --    Comment   Level of Care: Telemetry [5]   Diagnosis: NSTEMI (non-ST elevated myocardial infarction) [857316]                 No follow-up provider specified.       Medication List      No changes were made to your prescriptions during this visit.            Nirmala Dang PA  05/28/24 3605

## 2024-05-28 NOTE — LETTER
EMS Transport Request  For use at [facility], [place], [place], [place], and [place] only   Patient Name: Too Tai : 1955   Weight:97.5 kg (215 lb) Pick-up Location: [facility] ([facility]) BLS/ALS: BLS/ALS: BLS   Insurance: [employer] MEDICARE REPLACEMENT Auth End Date:    Pre-Cert #: D/C Summary complete:    Destination: Other [facility] [place] in [facility](dementia unit)   Contact Precautions: None   Equipment (O2, Fluids, etc.): None   Arrive By Date/Time: , Monday, /3, 3-5 pm Stretcher/WC: Stretcher   CM Requesting: Elvira Mary RN Ext: [phone]   Notes/Medical Necessity: Ams, fall risk, supervision for safety     ______________________________________________________________________    *Only 2 patient bags OR 1 carry-on size bag are permitted.  Wheelchairs and walkers CANNOT transported with the patient. Acknowledge: Yes

## 2024-05-28 NOTE — H&P
[facility] Medicine Services  HISTORY AND PHYSICAL    Patient Name: Too Tai  : 1955  MRN: [id number]  Primary Care Physician: Des Geller MD  Date of admission: 2024      Subjective   Subjective     Chief Complaint:  Abnormal breathing pattern    HPI:  Too Tai is a 68 y.o. male with advanced dementia, CAD s/p remote CABG, DM2, peripheral vascular disease who has been admitted recently with altered mental status and behavioral disturbances.  He has been doing better from that standpoint since his last discharge but wife reports that he was not acting like himself yesterday (was slumped in the chair) and has been breathing heavily/acting as if he is in pain along with persistently elevated glucose in the 4-500s so they brought him to the ED.  Workup revealed significant troponin elevation compared to his recent baseline and some ST changes on EKG.  Admission was requested for management of NSTEMI.  Patient's wife is not aware of any recent fevers, cough, or other localizing signs of infection.      Personal History     Past Medical History:   Diagnosis Date    Coronary artery disease     Dementia     Diabetes mellitus     Hyperlipidemia     Peripheral vascular disease            Past Surgical History:   Procedure Laterality Date    CORONARY ARTERY BYPASS GRAFT      FEMORAL ARTERY - POPLITEAL ARTERY BYPASS GRAFT         Family History: family history includes Diabetes in his maternal grandfather, mother, sister, sister, and sister; Heart disease in his father; Hyperlipidemia in his father; Hypertension in his father.     Social History:  reports that he quit smoking about 27 years ago. His smoking use included cigarettes. He has never been exposed to tobacco smoke. He has quit using smokeless tobacco. He reports that he does not drink alcohol and does not use drugs.  Social History     Social History Narrative    Not on file       Medications:  Available home  medication information reviewed.  Accu-Chek Geri, Accu-Chek Geri Plus, Alcohol Prep, Glucagon, Insulin Glargine, Insulin Pen Needle, PHENobarbital, accu-chek soft touch, amLODIPine, cyanocobalamin, folic acid, lisinopril, melatonin, sertraline, and temazepam    Allergies   Allergen Reactions    Bactrim [Sulfamethoxazole-Trimethoprim] Rash    Adhesive Tape Rash    Neosporin [Neomycin-Bacitracin Zn-Polymyx] Rash       Objective   Objective     Vital Signs:   Temp:  [97.8 °F (36.6 °C)] 97.8 °F (36.6 °C)  Heart Rate:  [112-116] 116  Resp:  [16] 16  BP: ()/(66-77) 118/71       Physical Exam   Constitutional: Awake, alert, laying in bed  HENT: NCAT, mucous membranes moist  Respiratory: Clear to auscultation bilaterally, respiratory effort increased  Cardiovascular: Mildly tachycardic, no murmurs, rubs, or gallops  Gastrointestinal: Soft, nontender, nondistended  Musculoskeletal: No bilateral ankle edema  Skin: No rashes on exposed surfaces    Result Review:  I have personally reviewed the results from the time of this admission to 5/28/2024 19:00 EDT and agree with these findings:  [x]  Laboratory list / accordion  []  Microbiology  [x]  Radiology  []  EKG/Telemetry   []  Cardiology/Vascular   []  Pathology  []  Old records  []  Other:  Most notable findings include:       LAB RESULTS:      Lab 05/28/24  1802 05/28/24  1455   WBC  --  12.09*   HEMOGLOBIN  --  13.6   HEMATOCRIT  --  39.8   PLATELETS  --  290   NEUTROS ABS  --  8.07*   IMMATURE GRANS (ABS)  --  0.10*   LYMPHS ABS  --  2.56   MONOS ABS  --  1.25*   EOS ABS  --  0.03   MCV  --  89.8   PROCALCITONIN  --  0.25   LACTATE 2.2* 2.1*         Lab 05/28/24  1455   SODIUM 134*   POTASSIUM 4.7   CHLORIDE 101   CO2 21.0*   ANION GAP 12.0   BUN 41*   CREATININE 1.26   EGFR 62.5   GLUCOSE 495*   CALCIUM 8.8   MAGNESIUM 1.6         Lab 05/28/24  1455   TOTAL PROTEIN 6.4   ALBUMIN 3.1*   GLOBULIN 3.3   ALT (SGPT) 34   AST (SGOT) 39   BILIRUBIN 0.7   ALK PHOS 126*          Lab 05/28/24  1802 05/28/24  1455   PROBNP 6,458.0*  --    HSTROP T 374* 395*                 Lab 05/28/24  1520   PH, ARTERIAL 7.477*   PCO2, ARTERIAL 29.8*   PO2 ART 58.1*   FIO2 21   HCO3 ART 22.0   BASE EXCESS ART -0.5*   CARBOXYHEMOGLOBIN 1.2     UA          4/19/2024    02:43 5/12/2024    00:04 5/28/2024    15:54   Urinalysis   Squamous Epithelial Cells, UA  0-2     Specific Gravity, UA <=1.005  1.035  1.037    Ketones, UA Negative  Trace  Trace    Blood, UA Negative  Negative  Negative    Leukocytes, UA Negative  Negative  Negative    Nitrite, UA Negative  Negative  Negative    RBC, UA  0-2     WBC, UA  0-2     Bacteria, UA  None Seen         Microbiology Results (last 10 days)       Procedure Component Value - Date/Time    COVID PRE-OP / PRE-PROCEDURE SCREENING ORDER (NO ISOLATION) - Swab, Nasopharynx [[id number]]  (Normal) Collected: 05/28/24 1528    Lab Status: Final result Specimen: Swab from Nasopharynx Updated: 05/28/24 1558    Narrative:      The following orders were created for panel order COVID PRE-OP / PRE-PROCEDURE SCREENING ORDER (NO ISOLATION) - Swab, Nasopharynx.  Procedure                               Abnormality         Status                     ---------                               -----------         ------                     COVID-19 and FLU A/B PCR...[[id number]]  Normal              Final result                 Please view results for these tests on the individual orders.    COVID-19 and FLU A/B PCR, 1 HR TAT - Swab, Nasopharynx [[id number]]  (Normal) Collected: 05/28/24 1528    Lab Status: Final result Specimen: Swab from Nasopharynx Updated: 05/28/24 1558     COVID19 Not Detected     Influenza A PCR Not Detected     Influenza B PCR Not Detected    Narrative:      Fact sheet for providers: https://www.fda.gov/media/739080/download    Fact sheet for patients: https://www.fda.gov/media/718449/download    Test performed by PCR.            XR Chest 1 View    Result Date:  5/28/2024  XR CHEST 1 VW Date of Exam: 5/28/2024 3:26 PM EDT Indication: AMS, elevated blood sugar Comparison: 5/11/2024 Findings: Lines: None Lungs: Poor respiratory effort accentuates the pulmonary vasculature and cardiomediastinal silhouette. No definite consolidation. Pleura: No pleural effusion or pneumothorax. Cardiomediastinum: Postsurgical changes. Otherwise unremarkable. Soft Tissues: Unremarkable. Bones: No acute osseous abnormality.     Impression: Impression: No acute abnormality. Electronically Signed: Grover Rivera DO  5/28/2024 4:05 PM EDT  Workstation ID: [id number]     Results for orders placed during the hospital encounter of 03/27/24    Adult Transthoracic Echo Complete W/ Cont if Necessary Per Protocol    Interpretation Summary    Left ventricular systolic function is normal. Calculated left ventricular EF = 55.1% Normal left ventricular cavity size noted. Left ventricular wall thickness is consistent with mild concentric hypertrophy. Left ventricular diastolic function is consistent with (grade I) impaired relaxation.    The right ventricular cavity is mildly dilated. Normal right ventricular systolic function noted.    There is calcification of the aortic valve. The aortic valve appears trileaflet. Mild aortic valve regurgitation is present. No aortic valve stenosis is present.    Mitral annular calcification is present. Trace mitral valve regurgitation is present. No significant mitral valve stenosis is present.    The tricuspid valve is structurally normal with no stenosis present. Trace tricuspid valve regurgitation is present. Insufficient TR velocity profile to estimate the right ventricular systolic pressure.    Mild dilation of the sinuses of Valsalva is present. Mild dilation of the ascending aorta is present. Ascending aorta = 4.2 cm      Assessment & Plan   Assessment & Plan       NSTEMI (non-ST elevated myocardial infarction)    Benign essential HTN    Benign prostatic  hyperplasia    Type 2 diabetes mellitus with hyperglycemia, with long-term current use of insulin    Hyperlipidemia    Peripheral vascular disease    Arteriosclerosis of coronary artery    Dementia with behavioral disturbance        NSTEMI  CAD s/p remote CABG  -Continue heparin drip  -Full dose ASA x 1 then 81mg daily  -Nitroglycerin and IV morphine PRN chest discomfort  -Cardiology consult in am    DM2 with hyperglycemia  -Continue lantus BID, add sliding scale insulin, adjust as needed    HTN  -Hold amlodipine and lisinopril for now to allow for nitroglycerin administration as needed.  Resume as tolerated.    Dementia with behavioral disturbance  -Continue Phenobarbitol TID at 64.8mg/64.8mg/129.6mg per adjustment last admission  -Palliative care consult      DVT prophylaxis:  Medical DVT prophylaxis orders are present.          CODE STATUS:    Code Status and Medical Interventions:   Ordered at: 05/28/24 1900     Medical Intervention Limits:    NO intubation (DNI)     Level Of Support Discussed With:    Health Care Surrogate     Code Status (Patient has no pulse and is not breathing):    No CPR (Do Not Attempt to Resuscitate)     Medical Interventions (Patient has pulse or is breathing):    Limited Support       Expected Discharge   Expected Discharge Date: 5/31/2024; Expected Discharge Time:      Humera Ugarte MD  05/28/24

## 2024-05-28 NOTE — PROGRESS NOTES
HEPARIN INFUSION  Too Tai is a  68 y.o. male receiving heparin infusion.     Therapy for (VTE/Cardiac):   NSTEMI (Cardiac Protocol)  Patient Weight: 975 kg  Initial Bolus (Y/N):   Yes  Any Bolus (Y/N):   Yes        Signs or Symptoms of Bleeding: No S/Sx bleeding per RN    Cardiac or Other (Not VTE)   Initial Bolus: 60 units/kg (Max 4,000 units)  Initial rate: 12 units/kg/hr (Max 1,000 units/hr)   Anti Xa Rebolus Infusion Hold time Change infusion Dose (Units/kg/hr) Next Anti Xa or aPTT Level Due   < 0.11 50 Units/kg  (4000 Units Max) None Increase by  3 Units/kg/hr 6 hours   0.11- 0.19 25 Units/kg  (2000 Units Max) None Increase by  2 Units/kg/hr 6 hours   0.2 - 0.29 0 None Increase by  1 Units/kg/hr 6 hours   0.3 - 0.5 0 None No Change 6 hours (after 2 consecutive levels in range check qAM)   0.51 - 0.6 0 None Decrease by  1 Units/kg/hr 6 hours   0.61 - 0.8 0 30 Minutes Decrease by  2 Units/kg/hr 6 hours   0.81 - 1 0 60 Minutes Decrease by  3 Units/kg/hr 6 hours   >1 0 Hold  After Anti Xa less than 0.5 decrease previous rate by  4 Units/kg/hr  Every 2 hours until Anti Xa  less than 0.5 then when infusion restarts in 6 hours     Recommend Xa every 6 hours.     Results from last 7 days   Lab Units 05/28/24  1840 05/28/24  1455   INR  1.36*  --    HEMOGLOBIN g/dL  --  13.6   HEMATOCRIT %  --  39.8   PLATELETS 10*3/mm3  --  290          Date   Time   Anti-Xa Current Rate (Unit/kg/hr) Bolus   (Units) Rate Change   (Unit/kg/hr) New Rate (Unit/kg/hr) Next   Anti-Xa Comments  Pump Check Daily   5/28 18:40 0.10 --new-- +4000 +10 10 01:30 D/w RN. TBW, bolus, rater confirmed.                                                                                                                                                                                                                                  Gino Pichardo, PharmD, BCPS, BCCP  19:04 EDT   05/28/24

## 2024-05-29 ENCOUNTER — APPOINTMENT (OUTPATIENT)
Dept: GENERAL RADIOLOGY | Facility: HOSPITAL | Age: 69
DRG: 281 | End: 2024-05-29
Payer: MEDICARE

## 2024-05-29 ENCOUNTER — APPOINTMENT (OUTPATIENT)
Dept: NEUROLOGY | Facility: HOSPITAL | Age: 69
DRG: 281 | End: 2024-05-29
Payer: MEDICARE

## 2024-05-29 ENCOUNTER — APPOINTMENT (OUTPATIENT)
Dept: CT IMAGING | Facility: HOSPITAL | Age: 69
DRG: 281 | End: 2024-05-29
Payer: MEDICARE

## 2024-05-29 LAB
ANION GAP SERPL CALCULATED.3IONS-SCNC: 11 MMOL/L (ref 5–15)
BACTERIA BLD CULT: ABNORMAL
BASOPHILS # BLD AUTO: 0.08 10*3/MM3 (ref 0–0.2)
BASOPHILS NFR BLD AUTO: 0.7 % (ref 0–1.5)
BOTTLE TYPE: ABNORMAL
BUN SERPL-MCNC: 40 MG/DL (ref 8–23)
BUN/CREAT SERPL: 39.6 (ref 7–25)
CALCIUM SPEC-SCNC: 8.8 MG/DL (ref 8.6–10.5)
CHLORIDE SERPL-SCNC: 110 MMOL/L (ref 98–107)
CO2 SERPL-SCNC: 22 MMOL/L (ref 22–29)
CREAT SERPL-MCNC: 1.01 MG/DL (ref 0.76–1.27)
D-LACTATE SERPL-SCNC: 1.7 MMOL/L (ref 0.5–2)
DEPRECATED RDW RBC AUTO: 41.8 FL (ref 37–54)
EGFRCR SERPLBLD CKD-EPI 2021: 81.5 ML/MIN/1.73
EOSINOPHIL # BLD AUTO: 0.14 10*3/MM3 (ref 0–0.4)
EOSINOPHIL NFR BLD AUTO: 1.3 % (ref 0.3–6.2)
ERYTHROCYTE [DISTWIDTH] IN BLOOD BY AUTOMATED COUNT: 12.5 % (ref 12.3–15.4)
GLUCOSE BLDC GLUCOMTR-MCNC: 176 MG/DL (ref 70–130)
GLUCOSE BLDC GLUCOMTR-MCNC: 178 MG/DL (ref 70–130)
GLUCOSE BLDC GLUCOMTR-MCNC: 178 MG/DL (ref 70–130)
GLUCOSE BLDC GLUCOMTR-MCNC: 194 MG/DL (ref 70–130)
GLUCOSE BLDC GLUCOMTR-MCNC: 201 MG/DL (ref 70–130)
GLUCOSE BLDC GLUCOMTR-MCNC: 206 MG/DL (ref 70–130)
GLUCOSE BLDC GLUCOMTR-MCNC: 224 MG/DL (ref 70–130)
GLUCOSE BLDC GLUCOMTR-MCNC: 224 MG/DL (ref 70–130)
GLUCOSE BLDC GLUCOMTR-MCNC: 240 MG/DL (ref 70–130)
GLUCOSE BLDC GLUCOMTR-MCNC: 302 MG/DL (ref 70–130)
GLUCOSE SERPL-MCNC: 262 MG/DL (ref 65–99)
HCT VFR BLD AUTO: 37.6 % (ref 37.5–51)
HGB BLD-MCNC: 12.7 G/DL (ref 13–17.7)
IMM GRANULOCYTES # BLD AUTO: 0.07 10*3/MM3 (ref 0–0.05)
IMM GRANULOCYTES NFR BLD AUTO: 0.7 % (ref 0–0.5)
LYMPHOCYTES # BLD AUTO: 3.44 10*3/MM3 (ref 0.7–3.1)
LYMPHOCYTES NFR BLD AUTO: 32.1 % (ref 19.6–45.3)
MCH RBC QN AUTO: 31 PG (ref 26.6–33)
MCHC RBC AUTO-ENTMCNC: 33.8 G/DL (ref 31.5–35.7)
MCV RBC AUTO: 91.7 FL (ref 79–97)
MONOCYTES # BLD AUTO: 0.96 10*3/MM3 (ref 0.1–0.9)
MONOCYTES NFR BLD AUTO: 8.9 % (ref 5–12)
NEUTROPHILS NFR BLD AUTO: 56.3 % (ref 42.7–76)
NEUTROPHILS NFR BLD AUTO: 6.04 10*3/MM3 (ref 1.7–7)
NRBC BLD AUTO-RTO: 0 /100 WBC (ref 0–0.2)
PHENOBARB SERPL-MCNC: 28.3 MCG/ML (ref 10–30)
PLATELET # BLD AUTO: 268 10*3/MM3 (ref 140–450)
PMV BLD AUTO: 10.5 FL (ref 6–12)
POTASSIUM SERPL-SCNC: 4 MMOL/L (ref 3.5–5.2)
QT INTERVAL: 320 MS
QTC INTERVAL: 448 MS
RBC # BLD AUTO: 4.1 10*6/MM3 (ref 4.14–5.8)
SODIUM SERPL-SCNC: 143 MMOL/L (ref 136–145)
UFH PPP CHRO-ACNC: 0.22 IU/ML (ref 0.3–0.7)
UFH PPP CHRO-ACNC: 0.23 IU/ML (ref 0.3–0.7)
UFH PPP CHRO-ACNC: 0.23 IU/ML (ref 0.3–0.7)
UFH PPP CHRO-ACNC: 0.29 IU/ML (ref 0.3–0.7)
WBC NRBC COR # BLD AUTO: 10.73 10*3/MM3 (ref 3.4–10.8)

## 2024-05-29 PROCEDURE — 87040 BLOOD CULTURE FOR BACTERIA: CPT | Performed by: STUDENT IN AN ORGANIZED HEALTH CARE EDUCATION/TRAINING PROGRAM

## 2024-05-29 PROCEDURE — 85025 COMPLETE CBC W/AUTO DIFF WBC: CPT | Performed by: INTERNAL MEDICINE

## 2024-05-29 PROCEDURE — 74018 RADEX ABDOMEN 1 VIEW: CPT

## 2024-05-29 PROCEDURE — 63710000001 INSULIN LISPRO (HUMAN) PER 5 UNITS: Performed by: INTERNAL MEDICINE

## 2024-05-29 PROCEDURE — 63710000001 INSULIN GLARGINE PER 5 UNITS: Performed by: INTERNAL MEDICINE

## 2024-05-29 PROCEDURE — 82948 REAGENT STRIP/BLOOD GLUCOSE: CPT

## 2024-05-29 PROCEDURE — 99222 1ST HOSP IP/OBS MODERATE 55: CPT

## 2024-05-29 PROCEDURE — 83605 ASSAY OF LACTIC ACID: CPT | Performed by: PHYSICIAN ASSISTANT

## 2024-05-29 PROCEDURE — 99222 1ST HOSP IP/OBS MODERATE 55: CPT | Performed by: INTERNAL MEDICINE

## 2024-05-29 PROCEDURE — 95819 EEG AWAKE AND ASLEEP: CPT | Performed by: PSYCHIATRY & NEUROLOGY

## 2024-05-29 PROCEDURE — 80048 BASIC METABOLIC PNL TOTAL CA: CPT | Performed by: INTERNAL MEDICINE

## 2024-05-29 PROCEDURE — 95819 EEG AWAKE AND ASLEEP: CPT

## 2024-05-29 PROCEDURE — 70450 CT HEAD/BRAIN W/O DYE: CPT

## 2024-05-29 PROCEDURE — 99232 SBSQ HOSP IP/OBS MODERATE 35: CPT | Performed by: STUDENT IN AN ORGANIZED HEALTH CARE EDUCATION/TRAINING PROGRAM

## 2024-05-29 PROCEDURE — 85520 HEPARIN ASSAY: CPT | Performed by: PHYSICIAN ASSISTANT

## 2024-05-29 PROCEDURE — 25010000002 HEPARIN (PORCINE) 25000-0.45 UT/250ML-% SOLUTION

## 2024-05-29 PROCEDURE — 92610 EVALUATE SWALLOWING FUNCTION: CPT

## 2024-05-29 PROCEDURE — 80184 ASSAY OF PHENOBARBITAL: CPT | Performed by: STUDENT IN AN ORGANIZED HEALTH CARE EDUCATION/TRAINING PROGRAM

## 2024-05-29 PROCEDURE — 85520 HEPARIN ASSAY: CPT

## 2024-05-29 RX ORDER — ROSUVASTATIN CALCIUM 20 MG/1
20 TABLET, COATED ORAL NIGHTLY
Status: DISCONTINUED | OUTPATIENT
Start: 2024-05-29 | End: 2024-06-03 | Stop reason: HOSPADM

## 2024-05-29 RX ORDER — CLOPIDOGREL BISULFATE 75 MG/1
75 TABLET ORAL DAILY
Status: DISCONTINUED | OUTPATIENT
Start: 2024-05-29 | End: 2024-06-03 | Stop reason: HOSPADM

## 2024-05-29 RX ADMIN — SENNOSIDES AND DOCUSATE SODIUM 2 TABLET: 8.6; 5 TABLET ORAL at 21:22

## 2024-05-29 RX ADMIN — Medication 5 MG: at 21:22

## 2024-05-29 RX ADMIN — INSULIN GLARGINE 15 UNITS: 100 INJECTION, SOLUTION SUBCUTANEOUS at 10:12

## 2024-05-29 RX ADMIN — INSULIN LISPRO 2 UNITS: 100 INJECTION, SOLUTION INTRAVENOUS; SUBCUTANEOUS at 21:38

## 2024-05-29 RX ADMIN — INSULIN LISPRO 2 UNITS: 100 INJECTION, SOLUTION INTRAVENOUS; SUBCUTANEOUS at 17:37

## 2024-05-29 RX ADMIN — Medication 10 ML: at 08:07

## 2024-05-29 RX ADMIN — ROSUVASTATIN CALCIUM 20 MG: 20 TABLET, COATED ORAL at 21:22

## 2024-05-29 RX ADMIN — Medication 10 ML: at 21:39

## 2024-05-29 RX ADMIN — INSULIN LISPRO 2 UNITS: 100 INJECTION, SOLUTION INTRAVENOUS; SUBCUTANEOUS at 11:58

## 2024-05-29 RX ADMIN — INSULIN LISPRO 4 UNITS: 100 INJECTION, SOLUTION INTRAVENOUS; SUBCUTANEOUS at 08:05

## 2024-05-29 RX ADMIN — HEPARIN SODIUM 12 UNITS/KG/HR: 10000 INJECTION, SOLUTION INTRAVENOUS at 17:52

## 2024-05-29 RX ADMIN — INSULIN GLARGINE 15 UNITS: 100 INJECTION, SOLUTION SUBCUTANEOUS at 21:29

## 2024-05-29 NOTE — PLAN OF CARE
Goal Outcome Evaluation:  Plan of Care Reviewed With: patient           Outcome Evaluation: vss. obtunded this am, now alert and slightly restless.  ct and eeg complete.  mri dcd r/t epicardial wires on cxr.  heparin gtt remains.  passed swallow eval with speech. tolerating diet.

## 2024-05-29 NOTE — THERAPY EVALUATION
Acute Care - Speech Language Pathology   Swallow Initial Evaluation [facility]  Clinical Swallow Evaluation      Patient Name: Too Tai  : 1955  MRN: [id number]  Today's Date: 2024               Admit Date: 2024    Visit Dx:     ICD-10-CM ICD-9-CM   1. NSTEMI (non-ST elevated myocardial infarction)  I21.4 410.70   2. Hyperglycemia due to diabetes mellitus  E11.65 250.02   3. Dementia with other behavioral disturbance, unspecified dementia severity, unspecified dementia type  F03.918 294.21   4. History of coronary artery disease  Z86.79 V12.59     Patient Active Problem List   Diagnosis    Cough    Benign essential HTN    Benign prostatic hyperplasia    Coronary artery disease involving native coronary artery of native heart    Chronic coronary artery disease    Type 2 diabetes mellitus with hyperglycemia, with long-term current use of insulin    Gout    Hyperlipidemia    History of heart attack    Peripheral neuropathy    Peripheral vascular disease    Spasm of muscle    Myoclonic jerking    Hypomagnesemia    Arteriosclerosis of coronary artery    Dementia with behavioral disturbance    Frequent falls    History of DVT (deep vein thrombosis)    Chronic anticoagulation    AMS (altered mental status)    Falls    Bacteriuria    Cystitis    Agitation due to dementia    Dementia with behavioral disturbance    NSTEMI (non-ST elevated myocardial infarction)     Past Medical History:   Diagnosis Date    Coronary artery disease     Dementia     Diabetes mellitus     Hyperlipidemia     Peripheral vascular disease      Past Surgical History:   Procedure Laterality Date    CORONARY ARTERY BYPASS GRAFT      FEMORAL ARTERY - POPLITEAL ARTERY BYPASS GRAFT         SLP Recommendation and Plan  SLP Swallowing Diagnosis: oral dysphagia, R/O pharyngeal dysphagia, other (see comments) (spouse reports @ cog-comm baseline & declined further cog-comm eval) (24 1400)  SLP Diet Recommendation: soft to chew  textures, ground, thin liquids (05/29/24 1400)  Recommended Precautions and Strategies: general aspiration precautions, 1:1 supervision, assist with feeding (05/29/24 1400)  SLP Rec. for Method of Medication Administration: meds whole, meds crushed, with puree (may need to trial w/ ice cream) (05/29/24 1400)     Monitor for Signs of Aspiration: notify SLP if any concerns (05/29/24 1400)  Recommended Diagnostics: other (see comments) (assessment of diet tolerance) (05/29/24 1400)  Swallow Criteria for Skilled Therapeutic Interventions Met: demonstrates skilled criteria (05/29/24 1400)  Anticipated Discharge Disposition (SLP): home with 24/7 care, skilled nursing facility (05/29/24 1400)  Rehab Potential/Prognosis, Swallowing: adequate, monitor progress closely (05/29/24 1400)  Therapy Frequency (Swallow): PRN (05/29/24 1400)  Predicted Duration Therapy Intervention (Days): until discharge (05/29/24 1400)  Oral Care Recommendations: Oral Care BID/PRN, Suction toothbrush (05/29/24 1400)                                        Plan of Care Reviewed With: patient, spouse      SWALLOW EVALUATION (Last 72 Hours)       SLP Adult Swallow Evaluation       Row Name 05/29/24 1400                   Rehab Evaluation    Document Type evaluation  -MP        Subjective Information no complaints  -MP        Patient Observations alert;cooperative  -MP        Patient/Family/Caregiver Comments/Observations Spouse present  -MP        Patient Effort adequate  -MP           General Information    Patient Profile Reviewed yes  -MP        Pertinent History Of Current Problem Adm 5/28 w/ NSTEMI. Hx advanced dementia, recent adm w/ AMS & behavioral disturbance. CT head today w/ no acute. CXR yesterday clear. Spouse reports no prior swallowing difficulty. [facility] SLP has attempted to see pt over past few admissions, but either not appropriate or wouldn't accept any PO.  -MP        Current Method of Nutrition NPO  -MP         Precautions/Limitations, Vision WFL  -MP        Precautions/Limitations, Hearing WFL  -MP        Prior Level of Function-Communication cognitive-linguistic impairment  -MP        Prior Level of Function-Swallowing no diet consistency restrictions  -MP        Plans/Goals Discussed with patient;spouse/S.O.  -MP        Barriers to Rehab medically complex;cognitive status  -MP        Patient's Goals for Discharge patient did not state  -MP        Family Goals for Discharge patient able to return to PO diet  -MP           Pain    Additional Documentation Pain Scale: FACES Pre/Post-Treatment (Group)  -MP           Pain Scale: FACES Pre/Post-Treatment    Pain: FACES Scale, Pretreatment 2-->hurts little bit  -MP        Posttreatment Pain Rating 2-->hurts little bit  -MP           Oral Motor Structure and Function    Dentition Assessment natural, present and adequate  -MP        Secretion Management dried secretions in oral cavity  -        Mucosal Quality dry;cracked  -MP           Oral Musculature and Cranial Nerve Assessment    Oral Motor General Assessment unable to assess  -MP           General Eating/Swallowing Observations    Eating/Swallowing Skills fed by SLP  -MP        Positioning During Eating upright in bed  -MP        Utensils Used spoon;cup;straw  -MP        Consistencies Trialed thin liquids;pureed;regular textures  -           Clinical Swallow Eval    Oral Prep Phase impaired  -MP        Pharyngeal Phase no overt signs/symptoms of pharyngeal impairment  -MP        Clinical Swallow Evaluation Summary Prolonged mastication w/ regular solid. No clinical s/sxs aspiration noted w/ any consistency. ? wincing occasionally - u/a to assess pt's tongue (? thrush) but notified RN. Rec initiate soft/ground, thin. Standard aspiration prctns. SLP will f/u for assessment of diet tolerance/ adjustments.  -MP           Oral Prep Concerns    Oral Prep Concerns prolonged mastication  -        Prolonged Mastication  regular consistencies  -MP           SLP Evaluation Clinical Impression    SLP Swallowing Diagnosis oral dysphagia;R/O pharyngeal dysphagia;other (see comments)  spouse reports @ cog-comm baseline & declined further cog-comm eval  -MP        Functional Impact risk of aspiration/pneumonia  -MP        Rehab Potential/Prognosis, Swallowing adequate, monitor progress closely  -MP        Swallow Criteria for Skilled Therapeutic Interventions Met demonstrates skilled criteria  -MP           Recommendations    Therapy Frequency (Swallow) PRN  -MP        Predicted Duration Therapy Intervention (Days) until discharge  -MP        SLP Diet Recommendation soft to chew textures;ground;thin liquids  -MP        Recommended Diagnostics other (see comments)  assessment of diet tolerance  -MP        Recommended Precautions and Strategies general aspiration precautions;1:1 supervision;assist with feeding  -MP        Oral Care Recommendations Oral Care BID/PRN;Suction toothbrush  -MP        SLP Rec. for Method of Medication Administration meds whole;meds crushed;with puree  may need to trial w/ ice cream  -MP        Monitor for Signs of Aspiration notify SLP if any concerns  -MP        Anticipated Discharge Disposition (SLP) home with 24/7 care;skilled nursing facility  -MP                  User Key  (r) = Recorded By, (t) = Taken By, (c) = Cosigned By      Initials Name Effective Dates    MP Steve Judd MS CCC-SLP 12/28/21 -                     EDUCATION  The patient has been educated in the following areas:   Dysphagia (Swallowing Impairment).        SLP GOALS       Row Name 05/29/24 1400             (LTG) Patient will demonstrate functional swallow for    Diet Texture (Demonstrate functional swallow) soft to chew (ground) textures  -MP      Liquid viscosity (Demonstrate functional swallow) thin liquids  -MP      [place] (Demonstrate functional swallow) with moderate cues (50-74% accuracy)  -MP      Time Frame  (Demonstrate functional swallow) by discharge  -MP      Progress/Outcomes (Demonstrate functional swallow) new goal  -MP         (STG) Patient will tolerate trials of    Consistencies Trialed (Tolerate trials) soft to chew (ground) textures;thin liquids  -MP      Desired Outcome (Tolerate trials) without signs/symptoms of aspiration;without signs of distress;with adequate oral prep/transit/clearance  -MP      [place] (Tolerate trials) with moderate cues (50-74% accuracy)  -MP      Time Frame (Tolerate trials) 1 week  -MP      Progress/Outcomes (Tolerate trials) new goal  -MP                User Key  (r) = Recorded By, (t) = Taken By, (c) = Cosigned By      Initials Name Provider Type    Steve Herrmann MS CCC-SLP Speech and Language Pathologist                         Time Calculation:    Time Calculation- SLP       Row Name 05/29/24 1448             Time Calculation- SLP    SLP Start Time 1400  -MP      SLP Received On 05/29/24  -MP         Untimed Charges    24661-TM Eval Oral Pharyng Swallow Minutes 40  -MP         Total Minutes    Untimed Charges Total Minutes 40  -MP       Total Minutes 40  -MP                User Key  (r) = Recorded By, (t) = Taken By, (c) = Cosigned By      Initials Name Provider Type    Steve Herrmann MS CCC-SLP Speech and Language Pathologist                    Therapy Charges for Today       Code Description Service Date Service Provider Modifiers Qty    [id number]  ST EVAL ORAL PHARYNG SWALLOW 3 5/29/2024 Steve Judd MS CCC-SLP GN 1                 Steve Isabel MS CCC-KACEY  5/29/2024

## 2024-05-29 NOTE — ED NOTES
Too Tai    Nursing Report ED to Floor:  Mental status: DISORIENTED X 4, NONVERBAL  Ambulatory status: NON AMBULATORY  Oxygen Therapy:  RA  Cardiac Rhythm: SINUS TACH  Admitted from: [facility]  Safety Concerns:  NA  Social Issues: DEMENTIA, NONVERBAL  ED Room #:  06    ED Nurse Phone Extension - [phone] or may call [phone].      HPI:   Chief Complaint   Patient presents with    Hyperglycemia       Past Medical History:  Past Medical History:   Diagnosis Date    Coronary artery disease     Dementia     Diabetes mellitus     Hyperlipidemia     Peripheral vascular disease         Past Surgical History:  Past Surgical History:   Procedure Laterality Date    CORONARY ARTERY BYPASS GRAFT      FEMORAL ARTERY - POPLITEAL ARTERY BYPASS GRAFT          Admitting Doctor:   Humera Ugarte MD    Consulting Provider(s):  Consults       Date and Time Order Name Status Description    5/12/2024  7:28 AM Inpatient Neurology Consult General Completed     4/23/2024  2:30 PM Inpatient Palliative Care MD Consult Completed     4/19/2024  5:42 AM Inpatient Neurology Consult General Completed              Admitting Diagnosis:   The primary encounter diagnosis was NSTEMI (non-ST elevated myocardial infarction). Diagnoses of Hyperglycemia due to diabetes mellitus, Dementia with other behavioral disturbance, unspecified dementia severity, unspecified dementia type, and History of coronary artery disease were also pertinent to this visit.    Most Recent Vitals:   Vitals:    05/28/24 1550 05/28/24 1630 05/28/24 1845 05/28/24 1900   BP:  118/71  114/82   Pulse: 112 116 116 114   Resp:       Temp:       TempSrc:       SpO2: 92% 92%     Weight:       Height:           Active LDAs/IV Access:   Lines, Drains & Airways       Active LDAs       Name Placement date Placement time Site Days    Peripheral IV 05/28/24 1448 Left;Posterior Forearm 05/28/24  1448  Forearm  less than 1                    Labs (abnormal labs have a star):   Labs Reviewed    COMPREHENSIVE METABOLIC PANEL - Abnormal; Notable for the following components:       Result Value    Glucose 495 (*)     BUN 41 (*)     Sodium 134 (*)     CO2 21.0 (*)     Albumin 3.1 (*)     Alkaline Phosphatase 126 (*)     BUN/Creatinine Ratio 32.5 (*)     All other components within normal limits    Narrative:     GFR Normal >60  Chronic Kidney Disease <60  Kidney Failure <15     CBC WITH AUTO DIFFERENTIAL - Abnormal; Notable for the following components:    WBC 12.09 (*)     Eosinophil % 0.2 (*)     Immature Grans % 0.8 (*)     Neutrophils, Absolute 8.07 (*)     Monocytes, Absolute 1.25 (*)     Immature Grans, Absolute 0.10 (*)     All other components within normal limits   URINALYSIS W/ CULTURE IF INDICATED - Abnormal; Notable for the following components:    Appearance, UA Hazy (*)     Specific Gravity, UA 1.037 (*)     Glucose, UA >=1000 mg/dL (3+) (*)     Ketones, UA Trace (*)     Protein, UA Trace (*)     All other components within normal limits    Narrative:     In absence of clinical symptoms, the presence of pyuria, bacteria, and/or nitrites on the urinalysis result does not correlate with infection.  Urine microscopic not indicated.   LACTIC ACID, PLASMA - Abnormal; Notable for the following components:    Lactate 2.1 (*)     All other components within normal limits   BLOOD GAS, ARTERIAL W/CO-OXIMETRY - Abnormal; Notable for the following components:    pH, Arterial 7.477 (*)     pCO2, Arterial 29.8 (*)     pO2, Arterial 58.1 (*)     Base Excess, Arterial -0.5 (*)     Oxyhemoglobin 90.2 (*)     pCO2, Temperature Corrected 29.8 (*)     pO2, Temperature Corrected 58.1 (*)     All other components within normal limits   LACTIC ACID, REFLEX - Abnormal; Notable for the following components:    Lactate 2.2 (*)     All other components within normal limits   SINGLE HS TROPONIN T - Abnormal; Notable for the following components:    HS Troponin T 395 (*)     All other components within normal limits     Narrative:     High Sensitive Troponin T Reference Range:  <14.0 ng/L- Negative Female for AMI  <22.0 ng/L- Negative Male for AMI  >=14 - Abnormal Female indicating possible myocardial injury.  >=22 - Abnormal Male indicating possible myocardial injury.   Clinicians would have to utilize clinical acumen, EKG, Troponin, and serial changes to determine if it is an Acute Myocardial Infarction or myocardial injury due to an underlying chronic condition.        HIGH SENSITIVITIY TROPONIN T 2HR - Abnormal; Notable for the following components:    HS Troponin T 374 (*)     Troponin T Delta -21 (*)     All other components within normal limits    Narrative:     High Sensitive Troponin T Reference Range:  <14.0 ng/L- Negative Female for AMI  <22.0 ng/L- Negative Male for AMI  >=14 - Abnormal Female indicating possible myocardial injury.  >=22 - Abnormal Male indicating possible myocardial injury.   Clinicians would have to utilize clinical acumen, EKG, Troponin, and serial changes to determine if it is an Acute Myocardial Infarction or myocardial injury due to an underlying chronic condition.        BNP (IN-HOUSE) - Abnormal; Notable for the following components:    proBNP 6,458.0 (*)     All other components within normal limits    Narrative:     This assay is used as an aid in the diagnosis of individuals suspected of having heart failure. It can be used as an aid in the diagnosis of acute decompensated heart failure (ADHF) in patients presenting with signs and symptoms of ADHF to the emergency department (ED). In addition, NT-proBNP of <300 pg/mL indicates ADHF is not likely.    Age Range Result Interpretation  NT-proBNP Concentration (pg/mL:      <50             Positive            >450                   Gray                 300-450                    Negative             <300    50-75           Positive            >900                  Gray                300-900                  Negative            <300      >75         "     Positive            >1800                  Gray                300-1800                  Negative            <300   HEPARIN ANTI XA - Abnormal; Notable for the following components:    Heparin Anti-Xa (UFH) 0.10 (*)     All other components within normal limits   PROTIME-INR - Abnormal; Notable for the following components:    Protime 16.9 (*)     INR 1.36 (*)     All other components within normal limits   APTT - Abnormal; Notable for the following components:    PTT 31.7 (*)     All other components within normal limits    Narrative:     PTT = The equivalent PTT values for the therapeutic range of heparin levels at 0.3 to 0.5 U/ml are 60 to 70 seconds.   POCT GLUCOSE FINGERSTICK - Abnormal; Notable for the following components:    Glucose 561 (*)     All other components within normal limits   POCT GLUCOSE FINGERSTICK - Abnormal; Notable for the following components:    Glucose 356 (*)     All other components within normal limits   COVID-19 AND FLU A/B, NP SWAB IN TRANSPORT MEDIA 1 HR TAT - Normal    Narrative:     Fact sheet for providers: https://www.fda.gov/media/801153/download    Fact sheet for patients: https://www.fda.gov/media/544758/download    Test performed by PCR.   PROCALCITONIN - Normal    Narrative:     As a Marker for Sepsis (Non-Neonates):    1. <0.5 ng/mL represents a low risk of severe sepsis and/or septic shock.  2. >2 ng/mL represents a high risk of severe sepsis and/or septic shock.    As a Marker for Lower Respiratory Tract Infections that require antibiotic therapy:    PCT on Admission    Antibiotic Therapy       6-12 Hrs later    >0.5                Strongly Recommended  >0.25 - <0.5        Recommended   0.1 - 0.25          Discouraged              Remeasure/reassess PCT  <0.1                Strongly Discouraged     Remeasure/reassess PCT    As 28 day mortality risk marker: \"Change in Procalcitonin Result\" (>80% or <=80%) if Day 0 (or Day 1) and Day 4 values are available. Refer to " http://www.[facility]-pct-calculator.com    Change in PCT <=80%  A decrease of PCT levels below or equal to 80% defines a positive change in PCT test result representing a higher risk for 28-day all-cause mortality of patients diagnosed with severe sepsis for septic shock.    Change in PCT >80%  A decrease of PCT levels of more than 80% defines a negative change in PCT result representing a lower risk for 28-day all-cause mortality of patients diagnosed with severe sepsis or septic shock.      MAGNESIUM - Normal   BETA HYDROXYBUTYRATE QUANTITATIVE - Normal    Narrative:     In the assessment of possible diabetic ketoacidosis, the test should be interpreted along with other clinical and laboratory findings.  A level greater than 1 mmol/L should require further evaluation and levels of more than 3 mmol/L require immediate medical review.   COVID PRE-OP / PRE-PROCEDURE SCREENING ORDER (NO ISOLATION)    Narrative:     The following orders were created for panel order COVID PRE-OP / PRE-PROCEDURE SCREENING ORDER (NO ISOLATION) - Swab, Nasopharynx.  Procedure                               Abnormality         Status                     ---------                               -----------         ------                     COVID-19 and FLU A/B PCR...[[id number]]  Normal              Final result                 Please view results for these tests on the individual orders.   BLOOD CULTURE   BLOOD CULTURE   RAINBOW DRAW    Narrative:     The following orders were created for panel order [place] Draw.  Procedure                               Abnormality         Status                     ---------                               -----------         ------                     Green Top (Gel)[[id number]]                                  Final result               Lavender Top[[id number]]                                     Final result               Gold Top - SST[[id number]]                                   Final result                [place] Top[[id number]]                                         Final result               Light Blue Top[[id number]]                                   Final result                 Please view results for these tests on the individual orders.   BLOOD GAS, ARTERIAL   LACTIC ACID, REFLEX   POCT GLUCOSE FINGERSTICK   POCT GLUCOSE FINGERSTICK   POCT GLUCOSE FINGERSTICK   POCT GLUCOSE FINGERSTICK   POCT GLUCOSE FINGERSTICK   POCT GLUCOSE FINGERSTICK   POCT GLUCOSE FINGERSTICK   POCT GLUCOSE FINGERSTICK   POCT GLUCOSE FINGERSTICK   POCT GLUCOSE FINGERSTICK   POCT GLUCOSE FINGERSTICK   POCT GLUCOSE FINGERSTICK   POCT GLUCOSE FINGERSTICK   POCT GLUCOSE FINGERSTICK   POCT GLUCOSE FINGERSTICK   POCT GLUCOSE FINGERSTICK   POCT GLUCOSE FINGERSTICK   POCT GLUCOSE FINGERSTICK   POCT GLUCOSE FINGERSTICK   POCT GLUCOSE FINGERSTICK   POCT GLUCOSE FINGERSTICK   POCT GLUCOSE FINGERSTICK   POCT GLUCOSE FINGERSTICK   POCT GLUCOSE FINGERSTICK   POCT GLUCOSE FINGERSTICK   POCT GLUCOSE FINGERSTICK   POCT GLUCOSE FINGERSTICK   POCT GLUCOSE FINGERSTICK   POCT GLUCOSE FINGERSTICK   POCT GLUCOSE FINGERSTICK   CBC AND DIFFERENTIAL    Narrative:     The following orders were created for panel order CBC & Differential.  Procedure                               Abnormality         Status                     ---------                               -----------         ------                     CBC Auto Differential[[id number]]        Abnormal            Final result                 Please view results for these tests on the individual orders.   GREEN TOP   LAVENDER TOP   GOLD TOP - [facility]   GRAY TOP   LIGHT BLUE TOP   KETONE BODIES SERUM    Narrative:     The following orders were created for panel order Ketone Bodies, Serum (Not performed at [place]).  Procedure                               Abnormality         Status                     ---------                               -----------         ------                     Beta Hydroxybutyrate  [place]...[[id number]]  Normal              Final result                 Please view results for these tests on the individual orders.       Meds Given in ED:   Medications   sodium chloride 0.9 % flush 10 mL (has no administration in time range)   heparin 81136 units/250 mL (100 units/mL) in 0.45 % NaCl infusion (10 Units/kg/hr × 97.5 kg Intravenous Currently Infusing 5/28/24 2013)   Pharmacy to Dose Heparin (has no administration in time range)   sodium chloride 0.9 % bolus 1,000 mL (1,000 mL Intravenous New Bag 5/28/24 1948)   sodium chloride 0.9 % bolus 1,000 mL (0 mL Intravenous Stopped 5/28/24 1724)   heparin (porcine) injection 4,000 Units (4,000 Units Intravenous Given 5/28/24 1950)     heparin, 10 Units/kg/hr, Last Rate: 10 Units/kg/hr (05/28/24 2013)  Pharmacy to Dose Heparin,          Last NIH score:                                                          Dysphagia screening results:  Patient Factors Component (Dysphagia:Stroke or Rule-out)  Best Eye Response: 4-->(E4) spontaneous (05/28/24 1501)  Best Motor Response: 4-->(M4) withdraws from pain (05/28/24 1501)  Best Verbal Response: 1-->(V1) none (05/28/24 1501)  [place] Coma Scale Score: 9 (05/28/24 1501)     [place] Coma Scale:  No data recorded     CIWA:        Restraint Type:            Isolation Status:  No active isolations

## 2024-05-29 NOTE — PROGRESS NOTES
HEPARIN INFUSION  Too Tai is a  68 y.o. male receiving heparin infusion.     Therapy for (VTE/Cardiac):   NSTEMI (Cardiac Protocol)  Patient Weight: 97.5 kg  Initial Bolus (Y/N):   Yes  Any Bolus (Y/N):   Yes        Signs or Symptoms of Bleeding: No S/Sx bleeding per RN    Cardiac or Other (Not VTE)   Initial Bolus: 60 units/kg (Max 4,000 units)  Initial rate: 12 units/kg/hr (Max 1,000 units/hr)   Anti Xa Rebolus Infusion Hold time Change infusion Dose (Units/kg/hr) Next Anti Xa or aPTT Level Due   < 0.11 50 Units/kg  (4000 Units Max) None Increase by  3 Units/kg/hr 6 hours   0.11- 0.19 25 Units/kg  (2000 Units Max) None Increase by  2 Units/kg/hr 6 hours   0.2 - 0.29 0 None Increase by  1 Units/kg/hr 6 hours   0.3 - 0.5 0 None No Change 6 hours (after 2 consecutive levels in range check qAM)   0.51 - 0.6 0 None Decrease by  1 Units/kg/hr 6 hours   0.61 - 0.8 0 30 Minutes Decrease by  2 Units/kg/hr 6 hours   0.81 - 1 0 60 Minutes Decrease by  3 Units/kg/hr 6 hours   >1 0 Hold  After Anti Xa less than 0.5 decrease previous rate by  4 Units/kg/hr  Every 2 hours until Anti Xa  less than 0.5 then when infusion restarts in 6 hours     Recommend Xa every 6 hours.     Results from last 7 days   Lab Units 05/29/24  0144 05/28/24  1840 05/28/24  1455   INR   --  1.36*  --    HEMOGLOBIN g/dL 12.7*  --  13.6   HEMATOCRIT % 37.6  --  39.8   PLATELETS 10*3/mm3 268  --  290          Date   Time   Anti-Xa Current Rate (Unit/kg/hr) Bolus   (Units) Rate Change   (Unit/kg/hr) New Rate (Unit/kg/hr) Next   Anti-Xa Comments  Pump Check Daily   5/28 18:40 0.10 --new-- +4000 +10 10 01:30 D/w RN. TBW, bolus, rater confirmed.    5/29 0144 0.23 10 -- +1 11 0900 Spoke with rn @ 1467   5/29 0921 0.23 11 -- +1 12 1800 DW RN, pump checked and verified                                                                                                                                                                                                            Thanks  Bebeto Randolph, PharmD, BCPS  5/29/2024  13:20 EDT

## 2024-05-29 NOTE — PROGRESS NOTES
[facility] Medicine Services  PROGRESS NOTE    Patient Name: Too Tai  : 1955  MRN: [id number]    Date of Admission: 2024  Primary Care Physician: Des Geller MD    Subjective   Subjective     CC:  Follow-up NSTEMI    HPI:  Patient's wife at bedside. He does not wake up to voice. He doesn't follow commands.       Objective   Objective     Vital Signs:   Temp:  [96.8 °F (36 °C)-97.8 °F (36.6 °C)] 97.5 °F (36.4 °C)  Heart Rate:  [] 97  Resp:  [16-18] 18  BP: ()/(56-82) 109/59     Physical Exam:  Constitutional: No acute distress, laying in bed  HENT: NCAT, mucous membranes moist  Respiratory: Clear to auscultation bilaterally, respiratory effort normal   Cardiovascular: RRR, no murmurs, rubs, or gallops  Gastrointestinal: Positive bowel sounds, soft, nontender, nondistended  Musculoskeletal: No bilateral ankle edema  Psychiatric: Unable to obtain  Neurologic: Does not respond to voice, does not follow commands, PERRL, withdraws to pain  Skin: No rashes      Results Reviewed:  LAB RESULTS:      Lab 24  0144 24  2236 24  1840 24  1802 24  1455   WBC 10.73  --   --   --  12.09*   HEMOGLOBIN 12.7*  --   --   --  13.6   HEMATOCRIT 37.6  --   --   --  39.8   PLATELETS 268  --   --   --  290   NEUTROS ABS 6.04  --   --   --  8.07*   IMMATURE GRANS (ABS) 0.07*  --   --   --  0.10*   LYMPHS ABS 3.44*  --   --   --  2.56   MONOS ABS 0.96*  --   --   --  1.25*   EOS ABS 0.14  --   --   --  0.03   MCV 91.7  --   --   --  89.8   PROCALCITONIN  --   --   --   --  0.25   LACTATE 1.7 2.2*  --  2.2* 2.1*   PROTIME  --   --  16.9*  --   --    APTT  --   --  31.7*  --   --    HEPARIN ANTI-XA 0.23*  --  0.10*  --   --          Lab 24  0144 24  1455   SODIUM 143 134*   POTASSIUM 4.0 4.7   CHLORIDE 110* 101   CO2 22.0 21.0*   ANION GAP 11.0 12.0   BUN 40* 41*   CREATININE 1.01 1.26   EGFR 81.5 62.5   GLUCOSE 262* 495*   CALCIUM 8.8  8.8   MAGNESIUM  --  1.6         Lab 05/28/24  1455   TOTAL PROTEIN 6.4   ALBUMIN 3.1*   GLOBULIN 3.3   ALT (SGPT) 34   AST (SGOT) 39   BILIRUBIN 0.7   ALK PHOS 126*         Lab 05/28/24  1840 05/28/24  1802 05/28/24  1455   PROBNP  --  6,458.0*  --    HSTROP T  --  374* 395*   PROTIME 16.9*  --   --    INR 1.36*  --   --                  Lab 05/28/24  1520   PH, ARTERIAL 7.477*   PCO2, ARTERIAL 29.8*   PO2 ART 58.1*   FIO2 21   HCO3 ART 22.0   BASE EXCESS ART -0.5*   CARBOXYHEMOGLOBIN 1.2     Brief Urine Lab Results  (Last result in the past 365 days)        Color   Clarity   Blood   Leuk Est   Nitrite   Protein   CREAT   Urine HCG        05/28/24 1554 Yellow   Hazy   Negative   Negative   Negative   Trace                   Microbiology Results Abnormal       Procedure Component Value - Date/Time    COVID PRE-OP / PRE-PROCEDURE SCREENING ORDER (NO ISOLATION) - Swab, Nasopharynx [[id number]]  (Normal) Collected: 05/28/24 1528    Lab Status: Final result Specimen: Swab from Nasopharynx Updated: 05/28/24 1558    Narrative:      The following orders were created for panel order COVID PRE-OP / PRE-PROCEDURE SCREENING ORDER (NO ISOLATION) - Swab, Nasopharynx.  Procedure                               Abnormality         Status                     ---------                               -----------         ------                     COVID-19 and FLU A/B PCR...[[id number]]  Normal              Final result                 Please view results for these tests on the individual orders.    COVID-19 and FLU A/B PCR, 1 HR TAT - Swab, Nasopharynx [[id number]]  (Normal) Collected: 05/28/24 1528    Lab Status: Final result Specimen: Swab from Nasopharynx Updated: 05/28/24 1558     COVID19 Not Detected     Influenza A PCR Not Detected     Influenza B PCR Not Detected    Narrative:      Fact sheet for providers: https://www.fda.gov/media/533390/download    Fact sheet for patients: https://www.fda.gov/media/230606/download    Test  performed by PCR.            XR Chest 1 View    Result Date: 5/28/2024  XR CHEST 1 VW Date of Exam: 5/28/2024 3:26 PM EDT Indication: AMS, elevated blood sugar Comparison: 5/11/2024 Findings: Lines: None Lungs: Poor respiratory effort accentuates the pulmonary vasculature and cardiomediastinal silhouette. No definite consolidation. Pleura: No pleural effusion or pneumothorax. Cardiomediastinum: Postsurgical changes. Otherwise unremarkable. Soft Tissues: Unremarkable. Bones: No acute osseous abnormality.     Impression: Impression: No acute abnormality. Electronically Signed: Grover Rivera DO  5/28/2024 4:05 PM EDT  Workstation ID: [id number]     Results for orders placed during the hospital encounter of 03/27/24    Adult Transthoracic Echo Complete W/ Cont if Necessary Per Protocol    Interpretation Summary    Left ventricular systolic function is normal. Calculated left ventricular EF = 55.1% Normal left ventricular cavity size noted. Left ventricular wall thickness is consistent with mild concentric hypertrophy. Left ventricular diastolic function is consistent with (grade I) impaired relaxation.    The right ventricular cavity is mildly dilated. Normal right ventricular systolic function noted.    There is calcification of the aortic valve. The aortic valve appears trileaflet. Mild aortic valve regurgitation is present. No aortic valve stenosis is present.    Mitral annular calcification is present. Trace mitral valve regurgitation is present. No significant mitral valve stenosis is present.    The tricuspid valve is structurally normal with no stenosis present. Trace tricuspid valve regurgitation is present. Insufficient TR velocity profile to estimate the right ventricular systolic pressure.    Mild dilation of the sinuses of Valsalva is present. Mild dilation of the ascending aorta is present. Ascending aorta = 4.2 cm      Current medications:  Scheduled Meds:[Held by provider] amLODIPine, 10 mg, Oral,  Q24H  aspirin, 81 mg, Oral, Daily  folic acid, 1 mg, Oral, Daily  insulin glargine, 15 Units, Subcutaneous, Q12H  insulin lispro, 2-9 Units, Subcutaneous, 4x Daily AC & at Bedtime  [Held by provider] lisinopril, 20 mg, Oral, Daily  melatonin, 5 mg, Oral, Nightly  PHENobarbital, 129.6 mg, Oral, Nightly  PHENobarbital, 64.8 mg, Oral, Daily With Breakfast & Lunch  senna-docusate sodium, 2 tablet, Oral, BID  sertraline, 50 mg, Oral, Daily  sodium chloride, 10 mL, Intravenous, Q12H      Continuous Infusions:heparin, 11 Units/kg/hr, Last Rate: 11 Units/kg/hr (05/29/24 0808)  Pharmacy to Dose Heparin,       PRN Meds:.  acetaminophen **OR** acetaminophen **OR** acetaminophen    senna-docusate sodium **AND** polyethylene glycol **AND** bisacodyl **AND** bisacodyl    Calcium Replacement - Follow Nurse / BPA Driven Protocol    dextrose    dextrose    glucagon (human recombinant)    Magnesium Low Dose Replacement - Follow Nurse / BPA Driven Protocol    Morphine **AND** naloxone    nitroglycerin    Pharmacy to Dose Heparin    Phosphorus Replacement - Follow Nurse / BPA Driven Protocol    Potassium Replacement - Follow Nurse / BPA Driven Protocol    sodium chloride    sodium chloride    sodium chloride    temazepam    Assessment & Plan   Assessment & Plan     Active Hospital Problems    Diagnosis  POA    **NSTEMI (non-ST elevated myocardial infarction) [I21.4]  Yes    Arteriosclerosis of coronary artery [I25.10]  Yes    Dementia with behavioral disturbance [F03.918]  Yes    Benign essential HTN [I10]  Yes    Benign prostatic hyperplasia [N40.0]  Yes    Hyperlipidemia [E78.5]  Yes    Type 2 diabetes mellitus with hyperglycemia, with long-term current use of insulin [E11.65, Z79.4]  Not Applicable    Peripheral vascular disease [I73.9]  Yes      Resolved Hospital Problems   No resolved problems to display.        Brief Hospital Course to date:  Too Tai is a 68 y.o. male with advanced dementia, CAD s/p remote CABG, DM2,  peripheral vascular disease who has been admitted recently with altered mental status and behavioral disturbances.  He had improved, but on 5/27, patient was not acting like himself and appeared to be in pain along with significant hyperglycemia.. Workup revealed significant troponin elevation compared to his recent baseline and some ST changes on EKG.     This patient's problems and plans were partially entered by my partner and updated as appropriate by me 05/29/24.    Dementia with behavioral disturbance  Altered mental status  -CT head obtained stat given patient with AMS  and also on heparin drip  -Holding home Phenobarbitol TID at 64.8mg/64.8mg/129.6mg per adjustment last admission  -Palliative care consulted  -NPO for now given AMS  -EEG pending  -WBC within normal limits, UA not consistent with infection, COVID/flu negative, ABG with alkalosis  -Suspect AMS related to oversedation; discussed with neurology, they will see in consult  -Patient appears to be hospice appropriate given severe dementia; patient's wife has not been amenable to this in the past, will readdress    Positive blood culture  -1 of 2 bottles positive for staph species, suspect contamination; repeat blood culture sent    NSTEMI  CAD s/p remote CABG  PAD status post multiple lower extremity stents  -Continue heparin drip x48 hours; medical management of NSTEMI  -Continue ASA  -Nitroglycerin and IV morphine PRN chest discomfort  -Cardiology consulted     DM2 with hyperglycemia  -Continue lantus, sliding scale insulin, adjust as needed     HTN  -Hold amlodipine and lisinopril for now to allow for nitroglycerin administration as needed.  Resume as tolerated.    Expected Discharge Location and Transportation: back to facility  Expected Discharge   Expected Discharge Date: 5/31/2024; Expected Discharge Time:      DVT prophylaxis:  Medical DVT prophylaxis orders are present.         AM-PAC 6 Clicks Score (PT): 6 (05/28/24 2200)    CODE STATUS:    Code Status and Medical Interventions:   Ordered at: 05/28/24 1900     Medical Intervention Limits:    NO intubation (DNI)     Level Of Support Discussed With:    Health Care Surrogate     Code Status (Patient has no pulse and is not breathing):    No CPR (Do Not Attempt to Resuscitate)     Medical Interventions (Patient has pulse or is breathing):    Limited Support       Jessica Ervin MD  05/29/24

## 2024-05-29 NOTE — PLAN OF CARE
Goal Outcome Evaluation:  Plan of Care Reviewed With: patient, spouse                  Anticipated Discharge Disposition (SLP): home with 24/7 care, skilled nursing facility          SLP Swallowing Diagnosis: oral dysphagia, R/O pharyngeal dysphagia, other (see comments) (spouse reports @ cog-comm baseline & declined further cog-comm eval) (05/29/24 1400)

## 2024-05-29 NOTE — PROGRESS NOTES
Clinical Pharmacy Services: Medication History [re: previous anticoagulation/antiplatelets]    68 yoM patient was initially on DAPT (aspirin, clopidogrel) s/p placement of multiple LE stents from 2007 to 2017 d/t history of Severe PVD with IC. He presented to [facility] on 1/26/2017 for CT angio with runoff of RLE with Dr Light d/t occlusion of the previous stents. Patient initiated on apixban while admitted on 2/2/17, and ultimately discharged on 2/6/17, with instructions to discontinue clopidogrel and c/w apixaban 5 mg PO BID in conjunction with his aspirin 81 mg PO daily thereafter.     I s/w patients wife at bedside who confirmed that he has been on the combination of Eliquis (apixaban) and baby aspirin for several years. This regimen was continued until this past February when he was evaluated here locally at [facility] after he began experiencing sporadic convulsions and had fallen 4 times on 2/24/24; notably hitting his head at least once. Patients medical team felt it was best at that time to discontinue the apixaban d/t the risk of intracranial bleed. Patients wife endorsed his compliance with a daily dose aspirin 81 mg PO daily since then. He presents to [facility] today for hyperglycemia from NH and workup in the ED reveals an incidental finding for suspected NSTEMI. We have initiated a heparin infusion per protocol in the ED and Cardiology has been consulted to further evaluate in AM.     Patients wife is seeking clarification on his anticoagulation/antiplatelet plan during this admission.     Gino Pichardo, Suraj, BCPS, BCCP  21:31 EDT   05/28/24

## 2024-05-29 NOTE — CONSULTS
[facility]  Consult Note    Patient Name: Too Tai  : 1955  MRN: [id number]  Primary Care Physician:  Des Geller MD  Referring Physician: No ref. provider found  Date of admission: 2024    Subjective     Reason for Consult/ Chief Complaint: Altered mental status worse than baseline    Too Tai is a 68 y.o. male with past medical history of advanced dementia with behavioral disturbances and nonverbal, CAD, PVD, T2DM, HLD and orthostatic hypotension who presented with altered mental status. Per wife which is bedside patient had been doing well at his living facility.  She states that  evening when she left him he was in his normal cognitive state.  She went back to visit Monday morning and patient was slumped over in chair not acting like himself.  She was concerned about his breathing, and he was transferred to the ED.  Upon arrival patient's glucose was 561, no extreme electrolyte abnormalities, lactate 2.1, WBC ceased 10.73.  Troponin elevated patient seen by cardiology which recommended medical management of NSTEMI.  Patient continued back on ASA and restarted on Plavix 75 mg daily.  Also recommended to start Crestor 20 mg daily    Patient was also admitted from 24 to 2024 with agitation and behavioral disturbances.  Patient was weaned off of Depakote last admission as it was not effective for his agitation.  He was discharged on phenobarbital 64.8 mg/64.8 mg/129.6 mg and Seroquel 100 mg in a.m./200 mg in p.m.  It was recommended to continue to wean Seroquel as patient tolerated.  Patient was also seen by psych at last admission.  Please see note for recommendations.  Patient was discharged on Zoloft 50 mg daily. Phenobarbital level on admission was 20.3.  CT head negative for acute abnormalities.  Lactic 10.3.  Patient was readmitted on 2024 to 5/15/2024 after sustaining a fall at his facility.  No acute findings, medication adjusted was  discharged back to his living facility.     Of note patient has had multiple hospitalizations this year. Patient was also hospitalized from 3/27/2024 to 4/4/2024 for altered mental status and frequent falls.  There was concerns for UTI and pneumonia during that admission.  Patient also became orthostatic however it was difficult to manage due to patient's agitation.     Patient was also admitted from 2/24/2024 to 3/24/2024 for frequent falls and myoclonic jerking.  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  Patient was trialed on Depakote at that time however was unsuccessful management of his extreme agitation.    Review Of Systems   Unable to assess due to baseline mental status    Personal History     Past Medical History:   Diagnosis Date    Coronary artery disease     Dementia     Diabetes mellitus     Hyperlipidemia     Peripheral vascular disease        Past Surgical History:   Procedure Laterality Date    CORONARY ARTERY BYPASS GRAFT      FEMORAL ARTERY - POPLITEAL ARTERY BYPASS GRAFT         Family History: family history includes Diabetes in his maternal grandfather, mother, sister, sister, and sister; Heart disease in his father; Hyperlipidemia in his father; Hypertension in his father. Otherwise pertinent FHx was reviewed and not pertinent to current issue.    Social History:  reports that he quit smoking about 27 years ago. His smoking use included cigarettes. He has never been exposed to tobacco smoke. He has quit using smokeless tobacco. He reports that he does not drink alcohol and does not use drugs.    Home Medications:   Accu-Chek Geri, Accu-Chek Geri Plus, Alcohol Prep, Glucagon, Insulin Glargine, Insulin Pen Needle, PHENobarbital, accu-chek soft touch, amLODIPine, cyanocobalamin, folic acid, lisinopril, melatonin, sertraline, and temazepam    Current Medications:     Current Facility-Administered Medications:     acetaminophen (TYLENOL) tablet  650 mg, 650 mg, Oral, Q4H PRN, 650 mg at 05/28/24 2219 **OR** acetaminophen (TYLENOL) 160 MG/5ML oral solution 650 mg, 650 mg, Oral, Q4H PRN **OR** acetaminophen (TYLENOL) suppository 650 mg, 650 mg, Rectal, Q4H PRN, Humera Ugarte MD    [Held by provider] amLODIPine (NORVASC) tablet 10 mg, 10 mg, Oral, Q24H, Humera Ugarte MD    aspirin chewable tablet 81 mg, 81 mg, Oral, Daily, Humera Ugarte MD    sennosides-docusate (PERICOLACE) 8.6-50 MG per tablet 2 tablet, 2 tablet, Oral, BID **AND** polyethylene glycol (MIRALAX) packet 17 g, 17 g, Oral, Daily PRN **AND** bisacodyl (DULCOLAX) EC tablet 5 mg, 5 mg, Oral, Daily PRN **AND** bisacodyl (DULCOLAX) suppository 10 mg, 10 mg, Rectal, Daily PRN, Humera Ugarte MD    Calcium Replacement - Follow Nurse / BPA Driven Protocol, , Does not apply, PRN, Humera Ugarte MD    clopidogrel (PLAVIX) tablet 75 mg, 75 mg, Oral, Daily, Jerrod Duong MD    dextrose (D50W) (25 g/50 mL) IV injection 25 g, 25 g, Intravenous, Q15 Min PRN, Humera Ugarte MD    dextrose (GLUTOSE) oral gel 15 g, 15 g, Oral, Q15 Min PRN, Humera Ugarte MD    folic acid (FOLVITE) tablet 1 mg, 1 mg, Oral, Daily, Humera Ugarte MD    glucagon (GLUCAGEN) injection 1 mg, 1 mg, Intramuscular, Q15 Min PRN, Humera Ugarte MD    heparin 26697 units/250 mL (100 units/mL) in 0.45 % NaCl infusion, 12 Units/kg/hr, Intravenous, Titrated, Bebeto Randolph, PharmD, Last Rate: 11.7 mL/hr at 05/29/24 0956, 12 Units/kg/hr at 05/29/24 0956    insulin glargine (LANTUS, SEMGLEE) injection 15 Units, 15 Units, Subcutaneous, Q12H, Humera Ugarte MD, 15 Units at 05/29/24 1012    Insulin Lispro (humaLOG) injection 2-9 Units, 2-9 Units, Subcutaneous, 4x Daily AC & at Bedtime, Humera Ugarte MD, 2 Units at 05/29/24 1158    [Held by provider] lisinopril (PRINIVIL,ZESTRIL) tablet 20 mg, 20 mg, Oral, Daily, Humera Ugarte MD    Magnesium Low Dose Replacement - Follow Nurse / BPA Driven Protocol, , Does not apply, PRN, Brown, Humera,  MD    melatonin tablet 5 mg, 5 mg, Oral, Nightly, Humera Ugarte MD, 5 mg at 05/28/24 2218    morphine injection 1 mg, 1 mg, Intravenous, Q4H PRN, 1 mg at 05/28/24 2033 **AND** naloxone (NARCAN) injection 0.4 mg, 0.4 mg, Intravenous, Q5 Min PRN, Humera Ugarte MD    nitroglycerin (NITROSTAT) SL tablet 0.4 mg, 0.4 mg, Sublingual, Q5 Min PRN, Humera Ugarte MD    Pharmacy Consult - MT, , Does not apply, Daily, Bebeto Randolph, PharmD    Pharmacy to Dose Heparin, , Does not apply, Continuous PRN, Humera Ugarte MD    [Held by provider] PHENobarbital tablet 129.6 mg, 129.6 mg, Oral, Nightly, Humera Ugarte MD, 129.6 mg at 05/28/24 2217    [Held by provider] PHENobarbital tablet 64.8 mg, 64.8 mg, Oral, Daily With Breakfast & Lunch, Humera Ugarte MD    Phosphorus Replacement - Follow Nurse / BPA Driven Protocol, , Does not apply, PRN, Humera Ugarte MD    Potassium Replacement - Follow Nurse / BPA Driven Protocol, , Does not apply, Torito MCCRAY Hannah, MD    rosuvastatin (CRESTOR) tablet 20 mg, 20 mg, Oral, Nightly, Jerrod Duong MD    sertraline (ZOLOFT) tablet 50 mg, 50 mg, Oral, Daily, Humera Ugarte MD    sodium chloride 0.9 % flush 10 mL, 10 mL, Intravenous, PRN, Humera Ugarte MD    sodium chloride 0.9 % flush 10 mL, 10 mL, Intravenous, Q12H, Humera Ugarte MD, 10 mL at 05/29/24 0807    sodium chloride 0.9 % flush 10 mL, 10 mL, Intravenous, PRN, Humera Ugarte MD    sodium chloride 0.9 % infusion 40 mL, 40 mL, Intravenous, PRN, Humera Ugarte MD    temazepam (RESTORIL) capsule 15 mg, 15 mg, Oral, Nightly PRN, Humera Ugarte MD, 15 mg at 05/28/24 2219     Allergies:  Allergies   Allergen Reactions    Bactrim [Sulfamethoxazole-Trimethoprim] Rash    Bacitra-Neomycin-Polymyxin-Hc Unknown - Low Severity    Adhesive Tape Rash    Neosporin [Neomycin-Bacitracin Zn-Polymyx] Rash       Objective     Physical Exam  Vitals and nursing note reviewed.   Constitutional:       General: He is not in acute distress.      Appearance: He is ill-appearing.   Eyes:      Extraocular Movements: Extraocular movements intact.      Pupils: Pupils are equal, round, and reactive to light.      Comments: No nystagmus or deviated gaze noted   Neurological:      Mental Status: He is lethargic.      GCS: GCS eye subscore is 4. GCS verbal subscore is 3. GCS motor subscore is 6.      Cranial Nerves: No cranial nerve deficit or facial asymmetry.      Sensory: No sensory deficit.      Motor: Weakness present. No seizure activity.      Deep Tendon Reflexes:      Reflex Scores:       Bicep reflexes are 1+ on the right side and 1+ on the left side.       Patellar reflexes are 1+ on the right side and 1+ on the left side.     Comments:       Cranial Nerves   CN II: Pupils are equal, round, and reactive to light. Normal visual acuity and visual fields.    CN III IV VI: Extraocular movements are full without nystagmus.  CN V: Normal facial sensation and strength of muscles of mastication.  CN VII: Facial movements are symmetric. No weakness.  CN VIII:  Auditory acuity is normal.  CN IX & X:  Symmetric palatal movement.  CN XII: The tongue is midline.  No atrophy or fasciculations.    Generalized weakness         Vitals:  Temp:  [96.8 °F (36 °C)-97.8 °F (36.6 °C)] 97.5 °F (36.4 °C)  Heart Rate:  [] 97  Resp:  [16-18] 18  BP: ()/(56-82) 130/73    Laboratory Results:   Lab Results   Component Value Date    GLUCOSE 262 (H) 05/29/2024    CALCIUM 8.8 05/29/2024     05/29/2024    K 4.0 05/29/2024    CO2 22.0 05/29/2024     (H) 05/29/2024    BUN 40 (H) 05/29/2024    CREATININE 1.01 05/29/2024    EGFRIFAFRI 102 02/21/2022    EGFRIFNONA 88 02/21/2022    BCR 39.6 (H) 05/29/2024    ANIONGAP 11.0 05/29/2024     Lab Results   Component Value Date    WBC 10.73 05/29/2024    HGB 12.7 (L) 05/29/2024    HCT 37.6 05/29/2024    MCV 91.7 05/29/2024     05/29/2024     Lab Results   Component Value Date    CHOL 220 (H) 01/08/2024    CHOL 120  08/01/2019    CHOL 117 04/15/2019     Lab Results   Component Value Date    HDL 41 01/08/2024    HDL 37 (L) 08/03/2023    HDL 41 04/03/2023     Lab Results   Component Value Date     (H) 01/08/2024     (H) 08/03/2023     (H) 04/03/2023     Lab Results   Component Value Date    TRIG 108 01/08/2024    TRIG 208 (H) 08/03/2023    TRIG 138 04/03/2023     Lab Results   Component Value Date    HGBA1C 9.50 (H) 04/18/2024     Lab Results   Component Value Date    INR 1.36 (H) 05/28/2024    INR 1.1 11/25/2019    INR 1.1 09/10/2018    PROTIME 16.9 (H) 05/28/2024    PROTIME 12.5 11/25/2019    PROTIME 13.3 09/10/2018     Lab Results   Component Value Date    NFPSEBML94 384 04/18/2024     Lab Results   Component Value Date    FOLATE 10.20 02/24/2024             Assessment / Plan   Brief Patient Summary:  Too Tai is a 68 y.o. male with past medical history of advanced dementia with behavioral disturbances and nonverbal, CAD, PVD, T2DM, HLD and orthostatic hypotension who presented with altered mental status. Per wife which is bedside patient had been doing well at his living facility.  Found to have NSTEMI, cardiology following.        Plan:   Advanced dementia with behavioral disturbances  Lethargy  Acute NSTEMI  Unclear etiology of patient's altered mental status at this time.  Although patient had an NSTEMI less likely to be etiology of patient's current mentation issues.  Concern patient continues to progress in his dementia, cannot entirely rule out acute process.  Will proceed with workup  EEG  CT head  Consider MRI however patient has Epicardial leads in place on x-ray.  UA negative  Hold phenobarbital at this time, will reconsider restarting when patient does not so lethargic.  Phenobarbital level 28.3  N.p.o. till swallow evaluation is complete.  Will hold home temazepam 15 mg nightly, Zoloft 50 mg daily, Aricept 10 mg twice daily and home melatonin 15 mg nightly due to patient lethargy and able  to complete bedside swallow.  Continue fall precautions  Cardiology consulted, appreciate recommendations.  Currently on heparin drip.  General neurology will continue to follow    I have discussed the above with the patient, bedside RN Dr. Ervin  Time spent with patient: 80 minutes in face-to-face evaluation and management of the patient.     Copied text in this note has been reviewed and is accurate as of 05/29/24.     IVONNE Santos

## 2024-05-29 NOTE — CONSULTS
[facility] Consult    05/29/2024       Subjective:      Too Tai  [id number]  1955  0    Des Geller MD    Chief Complaint: NSTEMI      Problem List:    Coronary artery disease  A.  History of coronary artery bypass grafting  B.  Nuclear perfusion study 2/20/2022-MAURICIO Arshad MD revealing LVEF 49%, mild to moderate anterior lateral ischemia.  C.  Echocardiogram 3/27/2024 revealing LVEF 55%, calcification of the aortic valve with mild AR, trace MR.  D.  [facility] ED presentation 5/28/2024 with abnormal breathing pattern.  E.  Troponin elevation at 395/non-ST elevation MI      2.  Advanced dementia  3.  Hypertension  4.  Hyperlipidemia  5.  Uncontrolled diabetes mellitus with history of peripheral neuropathy.  6.  BPH  7.  History of DVT  8.  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  Occluded right SFA and left SFA.  Occluded right posterior tibial      [Held by provider] amLODIPine, 10 mg, Oral, Q24H  aspirin, 81 mg, Oral, Daily  folic acid, 1 mg, Oral, Daily  insulin glargine, 15 Units, Subcutaneous, Q12H  insulin lispro, 2-9 Units, Subcutaneous, 4x Daily AC & at Bedtime  [Held by provider] lisinopril, 20 mg, Oral, Daily  melatonin, 5 mg, Oral, Nightly  PHENobarbital, 129.6 mg, Oral, Nightly  PHENobarbital, 64.8 mg, Oral, Daily With Breakfast & Lunch  senna-docusate sodium, 2 tablet, Oral, BID  sertraline, 50 mg, Oral, Daily  sodium chloride, 10 mL, Intravenous, Q12H      heparin, 11 Units/kg/hr, Last Rate: 11 Units/kg/hr (05/29/24 0236)  Pharmacy to Dose Heparin,       is allergic to bactrim [sulfamethoxazole-trimethoprim], bacitra-neomycin-polymyxin-hc, adhesive tape, and neosporin [neomycin-bacitracin zn-polymyx].    HPI: Mr. Tai is a 68-year-old white male with a history of coronary artery disease, advanced dementia, hypertension, hyperlipidemia,  "uncontrolled diabetes mellitus who presented to the [facility] emergency room yesterday after his wife noticed that he was breathing abnormally and was slumped over in a chair.  His troponin was elevated at 395 and 374.  He was subsequently admitted by hospital medicine for further cardiac evaluation.      Patient is nonverbal and unable to provide any history.  His wife states that he has had a progressive decline in the level of alertness and mental status but has significantly worsened in the last 3 days.  She thought he looked uncomfortable at the nursing home.  Currently not interactive.   Cardiac risk factors: advanced age (older than 55 for men, 65 for women), diabetes mellitus, dyslipidemia, family history of premature cardiovascular disease, hypertension, male gender, and sedentary lifestyle.     History  Family History   Problem Relation Age of Onset    Diabetes Mother     Heart disease Father     Hypertension Father     Hyperlipidemia Father     Diabetes Sister     Diabetes Maternal Grandfather     Diabetes Sister     Diabetes Sister      Past Surgical History:   Procedure Laterality Date    CORONARY ARTERY BYPASS GRAFT      FEMORAL ARTERY - POPLITEAL ARTERY BYPASS GRAFT        Past Medical History:   Diagnosis Date    Coronary artery disease     Dementia     Diabetes mellitus     Hyperlipidemia     Peripheral vascular disease      Social History     Tobacco Use   Smoking Status Former    Current packs/day: 0.00    Types: Cigarettes    Quit date:     Years since quittin.4    Passive exposure: Never   Smokeless Tobacco Former     Social History     Substance and Sexual Activity   Alcohol Use No     Past Surgical History:   Procedure Laterality Date    CORONARY ARTERY BYPASS GRAFT      FEMORAL ARTERY - POPLITEAL ARTERY BYPASS GRAFT         Review of Systems  ROS unable to obtain due to altered mental status.       Objective:     height is 185.4 cm (73\") and weight is 97.5 kg (215 lb). His " axillary temperature is 96.8 °F (36 °C). His blood pressure is 99/56 and his pulse is 94. His respiration is 18 and oxygen saturation is 95%.     Physical Exam  Constitutional:       Comments: Nonverbal.  Not alert.  Does not appear in acute distress   HENT:      Head: Normocephalic and atraumatic.   Cardiovascular:      Rate and Rhythm: Normal rate and regular rhythm.      Heart sounds: No murmur heard.     Comments: Faint left dorsalis pedis pulse.  Unable to palpate on the right  Pulmonary:      Effort: Pulmonary effort is normal.   Musculoskeletal:      Right lower leg: No edema.      Left lower leg: No edema.   Neurological:      Comments: Nonverbal         Cardiographics  ECG: Accelerated junctional at a rate of 118, anterior septal infarct, age undetermined  Echocardiogram:  3/27/24-LVEF 55%, calcification of the aortic valve, mild AR, trace MR    Imaging  Chest x-ray: normal     Lab Review   Lab Results   Component Value Date    GLUCOSE 262 (H) 05/29/2024    BUN 40 (H) 05/29/2024    CREATININE 1.01 05/29/2024    EGFRIFNONA 88 02/21/2022    EGFRIFAFRI 102 02/21/2022    BCR 39.6 (H) 05/29/2024    CO2 22.0 05/29/2024    CALCIUM 8.8 05/29/2024    PROTENTOTREF 7.0 08/03/2023    ALBUMIN 3.1 (L) 05/28/2024    LABIL2 1.7 08/03/2023    AST 39 05/28/2024    ALT 34 05/28/2024     Lab Results   Component Value Date    WBC 10.73 05/29/2024    HGB 12.7 (L) 05/29/2024    HCT 37.6 05/29/2024    MCV 91.7 05/29/2024     05/29/2024     Lab Results   Component Value Date    CHOL 220 (H) 01/08/2024    CHOL 120 08/01/2019    CHOL 117 04/15/2019     Lab Results   Component Value Date    TRIG 108 01/08/2024    TRIG 208 (H) 08/03/2023    TRIG 138 04/03/2023     Lab Results   Component Value Date    HDL 41 01/08/2024    HDL 37 (L) 08/03/2023    HDL 41 04/03/2023             Assessment:    1.  Advanced dementia and altered mental status  2.  History of CABG with non-STEMI  3.  Diabetes  4.  Hypertension  5.  Advanced bilateral  peripheral vascular disease        Plan:     Discussed management options with patient's wife as the patient is currently nonverbal.  With his recent decline in mental status recommend medical management of this non-STEMI.  Continue aspirin 81 mg daily and will start back Plavix 75 mg daily.  Reasonable to continue the heparin drip for 48 hours then discontinue.  Start Crestor 20 mg daily  At this time feel echocardiogram can be deferred due to plan for medical management, patient's advanced dementia and altered mental status  Palliative care has met with patient's family.  He does have a DNR in place.  I will plan to reevaluate Friday.  Please call with questions in the interim

## 2024-05-29 NOTE — CONSULTS
Palliative Care Initial Consult   Attending Physician: Jessica Ervin MD  Referring Provider: Humera Ugarte       Reason for Referral:  assistance with clarification of goals of care    Code Status:   Code Status and Medical Interventions:   Ordered at: 24 1900     Medical Intervention Limits:    NO intubation (DNI)     Level Of Support Discussed With:    Health Care Surrogate     Code Status (Patient has no pulse and is not breathing):    No CPR (Do Not Attempt to Resuscitate)     Medical Interventions (Patient has pulse or is breathing):    Limited Support      Advanced Directives: Advance Directive Status: Patient has advance directive, copy in chart   Family/Support: sposue     Goals of Care:    Goals of Care/Treatment Preferences:    treat       HPI:   69 yo male with dementia with behaviors well known to team.  Following recent discharge was planned admit to hospice but spouse with change of mind at facility.  Now presents with lethargy and found with NSTEMI.  Seen by cards today with rec for med mgmt.  Plavix had previously been discontinued due to falls but now resumed per cards in light of recent events.  Spouse remains bedside and reports this is an entirely different problem.      ROS:   Pt unable      Past Medical History:   Diagnosis Date    Coronary artery disease     Dementia     Diabetes mellitus     Hyperlipidemia     Peripheral vascular disease      Past Surgical History:   Procedure Laterality Date    CORONARY ARTERY BYPASS GRAFT      FEMORAL ARTERY - POPLITEAL ARTERY BYPASS GRAFT       Social History     Socioeconomic History    Marital status:    Tobacco Use    Smoking status: Former     Current packs/day: 0.00     Types: Cigarettes     Quit date:      Years since quittin.4     Passive exposure: Never    Smokeless tobacco: Former   Vaping Use    Vaping status: Never Used   Substance and Sexual Activity    Alcohol use: No    Drug use: No    Sexual activity: Never     Family  History   Problem Relation Age of Onset    Diabetes Mother     Heart disease Father     Hypertension Father     Hyperlipidemia Father     Diabetes Sister     Diabetes Maternal Grandfather     Diabetes Sister     Diabetes Sister        Allergies   Allergen Reactions    Bactrim [Sulfamethoxazole-Trimethoprim] Rash    Bacitra-Neomycin-Polymyxin-Hc Unknown - Low Severity    Adhesive Tape Rash    Neosporin [Neomycin-Bacitracin Zn-Polymyx] Rash         Current Facility-Administered Medications:     acetaminophen (TYLENOL) tablet 650 mg, 650 mg, Oral, Q4H PRN, 650 mg at 05/28/24 8769 **OR** acetaminophen (TYLENOL) 160 MG/5ML oral solution 650 mg, 650 mg, Oral, Q4H PRN **OR** acetaminophen (TYLENOL) suppository 650 mg, 650 mg, Rectal, Q4H PRN, Humera Ugarte MD    [Held by provider] amLODIPine (NORVASC) tablet 10 mg, 10 mg, Oral, Q24H, Huemra Ugarte MD    aspirin chewable tablet 81 mg, 81 mg, Oral, Daily, Humera Ugarte MD    sennosides-docusate (PERICOLACE) 8.6-50 MG per tablet 2 tablet, 2 tablet, Oral, BID **AND** polyethylene glycol (MIRALAX) packet 17 g, 17 g, Oral, Daily PRN **AND** bisacodyl (DULCOLAX) EC tablet 5 mg, 5 mg, Oral, Daily PRN **AND** bisacodyl (DULCOLAX) suppository 10 mg, 10 mg, Rectal, Daily PRN, Humera Ugarte MD    Calcium Replacement - Follow Nurse / BPA Driven Protocol, , Does not apply, PRN, Humera Ugarte MD    clopidogrel (PLAVIX) tablet 75 mg, 75 mg, Oral, Daily, Jerrod Duong MD    dextrose (D50W) (25 g/50 mL) IV injection 25 g, 25 g, Intravenous, Q15 Min PRN, Humera Ugarte MD    dextrose (GLUTOSE) oral gel 15 g, 15 g, Oral, Q15 Min PRN, Humera Ugarte MD    folic acid (FOLVITE) tablet 1 mg, 1 mg, Oral, Daily, Humera Ugarte MD    glucagon (GLUCAGEN) injection 1 mg, 1 mg, Intramuscular, Q15 Min PRN, Humera Ugarte MD    heparin 93417 units/250 mL (100 units/mL) in 0.45 % NaCl infusion, 12 Units/kg/hr, Intravenous, Titrated, Bebeto Randolph, PharmD, Last Rate: 11.7 mL/hr at 05/29/24  0956, 12 Units/kg/hr at 05/29/24 0956    insulin glargine (LANTUS, SEMGLEE) injection 15 Units, 15 Units, Subcutaneous, Q12H, Humera Ugarte MD, 15 Units at 05/29/24 1012    Insulin Lispro (humaLOG) injection 2-9 Units, 2-9 Units, Subcutaneous, 4x Daily AC & at Bedtime, Humera Ugarte MD, 2 Units at 05/29/24 1158    [Held by provider] lisinopril (PRINIVIL,ZESTRIL) tablet 20 mg, 20 mg, Oral, Daily, Humera Ugarte MD    Magnesium Low Dose Replacement - Follow Nurse / BPA Driven Protocol, , Does not apply, PRN, Humera Ugarte MD    melatonin tablet 5 mg, 5 mg, Oral, Nightly, Humera Ugarte MD, 5 mg at 05/28/24 2218    morphine injection 1 mg, 1 mg, Intravenous, Q4H PRN, 1 mg at 05/28/24 2033 **AND** naloxone (NARCAN) injection 0.4 mg, 0.4 mg, Intravenous, Q5 Min PRN, Humera Ugarte MD    nitroglycerin (NITROSTAT) SL tablet 0.4 mg, 0.4 mg, Sublingual, Q5 Min PRN, Humera Ugarte MD    Pharmacy Consult - MT, , Does not apply, Daily, Bebeto Randolph, PharmD    Pharmacy to Dose Heparin, , Does not apply, Continuous PRN, Humera Ugarte MD    [Held by provider] PHENobarbital tablet 129.6 mg, 129.6 mg, Oral, Nightly, Humera Ugarte MD, 129.6 mg at 05/28/24 2217    [Held by provider] PHENobarbital tablet 64.8 mg, 64.8 mg, Oral, Daily With Breakfast & Lunch, Humera Ugarte MD    Phosphorus Replacement - Follow Nurse / BPA Driven Protocol, , Does not apply, PRN, Humera Ugarte MD    Potassium Replacement - Follow Nurse / BPA Driven Protocol, , Does not apply, PRDEACON, Humera Ugarte MD    rosuvastatin (CRESTOR) tablet 20 mg, 20 mg, Oral, Nightly, Jerrod Duong MD    sertraline (ZOLOFT) tablet 50 mg, 50 mg, Oral, Daily, Humera Ugarte MD    sodium chloride 0.9 % flush 10 mL, 10 mL, Intravenous, PRN, Humera Ugarte MD    sodium chloride 0.9 % flush 10 mL, 10 mL, Intravenous, Q12H, Humera Ugarte MD, 10 mL at 05/29/24 0807    sodium chloride 0.9 % flush 10 mL, 10 mL, Intravenous, PRN, Humera Ugarte MD    sodium chloride 0.9 %  "infusion 40 mL, 40 mL, Intravenous, PRN, Humera Ugarte MD    temazepam (RESTORIL) capsule 15 mg, 15 mg, Oral, Nightly PRN, Humera Ugarte MD, 15 mg at 05/28/24 2219     Palliative Performance Scale Score: 10%    /73 (BP Location: Left arm, Patient Position: Lying)   Pulse 97   Temp 97.5 °F (36.4 °C) (Axillary)   Resp 18   Ht 185.4 cm (73\")   Wt 97.5 kg (215 lb)   SpO2 94%   BMI 28.37 kg/m²     Intake/Output Summary (Last 24 hours) at 5/29/2024 1202  Last data filed at 5/28/2024 2033  Gross per 24 hour   Intake 2000 ml   Output --   Net 2000 ml       Physical Exam:    General Appearance:    Asleep and did not respond to exam, NAD   HEENT:    NC/AT, EOMI, anicteric, MMM, face relaxed   Neck:   supple, trachea midline, no JVD   Lungs:     CTA bilat, diminished in bases; respirations regular, even     and unlabored    Heart:    RRR, normal S1 and S2, no M/R/G   Abdomen:     Normal bowel sounds, soft, nontender, nondistended   G/U:   Deferred   MSK/Extremities:   No clubbing , cyanosis, + edema, No wasting   Pulses:   Pulses palpable and equal bilaterally   Skin:   Warm, dry, no mottling   Neurologic:   Unresponsive to exam   Psych:   Calm         Labs:   Results from last 7 days   Lab Units 05/29/24  0144   WBC 10*3/mm3 10.73   HEMOGLOBIN g/dL 12.7*   HEMATOCRIT % 37.6   PLATELETS 10*3/mm3 268     Results from last 7 days   Lab Units 05/29/24  0144 05/28/24  1455   SODIUM mmol/L 143 134*   POTASSIUM mmol/L 4.0 4.7   CHLORIDE mmol/L 110* 101   CO2 mmol/L 22.0 21.0*   BUN mg/dL 40* 41*   CREATININE mg/dL 1.01 1.26   CALCIUM mg/dL 8.8 8.8   BILIRUBIN mg/dL  --  0.7   ALK PHOS U/L  --  126*   ALT (SGPT) U/L  --  34   AST (SGOT) U/L  --  39   GLUCOSE mg/dL 262* 495*     Imaging Results (Last 72 Hours)       Procedure Component Value Units Date/Time    CT Head Without Contrast [[id number]] Collected: 05/29/24 1129     Updated: 05/29/24 1134    Narrative:      CT HEAD WO CONTRAST    Date of Exam: 5/29/2024 11:14 " AM EDT    Indication: AMS.    Comparison: Head CT 5/12/2024    Technique: Axial CT images were obtained of the head without contrast administration.  Automated exposure control and iterative construction methods were used.      Findings:  No acute intracranial hemorrhage. No acute large territory infarct. There are scattered subcortical and periventricular white matter hypodensities which are nonspecific and can be seen in setting of chronic small vessel ischemic change. No extra-axial   collections. No mass effect. Normal size and configuration of the ventricles. Normal appearance of the orbits. The paranasal sinuses are clear. The mastoid air cells are clear. No acute or suspicious bony findings.      Impression:      Impression:  No acute intracranial findings.    Chronic and senescent changes as above.        Electronically Signed: Bebeto Ko MD    5/29/2024 11:31 AM EDT    Workstation ID: [id number]    XR Chest 1 View [[id number]] Collected: 05/28/24 1604     Updated: 05/28/24 1608    Narrative:      XR CHEST 1 VW    Date of Exam: 5/28/2024 3:26 PM EDT    Indication: AMS, elevated blood sugar    Comparison: 5/11/2024    Findings:  Lines: None    Lungs: Poor respiratory effort accentuates the pulmonary vasculature and cardiomediastinal silhouette. No definite consolidation.  Pleura: No pleural effusion or pneumothorax.    Cardiomediastinum: Postsurgical changes. Otherwise unremarkable.    Soft Tissues: Unremarkable.    Bones: No acute osseous abnormality.      Impression:      Impression:  No acute abnormality.      Electronically Signed: Grover Rivera DO    5/28/2024 4:05 PM EDT    Workstation ID: [id number]                Diagnostics:   No valid procedures specified.    A:     NSTEMI (non-ST elevated myocardial infarction)    Benign essential HTN    Benign prostatic hyperplasia    Type 2 diabetes mellitus with hyperglycemia, with long-term current use of insulin    Hyperlipidemia    Peripheral vascular  disease    Arteriosclerosis of coronary artery    Dementia with behavioral disturbance         Impression:   NSTEMI  HTN  BPH  DM 2  PAD  CAD  Dementia with behaviors      Symptoms:  AMS  Debility       P:    Met with spouse in room.  To continue treatments offered.  Already no CPR, DNI. Will monitor for needs. Again not ready for hospice. Thank you for this consult and allowing us to participate in patient's plan of care. Palliative Care Team will continue to follow patient.       Carlee Cesar MD, 5/29/2024, 12:02 EDT

## 2024-05-29 NOTE — CONSULTS
Order noted for diabetes education. GCS 8. Nonverbal per chart review. Inappropriate for education. Will follow.

## 2024-05-29 NOTE — PROGRESS NOTES
Nutrition Services    Patient Name:  Too Tai  YOB: 1955  MRN: [id number]  Admit Date:  5/28/2024    Patient screened for MST 2 and possible pressure injury. Chart reviewed. Patient busy with provider at time of visit. No WOC consult noted at this time. No significant weight loss noted in the past year per EMR. No meals have been documented as of time of chart review and note. Noted family requested boost glucose and uncrustables TID during previous admission - will reorder. RDN will complete full assessment as able.    Electronically signed by:  Lena Ziegler MS,RD,LD  05/29/24 14:38 EDT

## 2024-05-29 NOTE — PLAN OF CARE
Goal Outcome Evaluation:  Plan of Care Reviewed With: patient        Progress: declining  Outcome Evaluation: Palliative RN and Dr. GLENN Cesar saw pt for initial palliative consult on 5/29 at 1000.  New palliative consult for assistance with support and symptom management per Dr. Ugarte.  Pt. well known to palliative service from previous admissions.  NOK is spouse Jamaica Tai.  Pt. was asleep on RA and demonstrated increased WOB at rest. Aside from that, he did not appear particularly uncomfortable.   Heparin gtt infusing.  Pt lethargic and does not rouse.  Per nursing report, he has not seen pt. awake today either.  Dr. Cesar discussed GOC with wife at .  She is seemed optimistic for recovery and wishes to continue current POC.  Pt,. is No CPR limited support.  Palliative care to follow for support, POC and ongoing GOC.      Problem: Palliative Care  Goal: Enhanced Quality of Life  Intervention: Promote Advance Care Planning  Flowsheets (Taken 5/29/2024 1438)  Life Transition/Adjustment:   palliative care initiated   palliative care discussed      1300 Palliative IDT meeting: ZAHRA Ching RN, CHPN; AQUILINO Bai, APRN; LIANNE Domínguez MDiv, [facility];GLENN Cesar MD.; LIANNE Quijano RN, CHPN    After hours, weekends and holidays, contact Palliative Provider by calling [phone].

## 2024-05-29 NOTE — CASE MANAGEMENT/SOCIAL WORK
Discharge Planning Assessment  [facility]     Patient Name: Too Tai  MRN: [id number]  Today's Date: 5/29/2024    Admit Date: 5/28/2024    Plan: discharge plan TBD   Discharge Needs Assessment       Row Name 05/29/24 1418       Living Environment    People in Home other (see comments);facility resident    Name(s) of People in Home Pt resides at [facility](dementia unit) [facility]    Primary Care Provided by spouse/significant other;other (see comments)  Staff at [facility]    Provides Primary Care For no one, unable/limited ability to care for self    Family Caregiver if Needed spouse    Family Caregiver Names Jamaica Tai(spouse)    Quality of Family Relationships helpful;involved;supportive    Able to Return to Prior Arrangements other (see comments)  Pending hospital course    Living Arrangement Comments I met with pt's spouse, Jamaica in patient's room regarding discharge plan. Pt was sleeping.       Transition Planning    Patient/Family Anticipates Transition to home    Patient/Family Anticipated Services at Transition     Transportation Anticipated health plan transportation       Discharge Needs Assessment    Readmission Within the Last 30 Days current reason for admission unrelated to previous admission    Current Outpatient/Agency/Support Group other (see comments)  Pt resides at [facility](dementia care) [facility]    Equipment Currently Used at Home wheelchair    Concerns to be Addressed discharge planning;adjustment to diagnosis/illness    Anticipated Changes Related to Illness inability to care for self    Equipment Needed After Discharge wheelchair, manual    Outpatient/Agency/Support Group Needs other (see comments)  LIves at [facility] dementia care    Discharge Facility/Level of Care Needs other (see comments)  Pending hospital course    Discharge Coordination/Progress Pt's spouse confirmed that pt has [employer] Medicare Replacement insurance with prescription coverage and  uses the facility pharmacy. Pt has a history of advanced dementia, PVD, CABG and here with NStemi. Pt is a resident at [facility] (dementia care) and has been there about a month per spouse. Pt's spouse would like for pt to return to [facility] at discharge. Pt will need transportation. Cards and palliative are following. I spoke with Marbin) at [facility] and Roxana([facility] Relation's Director) plans to do on site visit with pt/spouse today. Fannie states she will update CM today or tomorrow regarding visit and whether pt can return.  Spouse(Jamaica) did say pt is dependent with ADLs and w/c bound. Jamaica states she spends 8-12 hours/day at the [facility] with pt and feeds him. CM will cont to follow                   Discharge Plan       Row Name 05/29/24 1431       Plan    Plan discharge plan TBD    Plan Comments Per pt's spouse, she would like for pt to return to [facility] at discharge. I spoke with Marbin) at [facility] and Roxana([facility] Relation's Director) plans to do on site visit with pt/spouse today. Fannie states she will update CM today or tomorrow regarding visit and whether pt can return. CM will cont to follow    Final Discharge Disposition Code 01 - home or self-care                  Continued Care and Services - Admitted Since 5/28/2024    No active coordination exists for this encounter.       Selected Continued Care - Prior Encounters Includes continued care and service providers with selected services from prior encounters from 2/28/2024 to 5/29/2024      Discharged on 5/9/2024 Admission date: 4/18/2024 - Discharge disposition: Home or Self Care      Destination       Service Provider Selected Services Address Phone Fax Patient Preferred    [facility] [address] , [facility] [address] [phone] --       Internal Comment last updated by Ilene Brown, RN 5/9/2024 1157    [facility]   [address],  [place]  [phone]  [phone]                         Durable Medical Equipment       Service Provider Selected Services Address Phone Fax Patient Preferred    [facility] [address] [place] [address] [id number] [phone] --       Internal Comment last updated by Ilene Brown RN 5/9/2024 1239    Hospital Bed & WC                                 Discharged on 4/4/2024 Admission date: 3/27/2024 - Discharge disposition: Home-Health Care Svc      Home Medical Care       Service Provider Selected Services Address Phone Fax Patient Preferred    [facility] [place] [facility] ,  [facility] [address] [id number] [phone] --                      Discharged on 3/4/2024 Admission date: 2/24/2024 - Discharge disposition: Skilled Nursing Facility (DC - External)      Destination       Service Provider Selected Services Address Phone Fax Patient Preferred    [facility] [address] [facility] [address] [phone] --                          Expected Discharge Date and Time       Expected Discharge Date Expected Discharge Time    May 31, 2024            Demographic Summary       Row Name 05/29/24 1403       General Information    General Information Comments PCP is ABLEARDO MURRAY       Contact Information    Permission Granted to Share Info With     Contact Information Obtained for     Contact Information Comments Caroline Tai(spouse) [phone]                   Functional Status       Row Name 05/29/24 1417       Functional Status    Functional Status Comments Per spouse, pt is dependent with ADLs                   Psychosocial    No documentation.                  Abuse/Neglect    No documentation.                  Legal    No documentation.                  Substance Abuse    No documentation.                  Patient Forms    No  documentation.                     Elvira Mary RN

## 2024-05-30 LAB
ALBUMIN SERPL-MCNC: 3.1 G/DL (ref 3.5–5.2)
ALBUMIN/GLOB SERPL: 0.9 G/DL
ALP SERPL-CCNC: 122 U/L (ref 39–117)
ALT SERPL W P-5'-P-CCNC: 59 U/L (ref 1–41)
ANION GAP SERPL CALCULATED.3IONS-SCNC: 11 MMOL/L (ref 5–15)
AST SERPL-CCNC: 66 U/L (ref 1–40)
BILIRUB SERPL-MCNC: 0.6 MG/DL (ref 0–1.2)
BUN SERPL-MCNC: 24 MG/DL (ref 8–23)
BUN/CREAT SERPL: 28.9 (ref 7–25)
CALCIUM SPEC-SCNC: 8.8 MG/DL (ref 8.6–10.5)
CHLORIDE SERPL-SCNC: 107 MMOL/L (ref 98–107)
CHOLEST SERPL-MCNC: 179 MG/DL (ref 0–200)
CO2 SERPL-SCNC: 26 MMOL/L (ref 22–29)
CREAT SERPL-MCNC: 0.83 MG/DL (ref 0.76–1.27)
DEPRECATED RDW RBC AUTO: 42.6 FL (ref 37–54)
EGFRCR SERPLBLD CKD-EPI 2021: 95.9 ML/MIN/1.73
ERYTHROCYTE [DISTWIDTH] IN BLOOD BY AUTOMATED COUNT: 12.3 % (ref 12.3–15.4)
GLOBULIN UR ELPH-MCNC: 3.3 GM/DL
GLUCOSE BLDC GLUCOMTR-MCNC: 108 MG/DL (ref 70–130)
GLUCOSE BLDC GLUCOMTR-MCNC: 140 MG/DL (ref 70–130)
GLUCOSE BLDC GLUCOMTR-MCNC: 150 MG/DL (ref 70–130)
GLUCOSE BLDC GLUCOMTR-MCNC: 227 MG/DL (ref 70–130)
GLUCOSE SERPL-MCNC: 119 MG/DL (ref 65–99)
HCT VFR BLD AUTO: 40.2 % (ref 37.5–51)
HDLC SERPL-MCNC: 34 MG/DL (ref 40–60)
HGB BLD-MCNC: 13.2 G/DL (ref 13–17.7)
LDLC SERPL CALC-MCNC: 126 MG/DL (ref 0–100)
LDLC/HDLC SERPL: 3.65 {RATIO}
MCH RBC QN AUTO: 30.5 PG (ref 26.6–33)
MCHC RBC AUTO-ENTMCNC: 32.8 G/DL (ref 31.5–35.7)
MCV RBC AUTO: 92.8 FL (ref 79–97)
PLATELET # BLD AUTO: 311 10*3/MM3 (ref 140–450)
PMV BLD AUTO: 10.1 FL (ref 6–12)
POTASSIUM SERPL-SCNC: 4.5 MMOL/L (ref 3.5–5.2)
PROT SERPL-MCNC: 6.4 G/DL (ref 6–8.5)
RBC # BLD AUTO: 4.33 10*6/MM3 (ref 4.14–5.8)
SODIUM SERPL-SCNC: 144 MMOL/L (ref 136–145)
TRIGL SERPL-MCNC: 105 MG/DL (ref 0–150)
UFH PPP CHRO-ACNC: 0.26 IU/ML (ref 0.3–0.7)
UFH PPP CHRO-ACNC: 0.29 IU/ML (ref 0.3–0.7)
UFH PPP CHRO-ACNC: 0.3 IU/ML (ref 0.3–0.7)
VLDLC SERPL-MCNC: 19 MG/DL (ref 5–40)
WBC NRBC COR # BLD AUTO: 11.57 10*3/MM3 (ref 3.4–10.8)

## 2024-05-30 PROCEDURE — 85520 HEPARIN ASSAY: CPT | Performed by: STUDENT IN AN ORGANIZED HEALTH CARE EDUCATION/TRAINING PROGRAM

## 2024-05-30 PROCEDURE — 63710000001 INSULIN LISPRO (HUMAN) PER 5 UNITS: Performed by: INTERNAL MEDICINE

## 2024-05-30 PROCEDURE — 97161 PT EVAL LOW COMPLEX 20 MIN: CPT

## 2024-05-30 PROCEDURE — 85520 HEPARIN ASSAY: CPT

## 2024-05-30 PROCEDURE — 80061 LIPID PANEL: CPT | Performed by: INTERNAL MEDICINE

## 2024-05-30 PROCEDURE — 85027 COMPLETE CBC AUTOMATED: CPT | Performed by: STUDENT IN AN ORGANIZED HEALTH CARE EDUCATION/TRAINING PROGRAM

## 2024-05-30 PROCEDURE — 99233 SBSQ HOSP IP/OBS HIGH 50: CPT

## 2024-05-30 PROCEDURE — 97530 THERAPEUTIC ACTIVITIES: CPT

## 2024-05-30 PROCEDURE — 82948 REAGENT STRIP/BLOOD GLUCOSE: CPT

## 2024-05-30 PROCEDURE — 25010000002 HEPARIN (PORCINE) 25000-0.45 UT/250ML-% SOLUTION

## 2024-05-30 PROCEDURE — 80053 COMPREHEN METABOLIC PANEL: CPT | Performed by: STUDENT IN AN ORGANIZED HEALTH CARE EDUCATION/TRAINING PROGRAM

## 2024-05-30 PROCEDURE — 99232 SBSQ HOSP IP/OBS MODERATE 35: CPT | Performed by: STUDENT IN AN ORGANIZED HEALTH CARE EDUCATION/TRAINING PROGRAM

## 2024-05-30 PROCEDURE — 63710000001 INSULIN GLARGINE PER 5 UNITS: Performed by: INTERNAL MEDICINE

## 2024-05-30 RX ORDER — TEMAZEPAM 7.5 MG/1
7.5 CAPSULE ORAL NIGHTLY
Status: DISCONTINUED | OUTPATIENT
Start: 2024-05-30 | End: 2024-05-30

## 2024-05-30 RX ORDER — PHENOBARBITAL 64.8 MG/1
32.4 TABLET ORAL 3 TIMES DAILY
Status: DISCONTINUED | OUTPATIENT
Start: 2024-05-30 | End: 2024-05-31

## 2024-05-30 RX ORDER — DONEPEZIL HYDROCHLORIDE 10 MG/1
10 TABLET, FILM COATED ORAL 2 TIMES DAILY
Status: DISCONTINUED | OUTPATIENT
Start: 2024-05-30 | End: 2024-06-03 | Stop reason: HOSPADM

## 2024-05-30 RX ORDER — TEMAZEPAM 15 MG/1
15 CAPSULE ORAL NIGHTLY
Status: DISCONTINUED | OUTPATIENT
Start: 2024-05-30 | End: 2024-06-03 | Stop reason: HOSPADM

## 2024-05-30 RX ADMIN — Medication 5 MG: at 20:15

## 2024-05-30 RX ADMIN — INSULIN LISPRO 2 UNITS: 100 INJECTION, SOLUTION INTRAVENOUS; SUBCUTANEOUS at 17:16

## 2024-05-30 RX ADMIN — TEMAZEPAM 15 MG: 15 CAPSULE ORAL at 20:15

## 2024-05-30 RX ADMIN — ROSUVASTATIN CALCIUM 20 MG: 20 TABLET, COATED ORAL at 20:15

## 2024-05-30 RX ADMIN — FOLIC ACID 1 MG: 1 TABLET ORAL at 09:31

## 2024-05-30 RX ADMIN — ASPIRIN 81 MG: 81 TABLET, CHEWABLE ORAL at 09:31

## 2024-05-30 RX ADMIN — HEPARIN SODIUM 13.5 UNITS/KG/HR: 10000 INJECTION, SOLUTION INTRAVENOUS at 13:16

## 2024-05-30 RX ADMIN — SERTRALINE HYDROCHLORIDE 50 MG: 50 TABLET ORAL at 09:31

## 2024-05-30 RX ADMIN — DONEPEZIL HYDROCHLORIDE 10 MG: 10 TABLET, FILM COATED ORAL at 20:15

## 2024-05-30 RX ADMIN — INSULIN GLARGINE 15 UNITS: 100 INJECTION, SOLUTION SUBCUTANEOUS at 09:30

## 2024-05-30 RX ADMIN — INSULIN GLARGINE 15 UNITS: 100 INJECTION, SOLUTION SUBCUTANEOUS at 22:09

## 2024-05-30 RX ADMIN — AMLODIPINE BESYLATE 10 MG: 10 TABLET ORAL at 09:30

## 2024-05-30 RX ADMIN — TEMAZEPAM 15 MG: 15 CAPSULE ORAL at 00:16

## 2024-05-30 RX ADMIN — INSULIN LISPRO 4 UNITS: 100 INJECTION, SOLUTION INTRAVENOUS; SUBCUTANEOUS at 12:39

## 2024-05-30 RX ADMIN — CLOPIDOGREL BISULFATE 75 MG: 75 TABLET ORAL at 09:30

## 2024-05-30 RX ADMIN — Medication 10 ML: at 20:18

## 2024-05-30 RX ADMIN — PHENOBARBITAL 32.4 MG: 64.8 TABLET ORAL at 17:16

## 2024-05-30 RX ADMIN — LISINOPRIL 20 MG: 20 TABLET ORAL at 09:31

## 2024-05-30 NOTE — THERAPY EVALUATION
Patient Name: Too Tai  : 1955    MRN: [id number]                              Today's Date: 2024       Admit Date: 2024    Visit Dx:     ICD-10-CM ICD-9-CM   1. NSTEMI (non-ST elevated myocardial infarction)  I21.4 410.70   2. Hyperglycemia due to diabetes mellitus  E11.65 250.02   3. Dementia with other behavioral disturbance, unspecified dementia severity, unspecified dementia type  F03.918 294.21   4. History of coronary artery disease  Z86.79 V12.59     Patient Active Problem List   Diagnosis    Cough    Benign essential HTN    Benign prostatic hyperplasia    Coronary artery disease involving native coronary artery of native heart    Chronic coronary artery disease    Type 2 diabetes mellitus with hyperglycemia, with long-term current use of insulin    Gout    Hyperlipidemia    History of heart attack    Peripheral neuropathy    Peripheral vascular disease    Spasm of muscle    Myoclonic jerking    Hypomagnesemia    Arteriosclerosis of coronary artery    Dementia with behavioral disturbance    Frequent falls    History of DVT (deep vein thrombosis)    Chronic anticoagulation    AMS (altered mental status)    Falls    Bacteriuria    Cystitis    Agitation due to dementia    Dementia with behavioral disturbance    NSTEMI (non-ST elevated myocardial infarction)     Past Medical History:   Diagnosis Date    Coronary artery disease     Dementia     Diabetes mellitus     Hyperlipidemia     Peripheral vascular disease      Past Surgical History:   Procedure Laterality Date    CORONARY ARTERY BYPASS GRAFT      FEMORAL ARTERY - POPLITEAL ARTERY BYPASS GRAFT        General Information       Row Name 24 1116          Physical Therapy Time and Intention    Document Type evaluation  -NS     Mode of Treatment individual therapy;physical therapy  -NS       Row Name 24 1116          General Information    Patient Profile Reviewed yes  -NS     Prior Level of Function max assist:;transfer;bed  "mobility;dependent:;w/c or scooter;ADL's  hx taken from family. Per family, pt is assist x2 for transfers to wheelchair but he \"does not bear much weight\", has not been getting up to wheelchair frequently.  -NS     Existing Precautions/Restrictions fall;other (see comments)  dementia  -NS     Barriers to Rehab medically complex;previous functional deficit;cognitive status  -NS       Row Name 05/30/24 1116          Living Environment    People in Home facility resident  [facility]  -NS       Row Name 05/30/24 1116          Home Main Entrance    Number of Stairs, Main Entrance none  -NS       Row Name 05/30/24 1116          Stairs Within Home, Primary    Number of Stairs, Within Home, Primary none  -NS       Row Name 05/30/24 1116          Cognition    Orientation Status (Cognition) unable/difficult to assess  minimally verbal, able to attend visually when name called but inconsistent  -NS       Row Name 05/30/24 1116          Safety Issues, Functional Mobility    Safety Issues Affecting Function (Mobility) insight into deficits/self-awareness;ability to follow commands;safety precaution awareness;safety precautions follow-through/compliance;sequencing abilities;judgment;problem-solving  -NS     Impairments Affecting Function (Mobility) balance;cognition;coordination;endurance/activity tolerance;postural/trunk control;range of motion (ROM);strength;motor planning  -NS     Cognitive Impairments, Mobility Safety/Performance attention;awareness, need for assistance;insight into deficits/self-awareness;judgment;problem-solving/reasoning;safety precaution awareness;safety precaution follow-through;sequencing abilities  -NS               User Key  (r) = Recorded By, (t) = Taken By, (c) = Cosigned By      Initials Name Provider Type    Belen Huerta PT Physical Therapist                   Mobility       Row Name 05/30/24 1116          Bed Mobility    Bed Mobility supine-sit;sit-supine;rolling right;rolling " left;scooting/bridging  -NS     Rolling Left [place] (Bed Mobility) dependent (less than 25% patient effort);2 person assist;verbal cues  -NS     Rolling Right [place] (Bed Mobility) dependent (less than 25% patient effort);2 person assist;verbal cues  -NS     Scooting/Bridging [place] (Bed Mobility) dependent (less than 25% patient effort);2 person assist  -NS     Supine-Sit [place] (Bed Mobility) dependent (less than 25% patient effort);2 person assist;verbal cues  -NS     Sit-Supine [place] (Bed Mobility) dependent (less than 25% patient effort);2 person assist;verbal cues  -NS     Assistive Device (Bed Mobility) draw sheet;head of bed elevated  -NS     Comment, (Bed Mobility) Cues for sequencing, no initiation of movement.  -NS       Row Name 05/30/24 1116          Transfers    Comment, (Transfers) Not appropriate to assess due to poor trunk control.  -NS       Row Name 05/30/24 1116          Bed-Chair Transfer    Bed-Chair [place] (Transfers) not tested  -NS       Row Name 05/30/24 [facility]          Sit-Stand Transfer    Sit-Stand [place] (Transfers) not tested  -NS       Row Name 05/30/24 [facility]          Gait/Stairs (Locomotion)    [place] Level (Gait) not tested  -NS     Comment, (Gait/Stairs) Pt is nonambulatory at baseline  -NS               User Key  (r) = Recorded By, (t) = Taken By, (c) = Cosigned By      Initials Name Provider Type    Belen Huerta PT Physical Therapist                   Obj/Interventions       Row Name 05/30/24 1116          Range of Motion Comprehensive    General Range of Motion lower extremity range of motion deficits identified  -NS     Comment, General Range of Motion unable to formally assess but passively generally stiff  -NS       Row Name 05/30/24 1116          Strength Comprehensive (MMT)    General Manual Muscle Testing (MMT) Assessment lower extremity strength deficits identified  -NS     Comment, General Manual Muscle Testing (MMT)  Assessment unable to formally assess due to cognition- no movement on command but observe spontaneous movements into hip and knee flexion against gravity  -NS       Row Name 05/30/24 1116          Balance    Balance Assessment sitting static balance;sitting dynamic balance  -NS     Static Sitting Balance maximum assist;2-person assist  -NS     Dynamic Sitting Balance maximum assist;2-person assist  -NS     Position, Sitting Balance unsupported;sitting edge of bed  -NS     Balance Interventions weight shifting activity  -NS     Comment, Balance A/P rocking to facilitate trunk activation. Posterior and R lateral lean.  -NS       Row Name 05/30/24 1116          Sensory Assessment (Somatosensory)    Sensory Assessment (Somatosensory) unable/difficult to assess  -NS               User Key  (r) = Recorded By, (t) = Taken By, (c) = Cosigned By      Initials Name Provider Type    Belen Huerta, PT Physical Therapist                   Goals/Plan       Row Name 05/30/24 1116          Bed Mobility Goal 1 (PT)    Activity/Assistive Device (Bed Mobility Goal 1, PT) supine to sit  -NS     [place] Level/Cues Needed (Bed Mobility Goal 1, PT) maximum assist (25-49% patient effort)  -NS     Time Frame (Bed Mobility Goal 1, PT) long term goal (LTG);10 days  -NS       Row Name 05/30/24 1116          Transfer Goal 1 (PT)    Activity/Assistive Device (Transfer Goal 1, PT) sit-to-stand/stand-to-sit;bed-to-chair/chair-to-bed  -NS     [place] Level/Cues Needed (Transfer Goal 1, PT) maximum assist (25-49% patient effort)  x2  -NS       Row Name 05/30/24 1116          Problem Specific Goal 1 (PT)    Problem Specific Goal 1 (PT) Patient will sit EOB unsupported with Min A for balance for 2 minutes to facilitate trunk control.  -NS     Time Frame (Problem Specific Goal 1, PT) short-term goal (STG);1 week  -NS       Row Name 05/30/24 1116          Therapy Assessment/Plan (PT)    Planned Therapy Interventions (PT) balance  training;bed mobility training;home exercise program;patient/family education;strengthening;neuromuscular re-education;postural re-education;transfer training;stretching;ROM (range of motion)  -NS               User Key  (r) = Recorded By, (t) = Taken By, (c) = Cosigned By      Initials Name Provider Type    Belen Huerta, PT Physical Therapist                   Clinical Impression       Row Name 05/30/24 1116          Pain Scale: FACES Pre/Post-Treatment    Pain: FACES Scale, Pretreatment 0-->no hurt  -NS     Posttreatment Pain Rating 0-->no hurt  -NS       Row Name 05/30/24 1116          Plan of Care Review    Plan of Care Reviewed With patient;spouse  -NS     Progress no change  -NS     Outcome Evaluation Patient presents with decreased command following, weakness, and poor trunk control affecting functional mobility. Skilled IP PT services warranted 3x/week to address functional deficits and support return to PLOF. Recommend return to ECF with HHPT/OT at d/c.  -NS       Row Name 05/30/24 1116          Therapy Assessment/Plan (PT)    Patient/Family Therapy Goals Statement (PT) return to PLOF, get more PT  -NS     Rehab Potential (PT) fair, will monitor progress closely  -NS     Criteria for Skilled Interventions Met (PT) yes;meets criteria;skilled treatment is necessary  -NS     Therapy Frequency (PT) 3 times/wk  -NS     Predicted Duration of Therapy Intervention (PT) 10 days  -NS       Row Name 05/30/24 1116          Vital Signs    Pretreatment Heart Rate (beats/min) 83  -NS     Posttreatment Heart Rate (beats/min) 86  -NS     O2 Delivery Pre Treatment room air  -NS     O2 Delivery Post Treatment room air  -NS     Pre Patient Position Supine  -NS     Intra Patient Position Sitting  -NS     Post Patient Position Supine  -NS       Row Name 05/30/24 1116          Positioning and Restraints    Pre-Treatment Position in bed  -NS     Post Treatment Position bed  -NS     In Bed notified nsg;supine;call light within  reach;encouraged to call for assist;exit alarm on;side rails up x3;with family/caregiver  -NS               User Key  (r) = Recorded By, (t) = Taken By, (c) = Cosigned By      Initials Name Provider Type    Belen Huerta PT Physical Therapist                   Outcome Measures       Row Name 05/30/24 1116          How much help from another person do you currently need...    Turning from your back to your side while in flat bed without using bedrails? 1  -NS     Moving from lying on back to sitting on the side of a flat bed without bedrails? 1  -NS     Moving to and from a bed to a chair (including a wheelchair)? 1  -NS     Standing up from a chair using your arms (e.g., wheelchair, bedside chair)? 1  -NS     Climbing 3-5 steps with a railing? 1  -NS     To walk in hospital room? 1  -NS     AM-PAC 6 Clicks Score (PT) 6  -NS     Highest Level of Mobility Goal 2 --> Bed activities/dependent transfer  -NS       Row Name 05/30/24 1116          Functional Assessment    Outcome Measure Options AM-PAC 6 Clicks Basic Mobility (PT)  -NS               User Key  (r) = Recorded By, (t) = Taken By, (c) = Cosigned By      Initials Name Provider Type    Belen Huerta PT Physical Therapist                                 Physical Therapy Education       Title: PT OT SLP Therapies (In Progress)       Topic: Physical Therapy (In Progress)       Point: Mobility training (Done)       Learning Progress Summary             Patient Acceptance, E, VU,NR by NS at 5/30/2024 1255   Family Acceptance, E, VU,NR by NS at 5/30/2024 1255                         Point: Home exercise program (Not Started)       Learner Progress:  Not documented in this visit.              Point: Body mechanics (Done)       Learning Progress Summary             Patient Acceptance, E, VU,NR by NS at 5/30/2024 1255   Family Acceptance, E, VU,NR by NS at 5/30/2024 1255                         Point: Precautions (Done)       Learning Progress Summary              Patient Acceptance, E, VU,NR by NS at 5/30/2024 1255   Family Acceptance, E, VU,NR by NS at 5/30/2024 1255                                         User Key       Initials Effective Dates Name Provider Type Discipline    NS 06/16/21 -  Belen Maldonado, TRACE Physical Therapist PT                  PT Recommendation and Plan  Planned Therapy Interventions (PT): balance training, bed mobility training, home exercise program, patient/family education, strengthening, neuromuscular re-education, postural re-education, transfer training, stretching, ROM (range of motion)  Plan of Care Reviewed With: patient, spouse  Progress: no change  Outcome Evaluation: Patient presents with decreased command following, weakness, and poor trunk control affecting functional mobility. Skilled IP PT services warranted 3x/week to address functional deficits and support return to PLOF. Recommend return to ECF with HHPT/OT at d/c.     Time Calculation:   PT Evaluation Complexity  History, PT Evaluation Complexity: 3 or more personal factors and/or comorbidities  Examination of Body Systems (PT Eval Complexity): total of 4 or more elements  Clinical Presentation (PT Evaluation Complexity): stable  Clinical Decision Making (PT Evaluation Complexity): low complexity  Overall Complexity (PT Evaluation Complexity): low complexity     PT Charges       Row Name 05/30/24 1116             Time Calculation    Start Time 1116  -NS      PT Received On 05/30/24  -NS      PT Goal Re-Cert Due Date 06/09/24  -NS         Timed Charges    62451 - PT Therapeutic Activity Minutes 8  -NS         Untimed Charges    PT Eval/Re-eval Minutes 49  -NS         Total Minutes    Timed Charges Total Minutes 8  -NS      Untimed Charges Total Minutes 49  -NS       Total Minutes 57  -NS                User Key  (r) = Recorded By, (t) = Taken By, (c) = Cosigned By      Initials Name Provider Type    Belen Huerta, TRACE Physical Therapist                  Therapy Charges for Today        Code Description Service Date Service Provider Modifiers Qty    24345217259 HC PT THERAPEUTIC ACT EA 15 MIN 5/30/2024 Belen Maldonado, PT GP 1    78004845018 HC PT EVAL LOW COMPLEXITY 4 5/30/2024 Belen Maldonado, PT GP 1    23770009052 HC PT THER SUPP EA 15 MIN 5/30/2024 Belen Maldonado, PT GP 3            PT G-Codes  Outcome Measure Options: AM-PAC 6 Clicks Basic Mobility (PT)  AM-PAC 6 Clicks Score (PT): 6  PT Discharge Summary  Anticipated Discharge Disposition (PT): extended care facility, home with home health    Belen Maldonado, PT  5/30/2024

## 2024-05-30 NOTE — PROGRESS NOTES
HEPARIN INFUSION  Too Tai is a  68 y.o. male receiving heparin infusion.     Therapy for (VTE/Cardiac):   NSTEMI (Cardiac Protocol)  Patient Weight: 97.5 kg  Initial Bolus (Y/N):   Yes  Any Bolus (Y/N):   Yes        Signs or Symptoms of Bleeding: No S/Sx bleeding per RN    Cardiac or Other (Not VTE)   Initial Bolus: 60 units/kg (Max 4,000 units)  Initial rate: 12 units/kg/hr (Max 1,000 units/hr)   Anti Xa Rebolus Infusion Hold time Change infusion Dose (Units/kg/hr) Next Anti Xa or aPTT Level Due   < 0.11 50 Units/kg  (4000 Units Max) None Increase by  3 Units/kg/hr 6 hours   0.11- 0.19 25 Units/kg  (2000 Units Max) None Increase by  2 Units/kg/hr 6 hours   0.2 - 0.29 0 None Increase by  1 Units/kg/hr 6 hours   0.3 - 0.5 0 None No Change 6 hours (after 2 consecutive levels in range check qAM)   0.51 - 0.6 0 None Decrease by  1 Units/kg/hr 6 hours   0.61 - 0.8 0 30 Minutes Decrease by  2 Units/kg/hr 6 hours   0.81 - 1 0 60 Minutes Decrease by  3 Units/kg/hr 6 hours   >1 0 Hold  After Anti Xa less than 0.5 decrease previous rate by  4 Units/kg/hr  Every 2 hours until Anti Xa  less than 0.5 then when infusion restarts in 6 hours     Recommend Xa every 6 hours.     Results from last 7 days   Lab Units 05/30/24  0341 05/29/24  0144 05/28/24  1840 05/28/24  1455   INR   --   --  1.36*  --    HEMOGLOBIN g/dL 13.2 12.7*  --  13.6   HEMATOCRIT % 40.2 37.6  --  39.8   PLATELETS 10*3/mm3 311 268  --  290          Date   Time   Anti-Xa Current Rate (Unit/kg/hr) Bolus   (Units) Rate Change   (Unit/kg/hr) New Rate (Unit/kg/hr) Next   Anti-Xa Comments  Pump Check Daily   5/28 18:40 0.10 --new-- +4000 +10 10 01:30 D/w RN. TBW, bolus, rater confirmed.    5/29 0144 0.23 10 -- +1 11 0900 Spoke with rn @ 3249   5/29 0959 0.23 11 -- +1 12 1800 DW RN, pump checked and verified   5/29 1442 0.29 12 -- -- 12 2100 See handoff note    5/29 2112 0.22 12 -- +1 13 0400 DW RN   5/30 0341 0.3 13 -- -- 13 1000 Spoke with Brenda 3250   5/30  1016 0.29 13 -- +0.5 13.5 2000 KYLEE RN, pump verified                                                                                                                                                               Bebeto Randolph, PharmD   05/30/24  12:59 EDT

## 2024-05-30 NOTE — PLAN OF CARE
Goal Outcome Evaluation:  Plan of Care Reviewed With: patient, spouse        Progress: no change  Outcome Evaluation: Patient presents with decreased command following, weakness, and poor trunk control affecting functional mobility. Skilled IP PT services warranted 3x/week to address functional deficits and support return to PLOF. Recommend return to ECF with HHPT/OT at d/c.      Anticipated Discharge Disposition (PT): extended care facility, home with home health

## 2024-05-30 NOTE — PROGRESS NOTES
[facility] Medicine Services  PROGRESS NOTE    Patient Name: Too Tai  : 1955  MRN: [id number]    Date of Admission: 2024  Primary Care Physician: Des Geller MD    Subjective   Subjective     CC:  Follow-up NSTEMI    HPI:  More alert today.  Ate some.  Called his wife by name per her report.  Said hi to me when I walked into the room, but otherwise didn't answer any questions & had difficulty following any commands.     Objective   Objective     Vital Signs:   Temp:  [96.6 °F (35.9 °C)-98 °F (36.7 °C)] 98 °F (36.7 °C)  Heart Rate:  [] 90  Resp:  [16-18] 18  BP: (118-169)/(60-94) 169/94     Physical Exam:  Constitutional: No acute distress, laying in bed, overweight  HENT: NCAT, mucous membranes moist  Respiratory: Clear to auscultation bilaterally, respiratory effort normal   Cardiovascular: RRR, no murmurs, rubs, or gallops  Gastrointestinal: Positive bowel sounds, soft, nontender, nondistended  Musculoskeletal: No bilateral ankle edema  Psychiatric: Calm  Neurologic: Does not follow commands, symmetric facies, alert, looks around the room  Skin: No rashes      Results Reviewed:  LAB RESULTS:      Lab 24  0341 24  2112 24  1414 24  0855 24  0144 24  2236 24  1840 24  1840 24  1802 24  1455   WBC 11.57*  --   --   --  10.73  --   --   --   --  12.09*   HEMOGLOBIN 13.2  --   --   --  12.7*  --   --   --   --  13.6   HEMATOCRIT 40.2  --   --   --  37.6  --   --   --   --  39.8   PLATELETS 311  --   --   --  268  --   --   --   --  290   NEUTROS ABS  --   --   --   --  6.04  --   --   --   --  8.07*   IMMATURE GRANS (ABS)  --   --   --   --  0.07*  --   --   --   --  0.10*   LYMPHS ABS  --   --   --   --  3.44*  --   --   --   --  2.56   MONOS ABS  --   --   --   --  0.96*  --   --   --   --  1.25*   EOS ABS  --   --   --   --  0.14  --   --   --   --  0.03   MCV 92.8  --   --   --  91.7  --   --    --   --  89.8   PROCALCITONIN  --   --   --   --   --   --   --   --   --  0.25   LACTATE  --   --   --   --  1.7 2.2*  --   --  2.2* 2.1*   PROTIME  --   --   --   --   --   --   --  16.9*  --   --    APTT  --   --   --   --   --   --   --  31.7*  --   --    HEPARIN ANTI-XA 0.30 0.22* 0.29* 0.23* 0.23*  --    < > 0.10*  --   --     < > = values in this interval not displayed.         Lab 05/30/24  0341 05/29/24  0144 05/28/24  1455   SODIUM 144 143 134*   POTASSIUM 4.5 4.0 4.7   CHLORIDE 107 110* 101   CO2 26.0 22.0 21.0*   ANION GAP 11.0 11.0 12.0   BUN 24* 40* 41*   CREATININE 0.83 1.01 1.26   EGFR 95.9 81.5 62.5   GLUCOSE 119* 262* 495*   CALCIUM 8.8 8.8 8.8   MAGNESIUM  --   --  1.6         Lab 05/30/24  0341 05/28/24  1455   TOTAL PROTEIN 6.4 6.4   ALBUMIN 3.1* 3.1*   GLOBULIN 3.3 3.3   ALT (SGPT) 59* 34   AST (SGOT) 66* 39   BILIRUBIN 0.6 0.7   ALK PHOS 122* 126*         Lab 05/28/24  1840 05/28/24  1802 05/28/24  1455   PROBNP  --  6,458.0*  --    HSTROP T  --  374* 395*   PROTIME 16.9*  --   --    INR 1.36*  --   --          Lab 05/30/24  0341   CHOLESTEROL 179   LDL CHOL 126*   HDL CHOL 34*   TRIGLYCERIDES 105             Lab 05/28/24  1520   PH, ARTERIAL 7.477*   PCO2, ARTERIAL 29.8*   PO2 ART 58.1*   FIO2 21   HCO3 ART 22.0   BASE EXCESS ART -0.5*   CARBOXYHEMOGLOBIN 1.2     Brief Urine Lab Results  (Last result in the past 365 days)        Color   Clarity   Blood   Leuk Est   Nitrite   Protein   CREAT   Urine HCG        05/28/24 1554 Yellow   Hazy   Negative   Negative   Negative   Trace                   Microbiology Results Abnormal       Procedure Component Value - Date/Time    Blood Culture - Blood, Wrist, Right [[id number]]  (Normal) Collected: 05/28/24 1525    Lab Status: Preliminary result Specimen: Blood from Wrist, Right Updated: 05/29/24 1545     Blood Culture No growth at 24 hours    COVID PRE-OP / PRE-PROCEDURE SCREENING ORDER (NO ISOLATION) - Swab, Nasopharynx [[id number]]  (Normal)  Collected: 05/28/24 1528    Lab Status: Final result Specimen: Swab from Nasopharynx Updated: 05/28/24 1558    Narrative:      The following orders were created for panel order COVID PRE-OP / PRE-PROCEDURE SCREENING ORDER (NO ISOLATION) - Swab, Nasopharynx.  Procedure                               Abnormality         Status                     ---------                               -----------         ------                     COVID-19 and FLU A/B PCR...[[id number]]  Normal              Final result                 Please view results for these tests on the individual orders.    COVID-19 and FLU A/B PCR, 1 HR TAT - Swab, Nasopharynx [[id number]]  (Normal) Collected: 05/28/24 1528    Lab Status: Final result Specimen: Swab from Nasopharynx Updated: 05/28/24 1558     COVID19 Not Detected     Influenza A PCR Not Detected     Influenza B PCR Not Detected    Narrative:      Fact sheet for providers: https://www.fda.gov/media/110676/download    Fact sheet for patients: https://www.fda.gov/media/366307/download    Test performed by PCR.            XR Abdomen KUB    Result Date: 5/29/2024  XR ABDOMEN KUB Date of Exam: 5/29/2024 1:03 PM EDT Indication: mri approval Comparison: None available. Findings: 2 supine views. There is a large amount of stool. There is no bowel dilatation. There is diffuse arterial vascular calcification. There are no radiopaque foreign bodies within the patient.     Impression: Impression: Constipation. No acute process. No radiopaque foreign body within the patient. Electronically Signed: Waleska Giron MD  5/29/2024 2:45 PM EDT  Workstation ID: [id number]    EEG    Result Date: 5/29/2024  Reason for referral: 68 y.o.male with altered mental status Technical Summary:  A 19 channel digital EEG was performed using the international 10-20 placement system, including eye leads and EKG leads. Duration: 20 minutes Findings: The patient is resting quietly in bed and appears asleep at the beginning of the  study.  Diffuse low amplitude 4 to 6 Hz intermixed delta and theta activity is present symmetrically over both hemispheres.  Later in the study, when the patient rouses somewhat there is an increase in faster 5-7 Hz theta activity.  A clear posterior rhythm is not seen.  Photic stimulation does not change the background.  Hyperventilation is not performed.  No focal features or epileptiform activity are present. Video: Available Technical quality: Superior EKG: Regular, 80 bpm SUMMARY: Mild generalized slow No focal features or epileptiform activity are seen     Impression: Diffuse cerebral dysfunction mild degree, nonspecific No evidence for epilepsy is present This report is transcribed using the Dragon dictation system.      CT Head Without Contrast    Result Date: 5/29/2024  CT HEAD WO CONTRAST Date of Exam: 5/29/2024 11:14 AM EDT Indication: AMS. Comparison: Head CT 5/12/2024 Technique: Axial CT images were obtained of the head without contrast administration.  Automated exposure control and iterative construction methods were used. Findings: No acute intracranial hemorrhage. No acute large territory infarct. There are scattered subcortical and periventricular white matter hypodensities which are nonspecific and can be seen in setting of chronic small vessel ischemic change. No extra-axial collections. No mass effect. Normal size and configuration of the ventricles. Normal appearance of the orbits. The paranasal sinuses are clear. The mastoid air cells are clear. No acute or suspicious bony findings.     Impression: Impression: No acute intracranial findings. Chronic and senescent changes as above. Electronically Signed: Bebeto Ko MD  5/29/2024 11:31 AM EDT  Workstation ID: [id number]    XR Chest 1 View    Result Date: 5/28/2024  XR CHEST 1 VW Date of Exam: 5/28/2024 3:26 PM EDT Indication: AMS, elevated blood sugar Comparison: 5/11/2024 Findings: Lines: None Lungs: Poor respiratory effort accentuates the  pulmonary vasculature and cardiomediastinal silhouette. No definite consolidation. Pleura: No pleural effusion or pneumothorax. Cardiomediastinum: Postsurgical changes. Otherwise unremarkable. Soft Tissues: Unremarkable. Bones: No acute osseous abnormality.     Impression: Impression: No acute abnormality. Electronically Signed: Grover DO Nicole  5/28/2024 4:05 PM EDT  Workstation ID: [id number]     Results for orders placed during the hospital encounter of 03/27/24    Adult Transthoracic Echo Complete W/ Cont if Necessary Per Protocol    Interpretation Summary    Left ventricular systolic function is normal. Calculated left ventricular EF = 55.1% Normal left ventricular cavity size noted. Left ventricular wall thickness is consistent with mild concentric hypertrophy. Left ventricular diastolic function is consistent with (grade I) impaired relaxation.    The right ventricular cavity is mildly dilated. Normal right ventricular systolic function noted.    There is calcification of the aortic valve. The aortic valve appears trileaflet. Mild aortic valve regurgitation is present. No aortic valve stenosis is present.    Mitral annular calcification is present. Trace mitral valve regurgitation is present. No significant mitral valve stenosis is present.    The tricuspid valve is structurally normal with no stenosis present. Trace tricuspid valve regurgitation is present. Insufficient TR velocity profile to estimate the right ventricular systolic pressure.    Mild dilation of the sinuses of Valsalva is present. Mild dilation of the ascending aorta is present. Ascending aorta = 4.2 cm      Current medications:  Scheduled Meds:[Held by provider] amLODIPine, 10 mg, Oral, Q24H  aspirin, 81 mg, Oral, Daily  clopidogrel, 75 mg, Oral, Daily  folic acid, 1 mg, Oral, Daily  insulin glargine, 15 Units, Subcutaneous, Q12H  insulin lispro, 2-9 Units, Subcutaneous, 4x Daily AC & at Bedtime  [Held by provider] lisinopril, 20 mg,  Oral, Daily  melatonin, 5 mg, Oral, Nightly  pharmacy consult - MTM, , Does not apply, Daily  [Held by provider] PHENobarbital, 129.6 mg, Oral, Nightly  [Held by provider] PHENobarbital, 64.8 mg, Oral, Daily With Breakfast & Lunch  rosuvastatin, 20 mg, Oral, Nightly  senna-docusate sodium, 2 tablet, Oral, BID  sertraline, 50 mg, Oral, Daily  sodium chloride, 10 mL, Intravenous, Q12H      Continuous Infusions:heparin, 13 Units/kg/hr, Last Rate: 13 Units/kg/hr (05/29/24 2660)  Pharmacy to Dose Heparin,       PRN Meds:.  acetaminophen **OR** acetaminophen **OR** acetaminophen    senna-docusate sodium **AND** polyethylene glycol **AND** bisacodyl **AND** bisacodyl    Calcium Replacement - Follow Nurse / BPA Driven Protocol    dextrose    dextrose    glucagon (human recombinant)    Magnesium Low Dose Replacement - Follow Nurse / BPA Driven Protocol    Morphine **AND** naloxone    nitroglycerin    Pharmacy to Dose Heparin    Phosphorus Replacement - Follow Nurse / BPA Driven Protocol    Potassium Replacement - Follow Nurse / BPA Driven Protocol    sodium chloride    sodium chloride    sodium chloride    temazepam    Assessment & Plan   Assessment & Plan     Active Hospital Problems    Diagnosis  POA    **NSTEMI (non-ST elevated myocardial infarction) [I21.4]  Yes    Arteriosclerosis of coronary artery [I25.10]  Yes    Dementia with behavioral disturbance [F03.918]  Yes    Benign essential HTN [I10]  Yes    Benign prostatic hyperplasia [N40.0]  Yes    Hyperlipidemia [E78.5]  Yes    Type 2 diabetes mellitus with hyperglycemia, with long-term current use of insulin [E11.65, Z79.4]  Not Applicable    Peripheral vascular disease [I73.9]  Yes      Resolved Hospital Problems   No resolved problems to display.        Brief Hospital Course to date:  Too Tai is a 68 y.o. male with advanced dementia, CAD s/p remote CABG, DM2, peripheral vascular disease who has been admitted recently with altered mental status and behavioral  disturbances.  He had improved, but on 5/27, patient was not acting like himself and appeared to be in pain along with significant hyperglycemia.. Workup revealed significant troponin elevation compared to his recent baseline and some ST changes on EKG.     This patient's problems and plans were partially entered by my partner and updated as appropriate by me 05/30/24.    Dementia with behavioral disturbance  Altered mental status  -CT head obtained stat given patient with AMS and also on heparin drip --> no acute findings   -Holding home Phenobarbitol TID at 64.8mg/64.8mg/129.6mg per adjustment last admission --> awaiting call back from neuro re: medication adjustments  -Palliative care consulted  -EEG with no evidence of seizures  -WBC within normal limits, UA not consistent with infection, COVID/flu negative, ABG with alkalosis  -Suspect AMS related to oversedation; discussed with neurology  -Neurology consulted  -Patient appears to be hospice appropriate given severe dementia; patient's wife has not been amenable to this in the past, will readdress    Positive blood culture  -1 of 2 bottles positive for staph species, suspect contamination; repeat blood culture sent    NSTEMI  CAD s/p remote CABG  PAD status post multiple lower extremity stents  -Continue heparin drip x48 hours; medical management of NSTEMI  -Continue ASA  -Nitroglycerin and IV morphine PRN chest discomfort  -Cardiology consulted     DM2 with hyperglycemia  -Continue lantus, sliding scale insulin, adjust as needed     HTN  -Resume amlodipine and lisinopril     Expected Discharge Location and Transportation: back to facility  Expected Discharge   Expected Discharge Date: 5/31/2024; Expected Discharge Time:      DVT prophylaxis:  Medical DVT prophylaxis orders are present.         AM-PAC 6 Clicks Score (PT): 11 (05/29/24 2030)    CODE STATUS:   Code Status and Medical Interventions:   Ordered at: 05/28/24 1900     Medical Intervention Limits:     NO intubation (DNI)     Level Of Support Discussed With:    Health Care Surrogate     Code Status (Patient has no pulse and is not breathing):    No CPR (Do Not Attempt to Resuscitate)     Medical Interventions (Patient has pulse or is breathing):    Limited Support       Jessica Ervin MD  05/30/24

## 2024-05-30 NOTE — PROGRESS NOTES
[facility] Neurology    Progress Note    Patient Name: Too Tai  : 1955  MRN: [id number]  Primary Care Physician:  Des Geller MD  Date of admission: 2024    Subjective     Chief Complaint: Advanced dementia with behavioral disturbances    History of Present Illness   Patient resting comfortably in bed.  No acute events overnight.  Per bedside RN rested well after receiving as needed Restoril.    Review of Systems   Unable to assess due to baseline mental status    Objective     Physical Exam  Vitals and nursing note reviewed.   Constitutional:       General: He is not in acute distress.     Appearance: He is not ill-appearing.   Eyes:      Extraocular Movements: Extraocular movements intact.      Pupils: Pupils are equal, round, and reactive to light.      Comments: No nystagmus or deviated gaze noted   Cardiovascular:      Rate and Rhythm: Normal rate.   Pulmonary:      Effort: Pulmonary effort is normal.   Neurological:      Mental Status: He is alert. Mental status is at baseline.      Cranial Nerves: No dysarthria or facial asymmetry.      Sensory: No sensory deficit.      Motor: No weakness, tremor or seizure activity.      Comments:     Cranial Nerves   CN II: Pupils are equal, round, and reactive to light. Normal visual acuity and visual fields.    CN III IV VI: Extraocular movements are full without nystagmus.  CN V: Normal facial sensation and strength of muscles of mastication.  CN VII: Facial movements are symmetric. No weakness.  CN VIII:  Auditory acuity is normal.  CN IX & X:  Symmetric palatal movement.  CN XII: The tongue is midline.  No atrophy or fasciculations.            Vitals:   Temp:  [96.6 °F (35.9 °C)-98 °F (36.7 °C)] 98 °F (36.7 °C)  Heart Rate:  [] 90  Resp:  [16-18] 18  BP: (118-169)/(60-94) 169/94    Current Medications    Current Facility-Administered Medications:     acetaminophen (TYLENOL) tablet 650 mg, 650 mg, Oral, Q4H PRN, 650 mg at 24  2219 **OR** acetaminophen (TYLENOL) 160 MG/5ML oral solution 650 mg, 650 mg, Oral, Q4H PRN **OR** acetaminophen (TYLENOL) suppository 650 mg, 650 mg, Rectal, Q4H PRN, Humera Ugarte MD    [Held by provider] amLODIPine (NORVASC) tablet 10 mg, 10 mg, Oral, Q24H, Humera Ugarte MD    aspirin chewable tablet 81 mg, 81 mg, Oral, Daily, Humera Ugarte MD    sennosides-docusate (PERICOLACE) 8.6-50 MG per tablet 2 tablet, 2 tablet, Oral, BID, 2 tablet at 05/29/24 2122 **AND** polyethylene glycol (MIRALAX) packet 17 g, 17 g, Oral, Daily PRN **AND** bisacodyl (DULCOLAX) EC tablet 5 mg, 5 mg, Oral, Daily PRN **AND** bisacodyl (DULCOLAX) suppository 10 mg, 10 mg, Rectal, Daily PRN, Humera Ugarte MD    Calcium Replacement - Follow Nurse / BPA Driven Protocol, , Does not apply, PRN, Humera Ugarte MD    clopidogrel (PLAVIX) tablet 75 mg, 75 mg, Oral, Daily, Jerrod Duong MD    dextrose (D50W) (25 g/50 mL) IV injection 25 g, 25 g, Intravenous, Q15 Min PRN, Humera Ugarte MD    dextrose (GLUTOSE) oral gel 15 g, 15 g, Oral, Q15 Min PRN, Humera Ugarte MD    folic acid (FOLVITE) tablet 1 mg, 1 mg, Oral, Daily, Humera Ugarte MD    glucagon (GLUCAGEN) injection 1 mg, 1 mg, Intramuscular, Q15 Min PRN, Humera Ugarte MD    heparin 09942 units/250 mL (100 units/mL) in 0.45 % NaCl infusion, 13 Units/kg/hr, Intravenous, Titrated, Seb Maurice, [facility], Last Rate: 12.67 mL/hr at 05/29/24 2243, 13 Units/kg/hr at 05/29/24 2243    insulin glargine (LANTUS, SEMGLEE) injection 15 Units, 15 Units, Subcutaneous, Q12H, Humera Ugarte MD, 15 Units at 05/29/24 2129    Insulin Lispro (humaLOG) injection 2-9 Units, 2-9 Units, Subcutaneous, 4x Daily AC & at Bedtime, Humera Ugarte MD, 2 Units at 05/29/24 2138    [Held by provider] lisinopril (PRINIVIL,ZESTRIL) tablet 20 mg, 20 mg, Oral, Daily, Humera Ugarte MD    Magnesium Low Dose Replacement - Follow Nurse / BPA Driven Protocol, , Does not apply, PRN, Humera Ugarte MD    melatonin tablet  5 mg, 5 mg, Oral, Nightly, Humera Ugarte MD, 5 mg at 05/29/24 2122    morphine injection 1 mg, 1 mg, Intravenous, Q4H PRN, 1 mg at 05/28/24 2033 **AND** naloxone (NARCAN) injection 0.4 mg, 0.4 mg, Intravenous, Q5 Min PRN, Humera Ugarte MD    nitroglycerin (NITROSTAT) SL tablet 0.4 mg, 0.4 mg, Sublingual, Q5 Min PRN, Humera gUarte MD    Pharmacy Consult - MT, , Does not apply, Daily, Bebeto Randolph, PharmD    Pharmacy to Dose Heparin, , Does not apply, Continuous PRN, Humera Ugarte MD    [Held by provider] PHENobarbital tablet 129.6 mg, 129.6 mg, Oral, Nightly, Humera Ugarte MD, 129.6 mg at 05/28/24 2217    [Held by provider] PHENobarbital tablet 64.8 mg, 64.8 mg, Oral, Daily With Breakfast & Lunch, Humera Ugarte MD    Phosphorus Replacement - Follow Nurse / BPA Driven Protocol, , Does not apply, PRN, Humera Ugarte MD    Potassium Replacement - Follow Nurse / BPA Driven Protocol, , Does not apply, Torito MCCRAY Hannah, MD    rosuvastatin (CRESTOR) tablet 20 mg, 20 mg, Oral, Nightly, ChristianstJerrod santo MD, 20 mg at 05/29/24 2122    sertraline (ZOLOFT) tablet 50 mg, 50 mg, Oral, Daily, Humera Ugarte MD    sodium chloride 0.9 % flush 10 mL, 10 mL, Intravenous, PRN, Humera Ugarte MD    sodium chloride 0.9 % flush 10 mL, 10 mL, Intravenous, Q12H, Humera Ugarte MD, 10 mL at 05/29/24 2139    sodium chloride 0.9 % flush 10 mL, 10 mL, Intravenous, PRN, Humera Ugarte MD    sodium chloride 0.9 % infusion 40 mL, 40 mL, Intravenous, PRN, Humera Ugarte MD    temazepam (RESTORIL) capsule 15 mg, 15 mg, Oral, Nightly PRN, Humera Ugarte MD, 15 mg at 05/30/24 0016    Laboratory Results:   Lab Results   Component Value Date    GLUCOSE 119 (H) 05/30/2024    CALCIUM 8.8 05/30/2024     05/30/2024    K 4.5 05/30/2024    CO2 26.0 05/30/2024     05/30/2024    BUN 24 (H) 05/30/2024    CREATININE 0.83 05/30/2024    EGFRIFAFRI 102 02/21/2022    EGFRIFNONA 88 02/21/2022    BCR 28.9 (H) 05/30/2024    ANIONGAP 11.0  05/30/2024     Lab Results   Component Value Date    WBC 11.57 (H) 05/30/2024    HGB 13.2 05/30/2024    HCT 40.2 05/30/2024    MCV 92.8 05/30/2024     05/30/2024     Lab Results   Component Value Date    CHOL 179 05/30/2024    CHOL 220 (H) 01/08/2024    CHOL 120 08/01/2019     Lab Results   Component Value Date    HDL 34 (L) 05/30/2024    HDL 41 01/08/2024    HDL 37 (L) 08/03/2023     Lab Results   Component Value Date     (H) 05/30/2024     (H) 01/08/2024     (H) 08/03/2023     Lab Results   Component Value Date    TRIG 105 05/30/2024    TRIG 108 01/08/2024    TRIG 208 (H) 08/03/2023     Lab Results   Component Value Date    HGBA1C 9.50 (H) 04/18/2024     Lab Results   Component Value Date    INR 1.36 (H) 05/28/2024    INR 1.1 11/25/2019    INR 1.1 09/10/2018    PROTIME 16.9 (H) 05/28/2024    PROTIME 12.5 11/25/2019    PROTIME 13.3 09/10/2018     Lab Results   Component Value Date    FOLATE 10.20 02/24/2024     Lab Results   Component Value Date    YIUNCQNM22 384 04/18/2024             Assessment / Plan   Brief Patient Summary:  Too Tai is a 68 y.o. male with past medical history of advanced dementia with behavioral disturbances and nonverbal, CAD, PVD, T2DM, HLD and orthostatic hypotension who presented with altered mental status. Per wife which is bedside patient had been doing well at his living facility.  Found to have NSTEMI, cardiology following.       Plan:   Advanced dementia with behavioral disturbances  Lethargy  Acute NSTEMI  EEG no epileptic activity seen.  Nonspecific mild diffuse cerebral dysfunction noted.  CT head no acute intracranial abnormalities.  Chronic microvascular ischemic changes noted.  Consider MRI however patient has Epicardial leads in place on x-ray.  UA negative  Restart phenobarbital at reduced dose of 32.43 times daily  Recheck trough level in a.m.  N.p.o. till swallow evaluation is complete.  Restart home temazepam 15 mg nightly  Restart home  Zoloft 50 mg daily  Restart home Aricept 10 mg twice daily  Restart home melatonin 5 mg nightly  Continue fall precautions  Cardiology consulted, appreciate recommendations.  Recommend medical management.  Repeat blood culture sent.  Palliative following, appreciate recommendations  General neurology will continue to follow    I have discussed the above with the patient, bedside RN Dr. Ervin  Time spent with patient: 50 minutes in face-to-face evaluation and management of the patient.    Copied text in this note has been reviewed and is accurate as of 05/30/24.     Meliza Kahn, APRN

## 2024-05-30 NOTE — PLAN OF CARE
"  Problem: Palliative Care  Goal: Enhanced Quality of Life  Intervention: Optimize Psychosocial Wellbeing  Recent Flowsheet Documentation  Taken 5/30/2024 1120 by Amber Cervantes \"Soraya\" ANGELA PEREIRAW  Supportive Measures: (symptom assessment, d/w patient's wife and son at bedside)   active listening utilized   positive reinforcement provided   verbalization of feelings encouraged   other (see comments)  Family/Support System Care:   support provided   self-care encouraged   caregiver stress acknowledged  Visit with patient's wife and son at bedside.  Patient awake, pleasantly confused.  Wife reports patient had some breakfast this morning.  Discussed plan of care moving forward - given expected progressive decline.  She still is not ready to transition to hospice and would like to return to facility with palliative care f/u.  Unfortunately, [facility] community palliative care has had difficulty establishing relationship as patient has been in and out of hospital.  Palliative Care will continue to follow for support and assist with plan of care.     "

## 2024-05-30 NOTE — CONSULTS
Diabetes Education    Patient Name:  Too Tai  YOB: 1955  MRN: [id number]  Admit Date:  5/28/2024        Chart reviewed for diabetes ed, noted current GCS 11. May not be appropriate for education today, we continue to follow. Thank you.       Electronically signed by:  Caron Nathan RN  05/30/24 08:23 EDT   consult

## 2024-05-31 ENCOUNTER — DOCUMENTATION (OUTPATIENT)
Dept: CARDIAC REHAB | Facility: HOSPITAL | Age: 69
End: 2024-05-31
Payer: MEDICARE

## 2024-05-31 LAB
GLUCOSE BLDC GLUCOMTR-MCNC: 114 MG/DL (ref 70–130)
GLUCOSE BLDC GLUCOMTR-MCNC: 230 MG/DL (ref 70–130)
GLUCOSE BLDC GLUCOMTR-MCNC: 281 MG/DL (ref 70–130)
PHENOBARB SERPL-MCNC: 21.1 MCG/ML (ref 10–30)
UFH PPP CHRO-ACNC: 0.27 IU/ML (ref 0.3–0.7)

## 2024-05-31 PROCEDURE — 99232 SBSQ HOSP IP/OBS MODERATE 35: CPT | Performed by: STUDENT IN AN ORGANIZED HEALTH CARE EDUCATION/TRAINING PROGRAM

## 2024-05-31 PROCEDURE — 80184 ASSAY OF PHENOBARBITAL: CPT | Performed by: STUDENT IN AN ORGANIZED HEALTH CARE EDUCATION/TRAINING PROGRAM

## 2024-05-31 PROCEDURE — 92526 ORAL FUNCTION THERAPY: CPT

## 2024-05-31 PROCEDURE — 63710000001 INSULIN GLARGINE PER 5 UNITS: Performed by: INTERNAL MEDICINE

## 2024-05-31 PROCEDURE — 63710000001 INSULIN LISPRO (HUMAN) PER 5 UNITS: Performed by: INTERNAL MEDICINE

## 2024-05-31 PROCEDURE — 99232 SBSQ HOSP IP/OBS MODERATE 35: CPT | Performed by: INTERNAL MEDICINE

## 2024-05-31 PROCEDURE — 82948 REAGENT STRIP/BLOOD GLUCOSE: CPT

## 2024-05-31 PROCEDURE — 99233 SBSQ HOSP IP/OBS HIGH 50: CPT

## 2024-05-31 PROCEDURE — 85520 HEPARIN ASSAY: CPT

## 2024-05-31 RX ORDER — PHENOBARBITAL 64.8 MG/1
32.4 TABLET ORAL 3 TIMES DAILY
Status: DISCONTINUED | OUTPATIENT
Start: 2024-05-31 | End: 2024-06-03 | Stop reason: HOSPADM

## 2024-05-31 RX ORDER — BISOPROLOL FUMARATE 5 MG/1
5 TABLET, FILM COATED ORAL
Status: DISCONTINUED | OUTPATIENT
Start: 2024-05-31 | End: 2024-06-03 | Stop reason: HOSPADM

## 2024-05-31 RX ORDER — PHENOBARBITAL 64.8 MG/1
64.8 TABLET ORAL 3 TIMES DAILY
Status: DISCONTINUED | OUTPATIENT
Start: 2024-05-31 | End: 2024-05-31

## 2024-05-31 RX ADMIN — INSULIN LISPRO 4 UNITS: 100 INJECTION, SOLUTION INTRAVENOUS; SUBCUTANEOUS at 17:36

## 2024-05-31 RX ADMIN — DONEPEZIL HYDROCHLORIDE 10 MG: 10 TABLET, FILM COATED ORAL at 20:58

## 2024-05-31 RX ADMIN — INSULIN GLARGINE 15 UNITS: 100 INJECTION, SOLUTION SUBCUTANEOUS at 20:58

## 2024-05-31 RX ADMIN — FOLIC ACID 1 MG: 1 TABLET ORAL at 08:49

## 2024-05-31 RX ADMIN — Medication 5 MG: at 20:58

## 2024-05-31 RX ADMIN — CLOPIDOGREL BISULFATE 75 MG: 75 TABLET ORAL at 08:50

## 2024-05-31 RX ADMIN — PHENOBARBITAL 32.4 MG: 64.8 TABLET ORAL at 20:58

## 2024-05-31 RX ADMIN — ASPIRIN 81 MG: 81 TABLET, CHEWABLE ORAL at 08:49

## 2024-05-31 RX ADMIN — SERTRALINE HYDROCHLORIDE 50 MG: 50 TABLET ORAL at 08:50

## 2024-05-31 RX ADMIN — Medication 10 ML: at 08:51

## 2024-05-31 RX ADMIN — INSULIN GLARGINE 15 UNITS: 100 INJECTION, SOLUTION SUBCUTANEOUS at 08:50

## 2024-05-31 RX ADMIN — ROSUVASTATIN CALCIUM 20 MG: 20 TABLET, COATED ORAL at 20:58

## 2024-05-31 RX ADMIN — AMLODIPINE BESYLATE 10 MG: 10 TABLET ORAL at 08:50

## 2024-05-31 RX ADMIN — DONEPEZIL HYDROCHLORIDE 10 MG: 10 TABLET, FILM COATED ORAL at 08:49

## 2024-05-31 RX ADMIN — LISINOPRIL 20 MG: 20 TABLET ORAL at 08:49

## 2024-05-31 RX ADMIN — PHENOBARBITAL 32.4 MG: 64.8 TABLET ORAL at 17:34

## 2024-05-31 RX ADMIN — INSULIN LISPRO 4 UNITS: 100 INJECTION, SOLUTION INTRAVENOUS; SUBCUTANEOUS at 17:35

## 2024-05-31 RX ADMIN — TEMAZEPAM 15 MG: 15 CAPSULE ORAL at 20:58

## 2024-05-31 RX ADMIN — INSULIN LISPRO 6 UNITS: 100 INJECTION, SOLUTION INTRAVENOUS; SUBCUTANEOUS at 21:00

## 2024-05-31 NOTE — PROGRESS NOTES
[facility] Medicine Services  PROGRESS NOTE    Patient Name: Too Tai  : 1955  MRN: [id number]    Date of Admission: 2024  Primary Care Physician: Des Geller MD    Subjective   Subjective     CC:  Follow-up NSTEMI    HPI:  More alert. Mumbles in the room. Fed himself some chicken minis that his wife brought in.     Objective   Objective     Vital Signs:   Temp:  [96.8 °F (36 °C)-97.6 °F (36.4 °C)] 96.8 °F (36 °C)  Heart Rate:  [71-92] 71  Resp:  [18-24] 22  BP: (119-161)/(52-87) 147/54     Physical Exam:  Constitutional: No acute distress, laying in bed, overweight  HENT: NCAT, mucous membranes moist  Respiratory: Clear to auscultation bilaterally, respiratory effort normal   Cardiovascular: RRR, no murmurs, rubs, or gallops  Gastrointestinal: Positive bowel sounds, soft, nontender, nondistended  Musculoskeletal: No bilateral ankle edema  Psychiatric: Calm  Neurologic: Does not follow commands, symmetric facies, alert, looks around the room, grabbing at my hands when I try to examine him  Skin: No rashes on exposed skin      Results Reviewed:  LAB RESULTS:      Lab 24  0504 24  1946 24  1016 24  0341 24  2112 24  0855 24  0144 24  2236 24  1840 24  1840 24  1802 24  1455   WBC  --   --   --  11.57*  --   --  10.73  --   --   --   --  12.09*   HEMOGLOBIN  --   --   --  13.2  --   --  12.7*  --   --   --   --  13.6   HEMATOCRIT  --   --   --  40.2  --   --  37.6  --   --   --   --  39.8   PLATELETS  --   --   --  311  --   --  268  --   --   --   --  290   NEUTROS ABS  --   --   --   --   --   --  6.04  --   --   --   --  8.07*   IMMATURE GRANS (ABS)  --   --   --   --   --   --  0.07*  --   --   --   --  0.10*   LYMPHS ABS  --   --   --   --   --   --  3.44*  --   --   --   --  2.56   MONOS ABS  --   --   --   --   --   --  0.96*  --   --   --   --  1.25*   EOS ABS  --   --   --   --   --    --  0.14  --   --   --   --  0.03   MCV  --   --   --  92.8  --   --  91.7  --   --   --   --  89.8   PROCALCITONIN  --   --   --   --   --   --   --   --   --   --   --  0.25   LACTATE  --   --   --   --   --   --  1.7 2.2*  --   --  2.2* 2.1*   PROTIME  --   --   --   --   --   --   --   --   --  16.9*  --   --    APTT  --   --   --   --   --   --   --   --   --  31.7*  --   --    HEPARIN ANTI-XA 0.27* 0.26* 0.29* 0.30 0.22*   < > 0.23*  --    < > 0.10*  --   --     < > = values in this interval not displayed.         Lab 05/30/24  0341 05/29/24  0144 05/28/24  1455   SODIUM 144 143 134*   POTASSIUM 4.5 4.0 4.7   CHLORIDE 107 110* 101   CO2 26.0 22.0 21.0*   ANION GAP 11.0 11.0 12.0   BUN 24* 40* 41*   CREATININE 0.83 1.01 1.26   EGFR 95.9 81.5 62.5   GLUCOSE 119* 262* 495*   CALCIUM 8.8 8.8 8.8   MAGNESIUM  --   --  1.6         Lab 05/30/24  0341 05/28/24  1455   TOTAL PROTEIN 6.4 6.4   ALBUMIN 3.1* 3.1*   GLOBULIN 3.3 3.3   ALT (SGPT) 59* 34   AST (SGOT) 66* 39   BILIRUBIN 0.6 0.7   ALK PHOS 122* 126*         Lab 05/28/24  1840 05/28/24  1802 05/28/24  1455   PROBNP  --  6,458.0*  --    HSTROP T  --  374* 395*   PROTIME 16.9*  --   --    INR 1.36*  --   --          Lab 05/30/24  0341   CHOLESTEROL 179   LDL CHOL 126*   HDL CHOL 34*   TRIGLYCERIDES 105             Lab 05/28/24  1520   PH, ARTERIAL 7.477*   PCO2, ARTERIAL 29.8*   PO2 ART 58.1*   FIO2 21   HCO3 ART 22.0   BASE EXCESS ART -0.5*   CARBOXYHEMOGLOBIN 1.2     Brief Urine Lab Results  (Last result in the past 365 days)        Color   Clarity   Blood   Leuk Est   Nitrite   Protein   CREAT   Urine HCG        05/28/24 1554 Yellow   Hazy   Negative   Negative   Negative   Trace                   Microbiology Results Abnormal       Procedure Component Value - Date/Time    Blood Culture - Blood, Wrist, Right [[id number]]  (Normal) Collected: 05/28/24 1525    Lab Status: Preliminary result Specimen: Blood from Wrist, Right Updated: 05/30/24 1545     Blood  Culture No growth at 2 days    Blood Culture - Blood, Hand, Right [[id number]]  (Normal) Collected: 05/29/24 1414    Lab Status: Preliminary result Specimen: Blood from Hand, Right Updated: 05/30/24 1500     Blood Culture No growth at 24 hours    COVID PRE-OP / PRE-PROCEDURE SCREENING ORDER (NO ISOLATION) - Swab, Nasopharynx [[id number]]  (Normal) Collected: 05/28/24 1528    Lab Status: Final result Specimen: Swab from Nasopharynx Updated: 05/28/24 1558    Narrative:      The following orders were created for panel order COVID PRE-OP / PRE-PROCEDURE SCREENING ORDER (NO ISOLATION) - Swab, Nasopharynx.  Procedure                               Abnormality         Status                     ---------                               -----------         ------                     COVID-19 and FLU A/B PCR...[[id number]]  Normal              Final result                 Please view results for these tests on the individual orders.    COVID-19 and FLU A/B PCR, 1 HR TAT - Swab, Nasopharynx [[id number]]  (Normal) Collected: 05/28/24 1528    Lab Status: Final result Specimen: Swab from Nasopharynx Updated: 05/28/24 1558     COVID19 Not Detected     Influenza A PCR Not Detected     Influenza B PCR Not Detected    Narrative:      Fact sheet for providers: https://www.fda.gov/media/331875/download    Fact sheet for patients: https://www.fda.gov/media/417448/download    Test performed by PCR.            XR Abdomen KUB    Result Date: 5/29/2024  XR ABDOMEN KUB Date of Exam: 5/29/2024 1:03 PM EDT Indication: mri approval Comparison: None available. Findings: 2 supine views. There is a large amount of stool. There is no bowel dilatation. There is diffuse arterial vascular calcification. There are no radiopaque foreign bodies within the patient.     Impression: Impression: Constipation. No acute process. No radiopaque foreign body within the patient. Electronically Signed: Waleska Giron MD  5/29/2024 2:45 PM EDT  Workstation ID:  [id number]    EEG    Result Date: 5/29/2024  Reason for referral: 68 y.o.male with altered mental status Technical Summary:  A 19 channel digital EEG was performed using the international 10-20 placement system, including eye leads and EKG leads. Duration: 20 minutes Findings: The patient is resting quietly in bed and appears asleep at the beginning of the study.  Diffuse low amplitude 4 to 6 Hz intermixed delta and theta activity is present symmetrically over both hemispheres.  Later in the study, when the patient rouses somewhat there is an increase in faster 5-7 Hz theta activity.  A clear posterior rhythm is not seen.  Photic stimulation does not change the background.  Hyperventilation is not performed.  No focal features or epileptiform activity are present. Video: Available Technical quality: Superior EKG: Regular, 80 bpm SUMMARY: Mild generalized slow No focal features or epileptiform activity are seen     Impression: Diffuse cerebral dysfunction mild degree, nonspecific No evidence for epilepsy is present This report is transcribed using the Dragon dictation system.      CT Head Without Contrast    Result Date: 5/29/2024  CT HEAD WO CONTRAST Date of Exam: 5/29/2024 11:14 AM EDT Indication: AMS. Comparison: Head CT 5/12/2024 Technique: Axial CT images were obtained of the head without contrast administration.  Automated exposure control and iterative construction methods were used. Findings: No acute intracranial hemorrhage. No acute large territory infarct. There are scattered subcortical and periventricular white matter hypodensities which are nonspecific and can be seen in setting of chronic small vessel ischemic change. No extra-axial collections. No mass effect. Normal size and configuration of the ventricles. Normal appearance of the orbits. The paranasal sinuses are clear. The mastoid air cells are clear. No acute or suspicious bony findings.     Impression: Impression: No acute intracranial findings.  Chronic and senescent changes as above. Electronically Signed: Bebeto Ko MD  5/29/2024 11:31 AM EDT  Workstation ID: [id number]     Results for orders placed during the hospital encounter of 03/27/24    Adult Transthoracic Echo Complete W/ Cont if Necessary Per Protocol    Interpretation Summary    Left ventricular systolic function is normal. Calculated left ventricular EF = 55.1% Normal left ventricular cavity size noted. Left ventricular wall thickness is consistent with mild concentric hypertrophy. Left ventricular diastolic function is consistent with (grade I) impaired relaxation.    The right ventricular cavity is mildly dilated. Normal right ventricular systolic function noted.    There is calcification of the aortic valve. The aortic valve appears trileaflet. Mild aortic valve regurgitation is present. No aortic valve stenosis is present.    Mitral annular calcification is present. Trace mitral valve regurgitation is present. No significant mitral valve stenosis is present.    The tricuspid valve is structurally normal with no stenosis present. Trace tricuspid valve regurgitation is present. Insufficient TR velocity profile to estimate the right ventricular systolic pressure.    Mild dilation of the sinuses of Valsalva is present. Mild dilation of the ascending aorta is present. Ascending aorta = 4.2 cm      Current medications:  Scheduled Meds:amLODIPine, 10 mg, Oral, Q24H  aspirin, 81 mg, Oral, Daily  bisoprolol, 5 mg, Oral, Q24H  clopidogrel, 75 mg, Oral, Daily  donepezil, 10 mg, Oral, BID  folic acid, 1 mg, Oral, Daily  insulin glargine, 15 Units, Subcutaneous, Q12H  insulin lispro, 2-9 Units, Subcutaneous, 4x Daily AC & at Bedtime  lisinopril, 20 mg, Oral, Daily  melatonin, 5 mg, Oral, Nightly  pharmacy consult - MTM, , Does not apply, Daily  PHENobarbital, 32.4 mg, Oral, TID  rosuvastatin, 20 mg, Oral, Nightly  senna-docusate sodium, 2 tablet, Oral, BID  sertraline, 50 mg, Oral, Daily  sodium  chloride, 10 mL, Intravenous, Q12H  temazepam, 15 mg, Oral, Nightly      Continuous Infusions:     PRN Meds:.  acetaminophen **OR** acetaminophen **OR** acetaminophen    senna-docusate sodium **AND** polyethylene glycol **AND** bisacodyl **AND** bisacodyl    Calcium Replacement - Follow Nurse / BPA Driven Protocol    dextrose    dextrose    glucagon (human recombinant)    Magnesium Low Dose Replacement - Follow Nurse / BPA Driven Protocol    Morphine **AND** naloxone    nitroglycerin    Phosphorus Replacement - Follow Nurse / BPA Driven Protocol    Potassium Replacement - Follow Nurse / BPA Driven Protocol    sodium chloride    sodium chloride    sodium chloride    Assessment & Plan   Assessment & Plan     Active Hospital Problems    Diagnosis  POA    **NSTEMI (non-ST elevated myocardial infarction) [I21.4]  Yes    Arteriosclerosis of coronary artery [I25.10]  Yes    Dementia with behavioral disturbance [F03.918]  Yes    Benign essential HTN [I10]  Yes    Benign prostatic hyperplasia [N40.0]  Yes    Hyperlipidemia [E78.5]  Yes    Type 2 diabetes mellitus with hyperglycemia, with long-term current use of insulin [E11.65, Z79.4]  Not Applicable    Peripheral vascular disease [I73.9]  Yes      Resolved Hospital Problems   No resolved problems to display.        Brief Hospital Course to date:  Too Tai is a 68 y.o. male with advanced dementia, CAD s/p remote CABG, DM2, peripheral vascular disease who has been admitted recently with altered mental status and behavioral disturbances.  He had improved, but on 5/27, patient was not acting like himself and appeared to be in pain along with significant hyperglycemia.. Workup revealed significant troponin elevation compared to his recent baseline and some ST changes on EKG.     This patient's problems and plans were partially entered by my partner and updated as appropriate by me 05/31/24.    Dementia with behavioral disturbance  Altered mental status  -CT head obtained  stat given patient with AMS and also on heparin drip --> no acute findings   -Was on Phenobarbitol TID at 64.8mg/64.8mg/129.6mg and was over-sedated --> now on phenobarb 32.4mg TID  -Continue donepezil, melatonin, sertraline, reduced dose Restoril  -Patient's wife has questions about depakote - awaiting call back from neuro  -Palliative care consulted  -EEG with no evidence of seizures  -WBC within normal limits, UA not consistent with infection, COVID/flu negative, ABG with alkalosis  -Suspect AMS related to oversedation; discussed with neurology  -Neurology consulted  -Patient appears to be hospice appropriate given severe dementia; patient's wife has not been amenable to this in the past, will readdress    Positive blood culture  -1 of 2 bottles positive for staph species with various morphologies, suspect contamination; repeat blood culture obtained and 1 of 2 positive    NSTEMI  CAD s/p remote CABG  PAD status post multiple lower extremity stents  -Status post heparin drip x48 hours; medical management of NSTEMI  -Continue ASA, Plavix  -Nitroglycerin and IV morphine PRN chest discomfort  -Cardiology consulted     DM2 with hyperglycemia  -Continue lantus, sliding scale insulin, adjust as needed     HTN  -Resumed amlodipine and lisinopril     Expected Discharge Location and Transportation: back to facility  Expected Discharge   Expected Discharge Date: 6/2/2024; Expected Discharge Time:      DVT prophylaxis:  No DVT prophylaxis order currently exists.         AM-PAC 6 Clicks Score (PT): 8 (05/30/24 2045)    CODE STATUS:   Code Status and Medical Interventions:   Ordered at: 05/28/24 1900     Medical Intervention Limits:    NO intubation (DNI)     Level Of Support Discussed With:    Health Care Surrogate     Code Status (Patient has no pulse and is not breathing):    No CPR (Do Not Attempt to Resuscitate)     Medical Interventions (Patient has pulse or is breathing):    Limited Support       Jessica Ervin  MD  05/31/24

## 2024-05-31 NOTE — PROGRESS NOTES
[facility] Neurology    Progress Note    Patient Name: Too Tai  : 1955  MRN: [id number]  Primary Care Physician:  Des Geller MD  Date of admission: 2024    Subjective     Chief Complaint: Dementia with behavioral disturbances    History of Present Illness   Patient seen resting comfortably in bed.  Eating a sandwich upon assessment.  Was able to move all extremities.  Per bedside RN patient rested well throughout night.    Review of Systems   Difficult to assess due to baseline mental status    Objective     Physical Exam  Vitals and nursing note reviewed.   Constitutional:       General: He is not in acute distress.     Appearance: He is not ill-appearing.   Eyes:      Extraocular Movements: Extraocular movements intact.      Pupils: Pupils are equal, round, and reactive to light.      Comments: No nystagmus or deviated gaze noted   Cardiovascular:      Rate and Rhythm: Normal rate.   Pulmonary:      Effort: Pulmonary effort is normal.   Neurological:      Mental Status: He is alert. Mental status is at baseline.      Cranial Nerves: Cranial nerves 2-12 are intact.      Sensory: Sensation is intact.      Motor: Motor function is intact. No seizure activity.      Comments:   Cranial Nerves   CN II: Pupils are equal, round, and reactive to light. Normal visual acuity and visual fields.    CN III IV VI: Extraocular movements are full without nystagmus.  CN V: Normal facial sensation and strength of muscles of mastication.  CN VII: Facial movements are symmetric. No weakness.  CN VIII:  Auditory acuity is normal.  CN IX & X:  Symmetric palatal movement.  CN XI: Sternocleidomastoid and trapezius are normal.  No weakness.  CN XII: The tongue is midline.  No atrophy or fasciculations.            Vitals:   Temp:  [96.8 °F (36 °C)-97.6 °F (36.4 °C)] 96.8 °F (36 °C)  Heart Rate:  [71-96] 71  Resp:  [18-24] 22  BP: (119-161)/(52-87) 147/54    Current Medications    Current  Facility-Administered Medications:     acetaminophen (TYLENOL) tablet 650 mg, 650 mg, Oral, Q4H PRN, 650 mg at 05/28/24 2219 **OR** acetaminophen (TYLENOL) 160 MG/5ML oral solution 650 mg, 650 mg, Oral, Q4H PRN **OR** acetaminophen (TYLENOL) suppository 650 mg, 650 mg, Rectal, Q4H PRN, Humera Ugarte MD    amLODIPine (NORVASC) tablet 10 mg, 10 mg, Oral, Q24H, Jessica Ervin MD, 10 mg at 05/30/24 0930    aspirin chewable tablet 81 mg, 81 mg, Oral, Daily, Humera Ugarte MD, 81 mg at 05/30/24 0931    sennosides-docusate (PERICOLACE) 8.6-50 MG per tablet 2 tablet, 2 tablet, Oral, BID, 2 tablet at 05/29/24 2122 **AND** polyethylene glycol (MIRALAX) packet 17 g, 17 g, Oral, Daily PRN **AND** bisacodyl (DULCOLAX) EC tablet 5 mg, 5 mg, Oral, Daily PRN **AND** bisacodyl (DULCOLAX) suppository 10 mg, 10 mg, Rectal, Daily PRN, Humera Ugarte MD    Calcium Replacement - Follow Nurse / BPA Driven Protocol, , Does not apply, PRN, Humera Ugarte MD    clopidogrel (PLAVIX) tablet 75 mg, 75 mg, Oral, Daily, Jerrod Duong MD, 75 mg at 05/30/24 0930    dextrose (D50W) (25 g/50 mL) IV injection 25 g, 25 g, Intravenous, Q15 Min PRN, Humera Ugarte MD    dextrose (GLUTOSE) oral gel 15 g, 15 g, Oral, Q15 Min PRN, Humera Ugarte MD    donepezil (ARICEPT) tablet 10 mg, 10 mg, Oral, BID, Meliza Kahn, APRN, 10 mg at 05/30/24 2015    folic acid (FOLVITE) tablet 1 mg, 1 mg, Oral, Daily, Humera Ugarte MD, 1 mg at 05/30/24 0931    glucagon (GLUCAGEN) injection 1 mg, 1 mg, Intramuscular, Q15 Min PRN, Humera Ugarte MD    insulin glargine (LANTUS, SEMGLEE) injection 15 Units, 15 Units, Subcutaneous, Q12H, Humera Ugarte MD, 15 Units at 05/30/24 2209    Insulin Lispro (humaLOG) injection 2-9 Units, 2-9 Units, Subcutaneous, 4x Daily AC & at Bedtime, Humera Ugarte MD, 2 Units at 05/30/24 1716    lisinopril (PRINIVIL,ZESTRIL) tablet 20 mg, 20 mg, Oral, Daily, Jessica Ervin MD, 20 mg at 05/30/24 0931    Magnesium Low Dose Replacement -  Follow Nurse / BPA Driven Protocol, , Does not apply, PRTorito WORTHY Hannah, MD    melatonin tablet 5 mg, 5 mg, Oral, Nightly, Humera Ugarte MD, 5 mg at 05/30/24 2015    morphine injection 1 mg, 1 mg, Intravenous, Q4H PRN, 1 mg at 05/28/24 2033 **AND** naloxone (NARCAN) injection 0.4 mg, 0.4 mg, Intravenous, Q5 Min PRN, Humera Ugarte MD    nitroglycerin (NITROSTAT) SL tablet 0.4 mg, 0.4 mg, Sublingual, Q5 Min PRN, Humera Ugarte MD    Pharmacy Consult - MTM, , Does not apply, Daily, Bebeto Randolph, PharmD    PHENobarbital tablet 32.4 mg, 32.4 mg, Oral, TID, Jhaveri, Darrell A, DO, 32.4 mg at 05/30/24 1716    Phosphorus Replacement - Follow Nurse / BPA Driven Protocol, , Does not apply, PRTorito WORTHY Hannah, MD    Potassium Replacement - Follow Nurse / BPA Driven Protocol, , Does not apply, PRTorito WORTHY Hannah, MD    rosuvastatin (CRESTOR) tablet 20 mg, 20 mg, Oral, Nightly, Jerrod Duong MD, 20 mg at 05/30/24 2015    sertraline (ZOLOFT) tablet 50 mg, 50 mg, Oral, Daily, Humera Ugarte MD, 50 mg at 05/30/24 0931    sodium chloride 0.9 % flush 10 mL, 10 mL, Intravenous, PRN, Humera Ugarte MD    sodium chloride 0.9 % flush 10 mL, 10 mL, Intravenous, Q12H, Humera Ugarte MD, 10 mL at 05/30/24 2018    sodium chloride 0.9 % flush 10 mL, 10 mL, Intravenous, PRN, Humera Ugarte MD    sodium chloride 0.9 % infusion 40 mL, 40 mL, Intravenous, PRNTorito Hannah, MD    temazepam (RESTORIL) capsule 15 mg, 15 mg, Oral, Nightly, Jhaveri, Darrell A, DO, 15 mg at 05/30/24 2015    Laboratory Results:   Lab Results   Component Value Date    GLUCOSE 119 (H) 05/30/2024    CALCIUM 8.8 05/30/2024     05/30/2024    K 4.5 05/30/2024    CO2 26.0 05/30/2024     05/30/2024    BUN 24 (H) 05/30/2024    CREATININE 0.83 05/30/2024    EGFRIFAFRI 102 02/21/2022    EGFRIFNONA 88 02/21/2022    BCR 28.9 (H) 05/30/2024    ANIONGAP 11.0 05/30/2024     Lab Results   Component Value Date    WBC 11.57 (H) 05/30/2024    HGB 13.2 05/30/2024    HCT  40.2 05/30/2024    MCV 92.8 05/30/2024     05/30/2024     Lab Results   Component Value Date    CHOL 179 05/30/2024    CHOL 220 (H) 01/08/2024    CHOL 120 08/01/2019     Lab Results   Component Value Date    HDL 34 (L) 05/30/2024    HDL 41 01/08/2024    HDL 37 (L) 08/03/2023     Lab Results   Component Value Date     (H) 05/30/2024     (H) 01/08/2024     (H) 08/03/2023     Lab Results   Component Value Date    TRIG 105 05/30/2024    TRIG 108 01/08/2024    TRIG 208 (H) 08/03/2023     Lab Results   Component Value Date    HGBA1C 9.50 (H) 04/18/2024     Lab Results   Component Value Date    INR 1.36 (H) 05/28/2024    INR 1.1 11/25/2019    INR 1.1 09/10/2018    PROTIME 16.9 (H) 05/28/2024    PROTIME 12.5 11/25/2019    PROTIME 13.3 09/10/2018     Lab Results   Component Value Date    FOLATE 10.20 02/24/2024     Lab Results   Component Value Date    [id number] 384 04/18/2024             Assessment / Plan   Brief Patient Summary:  Too Tai is a 68 y.o. male with past medical history of advanced dementia with behavioral disturbances and nonverbal, CAD, PVD, T2DM, HLD and orthostatic hypotension who presented with altered mental status. Per wife which is bedside patient had been doing well at his living facility.  Found to have NSTEMI, cardiology following.       Plan:   Advanced dementia with behavioral disturbances-stable  Lethargy-resolved  Acute NSTEMI  EEG no epileptic activity seen.  Nonspecific mild diffuse cerebral dysfunction noted.  CT head no acute intracranial abnormalities.  Chronic microvascular ischemic changes noted.  Considered MRI however patient has Epicardial leads in place on x-ray.  Continue phenobarbital at reduced dose of 32.4,  3 times daily  Trough phenobarbital level 21.1  Recheck level in 2 to 4 weeks.  N.p.o. till swallow evaluation is complete.  Restart home temazepam 15 mg nightly  Restart home Zoloft 50 mg daily  Restart home Aricept 10 mg twice daily  Restart  home melatonin 5 mg nightly  Continue fall precautions  Cardiology consulted, appreciate recommendations.  Recommend medical management of MI  Repeat blood culture sent.  Palliative following, appreciate recommendations  Patient to follow-up with IVONNE Eckert appointment scheduled for 9/20/2024  Patient is okay to discharge from a neurologic standpoint.  Please feel free to reach out with any questions.    I have discussed the above with the patient, bedside RN and Dr. Ervin  Time spent with patient: 50 minutes in face-to-face evaluation and management of the patient.    Copied text in this note has been reviewed and is accurate as of 05/31/24.     IVONNE Santos

## 2024-05-31 NOTE — THERAPY DISCHARGE NOTE
Acute Care - Speech Language Pathology   Swallow Treatment Note/Discharge [facility]     Patient Name: Too Tai  : 1955  MRN: [id number]  Today's Date: 2024               Admit Date: 2024    Visit Dx:    ICD-10-CM ICD-9-CM   1. NSTEMI (non-ST elevated myocardial infarction)  I21.4 410.70   2. Hyperglycemia due to diabetes mellitus  E11.65 250.02   3. Dementia with other behavioral disturbance, unspecified dementia severity, unspecified dementia type  F03.918 294.21   4. History of coronary artery disease  Z86.79 V12.59     Patient Active Problem List   Diagnosis    Cough    Benign essential HTN    Benign prostatic hyperplasia    Coronary artery disease involving native coronary artery of native heart    Chronic coronary artery disease    Type 2 diabetes mellitus with hyperglycemia, with long-term current use of insulin    Gout    Hyperlipidemia    History of heart attack    Peripheral neuropathy    Peripheral vascular disease    Spasm of muscle    Myoclonic jerking    Hypomagnesemia    Arteriosclerosis of coronary artery    Dementia with behavioral disturbance    Frequent falls    History of DVT (deep vein thrombosis)    Chronic anticoagulation    AMS (altered mental status)    Falls    Bacteriuria    Cystitis    Agitation due to dementia    Dementia with behavioral disturbance    NSTEMI (non-ST elevated myocardial infarction)     Past Medical History:   Diagnosis Date    Coronary artery disease     Dementia     Diabetes mellitus     Hyperlipidemia     Peripheral vascular disease      Past Surgical History:   Procedure Laterality Date    CORONARY ARTERY BYPASS GRAFT      FEMORAL ARTERY - POPLITEAL ARTERY BYPASS GRAFT         SLP Recommendation and Plan                                                           Treatment Assessment (SLP): toleration of diet (24 1115)  Treatment Assessment Comments (SLP): spouse reports pt not liking ground solids. She gave him a chicken mini for  breakfast and no issues. (05/31/24 1115)  Plan for Continued Treatment (SLP): treatment no longer indicated as all goals met, other (see comments) (advance to regular solids) (05/31/24 1115)    Plan of Care Reviewed With: patient (05/31/24 1316)    SWALLOW EVALUATION (Last 72 Hours)       SLP Adult Swallow Evaluation       Row Name 05/31/24 1115 05/29/24 1400                Rehab Evaluation    Document Type discharge treatment  -SM evaluation  -MP       Subjective Information no complaints  -SM no complaints  -MP       Patient Observations alert;cooperative  -SM alert;cooperative  -MP       Patient/Family/Caregiver Comments/Observations -- Spouse present  -MP       Patient Effort adequate  -SM adequate  -MP          General Information    Patient Profile Reviewed -- yes  -MP       Pertinent History Of Current Problem -- Adm 5/28 w/ NSTEMI. Hx advanced dementia, recent adm w/ AMS & behavioral disturbance. CT head today w/ no acute. CXR yesterday clear. Spouse reports no prior swallowing difficulty. [facility] SLP has attempted to see pt over past few admissions, but either not appropriate or wouldn't accept any PO.  -MP       Current Method of Nutrition -- NPO  -MP       Precautions/Limitations, Vision -- WFL  -MP       Precautions/Limitations, Hearing -- WFL  -MP       Prior Level of Function-Communication -- cognitive-linguistic impairment  -MP       Prior Level of Function-Swallowing -- no diet consistency restrictions  -MP       Plans/Goals Discussed with -- patient;spouse/S.O.  -MP       Barriers to Rehab -- medically complex;cognitive status  -MP       Patient's Goals for Discharge -- patient did not state  -MP       Family Goals for Discharge -- patient able to return to PO diet  -MP          Pain    Additional Documentation -- Pain Scale: FACES Pre/Post-Treatment (Group)  -MP          Pain Scale: FACES Pre/Post-Treatment    Pain: FACES Scale, Pretreatment 0-->no hurt  -SM 2-->hurts little bit  -MP        Posttreatment Pain Rating 0-->no hurt  -SM 2-->hurts little bit  -          Oral Motor Structure and Function    Dentition Assessment -- natural, present and adequate  -       Secretion Management -- dried secretions in oral cavity  -       Mucosal Quality -- dry;cracked  -          Oral Musculature and Cranial Nerve Assessment    Oral Motor General Assessment -- unable to assess  -          General Eating/Swallowing Observations    Eating/Swallowing Skills -- fed by SLP  -       Positioning During Eating -- upright in bed  -       Utensils Used -- spoon;cup;straw  -       Consistencies Trialed -- thin liquids;pureed;regular textures  -          Clinical Swallow Eval    Oral Prep Phase -- impaired  -       Pharyngeal Phase -- no overt signs/symptoms of pharyngeal impairment  -       Clinical Swallow Evaluation Summary -- Prolonged mastication w/ regular solid. No clinical s/sxs aspiration noted w/ any consistency. ? wincing occasionally - u/a to assess pt's tongue (? thrush) but notified RN. Rec initiate soft/ground, thin. Standard aspiration prctns. SLP will f/u for assessment of diet tolerance/ adjustments.  -          Oral Prep Concerns    Oral Prep Concerns -- prolonged mastication  -       Prolonged Mastication -- regular consistencies  -          SLP Evaluation Clinical Impression    SLP Swallowing Diagnosis -- oral dysphagia;R/O pharyngeal dysphagia;other (see comments)  spouse reports @ cog-comm baseline & declined further cog-comm eval  -       Functional Impact -- risk of aspiration/pneumonia  -       Rehab Potential/Prognosis, Swallowing -- adequate, monitor progress closely  -       Swallow Criteria for Skilled Therapeutic Interventions Met -- demonstrates skilled criteria  -          SLP Treatment Clinical Impressions    Treatment Assessment (SLP) toleration of diet  -SM --       Treatment Assessment Comments (SLP) spouse reports pt not liking ground solids. She gave  him a chicken mini for breakfast and no issues.  -SM --       Barriers to Overall Progress (SLP) Cognitive status  - --       Plan for Continued Treatment (SLP) treatment no longer indicated as all goals met;other (see comments)  advance to regular solids  - --       Care Plan Review evaluation/treatment results reviewed;care plan/treatment goals reviewed;risks/benefits reviewed;current/potential barriers reviewed;patient/other agree to care plan  - --       Care Plan Review, Other Participant(s) spouse  - --          Recommendations    Therapy Frequency (Swallow) -- PRN  -       Predicted Duration Therapy Intervention (Days) -- until discharge  -       SLP Diet Recommendation -- soft to chew textures;ground;thin liquids  -       Recommended Diagnostics -- other (see comments)  assessment of diet tolerance  -       Recommended Precautions and Strategies -- general aspiration precautions;1:1 supervision;assist with feeding  -       Oral Care Recommendations -- Oral Care BID/PRN;Suction toothbrush  -       SLP Rec. for Method of Medication Administration -- meds whole;meds crushed;with puree  may need to trial w/ ice cream  -       Monitor for Signs of Aspiration -- notify SLP if any concerns  -       Anticipated Discharge Disposition (SLP) -- home with 24/7 care;skilled nursing facility  -                 User Key  (r) = Recorded By, (t) = Taken By, (c) = Cosigned By      Initials Name Effective Dates    Dominique Camara, MS CCC-SLP 02/03/23 -     MP Steve Judd, MS CCC-SLP 12/28/21 -                     EDUCATION  The patient's wife has been educated in the following areas:   Modified Diet Instruction.         SLP GOALS       Row Name 05/31/24 1115 05/29/24 1400          (LTG) Patient will demonstrate functional swallow for    Diet Texture (Demonstrate functional swallow) regular textures  -SM soft to chew (ground) textures  -MP     Liquid viscosity (Demonstrate functional  swallow) thin liquids  -SM thin liquids  -MP     Mora (Demonstrate functional swallow) with 1:1 assist/ supervision  -SM with moderate cues (50-74% accuracy)  -MP     Time Frame (Demonstrate functional swallow) by discharge  -SM by discharge  -MP     Progress/Outcomes (Demonstrate functional swallow) goal revised this date;goal met  -SM new goal  -MP        (STG) Patient will tolerate trials of    Consistencies Trialed (Tolerate trials) regular textures;thin liquids  -SM soft to chew (ground) textures;thin liquids  -MP     Desired Outcome (Tolerate trials) without signs/symptoms of aspiration;without signs of distress;with adequate oral prep/transit/clearance  -SM without signs/symptoms of aspiration;without signs of distress;with adequate oral prep/transit/clearance  -MP     Mora (Tolerate trials) with 1:1 assist/ supervision  -SM with moderate cues (50-74% accuracy)  -MP     Time Frame (Tolerate trials) 1 week  -SM 1 week  -MP     Progress/Outcomes (Tolerate trials) goal revised this date;goal met  -SM new goal  -MP               User Key  (r) = Recorded By, (t) = Taken By, (c) = Cosigned By      Initials Name Provider Type    Dominique Camara MS CCC-SLP Speech and Language Pathologist    Steve Herrmann MS CCC-SLP Speech and Language Pathologist                           Time Calculation:    Time Calculation- SLP       Row Name 05/31/24 1318             Time Calculation- SLP    SLP Start Time 1115  -SM      SLP Received On 05/31/24  -SM         Untimed Charges    52824-AG Treatment Swallow Minutes 30  -SM         Total Minutes    Untimed Charges Total Minutes 30  -SM       Total Minutes 30  -SM                User Key  (r) = Recorded By, (t) = Taken By, (c) = Cosigned By      Initials Name Provider Type    Dominique Camara MS CCC-SLP Speech and Language Pathologist                    Therapy Charges for Today       Code Description Service Date Service Provider Modifiers  Qty    [id number]  ST TREATMENT SWALLOW 2 5/31/2024 Dominique Ahmadi, MS CCC-SLP GN 1                      Dominique Ahmadi, MS LUIS ANGEL-SLP  5/31/2024

## 2024-05-31 NOTE — CASE MANAGEMENT/SOCIAL WORK
Continued Stay Note  [facility]     Patient Name: Too Tai  MRN: [id number]  Today's Date: 5/31/2024    Admit Date: 5/28/2024    Plan: discharge plan   Discharge Plan       Row Name 05/31/24 1500       Plan    Plan discharge plan    Plan Comments Per discussion in MDR, pt will be here over weekend. Palliative, neuro, speech and cards following. I spoke with Fannie CHRIS at [facility] and  pt will need an onsite visit before returning to [facility].  [facility] doesn't accept pt's over the weekend. Fannie states she will do an onsite visit on Monday. Pt will need transportation at discharge. CM will follow up Monday.    Final Discharge Disposition Code 01 - home or self-care                   Discharge Codes    No documentation.                 Expected Discharge Date and Time       Expected Discharge Date Expected Discharge Time    Jun 2, 2024               Elvira Mary RN

## 2024-05-31 NOTE — CONSULTS
Diabetes Education    Patient Name:  Too Tai  YOB: 1955  MRN: [id number]  Admit Date:  5/28/2024        Diabetes education follow up. Patient is a resident of long term care facility due to advanced dementia. Palliative care following. GCS is 11, not appropriate for education.If specific questions or needs present please reconsult. [phone]    Electronically signed by:  Francoise Kahn RN  05/31/24 09:17 EDT

## 2024-05-31 NOTE — PROGRESS NOTES
"[facility] Progress Note     LOS: 2 days   Patient Care Team:  Des Geller MD as PCP - General (Internal Medicine)  Sara Bee MD as Consulting Physician (Dermatopathology)  Lenin Bradley MD as Consulting Physician (Neurology)  PCP:  Des Geller MD    Chief Complaint: Non-STEMI    Subjective: Patient is a little bit more alert today but does not appear oriented.  Does not appear in acute distress.      Review of Systems:   Unable to obtain secondary to altered mental status.      Objective:    Vital Sign Min/Max for last 24 hours  Temp  Min: 96.8 °F (36 °C)  Max: 97.6 °F (36.4 °C)   BP  Min: 119/52  Max: 161/87   Pulse  Min: 71  Max: 96   Resp  Min: 18  Max: 24   No data recorded   No data recorded   No data recorded     Flowsheet Rows      Flowsheet Row First Filed Value   Admission Height 185.4 cm (73\") Documented at 05/28/2024 1450   Admission Weight 97.5 kg (215 lb) Documented at 05/28/2024 1450            Telemetry: Sinus rhythm      Intake/Output Summary (Last 24 hours) at 5/31/2024 0923  Last data filed at 5/31/2024 0534  Gross per 24 hour   Intake --   Output 1250 ml   Net -1250 ml     Intake & Output (last 3 days)         05/28 0701  05/29 0700 05/29 0701  05/30 0700 05/30 0701  05/31 0700 05/31 0701  06/01 0700    P.O.  300      I.V. (mL/kg)  233.1 (2.4)      IV Piggyback 2000       Total Intake(mL/kg) 2000 (20.5) 533.1 (5.5)      Urine (mL/kg/hr)  650 (0.3) 1250 (0.5)     Total Output  650 1250     Net +2000 -117 -1250             Urine Unmeasured Occurrence 1 x 1 x      Stool Unmeasured Occurrence 1 x                Physical Exam:  Constitutional:       Appearance: Not in distress.   Cardiovascular:      Regular rhythm.      Murmurs: There is no murmur.   Neurological:      Comments: Not oriented          LABS/DIAGNOSTIC DATA:  Results from last 7 days   Lab Units 05/30/24  0341 05/29/24  0144 05/28/24  1455   WBC 10*3/mm3 " 11.57* 10.73 12.09*   HEMOGLOBIN g/dL 13.2 12.7* 13.6   HEMATOCRIT % 40.2 37.6 39.8   PLATELETS 10*3/mm3 311 268 290     Lab Results   Lab Value Date/Time    TROPONINT 374 (C) 05/28/2024 1802    TROPONINT 395 (C) 05/28/2024 1455    TROPONINT 25 (H) 05/11/2024 2141    TROPONINT 22 (H) 04/18/2024 2356    TROPONINT 15 03/27/2024 1422    TROPONINT 45 (H) 02/25/2024 0035    TROPONINT 55 (C) 02/24/2024 2221     Results from last 7 days   Lab Units 05/28/24  1840   INR  1.36*   APTT seconds 31.7*     Results from last 7 days   Lab Units 05/30/24  0341 05/29/24  0144 05/28/24  1455   SODIUM mmol/L 144 143 134*   POTASSIUM mmol/L 4.5 4.0 4.7   CHLORIDE mmol/L 107 110* 101   CO2 mmol/L 26.0 22.0 21.0*   BUN mg/dL 24* 40* 41*   CREATININE mg/dL 0.83 1.01 1.26   CALCIUM mg/dL 8.8 8.8 8.8   BILIRUBIN mg/dL 0.6  --  0.7   ALK PHOS U/L 122*  --  126*   ALT (SGPT) U/L 59*  --  34   AST (SGOT) U/L 66*  --  39   GLUCOSE mg/dL 119* 262* 495*         Results from last 7 days   Lab Units 05/30/24  0341   CHOLESTEROL mg/dL 179   TRIGLYCERIDES mg/dL 105   HDL CHOL mg/dL 34*   LDL CHOL mg/dL 126*               Medication Review:   amLODIPine, 10 mg, Oral, Q24H  aspirin, 81 mg, Oral, Daily  clopidogrel, 75 mg, Oral, Daily  donepezil, 10 mg, Oral, BID  folic acid, 1 mg, Oral, Daily  insulin glargine, 15 Units, Subcutaneous, Q12H  insulin lispro, 2-9 Units, Subcutaneous, 4x Daily AC & at Bedtime  lisinopril, 20 mg, Oral, Daily  melatonin, 5 mg, Oral, Nightly  pharmacy consult - MTM, , Does not apply, Daily  PHENobarbital, 32.4 mg, Oral, TID  rosuvastatin, 20 mg, Oral, Nightly  senna-docusate sodium, 2 tablet, Oral, BID  sertraline, 50 mg, Oral, Daily  sodium chloride, 10 mL, Intravenous, Q12H  temazepam, 15 mg, Oral, Nightly               NSTEMI (non-ST elevated myocardial infarction)    Benign essential HTN    Benign prostatic hyperplasia    Type 2 diabetes mellitus with hyperglycemia, with long-term current use of insulin     Hyperlipidemia    Peripheral vascular disease    Arteriosclerosis of coronary artery    Dementia with behavioral disturbance    Assessment:    1.  Advanced dementia and altered mental status  2.  History of CABG with non-STEMI  3.  Diabetes  4.  Hypertension  5.  Advanced bilateral peripheral vascular disease         Plan:      Continue medical management of CAD/non-STEMI with aspirin and Plavix  Patient has completed 48 hours of heparin will discontinue today.  Crestor 20 mg daily started this admission.    At this time feel echocardiogram can be deferred due to plan for medical management, patient's advanced dementia and altered mental status  Palliative care has met with patient's family.  He does have a DNR in place.  Home antihypertensives have been resumed.  Amlodipine and lisinopril.  Will add bisoprolol 5 mg daily for beta-blocker therapy.       We will sign off at this time.  Please call with additional questions or concerns.  Discussed with patient's wife      Jerrod Duong MD [facility]  05/31/24

## 2024-05-31 NOTE — PLAN OF CARE
"Goal Outcome Evaluation:  Plan of Care Reviewed With: patient        Progress: no change  Outcome Evaluation: Palliative RN saw pt 5/31 at 1140.  Pt. was sitting up in bed alert, awake and pleasantly confused.  He did not demonstrate any visible signs of distress.  Pt. smiled pleasantly and nodded \"yes\" when asked if he was feeling comfortable.  Wife relayed that pt had eaten chicken minis this am without difficulty and that orders were to be changed to regular diet without any ground consistencies.  Checked orders and that had not been changed just yet.  Talked to RN who was going to check on as he was aware of dietary change.  Plan for pt to discharge to facility with palliative support.  Palliative care to continue to follow for support, POC and ongoing GOC.       1300 Palliative IDT meeting: ZAHRA Ching RN, CHPN; AQUILINO Bai, APRN;KIERA Rogers MD.; PEPITO Wilson RN, CHPN; GLENN Cervantes, [facility], [facility]-    After hours, weekends and holidays, contact Palliative Provider by calling [phone].                          "

## 2024-05-31 NOTE — PLAN OF CARE
Goal Outcome Evaluation:  Plan of Care Reviewed With: patient                  Treatment Assessment (SLP): toleration of diet (05/31/24 1115)  Treatment Assessment Comments (SLP): spouse reports pt not liking ground solids. She gave him a chicken mini for breakfast and no issues. (05/31/24 1115)  Plan for Continued Treatment (SLP): treatment no longer indicated as all goals met, other (see comments) (advance to regular solids) (05/31/24 1115)

## 2024-06-01 LAB
ANION GAP SERPL CALCULATED.3IONS-SCNC: 12 MMOL/L (ref 5–15)
BACTERIA SPEC AEROBE CULT: ABNORMAL
BASOPHILS # BLD AUTO: 0.08 10*3/MM3 (ref 0–0.2)
BASOPHILS NFR BLD AUTO: 0.8 % (ref 0–1.5)
BUN SERPL-MCNC: 11 MG/DL (ref 8–23)
BUN/CREAT SERPL: 14.5 (ref 7–25)
CALCIUM SPEC-SCNC: 8.6 MG/DL (ref 8.6–10.5)
CHLORIDE SERPL-SCNC: 99 MMOL/L (ref 98–107)
CO2 SERPL-SCNC: 25 MMOL/L (ref 22–29)
CREAT SERPL-MCNC: 0.76 MG/DL (ref 0.76–1.27)
DEPRECATED RDW RBC AUTO: 38.8 FL (ref 37–54)
EGFRCR SERPLBLD CKD-EPI 2021: 98.5 ML/MIN/1.73
EOSINOPHIL # BLD AUTO: 0.33 10*3/MM3 (ref 0–0.4)
EOSINOPHIL NFR BLD AUTO: 3.5 % (ref 0.3–6.2)
ERYTHROCYTE [DISTWIDTH] IN BLOOD BY AUTOMATED COUNT: 11.9 % (ref 12.3–15.4)
GLUCOSE BLDC GLUCOMTR-MCNC: 175 MG/DL (ref 70–130)
GLUCOSE BLDC GLUCOMTR-MCNC: 211 MG/DL (ref 70–130)
GLUCOSE BLDC GLUCOMTR-MCNC: 271 MG/DL (ref 70–130)
GLUCOSE BLDC GLUCOMTR-MCNC: 281 MG/DL (ref 70–130)
GLUCOSE SERPL-MCNC: 202 MG/DL (ref 65–99)
GRAM STN SPEC: ABNORMAL
HCT VFR BLD AUTO: 41.7 % (ref 37.5–51)
HGB BLD-MCNC: 14.1 G/DL (ref 13–17.7)
IMM GRANULOCYTES # BLD AUTO: 0.07 10*3/MM3 (ref 0–0.05)
IMM GRANULOCYTES NFR BLD AUTO: 0.7 % (ref 0–0.5)
ISOLATED FROM: ABNORMAL
LYMPHOCYTES # BLD AUTO: 2.81 10*3/MM3 (ref 0.7–3.1)
LYMPHOCYTES NFR BLD AUTO: 29.6 % (ref 19.6–45.3)
MCH RBC QN AUTO: 30.6 PG (ref 26.6–33)
MCHC RBC AUTO-ENTMCNC: 33.8 G/DL (ref 31.5–35.7)
MCV RBC AUTO: 90.5 FL (ref 79–97)
MONOCYTES # BLD AUTO: 0.87 10*3/MM3 (ref 0.1–0.9)
MONOCYTES NFR BLD AUTO: 9.2 % (ref 5–12)
NEUTROPHILS NFR BLD AUTO: 5.33 10*3/MM3 (ref 1.7–7)
NEUTROPHILS NFR BLD AUTO: 56.2 % (ref 42.7–76)
NRBC BLD AUTO-RTO: 0 /100 WBC (ref 0–0.2)
PLATELET # BLD AUTO: 380 10*3/MM3 (ref 140–450)
PMV BLD AUTO: 10.5 FL (ref 6–12)
POTASSIUM SERPL-SCNC: 4.4 MMOL/L (ref 3.5–5.2)
RBC # BLD AUTO: 4.61 10*6/MM3 (ref 4.14–5.8)
SODIUM SERPL-SCNC: 136 MMOL/L (ref 136–145)
WBC NRBC COR # BLD AUTO: 9.49 10*3/MM3 (ref 3.4–10.8)

## 2024-06-01 PROCEDURE — 63710000001 INSULIN GLARGINE PER 5 UNITS: Performed by: INTERNAL MEDICINE

## 2024-06-01 PROCEDURE — 85025 COMPLETE CBC W/AUTO DIFF WBC: CPT | Performed by: INTERNAL MEDICINE

## 2024-06-01 PROCEDURE — 82948 REAGENT STRIP/BLOOD GLUCOSE: CPT

## 2024-06-01 PROCEDURE — 63710000001 INSULIN LISPRO (HUMAN) PER 5 UNITS: Performed by: INTERNAL MEDICINE

## 2024-06-01 PROCEDURE — 99232 SBSQ HOSP IP/OBS MODERATE 35: CPT | Performed by: STUDENT IN AN ORGANIZED HEALTH CARE EDUCATION/TRAINING PROGRAM

## 2024-06-01 PROCEDURE — 80048 BASIC METABOLIC PNL TOTAL CA: CPT | Performed by: STUDENT IN AN ORGANIZED HEALTH CARE EDUCATION/TRAINING PROGRAM

## 2024-06-01 RX ADMIN — INSULIN GLARGINE 15 UNITS: 100 INJECTION, SOLUTION SUBCUTANEOUS at 22:06

## 2024-06-01 RX ADMIN — TEMAZEPAM 15 MG: 15 CAPSULE ORAL at 22:06

## 2024-06-01 RX ADMIN — AMLODIPINE BESYLATE 10 MG: 10 TABLET ORAL at 09:40

## 2024-06-01 RX ADMIN — SERTRALINE HYDROCHLORIDE 50 MG: 50 TABLET ORAL at 09:40

## 2024-06-01 RX ADMIN — BISOPROLOL FUMARATE 5 MG: 5 TABLET ORAL at 09:40

## 2024-06-01 RX ADMIN — INSULIN LISPRO 2 UNITS: 100 INJECTION, SOLUTION INTRAVENOUS; SUBCUTANEOUS at 22:56

## 2024-06-01 RX ADMIN — PHENOBARBITAL 32.4 MG: 64.8 TABLET ORAL at 22:06

## 2024-06-01 RX ADMIN — LISINOPRIL 20 MG: 20 TABLET ORAL at 09:40

## 2024-06-01 RX ADMIN — FOLIC ACID 1 MG: 1 TABLET ORAL at 09:40

## 2024-06-01 RX ADMIN — ROSUVASTATIN CALCIUM 20 MG: 20 TABLET, COATED ORAL at 22:06

## 2024-06-01 RX ADMIN — CLOPIDOGREL BISULFATE 75 MG: 75 TABLET ORAL at 09:40

## 2024-06-01 RX ADMIN — SENNOSIDES AND DOCUSATE SODIUM 2 TABLET: 8.6; 5 TABLET ORAL at 22:06

## 2024-06-01 RX ADMIN — DONEPEZIL HYDROCHLORIDE 10 MG: 10 TABLET, FILM COATED ORAL at 22:06

## 2024-06-01 RX ADMIN — Medication 5 MG: at 22:06

## 2024-06-01 RX ADMIN — Medication 10 ML: at 09:43

## 2024-06-01 RX ADMIN — INSULIN LISPRO 4 UNITS: 100 INJECTION, SOLUTION INTRAVENOUS; SUBCUTANEOUS at 09:41

## 2024-06-01 RX ADMIN — PHENOBARBITAL 32.4 MG: 64.8 TABLET ORAL at 09:40

## 2024-06-01 RX ADMIN — INSULIN LISPRO 6 UNITS: 100 INJECTION, SOLUTION INTRAVENOUS; SUBCUTANEOUS at 18:16

## 2024-06-01 RX ADMIN — INSULIN GLARGINE 15 UNITS: 100 INJECTION, SOLUTION SUBCUTANEOUS at 09:41

## 2024-06-01 RX ADMIN — Medication 10 ML: at 22:06

## 2024-06-01 RX ADMIN — ASPIRIN 81 MG: 81 TABLET, CHEWABLE ORAL at 09:40

## 2024-06-01 RX ADMIN — INSULIN LISPRO 6 UNITS: 100 INJECTION, SOLUTION INTRAVENOUS; SUBCUTANEOUS at 11:30

## 2024-06-01 RX ADMIN — PHENOBARBITAL 32.4 MG: 64.8 TABLET ORAL at 18:11

## 2024-06-01 RX ADMIN — DONEPEZIL HYDROCHLORIDE 10 MG: 10 TABLET, FILM COATED ORAL at 09:40

## 2024-06-01 NOTE — PROGRESS NOTES
[facility] Medicine Services  PROGRESS NOTE    Patient Name: Too Tai  : 1955  MRN: [id number]    Date of Admission: 2024  Primary Care Physician: Des Geller MD    Subjective   Subjective     CC:  Follow-up NSTEMI    HPI:  More alert today.  Slept well overnight.  Has been calm.  Ate breakfast.  Patient's son at bedside.  No nursing concerns.    Objective   Objective     Vital Signs:   Temp:  [97.5 °F (36.4 °C)-98.3 °F (36.8 °C)] 98.3 °F (36.8 °C)  Heart Rate:  [79-89] 87  Resp:  [16-18] 18  BP: (148-178)/(70-95) 178/89     Physical Exam:  Constitutional: No acute distress, sitting up in bed, overweight  HENT: NCAT, mucous membranes moist  Respiratory: Clear to auscultation bilaterally, respiratory effort normal   Cardiovascular: RRR, no murmurs, rubs, or gallops  Gastrointestinal: Positive bowel sounds, soft, nontender, nondistended  Musculoskeletal: No bilateral ankle edema  Psychiatric: Calm  Neurologic: Does not follow commands, symmetric facies, alert, looks around the room  Skin: No rashes on exposed skin      Results Reviewed:  LAB RESULTS:      Lab 24  0504 24  0504 24  1946 24  1016 24  0341 24  2112 24  0855 24  0144 24  2236 24  1840 24  1840 24  1802 24  1455   WBC 9.49  --   --   --  11.57*  --   --  10.73  --   --   --   --  12.09*   HEMOGLOBIN 14.1  --   --   --  13.2  --   --  12.7*  --   --   --   --  13.6   HEMATOCRIT 41.7  --   --   --  40.2  --   --  37.6  --   --   --   --  39.8   PLATELETS 380  --   --   --  311  --   --  268  --   --   --   --  290   NEUTROS ABS 5.33  --   --   --   --   --   --  6.04  --   --   --   --  8.07*   IMMATURE GRANS (ABS) 0.07*  --   --   --   --   --   --  0.07*  --   --   --   --  0.10*   LYMPHS ABS 2.81  --   --   --   --   --   --  3.44*  --   --   --   --  2.56   MONOS ABS 0.87  --   --   --   --   --   --  0.96*  --   --   --    --  1.25*   EOS ABS 0.33  --   --   --   --   --   --  0.14  --   --   --   --  0.03   MCV 90.5  --   --   --  92.8  --   --  91.7  --   --   --   --  89.8   PROCALCITONIN  --   --   --   --   --   --   --   --   --   --   --   --  0.25   LACTATE  --   --   --   --   --   --   --  1.7 2.2*  --   --  2.2* 2.1*   PROTIME  --   --   --   --   --   --   --   --   --   --  16.9*  --   --    APTT  --   --   --   --   --   --   --   --   --   --  31.7*  --   --    HEPARIN ANTI-XA  --  0.27* 0.26* 0.29* 0.30 0.22*   < > 0.23*  --    < > 0.10*  --   --     < > = values in this interval not displayed.         Lab 06/01/24  0504 05/30/24  0341 05/29/24  0144 05/28/24  1455   SODIUM 136 144 143 134*   POTASSIUM 4.4 4.5 4.0 4.7   CHLORIDE 99 107 110* 101   CO2 25.0 26.0 22.0 21.0*   ANION GAP 12.0 11.0 11.0 12.0   BUN 11 24* 40* 41*   CREATININE 0.76 0.83 1.01 1.26   EGFR 98.5 95.9 81.5 62.5   GLUCOSE 202* 119* 262* 495*   CALCIUM 8.6 8.8 8.8 8.8   MAGNESIUM  --   --   --  1.6         Lab 05/30/24  0341 05/28/24  1455   TOTAL PROTEIN 6.4 6.4   ALBUMIN 3.1* 3.1*   GLOBULIN 3.3 3.3   ALT (SGPT) 59* 34   AST (SGOT) 66* 39   BILIRUBIN 0.6 0.7   ALK PHOS 122* 126*         Lab 05/28/24  1840 05/28/24  1802 05/28/24  1455   PROBNP  --  6,458.0*  --    HSTROP T  --  374* 395*   PROTIME 16.9*  --   --    INR 1.36*  --   --          Lab 05/30/24  0341   CHOLESTEROL 179   LDL CHOL 126*   HDL CHOL 34*   TRIGLYCERIDES 105             Lab 05/28/24  1520   PH, ARTERIAL 7.477*   PCO2, ARTERIAL 29.8*   PO2 ART 58.1*   FIO2 21   HCO3 ART 22.0   BASE EXCESS ART -0.5*   CARBOXYHEMOGLOBIN 1.2     Brief Urine Lab Results  (Last result in the past 365 days)        Color   Clarity   Blood   Leuk Est   Nitrite   Protein   CREAT   Urine HCG        05/28/24 1554 Yellow   Hazy   Negative   Negative   Negative   Trace                   Microbiology Results Abnormal       Procedure Component Value - Date/Time    Blood Culture - Blood, Wrist, Right  [[id number]]  (Normal) Collected: 05/28/24 1525    Lab Status: Preliminary result Specimen: Blood from Wrist, Right Updated: 05/31/24 1545     Blood Culture No growth at 3 days    Blood Culture - Blood, Hand, Right [[id number]]  (Normal) Collected: 05/29/24 1414    Lab Status: Preliminary result Specimen: Blood from Hand, Right Updated: 05/31/24 1446     Blood Culture No growth at 2 days    COVID PRE-OP / PRE-PROCEDURE SCREENING ORDER (NO ISOLATION) - Swab, Nasopharynx [[id number]]  (Normal) Collected: 05/28/24 1528    Lab Status: Final result Specimen: Swab from Nasopharynx Updated: 05/28/24 1558    Narrative:      The following orders were created for panel order COVID PRE-OP / PRE-PROCEDURE SCREENING ORDER (NO ISOLATION) - Swab, Nasopharynx.  Procedure                               Abnormality         Status                     ---------                               -----------         ------                     COVID-19 and FLU A/B PCR...[[id number]]  Normal              Final result                 Please view results for these tests on the individual orders.    COVID-19 and FLU A/B PCR, 1 HR TAT - Swab, Nasopharynx [[id number]]  (Normal) Collected: 05/28/24 1528    Lab Status: Final result Specimen: Swab from Nasopharynx Updated: 05/28/24 1558     COVID19 Not Detected     Influenza A PCR Not Detected     Influenza B PCR Not Detected    Narrative:      Fact sheet for providers: https://www.fda.gov/media/927949/download    Fact sheet for patients: https://www.fda.gov/media/252162/download    Test performed by PCR.            No radiology results from the last 24 hrs    Results for orders placed during the hospital encounter of 03/27/24    Adult Transthoracic Echo Complete W/ Cont if Necessary Per Protocol    Interpretation Summary    Left ventricular systolic function is normal. Calculated left ventricular EF = 55.1% Normal left ventricular cavity size noted. Left ventricular wall thickness is consistent with mild  concentric hypertrophy. Left ventricular diastolic function is consistent with (grade I) impaired relaxation.    The right ventricular cavity is mildly dilated. Normal right ventricular systolic function noted.    There is calcification of the aortic valve. The aortic valve appears trileaflet. Mild aortic valve regurgitation is present. No aortic valve stenosis is present.    Mitral annular calcification is present. Trace mitral valve regurgitation is present. No significant mitral valve stenosis is present.    The tricuspid valve is structurally normal with no stenosis present. Trace tricuspid valve regurgitation is present. Insufficient TR velocity profile to estimate the right ventricular systolic pressure.    Mild dilation of the sinuses of Valsalva is present. Mild dilation of the ascending aorta is present. Ascending aorta = 4.2 cm      Current medications:  Scheduled Meds:amLODIPine, 10 mg, Oral, Q24H  aspirin, 81 mg, Oral, Daily  bisoprolol, 5 mg, Oral, Q24H  clopidogrel, 75 mg, Oral, Daily  donepezil, 10 mg, Oral, BID  folic acid, 1 mg, Oral, Daily  insulin glargine, 15 Units, Subcutaneous, Q12H  insulin lispro, 2-9 Units, Subcutaneous, 4x Daily AC & at Bedtime  lisinopril, 20 mg, Oral, Daily  melatonin, 5 mg, Oral, Nightly  pharmacy consult - MTM, , Does not apply, Daily  PHENobarbital, 32.4 mg, Oral, TID  rosuvastatin, 20 mg, Oral, Nightly  senna-docusate sodium, 2 tablet, Oral, BID  sertraline, 50 mg, Oral, Daily  sodium chloride, 10 mL, Intravenous, Q12H  temazepam, 15 mg, Oral, Nightly      Continuous Infusions:     PRN Meds:.  acetaminophen **OR** acetaminophen **OR** acetaminophen    senna-docusate sodium **AND** polyethylene glycol **AND** bisacodyl **AND** bisacodyl    Calcium Replacement - Follow Nurse / BPA Driven Protocol    dextrose    dextrose    glucagon (human recombinant)    Magnesium Low Dose Replacement - Follow Nurse / BPA Driven Protocol    Morphine **AND** naloxone    nitroglycerin     Phosphorus Replacement - Follow Nurse / BPA Driven Protocol    Potassium Replacement - Follow Nurse / BPA Driven Protocol    sodium chloride    sodium chloride    sodium chloride    Assessment & Plan   Assessment & Plan     Active Hospital Problems    Diagnosis  POA    **NSTEMI (non-ST elevated myocardial infarction) [I21.4]  Yes    Arteriosclerosis of coronary artery [I25.10]  Yes    Dementia with behavioral disturbance [F03.918]  Yes    Benign essential HTN [I10]  Yes    Benign prostatic hyperplasia [N40.0]  Yes    Hyperlipidemia [E78.5]  Yes    Type 2 diabetes mellitus with hyperglycemia, with long-term current use of insulin [E11.65, Z79.4]  Not Applicable    Peripheral vascular disease [I73.9]  Yes      Resolved Hospital Problems   No resolved problems to display.        Brief Hospital Course to date:  Too Tai is a 68 y.o. male with advanced dementia, CAD s/p remote CABG, DM2, peripheral vascular disease who has been admitted recently with altered mental status and behavioral disturbances.  He had improved, but on 5/27, patient was not acting like himself and appeared to be in pain along with significant hyperglycemia.. Workup revealed significant troponin elevation compared to his recent baseline and some ST changes on EKG.     This patient's problems and plans were partially entered by my partner and updated as appropriate by me 06/01/24.    Dementia with behavioral disturbance  Altered mental status  -CT head obtained stat given patient with AMS and also on heparin drip --> no acute findings   -Was on Phenobarbitol TID at 64.8mg/64.8mg/129.6mg and was over-sedated --> now on phenobarb 32.4mg TID  -Continue donepezil, melatonin, sertraline, reduced dose Restoril  -Patient's wife has questions about depakote - awaiting call back from neuro  -Palliative care consulted  -EEG with no evidence of seizures  -WBC within normal limits, UA not consistent with infection, COVID/flu negative, ABG with  alkalosis  -Suspect AMS related to oversedation; discussed with neurology  -Neurology consulted  -Patient appears to be hospice appropriate given severe dementia; patient's wife has not been amenable to this in the past    Positive blood culture  -Suspected contamination with 2 different staph species; no indwelling hardware    NSTEMI  CAD s/p remote CABG  PAD status post multiple lower extremity stents  -Status post heparin drip x48 hours; medical management of NSTEMI  -Continue ASA, Plavix, Crestor 20 mg daily, beta-blocker added  -Nitroglycerin and IV morphine PRN chest discomfort  -Cardiology consulted     DM2 with hyperglycemia  -Continue lantus, sliding scale insulin, adjust as needed     HTN  -Resumed amlodipine and lisinopril     Medically ready for discharge.  Awaiting repeat eval by his facility on Monday per case management.    Expected Discharge Location and Transportation: back to facility  Expected Discharge   Expected Discharge Date: 6/3/2024; Expected Discharge Time:      DVT prophylaxis:  No DVT prophylaxis order currently exists.         AM-PAC 6 Clicks Score (PT): 8 (05/30/24 2045)    CODE STATUS:   Code Status and Medical Interventions:   Ordered at: 05/28/24 1900     Medical Intervention Limits:    NO intubation (DNI)     Level Of Support Discussed With:    Health Care Surrogate     Code Status (Patient has no pulse and is not breathing):    No CPR (Do Not Attempt to Resuscitate)     Medical Interventions (Patient has pulse or is breathing):    Limited Support       Jessica Ervin MD  06/01/24

## 2024-06-02 LAB
BACTERIA SPEC AEROBE CULT: NORMAL
GLUCOSE BLDC GLUCOMTR-MCNC: 148 MG/DL (ref 70–130)
GLUCOSE BLDC GLUCOMTR-MCNC: 157 MG/DL (ref 70–130)
GLUCOSE BLDC GLUCOMTR-MCNC: 166 MG/DL (ref 70–130)
GLUCOSE BLDC GLUCOMTR-MCNC: 188 MG/DL (ref 70–130)

## 2024-06-02 PROCEDURE — 82948 REAGENT STRIP/BLOOD GLUCOSE: CPT

## 2024-06-02 PROCEDURE — 63710000001 INSULIN GLARGINE PER 5 UNITS: Performed by: INTERNAL MEDICINE

## 2024-06-02 PROCEDURE — 99232 SBSQ HOSP IP/OBS MODERATE 35: CPT | Performed by: STUDENT IN AN ORGANIZED HEALTH CARE EDUCATION/TRAINING PROGRAM

## 2024-06-02 PROCEDURE — 63710000001 INSULIN LISPRO (HUMAN) PER 5 UNITS: Performed by: STUDENT IN AN ORGANIZED HEALTH CARE EDUCATION/TRAINING PROGRAM

## 2024-06-02 PROCEDURE — 63710000001 INSULIN LISPRO (HUMAN) PER 5 UNITS: Performed by: INTERNAL MEDICINE

## 2024-06-02 RX ORDER — INSULIN LISPRO 100 [IU]/ML
3 INJECTION, SOLUTION INTRAVENOUS; SUBCUTANEOUS
Status: DISCONTINUED | OUTPATIENT
Start: 2024-06-02 | End: 2024-06-03 | Stop reason: HOSPADM

## 2024-06-02 RX ADMIN — INSULIN LISPRO 3 UNITS: 100 INJECTION, SOLUTION INTRAVENOUS; SUBCUTANEOUS at 17:47

## 2024-06-02 RX ADMIN — DONEPEZIL HYDROCHLORIDE 10 MG: 10 TABLET, FILM COATED ORAL at 22:18

## 2024-06-02 RX ADMIN — DONEPEZIL HYDROCHLORIDE 10 MG: 10 TABLET, FILM COATED ORAL at 10:12

## 2024-06-02 RX ADMIN — PHENOBARBITAL 32.4 MG: 64.8 TABLET ORAL at 17:45

## 2024-06-02 RX ADMIN — BISOPROLOL FUMARATE 5 MG: 5 TABLET ORAL at 10:06

## 2024-06-02 RX ADMIN — INSULIN LISPRO 2 UNITS: 100 INJECTION, SOLUTION INTRAVENOUS; SUBCUTANEOUS at 22:48

## 2024-06-02 RX ADMIN — INSULIN LISPRO 6 UNITS: 100 INJECTION, SOLUTION INTRAVENOUS; SUBCUTANEOUS at 17:46

## 2024-06-02 RX ADMIN — SENNOSIDES AND DOCUSATE SODIUM 2 TABLET: 8.6; 5 TABLET ORAL at 22:17

## 2024-06-02 RX ADMIN — INSULIN LISPRO 3 UNITS: 100 INJECTION, SOLUTION INTRAVENOUS; SUBCUTANEOUS at 12:00

## 2024-06-02 RX ADMIN — Medication 5 MG: at 22:17

## 2024-06-02 RX ADMIN — CLOPIDOGREL BISULFATE 75 MG: 75 TABLET ORAL at 10:07

## 2024-06-02 RX ADMIN — SENNOSIDES AND DOCUSATE SODIUM 2 TABLET: 8.6; 5 TABLET ORAL at 10:07

## 2024-06-02 RX ADMIN — LISINOPRIL 20 MG: 20 TABLET ORAL at 10:07

## 2024-06-02 RX ADMIN — PHENOBARBITAL 32.4 MG: 64.8 TABLET ORAL at 10:05

## 2024-06-02 RX ADMIN — INSULIN GLARGINE 15 UNITS: 100 INJECTION, SOLUTION SUBCUTANEOUS at 22:48

## 2024-06-02 RX ADMIN — INSULIN GLARGINE 15 UNITS: 100 INJECTION, SOLUTION SUBCUTANEOUS at 10:06

## 2024-06-02 RX ADMIN — ASPIRIN 81 MG: 81 TABLET, CHEWABLE ORAL at 10:07

## 2024-06-02 RX ADMIN — PHENOBARBITAL 32.4 MG: 64.8 TABLET ORAL at 22:17

## 2024-06-02 RX ADMIN — AMLODIPINE BESYLATE 10 MG: 10 TABLET ORAL at 10:12

## 2024-06-02 RX ADMIN — FOLIC ACID 1 MG: 1 TABLET ORAL at 10:06

## 2024-06-02 RX ADMIN — INSULIN LISPRO 3 UNITS: 100 INJECTION, SOLUTION INTRAVENOUS; SUBCUTANEOUS at 10:05

## 2024-06-02 RX ADMIN — SERTRALINE HYDROCHLORIDE 50 MG: 50 TABLET ORAL at 10:07

## 2024-06-02 RX ADMIN — ROSUVASTATIN CALCIUM 20 MG: 20 TABLET, COATED ORAL at 22:17

## 2024-06-02 RX ADMIN — INSULIN LISPRO 2 UNITS: 100 INJECTION, SOLUTION INTRAVENOUS; SUBCUTANEOUS at 11:30

## 2024-06-02 RX ADMIN — TEMAZEPAM 15 MG: 15 CAPSULE ORAL at 22:17

## 2024-06-02 NOTE — PROGRESS NOTES
[facility] Medicine Services  PROGRESS NOTE    Patient Name: Too Tai  : 1955  MRN: [id number]    Date of Admission: 2024  Primary Care Physician: Des Geller MD    Subjective   Subjective     CC:  Follow-up NSTEMI    HPI:  No family at bedside.  Patient does not follow commands.  He is calm.  Per nursing, he ate breakfast well and he slept well overnight.  No behavioral concerns per nursing.    Objective   Objective     Vital Signs:   Temp:  [96.4 °F (35.8 °C)-96.9 °F (36.1 °C)] 96.9 °F (36.1 °C)  Heart Rate:  [60-72] 60  Resp:  [18] 18  BP: (130-144)/(53-57) 130/57     Physical Exam:  Constitutional: No acute distress, sitting up in bed, overweight  HENT: NCAT, mucous membranes moist  Respiratory: Clear to auscultation bilaterally, respiratory effort normal   Cardiovascular: RRR, no murmurs  Gastrointestinal: Normoactive bowel sounds, soft, nontender, nondistended  Musculoskeletal: No bilateral ankle edema  Psychiatric: Calm  Neurologic: Does not follow commands, symmetric facies, alert, looks around the room  Skin: No rashes on exposed skin      Results Reviewed:  LAB RESULTS:      Lab 24  0504 24  0504 24  1946 24  1016 24  0341 24  2112 24  0855 24  0144 24  2236 24  1840 24  1840 24  1802 24  1455   WBC 9.49  --   --   --  11.57*  --   --  10.73  --   --   --   --  12.09*   HEMOGLOBIN 14.1  --   --   --  13.2  --   --  12.7*  --   --   --   --  13.6   HEMATOCRIT 41.7  --   --   --  40.2  --   --  37.6  --   --   --   --  39.8   PLATELETS 380  --   --   --  311  --   --  268  --   --   --   --  290   NEUTROS ABS 5.33  --   --   --   --   --   --  6.04  --   --   --   --  8.07*   IMMATURE GRANS (ABS) 0.07*  --   --   --   --   --   --  0.07*  --   --   --   --  0.10*   LYMPHS ABS 2.81  --   --   --   --   --   --  3.44*  --   --   --   --  2.56   MONOS ABS 0.87  --   --   --   --    --   --  0.96*  --   --   --   --  1.25*   EOS ABS 0.33  --   --   --   --   --   --  0.14  --   --   --   --  0.03   MCV 90.5  --   --   --  92.8  --   --  91.7  --   --   --   --  89.8   PROCALCITONIN  --   --   --   --   --   --   --   --   --   --   --   --  0.25   LACTATE  --   --   --   --   --   --   --  1.7 2.2*  --   --  2.2* 2.1*   PROTIME  --   --   --   --   --   --   --   --   --   --  16.9*  --   --    APTT  --   --   --   --   --   --   --   --   --   --  31.7*  --   --    HEPARIN ANTI-XA  --  0.27* 0.26* 0.29* 0.30 0.22*   < > 0.23*  --    < > 0.10*  --   --     < > = values in this interval not displayed.         Lab 06/01/24  0504 05/30/24  0341 05/29/24  0144 05/28/24  1455   SODIUM 136 144 143 134*   POTASSIUM 4.4 4.5 4.0 4.7   CHLORIDE 99 107 110* 101   CO2 25.0 26.0 22.0 21.0*   ANION GAP 12.0 11.0 11.0 12.0   BUN 11 24* 40* 41*   CREATININE 0.76 0.83 1.01 1.26   EGFR 98.5 95.9 81.5 62.5   GLUCOSE 202* 119* 262* 495*   CALCIUM 8.6 8.8 8.8 8.8   MAGNESIUM  --   --   --  1.6         Lab 05/30/24  0341 05/28/24  1455   TOTAL PROTEIN 6.4 6.4   ALBUMIN 3.1* 3.1*   GLOBULIN 3.3 3.3   ALT (SGPT) 59* 34   AST (SGOT) 66* 39   BILIRUBIN 0.6 0.7   ALK PHOS 122* 126*         Lab 05/28/24  1840 05/28/24  1802 05/28/24  1455   PROBNP  --  6,458.0*  --    HSTROP T  --  374* 395*   PROTIME 16.9*  --   --    INR 1.36*  --   --          Lab 05/30/24  0341   CHOLESTEROL 179   LDL CHOL 126*   HDL CHOL 34*   TRIGLYCERIDES 105             Lab 05/28/24  1520   PH, ARTERIAL 7.477*   PCO2, ARTERIAL 29.8*   PO2 ART 58.1*   FIO2 21   HCO3 ART 22.0   BASE EXCESS ART -0.5*   CARBOXYHEMOGLOBIN 1.2     Brief Urine Lab Results  (Last result in the past 365 days)        Color   Clarity   Blood   Leuk Est   Nitrite   Protein   CREAT   Urine HCG        05/28/24 1554 Yellow   Hazy   Negative   Negative   Negative   Trace                   Microbiology Results Abnormal       Procedure Component Value - Date/Time    Blood Culture -  Blood, Wrist, Right [[id number]]  (Normal) Collected: 05/28/24 1525    Lab Status: Preliminary result Specimen: Blood from Wrist, Right Updated: 06/01/24 1545     Blood Culture No growth at 4 days    Blood Culture - Blood, Hand, Right [[id number]]  (Normal) Collected: 05/29/24 1414    Lab Status: Preliminary result Specimen: Blood from Hand, Right Updated: 06/01/24 1500     Blood Culture No growth at 3 days    COVID PRE-OP / PRE-PROCEDURE SCREENING ORDER (NO ISOLATION) - Swab, Nasopharynx [[id number]]  (Normal) Collected: 05/28/24 1528    Lab Status: Final result Specimen: Swab from Nasopharynx Updated: 05/28/24 1558    Narrative:      The following orders were created for panel order COVID PRE-OP / PRE-PROCEDURE SCREENING ORDER (NO ISOLATION) - Swab, Nasopharynx.  Procedure                               Abnormality         Status                     ---------                               -----------         ------                     COVID-19 and FLU A/B PCR...[[id number]]  Normal              Final result                 Please view results for these tests on the individual orders.    COVID-19 and FLU A/B PCR, 1 HR TAT - Swab, Nasopharynx [[id number]]  (Normal) Collected: 05/28/24 1528    Lab Status: Final result Specimen: Swab from Nasopharynx Updated: 05/28/24 1558     COVID19 Not Detected     Influenza A PCR Not Detected     Influenza B PCR Not Detected    Narrative:      Fact sheet for providers: https://www.fda.gov/media/205902/download    Fact sheet for patients: https://www.fda.gov/media/017044/download    Test performed by PCR.            No radiology results from the last 24 hrs    Results for orders placed during the hospital encounter of 03/27/24    Adult Transthoracic Echo Complete W/ Cont if Necessary Per Protocol    Interpretation Summary    Left ventricular systolic function is normal. Calculated left ventricular EF = 55.1% Normal left ventricular cavity size noted. Left ventricular wall thickness is  consistent with mild concentric hypertrophy. Left ventricular diastolic function is consistent with (grade I) impaired relaxation.    The right ventricular cavity is mildly dilated. Normal right ventricular systolic function noted.    There is calcification of the aortic valve. The aortic valve appears trileaflet. Mild aortic valve regurgitation is present. No aortic valve stenosis is present.    Mitral annular calcification is present. Trace mitral valve regurgitation is present. No significant mitral valve stenosis is present.    The tricuspid valve is structurally normal with no stenosis present. Trace tricuspid valve regurgitation is present. Insufficient TR velocity profile to estimate the right ventricular systolic pressure.    Mild dilation of the sinuses of Valsalva is present. Mild dilation of the ascending aorta is present. Ascending aorta = 4.2 cm      Current medications:  Scheduled Meds:amLODIPine, 10 mg, Oral, Q24H  aspirin, 81 mg, Oral, Daily  bisoprolol, 5 mg, Oral, Q24H  clopidogrel, 75 mg, Oral, Daily  donepezil, 10 mg, Oral, BID  folic acid, 1 mg, Oral, Daily  insulin glargine, 15 Units, Subcutaneous, Q12H  insulin lispro, 2-9 Units, Subcutaneous, 4x Daily AC & at Bedtime  lisinopril, 20 mg, Oral, Daily  melatonin, 5 mg, Oral, Nightly  pharmacy consult - MTM, , Does not apply, Daily  PHENobarbital, 32.4 mg, Oral, TID  rosuvastatin, 20 mg, Oral, Nightly  senna-docusate sodium, 2 tablet, Oral, BID  sertraline, 50 mg, Oral, Daily  sodium chloride, 10 mL, Intravenous, Q12H  temazepam, 15 mg, Oral, Nightly      Continuous Infusions:     PRN Meds:.  acetaminophen **OR** acetaminophen **OR** acetaminophen    senna-docusate sodium **AND** polyethylene glycol **AND** bisacodyl **AND** bisacodyl    Calcium Replacement - Follow Nurse / BPA Driven Protocol    dextrose    dextrose    glucagon (human recombinant)    Magnesium Low Dose Replacement - Follow Nurse / BPA Driven Protocol    Morphine **AND**  naloxone    nitroglycerin    Phosphorus Replacement - Follow Nurse / BPA Driven Protocol    Potassium Replacement - Follow Nurse / BPA Driven Protocol    sodium chloride    sodium chloride    sodium chloride    Assessment & Plan   Assessment & Plan     Active Hospital Problems    Diagnosis  POA    **NSTEMI (non-ST elevated myocardial infarction) [I21.4]  Yes    Arteriosclerosis of coronary artery [I25.10]  Yes    Dementia with behavioral disturbance [F03.918]  Yes    Benign essential HTN [I10]  Yes    Benign prostatic hyperplasia [N40.0]  Yes    Hyperlipidemia [E78.5]  Yes    Type 2 diabetes mellitus with hyperglycemia, with long-term current use of insulin [E11.65, Z79.4]  Not Applicable    Peripheral vascular disease [I73.9]  Yes      Resolved Hospital Problems   No resolved problems to display.        Brief Hospital Course to date:  Too Tai is a 68 y.o. male with advanced dementia, CAD s/p remote CABG, DM2, peripheral vascular disease who has been admitted recently with altered mental status and behavioral disturbances.  He had improved, but on 5/27, patient was not acting like himself and appeared to be in pain along with significant hyperglycemia.. Workup revealed significant troponin elevation compared to his recent baseline and some ST changes on EKG.     This patient's problems and plans were partially entered by my partner and updated as appropriate by me 06/02/24.    Dementia with behavioral disturbance  Altered mental status  -CT head obtained stat given patient with AMS and also on heparin drip --> no acute findings   -Was on Phenobarbitol TID at 64.8mg/64.8mg/129.6mg and was over-sedated --> now on phenobarb 32.4mg TID  -Continue donepezil, melatonin, sertraline, reduced dose Restoril  -Palliative care consulted  -EEG with no evidence of seizures  -WBC within normal limits, UA not consistent with infection, COVID/flu negative, ABG with alkalosis  -Suspect AMS related to oversedation; discussed  with neurology  -Neurology consulted  -Patient appears to be hospice appropriate given severe dementia; patient's wife has not been amenable to this in the past    Positive blood culture likely contaminant  -Suspected contamination with 2 different staph species; no indwelling hardware    NSTEMI  CAD s/p remote CABG  PAD status post multiple lower extremity stents  -Status post heparin drip x48 hours; medical management of NSTEMI  -Continue ASA, Plavix, Crestor 20 mg daily, beta-blocker added  -Nitroglycerin and IV morphine PRN chest discomfort  -Cardiology consulted     DM2 with hyperglycemia  -Continue lantus, lispro added, sliding scale insulin, adjust as needed     HTN  -Resumed amlodipine and lisinopril     Medically ready for discharge.  Awaiting repeat eval by his facility on Monday per case management.    Expected Discharge Location and Transportation: back to facility  Expected Discharge   Expected Discharge Date: 6/3/2024; Expected Discharge Time:      DVT prophylaxis:  No DVT prophylaxis order currently exists.         AM-PAC 6 Clicks Score (PT): 8 (06/01/24 2011)    CODE STATUS:   Code Status and Medical Interventions:   Ordered at: 05/28/24 1900     Medical Intervention Limits:    NO intubation (DNI)     Level Of Support Discussed With:    Health Care Surrogate     Code Status (Patient has no pulse and is not breathing):    No CPR (Do Not Attempt to Resuscitate)     Medical Interventions (Patient has pulse or is breathing):    Limited Support       Jessica Ervin MD  06/02/24

## 2024-06-03 VITALS
RESPIRATION RATE: 18 BRPM | BODY MASS INDEX: 28.49 KG/M2 | HEART RATE: 71 BPM | DIASTOLIC BLOOD PRESSURE: 62 MMHG | HEIGHT: 73 IN | SYSTOLIC BLOOD PRESSURE: 132 MMHG | OXYGEN SATURATION: 95 % | TEMPERATURE: 97.1 F | WEIGHT: 215 LBS

## 2024-06-03 LAB
BACTERIA SPEC AEROBE CULT: NORMAL
GLUCOSE BLDC GLUCOMTR-MCNC: 130 MG/DL (ref 70–130)
GLUCOSE BLDC GLUCOMTR-MCNC: 303 MG/DL (ref 70–130)

## 2024-06-03 PROCEDURE — 63710000001 INSULIN LISPRO (HUMAN) PER 5 UNITS: Performed by: STUDENT IN AN ORGANIZED HEALTH CARE EDUCATION/TRAINING PROGRAM

## 2024-06-03 PROCEDURE — 63710000001 INSULIN LISPRO (HUMAN) PER 5 UNITS: Performed by: INTERNAL MEDICINE

## 2024-06-03 PROCEDURE — 63710000001 INSULIN GLARGINE PER 5 UNITS: Performed by: INTERNAL MEDICINE

## 2024-06-03 PROCEDURE — 99239 HOSP IP/OBS DSCHRG MGMT >30: CPT | Performed by: STUDENT IN AN ORGANIZED HEALTH CARE EDUCATION/TRAINING PROGRAM

## 2024-06-03 PROCEDURE — 82948 REAGENT STRIP/BLOOD GLUCOSE: CPT

## 2024-06-03 PROCEDURE — 99232 SBSQ HOSP IP/OBS MODERATE 35: CPT

## 2024-06-03 RX ORDER — ASPIRIN 81 MG/1
81 TABLET, CHEWABLE ORAL DAILY
Qty: 30 TABLET | Refills: 0 | Status: SHIPPED | OUTPATIENT
Start: 2024-06-04

## 2024-06-03 RX ORDER — BISOPROLOL FUMARATE 5 MG/1
5 TABLET, FILM COATED ORAL
Qty: 30 TABLET | Refills: 0 | Status: SHIPPED | OUTPATIENT
Start: 2024-06-04

## 2024-06-03 RX ORDER — DONEPEZIL HYDROCHLORIDE 10 MG/1
10 TABLET, FILM COATED ORAL 2 TIMES DAILY
Qty: 60 TABLET | Refills: 0 | Status: SHIPPED | OUTPATIENT
Start: 2024-06-03

## 2024-06-03 RX ORDER — PHENOBARBITAL 32.4 MG/1
32.4 TABLET ORAL 3 TIMES DAILY
Qty: 9 TABLET | Refills: 0 | Status: SHIPPED | OUTPATIENT
Start: 2024-06-03

## 2024-06-03 RX ORDER — ROSUVASTATIN CALCIUM 20 MG/1
20 TABLET, COATED ORAL NIGHTLY
Qty: 30 TABLET | Refills: 0 | Status: SHIPPED | OUTPATIENT
Start: 2024-06-03

## 2024-06-03 RX ORDER — CLOPIDOGREL BISULFATE 75 MG/1
75 TABLET ORAL DAILY
Qty: 30 TABLET | Refills: 0 | Status: SHIPPED | OUTPATIENT
Start: 2024-06-04

## 2024-06-03 RX ORDER — INSULIN GLARGINE 100 [IU]/ML
20 INJECTION, SOLUTION SUBCUTANEOUS 2 TIMES DAILY
COMMUNITY

## 2024-06-03 RX ORDER — HUMAN INSULIN 100 [IU]/ML
5 INJECTION, SOLUTION SUBCUTANEOUS 3 TIMES DAILY PRN
COMMUNITY

## 2024-06-03 RX ORDER — PHENOBARBITAL 64.8 MG/1
TABLET ORAL
COMMUNITY
End: 2024-06-03 | Stop reason: HOSPADM

## 2024-06-03 RX ADMIN — LISINOPRIL 20 MG: 20 TABLET ORAL at 09:41

## 2024-06-03 RX ADMIN — DONEPEZIL HYDROCHLORIDE 10 MG: 10 TABLET, FILM COATED ORAL at 09:41

## 2024-06-03 RX ADMIN — INSULIN LISPRO 7 UNITS: 100 INJECTION, SOLUTION INTRAVENOUS; SUBCUTANEOUS at 12:57

## 2024-06-03 RX ADMIN — FOLIC ACID 1 MG: 1 TABLET ORAL at 09:41

## 2024-06-03 RX ADMIN — ASPIRIN 81 MG: 81 TABLET, CHEWABLE ORAL at 09:41

## 2024-06-03 RX ADMIN — SERTRALINE HYDROCHLORIDE 50 MG: 50 TABLET ORAL at 09:41

## 2024-06-03 RX ADMIN — INSULIN GLARGINE 15 UNITS: 100 INJECTION, SOLUTION SUBCUTANEOUS at 09:39

## 2024-06-03 RX ADMIN — INSULIN LISPRO 3 UNITS: 100 INJECTION, SOLUTION INTRAVENOUS; SUBCUTANEOUS at 12:57

## 2024-06-03 RX ADMIN — BISOPROLOL FUMARATE 5 MG: 5 TABLET ORAL at 09:41

## 2024-06-03 RX ADMIN — PHENOBARBITAL 32.4 MG: 64.8 TABLET ORAL at 09:41

## 2024-06-03 RX ADMIN — CLOPIDOGREL BISULFATE 75 MG: 75 TABLET ORAL at 09:41

## 2024-06-03 RX ADMIN — AMLODIPINE BESYLATE 10 MG: 10 TABLET ORAL at 09:41

## 2024-06-03 NOTE — PLAN OF CARE
Problem: Adult Inpatient Plan of Care  Goal: Plan of Care Review  Outcome: Adequate for Care Transition  Goal: Patient-Specific Goal (Individualized)  Outcome: Adequate for Care Transition  Goal: Absence of Hospital-Acquired Illness or Injury  Outcome: Adequate for Care Transition  Intervention: Identify and Manage Fall Risk  Recent Flowsheet Documentation  Taken 6/3/2024 1200 by Clarissa López RN  Safety Promotion/Fall Prevention:   activity supervised   assistive device/personal items within reach   clutter free environment maintained   fall prevention program maintained   safety round/check completed  Taken 6/3/2024 0940 by Clarissa López RN  Safety Promotion/Fall Prevention:   activity supervised   assistive device/personal items within reach   clutter free environment maintained   fall prevention program maintained   lighting adjusted   nonskid shoes/slippers when out of bed   safety round/check completed  Taken 6/3/2024 0800 by Clarissa López RN  Safety Promotion/Fall Prevention:   activity supervised   assistive device/personal items within reach   clutter free environment maintained   nonskid shoes/slippers when out of bed   safety round/check completed  Intervention: Prevent Skin Injury  Recent Flowsheet Documentation  Taken 6/3/2024 1200 by Clarissa López RN  Body Position: turned  Skin Protection:   adhesive use limited   incontinence pads utilized   tubing/devices free from skin contact  Taken 6/3/2024 0940 by Clarissa López RN  Body Position: position changed independently  Skin Protection:   adhesive use limited   incontinence pads utilized   tubing/devices free from skin contact  Taken 6/3/2024 0800 by Clarissa López RN  Body Position: position changed independently  Skin Protection:   adhesive use limited   incontinence pads utilized   tubing/devices free from skin contact  Intervention: Prevent and Manage VTE (Venous Thromboembolism) Risk  Recent Flowsheet Documentation  Taken 6/3/2024 1200 by Rene  MANJINDER Romo  Activity Management: activity encouraged  Taken 6/3/2024 0940 by Clarissa López RN  Activity Management: activity encouraged  Taken 6/3/2024 0800 by Clarissa López RN  Activity Management: activity encouraged  Intervention: Prevent Infection  Recent Flowsheet Documentation  Taken 6/3/2024 1200 by Clarissa López RN  Infection Prevention:   environmental surveillance performed   rest/sleep promoted   single patient room provided  Taken 6/3/2024 0940 by Clarissa López RN  Infection Prevention:   environmental surveillance performed   rest/sleep promoted   single patient room provided  Taken 6/3/2024 0800 by Clarissa López RN  Infection Prevention:   environmental surveillance performed   rest/sleep promoted   single patient room provided  Goal: Optimal Comfort and Wellbeing  Outcome: Adequate for Care Transition  Intervention: Provide Person-Centered Care  Recent Flowsheet Documentation  Taken 6/3/2024 1200 by Clarissa López RN  Trust Relationship/Rapport:   care explained   choices provided   questions answered   questions encouraged  Taken 6/3/2024 0940 by Clarissa López RN  Trust Relationship/Rapport:   care explained   choices provided   questions answered  Goal: Readiness for Transition of Care  Outcome: Adequate for Care Transition     Problem: Adjustment to Illness (Acute Coronary Syndrome)  Goal: Optimal Adaptation to Illness  Outcome: Adequate for Care Transition     Problem: Dysrhythmia (Acute Coronary Syndrome)  Goal: Normalized Cardiac Rhythm  Outcome: Adequate for Care Transition     Problem: Cardiac-Related Pain (Acute Coronary Syndrome)  Goal: Absence of Cardiac-Related Pain  Outcome: Adequate for Care Transition     Problem: Hemodynamic Instability (Acute Coronary Syndrome)  Goal: Effective Cardiac Pump Function  Outcome: Adequate for Care Transition  Intervention: Optimize Cardiac Function and Blood Flow  Recent Flowsheet Documentation  Taken 6/3/2024 0940 by Clarissa López RN  Fluid/Electrolyte  Management: fluids provided     Problem: Tissue Perfusion (Acute Coronary Syndrome)  Goal: Adequate Tissue Perfusion  Outcome: Adequate for Care Transition  Intervention: Optimize Cardiac Tissue Perfusion  Recent Flowsheet Documentation  Taken 6/3/2024 1200 by Clarissa López RN  Activity Management: activity encouraged  Taken 6/3/2024 0940 by Clarissa López RN  Activity Management: activity encouraged  Taken 6/3/2024 0800 by Clarissa López RN  Activity Management: activity encouraged     Problem: Skin Injury Risk Increased  Goal: Skin Health and Integrity  Outcome: Adequate for Care Transition  Intervention: Optimize Skin Protection  Recent Flowsheet Documentation  Taken 6/3/2024 1200 by Clarissa López RN  Pressure Reduction Techniques:   weight shift assistance provided   heels elevated off bed  Head of Bed (HOB) Positioning: HOB elevated  Pressure Reduction Devices: pressure-redistributing mattress utilized  Skin Protection:   adhesive use limited   incontinence pads utilized   tubing/devices free from skin contact  Taken 6/3/2024 0940 by Clarissa López RN  Pressure Reduction Techniques:   weight shift assistance provided   heels elevated off bed  Head of Bed (HOB) Positioning: HOB elevated  Pressure Reduction Devices: pressure-redistributing mattress utilized  Skin Protection:   adhesive use limited   incontinence pads utilized   tubing/devices free from skin contact  Taken 6/3/2024 0800 by Clarissa López RN  Pressure Reduction Techniques:   heels elevated off bed   weight shift assistance provided  Head of Bed (HOB) Positioning: HOB elevated  Pressure Reduction Devices:   heel offloading device utilized   pressure-redistributing mattress utilized  Skin Protection:   adhesive use limited   incontinence pads utilized   tubing/devices free from skin contact     Problem: Behavioral Health Comorbidity  Goal: Maintenance of Behavioral Health Symptom Control  Outcome: Adequate for Care Transition  Intervention: Maintain  Behavioral Health Symptom Control  Recent Flowsheet Documentation  Taken 6/3/2024 1200 by Clarissa López RN  Medication Review/Management: medications reviewed  Taken 6/3/2024 0940 by Clarissa López RN  Medication Review/Management: medications reviewed  Taken 6/3/2024 0800 by Clarissa López RN  Medication Review/Management: medications reviewed     Problem: Diabetes Comorbidity  Goal: Blood Glucose Level Within Targeted Range  Outcome: Adequate for Care Transition  Intervention: Monitor and Manage Glycemia  Recent Flowsheet Documentation  Taken 6/3/2024 0800 by Clarissa López RN  Glycemic Management: blood glucose monitored     Problem: Hypertension Comorbidity  Goal: Blood Pressure in Desired Range  Outcome: Adequate for Care Transition  Intervention: Maintain Blood Pressure Management  Recent Flowsheet Documentation  Taken 6/3/2024 1200 by Clarissa López RN  Medication Review/Management: medications reviewed  Taken 6/3/2024 0940 by Clarissa López RN  Medication Review/Management: medications reviewed  Taken 6/3/2024 0800 by Clarissa López RN  Medication Review/Management: medications reviewed     Problem: Pain Chronic (Persistent) (Comorbidity Management)  Goal: Acceptable Pain Control and Functional Ability  Outcome: Adequate for Care Transition  Intervention: Manage Persistent Pain  Recent Flowsheet Documentation  Taken 6/3/2024 1200 by Clarissa López RN  Medication Review/Management: medications reviewed  Taken 6/3/2024 0940 by Clarissa López RN  Medication Review/Management: medications reviewed  Taken 6/3/2024 0800 by Clarissa López RN  Medication Review/Management: medications reviewed     Problem: Fall Injury Risk  Goal: Absence of Fall and Fall-Related Injury  Outcome: Adequate for Care Transition  Intervention: Identify and Manage Contributors  Recent Flowsheet Documentation  Taken 6/3/2024 1200 by Clarissa López RN  Medication Review/Management: medications reviewed  Taken 6/3/2024 0940 by Clarissa López  RN  Medication Review/Management: medications reviewed  Taken 6/3/2024 0800 by Clarissa López RN  Medication Review/Management: medications reviewed  Intervention: Promote Injury-Free Environment  Recent Flowsheet Documentation  Taken 6/3/2024 1200 by Clarissa López RN  Safety Promotion/Fall Prevention:   activity supervised   assistive device/personal items within reach   clutter free environment maintained   fall prevention program maintained   safety round/check completed  Taken 6/3/2024 0940 by Clarissa López RN  Safety Promotion/Fall Prevention:   activity supervised   assistive device/personal items within reach   clutter free environment maintained   fall prevention program maintained   lighting adjusted   nonskid shoes/slippers when out of bed   safety round/check completed  Taken 6/3/2024 0800 by Clarissa López RN  Safety Promotion/Fall Prevention:   activity supervised   assistive device/personal items within reach   clutter free environment maintained   nonskid shoes/slippers when out of bed   safety round/check completed     Problem: Palliative Care  Goal: Enhanced Quality of Life  Outcome: Adequate for Care Transition  Intervention: Optimize Function  Recent Flowsheet Documentation  Taken 6/3/2024 0940 by Clarissa López RN  Sensory Stimulation Regulation:   care clustered   quiet environment promoted     Problem: Behavior Regulation Impairment (Dementia Signs/Symptoms)  Goal: Improved Behavioral Control (Dementia Signs/Symptoms)  Outcome: Adequate for Care Transition     Problem: Cognitive Impairment (Dementia Signs/Symptoms)  Goal: Optimized Cognitive Function (Dementia Signs/Symptoms)  Outcome: Adequate for Care Transition     Problem: Mood Impairment (Dementia Signs/Symptoms)  Goal: Improved Mood Symptoms (Dementia Signs/Symptoms)  Outcome: Adequate for Care Transition     Problem: Nutrition Imbalance (Dementia Signs/Symptoms)  Goal: Optimized Nutrition Intake (Dementia Signs/Symptoms)  Outcome: Adequate  for Care Transition     Problem: Sleep Disturbance (Dementia Signs/Symptoms)  Goal: Improved Sleep (Dementia Signs/Symptoms)  Outcome: Adequate for Care Transition     Problem: Social or Functional Impairment (Dementia Signs/Symptoms)  Goal: Enhanced Social or Functional Skills and Ability (Dementia Signs/Symptoms)  Outcome: Adequate for Care Transition  Intervention: Promote Social and Functional Ability  Recent Flowsheet Documentation  Taken 6/3/2024 1200 by Clarissa López, RN  Trust Relationship/Rapport:   care explained   choices provided   questions answered   questions encouraged  Taken 6/3/2024 0940 by Clarissa López, RN  Trust Relationship/Rapport:   care explained   choices provided   questions answered   Goal Outcome Evaluation:

## 2024-06-03 NOTE — CASE MANAGEMENT/SOCIAL WORK
Case Management Discharge Note      Final Note: Per Fannie(DON at [facility]), the facility can accept pt back today. Discharge summary faxed to [facility] at [phone]. Pt's primary nurse can call report to  [phone] and send a copy of the discharge summary with pt. Any controlled meds will be escribed to the facility's pharmacy. Transportation arranged with [facility] to transport pt today [facility] today(Monday, 6/3) at 1500. I spoke with pt's spouse in room and she is agreeable to discharge plan.  PCS form in drop box           Selected Continued Care - Admitted Since 5/28/2024       Destination    No services have been selected for the patient.                Durable Medical Equipment    No services have been selected for the patient.                Dialysis/Infusion    No services have been selected for the patient.                Home Medical Care    No services have been selected for the patient.                Therapy    No services have been selected for the patient.                Community Resources    No services have been selected for the patient.                Community & DME    No services have been selected for the patient.                    Selected Continued Care - Prior Encounters Includes continued care and service providers with selected services from prior encounters from 2/28/2024 to 6/3/2024      Discharged on 5/9/2024 Admission date: 4/18/2024 - Discharge disposition: Home or Self Care      Destination       Service Provider Selected Services Address Phone Fax Patient Preferred    [facility] [address] , [facility] [address] [phone] --       Internal Comment last updated by Ilene Brown, RN 5/9/2024 1157    [facility]   [address]  [phone]  [phone]                         Durable Medical Equipment       Service Provider Selected Services Address Phone Fax Patient Preferred     [facility] [address] [place] [address] [id number] [phone] --       Internal Comment last updated by Ilene Brown, RN 5/9/2024 1239    Hospital Bed & WC                                 Discharged on 4/4/2024 Admission date: 3/27/2024 - Discharge disposition: Home-Health Care [facility]      Home Medical Care       Service Provider Selected Services Address Phone Fax Patient Preferred     [facility] ,  Home Rehabilitation [address] [facility] [address] [phone] --                      Discharged on 3/4/2024 Admission date: 2/24/2024 - Discharge disposition: Skilled Nursing Facility (DC - External)      Destination       Service Provider Selected Services Address Phone Fax Patient Preferred    [facility] [address] [facility] [address] [phone] --                          Transportation Services  Ambulance: [facility]    Final Discharge Disposition Code: 01 - home or self-care

## 2024-06-03 NOTE — DISCHARGE PLACEMENT REQUEST
"Too Richter (68 y.o. Male)     To [facility]  From Danita(CM at [facility]) [phone]      Date of Birth   1955    Social Security Number       Address   [address] [place] [address]    Home Phone   [phone]    MRN   [id number]       Jehovah's witness   Yazidism    Marital Status                               Admission Date   5/28/24    Admission Type   Emergency    Admitting Provider   Jessica Ervin MD    Attending Provider   Jessica Ervin MD    Department, Room/Bed   [facility] 6A, N613/1       Discharge Date       Discharge Disposition   Long Term Care (DC - External)    Discharge Destination                                 Attending Provider: Jessica Ervin MD    Allergies: Bactrim [Sulfamethoxazole-trimethoprim], Bacitra-neomycin-polymyxin-hc, Adhesive Tape, Neosporin [Neomycin-bacitracin Zn-polymyx]    Isolation: None   Infection: None   Code Status: No CPR    Ht: 185.4 cm (73\")   Wt: 97.5 kg (215 lb)    Admission Cmt: None   Principal Problem: NSTEMI (non-ST elevated myocardial infarction) [I21.4]                   Active Insurance as of 5/28/2024       Primary Coverage       Payor Plan Insurance Group Employer/Plan Group    AETNA MEDICARE REPLACEMENT AETNA MED ADV PPO [id number]       Payor Plan Address Payor Plan Phone Number Payor Plan Fax Number Effective Dates    [address] [phone]  1/1/2024 - None Entered    [facility] [id number]         Subscriber Name Subscriber Birth Date Member ID       TOO RICHTER 1955 [id number]                     Emergency Contacts        (Rel.) Home Phone Work Phone Mobile Phone    WILLIE RICHTER (Spouse) [phone] -- [phone]    Ritchie Richter (Son) [phone] -- [phone]    Meg Bustillo (Relative) [phone] -- [phone]                 Discharge Summary        Jessica Ervin MD at 06/03/24 1339              [facility] Medicine Services  DISCHARGE SUMMARY    Patient Name: " Too Tai  : 1955  MRN: [id number]    Date of Admission: 2024  2:45 PM  Date of Discharge: 6/3/2024  Primary Care Physician: Des Geller MD    Consults       Date and Time Order Name Status Description    2024 10:30 AM Inpatient Neurology Consult General Completed     2024  9:59 PM Inpatient Cardiology Consult Completed     2024  8:43 PM Inpatient Palliative Care MD Consult Completed     2024  8:39 PM Inpatient Cardiology Consult Completed     2024  7:28 AM Inpatient Neurology Consult General Completed     2024  2:30 PM Inpatient Palliative Care MD Consult Completed     2024  5:42 AM Inpatient Neurology Consult General Completed             Hospital Course     Presenting Problem: NSTEMI    Active Hospital Problems    Diagnosis  POA    **NSTEMI (non-ST elevated myocardial infarction) [I21.4]  Yes    Arteriosclerosis of coronary artery [I25.10]  Yes    Dementia with behavioral disturbance [F03.918]  Yes    Benign essential HTN [I10]  Yes    Benign prostatic hyperplasia [N40.0]  Yes    Hyperlipidemia [E78.5]  Yes    Type 2 diabetes mellitus with hyperglycemia, with long-term current use of insulin [E11.65, Z79.4]  Not Applicable    Peripheral vascular disease [I73.9]  Yes      Resolved Hospital Problems   No resolved problems to display.          Hospital Course:  Too Tai is a 68 y.o. male with advanced dementia, CAD s/p remote CABG, DM2, peripheral vascular disease who has been admitted recently with altered mental status and behavioral disturbances.  He had improved, but on , patient was not acting like himself and appeared to be in pain along with significant hyperglycemia.. Workup revealed significant troponin elevation compared to his recent baseline.    This patient's problems and plans were partially entered by my partner and updated as appropriate by me 24.     Dementia with behavioral disturbance  Altered mental status  -CT head  obtained stat given patient with AMS and also on heparin drip --> no acute findings   -Was on Phenobarbitol TID at 64.8mg/64.8mg/129.6mg and was over-sedated --> now on phenobarb 32.4mg TID and will continue this reduced dosing on discharge per discussion with MULUGETA Kahn  -Recheck phenobarbital level in 2 to 4 weeks at facility  -Continue donepezil, melatonin, sertraline, Restoril  -Palliative care consulted  -EEG with no evidence of seizures  -WBC within normal limits, UA not consistent with infection, COVID/flu negative, ABG with alkalosis  -Suspect AMS related to oversedation; discussed with neurology  -Neurology consulted  -Patient appears to be hospice appropriate given severe dementia; patient's wife has not been amenable to this in the past; High risk for readmissions given advanced dementia     Positive blood culture likely contaminant  -Suspected contamination with 2 different staph species     NSTEMI  CAD s/p remote CABG  PAD status post multiple lower extremity stents  -Status post heparin drip x48 hours; medical management of NSTEMI  -Continue ASA, Plavix, Crestor 20 mg daily, beta-blocker added  -Cardiology consulted     DM2 with hyperglycemia  -Continue home regimen on discharge    HTN  -Resumed amlodipine and lisinopril      Medically ready for discharge.  Case management arranged transport.      Discharge Follow Up Recommendations for outpatient labs/diagnostics:  Follow-up with repeat phenobarbital level in 2 to 4 weeks at facility  Follow-up with IVONNE Eckert September 20, 2024 as scheduled  Follow-up with cardiology in 2 months  Follow-up with facility doctor upon arrival to facility    Day of Discharge     HPI:   Patient fed himself today.  No agitation.  Does not answer questions.  No nursing concerns.    Review of Systems  Able to obtain given advanced dementia    Vital Signs:   Temp:  [96.5 °F (35.8 °C)-98.4 °F (36.9 °C)] 97.1 °F (36.2 °C)  Heart Rate:  [60-71] 71  Resp:  [18] 18  BP:  (126-159)/(44-83) 132/62      Physical Exam:  Constitutional: No acute distress, laying in bed, overweight  HENT: NCAT, mucous membranes moist  Respiratory: Clear to auscultation bilaterally, respiratory effort normal   Cardiovascular: RRR, no murmurs  Gastrointestinal: Normoactive bowel sounds, soft, nontender, nondistended  Musculoskeletal: No bilateral ankle edema  Psychiatric: Calm  Neurologic: Does not follow commands, symmetric facies  Skin: No rashes on exposed skin    Pertinent  and/or Most Recent Results     LAB RESULTS:      Lab 06/01/24  0504 05/31/24  0504 05/30/24  1946 05/30/24  1016 05/30/24  0341 05/29/24  2112 05/29/24  0855 05/29/24  0144 05/28/24  2236 05/28/24  1840 05/28/24  1840 05/28/24  1802 05/28/24  1455   WBC 9.49  --   --   --  11.57*  --   --  10.73  --   --   --   --  12.09*   HEMOGLOBIN 14.1  --   --   --  13.2  --   --  12.7*  --   --   --   --  13.6   HEMATOCRIT 41.7  --   --   --  40.2  --   --  37.6  --   --   --   --  39.8   PLATELETS 380  --   --   --  311  --   --  268  --   --   --   --  290   NEUTROS ABS 5.33  --   --   --   --   --   --  6.04  --   --   --   --  8.07*   IMMATURE GRANS (ABS) 0.07*  --   --   --   --   --   --  0.07*  --   --   --   --  0.10*   LYMPHS ABS 2.81  --   --   --   --   --   --  3.44*  --   --   --   --  2.56   MONOS ABS 0.87  --   --   --   --   --   --  0.96*  --   --   --   --  1.25*   EOS ABS 0.33  --   --   --   --   --   --  0.14  --   --   --   --  0.03   MCV 90.5  --   --   --  92.8  --   --  91.7  --   --   --   --  89.8   PROCALCITONIN  --   --   --   --   --   --   --   --   --   --   --   --  0.25   LACTATE  --   --   --   --   --   --   --  1.7 2.2*  --   --  2.2* 2.1*   PROTIME  --   --   --   --   --   --   --   --   --   --  16.9*  --   --    APTT  --   --   --   --   --   --   --   --   --   --  31.7*  --   --    HEPARIN ANTI-XA  --  0.27* 0.26* 0.29* 0.30 0.22*   < > 0.23*  --    < > 0.10*  --   --     < > = values in this interval  not displayed.         Lab 06/01/24  0504 05/30/24  0341 05/29/24  0144 05/28/24  1455   SODIUM 136 144 143 134*   POTASSIUM 4.4 4.5 4.0 4.7   CHLORIDE 99 107 110* 101   CO2 25.0 26.0 22.0 21.0*   ANION GAP 12.0 11.0 11.0 12.0   BUN 11 24* 40* 41*   CREATININE 0.76 0.83 1.01 1.26   EGFR 98.5 95.9 81.5 62.5   GLUCOSE 202* 119* 262* 495*   CALCIUM 8.6 8.8 8.8 8.8   MAGNESIUM  --   --   --  1.6         Lab 05/30/24  0341 05/28/24  1455   TOTAL PROTEIN 6.4 6.4   ALBUMIN 3.1* 3.1*   GLOBULIN 3.3 3.3   ALT (SGPT) 59* 34   AST (SGOT) 66* 39   BILIRUBIN 0.6 0.7   ALK PHOS 122* 126*         Lab 05/28/24  1840 05/28/24  1802 05/28/24  1455   PROBNP  --  6,458.0*  --    HSTROP T  --  374* 395*   PROTIME 16.9*  --   --    INR 1.36*  --   --          Lab 05/30/24  0341   CHOLESTEROL 179   LDL CHOL 126*   HDL CHOL 34*   TRIGLYCERIDES 105             Lab 05/28/24  1520   PH, ARTERIAL 7.477*   PCO2, ARTERIAL 29.8*   PO2 ART 58.1*   FIO2 21   HCO3 ART 22.0   BASE EXCESS ART -0.5*   CARBOXYHEMOGLOBIN 1.2     Brief Urine Lab Results  (Last result in the past 365 days)        Color   Clarity   Blood   Leuk Est   Nitrite   Protein   CREAT   Urine HCG        05/28/24 1554 Yellow   Hazy   Negative   Negative   Negative   Trace                 Microbiology Results (last 10 days)       Procedure Component Value - Date/Time    Blood Culture - Blood, Arm, Left [[id number]]  (Abnormal) Collected: 05/29/24 1414    Lab Status: Final result Specimen: Blood from Arm, Left Updated: 06/01/24 0630     Blood Culture Staphylococcus epidermidis     Isolated from --     Gram Stain Anaerobic Bottle Gram positive cocci in clusters    Narrative:      [facility] pharmacist wants ID    Blood Culture - Blood, Hand, Right [[id number]]  (Normal) Collected: 05/29/24 1414    Lab Status: Preliminary result Specimen: Blood from Hand, Right Updated: 06/02/24 1500     Blood Culture No growth at 4 days    Blood Culture - Blood, Arm, Right [[id number]]  (Abnormal)   (Susceptibility) Collected: 05/28/24 1552    Lab Status: Final result Specimen: Blood from Arm, Right Updated: 06/01/24 0630     Blood Culture Staphylococcus capitis     Isolated from Aerobic Bottle     Blood Culture Staphylococcus epidermidis     Isolated from --     Gram Stain Aerobic Bottle Gram positive cocci in clusters      Anaerobic Bottle Gram positive cocci in clusters    Narrative:          [facility] pharmacist wants ID 05/30/24      Susceptibility        Staphylococcus capitis      HERMELINDA      Oxacillin Susceptible      Vancomycin Susceptible                       Susceptibility        Staphylococcus epidermidis      HERMELINDA      Oxacillin Resistant      Vancomycin Susceptible                           Blood Culture ID, PCR - Blood, Arm, Right [[id number]]  (Abnormal) Collected: 05/28/24 1552    Lab Status: Final result Specimen: Blood from Arm, Right Updated: 05/29/24 1209     BCID, PCR Staph spp, not aureus or lugdunensis. Identification by BCID2 PCR.     BOTTLE TYPE Aerobic Bottle    COVID PRE-OP / PRE-PROCEDURE SCREENING ORDER (NO ISOLATION) - Swab, Nasopharynx [[id number]]  (Normal) Collected: 05/28/24 1528    Lab Status: Final result Specimen: Swab from Nasopharynx Updated: 05/28/24 1558    Narrative:      The following orders were created for panel order COVID PRE-OP / PRE-PROCEDURE SCREENING ORDER (NO ISOLATION) - Swab, Nasopharynx.  Procedure                               Abnormality         Status                     ---------                               -----------         ------                     COVID-19 and FLU A/B PCR...[[id number]]  Normal              Final result                 Please view results for these tests on the individual orders.    COVID-19 and FLU A/B PCR, 1 HR TAT - Swab, Nasopharynx [[id number]]  (Normal) Collected: 05/28/24 1528    Lab Status: Final result Specimen: Swab from Nasopharynx Updated: 05/28/24 1558     COVID19 Not Detected     Influenza A PCR Not Detected     Influenza B  PCR Not Detected    Narrative:      Fact sheet for providers: https://www.fda.gov/media/004502/download    Fact sheet for patients: https://www.fda.gov/media/894903/download    Test performed by PCR.    Blood Culture - Blood, Wrist, Right [[id number]]  (Normal) Collected: 05/28/24 1525    Lab Status: Final result Specimen: Blood from Wrist, Right Updated: 06/02/24 1545     Blood Culture No growth at 5 days            XR Abdomen KUB    Result Date: 5/29/2024  XR ABDOMEN KUB Date of Exam: 5/29/2024 1:03 PM EDT Indication: mri approval Comparison: None available. Findings: 2 supine views. There is a large amount of stool. There is no bowel dilatation. There is diffuse arterial vascular calcification. There are no radiopaque foreign bodies within the patient.     Impression: Constipation. No acute process. No radiopaque foreign body within the patient. Electronically Signed: Waleska Giron MD  5/29/2024 2:45 PM EDT  Workstation ID: [id number]    EEG    Result Date: 5/29/2024  Reason for referral: 68 y.o.male with altered mental status Technical Summary:  A 19 channel digital EEG was performed using the international 10-20 placement system, including eye leads and EKG leads. Duration: 20 minutes Findings: The patient is resting quietly in bed and appears asleep at the beginning of the study.  Diffuse low amplitude 4 to 6 Hz intermixed delta and theta activity is present symmetrically over both hemispheres.  Later in the study, when the patient rouses somewhat there is an increase in faster 5-7 Hz theta activity.  A clear posterior rhythm is not seen.  Photic stimulation does not change the background.  Hyperventilation is not performed.  No focal features or epileptiform activity are present. Video: Available Technical quality: Superior EKG: Regular, 80 bpm SUMMARY: Mild generalized slow No focal features or epileptiform activity are seen     Diffuse cerebral dysfunction mild degree, nonspecific No evidence for epilepsy is  present This report is transcribed using the Dragon dictation system.      CT Head Without Contrast    Result Date: 5/29/2024  CT HEAD WO CONTRAST Date of Exam: 5/29/2024 11:14 AM EDT Indication: AMS. Comparison: Head CT 5/12/2024 Technique: Axial CT images were obtained of the head without contrast administration.  Automated exposure control and iterative construction methods were used. Findings: No acute intracranial hemorrhage. No acute large territory infarct. There are scattered subcortical and periventricular white matter hypodensities which are nonspecific and can be seen in setting of chronic small vessel ischemic change. No extra-axial collections. No mass effect. Normal size and configuration of the ventricles. Normal appearance of the orbits. The paranasal sinuses are clear. The mastoid air cells are clear. No acute or suspicious bony findings.     Impression: No acute intracranial findings. Chronic and senescent changes as above. Electronically Signed: Bebeto Ko MD  5/29/2024 11:31 AM EDT  Workstation ID: [id number]    XR Chest 1 View    Result Date: 5/28/2024  XR CHEST 1 VW Date of Exam: 5/28/2024 3:26 PM EDT Indication: AMS, elevated blood sugar Comparison: 5/11/2024 Findings: Lines: None Lungs: Poor respiratory effort accentuates the pulmonary vasculature and cardiomediastinal silhouette. No definite consolidation. Pleura: No pleural effusion or pneumothorax. Cardiomediastinum: Postsurgical changes. Otherwise unremarkable. Soft Tissues: Unremarkable. Bones: No acute osseous abnormality.     Impression: No acute abnormality. Electronically Signed: Grover Rivera DO  5/28/2024 4:05 PM EDT  Workstation ID: [id number]             Results for orders placed during the hospital encounter of 03/27/24    Adult Transthoracic Echo Complete W/ Cont if Necessary Per Protocol    Interpretation Summary    Left ventricular systolic function is normal. Calculated left ventricular EF = 55.1% Normal left  ventricular cavity size noted. Left ventricular wall thickness is consistent with mild concentric hypertrophy. Left ventricular diastolic function is consistent with (grade I) impaired relaxation.    The right ventricular cavity is mildly dilated. Normal right ventricular systolic function noted.    There is calcification of the aortic valve. The aortic valve appears trileaflet. Mild aortic valve regurgitation is present. No aortic valve stenosis is present.    Mitral annular calcification is present. Trace mitral valve regurgitation is present. No significant mitral valve stenosis is present.    The tricuspid valve is structurally normal with no stenosis present. Trace tricuspid valve regurgitation is present. Insufficient TR velocity profile to estimate the right ventricular systolic pressure.    Mild dilation of the sinuses of Valsalva is present. Mild dilation of the ascending aorta is present. Ascending aorta = 4.2 cm      Plan for Follow-up of Pending Labs/Results: pcp  Pending Labs       Order Current Status    Blood Culture - Blood, Hand, Right Preliminary result          Discharge Details        Discharge Medications        New Medications        Instructions Start Date   aspirin 81 MG chewable tablet   81 mg, Oral, Daily   Start Date: June 4, 2024     bisoprolol 5 MG tablet  Commonly known as: ZEBeta   5 mg, Oral, Every 24 Hours Scheduled   Start Date: June 4, 2024     clopidogrel 75 MG tablet  Commonly known as: PLAVIX   75 mg, Oral, Daily   Start Date: June 4, 2024     donepezil 10 MG tablet  Commonly known as: ARICEPT   10 mg, Oral, 2 Times Daily      rosuvastatin 20 MG tablet  Commonly known as: CRESTOR   20 mg, Oral, Nightly             Changes to Medications        Instructions Start Date   PHENobarbital 32.4 MG tablet  What changed:   medication strength  how much to take  how to take this  when to take this  additional instructions   32.4 mg, Oral, 3 Times Daily             Continue These  Medications        Instructions Start Date   amLODIPine 10 MG tablet  Commonly known as: NORVASC   10 mg, Oral, Every 24 Hours Scheduled      cyanocobalamin 1000 MCG/ML injection   1,000 mcg, Intramuscular, Every 28 Days      folic acid 1 MG tablet  Commonly known as: FOLVITE   1 mg, Oral, Daily      Glucagon 1 MG/0.2ML solution auto-injector   1 mg, Subcutaneous, Every 15 Minutes PRN      Lantus SoloStar 100 UNIT/ML injection pen  Generic drug: Insulin Glargine   20 Units, Subcutaneous, 2 Times Daily      lisinopril 20 MG tablet  Commonly known as: PRINIVIL,ZESTRIL   20 mg, Oral, Daily      melatonin 5 MG tablet tablet   5 mg, Oral, Nightly      metFORMIN 1000 MG tablet  Commonly known as: GLUCOPHAGE   1,000 mg, Oral, 2 Times Daily With Meals      NovoLIN R FlexPen 100 UNIT/ML injection  Generic drug: Insulin Regular Human   5 Units, Subcutaneous, 3 Times Daily PRN      sertraline 50 MG tablet  Commonly known as: ZOLOFT   50 mg, Oral, Daily      temazepam 15 MG capsule  Commonly known as: RESTORIL   15 mg, Oral, Nightly PRN               Allergies   Allergen Reactions    Bactrim [Sulfamethoxazole-Trimethoprim] Rash    Bacitra-Neomycin-Polymyxin-Hc Unknown - Low Severity    Adhesive Tape Rash    Neosporin [Neomycin-Bacitracin Zn-Polymyx] Rash         Discharge Disposition:  Long Term Care (DC - External)    Diet:  Hospital:  Diet Order   Procedures    Diet: Regular/House; Texture: Regular (IDDSI 7); Fluid Consistency: Thin (IDDSI 0)       Diet Instructions       Diet: Regular/House Diet; Regular (IDDSI 7); Thin (IDDSI 0)      Discharge Diet: Regular/House Diet    Texture: Regular (IDDSI 7)    Fluid Consistency: Thin (IDDSI 0)             Activity:      Restrictions or Other Recommendations:       CODE STATUS:    Code Status and Medical Interventions:   Ordered at: 05/28/24 1900     Medical Intervention Limits:    NO intubation (DNI)     Level Of Support Discussed With:    Health Care Surrogate     Code Status  (Patient has no pulse and is not breathing):    No CPR (Do Not Attempt to Resuscitate)     Medical Interventions (Patient has pulse or is breathing):    Limited Support       Future Appointments   Date Time Provider Department Center   6/3/2024  3:00 PM MED 7  [facility]   9/20/2024  9:00 AM Kamla Castillo APRN [facility]       Additional Instructions for the Follow-ups that You Need to Schedule       Call MD With Problems / Concerns   As directed      Please seek medical attention for any the following: Fevers, bloody stools, worsening confusion, and/or any other concerning symptoms    Order Comments: Please seek medical attention for any the following: Fevers, bloody stools, worsening confusion, and/or any other concerning symptoms         Discharge Follow-up with PCP   As directed       Currently Documented PCP:    Des Geller MD    PCP Phone Number:    [phone]     Follow Up Details: Follow-up with facility doctor upon arrival to facility        Discharge Follow-up with Specified Provider: Follow-up with IVONNE Eckert September 20, 2024 as scheduled   As directed      To: Follow-up with IVONNE Eckert September 20, 2024 as scheduled        Discharge Follow-up with Specified Provider: Follow-up with Dr. Duong in 2 months   As directed      To: Follow-up with Dr. Duong in 2 months        Phenobarbital Level    Jun 30, 2024 (Approximate)      Release to patient: Routine Release                      Jessica Ervin MD  06/03/24      Time Spent on Discharge:  I spent  40  minutes on this discharge activity which included: face-to-face encounter with the patient, reviewing the data in the system, coordination of the care with the nursing staff as well as consultants, documentation, and entering orders.            Electronically signed by Jessica Ervin MD at 06/03/24 7138

## 2024-06-03 NOTE — PROGRESS NOTES
[facility] Neurology    Progress Note    Patient Name: Too Tai  : 1955  MRN: [id number]  Primary Care Physician:  Des Geller MD  Date of admission: 2024    Subjective     Chief Complaint: Advanced dementia    History of Present Illness   Patient seen resting comfortably in bed.  Continues at his cognitive baseline.  No acute events over weekend.  Per chart review he rested well.    Review of Systems   General: Negative for fever, nausea, or vomiting.   Neurological: Negative for headache, pain, or weakness.     Objective     Physical Exam  Vitals and nursing note reviewed.   Constitutional:       General: He is not in acute distress.     Appearance: He is not ill-appearing.   Eyes:      Extraocular Movements: Extraocular movements intact.      Pupils: Pupils are equal, round, and reactive to light.      Comments: No nystagmus or deviated gaze noted.   Cardiovascular:      Rate and Rhythm: Normal rate.   Pulmonary:      Effort: Pulmonary effort is normal.   Neurological:      Mental Status: He is alert. Mental status is at baseline.      Cranial Nerves: No cranial nerve deficit or facial asymmetry.      Sensory: No sensory deficit.      Motor: No weakness or seizure activity.      Comments:     Cranial Nerves   CN II: Pupils are equal, round, and reactive to light. Normal visual acuity and visual fields.    CN III IV VI: Extraocular movements are full without nystagmus.  CN V: Normal facial sensation and strength of muscles of mastication.  CN VII: Facial movements are symmetric. No weakness.  CN VIII:  Auditory acuity is normal.  CN IX & X:  Symmetric palatal movement.  CN XI: Sternocleidomastoid and trapezius are normal.  No weakness.  CN XII: The tongue is midline.  No atrophy or fasciculations.            Vitals:   Temp:  [96.5 °F (35.8 °C)-98.4 °F (36.9 °C)] 97.1 °F (36.2 °C)  Heart Rate:  [60-71] 71  Resp:  [18] 18  BP: (126-159)/(44-83) 132/62    Current Medications    Current  Facility-Administered Medications:     acetaminophen (TYLENOL) tablet 650 mg, 650 mg, Oral, Q4H PRN, 650 mg at 05/28/24 2219 **OR** acetaminophen (TYLENOL) 160 MG/5ML oral solution 650 mg, 650 mg, Oral, Q4H PRN **OR** acetaminophen (TYLENOL) suppository 650 mg, 650 mg, Rectal, Q4H PRN, Humera Ugarte MD    amLODIPine (NORVASC) tablet 10 mg, 10 mg, Oral, Q24H, Jessica Ervin MD, 10 mg at 06/03/24 0941    aspirin chewable tablet 81 mg, 81 mg, Oral, Daily, Humera Ugarte MD, 81 mg at 06/03/24 0941    sennosides-docusate (PERICOLACE) 8.6-50 MG per tablet 2 tablet, 2 tablet, Oral, BID, 2 tablet at 06/02/24 2217 **AND** polyethylene glycol (MIRALAX) packet 17 g, 17 g, Oral, Daily PRN **AND** bisacodyl (DULCOLAX) EC tablet 5 mg, 5 mg, Oral, Daily PRN **AND** bisacodyl (DULCOLAX) suppository 10 mg, 10 mg, Rectal, Daily PRN, Humera Ugarte MD    bisoprolol (ZEBeta) tablet 5 mg, 5 mg, Oral, Q24H, Jerrod Duong MD, 5 mg at 06/03/24 0941    Calcium Replacement - Follow Nurse / BPA Driven Protocol, , Does not apply, PRN, Humera Ugarte MD    clopidogrel (PLAVIX) tablet 75 mg, 75 mg, Oral, Daily, Jerrod Duong MD, 75 mg at 06/03/24 0941    dextrose (D50W) (25 g/50 mL) IV injection 25 g, 25 g, Intravenous, Q15 Min PRN, Humera Ugarte MD    dextrose (GLUTOSE) oral gel 15 g, 15 g, Oral, Q15 Min PRN, Humera Ugarte MD    donepezil (ARICEPT) tablet 10 mg, 10 mg, Oral, BID, Meliza Kahn, APRN, 10 mg at 06/03/24 0941    folic acid (FOLVITE) tablet 1 mg, 1 mg, Oral, Daily, Humera Ugarte MD, 1 mg at 06/03/24 0941    glucagon (GLUCAGEN) injection 1 mg, 1 mg, Intramuscular, Q15 Min PRN, Humera Ugarte MD    insulin glargine (LANTUS, SEMGLEE) injection 15 Units, 15 Units, Subcutaneous, Q12H, Humera Ugarte MD, 15 Units at 06/03/24 0939    Insulin Lispro (humaLOG) injection 2-9 Units, 2-9 Units, Subcutaneous, 4x Daily AC & at Bedtime, Humera Ugarte MD, 7 Units at 06/03/24 1257    Insulin Lispro (humaLOG) injection 3  Units, 3 Units, Subcutaneous, TID With Meals, Jessica Ervin MD, 3 Units at 06/03/24 1257    lisinopril (PRINIVIL,ZESTRIL) tablet 20 mg, 20 mg, Oral, Daily, Jessica Ervin MD, 20 mg at 06/03/24 0941    Magnesium Low Dose Replacement - Follow Nurse / BPA Driven Protocol, , Does not apply, PRTorito WORTHY Hannah, MD    melatonin tablet 5 mg, 5 mg, Oral, Nightly, Humera Ugarte MD, 5 mg at 06/02/24 2217    nitroglycerin (NITROSTAT) SL tablet 0.4 mg, 0.4 mg, Sublingual, Q5 Min PRN, Humera Ugarte MD    Pharmacy Consult - [facility], , Does not apply, Daily, Bebeto Randolph, PharmD, Given at 06/02/24 1009    PHENobarbital tablet 32.4 mg, 32.4 mg, Oral, TID, Jessica Ervin MD, 32.4 mg at 06/03/24 0941    Phosphorus Replacement - Follow Nurse / BPA Driven Protocol, , Does not apply, PRTorito WORTHY Hannah, MD    Potassium Replacement - Follow Nurse / BPA Driven Protocol, , Does not apply, Torito MCCRAY Hannah, MD    rosuvastatin (CRESTOR) tablet 20 mg, 20 mg, Oral, Nightly, KustererJerrod MD, 20 mg at 06/02/24 2217    sertraline (ZOLOFT) tablet 50 mg, 50 mg, Oral, Daily, Humera Ugarte MD, 50 mg at 06/03/24 0941    sodium chloride 0.9 % flush 10 mL, 10 mL, Intravenous, PRN, Humera Ugarte MD    sodium chloride 0.9 % flush 10 mL, 10 mL, Intravenous, Q12H, Humera Ugarte MD, 10 mL at 06/01/24 2206    sodium chloride 0.9 % flush 10 mL, 10 mL, Intravenous, PRN, Humera Ugarte MD    sodium chloride 0.9 % infusion 40 mL, 40 mL, Intravenous, PRN, Humera Ugarte MD    temazepam (RESTORIL) capsule 15 mg, 15 mg, Oral, Nightly, Darrell Jhaveri DO, 15 mg at 06/02/24 2217    Laboratory Results:   Lab Results   Component Value Date    GLUCOSE 202 (H) 06/01/2024    CALCIUM 8.6 06/01/2024     06/01/2024    K 4.4 06/01/2024    CO2 25.0 06/01/2024    CL 99 06/01/2024    BUN 11 06/01/2024    CREATININE 0.76 06/01/2024    EGFRIFAFRI 102 02/21/2022    EGFRIFNONA 88 02/21/2022    BCR 14.5 06/01/2024    ANIONGAP 12.0 06/01/2024     Lab Results    Component Value Date    WBC 9.49 06/01/2024    HGB 14.1 06/01/2024    HCT 41.7 06/01/2024    MCV 90.5 06/01/2024     06/01/2024     Lab Results   Component Value Date    CHOL 179 05/30/2024    CHOL 220 (H) 01/08/2024    CHOL 120 08/01/2019     Lab Results   Component Value Date    HDL 34 (L) 05/30/2024    HDL 41 01/08/2024    HDL 37 (L) 08/03/2023     Lab Results   Component Value Date     (H) 05/30/2024     (H) 01/08/2024     (H) 08/03/2023     Lab Results   Component Value Date    TRIG 105 05/30/2024    TRIG 108 01/08/2024    TRIG 208 (H) 08/03/2023     Lab Results   Component Value Date    HGBA1C 9.50 (H) 04/18/2024     Lab Results   Component Value Date    INR 1.36 (H) 05/28/2024    INR 1.1 11/25/2019    INR 1.1 09/10/2018    PROTIME 16.9 (H) 05/28/2024    PROTIME 12.5 11/25/2019    PROTIME 13.3 09/10/2018     Lab Results   Component Value Date    FOLATE 10.20 02/24/2024     Lab Results   Component Value Date    [id number] 384 04/18/2024             Assessment / Plan   Brief Patient Summary:  Too Tai is a 68 y.o. male with past medical history of advanced dementia with behavioral disturbances and nonverbal, CAD, PVD, T2DM, HLD and orthostatic hypotension who presented with altered mental status. Per wife which is bedside patient had been doing well at his living facility.  Found to have NSTEMI, cardiology following.       Plan:   Advanced dementia with behavioral disturbances-stable  Lethargy-resolved  Acute NSTEMI  EEG no epileptic activity seen.  Nonspecific mild diffuse cerebral dysfunction noted.  CT head no acute intracranial abnormalities.  Chronic microvascular ischemic changes noted.  Considered MRI however patient has Epicardial leads in place on x-ray.  Continue phenobarbital at reduced dose of 32.4,  3 times daily  Trough phenobarbital level 21.1  Recheck level in 2 to 4 weeks.  N.p.o. till swallow evaluation is complete.  Restart home temazepam 15 mg  nightly  Restart home Zoloft 50 mg daily  Restart home Aricept 10 mg twice daily  Restart home melatonin 5 mg nightly  Continue fall precautions  Cardiology consulted, appreciate recommendations.  Recommend medical management of MI  Repeat blood culture sent.  Palliative following, appreciate recommendations  Patient to follow-up with IVONNE Eckert appointment scheduled for 9/20/2024  Patient is okay to discharge from a neurologic standpoint.  Please feel free to reach out with any questions.    I have discussed the above with the patient, bedside RN, patient's wife and Dr. Ervin    Time spent with patient: 35 minutes in face-to-face evaluation and management of the patient.    Copied text in this note has been reviewed and is accurate as of 06/03/24.     IVONNE Santos

## 2024-06-03 NOTE — DISCHARGE SUMMARY
[facility] Medicine Services  DISCHARGE SUMMARY    Patient Name: Too Tai  : 1955  MRN: [id number]    Date of Admission: 2024  2:45 PM  Date of Discharge: 6/3/2024  Primary Care Physician: Des Geller MD    Consults       Date and Time Order Name Status Description    2024 10:30 AM Inpatient Neurology Consult General Completed     2024  9:59 PM Inpatient Cardiology Consult Completed     2024  8:43 PM Inpatient Palliative Care MD Consult Completed     2024  8:39 PM Inpatient Cardiology Consult Completed     2024  7:28 AM Inpatient Neurology Consult General Completed     2024  2:30 PM Inpatient Palliative Care MD Consult Completed     2024  5:42 AM Inpatient Neurology Consult General Completed             Hospital Course     Presenting Problem: NSTEMI    Active Hospital Problems    Diagnosis  POA    **NSTEMI (non-ST elevated myocardial infarction) [I21.4]  Yes    Arteriosclerosis of coronary artery [I25.10]  Yes    Dementia with behavioral disturbance [F03.918]  Yes    Benign essential HTN [I10]  Yes    Benign prostatic hyperplasia [N40.0]  Yes    Hyperlipidemia [E78.5]  Yes    Type 2 diabetes mellitus with hyperglycemia, with long-term current use of insulin [E11.65, Z79.4]  Not Applicable    Peripheral vascular disease [I73.9]  Yes      Resolved Hospital Problems   No resolved problems to display.          Hospital Course:  Too Tai is a 68 y.o. male with advanced dementia, CAD s/p remote CABG, DM2, peripheral vascular disease who has been admitted recently with altered mental status and behavioral disturbances.  He had improved, but on , patient was not acting like himself and appeared to be in pain along with significant hyperglycemia.. Workup revealed significant troponin elevation compared to his recent baseline.    This patient's problems and plans were partially entered by my partner and updated as appropriate by  me 06/03/24.     Dementia with behavioral disturbance  Altered mental status  -CT head obtained stat given patient with AMS and also on heparin drip --> no acute findings   -Was on Phenobarbitol TID at 64.8mg/64.8mg/129.6mg and was over-sedated --> now on phenobarb 32.4mg TID and will continue this reduced dosing on discharge per discussion with MULUGETA Kahn  -Recheck phenobarbital level in 2 to 4 weeks at facility  -Continue donepezil, melatonin, sertraline, Restoril  -Palliative care consulted  -EEG with no evidence of seizures  -WBC within normal limits, UA not consistent with infection, COVID/flu negative, ABG with alkalosis  -Suspect AMS related to oversedation; discussed with neurology  -Neurology consulted  -Patient appears to be hospice appropriate given severe dementia; patient's wife has not been amenable to this in the past; High risk for readmissions given advanced dementia     Positive blood culture likely contaminant  -Suspected contamination with 2 different staph species     NSTEMI  CAD s/p remote CABG  PAD status post multiple lower extremity stents  -Status post heparin drip x48 hours; medical management of NSTEMI  -Continue ASA, Plavix, Crestor 20 mg daily, beta-blocker added  -Cardiology consulted     DM2 with hyperglycemia  -Continue home regimen on discharge    HTN  -Resumed amlodipine and lisinopril      Medically ready for discharge.  Case management arranged transport.      Discharge Follow Up Recommendations for outpatient labs/diagnostics:  Follow-up with repeat phenobarbital level in 2 to 4 weeks at facility  Follow-up with IVONNE Eckert September 20, 2024 as scheduled  Follow-up with cardiology in 2 months  Follow-up with facility doctor upon arrival to facility    Day of Discharge     HPI:   Patient fed himself today.  No agitation.  Does not answer questions.  No nursing concerns.    Review of Systems  Able to obtain given advanced dementia    Vital Signs:   Temp:  [96.5 °F (35.8  °C)-98.4 °F (36.9 °C)] 97.1 °F (36.2 °C)  Heart Rate:  [60-71] 71  Resp:  [18] 18  BP: (126-159)/(44-83) 132/62      Physical Exam:  Constitutional: No acute distress, laying in bed, overweight  HENT: NCAT, mucous membranes moist  Respiratory: Clear to auscultation bilaterally, respiratory effort normal   Cardiovascular: RRR, no murmurs  Gastrointestinal: Normoactive bowel sounds, soft, nontender, nondistended  Musculoskeletal: No bilateral ankle edema  Psychiatric: Calm  Neurologic: Does not follow commands, symmetric facies  Skin: No rashes on exposed skin    Pertinent  and/or Most Recent Results     LAB RESULTS:      Lab 06/01/24  0504 05/31/24  0504 05/30/24  1946 05/30/24  1016 05/30/24  0341 05/29/24  2112 05/29/24  0855 05/29/24  0144 05/28/24  2236 05/28/24  1840 05/28/24  1840 05/28/24  1802 05/28/24  1455   WBC 9.49  --   --   --  11.57*  --   --  10.73  --   --   --   --  12.09*   HEMOGLOBIN 14.1  --   --   --  13.2  --   --  12.7*  --   --   --   --  13.6   HEMATOCRIT 41.7  --   --   --  40.2  --   --  37.6  --   --   --   --  39.8   PLATELETS 380  --   --   --  311  --   --  268  --   --   --   --  290   NEUTROS ABS 5.33  --   --   --   --   --   --  6.04  --   --   --   --  8.07*   IMMATURE GRANS (ABS) 0.07*  --   --   --   --   --   --  0.07*  --   --   --   --  0.10*   LYMPHS ABS 2.81  --   --   --   --   --   --  3.44*  --   --   --   --  2.56   MONOS ABS 0.87  --   --   --   --   --   --  0.96*  --   --   --   --  1.25*   EOS ABS 0.33  --   --   --   --   --   --  0.14  --   --   --   --  0.03   MCV 90.5  --   --   --  92.8  --   --  91.7  --   --   --   --  89.8   PROCALCITONIN  --   --   --   --   --   --   --   --   --   --   --   --  0.25   LACTATE  --   --   --   --   --   --   --  1.7 2.2*  --   --  2.2* 2.1*   PROTIME  --   --   --   --   --   --   --   --   --   --  16.9*  --   --    APTT  --   --   --   --   --   --   --   --   --   --  31.7*  --   --    HEPARIN ANTI-XA  --  0.27* 0.26*  0.29* 0.30 0.22*   < > 0.23*  --    < > 0.10*  --   --     < > = values in this interval not displayed.         Lab 06/01/24  0504 05/30/24  0341 05/29/24  0144 05/28/24  1455   SODIUM 136 144 143 134*   POTASSIUM 4.4 4.5 4.0 4.7   CHLORIDE 99 107 110* 101   CO2 25.0 26.0 22.0 21.0*   ANION GAP 12.0 11.0 11.0 12.0   BUN 11 24* 40* 41*   CREATININE 0.76 0.83 1.01 1.26   EGFR 98.5 95.9 81.5 62.5   GLUCOSE 202* 119* 262* 495*   CALCIUM 8.6 8.8 8.8 8.8   MAGNESIUM  --   --   --  1.6         Lab 05/30/24  0341 05/28/24  1455   TOTAL PROTEIN 6.4 6.4   ALBUMIN 3.1* 3.1*   GLOBULIN 3.3 3.3   ALT (SGPT) 59* 34   AST (SGOT) 66* 39   BILIRUBIN 0.6 0.7   ALK PHOS 122* 126*         Lab 05/28/24  1840 05/28/24  1802 05/28/24  1455   PROBNP  --  6,458.0*  --    HSTROP T  --  374* 395*   PROTIME 16.9*  --   --    INR 1.36*  --   --          Lab 05/30/24  0341   CHOLESTEROL 179   LDL CHOL 126*   HDL CHOL 34*   TRIGLYCERIDES 105             Lab 05/28/24  1520   PH, ARTERIAL 7.477*   PCO2, ARTERIAL 29.8*   PO2 ART 58.1*   FIO2 21   HCO3 ART 22.0   BASE EXCESS ART -0.5*   CARBOXYHEMOGLOBIN 1.2     Brief Urine Lab Results  (Last result in the past 365 days)        Color   Clarity   Blood   Leuk Est   Nitrite   Protein   CREAT   Urine HCG        05/28/24 1554 Yellow   Hazy   Negative   Negative   Negative   Trace                 Microbiology Results (last 10 days)       Procedure Component Value - Date/Time    Blood Culture - Blood, Arm, Left [[id number]]  (Abnormal) Collected: 05/29/24 1414    Lab Status: Final result Specimen: Blood from Arm, Left Updated: 06/01/24 0630     Blood Culture Staphylococcus epidermidis     Isolated from --     Gram Stain Anaerobic Bottle Gram positive cocci in clusters    Narrative:      [facility] pharmacist wants ID    Blood Culture - Blood, Hand, Right [[id number]]  (Normal) Collected: 05/29/24 1414    Lab Status: Preliminary result Specimen: Blood from Hand, Right Updated: 06/02/24 1500     Blood Culture No  growth at 4 days    Blood Culture - Blood, Arm, Right [[id number]]  (Abnormal)  (Susceptibility) Collected: 05/28/24 1552    Lab Status: Final result Specimen: Blood from Arm, Right Updated: 06/01/24 0630     Blood Culture Staphylococcus capitis     Isolated from Aerobic Bottle     Blood Culture Staphylococcus epidermidis     Isolated from --     Gram Stain Aerobic Bottle Gram positive cocci in clusters      Anaerobic Bottle Gram positive cocci in clusters    Narrative:          [facility] pharmacist wants ID 05/30/24      Susceptibility        Staphylococcus capitis      HERMELINDA      Oxacillin Susceptible      Vancomycin Susceptible                       Susceptibility        Staphylococcus epidermidis      HERMELINDA      Oxacillin Resistant      Vancomycin Susceptible                           Blood Culture ID, PCR - Blood, Arm, Right [[id number]]  (Abnormal) Collected: 05/28/24 1552    Lab Status: Final result Specimen: Blood from Arm, Right Updated: 05/29/24 1209     BCID, PCR Staph spp, not aureus or lugdunensis. Identification by BCID2 PCR.     BOTTLE TYPE Aerobic Bottle    COVID PRE-OP / PRE-PROCEDURE SCREENING ORDER (NO ISOLATION) - Swab, Nasopharynx [[id number]]  (Normal) Collected: 05/28/24 1528    Lab Status: Final result Specimen: Swab from Nasopharynx Updated: 05/28/24 1558    Narrative:      The following orders were created for panel order COVID PRE-OP / PRE-PROCEDURE SCREENING ORDER (NO ISOLATION) - Swab, Nasopharynx.  Procedure                               Abnormality         Status                     ---------                               -----------         ------                     COVID-19 and FLU A/B PCR...[[id number]]  Normal              Final result                 Please view results for these tests on the individual orders.    COVID-19 and FLU A/B PCR, 1 HR TAT - Swab, Nasopharynx [[id number]]  (Normal) Collected: 05/28/24 1528    Lab Status: Final result Specimen: Swab from Nasopharynx Updated: 05/28/24  1558     COVID19 Not Detected     Influenza A PCR Not Detected     Influenza B PCR Not Detected    Narrative:      Fact sheet for providers: https://www.fda.gov/media/453865/download    Fact sheet for patients: https://www.fda.gov/media/740649/download    Test performed by PCR.    Blood Culture - Blood, Wrist, Right [[id number]]  (Normal) Collected: 05/28/24 1525    Lab Status: Final result Specimen: Blood from Wrist, Right Updated: 06/02/24 1540     Blood Culture No growth at 5 days            XR Abdomen KUB    Result Date: 5/29/2024  XR ABDOMEN KUB Date of Exam: 5/29/2024 1:03 PM EDT Indication: mri approval Comparison: None available. Findings: 2 supine views. There is a large amount of stool. There is no bowel dilatation. There is diffuse arterial vascular calcification. There are no radiopaque foreign bodies within the patient.     Impression: Constipation. No acute process. No radiopaque foreign body within the patient. Electronically Signed: Waleska Giron MD  5/29/2024 2:45 PM EDT  Workstation ID: [id number]    EEG    Result Date: 5/29/2024  Reason for referral: 68 y.o.male with altered mental status Technical Summary:  A 19 channel digital EEG was performed using the international 10-20 placement system, including eye leads and EKG leads. Duration: 20 minutes Findings: The patient is resting quietly in bed and appears asleep at the beginning of the study.  Diffuse low amplitude 4 to 6 Hz intermixed delta and theta activity is present symmetrically over both hemispheres.  Later in the study, when the patient rouses somewhat there is an increase in faster 5-7 Hz theta activity.  A clear posterior rhythm is not seen.  Photic stimulation does not change the background.  Hyperventilation is not performed.  No focal features or epileptiform activity are present. Video: Available Technical quality: Superior EKG: Regular, 80 bpm SUMMARY: Mild generalized slow No focal features or epileptiform activity are seen      Diffuse cerebral dysfunction mild degree, nonspecific No evidence for epilepsy is present This report is transcribed using the Dragon dictation system.      CT Head Without Contrast    Result Date: 5/29/2024  CT HEAD WO CONTRAST Date of Exam: 5/29/2024 11:14 AM EDT Indication: AMS. Comparison: Head CT 5/12/2024 Technique: Axial CT images were obtained of the head without contrast administration.  Automated exposure control and iterative construction methods were used. Findings: No acute intracranial hemorrhage. No acute large territory infarct. There are scattered subcortical and periventricular white matter hypodensities which are nonspecific and can be seen in setting of chronic small vessel ischemic change. No extra-axial collections. No mass effect. Normal size and configuration of the ventricles. Normal appearance of the orbits. The paranasal sinuses are clear. The mastoid air cells are clear. No acute or suspicious bony findings.     Impression: No acute intracranial findings. Chronic and senescent changes as above. Electronically Signed: Bebeto Ko MD  5/29/2024 11:31 AM EDT  Workstation ID: [id number]    XR Chest 1 View    Result Date: 5/28/2024  XR CHEST 1 VW Date of Exam: 5/28/2024 3:26 PM EDT Indication: AMS, elevated blood sugar Comparison: 5/11/2024 Findings: Lines: None Lungs: Poor respiratory effort accentuates the pulmonary vasculature and cardiomediastinal silhouette. No definite consolidation. Pleura: No pleural effusion or pneumothorax. Cardiomediastinum: Postsurgical changes. Otherwise unremarkable. Soft Tissues: Unremarkable. Bones: No acute osseous abnormality.     Impression: No acute abnormality. Electronically Signed: Grover Rivera DO  5/28/2024 4:05 PM EDT  Workstation ID: [id number]             Results for orders placed during the hospital encounter of 03/27/24    Adult Transthoracic Echo Complete W/ Cont if Necessary Per Protocol    Interpretation Summary    Left ventricular  systolic function is normal. Calculated left ventricular EF = 55.1% Normal left ventricular cavity size noted. Left ventricular wall thickness is consistent with mild concentric hypertrophy. Left ventricular diastolic function is consistent with (grade I) impaired relaxation.    The right ventricular cavity is mildly dilated. Normal right ventricular systolic function noted.    There is calcification of the aortic valve. The aortic valve appears trileaflet. Mild aortic valve regurgitation is present. No aortic valve stenosis is present.    Mitral annular calcification is present. Trace mitral valve regurgitation is present. No significant mitral valve stenosis is present.    The tricuspid valve is structurally normal with no stenosis present. Trace tricuspid valve regurgitation is present. Insufficient TR velocity profile to estimate the right ventricular systolic pressure.    Mild dilation of the sinuses of Valsalva is present. Mild dilation of the ascending aorta is present. Ascending aorta = 4.2 cm      Plan for Follow-up of Pending Labs/Results: pcp  Pending Labs       Order Current Status    Blood Culture - Blood, Hand, Right Preliminary result          Discharge Details        Discharge Medications        New Medications        Instructions Start Date   aspirin 81 MG chewable tablet   81 mg, Oral, Daily   Start Date: June 4, 2024     bisoprolol 5 MG tablet  Commonly known as: ZEBeta   5 mg, Oral, Every 24 Hours Scheduled   Start Date: June 4, 2024     clopidogrel 75 MG tablet  Commonly known as: PLAVIX   75 mg, Oral, Daily   Start Date: June 4, 2024     donepezil 10 MG tablet  Commonly known as: ARICEPT   10 mg, Oral, 2 Times Daily      rosuvastatin 20 MG tablet  Commonly known as: CRESTOR   20 mg, Oral, Nightly             Changes to Medications        Instructions Start Date   PHENobarbital 32.4 MG tablet  What changed:   medication strength  how much to take  how to take this  when to take this  additional  instructions   32.4 mg, Oral, 3 Times Daily             Continue These Medications        Instructions Start Date   amLODIPine 10 MG tablet  Commonly known as: NORVASC   10 mg, Oral, Every 24 Hours Scheduled      cyanocobalamin 1000 MCG/ML injection   1,000 mcg, Intramuscular, Every 28 Days      folic acid 1 MG tablet  Commonly known as: FOLVITE   1 mg, Oral, Daily      Glucagon 1 MG/0.2ML solution auto-injector   1 mg, Subcutaneous, Every 15 Minutes PRN      Lantus SoloStar 100 UNIT/ML injection pen  Generic drug: Insulin Glargine   20 Units, Subcutaneous, 2 Times Daily      lisinopril 20 MG tablet  Commonly known as: PRINIVIL,ZESTRIL   20 mg, Oral, Daily      melatonin 5 MG tablet tablet   5 mg, Oral, Nightly      metFORMIN 1000 MG tablet  Commonly known as: GLUCOPHAGE   1,000 mg, Oral, 2 Times Daily With Meals      NovoLIN R FlexPen 100 UNIT/ML injection  Generic drug: Insulin Regular Human   5 Units, Subcutaneous, 3 Times Daily PRN      sertraline 50 MG tablet  Commonly known as: ZOLOFT   50 mg, Oral, Daily      temazepam 15 MG capsule  Commonly known as: RESTORIL   15 mg, Oral, Nightly PRN               Allergies   Allergen Reactions    Bactrim [Sulfamethoxazole-Trimethoprim] Rash    Bacitra-Neomycin-Polymyxin-Hc Unknown - Low Severity    Adhesive Tape Rash    Neosporin [Neomycin-Bacitracin Zn-Polymyx] Rash         Discharge Disposition:  Long Term Care (DC - External)    Diet:  Hospital:  Diet Order   Procedures    Diet: Regular/House; Texture: Regular (IDDSI 7); Fluid Consistency: Thin (IDDSI 0)       Diet Instructions       Diet: Regular/House Diet; Regular (IDDSI 7); Thin (IDDSI 0)      Discharge Diet: Regular/House Diet    Texture: Regular (IDDSI 7)    Fluid Consistency: Thin (IDDSI 0)             Activity:      Restrictions or Other Recommendations:       CODE STATUS:    Code Status and Medical Interventions:   Ordered at: 05/28/24 1900     Medical Intervention Limits:    NO intubation (DNI)     Level Of  Support Discussed With:    Health Care Surrogate     Code Status (Patient has no pulse and is not breathing):    No CPR (Do Not Attempt to Resuscitate)     Medical Interventions (Patient has pulse or is breathing):    Limited Support       Future Appointments   Date Time Provider Department Center   6/3/2024  3:00 PM MED [facility]   9/20/2024  9:00 AM Kamla Castillo APRN [facility]       Additional Instructions for the Follow-ups that You Need to Schedule       Call MD With Problems / Concerns   As directed      Please seek medical attention for any the following: Fevers, bloody stools, worsening confusion, and/or any other concerning symptoms    Order Comments: Please seek medical attention for any the following: Fevers, bloody stools, worsening confusion, and/or any other concerning symptoms         Discharge Follow-up with PCP   As directed       Currently Documented PCP:    Des Geller MD    PCP Phone Number:    [phone]     Follow Up Details: Follow-up with facility doctor upon arrival to facility        Discharge Follow-up with Specified Provider: Follow-up with IVONNE Eckert September 20, 2024 as scheduled   As directed      To: Follow-up with IVONNE Eckert September 20, 2024 as scheduled        Discharge Follow-up with Specified Provider: Follow-up with Dr. Duong in 2 months   As directed      To: Follow-up with Dr. Duong in 2 months        Phenobarbital Level    Jun 30, 2024 (Approximate)      Release to patient: Routine Release                      Jessica Ervin MD  06/03/24      Time Spent on Discharge:  I spent  40  minutes on this discharge activity which included: face-to-face encounter with the patient, reviewing the data in the system, coordination of the care with the nursing staff as well as consultants, documentation, and entering orders.

## 2024-06-03 NOTE — NURSING NOTE
Pt belongings packed and returned to pt. Discharge paperwork printed and packed in envelop. Nurse report given over the phone to the receiving facility. Pt denies any needs at this time. Pt is resting in bed at this time.

## 2024-06-03 NOTE — CASE MANAGEMENT/SOCIAL WORK
Continued Stay Note  [facility]     Patient Name: Too Tai  MRN: [id number]  Today's Date: 6/3/2024    Admit Date: 5/28/2024    Plan: discharge plan   Discharge Plan       Row Name 06/03/24 1044       Plan    Plan discharge plan    Plan Comments Per discussion in MDR, pt is medically ready for discharge and the plan is back to [facility]. I spoke with Fannie(DON at [facility]) and she plans on doing an onsite visit this morning. CM will work on transportation.    Final Discharge Disposition Code 01 - home or self-care                   Discharge Codes    No documentation.                 Expected Discharge Date and Time       Expected Discharge Date Expected Discharge Time    Serafin 3, 2024               Elvira Mary RN     HHome health attendant/needed assist

## 2024-06-03 NOTE — PAYOR COMM NOTE
"Too Richter (68 y.o. Male)     [id number]     Holly Rose, RN  Utilization Review  Naeyz-[phone]  [phone]        Date of Birth   1955    Social Security Number       Address   [facility] [address] [place] [address]    Home Phone   [phone]    MRN   [id number]       Moravian   Anglican    Marital Status                               Admission Date   5/28/24    Admission Type   Emergency    Admitting Provider   Jessica Ervin MD    Attending Provider   Jessica Ervin MD    Department, Room/Bed   [facility] 6A, N613/1       Discharge Date       Discharge Disposition   Long Term Care (DC - External)    Discharge Destination                                 Attending Provider: Jessica Ervin MD    Allergies: Bactrim [Sulfamethoxazole-trimethoprim], Bacitra-neomycin-polymyxin-hc, Adhesive Tape, Neosporin [Neomycin-bacitracin Zn-polymyx]    Isolation: None   Infection: None   Code Status: No CPR    Ht: 185.4 cm (73\")   Wt: 97.5 kg (215 lb)    Admission Cmt: None   Principal Problem: NSTEMI (non-ST elevated myocardial infarction) [I21.4]                   Active Insurance as of 5/28/2024       Primary Coverage       Payor Plan Insurance Group Employer/Plan Group    AETNA MEDICARE REPLACEMENT AETNA MED ADV PPO [id number]       Payor Plan Address Payor Plan Phone Number Payor Plan Fax Number Effective Dates    [address] [phone]  1/1/2024 - None Entered    [facility] [id number]         Subscriber Name Subscriber Birth Date Member ID       TOO RICHTER 1955 [id number]                     Emergency Contacts        (Rel.) Home Phone Work Phone Mobile Phone    NEELAMWILLIE GARCIA (Spouse) [phone] -- [phone]    Ritchie Richter (Son) [phone] -- [phone]    Meg Bustillo (Relative) [phone] -- [phone]                 Discharge Summary        Jessica Ervin MD at 06/03/24 1339              [facility] Medicine " Services  DISCHARGE SUMMARY    Patient Name: Too Tai  : 1955  MRN: [id number]    Date of Admission: 2024  2:45 PM  Date of Discharge: 6/3/2024  Primary Care Physician: Des Geller MD    Consults       Date and Time Order Name Status Description    2024 10:30 AM Inpatient Neurology Consult General Completed     2024  9:59 PM Inpatient Cardiology Consult Completed     2024  8:43 PM Inpatient Palliative Care MD Consult Completed     2024  8:39 PM Inpatient Cardiology Consult Completed     2024  7:28 AM Inpatient Neurology Consult General Completed     2024  2:30 PM Inpatient Palliative Care MD Consult Completed     2024  5:42 AM Inpatient Neurology Consult General Completed             Hospital Course     Presenting Problem: NSTEMI    Active Hospital Problems    Diagnosis  POA    **NSTEMI (non-ST elevated myocardial infarction) [I21.4]  Yes    Arteriosclerosis of coronary artery [I25.10]  Yes    Dementia with behavioral disturbance [F03.918]  Yes    Benign essential HTN [I10]  Yes    Benign prostatic hyperplasia [N40.0]  Yes    Hyperlipidemia [E78.5]  Yes    Type 2 diabetes mellitus with hyperglycemia, with long-term current use of insulin [E11.65, Z79.4]  Not Applicable    Peripheral vascular disease [I73.9]  Yes      Resolved Hospital Problems   No resolved problems to display.          Hospital Course:  Too Tai is a 68 y.o. male with advanced dementia, CAD s/p remote CABG, DM2, peripheral vascular disease who has been admitted recently with altered mental status and behavioral disturbances.  He had improved, but on , patient was not acting like himself and appeared to be in pain along with significant hyperglycemia.. Workup revealed significant troponin elevation compared to his recent baseline.    This patient's problems and plans were partially entered by my partner and updated as appropriate by me 24.     Dementia with behavioral  disturbance  Altered mental status  -CT head obtained stat given patient with AMS and also on heparin drip --> no acute findings   -Was on Phenobarbitol TID at 64.8mg/64.8mg/129.6mg and was over-sedated --> now on phenobarb 32.4mg TID and will continue this reduced dosing on discharge per discussion with MULUGETA Kahn  -Recheck phenobarbital level in 2 to 4 weeks at facility  -Continue donepezil, melatonin, sertraline, Restoril  -Palliative care consulted  -EEG with no evidence of seizures  -WBC within normal limits, UA not consistent with infection, COVID/flu negative, ABG with alkalosis  -Suspect AMS related to oversedation; discussed with neurology  -Neurology consulted  -Patient appears to be hospice appropriate given severe dementia; patient's wife has not been amenable to this in the past; High risk for readmissions given advanced dementia     Positive blood culture likely contaminant  -Suspected contamination with 2 different staph species     NSTEMI  CAD s/p remote CABG  PAD status post multiple lower extremity stents  -Status post heparin drip x48 hours; medical management of NSTEMI  -Continue ASA, Plavix, Crestor 20 mg daily, beta-blocker added  -Cardiology consulted     DM2 with hyperglycemia  -Continue home regimen on discharge    HTN  -Resumed amlodipine and lisinopril      Medically ready for discharge.  Case management arranged transport.      Discharge Follow Up Recommendations for outpatient labs/diagnostics:  Follow-up with repeat phenobarbital level in 2 to 4 weeks at facility  Follow-up with IVONNE Eckert September 20, 2024 as scheduled  Follow-up with cardiology in 2 months  Follow-up with facility doctor upon arrival to facility    Day of Discharge     HPI:   Patient fed himself today.  No agitation.  Does not answer questions.  No nursing concerns.    Review of Systems  Able to obtain given advanced dementia    Vital Signs:   Temp:  [96.5 °F (35.8 °C)-98.4 °F (36.9 °C)] 97.1 °F (36.2  °C)  Heart Rate:  [60-71] 71  Resp:  [18] 18  BP: (126-159)/(44-83) 132/62      Physical Exam:  Constitutional: No acute distress, laying in bed, overweight  HENT: NCAT, mucous membranes moist  Respiratory: Clear to auscultation bilaterally, respiratory effort normal   Cardiovascular: RRR, no murmurs  Gastrointestinal: Normoactive bowel sounds, soft, nontender, nondistended  Musculoskeletal: No bilateral ankle edema  Psychiatric: Calm  Neurologic: Does not follow commands, symmetric facies  Skin: No rashes on exposed skin    Pertinent  and/or Most Recent Results     LAB RESULTS:      Lab 06/01/24  0504 05/31/24  0504 05/30/24  1946 05/30/24  1016 05/30/24  0341 05/29/24  2112 05/29/24  0855 05/29/24  0144 05/28/24  2236 05/28/24  1840 05/28/24  1840 05/28/24  1802 05/28/24  1455   WBC 9.49  --   --   --  11.57*  --   --  10.73  --   --   --   --  12.09*   HEMOGLOBIN 14.1  --   --   --  13.2  --   --  12.7*  --   --   --   --  13.6   HEMATOCRIT 41.7  --   --   --  40.2  --   --  37.6  --   --   --   --  39.8   PLATELETS 380  --   --   --  311  --   --  268  --   --   --   --  290   NEUTROS ABS 5.33  --   --   --   --   --   --  6.04  --   --   --   --  8.07*   IMMATURE GRANS (ABS) 0.07*  --   --   --   --   --   --  0.07*  --   --   --   --  0.10*   LYMPHS ABS 2.81  --   --   --   --   --   --  3.44*  --   --   --   --  2.56   MONOS ABS 0.87  --   --   --   --   --   --  0.96*  --   --   --   --  1.25*   EOS ABS 0.33  --   --   --   --   --   --  0.14  --   --   --   --  0.03   MCV 90.5  --   --   --  92.8  --   --  91.7  --   --   --   --  89.8   PROCALCITONIN  --   --   --   --   --   --   --   --   --   --   --   --  0.25   LACTATE  --   --   --   --   --   --   --  1.7 2.2*  --   --  2.2* 2.1*   PROTIME  --   --   --   --   --   --   --   --   --   --  16.9*  --   --    APTT  --   --   --   --   --   --   --   --   --   --  31.7*  --   --    HEPARIN ANTI-XA  --  0.27* 0.26* 0.29* 0.30 0.22*   < > 0.23*  --    < >  0.10*  --   --     < > = values in this interval not displayed.         Lab 06/01/24  0504 05/30/24  0341 05/29/24  0144 05/28/24  1455   SODIUM 136 144 143 134*   POTASSIUM 4.4 4.5 4.0 4.7   CHLORIDE 99 107 110* 101   CO2 25.0 26.0 22.0 21.0*   ANION GAP 12.0 11.0 11.0 12.0   BUN 11 24* 40* 41*   CREATININE 0.76 0.83 1.01 1.26   EGFR 98.5 95.9 81.5 62.5   GLUCOSE 202* 119* 262* 495*   CALCIUM 8.6 8.8 8.8 8.8   MAGNESIUM  --   --   --  1.6         Lab 05/30/24  0341 05/28/24  1455   TOTAL PROTEIN 6.4 6.4   ALBUMIN 3.1* 3.1*   GLOBULIN 3.3 3.3   ALT (SGPT) 59* 34   AST (SGOT) 66* 39   BILIRUBIN 0.6 0.7   ALK PHOS 122* 126*         Lab 05/28/24  1840 05/28/24  1802 05/28/24  1455   PROBNP  --  6,458.0*  --    HSTROP T  --  374* 395*   PROTIME 16.9*  --   --    INR 1.36*  --   --          Lab 05/30/24  0341   CHOLESTEROL 179   LDL CHOL 126*   HDL CHOL 34*   TRIGLYCERIDES 105             Lab 05/28/24  1520   PH, ARTERIAL 7.477*   PCO2, ARTERIAL 29.8*   PO2 ART 58.1*   FIO2 21   HCO3 ART 22.0   BASE EXCESS ART -0.5*   CARBOXYHEMOGLOBIN 1.2     Brief Urine Lab Results  (Last result in the past 365 days)        Color   Clarity   Blood   Leuk Est   Nitrite   Protein   CREAT   Urine HCG        05/28/24 1554 Yellow   Hazy   Negative   Negative   Negative   Trace                 Microbiology Results (last 10 days)       Procedure Component Value - Date/Time    Blood Culture - Blood, Arm, Left [[id number]]  (Abnormal) Collected: 05/29/24 1414    Lab Status: Final result Specimen: Blood from Arm, Left Updated: 06/01/24 0630     Blood Culture Staphylococcus epidermidis     Isolated from --     Gram Stain Anaerobic Bottle Gram positive cocci in clusters    Narrative:      [facility] pharmacist wants ID    Blood Culture - Blood, Hand, Right [[id number]]  (Normal) Collected: 05/29/24 1414    Lab Status: Preliminary result Specimen: Blood from Hand, Right Updated: 06/02/24 1500     Blood Culture No growth at 4 days    Blood Culture - Blood,  Arm, Right [[id number]]  (Abnormal)  (Susceptibility) Collected: 05/28/24 1552    Lab Status: Final result Specimen: Blood from Arm, Right Updated: 06/01/24 0630     Blood Culture Staphylococcus capitis     Isolated from Aerobic Bottle     Blood Culture Staphylococcus epidermidis     Isolated from --     Gram Stain Aerobic Bottle Gram positive cocci in clusters      Anaerobic Bottle Gram positive cocci in clusters    Narrative:          [facility] pharmacist wants ID 05/30/24      Susceptibility        Staphylococcus capitis      HERMELINDA      Oxacillin Susceptible      Vancomycin Susceptible                       Susceptibility        Staphylococcus epidermidis      HERMELINDA      Oxacillin Resistant      Vancomycin Susceptible                           Blood Culture ID, PCR - Blood, Arm, Right [[id number]]  (Abnormal) Collected: 05/28/24 1552    Lab Status: Final result Specimen: Blood from Arm, Right Updated: 05/29/24 1209     BCID, PCR Staph spp, not aureus or lugdunensis. Identification by BCID2 PCR.     BOTTLE TYPE Aerobic Bottle    COVID PRE-OP / PRE-PROCEDURE SCREENING ORDER (NO ISOLATION) - Swab, Nasopharynx [[id number]]  (Normal) Collected: 05/28/24 1528    Lab Status: Final result Specimen: Swab from Nasopharynx Updated: 05/28/24 1558    Narrative:      The following orders were created for panel order COVID PRE-OP / PRE-PROCEDURE SCREENING ORDER (NO ISOLATION) - Swab, Nasopharynx.  Procedure                               Abnormality         Status                     ---------                               -----------         ------                     COVID-19 and FLU A/B PCR...[[id number]]  Normal              Final result                 Please view results for these tests on the individual orders.    COVID-19 and FLU A/B PCR, 1 HR TAT - Swab, Nasopharynx [[id number]]  (Normal) Collected: 05/28/24 1528    Lab Status: Final result Specimen: Swab from Nasopharynx Updated: 05/28/24 1558     COVID19 Not Detected     Influenza A  PCR Not Detected     Influenza B PCR Not Detected    Narrative:      Fact sheet for providers: https://www.fda.gov/media/040321/download    Fact sheet for patients: https://www.fda.gov/media/574867/download    Test performed by PCR.    Blood Culture - Blood, Wrist, Right [[id number]]  (Normal) Collected: 05/28/24 1525    Lab Status: Final result Specimen: Blood from Wrist, Right Updated: 06/02/24 1547     Blood Culture No growth at 5 days            XR Abdomen KUB    Result Date: 5/29/2024  XR ABDOMEN KUB Date of Exam: 5/29/2024 1:03 PM EDT Indication: mri approval Comparison: None available. Findings: 2 supine views. There is a large amount of stool. There is no bowel dilatation. There is diffuse arterial vascular calcification. There are no radiopaque foreign bodies within the patient.     Impression: Constipation. No acute process. No radiopaque foreign body within the patient. Electronically Signed: Waleska Giron MD  5/29/2024 2:45 PM EDT  Workstation ID: [id number]    EEG    Result Date: 5/29/2024  Reason for referral: 68 y.o.male with altered mental status Technical Summary:  A 19 channel digital EEG was performed using the international 10-20 placement system, including eye leads and EKG leads. Duration: 20 minutes Findings: The patient is resting quietly in bed and appears asleep at the beginning of the study.  Diffuse low amplitude 4 to 6 Hz intermixed delta and theta activity is present symmetrically over both hemispheres.  Later in the study, when the patient rouses somewhat there is an increase in faster 5-7 Hz theta activity.  A clear posterior rhythm is not seen.  Photic stimulation does not change the background.  Hyperventilation is not performed.  No focal features or epileptiform activity are present. Video: Available Technical quality: Superior EKG: Regular, 80 bpm SUMMARY: Mild generalized slow No focal features or epileptiform activity are seen     Diffuse cerebral dysfunction mild degree,  nonspecific No evidence for epilepsy is present This report is transcribed using the Dragon dictation system.      CT Head Without Contrast    Result Date: 5/29/2024  CT HEAD WO CONTRAST Date of Exam: 5/29/2024 11:14 AM EDT Indication: AMS. Comparison: Head CT 5/12/2024 Technique: Axial CT images were obtained of the head without contrast administration.  Automated exposure control and iterative construction methods were used. Findings: No acute intracranial hemorrhage. No acute large territory infarct. There are scattered subcortical and periventricular white matter hypodensities which are nonspecific and can be seen in setting of chronic small vessel ischemic change. No extra-axial collections. No mass effect. Normal size and configuration of the ventricles. Normal appearance of the orbits. The paranasal sinuses are clear. The mastoid air cells are clear. No acute or suspicious bony findings.     Impression: No acute intracranial findings. Chronic and senescent changes as above. Electronically Signed: Bebeto Ko MD  5/29/2024 11:31 AM EDT  Workstation ID: [id number]    XR Chest 1 View    Result Date: 5/28/2024  XR CHEST 1 VW Date of Exam: 5/28/2024 3:26 PM EDT Indication: AMS, elevated blood sugar Comparison: 5/11/2024 Findings: Lines: None Lungs: Poor respiratory effort accentuates the pulmonary vasculature and cardiomediastinal silhouette. No definite consolidation. Pleura: No pleural effusion or pneumothorax. Cardiomediastinum: Postsurgical changes. Otherwise unremarkable. Soft Tissues: Unremarkable. Bones: No acute osseous abnormality.     Impression: No acute abnormality. Electronically Signed: Grover Rivera DO  5/28/2024 4:05 PM EDT  Workstation ID: [id number]             Results for orders placed during the hospital encounter of 03/27/24    Adult Transthoracic Echo Complete W/ Cont if Necessary Per Protocol    Interpretation Summary    Left ventricular systolic function is normal. Calculated left  ventricular EF = 55.1% Normal left ventricular cavity size noted. Left ventricular wall thickness is consistent with mild concentric hypertrophy. Left ventricular diastolic function is consistent with (grade I) impaired relaxation.    The right ventricular cavity is mildly dilated. Normal right ventricular systolic function noted.    There is calcification of the aortic valve. The aortic valve appears trileaflet. Mild aortic valve regurgitation is present. No aortic valve stenosis is present.    Mitral annular calcification is present. Trace mitral valve regurgitation is present. No significant mitral valve stenosis is present.    The tricuspid valve is structurally normal with no stenosis present. Trace tricuspid valve regurgitation is present. Insufficient TR velocity profile to estimate the right ventricular systolic pressure.    Mild dilation of the sinuses of Valsalva is present. Mild dilation of the ascending aorta is present. Ascending aorta = 4.2 cm      Plan for Follow-up of Pending Labs/Results: pcp  Pending Labs       Order Current Status    Blood Culture - Blood, Hand, Right Preliminary result          Discharge Details        Discharge Medications        New Medications        Instructions Start Date   aspirin 81 MG chewable tablet   81 mg, Oral, Daily   Start Date: June 4, 2024     bisoprolol 5 MG tablet  Commonly known as: ZEBeta   5 mg, Oral, Every 24 Hours Scheduled   Start Date: June 4, 2024     clopidogrel 75 MG tablet  Commonly known as: PLAVIX   75 mg, Oral, Daily   Start Date: June 4, 2024     donepezil 10 MG tablet  Commonly known as: ARICEPT   10 mg, Oral, 2 Times Daily      rosuvastatin 20 MG tablet  Commonly known as: CRESTOR   20 mg, Oral, Nightly             Changes to Medications        Instructions Start Date   PHENobarbital 32.4 MG tablet  What changed:   medication strength  how much to take  how to take this  when to take this  additional instructions   32.4 mg, Oral, 3 Times Daily              Continue These Medications        Instructions Start Date   amLODIPine 10 MG tablet  Commonly known as: NORVASC   10 mg, Oral, Every 24 Hours Scheduled      cyanocobalamin 1000 MCG/ML injection   1,000 mcg, Intramuscular, Every 28 Days      folic acid 1 MG tablet  Commonly known as: FOLVITE   1 mg, Oral, Daily      Glucagon 1 MG/0.2ML solution auto-injector   1 mg, Subcutaneous, Every 15 Minutes PRN      Lantus SoloStar 100 UNIT/ML injection pen  Generic drug: Insulin Glargine   20 Units, Subcutaneous, 2 Times Daily      lisinopril 20 MG tablet  Commonly known as: PRINIVIL,ZESTRIL   20 mg, Oral, Daily      melatonin 5 MG tablet tablet   5 mg, Oral, Nightly      metFORMIN 1000 MG tablet  Commonly known as: GLUCOPHAGE   1,000 mg, Oral, 2 Times Daily With Meals      NovoLIN R FlexPen 100 UNIT/ML injection  Generic drug: Insulin Regular Human   5 Units, Subcutaneous, 3 Times Daily PRN      sertraline 50 MG tablet  Commonly known as: ZOLOFT   50 mg, Oral, Daily      temazepam 15 MG capsule  Commonly known as: RESTORIL   15 mg, Oral, Nightly PRN               Allergies   Allergen Reactions    Bactrim [Sulfamethoxazole-Trimethoprim] Rash    Bacitra-Neomycin-Polymyxin-Hc Unknown - Low Severity    Adhesive Tape Rash    Neosporin [Neomycin-Bacitracin Zn-Polymyx] Rash         Discharge Disposition:  Long Term Care (DC - External)    Diet:  Hospital:  Diet Order   Procedures    Diet: Regular/House; Texture: Regular (IDDSI 7); Fluid Consistency: Thin (IDDSI 0)       Diet Instructions       Diet: Regular/House Diet; Regular (IDDSI 7); Thin (IDDSI 0)      Discharge Diet: Regular/House Diet    Texture: Regular (IDDSI 7)    Fluid Consistency: Thin (IDDSI 0)             Activity:      Restrictions or Other Recommendations:       CODE STATUS:    Code Status and Medical Interventions:   Ordered at: 05/28/24 1900     Medical Intervention Limits:    NO intubation (DNI)     Level Of Support Discussed With:    Health Care  Surrogate     Code Status (Patient has no pulse and is not breathing):    No CPR (Do Not Attempt to Resuscitate)     Medical Interventions (Patient has pulse or is breathing):    Limited Support       Future Appointments   Date Time Provider Department Center   6/3/2024  3:00 PM MED 7  [facility]   9/20/2024  9:00 AM Kamla Castillo APRN [facility]       Additional Instructions for the Follow-ups that You Need to Schedule       Call MD With Problems / Concerns   As directed      Please seek medical attention for any the following: Fevers, bloody stools, worsening confusion, and/or any other concerning symptoms    Order Comments: Please seek medical attention for any the following: Fevers, bloody stools, worsening confusion, and/or any other concerning symptoms         Discharge Follow-up with PCP   As directed       Currently Documented PCP:    Des Geller MD    PCP Phone Number:    [phone]     Follow Up Details: Follow-up with facility doctor upon arrival to facility        Discharge Follow-up with Specified Provider: Follow-up with IVONNE Eckert September 20, 2024 as scheduled   As directed      To: Follow-up with IVONNE Eckert September 20, 2024 as scheduled        Discharge Follow-up with Specified Provider: Follow-up with Dr. Duong in 2 months   As directed      To: Follow-up with Dr. Duong in 2 months        Phenobarbital Level    Jun 30, 2024 (Approximate)      Release to patient: Routine Release                      Jessica Ervin MD  06/03/24      Time Spent on Discharge:  I spent  40  minutes on this discharge activity which included: face-to-face encounter with the patient, reviewing the data in the system, coordination of the care with the nursing staff as well as consultants, documentation, and entering orders.            Electronically signed by Jessica Ervin MD at 06/03/24 3470

## 2024-06-28 ENCOUNTER — TELEPHONE (OUTPATIENT)
Dept: INTERNAL MEDICINE | Facility: CLINIC | Age: 69
End: 2024-06-28
Payer: MEDICARE

## 2024-06-28 ENCOUNTER — READMISSION MANAGEMENT (OUTPATIENT)
Dept: CALL CENTER | Facility: HOSPITAL | Age: 69
End: 2024-06-28
Payer: MEDICARE

## 2024-06-28 DIAGNOSIS — F03.918 DEMENTIA WITH BEHAVIORAL DISTURBANCE: ICD-10-CM

## 2024-06-28 DIAGNOSIS — R29.898 WEAKNESS OF BOTH LOWER EXTREMITIES: ICD-10-CM

## 2024-06-28 DIAGNOSIS — R26.2 UNABLE TO AMBULATE: ICD-10-CM

## 2024-06-28 DIAGNOSIS — R41.0 DISORIENTATION: Primary | ICD-10-CM

## 2024-06-28 DIAGNOSIS — R29.898 MUSCULAR DECONDITIONING: ICD-10-CM

## 2024-06-28 NOTE — OUTREACH NOTE
Prep Survey      Flowsheet Row Responses   [facility] facility patient discharged from? Non-BH   Is LACE score < 7 ? Non-BH Discharge   Eligibility [facility]   Hospital [facility]   Date of Discharge 06/30/24   Discharge diagnosis dementia   Does the patient have one of the following disease processes/diagnoses(primary or secondary)? Other   Prep survey completed? Yes            Viridiana Rodney Registered Nurse

## 2024-06-28 NOTE — TELEPHONE ENCOUNTER
Patient with discharge from the hospital but family is unable to lift the patient and requires a Robert lift to be able to navigate home care.  Order has been placed.

## 2024-06-28 NOTE — TELEPHONE ENCOUNTER
Order for Robert lift [facility] @ [phone] Kwesi Leung.  Advised that this is an urgent request and requested a possible Saturday delivery since the patient is being d/c'd on 6/30 from [facility].      Fup appointment scheduled with Humera Lau on 7-11-24 @ 1030am.

## 2024-07-01 ENCOUNTER — TRANSITIONAL CARE MANAGEMENT TELEPHONE ENCOUNTER (OUTPATIENT)
Dept: CALL CENTER | Facility: HOSPITAL | Age: 69
End: 2024-07-01
Payer: MEDICARE

## 2024-07-01 NOTE — OUTREACH NOTE
Call Center TCM Note      Flowsheet Row Responses   [facility] patient discharged from? Non-BH  [([facility])]   Does the patient have one of the following disease processes/diagnoses(primary or secondary)? Other   TCM attempt successful? Yes   Call start time 1257   Call end time 1338   Discharge diagnosis dementia   Person spoke with today (if not patient) and relationship wife   Meds reviewed with patient/caregiver? Yes   Is the patient having any side effects they believe may be caused by any medication additions or changes? No   Does the patient have all medications ordered at discharge? Yes   Is the patient taking all medications as directed (includes completed medication regime)? Yes   Medication comments Patient needs refill on Mupirocin sent into [facility] in [place].   Comments Patient has a hospital followup scheduled on 7/11 with [facility] with (Humera BURGESS). Patient was discharged from [facility].   Does the patient have an appointment with their PCP within 7-14 days of discharge? Yes   Home health comments will likely need an order to continue nursing and therapy.   Psychosocial issues? No   Did the patient receive a copy of their discharge instructions? Yes   Nursing interventions Reviewed instructions with patient   What is the patient's perception of their health status since discharge? Same  [Patient has an abrasion to his leg and wife is using Mupiriocin (He was dropped at [facility] and this is helping to clear up the area. There is no drainage from area or s/s of infection]   Is the patient/caregiver able to teach back signs and symptoms related to disease process for when to call PCP? Yes   Is the patient/caregiver able to teach back signs and symptoms related to disease process for when to call 911? Yes   Is the patient/caregiver able to teach back the hierarchy of who to call/visit for symptoms/problems? PCP, Specialist, Home health nurse,  Urgent Care, ED, 911 Yes   If the patient is a current smoker, are they able to teach back resources for cessation? Not a smoker   TCM call completed? Yes   Call end time 1338   Would this patient benefit from a Referral to [facility] Social Work? No   Is the patient interested in additional calls from an ambulatory ? No            Rickie Hoang RN    7/1/2024, 13:39 EDT

## 2024-07-02 RX ORDER — MUPIROCIN CALCIUM 20 MG/G
CREAM TOPICAL 2 TIMES DAILY
Qty: 30 G | Refills: 1 | Status: SHIPPED | OUTPATIENT
Start: 2024-07-02

## 2024-07-05 RX ORDER — AMOXICILLIN AND CLAVULANATE POTASSIUM 875; 125 MG/1; MG/1
1 TABLET, FILM COATED ORAL 2 TIMES DAILY
Qty: 20 TABLET | Refills: 0 | Status: SHIPPED | OUTPATIENT
Start: 2024-07-05

## 2024-07-09 ENCOUNTER — TELEPHONE (OUTPATIENT)
Dept: INTERNAL MEDICINE | Facility: CLINIC | Age: 69
End: 2024-07-09
Payer: MEDICARE

## 2024-07-09 DIAGNOSIS — R29.898 MUSCULAR DECONDITIONING: Primary | ICD-10-CM

## 2024-07-09 DIAGNOSIS — F03.918 DEMENTIA WITH BEHAVIORAL DISTURBANCE: ICD-10-CM

## 2024-07-09 DIAGNOSIS — R29.898 WEAKNESS OF BOTH LOWER EXTREMITIES: ICD-10-CM

## 2024-07-09 DIAGNOSIS — R26.2 UNABLE TO AMBULATE: ICD-10-CM

## 2024-07-09 NOTE — TELEPHONE ENCOUNTER
Patient's wife called and states patient has a hospital follow-up for Thursday but he is unable to walk. She states he is in a wheelchair and she needs help getting him here. Home Health comes out but can she get help with a home health aide and a ?

## 2024-07-10 ENCOUNTER — HOME HEALTH ADMISSION (OUTPATIENT)
Dept: HOME HEALTH SERVICES | Facility: HOME HEALTHCARE | Age: 69
End: 2024-07-10
Payer: MEDICARE

## 2024-07-11 ENCOUNTER — OFFICE VISIT (OUTPATIENT)
Dept: INTERNAL MEDICINE | Facility: CLINIC | Age: 69
End: 2024-07-11
Payer: MEDICARE

## 2024-07-11 VITALS
RESPIRATION RATE: 20 BRPM | DIASTOLIC BLOOD PRESSURE: 56 MMHG | TEMPERATURE: 97.1 F | SYSTOLIC BLOOD PRESSURE: 112 MMHG | HEART RATE: 68 BPM

## 2024-07-11 DIAGNOSIS — E78.5 HYPERLIPIDEMIA, UNSPECIFIED HYPERLIPIDEMIA TYPE: ICD-10-CM

## 2024-07-11 DIAGNOSIS — E11.621 DIABETIC ULCER OF BOTH FEET: ICD-10-CM

## 2024-07-11 DIAGNOSIS — R29.898 WEAKNESS OF BOTH LOWER EXTREMITIES: ICD-10-CM

## 2024-07-11 DIAGNOSIS — E53.8 VITAMIN B 12 DEFICIENCY: ICD-10-CM

## 2024-07-11 DIAGNOSIS — L97.529 DIABETIC ULCER OF BOTH FEET: ICD-10-CM

## 2024-07-11 DIAGNOSIS — F03.C11 SEVERE DEMENTIA WITH AGITATION, UNSPECIFIED DEMENTIA TYPE: ICD-10-CM

## 2024-07-11 DIAGNOSIS — Z79.4 TYPE 2 DIABETES MELLITUS WITH DIABETIC PERIPHERAL ANGIOPATHY WITHOUT GANGRENE, WITH LONG-TERM CURRENT USE OF INSULIN: ICD-10-CM

## 2024-07-11 DIAGNOSIS — I10 ESSENTIAL HYPERTENSION: ICD-10-CM

## 2024-07-11 DIAGNOSIS — F03.918 DEMENTIA WITH BEHAVIORAL DISTURBANCE: Primary | ICD-10-CM

## 2024-07-11 DIAGNOSIS — L97.519 DIABETIC ULCER OF BOTH FEET: ICD-10-CM

## 2024-07-11 DIAGNOSIS — R32 URINARY INCONTINENCE, UNSPECIFIED TYPE: ICD-10-CM

## 2024-07-11 DIAGNOSIS — R26.2 UNABLE TO AMBULATE: ICD-10-CM

## 2024-07-11 DIAGNOSIS — Z79.4 TYPE 2 DIABETES MELLITUS WITH HYPERGLYCEMIA, WITH LONG-TERM CURRENT USE OF INSULIN: ICD-10-CM

## 2024-07-11 DIAGNOSIS — E11.65 TYPE 2 DIABETES MELLITUS WITH HYPERGLYCEMIA, WITH LONG-TERM CURRENT USE OF INSULIN: ICD-10-CM

## 2024-07-11 DIAGNOSIS — R29.898 MUSCULAR DECONDITIONING: ICD-10-CM

## 2024-07-11 DIAGNOSIS — R15.9 INCONTINENCE OF FECES, UNSPECIFIED FECAL INCONTINENCE TYPE: ICD-10-CM

## 2024-07-11 DIAGNOSIS — E11.51 TYPE 2 DIABETES MELLITUS WITH DIABETIC PERIPHERAL ANGIOPATHY WITHOUT GANGRENE, WITH LONG-TERM CURRENT USE OF INSULIN: ICD-10-CM

## 2024-07-11 DIAGNOSIS — F03.90 DEMENTIA WITHOUT BEHAVIORAL DISTURBANCE: ICD-10-CM

## 2024-07-11 DIAGNOSIS — R41.0 DISORIENTATION: ICD-10-CM

## 2024-07-11 PROCEDURE — 3078F DIAST BP <80 MM HG: CPT | Performed by: PHYSICIAN ASSISTANT

## 2024-07-11 PROCEDURE — 3074F SYST BP LT 130 MM HG: CPT | Performed by: PHYSICIAN ASSISTANT

## 2024-07-11 PROCEDURE — 3046F HEMOGLOBIN A1C LEVEL >9.0%: CPT | Performed by: PHYSICIAN ASSISTANT

## 2024-07-11 PROCEDURE — 1126F AMNT PAIN NOTED NONE PRSNT: CPT | Performed by: PHYSICIAN ASSISTANT

## 2024-07-11 PROCEDURE — 99214 OFFICE O/P EST MOD 30 MIN: CPT | Performed by: PHYSICIAN ASSISTANT

## 2024-07-11 RX ORDER — DIAPER,BRIEF,ADULT, DISPOSABLE
1 EACH MISCELLANEOUS 4 TIMES DAILY PRN
Qty: 28 EACH | Refills: 15 | Status: SHIPPED | OUTPATIENT
Start: 2024-07-11

## 2024-07-11 NOTE — PROGRESS NOTES
Office Note     Name: Too Tai    : 1955     MRN: [id number]     Chief Complaint  Hospital Follow Up Visit ([facility] 2024- 6/3/2024, [facility] 6/3/2024- 2024)    Subjective   History of Present Illness  The patient is a 68-year-old male who presents today with wife and caregiver for hospital follow-up. He is accompanied by his wife.    The patient was admitted to [facility] from 2023 to 2023, during which he was diagnosed with NSTEMI, which was managed medically without surgical intervention. Concurrently, he was experiencing behavioral disturbances, leading to a stay at [facility] from 2023 to 2023. Since his return home, he has been receiving home health care, and Dr. Geller has ordered new home health orders. His wife reports that she has been administering phenobarbital in the morning and evening, as opposed to thrice-daily administration due to excessive sedation. However, the patient has been experiencing some aggression due to this adjustment. Additionally, he has been non-adherence to his mealtime insulin regimen due to hypoglycemia. Additionally, the patient has been scratching his scalp and lower extremities, for which Dr. Geller has prescribed Augmentin. Home health services have been assisting with wound management and Bactroban has been applied topically. Otherwise, the patient is faring well.    Hospital discharge:    Date of Admission: 2024  2:45 PM  Date of Discharge: 6/3/2024  Primary Care Physician: Des Geller MD    Presenting Problem: NSTEMI           Active Hospital Problems     Diagnosis   POA    **NSTEMI (non-ST elevated myocardial infarction) [I21.4]   Yes    Arteriosclerosis of coronary artery [I25.10]   Yes    Dementia with behavioral disturbance [F03.918]   Yes    Benign essential HTN [I10]   Yes    Benign prostatic hyperplasia [N40.0]   Yes    Hyperlipidemia [E78.5]   Yes    Type 2 diabetes mellitus with  hyperglycemia, with long-term current use of insulin [E11.65, Z79.4]   Not Applicable    Peripheral vascular disease [I73.9]   Yes       Resolved Hospital Problems   No resolved problems to display.            Hospital Course:  Too Tai is a 68 y.o. male with advanced dementia, CAD s/p remote CABG, DM2, peripheral vascular disease who has been admitted recently with altered mental status and behavioral disturbances.  He had improved, but on 5/27, patient was not acting like himself and appeared to be in pain along with significant hyperglycemia.. Workup revealed significant troponin elevation compared to his recent baseline.     This patient's problems and plans were partially entered by my partner and updated as appropriate by me 06/03/24.     Dementia with behavioral disturbance  Altered mental status  -CT head obtained stat given patient with AMS and also on heparin drip --> no acute findings   -Was on Phenobarbitol TID at 64.8mg/64.8mg/129.6mg and was over-sedated --> now on phenobarb 32.4mg TID and will continue this reduced dosing on discharge per discussion with MULUGETA Kahn  -Recheck phenobarbital level in 2 to 4 weeks at facility  -Continue donepezil, melatonin, sertraline, Restoril  -Palliative care consulted  -EEG with no evidence of seizures  -WBC within normal limits, UA not consistent with infection, COVID/flu negative, ABG with alkalosis  -Suspect AMS related to oversedation; discussed with neurology  -Neurology consulted  -Patient appears to be hospice appropriate given severe dementia; patient's wife has not been amenable to this in the past; High risk for readmissions given advanced dementia     Positive blood culture likely contaminant  -Suspected contamination with 2 different staph species     NSTEMI  CAD s/p remote CABG  PAD status post multiple lower extremity stents  -Status post heparin drip x48 hours; medical management of NSTEMI  -Continue ASA, Plavix, Crestor 20 mg daily, beta-blocker  added  -Cardiology consulted     DM2 with hyperglycemia  -Continue home regimen on discharge     HTN  -Resumed amlodipine and lisinopril      Medically ready for discharge.  Case management arranged transport.        Discharge Follow Up Recommendations for outpatient labs/diagnostics:  Follow-up with repeat phenobarbital level in 2 to 4 weeks at facility  Follow-up with IVONNE Eckert September 20, 2024 as scheduled  Follow-up with cardiology in 2 months  Follow-up with facility doctor upon arrival to facility     Past Medical History:   Past Medical History:   Diagnosis Date    Coronary artery disease     Dementia     Diabetes mellitus     Hyperlipidemia     Peripheral vascular disease        Past Surgical History:   Past Surgical History:   Procedure Laterality Date    CORONARY ARTERY BYPASS GRAFT      FEMORAL ARTERY - POPLITEAL ARTERY BYPASS GRAFT         Immunizations:   Immunization History   Administered Date(s) Administered    COVID-19 (PFIZER) Purple Cap Monovalent 07/20/2021, 08/14/2021    Covid-19 (Pfizer) Gray Cap Monovalent 08/21/2022    Fluzone (or Fluarix & Flulaval for VFC) >6mos 09/06/2019, 10/20/2020    Fluzone High-Dose 65+yrs 11/03/2022    Fluzone Quad >6mos (Multi-dose) 10/15/2018    Influenza Quad Vaccine (Inpatient) 11/03/2010, 10/15/2018    Influenza Split Preservative Free ID 12/02/2014    Pneumococcal Conjugate 13-Valent (PCV13) 07/20/2017, 01/09/2020    Pneumococcal Polysaccharide (PPSV23) 05/26/2020    Zostavax 07/20/2017        Medications:     Current Outpatient Medications:     amLODIPine (NORVASC) 10 MG tablet, Take 1 tablet by mouth Daily. Indications: High Blood Pressure Disorder, Disp: 30 tablet, Rfl: 0    amoxicillin-clavulanate (AUGMENTIN) 875-125 MG per tablet, Take 1 tablet by mouth 2 (Two) Times a Day., Disp: 20 tablet, Rfl: 0    aspirin 81 MG chewable tablet, Chew 1 tablet Daily., Disp: 30 tablet, Rfl: 0    bisoprolol (ZEBeta) 5 MG tablet, Take 1 tablet by mouth Daily.,  Disp: 30 tablet, Rfl: 0    clopidogrel (PLAVIX) 75 MG tablet, Take 1 tablet by mouth Daily., Disp: 30 tablet, Rfl: 0    cyanocobalamin 1000 MCG/ML injection, Inject 1 mL into the appropriate muscle as directed by prescriber Every 28 (Twenty-Eight) Days., Disp: , Rfl:     donepezil (ARICEPT) 10 MG tablet, Take 1 tablet by mouth 2 (Two) Times a Day., Disp: 60 tablet, Rfl: 0    folic acid (FOLVITE) 1 MG tablet, Take 1 tablet by mouth Daily., Disp: 30 tablet, Rfl: 0    Glucagon 1 MG/0.2ML solution auto-injector, Inject 1 mg under the skin into the appropriate area as directed Every 15 (Fifteen) Minutes As Needed (Hypoglycemia)., Disp: 0.4 mL, Rfl: 3    Insulin Glargine (Lantus SoloStar) 100 UNIT/ML injection pen, Inject 20 Units under the skin into the appropriate area as directed 2 (Two) Times a Day., Disp: , Rfl:     lisinopril (PRINIVIL,ZESTRIL) 20 MG tablet, Take 1 tablet by mouth Daily. Indications: High Blood Pressure Disorder, Disp: 30 tablet, Rfl: 0    metFORMIN (GLUCOPHAGE) 1000 MG tablet, Take 1 tablet by mouth 2 (Two) Times a Day With Meals., Disp: , Rfl:     mupirocin (BACTROBAN) 2 % cream, Apply  topically to the appropriate area as directed 2 (Two) Times a Day., Disp: 30 g, Rfl: 1    mupirocin (BACTROBAN) 2 % ointment, Apply 1 Application topically to the appropriate area as directed 3 (Three) Times a Day., Disp: 30 g, Rfl: 1    PHENobarbital 32.4 MG tablet, Take 1 tablet by mouth 3 (Three) Times a Day. (Patient taking differently: Take 1 tablet by mouth 3 (Three) Times a Day. Patient reports taking twice daily), Disp: 9 tablet, Rfl: 0    sertraline (ZOLOFT) 50 MG tablet, Take 1 tablet by mouth Daily., Disp: 30 tablet, Rfl: 0    temazepam (RESTORIL) 15 MG capsule, Take 1 capsule by mouth At Night As Needed for Sleep., Disp: 30 capsule, Rfl: 0    Incontinence Supply Disposable (Certainty Cleansing Wipes) misc, Use 3 each 4 (Four) Times a Day As Needed (urinary and fecal incontinence)., Disp: 80 each, Rfl:  "15    Incontinence Supply Disposable (Certainty Fitted Briefs XL) misc, Use 1 each 4 (Four) Times a Day As Needed (urinary and fecal incontinence)., Disp: 28 each, Rfl: 15    Insulin Regular Human (NovoLIN R FlexPen) 100 UNIT/ML injection, Inject 5 Units under the skin into the appropriate area as directed 3 (Three) Times a Day As Needed for High Blood Sugar. (Patient not taking: Reported on 2024), Disp: , Rfl:     melatonin 5 MG tablet tablet, Take 1 tablet by mouth Every Night. (Patient not taking: Reported on 2024), Disp: 30 tablet, Rfl: 0    rosuvastatin (CRESTOR) 20 MG tablet, Take 1 tablet by mouth Every Night. (Patient not taking: Reported on 2024), Disp: 30 tablet, Rfl: 0    Allergies:   Allergies   Allergen Reactions    Bactrim [Sulfamethoxazole-Trimethoprim] Rash    Bacitra-Neomycin-Polymyxin-Hc Unknown - Low Severity    Adhesive Tape Rash    Bacitracin-Polymyxin B Rash    Neosporin [Neomycin-Bacitracin Zn-Polymyx] Rash       Family History:   Family History   Problem Relation Age of Onset    Diabetes Mother     Heart disease Father     Hypertension Father     Hyperlipidemia Father     Diabetes Sister     Diabetes Maternal Grandfather     Diabetes Sister     Diabetes Sister        Social History:   Social History     Socioeconomic History    Marital status:    Tobacco Use    Smoking status: Former     Current packs/day: 0.00     Types: Cigarettes     Quit date:      Years since quittin.5     Passive exposure: Never    Smokeless tobacco: Former   Vaping Use    Vaping status: Never Used   Substance and Sexual Activity    Alcohol use: No    Drug use: No    Sexual activity: Never       Objective     Vital Signs  /56 (BP Location: Right arm, Patient Position: Sitting, Cuff Size: Adult)   Pulse 68   Temp 97.1 °F (36.2 °C) (Infrared)   Resp 20   Estimated body mass index is 28.37 kg/m² as calculated from the following:    Height as of 24: 185.4 cm (73\").    Weight as " of 5/28/24: 97.5 kg (215 lb).            Physical Exam  Vitals and nursing note reviewed.   Constitutional:       Appearance: Normal appearance.   Cardiovascular:      Rate and Rhythm: Normal rate and regular rhythm.      Pulses: Normal pulses.      Heart sounds: Normal heart sounds.   Pulmonary:      Effort: Pulmonary effort is normal.      Breath sounds: Normal breath sounds.   Skin:     General: Skin is warm and dry.      Comments: Able to assess scalp and lower extremities as some of feet however patient was aggressive when trying to evaluate feet lower extremities have multiple excoriations with mild erythema as to scalp   Neurological:      Mental Status: He is alert. He is disoriented.      Comments: Patient present in wheelchair   Psychiatric:         Mood and Affect: Mood normal.         Behavior: Behavior is aggressive (during exam).         Cognition and Memory: Cognition is impaired. Memory is impaired.         Judgment: Judgment is inappropriate.        Exam limited due to patient aggression  Physical Exam      Assessment and Plan     Assessment & Plan      Problem List Items Addressed This Visit       Type 2 diabetes mellitus with hyperglycemia, with long-term current use of insulin    Relevant Orders    Comprehensive Metabolic Panel    CBC & Differential    Lipid Panel    TSH    Hemoglobin A1c    Phenobarbital level    Hyperlipidemia    Relevant Orders    Comprehensive Metabolic Panel    CBC & Differential    Lipid Panel    TSH    Hemoglobin A1c    Phenobarbital level    Dementia with behavioral disturbance - Primary    Relevant Orders    Comprehensive Metabolic Panel    CBC & Differential    Lipid Panel    TSH    Hemoglobin A1c    Phenobarbital level    AMS (altered mental status)    Relevant Orders    Comprehensive Metabolic Panel    CBC & Differential    Lipid Panel    TSH    Hemoglobin A1c    Phenobarbital level     Other Visit Diagnoses       Muscular deconditioning        Relevant Orders     Comprehensive Metabolic Panel    CBC & Differential    Lipid Panel    TSH    Hemoglobin A1c    Phenobarbital level    Weakness of both lower extremities        Relevant Orders    Comprehensive Metabolic Panel    CBC & Differential    Lipid Panel    TSH    Hemoglobin A1c    Phenobarbital level    Unable to ambulate        Relevant Orders    Comprehensive Metabolic Panel    CBC & Differential    Lipid Panel    TSH    Hemoglobin A1c    Phenobarbital level    Dementia without behavioral disturbance        Relevant Orders    Comprehensive Metabolic Panel    CBC & Differential    Lipid Panel    TSH    Hemoglobin A1c    Phenobarbital level    Severe dementia with agitation, unspecified dementia type        Relevant Orders    Comprehensive Metabolic Panel    CBC & Differential    Lipid Panel    TSH    Hemoglobin A1c    Phenobarbital level    Type 2 diabetes mellitus with diabetic peripheral angiopathy without gangrene, with long-term current use of insulin        Relevant Orders    Comprehensive Metabolic Panel    CBC & Differential    Lipid Panel    TSH    Hemoglobin A1c    Phenobarbital level    Diabetic ulcer of both feet        Relevant Orders    Comprehensive Metabolic Panel    CBC & Differential    Lipid Panel    TSH    Hemoglobin A1c    Phenobarbital level    Essential hypertension        Relevant Orders    Comprehensive Metabolic Panel    CBC & Differential    Lipid Panel    TSH    Hemoglobin A1c    Phenobarbital level    Urinary incontinence, unspecified type        Relevant Medications    Incontinence Supply Disposable (Certainty Fitted Briefs XL) misc    Incontinence Supply Disposable (Certainty Cleansing Wipes) misc    Other Relevant Orders    Miscellaneous DME    Incontinence of feces, unspecified fecal incontinence type        Relevant Medications    Incontinence Supply Disposable (Certainty Fitted Briefs XL) misc    Incontinence Supply Disposable (Certainty Cleansing Wipes) misc    Other Relevant Orders     Miscellaneous DME    Vitamin B 12 deficiency                  Reviewed medications, potential side effects and signs and symptoms to report. Discussed risk versus benefits of treatment plan with patient and/or family-including medications, labs and radiology that may be ordered. Addressed questions and concerns during visit. Patient and/or family verbalized understanding and agree with plan. Instructed to call the office with any questions and report to ER with any life-threatening symptoms.     Follow Up  No follow-ups on file.    Patient or patient representative verbalized consent for the use of Ambient Listening during the visit with  Humera Lau PA-C for chart documentation. 7/11/2024  12:03 EDT    HAZEL Murdock [facility]  [address] [address] [phone]

## 2024-07-12 ENCOUNTER — TELEPHONE (OUTPATIENT)
Dept: INTERNAL MEDICINE | Facility: CLINIC | Age: 69
End: 2024-07-12
Payer: MEDICARE

## 2024-07-12 NOTE — TELEPHONE ENCOUNTER
Patient's wife has been called and states that she will call her insurance company to find out where to send patient's DME incontinence supplies and will call the office back with company and fax. Forms are located on Carla's desk until faxed. She demonstrates understanding and appreciation.

## 2024-07-15 ENCOUNTER — TELEPHONE (OUTPATIENT)
Dept: INTERNAL MEDICINE | Facility: CLINIC | Age: 69
End: 2024-07-15
Payer: MEDICARE

## 2024-07-15 NOTE — TELEPHONE ENCOUNTER
Patient was not able to have blood work drawn at the time of his appointment as we did not have a butterfly needle. They are needing an order for home health for blood draw at home as she is unable to get him back to the office for blood work. Agency is [facility]   Fax number is [phone]

## 2024-07-17 ENCOUNTER — TELEPHONE (OUTPATIENT)
Dept: INTERNAL MEDICINE | Facility: CLINIC | Age: 69
End: 2024-07-17

## 2024-07-17 DIAGNOSIS — F03.911 AGITATION DUE TO DEMENTIA: ICD-10-CM

## 2024-07-17 DIAGNOSIS — F03.918 DEMENTIA WITH BEHAVIORAL DISTURBANCE: ICD-10-CM

## 2024-07-17 DIAGNOSIS — E78.5 HYPERLIPIDEMIA, UNSPECIFIED HYPERLIPIDEMIA TYPE: ICD-10-CM

## 2024-07-17 DIAGNOSIS — E53.8 VITAMIN B 12 DEFICIENCY: ICD-10-CM

## 2024-07-17 DIAGNOSIS — E11.65 TYPE 2 DIABETES MELLITUS WITH HYPERGLYCEMIA, WITH LONG-TERM CURRENT USE OF INSULIN: ICD-10-CM

## 2024-07-17 DIAGNOSIS — I10 ESSENTIAL HYPERTENSION: ICD-10-CM

## 2024-07-17 DIAGNOSIS — F03.918 DEMENTIA WITH BEHAVIORAL DISTURBANCE: Primary | ICD-10-CM

## 2024-07-17 DIAGNOSIS — Z79.4 TYPE 2 DIABETES MELLITUS WITH HYPERGLYCEMIA, WITH LONG-TERM CURRENT USE OF INSULIN: ICD-10-CM

## 2024-07-17 RX ORDER — DONEPEZIL HYDROCHLORIDE 10 MG/1
10 TABLET, FILM COATED ORAL 2 TIMES DAILY
Qty: 60 TABLET | Refills: 5 | Status: SHIPPED | OUTPATIENT
Start: 2024-07-17

## 2024-07-17 RX ORDER — DONEPEZIL HYDROCHLORIDE 10 MG/1
10 TABLET, FILM COATED ORAL 2 TIMES DAILY
Qty: 60 TABLET | Refills: 0 | OUTPATIENT
Start: 2024-07-17

## 2024-07-17 RX ORDER — LISINOPRIL 20 MG/1
20 TABLET ORAL DAILY
Qty: 30 TABLET | Refills: 0 | OUTPATIENT
Start: 2024-07-17

## 2024-07-17 RX ORDER — FOLIC ACID 1 MG/1
1000 TABLET ORAL DAILY
Qty: 30 TABLET | Refills: 5 | Status: SHIPPED | OUTPATIENT
Start: 2024-07-17

## 2024-07-17 RX ORDER — AMLODIPINE BESYLATE 10 MG/1
10 TABLET ORAL DAILY
Qty: 30 TABLET | Refills: 0 | Status: SHIPPED | OUTPATIENT
Start: 2024-07-17

## 2024-07-17 RX ORDER — AMLODIPINE BESYLATE 10 MG/1
10 TABLET ORAL
Qty: 30 TABLET | Refills: 0 | OUTPATIENT
Start: 2024-07-17

## 2024-07-17 RX ORDER — CLOPIDOGREL BISULFATE 75 MG/1
75 TABLET ORAL DAILY
Qty: 30 TABLET | Refills: 0 | OUTPATIENT
Start: 2024-07-17

## 2024-07-17 RX ORDER — BISOPROLOL FUMARATE 5 MG/1
5 TABLET, FILM COATED ORAL DAILY
Qty: 30 TABLET | Refills: 5 | Status: SHIPPED | OUTPATIENT
Start: 2024-07-17

## 2024-07-17 RX ORDER — LISINOPRIL 20 MG/1
20 TABLET ORAL DAILY
Qty: 30 TABLET | Refills: 0 | Status: SHIPPED | OUTPATIENT
Start: 2024-07-17

## 2024-07-17 RX ORDER — ROSUVASTATIN CALCIUM 20 MG/1
20 TABLET, COATED ORAL NIGHTLY
Qty: 30 TABLET | Refills: 0 | OUTPATIENT
Start: 2024-07-17

## 2024-07-17 RX ORDER — UREA 10 %
5 LOTION (ML) TOPICAL NIGHTLY
Qty: 30 TABLET | Refills: 0 | OUTPATIENT
Start: 2024-07-17

## 2024-07-17 RX ORDER — FOLIC ACID 1 MG/1
1 TABLET ORAL DAILY
Qty: 30 TABLET | Refills: 0 | OUTPATIENT
Start: 2024-07-17

## 2024-07-17 RX ORDER — BISOPROLOL FUMARATE 5 MG/1
5 TABLET, FILM COATED ORAL
Qty: 30 TABLET | Refills: 0 | OUTPATIENT
Start: 2024-07-17

## 2024-07-17 RX ORDER — ROSUVASTATIN CALCIUM 20 MG/1
20 TABLET, COATED ORAL
Qty: 30 TABLET | Refills: 5 | Status: SHIPPED | OUTPATIENT
Start: 2024-07-17

## 2024-07-17 RX ORDER — CLOPIDOGREL BISULFATE 75 MG/1
75 TABLET ORAL DAILY
Qty: 30 TABLET | Refills: 5 | Status: SHIPPED | OUTPATIENT
Start: 2024-07-17

## 2024-07-17 RX ORDER — ASPIRIN 81 MG/1
81 TABLET, CHEWABLE ORAL DAILY
Qty: 30 TABLET | Refills: 5 | Status: SHIPPED | OUTPATIENT
Start: 2024-07-17

## 2024-07-17 RX ORDER — MULTIVIT,CALC,MINS/IRON/FOLIC 9MG-400MCG
5 TABLET ORAL
Qty: 30 EACH | Refills: 5 | Status: SHIPPED | OUTPATIENT
Start: 2024-07-17

## 2024-07-17 RX ORDER — ASPIRIN 81 MG/1
81 TABLET, CHEWABLE ORAL DAILY
Qty: 30 TABLET | Refills: 0 | OUTPATIENT
Start: 2024-07-17

## 2024-07-17 NOTE — TELEPHONE ENCOUNTER
Caller: COURTNEY    Relationship: Other [facility]    Best call back number:      What is the best time to reach you: ANYTIME    Who are you requesting to speak with (clinical staff, provider,  specific staff member): CLINICAL STAFF    Do you know the name of the person who called: COURTNEY    What was the call regarding: [facility] WAS SENT ORDERS TO DRAW LABS, HOWEVER THEY CAN'T DRAW THE LABS AS PATIENT IS BEING SEEN ONLY FOR THERAPY AND THEY CAN'T SEND OUT SOMEONE AS THIS WOULD BE SKILLED NURSING AND THEY CAN'T JUST SEND OUT A NURSE ONLY FOR THIS    Is it okay if the provider responds through MyChart: CALL BACK

## 2024-07-17 NOTE — TELEPHONE ENCOUNTER
Caller: WILLIE RICHTER    Relationship: Emergency Contact    Best call back number: [phone]     Requested Prescriptions:   Requested Prescriptions     Pending Prescriptions Disp Refills    amLODIPine (NORVASC) 10 MG tablet 30 tablet 0     Sig: Take 1 tablet by mouth Daily. Indications: High Blood Pressure Disorder    aspirin 81 MG chewable tablet 30 tablet 0     Sig: Chew 1 tablet Daily.    bisoprolol (ZEBeta) 5 MG tablet 30 tablet 0     Sig: Take 1 tablet by mouth Daily.    clopidogrel (PLAVIX) 75 MG tablet 30 tablet 0     Sig: Take 1 tablet by mouth Daily.    donepezil (ARICEPT) 10 MG tablet 60 tablet 0     Sig: Take 1 tablet by mouth 2 (Two) Times a Day.    folic acid (FOLVITE) 1 MG tablet 30 tablet 0     Sig: Take 1 tablet by mouth Daily.    lisinopril (PRINIVIL,ZESTRIL) 20 MG tablet 30 tablet 0     Sig: Take 1 tablet by mouth Daily. Indications: High Blood Pressure Disorder    melatonin 5 MG tablet tablet 30 tablet 0     Sig: Take 1 tablet by mouth Every Night.    metFORMIN (GLUCOPHAGE) 1000 MG tablet       Sig: Take 1 tablet by mouth 2 (Two) Times a Day With Meals.    sertraline (ZOLOFT) 50 MG tablet 30 tablet 0     Sig: Take 1 tablet by mouth Daily.    rosuvastatin (CRESTOR) 20 MG tablet 30 tablet 0     Sig: Take 1 tablet by mouth Every Night.        temazepam (RESTORIL) 15 MG capsule       PHENobarbital 32.4 MG tablet       Pharmacy where request should be sent: [facility] [place], [place] - [address] [phone] [facility] [phone]      Last office visit with prescribing clinician: 8/3/2023   Last telemedicine visit with prescribing clinician: Visit date not found   Next office visit with prescribing clinician: Visit date not found     Additional details provided by patient: PATIENT STATED THAT HE HAS 6 MORNING DOSES AND 9 NIGHT DOSES.       PATIENT STATED THAT SHE IS PUTTING THESE INTO PILL PACKS SO PLEASE LET PHARMACY KNOW THAT THIS IS HOW THEY ARE DISPENSED.      MORNING:    PINE 10MG   ASPRIN 81   BISOPROLOL 5MG   clopidogrel (PLAVIX) 75 MG tablet  donepezil (ARICEPT) 10 MG tablet  folic acid (FOLVITE) 1 MG tablet  lisinopril (PRINIVIL,ZESTRIL) 20 MG tablet  metFOR  AMLODIMIN (GLUCOPHAGE) 1000 MG tablet  sertraline (ZOLOFT) 50 MG tablet    NIGHT:    melatonin 5 MG tablet tablet  metFORMIN (GLUCOPHAGE) 1000 MG tablet  rosuvastatin (CRESTOR) 20 MG tablet  sertraline (ZOLOFT) 50 MG tablet    CONTROLLED SUBSTANCE:     temazepam (RESTORIL) 15 MG capsule   PHENobarbital 32.4 MG tablet

## 2024-07-18 RX ORDER — TEMAZEPAM 15 MG/1
15 CAPSULE ORAL NIGHTLY PRN
Qty: 30 CAPSULE | Refills: 0 | Status: SHIPPED | OUTPATIENT
Start: 2024-07-18

## 2024-07-18 RX ORDER — PHENOBARBITAL 32.4 MG/1
32.4 TABLET ORAL 3 TIMES DAILY
Qty: 9 TABLET | Refills: 0 | Status: SHIPPED | OUTPATIENT
Start: 2024-07-18

## 2024-07-20 RX ORDER — AMOXICILLIN AND CLAVULANATE POTASSIUM 875; 125 MG/1; MG/1
1 TABLET, FILM COATED ORAL 2 TIMES DAILY
Qty: 20 TABLET | Refills: 0 | Status: SHIPPED | OUTPATIENT
Start: 2024-07-20

## 2024-07-23 ENCOUNTER — TELEPHONE (OUTPATIENT)
Dept: INTERNAL MEDICINE | Facility: CLINIC | Age: 69
End: 2024-07-23
Payer: MEDICARE

## 2024-07-23 NOTE — TELEPHONE ENCOUNTER
Spoke with Paula at [facility], gave verbal order from Dr Geller for skilled nursing to evaluate and treat for wound care to leg.  Verbalized understanding and agreement.  States they will do.

## 2024-07-30 ENCOUNTER — TELEPHONE (OUTPATIENT)
Dept: INTERNAL MEDICINE | Facility: CLINIC | Age: 69
End: 2024-07-30
Payer: MEDICARE

## 2024-07-30 DIAGNOSIS — F03.918 DEMENTIA WITH BEHAVIORAL DISTURBANCE: Primary | ICD-10-CM

## 2024-07-30 NOTE — TELEPHONE ENCOUNTER
Patient's wife called. Patient is not wanting to eat and needs something called in that will increase his appetite. He is doing better in some ways and is trying to feed himself, but he is still acting aggressive and holding on too tightly to caregivers.  Call: [phone]

## 2024-07-31 RX ORDER — MEGESTROL ACETATE 20 MG/1
20 TABLET ORAL DAILY
Qty: 30 TABLET | Refills: 1 | Status: SHIPPED | OUTPATIENT
Start: 2024-07-31

## 2024-07-31 NOTE — TELEPHONE ENCOUNTER
Message relayed to Ms. Berumen   I have sent in a prescription for megace.  Des Geller MD  07:30 EDT  07/31/24   Good understanding verbalized.     Adrianne - private nurse added she will like for Dr. Geller to call her to discuss patient's behavioral health and medicine on [phone]

## 2024-08-02 ENCOUNTER — TELEPHONE (OUTPATIENT)
Dept: INTERNAL MEDICINE | Facility: CLINIC | Age: 69
End: 2024-08-02
Payer: MEDICARE
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
## 2024-08-04 ENCOUNTER — APPOINTMENT (OUTPATIENT)
Dept: GENERAL RADIOLOGY | Facility: HOSPITAL | Age: 69
End: 2024-08-04
Payer: MEDICARE

## 2024-08-04 ENCOUNTER — APPOINTMENT (OUTPATIENT)
Dept: CT IMAGING | Facility: HOSPITAL | Age: 69
End: 2024-08-04
Payer: MEDICARE

## 2024-08-04 ENCOUNTER — HOSPITAL ENCOUNTER (EMERGENCY)
Facility: HOSPITAL | Age: 69
Discharge: HOME | End: 2024-08-04
Attending: EMERGENCY MEDICINE | Admitting: EMERGENCY MEDICINE
Payer: MEDICARE

## 2024-08-04 VITALS
HEIGHT: 73 IN | SYSTOLIC BLOOD PRESSURE: 155 MMHG | RESPIRATION RATE: 18 BRPM | OXYGEN SATURATION: 100 % | BODY MASS INDEX: 26.51 KG/M2 | WEIGHT: 200 LBS | DIASTOLIC BLOOD PRESSURE: 72 MMHG | TEMPERATURE: 98.1 F | HEART RATE: 56 BPM

## 2024-08-04 DIAGNOSIS — R46.89 COMBATIVE BEHAVIOR: Primary | ICD-10-CM

## 2024-08-04 DIAGNOSIS — Z86.59 HISTORY OF DEMENTIA: ICD-10-CM

## 2024-08-04 DIAGNOSIS — E83.42 HYPOMAGNESEMIA: ICD-10-CM

## 2024-08-04 LAB
ALBUMIN SERPL-MCNC: 3.6 G/DL (ref 3.5–5.2)
ALBUMIN/GLOB SERPL: 1.1 G/DL
ALP SERPL-CCNC: 129 U/L (ref 39–117)
ALT SERPL W P-5'-P-CCNC: 15 U/L (ref 1–41)
AMMONIA BLD-SCNC: 20 UMOL/L (ref 16–60)
AMPHET+METHAMPHET UR QL: NEGATIVE
AMPHETAMINES UR QL: NEGATIVE
ANION GAP SERPL CALCULATED.3IONS-SCNC: 11 MMOL/L (ref 5–15)
APAP SERPL-MCNC: <5 MCG/ML (ref 0–30)
AST SERPL-CCNC: 15 U/L (ref 1–40)
BARBITURATES UR QL SCN: POSITIVE
BASOPHILS # BLD AUTO: 0.1 10*3/MM3 (ref 0–0.2)
BASOPHILS NFR BLD AUTO: 0.9 % (ref 0–1.5)
BENZODIAZ UR QL SCN: POSITIVE
BILIRUB SERPL-MCNC: 0.4 MG/DL (ref 0–1.2)
BILIRUB UR QL STRIP: NEGATIVE
BUN SERPL-MCNC: 13 MG/DL (ref 8–23)
BUN/CREAT SERPL: 17.8 (ref 7–25)
BUPRENORPHINE SERPL-MCNC: NEGATIVE NG/ML
CALCIUM SPEC-SCNC: 9.2 MG/DL (ref 8.6–10.5)
CANNABINOIDS SERPL QL: NEGATIVE
CHLORIDE SERPL-SCNC: 101 MMOL/L (ref 98–107)
CLARITY UR: CLEAR
CO2 SERPL-SCNC: 25 MMOL/L (ref 22–29)
COCAINE UR QL: NEGATIVE
COLOR UR: YELLOW
CREAT SERPL-MCNC: 0.73 MG/DL (ref 0.76–1.27)
DEPRECATED RDW RBC AUTO: 40.2 FL (ref 37–54)
EGFRCR SERPLBLD CKD-EPI 2021: 99.1 ML/MIN/1.73
EOSINOPHIL # BLD AUTO: 0.31 10*3/MM3 (ref 0–0.4)
EOSINOPHIL NFR BLD AUTO: 2.9 % (ref 0.3–6.2)
ERYTHROCYTE [DISTWIDTH] IN BLOOD BY AUTOMATED COUNT: 12.3 % (ref 12.3–15.4)
ETHANOL BLD-MCNC: <10 MG/DL (ref 0–10)
FENTANYL UR-MCNC: NEGATIVE NG/ML
GLOBULIN UR ELPH-MCNC: 3.2 GM/DL
GLUCOSE SERPL-MCNC: 139 MG/DL (ref 65–99)
GLUCOSE UR STRIP-MCNC: NEGATIVE MG/DL
HCT VFR BLD AUTO: 35.7 % (ref 37.5–51)
HGB BLD-MCNC: 12.4 G/DL (ref 13–17.7)
HGB UR QL STRIP.AUTO: NEGATIVE
HOLD SPECIMEN: NORMAL
IMM GRANULOCYTES # BLD AUTO: 0.05 10*3/MM3 (ref 0–0.05)
IMM GRANULOCYTES NFR BLD AUTO: 0.5 % (ref 0–0.5)
KETONES UR QL STRIP: NEGATIVE
LEUKOCYTE ESTERASE UR QL STRIP.AUTO: NEGATIVE
LYMPHOCYTES # BLD AUTO: 3.11 10*3/MM3 (ref 0.7–3.1)
LYMPHOCYTES NFR BLD AUTO: 29.1 % (ref 19.6–45.3)
MAGNESIUM SERPL-MCNC: 1.2 MG/DL (ref 1.6–2.4)
MCH RBC QN AUTO: 31.2 PG (ref 26.6–33)
MCHC RBC AUTO-ENTMCNC: 34.7 G/DL (ref 31.5–35.7)
MCV RBC AUTO: 89.7 FL (ref 79–97)
METHADONE UR QL SCN: NEGATIVE
MONOCYTES # BLD AUTO: 1.06 10*3/MM3 (ref 0.1–0.9)
MONOCYTES NFR BLD AUTO: 9.9 % (ref 5–12)
NEUTROPHILS NFR BLD AUTO: 56.7 % (ref 42.7–76)
NEUTROPHILS NFR BLD AUTO: 6.07 10*3/MM3 (ref 1.7–7)
NITRITE UR QL STRIP: NEGATIVE
NRBC BLD AUTO-RTO: 0 /100 WBC (ref 0–0.2)
OPIATES UR QL: NEGATIVE
OXYCODONE UR QL SCN: NEGATIVE
PCP UR QL SCN: NEGATIVE
PH UR STRIP.AUTO: 7.5 [PH] (ref 5–8)
PHENOBARB SERPL-MCNC: 11.4 MCG/ML (ref 10–30)
PLATELET # BLD AUTO: 422 10*3/MM3 (ref 140–450)
PMV BLD AUTO: 9.2 FL (ref 6–12)
POTASSIUM SERPL-SCNC: 4.1 MMOL/L (ref 3.5–5.2)
PROT SERPL-MCNC: 6.8 G/DL (ref 6–8.5)
PROT UR QL STRIP: ABNORMAL
RBC # BLD AUTO: 3.98 10*6/MM3 (ref 4.14–5.8)
SALICYLATES SERPL-MCNC: <0.3 MG/DL
SODIUM SERPL-SCNC: 137 MMOL/L (ref 136–145)
SP GR UR STRIP: 1.02 (ref 1–1.03)
T4 FREE SERPL-MCNC: 1.26 NG/DL (ref 0.92–1.68)
TRICYCLICS UR QL SCN: NEGATIVE
TROPONIN T SERPL HS-MCNC: 34 NG/L
TSH SERPL DL<=0.05 MIU/L-ACNC: 3.08 UIU/ML (ref 0.27–4.2)
UROBILINOGEN UR QL STRIP: ABNORMAL
WBC NRBC COR # BLD AUTO: 10.7 10*3/MM3 (ref 3.4–10.8)
WHOLE BLOOD HOLD COAG: NORMAL
WHOLE BLOOD HOLD SPECIMEN: NORMAL

## 2024-08-04 PROCEDURE — 83735 ASSAY OF MAGNESIUM: CPT | Performed by: EMERGENCY MEDICINE

## 2024-08-04 PROCEDURE — 80179 DRUG ASSAY SALICYLATE: CPT | Performed by: EMERGENCY MEDICINE

## 2024-08-04 PROCEDURE — 81003 URINALYSIS AUTO W/O SCOPE: CPT | Performed by: EMERGENCY MEDICINE

## 2024-08-04 PROCEDURE — 71045 X-RAY EXAM CHEST 1 VIEW: CPT

## 2024-08-04 PROCEDURE — 96372 THER/PROPH/DIAG INJ SC/IM: CPT

## 2024-08-04 PROCEDURE — 84443 ASSAY THYROID STIM HORMONE: CPT | Performed by: EMERGENCY MEDICINE

## 2024-08-04 PROCEDURE — 25010000002 DIPHENHYDRAMINE PER 50 MG: Performed by: EMERGENCY MEDICINE

## 2024-08-04 PROCEDURE — 80053 COMPREHEN METABOLIC PANEL: CPT | Performed by: EMERGENCY MEDICINE

## 2024-08-04 PROCEDURE — 82077 ASSAY SPEC XCP UR&BREATH IA: CPT | Performed by: EMERGENCY MEDICINE

## 2024-08-04 PROCEDURE — 84439 ASSAY OF FREE THYROXINE: CPT | Performed by: EMERGENCY MEDICINE

## 2024-08-04 PROCEDURE — 70450 CT HEAD/BRAIN W/O DYE: CPT

## 2024-08-04 PROCEDURE — 25010000002 LORAZEPAM PER 2 MG: Performed by: EMERGENCY MEDICINE

## 2024-08-04 PROCEDURE — P9612 CATHETERIZE FOR URINE SPEC: HCPCS

## 2024-08-04 PROCEDURE — 93005 ELECTROCARDIOGRAM TRACING: CPT | Performed by: EMERGENCY MEDICINE

## 2024-08-04 PROCEDURE — 84484 ASSAY OF TROPONIN QUANT: CPT | Performed by: EMERGENCY MEDICINE

## 2024-08-04 PROCEDURE — 85025 COMPLETE CBC W/AUTO DIFF WBC: CPT | Performed by: EMERGENCY MEDICINE

## 2024-08-04 PROCEDURE — 80307 DRUG TEST PRSMV CHEM ANLYZR: CPT | Performed by: EMERGENCY MEDICINE

## 2024-08-04 PROCEDURE — 82140 ASSAY OF AMMONIA: CPT | Performed by: EMERGENCY MEDICINE

## 2024-08-04 PROCEDURE — 96375 TX/PRO/DX INJ NEW DRUG ADDON: CPT

## 2024-08-04 PROCEDURE — 80143 DRUG ASSAY ACETAMINOPHEN: CPT | Performed by: EMERGENCY MEDICINE

## 2024-08-04 PROCEDURE — 25010000002 HALOPERIDOL LACTATE PER 5 MG

## 2024-08-04 PROCEDURE — 80184 ASSAY OF PHENOBARBITAL: CPT | Performed by: EMERGENCY MEDICINE

## 2024-08-04 PROCEDURE — 99284 EMERGENCY DEPT VISIT MOD MDM: CPT

## 2024-08-04 PROCEDURE — 96374 THER/PROPH/DIAG INJ IV PUSH: CPT

## 2024-08-04 RX ORDER — HALOPERIDOL 5 MG/ML
5 INJECTION INTRAMUSCULAR ONCE
Status: COMPLETED | OUTPATIENT
Start: 2024-08-04 | End: 2024-08-04

## 2024-08-04 RX ORDER — HALOPERIDOL 5 MG/ML
INJECTION INTRAMUSCULAR
Status: COMPLETED
Start: 2024-08-04 | End: 2024-08-04

## 2024-08-04 RX ORDER — LORAZEPAM 1 MG/1
1 TABLET ORAL EVERY 8 HOURS PRN
Qty: 25 TABLET | Refills: 0 | Status: SHIPPED | OUTPATIENT
Start: 2024-08-04

## 2024-08-04 RX ORDER — LORAZEPAM 2 MG/ML
2 INJECTION INTRAMUSCULAR ONCE
Status: COMPLETED | OUTPATIENT
Start: 2024-08-04 | End: 2024-08-04

## 2024-08-04 RX ORDER — DIPHENHYDRAMINE HYDROCHLORIDE 50 MG/ML
50 INJECTION INTRAMUSCULAR; INTRAVENOUS ONCE
Status: DISCONTINUED | OUTPATIENT
Start: 2024-08-04 | End: 2024-08-04

## 2024-08-04 RX ORDER — MAGNESIUM OXIDE 400 MG/1
400 TABLET ORAL DAILY
Qty: 3 TABLET | Refills: 0 | Status: SHIPPED | OUTPATIENT
Start: 2024-08-04 | End: 2024-08-07

## 2024-08-04 RX ORDER — DIPHENHYDRAMINE HYDROCHLORIDE 50 MG/ML
50 INJECTION INTRAMUSCULAR; INTRAVENOUS ONCE
Status: COMPLETED | OUTPATIENT
Start: 2024-08-04 | End: 2024-08-04

## 2024-08-04 RX ORDER — LORAZEPAM 2 MG/ML
2 INJECTION INTRAMUSCULAR ONCE
Status: DISCONTINUED | OUTPATIENT
Start: 2024-08-04 | End: 2024-08-04

## 2024-08-04 RX ORDER — SODIUM CHLORIDE 0.9 % (FLUSH) 0.9 %
10 SYRINGE (ML) INJECTION AS NEEDED
Status: DISCONTINUED | OUTPATIENT
Start: 2024-08-04 | End: 2024-08-04 | Stop reason: HOSPADM

## 2024-08-04 RX ORDER — HYDROXYZINE HYDROCHLORIDE 25 MG/1
25 TABLET, FILM COATED ORAL 3 TIMES DAILY PRN
Qty: 20 TABLET | Refills: 0 | Status: SHIPPED | OUTPATIENT
Start: 2024-08-04

## 2024-08-04 RX ORDER — MAGNESIUM SULFATE HEPTAHYDRATE 40 MG/ML
2 INJECTION, SOLUTION INTRAVENOUS ONCE
Status: DISCONTINUED | OUTPATIENT
Start: 2024-08-04 | End: 2024-08-04 | Stop reason: HOSPADM

## 2024-08-04 RX ADMIN — DIPHENHYDRAMINE HYDROCHLORIDE 50 MG: 50 INJECTION INTRAMUSCULAR; INTRAVENOUS at 11:55

## 2024-08-04 RX ADMIN — LORAZEPAM 2 MG: 2 INJECTION INTRAMUSCULAR; INTRAVENOUS at 11:55

## 2024-08-04 RX ADMIN — HALOPERIDOL 5 MG: 5 INJECTION INTRAMUSCULAR at 10:57

## 2024-08-04 RX ADMIN — HALOPERIDOL LACTATE 5 MG: 5 INJECTION, SOLUTION INTRAMUSCULAR at 10:57

## 2024-08-04 NOTE — TELEPHONE ENCOUNTER
Discussed with Caroline and sent to ER due to worsening of behavioral condition.  Des Geller MD  18:16 EDT  08/04/24

## 2024-08-04 NOTE — ED PROVIDER NOTES
"Subjective   History of Present Illness  69-year-old male presents via EMS from home to be evaluated for altered mental status.  Of note, the patient has a history of dementia and is a very poor historian.  His history is obtained from his family at bedside as well as from EMS personnel.  According to them, the patient has become increasingly aggressive and combative over the past few days.  His wife contacted his primary care physician about his symptoms this morning and was advised to bring him to the emergency department to be \"checked out for a UTI.\"  No known fevers.  No known fall or head trauma.      Review of Systems   Unable to perform ROS: Dementia       Past Medical History:   Diagnosis Date    Coronary artery disease     Dementia     Diabetes mellitus     Hyperlipidemia     Peripheral vascular disease        Allergies   Allergen Reactions    Bactrim [Sulfamethoxazole-Trimethoprim] Rash    Bacitra-Neomycin-Polymyxin-Hc Unknown - Low Severity    Adhesive Tape Rash    Bacitracin-Polymyxin B Rash    Neosporin [Neomycin-Bacitracin Zn-Polymyx] Rash       Past Surgical History:   Procedure Laterality Date    CORONARY ARTERY BYPASS GRAFT      FEMORAL ARTERY - POPLITEAL ARTERY BYPASS GRAFT         Family History   Problem Relation Age of Onset    Diabetes Mother     Heart disease Father     Hypertension Father     Hyperlipidemia Father     Diabetes Sister     Diabetes Maternal Grandfather     Diabetes Sister     Diabetes Sister        Social History     Socioeconomic History    Marital status:    Tobacco Use    Smoking status: Former     Current packs/day: 0.00     Types: Cigarettes     Quit date:      Years since quittin.6     Passive exposure: Never    Smokeless tobacco: Former   Vaping Use    Vaping status: Never Used   Substance and Sexual Activity    Alcohol use: No    Drug use: No    Sexual activity: Never           Objective   Physical Exam  Vitals and nursing note reviewed. "   Constitutional:       Appearance: He is well-developed. He is not diaphoretic.      Comments: Chronically ill-appearing elderly male, combative   HENT:      Head: Normocephalic and atraumatic.   Neck:      Vascular: No JVD.      Comments: No meningeal signs or nuchal rigidity  Cardiovascular:      Rate and Rhythm: Normal rate and regular rhythm.      Heart sounds: Normal heart sounds. No murmur heard.     No friction rub. No gallop.   Pulmonary:      Effort: Pulmonary effort is normal. No respiratory distress.      Breath sounds: Normal breath sounds. No wheezing or rales.   Abdominal:      General: Bowel sounds are normal. There is no distension.      Palpations: Abdomen is soft. There is no mass.      Tenderness: There is no abdominal tenderness. There is no guarding.   Musculoskeletal:         General: Normal range of motion.   Skin:     General: Skin is warm and dry.      Coloration: Skin is not pale.      Findings: No erythema or rash.   Neurological:      Comments: Moving all 4 extremities, following some commands   Psychiatric:         Behavior: Behavior is agitated.         Thought Content: Thought content normal.         Judgment: Judgment normal.         Procedures           ED Course  ED Course as of 08/04/24 1322   Sun Aug 04, 2024   1115 69-year-old male presents via EMS from home to be evaluated for altered mental status.  Of note, the patient has a history of dementia.  According to his family and EMS personnel, the patient has become increasingly aggressive and combative over the past few days.  They contacted his primary care physician, and they were advised to send the patient to the emergency department to be evaluated.  On arrival, the patient is demented and combative.  Broad differential diagnosis.  Haldol given for chemical sedation in order to obtain workup.  We will obtain labs and imaging, and we will reassess following initial interventions. [DD]   1218 Labs remarkable for magnesium of  1.2.  Oral magnesium replacement initiated.  High-sensitivity troponin is mildly elevated but clinical presentation is clearly not consistent with ACS. [DD]   1300 I personally and independently reviewed the patient's CT images and findings, and I am in agreement with the radiologist regarding CT interpretation--particularly there is no emergent intracranial process present. [DD]      ED Course User Index  [DD] Jeffy López MD                                   Recent Results (from the past 24 hour(s))   Urinalysis With Culture If Indicated - Straight Cath    Collection Time: 08/04/24 11:07 AM    Specimen: Straight Cath; Urine   Result Value Ref Range    Color, UA Yellow Yellow, Straw    Appearance, UA Clear Clear    pH, UA 7.5 5.0 - 8.0    Specific Gravity, UA 1.022 1.001 - 1.030    Glucose, UA Negative Negative    Ketones, UA Negative Negative    Bilirubin, UA Negative Negative    Blood, UA Negative Negative    Protein, UA Trace (A) Negative    Leuk Esterase, UA Negative Negative    Nitrite, UA Negative Negative    Urobilinogen, UA 1.0 E.U./dL 0.2 - 1.0 E.U./dL   Urine Drug Screen - Straight Cath    Collection Time: 08/04/24 11:07 AM    Specimen: Straight Cath; Urine   Result Value Ref Range    THC, Screen, Urine Negative Negative    Phencyclidine (PCP), Urine Negative Negative    Cocaine Screen, Urine Negative Negative    Methamphetamine, Ur Negative Negative    Opiate Screen Negative Negative    Amphetamine Screen, Urine Negative Negative    Benzodiazepine Screen, Urine Positive (A) Negative    Tricyclic Antidepressants Screen Negative Negative    Methadone Screen, Urine Negative Negative    Barbiturates Screen, Urine Positive (A) Negative    Oxycodone Screen, Urine Negative Negative    Buprenorphine, Screen, Urine Negative Negative   Fentanyl, Urine - Straight Cath    Collection Time: 08/04/24 11:07 AM    Specimen: Straight Cath; Urine   Result Value Ref Range    Fentanyl, Urine Negative Negative    Comprehensive Metabolic Panel    Collection Time: 08/04/24 11:37 AM    Specimen: Blood   Result Value Ref Range    Glucose 139 (H) 65 - 99 mg/dL    BUN 13 8 - 23 mg/dL    Creatinine 0.73 (L) 0.76 - 1.27 mg/dL    Sodium 137 136 - 145 mmol/L    Potassium 4.1 3.5 - 5.2 mmol/L    Chloride 101 98 - 107 mmol/L    CO2 25.0 22.0 - 29.0 mmol/L    Calcium 9.2 8.6 - 10.5 mg/dL    Total Protein 6.8 6.0 - 8.5 g/dL    Albumin 3.6 3.5 - 5.2 g/dL    ALT (SGPT) 15 1 - 41 U/L    AST (SGOT) 15 1 - 40 U/L    Alkaline Phosphatase 129 (H) 39 - 117 U/L    Total Bilirubin 0.4 0.0 - 1.2 mg/dL    Globulin 3.2 gm/dL    A/G Ratio 1.1 g/dL    BUN/Creatinine Ratio 17.8 7.0 - 25.0    Anion Gap 11.0 5.0 - 15.0 mmol/L    eGFR 99.1 >60.0 mL/min/1.73   Single High Sensitivity Troponin T    Collection Time: 08/04/24 11:37 AM    Specimen: Blood   Result Value Ref Range    HS Troponin T 34 (H) <22 ng/L   Magnesium    Collection Time: 08/04/24 11:37 AM    Specimen: Blood   Result Value Ref Range    Magnesium 1.2 (L) 1.6 - 2.4 mg/dL   Green Top (Gel)    Collection Time: 08/04/24 11:37 AM   Result Value Ref Range    Extra Tube Hold for add-ons.    Lavender Top    Collection Time: 08/04/24 11:37 AM   Result Value Ref Range    Extra Tube hold for add-on    Gold Top - SST    Collection Time: 08/04/24 11:37 AM   Result Value Ref Range    Extra Tube Hold for add-ons.    Gray Top    Collection Time: 08/04/24 11:37 AM   Result Value Ref Range    Extra Tube Hold for add-ons.    Light Blue Top    Collection Time: 08/04/24 11:37 AM   Result Value Ref Range    Extra Tube Hold for add-ons.    CBC Auto Differential    Collection Time: 08/04/24 11:37 AM    Specimen: Blood   Result Value Ref Range    WBC 10.70 3.40 - 10.80 10*3/mm3    RBC 3.98 (L) 4.14 - 5.80 10*6/mm3    Hemoglobin 12.4 (L) 13.0 - 17.7 g/dL    Hematocrit 35.7 (L) 37.5 - 51.0 %    MCV 89.7 79.0 - 97.0 fL    MCH 31.2 26.6 - 33.0 pg    MCHC 34.7 31.5 - 35.7 g/dL    RDW 12.3 12.3 - 15.4 %    RDW-SD 40.2  37.0 - 54.0 fl    MPV 9.2 6.0 - 12.0 fL    Platelets 422 140 - 450 10*3/mm3    Neutrophil % 56.7 42.7 - 76.0 %    Lymphocyte % 29.1 19.6 - 45.3 %    Monocyte % 9.9 5.0 - 12.0 %    Eosinophil % 2.9 0.3 - 6.2 %    Basophil % 0.9 0.0 - 1.5 %    Immature Grans % 0.5 0.0 - 0.5 %    Neutrophils, Absolute 6.07 1.70 - 7.00 10*3/mm3    Lymphocytes, Absolute 3.11 (H) 0.70 - 3.10 10*3/mm3    Monocytes, Absolute 1.06 (H) 0.10 - 0.90 10*3/mm3    Eosinophils, Absolute 0.31 0.00 - 0.40 10*3/mm3    Basophils, Absolute 0.10 0.00 - 0.20 10*3/mm3    Immature Grans, Absolute 0.05 0.00 - 0.05 10*3/mm3    nRBC 0.0 0.0 - 0.2 /100 WBC   TSH    Collection Time: 08/04/24 11:37 AM    Specimen: Blood   Result Value Ref Range    TSH 3.080 0.270 - 4.200 uIU/mL   T4, Free    Collection Time: 08/04/24 11:37 AM    Specimen: Blood   Result Value Ref Range    Free T4 1.26 0.92 - 1.68 ng/dL   Ammonia    Collection Time: 08/04/24 11:37 AM    Specimen: Blood   Result Value Ref Range    Ammonia 20 16 - 60 umol/L   Salicylate Level    Collection Time: 08/04/24 11:37 AM    Specimen: Blood   Result Value Ref Range    Salicylate <0.3 <=30.0 mg/dL   Ethanol    Collection Time: 08/04/24 11:37 AM    Specimen: Blood   Result Value Ref Range    Ethanol <10 0 - 10 mg/dL   Acetaminophen Level    Collection Time: 08/04/24 11:37 AM    Specimen: Blood   Result Value Ref Range    Acetaminophen <5.0 0.0 - 30.0 mcg/mL   Phenobarbital Level    Collection Time: 08/04/24 11:37 AM    Specimen: Blood   Result Value Ref Range    Phenobarbital 11.4 10.0 - 30.0 mcg/mL   ECG 12 Lead ED Triage Standing Order; Weak / Dizzy / AMS    Collection Time: 08/04/24 12:08 PM   Result Value Ref Range    QT Interval 466 ms    QTC Interval 449 ms     Note: In addition to lab results from this visit, the labs listed above may include labs taken at another facility or during a different encounter within the last 24 hours. Please correlate lab times with ED admission and discharge times for  further clarification of the services performed during this visit.    XR Chest 1 View   Final Result   There is no significant change when compared to the prior study. There is no evidence for acute cardiopulmonary process.            Electronically Signed: Jose Gomez MD     8/4/2024 1:05 PM EDT     Workstation ID: [id number]      CT Head Without Contrast   Final Result   1.No evidence for acute intracranial abnormality.   2.Nonspecific white matter changes are noted with associated diffuse volume loss. These findings are likely related to chronic small vessel ischemic changes and/or age-related changes.            Electronically Signed: Jose Gomez MD     8/4/2024 12:51 PM EDT     Workstation ID: [id number]        Vitals:    08/04/24 1223 08/04/24 1228 08/04/24 1300 08/04/24 1301   BP: 130/59  155/72    BP Location:       Patient Position:       Pulse: 57   56   Resp:       Temp:  98.1 °F (36.7 °C)     TempSrc:  Axillary     SpO2: 96%   100%   Weight:       Height:         Medications   sodium chloride 0.9 % flush 10 mL (has no administration in time range)   magnesium sulfate 2g/50 mL (PREMIX) infusion (2 g Intravenous Not Given 8/4/24 1309)   haloperidol lactate (HALDOL) injection 5 mg (5 mg Intramuscular Given 8/4/24 1057)   LORazepam (ATIVAN) injection 2 mg (2 mg Intravenous Given 8/4/24 1155)   diphenhydrAMINE (BENADRYL) injection 50 mg (50 mg Intravenous Given 8/4/24 1155)     ECG/EMG Results (last 24 hours)       Procedure Component Value Units Date/Time    ECG 12 Lead ED Triage Standing Order; Weak / Dizzy / AMS [[id number]] Collected: 08/04/24 1208     Updated: 08/04/24 1207     QT Interval 466 ms      QTC Interval 449 ms     Narrative:      Test Reason : ED Triage Standing Order~  Blood Pressure :   */*   mmHG  Vent. Rate :  56 BPM     Atrial Rate :  56 BPM     P-R Int : 186 ms          QRS Dur : 102 ms      QT Int : 466 ms       P-R-T Axes :   7 -15  33 degrees     QTc Int : 449 ms    Sinus  bradycardia  Otherwise normal ECG  When compared with ECG of 28-MAY-2024 15:20,  Sinus rhythm has replaced Junctional rhythm  Vent. rate has decreased BY  62 BPM  Criteria for Anteroseptal infarct are no longer present    Referred By: EDMD           Confirmed By:           ECG 12 Lead ED Triage Standing Order; Weak / Dizzy / AMS   Preliminary Result   Test Reason : ED Triage Standing Order~   Blood Pressure :   */*   mmHG   Vent. Rate :  56 BPM     Atrial Rate :  56 BPM      P-R Int : 186 ms          QRS Dur : 102 ms       QT Int : 466 ms       P-R-T Axes :   7 -15  33 degrees      QTc Int : 449 ms      Sinus bradycardia   Otherwise normal ECG   When compared with ECG of 28-MAY-2024 15:20,   Sinus rhythm has replaced Junctional rhythm   Vent. rate has decreased BY  62 BPM   Criteria for Anteroseptal infarct are no longer present      Referred By: EDMD           Confirmed By:                     Medical Decision Making  Problems Addressed:  Combative behavior: complicated acute illness or injury  History of dementia: complicated acute illness or injury  Hypomagnesemia: complicated acute illness or injury    Amount and/or Complexity of Data Reviewed  Labs: ordered.  Radiology: ordered.  ECG/medicine tests: ordered.    Risk  OTC drugs.  Prescription drug management.        Final diagnoses:   Combative behavior   History of dementia   Hypomagnesemia       ED Disposition  ED Disposition       ED Disposition   Discharge    Condition   Stable    Comment   --               Des Geller MD  [address] [place], [place] [id number] [phone]    In 1 week           Medication List        New Prescriptions      hydrOXYzine 25 MG tablet  Commonly known as: ATARAX  Take 1 tablet by mouth 3 (Three) Times a Day As Needed for Anxiety.     LORazepam 1 MG tablet  Commonly known as: ATIVAN  Take 1 tablet by mouth Every 8 (Eight) Hours As Needed for Anxiety.     magnesium oxide 400 MG tablet  Commonly known as:  MAG-OX  Take 1 tablet by mouth Daily for 3 days.               Where to Get Your Medications        These medications were sent to [facility] - [place], [place] - [address] - [phone]  - [phone] [address], [facility] [address]      Phone: [phone]   hydrOXYzine 25 MG tablet  LORazepam 1 MG tablet  magnesium oxide 400 MG tablet            Jeffy López MD  08/04/24 1356

## 2024-08-05 LAB
QT INTERVAL: 466 MS
QTC INTERVAL: 449 MS

## 2024-08-05 NOTE — TELEPHONE ENCOUNTER
Reached Ms. Berumen, stated she had spoken to Dr. Hernandez and he advised that she take Pt to the ER which she did and he was given Lorazepam 1 mg tablet which helped and informed that Pt will need an office visit or V Visit with PCP to determine next steps. Ms. Berumen stated Ativan did help.

## 2024-08-07 ENCOUNTER — TELEPHONE (OUTPATIENT)
Dept: INTERNAL MEDICINE | Facility: CLINIC | Age: 69
End: 2024-08-07
Payer: MEDICARE

## 2024-08-07 NOTE — TELEPHONE ENCOUNTER
Spoke with patient wife, states patient is just not feeling well.  States OT just left and that his temp has been 99.3 and 99.7.  States he is sleeping again.  States she has an important call on other line and asked that I call back in 5 minutes.

## 2024-08-07 NOTE — TELEPHONE ENCOUNTER
Caller: WILLIE RICHTER    Relationship: Emergency Contact    Best call back number: [phone]     What is the best time to reach you: ANYTIME    Who are you requesting to speak with (clinical staff, provider,  specific staff member): CLINICAL STAFF    What was the call regarding: PATIENT WAS SEEN BY EMS ON 08/04/24 AND WAS CHECKED FOR A URINARY TRACT INFECTION, BUT WAS NEGATIVE. THE PATIENT'S EMERGENCY CONTACT STATED THAT HE IS STILL RUNNING A SLIGHT FEVER AND SHE WOULD LIKE TO SPEAK WITH CLINICAL STAFF ABOUT THIS.

## 2024-08-07 NOTE — TELEPHONE ENCOUNTER
Called patient wife back.  States patient started running low grade temp and having combative behavior so they went to ER on 8/4.  States they said urine was negative. Magnesium level low.  Sent him home with 3 doses of Magnesium Oxide and rxs for Lorazepam 1mg and Hydroyzine 25 mg.  States she has not given him any of the Lorazepam but did give dose of Hydroxyzine this morning. States she had also already started him on Doxycycline 100 mg that she had left because she felt he had infection and that she read Doxycycline was good for skin or urine and that she felt he either had UTI or skin infection as wounds on legs are still there. States he was awake all night talking and has been lethargic but mostly awake during the day.  States he just has look on his face that looks like he is sick and doesn't feel well.  States last temp was 99.3 and that he is sleeping now.  States they gave him some Tylenol and that did seem to make him feel better.  States she just needs to know what to do for him.

## 2024-08-07 NOTE — TELEPHONE ENCOUNTER
"Left message voicemail for patient wife, [place], that we were calling her back and to please return call.  Ofc # given.     RELAY:  Dr Geller said \"Continue the doxycycline and they could try the lorazepam if he becomes combative.\"     Document if notified.        "

## 2024-08-08 NOTE — TELEPHONE ENCOUNTER
"Spoke with patient wife, [place], informed that Dr Geller said   \"Continue the doxycycline and they could try the lorazepam if he becomes combative.\"  Verbalized good understanding, agreement and appreciation.Instructed to call if worsens or doesn't improve.  Agreed.      "

## 2024-08-09 ENCOUNTER — TELEPHONE (OUTPATIENT)
Dept: INTERNAL MEDICINE | Facility: CLINIC | Age: 69
End: 2024-08-09
Payer: MEDICARE

## 2024-08-09 NOTE — TELEPHONE ENCOUNTER
NEED ORDERS TO CONTINUE HOME HEALTH.   WOULD DR MURRAY BE ALRIGHT WITH [facility] ALGINATE AND THE WOUND BEING RAPPED WITH LASHONDA AND ALYX.    PLEASE ADVISE    [facility]   [phone]     FAX:[phone]

## 2024-08-12 NOTE — TELEPHONE ENCOUNTER
OK to give verbal.  Agree with all of the recommendations.  Des Geller MD  15:27 EDT  08/12/24

## 2024-08-12 NOTE — TELEPHONE ENCOUNTER
"Spoke with Clare with [facility], informed that Dr Geller said   \"OK to give verbal.  Agree with all of the recommendations.\"  Verbalized good understanding and great appreciation.   "

## 2024-08-13 RX ORDER — AMLODIPINE BESYLATE 10 MG/1
10 TABLET ORAL DAILY
Qty: 30 TABLET | Refills: 0 | Status: SHIPPED | OUTPATIENT
Start: 2024-08-13

## 2024-08-13 RX ORDER — LISINOPRIL 20 MG/1
20 TABLET ORAL DAILY
Qty: 30 TABLET | Refills: 0 | Status: SHIPPED | OUTPATIENT
Start: 2024-08-13

## 2024-08-26 ENCOUNTER — TELEPHONE (OUTPATIENT)
Dept: INTERNAL MEDICINE | Facility: CLINIC | Age: 69
End: 2024-08-26
Payer: MEDICARE

## 2024-08-26 DIAGNOSIS — F03.911 AGITATION DUE TO DEMENTIA: ICD-10-CM

## 2024-08-26 RX ORDER — HYDROXYZINE HYDROCHLORIDE 25 MG/1
25 TABLET, FILM COATED ORAL 3 TIMES DAILY PRN
Qty: 20 TABLET | Refills: 0 | Status: SHIPPED | OUTPATIENT
Start: 2024-08-26

## 2024-08-26 RX ORDER — PHENOBARBITAL 32.4 MG/1
32.4 TABLET ORAL 3 TIMES DAILY
Qty: 9 TABLET | Refills: 0 | Status: SHIPPED | OUTPATIENT
Start: 2024-08-26

## 2024-08-26 NOTE — TELEPHONE ENCOUNTER
Last office visit (LOV) for chronic conditions 07/11/24  Next office visit (NOV)  Urine drug screen (UDS) 08/04/24  Controlled substance agreement (CSA)

## 2024-08-26 NOTE — TELEPHONE ENCOUNTER
Caller: WILLIE RICHTER    Relationship: Emergency Contact    Best call back number: [phone]    Requested Prescriptions:   Requested Prescriptions     Pending Prescriptions Disp Refills    hydrOXYzine (ATARAX) 25 MG tablet 20 tablet 0     Sig: Take 1 tablet by mouth 3 (Three) Times a Day As Needed for Anxiety.      FENOBARBITAL UNSURE OF MG 3X DAILY     Pharmacy where request should be sent: [facility] - [phone]  - [phone] FX     Last office visit with prescribing clinician: 8/3/2023   Last telemedicine visit with prescribing clinician: Visit date not found   Next office visit with prescribing clinician: Visit date not found     Additional details provided by patient: PT IS OUT. HE RECEIVED THE FENOBARBITAL FROM THE NURSING HOME AND IT IS NOT ON HIS MED LIST    Does the patient have less than a 3 day supply:  [x] Yes  [] No    Would you like a call back once the refill request has been completed: [x] Yes [] No    If the office needs to give you a call back, can they leave a voicemail: [x] Yes [] No    Tawana Harris Rep   08/26/24 08:59 EDT

## 2024-08-30 DIAGNOSIS — F03.911 AGITATION DUE TO DEMENTIA: ICD-10-CM

## 2024-08-30 DIAGNOSIS — F03.918 DEMENTIA WITH BEHAVIORAL DISTURBANCE: ICD-10-CM

## 2024-08-30 RX ORDER — TEMAZEPAM 15 MG/1
15 CAPSULE ORAL NIGHTLY PRN
Qty: 30 CAPSULE | Refills: 0 | Status: SHIPPED | OUTPATIENT
Start: 2024-08-30

## 2024-08-30 RX ORDER — PHENOBARBITAL 32.4 MG/1
32.4 TABLET ORAL 3 TIMES DAILY
Qty: 9 TABLET | Refills: 0 | OUTPATIENT
Start: 2024-08-30

## 2024-08-30 NOTE — TELEPHONE ENCOUNTER
Caller: WILLIE RICHTER    Relationship to patient: Emergency Contact    Best call back number: [phone]     Patient is needing: temazepam (RESTORIL) 15 MG capsule     IF THE PATIENT DOES NOT GET THIS MEDICATION HE WILL NOT SLEEP. PATIENTS WIFE  IS VERY CONCERNED ABOUT THIS UP COMING WEEKEND.

## 2024-08-30 NOTE — TELEPHONE ENCOUNTER
Caller: Zaire Too CORDELL    Relationship: Self    Best call back number: [phone]     Requested Prescriptions:   Requested Prescriptions     Pending Prescriptions Disp Refills    temazepam (RESTORIL) 15 MG capsule [Pharmacy Med Name: temazepam 15 mg capsule] 30 capsule 0     Sig: Take 1 capsule by mouth At Night As Needed for Sleep.    PHENobarbital 32.4 MG tablet 9 tablet 0     Sig: Take 1 tablet by mouth 3 (Three) Times a Day.        Ph[facility] where request [facility] be sent[facility] [address] [phone] [facility] [phone]      Last office visit with prescribing clinician: 8/3/2023   Last telemedicine visit with prescribing clinician: Visit date not found   Next office visit with prescribing clinician: Visit date not found     Additional details provided by patient: PATIENT HAS A FEW DAYS LEFT ON THE MEDICATIONS     Does the patient have less than a 3 day supply:  [x] Yes  [] No    Would you like a call back once the refill request has been completed: [x] Yes [] No    If the office needs to give you a call back, can they leave a voicemail: [x] Yes [] No    Tawana Dunlap Rep   08/30/24 14:02 EDT

## 2024-09-04 ENCOUNTER — OUTSIDE FACILITY SERVICE (OUTPATIENT)
Dept: INTERNAL MEDICINE | Facility: CLINIC | Age: 69
End: 2024-09-04
Payer: MEDICARE

## 2024-09-04 PROCEDURE — G0179 MD RECERTIFICATION HHA PT: HCPCS | Performed by: INTERNAL MEDICINE

## 2024-09-05 DIAGNOSIS — F03.911 AGITATION DUE TO DEMENTIA: ICD-10-CM

## 2024-09-05 RX ORDER — PHENOBARBITAL 32.4 MG/1
32.4 TABLET ORAL 3 TIMES DAILY
Qty: 9 TABLET | Refills: 0 | OUTPATIENT
Start: 2024-09-05

## 2024-09-05 RX ORDER — PHENOBARBITAL 32.4 MG/1
32.4 TABLET ORAL 3 TIMES DAILY
Qty: 90 TABLET | Refills: 0 | Status: SHIPPED | OUTPATIENT
Start: 2024-09-05

## 2024-09-05 RX ORDER — HYDROXYZINE HYDROCHLORIDE 25 MG/1
25 TABLET, FILM COATED ORAL 3 TIMES DAILY PRN
Qty: 45 TABLET | Refills: 0 | Status: SHIPPED | OUTPATIENT
Start: 2024-09-05

## 2024-09-05 NOTE — TELEPHONE ENCOUNTER
Caller: WILLIE RICHTER    Relationship: Emergency Contact    Best call back number: [phone]    Requested Prescriptions:   Requested Prescriptions     Pending Prescriptions Disp Refills    PHENobarbital 32.4 MG tablet 9 tablet 0     Sig: Take 1 tablet by mouth 3 (Three) Times a Day.    hydrOXYzine (ATARAX) 25 MG tablet 20 tablet 0     Sig: Take 1 tablet by mouth 3 (Three) Times a Day As Needed for Anxiety.        [facility] where request should be sent: [facility] [phone] [facility] [phone]      Last office visit with prescribing clinician: 8/3/2023   Last telemedicine visit with prescribing clinician: Visit date not found   Next office visit with prescribing clinician: Visit date not found     Additional details provided by patient: THE PATIENTS WIFE STATES THAT HE IS OUT OF MEDICATION AND THEY NEED IT AS SOON AS POSSIBLE     Does the patient have less than a 3 day supply:  [x] Yes  [] No    Would you like a call back once the refill request has been completed: [] Yes [x] No    If the office needs to give you a call back, can they leave a voicemail: [] Yes [x] No    Tawana Garner Rep   09/05/24 10:54 EDT

## 2024-09-06 ENCOUNTER — TELEPHONE (OUTPATIENT)
Dept: INTERNAL MEDICINE | Facility: CLINIC | Age: 69
End: 2024-09-06
Payer: MEDICARE

## 2024-09-10 RX ORDER — LISINOPRIL 20 MG/1
20 TABLET ORAL DAILY
Qty: 30 TABLET | Refills: 0 | Status: SHIPPED | OUTPATIENT
Start: 2024-09-10

## 2024-09-10 RX ORDER — AMLODIPINE BESYLATE 10 MG/1
10 TABLET ORAL DAILY
Qty: 30 TABLET | Refills: 0 | Status: SHIPPED | OUTPATIENT
Start: 2024-09-10

## 2024-09-13 ENCOUNTER — TELEPHONE (OUTPATIENT)
Dept: INTERNAL MEDICINE | Facility: CLINIC | Age: 69
End: 2024-09-13
Payer: MEDICARE

## 2024-09-13 ENCOUNTER — DOCUMENTATION (OUTPATIENT)
Dept: INTERNAL MEDICINE | Facility: CLINIC | Age: 69
End: 2024-09-13
Payer: MEDICARE

## 2024-09-13 DIAGNOSIS — R30.0 DYSURIA: Primary | ICD-10-CM

## 2024-09-13 NOTE — TELEPHONE ENCOUNTER
Ferny from [facility] called and was read the message from RAMÍREZ Murdock.

## 2024-09-13 NOTE — TELEPHONE ENCOUNTER
Ferny from [facility] called. Patient has wounds that are declining. They want to know if they can start using Medi Honey sheets to treat the wounds.   Call: [phone]

## 2024-09-17 ENCOUNTER — DOCUMENTATION (OUTPATIENT)
Dept: INTERNAL MEDICINE | Facility: CLINIC | Age: 69
End: 2024-09-17
Payer: MEDICARE

## 2024-09-19 ENCOUNTER — TELEPHONE (OUTPATIENT)
Dept: NEUROLOGY | Facility: CLINIC | Age: 69
End: 2024-09-19
Payer: MEDICARE

## 2024-09-23 DIAGNOSIS — F03.918 DEMENTIA WITH BEHAVIORAL DISTURBANCE: ICD-10-CM

## 2024-09-24 RX ORDER — TEMAZEPAM 15 MG/1
15 CAPSULE ORAL NIGHTLY PRN
Qty: 30 CAPSULE | Refills: 0 | Status: SHIPPED | OUTPATIENT
Start: 2024-09-24

## 2024-10-07 RX ORDER — AMLODIPINE BESYLATE 10 MG/1
10 TABLET ORAL DAILY
Qty: 30 TABLET | Refills: 2 | Status: SHIPPED | OUTPATIENT
Start: 2024-10-07

## 2024-10-07 RX ORDER — LISINOPRIL 20 MG/1
20 TABLET ORAL DAILY
Qty: 30 TABLET | Refills: 2 | Status: SHIPPED | OUTPATIENT
Start: 2024-10-07

## 2024-10-15 ENCOUNTER — TELEPHONE (OUTPATIENT)
Dept: INTERNAL MEDICINE | Facility: CLINIC | Age: 69
End: 2024-10-15
Payer: MEDICARE

## 2024-10-15 DIAGNOSIS — R53.83 FATIGUE, UNSPECIFIED TYPE: Primary | ICD-10-CM

## 2024-10-15 NOTE — TELEPHONE ENCOUNTER
Caller: WILLIE RICHTER    Relationship: Emergency Contact    Best call back number: [phone]     What was the call regarding: WILLIE SAYS THAT HER  HAS BEEN VERY WEAK. HE HASN'T BEEN EATING MUCH EITHER. THIS STARTED ABOUT 3 DAYS AGO. PLEASE CALL TO DISCUSS. SHE SAYS IT'S DIFFICULT FOR HER TO GET HIM OUT.    Is it okay if the provider responds through MyChart: NO

## 2024-10-16 ENCOUNTER — TELEPHONE (OUTPATIENT)
Dept: INTERNAL MEDICINE | Facility: CLINIC | Age: 69
End: 2024-10-16
Payer: MEDICARE

## 2024-10-16 NOTE — TELEPHONE ENCOUNTER
"Spoke with Savannah with [facility] , informed that Dr Geller said   \"I have sent in an antibiotic.  Augmentin 875 mg po BID x 10 days.      I will sign home health orders\"  Verbalized understanding and great appreciation.      Asked if they could obtain labs ordered by Dr Geller and fax us results.  States she can send nurse out tomorrow to obtain.  Orders given for CBC with diff, CMP, T4 free, TSH, UA and Urine culture. Verbalized understaning and agreement.   "

## 2024-10-16 NOTE — TELEPHONE ENCOUNTER
Caller: [facility]    Relationship: Home Health    Best call back number: [phone]     What is the best time to reach you: BEFORE 5    Who are you requesting to speak with (clinical staff, provider,  specific staff member): CLINICAL STAFF    What was the call regarding: WOULD LIKE TO KNOW IF PCP WILL CONTINUE TO SIGN  HOME HEALTH ORDERS.? HE WAS SEEN ON 10/15/24, FOR HIS 60 DAY RE-CERTIFICATION. HOME HEALTH STATED THAT THE FAMILY STATED THAT HE ACTED LIKE HE HAD A HEADACHE AND SORE  THROAT. HOME HEALTH STATED THAT HE HAD A SLIGHT FEVER OF 98.9 AND A PULSE OF 98. HOME HEALTH IS CONCERNED THAT THE PATIENT MAY HAVE A SINUS OR RESPIRATORY INFECTION.

## 2024-10-16 NOTE — TELEPHONE ENCOUNTER
"Attempted to call patient wife x 2, no answer and voicemail is full and cannot accept new messages.     Spoke with Savannah with [facility], gave verbal order for labs Dr Geller order (CBC with diff, CMP, T4 Free, TSH, U/A and Urine culture.) States they will send nurse out tomorrow to obtain.       If wife should return call please    RELAY:  Dr Geller said \"I have put in orders for labs. We have contacted [facility] and they will be coming out to obtain.     Also Dr Geller has sent in rx for Augmentin to the pharmacy for him.    Document if notified.                                   "

## 2024-10-16 NOTE — TELEPHONE ENCOUNTER
I have put in orders for labs.  Please have them stop in for labs or have home health draw them if they have home health.  Des Geller MD  06:55 EDT  10/16/24

## 2024-10-16 NOTE — TELEPHONE ENCOUNTER
I have sent in an antibiotic.  Augmentin 875 mg po BID x 10 days.     I will sign home health orders.     Des Geller MD  17:49 EDT  10/16/24

## 2024-10-17 ENCOUNTER — TELEPHONE (OUTPATIENT)
Dept: INTERNAL MEDICINE | Facility: CLINIC | Age: 69
End: 2024-10-17
Payer: MEDICARE

## 2024-10-17 NOTE — TELEPHONE ENCOUNTER
Spoke with Alanna with [facility], informed that Dr Geller will continue to be primary with Hospice.  Verbalized understanding and great appreciation.

## 2024-10-17 NOTE — TELEPHONE ENCOUNTER
[facility]    Best call back number: [phone]   OPTION 2     What orders are you requesting (i.e. lab or imaging): VERBAL ORDERS STATING THAT DR. MURRAY IS GOING TO STAY ON AS ATTENDING WITH HOSPICE     Additional notes: PLEASE CALL BACK TO DISCUSS.

## 2024-10-26 ENCOUNTER — READMISSION MANAGEMENT (OUTPATIENT)
Dept: CALL CENTER | Facility: HOSPITAL | Age: 69
End: 2024-10-26
Payer: MEDICARE

## 2024-10-27 NOTE — OUTREACH NOTE
Prep Survey      Flowsheet Row Responses   Gnosticism facility patient discharged from? Non-BH   Is LACE score < 7 ? Non-BH Discharge   Eligibility [facility]   Date of Admission 10/18/24   Date of Discharge 10/26/24   Discharge Disposition Home or Self Care   Discharge diagnosis Sepsis without acute organ dysfunction,   Does the patient have one of the following disease processes/diagnoses(primary or secondary)? Sepsis   Does the patient have Home health ordered? No   Is there a DME ordered? No   Prep survey completed? Yes            DESTIN LADD - Registered Nurse

## 2024-10-28 ENCOUNTER — TRANSITIONAL CARE MANAGEMENT TELEPHONE ENCOUNTER (OUTPATIENT)
Dept: CALL CENTER | Facility: HOSPITAL | Age: 69
End: 2024-10-28
Payer: MEDICARE

## 2024-10-28 NOTE — OUTREACH NOTE
Call Center TCM Note      Flowsheet Row Responses   [facility] patient discharged from? Non-  [[facility]]   Does the patient have one of the following disease processes/diagnoses(primary or secondary)? Other   TCM attempt successful? Yes   Call start time 1300   Call end time 1306   Discharge diagnosis Sepsis without acute organ dysfunction,   Meds reviewed with patient/caregiver? Yes   Is the patient having any side effects they believe may be caused by any medication additions or changes? No   Does the patient have all medications ordered at discharge? Yes   Is the patient taking all medications as directed (includes completed medication regime)? Yes   Comments No availabel HOSP DC FU appts with Dr. GELLER that meets TCM guidelines.   Does the patient have an appointment with their PCP within 7-14 days of discharge? No appointments available   Nursing Interventions Routed TCM call to PCP office   Has home health visited the patient within 72 hours of discharge? Unsure   Psychosocial issues? No   Did the patient receive a copy of their discharge instructions? Yes   Nursing interventions Reviewed instructions with patient   What is the patient's perception of their health status since discharge? Same   Is the patient/caregiver able to teach back signs and symptoms related to disease process for when to call PCP? Yes   Is the patient/caregiver able to teach back signs and symptoms related to disease process for when to call 911? Yes   Is the patient/caregiver able to teach back the hierarchy of who to call/visit for symptoms/problems? PCP, Specialist, Home health nurse, Urgent Care, ED, 911 Yes   TCM call completed? Yes   Wrap up additional comments Wife reports that Pt is about the same. If she does not hear from HH or wound care she will call them. Wife will also call PCP ffice as she only wants Pt to see Dr. Geller.   Call end time 1306            Maria Ines Cervantes RN    10/28/2024, 13:07 EDT

## 2024-10-29 ENCOUNTER — TELEPHONE (OUTPATIENT)
Dept: INTERNAL MEDICINE | Facility: CLINIC | Age: 69
End: 2024-10-29
Payer: MEDICARE

## 2024-10-29 NOTE — TELEPHONE ENCOUNTER
Spoke to Kelly, and she stated heart rate was the only concerning thing. She mentioned he just recently got out the hospital, and was septic. He had a UTI, and wounds on both legs that are not healing. She stated BP, and all other vitals are normal and stable and that he was alerted.

## 2024-10-29 NOTE — TELEPHONE ENCOUNTER
BRANDON [facility] [phone] PATIENT GOT OUT HOSPITAL ON SATURDAY , BRANDON IS WITH PATIENT NOW AND HIS HEART RATE , CALL BRANDON TO DISCUSS

## 2024-10-29 NOTE — TELEPHONE ENCOUNTER
Please call and inform if any fever, changing in mental status, BP less than 100/60, chest pain, or trouble breathing patient should be seen in ER.     Please make sure patient is taking bisoprolol as prescribed (5mg daily).     Dr. Lake

## 2024-10-31 DIAGNOSIS — F03.911 AGITATION DUE TO DEMENTIA: ICD-10-CM

## 2024-10-31 DIAGNOSIS — F03.918 DEMENTIA WITH BEHAVIORAL DISTURBANCE: ICD-10-CM

## 2024-10-31 RX ORDER — PHENOBARBITAL 32.4 MG/1
32.4 TABLET ORAL 3 TIMES DAILY
Qty: 45 TABLET | Refills: 0 | Status: SHIPPED | OUTPATIENT
Start: 2024-10-31

## 2024-10-31 RX ORDER — TEMAZEPAM 15 MG/1
15 CAPSULE ORAL NIGHTLY PRN
Qty: 30 CAPSULE | Refills: 0 | Status: SHIPPED | OUTPATIENT
Start: 2024-10-31

## 2024-11-08 ENCOUNTER — TELEPHONE (OUTPATIENT)
Dept: INTERNAL MEDICINE | Facility: CLINIC | Age: 69
End: 2024-11-08

## 2024-11-08 ENCOUNTER — OUTSIDE FACILITY SERVICE (OUTPATIENT)
Dept: INTERNAL MEDICINE | Facility: CLINIC | Age: 69
End: 2024-11-08
Payer: MEDICARE

## 2024-11-11 NOTE — TELEPHONE ENCOUNTER
"Okay per Dr Geller for [facility] to do wound care.     Left message voicemail for [facility] that we were returning their call and to please call back.  Ofc. # given.     RELAY \"Okay per Dr Geller for [facility] to do wound care.\"    Document if notified.    "

## 2024-11-12 NOTE — TELEPHONE ENCOUNTER
Spoke with Katiuska with [facility], informed jevon per Dr Geller for [facility] to do wound care. Verbalized good understanding and appreciation.

## 2024-11-20 RX ORDER — MUPIROCIN 20 MG/G
1 OINTMENT TOPICAL 3 TIMES DAILY
Qty: 30 G | Refills: 1 | Status: SHIPPED | OUTPATIENT
Start: 2024-11-20

## 2024-11-23 ENCOUNTER — DOCUMENTATION (OUTPATIENT)
Dept: INTERNAL MEDICINE | Facility: CLINIC | Age: 69
End: 2024-11-23
Payer: MEDICARE

## 2024-11-23 RX ORDER — NYSTATIN 100000 [USP'U]/G
POWDER TOPICAL 3 TIMES DAILY
Qty: 60 G | Refills: 1 | Status: SHIPPED | OUTPATIENT
Start: 2024-11-23

## 2024-11-26 DIAGNOSIS — F03.911 AGITATION DUE TO DEMENTIA: ICD-10-CM

## 2024-11-26 DIAGNOSIS — F03.918 DEMENTIA WITH BEHAVIORAL DISTURBANCE: ICD-10-CM

## 2024-11-26 RX ORDER — PHENOBARBITAL 32.4 MG/1
32.4 TABLET ORAL 3 TIMES DAILY
Qty: 45 TABLET | Refills: 0 | Status: SHIPPED | OUTPATIENT
Start: 2024-11-26

## 2024-11-26 RX ORDER — TEMAZEPAM 15 MG/1
15 CAPSULE ORAL NIGHTLY PRN
Qty: 30 CAPSULE | Refills: 0 | Status: SHIPPED | OUTPATIENT
Start: 2024-11-26

## 2024-12-04 ENCOUNTER — TELEPHONE (OUTPATIENT)
Dept: INTERNAL MEDICINE | Facility: CLINIC | Age: 69
End: 2024-12-04
Payer: MEDICARE

## 2024-12-04 NOTE — TELEPHONE ENCOUNTER
"Spoke with Clare, home health nurse, informed that Dr Geller said   \"With a fever of 103 and pulse of 120, he needs to be seen.  He should go to the [facility]. \"  Verbalized understanding and agreement.  States she will call patient wife and relay message to her now.  States she doesn't know that his wife will take him.  Explained that he has to been seen and treated and that is the best thing to do and Dr Geller's recommendations.  States she will relay.   "

## 2024-12-04 NOTE — TELEPHONE ENCOUNTER
Caller: DENISE    Relationship: Home Health    Best call back number: [phone]     What was the call regarding: HOME HEALTH NURSE STATES SHE SAW THE PATIENT EARLIER TODAY AND HE HAD A FEVER .5 AND HIS HEART RATE WAS STAYING BETWEEN 110-120. THEY ARE TREATING HIM FOR LOWER LEG WOUNDS. ONE OF HIS CAREGIVERS WAS SICK EARLIER THIS WEEK AND THEY ARE NOT SURE IF HE IS JUST PRESENTING WITH SYMPTOMS DUE TO EXPOSURE TO HER, OR IF IT IS DUE TO SOMETHING ELSE. HIS WIFE WOULD LIKE TO KNOW IF THE  CAN CALL IN AN ANTIBIOTIC FOR HIM. ON HIS RIGHT LOWER EXTREMITY HE HAS NECROTIC TISSUE AND IT IS A LITTLE RED. THAT IS THE ONLY CHANGE IN HIS WOUNDS.

## 2024-12-16 NOTE — TELEPHONE ENCOUNTER
Caller: WILLIE RICHTER    Relationship: Emergency Contact    Best call back number: [phone]     Requested Prescriptions:   Requested Prescriptions     Pending Prescriptions Disp Refills    amoxicillin-clavulanate (AUGMENTIN) 875-125 MG per tablet 20 tablet 0     Sig: Take 1 tablet by mouth 2 (Two) Times a Day.    ANOTHER ANTIBIOTIC THAT SHE CANNOT REMEMBER.    Pharmacy where request should be sent: [address] [phone] [place] [phone] FX     Last office visit with prescribing clinician: 8/3/2023   Last telemedicine visit with prescribing clinician: Visit date not found   Next office visit with prescribing clinician: Visit date not found     Additional details provided by patient: PATIENT'S WIFE STATES THAT HE HAS BEEN HAVING FEVERS AND WHEN HE TAKES THESE MEDICATIONS, IT GOES AWAY.  HOWEVER, HE DOES NOT HAVE A FEVER RIGHT NOW.  HE IS OUT OF THE MEDICATIONS.    Does the patient have less than a 3 day supply:  [x] Yes  [] No    Would you like a call back once the refill request has been completed: [] Yes [x] No    If the office needs to give you a call back, can they leave a voicemail: [] Yes [x] No    Tawana Kennedy Rep   12/16/24 10:49 EST

## 2024-12-17 RX ORDER — SILVER SULFADIAZINE 10 MG/G
1 CREAM TOPICAL 2 TIMES DAILY
Qty: 400 G | Refills: 1 | Status: SHIPPED | OUTPATIENT
Start: 2024-12-17

## 2024-12-21 DIAGNOSIS — F03.911 AGITATION DUE TO DEMENTIA: ICD-10-CM

## 2024-12-23 ENCOUNTER — TELEPHONE (OUTPATIENT)
Dept: INTERNAL MEDICINE | Facility: CLINIC | Age: 69
End: 2024-12-23
Payer: MEDICARE

## 2024-12-23 DIAGNOSIS — F03.911 AGITATION DUE TO DEMENTIA: ICD-10-CM

## 2024-12-23 RX ORDER — PHENOBARBITAL 32.4 MG/1
32.4 TABLET ORAL 3 TIMES DAILY
Qty: 90 TABLET | Refills: 1 | Status: SHIPPED | OUTPATIENT
Start: 2024-12-23

## 2024-12-23 RX ORDER — PHENOBARBITAL 32.4 MG/1
32.4 TABLET ORAL 3 TIMES DAILY
Qty: 45 TABLET | Refills: 0 | OUTPATIENT
Start: 2024-12-23

## 2024-12-23 NOTE — TELEPHONE ENCOUNTER
SPOUSE OF PATIENT HAS CALLED AND STATED PATIENT IS OUT OF HIS PHENobarbital 32.4 MG tablet PREVIOUS PRESCRIPTION WAS DENIED STATING PATIENT NEEDS AN APPOINTMENT. SPOUSE STATES THERE IS NO WAY PATIENT CAN GET IN THE OFFICE FOR AN APPOINTMENT. SPOUSE IS REQUESTING A CALL BACK ASAP TO DISCUSS. SPOUSE STATES PATIENT WILL NEED HIS MEDICATION TODAY.    CALL BACK NUMBER [phone]

## 2024-12-31 RX ORDER — ASPIRIN 81 MG/1
81 TABLET, CHEWABLE ORAL DAILY
Qty: 30 TABLET | Refills: 2 | Status: SHIPPED | OUTPATIENT
Start: 2024-12-31

## 2024-12-31 RX ORDER — ROSUVASTATIN CALCIUM 20 MG/1
20 TABLET, COATED ORAL
Qty: 30 TABLET | Refills: 2 | Status: SHIPPED | OUTPATIENT
Start: 2024-12-31

## 2024-12-31 RX ORDER — LISINOPRIL 20 MG/1
20 TABLET ORAL DAILY
Qty: 30 TABLET | Refills: 2 | Status: SHIPPED | OUTPATIENT
Start: 2024-12-31

## 2024-12-31 RX ORDER — DONEPEZIL HYDROCHLORIDE 10 MG/1
10 TABLET, FILM COATED ORAL 2 TIMES DAILY
Qty: 60 TABLET | Refills: 2 | Status: SHIPPED | OUTPATIENT
Start: 2024-12-31

## 2024-12-31 RX ORDER — FOLIC ACID 1 MG/1
1000 TABLET ORAL DAILY
Qty: 30 TABLET | Refills: 2 | Status: SHIPPED | OUTPATIENT
Start: 2024-12-31

## 2024-12-31 RX ORDER — CLOPIDOGREL BISULFATE 75 MG/1
75 TABLET ORAL DAILY
Qty: 30 TABLET | Refills: 2 | Status: SHIPPED | OUTPATIENT
Start: 2024-12-31

## 2025-01-01 ENCOUNTER — TELEPHONE (OUTPATIENT)
Dept: INTERNAL MEDICINE | Facility: CLINIC | Age: 70
End: 2025-01-01

## 2025-01-02 DIAGNOSIS — F03.918 DEMENTIA WITH BEHAVIORAL DISTURBANCE: ICD-10-CM

## 2025-01-03 RX ORDER — TEMAZEPAM 15 MG/1
15 CAPSULE ORAL NIGHTLY PRN
Qty: 30 CAPSULE | Refills: 0 | Status: SHIPPED | OUTPATIENT
Start: 2025-01-03

## 2025-01-03 NOTE — TELEPHONE ENCOUNTER
CALLING BACK TO GET STATUS OF RX. STATES THAT THE PT NEEDS THIS MEDICATION TO SLEEP. PHARMACY THEY USE DOESN'T ACCEPT REFILLS AFTER 5 PM TODAY

## 2025-01-08 NOTE — TELEPHONE ENCOUNTER
Name: WILLIE RICHTER      Relationship: Emergency Contact      Best Callback Number: [phone]       HUB PROVIDED THE RELAY MESSAGE FROM THE OFFICE      PATIENT: VOICED UNDERSTANDING AND HAS NO FURTHER QUESTIONS AT THIS TIME    ADDITIONAL INFORMATION:

## 2025-01-08 NOTE — TELEPHONE ENCOUNTER
Tried to reach patient no answer left voicemail to return call    RELAY:  Patient does need to update his CSA however I did call in his temazepam 30-day supply

## 2025-02-18 NOTE — TELEPHONE ENCOUNTER
Caller: [employer]    Relationship: Other    Best call back number: [phone] REFERENCE NUMBER [id number]    What is the best time to reach you: ANYTIME     Who are you requesting to speak with (clinical staff, provider,  specific staff member): NURSE       What was the call regarding: THE CALLER STATES THAT SHE NEEDS TO CONFIRM THE PATIENTS DIAGNOSIS OF CHRONIC HEART FAILURE CARDIOVASCULAR DISEASE AND DIABETES

## 2025-02-18 NOTE — TELEPHONE ENCOUNTER
[employer] faxed form requesting this information.  Form received and placed on your desk.  They need completed and signed and faxed back.

## 2025-02-24 ENCOUNTER — TELEPHONE (OUTPATIENT)
Dept: INTERNAL MEDICINE | Facility: CLINIC | Age: 70
End: 2025-02-24

## 2025-02-24 NOTE — TELEPHONE ENCOUNTER
“Please be informed that patient has passed. Patient has been marked  in the system. The date of death is: 26    Caller: [facility] KARINA    Relationship:     Best call back number: [phone]     Did the patient have surgery within 30 days of their passing (Y/N): UNKNOWN